# Patient Record
Sex: MALE | Race: BLACK OR AFRICAN AMERICAN | Employment: OTHER | URBAN - METROPOLITAN AREA
[De-identification: names, ages, dates, MRNs, and addresses within clinical notes are randomized per-mention and may not be internally consistent; named-entity substitution may affect disease eponyms.]

---

## 2019-01-23 ENCOUNTER — OP HISTORICAL/CONVERTED ENCOUNTER (OUTPATIENT)
Dept: OTHER | Age: 64
End: 2019-01-23

## 2021-06-14 ENCOUNTER — OFFICE VISIT (OUTPATIENT)
Dept: SURGERY | Age: 66
End: 2021-06-14
Payer: MEDICARE

## 2021-06-14 ENCOUNTER — TRANSCRIBE ORDER (OUTPATIENT)
Dept: REGISTRATION | Age: 66
End: 2021-06-14

## 2021-06-14 ENCOUNTER — HOSPITAL ENCOUNTER (OUTPATIENT)
Dept: LAB | Age: 66
Discharge: HOME | End: 2021-06-14
Attending: SURGERY
Payer: MEDICARE

## 2021-06-14 ENCOUNTER — HOSPITAL ENCOUNTER (OUTPATIENT)
Dept: CT IMAGING | Age: 66
Discharge: HOME | End: 2021-06-14
Attending: SURGERY
Payer: MEDICARE

## 2021-06-14 VITALS
WEIGHT: 163.6 LBS | RESPIRATION RATE: 12 BRPM | HEART RATE: 61 BPM | OXYGEN SATURATION: 99 % | BODY MASS INDEX: 24.23 KG/M2 | HEIGHT: 69 IN | SYSTOLIC BLOOD PRESSURE: 152 MMHG | DIASTOLIC BLOOD PRESSURE: 72 MMHG | TEMPERATURE: 97.1 F

## 2021-06-14 DIAGNOSIS — L97.509 FOOT ULCER (HCC): Primary | ICD-10-CM

## 2021-06-14 DIAGNOSIS — I73.9 PERIPHERAL VASCULAR DISEASE (HCC): ICD-10-CM

## 2021-06-14 DIAGNOSIS — L97.509 FOOT ULCER (HCC): ICD-10-CM

## 2021-06-14 DIAGNOSIS — I70.25 ISCHEMIC FOOT ULCER DUE TO ATHEROSCLEROSIS OF NATIVE ARTERY OF LIMB (HCC): ICD-10-CM

## 2021-06-14 DIAGNOSIS — L97.509 ISCHEMIC FOOT ULCER DUE TO ATHEROSCLEROSIS OF NATIVE ARTERY OF LIMB (HCC): ICD-10-CM

## 2021-06-14 DIAGNOSIS — I70.25 ISCHEMIC FOOT ULCER DUE TO ATHEROSCLEROSIS OF NATIVE ARTERY OF LIMB (HCC): Primary | ICD-10-CM

## 2021-06-14 DIAGNOSIS — L97.509 ISCHEMIC FOOT ULCER DUE TO ATHEROSCLEROSIS OF NATIVE ARTERY OF LIMB (HCC): Primary | ICD-10-CM

## 2021-06-14 LAB — CREAT SERPL-MCNC: 0.86 MG/DL (ref 0.7–1.3)

## 2021-06-14 PROCEDURE — 3017F COLORECTAL CA SCREEN DOC REV: CPT | Performed by: SURGERY

## 2021-06-14 PROCEDURE — 75635 CT ANGIO ABDOMINAL ARTERIES: CPT

## 2021-06-14 PROCEDURE — G8510 SCR DEP NEG, NO PLAN REQD: HCPCS | Performed by: SURGERY

## 2021-06-14 PROCEDURE — 36415 COLL VENOUS BLD VENIPUNCTURE: CPT

## 2021-06-14 PROCEDURE — G8420 CALC BMI NORM PARAMETERS: HCPCS | Performed by: SURGERY

## 2021-06-14 PROCEDURE — 1101F PT FALLS ASSESS-DOCD LE1/YR: CPT | Performed by: SURGERY

## 2021-06-14 PROCEDURE — 99203 OFFICE O/P NEW LOW 30 MIN: CPT | Performed by: SURGERY

## 2021-06-14 PROCEDURE — 82565 ASSAY OF CREATININE: CPT

## 2021-06-14 PROCEDURE — G8536 NO DOC ELDER MAL SCRN: HCPCS | Performed by: SURGERY

## 2021-06-14 PROCEDURE — G8427 DOCREV CUR MEDS BY ELIG CLIN: HCPCS | Performed by: SURGERY

## 2021-06-14 PROCEDURE — 74011000636 HC RX REV CODE- 636: Performed by: SURGERY

## 2021-06-14 RX ORDER — GABAPENTIN 100 MG/1
1 CAPSULE ORAL DAILY
COMMUNITY
Start: 2021-06-10

## 2021-06-14 RX ORDER — CLOPIDOGREL BISULFATE 75 MG/1
1 TABLET ORAL DAILY
COMMUNITY
Start: 2021-05-06

## 2021-06-14 RX ORDER — FUROSEMIDE 40 MG/1
1 TABLET ORAL 2 TIMES DAILY
COMMUNITY
Start: 2021-06-10

## 2021-06-14 RX ORDER — LOSARTAN POTASSIUM 25 MG/1
1 TABLET ORAL DAILY
COMMUNITY
Start: 2021-05-18

## 2021-06-14 RX ORDER — ISOSORBIDE MONONITRATE 30 MG/1
1 TABLET, EXTENDED RELEASE ORAL DAILY
COMMUNITY
Start: 2021-05-30

## 2021-06-14 RX ORDER — DOCUSATE SODIUM 100 MG/1
100 CAPSULE, LIQUID FILLED ORAL AS NEEDED
COMMUNITY

## 2021-06-14 RX ORDER — METOPROLOL TARTRATE 25 MG/1
1 TABLET, FILM COATED ORAL 2 TIMES DAILY
COMMUNITY
Start: 2021-05-06

## 2021-06-14 RX ORDER — ATORVASTATIN CALCIUM 80 MG/1
1 TABLET, FILM COATED ORAL DAILY
COMMUNITY
Start: 2021-06-09

## 2021-06-14 RX ADMIN — IOPAMIDOL 100 ML: 755 INJECTION, SOLUTION INTRAVENOUS at 15:02

## 2021-06-14 NOTE — PROGRESS NOTES
Chief Complaint   Patient presents with    New Patient     Right leg pain/poor circulation      1. Have you been to the ER, urgent care clinic since your last visit? Hospitalized since your last visit? No    2. Have you seen or consulted any other health care providers outside of the [address] [place] [address] since your last visit? Include any pap smears or colon screening.  No    Visit Vitals  BP (!) 152/72 (BP 1 Location: Left upper arm, BP Patient Position: Sitting, BP Cuff Size: Adult)   Pulse 61   Temp 97.1 °F (36.2 °C) (Temporal)   Resp 12   Ht 5' 9\" (1.753 m)   Wt 163 lb 9.6 oz (74.2 kg)   SpO2 99%   BMI 24.16 kg/m²

## 2021-06-17 PROBLEM — L97.509 ISCHEMIC FOOT ULCER DUE TO ATHEROSCLEROSIS OF NATIVE ARTERY OF LIMB (HCC): Status: ACTIVE | Noted: 2021-06-17

## 2021-06-17 PROBLEM — I70.25 ISCHEMIC FOOT ULCER DUE TO ATHEROSCLEROSIS OF NATIVE ARTERY OF LIMB (HCC): Status: ACTIVE | Noted: 2021-06-17

## 2021-06-17 PROBLEM — I73.9 PERIPHERAL VASCULAR DISEASE (HCC): Status: ACTIVE | Noted: 2021-06-17

## 2021-06-17 NOTE — PROGRESS NOTES
VASCULAR history and physical      Subjective:   CHIEF COMPLAINTS:  Leg pain  PRESENTATION OF ILLNESS:  Mr. Ishmael Claros is a very pleasant 79-year-old man who has known history of peripheral vascular disease. He is complaining of bilateral leg pain. Especially worse in right leg. Pain is constant. Patient has some sort of a vascular work-up and procedures done at the [facility] in 2014. Patient is not quite sure what was exactly done. And when symptoms started about a month ago. He says probably longer. But significant pain started about a month ago. Patient denies chest pain shortness of breath. Patient does have a history of congestive heart failure. Patient denies abdominal pain nausea weight loss or history of stroke. Past Medical History:   Diagnosis Date    CAD (coronary artery disease)     HTN (hypertension)     Hyperlipidemia     Ischemic cardiomyopathy     PVD (peripheral vascular disease) ([address]       Past Surgical History:   Procedure Laterality Date    HX HEART VALVE SURGERY  2017    HX IMPLANTABLE CARDIOVERTER DEFIBRILLATOR  2014     Family History   Problem Relation Age of Onset    Hypertension Mother     Heart Attack Father     Hypertension Father       Social History     Tobacco Use    Smoking status: Current Every Day Smoker     Packs/day: 0.25     Years: 40.00     Pack years: 10.00     Types: Cigarettes    Smokeless tobacco: Never Used   Substance Use Topics    Alcohol use: Not Currently       Prior to Admission medications    Medication Sig Start Date End Date Taking? Authorizing Provider   atorvastatin (LIPITOR) 80 mg tablet Take 1 Tablet by mouth daily. 6/9/21  Yes Provider, Historical   clopidogreL (PLAVIX) 75 mg tab Take 1 Tablet by mouth daily. 5/6/21  Yes Provider, Historical   furosemide (LASIX) 40 mg tablet Take 1 Tablet by mouth two (2) times a day. 6/10/21  Yes Provider, Historical   isosorbide mononitrate ER (IMDUR) 30 mg tablet Take 1 Tablet by mouth daily.  5/30/21 Yes Provider, Historical   losartan (COZAAR) 25 mg tablet Take 1 Tablet by mouth daily. 5/18/21  Yes Provider, Historical   metoprolol tartrate (LOPRESSOR) 25 mg tablet Take 1 Tablet by mouth two (2) times a day. 5/6/21  Yes Provider, Historical   gabapentin (NEURONTIN) 100 mg capsule Take 1 Capsule by mouth daily. 6/10/21  Yes Provider, Historical   docusate sodium (COLACE) 100 mg capsule Take 100 mg by mouth as needed for Constipation. Yes Provider, Historical     Not on File     Review of Systems:  I reviewed the rest of organ systems personally and they were negative signed by Dr. Gray Anand    Objective:     Visit Vitals  BP (!) 152/72 (BP 1 Location: Left upper arm, BP Patient Position: Sitting, BP Cuff Size: Adult)   Pulse 61   Temp 97.1 °F (36.2 °C) (Temporal)   Resp 12   Ht 5' 9\" (1.753 m)   Wt 163 lb 9.6 oz (74.2 kg)   SpO2 99%   BMI 24.16 kg/m²     VITAL SIGNS REVIEWED. Physical Exam:  Patient is well-nourished pleasant in conversation is appropriate. Head and neck examination atraumatic, normocephalic. Gaze appropriate. Conversation appropriate. Neck examination shows supple. No mass. No obvious carotid bruit. Chest examination shows lungs are clear bilaterally well-expanded, no crackles or wheezes. Cardiovascular system regular rate, no obvious murmur. Skin warm to touch  and moist, no skin lesions. Abdomen is soft ,not tender or distended bowel sounds present. No palpable mass. Neurological examinations, no focal neuro deficits moving all 4 extremities. Cranial nerves intact. Sensation is intact as well. Hematologic: No obvious bruise or swelling or obvious lymphadenopathy. Psychosocial: Appropriate. Has good effect. Musculoskeletal system: No muscle wasting, appropriate movements upper and lower extremity. Vascular examination: Patient has nonpalpable pedal pulse. Very sluggish Doppler signal noted right foot and stronger Doppler signal noted in the left dorsalis pedis and PT.   There is no palpable pedal pulses. He had multiple scar tissue noted in both groins and abdominal area. Data Review:   Hospital Outpatient Visit on 06/14/2021   Component Date Value Ref Range Status    Creatinine 06/14/2021 0.86  0.70 - 1.30 mg/dL Final        Assessment:     Problem List Items Addressed This Visit        Circulatory    Ischemic foot ulcer due to atherosclerosis of native artery of limb ([address] - Primary    Relevant Medications    atorvastatin (LIPITOR) 80 mg tablet    clopidogreL (PLAVIX) 75 mg tab    furosemide (LASIX) 40 mg tablet    losartan (COZAAR) 25 mg tablet    metoprolol tartrate (LOPRESSOR) 25 mg tablet    Other Relevant Orders    CTA ABD ART W RUNOFF W WO CONT (Completed)    Peripheral vascular disease (HCC)    Relevant Medications    atorvastatin (LIPITOR) 80 mg tablet    clopidogreL (PLAVIX) 75 mg tab              Plan:     Patient will require further imaging studies of CT angiogram aorta distal runoff. He must have some sort of bypass surgery for vascular problem. I did explain to the patient rationale for CT angiogram and will discuss results and come up with viable plan for his ischemic leg on the right leg. Patient will visit me after CT scan which is arranged for tomorrow.         Valentine Dash MD

## 2021-06-25 ENCOUNTER — OFFICE VISIT (OUTPATIENT)
Dept: SURGERY | Age: 66
End: 2021-06-25
Payer: MEDICARE

## 2021-06-25 VITALS
RESPIRATION RATE: 18 BRPM | DIASTOLIC BLOOD PRESSURE: 53 MMHG | BODY MASS INDEX: 23.89 KG/M2 | HEART RATE: 61 BPM | SYSTOLIC BLOOD PRESSURE: 123 MMHG | OXYGEN SATURATION: 99 % | TEMPERATURE: 99.6 F | WEIGHT: 161.8 LBS

## 2021-06-25 DIAGNOSIS — L97.509 ISCHEMIC FOOT ULCER DUE TO ATHEROSCLEROSIS OF NATIVE ARTERY OF LIMB (HCC): Primary | ICD-10-CM

## 2021-06-25 DIAGNOSIS — I70.25 ISCHEMIC FOOT ULCER DUE TO ATHEROSCLEROSIS OF NATIVE ARTERY OF LIMB (HCC): Primary | ICD-10-CM

## 2021-06-25 PROCEDURE — G8510 SCR DEP NEG, NO PLAN REQD: HCPCS | Performed by: SURGERY

## 2021-06-25 PROCEDURE — G8420 CALC BMI NORM PARAMETERS: HCPCS | Performed by: SURGERY

## 2021-06-25 PROCEDURE — 1101F PT FALLS ASSESS-DOCD LE1/YR: CPT | Performed by: SURGERY

## 2021-06-25 PROCEDURE — G8536 NO DOC ELDER MAL SCRN: HCPCS | Performed by: SURGERY

## 2021-06-25 PROCEDURE — 3017F COLORECTAL CA SCREEN DOC REV: CPT | Performed by: SURGERY

## 2021-06-25 PROCEDURE — 99213 OFFICE O/P EST LOW 20 MIN: CPT | Performed by: SURGERY

## 2021-06-25 PROCEDURE — G8427 DOCREV CUR MEDS BY ELIG CLIN: HCPCS | Performed by: SURGERY

## 2021-06-25 RX ORDER — ASPIRIN 81 MG/1
81 TABLET ORAL DAILY
COMMUNITY

## 2021-06-25 NOTE — PROGRESS NOTES
Chief Complaint   Patient presents with    Follow-up     RLE     1. Have you been to the ER, urgent care clinic since your last visit? Hospitalized since your last visit? No    2. Have you seen or consulted any other health care providers outside of the [address] [place] [address] since your last visit? Include any pap smears or colon screening.  No   Visit Vitals  BP (!) 123/53 (BP 1 Location: Left upper arm, BP Patient Position: Sitting, BP Cuff Size: Adult)   Pulse 61   Temp 99.6 °F (37.6 °C) (Temporal)   Resp 18   Wt 161 lb 12.8 oz (73.4 kg)   SpO2 99%   BMI 23.89 kg/m²

## 2021-09-20 ENCOUNTER — TRANSCRIBE ORDER (OUTPATIENT)
Dept: SCHEDULING | Age: 66
End: 2021-09-20

## 2021-09-20 DIAGNOSIS — I70.1 ATHEROSCLEROSIS OF RENAL ARTERY (HCC): Primary | ICD-10-CM

## 2022-02-02 ENCOUNTER — HOSPITAL ENCOUNTER (OUTPATIENT)
Dept: NON INVASIVE DIAGNOSTICS | Age: 67
Discharge: HOME | End: 2022-02-02
Attending: RADIOLOGY
Payer: MEDICARE

## 2022-02-02 DIAGNOSIS — I70.1 ATHEROSCLEROSIS OF RENAL ARTERY (HCC): ICD-10-CM

## 2022-02-02 LAB
ABDOMINAL MID AORTA VEL: 83.2 CM/S
LEFT ARM BP: 161 MMHG
LEFT KIDNEY ARCUATE ARTERY INFERIOR EDV: 4.7 CM/S
LEFT KIDNEY ARCUATE ARTERY INFERIOR PSV: 19.7 CM/S
LEFT KIDNEY ARCUATE ARTERY MEDIAL EDV: 4 CM/S
LEFT KIDNEY ARCUATE ARTERY MEDIAL PSV: 22.4 CM/S
LEFT KIDNEY ARCUATE ARTERY SUPERIOR EDV: 4.7 CM/S
LEFT KIDNEY ARCUATE ARTERY SUPERIOR PSV: 21.4 CM/S
LEFT KIDNEY INF ARCUATE RI: 0.76
LEFT KIDNEY LENGTH: 9.04 CM
LEFT KIDNEY MED ARCUATE RI: 0.82
LEFT KIDNEY SUP ARCUATE RI: 0.78
LEFT RENAL DIST DIAS: 19.4 CM/S
LEFT RENAL DIST RI: 0.71
LEFT RENAL DIST SYS: 66.2 CM/S
LEFT RENAL LOWER PARENCHYMA MAX: 23.5 CM/S
LEFT RENAL LOWER PARENCHYMA MIN: 7 CM/S
LEFT RENAL LOWER PARENCHYMA RI: 0.7
LEFT RENAL MIDDLE PARENCHYMA MAX: 24.7 CM/S
LEFT RENAL MIDDLE PARENCHYMA MIN: 3 CM/S
LEFT RENAL MIDDLE PARENCHYMA RI: 0.88
LEFT RENAL UPPER PARENCHYMA MAX: 33.8 CM/S
LEFT RENAL UPPER PARENCHYMA MIN: 5.2 CM/S
LEFT RENAL UPPER PARENCHYMA RI: 0.85
MID AORTIC AP: 2.06 CM
MID AORTIC TRANS: 1.95 CM
RIGHT ARM BP: 149 MMHG
RIGHT KIDNEY LENGTH: 9.22 CM
RIGHT RENAL DIST DIAS: 34.2 CM/S
RIGHT RENAL DIST RI: 0.73
RIGHT RENAL DIST SYS: 128 CM/S
RIGHT RENAL LOWER PARENCHYMA MAX: 22.2 CM/S
RIGHT RENAL LOWER PARENCHYMA MIN: 5 CM/S
RIGHT RENAL LOWER PARENCHYMA RI: 0.77
RIGHT RENAL MID DIAS: 16 CM/S
RIGHT RENAL MID RI: 0.9
RIGHT RENAL MID SYS: 155 CM/S
RIGHT RENAL MIDDLE PARENCHYMA MAX: 35.1 CM/S
RIGHT RENAL MIDDLE PARENCHYMA MIN: 8.7 CM/S
RIGHT RENAL MIDDLE PARENCHYMA RI: 0.75
RIGHT RENAL ORIGIN DIAS: 11.6 CM/S
RIGHT RENAL ORIGIN RI: 0.83
RIGHT RENAL ORIGIN SYS: 69.2 CM/S
RIGHT RENAL PROX DIAS: 19.3 CM/S
RIGHT RENAL PROX RI: 0.81
RIGHT RENAL PROX SYS: 102 CM/S
RIGHT RENAL UPPER PARENCHYMA MAX: 30.4 CM/S
RIGHT RENAL UPPER PARENCHYMA MIN: 9.7 CM/S
RIGHT RENAL UPPER PARENCHYMA RI: 0.68

## 2022-02-02 PROCEDURE — 93975 VASCULAR STUDY: CPT

## 2022-03-19 PROBLEM — I73.9 PERIPHERAL VASCULAR DISEASE (HCC): Status: ACTIVE | Noted: 2021-06-17

## 2022-03-19 PROBLEM — L97.509 ISCHEMIC FOOT ULCER DUE TO ATHEROSCLEROSIS OF NATIVE ARTERY OF LIMB (HCC): Status: ACTIVE | Noted: 2021-06-17

## 2022-03-19 PROBLEM — I70.25 ISCHEMIC FOOT ULCER DUE TO ATHEROSCLEROSIS OF NATIVE ARTERY OF LIMB (HCC): Status: ACTIVE | Noted: 2021-06-17

## 2022-11-23 ENCOUNTER — TRANSCRIBE ORDER (OUTPATIENT)
Dept: SCHEDULING | Age: 67
End: 2022-11-23

## 2022-11-23 DIAGNOSIS — I70.211 ATHEROSCLEROSIS OF NATIVE ARTERY OF RIGHT LOWER EXTREMITY WITH INTERMITTENT CLAUDICATION (HCC): Primary | ICD-10-CM

## 2022-12-13 ENCOUNTER — HOSPITAL ENCOUNTER (OUTPATIENT)
Dept: CT IMAGING | Age: 67
Discharge: HOME | End: 2022-12-13
Attending: RADIOLOGY
Payer: MEDICARE

## 2022-12-13 DIAGNOSIS — I70.211 ATHEROSCLEROSIS OF NATIVE ARTERY OF RIGHT LOWER EXTREMITY WITH INTERMITTENT CLAUDICATION (HCC): ICD-10-CM

## 2022-12-13 LAB — CREAT BLD-MCNC: 1 MG/DL (ref 0.6–1.3)

## 2022-12-13 PROCEDURE — 75635 CT ANGIO ABDOMINAL ARTERIES: CPT

## 2022-12-13 PROCEDURE — 74011000636 HC RX REV CODE- 636

## 2022-12-13 PROCEDURE — 82565 ASSAY OF CREATININE: CPT

## 2022-12-13 RX ADMIN — IOPAMIDOL 100 ML: 755 INJECTION, SOLUTION INTRAVENOUS at 10:54

## 2023-02-17 ENCOUNTER — HOSPITAL ENCOUNTER (OUTPATIENT)
Dept: PREADMISSION TESTING | Age: 68
Discharge: HOME | End: 2023-02-17
Attending: SURGERY
Payer: MEDICARE

## 2023-02-17 VITALS
SYSTOLIC BLOOD PRESSURE: 115 MMHG | BODY MASS INDEX: 24.32 KG/M2 | HEART RATE: 60 BPM | TEMPERATURE: 97.9 F | HEIGHT: 68 IN | WEIGHT: 160.5 LBS | DIASTOLIC BLOOD PRESSURE: 63 MMHG | RESPIRATION RATE: 16 BRPM | OXYGEN SATURATION: 100 %

## 2023-02-17 LAB
ABO + RH BLD: NORMAL
ALBUMIN SERPL-MCNC: 3.7 G/DL (ref 3.5–5)
ALBUMIN/GLOB SERPL: 1 (ref 1.1–2.2)
ALP SERPL-CCNC: 100 U/L (ref 45–117)
ALT SERPL-CCNC: 16 U/L (ref 12–78)
ANION GAP SERPL CALC-SCNC: 4 MMOL/L (ref 5–15)
APPEARANCE UR: ABNORMAL
APTT PPP: 25.6 SEC (ref 22.1–31)
AST SERPL-CCNC: 20 U/L (ref 15–37)
BACTERIA URNS QL MICRO: NEGATIVE /HPF
BILIRUB SERPL-MCNC: 0.4 MG/DL (ref 0.2–1)
BILIRUB UR QL: NEGATIVE
BLOOD GROUP ANTIBODIES SERPL: NORMAL
BUN SERPL-MCNC: 14 MG/DL (ref 6–20)
BUN/CREAT SERPL: 13 (ref 12–20)
CALCIUM SERPL-MCNC: 8.8 MG/DL (ref 8.5–10.1)
CHLORIDE SERPL-SCNC: 110 MMOL/L (ref 97–108)
CO2 SERPL-SCNC: 28 MMOL/L (ref 21–32)
COLOR UR: ABNORMAL
CREAT SERPL-MCNC: 1.05 MG/DL (ref 0.7–1.3)
EPITH CASTS URNS QL MICRO: ABNORMAL /LPF
ERYTHROCYTE [DISTWIDTH] IN BLOOD BY AUTOMATED COUNT: 13.3 % (ref 11.5–14.5)
GLOBULIN SER CALC-MCNC: 3.7 G/DL (ref 2–4)
GLUCOSE SERPL-MCNC: 111 MG/DL (ref 65–100)
GLUCOSE UR STRIP.AUTO-MCNC: NEGATIVE MG/DL
HCT VFR BLD AUTO: 37.3 % (ref 36.6–50.3)
HGB BLD-MCNC: 11.8 G/DL (ref 12.1–17)
HGB UR QL STRIP: ABNORMAL
INR PPP: 1 (ref 0.9–1.1)
KETONES UR QL STRIP.AUTO: ABNORMAL MG/DL
LEUKOCYTE ESTERASE UR QL STRIP.AUTO: ABNORMAL
MCH RBC QN AUTO: 29.3 PG (ref 26–34)
MCHC RBC AUTO-ENTMCNC: 31.6 G/DL (ref 30–36.5)
MCV RBC AUTO: 92.6 FL (ref 80–99)
NITRITE UR QL STRIP.AUTO: NEGATIVE
NRBC # BLD: 0 K/UL (ref 0–0.01)
NRBC BLD-RTO: 0 PER 100 WBC
PH UR STRIP: 7 (ref 5–8)
PLATELET # BLD AUTO: 292 K/UL (ref 150–400)
PMV BLD AUTO: 10.2 FL (ref 8.9–12.9)
POTASSIUM SERPL-SCNC: 4 MMOL/L (ref 3.5–5.1)
PROT SERPL-MCNC: 7.4 G/DL (ref 6.4–8.2)
PROT UR STRIP-MCNC: ABNORMAL MG/DL
PROTHROMBIN TIME: 10.7 SEC (ref 9–11.1)
RBC # BLD AUTO: 4.03 M/UL (ref 4.1–5.7)
RBC #/AREA URNS HPF: ABNORMAL /HPF (ref 0–5)
SODIUM SERPL-SCNC: 142 MMOL/L (ref 136–145)
SP GR UR REFRACTOMETRY: 1.02
SPECIMEN EXP DATE BLD: NORMAL
THERAPEUTIC RANGE,PTTT: NORMAL SECS (ref 58–77)
TRICHOMONAS UR QL MICRO: PRESENT
UA: UC IF INDICATED,UAUC: ABNORMAL
UROBILINOGEN UR QL STRIP.AUTO: 1 EU/DL (ref 0.2–1)
WBC # BLD AUTO: 7.1 K/UL (ref 4.1–11.1)
WBC URNS QL MICRO: ABNORMAL /HPF (ref 0–4)

## 2023-02-17 PROCEDURE — 87086 URINE CULTURE/COLONY COUNT: CPT

## 2023-02-17 PROCEDURE — 36415 COLL VENOUS BLD VENIPUNCTURE: CPT

## 2023-02-17 PROCEDURE — 87147 CULTURE TYPE IMMUNOLOGIC: CPT

## 2023-02-17 PROCEDURE — 85610 PROTHROMBIN TIME: CPT

## 2023-02-17 PROCEDURE — 80053 COMPREHEN METABOLIC PANEL: CPT

## 2023-02-17 PROCEDURE — 85730 THROMBOPLASTIN TIME PARTIAL: CPT

## 2023-02-17 PROCEDURE — 81001 URINALYSIS AUTO W/SCOPE: CPT

## 2023-02-17 PROCEDURE — 86900 BLOOD TYPING SEROLOGIC ABO: CPT

## 2023-02-17 PROCEDURE — 85027 COMPLETE CBC AUTOMATED: CPT

## 2023-02-17 RX ORDER — FAMOTIDINE 20 MG/1
20 TABLET, FILM COATED ORAL 2 TIMES DAILY
Status: ON HOLD | COMMUNITY

## 2023-02-17 RX ORDER — SODIUM CHLORIDE, SODIUM LACTATE, POTASSIUM CHLORIDE, CALCIUM CHLORIDE 600; 310; 30; 20 MG/100ML; MG/100ML; MG/100ML; MG/100ML
25 INJECTION, SOLUTION INTRAVENOUS CONTINUOUS
Status: CANCELLED | OUTPATIENT
Start: 2023-02-24

## 2023-02-17 NOTE — PERIOP NOTES

## 2023-02-17 NOTE — PERIOP NOTES
[facility]  Preoperative Instructions        Surgery Date 2/24/23          Time of Arrival to be called @ [phone]    1. On the day of your surgery, please report to the Surgical Services Registration Desk and sign in at your designated time. The Surgery Center is located to the right of the Emergency Room. 2. You must have someone with you to drive you home. You should not drive a car for 24 hours following surgery. Please make arrangements for a friend or family member to stay with you for the first 24 hours after your surgery. 3. Do not have anything to eat or drink (including water, gum, mints, coffee, juice) after midnight ?? .? This may not apply to medications prescribed by your physician. ?(Please note below the special instructions with medications to take the morning of your procedure.)    4. We recommend you do not drink any alcoholic beverages for 24 hours before and after your surgery. 5. Contact your surgeons office for instructions on the following medications: non-steroidal anti-inflammatory drugs (i.e. Advil, Aleve), vitamins, and supplements. (Some surgeons will want you to stop these medications prior to surgery and others may allow you to take them)  **If you are currently taking Plavix, Coumadin, Aspirin and/or other blood-thinning agents, contact your surgeon for instructions. ** Your surgeon will partner with the physician prescribing these medications to determine if it is safe to stop or if you need to continue taking. Please do not stop taking these medications without instructions from your surgeon    6. Wear comfortable clothes. Wear glasses instead of contacts. Do not bring any money or jewelry. Please bring picture ID, insurance card, and any prearranged co-payment or hospital payment. Do not wear make-up, particularly mascara the morning of your surgery. Do not wear nail polish, particularly if you are having foot /hand surgery.   Wear your hair loose or down, no ponytails, buns, frantz pins or clips. All body piercings must be removed. Please shower with antibacterial soap for three consecutive days before and on the morning of surgery, but do not apply any lotions, powders or deodorants after the shower on the day of surgery. Please use a fresh towels after each shower. Please sleep in clean clothes and change bed linens the night before surgery. Please do not shave for 48 hours prior to surgery. Shaving of the face is acceptable. 7. You should understand that if you do not follow these instructions your surgery may be cancelled. If your physical condition changes (I.e. fever, cold or flu) please contact your surgeon as soon as possible. 8. It is important that you be on time. If a situation occurs where you may be late, please call [phone] (OR Holding Area). 9. If you have any questions and or problems, please call [phone] (Pre-admission Testing). 10. Your surgery time may be subject to change. You will receive a phone call the evening prior if your time changes. 11.  If having outpatient surgery, you must have someone to drive you here, stay with you during the duration of your stay, and to drive you home at time of discharge. Stop plavix 7 days prior to surgery per Dr Kori Joseph- pt took 2/17/23, instructed to stop as of today- 2/17/23. Continue aspirin but do not take the morning of surgery     TAKE ALL MEDICATIONS DAY OF SURGERY EXCEPT:aspirin. May take all other morning medications with a sip of water. I understand a pre-operative phone call will be made to verify my surgery time. In the event that I am not available, I give permission for a message to be left on my answering service and/or with another person?   yes         ___________________      __________   _________    (Signature of Patient)             (Witness)                (Date and Time)

## 2023-02-18 LAB
BACTERIA SPEC CULT: ABNORMAL
CC UR VC: ABNORMAL
SERVICE CMNT-IMP: ABNORMAL

## 2023-02-20 RX ORDER — METRONIDAZOLE 500 MG/1
2000 TABLET ORAL ONCE
Qty: 4 TABLET | Refills: 0 | Status: SHIPPED | OUTPATIENT
Start: 2023-02-20 | End: 2023-02-20

## 2023-02-20 RX ORDER — AMOXICILLIN 500 MG/1
500 CAPSULE ORAL 3 TIMES DAILY
Qty: 15 CAPSULE | Refills: 0 | Status: ON HOLD | OUTPATIENT
Start: 2023-02-20 | End: 2023-02-25

## 2023-02-20 NOTE — PERIOP NOTES
Patient informed of script for flagyl to take all 4 pills today for trichomonas after he eats a meal. Reinforced not to drink alcohol with flagyl. Informed of script for amoxicillin to start tomorrow 1 tab tid for 5 days for UTI. Pt advised to inform partner to be tested as well. Patient verbalizes understanding.

## 2023-02-20 NOTE — ADVANCED PRACTICE NURSE
2/20/23 UC + for trichomonas as well as beta hemolytic strep. Surgeon advised and requests pt treated and states pt is ok to proceed with surgery as scheduled. Rxs for Metronidazole 500 mg # 4 to take 4 tablets (2000 mg) one time, and Amoxicillin 500 mg TID x 5 days escribed to pt's pharmacy on file. Pt advised to avoid ETOH while taking Metronidazole and that partner(s) should be advised of + trichomonas results and need for testing and treatment as needed. Latest Reference Range & Units 2/17/23 09:50   Color -   YELLOW/STRAW   Appearance CLEAR   CLOUDY ! Specific gravity -   1.022   pH (UA) 5.0 - 8.0   7.0   Protein NEG mg/dL TRACE ! Glucose NEG mg/dL Negative   Ketone NEG mg/dL TRACE ! Blood NEG   TRACE ! Bilirubin NEG   Negative   Urobilinogen 0.2 - 1.0 EU/dL 1.0   Nitrites NEG   Negative   Leukocyte Esterase NEG   LARGE ! Epithelial cells FEW /lpf MANY ! WBC 0 - 4 /hpf 20-50   RBC 0 - 5 /hpf 0-5   Bacteria NEG /hpf Negative   Trichomonas NEG   PRESENT !   !: Data is abnormal  CULTURE, URINE [[id number]] (Order [id number])  Microbiology  Date: 2/17/2023 Department: [facility]rm Or Preadmit Tsting Released By:  (auto-released) Authorizing: Cheryl Babcock MD      Contains abnormal data CULTURE, URINE  Order: [id number]  Collected 2/17/2023 09:50    Status: Final result    Specimen Information: Urine   0 Result Notes  Component Ref Range & Units 2/17/23 0950  Resulting Agency   Special Requests:   NO SPECIAL REQUESTS   Reflexed from [id number]  [facility]   Adams Count   >100,000   COLONIES/mL  [address]   Culture result:    Abnormal   STREPTOCOCCI, BETA HEMOLYTIC GROUP B Penicillin and ampicillin are drugs of choice for treatment of beta-hemolytic streptococcal infections. Susceptibility testing of penicillins and beta-lactams approved by the FDA for treatment of beta-hemolytic streptococcal infections need not be performed routinely, because nonsusceptible isolates are extremely rare. CLSI 2012   [address]              Specimen Collected: 02/17/23 09:50 Last Resulted: 02/18/23 12:14       Order Details     Lab and Collection Details     Routing     Result History     View Encounter Conversation           Result Care Coordination      Patient Communication     Released  Not seen Back to Top           Collection Information    Specimen ID: [id number]    Urine       Collected: 2/17/2023  9:50 AM    [id number]    Resulting Agency: [facility] AT [facility]. Jaci Marques,    [address] [place] [id number]        Lab Information    Lab   [facility] Jaci Marques,    [address] [place] [address] [address] [facility] [address]                Lab Inquiry View [address] [facility] Lab Information       CULTURE, URINE: Patient Communication     Released  Not seen     Provider Information    Ordering User Ordering Provider Authorizing Provider   Brendan, Lab In MD Cheryl Short MD   Attending Provider(s) PCP   MD Sirisha Olivia MD     How to build your own [facility], URINE (Order #[id number]) on 2/17/23     Tracking Links    Cosign Tracking Order Transmittal Tracking

## 2023-02-24 ENCOUNTER — HOSPITAL ENCOUNTER (INPATIENT)
Age: 68
LOS: 3 days | Discharge: HOME | DRG: 254 | End: 2023-02-27
Attending: SURGERY | Admitting: SURGERY
Payer: MEDICARE

## 2023-02-24 ENCOUNTER — ANESTHESIA EVENT (OUTPATIENT)
Dept: SURGERY | Age: 68
DRG: 254 | End: 2023-02-24
Payer: MEDICARE

## 2023-02-24 ENCOUNTER — ANESTHESIA (OUTPATIENT)
Dept: SURGERY | Age: 68
DRG: 254 | End: 2023-02-24
Payer: MEDICARE

## 2023-02-24 DIAGNOSIS — I99.8 LOWER LIMB ISCHEMIA: Primary | ICD-10-CM

## 2023-02-24 PROCEDURE — 74011250636 HC RX REV CODE- 250/636: Performed by: NURSE ANESTHETIST, CERTIFIED REGISTERED

## 2023-02-24 PROCEDURE — 74011250636 HC RX REV CODE- 250/636: Performed by: SURGERY

## 2023-02-24 PROCEDURE — 65270000046 HC RM TELEMETRY

## 2023-02-24 PROCEDURE — 76060000037 HC ANESTHESIA 3 TO 3.5 HR: Performed by: SURGERY

## 2023-02-24 PROCEDURE — 77030002996 HC SUT SLK J&J -A: Performed by: SURGERY

## 2023-02-24 PROCEDURE — 74011000250 HC RX REV CODE- 250: Performed by: NURSE ANESTHETIST, CERTIFIED REGISTERED

## 2023-02-24 PROCEDURE — 77030026438 HC STYL ET INTUB CARD -A: Performed by: STUDENT IN AN ORGANIZED HEALTH CARE EDUCATION/TRAINING PROGRAM

## 2023-02-24 PROCEDURE — 74011250636 HC RX REV CODE- 250/636: Performed by: STUDENT IN AN ORGANIZED HEALTH CARE EDUCATION/TRAINING PROGRAM

## 2023-02-24 PROCEDURE — 041K0JH BYPASS RIGHT FEMORAL ARTERY TO RIGHT FEMORAL ARTERY WITH SYNTHETIC SUBSTITUTE, OPEN APPROACH: ICD-10-PCS | Performed by: SURGERY

## 2023-02-24 PROCEDURE — 76210000016 HC OR PH I REC 1 TO 1.5 HR: Performed by: SURGERY

## 2023-02-24 PROCEDURE — 77030002924 HC SUT GORTX WLGO -B: Performed by: SURGERY

## 2023-02-24 PROCEDURE — 77030002987 HC SUT PROL J&J -B: Performed by: SURGERY

## 2023-02-24 PROCEDURE — 74011000250 HC RX REV CODE- 250: Performed by: SURGERY

## 2023-02-24 PROCEDURE — 77030014008 HC SPNG HEMSTAT J&J -C: Performed by: SURGERY

## 2023-02-24 PROCEDURE — 77030005401 HC CATH RAD ARRO -A: Performed by: STUDENT IN AN ORGANIZED HEALTH CARE EDUCATION/TRAINING PROGRAM

## 2023-02-24 PROCEDURE — 77030020061 HC IV BLD WRMR ADMIN SET 3M -B: Performed by: STUDENT IN AN ORGANIZED HEALTH CARE EDUCATION/TRAINING PROGRAM

## 2023-02-24 PROCEDURE — C1768 GRAFT, VASCULAR: HCPCS | Performed by: SURGERY

## 2023-02-24 PROCEDURE — 77030031753 HC SHR ENDO COAG HARM J&J -E: Performed by: SURGERY

## 2023-02-24 PROCEDURE — 77030013797 HC KT TRNSDUC PRSSR EDWD -A: Performed by: STUDENT IN AN ORGANIZED HEALTH CARE EDUCATION/TRAINING PROGRAM

## 2023-02-24 PROCEDURE — 4A133B1 MONITORING OF ARTERIAL PRESSURE, PERIPHERAL, PERCUTANEOUS APPROACH: ICD-10-PCS | Performed by: STUDENT IN AN ORGANIZED HEALTH CARE EDUCATION/TRAINING PROGRAM

## 2023-02-24 PROCEDURE — 77030008684 HC TU ET CUF COVD -B: Performed by: STUDENT IN AN ORGANIZED HEALTH CARE EDUCATION/TRAINING PROGRAM

## 2023-02-24 PROCEDURE — 4A133J1 MONITORING OF ARTERIAL PULSE, PERIPHERAL, PERCUTANEOUS APPROACH: ICD-10-PCS | Performed by: STUDENT IN AN ORGANIZED HEALTH CARE EDUCATION/TRAINING PROGRAM

## 2023-02-24 PROCEDURE — 2709999900 HC NON-CHARGEABLE SUPPLY: Performed by: SURGERY

## 2023-02-24 PROCEDURE — 77030021678 HC GLIDESCP STAT DISP VERT -B: Performed by: STUDENT IN AN ORGANIZED HEALTH CARE EDUCATION/TRAINING PROGRAM

## 2023-02-24 PROCEDURE — 74011250637 HC RX REV CODE- 250/637: Performed by: SURGERY

## 2023-02-24 PROCEDURE — 77030031139 HC SUT VCRL2 J&J -A: Performed by: SURGERY

## 2023-02-24 PROCEDURE — 77030038692 HC WND DEB SYS IRMX -B: Performed by: SURGERY

## 2023-02-24 PROCEDURE — 041L0JH BYPASS LEFT FEMORAL ARTERY TO RIGHT FEMORAL ARTERY WITH SYNTHETIC SUBSTITUTE, OPEN APPROACH: ICD-10-PCS | Performed by: SURGERY

## 2023-02-24 PROCEDURE — 2709999900 HC NON-CHARGEABLE SUPPLY

## 2023-02-24 PROCEDURE — 76010000173 HC OR TIME 3 TO 3.5 HR INTENSV-TIER 1: Performed by: SURGERY

## 2023-02-24 PROCEDURE — 77030002986 HC SUT PROL J&J -A: Performed by: SURGERY

## 2023-02-24 PROCEDURE — 77030013079 HC BLNKT BAIR HGGR 3M -A: Performed by: STUDENT IN AN ORGANIZED HEALTH CARE EDUCATION/TRAINING PROGRAM

## 2023-02-24 PROCEDURE — 77030008463 HC STPLR SKN PROX J&J -B: Performed by: SURGERY

## 2023-02-24 DEVICE — PROPATEN VASCULAR GRAFT TW RR 8MMX80CM 70CM RINGS HEPARIN
Type: IMPLANTABLE DEVICE | Site: GROIN | Status: FUNCTIONAL
Brand: GORE PROPATEN VASCULAR GRAFT

## 2023-02-24 RX ORDER — ACETAMINOPHEN 325 MG/1
650 TABLET ORAL
Status: DISCONTINUED | OUTPATIENT
Start: 2023-02-24 | End: 2023-02-27 | Stop reason: HOSPADM

## 2023-02-24 RX ORDER — ATORVASTATIN CALCIUM 40 MG/1
80 TABLET, FILM COATED ORAL DAILY
Status: DISCONTINUED | OUTPATIENT
Start: 2023-02-25 | End: 2023-02-27 | Stop reason: HOSPADM

## 2023-02-24 RX ORDER — HYDROMORPHONE HYDROCHLORIDE 1 MG/ML
0.2 INJECTION, SOLUTION INTRAMUSCULAR; INTRAVENOUS; SUBCUTANEOUS
Status: DISCONTINUED | OUTPATIENT
Start: 2023-02-24 | End: 2023-02-24 | Stop reason: HOSPADM

## 2023-02-24 RX ORDER — AMOXICILLIN 250 MG/1
500 CAPSULE ORAL 3 TIMES DAILY
Status: DISCONTINUED | OUTPATIENT
Start: 2023-02-24 | End: 2023-02-24

## 2023-02-24 RX ORDER — SUCCINYLCHOLINE CHLORIDE 20 MG/ML
INJECTION INTRAMUSCULAR; INTRAVENOUS AS NEEDED
Status: DISCONTINUED | OUTPATIENT
Start: 2023-02-24 | End: 2023-02-24 | Stop reason: HOSPADM

## 2023-02-24 RX ORDER — FENTANYL CITRATE 50 UG/ML
25 INJECTION, SOLUTION INTRAMUSCULAR; INTRAVENOUS
Status: DISCONTINUED | OUTPATIENT
Start: 2023-02-24 | End: 2023-02-24 | Stop reason: HOSPADM

## 2023-02-24 RX ORDER — ONDANSETRON 2 MG/ML
INJECTION INTRAMUSCULAR; INTRAVENOUS AS NEEDED
Status: DISCONTINUED | OUTPATIENT
Start: 2023-02-24 | End: 2023-02-24 | Stop reason: HOSPADM

## 2023-02-24 RX ORDER — CLOPIDOGREL BISULFATE 75 MG/1
75 TABLET ORAL DAILY
Status: DISCONTINUED | OUTPATIENT
Start: 2023-02-25 | End: 2023-02-27 | Stop reason: HOSPADM

## 2023-02-24 RX ORDER — GABAPENTIN 100 MG/1
100 CAPSULE ORAL
Status: DISCONTINUED | OUTPATIENT
Start: 2023-02-24 | End: 2023-02-27 | Stop reason: HOSPADM

## 2023-02-24 RX ORDER — ROCURONIUM BROMIDE 10 MG/ML
INJECTION, SOLUTION INTRAVENOUS AS NEEDED
Status: DISCONTINUED | OUTPATIENT
Start: 2023-02-24 | End: 2023-02-24 | Stop reason: HOSPADM

## 2023-02-24 RX ORDER — EPHEDRINE SULFATE/0.9% NACL/PF 50 MG/5 ML
SYRINGE (ML) INTRAVENOUS AS NEEDED
Status: DISCONTINUED | OUTPATIENT
Start: 2023-02-24 | End: 2023-02-24 | Stop reason: HOSPADM

## 2023-02-24 RX ORDER — FAMOTIDINE 20 MG/1
20 TABLET, FILM COATED ORAL 2 TIMES DAILY
Status: DISCONTINUED | OUTPATIENT
Start: 2023-02-24 | End: 2023-02-27 | Stop reason: HOSPADM

## 2023-02-24 RX ORDER — ONDANSETRON 2 MG/ML
4 INJECTION INTRAMUSCULAR; INTRAVENOUS AS NEEDED
Status: DISCONTINUED | OUTPATIENT
Start: 2023-02-24 | End: 2023-02-24 | Stop reason: HOSPADM

## 2023-02-24 RX ORDER — LOSARTAN POTASSIUM 50 MG/1
25 TABLET ORAL DAILY
Status: DISCONTINUED | OUTPATIENT
Start: 2023-02-25 | End: 2023-02-27 | Stop reason: HOSPADM

## 2023-02-24 RX ORDER — DEXAMETHASONE SODIUM PHOSPHATE 4 MG/ML
INJECTION, SOLUTION INTRA-ARTICULAR; INTRALESIONAL; INTRAMUSCULAR; INTRAVENOUS; SOFT TISSUE AS NEEDED
Status: DISCONTINUED | OUTPATIENT
Start: 2023-02-24 | End: 2023-02-24 | Stop reason: HOSPADM

## 2023-02-24 RX ORDER — SODIUM CHLORIDE, SODIUM LACTATE, POTASSIUM CHLORIDE, CALCIUM CHLORIDE 600; 310; 30; 20 MG/100ML; MG/100ML; MG/100ML; MG/100ML
25 INJECTION, SOLUTION INTRAVENOUS CONTINUOUS
Status: DISCONTINUED | OUTPATIENT
Start: 2023-02-24 | End: 2023-02-24 | Stop reason: HOSPADM

## 2023-02-24 RX ORDER — ADHESIVE BANDAGE
30 BANDAGE TOPICAL DAILY PRN
Status: DISCONTINUED | OUTPATIENT
Start: 2023-02-24 | End: 2023-02-27 | Stop reason: HOSPADM

## 2023-02-24 RX ORDER — OXYCODONE HYDROCHLORIDE 5 MG/1
5 TABLET ORAL AS NEEDED
Status: DISCONTINUED | OUTPATIENT
Start: 2023-02-24 | End: 2023-02-24 | Stop reason: HOSPADM

## 2023-02-24 RX ORDER — MORPHINE SULFATE 4 MG/ML
2 INJECTION INTRAVENOUS
Status: DISCONTINUED | OUTPATIENT
Start: 2023-02-24 | End: 2023-02-27 | Stop reason: HOSPADM

## 2023-02-24 RX ORDER — MORPHINE SULFATE 4 MG/ML
4 INJECTION INTRAVENOUS
Status: DISCONTINUED | OUTPATIENT
Start: 2023-02-24 | End: 2023-02-27 | Stop reason: HOSPADM

## 2023-02-24 RX ORDER — LIDOCAINE HYDROCHLORIDE 20 MG/ML
INJECTION, SOLUTION EPIDURAL; INFILTRATION; INTRACAUDAL; PERINEURAL AS NEEDED
Status: DISCONTINUED | OUTPATIENT
Start: 2023-02-24 | End: 2023-02-24 | Stop reason: HOSPADM

## 2023-02-24 RX ORDER — OXYCODONE AND ACETAMINOPHEN 5; 325 MG/1; MG/1
1 TABLET ORAL
Status: DISCONTINUED | OUTPATIENT
Start: 2023-02-24 | End: 2023-02-27 | Stop reason: HOSPADM

## 2023-02-24 RX ORDER — PROTAMINE SULFATE 10 MG/ML
INJECTION, SOLUTION INTRAVENOUS AS NEEDED
Status: DISCONTINUED | OUTPATIENT
Start: 2023-02-24 | End: 2023-02-24 | Stop reason: HOSPADM

## 2023-02-24 RX ORDER — METOPROLOL TARTRATE 25 MG/1
25 TABLET, FILM COATED ORAL 2 TIMES DAILY
Status: DISCONTINUED | OUTPATIENT
Start: 2023-02-24 | End: 2023-02-27 | Stop reason: HOSPADM

## 2023-02-24 RX ORDER — LIDOCAINE HYDROCHLORIDE 10 MG/ML
0.1 INJECTION, SOLUTION EPIDURAL; INFILTRATION; INTRACAUDAL; PERINEURAL AS NEEDED
Status: DISCONTINUED | OUTPATIENT
Start: 2023-02-24 | End: 2023-02-24 | Stop reason: HOSPADM

## 2023-02-24 RX ORDER — FENTANYL CITRATE 50 UG/ML
INJECTION, SOLUTION INTRAMUSCULAR; INTRAVENOUS AS NEEDED
Status: DISCONTINUED | OUTPATIENT
Start: 2023-02-24 | End: 2023-02-24 | Stop reason: HOSPADM

## 2023-02-24 RX ORDER — HYDROMORPHONE HYDROCHLORIDE 2 MG/ML
INJECTION, SOLUTION INTRAMUSCULAR; INTRAVENOUS; SUBCUTANEOUS AS NEEDED
Status: DISCONTINUED | OUTPATIENT
Start: 2023-02-24 | End: 2023-02-24 | Stop reason: HOSPADM

## 2023-02-24 RX ORDER — ONDANSETRON 2 MG/ML
4 INJECTION INTRAMUSCULAR; INTRAVENOUS
Status: DISCONTINUED | OUTPATIENT
Start: 2023-02-24 | End: 2023-02-27 | Stop reason: HOSPADM

## 2023-02-24 RX ORDER — ASPIRIN 81 MG/1
81 TABLET ORAL DAILY
Status: DISCONTINUED | OUTPATIENT
Start: 2023-02-25 | End: 2023-02-27 | Stop reason: HOSPADM

## 2023-02-24 RX ORDER — ISOSORBIDE MONONITRATE 30 MG/1
30 TABLET, EXTENDED RELEASE ORAL DAILY
Status: DISCONTINUED | OUTPATIENT
Start: 2023-02-25 | End: 2023-02-27 | Stop reason: HOSPADM

## 2023-02-24 RX ORDER — ETOMIDATE 2 MG/ML
INJECTION INTRAVENOUS AS NEEDED
Status: DISCONTINUED | OUTPATIENT
Start: 2023-02-24 | End: 2023-02-24 | Stop reason: HOSPADM

## 2023-02-24 RX ORDER — HEPARIN SODIUM 1000 [USP'U]/ML
INJECTION, SOLUTION INTRAVENOUS; SUBCUTANEOUS AS NEEDED
Status: DISCONTINUED | OUTPATIENT
Start: 2023-02-24 | End: 2023-02-24 | Stop reason: HOSPADM

## 2023-02-24 RX ORDER — SODIUM CHLORIDE, SODIUM LACTATE, POTASSIUM CHLORIDE, CALCIUM CHLORIDE 600; 310; 30; 20 MG/100ML; MG/100ML; MG/100ML; MG/100ML
75 INJECTION, SOLUTION INTRAVENOUS CONTINUOUS
Status: DISCONTINUED | OUTPATIENT
Start: 2023-02-24 | End: 2023-02-24 | Stop reason: HOSPADM

## 2023-02-24 RX ORDER — SODIUM CHLORIDE 9 MG/ML
50 INJECTION, SOLUTION INTRAVENOUS CONTINUOUS
Status: DISCONTINUED | OUTPATIENT
Start: 2023-02-24 | End: 2023-02-25

## 2023-02-24 RX ORDER — AMOXICILLIN 250 MG/1
500 CAPSULE ORAL 3 TIMES DAILY
Status: DISCONTINUED | OUTPATIENT
Start: 2023-02-24 | End: 2023-02-27 | Stop reason: HOSPADM

## 2023-02-24 RX ORDER — FACIAL-BODY WIPES
10 EACH TOPICAL DAILY PRN
Status: DISCONTINUED | OUTPATIENT
Start: 2023-02-24 | End: 2023-02-27 | Stop reason: HOSPADM

## 2023-02-24 RX ADMIN — FAMOTIDINE 20 MG: 20 TABLET, FILM COATED ORAL at 18:20

## 2023-02-24 RX ADMIN — HEPARIN SODIUM 7500 UNITS: 1000 INJECTION, SOLUTION INTRAVENOUS; SUBCUTANEOUS at 14:04

## 2023-02-24 RX ADMIN — OXYCODONE AND ACETAMINOPHEN 1 TABLET: 5; 325 TABLET ORAL at 23:40

## 2023-02-24 RX ADMIN — HYDROMORPHONE HYDROCHLORIDE 0.5 MG: 2 INJECTION, SOLUTION INTRAMUSCULAR; INTRAVENOUS; SUBCUTANEOUS at 13:25

## 2023-02-24 RX ADMIN — LIDOCAINE HYDROCHLORIDE 100 MG: 20 INJECTION, SOLUTION EPIDURAL; INFILTRATION; INTRACAUDAL; PERINEURAL at 12:35

## 2023-02-24 RX ADMIN — Medication 10 MG: at 14:08

## 2023-02-24 RX ADMIN — DEXAMETHASONE SODIUM PHOSPHATE 4 MG: 4 INJECTION, SOLUTION INTRAMUSCULAR; INTRAVENOUS at 13:56

## 2023-02-24 RX ADMIN — ETOMIDATE 12 MCG: 2 INJECTION, SOLUTION INTRAVENOUS at 12:35

## 2023-02-24 RX ADMIN — SUGAMMADEX 400 MG: 100 INJECTION, SOLUTION INTRAVENOUS at 15:29

## 2023-02-24 RX ADMIN — HYDROMORPHONE HYDROCHLORIDE 0.25 MG: 2 INJECTION, SOLUTION INTRAMUSCULAR; INTRAVENOUS; SUBCUTANEOUS at 12:30

## 2023-02-24 RX ADMIN — FENTANYL CITRATE 25 MCG: 50 INJECTION, SOLUTION INTRAMUSCULAR; INTRAVENOUS at 12:35

## 2023-02-24 RX ADMIN — Medication 3 AMPULE: at 09:29

## 2023-02-24 RX ADMIN — WATER 2 G: 1 INJECTION INTRAMUSCULAR; INTRAVENOUS; SUBCUTANEOUS at 12:39

## 2023-02-24 RX ADMIN — SODIUM CHLORIDE 40 MCG/MIN: 900 INJECTION, SOLUTION INTRAVENOUS at 13:00

## 2023-02-24 RX ADMIN — SODIUM CHLORIDE, POTASSIUM CHLORIDE, SODIUM LACTATE AND CALCIUM CHLORIDE 25 ML/HR: 600; 310; 30; 20 INJECTION, SOLUTION INTRAVENOUS at 09:28

## 2023-02-24 RX ADMIN — SUCCINYLCHOLINE CHLORIDE 140 MG: 20 INJECTION, SOLUTION INTRAMUSCULAR; INTRAVENOUS at 12:37

## 2023-02-24 RX ADMIN — ONDANSETRON HYDROCHLORIDE 4 MG: 2 INJECTION, SOLUTION INTRAMUSCULAR; INTRAVENOUS at 15:29

## 2023-02-24 RX ADMIN — PROTAMINE SULFATE 40 MG: 10 INJECTION, SOLUTION INTRAVENOUS at 15:13

## 2023-02-24 RX ADMIN — METOPROLOL TARTRATE 25 MG: 25 TABLET, FILM COATED ORAL at 18:20

## 2023-02-24 RX ADMIN — OXYCODONE AND ACETAMINOPHEN 1 TABLET: 5; 325 TABLET ORAL at 20:03

## 2023-02-24 RX ADMIN — ROCURONIUM BROMIDE 40 MG: 10 INJECTION INTRAVENOUS at 12:54

## 2023-02-24 RX ADMIN — SODIUM CHLORIDE 50 ML/HR: 9 INJECTION, SOLUTION INTRAVENOUS at 17:42

## 2023-02-24 RX ADMIN — AMOXICILLIN 500 MG: 250 CAPSULE ORAL at 20:03

## 2023-02-24 RX ADMIN — ROCURONIUM BROMIDE 50 MG: 10 INJECTION INTRAVENOUS at 13:56

## 2023-02-24 RX ADMIN — ROCURONIUM BROMIDE 10 MG: 10 INJECTION INTRAVENOUS at 12:35

## 2023-02-24 RX ADMIN — VANCOMYCIN HYDROCHLORIDE 1000 MG: 1 INJECTION, POWDER, LYOPHILIZED, FOR SOLUTION INTRAVENOUS at 11:41

## 2023-02-24 RX ADMIN — GABAPENTIN 100 MG: 100 CAPSULE ORAL at 23:19

## 2023-02-24 NOTE — PROGRESS NOTES
TRANSFER - IN REPORT:    Verbal report received from IRIS Felix on Viv Penny  being received from PACU for routine progression of care      Report consisted of patients Situation, Background, Assessment and   Recommendations(SBAR). Information from the following report(s) SBAR, Kardex, MAR, and Recent Results was reviewed with the receiving nurse. Opportunity for questions and clarification was provided. Assessment completed upon patients arrival to unit and care assumed.         Primary Nurse Misty Mckay RN and IRIS Felix performed a dual skin assessment on this patient Impairment noted- see wound doc flow sheet  Collins score is 19

## 2023-02-24 NOTE — PERIOP NOTES
Handoff Report from Operating Room to PACU    Report received from TERRENCE OLEARY [facility] and Yeny Cassidy CRNA regarding Zulma Cushing. Surgeon(s):  Kylie Pina MD  And Procedure(s) (LRB):  FEMORAL TO FEMORAL BYPASS WITH PTFE GRAFT (Bilateral)  confirmed   with allergies and dressings discussed. Anesthesia type, drugs, patient history, complications, estimated blood loss, vital signs, intake and output, and last pain medication and reversal medications were reviewed.

## 2023-02-24 NOTE — H&P
History and Physical    Subjective:     Jeremiah Jacobson is a 78 y.o. male who has a h/o PAD w/ previous ABF and bilateral Fem-pop bypasses. The right limb of the ABF and both Fem-pops are occluded. He has rest pain and claudication of the right leg and foot. Past Medical History:   Diagnosis Date    Atherosclerosis of native arteries of extremities with intermittent claudication, bilateral legs (HCC)     CAD (coronary artery disease)     dr Juan Mccall cardiology [place]    Chest pain     Heart attack [facility]) 2014    Heart failure ([facility]     HTN (hypertension)     Hyperlipidemia     Ischemic cardiomyopathy     Mild mitral valve regurgitation     Nonrheumatic mitral (valve) insufficiency     PVD (peripheral vascular disease) ([facility])     Rheumatic tricuspid insufficiency     Sick sinus syndrome ([facility]       Past Surgical History:   Procedure Laterality Date    HX HEART VALVE SURGERY  2017    HX HERNIA REPAIR      HX IMPLANTABLE CARDIOVERTER DEFIBRILLATOR  2014    HX PACEMAKER      pacemaker/ICD    [place] UNLISTED PROCEDURE VASCULAR SURGERY  2014    aortobifemoral bypass and bilateral femoral above knee popliteal bypass     Family History   Problem Relation Age of Onset    Hypertension Mother     Heart Attack Father     Hypertension Father       Social History     Tobacco Use    Smoking status: Every Day     Packs/day: 0.25     Years: 40.00     Pack years: 10.00     Types: Cigarettes    Smokeless tobacco: Never    Tobacco comments:     Occasionally smokes- down to one cigarettes a day- trying to quit    Substance Use Topics    Alcohol use: Not Currently       Prior to Admission medications    Medication Sig Start Date End Date Taking? Authorizing Provider   amoxicillin (AMOXIL) 500 mg capsule Take 1 Capsule by mouth three (3) times daily for 5 days. 2/20/23 2/25/23 Yes Antoinette Spann NP   famotidine (PEPCID) 20 mg tablet Take 20 mg by mouth two (2) times a day.    Yes Provider, Historical collagenase (SANTYL) 250 unit/gram ointment Apply  to affected area daily. To right lower leg near inner ankle   Yes Provider, Historical   atorvastatin (LIPITOR) 80 mg tablet Take 80 mg by mouth daily. 6/9/21  Yes Provider, Historical   isosorbide mononitrate ER (IMDUR) 30 mg tablet Take 30 mg by mouth daily. 5/30/21  Yes Provider, Historical   losartan (COZAAR) 25 mg tablet Take 25 mg by mouth daily. 5/18/21  Yes Provider, Historical   metoprolol tartrate (LOPRESSOR) 25 mg tablet Take 25 mg by mouth two (2) times a day. 5/6/21  Yes Provider, Historical   gabapentin (NEURONTIN) 100 mg capsule Take 100 mg by mouth nightly. 6/10/21  Yes Provider, Historical   aspirin delayed-release 81 mg tablet Take 81 mg by mouth daily. Provider, Historical   clopidogreL (PLAVIX) 75 mg tab Take 75 mg by mouth daily. 5/6/21   Provider, Historical     No Known Allergies     Review of Systems:  Denies CP/SOB    Objective:     Physical Exam:   Visit Vitals  /67 (BP 1 Location: Right upper arm, BP Patient Position: At rest)   Pulse 64   Temp 98.4 °F (36.9 °C)   Resp 21   Ht 5' 8\" (1.727 m)   Wt 72 kg (158 lb 11.7 oz)   SpO2 100%   BMI 24.14 kg/m²     General:  Alert, cooperative, no distress, appears stated age. Head:  Normocephalic, without obvious abnormality, atraumatic. Lungs:   Clear to auscultation bilaterally. Heart:  Regular rate and rhythm, S1, S2 normal, no murmur, click, rub or gallop. Abdomen:   Soft, non-tender. Bowel sounds normal. No masses,  No organomegaly. Rectal:  Normal tone, normal prostate, no masses or tenderness  Guaiac negative stool. Extremities: Extremities normal, atraumatic, no cyanosis or edema. Pulses: 2+ Lt femoral, No RLE pulses           Neurologic: Normal strength, sensation throughout.        Assessment:     Active Problems:    Lower limb ischemia (2/24/2023)        Plan:     Left-right Fem-Fem and right femoral endarterectomy    Signed By: Paras Berman MD     February 24, 2023

## 2023-02-24 NOTE — ANESTHESIA PREPROCEDURE EVALUATION
Relevant Problems   No relevant active problems       Anesthetic History   No history of anesthetic complications            Review of Systems / Medical History  Patient summary reviewed, nursing notes reviewed and pertinent labs reviewed    Pulmonary          Smoker         Neuro/Psych              Cardiovascular    Hypertension      CHF  Dysrhythmias   Pacemaker, past MI, CAD, PAD and hyperlipidemia    Exercise tolerance: <4 METS  Comments:   Myocardial perfusion scan 1/17/23 with EF 16% and mild to moderate RV dysfunction and revealed larger perfusion abnormality indicative of prior large infarct to anterior, apical and inferior myocardium    [employer] Pacemaker plus ICD last interrogation 1/25/23  AAI with lower rate 60bpm; three episodes of VT lasting 1-3 seconds no shocks delivered    Prior inferior and anterolateral infarct based on q waves from prior EKG    Previous aortobifem bypass and bilateral fem-pop bypasses in 2014   GI/Hepatic/Renal  Within defined limits              Endo/Other  Within defined limits           Other Findings            Physical Exam    Airway  Mallampati: II  TM Distance: > 6 cm  Neck ROM: normal range of motion   Mouth opening: Normal     Cardiovascular    Rhythm: regular  Rate: normal         Dental  No notable dental hx       Pulmonary  Breath sounds clear to auscultation               Abdominal  GI exam deferred       Other Findings            Anesthetic Plan    ASA: 4  Anesthesia type: general    Monitoring Plan: Arterial line and BIS      Induction: Intravenous  Anesthetic plan and risks discussed with: Patient

## 2023-02-24 NOTE — PERIOP NOTES
TRANSFER - OUT REPORT:    Verbal report given to [facility] RN (name) on Narcisa Talamantes  being transferred to [facility](unit) for routine post - op       Report consisted of patients Situation, Background, Assessment and   Recommendations(SBAR). Information from the following report(s) SBAR, OR Summary, Procedure Summary, Intake/Output, MAR, and Cardiac Rhythm SR   was reviewed with the receiving nurse. Opportunity for questions and clarification was provided.       Patient transported with:   Monitor  O2 @ 2 liters  Registered Nurse

## 2023-02-24 NOTE — ANESTHESIA PROCEDURE NOTES
Arterial Line Placement    Start time: 2/24/2023 10:00 AM  End time: 2/24/2023 10:20 AM  Performed by: Rizwan Billingsley MD  Authorized by: Rizwan Billingsley MD     Pre-Procedure  Indications:  Arterial pressure monitoring  Preanesthetic Checklist: patient identified, risks and benefits discussed, anesthesia consent, site marked, timeout performed and fire risk safety assessment completed and verbalized    Timeout Time: 10:00 EST      Procedure:   Prep:  Chlorhexidine  Seldinger Technique?: Yes    Orientation:  Left  Location:  Radial artery  Catheter size:  20 G  Number of attempts:  1  Cont Cardiac Output Sensor: No      Assessment:   Post-procedure:  Line secured and sterile dressing applied  Patient Tolerance:  Patient tolerated the procedure well with no immediate complications

## 2023-02-24 NOTE — PERIOP NOTES
4131 - PT DENIES FEVER, COLD, COUGH, SOB, N/V, DIARRHEA. .. PRE-OP TCHING DONE - PT VERBALIZES UNDERSTANDING. STRETCHER IN LOWEST POSITION, CB IN PLACE AND SR UP X2.

## 2023-02-24 NOTE — PERIOP NOTES
Irrrichardpt Wound Debridement and Cleansing System  Ref: Tung Bull: [id number] LOT: [id number] Expiration Date: 2025/01/31

## 2023-02-24 NOTE — ANESTHESIA POSTPROCEDURE EVALUATION
Procedure(s): FEMORAL TO FEMORAL BYPASS WITH PTFE GRAFT. general    Anesthesia Post Evaluation      Multimodal analgesia: multimodal analgesia used between 6 hours prior to anesthesia start to PACU discharge  Patient location during evaluation: PACU  Patient participation: complete - patient participated  Level of consciousness: sleepy but conscious  Pain management: adequate  Airway patency: patent  Anesthetic complications: no  Cardiovascular status: acceptable, blood pressure returned to baseline and hemodynamically stable  Respiratory status: acceptable and nasal cannula  Hydration status: acceptable  Post anesthesia nausea and vomiting:  none  Final Post Anesthesia Temperature Assessment:  Normothermia (36.0-37.5 degrees C)      INITIAL Post-op Vital signs:   Vitals Value Taken Time   /59 02/24/23 1600   Temp 36.8 °C (98.3 °F) 02/24/23 1546   Pulse 62 02/24/23 1614   Resp 16 02/24/23 1614   SpO2 100 % 02/24/23 1614   Vitals shown include unvalidated device data.

## 2023-02-24 NOTE — BRIEF OP NOTE
Brief Postoperative Note    Patient: Vamsi Sousa  YOB: 1955  MRN: [id number]    Date of Procedure: 2/24/2023     Pre-Op Diagnosis: Critical limb ischemia of right lower extremity ([address] [I70.221]    Post-Op Diagnosis: Same as preoperative diagnosis. Procedure(s): FEMORAL TO FEMORAL BYPASS WITH PTFE GRAFT    Surgeon(s):  Hank Perez MD    Surgical Assistant: Surg Asst-1: Tam Verma    Anesthesia: General     Estimated Blood Loss (mL): less than 316     Complications: None    Specimens: * No specimens in log *     Implants:   Implant Name Type Inv. Item Serial No.  Lot No. LRB No. Used Action   GRAFT VASC L80CM DIA8MM PTFE CBAS HEP SURF THN WALLED REM - [id number]  GRAFT VASC L80CM DIA8MM PTFE CBAS HEP SURF THN WALLED REM [id number] [employer]WD NA N/A 1 Implanted       Drains: * No LDAs found *    Findings: Bypass from hare of left femoral anastomosis of ABF to proximal to mid Rt PFA via lateral approach. Good flow on completion.     Electronically Signed by Fatimah Gerber MD on 2/24/2023 at 3:45 PM

## 2023-02-25 LAB
ANION GAP SERPL CALC-SCNC: 4 MMOL/L (ref 5–15)
BUN SERPL-MCNC: 14 MG/DL (ref 6–20)
BUN/CREAT SERPL: 17 (ref 12–20)
CALCIUM SERPL-MCNC: 8.4 MG/DL (ref 8.5–10.1)
CHLORIDE SERPL-SCNC: 108 MMOL/L (ref 97–108)
CO2 SERPL-SCNC: 25 MMOL/L (ref 21–32)
CREAT SERPL-MCNC: 0.81 MG/DL (ref 0.7–1.3)
ERYTHROCYTE [DISTWIDTH] IN BLOOD BY AUTOMATED COUNT: 13 % (ref 11.5–14.5)
GLUCOSE SERPL-MCNC: 124 MG/DL (ref 65–100)
HCT VFR BLD AUTO: 37.7 % (ref 36.6–50.3)
HGB BLD-MCNC: 11.7 G/DL (ref 12.1–17)
MCH RBC QN AUTO: 29 PG (ref 26–34)
MCHC RBC AUTO-ENTMCNC: 31 G/DL (ref 30–36.5)
MCV RBC AUTO: 93.5 FL (ref 80–99)
NRBC # BLD: 0 K/UL (ref 0–0.01)
NRBC BLD-RTO: 0 PER 100 WBC
PLATELET # BLD AUTO: 231 K/UL (ref 150–400)
PMV BLD AUTO: 10.5 FL (ref 8.9–12.9)
POTASSIUM SERPL-SCNC: 4.4 MMOL/L (ref 3.5–5.1)
RBC # BLD AUTO: 4.03 M/UL (ref 4.1–5.7)
SODIUM SERPL-SCNC: 137 MMOL/L (ref 136–145)
WBC # BLD AUTO: 8.5 K/UL (ref 4.1–11.1)

## 2023-02-25 PROCEDURE — 97161 PT EVAL LOW COMPLEX 20 MIN: CPT | Performed by: PHYSICAL THERAPIST

## 2023-02-25 PROCEDURE — 80048 BASIC METABOLIC PNL TOTAL CA: CPT

## 2023-02-25 PROCEDURE — 65270000029 HC RM PRIVATE

## 2023-02-25 PROCEDURE — 74011250637 HC RX REV CODE- 250/637: Performed by: SURGERY

## 2023-02-25 PROCEDURE — 74011000250 HC RX REV CODE- 250: Performed by: SURGERY

## 2023-02-25 PROCEDURE — 97535 SELF CARE MNGMENT TRAINING: CPT | Performed by: OCCUPATIONAL THERAPIST

## 2023-02-25 PROCEDURE — 36415 COLL VENOUS BLD VENIPUNCTURE: CPT

## 2023-02-25 PROCEDURE — 97116 GAIT TRAINING THERAPY: CPT | Performed by: PHYSICAL THERAPIST

## 2023-02-25 PROCEDURE — 97165 OT EVAL LOW COMPLEX 30 MIN: CPT | Performed by: OCCUPATIONAL THERAPIST

## 2023-02-25 PROCEDURE — 85027 COMPLETE CBC AUTOMATED: CPT

## 2023-02-25 RX ADMIN — ATORVASTATIN CALCIUM 80 MG: 40 TABLET, FILM COATED ORAL at 09:36

## 2023-02-25 RX ADMIN — FAMOTIDINE 20 MG: 20 TABLET, FILM COATED ORAL at 09:36

## 2023-02-25 RX ADMIN — GABAPENTIN 100 MG: 100 CAPSULE ORAL at 21:36

## 2023-02-25 RX ADMIN — LOSARTAN POTASSIUM 25 MG: 50 TABLET, FILM COATED ORAL at 09:36

## 2023-02-25 RX ADMIN — METOPROLOL TARTRATE 25 MG: 25 TABLET, FILM COATED ORAL at 17:38

## 2023-02-25 RX ADMIN — COLLAGENASE SANTYL: 250 OINTMENT TOPICAL at 10:07

## 2023-02-25 RX ADMIN — OXYCODONE AND ACETAMINOPHEN 1 TABLET: 5; 325 TABLET ORAL at 06:59

## 2023-02-25 RX ADMIN — METOPROLOL TARTRATE 25 MG: 25 TABLET, FILM COATED ORAL at 09:36

## 2023-02-25 RX ADMIN — CLOPIDOGREL BISULFATE 75 MG: 75 TABLET ORAL at 09:36

## 2023-02-25 RX ADMIN — ASPIRIN 81 MG: 81 TABLET, COATED ORAL at 09:36

## 2023-02-25 RX ADMIN — OXYCODONE AND ACETAMINOPHEN 1 TABLET: 5; 325 TABLET ORAL at 13:52

## 2023-02-25 RX ADMIN — AMOXICILLIN 500 MG: 250 CAPSULE ORAL at 21:36

## 2023-02-25 RX ADMIN — ISOSORBIDE MONONITRATE 30 MG: 30 TABLET, EXTENDED RELEASE ORAL at 09:36

## 2023-02-25 RX ADMIN — AMOXICILLIN 500 MG: 250 CAPSULE ORAL at 10:24

## 2023-02-25 RX ADMIN — FAMOTIDINE 20 MG: 20 TABLET, FILM COATED ORAL at 17:38

## 2023-02-25 RX ADMIN — AMOXICILLIN 500 MG: 250 CAPSULE ORAL at 18:01

## 2023-02-25 NOTE — PROGRESS NOTES
Problem: Falls - Risk of  Goal: *Absence of Falls  Description: Document [place] Fall Risk and appropriate interventions in the flowsheet.   Outcome: Progressing Towards Goal  Note: Fall Risk Interventions:                                Problem: Patient Education: Go to Patient Education Activity  Goal: Patient/Family Education  Outcome: Progressing Towards Goal     Problem: Patient Education: Go to Patient Education Activity  Goal: Patient/Family Education  Outcome: Progressing Towards Goal

## 2023-02-25 NOTE — PROGRESS NOTES
End of Shift Note    Bedside shift change report given to Baltazar Contreras RN (oncoming nurse) by Sada Weinberg RN (offgoing nurse). Report included the following information SBAR, Kardex, and MAR    Shift worked:  7a-7p     Shift summary and any significant changes:     Patient arrived to unit this evening following procedure. No significant changes.      Concerns for physician to address:  none     Zone phone for oncoming shift:   unknown       Activity:     Number times ambulated in hallways past shift: 0  Number of times OOB to chair past shift: 0    Cardiac:   Cardiac Monitoring: Yes      Cardiac Rhythm: Sinus Rhythm, Atrial Paced    Access:  Current line(s): PIV     Genitourinary:   Urinary status: lawrence    Respiratory:   O2 Device: None (Room air)  Chronic home O2 use?: NO  Incentive spirometer at bedside: NO       GI:  Last Bowel Movement Date: 02/23/23  Current diet:  ADULT DIET Regular  Passing flatus: NO  Tolerating current diet: YES       Pain Management:   Patient states pain is manageable on current regimen: N/A    Skin:  Collins Score: 19  Interventions: float heels and increase time out of bed    Patient Safety:  Fall Score:    Interventions: bed/chair alarm, gripper socks, pt to call before getting OOB, and stay with me (per policy)       Length of Stay:  Expected LOS: - - -  Actual LOS: 0      Sada Weinberg RN

## 2023-02-25 NOTE — PROGRESS NOTES
0700: End of Shift Note    Bedside shift change report given to [address] [place] [address] (oncoming nurse) by Delight Krabbe, RN (offgoing nurse).   Report included the following information SBAR, Kardex, Intake/Output, MAR, Recent Results, and Cardiac Rhythm NSR    Shift worked:  5752-1288     Shift summary and any significant changes:     PRN pain medication given     Concerns for physician to address:       Zone phone for oncoming shift:          Activity:  Activity Level: Bath Room Privileges  Number times ambulated in hallways past shift: 0  Number of times OOB to chair past shift: 0    Cardiac:   Cardiac Monitoring: Yes      Cardiac Rhythm: Atrial Paced    Access:  Current line(s): PIV     Genitourinary:   Urinary status: lawrence    Respiratory:   O2 Device: None (Room air)  Chronic home O2 use?: NO  Incentive spirometer at bedside: NO       GI:  Last Bowel Movement Date: 02/23/23  Current diet:  ADULT DIET Regular  Passing flatus: YES  Tolerating current diet: YES       Pain Management:   Patient states pain is manageable on current regimen: YES    Skin:  Collins Score: 19  Interventions: increase time out of bed    Patient Safety:  Fall Score:    Interventions: bed/chair alarm, assistive device (walker, cane, etc), gripper socks, and pt to call before getting OOB       Length of Stay:  Expected LOS: - - -  Actual LOS: [address] [facility] [address], RN

## 2023-02-25 NOTE — PROGRESS NOTES
OCCUPATIONAL THERAPY EVALUATION/DISCHARGE  Patient: Jasiel Collins (51 y.o. male)  Date: 2/25/2023  Primary Diagnosis: Critical limb ischemia of right lower extremity (HCC) [I70.221]  Lower limb ischemia [I99.8]  Procedure(s) (LRB):  FEMORAL TO FEMORAL BYPASS WITH PTFE GRAFT (Bilateral) 1 Day Post-Op   Precautions: none       ASSESSMENT  Based on the objective data described below, the patient presents with controlled pain and pt was able to reach towards feet to adjust socks seated. CGA overall for mobility without RW and supervision with RW.  Educated pt on fall prevention, adaptive strategies for ADLs and bathing. Good understanding. All vitals were stable. Pt is in agreement that OT services aren't needed at this time. He reports his wife can assist at discharge if needed. All vitals were stable this session. Current Level of Function (ADLs/self-care): CGA mobility with and without RW, supervision mobility with RW, supervision ADLS    Functional Outcome Measure: The patient scored 75/100 on the barthel outcome measure which is indicative of minimal deficits with mobility. Other factors to consider for discharge: none     PLAN :  Recommend with staff: ambulate     Recommendation for discharge: (in order for the patient to meet his/her long term goals)  No skilled occupational therapy/ follow up rehabilitation needs identified at this time. This discharge recommendation:  Has not yet been discussed the attending provider and/or case management    IF patient discharges home will need the following DME: none       SUBJECTIVE:   Patient stated It feels good to move.     OBJECTIVE DATA SUMMARY:   HISTORY:   Past Medical History:   Diagnosis Date    Atherosclerosis of native arteries of extremities with intermittent claudication, bilateral legs (HCC)     CAD (coronary artery disease)     dr Nico Burnham cardiology [place]    Chest pain     Heart attack [facility]) 2014    Heart failure (HCC)     HTN (hypertension)     Hyperlipidemia     Ischemic cardiomyopathy     Mild mitral valve regurgitation     Nonrheumatic mitral (valve) insufficiency     PVD (peripheral vascular disease) ([facility])     Rheumatic tricuspid insufficiency     Sick sinus syndrome [facility])      Past Surgical History:   Procedure Laterality Date    HX HEART VALVE SURGERY  2017    HX HERNIA REPAIR      HX IMPLANTABLE CARDIOVERTER DEFIBRILLATOR  2014    HX PACEMAKER      pacemaker/ICD    MA UNLISTED PROCEDURE VASCULAR SURGERY  2014    aortobifemoral bypass and bilateral femoral above knee popliteal bypass       Prior Level of Function/Environment/Context: independent with ADLs and IADLS  Expanded or extensive additional review of patient history:   Home Situation  Home Environment: Private residence  # Steps to Enter: 3  Rails to Enter: Yes  Hand Rails : Bilateral  One/Two Story Residence: One story  Living Alone: No  Support Systems: Spouse/Significant Other  Patient Expects to be Discharged to[de-identified] Home with family assistance  Current DME Used/Available at Home: Cane, straight  Tub or Shower Type: Tub/Shower combination    Hand dominance: Right    EXAMINATION OF PERFORMANCE DEFICITS:  Cognitive/Behavioral Status:  Neurologic State: Alert  Orientation Level: Oriented X4  Cognition: Appropriate decision making; Appropriate for age attention/concentration; Appropriate safety awareness  Perception: Appears intact  Perseveration: No perseveration noted  Safety/Judgement: Awareness of environment; Fall prevention;Home safety; Insight into deficits;Good awareness of safety precautions    \    Hearing: Auditory  Auditory Impairment: None  Hearing Aids/Status: Does not own    Vision/Perceptual:                           Acuity: Within Defined Limits         Range of Motion:    AROM: Within functional limits  PROM: Within functional limits                      Strength:    Strength:  Within functional limits Coordination:  Coordination: Within functional limits  Fine Motor Skills-Upper: Left Intact; Right Intact    Gross Motor Skills-Upper: Left Intact; Right Intact    Tone & Sensation:    Tone: Normal  Sensation: Impaired (numbness left LE)                      Balance:  Sitting: Intact  Standing: Intact    Functional Mobility and Transfers for ADLs:  Bed Mobility:  Rolling: Independent  Supine to Sit: Independent  Scooting: Independent    Transfers:  Sit to Stand: Supervision  Stand to Sit: Supervision  Bed to Chair: Supervision (with Rw)  Bathroom Mobility:  (supervision)  Toilet Transfer : Supervision    ADL Assessment:  Feeding: Independent    Oral Facial Hygiene/Grooming: Supervision    Bathing: Supervision    Upper Body Dressing: Supervision    Lower Body Dressing: Supervision    Toileting: Supervision                ADL Intervention and task modifications:     Educated on donning clothing over more painful leg first and doff last.  Educated to sit to thread LB clothing. Educated to follow doctors instructions on bathing and to not take tub baths. Pt voiced understanding. Cognitive Retraining  Safety/Judgement: Awareness of environment; Fall prevention;Home safety; Insight into deficits;Good awareness of safety precautions    Functional Measure:    Barthel Index:  Bathin  Bladder: 10  Bowels: 10  Groomin  Dressing: 10  Feeding: 10  Mobility: 10  Stairs: 5  Toilet Use: 5  Transfer (Bed to Chair and Back): 10  Total: 75/100      The Barthel ADL Index: Guidelines  1. The index should be used as a record of what a patient does, not as a record of what a patient could do. 2. The main aim is to establish degree of independence from any help, physical or verbal, however minor and for whatever reason. 3. The need for supervision renders the patient not independent. 4. A patient's performance should be established using the best available evidence.  Asking the patient, friends/relatives and nurses are the usual sources, but direct observation and common sense are also important. However direct testing is not needed. 5. Usually the patient's performance over the preceding 24-48 hours is important, but occasionally longer periods will be relevant. 6. Middle categories imply that the patient supplies over 50 per cent of the effort. 7. Use of aids to be independent is allowed. Score Interpretation (from [address])    Independent   60-79 Minimally independent   40-59 Partially dependent   20-39 Very dependent   <20 Totally dependent     -Carlee Cintron., Barthel, D.W. (1965). Functional evaluation: the Barthel Index. [address] ([address] (1997). The Barthel activities of daily living index: self-reporting versus actual performance in the old (> or = 75 years). Journal of [address] [place] [address] 45(7), [address] [facility], SAURAV, [facility]., [place]. (1999). Measuring the change in disability after inpatient rehabilitation; comparison of the responsiveness of the Barthel Index and Functional [place] Measure. Journal of Neurology, Neurosurgery, and Psychiatry, 66(4), 259-279. Zhanna Garza, N.J.A, AUDRA ShinJ.JAYLENE, & Dayna Woodson, M.A. (2004) Assessment of post-stroke quality of life in cost-effectiveness studies: The usefulness of the Barthel Index and the EuroQoL-5D.  Quality of Life Research, 15, 930-93         Occupational Therapy Evaluation Charge Determination   History Examination Decision-Making   LOW Complexity : Brief history review  LOW Complexity : 1-3 performance deficits relating to physical, cognitive , or psychosocial skils that result in activity limitations and / or participation restrictions  LOW Complexity : No comorbidities that affect functional and no verbal or physical assistance needed to complete eval tasks       Based on the above components, the patient evaluation is determined to be of the following complexity level: LOW   Pain Ratin/10    Activity Tolerance:   Good    After treatment patient left in no apparent distress:    Sitting in chair, Call bell within reach, and Bed / chair alarm activated    COMMUNICATION/EDUCATION:   The patients plan of care was discussed with: Physical therapist, Registered nurse, and patient .      Thank you for this referral.  Lauro Caldwell OTR/L  Time Calculation: 13 mins

## 2023-02-25 NOTE — PROGRESS NOTES
Transition of Care Plan:    RUR:  5 %  Disposition:  home with significant other  If SNF or IPR: Date FOC offered:  Date FOC received:  Date authorization started with reference number:  Date authorization received and expires:  Accepting facility:  Follow up appointments:  PCP, Specialists  DME needed:  Pt owns a cane. Transportation at Discharge:  sister  Donnie Mcgrath or means to access home:     yes   IM Medicare Letter: needed at d/c  Is patient a [place] and connected with the [place]? N/a              If yes, was [employer] transfer form completed and [facility] notified? Caregiver Contact:  Cleta Hodgkins  [phone]  Discharge Caregiver contacted prior to discharge? CM will contact prior to d/c if pt desires. Care Conference needed?:          Reason for Admission:  Critical Limb Ischemia of RLE                     RUR Score:     5%                Plan for utilizing home health:   as needed       PCP: First and Last name:  Audrey Beaulieu MD     Name of Practice:    Are you a current patient: Yes/No: yes   Approximate date of last visit: 2 weeks ago    Can you participate in a virtual visit with your PCP:                     Current Advanced Directive/Advance Care Plan: Full Code    Sarah 13 (ACP) Conversation      Date of Conversation: 2/25/23  Conducted with: Patient with Decision Making Capacity    Healthcare Decision Maker:   No healthcare decision makers have been documented.    Click here to complete [place] including selection of the Healthcare Decision Maker Relationship (ie \"Primary\")    Content/Action Overview:   DECLINED ACP conversation - will revisit periodically   Reviewed DNR/DNI and patient elects Full Code (Attempt Resuscitation)       Length of Voluntary ACP Conversation in minutes:  <16 minutes (Non-Billable)    Luz Zuñiga                          Transition of Care Plan:     home with significant other    CM met with pt at bedside to introduce self/role, verify demographics, insurance and PCP. CM also discussed d/c plan. Pt is a 77 yo, single,  male who was [facility] to [facility] on 2/24[facility]23 with the above dx. Pt sees his PCP every 3 months. Pt uses the [employer]. Pt lives with his significant other in a one fl home with 3 CHERI. Pt has a supportive sister as well. Pt can complete his ADL care independently at baseline. Pt has a cane he uses as needed. Pt does drive. Pt has a hx of HH but was unable to recall company. Pt denied a hx of SNF or IPR. Pt's sister will transport at d/c. CM will continue to assess for d/c needs. Care Management Interventions  PCP Verified by CM: Yes (Pt sees Dr. Michelle Johnson. )  Mode of Transport at Discharge:  Other (see comment) (sister)  Transition of Care Consult (CM Consult): Discharge Planning  MyChart Signup:  (Pt has a cane.)  Discharge Durable Medical Equipment: No  Physical Therapy Consult: Yes  Occupational Therapy Consult: Yes  Speech Therapy Consult: No  Support Systems: Other Family Member(s), Spouse/Significant Other  Confirm Follow Up Transport: Self  The Patient and/or Patient Representative was Provided with a Choice of Provider and Agrees with the Discharge Plan?: Yes  Freedom of Choice List was Provided with Basic Dialogue that Supports the Patient's Individualized Plan of Care/Goals, Treatment Preferences and Shares the Quality Data Associated with the Providers?: Yes  Discharge Location  Patient Expects to be Discharged to[de-identified] Home with family assistance    Elyse Dunbar LMSW  Supervisee in [address] [facility] [facility]

## 2023-02-25 NOTE — PROGRESS NOTES
Problem: Mobility Impaired (Adult and Pediatric)  Goal: *Acute Goals and Plan of Care (Insert Text)  Description: FUNCTIONAL STATUS PRIOR TO ADMISSION: Patient was independent and active without use of DME.    HOME SUPPORT PRIOR TO ADMISSION: The patient lived with girl friend but did not require assist.    Physical Therapy Goals  Initiated 2/25/2023  1. Patient will move from supine to sit and sit to supine , scoot up and down, and roll side to side in bed with independence within 7 day(s). 2.  Patient will transfer from bed to chair and chair to bed with independence using the least restrictive device within 7 day(s). 3.  Patient will perform sit to stand with independence within 7 day(s). 4.  Patient will ambulate with independence for 250 feet with the least restrictive device within 7 day(s). 5.  Patient will ascend/descend 4 stairs with 1 handrail(s) with modified independence within 7 day(s). Outcome: Not Met   PHYSICAL THERAPY EVALUATION  Patient: Mitzi Roberts (89 y.o. male)  Date: 2/25/2023  Primary Diagnosis: Critical limb ischemia of right lower extremity (HCC) [I70.221]  Lower limb ischemia [I99.8]  Procedure(s) (LRB):  FEMORAL TO FEMORAL BYPASS WITH PTFE GRAFT (Bilateral) 1 Day Post-Op   Precautions: WBAT        ASSESSMENT  Based on the objective data described below, the patient presents with impaired functional mobility, balance and endurance following s/p fem-fem bypass yesterday. Patient requiring CGA for overall mobility today. He was able to ambulate 200 feet in aquino using RW- gait is antalgic but steady overall with no overt LOB noted. Attempted short distance ambulation without use of AD but patient reports increased pain and gait noted to be mildly unsteady. Encouraged patient to ambulate in halls 3x/day with nursing staff to improve gait stability and endurance. Patient is independent at baseline.  Anticipate good progress with therapy and return to home without further PT services. May need RW pending further progress. Current Level of Function Impacting Discharge (mobility/balance): CGA         Patient will benefit from skilled therapy intervention to address the above noted impairments. PLAN :  Recommendations and Planned Interventions: bed mobility training, transfer training, gait training, therapeutic exercises, patient and family training/education, and therapeutic activities      Frequency/Duration: Patient will be followed by physical therapy:  5 times a week to address goals. Likely needs 1-2 more visits    Recommendation for discharge: (in order for the patient to meet his/her long term goals)  No skilled physical therapy/ follow up rehabilitation needs anticipated at this time. This discharge recommendation:  Has not yet been discussed the attending provider and/or case management    IF patient discharges home will need the following DME: to be determined (TBD); may need RW         SUBJECTIVE:   Patient stated It feels better if I use the walker.     OBJECTIVE DATA SUMMARY:   HISTORY:    Past Medical History:   Diagnosis Date    Atherosclerosis of native arteries of extremities with intermittent claudication, bilateral legs (HCC)     CAD (coronary artery disease)     dr Yu Caldwell cardiology [place]    Chest pain     Heart attack [facility]) 2014    Heart failure ([facility] [address] [address]     HTN (hypertension)     Hyperlipidemia     Ischemic cardiomyopathy     Mild mitral valve regurgitation     Nonrheumatic mitral (valve) insufficiency     PVD (peripheral vascular disease) ([facility] [address]     Rheumatic tricuspid insufficiency     Sick sinus syndrome [facility])      Past Surgical History:   Procedure Laterality Date    HX HEART VALVE SURGERY  2017    HX HERNIA REPAIR      HX IMPLANTABLE CARDIOVERTER DEFIBRILLATOR  2014    HX PACEMAKER      pacemaker/ICD    SD UNLISTED PROCEDURE VASCULAR SURGERY  2014    aortobifemoral bypass and bilateral femoral above knee popliteal bypass Home Situation  Home Environment: Private residence  # Steps to Enter: 3  Rails to Enter: Yes  Hand Rails : Bilateral  One/Two Story Residence: One story  Living Alone: No  Support Systems: Spouse/Significant Other  Patient Expects to be Discharged to[de-identified] Home with family assistance  Current DME Used/Available at Home: Cane, straight  Tub or Shower Type: Tub/Shower combination    EXAMINATION/PRESENTATION/DECISION MAKING:   Critical Behavior:  Neurologic State: Alert  Orientation Level: Oriented X4  Cognition: Appropriate decision making, Appropriate for age attention/concentration, Appropriate safety awareness  Safety/Judgement: Awareness of environment, Fall prevention, Home safety, Insight into deficits, Good awareness of safety precautions  Hearing: Auditory  Auditory Impairment: None  Hearing Aids/Status: Does not own    Range Of Motion:  AROM: Within functional limits           PROM: Within functional limits           Strength:    Strength:  Within functional limits                    Tone & Sensation:   Tone: Normal              Sensation: Impaired (numbness left LE)               Coordination:  Coordination: Within functional limits  Vision:   Acuity: Within Defined Limits  Functional Mobility:  Bed Mobility:  Rolling: Supervision  Supine to Sit: Stand-by assistance     Scooting: Independent  Transfers:  Sit to Stand: Contact guard assistance  Stand to Sit: Contact guard assistance        Bed to Chair: Contact guard assistance              Balance:   Sitting: Intact  Standing: Impaired  Standing - Static: Good  Standing - Dynamic : Fair;Unsupported (good using RW)  Ambulation/Gait Training:  Distance (ft): 200 Feet (ft)  Assistive Device: Walker, rolling;Gait belt  Ambulation - Level of Assistance: Contact guard assistance        Gait Abnormalities: Antalgic;Decreased step clearance;Trunk sway increased        Base of Support: Widened;Shift to left  Stance: Right decreased  Speed/Rosemary: Pace decreased (<100 feet/min); Slow  Step Length: Left shortened      Gait is antalgic but steady overall         Activity Tolerance:   Good    After treatment patient left in no apparent distress:   Sitting in chair, Call bell within reach, and Bed / chair alarm activated    COMMUNICATION/EDUCATION:   The patients plan of care was discussed with: Occupational therapist and Registered nurse. Fall prevention education was provided and the patient/caregiver indicated understanding., Patient/family have participated as able in goal setting and plan of care. , and Patient/family agree to work toward stated goals and plan of care.     Thank you for this referral.  Gia Cheung, PT   Time Calculation: 22 mins

## 2023-02-25 NOTE — PROGRESS NOTES
TRANSFER - OUT REPORT:    Verbal report given to IRIS Rios (name) on Fanta Acosta  being transferred to [facility] for routine progression of care       Report consisted of patients Situation, Background, Assessment and   Recommendations(SBAR). Information from the following report(s) SBAR, Kardex, and MAR was reviewed with the receiving nurse. Lines:   Peripheral IV 02/24/23 Anterior;Distal;Left Forearm (Active)   Site Assessment Clean, dry, & intact 02/25/23 1424   Phlebitis Assessment 0 02/25/23 1424   Infiltration Assessment 0 02/25/23 1424   Dressing Status Clean, dry, & intact 02/25/23 1424   Dressing Type Tape;Transparent 02/25/23 1424   Hub Color/Line Status Capped 02/25/23 1424   Action Taken Open ports on tubing capped 02/25/23 1424   Alcohol Cap Used Yes 02/25/23 1424       Peripheral IV 02/24/23 Posterior;Right Hand (Active)   Site Assessment Clean, dry, & intact 02/25/23 1424   Phlebitis Assessment 0 02/25/23 1424   Infiltration Assessment 0 02/25/23 1424   Dressing Status Clean, dry, & intact 02/25/23 1424   Dressing Type Tape;Transparent w/CHG gel 02/25/23 1424   Hub Color/Line Status Capped 02/25/23 1424   Action Taken Open ports on tubing capped 02/25/23 1424   Alcohol Cap Used Yes 02/25/23 1424        Opportunity for questions and clarification was provided.       Patient transported with:   Patient-specific medications from Pharmacy

## 2023-02-25 NOTE — PROGRESS NOTES
S/p fem-fem  Rest pain resolved  Incision c/d/I  D/c lawrence, HLIVFs  Amoxicillin for UTI  Asa, plavix  Home when mobile

## 2023-02-25 NOTE — PROGRESS NOTES
Bedside shift change report given to Leonor Gutierrez (oncoming nurse) by Baltazar Contreras RN (offgoing nurse). Report included the following information SBAR, Kardex, and MAR.

## 2023-02-26 PROCEDURE — 74011250637 HC RX REV CODE- 250/637: Performed by: SURGERY

## 2023-02-26 PROCEDURE — 65270000029 HC RM PRIVATE

## 2023-02-26 RX ADMIN — CLOPIDOGREL BISULFATE 75 MG: 75 TABLET ORAL at 10:41

## 2023-02-26 RX ADMIN — ATORVASTATIN CALCIUM 80 MG: 40 TABLET, FILM COATED ORAL at 10:42

## 2023-02-26 RX ADMIN — AMOXICILLIN 500 MG: 250 CAPSULE ORAL at 16:05

## 2023-02-26 RX ADMIN — LOSARTAN POTASSIUM 25 MG: 50 TABLET, FILM COATED ORAL at 10:42

## 2023-02-26 RX ADMIN — ISOSORBIDE MONONITRATE 30 MG: 30 TABLET, EXTENDED RELEASE ORAL at 10:41

## 2023-02-26 RX ADMIN — FAMOTIDINE 20 MG: 20 TABLET, FILM COATED ORAL at 10:42

## 2023-02-26 RX ADMIN — METOPROLOL TARTRATE 25 MG: 25 TABLET, FILM COATED ORAL at 21:11

## 2023-02-26 RX ADMIN — METOPROLOL TARTRATE 25 MG: 25 TABLET, FILM COATED ORAL at 10:42

## 2023-02-26 RX ADMIN — AMOXICILLIN 500 MG: 250 CAPSULE ORAL at 21:11

## 2023-02-26 RX ADMIN — COLLAGENASE SANTYL: 250 OINTMENT TOPICAL at 10:36

## 2023-02-26 RX ADMIN — OXYCODONE AND ACETAMINOPHEN 1 TABLET: 5; 325 TABLET ORAL at 21:11

## 2023-02-26 RX ADMIN — FAMOTIDINE 20 MG: 20 TABLET, FILM COATED ORAL at 16:05

## 2023-02-26 RX ADMIN — GABAPENTIN 100 MG: 100 CAPSULE ORAL at 21:12

## 2023-02-26 RX ADMIN — OXYCODONE AND ACETAMINOPHEN 1 TABLET: 5; 325 TABLET ORAL at 10:42

## 2023-02-26 RX ADMIN — AMOXICILLIN 500 MG: 250 CAPSULE ORAL at 10:42

## 2023-02-26 RX ADMIN — ASPIRIN 81 MG: 81 TABLET, COATED ORAL at 10:42

## 2023-02-26 NOTE — PROGRESS NOTES
Problem: Falls - Risk of  Goal: *Absence of Falls  Description: Document Ramesh Gonzalez Fall Risk and appropriate interventions in the flowsheet.   Outcome: Progressing Towards Goal  Note: Fall Risk Interventions:                                Problem: Patient Education: Go to Patient Education Activity  Goal: Patient/Family Education  Outcome: Progressing Towards Goal     Problem: Patient Education: Go to Patient Education Activity  Goal: Patient/Family Education  Outcome: Progressing Towards Goal     Problem: Risk for Elopement  Goal: Patient will not exit the unit/facility without proper escort  Outcome: Progressing Towards Goal     Problem: Patient Education: Go to Patient Education Activity  Goal: Patient/Family Education  2/26/2023 0020 by Leanne Barksdale RN  Outcome: Progressing Towards Goal  2/26/2023 0019 by Leanne Barksdale RN  Outcome: Progressing Towards Goal     Problem: Patient Education: Go to Patient Education Activity  Goal: Patient/Family Education  2/26/2023 0020 by Leanne Barksdale RN  Outcome: Progressing Towards Goal  2/26/2023 0019 by Leanne Barksdale RN  Outcome: Progressing Towards Goal

## 2023-02-26 NOTE — PROGRESS NOTES
End of Shift Note    Bedside shift change report given to IRIS Philip (oncoming nurse) by Marcela Sousa RN (offgoing nurse). Report included the following information SBAR, Kardex, Intake/Output, MAR, and Recent Results    Shift worked:  7p-7a     Shift summary and any significant changes:     Pt had a quiet night, no c/o pain voiced.      Concerns for physician to address:        Zone phone for oncoming shift:   [phone]       Activity:  Activity Level: Bath Room Privileges  Number times ambulated in hallways past shift: 0  Number of times OOB to chair past shift: 1    Cardiac:   Cardiac Monitoring: No      Cardiac Rhythm: Sinus Tachy    Access:  Current line(s): PICC     Genitourinary:   Urinary status: voiding    Respiratory:   O2 Device: None (Room air)  Chronic home O2 use?: NO  Incentive spirometer at bedside: NO       GI:  Last Bowel Movement Date: 02/23/23  Current diet:  ADULT DIET Regular  Passing flatus: YES  Tolerating current diet: YES       Pain Management:   Patient states pain is manageable on current regimen: YES    Skin:  Collins Score: 17  Interventions: float heels, increase time out of bed, and PT/OT consult    Patient Safety:  Fall Score:    Interventions: bed/chair alarm, assistive device (walker, cane, etc), gripper socks, and pt to call before getting OOB       Length of Stay:  Expected LOS: - - -  Actual LOS: 2      Marcela Sousa RN

## 2023-02-26 NOTE — PROGRESS NOTES
Spiritual Care Assessment/Progress Note  [facility]      NAME: Wilbert Rosas      MRN: [id number]  AGE: 78 y.o.  SEX: male  Methodist Affiliation: Alevism   Language: English     2/26/2023     Total Time (in minutes): 16     Spiritual Assessment begun in [facility] 3 SURG TELE through conversation with:         [x]Patient        [] Family    [] Friend(s)        Reason for Consult: Request by staff     Spiritual beliefs: (Please include comment if needed)     [x] Identifies with a layo tradition:         [] Supported by a layo community:            [] Claims no spiritual orientation:           [] Seeking spiritual identity:                [] Adheres to an individual form of spirituality:           [] Not able to assess:                           Identified resources for coping:      [] Prayer                               [] Music                  [] Guided Imagery     [x] Family/friends                 [] Pet visits     [] Devotional reading                         [] Unknown     [] Other:                                                Interventions offered during this visit: (See comments for more details)    Patient Interventions: Initial/Spiritual assessment, patient floor, Coping skills reviewed/reinforced, Life review/legacy, Normalization of emotional/spiritual concerns, Prayer (assurance of), Methodist beliefs/image of God discussed, Integration of medical assessment with existing values and beliefs, Affirmation of layo, Catharsis/review of pertinent events in supportive environment, Iconic (affirming the presence of God/Higher Power)           Plan of Care:     [] Support spiritual and/or cultural needs    [] Support AMD and/or advance care planning process      [] Support grieving process   [] Coordinate Rites and/or Rituals    [] Coordination with community clergy   [] No spiritual needs identified at this time   [] Detailed Plan of Care below (See Comments)  [] Make referral to Music Therapy  [] Make referral to Pet Therapy     [] Make referral to Addiction services  [] Make referral to [facility]  [] Make referral to Spiritual Care Partner  [] No future visits requested        [x] Contact Spiritual Care for further referrals     Comments:  Visit was in response to staff request, through in basket, for pastoral support. Patient was in bed in 3111 and appeared comfortable. He was welcoming of 's visit and shared what led to his hospitalization. He indicated he was doing pretty well, lifting his foot to greenberg improvement in his condition. He has a good support system; sisters, brothers and several nieces and nephews, who stay connected for support. He was hopeful of discharge tomorrow.  celebrated patient's progress and encouraged continuous self care. He expresses no need for support when asked. He thanked  for the visit.        Visited by: Irina fajardo: [phone])

## 2023-02-27 VITALS
HEIGHT: 68 IN | HEART RATE: 88 BPM | RESPIRATION RATE: 16 BRPM | OXYGEN SATURATION: 96 % | WEIGHT: 158.73 LBS | SYSTOLIC BLOOD PRESSURE: 133 MMHG | TEMPERATURE: 99.5 F | DIASTOLIC BLOOD PRESSURE: 67 MMHG | BODY MASS INDEX: 24.06 KG/M2

## 2023-02-27 PROCEDURE — 74011250637 HC RX REV CODE- 250/637: Performed by: SURGERY

## 2023-02-27 PROCEDURE — 74011000250 HC RX REV CODE- 250: Performed by: SURGERY

## 2023-02-27 RX ORDER — OXYCODONE AND ACETAMINOPHEN 5; 325 MG/1; MG/1
1 TABLET ORAL
Qty: 20 TABLET | Refills: 0 | Status: SHIPPED | OUTPATIENT
Start: 2023-02-27 | End: 2023-03-04

## 2023-02-27 RX ADMIN — LOSARTAN POTASSIUM 25 MG: 50 TABLET, FILM COATED ORAL at 08:30

## 2023-02-27 RX ADMIN — METOPROLOL TARTRATE 25 MG: 25 TABLET, FILM COATED ORAL at 08:30

## 2023-02-27 RX ADMIN — COLLAGENASE SANTYL: 250 OINTMENT TOPICAL at 13:00

## 2023-02-27 RX ADMIN — ASPIRIN 81 MG: 81 TABLET, COATED ORAL at 08:29

## 2023-02-27 RX ADMIN — ISOSORBIDE MONONITRATE 30 MG: 30 TABLET, EXTENDED RELEASE ORAL at 08:30

## 2023-02-27 RX ADMIN — CLOPIDOGREL BISULFATE 75 MG: 75 TABLET ORAL at 08:29

## 2023-02-27 RX ADMIN — AMOXICILLIN 500 MG: 250 CAPSULE ORAL at 08:29

## 2023-02-27 RX ADMIN — ATORVASTATIN CALCIUM 80 MG: 40 TABLET, FILM COATED ORAL at 08:29

## 2023-02-27 RX ADMIN — OXYCODONE AND ACETAMINOPHEN 1 TABLET: 5; 325 TABLET ORAL at 08:43

## 2023-02-27 RX ADMIN — FAMOTIDINE 20 MG: 20 TABLET, FILM COATED ORAL at 08:29

## 2023-02-27 NOTE — DISCHARGE INSTRUCTIONS
Discharge Instructions After Vascular Surgery   Home care  Check your incision every day for signs of infection such as swelling, redness, warmth, or drainage. Dont bathe or soak in a tub or go swimming until your incisions are well healed (usually at least 2 weeks). You can shower to keep your incisions clean. Just make sure you dry them well afterward. Dry dressing changes daily. Activity  NO heavy lifting  Dont drive while taking pain medication. Expect to start walking soon after surgery. Walking helps reduce swelling and helps your incision heal.   Dont stand or sit with your feet down for long. When you sit, raise your feet as high as you comfortably can. Take your medicines exactly as directed. Dont skip doses. Avoid skin burns by testing the temperature of shower water before you get in. Wear slippers or shoes when walking. Dont go barefoot or wear open-toed shoes. Follow-up  See your doctor to have your staples removed 2-3 weeks after your surgery. When to call your healthcare provider   Call your provider right away if you have any of the following:  Fever of 100.4°F (38°C)  Signs of infection (redness, swelling, or warmth at the incision site)  Drainage from your incision  Changes in color, temperature, feeling, or movement in either leg or foot  Increasing pain or numbness in your foot or leg  Leg swelling that doesn't get better overnight  Chest pain or trouble breathing    Long-term changes at home  Eat a healthy, low-fat, low cholesterol, and low calorie diet. Maintain your ideal body weight. After you have recovered from surgery, try to exercise more, especially walking. If you smoke, ask your primary doctor for help quitting.       Please call the office at [phone] for any issues

## 2023-02-27 NOTE — PROGRESS NOTES
0700: End of Shift Note    Bedside shift change report given Sebastian Ansari RN (oncoming nurse) by Namita Bai RN (offgoing nurse). Report included the following information SBAR, Kardex, Intake/Output, MAR, Recent Results, and Cardiac Rhythm NSR    Shift worked: 7p-7a   Shift summary and any significant changes:     PRN pain medication given. Pt had a quiet night. Concerns for physician to address:  none     Zone phone for oncoming shift:   [phone]       Activity:  Activity Level: Up with Assistance  Number times ambulated in hallways past shift: 0  Number of times OOB to chair past shift: 0    Cardiac:   Cardiac Monitoring: Yes      Cardiac Rhythm: Sinus Rhythm    Access:  Current line(s): PIV     Genitourinary:   Urinary status: lawrence    Respiratory:   O2 Device: None (Room air)  Chronic home O2 use?: NO  Incentive spirometer at bedside: NO       GI:  Last Bowel Movement Date: 02/23/23  Current diet:  ADULT DIET Regular  Passing flatus: YES  Tolerating current diet: YES       Pain Management:   Patient states pain is manageable on current regimen: YES    Skin:  Collins Score: 16  Interventions: increase time out of bed    Patient Safety:  Fall Score:  Total Score: 3  Interventions: bed/chair alarm, assistive device (walker, cane, etc), gripper socks, and pt to call before getting OOB  High Fall Risk: Yes    Length of Stay:  Expected LOS: - - -  Actual LOS: 3      Namita Bai RN

## 2023-02-27 NOTE — PROGRESS NOTES
DISCHARGE NOTE FROM [facility] NURSE    Patient determined to be stable for discharge by attending provider. I have reviewed the discharge instructions and follow-up appointments with the patient. They verbalized understanding and all questions were answered to their satisfaction. No complaints or further questions were expressed. Medications sent to pharmacy. Appropriate educational materials and medication side effect teaching were provided. PIV were removed prior to discharge. Patient did not discharge with any line, lawrence, or drain. All personal items collected during admission were returned to the patient prior to discharge. Post-op patient: Yes- Patient given post-op discharge kit and instructed on use.        Ml Lovett

## 2023-02-27 NOTE — PROGRESS NOTES
Problem: Falls - Risk of  Goal: *Absence of Falls  Description: Document Nel Erps Fall Risk and appropriate interventions in the flowsheet.   Outcome: Progressing Towards Goal  Note: Fall Risk Interventions:  Mobility Interventions: Bed/chair exit alarm, Patient to call before getting OOB, Utilize walker, cane, or other assistive device, Utilize gait belt for transfers/ambulation         Medication Interventions: Bed/chair exit alarm, Patient to call before getting OOB, Teach patient to arise slowly    Elimination Interventions: Urinal in reach, Toileting schedule/hourly rounds, Stay With Me (per policy), Patient to call for help with toileting needs              Problem: Patient Education: Go to Patient Education Activity  Goal: Patient/Family Education  Outcome: Progressing Towards Goal     Problem: Patient Education: Go to Patient Education Activity  Goal: Patient/Family Education  Outcome: Progressing Towards Goal

## 2023-02-27 NOTE — DISCHARGE SUMMARY
Vascular Surgery Discharge Summary     Patient ID:  Saturnino Ojeda  [id number]  70 y.o.  1955    Admitting Provider: Estiven Wilson MD  Discharging Provider: Estiven Wilson MD    Admit Date: 2/24/2023    Discharge Date: 2/27/2023    Discharge Diagnoses:    Severe peripheral arterial disease POA  Hypertension POA  Hyperlipidemia POA  Coronary artery disease  Cardiomyopathy POA  Sick sinus syndrome POA  History of PPM/ICD POA    Procedures during admission:  FEMORAL TO FEMORAL BYPASS WITH PTFE GRAFT  2/24/2023    Hospital Course:   Mr. Werner Kan is a very pleasant 62-year-old male with a past medical history significant for coronary artery disease, cardiomyopathy, hypertension, hyperlipidemia, sick sinus syndrome, and PPM/ICD. He has a known history of peripheral arterial disease and is status post aortobifemoral bypass and bilateral femoral above-the-knee popliteal bypass in 2014. He is a current smoker. He is now admitted to the hospital status post a femoral to femoral bypass with PTFE 2/24/2023. His postprocedural course has been uneventful. On day of discharge his feet are warm and well-perfused. He denies claudication or rest pain. His comorbid conditions are stable. He was ambulating independently. Pertinent Results:   No results found for this or any previous visit (from the past 24 hour(s)).     Vital signs: Patient Vitals for the past 24 hrs:   BP Temp Pulse Resp SpO2   02/27/23 0734 133/67 99.5 °F (37.5 °C) 88 16 --   02/26/23 1915 129/69 98.5 °F (36.9 °C) 72 18 96 %   02/26/23 1530 119/64 98.4 °F (36.9 °C) 70 17 99 %       Patient Weight:   Last 3 Recorded Weights in this Encounter    02/24/23 0851   Weight: 72 kg (158 lb 11.7 oz)       Consults: None    Patient Condition at Discharge: stable    Disposition: home    Patient Instructions:   Current Discharge Medication List        START taking these medications    Details   oxyCODONE-acetaminophen (PERCOCET) 5-325 mg per tablet Take 1 Tablet by mouth every four (4) hours as needed for Pain for up to 5 days. Max Daily Amount: 6 Tablets. Qty: 20 Tablet, Refills: 0    Associated Diagnoses: Lower limb ischemia           CONTINUE these medications which have NOT CHANGED    Details   famotidine (PEPCID) 20 mg tablet Take 20 mg by mouth two (2) times a day. collagenase (SANTYL) 250 unit/gram ointment Apply  to affected area daily. To right lower leg near inner ankle      atorvastatin (LIPITOR) 80 mg tablet Take 80 mg by mouth daily. isosorbide mononitrate ER (IMDUR) 30 mg tablet Take 30 mg by mouth daily. losartan (COZAAR) 25 mg tablet Take 25 mg by mouth daily. metoprolol tartrate (LOPRESSOR) 25 mg tablet Take 25 mg by mouth two (2) times a day.      gabapentin (NEURONTIN) 100 mg capsule Take 100 mg by mouth nightly. aspirin delayed-release 81 mg tablet Take 81 mg by mouth daily. clopidogreL (PLAVIX) 75 mg tab Take 75 mg by mouth daily. STOP taking these medications       amoxicillin (AMOXIL) 500 mg capsule Comments:   Reason for Stopping:               Diet: Cardiac Diet    Discharge Instructions After Vascular Surgery   Home care  Check your incision every day for signs of infection such as swelling, redness, warmth, or drainage. Dont bathe or soak in a tub or go swimming until your incisions are well healed (usually at least 2 weeks). You can shower to keep your incisions clean. Just make sure you dry them well afterward. Dry dressing changes daily. Activity  NO heavy lifting  Dont drive while taking pain medication. Expect to start walking soon after surgery. Walking helps reduce swelling and helps your incision heal.   Dont stand or sit with your feet down for long. When you sit, raise your feet as high as you comfortably can. Take your medicines exactly as directed. Dont skip doses. Avoid skin burns by testing the temperature of shower water before you get in.   Wear slippers or shoes when walking. Dont go barefoot or wear open-toed shoes. Follow-up  See your doctor to have your staples removed 2-3 weeks after your surgery. When to call your healthcare provider   Call your provider right away if you have any of the following:  Fever of 100.4°F (38°C)  Signs of infection (redness, swelling, or warmth at the incision site)  Drainage from your incision  Changes in color, temperature, feeling, or movement in either leg or foot  Increasing pain or numbness in your foot or leg  Leg swelling that doesn't get better overnight  Chest pain or trouble breathing    Long-term changes at home  Eat a healthy, low-fat, low cholesterol, and low calorie diet. Maintain your ideal body weight. After you have recovered from surgery, try to exercise more, especially walking. If you smoke, ask your primary doctor for help quitting.       Please call the office at [phone] for any issues    Follow-up Information       Follow up With Specialties Details Why Contact Info    Jada Domínguez MD Vascular Surgery Follow up in 2 week(s)  [address] [place] [address]      Harvinder Duval MD Internal Medicine Physician   [address] [place] [address]  [phone]              Signed:  Keyur Nieves NP  2/27/2023  9:03 AM

## 2023-03-22 NOTE — OP NOTES
[facility]  OPERATIVE REPORT    Name:  Ketan Chang  MR#:  [id number]  :  1955  ACCOUNT #:  [de-identified]  DATE OF SERVICE:  2023    PREOPERATIVE DIAGNOSES:  Peripheral artery disease with ischemic rest pain of the right lower extremity. POSTOPERATIVE DIAGNOSES:  Peripheral artery disease with ischemic rest pain of the right lower extremity. PROCEDURE PERFORMED:  Left to right femoral-femoral bypass. SURGEON:  Zenia Watts MD    ASSISTANT:  None. ANESTHESIA:  General.    COMPLICATIONS:  None. SPECIMENS REMOVED:  None. IMPLANTS:  8-mm Propaten graft. ESTIMATED BLOOD LOSS:  Less than 100 mL. DRAINS:  None. INDICATIONS:  The patient is a 61-year-old male with an extensive history of peripheral artery disease including previous aortobifemoral bypass and bilateral femoral above-knee popliteal bypasses. The bilateral femoral above-knee popliteal bypasses and the right limb of the aortobifemoral bypass are known to be occluded. The patient has ischemic pain of the right lower extremity. He presents for left to right femoral-femoral bypass. PROCEDURE:  After informed consent was obtained, the patient was given preoperative intravenous antibiotics within 1 hour of the incision. He was taken to the operating room and after induction of adequate general anesthesia, both groins were prepped and draped as a sterile field and the skin was covered with [place]. Through a transverse incision in the left groin, the anastomosis of the aortofemoral graft to the common femoral artery was dissected free. Next rather than enter the reoperative right groin since there was only profunda outflow, the mid profunda was dissected free from a lateral approach through an incision just along the lateral border of the sartorius muscle in the proximal thigh. Sufficient segment of profunda for the distal anastomosis was isolated and controlled with vessel loops.   The patient was systemically heparinized. An end-to-side anastomosis was performed between an 8-mm ringed Propaten graft and the hare of the left aortofemoral graft using running 5-0 Tea-Mars suture. Upon completion, this was hemostatic. The graft was then tunneled subcutaneously above the level of the pubis and brought down into the proximal right thigh incision where an end-to-side anastomosis was performed between the graft and the proximal to mid profunda using running 5-0 Tea-Mars suture. Upon completion, there was excellent flow in the circuit. The Doppler flow in the right profunda diminished dramatically with temporary occlusion of the bypass. Both wounds were irrigated copiously with antibiotic irrigation. There was also a small counter incision in the lower abdomen and the midline above the pubis, which was used to assist with tunneling. This was irrigated with antibiotic irrigation. All wounds were closed with multiple layers of Vicryl suture and skin staples. Dry dressings were applied. The patient was transferred to the PACU in stable condition. All counts were correct.         Arlene Henderson MD      [id number]  D:  03/22/2023 3:49  T:  03/22/2023 4:16  JOB #:  [id number]

## 2023-05-23 RX ORDER — ISOSORBIDE MONONITRATE 30 MG/1
30 TABLET, EXTENDED RELEASE ORAL DAILY
COMMUNITY
Start: 2021-05-30

## 2023-05-23 RX ORDER — GABAPENTIN 100 MG/1
100 CAPSULE ORAL NIGHTLY
COMMUNITY
Start: 2021-06-10

## 2023-05-23 RX ORDER — CLOPIDOGREL BISULFATE 75 MG/1
75 TABLET ORAL DAILY
COMMUNITY
Start: 2021-05-06

## 2023-05-23 RX ORDER — LOSARTAN POTASSIUM 25 MG/1
25 TABLET ORAL DAILY
COMMUNITY
Start: 2021-05-18

## 2023-05-23 RX ORDER — FAMOTIDINE 20 MG/1
20 TABLET, FILM COATED ORAL 2 TIMES DAILY
COMMUNITY

## 2023-05-23 RX ORDER — ASPIRIN 81 MG/1
81 TABLET ORAL DAILY
COMMUNITY

## 2023-05-23 RX ORDER — ATORVASTATIN CALCIUM 80 MG/1
80 TABLET, FILM COATED ORAL DAILY
COMMUNITY
Start: 2021-06-09

## 2023-07-31 ENCOUNTER — TRANSCRIBE ORDERS (OUTPATIENT)
Facility: HOSPITAL | Age: 68
End: 2023-07-31

## 2023-07-31 DIAGNOSIS — I73.9 PERIPHERAL ARTERY DISEASE (HCC): Primary | ICD-10-CM

## 2023-08-10 ENCOUNTER — HOSPITAL ENCOUNTER (OUTPATIENT)
Facility: HOSPITAL | Age: 68
Discharge: HOME | End: 2023-08-10
Payer: MEDICARE

## 2023-08-10 DIAGNOSIS — I73.9 PERIPHERAL ARTERY DISEASE (HCC): ICD-10-CM

## 2023-08-10 LAB — CREAT BLD-MCNC: 0.7 MG/DL (ref 0.6–1.3)

## 2023-08-10 PROCEDURE — 6360000004 HC RX CONTRAST MEDICATION: Performed by: INTERNAL MEDICINE

## 2023-08-10 PROCEDURE — 82565 ASSAY OF CREATININE: CPT

## 2023-08-10 PROCEDURE — 75635 CT ANGIO ABDOMINAL ARTERIES: CPT

## 2023-08-10 RX ADMIN — IOPAMIDOL 100 ML: 755 INJECTION, SOLUTION INTRAVENOUS at 12:28

## 2023-08-18 ENCOUNTER — HOSPITAL ENCOUNTER (OUTPATIENT)
Facility: HOSPITAL | Age: 68
End: 2023-08-18
Payer: MEDICARE

## 2023-08-18 VITALS
DIASTOLIC BLOOD PRESSURE: 61 MMHG | BODY MASS INDEX: 22.22 KG/M2 | TEMPERATURE: 97.5 F | HEART RATE: 66 BPM | OXYGEN SATURATION: 98 % | RESPIRATION RATE: 18 BRPM | SYSTOLIC BLOOD PRESSURE: 127 MMHG | HEIGHT: 68 IN | WEIGHT: 146.61 LBS

## 2023-08-18 LAB
ALBUMIN SERPL-MCNC: 3.8 G/DL (ref 3.5–5)
ALBUMIN/GLOB SERPL: 0.9 (ref 1.1–2.2)
ALP SERPL-CCNC: 139 U/L (ref 45–117)
ALT SERPL-CCNC: 17 U/L (ref 12–78)
ANION GAP SERPL CALC-SCNC: 2 MMOL/L (ref 5–15)
APPEARANCE UR: CLEAR
APTT PPP: 28 SEC (ref 22.1–31)
AST SERPL-CCNC: 17 U/L (ref 15–37)
BACTERIA URNS QL MICRO: NEGATIVE /HPF
BILIRUB SERPL-MCNC: 0.9 MG/DL (ref 0.2–1)
BILIRUB UR QL: NEGATIVE
BUN SERPL-MCNC: 10 MG/DL (ref 6–20)
BUN/CREAT SERPL: 11 (ref 12–20)
CALCIUM SERPL-MCNC: 9 MG/DL (ref 8.5–10.1)
CHLORIDE SERPL-SCNC: 108 MMOL/L (ref 97–108)
CO2 SERPL-SCNC: 28 MMOL/L (ref 21–32)
COLOR UR: ABNORMAL
CREAT SERPL-MCNC: 0.88 MG/DL (ref 0.7–1.3)
EPITH CASTS URNS QL MICRO: ABNORMAL /LPF
ERYTHROCYTE [DISTWIDTH] IN BLOOD BY AUTOMATED COUNT: 13.6 % (ref 11.5–14.5)
GLOBULIN SER CALC-MCNC: 4.4 G/DL (ref 2–4)
GLUCOSE SERPL-MCNC: 109 MG/DL (ref 65–100)
GLUCOSE UR STRIP.AUTO-MCNC: NEGATIVE MG/DL
HCT VFR BLD AUTO: 37.6 % (ref 36.6–50.3)
HGB BLD-MCNC: 12.2 G/DL (ref 12.1–17)
HGB UR QL STRIP: ABNORMAL
HYALINE CASTS URNS QL MICRO: ABNORMAL /LPF (ref 0–2)
INR PPP: 1 (ref 0.9–1.1)
KETONES UR QL STRIP.AUTO: ABNORMAL MG/DL
LEUKOCYTE ESTERASE UR QL STRIP.AUTO: ABNORMAL
MCH RBC QN AUTO: 28.8 PG (ref 26–34)
MCHC RBC AUTO-ENTMCNC: 32.4 G/DL (ref 30–36.5)
MCV RBC AUTO: 88.7 FL (ref 80–99)
NITRITE UR QL STRIP.AUTO: NEGATIVE
NRBC # BLD: 0 K/UL (ref 0–0.01)
NRBC BLD-RTO: 0 PER 100 WBC
PH UR STRIP: 7 (ref 5–8)
PLATELET # BLD AUTO: 340 K/UL (ref 150–400)
PMV BLD AUTO: 10.4 FL (ref 8.9–12.9)
POTASSIUM SERPL-SCNC: 4.2 MMOL/L (ref 3.5–5.1)
PROT SERPL-MCNC: 8.2 G/DL (ref 6.4–8.2)
PROT UR STRIP-MCNC: ABNORMAL MG/DL
PROTHROMBIN TIME: 10.8 SEC (ref 9–11.1)
RBC # BLD AUTO: 4.24 M/UL (ref 4.1–5.7)
RBC #/AREA URNS HPF: ABNORMAL /HPF (ref 0–5)
SODIUM SERPL-SCNC: 138 MMOL/L (ref 136–145)
SP GR UR REFRACTOMETRY: 1.02
THERAPEUTIC RANGE: NORMAL SECS (ref 58–77)
URINE CULTURE IF INDICATED: ABNORMAL
UROBILINOGEN UR QL STRIP.AUTO: 1 EU/DL (ref 0.2–1)
WBC # BLD AUTO: 6.8 K/UL (ref 4.1–11.1)
WBC URNS QL MICRO: ABNORMAL /HPF (ref 0–4)

## 2023-08-18 PROCEDURE — 85730 THROMBOPLASTIN TIME PARTIAL: CPT

## 2023-08-18 PROCEDURE — 71046 X-RAY EXAM CHEST 2 VIEWS: CPT

## 2023-08-18 PROCEDURE — 81001 URINALYSIS AUTO W/SCOPE: CPT

## 2023-08-18 PROCEDURE — 80053 COMPREHEN METABOLIC PANEL: CPT

## 2023-08-18 PROCEDURE — 85027 COMPLETE CBC AUTOMATED: CPT

## 2023-08-18 PROCEDURE — 36415 COLL VENOUS BLD VENIPUNCTURE: CPT

## 2023-08-18 PROCEDURE — 86900 BLOOD TYPING SEROLOGIC ABO: CPT

## 2023-08-18 PROCEDURE — 86850 RBC ANTIBODY SCREEN: CPT

## 2023-08-18 PROCEDURE — 85610 PROTHROMBIN TIME: CPT

## 2023-08-18 PROCEDURE — 86901 BLOOD TYPING SEROLOGIC RH(D): CPT

## 2023-08-18 RX ORDER — SODIUM CHLORIDE, SODIUM LACTATE, POTASSIUM CHLORIDE, CALCIUM CHLORIDE 600; 310; 30; 20 MG/100ML; MG/100ML; MG/100ML; MG/100ML
INJECTION, SOLUTION INTRAVENOUS CONTINUOUS
Status: CANCELLED | OUTPATIENT
Start: 2023-08-24

## 2023-08-18 ASSESSMENT — PAIN SCALES - GENERAL: PAINLEVEL_OUTOF10: 8

## 2023-08-18 NOTE — PERIOP NOTE
EKG not done during PAT visit. Last EKG done on 7/26/23 during cardiologist visit. EKG tracing on chart.

## 2023-08-19 LAB
ABO + RH BLD: NORMAL
BLOOD GROUP ANTIBODIES SERPL: NORMAL
SPECIMEN EXP DATE BLD: NORMAL

## 2023-08-24 ENCOUNTER — ANESTHESIA EVENT (OUTPATIENT)
Facility: HOSPITAL | Age: 68
End: 2023-08-24
Payer: MEDICARE

## 2023-08-24 ENCOUNTER — HOSPITAL ENCOUNTER (INPATIENT)
Facility: HOSPITAL | Age: 68
LOS: 7 days | Discharge: ANOTHER HOSPITAL | DRG: 215 | End: 2023-08-31
Attending: SURGERY | Admitting: SURGERY
Payer: MEDICARE

## 2023-08-24 ENCOUNTER — ANESTHESIA (OUTPATIENT)
Facility: HOSPITAL | Age: 68
End: 2023-08-24
Payer: MEDICARE

## 2023-08-24 DIAGNOSIS — I21.4 NSTEMI (NON-ST ELEVATED MYOCARDIAL INFARCTION) (HCC): ICD-10-CM

## 2023-08-24 DIAGNOSIS — R06.02 SOB (SHORTNESS OF BREATH): ICD-10-CM

## 2023-08-24 DIAGNOSIS — R57.0 CARDIOGENIC SHOCK (HCC): Primary | ICD-10-CM

## 2023-08-24 PROBLEM — I70.229 CRITICAL LOWER LIMB ISCHEMIA (HCC): Status: ACTIVE | Noted: 2023-08-24

## 2023-08-24 PROCEDURE — 2500000003 HC RX 250 WO HCPCS

## 2023-08-24 PROCEDURE — 6360000002 HC RX W HCPCS

## 2023-08-24 PROCEDURE — 2580000003 HC RX 258: Performed by: ANESTHESIOLOGY

## 2023-08-24 PROCEDURE — 3700000000 HC ANESTHESIA ATTENDED CARE: Performed by: SURGERY

## 2023-08-24 PROCEDURE — 7100000001 HC PACU RECOVERY - ADDTL 15 MIN: Performed by: SURGERY

## 2023-08-24 PROCEDURE — 6360000002 HC RX W HCPCS: Performed by: SURGERY

## 2023-08-24 PROCEDURE — 2500000003 HC RX 250 WO HCPCS: Performed by: SURGERY

## 2023-08-24 PROCEDURE — 3700000001 HC ADD 15 MINUTES (ANESTHESIA): Performed by: SURGERY

## 2023-08-24 PROCEDURE — 6370000000 HC RX 637 (ALT 250 FOR IP): Performed by: SURGERY

## 2023-08-24 PROCEDURE — A4217 STERILE WATER/SALINE, 500 ML: HCPCS | Performed by: SURGERY

## 2023-08-24 PROCEDURE — C1768 GRAFT, VASCULAR: HCPCS | Performed by: SURGERY

## 2023-08-24 PROCEDURE — 041K0JL BYPASS RIGHT FEMORAL ARTERY TO POPLITEAL ARTERY WITH SYNTHETIC SUBSTITUTE, OPEN APPROACH: ICD-10-PCS | Performed by: SURGERY

## 2023-08-24 PROCEDURE — 2720000010 HC SURG SUPPLY STERILE: Performed by: SURGERY

## 2023-08-24 PROCEDURE — 2060000000 HC ICU INTERMEDIATE R&B

## 2023-08-24 PROCEDURE — 3600000004 HC SURGERY LEVEL 4 BASE: Performed by: SURGERY

## 2023-08-24 PROCEDURE — 2709999900 HC NON-CHARGEABLE SUPPLY: Performed by: SURGERY

## 2023-08-24 PROCEDURE — 2580000003 HC RX 258: Performed by: SURGERY

## 2023-08-24 PROCEDURE — 7100000000 HC PACU RECOVERY - FIRST 15 MIN: Performed by: SURGERY

## 2023-08-24 PROCEDURE — 2500000003 HC RX 250 WO HCPCS: Performed by: NURSE ANESTHETIST, CERTIFIED REGISTERED

## 2023-08-24 PROCEDURE — 2580000003 HC RX 258: Performed by: NURSE ANESTHETIST, CERTIFIED REGISTERED

## 2023-08-24 PROCEDURE — 3600000014 HC SURGERY LEVEL 4 ADDTL 15MIN: Performed by: SURGERY

## 2023-08-24 PROCEDURE — 6360000002 HC RX W HCPCS: Performed by: NURSE ANESTHETIST, CERTIFIED REGISTERED

## 2023-08-24 DEVICE — PROPATEN VASCULAR GRAFT TW RR 6MMX80CM 60CM RINGS HEPARIN
Type: IMPLANTABLE DEVICE | Site: GROIN | Status: FUNCTIONAL
Brand: GORE PROPATEN VASCULAR GRAFT

## 2023-08-24 RX ORDER — MIDAZOLAM HYDROCHLORIDE 1 MG/ML
2 INJECTION, SOLUTION INTRAMUSCULAR; INTRAVENOUS
Status: DISCONTINUED | OUTPATIENT
Start: 2023-08-24 | End: 2023-08-24 | Stop reason: HOSPADM

## 2023-08-24 RX ORDER — SODIUM CHLORIDE, SODIUM LACTATE, POTASSIUM CHLORIDE, CALCIUM CHLORIDE 600; 310; 30; 20 MG/100ML; MG/100ML; MG/100ML; MG/100ML
INJECTION, SOLUTION INTRAVENOUS CONTINUOUS
Status: DISCONTINUED | OUTPATIENT
Start: 2023-08-24 | End: 2023-08-24 | Stop reason: HOSPADM

## 2023-08-24 RX ORDER — ONDANSETRON 2 MG/ML
INJECTION INTRAMUSCULAR; INTRAVENOUS PRN
Status: DISCONTINUED | OUTPATIENT
Start: 2023-08-24 | End: 2023-08-24 | Stop reason: SDUPTHER

## 2023-08-24 RX ORDER — BISACODYL 10 MG
10 SUPPOSITORY, RECTAL RECTAL DAILY PRN
Status: DISCONTINUED | OUTPATIENT
Start: 2023-08-24 | End: 2023-08-31 | Stop reason: HOSPADM

## 2023-08-24 RX ORDER — ATORVASTATIN CALCIUM 40 MG/1
80 TABLET, FILM COATED ORAL DAILY
Status: DISCONTINUED | OUTPATIENT
Start: 2023-08-25 | End: 2023-08-28

## 2023-08-24 RX ORDER — FENTANYL CITRATE 50 UG/ML
50 INJECTION, SOLUTION INTRAMUSCULAR; INTRAVENOUS EVERY 5 MIN PRN
Status: DISCONTINUED | OUTPATIENT
Start: 2023-08-24 | End: 2023-08-24 | Stop reason: HOSPADM

## 2023-08-24 RX ORDER — HYDROMORPHONE HYDROCHLORIDE 2 MG/ML
INJECTION, SOLUTION INTRAMUSCULAR; INTRAVENOUS; SUBCUTANEOUS PRN
Status: DISCONTINUED | OUTPATIENT
Start: 2023-08-24 | End: 2023-08-24 | Stop reason: SDUPTHER

## 2023-08-24 RX ORDER — ASPIRIN 81 MG/1
81 TABLET ORAL DAILY
Status: DISCONTINUED | OUTPATIENT
Start: 2023-08-25 | End: 2023-08-28

## 2023-08-24 RX ORDER — ROCURONIUM BROMIDE 10 MG/ML
INJECTION, SOLUTION INTRAVENOUS PRN
Status: DISCONTINUED | OUTPATIENT
Start: 2023-08-24 | End: 2023-08-24 | Stop reason: SDUPTHER

## 2023-08-24 RX ORDER — ONDANSETRON 2 MG/ML
4 INJECTION INTRAMUSCULAR; INTRAVENOUS
Status: DISCONTINUED | OUTPATIENT
Start: 2023-08-24 | End: 2023-08-24 | Stop reason: HOSPADM

## 2023-08-24 RX ORDER — SODIUM CHLORIDE 9 MG/ML
INJECTION, SOLUTION INTRAVENOUS PRN
Status: DISCONTINUED | OUTPATIENT
Start: 2023-08-24 | End: 2023-08-24 | Stop reason: HOSPADM

## 2023-08-24 RX ORDER — CLOPIDOGREL BISULFATE 75 MG/1
75 TABLET ORAL DAILY
Status: DISCONTINUED | OUTPATIENT
Start: 2023-08-25 | End: 2023-08-28

## 2023-08-24 RX ORDER — OXYCODONE HYDROCHLORIDE AND ACETAMINOPHEN 5; 325 MG/1; MG/1
1 TABLET ORAL EVERY 4 HOURS PRN
Status: DISCONTINUED | OUTPATIENT
Start: 2023-08-24 | End: 2023-08-25

## 2023-08-24 RX ORDER — FAMOTIDINE 20 MG/1
20 TABLET, FILM COATED ORAL 2 TIMES DAILY
Status: DISCONTINUED | OUTPATIENT
Start: 2023-08-24 | End: 2023-08-28

## 2023-08-24 RX ORDER — GABAPENTIN 100 MG/1
100 CAPSULE ORAL NIGHTLY
Status: DISCONTINUED | OUTPATIENT
Start: 2023-08-24 | End: 2023-08-28

## 2023-08-24 RX ORDER — HYDRALAZINE HYDROCHLORIDE 20 MG/ML
10 INJECTION INTRAMUSCULAR; INTRAVENOUS
Status: DISCONTINUED | OUTPATIENT
Start: 2023-08-24 | End: 2023-08-24 | Stop reason: HOSPADM

## 2023-08-24 RX ORDER — PHENYLEPHRINE HCL IN 0.9% NACL 0.4MG/10ML
SYRINGE (ML) INTRAVENOUS PRN
Status: DISCONTINUED | OUTPATIENT
Start: 2023-08-24 | End: 2023-08-24 | Stop reason: SDUPTHER

## 2023-08-24 RX ORDER — LIDOCAINE HYDROCHLORIDE 20 MG/ML
INJECTION, SOLUTION EPIDURAL; INFILTRATION; INTRACAUDAL; PERINEURAL PRN
Status: DISCONTINUED | OUTPATIENT
Start: 2023-08-24 | End: 2023-08-24 | Stop reason: SDUPTHER

## 2023-08-24 RX ORDER — HYDROMORPHONE HYDROCHLORIDE 1 MG/ML
0.25 INJECTION, SOLUTION INTRAMUSCULAR; INTRAVENOUS; SUBCUTANEOUS EVERY 5 MIN PRN
Status: DISCONTINUED | OUTPATIENT
Start: 2023-08-24 | End: 2023-08-24 | Stop reason: HOSPADM

## 2023-08-24 RX ORDER — PROCHLORPERAZINE EDISYLATE 5 MG/ML
5 INJECTION INTRAMUSCULAR; INTRAVENOUS
Status: DISCONTINUED | OUTPATIENT
Start: 2023-08-24 | End: 2023-08-24 | Stop reason: HOSPADM

## 2023-08-24 RX ORDER — MEPERIDINE HYDROCHLORIDE 25 MG/ML
12.5 INJECTION INTRAMUSCULAR; INTRAVENOUS; SUBCUTANEOUS EVERY 5 MIN PRN
Status: DISCONTINUED | OUTPATIENT
Start: 2023-08-24 | End: 2023-08-24 | Stop reason: HOSPADM

## 2023-08-24 RX ORDER — LOSARTAN POTASSIUM 25 MG/1
25 TABLET ORAL DAILY
Status: DISCONTINUED | OUTPATIENT
Start: 2023-08-25 | End: 2023-08-28

## 2023-08-24 RX ORDER — SODIUM CHLORIDE 0.9 % (FLUSH) 0.9 %
5-40 SYRINGE (ML) INJECTION PRN
Status: DISCONTINUED | OUTPATIENT
Start: 2023-08-24 | End: 2023-08-24 | Stop reason: HOSPADM

## 2023-08-24 RX ORDER — GLYCOPYRROLATE 0.2 MG/ML
INJECTION INTRAMUSCULAR; INTRAVENOUS PRN
Status: DISCONTINUED | OUTPATIENT
Start: 2023-08-24 | End: 2023-08-24 | Stop reason: SDUPTHER

## 2023-08-24 RX ORDER — ONDANSETRON 2 MG/ML
4 INJECTION INTRAMUSCULAR; INTRAVENOUS EVERY 6 HOURS PRN
Status: DISCONTINUED | OUTPATIENT
Start: 2023-08-24 | End: 2023-08-24 | Stop reason: HOSPADM

## 2023-08-24 RX ORDER — ACETAMINOPHEN 325 MG/1
650 TABLET ORAL EVERY 4 HOURS PRN
Status: DISCONTINUED | OUTPATIENT
Start: 2023-08-24 | End: 2023-08-25

## 2023-08-24 RX ORDER — HEPARIN SODIUM 1000 [USP'U]/ML
INJECTION, SOLUTION INTRAVENOUS; SUBCUTANEOUS PRN
Status: DISCONTINUED | OUTPATIENT
Start: 2023-08-24 | End: 2023-08-24 | Stop reason: SDUPTHER

## 2023-08-24 RX ORDER — MORPHINE SULFATE 2 MG/ML
4 INJECTION, SOLUTION INTRAMUSCULAR; INTRAVENOUS
Status: DISCONTINUED | OUTPATIENT
Start: 2023-08-24 | End: 2023-08-25

## 2023-08-24 RX ORDER — LIDOCAINE HYDROCHLORIDE 20 MG/ML
JELLY TOPICAL PRN
Status: DISCONTINUED | OUTPATIENT
Start: 2023-08-24 | End: 2023-08-24 | Stop reason: ALTCHOICE

## 2023-08-24 RX ORDER — DIPHENHYDRAMINE HYDROCHLORIDE 50 MG/ML
12.5 INJECTION INTRAMUSCULAR; INTRAVENOUS
Status: DISCONTINUED | OUTPATIENT
Start: 2023-08-24 | End: 2023-08-24 | Stop reason: HOSPADM

## 2023-08-24 RX ORDER — MORPHINE SULFATE 2 MG/ML
2 INJECTION, SOLUTION INTRAMUSCULAR; INTRAVENOUS
Status: DISCONTINUED | OUTPATIENT
Start: 2023-08-24 | End: 2023-08-28

## 2023-08-24 RX ORDER — SODIUM CHLORIDE 9 MG/ML
INJECTION, SOLUTION INTRAVENOUS CONTINUOUS
Status: DISCONTINUED | OUTPATIENT
Start: 2023-08-24 | End: 2023-08-25

## 2023-08-24 RX ORDER — SODIUM CHLORIDE, SODIUM LACTATE, POTASSIUM CHLORIDE, CALCIUM CHLORIDE 600; 310; 30; 20 MG/100ML; MG/100ML; MG/100ML; MG/100ML
INJECTION, SOLUTION INTRAVENOUS CONTINUOUS
Status: DISCONTINUED | OUTPATIENT
Start: 2023-08-24 | End: 2023-08-25

## 2023-08-24 RX ORDER — MIDAZOLAM HYDROCHLORIDE 1 MG/ML
INJECTION INTRAMUSCULAR; INTRAVENOUS PRN
Status: DISCONTINUED | OUTPATIENT
Start: 2023-08-24 | End: 2023-08-24 | Stop reason: SDUPTHER

## 2023-08-24 RX ORDER — FENTANYL CITRATE 50 UG/ML
INJECTION, SOLUTION INTRAMUSCULAR; INTRAVENOUS PRN
Status: DISCONTINUED | OUTPATIENT
Start: 2023-08-24 | End: 2023-08-24 | Stop reason: SDUPTHER

## 2023-08-24 RX ORDER — SODIUM CHLORIDE 0.9 % (FLUSH) 0.9 %
5-40 SYRINGE (ML) INJECTION EVERY 12 HOURS SCHEDULED
Status: DISCONTINUED | OUTPATIENT
Start: 2023-08-24 | End: 2023-08-24 | Stop reason: HOSPADM

## 2023-08-24 RX ORDER — TAMSULOSIN HYDROCHLORIDE 0.4 MG/1
0.4 CAPSULE ORAL DAILY
Status: DISCONTINUED | OUTPATIENT
Start: 2023-08-25 | End: 2023-08-28

## 2023-08-24 RX ORDER — ISOSORBIDE MONONITRATE 30 MG/1
30 TABLET, EXTENDED RELEASE ORAL DAILY
Status: DISCONTINUED | OUTPATIENT
Start: 2023-08-25 | End: 2023-08-28

## 2023-08-24 RX ORDER — NEOSTIGMINE METHYLSULFATE 1 MG/ML
INJECTION, SOLUTION INTRAVENOUS PRN
Status: DISCONTINUED | OUTPATIENT
Start: 2023-08-24 | End: 2023-08-24 | Stop reason: SDUPTHER

## 2023-08-24 RX ADMIN — ROCURONIUM BROMIDE 40 MG: 10 INJECTION INTRAVENOUS at 13:59

## 2023-08-24 RX ADMIN — HYDROMORPHONE HYDROCHLORIDE 0.4 MG: 2 INJECTION INTRAMUSCULAR; INTRAVENOUS; SUBCUTANEOUS at 15:05

## 2023-08-24 RX ADMIN — SODIUM CHLORIDE, POTASSIUM CHLORIDE, SODIUM LACTATE AND CALCIUM CHLORIDE: 600; 310; 30; 20 INJECTION, SOLUTION INTRAVENOUS at 17:11

## 2023-08-24 RX ADMIN — GABAPENTIN 100 MG: 100 CAPSULE ORAL at 20:41

## 2023-08-24 RX ADMIN — GLYCOPYRROLATE 0.4 MG: 0.2 INJECTION INTRAMUSCULAR; INTRAVENOUS at 16:59

## 2023-08-24 RX ADMIN — PROPOFOL 20 MG: 10 INJECTION, EMULSION INTRAVENOUS at 17:04

## 2023-08-24 RX ADMIN — ROCURONIUM BROMIDE 10 MG: 10 INJECTION INTRAVENOUS at 16:35

## 2023-08-24 RX ADMIN — Medication 40 MCG: at 17:05

## 2023-08-24 RX ADMIN — METOPROLOL TARTRATE 25 MG: 25 TABLET, FILM COATED ORAL at 20:41

## 2023-08-24 RX ADMIN — FENTANYL CITRATE 25 MCG: 50 INJECTION, SOLUTION INTRAMUSCULAR; INTRAVENOUS at 14:45

## 2023-08-24 RX ADMIN — SODIUM CHLORIDE: 9 INJECTION, SOLUTION INTRAVENOUS at 17:57

## 2023-08-24 RX ADMIN — PHENYLEPHRINE HYDROCHLORIDE 40 MCG/MIN: 10 INJECTION INTRAVENOUS at 14:05

## 2023-08-24 RX ADMIN — ROCURONIUM BROMIDE 10 MG: 10 INJECTION INTRAVENOUS at 14:20

## 2023-08-24 RX ADMIN — PROPOFOL 30 MG: 10 INJECTION, EMULSION INTRAVENOUS at 17:16

## 2023-08-24 RX ADMIN — MIDAZOLAM HYDROCHLORIDE 2 MG: 1 INJECTION, SOLUTION INTRAMUSCULAR; INTRAVENOUS at 13:47

## 2023-08-24 RX ADMIN — ROCURONIUM BROMIDE 20 MG: 10 INJECTION INTRAVENOUS at 15:19

## 2023-08-24 RX ADMIN — FENTANYL CITRATE 50 MCG: 50 INJECTION, SOLUTION INTRAMUSCULAR; INTRAVENOUS at 13:59

## 2023-08-24 RX ADMIN — LIDOCAINE HYDROCHLORIDE 60 MG: 20 INJECTION, SOLUTION EPIDURAL; INFILTRATION; INTRACAUDAL; PERINEURAL at 13:59

## 2023-08-24 RX ADMIN — ONDANSETRON HYDROCHLORIDE 4 MG: 2 INJECTION, SOLUTION INTRAMUSCULAR; INTRAVENOUS at 16:48

## 2023-08-24 RX ADMIN — VANCOMYCIN HYDROCHLORIDE 1000 MG: 1 INJECTION, POWDER, LYOPHILIZED, FOR SOLUTION INTRAVENOUS at 12:52

## 2023-08-24 RX ADMIN — Medication 40 MCG: at 17:06

## 2023-08-24 RX ADMIN — PROPOFOL 120 MG: 10 INJECTION, EMULSION INTRAVENOUS at 13:59

## 2023-08-24 RX ADMIN — Medication 40 MCG: at 17:07

## 2023-08-24 RX ADMIN — MORPHINE SULFATE 2 MG: 2 INJECTION, SOLUTION INTRAMUSCULAR; INTRAVENOUS at 20:41

## 2023-08-24 RX ADMIN — FENTANYL CITRATE 25 MCG: 50 INJECTION, SOLUTION INTRAMUSCULAR; INTRAVENOUS at 14:40

## 2023-08-24 RX ADMIN — PROPOFOL 30 MG: 10 INJECTION, EMULSION INTRAVENOUS at 17:12

## 2023-08-24 RX ADMIN — FAMOTIDINE 20 MG: 20 TABLET, FILM COATED ORAL at 20:41

## 2023-08-24 RX ADMIN — Medication 3.5 MG: at 16:59

## 2023-08-24 RX ADMIN — SODIUM CHLORIDE, POTASSIUM CHLORIDE, SODIUM LACTATE AND CALCIUM CHLORIDE: 600; 310; 30; 20 INJECTION, SOLUTION INTRAVENOUS at 10:50

## 2023-08-24 RX ADMIN — Medication 3 AMPULE: at 12:51

## 2023-08-24 RX ADMIN — HEPARIN SODIUM 7500 UNITS: 1000 INJECTION INTRAVENOUS; SUBCUTANEOUS at 14:57

## 2023-08-24 ASSESSMENT — PAIN DESCRIPTION - LOCATION: LOCATION: LEG

## 2023-08-24 ASSESSMENT — LIFESTYLE VARIABLES: SMOKING_STATUS: 1

## 2023-08-24 ASSESSMENT — PAIN SCALES - GENERAL
PAINLEVEL_OUTOF10: 8
PAINLEVEL_OUTOF10: 0

## 2023-08-24 ASSESSMENT — PAIN DESCRIPTION - DESCRIPTORS
DESCRIPTORS: ACHING;SHARP;SHOOTING
DESCRIPTORS: ACHING

## 2023-08-24 ASSESSMENT — PAIN DESCRIPTION - ORIENTATION: ORIENTATION: RIGHT

## 2023-08-24 ASSESSMENT — PAIN SCALES - WONG BAKER: WONGBAKER_NUMERICALRESPONSE: 0

## 2023-08-24 ASSESSMENT — PAIN - FUNCTIONAL ASSESSMENT: PAIN_FUNCTIONAL_ASSESSMENT: 0-10

## 2023-08-24 NOTE — BRIEF OP NOTE
Brief Postoperative Note      Patient: Olinda Turner  YOB: 1955  MRN: [id number]    Date of Procedure: 8/24/2023    Pre-Op Diagnosis Codes:     * Peripheral vascular disease, unspecified ([address]) [I73.9]    Post-Op Diagnosis: Same       Procedure(s):  RIGHT FEMORAL POPLITEAL BYPASS WITH POLYTETRAFLUOROETHYLENE    Surgeon(s): Edie Billings MD    Assistant:  * No surgical staff found *    Anesthesia: General    Estimated Blood Loss (mL): less than 400     Complications: None    Specimens:   * No specimens in log *    Implants:  Implant Name Type Inv. Item Serial No.  Lot No. LRB No. Used Action   GRAFT VASC L80CM DIA6MM PTFE CBAS HEP SURF THN WALLED REM - [id number] Vascular grafts GRAFT VASC L80CM DIA6MM PTFE CBAS HEP SURF THN WALLED REM [id number] [employer]WD NA Right 1 Implanted         Drains:   Urinary Catheter 08/24/23 Coude (Active)       Findings: Bypass from Lt to Rt Fem-Fem to bk pop w/ 6 mm ringed Propaten. Tunneled anatomically. Good flow on completion.       Electronically signed by Nikolai Alvarez MD on 8/24/2023 at 5:26 PM

## 2023-08-25 LAB
ANION GAP SERPL CALC-SCNC: 6 MMOL/L (ref 5–15)
BUN SERPL-MCNC: 12 MG/DL (ref 6–20)
BUN/CREAT SERPL: 14 (ref 12–20)
CALCIUM SERPL-MCNC: 7.9 MG/DL (ref 8.5–10.1)
CHLORIDE SERPL-SCNC: 109 MMOL/L (ref 97–108)
CO2 SERPL-SCNC: 22 MMOL/L (ref 21–32)
CREAT SERPL-MCNC: 0.88 MG/DL (ref 0.7–1.3)
ERYTHROCYTE [DISTWIDTH] IN BLOOD BY AUTOMATED COUNT: 13.8 % (ref 11.5–14.5)
GLUCOSE SERPL-MCNC: 118 MG/DL (ref 65–100)
HCT VFR BLD AUTO: 31.5 % (ref 36.6–50.3)
HGB BLD-MCNC: 10 G/DL (ref 12.1–17)
MCH RBC QN AUTO: 28.5 PG (ref 26–34)
MCHC RBC AUTO-ENTMCNC: 31.7 G/DL (ref 30–36.5)
MCV RBC AUTO: 89.7 FL (ref 80–99)
NRBC # BLD: 0 K/UL (ref 0–0.01)
NRBC BLD-RTO: 0 PER 100 WBC
PLATELET # BLD AUTO: 242 K/UL (ref 150–400)
PMV BLD AUTO: 10 FL (ref 8.9–12.9)
POTASSIUM SERPL-SCNC: 4.4 MMOL/L (ref 3.5–5.1)
RBC # BLD AUTO: 3.51 M/UL (ref 4.1–5.7)
SODIUM SERPL-SCNC: 137 MMOL/L (ref 136–145)
WBC # BLD AUTO: 6.9 K/UL (ref 4.1–11.1)

## 2023-08-25 PROCEDURE — 6370000000 HC RX 637 (ALT 250 FOR IP): Performed by: NURSE PRACTITIONER

## 2023-08-25 PROCEDURE — 85027 COMPLETE CBC AUTOMATED: CPT

## 2023-08-25 PROCEDURE — 2060000000 HC ICU INTERMEDIATE R&B

## 2023-08-25 PROCEDURE — 80048 BASIC METABOLIC PNL TOTAL CA: CPT

## 2023-08-25 PROCEDURE — 6370000000 HC RX 637 (ALT 250 FOR IP): Performed by: SURGERY

## 2023-08-25 PROCEDURE — 2500000003 HC RX 250 WO HCPCS: Performed by: SURGERY

## 2023-08-25 PROCEDURE — 36415 COLL VENOUS BLD VENIPUNCTURE: CPT

## 2023-08-25 RX ORDER — ACETAMINOPHEN 500 MG
1000 TABLET ORAL EVERY 8 HOURS SCHEDULED
Status: DISCONTINUED | OUTPATIENT
Start: 2023-08-25 | End: 2023-08-28

## 2023-08-25 RX ORDER — OXYCODONE HYDROCHLORIDE 5 MG/1
10 TABLET ORAL EVERY 4 HOURS PRN
Status: DISCONTINUED | OUTPATIENT
Start: 2023-08-25 | End: 2023-08-28

## 2023-08-25 RX ORDER — HEPARIN SODIUM 5000 [USP'U]/ML
5000 INJECTION, SOLUTION INTRAVENOUS; SUBCUTANEOUS EVERY 8 HOURS SCHEDULED
Status: DISCONTINUED | OUTPATIENT
Start: 2023-08-26 | End: 2023-08-27

## 2023-08-25 RX ORDER — NICOTINE 21 MG/24HR
1 PATCH, TRANSDERMAL 24 HOURS TRANSDERMAL DAILY
Status: DISCONTINUED | OUTPATIENT
Start: 2023-08-25 | End: 2023-08-28

## 2023-08-25 RX ORDER — OXYCODONE HYDROCHLORIDE 5 MG/1
5 TABLET ORAL EVERY 4 HOURS PRN
Status: DISCONTINUED | OUTPATIENT
Start: 2023-08-25 | End: 2023-08-28

## 2023-08-25 RX ADMIN — COLLAGENASE SANTYL: 250 OINTMENT TOPICAL at 08:35

## 2023-08-25 RX ADMIN — MORPHINE SULFATE 2 MG: 2 INJECTION, SOLUTION INTRAMUSCULAR; INTRAVENOUS at 08:49

## 2023-08-25 RX ADMIN — METOPROLOL TARTRATE 12.5 MG: 25 TABLET ORAL at 21:31

## 2023-08-25 RX ADMIN — OXYCODONE HYDROCHLORIDE 10 MG: 5 TABLET ORAL at 16:17

## 2023-08-25 RX ADMIN — FAMOTIDINE 20 MG: 20 TABLET, FILM COATED ORAL at 21:31

## 2023-08-25 RX ADMIN — CLOPIDOGREL BISULFATE 75 MG: 75 TABLET ORAL at 08:35

## 2023-08-25 RX ADMIN — FAMOTIDINE 20 MG: 20 TABLET, FILM COATED ORAL at 08:34

## 2023-08-25 RX ADMIN — Medication 1 AMPULE: at 18:25

## 2023-08-25 RX ADMIN — GABAPENTIN 100 MG: 100 CAPSULE ORAL at 21:30

## 2023-08-25 RX ADMIN — TAMSULOSIN HYDROCHLORIDE 0.4 MG: 0.4 CAPSULE ORAL at 08:35

## 2023-08-25 RX ADMIN — ASPIRIN 81 MG: 81 TABLET, COATED ORAL at 08:34

## 2023-08-25 RX ADMIN — METOPROLOL TARTRATE 12.5 MG: 25 TABLET ORAL at 08:27

## 2023-08-25 RX ADMIN — ACETAMINOPHEN 1000 MG: 500 TABLET ORAL at 14:29

## 2023-08-25 RX ADMIN — ACETAMINOPHEN 1000 MG: 500 TABLET ORAL at 21:30

## 2023-08-25 RX ADMIN — MAGNESIUM HYDROXIDE 30 ML: 400 SUSPENSION ORAL at 17:33

## 2023-08-25 RX ADMIN — MORPHINE SULFATE 2 MG: 2 INJECTION, SOLUTION INTRAMUSCULAR; INTRAVENOUS at 03:42

## 2023-08-25 RX ADMIN — Medication 1 AMPULE: at 05:57

## 2023-08-25 RX ADMIN — ATORVASTATIN CALCIUM 80 MG: 40 TABLET, FILM COATED ORAL at 08:35

## 2023-08-25 ASSESSMENT — PAIN SCALES - GENERAL
PAINLEVEL_OUTOF10: 1
PAINLEVEL_OUTOF10: 6
PAINLEVEL_OUTOF10: 0
PAINLEVEL_OUTOF10: 8
PAINLEVEL_OUTOF10: 3
PAINLEVEL_OUTOF10: 1
PAINLEVEL_OUTOF10: 0
PAINLEVEL_OUTOF10: 2
PAINLEVEL_OUTOF10: 8

## 2023-08-25 ASSESSMENT — PAIN DESCRIPTION - ORIENTATION
ORIENTATION: RIGHT

## 2023-08-25 ASSESSMENT — PAIN DESCRIPTION - DESCRIPTORS
DESCRIPTORS: ACHING

## 2023-08-25 ASSESSMENT — PAIN SCALES - WONG BAKER: WONGBAKER_NUMERICALRESPONSE: 2

## 2023-08-25 ASSESSMENT — PAIN DESCRIPTION - LOCATION
LOCATION: LEG;FOOT
LOCATION: LEG
LOCATION: LEG;FOOT
LOCATION: LEG;FOOT

## 2023-08-25 NOTE — OP NOTE
[facility]  OPERATIVE REPORT    Name:  Leilani Tucker  MR#:  [id number]  :  1955  ACCOUNT #:  [de-identified]  DATE OF SERVICE:  2023    PREOPERATIVE DIAGNOSIS:  Peripheral artery disease with nonhealing wound to the right lower extremity. POSTOPERATIVE DIAGNOSIS:  Peripheral artery disease with nonhealing wound to the right lower extremity. PROCEDURE PERFORMED:  Right femoral to below-knee popliteal bypass with PTFE. SURGEON:  Whitney Mcfarland MD    ASSISTANT:  Tika Marsh    ANESTHESIA:  General.    COMPLICATIONS:  None. SPECIMENS REMOVED:  None. IMPLANTS:  6 mm ringed PROPATEN graft. ESTIMATED BLOOD LOSS:  100 mL. DRAINS:  None. INDICATIONS:  The patient is a 64-year-old with a long history of peripheral arterial disease. He had an aortobifemoral bypass and bilateral femoral above-knee popliteal bypasses in the distant past.  Approximately 6 months ago, he presented with occlusion of the right limb of the aortobifemoral bypass and chronic occlusion of the bilateral femoral popliteal bypasses and a wound on the right foot. He underwent a bypass from the coronel of the anastomosis of the left limb of the aortofemoral graft and the common femoral artery which was tunneled subcutaneously in a suprapubic plane and then brought to the profunda in the right leg via a lateral approach to the profunda. He now has a nonhealing right lower leg wound. He presents for femoral popliteal bypass with inflow from the femoral-femoral bypass to the below-knee popliteal artery. PROCEDURE:  After informed consent was obtained, the patient was given intravenous antibiotics within 1 hour of the incision. He was taken to the operating room and after induction of adequate general anesthesia and placement of a Allred catheter, the right groin and right leg were prepped and draped as a sterile field.   The lower leg wound including the existing bandage was covered with an

## 2023-08-25 NOTE — PLAN OF CARE
Problem: Safety - Adult  Goal: Free from fall injury  8/25/2023 1108 by Aliya Berumen RN  Outcome: Progressing  8/25/2023 0210 by Porter Brown RN  Outcome: Progressing  Flowsheets (Taken 8/24/2023 2100)  Free From Fall Injury: Gianna Le family/caregiver on patient safety     Problem: ABCDS Injury Assessment  Goal: Absence of physical injury  8/25/2023 1108 by Aliya Berumen RN  Outcome: Progressing  8/25/2023 0210 by Porter Brown RN  Outcome: Progressing  Flowsheets (Taken 8/24/2023 2100)  Absence of Physical Injury: Implement safety measures based on patient assessment

## 2023-08-25 NOTE — ANESTHESIA POSTPROCEDURE EVALUATION
Department of Anesthesiology  Postprocedure Note    Patient: Sofia Deluna  MRN: [id number]  YOB: 1955  Date of evaluation: 8/24/2023      Procedure Summary     Date: 08/24/23 Room / Location: [facility] MAIN OR M2 / [facility] MAIN OR    Anesthesia Start: 1347 Anesthesia Stop: 1738    Procedure: RIGHT FEMORAL POPLITEAL BYPASS WITH POLYTETRAFLUOROETHYLENE (Right: Groin) Diagnosis:       Peripheral vascular disease, unspecified ([address])      (Peripheral vascular disease, unspecified ([address]) [I73.9])    Providers: Blaire Forte MD Responsible Provider: Cortney Chamorro MD    Anesthesia Type: General ASA Status: 3          Anesthesia Type: General    Kelly Phase I: Kelly Score: 9    Kelly Phase II:        Anesthesia Post Evaluation    Patient location during evaluation: PACU  Patient participation: complete - patient cannot participate  Level of consciousness: awake  Pain score: 0  Airway patency: patent  Nausea & Vomiting: no nausea and no vomiting  Complications: no  Cardiovascular status: hemodynamically stable  Respiratory status: acceptable  Hydration status: stable

## 2023-08-26 LAB
ANION GAP SERPL CALC-SCNC: 4 MMOL/L (ref 5–15)
BUN SERPL-MCNC: 9 MG/DL (ref 6–20)
BUN/CREAT SERPL: 11 (ref 12–20)
CALCIUM SERPL-MCNC: 8.3 MG/DL (ref 8.5–10.1)
CHLORIDE SERPL-SCNC: 104 MMOL/L (ref 97–108)
CO2 SERPL-SCNC: 28 MMOL/L (ref 21–32)
CREAT SERPL-MCNC: 0.81 MG/DL (ref 0.7–1.3)
ERYTHROCYTE [DISTWIDTH] IN BLOOD BY AUTOMATED COUNT: 13.9 % (ref 11.5–14.5)
GLUCOSE SERPL-MCNC: 125 MG/DL (ref 65–100)
HCT VFR BLD AUTO: 27.3 % (ref 36.6–50.3)
HGB BLD-MCNC: 8.8 G/DL (ref 12.1–17)
MCH RBC QN AUTO: 28.3 PG (ref 26–34)
MCHC RBC AUTO-ENTMCNC: 32.2 G/DL (ref 30–36.5)
MCV RBC AUTO: 87.8 FL (ref 80–99)
NRBC # BLD: 0 K/UL (ref 0–0.01)
NRBC BLD-RTO: 0 PER 100 WBC
PLATELET # BLD AUTO: 233 K/UL (ref 150–400)
PMV BLD AUTO: 10.8 FL (ref 8.9–12.9)
POTASSIUM SERPL-SCNC: 3.5 MMOL/L (ref 3.5–5.1)
RBC # BLD AUTO: 3.11 M/UL (ref 4.1–5.7)
SODIUM SERPL-SCNC: 136 MMOL/L (ref 136–145)
WBC # BLD AUTO: 6.1 K/UL (ref 4.1–11.1)

## 2023-08-26 PROCEDURE — 97535 SELF CARE MNGMENT TRAINING: CPT

## 2023-08-26 PROCEDURE — 6360000002 HC RX W HCPCS: Performed by: NURSE PRACTITIONER

## 2023-08-26 PROCEDURE — 36415 COLL VENOUS BLD VENIPUNCTURE: CPT

## 2023-08-26 PROCEDURE — 6370000000 HC RX 637 (ALT 250 FOR IP): Performed by: NURSE PRACTITIONER

## 2023-08-26 PROCEDURE — 80048 BASIC METABOLIC PNL TOTAL CA: CPT

## 2023-08-26 PROCEDURE — 2500000003 HC RX 250 WO HCPCS: Performed by: SURGERY

## 2023-08-26 PROCEDURE — 97530 THERAPEUTIC ACTIVITIES: CPT

## 2023-08-26 PROCEDURE — 97161 PT EVAL LOW COMPLEX 20 MIN: CPT

## 2023-08-26 PROCEDURE — 2060000000 HC ICU INTERMEDIATE R&B

## 2023-08-26 PROCEDURE — 97166 OT EVAL MOD COMPLEX 45 MIN: CPT

## 2023-08-26 PROCEDURE — 85027 COMPLETE CBC AUTOMATED: CPT

## 2023-08-26 PROCEDURE — 6370000000 HC RX 637 (ALT 250 FOR IP): Performed by: SURGERY

## 2023-08-26 RX ADMIN — ASPIRIN 81 MG: 81 TABLET, COATED ORAL at 08:19

## 2023-08-26 RX ADMIN — HEPARIN SODIUM 5000 UNITS: 5000 INJECTION INTRAVENOUS; SUBCUTANEOUS at 14:15

## 2023-08-26 RX ADMIN — MAGNESIUM HYDROXIDE 30 ML: 400 SUSPENSION ORAL at 17:23

## 2023-08-26 RX ADMIN — ACETAMINOPHEN 1000 MG: 500 TABLET ORAL at 06:16

## 2023-08-26 RX ADMIN — TAMSULOSIN HYDROCHLORIDE 0.4 MG: 0.4 CAPSULE ORAL at 08:19

## 2023-08-26 RX ADMIN — METOPROLOL TARTRATE 12.5 MG: 25 TABLET ORAL at 21:14

## 2023-08-26 RX ADMIN — ACETAMINOPHEN 1000 MG: 500 TABLET ORAL at 14:15

## 2023-08-26 RX ADMIN — Medication 1 AMPULE: at 17:30

## 2023-08-26 RX ADMIN — CLOPIDOGREL BISULFATE 75 MG: 75 TABLET ORAL at 08:20

## 2023-08-26 RX ADMIN — FAMOTIDINE 20 MG: 20 TABLET, FILM COATED ORAL at 08:19

## 2023-08-26 RX ADMIN — Medication 1 AMPULE: at 06:17

## 2023-08-26 RX ADMIN — ATORVASTATIN CALCIUM 80 MG: 40 TABLET, FILM COATED ORAL at 08:19

## 2023-08-26 RX ADMIN — OXYCODONE HYDROCHLORIDE 10 MG: 5 TABLET ORAL at 12:22

## 2023-08-26 RX ADMIN — ACETAMINOPHEN 1000 MG: 500 TABLET ORAL at 21:14

## 2023-08-26 RX ADMIN — HEPARIN SODIUM 5000 UNITS: 5000 INJECTION INTRAVENOUS; SUBCUTANEOUS at 06:16

## 2023-08-26 RX ADMIN — GABAPENTIN 100 MG: 100 CAPSULE ORAL at 21:14

## 2023-08-26 RX ADMIN — MORPHINE SULFATE 2 MG: 2 INJECTION, SOLUTION INTRAMUSCULAR; INTRAVENOUS at 17:24

## 2023-08-26 RX ADMIN — OXYCODONE HYDROCHLORIDE 5 MG: 5 TABLET ORAL at 20:36

## 2023-08-26 RX ADMIN — COLLAGENASE SANTYL: 250 OINTMENT TOPICAL at 08:20

## 2023-08-26 RX ADMIN — METOPROLOL TARTRATE 12.5 MG: 25 TABLET ORAL at 08:51

## 2023-08-26 RX ADMIN — FAMOTIDINE 20 MG: 20 TABLET, FILM COATED ORAL at 21:14

## 2023-08-26 RX ADMIN — HEPARIN SODIUM 5000 UNITS: 5000 INJECTION INTRAVENOUS; SUBCUTANEOUS at 21:15

## 2023-08-26 ASSESSMENT — PAIN - FUNCTIONAL ASSESSMENT: PAIN_FUNCTIONAL_ASSESSMENT: ACTIVITIES ARE NOT PREVENTED

## 2023-08-26 ASSESSMENT — PAIN DESCRIPTION - LOCATION
LOCATION: LEG
LOCATION: LEG
LOCATION: FOOT

## 2023-08-26 ASSESSMENT — PAIN DESCRIPTION - ORIENTATION
ORIENTATION: RIGHT

## 2023-08-26 ASSESSMENT — PAIN SCALES - GENERAL
PAINLEVEL_OUTOF10: 2
PAINLEVEL_OUTOF10: 6
PAINLEVEL_OUTOF10: 6
PAINLEVEL_OUTOF10: 8
PAINLEVEL_OUTOF10: 0
PAINLEVEL_OUTOF10: 3
PAINLEVEL_OUTOF10: 8
PAINLEVEL_OUTOF10: 5
PAINLEVEL_OUTOF10: 0

## 2023-08-26 ASSESSMENT — PAIN DESCRIPTION - DESCRIPTORS
DESCRIPTORS: ACHING

## 2023-08-26 ASSESSMENT — PAIN SCALES - WONG BAKER
WONGBAKER_NUMERICALRESPONSE: 0
WONGBAKER_NUMERICALRESPONSE: 2

## 2023-08-26 NOTE — PLAN OF CARE
Problem: Occupational Therapy - Adult  Goal: By Discharge: Performs self-care activities at highest level of function for planned discharge setting. See evaluation for individualized goals. Description: FUNCTIONAL STATUS PRIOR TO ADMISSION:  Patient was independent for ADLs and just recently started using a SPC for mobility. , ADL Assistance: Independent,  ,  ,  ,  ,  ,  , Ambulation Assistance: Independent, Transfer Assistance: Independent, Active : Yes     HOME SUPPORT: Patient lived with his girlfriend. Occupational Therapy Goals:  Initiated 8/26/2023  1. Patient will perform grooming standing at sink with Modified [place] within 7 day(s). 2.  Patient will perform lower body dressing using AE PRN with Modified [place] within 7 day(s). 3.  Patient will perform toilet transfers with Supervision  within 7 day(s). 4.  Patient will perform all aspects of toileting with Modified [place] within 7 day(s). Outcome: Progressing   OCCUPATIONAL THERAPY EVALUATION    Patient: Rosa Reynolds (83 y.o. male)  Date: 8/26/2023  Primary Diagnosis: Peripheral vascular disease, unspecified ([address]) [I73.9]  Critical lower limb ischemia ([address]) [I70.229]  Procedure(s) (LRB):  RIGHT FEMORAL POPLITEAL BYPASS WITH POLYTETRAFLUOROETHYLENE (Right) 2 Days Post-Op     Precautions:                    ASSESSMENT :  The patient is limited by decreased functional mobility, independence in ADLs, high-level IADLs, ROM, strength, body mechanics, activity tolerance, endurance, safety awareness, coordination, balance, increased pain levels. Based on the impairments listed above patient is functioning below his baseline for ADLs and functional mobility. He is now completing ADLs with independence to mod assist and functional mobility with stand-by to min assist using rolling walker. Patient s/p R fem pop bypass POD 2 with c/o RLE pain.  Patient tolerated session well and required min assist for OOB transfer secondary

## 2023-08-26 NOTE — PLAN OF CARE
Problem: Safety - Adult  Goal: Free from fall injury  Outcome: Progressing     Problem: ABCDS Injury Assessment  Goal: Absence of physical injury  Outcome: Progressing     Problem: Physical Therapy - Adult  Goal: By Discharge: Performs mobility at highest level of function for planned discharge setting. See evaluation for individualized goals. Description: FUNCTIONAL STATUS PRIOR TO ADMISSION: Patient was modified independent using a single point cane for functional mobility. HOME SUPPORT PRIOR TO ADMISSION: The patient lives with his girlfriend, 1-story home with 3 steps and B rails to enter. Physical Therapy Goals  Initiated 8/26/2023  1. Patient will move from supine to sit and sit to supine in bed with modified independence within 7 day(s). 2.  Patient will perform sit to stand with supervision/set-up within 7 day(s). 3.  Patient will transfer from bed to chair and chair to bed with supervision/set-up using the least restrictive device within 7 day(s). 4.  Patient will ambulate with supervision/set-up for 150 feet with the least restrictive device within 7 day(s).    5.  Patient will ascend/descend 3 stairs with B handrail(s) with supervision/set-up within 7 day(s).   4/47/3251 [id number] by Maikel Donnelly PT  Outcome: Not Progressing

## 2023-08-26 NOTE — PLAN OF CARE
Bedside and Verbal shift change report given to Al. (oncoming nurse) by Johnna Rosas (offgoing nurse). Report included the following information Intake/Output, MAR, Med Rec Status, Cardiac Rhythm RADHA, Alarm Parameters, Quality Measures, and Neuro Assessment. 8933: Allred out. Problem: Pain  Goal: Verbalizes/displays adequate comfort level or baseline comfort level  Outcome: Progressing     Problem: ABCDS Injury Assessment  Goal: Absence of physical injury  8/25/2023 2044 by Edith Farmer RN  Outcome: Progressing  8/25/2023 1108 by Miri Huerta RN  Outcome: Progressing     Problem: Discharge Planning  Goal: Discharge to home or other facility with appropriate resources  Outcome: Progressing     Problem: Skin/Tissue Integrity  Goal: Absence of new skin breakdown  Description: 1. Monitor for areas of redness and/or skin breakdown  2. Assess vascular access sites hourly  3. Every 4-6 hours minimum:  Change oxygen saturation probe site  4. Every 4-6 hours:  If on nasal continuous positive airway pressure, respiratory therapy assess nares and determine need for appliance change or resting period.   Outcome: Progressing     Problem: Safety - Adult  Goal: Free from fall injury  8/25/2023 2044 by Edith Farmer RN  Outcome: Adequate for Discharge  8/25/2023 1108 by Miri Huerta RN  Outcome: Progressing

## 2023-08-26 NOTE — PLAN OF CARE
Problem: Physical Therapy - Adult  Goal: By Discharge: Performs mobility at highest level of function for planned discharge setting. See evaluation for individualized goals. Description: FUNCTIONAL STATUS PRIOR TO ADMISSION: Patient was modified independent using a single point cane for functional mobility. HOME SUPPORT PRIOR TO ADMISSION: The patient lives with his girlfriend, 1-story home with 3 steps and B rails to enter. Physical Therapy Goals  Initiated 8/26/2023  1. Patient will move from supine to sit and sit to supine in bed with modified independence within 7 day(s). 2.  Patient will perform sit to stand with supervision/set-up within 7 day(s). 3.  Patient will transfer from bed to chair and chair to bed with supervision/set-up using the least restrictive device within 7 day(s). 4.  Patient will ambulate with supervision/set-up for 150 feet with the least restrictive device within 7 day(s). 5.  Patient will ascend/descend 3 stairs with B handrail(s) with supervision/set-up within 7 day(s). Outcome: Not Progressing     PHYSICAL THERAPY EVALUATION    Patient: Ramin Metcalf (16 y.o. male)  Date: 8/26/2023  Primary Diagnosis: Peripheral vascular disease, unspecified ([address]) [I73.9]  Critical lower limb ischemia ([facility]) [I70.229]  Procedure(s) (LRB):  RIGHT FEMORAL POPLITEAL BYPASS WITH POLYTETRAFLUOROETHYLENE (Right) 2 Days Post-Op   Precautions: Weight Bearing, Fall Risk Right Lower Extremity Weight Bearing: Weight Bearing As Tolerated                  ASSESSMENT :   DEFICITS/IMPAIRMENTS:   The patient is limited by decreased functional mobility, strength, balance, increased pain levels s/p POD#2 for R femoral popliteal bypass. Patient cleared by nursing to mobilize. Based on the impairments listed above the patient is below his prior level of function. Patient cleared by nursing to mobilize.  Received patient sitting up in bed, eating breakfast, but agreeable to get into the chair and

## 2023-08-27 ENCOUNTER — APPOINTMENT (OUTPATIENT)
Facility: HOSPITAL | Age: 68
DRG: 215 | End: 2023-08-27
Attending: HOSPITALIST
Payer: MEDICARE

## 2023-08-27 ENCOUNTER — APPOINTMENT (OUTPATIENT)
Facility: HOSPITAL | Age: 68
DRG: 215 | End: 2023-08-27
Attending: SURGERY
Payer: MEDICARE

## 2023-08-27 LAB
ALBUMIN SERPL-MCNC: 2.5 G/DL (ref 3.5–5)
ALBUMIN/GLOB SERPL: 0.6 (ref 1.1–2.2)
ALP SERPL-CCNC: 127 U/L (ref 45–117)
ALT SERPL-CCNC: 32 U/L (ref 12–78)
ANION GAP SERPL CALC-SCNC: 3 MMOL/L (ref 5–15)
ANION GAP SERPL CALC-SCNC: 9 MMOL/L (ref 5–15)
APTT PPP: 26 SEC (ref 22.1–31)
AST SERPL-CCNC: 160 U/L (ref 15–37)
BASOPHILS # BLD: 0 K/UL (ref 0–0.1)
BASOPHILS NFR BLD: 0 % (ref 0–1)
BILIRUB SERPL-MCNC: 1.2 MG/DL (ref 0.2–1)
BUN SERPL-MCNC: 15 MG/DL (ref 6–20)
BUN SERPL-MCNC: 8 MG/DL (ref 6–20)
BUN/CREAT SERPL: 10 (ref 12–20)
BUN/CREAT SERPL: 13 (ref 12–20)
CALCIUM SERPL-MCNC: 8.1 MG/DL (ref 8.5–10.1)
CALCIUM SERPL-MCNC: 8.4 MG/DL (ref 8.5–10.1)
CHLORIDE SERPL-SCNC: 102 MMOL/L (ref 97–108)
CHLORIDE SERPL-SCNC: 105 MMOL/L (ref 97–108)
CO2 SERPL-SCNC: 23 MMOL/L (ref 21–32)
CO2 SERPL-SCNC: 28 MMOL/L (ref 21–32)
COMMENT:: NORMAL
CREAT SERPL-MCNC: 0.83 MG/DL (ref 0.7–1.3)
CREAT SERPL-MCNC: 1.15 MG/DL (ref 0.7–1.3)
D DIMER PPP FEU-MCNC: 2.15 MG/L FEU (ref 0–0.65)
DIFFERENTIAL METHOD BLD: ABNORMAL
ECHO AV PEAK GRADIENT: 3 MMHG
ECHO AV PEAK VELOCITY: 0.8 M/S
ECHO BSA: 1.74 M2
ECHO LA DIAMETER INDEX: 2.17 CM/M2
ECHO LA DIAMETER: 3.8 CM
ECHO LV FRACTIONAL SHORTENING: 13 % (ref 28–44)
ECHO LV INTERNAL DIMENSION DIASTOLE INDEX: 3.2 CM/M2
ECHO LV INTERNAL DIMENSION DIASTOLIC: 5.6 CM (ref 4.2–5.9)
ECHO LV INTERNAL DIMENSION SYSTOLIC INDEX: 2.8 CM/M2
ECHO LV INTERNAL DIMENSION SYSTOLIC: 4.9 CM
ECHO LV IVSD: 0.7 CM (ref 0.6–1)
ECHO LV MASS 2D: 165 G (ref 88–224)
ECHO LV MASS INDEX 2D: 94.3 G/M2 (ref 49–115)
ECHO LV POSTERIOR WALL DIASTOLIC: 0.9 CM (ref 0.6–1)
ECHO LV RELATIVE WALL THICKNESS RATIO: 0.32
ECHO RV INTERNAL DIMENSION: 3.5 CM
ECHO RV TAPSE: 1.5 CM (ref 1.7–?)
ECHO TV REGURGITANT MAX VELOCITY: 2.73 M/S
ECHO TV REGURGITANT PEAK GRADIENT: 30 MMHG
EKG ATRIAL RATE: 156 BPM
EKG DIAGNOSIS: NORMAL
EKG Q-T INTERVAL: 342 MS
EKG QRS DURATION: 176 MS
EKG QTC CALCULATION (BAZETT): 538 MS
EKG R AXIS: -1 DEGREES
EKG T AXIS: 145 DEGREES
EKG VENTRICULAR RATE: 149 BPM
EOSINOPHIL # BLD: 0 K/UL (ref 0–0.4)
EOSINOPHIL NFR BLD: 0 % (ref 0–7)
ERYTHROCYTE [DISTWIDTH] IN BLOOD BY AUTOMATED COUNT: 13.6 % (ref 11.5–14.5)
ERYTHROCYTE [DISTWIDTH] IN BLOOD BY AUTOMATED COUNT: 13.7 % (ref 11.5–14.5)
ERYTHROCYTE [DISTWIDTH] IN BLOOD BY AUTOMATED COUNT: 13.8 % (ref 11.5–14.5)
GLOBULIN SER CALC-MCNC: 4.4 G/DL (ref 2–4)
GLUCOSE BLD STRIP.AUTO-MCNC: 193 MG/DL (ref 65–117)
GLUCOSE SERPL-MCNC: 142 MG/DL (ref 65–100)
GLUCOSE SERPL-MCNC: 239 MG/DL (ref 65–100)
HCT VFR BLD AUTO: 24.7 % (ref 36.6–50.3)
HCT VFR BLD AUTO: 27 % (ref 36.6–50.3)
HCT VFR BLD AUTO: 29.7 % (ref 36.6–50.3)
HGB BLD-MCNC: 8.1 G/DL (ref 12.1–17)
HGB BLD-MCNC: 8.8 G/DL (ref 12.1–17)
HGB BLD-MCNC: 9.5 G/DL (ref 12.1–17)
IMM GRANULOCYTES # BLD AUTO: 0.1 K/UL (ref 0–0.04)
IMM GRANULOCYTES NFR BLD AUTO: 1 % (ref 0–0.5)
INR PPP: 1 (ref 0.9–1.1)
LACTATE SERPL-SCNC: 3.7 MMOL/L (ref 0.4–2)
LACTATE SERPL-SCNC: 4.1 MMOL/L (ref 0.4–2)
LYMPHOCYTES # BLD: 2.2 K/UL (ref 0.8–3.5)
LYMPHOCYTES NFR BLD: 18 % (ref 12–49)
MCH RBC QN AUTO: 28.5 PG (ref 26–34)
MCH RBC QN AUTO: 28.6 PG (ref 26–34)
MCH RBC QN AUTO: 28.7 PG (ref 26–34)
MCHC RBC AUTO-ENTMCNC: 32 G/DL (ref 30–36.5)
MCHC RBC AUTO-ENTMCNC: 32.6 G/DL (ref 30–36.5)
MCHC RBC AUTO-ENTMCNC: 32.8 G/DL (ref 30–36.5)
MCV RBC AUTO: 87.6 FL (ref 80–99)
MCV RBC AUTO: 87.7 FL (ref 80–99)
MCV RBC AUTO: 89.2 FL (ref 80–99)
MONOCYTES # BLD: 1.3 K/UL (ref 0–1)
MONOCYTES NFR BLD: 11 % (ref 5–13)
NEUTS SEG # BLD: 8.6 K/UL (ref 1.8–8)
NEUTS SEG NFR BLD: 70 % (ref 32–75)
NRBC # BLD: 0 K/UL (ref 0–0.01)
NRBC BLD-RTO: 0 PER 100 WBC
PLATELET # BLD AUTO: 226 K/UL (ref 150–400)
PLATELET # BLD AUTO: 327 K/UL (ref 150–400)
PLATELET # BLD AUTO: 336 K/UL (ref 150–400)
PMV BLD AUTO: 10.6 FL (ref 8.9–12.9)
PMV BLD AUTO: 10.9 FL (ref 8.9–12.9)
PMV BLD AUTO: 11.6 FL (ref 8.9–12.9)
POTASSIUM SERPL-SCNC: 3.5 MMOL/L (ref 3.5–5.1)
POTASSIUM SERPL-SCNC: 4.5 MMOL/L (ref 3.5–5.1)
PROT SERPL-MCNC: 6.9 G/DL (ref 6.4–8.2)
PROTHROMBIN TIME: 10.9 SEC (ref 9–11.1)
RBC # BLD AUTO: 2.82 M/UL (ref 4.1–5.7)
RBC # BLD AUTO: 3.08 M/UL (ref 4.1–5.7)
RBC # BLD AUTO: 3.33 M/UL (ref 4.1–5.7)
SERVICE CMNT-IMP: ABNORMAL
SODIUM SERPL-SCNC: 134 MMOL/L (ref 136–145)
SODIUM SERPL-SCNC: 136 MMOL/L (ref 136–145)
SPECIMEN HOLD: NORMAL
THERAPEUTIC RANGE: NORMAL SECS (ref 58–77)
TROPONIN I SERPL HS-MCNC: ABNORMAL NG/L (ref 0–76)
TROPONIN I SERPL HS-MCNC: ABNORMAL NG/L (ref 0–76)
UFH PPP CHRO-ACNC: <0.1 IU/ML
WBC # BLD AUTO: 12.3 K/UL (ref 4.1–11.1)
WBC # BLD AUTO: 14.8 K/UL (ref 4.1–11.1)
WBC # BLD AUTO: 6.7 K/UL (ref 4.1–11.1)

## 2023-08-27 PROCEDURE — 82803 BLOOD GASES ANY COMBINATION: CPT

## 2023-08-27 PROCEDURE — 85730 THROMBOPLASTIN TIME PARTIAL: CPT

## 2023-08-27 PROCEDURE — 6360000002 HC RX W HCPCS: Performed by: NURSE PRACTITIONER

## 2023-08-27 PROCEDURE — 6370000000 HC RX 637 (ALT 250 FOR IP): Performed by: SURGERY

## 2023-08-27 PROCEDURE — 6370000000 HC RX 637 (ALT 250 FOR IP): Performed by: NURSE PRACTITIONER

## 2023-08-27 PROCEDURE — 83605 ASSAY OF LACTIC ACID: CPT

## 2023-08-27 PROCEDURE — 2580000003 HC RX 258

## 2023-08-27 PROCEDURE — 2580000003 HC RX 258: Performed by: HOSPITALIST

## 2023-08-27 PROCEDURE — 85027 COMPLETE CBC AUTOMATED: CPT

## 2023-08-27 PROCEDURE — 5A09357 ASSISTANCE WITH RESPIRATORY VENTILATION, LESS THAN 24 CONSECUTIVE HOURS, CONTINUOUS POSITIVE AIRWAY PRESSURE: ICD-10-PCS | Performed by: HOSPITALIST

## 2023-08-27 PROCEDURE — 85610 PROTHROMBIN TIME: CPT

## 2023-08-27 PROCEDURE — 71045 X-RAY EXAM CHEST 1 VIEW: CPT

## 2023-08-27 PROCEDURE — 2500000003 HC RX 250 WO HCPCS: Performed by: SURGERY

## 2023-08-27 PROCEDURE — 5A2204Z RESTORATION OF CARDIAC RHYTHM, SINGLE: ICD-10-PCS | Performed by: HOSPITALIST

## 2023-08-27 PROCEDURE — 80053 COMPREHEN METABOLIC PANEL: CPT

## 2023-08-27 PROCEDURE — 84484 ASSAY OF TROPONIN QUANT: CPT

## 2023-08-27 PROCEDURE — 2500000003 HC RX 250 WO HCPCS

## 2023-08-27 PROCEDURE — 6360000002 HC RX W HCPCS

## 2023-08-27 PROCEDURE — 93306 TTE W/DOPPLER COMPLETE: CPT

## 2023-08-27 PROCEDURE — 6360000002 HC RX W HCPCS: Performed by: HOSPITALIST

## 2023-08-27 PROCEDURE — 85025 COMPLETE CBC W/AUTO DIFF WBC: CPT

## 2023-08-27 PROCEDURE — 05HM33Z INSERTION OF INFUSION DEVICE INTO RIGHT INTERNAL JUGULAR VEIN, PERCUTANEOUS APPROACH: ICD-10-PCS | Performed by: HOSPITALIST

## 2023-08-27 PROCEDURE — 36415 COLL VENOUS BLD VENIPUNCTURE: CPT

## 2023-08-27 PROCEDURE — 85379 FIBRIN DEGRADATION QUANT: CPT

## 2023-08-27 PROCEDURE — 94660 CPAP INITIATION&MGMT: CPT

## 2023-08-27 PROCEDURE — 2500000003 HC RX 250 WO HCPCS: Performed by: HOSPITALIST

## 2023-08-27 PROCEDURE — 82962 GLUCOSE BLOOD TEST: CPT

## 2023-08-27 PROCEDURE — 85520 HEPARIN ASSAY: CPT

## 2023-08-27 PROCEDURE — 2000000000 HC ICU R&B

## 2023-08-27 RX ORDER — MAGNESIUM SULFATE IN WATER 40 MG/ML
2000 INJECTION, SOLUTION INTRAVENOUS ONCE
Status: COMPLETED | OUTPATIENT
Start: 2023-08-27 | End: 2023-08-27

## 2023-08-27 RX ORDER — HEPARIN SODIUM 1000 [USP'U]/ML
40 INJECTION, SOLUTION INTRAVENOUS; SUBCUTANEOUS PRN
Status: DISCONTINUED | OUTPATIENT
Start: 2023-08-27 | End: 2023-08-28

## 2023-08-27 RX ORDER — NOREPINEPHRINE BITARTRATE 0.06 MG/ML
.5-2 INJECTION, SOLUTION INTRAVENOUS CONTINUOUS
Status: DISCONTINUED | OUTPATIENT
Start: 2023-08-27 | End: 2023-08-28

## 2023-08-27 RX ORDER — FENTANYL CITRATE 50 UG/ML
INJECTION, SOLUTION INTRAMUSCULAR; INTRAVENOUS
Status: COMPLETED
Start: 2023-08-27 | End: 2023-08-27

## 2023-08-27 RX ORDER — HEPARIN SODIUM 1000 [USP'U]/ML
80 INJECTION, SOLUTION INTRAVENOUS; SUBCUTANEOUS ONCE
Status: DISCONTINUED | OUTPATIENT
Start: 2023-08-27 | End: 2023-08-27

## 2023-08-27 RX ORDER — HEPARIN SODIUM 1000 [USP'U]/ML
80 INJECTION, SOLUTION INTRAVENOUS; SUBCUTANEOUS PRN
Status: DISCONTINUED | OUTPATIENT
Start: 2023-08-27 | End: 2023-08-28

## 2023-08-27 RX ORDER — FENTANYL CITRATE 50 UG/ML
100 INJECTION, SOLUTION INTRAMUSCULAR; INTRAVENOUS
Status: COMPLETED | OUTPATIENT
Start: 2023-08-27 | End: 2023-08-27

## 2023-08-27 RX ORDER — NOREPINEPHRINE BITARTRATE 0.06 MG/ML
INJECTION, SOLUTION INTRAVENOUS
Status: COMPLETED
Start: 2023-08-27 | End: 2023-08-27

## 2023-08-27 RX ORDER — MIDAZOLAM HYDROCHLORIDE 1 MG/ML
2 INJECTION, SOLUTION INTRAMUSCULAR; INTRAVENOUS ONCE
Status: DISCONTINUED | OUTPATIENT
Start: 2023-08-27 | End: 2023-08-28

## 2023-08-27 RX ORDER — MIDAZOLAM HYDROCHLORIDE 1 MG/ML
1 INJECTION INTRAMUSCULAR; INTRAVENOUS ONCE
Status: DISCONTINUED | OUTPATIENT
Start: 2023-08-27 | End: 2023-08-28 | Stop reason: HOSPADM

## 2023-08-27 RX ORDER — HEPARIN SODIUM 10000 [USP'U]/100ML
18-30 INJECTION, SOLUTION INTRAVENOUS CONTINUOUS
Status: DISCONTINUED | OUTPATIENT
Start: 2023-08-27 | End: 2023-08-28

## 2023-08-27 RX ADMIN — Medication 1 AMPULE: at 20:08

## 2023-08-27 RX ADMIN — FENTANYL CITRATE: 0.05 INJECTION, SOLUTION INTRAMUSCULAR; INTRAVENOUS at 18:22

## 2023-08-27 RX ADMIN — AMIODARONE HYDROCHLORIDE 150 MG: 1.5 INJECTION, SOLUTION INTRAVENOUS at 19:12

## 2023-08-27 RX ADMIN — CLOPIDOGREL BISULFATE 75 MG: 75 TABLET ORAL at 09:59

## 2023-08-27 RX ADMIN — GABAPENTIN 100 MG: 100 CAPSULE ORAL at 21:08

## 2023-08-27 RX ADMIN — ACETAMINOPHEN 1000 MG: 500 TABLET ORAL at 21:08

## 2023-08-27 RX ADMIN — ACETAMINOPHEN 1000 MG: 500 TABLET ORAL at 13:56

## 2023-08-27 RX ADMIN — METOPROLOL TARTRATE 12.5 MG: 25 TABLET ORAL at 09:59

## 2023-08-27 RX ADMIN — AMIODARONE HYDROCHLORIDE 1 MG/MIN: 50 INJECTION, SOLUTION INTRAVENOUS at 19:22

## 2023-08-27 RX ADMIN — Medication 1 AMPULE: at 06:16

## 2023-08-27 RX ADMIN — MAGNESIUM SULFATE HEPTAHYDRATE 2000 MG: 40 INJECTION, SOLUTION INTRAVENOUS at 20:52

## 2023-08-27 RX ADMIN — ASPIRIN 81 MG: 81 TABLET, COATED ORAL at 09:59

## 2023-08-27 RX ADMIN — FENTANYL CITRATE 100 MCG: 50 INJECTION INTRAMUSCULAR; INTRAVENOUS at 17:53

## 2023-08-27 RX ADMIN — HEPARIN SODIUM 18 UNITS/KG/HR: 10000 INJECTION, SOLUTION INTRAVENOUS at 20:03

## 2023-08-27 RX ADMIN — FAMOTIDINE 20 MG: 20 TABLET, FILM COATED ORAL at 21:09

## 2023-08-27 RX ADMIN — MORPHINE SULFATE 2 MG: 2 INJECTION, SOLUTION INTRAMUSCULAR; INTRAVENOUS at 13:56

## 2023-08-27 RX ADMIN — NOREPINEPHRINE BITARTRATE 12 MCG/MIN: 0.06 INJECTION, SOLUTION INTRAVENOUS at 19:00

## 2023-08-27 RX ADMIN — ATORVASTATIN CALCIUM 80 MG: 40 TABLET, FILM COATED ORAL at 09:59

## 2023-08-27 RX ADMIN — HEPARIN SODIUM 5000 UNITS: 5000 INJECTION INTRAVENOUS; SUBCUTANEOUS at 13:55

## 2023-08-27 RX ADMIN — SODIUM CHLORIDE 12 MCG/MIN: 9 INJECTION, SOLUTION INTRAVENOUS at 19:00

## 2023-08-27 RX ADMIN — ACETAMINOPHEN 1000 MG: 500 TABLET ORAL at 06:12

## 2023-08-27 RX ADMIN — COLLAGENASE SANTYL: 250 OINTMENT TOPICAL at 10:00

## 2023-08-27 RX ADMIN — HEPARIN SODIUM 5000 UNITS: 5000 INJECTION INTRAVENOUS; SUBCUTANEOUS at 06:11

## 2023-08-27 RX ADMIN — FAMOTIDINE 20 MG: 20 TABLET, FILM COATED ORAL at 09:59

## 2023-08-27 RX ADMIN — TAMSULOSIN HYDROCHLORIDE 0.4 MG: 0.4 CAPSULE ORAL at 09:59

## 2023-08-27 ASSESSMENT — PAIN SCALES - GENERAL
PAINLEVEL_OUTOF10: 0
PAINLEVEL_OUTOF10: 9

## 2023-08-27 ASSESSMENT — PAIN DESCRIPTION - DESCRIPTORS: DESCRIPTORS: ACHING

## 2023-08-27 ASSESSMENT — PAIN DESCRIPTION - ORIENTATION: ORIENTATION: RIGHT

## 2023-08-27 ASSESSMENT — ENCOUNTER SYMPTOMS
GASTROINTESTINAL NEGATIVE: 1
SHORTNESS OF BREATH: 1

## 2023-08-27 ASSESSMENT — PAIN DESCRIPTION - LOCATION: LOCATION: LEG;FOOT

## 2023-08-27 NOTE — SIGNIFICANT EVENT
RRT called by primary nurse for respiratory distress. Per primary nurse report, prior to RRT patient was alert and oriented x 3 and in no distress. Upon review of chart patient is status post RLE bypass surgery on 8/24/23. Patient observed resting in bed with eyes closed. Opens eyes when name called. Noted patient to be tachypneic, diaphoretic, lethargic complaining of chest pain. Patient appears hemodynamically unstable. Wide complex tachycardia noted on the monitor in the 120's. /77, patient afebrile. Blood glucose level 195, 02 sats 82%. Patient placed on bipap by respiratory therapy secondary to distress. No audible crackles on auscultation of lungs. No JVD present. NS bolus given. EKG performed which confirms wide complex tachycardia with LBBB. Noted EKG on 8/26/23 shows 7 beat run of wide complex tachycardia. Approximately 5 minutes after EKG obtained patient went into AV paced rhythm and subsequently returned to wide complex tachycardia with LBBB in the 120's. No echo noted on chart, ordered. D-dimer, CBC with magnesium, troponin, lactic acid ordered. Stat chest xray and CTA of chest ordered. Heparin drip started per DVT/PE protocol. Dr. Dany Blackmon (intensivist) at bedside to evaluate patient. Will immediately move patient to ICU secondary to instability. Dr. Donna Jasso with vascular surgery notified of above events. Informed surgeon that RLE remains warm to touch. Pedal pulses are thready but palpable. Informed provider that patient will be transferred to the ICU for further care secondary to hemodynamic instability. Attempted to notify next of kin Catarina Aldana at [phone] without success. Mailbox is full. Primary nurse and Dr. Dany Blackmon informed that primary contact could not be reached.

## 2023-08-27 NOTE — PLAN OF CARE
Bedside and Verbal shift change report given to Al. (oncoming nurse) by Jodi Bright (offgoing nurse). Report included the following information Adult Overview, Intake/Output, MAR, Med Rec Status, Alarm Parameters, and Quality Measures. Pt A/O x4 and cooperative. Pulses (+1 +2) noted with doppler on surgical right foot. Patient states PRN medication help with his pain management. Denies any sign of distress. Will continue to monitor. Problem: Physical Therapy - Adult  Goal: By Discharge: Performs mobility at highest level of function for planned discharge setting. See evaluation for individualized goals. Description: FUNCTIONAL STATUS PRIOR TO ADMISSION: Patient was modified independent using a single point cane for functional mobility. HOME SUPPORT PRIOR TO ADMISSION: The patient lives with his girlfriend, 1-story home with 3 steps and B rails to enter. Physical Therapy Goals  Initiated 8/26/2023  1. Patient will move from supine to sit and sit to supine in bed with modified independence within 7 day(s). 2.  Patient will perform sit to stand with supervision/set-up within 7 day(s). 3.  Patient will transfer from bed to chair and chair to bed with supervision/set-up using the least restrictive device within 7 day(s). 4.  Patient will ambulate with supervision/set-up for 150 feet with the least restrictive device within 7 day(s).    5.  Patient will ascend/descend 3 stairs with B handrail(s) with supervision/set-up within 7 day(s).   0/58/5487 [id number] by Mamta Sánchez PT  Outcome: Not Progressing     Problem: Pain  Goal: Verbalizes/displays adequate comfort level or baseline comfort level  Outcome: Progressing     Problem: ABCDS Injury Assessment  Goal: Absence of physical injury  8/26/2023 2216 by Lilly Whalen RN  Outcome: Progressing  8/26/2023 1556 by Dinora Prabhakar RN  Outcome: Progressing     Problem: Discharge Planning  Goal: Discharge to home or other facility with appropriate

## 2023-08-27 NOTE — CODE DOCUMENTATION
RAPID RESPONSE TEAM    Responded to room 2316 for overhead rapid response page. Reportedly patient became suddenly SOB and tachycardic. Upon arrival, patient was alert, appropriately interactive but unable to speak due to SOB; he maintained his own airway; his respirations were fast and labored; his pulse was strong, rapid, and irregular bilaterally. He quickly became diaphoretic with cold extremities. Bipap was initiated and blood gas (venous?) drawn  Additional IV access was obtained  Blood samples were drawn and sent to lab in lavender, pst, green, gray (on ice), and blue collection tubes. NS bolus was initiated. Patient was transferred to CCU 2518  DCCV was performed at 200J x1  Patient became hypotensive and Neosynephrine 100mcg IV was administered  Norepinephrine was initiated  Care transitioned to CCU Nursing staff. Please call back if needed.     Shahla Nj RN  Ext. [phone]

## 2023-08-28 ENCOUNTER — APPOINTMENT (OUTPATIENT)
Facility: HOSPITAL | Age: 68
DRG: 215 | End: 2023-08-28
Attending: SURGERY
Payer: MEDICARE

## 2023-08-28 ENCOUNTER — APPOINTMENT (OUTPATIENT)
Facility: HOSPITAL | Age: 68
DRG: 215 | End: 2023-08-28
Attending: THORACIC SURGERY (CARDIOTHORACIC VASCULAR SURGERY)
Payer: MEDICARE

## 2023-08-28 ENCOUNTER — ANESTHESIA EVENT (OUTPATIENT)
Facility: HOSPITAL | Age: 68
DRG: 215 | End: 2023-08-28
Payer: MEDICARE

## 2023-08-28 ENCOUNTER — ANESTHESIA (OUTPATIENT)
Facility: HOSPITAL | Age: 68
DRG: 215 | End: 2023-08-28
Payer: MEDICARE

## 2023-08-28 PROBLEM — R06.02 SOB (SHORTNESS OF BREATH): Status: ACTIVE | Noted: 2023-08-28

## 2023-08-28 PROBLEM — R57.0 CARDIOGENIC SHOCK (HCC): Status: ACTIVE | Noted: 2023-08-28

## 2023-08-28 LAB
ALBUMIN SERPL-MCNC: 2.3 G/DL (ref 3.5–5)
ALBUMIN/GLOB SERPL: 0.5 (ref 1.1–2.2)
ALP SERPL-CCNC: 123 U/L (ref 45–117)
ALT SERPL-CCNC: 33 U/L (ref 12–78)
ANION GAP BLD CALC-SCNC: 13 (ref 10–20)
ANION GAP SERPL CALC-SCNC: 8 MMOL/L (ref 5–15)
ANION GAP SERPL CALC-SCNC: 9 MMOL/L (ref 5–15)
APTT PPP: 43.6 SEC (ref 22.1–31)
APTT PPP: >130 SEC (ref 22.1–31)
ARTERIAL PATENCY WRIST A: ABNORMAL
AST SERPL-CCNC: 157 U/L (ref 15–37)
BASE DEFICIT BLD-SCNC: 3.9 MMOL/L
BASE DEFICIT BLDA-SCNC: 4.5 MMOL/L
BASE DEFICIT BLDV-SCNC: 8.9 MMOL/L
BDY SITE: ABNORMAL
BDY SITE: ABNORMAL
BILIRUB SERPL-MCNC: 0.7 MG/DL (ref 0.2–1)
BUN SERPL-MCNC: 19 MG/DL (ref 6–20)
BUN SERPL-MCNC: 28 MG/DL (ref 6–20)
BUN/CREAT SERPL: 13 (ref 12–20)
BUN/CREAT SERPL: 16 (ref 12–20)
CA-I BLD-MCNC: 1.09 MMOL/L (ref 1.12–1.32)
CA-I BLD-SCNC: 1.08 MMOL/L (ref 1.13–1.32)
CALCIUM SERPL-MCNC: 8.3 MG/DL (ref 8.5–10.1)
CALCIUM SERPL-MCNC: 8.9 MG/DL (ref 8.5–10.1)
CHLORIDE BLD-SCNC: 106 MMOL/L (ref 100–108)
CHLORIDE SERPL-SCNC: 101 MMOL/L (ref 97–108)
CHLORIDE SERPL-SCNC: 102 MMOL/L (ref 97–108)
CO2 BLD-SCNC: 21 MMOL/L (ref 19–24)
CO2 SERPL-SCNC: 21 MMOL/L (ref 21–32)
CO2 SERPL-SCNC: 21 MMOL/L (ref 21–32)
CREAT SERPL-MCNC: 1.52 MG/DL (ref 0.7–1.3)
CREAT SERPL-MCNC: 1.8 MG/DL (ref 0.7–1.3)
CREAT UR-MCNC: 1.3 MG/DL (ref 0.6–1.3)
EKG DIAGNOSIS: NORMAL
EKG Q-T INTERVAL: 386 MS
EKG QRS DURATION: 186 MS
EKG QTC CALCULATION (BAZETT): 563 MS
EKG R AXIS: 2 DEGREES
EKG T AXIS: 146 DEGREES
EKG VENTRICULAR RATE: 128 BPM
ERYTHROCYTE [DISTWIDTH] IN BLOOD BY AUTOMATED COUNT: 13.6 % (ref 11.5–14.5)
ERYTHROCYTE [DISTWIDTH] IN BLOOD BY AUTOMATED COUNT: 14.1 % (ref 11.5–14.5)
FIO2 ON VENT: 50 %
GLOBULIN SER CALC-MCNC: 4.3 G/DL (ref 2–4)
GLUCOSE BLD STRIP.AUTO-MCNC: 177 MG/DL (ref 65–117)
GLUCOSE BLD STRIP.AUTO-MCNC: 187 MG/DL (ref 65–117)
GLUCOSE BLD STRIP.AUTO-MCNC: 241 MG/DL (ref 74–106)
GLUCOSE SERPL-MCNC: 177 MG/DL (ref 65–100)
GLUCOSE SERPL-MCNC: 223 MG/DL (ref 65–100)
HCO3 BLDA-SCNC: 20 MMOL/L (ref 22–26)
HCO3 BLDA-SCNC: 21 MMOL/L
HCO3 BLDV-SCNC: 17 MMOL/L (ref 23–28)
HCT VFR BLD AUTO: 28.1 % (ref 36.6–50.3)
HCT VFR BLD AUTO: 28.9 % (ref 36.6–50.3)
HGB BLD-MCNC: 9.2 G/DL (ref 12.1–17)
HGB BLD-MCNC: 9.3 G/DL (ref 12.1–17)
HISTORY CHECK: NORMAL
HISTORY CHECK: NORMAL
INR PPP: 1.2 (ref 0.9–1.1)
LACTATE BLD-SCNC: 5.98 MMOL/L (ref 0.4–2)
LACTATE SERPL-SCNC: 3 MMOL/L (ref 0.4–2)
LACTATE SERPL-SCNC: 4.5 MMOL/L (ref 0.4–2)
LACTATE SERPL-SCNC: 4.7 MMOL/L (ref 0.4–2)
LACTATE SERPL-SCNC: 4.8 MMOL/L (ref 0.4–2)
LDH SERPL L TO P-CCNC: 633 U/L (ref 85–241)
MAGNESIUM SERPL-MCNC: 3.3 MG/DL (ref 1.6–2.4)
MCH RBC QN AUTO: 28.4 PG (ref 26–34)
MCH RBC QN AUTO: 28.8 PG (ref 26–34)
MCHC RBC AUTO-ENTMCNC: 31.8 G/DL (ref 30–36.5)
MCHC RBC AUTO-ENTMCNC: 33.1 G/DL (ref 30–36.5)
MCV RBC AUTO: 87 FL (ref 80–99)
MCV RBC AUTO: 89.2 FL (ref 80–99)
NRBC # BLD: 0 K/UL (ref 0–0.01)
NRBC # BLD: 0.05 K/UL (ref 0–0.01)
NRBC BLD-RTO: 0 PER 100 WBC
NRBC BLD-RTO: 0.5 PER 100 WBC
NT PRO BNP: ABNORMAL PG/ML
NT PRO BNP: ABNORMAL PG/ML
PCO2 BLD: 36.8 MMHG (ref 35–45)
PCO2 BLDA: 37 MMHG (ref 35–45)
PCO2 BLDV: 35.2 MMHG (ref 41–51)
PEEP RESPIRATORY: 5
PH BLD: 7.37 (ref 7.35–7.45)
PH BLDA: 7.36 (ref 7.35–7.45)
PH BLDV: 7.29 (ref 7.32–7.42)
PHOSPHATE SERPL-MCNC: 4.9 MG/DL (ref 2.6–4.7)
PLATELET # BLD AUTO: 256 K/UL (ref 150–400)
PLATELET # BLD AUTO: 358 K/UL (ref 150–400)
PMV BLD AUTO: 10.6 FL (ref 8.9–12.9)
PMV BLD AUTO: 11.2 FL (ref 8.9–12.9)
PO2 BLD: 57 MMHG (ref 80–100)
PO2 BLDA: 55 MMHG (ref 80–100)
PO2 BLDV: 42 MMHG (ref 25–40)
POTASSIUM BLD-SCNC: 3.9 MMOL/L (ref 3.5–5.5)
POTASSIUM SERPL-SCNC: 4.8 MMOL/L (ref 3.5–5.1)
POTASSIUM SERPL-SCNC: 5.3 MMOL/L (ref 3.5–5.1)
PROT SERPL-MCNC: 6.6 G/DL (ref 6.4–8.2)
PROTHROMBIN TIME: 12.7 SEC (ref 9–11.1)
RBC # BLD AUTO: 3.23 M/UL (ref 4.1–5.7)
RBC # BLD AUTO: 3.24 M/UL (ref 4.1–5.7)
SAO2 % BLD: 88 % (ref 92–97)
SAO2 % BLD: 89 %
SAO2 % BLDV: 73 % (ref 65–88)
SAO2% DEVICE SAO2% SENSOR NAME: ABNORMAL
SAO2% DEVICE SAO2% SENSOR NAME: ABNORMAL
SERVICE CMNT-IMP: ABNORMAL
SERVICE CMNT-IMP: ABNORMAL
SODIUM BLD-SCNC: 140 MMOL/L (ref 136–145)
SODIUM SERPL-SCNC: 131 MMOL/L (ref 136–145)
SODIUM SERPL-SCNC: 131 MMOL/L (ref 136–145)
SPECIMEN SITE: ABNORMAL
THERAPEUTIC RANGE: ABNORMAL SECS (ref 58–77)
THERAPEUTIC RANGE: ABNORMAL SECS (ref 58–77)
TROPONIN I SERPL HS-MCNC: ABNORMAL NG/L (ref 0–76)
UFH PPP CHRO-ACNC: 0.57 IU/ML
UFH PPP CHRO-ACNC: 0.64 IU/ML
WBC # BLD AUTO: 10.7 K/UL (ref 4.1–11.1)
WBC # BLD AUTO: 14.5 K/UL (ref 4.1–11.1)

## 2023-08-28 PROCEDURE — 02HA3RZ INSERTION OF SHORT-TERM EXTERNAL HEART ASSIST SYSTEM INTO HEART, PERCUTANEOUS APPROACH: ICD-10-PCS | Performed by: INTERNAL MEDICINE

## 2023-08-28 PROCEDURE — 34716 OPN AX/SUBCLA ART EXPOS CNDT: CPT | Performed by: THORACIC SURGERY (CARDIOTHORACIC VASCULAR SURGERY)

## 2023-08-28 PROCEDURE — 2000000000 HC ICU R&B

## 2023-08-28 PROCEDURE — 2500000003 HC RX 250 WO HCPCS: Performed by: NURSE PRACTITIONER

## 2023-08-28 PROCEDURE — 83605 ASSAY OF LACTIC ACID: CPT

## 2023-08-28 PROCEDURE — 36415 COLL VENOUS BLD VENIPUNCTURE: CPT

## 2023-08-28 PROCEDURE — 6370000000 HC RX 637 (ALT 250 FOR IP): Performed by: NURSE PRACTITIONER

## 2023-08-28 PROCEDURE — 83880 ASSAY OF NATRIURETIC PEPTIDE: CPT

## 2023-08-28 PROCEDURE — 2580000003 HC RX 258: Performed by: HOSPITALIST

## 2023-08-28 PROCEDURE — 86923 COMPATIBILITY TEST ELECTRIC: CPT

## 2023-08-28 PROCEDURE — 83615 LACTATE (LD) (LDH) ENZYME: CPT

## 2023-08-28 PROCEDURE — 2500000003 HC RX 250 WO HCPCS: Performed by: HOSPITALIST

## 2023-08-28 PROCEDURE — 85610 PROTHROMBIN TIME: CPT

## 2023-08-28 PROCEDURE — A4216 STERILE WATER/SALINE, 10 ML: HCPCS | Performed by: NURSE PRACTITIONER

## 2023-08-28 PROCEDURE — 80048 BASIC METABOLIC PNL TOTAL CA: CPT

## 2023-08-28 PROCEDURE — A4217 STERILE WATER/SALINE, 500 ML: HCPCS | Performed by: THORACIC SURGERY (CARDIOTHORACIC VASCULAR SURGERY)

## 2023-08-28 PROCEDURE — 82962 GLUCOSE BLOOD TEST: CPT

## 2023-08-28 PROCEDURE — 84484 ASSAY OF TROPONIN QUANT: CPT

## 2023-08-28 PROCEDURE — C9113 INJ PANTOPRAZOLE SODIUM, VIA: HCPCS | Performed by: NURSE PRACTITIONER

## 2023-08-28 PROCEDURE — 33990 INSJ PERQ VAD L HRT ARTERIAL: CPT | Performed by: THORACIC SURGERY (CARDIOTHORACIC VASCULAR SURGERY)

## 2023-08-28 PROCEDURE — 84295 ASSAY OF SERUM SODIUM: CPT

## 2023-08-28 PROCEDURE — 86900 BLOOD TYPING SEROLOGIC ABO: CPT

## 2023-08-28 PROCEDURE — 6360000004 HC RX CONTRAST MEDICATION: Performed by: STUDENT IN AN ORGANIZED HEALTH CARE EDUCATION/TRAINING PROGRAM

## 2023-08-28 PROCEDURE — 2709999900 HC NON-CHARGEABLE SUPPLY: Performed by: THORACIC SURGERY (CARDIOTHORACIC VASCULAR SURGERY)

## 2023-08-28 PROCEDURE — 85027 COMPLETE CBC AUTOMATED: CPT

## 2023-08-28 PROCEDURE — NBSRV NON-BILLABLE SERVICE: Performed by: NURSE PRACTITIONER

## 2023-08-28 PROCEDURE — P9045 ALBUMIN (HUMAN), 5%, 250 ML: HCPCS | Performed by: NURSE PRACTITIONER

## 2023-08-28 PROCEDURE — 84100 ASSAY OF PHOSPHORUS: CPT

## 2023-08-28 PROCEDURE — 85730 THROMBOPLASTIN TIME PARTIAL: CPT

## 2023-08-28 PROCEDURE — C1769 GUIDE WIRE: HCPCS | Performed by: THORACIC SURGERY (CARDIOTHORACIC VASCULAR SURGERY)

## 2023-08-28 PROCEDURE — 37799 UNLISTED PX VASCULAR SURGERY: CPT

## 2023-08-28 PROCEDURE — 6360000002 HC RX W HCPCS: Performed by: NURSE PRACTITIONER

## 2023-08-28 PROCEDURE — 2580000003 HC RX 258: Performed by: NURSE PRACTITIONER

## 2023-08-28 PROCEDURE — 5A0221D ASSISTANCE WITH CARDIAC OUTPUT USING IMPELLER PUMP, CONTINUOUS: ICD-10-PCS | Performed by: INTERNAL MEDICINE

## 2023-08-28 PROCEDURE — 85520 HEPARIN ASSAY: CPT

## 2023-08-28 PROCEDURE — 71045 X-RAY EXAM CHEST 1 VIEW: CPT

## 2023-08-28 PROCEDURE — 3600000018 HC SURGERY OHS ADDTL 15MIN: Performed by: THORACIC SURGERY (CARDIOTHORACIC VASCULAR SURGERY)

## 2023-08-28 PROCEDURE — B24BZZ4 ULTRASONOGRAPHY OF HEART WITH AORTA, TRANSESOPHAGEAL: ICD-10-PCS | Performed by: ANESTHESIOLOGY

## 2023-08-28 PROCEDURE — 3600000008 HC SURGERY OHS BASE: Performed by: THORACIC SURGERY (CARDIOTHORACIC VASCULAR SURGERY)

## 2023-08-28 PROCEDURE — 51798 US URINE CAPACITY MEASURE: CPT

## 2023-08-28 PROCEDURE — 83735 ASSAY OF MAGNESIUM: CPT

## 2023-08-28 PROCEDURE — 2580000003 HC RX 258: Performed by: THORACIC SURGERY (CARDIOTHORACIC VASCULAR SURGERY)

## 2023-08-28 PROCEDURE — 84132 ASSAY OF SERUM POTASSIUM: CPT

## 2023-08-28 PROCEDURE — P9016 RBC LEUKOCYTES REDUCED: HCPCS

## 2023-08-28 PROCEDURE — C1768 GRAFT, VASCULAR: HCPCS | Performed by: THORACIC SURGERY (CARDIOTHORACIC VASCULAR SURGERY)

## 2023-08-28 PROCEDURE — 6360000002 HC RX W HCPCS: Performed by: THORACIC SURGERY (CARDIOTHORACIC VASCULAR SURGERY)

## 2023-08-28 PROCEDURE — 6370000000 HC RX 637 (ALT 250 FOR IP): Performed by: SURGERY

## 2023-08-28 PROCEDURE — 3700000001 HC ADD 15 MINUTES (ANESTHESIA): Performed by: THORACIC SURGERY (CARDIOTHORACIC VASCULAR SURGERY)

## 2023-08-28 PROCEDURE — 82803 BLOOD GASES ANY COMBINATION: CPT

## 2023-08-28 PROCEDURE — 82330 ASSAY OF CALCIUM: CPT

## 2023-08-28 PROCEDURE — 2720000010 HC SURG SUPPLY STERILE: Performed by: THORACIC SURGERY (CARDIOTHORACIC VASCULAR SURGERY)

## 2023-08-28 PROCEDURE — 2580000003 HC RX 258: Performed by: NURSE ANESTHETIST, CERTIFIED REGISTERED

## 2023-08-28 PROCEDURE — 86850 RBC ANTIBODY SCREEN: CPT

## 2023-08-28 PROCEDURE — 80053 COMPREHEN METABOLIC PANEL: CPT

## 2023-08-28 PROCEDURE — 94660 CPAP INITIATION&MGMT: CPT

## 2023-08-28 PROCEDURE — 5A1945Z RESPIRATORY VENTILATION, 24-96 CONSECUTIVE HOURS: ICD-10-PCS | Performed by: INTERNAL MEDICINE

## 2023-08-28 PROCEDURE — 86901 BLOOD TYPING SEROLOGIC RH(D): CPT

## 2023-08-28 PROCEDURE — 0BH17EZ INSERTION OF ENDOTRACHEAL AIRWAY INTO TRACHEA, VIA NATURAL OR ARTIFICIAL OPENING: ICD-10-PCS | Performed by: INTERNAL MEDICINE

## 2023-08-28 PROCEDURE — 94002 VENT MGMT INPAT INIT DAY: CPT

## 2023-08-28 PROCEDURE — 6360000002 HC RX W HCPCS: Performed by: HOSPITALIST

## 2023-08-28 PROCEDURE — 3700000000 HC ANESTHESIA ATTENDED CARE: Performed by: THORACIC SURGERY (CARDIOTHORACIC VASCULAR SURGERY)

## 2023-08-28 PROCEDURE — 82947 ASSAY GLUCOSE BLOOD QUANT: CPT

## 2023-08-28 DEVICE — HEMASHIELD PLATINUM WOVEN STRAIGHT DOUBLE VELOUR VASCULAR GRAFT WITH GRAFT SIZER ACCESSORY
Type: IMPLANTABLE DEVICE | Site: AXILLA | Status: FUNCTIONAL
Brand: HEMASHIELD

## 2023-08-28 RX ORDER — SODIUM CHLORIDE 0.9 % (FLUSH) 0.9 %
5-40 SYRINGE (ML) INJECTION PRN
Status: DISCONTINUED | OUTPATIENT
Start: 2023-08-28 | End: 2023-08-31 | Stop reason: HOSPADM

## 2023-08-28 RX ORDER — SODIUM CHLORIDE 9 MG/ML
INJECTION, SOLUTION INTRAVENOUS PRN
Status: DISCONTINUED | OUTPATIENT
Start: 2023-08-28 | End: 2023-08-28

## 2023-08-28 RX ORDER — SODIUM CHLORIDE 9 MG/ML
INJECTION, SOLUTION INTRAVENOUS CONTINUOUS
Status: DISCONTINUED | OUTPATIENT
Start: 2023-08-28 | End: 2023-08-31 | Stop reason: HOSPADM

## 2023-08-28 RX ORDER — HEPARIN SODIUM 1000 [USP'U]/ML
INJECTION, SOLUTION INTRAVENOUS; SUBCUTANEOUS PRN
Status: DISCONTINUED | OUTPATIENT
Start: 2023-08-28 | End: 2023-08-28 | Stop reason: SDUPTHER

## 2023-08-28 RX ORDER — INSULIN LISPRO 100 [IU]/ML
1-20 INJECTION, SOLUTION INTRAVENOUS; SUBCUTANEOUS
Status: DISCONTINUED | OUTPATIENT
Start: 2023-08-30 | End: 2023-08-31

## 2023-08-28 RX ORDER — INSULIN GLARGINE 100 [IU]/ML
1-50 INJECTION, SOLUTION SUBCUTANEOUS
Status: ACTIVE | OUTPATIENT
Start: 2023-08-28 | End: 2023-08-29

## 2023-08-28 RX ORDER — SODIUM CHLORIDE, SODIUM LACTATE, POTASSIUM CHLORIDE, AND CALCIUM CHLORIDE .6; .31; .03; .02 G/100ML; G/100ML; G/100ML; G/100ML
500 INJECTION, SOLUTION INTRAVENOUS ONCE
Status: DISCONTINUED | OUTPATIENT
Start: 2023-08-28 | End: 2023-08-28

## 2023-08-28 RX ORDER — HYDRALAZINE HYDROCHLORIDE 20 MG/ML
10 INJECTION INTRAMUSCULAR; INTRAVENOUS EVERY 6 HOURS PRN
Status: DISCONTINUED | OUTPATIENT
Start: 2023-08-28 | End: 2023-08-31 | Stop reason: HOSPADM

## 2023-08-28 RX ORDER — DEXTROSE MONOHYDRATE 100 MG/ML
INJECTION, SOLUTION INTRAVENOUS CONTINUOUS PRN
Status: DISCONTINUED | OUTPATIENT
Start: 2023-08-28 | End: 2023-08-31 | Stop reason: HOSPADM

## 2023-08-28 RX ORDER — MAGNESIUM SULFATE IN WATER 40 MG/ML
2000 INJECTION, SOLUTION INTRAVENOUS PRN
Status: DISCONTINUED | OUTPATIENT
Start: 2023-08-28 | End: 2023-08-31 | Stop reason: HOSPADM

## 2023-08-28 RX ORDER — ONDANSETRON 2 MG/ML
4 INJECTION INTRAMUSCULAR; INTRAVENOUS EVERY 4 HOURS PRN
Status: DISCONTINUED | OUTPATIENT
Start: 2023-08-28 | End: 2023-08-31 | Stop reason: HOSPADM

## 2023-08-28 RX ORDER — POTASSIUM CHLORIDE 29.8 MG/ML
20 INJECTION INTRAVENOUS PRN
Status: DISCONTINUED | OUTPATIENT
Start: 2023-08-28 | End: 2023-08-31 | Stop reason: HOSPADM

## 2023-08-28 RX ORDER — METOPROLOL SUCCINATE 50 MG/1
50 TABLET, EXTENDED RELEASE ORAL 2 TIMES DAILY
Status: ON HOLD | COMMUNITY
Start: 2023-08-04

## 2023-08-28 RX ORDER — SODIUM CHLORIDE 9 MG/ML
INJECTION, SOLUTION INTRAVENOUS CONTINUOUS PRN
Status: DISCONTINUED | OUTPATIENT
Start: 2023-08-28 | End: 2023-08-28 | Stop reason: SDUPTHER

## 2023-08-28 RX ORDER — SUCCINYLCHOLINE CHLORIDE 20 MG/ML
INJECTION INTRAMUSCULAR; INTRAVENOUS PRN
Status: DISCONTINUED | OUTPATIENT
Start: 2023-08-28 | End: 2023-08-28 | Stop reason: SDUPTHER

## 2023-08-28 RX ORDER — LIDOCAINE HYDROCHLORIDE 20 MG/ML
INJECTION, SOLUTION EPIDURAL; INFILTRATION; INTRACAUDAL; PERINEURAL PRN
Status: DISCONTINUED | OUTPATIENT
Start: 2023-08-28 | End: 2023-08-28 | Stop reason: SDUPTHER

## 2023-08-28 RX ORDER — ASPIRIN 81 MG/1
81 TABLET, CHEWABLE ORAL DAILY
Status: DISCONTINUED | OUTPATIENT
Start: 2023-08-29 | End: 2023-08-31 | Stop reason: HOSPADM

## 2023-08-28 RX ORDER — SODIUM CHLORIDE 0.9 % (FLUSH) 0.9 %
5-40 SYRINGE (ML) INJECTION EVERY 12 HOURS SCHEDULED
Status: DISCONTINUED | OUTPATIENT
Start: 2023-08-28 | End: 2023-08-31 | Stop reason: HOSPADM

## 2023-08-28 RX ORDER — ALBUMIN, HUMAN INJ 5% 5 %
SOLUTION INTRAVENOUS
Status: DISPENSED
Start: 2023-08-28 | End: 2023-08-29

## 2023-08-28 RX ORDER — INSULIN LISPRO 100 [IU]/ML
0-8 INJECTION, SOLUTION INTRAVENOUS; SUBCUTANEOUS EVERY 6 HOURS SCHEDULED
Status: DISCONTINUED | OUTPATIENT
Start: 2023-08-28 | End: 2023-08-28

## 2023-08-28 RX ORDER — EPINEPHRINE 1 MG/ML
INJECTION, SOLUTION INTRAMUSCULAR; SUBCUTANEOUS PRN
Status: DISCONTINUED | OUTPATIENT
Start: 2023-08-28 | End: 2023-08-28 | Stop reason: SDUPTHER

## 2023-08-28 RX ORDER — INSULIN LISPRO 100 [IU]/ML
0-6 INJECTION, SOLUTION INTRAVENOUS; SUBCUTANEOUS NIGHTLY
Status: DISCONTINUED | OUTPATIENT
Start: 2023-08-28 | End: 2023-08-31

## 2023-08-28 RX ORDER — ATORVASTATIN CALCIUM 40 MG/1
80 TABLET, FILM COATED ORAL DAILY
Status: DISCONTINUED | OUTPATIENT
Start: 2023-08-29 | End: 2023-08-31 | Stop reason: HOSPADM

## 2023-08-28 RX ORDER — BUMETANIDE 0.25 MG/ML
4 INJECTION INTRAMUSCULAR; INTRAVENOUS ONCE
Status: COMPLETED | OUTPATIENT
Start: 2023-08-28 | End: 2023-08-28

## 2023-08-28 RX ORDER — BUMETANIDE 0.25 MG/ML
2 INJECTION INTRAMUSCULAR; INTRAVENOUS ONCE
Status: COMPLETED | OUTPATIENT
Start: 2023-08-28 | End: 2023-08-28

## 2023-08-28 RX ORDER — CALCIUM CHLORIDE 100 MG/ML
INJECTION INTRAVENOUS; INTRAVENTRICULAR PRN
Status: DISCONTINUED | OUTPATIENT
Start: 2023-08-28 | End: 2023-08-28 | Stop reason: SDUPTHER

## 2023-08-28 RX ORDER — CHLORHEXIDINE GLUCONATE 0.12 MG/ML
15 RINSE ORAL 2 TIMES DAILY
Status: DISCONTINUED | OUTPATIENT
Start: 2023-08-28 | End: 2023-08-31 | Stop reason: HOSPADM

## 2023-08-28 RX ORDER — PROPOFOL 10 MG/ML
5-50 INJECTION, EMULSION INTRAVENOUS CONTINUOUS
Status: DISCONTINUED | OUTPATIENT
Start: 2023-08-28 | End: 2023-08-31 | Stop reason: HOSPADM

## 2023-08-28 RX ORDER — ALBUMIN, HUMAN INJ 5% 5 %
12.5 SOLUTION INTRAVENOUS
Status: DISPENSED | OUTPATIENT
Start: 2023-08-28 | End: 2023-08-29

## 2023-08-28 RX ORDER — MIDAZOLAM HYDROCHLORIDE 1 MG/ML
1 INJECTION, SOLUTION INTRAMUSCULAR; INTRAVENOUS
Status: DISCONTINUED | OUTPATIENT
Start: 2023-08-28 | End: 2023-08-31 | Stop reason: HOSPADM

## 2023-08-28 RX ORDER — ALBUMIN, HUMAN INJ 5% 5 %
SOLUTION INTRAVENOUS PRN
Status: DISCONTINUED | OUTPATIENT
Start: 2023-08-28 | End: 2023-08-28 | Stop reason: SDUPTHER

## 2023-08-28 RX ORDER — ROCURONIUM BROMIDE 10 MG/ML
INJECTION, SOLUTION INTRAVENOUS PRN
Status: DISCONTINUED | OUTPATIENT
Start: 2023-08-28 | End: 2023-08-28 | Stop reason: SDUPTHER

## 2023-08-28 RX ORDER — SODIUM CHLORIDE, SODIUM LACTATE, POTASSIUM CHLORIDE, AND CALCIUM CHLORIDE .6; .31; .03; .02 G/100ML; G/100ML; G/100ML; G/100ML
1000 INJECTION, SOLUTION INTRAVENOUS ONCE
Status: COMPLETED | OUTPATIENT
Start: 2023-08-28 | End: 2023-08-28

## 2023-08-28 RX ORDER — ETOMIDATE 2 MG/ML
INJECTION INTRAVENOUS PRN
Status: DISCONTINUED | OUTPATIENT
Start: 2023-08-28 | End: 2023-08-28 | Stop reason: SDUPTHER

## 2023-08-28 RX ORDER — NOREPINEPHRINE BITARTRATE 0.06 MG/ML
1-10 INJECTION, SOLUTION INTRAVENOUS CONTINUOUS
Status: DISCONTINUED | OUTPATIENT
Start: 2023-08-28 | End: 2023-08-31 | Stop reason: HOSPADM

## 2023-08-28 RX ORDER — FUROSEMIDE 40 MG/1
40 TABLET ORAL DAILY
Status: ON HOLD | COMMUNITY
Start: 2023-07-28

## 2023-08-28 RX ORDER — INSULIN LISPRO 100 [IU]/ML
0-12 INJECTION, SOLUTION INTRAVENOUS; SUBCUTANEOUS
Status: DISCONTINUED | OUTPATIENT
Start: 2023-08-29 | End: 2023-08-31

## 2023-08-28 RX ORDER — FUROSEMIDE 40 MG/1
20 TABLET ORAL ONCE
Status: COMPLETED | OUTPATIENT
Start: 2023-08-28 | End: 2023-08-28

## 2023-08-28 RX ORDER — ALBUTEROL SULFATE 2.5 MG/3ML
2.5 SOLUTION RESPIRATORY (INHALATION) EVERY 4 HOURS PRN
Status: DISCONTINUED | OUTPATIENT
Start: 2023-08-28 | End: 2023-08-31 | Stop reason: HOSPADM

## 2023-08-28 RX ADMIN — COLLAGENASE SANTYL: 250 OINTMENT TOPICAL at 14:48

## 2023-08-28 RX ADMIN — HEPARIN SODIUM 5000 UNITS: 1000 INJECTION, SOLUTION INTRAVENOUS; SUBCUTANEOUS at 18:03

## 2023-08-28 RX ADMIN — CEFEPIME 2000 MG: 2 INJECTION, POWDER, FOR SOLUTION INTRAVENOUS at 09:35

## 2023-08-28 RX ADMIN — AMIODARONE HYDROCHLORIDE 0.5 MG/MIN: 50 INJECTION, SOLUTION INTRAVENOUS at 03:55

## 2023-08-28 RX ADMIN — CALCIUM CHLORIDE 0.5 G: 100 INJECTION INTRAVENOUS; INTRAVENTRICULAR at 16:36

## 2023-08-28 RX ADMIN — 0.12% CHLORHEXIDINE GLUCONATE 15 ML: 1.2 RINSE ORAL at 21:05

## 2023-08-28 RX ADMIN — ETOMIDATE 16 MG: 2 INJECTION INTRAVENOUS at 16:32

## 2023-08-28 RX ADMIN — SODIUM CHLORIDE 2 MCG/MIN: 9 INJECTION, SOLUTION INTRAVENOUS at 16:32

## 2023-08-28 RX ADMIN — SODIUM BICARBONATE: 84 INJECTION, SOLUTION INTRAVENOUS at 20:00

## 2023-08-28 RX ADMIN — SODIUM CHLORIDE: 9 INJECTION, SOLUTION INTRAVENOUS at 16:45

## 2023-08-28 RX ADMIN — ALBUMIN (HUMAN) 12.5 G: 12.5 INJECTION, SOLUTION INTRAVENOUS at 19:50

## 2023-08-28 RX ADMIN — SODIUM CHLORIDE 3.62 UNITS/HR: 9 INJECTION, SOLUTION INTRAVENOUS at 21:52

## 2023-08-28 RX ADMIN — SODIUM CHLORIDE 40 MG: 9 INJECTION INTRAMUSCULAR; INTRAVENOUS; SUBCUTANEOUS at 22:06

## 2023-08-28 RX ADMIN — BUMETANIDE 4 MG: 0.25 INJECTION INTRAMUSCULAR; INTRAVENOUS at 13:35

## 2023-08-28 RX ADMIN — CEFEPIME 2000 MG: 2 INJECTION, POWDER, FOR SOLUTION INTRAVENOUS at 21:22

## 2023-08-28 RX ADMIN — ROCURONIUM BROMIDE 50 MG: 10 INJECTION, SOLUTION INTRAVENOUS at 16:46

## 2023-08-28 RX ADMIN — PROPOFOL 10 MCG/KG/MIN: 10 INJECTION, EMULSION INTRAVENOUS at 20:37

## 2023-08-28 RX ADMIN — Medication 50 MEQ: at 17:22

## 2023-08-28 RX ADMIN — Medication 50 MEQ: at 19:36

## 2023-08-28 RX ADMIN — CALCIUM CHLORIDE 1000 MG: 100 INJECTION, SOLUTION INTRAVENOUS at 23:49

## 2023-08-28 RX ADMIN — ALBUMIN, HUMAN INJ 5% 12.5 G: 5 SOLUTION at 19:05

## 2023-08-28 RX ADMIN — CALCIUM CHLORIDE 0.5 G: 100 INJECTION INTRAVENOUS; INTRAVENTRICULAR at 16:51

## 2023-08-28 RX ADMIN — POTASSIUM CHLORIDE 20 MEQ: 29.8 INJECTION, SOLUTION INTRAVENOUS at 21:45

## 2023-08-28 RX ADMIN — AMIODARONE HYDROCHLORIDE 0.5 MG/MIN: 50 INJECTION, SOLUTION INTRAVENOUS at 21:41

## 2023-08-28 RX ADMIN — Medication 50 MEQ: at 18:37

## 2023-08-28 RX ADMIN — ALBUMIN (HUMAN) 12.5 G: 12.5 INJECTION, SOLUTION INTRAVENOUS at 23:35

## 2023-08-28 RX ADMIN — BUMETANIDE 2 MG: 0.25 INJECTION INTRAMUSCULAR; INTRAVENOUS at 11:16

## 2023-08-28 RX ADMIN — ROCURONIUM BROMIDE 50 MG: 10 INJECTION, SOLUTION INTRAVENOUS at 17:30

## 2023-08-28 RX ADMIN — EPINEPHRINE 8 MCG: 1 INJECTION, SOLUTION INTRAMUSCULAR; SUBCUTANEOUS at 16:32

## 2023-08-28 RX ADMIN — SODIUM CHLORIDE: 9 INJECTION, SOLUTION INTRAVENOUS at 20:25

## 2023-08-28 RX ADMIN — LIDOCAINE HYDROCHLORIDE 60 MG: 20 INJECTION, SOLUTION EPIDURAL; INFILTRATION; INTRACAUDAL; PERINEURAL at 16:32

## 2023-08-28 RX ADMIN — SODIUM CHLORIDE, PRESERVATIVE FREE 10 ML: 5 INJECTION INTRAVENOUS at 22:07

## 2023-08-28 RX ADMIN — BUMETANIDE 0.5 MG/HR: 0.25 INJECTION INTRAMUSCULAR; INTRAVENOUS at 14:40

## 2023-08-28 RX ADMIN — Medication 25 MEQ: at 16:51

## 2023-08-28 RX ADMIN — VANCOMYCIN HYDROCHLORIDE 1500 MG: 1.5 INJECTION, POWDER, LYOPHILIZED, FOR SOLUTION INTRAVENOUS at 11:24

## 2023-08-28 RX ADMIN — ROCURONIUM BROMIDE 50 MG: 10 INJECTION, SOLUTION INTRAVENOUS at 19:00

## 2023-08-28 RX ADMIN — Medication 1 AMPULE: at 08:29

## 2023-08-28 RX ADMIN — SUCCINYLCHOLINE CHLORIDE 140 MG: 20 INJECTION INTRAMUSCULAR; INTRAVENOUS at 16:32

## 2023-08-28 RX ADMIN — HEPARIN SODIUM 2500 UNITS: 1000 INJECTION, SOLUTION INTRAVENOUS; SUBCUTANEOUS at 18:15

## 2023-08-28 RX ADMIN — CALCIUM CHLORIDE 0.5 G: 100 INJECTION INTRAVENOUS; INTRAVENTRICULAR at 17:22

## 2023-08-28 RX ADMIN — ACETAMINOPHEN 1000 MG: 500 TABLET ORAL at 06:09

## 2023-08-28 RX ADMIN — SODIUM CHLORIDE: 9 INJECTION, SOLUTION INTRAVENOUS at 16:17

## 2023-08-28 RX ADMIN — Medication 25 MEQ: at 17:00

## 2023-08-28 RX ADMIN — FUROSEMIDE 20 MG: 40 TABLET ORAL at 00:37

## 2023-08-28 RX ADMIN — SODIUM CHLORIDE, POTASSIUM CHLORIDE, SODIUM LACTATE AND CALCIUM CHLORIDE 1000 ML: 600; 310; 30; 20 INJECTION, SOLUTION INTRAVENOUS at 21:15

## 2023-08-28 ASSESSMENT — PULMONARY FUNCTION TESTS
PIF_VALUE: 35
PIF_VALUE: 29

## 2023-08-28 ASSESSMENT — PAIN SCALES - GENERAL
PAINLEVEL_OUTOF10: 0

## 2023-08-28 NOTE — BRIEF OP NOTE
Brief Postoperative Note      Patient: Olinda Turner  YOB: 1955  MRN: [id number]    Date of Procedure: 8/28/2023    BRIEF OP NOTE  Pre-Op Diagnosis: cardiogenic shock    Post-Op Diagnosis: same    Procedure: Procedure(s):  RIGHT AXILLARY IMPELLA  5.5 INSERTION, HUDSON BY DR CHAUHAN [facility]    Surgeon: Dr. Kelli Page MD    Assistant(s): YASMIN Oshea    Anesthesia: General     Infusions: Amiodarone, propofol, epinephrine, levophed    Estimated Blood Loss: 300    Specimens: * No specimens in log *        Findings: See full operative note. Implants:   Implant Name Type Inv.  Item Serial No.  Lot No. LRB No. Used Action   GRAFT VSCLR L30CM D10MM 1 Technology Gen[employer] DBLE VLR PASS [facility] TUBE - [id number] Vascular grafts GRAFT VSCLR L30CM D10MM 1 Technology [employer] Prevention[employer] DBLE VLR PASS [facility] TUBE [id number] [employer] [facility]- [id number] Right 1 Implanted   bio glue   NA  [id number] Right 1 Implanted            Electronically signed by YASMIN Oshea on 8/28/2023 at 7:25 PM

## 2023-08-28 NOTE — CONSULTS
CSS Consult Note    Subjective:      Porfirio Osman is a 80 y.o. male who was referred for cardiac evaluation of Cardiogenic shock by Dr. Jh Rubio. Pt with significant PMH of CAD, previous MI, HFrEF, MR, SSS s/p PPM, PAD, and anemia. Due to pts present status, information obtained from Chart review: Pt presented to the ER with c/o non-healing wound to his RLE for which the pt subsequently underwent fem-pop bypass with Dr. Lavinia Adorno. Uncomplicated postop course until rapid response called on 8/27 for severe respiratory distress, hypotension, and tachycardia. On initial evaluation, patient was sweaty and tachypnic with belly breathing. HR in 160s, cold peripheries, BP in 01X systolic and RR in 24S. BiPAP initiated. CXR showed pulmonary edema. EKG showed LBBB and monitor shows wide complex regular rhythm. Patient moved to ICU and shocked (synchronized) with 200J emergently that improved HR in 120s. Levophed was initiated, HR staying in 120s and blood pressure improved to 82R over 18A systolic. Patient stays awake during whole course. Unable to get secondary peripheral and right IJ CVC placed with plan to start Amio drip after amio bolus and heparin drip. Our team was consulted by Dr. Jh Rubio this afternoon for cardiogenic shock requiring axillary impella placement. Pt with fem-pop this admission, vasculopath. He is retired. His sister is his  in [facility]. Unable to obtain full questioning due to pt work of breathing while on [address]. Chart reports pt is a current smoker, denies current alcohol use, denies drug use. Significant family history of his father with HTN and MI, his mother with HTN. Current Status: In ICU on Bipap 70%. Tachypneic, tachycardic. Alert, oriented, following commands. Increased WOB, Dyspnea at rest. Pt has been NPO all day. Not responding to diuresis - has gotten 20mg lasix, 6mg total of bumex. Only voided 200mL.      On Levophed @ 2, Heparin gtt, Amiodarone, and

## 2023-08-28 NOTE — PERIOP NOTE
TRANSFER - OUT REPORT:    Verbal report given to Igor Reynoso on Ramin Metcalf  being transferred to CVICU for routine post-op       Report consisted of patient's Situation, Background, Assessment and   Recommendations(SBAR). Information from the following report(s) Intake/Output, MAR, and Recent Results was reviewed with the receiving nurse. Lines:   CVC Double Lumen 08/28/23 (Active)       CVC Quadruple Lumen 08/27/23 Right Internal jugular (Active)   Central Line Being Utilized Yes 08/28/23 1200   Criteria for Appropriate Use Hemodynamically unstable, requiring monitoring lines, vasopressors, or volume resuscitation 08/28/23 1200   Site Assessment Clean, dry & intact 08/28/23 1200   Phlebitis Assessment No symptoms 08/28/23 1200   Infiltration Assessment 0 08/28/23 1200   Color/Movement/Sensation Capillary refill less than 3 sec 08/28/23 1200   Proximal Lumen Color/Status White;Flushed; Infusing 08/28/23 1200   Medial 1 Lumen Color/Status Gray;Flushed;Capped 08/28/23 1200   Medial 2 Lumen Color/Status Blue;Flushed; Infusing 08/28/23 1200   Distal Lumen Color/Status Brown;Flushed; Infusing 08/28/23 1200   Line Care Connections checked and tightened 08/28/23 1200   Alcohol Cap Used Yes 08/28/23 1200   Date of Last Dressing Change 08/27/23 08/28/23 1200   Dressing Type Transparent w/CHG gel 08/28/23 1200   Dressing Status Clean, dry & intact 08/28/23 1200       Pulmonary Artery Cath Single 08/28/23 (Active)       Peripheral IV 08/27/23 Left Antecubital (Active)   Site Assessment Clean, dry & intact 08/28/23 1200   Line Status Infusing 08/28/23 1200   Line Care Connections checked and tightened 08/28/23 1200   Phlebitis Assessment No symptoms 08/28/23 1200   Infiltration Assessment 0 08/28/23 1200   Alcohol Cap Used Yes 08/28/23 1200   Dressing Status Clean, dry & intact 08/28/23 1200   Dressing Type Transparent 08/28/23 1200       Arterial Line 08/28/23 Brachial (Active)       Introducer 08/28/23 (Active)

## 2023-08-28 NOTE — CONSENT
Informed Consent for Blood Component Transfusion Note    I have discussed with the patient the rationale for blood component transfusion; its benefits in treating or preventing fatigue, organ damage, or death; and its risk which includes mild transfusion reactions, rare risk of blood borne infection, or more serious but rare reactions. I have discussed the alternatives to transfusion, including the risk and consequences of not receiving transfusion. The patient had an opportunity to ask questions and had agreed to proceed with transfusion of blood components.     Electronically signed by YASMIN Ledezma NP on 8/28/23 at 3:20 PM EDT

## 2023-08-28 NOTE — ANESTHESIA PROCEDURE NOTES
Central Venous Line:    A central venous line was placed using ultrasound guidance and surface landmarks, in the pre-op for the following indication(s): central venous access and CVP monitoring. 8/28/2023 4:25 PM8/28/2023 4:34 PM    Sterility preparation included the following: hand hygiene performed prior to procedure, maximum sterile barriers used and sterile technique used to drape from head to toe. The patient was placed in Trendelenburg position. The right internal jugular vein was prepped. The site was prepped with Chloraprep. A 9 Fr (size), 12 (length), introducer double lumen was placed. During the procedure, the following specific steps were taken: target vein identified, needle advanced into vein and blood aspirated and guidewire advanced into vein. Intravenous verification was obtained by ultrasound, venous blood return and manometry. Post insertion care included: all ports aspirated, all ports flushed easily, guidewire removed intact, Biopatch applied, line sutured in place and dressing applied. During the procedure the patient experienced: patient tolerated procedure well with no complications. Outcomes: uncomplicated  Anesthesia type: localA(n) oximetric, 8 (size) Pulmonary Artery Catheter (PAC) was placed through the Introducer CVL in the right internal jugular vein. The PAC placement was confirmed by pressure tracing changes and HUDSON. The patient experienced the following events during the procedure: patient tolerated procedure well with no complications. PA Cath placed?: Yes  Staffing  Performed: Anesthesiologist   Anesthesiologist: Sancho Stover MD  Preanesthetic Checklist  Completed: patient identified, IV checked, site marked, risks and benefits discussed, surgical/procedural consents, equipment checked, pre-op evaluation, timeout performed, anesthesia consent given, oxygen available, monitors applied/VS acknowledged and fire risk safety assessment completed and

## 2023-08-28 NOTE — OP NOTE
Cardiothoracic Surgery Operative Note    Date of Dictation: 08/28/23    Date of Procedure: 08/28/23    Referring Physician: Blake Thorpe III, DO    Preoperative Diagnoses:    Cardiogenic shock    Postoperative Diagnosis:    Same as Pre-op. Procedure:    1. Right axillary artery dissection and 10 mm graft for temporary mechanical circulatory support. 2. Impella 5.5 pump    Surgeon: Lola Rivera MD, PhD, [facility], [facility]. Assistant Surgeon(s):  Evangelina Mayo MD    Assistant(s): DANIELLE Rondon    The assistance of the PA was required due to the critical nature of the surgery. Anesthesia: General endotracheal anesthesia and HUDSON. Anesthesiologist(s):  Samanta Matute MD.    Findings:    Good placement of impella in the LV without complication. LV decompressed. Estimated Blood Loss:  Documented in the anesthesia record. STS Data: High risk salvage case. \    The risks, benefits and alternatives to surgery were discussed with the patient and they requested to proceed with surgery. [place]-operative antibiotics were given within the STS-recommended windows. Operative Details: The patient was placed supine on the operating table. Standard monitors and lines were placed, anesthesia was given and the patient was intubated. A HUDSON was performed. The patient was prepped and draped in a sterile manner. An incision was made in the deltopectoral groove in the location of a prior incision. Diffuse scar tissue was encountered. The axillary artery was identified and controlled. 7,000 U of heparin was given to achieve an ACT > 250. An arteriotomy was made and a 10 mm dacron graft was anastomosed in a beveled end-to-side fashion with 5-0 prolene and treated with bioglue. The graft was de-aired and the impella installation sheath was secured. The aortic valve was crossed with a catheter and wire. The wire was exchanged for an 0.018 wire.  The impella device was delivered into the ventricle and

## 2023-08-28 NOTE — ANESTHESIA PROCEDURE NOTES
Procedure Performed: HUDSON       Start Time:        End Time:      Preanesthesia Checklist:  Patient identified, IV assessed, risks and benefits discussed, monitors and equipment assessed, procedure being performed at surgeon's request and anesthesia consent obtained. General Procedure Information  Diagnostic Indications for Echo:  assessment of ascending aorta, assessment of surgical repair, hemodynamic monitoring and assessment of valve function  Location performed:  OR  Intubated  Bite block placed  Heart visualized  Probe Insertion:  Easy  Probe Type:  3D, mulitplane, epiaortic and biplane  Modalities:  2D, 3D, color flow mapping, continuous wave Doppler, pulse wave Doppler and M-mode    Echocardiographic and Doppler Measurements    Ventricles    Right Ventricle:  Cavity size normal.  Hypertrophy not present. Thrombus not present. Global function moderately impaired. Left Ventricle:  Cavity size dilated. Hypertrophy not present. Thrombus not present. Global Function severely impaired. Ejection Fraction 5%. Other Ventricular Findings:       LVEF 5-10 on 10 mcg/min of epi and 10 mcg/min of levo with severe MR    Ventricular Regional Function:  1- Basal Anteroseptal:  akinetic  2- Basal Anterior:  akinetic  3- Basal Anterolateral:  dyskinetic  4- Basal Inferolateral:  akinetic  5- Basal Inferior:  akinetic  6- Basal Inferoseptal:  akinetic  7- Mid Anteroseptal:  akinetic  8- Mid Anterior:  dyskinetic  9- Mid Anterolateral:  dyskinetic  10- Mid Inferolateral:  dyskinetic  11- Mid Inferior:  akinetic  12- Mid Inferoseptal:  hypokinetic  13- Apical Anterior:  akinetic  14- Apical Lateral:  akinetic  15- Apical Inferior:  akinetic  16- Apical Septal:  akinetic  17- [place]:  dyskinetic    Wall Motion Comments:       Most of the walls are akinetic or dyskinetic, the septal wall is the only moving wall. Valves    Aortic Valve: Annulus normal.  Stenosis not present. Regurgitation mild.   Leaflets normal.

## 2023-08-28 NOTE — INTERDISCIPLINARY ROUNDS
Critical care interdisciplinary rounds today. Following members present: Case Management, ,  Diabetes Treatment, Nursing, Nutrition, Pharmacy, and Physician. Plan of care discussed. See clinical pathway for plan of care and interventions and desired outcomes.

## 2023-08-28 NOTE — CONSULTS
Consult    NAME: Colby Banda   :  1955   MRN:  [id number]     Date/Time:  2023 12:12 PM    Patient PCP: Norm Machuca MD  ________________________________________________________________________     Assessment:     Acute hypoxic respiratory failure  Hypotension  Cardiogenic shock  Presumed ischemic CM  Remote ICD  NSTEMI  Presumed VT s/p emergent cardioversion  Lactic acidosis  PAD s/p right fem-pop w/ prior left to right fem-fem crossover  SSS s/p PPM  Chronic systolic heart failure; EF 20-25%  Anemia        Plan: TnI 27, 494 (peak)  Lactate 3 --> 4.5  sCr 1.5  Na 131  HgB 9.2    2mcg levophed  1mg/hr bumex gtt    BiPAP    CTA:  1. Severe inflow and outflow disease as above with occluded right   common/external iliac limb, and bilateral common femoral to popliteal bypass   grafts. Would likely benefit from ischemic eval +/- MCS once anatomy known  He thinks his ICD is MDT; will have this interrogated  Discussed with Dr. Ely Vaughn  Trying to get in touch with vascular  Keep NPO  Continue amio  Continue heparin gtt  Continue ASA  Continue statin  Continue plavix  Continue metoprolol  Pressors per primary  Volume per primary    Thank you for this consult and allowing me to take part in this patients care. Please call with questions. [x]        High complexity decision making was performed        Subjective:   CHIEF COMPLAINT: SOB    HISTORY OF PRESENT ILLNESS:     Emily Araujo is a 80 y.o.  male who \"was admitted on floor 23 for non healing Rt LE wound and underwent Fem-popliteal bypass. Post op, course has been uncomplicated except this evening around 5 PM when patient complained of SOB and he was put on nasal cannula 2L. Within next gour, rapid response was called for patient in severe respiratory distress, hypotension and tachycardia in 160s. On initial evaluation, patient was sweaty and tachypnic with belly breathing.  HR in 160s, cold peripheries, BP

## 2023-08-28 NOTE — ANESTHESIA PROCEDURE NOTES
Arterial Line:    An arterial line was placed using surface landmarks, in the pre-op for the following indication(s): continuous blood pressure monitoring and blood sampling needed. A 20 gauge (size), 16 cm (length), Arrow (type) catheter was placed, Seldinger technique used, into the right brachial artery, secured by Tegaderm and suture. Anesthesia type: Local  Local infiltration: Injection    Events:  patient tolerated procedure well with no complications. 8/28/2023 4:01 PM8/28/2023 4:14 PM  Anesthesiologist: Francesca Borrero MD  Performed: Anesthesiologist   Preanesthetic Checklist  Completed: patient identified, IV checked, site marked, risks and benefits discussed, surgical/procedural consents, equipment checked, pre-op evaluation, timeout performed, anesthesia consent given, oxygen available, monitors applied/VS acknowledged and fire risk safety assessment completed and verbalized

## 2023-08-29 ENCOUNTER — APPOINTMENT (OUTPATIENT)
Facility: HOSPITAL | Age: 68
DRG: 215 | End: 2023-08-29
Attending: SURGERY
Payer: MEDICARE

## 2023-08-29 LAB
ACT BLD: 251 SECS (ref 79–138)
ACT BLD: 402 SECS (ref 79–138)
ALBUMIN SERPL-MCNC: 2.6 G/DL (ref 3.5–5)
ALBUMIN/GLOB SERPL: 0.9 (ref 1.1–2.2)
ALP SERPL-CCNC: 85 U/L (ref 45–117)
ALT SERPL-CCNC: 26 U/L (ref 12–78)
ANION GAP BLD CALC-SCNC: 12 (ref 10–20)
ANION GAP SERPL CALC-SCNC: 5 MMOL/L (ref 5–15)
ANION GAP SERPL CALC-SCNC: 9 MMOL/L (ref 5–15)
APTT PPP: 36 SEC (ref 22.1–31)
APTT PPP: 43 SEC (ref 22.1–31)
APTT PPP: 44.9 SEC (ref 22.1–31)
APTT PPP: 50.5 SEC (ref 22.1–31)
APTT PPP: 51.1 SEC (ref 22.1–31)
APTT PPP: 52.7 SEC (ref 22.1–31)
APTT PPP: 74.4 SEC (ref 22.1–31)
ARTERIAL PATENCY WRIST A: ABNORMAL
ARTERIAL PATENCY WRIST A: ABNORMAL
AST SERPL-CCNC: 135 U/L (ref 15–37)
BASE EXCESS BLD CALC-SCNC: 2.2 MMOL/L
BASE EXCESS BLDA CALC-SCNC: 2 MMOL/L
BASE EXCESS BLDA CALC-SCNC: 2.7 MMOL/L
BDY SITE: ABNORMAL
BDY SITE: ABNORMAL
BILIRUB SERPL-MCNC: 1.8 MG/DL (ref 0.2–1)
BUN SERPL-MCNC: 19 MG/DL (ref 6–20)
BUN SERPL-MCNC: 25 MG/DL (ref 6–20)
BUN/CREAT SERPL: 18 (ref 12–20)
BUN/CREAT SERPL: 20 (ref 12–20)
CA-I BLD-MCNC: 1.2 MMOL/L (ref 1.12–1.32)
CA-I BLD-SCNC: 1.09 MMOL/L (ref 1.13–1.32)
CA-I BLD-SCNC: 1.12 MMOL/L (ref 1.13–1.32)
CALCIUM SERPL-MCNC: 7.9 MG/DL (ref 8.5–10.1)
CALCIUM SERPL-MCNC: 8 MG/DL (ref 8.5–10.1)
CHLORIDE BLD-SCNC: 106 MMOL/L (ref 100–108)
CHLORIDE SERPL-SCNC: 105 MMOL/L (ref 97–108)
CHLORIDE SERPL-SCNC: 111 MMOL/L (ref 97–108)
CO2 BLD-SCNC: 25 MMOL/L (ref 19–24)
CO2 SERPL-SCNC: 25 MMOL/L (ref 21–32)
CO2 SERPL-SCNC: 26 MMOL/L (ref 21–32)
CREAT SERPL-MCNC: 0.97 MG/DL (ref 0.7–1.3)
CREAT SERPL-MCNC: 1.4 MG/DL (ref 0.7–1.3)
CREAT UR-MCNC: 1.2 MG/DL (ref 0.6–1.3)
ERYTHROCYTE [DISTWIDTH] IN BLOOD BY AUTOMATED COUNT: 14.4 % (ref 11.5–14.5)
FIO2 ON VENT: 100 %
GAS FLOW.O2 SETTING OXYMISER: 16
GLOBULIN SER CALC-MCNC: 3 G/DL (ref 2–4)
GLUCOSE BLD STRIP.AUTO-MCNC: 125 MG/DL (ref 65–117)
GLUCOSE BLD STRIP.AUTO-MCNC: 127 MG/DL (ref 65–117)
GLUCOSE BLD STRIP.AUTO-MCNC: 132 MG/DL (ref 65–117)
GLUCOSE BLD STRIP.AUTO-MCNC: 135 MG/DL (ref 65–117)
GLUCOSE BLD STRIP.AUTO-MCNC: 154 MG/DL (ref 65–117)
GLUCOSE BLD STRIP.AUTO-MCNC: 77 MG/DL (ref 65–117)
GLUCOSE BLD STRIP.AUTO-MCNC: 80 MG/DL (ref 65–117)
GLUCOSE BLD STRIP.AUTO-MCNC: 95 MG/DL (ref 74–106)
GLUCOSE BLD-MCNC: 101 MG/DL (ref 65–100)
GLUCOSE BLD-MCNC: 102 MG/DL (ref 65–100)
GLUCOSE BLD-MCNC: 105 MG/DL (ref 65–100)
GLUCOSE BLD-MCNC: 105 MG/DL (ref 65–100)
GLUCOSE BLD-MCNC: 106 MG/DL (ref 65–100)
GLUCOSE BLD-MCNC: 110 MG/DL (ref 65–100)
GLUCOSE BLD-MCNC: 112 MG/DL (ref 65–100)
GLUCOSE BLD-MCNC: 113 MG/DL (ref 65–100)
GLUCOSE BLD-MCNC: 113 MG/DL (ref 65–100)
GLUCOSE BLD-MCNC: 80 MG/DL (ref 65–100)
GLUCOSE BLD-MCNC: 98 MG/DL (ref 65–100)
GLUCOSE SERPL-MCNC: 129 MG/DL (ref 65–100)
GLUCOSE SERPL-MCNC: 191 MG/DL (ref 65–100)
HCO3 BLDA-SCNC: 24 MMOL/L (ref 22–26)
HCO3 BLDA-SCNC: 25 MMOL/L
HCO3 BLDA-SCNC: 25 MMOL/L (ref 22–26)
HCT VFR BLD AUTO: 23.5 % (ref 36.6–50.3)
HGB BLD-MCNC: 8.1 G/DL (ref 12.1–17)
LACTATE BLD-SCNC: 2.22 MMOL/L (ref 0.4–2)
MAGNESIUM SERPL-MCNC: 2 MG/DL (ref 1.6–2.4)
MAGNESIUM SERPL-MCNC: 2.4 MG/DL (ref 1.6–2.4)
MAGNESIUM SERPL-MCNC: 2.4 MG/DL (ref 1.6–2.4)
MCH RBC QN AUTO: 29.3 PG (ref 26–34)
MCHC RBC AUTO-ENTMCNC: 34.5 G/DL (ref 30–36.5)
MCV RBC AUTO: 85.1 FL (ref 80–99)
NRBC # BLD: 0.11 K/UL (ref 0–0.01)
NRBC BLD-RTO: 1.1 PER 100 WBC
NT PRO BNP: ABNORMAL PG/ML
PCO2 BLD: 34.4 MMHG (ref 35–45)
PCO2 BLDA: 31 MMHG (ref 35–45)
PCO2 BLDA: 32 MMHG (ref 35–45)
PEEP RESPIRATORY: 10
PH BLD: 7.48 (ref 7.35–7.45)
PH BLDA: 7.51 (ref 7.35–7.45)
PH BLDA: 7.52 (ref 7.35–7.45)
PLATELET # BLD AUTO: 227 K/UL (ref 150–400)
PMV BLD AUTO: 10.9 FL (ref 8.9–12.9)
PO2 BLD: 68 MMHG (ref 80–100)
PO2 BLDA: 340 MMHG (ref 80–100)
PO2 BLDA: 350 MMHG (ref 80–100)
POTASSIUM BLD-SCNC: 3.7 MMOL/L (ref 3.5–5.5)
POTASSIUM SERPL-SCNC: 3.5 MMOL/L (ref 3.5–5.1)
POTASSIUM SERPL-SCNC: 3.8 MMOL/L (ref 3.5–5.1)
POTASSIUM SERPL-SCNC: 3.9 MMOL/L (ref 3.5–5.1)
PROT SERPL-MCNC: 5.6 G/DL (ref 6.4–8.2)
RBC # BLD AUTO: 2.76 M/UL (ref 4.1–5.7)
SAO2 % BLD: 100 % (ref 92–97)
SAO2 % BLD: 100 % (ref 92–97)
SAO2 % BLD: 95 %
SAO2% DEVICE SAO2% SENSOR NAME: ABNORMAL
SAO2% DEVICE SAO2% SENSOR NAME: ABNORMAL
SERVICE CMNT-IMP: ABNORMAL
SERVICE CMNT-IMP: NORMAL
SODIUM BLD-SCNC: 143 MMOL/L (ref 136–145)
SODIUM SERPL-SCNC: 139 MMOL/L (ref 136–145)
SODIUM SERPL-SCNC: 142 MMOL/L (ref 136–145)
SPECIMEN SITE: ABNORMAL
THERAPEUTIC RANGE: ABNORMAL SECS (ref 58–77)
VANCOMYCIN SERPL-MCNC: 11.8 UG/ML
VT SETTING VENT: 450
WBC # BLD AUTO: 9.7 K/UL (ref 4.1–11.1)

## 2023-08-29 PROCEDURE — 83880 ASSAY OF NATRIURETIC PEPTIDE: CPT

## 2023-08-29 PROCEDURE — 83735 ASSAY OF MAGNESIUM: CPT

## 2023-08-29 PROCEDURE — 6360000002 HC RX W HCPCS: Performed by: NURSE PRACTITIONER

## 2023-08-29 PROCEDURE — B240ZZ3 ULTRASONOGRAPHY OF SINGLE CORONARY ARTERY, INTRAVASCULAR: ICD-10-PCS | Performed by: STUDENT IN AN ORGANIZED HEALTH CARE EDUCATION/TRAINING PROGRAM

## 2023-08-29 PROCEDURE — C9113 INJ PANTOPRAZOLE SODIUM, VIA: HCPCS | Performed by: NURSE PRACTITIONER

## 2023-08-29 PROCEDURE — 82803 BLOOD GASES ANY COMBINATION: CPT

## 2023-08-29 PROCEDURE — 2580000003 HC RX 258: Performed by: NURSE PRACTITIONER

## 2023-08-29 PROCEDURE — 6370000000 HC RX 637 (ALT 250 FOR IP): Performed by: STUDENT IN AN ORGANIZED HEALTH CARE EDUCATION/TRAINING PROGRAM

## 2023-08-29 PROCEDURE — 82962 GLUCOSE BLOOD TEST: CPT

## 2023-08-29 PROCEDURE — B2111ZZ FLUOROSCOPY OF MULTIPLE CORONARY ARTERIES USING LOW OSMOLAR CONTRAST: ICD-10-PCS | Performed by: STUDENT IN AN ORGANIZED HEALTH CARE EDUCATION/TRAINING PROGRAM

## 2023-08-29 PROCEDURE — 99152 MOD SED SAME PHYS/QHP 5/>YRS: CPT | Performed by: STUDENT IN AN ORGANIZED HEALTH CARE EDUCATION/TRAINING PROGRAM

## 2023-08-29 PROCEDURE — A4216 STERILE WATER/SALINE, 10 ML: HCPCS | Performed by: NURSE PRACTITIONER

## 2023-08-29 PROCEDURE — 80053 COMPREHEN METABOLIC PANEL: CPT

## 2023-08-29 PROCEDURE — 76937 US GUIDE VASCULAR ACCESS: CPT | Performed by: STUDENT IN AN ORGANIZED HEALTH CARE EDUCATION/TRAINING PROGRAM

## 2023-08-29 PROCEDURE — C9600 PERC DRUG-EL COR STENT SING: HCPCS | Performed by: STUDENT IN AN ORGANIZED HEALTH CARE EDUCATION/TRAINING PROGRAM

## 2023-08-29 PROCEDURE — 71045 X-RAY EXAM CHEST 1 VIEW: CPT

## 2023-08-29 PROCEDURE — 6360000002 HC RX W HCPCS: Performed by: STUDENT IN AN ORGANIZED HEALTH CARE EDUCATION/TRAINING PROGRAM

## 2023-08-29 PROCEDURE — 6370000000 HC RX 637 (ALT 250 FOR IP): Performed by: NURSE PRACTITIONER

## 2023-08-29 PROCEDURE — 2000000000 HC ICU R&B

## 2023-08-29 PROCEDURE — 93452 LEFT HRT CATH W/VENTRCLGRPHY: CPT | Performed by: STUDENT IN AN ORGANIZED HEALTH CARE EDUCATION/TRAINING PROGRAM

## 2023-08-29 PROCEDURE — 84295 ASSAY OF SERUM SODIUM: CPT

## 2023-08-29 PROCEDURE — 37799 UNLISTED PX VASCULAR SURGERY: CPT

## 2023-08-29 PROCEDURE — 6360000002 HC RX W HCPCS: Performed by: INTERNAL MEDICINE

## 2023-08-29 PROCEDURE — 85730 THROMBOPLASTIN TIME PARTIAL: CPT

## 2023-08-29 PROCEDURE — 2500000003 HC RX 250 WO HCPCS: Performed by: NURSE PRACTITIONER

## 2023-08-29 PROCEDURE — 2580000003 HC RX 258: Performed by: INTERNAL MEDICINE

## 2023-08-29 PROCEDURE — 4B02XTZ MEASUREMENT OF CARDIAC DEFIBRILLATOR, EXTERNAL APPROACH: ICD-10-PCS | Performed by: HOSPITALIST

## 2023-08-29 PROCEDURE — 2709999900 HC NON-CHARGEABLE SUPPLY: Performed by: STUDENT IN AN ORGANIZED HEALTH CARE EDUCATION/TRAINING PROGRAM

## 2023-08-29 PROCEDURE — C1753 CATH, INTRAVAS ULTRASOUND: HCPCS | Performed by: STUDENT IN AN ORGANIZED HEALTH CARE EDUCATION/TRAINING PROGRAM

## 2023-08-29 PROCEDURE — 84132 ASSAY OF SERUM POTASSIUM: CPT

## 2023-08-29 PROCEDURE — 85347 COAGULATION TIME ACTIVATED: CPT

## 2023-08-29 PROCEDURE — 94667 MNPJ CHEST WALL 1ST: CPT

## 2023-08-29 PROCEDURE — C1887 CATHETER, GUIDING: HCPCS | Performed by: STUDENT IN AN ORGANIZED HEALTH CARE EDUCATION/TRAINING PROGRAM

## 2023-08-29 PROCEDURE — 027034Z DILATION OF CORONARY ARTERY, ONE ARTERY WITH DRUG-ELUTING INTRALUMINAL DEVICE, PERCUTANEOUS APPROACH: ICD-10-PCS | Performed by: STUDENT IN AN ORGANIZED HEALTH CARE EDUCATION/TRAINING PROGRAM

## 2023-08-29 PROCEDURE — C1769 GUIDE WIRE: HCPCS | Performed by: STUDENT IN AN ORGANIZED HEALTH CARE EDUCATION/TRAINING PROGRAM

## 2023-08-29 PROCEDURE — 82947 ASSAY GLUCOSE BLOOD QUANT: CPT

## 2023-08-29 PROCEDURE — 36415 COLL VENOUS BLD VENIPUNCTURE: CPT

## 2023-08-29 PROCEDURE — 82330 ASSAY OF CALCIUM: CPT

## 2023-08-29 PROCEDURE — 4A023N7 MEASUREMENT OF CARDIAC SAMPLING AND PRESSURE, LEFT HEART, PERCUTANEOUS APPROACH: ICD-10-PCS | Performed by: STUDENT IN AN ORGANIZED HEALTH CARE EDUCATION/TRAINING PROGRAM

## 2023-08-29 PROCEDURE — 99153 MOD SED SAME PHYS/QHP EA: CPT | Performed by: STUDENT IN AN ORGANIZED HEALTH CARE EDUCATION/TRAINING PROGRAM

## 2023-08-29 PROCEDURE — C1725 CATH, TRANSLUMIN NON-LASER: HCPCS | Performed by: STUDENT IN AN ORGANIZED HEALTH CARE EDUCATION/TRAINING PROGRAM

## 2023-08-29 PROCEDURE — 80202 ASSAY OF VANCOMYCIN: CPT

## 2023-08-29 PROCEDURE — 2500000003 HC RX 250 WO HCPCS: Performed by: INTERNAL MEDICINE

## 2023-08-29 PROCEDURE — 94003 VENT MGMT INPAT SUBQ DAY: CPT

## 2023-08-29 PROCEDURE — 92978 ENDOLUMINL IVUS OCT C 1ST: CPT | Performed by: STUDENT IN AN ORGANIZED HEALTH CARE EDUCATION/TRAINING PROGRAM

## 2023-08-29 PROCEDURE — 2580000003 HC RX 258: Performed by: STUDENT IN AN ORGANIZED HEALTH CARE EDUCATION/TRAINING PROGRAM

## 2023-08-29 PROCEDURE — C1894 INTRO/SHEATH, NON-LASER: HCPCS | Performed by: STUDENT IN AN ORGANIZED HEALTH CARE EDUCATION/TRAINING PROGRAM

## 2023-08-29 PROCEDURE — 6360000004 HC RX CONTRAST MEDICATION: Performed by: STUDENT IN AN ORGANIZED HEALTH CARE EDUCATION/TRAINING PROGRAM

## 2023-08-29 PROCEDURE — C1874 STENT, COATED/COV W/DEL SYS: HCPCS | Performed by: STUDENT IN AN ORGANIZED HEALTH CARE EDUCATION/TRAINING PROGRAM

## 2023-08-29 PROCEDURE — 94668 MNPJ CHEST WALL SBSQ: CPT

## 2023-08-29 PROCEDURE — 85027 COMPLETE CBC AUTOMATED: CPT

## 2023-08-29 DEVICE — STENT ONYXNG30018UX ONYX 3.00X18RX
Type: IMPLANTABLE DEVICE | Status: FUNCTIONAL
Brand: ONYX FRONTIER™

## 2023-08-29 RX ORDER — OXYCODONE HYDROCHLORIDE 5 MG/1
5 TABLET ORAL EVERY 4 HOURS PRN
Status: DISCONTINUED | OUTPATIENT
Start: 2023-08-29 | End: 2023-08-31 | Stop reason: HOSPADM

## 2023-08-29 RX ORDER — DOBUTAMINE HYDROCHLORIDE 200 MG/100ML
2.5 INJECTION INTRAVENOUS CONTINUOUS
Status: DISCONTINUED | OUTPATIENT
Start: 2023-08-29 | End: 2023-08-31 | Stop reason: HOSPADM

## 2023-08-29 RX ORDER — BUMETANIDE 0.25 MG/ML
1 INJECTION INTRAMUSCULAR; INTRAVENOUS 3 TIMES DAILY
Status: DISCONTINUED | OUTPATIENT
Start: 2023-08-29 | End: 2023-08-30

## 2023-08-29 RX ORDER — BIVALIRUDIN 250 MG/5ML
INJECTION, POWDER, LYOPHILIZED, FOR SOLUTION INTRAVENOUS PRN
Status: DISCONTINUED | OUTPATIENT
Start: 2023-08-29 | End: 2023-08-29 | Stop reason: HOSPADM

## 2023-08-29 RX ORDER — FUROSEMIDE 10 MG/ML
40 INJECTION INTRAMUSCULAR; INTRAVENOUS 3 TIMES DAILY
Status: DISCONTINUED | OUTPATIENT
Start: 2023-08-29 | End: 2023-08-29

## 2023-08-29 RX ORDER — HYDROMORPHONE HYDROCHLORIDE 1 MG/ML
1 INJECTION, SOLUTION INTRAMUSCULAR; INTRAVENOUS; SUBCUTANEOUS EVERY 4 HOURS PRN
Status: DISCONTINUED | OUTPATIENT
Start: 2023-08-29 | End: 2023-08-31 | Stop reason: HOSPADM

## 2023-08-29 RX ORDER — OXYCODONE HYDROCHLORIDE 5 MG/1
10 TABLET ORAL EVERY 4 HOURS PRN
Status: DISCONTINUED | OUTPATIENT
Start: 2023-08-29 | End: 2023-08-31 | Stop reason: HOSPADM

## 2023-08-29 RX ADMIN — ASPIRIN 81 MG: 81 TABLET, CHEWABLE ORAL at 08:24

## 2023-08-29 RX ADMIN — SODIUM CHLORIDE: 9 INJECTION, SOLUTION INTRAVENOUS at 05:07

## 2023-08-29 RX ADMIN — POTASSIUM CHLORIDE 20 MEQ: 29.8 INJECTION, SOLUTION INTRAVENOUS at 06:36

## 2023-08-29 RX ADMIN — SODIUM CHLORIDE 40 MG: 9 INJECTION INTRAMUSCULAR; INTRAVENOUS; SUBCUTANEOUS at 20:09

## 2023-08-29 RX ADMIN — PROPOFOL 45 MCG/KG/MIN: 10 INJECTION, EMULSION INTRAVENOUS at 02:50

## 2023-08-29 RX ADMIN — PROPOFOL 50 MCG/KG/MIN: 10 INJECTION, EMULSION INTRAVENOUS at 07:35

## 2023-08-29 RX ADMIN — VANCOMYCIN HYDROCHLORIDE 750 MG: 750 INJECTION, POWDER, LYOPHILIZED, FOR SOLUTION INTRAVENOUS at 15:15

## 2023-08-29 RX ADMIN — PROPOFOL 50 MCG/KG/MIN: 10 INJECTION, EMULSION INTRAVENOUS at 17:38

## 2023-08-29 RX ADMIN — PROPOFOL 50 MCG/KG/MIN: 10 INJECTION, EMULSION INTRAVENOUS at 12:26

## 2023-08-29 RX ADMIN — POTASSIUM CHLORIDE 20 MEQ: 29.8 INJECTION, SOLUTION INTRAVENOUS at 22:11

## 2023-08-29 RX ADMIN — POTASSIUM CHLORIDE 20 MEQ: 29.8 INJECTION, SOLUTION INTRAVENOUS at 23:47

## 2023-08-29 RX ADMIN — Medication 1 AMPULE: at 19:37

## 2023-08-29 RX ADMIN — MAGNESIUM SULFATE HEPTAHYDRATE 2000 MG: 40 INJECTION, SOLUTION INTRAVENOUS at 22:26

## 2023-08-29 RX ADMIN — OXYCODONE HYDROCHLORIDE 5 MG: 5 TABLET ORAL at 20:11

## 2023-08-29 RX ADMIN — BUMETANIDE 1 MG: 0.25 INJECTION INTRAMUSCULAR; INTRAVENOUS at 17:18

## 2023-08-29 RX ADMIN — CEFEPIME 2000 MG: 2 INJECTION, POWDER, FOR SOLUTION INTRAVENOUS at 20:33

## 2023-08-29 RX ADMIN — SODIUM CHLORIDE 40 MG: 9 INJECTION INTRAMUSCULAR; INTRAVENOUS; SUBCUTANEOUS at 08:25

## 2023-08-29 RX ADMIN — CEFEPIME 2000 MG: 2 INJECTION, POWDER, FOR SOLUTION INTRAVENOUS at 08:24

## 2023-08-29 RX ADMIN — 0.12% CHLORHEXIDINE GLUCONATE 15 ML: 1.2 RINSE ORAL at 08:25

## 2023-08-29 RX ADMIN — 0.12% CHLORHEXIDINE GLUCONATE 15 ML: 1.2 RINSE ORAL at 20:33

## 2023-08-29 RX ADMIN — SODIUM CHLORIDE: 9 INJECTION, SOLUTION INTRAVENOUS at 10:29

## 2023-08-29 RX ADMIN — POTASSIUM CHLORIDE 20 MEQ: 29.8 INJECTION, SOLUTION INTRAVENOUS at 15:26

## 2023-08-29 RX ADMIN — AMIODARONE HYDROCHLORIDE 0.5 MG/MIN: 50 INJECTION, SOLUTION INTRAVENOUS at 12:22

## 2023-08-29 RX ADMIN — BIVALIRUDIN 0.07 MG/KG/HR: 250 INJECTION, POWDER, LYOPHILIZED, FOR SOLUTION INTRAVENOUS at 00:15

## 2023-08-29 RX ADMIN — TICAGRELOR 180 MG: 90 TABLET ORAL at 12:29

## 2023-08-29 RX ADMIN — POTASSIUM CHLORIDE 20 MEQ: 29.8 INJECTION, SOLUTION INTRAVENOUS at 21:08

## 2023-08-29 RX ADMIN — SODIUM CHLORIDE: 9 INJECTION, SOLUTION INTRAVENOUS at 15:33

## 2023-08-29 RX ADMIN — Medication 1 AMPULE: at 12:19

## 2023-08-29 RX ADMIN — POTASSIUM CHLORIDE 20 MEQ: 29.8 INJECTION, SOLUTION INTRAVENOUS at 04:38

## 2023-08-29 RX ADMIN — EPINEPHRINE 4 MCG/MIN: 1 INJECTION INTRAMUSCULAR; INTRAVENOUS; SUBCUTANEOUS at 05:44

## 2023-08-29 RX ADMIN — POTASSIUM CHLORIDE 20 MEQ: 29.8 INJECTION, SOLUTION INTRAVENOUS at 14:19

## 2023-08-29 RX ADMIN — MIDAZOLAM HYDROCHLORIDE 1 MG: 1 INJECTION, SOLUTION INTRAMUSCULAR; INTRAVENOUS at 01:25

## 2023-08-29 RX ADMIN — DOBUTAMINE HYDROCHLORIDE 2.5 MCG/KG/MIN: 200 INJECTION INTRAVENOUS at 17:19

## 2023-08-29 RX ADMIN — TICAGRELOR 90 MG: 90 TABLET ORAL at 21:16

## 2023-08-29 RX ADMIN — Medication 5 MCG/MIN: at 05:46

## 2023-08-29 RX ADMIN — PROPOFOL 40 MCG/KG/MIN: 10 INJECTION, EMULSION INTRAVENOUS at 23:34

## 2023-08-29 RX ADMIN — SODIUM CHLORIDE, PRESERVATIVE FREE 10 ML: 5 INJECTION INTRAVENOUS at 20:08

## 2023-08-29 RX ADMIN — SODIUM CHLORIDE, PRESERVATIVE FREE 10 ML: 5 INJECTION INTRAVENOUS at 08:25

## 2023-08-29 RX ADMIN — MAGNESIUM SULFATE HEPTAHYDRATE 2000 MG: 40 INJECTION, SOLUTION INTRAVENOUS at 21:09

## 2023-08-29 RX ADMIN — BIVALIRUDIN 0.14 MG/KG/HR: 250 INJECTION, POWDER, LYOPHILIZED, FOR SOLUTION INTRAVENOUS at 06:16

## 2023-08-29 RX ADMIN — COLLAGENASE SANTYL: 250 OINTMENT TOPICAL at 08:20

## 2023-08-29 ASSESSMENT — PAIN SCALES - GENERAL
PAINLEVEL_OUTOF10: 1
PAINLEVEL_OUTOF10: 3
PAINLEVEL_OUTOF10: 0

## 2023-08-29 ASSESSMENT — PULMONARY FUNCTION TESTS
PIF_VALUE: 19
PIF_VALUE: 23
PIF_VALUE: 20
PIF_VALUE: 25
PIF_VALUE: 27
PIF_VALUE: 27
PIF_VALUE: 19

## 2023-08-29 ASSESSMENT — PAIN SCALES - WONG BAKER: WONGBAKER_NUMERICALRESPONSE: 2

## 2023-08-29 NOTE — PROCEDURES
[facility]      Procedure Note - Central Venous Access:   Performed by Partha Morales MD    Obtained emergent Consent. Immediately prior to the procedure, the patient was reevaluated and found suitable for the planned procedure and any planned medications. Immediately prior to the procedure a time out was called to verify the correct patient, procedure, equipment, staff, and marking as appropriate. Central line Bundle:  Full sterile barrier precautions used. 7-Step Sterility Protocol followed. (cap, mask sterile gown, sterile gloves, large sterile sheet, hand hygiene, 2% chlorhexidine for cutaneous antisepsis)  5 mL 1% Lidocaine placed at insertion site. Patient positioned in Trendelenburg?no   The site was prepped with ChloraPrep. Using Seldinger technique a 4-lumen CVC was placed in the Right, Internal Jugular Vein via direct cannulation with 1 number of attempts for Blood Drawing and IV Access. Ultrasound Guidance was utilized. There was good dark, non-pulsatile blood return in all ports. Femoral Site? no. If Yes, reason femoral site was chosen:   Catheter secured. Biopatch in place? yes. Sterile Bio-occlusive dressing placed. The following complications were encountered: None. A follow-up chest x-ray was ordered post procedure. The procedure was tolerated well.       Partha Morales MD  Critical Care Medicine  [facility]

## 2023-08-29 NOTE — PLAN OF CARE
ADULT PROTOCOL: JET AEROSOL ASSESSMENT    Patient  Tarun Monroe     80 y.o.   male     8/29/2023  9:59 AM    Breath Sounds Pre Procedure: Breath Sounds Pre-Tx CRISTOFER: Diminished                                  Breath Sounds Pre-Tx LLL: Diminished        Breath Sounds Pre-Tx RUL: Rhonchi        Breath Sounds Pre-Tx RML: Rhonchi        Breath Sounds Pre-Tx RLL: Diminished  Breath Sounds Post Procedure: Breath Sounds Post-Tx CRISTOFER: Diminished          Breath Sounds Post-Tx LLL: Diminished          Breath Sounds Post-Tx RUL: Rhonchi          Breath Sounds Post-Tx RML: Rhonchi          Breath Sounds Post-Tx RLL: Rhonchi                                   Heart Rate: Pre procedure Pre-Tx Pulse: 84           Post procedure Post-Tx Pulse: 85    Resp Rate: Pre procedure Pre-Tx Resps: 20           Post procedure Post-Tx Resps: 19    SpO2:  SpO2: 100 %   with Oxygen                Nebulizer Therapy: Current medications        Changed: Yes  CPT Q4 added.      Problem List:   Patient Active Problem List   Diagnosis    Ischemic foot ulcer due to atherosclerosis of native artery of limb ([address])    Peripheral vascular disease ([address])    Lower limb ischemia    Critical lower limb ischemia (HCC)    Cardiogenic shock (HCC)    SOB (shortness of breath)       Respiratory Therapist: Loni Mott RCP

## 2023-08-30 ENCOUNTER — APPOINTMENT (OUTPATIENT)
Facility: HOSPITAL | Age: 68
DRG: 215 | End: 2023-08-30
Attending: SURGERY
Payer: MEDICARE

## 2023-08-30 LAB
ALBUMIN SERPL-MCNC: 2.5 G/DL (ref 3.5–5)
ALBUMIN/GLOB SERPL: 0.8 (ref 1.1–2.2)
ALP SERPL-CCNC: 100 U/L (ref 45–117)
ALT SERPL-CCNC: 24 U/L (ref 12–78)
ANION GAP BLD CALC-SCNC: 10 (ref 10–20)
ANION GAP SERPL CALC-SCNC: 4 MMOL/L (ref 5–15)
ANION GAP SERPL CALC-SCNC: 5 MMOL/L (ref 5–15)
APTT PPP: 49.5 SEC (ref 22.1–31)
APTT PPP: 54.1 SEC (ref 22.1–31)
APTT PPP: 54.2 SEC (ref 22.1–31)
APTT PPP: 54.9 SEC (ref 22.1–31)
APTT PPP: 55.7 SEC (ref 22.1–31)
APTT PPP: 56.4 SEC (ref 22.1–31)
APTT PPP: 58.3 SEC (ref 22.1–31)
APTT PPP: 62.3 SEC (ref 22.1–31)
AST SERPL-CCNC: 121 U/L (ref 15–37)
BASE EXCESS BLD CALC-SCNC: 0.5 MMOL/L
BDY SITE: ABNORMAL
BILIRUB SERPL-MCNC: 1.8 MG/DL (ref 0.2–1)
BUN SERPL-MCNC: 15 MG/DL (ref 6–20)
BUN SERPL-MCNC: 16 MG/DL (ref 6–20)
BUN/CREAT SERPL: 18 (ref 12–20)
BUN/CREAT SERPL: 18 (ref 12–20)
CA-I BLD-MCNC: 1.06 MMOL/L (ref 1.12–1.32)
CALCIUM SERPL-MCNC: 7.6 MG/DL (ref 8.5–10.1)
CALCIUM SERPL-MCNC: 8 MG/DL (ref 8.5–10.1)
CHLORIDE BLD-SCNC: 109 MMOL/L (ref 100–108)
CHLORIDE SERPL-SCNC: 111 MMOL/L (ref 97–108)
CHLORIDE SERPL-SCNC: 111 MMOL/L (ref 97–108)
CO2 BLD-SCNC: 23 MMOL/L (ref 19–24)
CO2 SERPL-SCNC: 26 MMOL/L (ref 21–32)
CO2 SERPL-SCNC: 26 MMOL/L (ref 21–32)
COHGB MFR BLD: 0.2 % (ref 1–2)
CREAT SERPL-MCNC: 0.85 MG/DL (ref 0.7–1.3)
CREAT SERPL-MCNC: 0.88 MG/DL (ref 0.7–1.3)
CREAT UR-MCNC: 0.7 MG/DL (ref 0.6–1.3)
ERYTHROCYTE [DISTWIDTH] IN BLOOD BY AUTOMATED COUNT: 14.9 % (ref 11.5–14.5)
GLOBULIN SER CALC-MCNC: 3.1 G/DL (ref 2–4)
GLUCOSE BLD STRIP.AUTO-MCNC: 115 MG/DL (ref 74–106)
GLUCOSE BLD STRIP.AUTO-MCNC: 131 MG/DL (ref 65–117)
GLUCOSE BLD-MCNC: 101 MG/DL (ref 65–100)
GLUCOSE BLD-MCNC: 101 MG/DL (ref 65–100)
GLUCOSE BLD-MCNC: 102 MG/DL (ref 65–100)
GLUCOSE BLD-MCNC: 104 MG/DL (ref 65–100)
GLUCOSE BLD-MCNC: 104 MG/DL (ref 65–100)
GLUCOSE BLD-MCNC: 105 MG/DL (ref 65–100)
GLUCOSE BLD-MCNC: 106 MG/DL (ref 65–100)
GLUCOSE BLD-MCNC: 107 MG/DL (ref 65–100)
GLUCOSE BLD-MCNC: 110 MG/DL (ref 65–100)
GLUCOSE BLD-MCNC: 113 MG/DL (ref 65–100)
GLUCOSE BLD-MCNC: 114 MG/DL (ref 65–100)
GLUCOSE BLD-MCNC: 115 MG/DL (ref 65–100)
GLUCOSE BLD-MCNC: 115 MG/DL (ref 65–100)
GLUCOSE BLD-MCNC: 116 MG/DL (ref 65–100)
GLUCOSE BLD-MCNC: 117 MG/DL (ref 65–100)
GLUCOSE BLD-MCNC: 118 MG/DL (ref 65–100)
GLUCOSE BLD-MCNC: 124 MG/DL (ref 65–100)
GLUCOSE BLD-MCNC: 90 MG/DL (ref 65–100)
GLUCOSE BLD-MCNC: 97 MG/DL (ref 65–100)
GLUCOSE SERPL-MCNC: 126 MG/DL (ref 65–100)
GLUCOSE SERPL-MCNC: 140 MG/DL (ref 65–100)
HCO3 BLDA-SCNC: 24 MMOL/L
HCT VFR BLD AUTO: 22.6 % (ref 36.6–50.3)
HGB BLD-MCNC: 7.5 G/DL (ref 12.1–17)
LACTATE BLD-SCNC: 0.72 MMOL/L (ref 0.4–2)
LDH SERPL L TO P-CCNC: 649 U/L (ref 85–241)
MAGNESIUM SERPL-MCNC: 2.5 MG/DL (ref 1.6–2.4)
MAGNESIUM SERPL-MCNC: 2.7 MG/DL (ref 1.6–2.4)
MCH RBC QN AUTO: 29 PG (ref 26–34)
MCHC RBC AUTO-ENTMCNC: 33.2 G/DL (ref 30–36.5)
MCV RBC AUTO: 87.3 FL (ref 80–99)
METHGB MFR BLD: 0.4 % (ref 0–1.4)
NRBC # BLD: 0.26 K/UL (ref 0–0.01)
NRBC BLD-RTO: 3.1 PER 100 WBC
NT PRO BNP: 7798 PG/ML
OXYHGB MFR BLD: 62.6 % (ref 94–97)
PCO2 BLD: 29.9 MMHG (ref 35–45)
PH BLD: 7.5 (ref 7.35–7.45)
PLATELET # BLD AUTO: 195 K/UL (ref 150–400)
PMV BLD AUTO: 10.3 FL (ref 8.9–12.9)
PO2 BLD: 85 MMHG (ref 80–100)
POTASSIUM BLD-SCNC: 3.7 MMOL/L (ref 3.5–5.5)
POTASSIUM SERPL-SCNC: 3.7 MMOL/L (ref 3.5–5.1)
POTASSIUM SERPL-SCNC: 4.3 MMOL/L (ref 3.5–5.1)
PROT SERPL-MCNC: 5.6 G/DL (ref 6.4–8.2)
RBC # BLD AUTO: 2.59 M/UL (ref 4.1–5.7)
SAO2 % BLD: 63 % (ref 95–99)
SAO2 % BLD: 97 %
SERVICE CMNT-IMP: ABNORMAL
SERVICE CMNT-IMP: NORMAL
SERVICE CMNT-IMP: NORMAL
SODIUM BLD-SCNC: 142 MMOL/L (ref 136–145)
SODIUM SERPL-SCNC: 141 MMOL/L (ref 136–145)
SODIUM SERPL-SCNC: 142 MMOL/L (ref 136–145)
SPECIMEN SITE: ABNORMAL
SPECIMEN SITE: ABNORMAL
THERAPEUTIC RANGE: ABNORMAL SECS (ref 58–77)
WBC # BLD AUTO: 8.3 K/UL (ref 4.1–11.1)

## 2023-08-30 PROCEDURE — 6360000002 HC RX W HCPCS: Performed by: NURSE PRACTITIONER

## 2023-08-30 PROCEDURE — 2500000003 HC RX 250 WO HCPCS: Performed by: INTERNAL MEDICINE

## 2023-08-30 PROCEDURE — 82330 ASSAY OF CALCIUM: CPT

## 2023-08-30 PROCEDURE — P9045 ALBUMIN (HUMAN), 5%, 250 ML: HCPCS | Performed by: NURSE PRACTITIONER

## 2023-08-30 PROCEDURE — 80053 COMPREHEN METABOLIC PANEL: CPT

## 2023-08-30 PROCEDURE — 6370000000 HC RX 637 (ALT 250 FOR IP): Performed by: NURSE PRACTITIONER

## 2023-08-30 PROCEDURE — 2500000003 HC RX 250 WO HCPCS: Performed by: NURSE PRACTITIONER

## 2023-08-30 PROCEDURE — 82962 GLUCOSE BLOOD TEST: CPT

## 2023-08-30 PROCEDURE — 36415 COLL VENOUS BLD VENIPUNCTURE: CPT

## 2023-08-30 PROCEDURE — 85027 COMPLETE CBC AUTOMATED: CPT

## 2023-08-30 PROCEDURE — 2580000003 HC RX 258: Performed by: NURSE PRACTITIONER

## 2023-08-30 PROCEDURE — 82803 BLOOD GASES ANY COMBINATION: CPT

## 2023-08-30 PROCEDURE — 6360000002 HC RX W HCPCS: Performed by: INTERNAL MEDICINE

## 2023-08-30 PROCEDURE — 6370000000 HC RX 637 (ALT 250 FOR IP): Performed by: STUDENT IN AN ORGANIZED HEALTH CARE EDUCATION/TRAINING PROGRAM

## 2023-08-30 PROCEDURE — 2000000000 HC ICU R&B

## 2023-08-30 PROCEDURE — 98960 EDU&TRN PT SELF-MGMT NQHP 1: CPT

## 2023-08-30 PROCEDURE — 85730 THROMBOPLASTIN TIME PARTIAL: CPT

## 2023-08-30 PROCEDURE — P9047 ALBUMIN (HUMAN), 25%, 50ML: HCPCS | Performed by: INTERNAL MEDICINE

## 2023-08-30 PROCEDURE — 83050 HGB METHEMOGLOBIN QUAN: CPT

## 2023-08-30 PROCEDURE — 71045 X-RAY EXAM CHEST 1 VIEW: CPT

## 2023-08-30 PROCEDURE — 84295 ASSAY OF SERUM SODIUM: CPT

## 2023-08-30 PROCEDURE — 6370000000 HC RX 637 (ALT 250 FOR IP): Performed by: INTERNAL MEDICINE

## 2023-08-30 PROCEDURE — 94003 VENT MGMT INPAT SUBQ DAY: CPT

## 2023-08-30 PROCEDURE — 6370000000 HC RX 637 (ALT 250 FOR IP): Performed by: HOSPITALIST

## 2023-08-30 PROCEDURE — C9113 INJ PANTOPRAZOLE SODIUM, VIA: HCPCS | Performed by: NURSE PRACTITIONER

## 2023-08-30 PROCEDURE — 83880 ASSAY OF NATRIURETIC PEPTIDE: CPT

## 2023-08-30 PROCEDURE — 83735 ASSAY OF MAGNESIUM: CPT

## 2023-08-30 PROCEDURE — 83615 LACTATE (LD) (LDH) ENZYME: CPT

## 2023-08-30 PROCEDURE — 2580000003 HC RX 258: Performed by: INTERNAL MEDICINE

## 2023-08-30 PROCEDURE — 94668 MNPJ CHEST WALL SBSQ: CPT

## 2023-08-30 PROCEDURE — A4216 STERILE WATER/SALINE, 10 ML: HCPCS | Performed by: NURSE PRACTITIONER

## 2023-08-30 PROCEDURE — 37799 UNLISTED PX VASCULAR SURGERY: CPT

## 2023-08-30 PROCEDURE — 84132 ASSAY OF SERUM POTASSIUM: CPT

## 2023-08-30 PROCEDURE — 82375 ASSAY CARBOXYHB QUANT: CPT

## 2023-08-30 RX ORDER — ALBUMIN, HUMAN INJ 5% 5 %
12.5 SOLUTION INTRAVENOUS ONCE
Status: COMPLETED | OUTPATIENT
Start: 2023-08-30 | End: 2023-08-30

## 2023-08-30 RX ORDER — ALBUMIN (HUMAN) 12.5 G/50ML
25 SOLUTION INTRAVENOUS 2 TIMES DAILY
Status: DISCONTINUED | OUTPATIENT
Start: 2023-08-30 | End: 2023-08-31 | Stop reason: HOSPADM

## 2023-08-30 RX ORDER — ACETAMINOPHEN 500 MG
500 TABLET ORAL EVERY 6 HOURS PRN
Status: DISCONTINUED | OUTPATIENT
Start: 2023-08-30 | End: 2023-08-31 | Stop reason: HOSPADM

## 2023-08-30 RX ORDER — BUMETANIDE 0.25 MG/ML
0.5 INJECTION INTRAMUSCULAR; INTRAVENOUS EVERY 6 HOURS PRN
Status: DISCONTINUED | OUTPATIENT
Start: 2023-08-30 | End: 2023-08-31 | Stop reason: HOSPADM

## 2023-08-30 RX ORDER — BUMETANIDE 0.25 MG/ML
1 INJECTION INTRAMUSCULAR; INTRAVENOUS 2 TIMES DAILY
Status: DISCONTINUED | OUTPATIENT
Start: 2023-08-30 | End: 2023-08-31 | Stop reason: HOSPADM

## 2023-08-30 RX ADMIN — OXYCODONE HYDROCHLORIDE 5 MG: 5 TABLET ORAL at 16:44

## 2023-08-30 RX ADMIN — ASPIRIN 81 MG: 81 TABLET, CHEWABLE ORAL at 08:12

## 2023-08-30 RX ADMIN — PROPOFOL 45 MCG/KG/MIN: 10 INJECTION, EMULSION INTRAVENOUS at 16:21

## 2023-08-30 RX ADMIN — Medication 1 AMPULE: at 08:52

## 2023-08-30 RX ADMIN — AMIODARONE HYDROCHLORIDE 0.5 MG/MIN: 50 INJECTION, SOLUTION INTRAVENOUS at 01:20

## 2023-08-30 RX ADMIN — SODIUM CHLORIDE 40 MG: 9 INJECTION INTRAMUSCULAR; INTRAVENOUS; SUBCUTANEOUS at 20:04

## 2023-08-30 RX ADMIN — POTASSIUM BICARBONATE 20 MEQ: 782 TABLET, EFFERVESCENT ORAL at 15:47

## 2023-08-30 RX ADMIN — PROPOFOL 40 MCG/KG/MIN: 10 INJECTION, EMULSION INTRAVENOUS at 21:42

## 2023-08-30 RX ADMIN — OXYCODONE HYDROCHLORIDE 5 MG: 5 TABLET ORAL at 03:49

## 2023-08-30 RX ADMIN — 0.12% CHLORHEXIDINE GLUCONATE 15 ML: 1.2 RINSE ORAL at 20:05

## 2023-08-30 RX ADMIN — COLLAGENASE SANTYL: 250 OINTMENT TOPICAL at 06:00

## 2023-08-30 RX ADMIN — BIVALIRUDIN 0.47 MG/KG/HR: 250 INJECTION, POWDER, LYOPHILIZED, FOR SOLUTION INTRAVENOUS at 19:29

## 2023-08-30 RX ADMIN — ALBUMIN (HUMAN) 25 G: 0.25 INJECTION, SOLUTION INTRAVENOUS at 20:15

## 2023-08-30 RX ADMIN — TICAGRELOR 90 MG: 90 TABLET ORAL at 08:12

## 2023-08-30 RX ADMIN — OXYCODONE HYDROCHLORIDE 5 MG: 5 TABLET ORAL at 22:36

## 2023-08-30 RX ADMIN — Medication 1 AMPULE: at 20:06

## 2023-08-30 RX ADMIN — VANCOMYCIN HYDROCHLORIDE 750 MG: 750 INJECTION, POWDER, LYOPHILIZED, FOR SOLUTION INTRAVENOUS at 14:47

## 2023-08-30 RX ADMIN — BUMETANIDE 1 MG: 0.25 INJECTION INTRAMUSCULAR; INTRAVENOUS at 20:05

## 2023-08-30 RX ADMIN — SODIUM CHLORIDE, PRESERVATIVE FREE 10 ML: 5 INJECTION INTRAVENOUS at 20:06

## 2023-08-30 RX ADMIN — 0.12% CHLORHEXIDINE GLUCONATE 15 ML: 1.2 RINSE ORAL at 08:16

## 2023-08-30 RX ADMIN — TICAGRELOR 90 MG: 90 TABLET ORAL at 20:04

## 2023-08-30 RX ADMIN — POTASSIUM CHLORIDE 20 MEQ: 29.8 INJECTION, SOLUTION INTRAVENOUS at 13:25

## 2023-08-30 RX ADMIN — Medication 3 MCG/MIN: at 00:34

## 2023-08-30 RX ADMIN — SODIUM CHLORIDE 40 MG: 9 INJECTION INTRAMUSCULAR; INTRAVENOUS; SUBCUTANEOUS at 08:00

## 2023-08-30 RX ADMIN — BIVALIRUDIN 0.35 MG/KG/HR: 250 INJECTION, POWDER, LYOPHILIZED, FOR SOLUTION INTRAVENOUS at 09:12

## 2023-08-30 RX ADMIN — PROPOFOL 40 MCG/KG/MIN: 10 INJECTION, EMULSION INTRAVENOUS at 04:56

## 2023-08-30 RX ADMIN — CEFEPIME 2000 MG: 2 INJECTION, POWDER, FOR SOLUTION INTRAVENOUS at 20:39

## 2023-08-30 RX ADMIN — ACETAMINOPHEN 500 MG: 500 TABLET ORAL at 19:32

## 2023-08-30 RX ADMIN — VANCOMYCIN HYDROCHLORIDE 750 MG: 750 INJECTION, POWDER, LYOPHILIZED, FOR SOLUTION INTRAVENOUS at 03:10

## 2023-08-30 RX ADMIN — POTASSIUM CHLORIDE 20 MEQ: 29.8 INJECTION, SOLUTION INTRAVENOUS at 12:14

## 2023-08-30 RX ADMIN — SODIUM CHLORIDE, PRESERVATIVE FREE 10 ML: 5 INJECTION INTRAVENOUS at 09:09

## 2023-08-30 RX ADMIN — EPINEPHRINE 3 MCG/MIN: 1 INJECTION INTRAMUSCULAR; INTRAVENOUS; SUBCUTANEOUS at 20:59

## 2023-08-30 RX ADMIN — AMIODARONE HYDROCHLORIDE 0.5 MG/MIN: 50 INJECTION, SOLUTION INTRAVENOUS at 13:45

## 2023-08-30 RX ADMIN — CEFEPIME 2000 MG: 2 INJECTION, POWDER, FOR SOLUTION INTRAVENOUS at 08:40

## 2023-08-30 RX ADMIN — BUMETANIDE 1 MG: 0.25 INJECTION INTRAMUSCULAR; INTRAVENOUS at 10:48

## 2023-08-30 RX ADMIN — ALBUMIN (HUMAN) 12.5 G: 12.5 INJECTION, SOLUTION INTRAVENOUS at 15:58

## 2023-08-30 RX ADMIN — PROPOFOL 40 MCG/KG/MIN: 10 INJECTION, EMULSION INTRAVENOUS at 11:41

## 2023-08-30 RX ADMIN — SODIUM CHLORIDE 1 UNITS/HR: 9 INJECTION, SOLUTION INTRAVENOUS at 05:52

## 2023-08-30 RX ADMIN — EPINEPHRINE 4 MCG/MIN: 1 INJECTION INTRAMUSCULAR; INTRAVENOUS; SUBCUTANEOUS at 00:32

## 2023-08-30 RX ADMIN — SODIUM BICARBONATE: 84 INJECTION, SOLUTION INTRAVENOUS at 00:25

## 2023-08-30 ASSESSMENT — PAIN SCALES - GENERAL
PAINLEVEL_OUTOF10: 1
PAINLEVEL_OUTOF10: 1
PAINLEVEL_OUTOF10: 5
PAINLEVEL_OUTOF10: 1
PAINLEVEL_OUTOF10: 0
PAINLEVEL_OUTOF10: 5

## 2023-08-30 ASSESSMENT — PAIN SCALES - WONG BAKER: WONGBAKER_NUMERICALRESPONSE: 2

## 2023-08-30 ASSESSMENT — PULMONARY FUNCTION TESTS
PIF_VALUE: 16
PIF_VALUE: 18
PIF_VALUE: 21
PIF_VALUE: 19

## 2023-08-30 NOTE — CARDIO/PULMONARY
Chart reviewed: Patient is 80 y.o. male admitted with Peripheral vascular disease, unspecified ([address]) [I73.9]  Critical lower limb ischemia ([address]) [I70.229]    Cardiac cath 8/29/23:  \"Brief procedure Result:   Severe distal LM and ostial / proximal Ramus disease with 95% stenosis    of LAD    of Lcx with distal collaterals    of RCA with distal collaterals   Brief attempt for RCA  intervention was not successful (wire in subintimal plane)   Successful PCI of LM to Ramus with MARYANA x1\"    Patient in CVICU on vent, critical condition. CP Rehab following.     Jose Eduardo Cueto RN

## 2023-08-31 ENCOUNTER — APPOINTMENT (OUTPATIENT)
Facility: HOSPITAL | Age: 68
DRG: 215 | End: 2023-08-31
Attending: SURGERY
Payer: MEDICARE

## 2023-08-31 ENCOUNTER — APPOINTMENT (OUTPATIENT)
Facility: HOSPITAL | Age: 68
DRG: 215 | End: 2023-08-31
Attending: INTERNAL MEDICINE
Payer: MEDICARE

## 2023-08-31 ENCOUNTER — HOSPITAL ENCOUNTER (INPATIENT)
Facility: HOSPITAL | Age: 68
LOS: 33 days | Discharge: NURSING, REHAB OR LONG-TERM CARE | DRG: 161 | End: 2023-10-03
Attending: ANESTHESIOLOGY | Admitting: ANESTHESIOLOGY
Payer: MEDICAID

## 2023-08-31 VITALS
RESPIRATION RATE: 17 BRPM | SYSTOLIC BLOOD PRESSURE: 109 MMHG | OXYGEN SATURATION: 96 % | BODY MASS INDEX: 23.96 KG/M2 | DIASTOLIC BLOOD PRESSURE: 78 MMHG | TEMPERATURE: 99.3 F | HEART RATE: 81 BPM | HEIGHT: 68 IN | WEIGHT: 158.07 LBS

## 2023-08-31 DIAGNOSIS — M79.89 LEFT ARM SWELLING: ICD-10-CM

## 2023-08-31 DIAGNOSIS — I70.25 ISCHEMIC FOOT ULCER DUE TO ATHEROSCLEROSIS OF NATIVE ARTERY OF LIMB (HCC): ICD-10-CM

## 2023-08-31 DIAGNOSIS — I50.43 ACUTE ON CHRONIC COMBINED SYSTOLIC AND DIASTOLIC HEART FAILURE (HCC): ICD-10-CM

## 2023-08-31 DIAGNOSIS — I50.9 HEART FAILURE, UNSPECIFIED HF CHRONICITY, UNSPECIFIED HEART FAILURE TYPE (HCC): ICD-10-CM

## 2023-08-31 DIAGNOSIS — R57.0 CARDIOGENIC SHOCK (HCC): ICD-10-CM

## 2023-08-31 DIAGNOSIS — Z95.811 LEFT VENTRICULAR ASSIST DEVICE PRESENT (HCC): ICD-10-CM

## 2023-08-31 DIAGNOSIS — I43 CARDIAC AMYLOIDOSIS (HCC): ICD-10-CM

## 2023-08-31 DIAGNOSIS — L97.509 ISCHEMIC FOOT ULCER DUE TO ATHEROSCLEROSIS OF NATIVE ARTERY OF LIMB (HCC): ICD-10-CM

## 2023-08-31 DIAGNOSIS — I63.9 CEREBROVASCULAR ACCIDENT (CVA), UNSPECIFIED MECHANISM (HCC): Primary | ICD-10-CM

## 2023-08-31 DIAGNOSIS — I73.9 PERIPHERAL VASCULAR DISEASE (HCC): ICD-10-CM

## 2023-08-31 DIAGNOSIS — E85.4 CARDIAC AMYLOIDOSIS (HCC): ICD-10-CM

## 2023-08-31 DIAGNOSIS — I50.23 ACUTE ON CHRONIC SYSTOLIC HEART FAILURE (HCC): ICD-10-CM

## 2023-08-31 LAB
-: NORMAL
ALBUMIN SERPL-MCNC: 2.9 G/DL (ref 3.5–5)
ALBUMIN/GLOB SERPL: 1.2 (ref 1.1–2.2)
ALP SERPL-CCNC: 100 U/L (ref 45–117)
ALT SERPL-CCNC: 22 U/L (ref 12–78)
ANION GAP SERPL CALC-SCNC: 6 MMOL/L (ref 5–15)
APTT PPP: 65.6 SEC (ref 22.1–31)
AST SERPL-CCNC: 83 U/L (ref 15–37)
BDY SITE: ABNORMAL
BDY SITE: ABNORMAL
BILIRUB SERPL-MCNC: 2.5 MG/DL (ref 0.2–1)
BUN SERPL-MCNC: 13 MG/DL (ref 6–20)
BUN/CREAT SERPL: 16 (ref 12–20)
CALCIUM SERPL-MCNC: 7.7 MG/DL (ref 8.5–10.1)
CHLORIDE SERPL-SCNC: 107 MMOL/L (ref 97–108)
CO2 SERPL-SCNC: 25 MMOL/L (ref 21–32)
COHGB MFR BLD: 0.3 % (ref 1–2)
COHGB MFR BLD: 1.2 % (ref 1–2)
COMMENT:: NORMAL
CORTIS AM PEAK SERPL-MCNC: 17.9 UG/DL (ref 4.3–22.45)
CREAT SERPL-MCNC: 0.83 MG/DL (ref 0.7–1.3)
ECHO AO ROOT DIAM: 3.2 CM
ECHO AO ROOT INDEX: 1.73 CM/M2
ECHO AV AREA PEAK VELOCITY: 4.2 CM2
ECHO AV AREA/BSA PEAK VELOCITY: 2.3 CM2/M2
ECHO AV PEAK GRADIENT: 1 MMHG
ECHO AV PEAK VELOCITY: 0.6 M/S
ECHO AV VELOCITY RATIO: 1
ECHO BSA: 1.74 M2
ECHO BSA: 1.85 M2
ECHO LA DIAMETER INDEX: 1.73 CM/M2
ECHO LA DIAMETER: 3.2 CM
ECHO LA TO AORTIC ROOT RATIO: 1
ECHO LV FRACTIONAL SHORTENING: 7 % (ref 28–44)
ECHO LV INTERNAL DIMENSION DIASTOLE INDEX: 3.24 CM/M2
ECHO LV INTERNAL DIMENSION DIASTOLIC: 6 CM (ref 4.2–5.9)
ECHO LV INTERNAL DIMENSION SYSTOLIC INDEX: 3.03 CM/M2
ECHO LV INTERNAL DIMENSION SYSTOLIC: 5.6 CM
ECHO LV IVSD: 1.1 CM (ref 0.6–1)
ECHO LV MASS 2D: 246.9 G (ref 88–224)
ECHO LV MASS INDEX 2D: 133.4 G/M2 (ref 49–115)
ECHO LV POSTERIOR WALL DIASTOLIC: 0.9 CM (ref 0.6–1)
ECHO LV RELATIVE WALL THICKNESS RATIO: 0.3
ECHO LVOT AREA: 4.2 CM2
ECHO LVOT DIAM: 2.3 CM
ECHO LVOT PEAK GRADIENT: 1 MMHG
ECHO LVOT PEAK VELOCITY: 0.6 M/S
ERYTHROCYTE [DISTWIDTH] IN BLOOD BY AUTOMATED COUNT: 15.2 % (ref 11.5–14.5)
GLOBULIN SER CALC-MCNC: 2.5 G/DL (ref 2–4)
GLUCOSE BLD-MCNC: 104 MG/DL (ref 65–100)
GLUCOSE BLD-MCNC: 106 MG/DL (ref 65–100)
GLUCOSE BLD-MCNC: 107 MG/DL (ref 65–100)
GLUCOSE BLD-MCNC: 107 MG/DL (ref 65–100)
GLUCOSE BLD-MCNC: 112 MG/DL (ref 65–100)
GLUCOSE BLD-MCNC: 113 MG/DL (ref 65–100)
GLUCOSE BLD-MCNC: 114 MG/DL (ref 65–100)
GLUCOSE BLD-MCNC: 115 MG/DL (ref 65–100)
GLUCOSE BLD-MCNC: 115 MG/DL (ref 65–100)
GLUCOSE BLD-MCNC: 98 MG/DL (ref 65–100)
GLUCOSE SERPL-MCNC: 114 MG/DL (ref 65–100)
HAV IGM SER QL: NONREACTIVE
HBV CORE IGM SER QL: NONREACTIVE
HBV SURFACE AG SER QL: <0.1 INDEX
HBV SURFACE AG SER QL: NEGATIVE
HCT VFR BLD AUTO: 22.2 % (ref 36.6–50.3)
HCV AB SER IA-ACNC: 0.19 INDEX
HCV AB SERPL QL IA: NONREACTIVE
HGB BLD-MCNC: 7.3 G/DL (ref 12.1–17)
HIV 1+2 AB+HIV1 P24 AG SERPL QL IA: NONREACTIVE
HIV 1/2 RESULT COMMENT: NORMAL
LACTATE SERPL-SCNC: 0.8 MMOL/L (ref 0.4–2)
LDH SERPL L TO P-CCNC: 580 U/L (ref 85–241)
MAGNESIUM SERPL-MCNC: 2.2 MG/DL (ref 1.6–2.4)
MCH RBC QN AUTO: 29.2 PG (ref 26–34)
MCHC RBC AUTO-ENTMCNC: 32.9 G/DL (ref 30–36.5)
MCV RBC AUTO: 88.8 FL (ref 80–99)
METHGB MFR BLD: 0.3 % (ref 0–1.4)
METHGB MFR BLD: 0.4 % (ref 0–1.4)
NRBC # BLD: 0.17 K/UL (ref 0–0.01)
NRBC BLD-RTO: 2.7 PER 100 WBC
OXYHGB MFR BLD: 56.3 % (ref 94–97)
OXYHGB MFR BLD: 65.6 % (ref 94–97)
PLATELET # BLD AUTO: 156 K/UL (ref 150–400)
PMV BLD AUTO: 10 FL (ref 8.9–12.9)
POTASSIUM SERPL-SCNC: 3.8 MMOL/L (ref 3.5–5.1)
PROCALCITONIN SERPL-MCNC: 0.51 NG/ML
PROCALCITONIN SERPL-MCNC: 0.69 NG/ML
PROT SERPL-MCNC: 5.4 G/DL (ref 6.4–8.2)
RBC # BLD AUTO: 2.5 M/UL (ref 4.1–5.7)
SAO2 % BLD: 57 % (ref 95–99)
SAO2 % BLD: 66 % (ref 95–99)
SERVICE CMNT-IMP: ABNORMAL
SERVICE CMNT-IMP: NORMAL
SODIUM SERPL-SCNC: 138 MMOL/L (ref 136–145)
SPECIMEN HOLD: NORMAL
SPECIMEN SITE: ABNORMAL
SPECIMEN SITE: ABNORMAL
THERAPEUTIC RANGE: ABNORMAL SECS (ref 58–77)
TSH SERPL DL<=0.05 MIU/L-ACNC: 1.66 UIU/ML (ref 0.36–3.74)
VANCOMYCIN SERPL-MCNC: 11.6 UG/ML
WBC # BLD AUTO: 6.3 K/UL (ref 4.1–11.1)

## 2023-08-31 PROCEDURE — 87389 HIV-1 AG W/HIV-1&-2 AB AG IA: CPT

## 2023-08-31 PROCEDURE — 82533 TOTAL CORTISOL: CPT

## 2023-08-31 PROCEDURE — 93308 TTE F-UP OR LMTD: CPT

## 2023-08-31 PROCEDURE — A4216 STERILE WATER/SALINE, 10 ML: HCPCS | Performed by: NURSE PRACTITIONER

## 2023-08-31 PROCEDURE — 2500000003 HC RX 250 WO HCPCS: Performed by: NURSE PRACTITIONER

## 2023-08-31 PROCEDURE — 94640 AIRWAY INHALATION TREATMENT: CPT

## 2023-08-31 PROCEDURE — 6370000000 HC RX 637 (ALT 250 FOR IP): Performed by: STUDENT IN AN ORGANIZED HEALTH CARE EDUCATION/TRAINING PROGRAM

## 2023-08-31 PROCEDURE — 84443 ASSAY THYROID STIM HORMONE: CPT

## 2023-08-31 PROCEDURE — 94003 VENT MGMT INPAT SUBQ DAY: CPT

## 2023-08-31 PROCEDURE — 37799 UNLISTED PX VASCULAR SURGERY: CPT

## 2023-08-31 PROCEDURE — 83605 ASSAY OF LACTIC ACID: CPT

## 2023-08-31 PROCEDURE — 6370000000 HC RX 637 (ALT 250 FOR IP): Performed by: NURSE PRACTITIONER

## 2023-08-31 PROCEDURE — 83735 ASSAY OF MAGNESIUM: CPT

## 2023-08-31 PROCEDURE — 36415 COLL VENOUS BLD VENIPUNCTURE: CPT

## 2023-08-31 PROCEDURE — 6360000002 HC RX W HCPCS: Performed by: NURSE PRACTITIONER

## 2023-08-31 PROCEDURE — C9113 INJ PANTOPRAZOLE SODIUM, VIA: HCPCS | Performed by: NURSE PRACTITIONER

## 2023-08-31 PROCEDURE — 6370000000 HC RX 637 (ALT 250 FOR IP): Performed by: INTERNAL MEDICINE

## 2023-08-31 PROCEDURE — 85730 THROMBOPLASTIN TIME PARTIAL: CPT

## 2023-08-31 PROCEDURE — 83050 HGB METHEMOGLOBIN QUAN: CPT

## 2023-08-31 PROCEDURE — 80053 COMPREHEN METABOLIC PANEL: CPT

## 2023-08-31 PROCEDURE — 2580000003 HC RX 258: Performed by: INTERNAL MEDICINE

## 2023-08-31 PROCEDURE — 2580000003 HC RX 258: Performed by: NURSE PRACTITIONER

## 2023-08-31 PROCEDURE — 6360000002 HC RX W HCPCS: Performed by: INTERNAL MEDICINE

## 2023-08-31 PROCEDURE — 80202 ASSAY OF VANCOMYCIN: CPT

## 2023-08-31 PROCEDURE — 82962 GLUCOSE BLOOD TEST: CPT

## 2023-08-31 PROCEDURE — 84145 PROCALCITONIN (PCT): CPT

## 2023-08-31 PROCEDURE — 2500000003 HC RX 250 WO HCPCS: Performed by: INTERNAL MEDICINE

## 2023-08-31 PROCEDURE — 6360000002 HC RX W HCPCS: Performed by: ANESTHESIOLOGY

## 2023-08-31 PROCEDURE — P9047 ALBUMIN (HUMAN), 25%, 50ML: HCPCS | Performed by: INTERNAL MEDICINE

## 2023-08-31 PROCEDURE — 85027 COMPLETE CBC AUTOMATED: CPT

## 2023-08-31 PROCEDURE — 2580000003 HC RX 258: Performed by: HOSPITALIST

## 2023-08-31 PROCEDURE — 83615 LACTATE (LD) (LDH) ENZYME: CPT

## 2023-08-31 PROCEDURE — 80074 ACUTE HEPATITIS PANEL: CPT

## 2023-08-31 PROCEDURE — 6360000002 HC RX W HCPCS: Performed by: HOSPITALIST

## 2023-08-31 PROCEDURE — 82375 ASSAY CARBOXYHB QUANT: CPT

## 2023-08-31 PROCEDURE — 5A1935Z RESPIRATORY VENTILATION, LESS THAN 24 CONSECUTIVE HOURS: ICD-10-PCS | Performed by: ANESTHESIOLOGY

## 2023-08-31 PROCEDURE — 94668 MNPJ CHEST WALL SBSQ: CPT

## 2023-08-31 PROCEDURE — 2000000000 HC ICU R&B

## 2023-08-31 PROCEDURE — 71045 X-RAY EXAM CHEST 1 VIEW: CPT

## 2023-08-31 RX ORDER — INSULIN LISPRO 100 [IU]/ML
0-4 INJECTION, SOLUTION INTRAVENOUS; SUBCUTANEOUS NIGHTLY
Status: DISCONTINUED | OUTPATIENT
Start: 2023-08-31 | End: 2023-08-31 | Stop reason: HOSPADM

## 2023-08-31 RX ORDER — INSULIN LISPRO 100 [IU]/ML
0-4 INJECTION, SOLUTION INTRAVENOUS; SUBCUTANEOUS EVERY 6 HOURS SCHEDULED
Status: DISCONTINUED | OUTPATIENT
Start: 2023-08-31 | End: 2023-08-31 | Stop reason: HOSPADM

## 2023-08-31 RX ORDER — PROPOFOL 10 MG/ML
5-50 INJECTION, EMULSION INTRAVENOUS CONTINUOUS
Status: DISCONTINUED | OUTPATIENT
Start: 2023-09-01 | End: 2023-09-03

## 2023-08-31 RX ORDER — DOBUTAMINE HYDROCHLORIDE 200 MG/100ML
2.5 INJECTION INTRAVENOUS CONTINUOUS
Status: DISCONTINUED | OUTPATIENT
Start: 2023-09-01 | End: 2023-09-01

## 2023-08-31 RX ORDER — MIDAZOLAM HYDROCHLORIDE 2 MG/2ML
2 INJECTION, SOLUTION INTRAMUSCULAR; INTRAVENOUS ONCE
Status: COMPLETED | OUTPATIENT
Start: 2023-08-31 | End: 2023-08-31

## 2023-08-31 RX ADMIN — OXYCODONE HYDROCHLORIDE 5 MG: 5 TABLET ORAL at 03:35

## 2023-08-31 RX ADMIN — SODIUM CHLORIDE 40 MG: 9 INJECTION INTRAMUSCULAR; INTRAVENOUS; SUBCUTANEOUS at 08:30

## 2023-08-31 RX ADMIN — 0.12% CHLORHEXIDINE GLUCONATE 15 ML: 1.2 RINSE ORAL at 08:39

## 2023-08-31 RX ADMIN — Medication 1 AMPULE: at 08:29

## 2023-08-31 RX ADMIN — AMIODARONE HYDROCHLORIDE 0.5 MG/MIN: 50 INJECTION, SOLUTION INTRAVENOUS at 20:52

## 2023-08-31 RX ADMIN — POTASSIUM CHLORIDE 20 MEQ: 29.8 INJECTION, SOLUTION INTRAVENOUS at 03:23

## 2023-08-31 RX ADMIN — SODIUM BICARBONATE: 84 INJECTION, SOLUTION INTRAVENOUS at 20:52

## 2023-08-31 RX ADMIN — BIVALIRUDIN 0.47 MG/KG/HR: 250 INJECTION, POWDER, LYOPHILIZED, FOR SOLUTION INTRAVENOUS at 04:20

## 2023-08-31 RX ADMIN — DOBUTAMINE HYDROCHLORIDE 2.5 MCG/KG/MIN: 200 INJECTION INTRAVENOUS at 12:14

## 2023-08-31 RX ADMIN — BIVALIRUDIN 0.47 MG/KG/HR: 250 INJECTION, POWDER, LYOPHILIZED, FOR SOLUTION INTRAVENOUS at 14:06

## 2023-08-31 RX ADMIN — HYDROMORPHONE HYDROCHLORIDE 1 MG: 1 INJECTION, SOLUTION INTRAMUSCULAR; INTRAVENOUS; SUBCUTANEOUS at 17:00

## 2023-08-31 RX ADMIN — HYDROMORPHONE HYDROCHLORIDE 1 MG: 1 INJECTION, SOLUTION INTRAMUSCULAR; INTRAVENOUS; SUBCUTANEOUS at 10:14

## 2023-08-31 RX ADMIN — MIDAZOLAM HYDROCHLORIDE 2 MG: 1 INJECTION, SOLUTION INTRAMUSCULAR; INTRAVENOUS at 23:32

## 2023-08-31 RX ADMIN — AMIODARONE HYDROCHLORIDE 0.5 MG/MIN: 50 INJECTION, SOLUTION INTRAVENOUS at 04:59

## 2023-08-31 RX ADMIN — TICAGRELOR 90 MG: 90 TABLET ORAL at 08:40

## 2023-08-31 RX ADMIN — CEFEPIME 2000 MG: 2 INJECTION, POWDER, FOR SOLUTION INTRAVENOUS at 08:28

## 2023-08-31 RX ADMIN — PROPOFOL 35 MCG/KG/MIN: 10 INJECTION, EMULSION INTRAVENOUS at 05:20

## 2023-08-31 RX ADMIN — PROPOFOL 50 MCG/KG/MIN: 10 INJECTION, EMULSION INTRAVENOUS at 10:21

## 2023-08-31 RX ADMIN — PROPOFOL 50 MCG/KG/MIN: 10 INJECTION, EMULSION INTRAVENOUS at 19:43

## 2023-08-31 RX ADMIN — VANCOMYCIN HYDROCHLORIDE 1000 MG: 1 INJECTION, POWDER, LYOPHILIZED, FOR SOLUTION INTRAVENOUS at 14:59

## 2023-08-31 RX ADMIN — VANCOMYCIN HYDROCHLORIDE 750 MG: 750 INJECTION, POWDER, LYOPHILIZED, FOR SOLUTION INTRAVENOUS at 03:04

## 2023-08-31 RX ADMIN — ALBUMIN (HUMAN) 25 G: 0.25 INJECTION, SOLUTION INTRAVENOUS at 08:38

## 2023-08-31 RX ADMIN — ASPIRIN 81 MG: 81 TABLET, CHEWABLE ORAL at 08:40

## 2023-08-31 RX ADMIN — MIDAZOLAM HYDROCHLORIDE 1 MG: 1 INJECTION, SOLUTION INTRAMUSCULAR; INTRAVENOUS at 05:59

## 2023-08-31 RX ADMIN — COLLAGENASE SANTYL: 250 OINTMENT TOPICAL at 08:33

## 2023-08-31 RX ADMIN — POTASSIUM CHLORIDE 20 MEQ: 29.8 INJECTION, SOLUTION INTRAVENOUS at 04:51

## 2023-08-31 RX ADMIN — BUMETANIDE 0.5 MG: 0.25 INJECTION INTRAMUSCULAR; INTRAVENOUS at 13:09

## 2023-08-31 RX ADMIN — POTASSIUM BICARBONATE 20 MEQ: 782 TABLET, EFFERVESCENT ORAL at 08:40

## 2023-08-31 RX ADMIN — PROPOFOL 50 MCG/KG/MIN: 10 INJECTION, EMULSION INTRAVENOUS at 14:56

## 2023-08-31 RX ADMIN — SODIUM CHLORIDE, PRESERVATIVE FREE 10 ML: 5 INJECTION INTRAVENOUS at 08:33

## 2023-08-31 ASSESSMENT — PULMONARY FUNCTION TESTS
PIF_VALUE: 16
PIF_VALUE: 21
PIF_VALUE: 19
PIF_VALUE: 19
PIF_VALUE: 31
PIF_VALUE: 26
PIF_VALUE: 21
PIF_VALUE: 18

## 2023-08-31 ASSESSMENT — PAIN SCALES - GENERAL
PAINLEVEL_OUTOF10: 5
PAINLEVEL_OUTOF10: 0
PAINLEVEL_OUTOF10: 7
PAINLEVEL_OUTOF10: 1
PAINLEVEL_OUTOF10: 0
PAINLEVEL_OUTOF10: 3
PAINLEVEL_OUTOF10: 7
PAINLEVEL_OUTOF10: 0
PAINLEVEL_OUTOF10: 0
PAINLEVEL_OUTOF10: 2

## 2023-08-31 NOTE — DISCHARGE SUMMARY
Discharge Summary      Patient ID:  Brandy Odom  80 y.o.  1955  [id number]    Admit Date: 8/24/2023    Attending Physician:  Blanca Hwang MD    Chief Complaint:  No chief complaint on file. Problem List:    Patient Active Problem List    Diagnosis Date Noted    Cardiogenic shock ([address]) 08/28/2023    SOB (shortness of breath) 08/28/2023    Critical lower limb ischemia ([address]) 08/24/2023    Lower limb ischemia 02/24/2023    Ischemic foot ulcer due to atherosclerosis of native artery of limb ([address]) 06/17/2021    Peripheral vascular disease ([address]) 06/17/2021       Discharge Diagnosis:    Cardiogenic shock. CAD. Acute hypoxemic respiratory failure. Pulmonary edema. Hospital Course:    Brandy Odom is a 80 y.o. male who was referred for cardiac evaluation of Cardiogenic shock by Dr. Rolanda Mckeon. Pt with significant PMH of CAD, previous MI, HFrEF, MR, SSS s/p PPM, PAD, and anemia. Due to pts present status, information obtained from Chart review: Pt presented to the ER with c/o non-healing wound to his RLE for which the pt subsequently underwent fem-pop bypass with Dr. Marilee Davila. Uncomplicated postop course until rapid response called on 8/27 for severe respiratory distress, hypotension, and tachycardia. On initial evaluation, patient was sweaty and tachypnic with belly breathing. HR in 160s, cold peripheries, BP in 19R systolic and RR in 65I. BiPAP initiated. CXR showed pulmonary edema. EKG showed LBBB and monitor shows wide complex regular rhythm. Patient moved to ICU and shocked (synchronized) with 200J emergently that improved HR in 120s.    8/29 - patient went to OR on 08/28 for axillary Impella placement for circulatory support. He remains intubated (intubated in OR). Going for cath today. He also has right lung atelectasis. 8/30: Patient remains intubated and sedated. In impella P7. No acute events overnight.   8/31: Patient remains on P7 at [address] On 2.5mic dobutamine and 3mic of epi.  40%

## 2023-08-31 NOTE — CARE COORDINATION
Transition of Care Plan:     RUR: 12%  Prior Level of Functioning: Independent  Disposition: Home with Home Health   If SNF or IPR: Date FOC offered: TBD  Date FOC received: TBD  Accepting facility: [facility]  Date authorization started with reference number: [facility]  Date authorization received and expires: TBD  Follow up appointments: To be done prior to discharge if going home. DME needed: None  Transportation at discharge: Family  IM/IMM Medicare/ letter given: To be given prior to discharge. Is patient a [place] and connected with VA? N/A  Caregiver Contact: Girlfriend and sister  Discharge Caregiver contacted prior to discharge? Caregiver to be contacted prior to discharge. Care Conference needed? No  Barriers to discharge:  Medical stability and HH    Initial note: Chart reviewed for updates. PT/OT recommended [facility] 08/26/23. CM has sent mass referrals to [facility] agencies as patient lives in the [facility] where most [facility] are out of service area. CM will continue to follow up with d/c planning.     CANDACE Allison    [phone]
Transition of Care Plan:     RUR: 13% \"low risk\"  Prior Level of Functioning: Independent  Disposition: Home with  Adrien Estrada   If SNF or IPR: Date FOC offered: TBD  Date FOC received: TBD  Accepting facility: TBD  Date authorization started with reference number: TBD  Date authorization received and expires: TBD  Follow up appointments: To be done prior to discharge if going home. DME needed: None  Transportation at discharge: Family will provide transport at d/c  IM/IMM Medicare/ letter given: To be given prior to discharge. Is patient a [place] and connected with VA? N/A  Caregiver Contact: Girlfriend and sister  Discharge Caregiver contacted prior to discharge? Caregiver to be contacted prior to discharge. Care Conference needed? No  Barriers to discharge:  Medical stability     Initial note: Chart reviewed for updates. Pt was accepted by [facility]. Pt is still pending medical clearance. CM will continue to follow up with d/c planning.     CANDACE Cox    [phone]
Transition of Care Plan:    RUR: 12%  Prior Level of Functioning: Independent  Disposition: TBD  If SNF or IPR: Date FOC offered: TBD  Date FOC received: TBD  Accepting facility: [facility]  Date authorization started with reference number: [facility]  Date authorization received and expires: TBD  Follow up appointments: To be done prior to discharge if going home. DME needed: None  Transportation at discharge: Family  IM/IMM Medicare/ letter given: To be given prior to discharge. Is patient a [place] and connected with VA? No   If yes, was [employer] transfer form completed and VA notified? N/A  Caregiver Contact: Girlfriend and sister  Discharge Caregiver contacted prior to discharge? Caregiver to be contacted prior to discharge. Care Conference needed? No  Barriers to discharge:  Medical stability. Patient was transferred to ccu on 8/27/23. He is on bipap. Cardiac surgery has been consulted for axillary impella insertion. Will continue to monitor. Indu Anguiano RN BSN CRM        [phone]
past. Rob Noel will drive the patient home. The patient has a cane and a wheelchair; denies any other DME needs. He lives in a house with his girlfriend, Jonny Noel [phone] but she does not drive.      Maurizio Yanez RN  Case Management  [phone]

## 2023-09-01 ENCOUNTER — APPOINTMENT (OUTPATIENT)
Facility: HOSPITAL | Age: 68
DRG: 161 | End: 2023-09-01
Attending: ANESTHESIOLOGY
Payer: MEDICAID

## 2023-09-01 LAB
ABO + RH BLD: NORMAL
ALBUMIN SERPL-MCNC: 3.6 G/DL (ref 3.5–5)
ALBUMIN/GLOB SERPL: 1.3 (ref 1.1–2.2)
ALP SERPL-CCNC: 78 U/L (ref 45–117)
ALT SERPL-CCNC: 18 U/L (ref 12–78)
ANION GAP SERPL CALC-SCNC: 6 MMOL/L (ref 5–15)
APPEARANCE UR: CLEAR
APTT PPP: 63.8 SEC (ref 22.1–31)
APTT PPP: 71.7 SEC (ref 22.1–31)
AST SERPL-CCNC: 40 U/L (ref 15–37)
BACTERIA URNS QL MICRO: NEGATIVE /HPF
BASE EXCESS BLD CALC-SCNC: 3.8 MMOL/L
BASOPHILS # BLD: 0 K/UL (ref 0–0.1)
BASOPHILS NFR BLD: 0 % (ref 0–1)
BILIRUB DIRECT SERPL-MCNC: 1.2 MG/DL (ref 0–0.2)
BILIRUB SERPL-MCNC: 2.3 MG/DL (ref 0.2–1)
BILIRUB UR QL: NEGATIVE
BLD PROD TYP BPU: NORMAL
BLOOD BANK DISPENSE STATUS: NORMAL
BLOOD GROUP ANTIBODIES SERPL: NORMAL
BPU ID: NORMAL
BUN SERPL-MCNC: 9 MG/DL (ref 6–20)
BUN/CREAT SERPL: 13 (ref 12–20)
CALCIUM SERPL-MCNC: 8 MG/DL (ref 8.5–10.1)
CHLORIDE SERPL-SCNC: 108 MMOL/L (ref 97–108)
CO2 SERPL-SCNC: 25 MMOL/L (ref 21–32)
COLOR UR: ABNORMAL
CREAT SERPL-MCNC: 0.72 MG/DL (ref 0.7–1.3)
CROSSMATCH RESULT: NORMAL
DIFFERENTIAL METHOD BLD: ABNORMAL
EOSINOPHIL # BLD: 0.3 K/UL (ref 0–0.4)
EOSINOPHIL NFR BLD: 3 % (ref 0–7)
EPITH CASTS URNS QL MICRO: ABNORMAL /LPF
ERYTHROCYTE [DISTWIDTH] IN BLOOD BY AUTOMATED COUNT: 15.6 % (ref 11.5–14.5)
EST. AVERAGE GLUCOSE BLD GHB EST-MCNC: 128 MG/DL
FERRITIN SERPL-MCNC: 302 NG/ML (ref 26–388)
GLOBULIN SER CALC-MCNC: 2.8 G/DL (ref 2–4)
GLUCOSE SERPL-MCNC: 105 MG/DL (ref 65–100)
GLUCOSE UR STRIP.AUTO-MCNC: NEGATIVE MG/DL
HBA1C MFR BLD: 6.1 % (ref 4–5.6)
HCO3 BLD-SCNC: 26.3 MMOL/L (ref 22–26)
HCT VFR BLD AUTO: 20.1 % (ref 36.6–50.3)
HCT VFR BLD AUTO: 21.3 % (ref 36.6–50.3)
HCT VFR BLD AUTO: 23.9 % (ref 36.6–50.3)
HGB BLD-MCNC: 6.4 G/DL (ref 12.1–17)
HGB BLD-MCNC: 6.7 G/DL (ref 12.1–17)
HGB BLD-MCNC: 7.7 G/DL (ref 12.1–17)
HGB UR QL STRIP: ABNORMAL
HISTORY CHECK: NORMAL
IMM GRANULOCYTES # BLD AUTO: 0 K/UL
IMM GRANULOCYTES NFR BLD AUTO: 0 %
IRON SATN MFR SERPL: 12 % (ref 20–50)
IRON SERPL-MCNC: 12 UG/DL (ref 35–150)
KETONES UR QL STRIP.AUTO: ABNORMAL MG/DL
LDH SERPL L TO P-CCNC: 487 U/L (ref 85–241)
LEUKOCYTE ESTERASE UR QL STRIP.AUTO: NEGATIVE
LYMPHOCYTES # BLD: 1.4 K/UL (ref 0.8–3.5)
LYMPHOCYTES NFR BLD: 15 % (ref 12–49)
MAGNESIUM SERPL-MCNC: 2.2 MG/DL (ref 1.6–2.4)
MCH RBC QN AUTO: 28.5 PG (ref 26–34)
MCHC RBC AUTO-ENTMCNC: 31.5 G/DL (ref 30–36.5)
MCV RBC AUTO: 90.6 FL (ref 80–99)
MONOCYTES # BLD: 0.9 K/UL (ref 0–1)
MONOCYTES NFR BLD: 9 % (ref 5–13)
NEUTS BAND NFR BLD MANUAL: 15 % (ref 0–6)
NEUTS SEG # BLD: 6.9 K/UL (ref 1.8–8)
NEUTS SEG NFR BLD: 58 % (ref 32–75)
NITRITE UR QL STRIP.AUTO: NEGATIVE
NRBC # BLD: 0.03 K/UL (ref 0–0.01)
NRBC BLD-RTO: 0.3 PER 100 WBC
PCO2 BLD: 33 MMHG (ref 35–45)
PH BLD: 7.51 (ref 7.35–7.45)
PH UR STRIP: 6 (ref 5–8)
PLATELET # BLD AUTO: 134 K/UL (ref 150–400)
PMV BLD AUTO: 10.5 FL (ref 8.9–12.9)
PO2 BLD: 137 MMHG (ref 80–100)
POTASSIUM SERPL-SCNC: 3.7 MMOL/L (ref 3.5–5.1)
PROT SERPL-MCNC: 6.4 G/DL (ref 6.4–8.2)
PROT UR STRIP-MCNC: NEGATIVE MG/DL
RBC # BLD AUTO: 2.35 M/UL (ref 4.1–5.7)
RBC #/AREA URNS HPF: >100 /HPF (ref 0–5)
RBC MORPH BLD: ABNORMAL
SAO2 % BLD: 99.4 % (ref 92–97)
SODIUM SERPL-SCNC: 139 MMOL/L (ref 136–145)
SP GR UR REFRACTOMETRY: 1.01 (ref 1–1.03)
SPECIMEN EXP DATE BLD: NORMAL
SPECIMEN TYPE: ABNORMAL
THERAPEUTIC RANGE: ABNORMAL SECS (ref 58–77)
THERAPEUTIC RANGE: ABNORMAL SECS (ref 58–77)
TIBC SERPL-MCNC: 98 UG/DL (ref 250–450)
UNIT DIVISION: 0
URINE CULTURE IF INDICATED: ABNORMAL
UROBILINOGEN UR QL STRIP.AUTO: 0.2 EU/DL (ref 0.2–1)
WBC # BLD AUTO: 9.5 K/UL (ref 4.1–11.1)
WBC URNS QL MICRO: ABNORMAL /HPF (ref 0–4)

## 2023-09-01 PROCEDURE — 2000000000 HC ICU R&B

## 2023-09-01 PROCEDURE — 83036 HEMOGLOBIN GLYCOSYLATED A1C: CPT

## 2023-09-01 PROCEDURE — 86850 RBC ANTIBODY SCREEN: CPT

## 2023-09-01 PROCEDURE — 71045 X-RAY EXAM CHEST 1 VIEW: CPT

## 2023-09-01 PROCEDURE — 86920 COMPATIBILITY TEST SPIN: CPT

## 2023-09-01 PROCEDURE — 85014 HEMATOCRIT: CPT

## 2023-09-01 PROCEDURE — A4216 STERILE WATER/SALINE, 10 ML: HCPCS | Performed by: ANESTHESIOLOGY

## 2023-09-01 PROCEDURE — 80048 BASIC METABOLIC PNL TOTAL CA: CPT

## 2023-09-01 PROCEDURE — 83550 IRON BINDING TEST: CPT

## 2023-09-01 PROCEDURE — 85018 HEMOGLOBIN: CPT

## 2023-09-01 PROCEDURE — 51798 US URINE CAPACITY MEASURE: CPT

## 2023-09-01 PROCEDURE — 2700000000 HC OXYGEN THERAPY PER DAY

## 2023-09-01 PROCEDURE — 85025 COMPLETE CBC W/AUTO DIFF WBC: CPT

## 2023-09-01 PROCEDURE — 36415 COLL VENOUS BLD VENIPUNCTURE: CPT

## 2023-09-01 PROCEDURE — 6360000002 HC RX W HCPCS: Performed by: ANESTHESIOLOGY

## 2023-09-01 PROCEDURE — 6370000000 HC RX 637 (ALT 250 FOR IP): Performed by: ANESTHESIOLOGY

## 2023-09-01 PROCEDURE — P9047 ALBUMIN (HUMAN), 25%, 50ML: HCPCS | Performed by: ANESTHESIOLOGY

## 2023-09-01 PROCEDURE — P9016 RBC LEUKOCYTES REDUCED: HCPCS

## 2023-09-01 PROCEDURE — 51701 INSERT BLADDER CATHETER: CPT

## 2023-09-01 PROCEDURE — 6360000002 HC RX W HCPCS: Performed by: NURSE PRACTITIONER

## 2023-09-01 PROCEDURE — 36430 TRANSFUSION BLD/BLD COMPNT: CPT

## 2023-09-01 PROCEDURE — 83615 LACTATE (LD) (LDH) ENZYME: CPT

## 2023-09-01 PROCEDURE — 80076 HEPATIC FUNCTION PANEL: CPT

## 2023-09-01 PROCEDURE — 86901 BLOOD TYPING SEROLOGIC RH(D): CPT

## 2023-09-01 PROCEDURE — C9113 INJ PANTOPRAZOLE SODIUM, VIA: HCPCS | Performed by: ANESTHESIOLOGY

## 2023-09-01 PROCEDURE — 51702 INSERT TEMP BLADDER CATH: CPT

## 2023-09-01 PROCEDURE — 83540 ASSAY OF IRON: CPT

## 2023-09-01 PROCEDURE — 83735 ASSAY OF MAGNESIUM: CPT

## 2023-09-01 PROCEDURE — 2580000003 HC RX 258: Performed by: ANESTHESIOLOGY

## 2023-09-01 PROCEDURE — 82803 BLOOD GASES ANY COMBINATION: CPT

## 2023-09-01 PROCEDURE — 6360000002 HC RX W HCPCS: Performed by: INTERNAL MEDICINE

## 2023-09-01 PROCEDURE — 85730 THROMBOPLASTIN TIME PARTIAL: CPT

## 2023-09-01 PROCEDURE — 99231 SBSQ HOSP IP/OBS SF/LOW 25: CPT | Performed by: INTERNAL MEDICINE

## 2023-09-01 PROCEDURE — 81001 URINALYSIS AUTO W/SCOPE: CPT

## 2023-09-01 PROCEDURE — 94002 VENT MGMT INPAT INIT DAY: CPT

## 2023-09-01 PROCEDURE — 86900 BLOOD TYPING SEROLOGIC ABO: CPT

## 2023-09-01 PROCEDURE — 2500000003 HC RX 250 WO HCPCS: Performed by: ANESTHESIOLOGY

## 2023-09-01 PROCEDURE — 30233N1 TRANSFUSION OF NONAUTOLOGOUS RED BLOOD CELLS INTO PERIPHERAL VEIN, PERCUTANEOUS APPROACH: ICD-10-PCS | Performed by: ANESTHESIOLOGY

## 2023-09-01 PROCEDURE — 82728 ASSAY OF FERRITIN: CPT

## 2023-09-01 RX ORDER — POTASSIUM CHLORIDE 7.45 MG/ML
10 INJECTION INTRAVENOUS PRN
Status: DISCONTINUED | OUTPATIENT
Start: 2023-09-01 | End: 2023-09-19

## 2023-09-01 RX ORDER — HYDRALAZINE HYDROCHLORIDE 20 MG/ML
2.5 INJECTION INTRAMUSCULAR; INTRAVENOUS EVERY 6 HOURS PRN
Status: DISCONTINUED | OUTPATIENT
Start: 2023-09-01 | End: 2023-09-01

## 2023-09-01 RX ORDER — MAGNESIUM SULFATE IN WATER 40 MG/ML
2000 INJECTION, SOLUTION INTRAVENOUS PRN
Status: DISCONTINUED | OUTPATIENT
Start: 2023-09-01 | End: 2023-09-19

## 2023-09-01 RX ORDER — SODIUM CHLORIDE 9 MG/ML
INJECTION, SOLUTION INTRAVENOUS PRN
Status: DISCONTINUED | OUTPATIENT
Start: 2023-09-01 | End: 2023-09-19

## 2023-09-01 RX ORDER — ALBUMIN (HUMAN) 12.5 G/50ML
25 SOLUTION INTRAVENOUS 2 TIMES DAILY
Status: DISCONTINUED | OUTPATIENT
Start: 2023-09-01 | End: 2023-09-08

## 2023-09-01 RX ORDER — BUMETANIDE 0.25 MG/ML
0.5 INJECTION INTRAMUSCULAR; INTRAVENOUS AS NEEDED
Status: DISCONTINUED | OUTPATIENT
Start: 2023-09-01 | End: 2023-09-05

## 2023-09-01 RX ORDER — HYDRALAZINE HYDROCHLORIDE 20 MG/ML
5 INJECTION INTRAMUSCULAR; INTRAVENOUS EVERY 6 HOURS PRN
Status: DISCONTINUED | OUTPATIENT
Start: 2023-09-01 | End: 2023-09-19

## 2023-09-01 RX ORDER — FENTANYL CITRATE-0.9 % NACL/PF 10 MCG/ML
25-50 PLASTIC BAG, INJECTION (ML) INTRAVENOUS CONTINUOUS
Status: DISCONTINUED | OUTPATIENT
Start: 2023-09-01 | End: 2023-09-03

## 2023-09-01 RX ORDER — SODIUM CHLORIDE 9 MG/ML
INJECTION, SOLUTION INTRAVENOUS PRN
Status: DISCONTINUED | OUTPATIENT
Start: 2023-09-01 | End: 2023-09-01 | Stop reason: SDUPTHER

## 2023-09-01 RX ORDER — CHLORHEXIDINE GLUCONATE ORAL RINSE 1.2 MG/ML
15 SOLUTION DENTAL 2 TIMES DAILY
Status: DISCONTINUED | OUTPATIENT
Start: 2023-09-01 | End: 2023-09-28

## 2023-09-01 RX ORDER — BUMETANIDE 0.25 MG/ML
1 INJECTION INTRAMUSCULAR; INTRAVENOUS 2 TIMES DAILY
Status: DISCONTINUED | OUTPATIENT
Start: 2023-09-01 | End: 2023-09-02

## 2023-09-01 RX ORDER — POTASSIUM CHLORIDE 29.8 MG/ML
20 INJECTION INTRAVENOUS PRN
Status: DISCONTINUED | OUTPATIENT
Start: 2023-09-01 | End: 2023-09-19

## 2023-09-01 RX ORDER — MIDAZOLAM HYDROCHLORIDE 2 MG/2ML
2 INJECTION, SOLUTION INTRAMUSCULAR; INTRAVENOUS EVERY 4 HOURS PRN
Status: DISCONTINUED | OUTPATIENT
Start: 2023-09-01 | End: 2023-09-19

## 2023-09-01 RX ADMIN — BIVALIRUDIN 0.47 MG/KG/HR: 250 INJECTION, POWDER, LYOPHILIZED, FOR SOLUTION INTRAVENOUS at 00:12

## 2023-09-01 RX ADMIN — AMIODARONE HYDROCHLORIDE 0.5 MG/MIN: 50 INJECTION, SOLUTION INTRAVENOUS at 15:31

## 2023-09-01 RX ADMIN — BIVALIRUDIN 0.47 MG/KG/HR: 250 INJECTION, POWDER, LYOPHILIZED, FOR SOLUTION INTRAVENOUS at 17:42

## 2023-09-01 RX ADMIN — BUMETANIDE 1 MG: 0.25 INJECTION, SOLUTION INTRAMUSCULAR; INTRAVENOUS at 09:51

## 2023-09-01 RX ADMIN — WATER 2000 MG: 1 INJECTION INTRAMUSCULAR; INTRAVENOUS; SUBCUTANEOUS at 23:28

## 2023-09-01 RX ADMIN — PROPOFOL 50 MCG/KG/MIN: 10 INJECTION, EMULSION INTRAVENOUS at 00:15

## 2023-09-01 RX ADMIN — MIDAZOLAM HYDROCHLORIDE 2 MG: 1 INJECTION, SOLUTION INTRAMUSCULAR; INTRAVENOUS at 03:52

## 2023-09-01 RX ADMIN — WATER 2000 MG: 1 INJECTION INTRAMUSCULAR; INTRAVENOUS; SUBCUTANEOUS at 15:19

## 2023-09-01 RX ADMIN — TICAGRELOR 90 MG: 90 TABLET ORAL at 20:39

## 2023-09-01 RX ADMIN — POTASSIUM BICARBONATE 20 MEQ: 782 TABLET, EFFERVESCENT ORAL at 00:42

## 2023-09-01 RX ADMIN — POTASSIUM BICARBONATE 20 MEQ: 782 TABLET, EFFERVESCENT ORAL at 20:40

## 2023-09-01 RX ADMIN — HYDRALAZINE HYDROCHLORIDE 3 MG: 20 INJECTION INTRAMUSCULAR; INTRAVENOUS at 12:09

## 2023-09-01 RX ADMIN — SODIUM CHLORIDE, PRESERVATIVE FREE 40 MG: 5 INJECTION INTRAVENOUS at 12:09

## 2023-09-01 RX ADMIN — Medication 50 MCG/HR: at 07:13

## 2023-09-01 RX ADMIN — CHLORHEXIDINE GLUCONATE 15 ML: 1.2 RINSE ORAL at 00:42

## 2023-09-01 RX ADMIN — SODIUM BICARBONATE: 84 INJECTION, SOLUTION INTRAVENOUS at 00:14

## 2023-09-01 RX ADMIN — VANCOMYCIN HYDROCHLORIDE 1000 MG: 1 INJECTION, POWDER, LYOPHILIZED, FOR SOLUTION INTRAVENOUS at 03:30

## 2023-09-01 RX ADMIN — POTASSIUM BICARBONATE 20 MEQ: 782 TABLET, EFFERVESCENT ORAL at 08:38

## 2023-09-01 RX ADMIN — ALBUMIN (HUMAN) 25 G: 0.25 INJECTION, SOLUTION INTRAVENOUS at 00:42

## 2023-09-01 RX ADMIN — EPINEPHRINE 2 MCG/MIN: 1 INJECTION INTRAMUSCULAR; INTRAVENOUS; SUBCUTANEOUS at 00:15

## 2023-09-01 RX ADMIN — DOBUTAMINE HYDROCHLORIDE 2.5 MCG/KG/MIN: 200 INJECTION INTRAVENOUS at 00:11

## 2023-09-01 RX ADMIN — CHLORHEXIDINE GLUCONATE 15 ML: 1.2 RINSE ORAL at 08:39

## 2023-09-01 RX ADMIN — TICAGRELOR 90 MG: 90 TABLET ORAL at 00:42

## 2023-09-01 RX ADMIN — WATER 1 MG: 1 INJECTION INTRAMUSCULAR; INTRAVENOUS; SUBCUTANEOUS at 15:10

## 2023-09-01 RX ADMIN — BIVALIRUDIN 0.47 MG/KG/HR: 250 INJECTION, POWDER, LYOPHILIZED, FOR SOLUTION INTRAVENOUS at 07:59

## 2023-09-01 RX ADMIN — ALBUMIN (HUMAN) 25 G: 0.25 INJECTION, SOLUTION INTRAVENOUS at 20:40

## 2023-09-01 RX ADMIN — ALBUMIN (HUMAN) 25 G: 0.25 INJECTION, SOLUTION INTRAVENOUS at 08:40

## 2023-09-01 RX ADMIN — CHLORHEXIDINE GLUCONATE 15 ML: 1.2 RINSE ORAL at 20:40

## 2023-09-01 RX ADMIN — SODIUM CHLORIDE, PRESERVATIVE FREE 40 MG: 5 INJECTION INTRAVENOUS at 00:41

## 2023-09-01 RX ADMIN — HYDRALAZINE HYDROCHLORIDE 5 MG: 20 INJECTION INTRAMUSCULAR; INTRAVENOUS at 15:10

## 2023-09-01 RX ADMIN — AMIODARONE HYDROCHLORIDE 0.5 MG/MIN: 50 INJECTION, SOLUTION INTRAVENOUS at 00:12

## 2023-09-01 RX ADMIN — TICAGRELOR 90 MG: 90 TABLET ORAL at 08:38

## 2023-09-01 ASSESSMENT — PAIN SCALES - GENERAL
PAINLEVEL_OUTOF10: 0

## 2023-09-01 ASSESSMENT — PULMONARY FUNCTION TESTS
PIF_VALUE: 18
PIF_VALUE: 17
PIF_VALUE: 13

## 2023-09-01 ASSESSMENT — PAIN SCALES - WONG BAKER: WONGBAKER_NUMERICALRESPONSE: 0

## 2023-09-01 NOTE — ANESTHESIA POSTPROCEDURE EVALUATION
patent. Complications related to anesthesia: None    Post-anesthesia assessment completed. No concerns. I have evaluated the patient and the patient is stable and ready to be discharged from PACU .     Signed By: Keily Riddle MD    9/1/2023    Pain management: satisfactory to patient

## 2023-09-01 NOTE — CARE COORDINATION
Transition of Care Plan:    RUR: 19%--moderate  Prior Level of Functioning: independent  Disposition: home with [address] [facility] PT/OT/SN with Chelsy  Follow up appointments: cardiology, PCP  DME needed: N/A  Transportation at discharge: in car w/family  IM/IMM Medicare/ letter given:   Is patient a [place] and connected with VA? no   If yes, was [employer] transfer form completed and VA notified? Caregiver Contact:   Clara Layla, significant other, [phone]  Darcella Cushing, sister, [phone]  Discharge Caregiver contacted prior to discharge? N/A  Care Conference needed? no  Barriers to discharge: none noted    CM received a call from [facility] requesting that CM place a referral in [address] for home health PT/OT/SN as they had accepted patient when he was at [address] [place] [address]. Referral submitted. 09/01/23 0971   Readmission Assessment   Number of Days since last admission? 1-7 days  (transfer from [address] [place] [address] 8/24-8/31/23)   Previous Disposition Other (comment)  (transfer for higher level of care)   Who is being Jazzy Dobbs   (chart records)   What was the patient's/caregiver's perception as to why they think they needed to return back to the hospital? Other (Comment)  (N/A--transfer)   Did you visit your Primary Care Physician after you left the hospital, before you returned this time? No   Why weren't you able to visit your PCP? Other (Comment)  (N/A transfer)   Did you see a specialist, such as Cardiac, Pulmonary, Orthopedic Physician, etc. after you left the hospital? Other (Comment)  (N/A transfer)   Who advised the patient to return to the hospital? Physician   Does the patient report anything that got in the way of taking their medications? No   In our efforts to provide the best possible care to you and others like you, can you think of anything that we could have done to help you after you left the hospital the first time, so that you might not have needed to return so soon?  Other (Comment)  (transfer)     Robert Arrieta

## 2023-09-01 NOTE — NURSE NAVIGATOR
HEART FAILURE NURSE NAVIGATOR NOTE  [address]    Patient chart was reviewed by Heart Failure (HF) Nurse Navigators for compliance of prescribed treatment with guidelines directed medical therapy (GDMT) and HF database completed. Please, review beneath recommendations for symptomatic patients with HF with Reduced Ejection Fraction ? 40% (HFrEF)* and patients whose LVEF improved > 40% (HFimpEF)* for your consideration when taking care of this patient. Current Medical Therapy:    Name Patricia Covarrubias    1955   LVEF    15/20   NYHA Functional Class   IV   ARNi/ACEi/ARB   *   Beta-blocker   *   Aldosterone Antagonist   *   SGLT2 inhibitor   *   Hydralazine/Isosorbide Dinitrate    Consulting Cardiologist:   [facility]     Recommendations:    Please, add the following GDMT for HFrEF ? 40% [Class 1] or document in discharge summary/progress note why patient cannot take the medication:  ARNi/ACEi or ARB  Beta-blockers (carvedilol, sustained-release metoprolol succinate or bisoprolol)  Aldosterone antagonists GFR > 30 and K< 5  SGLT2 inhibitor  Hydralazine/Isosorbide dinitrate for  Americans with Class III/IV symptoms  Adjust diuretic dose at discharge if hospitalized for volume overload    Consider adding the following GDMT for HFrEF ? 40%, if appropriate [Class 2b]:   Ivabradine for patients on maximally tolerated beta-blocker dose in order to achieve HR 70-80bpm  Digoxin, goal level 0.5-0.9  Polyunsaturated fatty acids  Vericuguat  For patient with hyperkalemia while on RAASi > 5.5, consider adding potassium binders (patiromer, sodium zirconium cycosilicate)    Note: the following medications may be potentially harmful in heart failure [Class 3]:  Calcium channel blockers (doxazosin, diltiazem, verapamil, nifedipine)  Antiarrhythmics (flecanide, disopyrimide, sotalol, dronedarone)  Diabetes medications (thiasolidinediones, saxagliptin, alogliptin)  NSAIDs and MERCEDES 2

## 2023-09-01 NOTE — CARE COORDINATION
Care Management Initial Assessment       RUR: 20%--high  Readmission? Yes - [facility] from 8/24-8/31/23  1st IM letter given? Yes - 09/01/23  1st  letter given: No    Emergency contacts: Jonny Noel, significant other, [address], sister, [phone]    Patient is a transfer from [facility] where he was hospitalized for cardiogenic shock. Underwent fem pop bypass, left heart cath and Impella placement while at [facility]. Patient is being worked up for LVAD. Home health PT/OT being recommended at discharge. Per chart notes, patient and is his significant other reside in a home in [place]. DME: cane, wheelchair. Prior to initial admission, patient was independent in ADLs/ambulation and driving. Hx: sick sinus syndrome s/p pacemaker placement, CAD, MI, reduced HFrEF, PAD, anemia. Receives Medicare, Medicaid and SNAP benefits. PCP confirmed. Finding home health in [facility] could pose a barrier to discharge. 09/01/23 0928   Service Assessment   Patient Orientation Alert and Oriented   Cognition Alert   History Provided By Medical Record   Primary Caregiver Self   Support Systems Spouse/Significant Other;Family Members   PCP Verified by CM Yes   Last Visit to PCP Within last 3 months   Prior Functional Level Independent in ADLs/IADLs   Current Functional Level Assistance with the following:;Cooking;Housework; Shopping;Mobility   Can patient return to prior living arrangement Yes   Ability to make needs known: Good   Family able to assist with home care needs: Yes   Financial Resources [employer]; Medicaid; Medicare   Community Resources None   CM/SW Referral Other (see comment)  (home health)   Social/Functional History   Lives With Significant other   Type of [address] [place] [address]  One level   Home Access Stairs to enter with rails   Entrance Stairs - Number of Steps [phone]; [address]

## 2023-09-01 NOTE — WOUND CARE
WOCN Note:     New consult for leg wound. Seen in 4223    Chart shows:  Admitted on 8/31/23. Admitted for heart failure & transferred for LVAD workup. History of ischemic foot ulcer, s/p fem-pop bypass. WBC = 9.5    Assessment:   Communicative and reports no pain. RN at bedside and reports no concerns with buttocks; FMS in place & using barrier cream for leaks. Surface: GUZMAN mattress    Bilateral heels intact and without erythema. Heels offloaded with pillows. 1. POA chronic ulceration to right medial malleolus  4 x 5 x 0.6 cm  Scant serosanguinous exudate; no malodor or purulence - no gross S&S of infection  Periwound intact & without erythema; some fibrinous material in base of wound; lots of large dry skin plaques. Washed lower leg with soap & water. Tx: applied Puracol Ag and foam dressing        Wound Recommendations:    RLL: clean with saline, apply Puracol AG (left in room) and cover with foam dressing. Change every 3 days. PI Prevention:  Turn/reposition approximately every 2 hours  Offload heels with heels hanging off end of pillow at all times while in bed. Sacral Foam dressing: lift to assess regularly; change as needed. Discontinue if incontinence is frequently soiling dressing. Z-guard cream to buttocks and sacrum daily and as needed with incontinence care  Low Air Loss mattress: Use only flat sheet and one incontinence pad. Discussed with RN.     Transition of Care: Plan to follow weekly and as needed while admitted to hospital.     EUGENIA Sparks, RN, [facility]  Certified Wound, Ostomy, Continence Nurse  office [phone]  Available via [facility]

## 2023-09-02 ENCOUNTER — APPOINTMENT (OUTPATIENT)
Facility: HOSPITAL | Age: 68
DRG: 161 | End: 2023-09-02
Attending: ANESTHESIOLOGY
Payer: MEDICAID

## 2023-09-02 LAB
ABO + RH BLD: NORMAL
ALBUMIN SERPL-MCNC: 3.2 G/DL (ref 3.5–5)
ALBUMIN/GLOB SERPL: 1.1 (ref 1.1–2.2)
ALP SERPL-CCNC: 84 U/L (ref 45–117)
ALT SERPL-CCNC: 16 U/L (ref 12–78)
ANION GAP SERPL CALC-SCNC: 5 MMOL/L (ref 5–15)
APTT PPP: 71.9 SEC (ref 22.1–31)
APTT PPP: 72.6 SEC (ref 22.1–31)
AST SERPL-CCNC: 34 U/L (ref 15–37)
BASOPHILS # BLD: 0 K/UL (ref 0–0.1)
BASOPHILS NFR BLD: 0 % (ref 0–1)
BDY SITE: ABNORMAL
BILIRUB DIRECT SERPL-MCNC: 1.1 MG/DL (ref 0–0.2)
BILIRUB SERPL-MCNC: 2.1 MG/DL (ref 0.2–1)
BLD PROD TYP BPU: NORMAL
BLOOD BANK DISPENSE STATUS: NORMAL
BLOOD GROUP ANTIBODIES SERPL: NORMAL
BPU ID: NORMAL
BUN SERPL-MCNC: 8 MG/DL (ref 6–20)
BUN/CREAT SERPL: 12 (ref 12–20)
CALCIUM SERPL-MCNC: 8.2 MG/DL (ref 8.5–10.1)
CHLORIDE SERPL-SCNC: 106 MMOL/L (ref 97–108)
CO2 SERPL-SCNC: 27 MMOL/L (ref 21–32)
COHGB MFR BLD: 1.7 % (ref 1–2)
CREAT SERPL-MCNC: 0.66 MG/DL (ref 0.7–1.3)
CROSSMATCH RESULT: NORMAL
DIFFERENTIAL METHOD BLD: ABNORMAL
ECHO BSA: 1.8 M2
ECHO EST RA PRESSURE: 8 MMHG
ECHO LV EDV A2C: 282 ML
ECHO LV EDV A4C: 193 ML
ECHO LV EDV BP: 247 ML (ref 67–155)
ECHO LV EDV INDEX A4C: 107 ML/M2
ECHO LV EDV INDEX BP: 137 ML/M2
ECHO LV EDV NDEX A2C: 157 ML/M2
ECHO LV EJECTION FRACTION A2C: 32 %
ECHO LV EJECTION FRACTION A4C: 29 %
ECHO LV EJECTION FRACTION BIPLANE: 27 % (ref 55–100)
ECHO LV ESV A2C: 192 ML
ECHO LV ESV A4C: 136 ML
ECHO LV ESV BP: 179 ML (ref 22–58)
ECHO LV ESV INDEX A2C: 107 ML/M2
ECHO LV ESV INDEX A4C: 76 ML/M2
ECHO LV ESV INDEX BP: 99 ML/M2
ECHO LV FRACTIONAL SHORTENING: 6 % (ref 28–44)
ECHO LV INTERNAL DIMENSION DIASTOLE INDEX: 3.44 CM/M2
ECHO LV INTERNAL DIMENSION DIASTOLIC: 6.2 CM (ref 4.2–5.9)
ECHO LV INTERNAL DIMENSION SYSTOLIC INDEX: 3.22 CM/M2
ECHO LV INTERNAL DIMENSION SYSTOLIC: 5.8 CM
ECHO LV IVSD: 0.7 CM (ref 0.6–1)
ECHO LV MASS 2D: 182.1 G (ref 88–224)
ECHO LV MASS INDEX 2D: 101.2 G/M2 (ref 49–115)
ECHO LV POSTERIOR WALL DIASTOLIC: 0.8 CM (ref 0.6–1)
ECHO LV RELATIVE WALL THICKNESS RATIO: 0.26
ECHO MV A VELOCITY: 1.13 M/S
ECHO MV AREA PHT: 5.4 CM2
ECHO MV E DECELERATION TIME (DT): 140.5 MS
ECHO MV E VELOCITY: 0.83 M/S
ECHO MV E/A RATIO: 0.73
ECHO MV PRESSURE HALF TIME (PHT): 40.7 MS
ECHO PULMONARY ARTERY END DIASTOLIC PRESSURE: 5 MMHG
ECHO PV REGURGITANT MAX VELOCITY: 1.1 M/S
ECHO RIGHT VENTRICULAR SYSTOLIC PRESSURE (RVSP): 34 MMHG
ECHO RV FREE WALL PEAK S': 12 CM/S
ECHO RV TAPSE: 1.8 CM (ref 1.7–?)
ECHO TV REGURGITANT MAX VELOCITY: 2.54 M/S
ECHO TV REGURGITANT PEAK GRADIENT: 26 MMHG
EOSINOPHIL # BLD: 0.4 K/UL (ref 0–0.4)
EOSINOPHIL NFR BLD: 3 % (ref 0–7)
ERYTHROCYTE [DISTWIDTH] IN BLOOD BY AUTOMATED COUNT: 15.3 % (ref 11.5–14.5)
GLOBULIN SER CALC-MCNC: 2.9 G/DL (ref 2–4)
GLUCOSE SERPL-MCNC: 88 MG/DL (ref 65–100)
HCT VFR BLD AUTO: 23.6 % (ref 36.6–50.3)
HGB BLD-MCNC: 7.6 G/DL (ref 12.1–17)
IMM GRANULOCYTES # BLD AUTO: 0 K/UL
IMM GRANULOCYTES NFR BLD AUTO: 0 %
LACTATE SERPL-SCNC: 0.9 MMOL/L (ref 0.4–2)
LACTATE SERPL-SCNC: 1 MMOL/L (ref 0.4–2)
LDH SERPL L TO P-CCNC: 494 U/L (ref 85–241)
LYMPHOCYTES # BLD: 1.8 K/UL (ref 0.8–3.5)
LYMPHOCYTES NFR BLD: 15 % (ref 12–49)
MAGNESIUM SERPL-MCNC: 2.2 MG/DL (ref 1.6–2.4)
MCH RBC QN AUTO: 28.8 PG (ref 26–34)
MCHC RBC AUTO-ENTMCNC: 32.2 G/DL (ref 30–36.5)
MCV RBC AUTO: 89.4 FL (ref 80–99)
METAMYELOCYTES NFR BLD MANUAL: 2 %
METHGB MFR BLD: 0 % (ref 0–1.4)
MONOCYTES # BLD: 1.1 K/UL (ref 0–1)
MONOCYTES NFR BLD: 9 % (ref 5–13)
NEUTS BAND NFR BLD MANUAL: 10 % (ref 0–6)
NEUTS SEG # BLD: 8.4 K/UL (ref 1.8–8)
NEUTS SEG NFR BLD: 61 % (ref 32–75)
NRBC # BLD: 0.02 K/UL (ref 0–0.01)
NRBC BLD-RTO: 0.2 PER 100 WBC
OXYHGB MFR BLD: 54.9 % (ref 94–97)
PLATELET # BLD AUTO: 131 K/UL (ref 150–400)
PMV BLD AUTO: 10.7 FL (ref 8.9–12.9)
POTASSIUM SERPL-SCNC: 3 MMOL/L (ref 3.5–5.1)
PROT SERPL-MCNC: 6.1 G/DL (ref 6.4–8.2)
RBC # BLD AUTO: 2.64 M/UL (ref 4.1–5.7)
RBC MORPH BLD: ABNORMAL
SAO2 % BLD: 56 % (ref 95–99)
SODIUM SERPL-SCNC: 138 MMOL/L (ref 136–145)
SPECIMEN EXP DATE BLD: NORMAL
SPECIMEN SITE: ABNORMAL
THERAPEUTIC RANGE: ABNORMAL SECS (ref 58–77)
THERAPEUTIC RANGE: ABNORMAL SECS (ref 58–77)
UNIT DIVISION: 0
WBC # BLD AUTO: 11.8 K/UL (ref 4.1–11.1)

## 2023-09-02 PROCEDURE — 6360000002 HC RX W HCPCS: Performed by: ANESTHESIOLOGY

## 2023-09-02 PROCEDURE — 2580000003 HC RX 258: Performed by: ANESTHESIOLOGY

## 2023-09-02 PROCEDURE — 6370000000 HC RX 637 (ALT 250 FOR IP): Performed by: ANESTHESIOLOGY

## 2023-09-02 PROCEDURE — 83735 ASSAY OF MAGNESIUM: CPT

## 2023-09-02 PROCEDURE — P9047 ALBUMIN (HUMAN), 25%, 50ML: HCPCS | Performed by: ANESTHESIOLOGY

## 2023-09-02 PROCEDURE — 83605 ASSAY OF LACTIC ACID: CPT

## 2023-09-02 PROCEDURE — 6360000002 HC RX W HCPCS: Performed by: STUDENT IN AN ORGANIZED HEALTH CARE EDUCATION/TRAINING PROGRAM

## 2023-09-02 PROCEDURE — 93325 DOPPLER ECHO COLOR FLOW MAPG: CPT | Performed by: INTERNAL MEDICINE

## 2023-09-02 PROCEDURE — 2700000000 HC OXYGEN THERAPY PER DAY

## 2023-09-02 PROCEDURE — 2500000003 HC RX 250 WO HCPCS: Performed by: ANESTHESIOLOGY

## 2023-09-02 PROCEDURE — 83050 HGB METHEMOGLOBIN QUAN: CPT

## 2023-09-02 PROCEDURE — 80048 BASIC METABOLIC PNL TOTAL CA: CPT

## 2023-09-02 PROCEDURE — A4216 STERILE WATER/SALINE, 10 ML: HCPCS | Performed by: ANESTHESIOLOGY

## 2023-09-02 PROCEDURE — 83615 LACTATE (LD) (LDH) ENZYME: CPT

## 2023-09-02 PROCEDURE — 85730 THROMBOPLASTIN TIME PARTIAL: CPT

## 2023-09-02 PROCEDURE — 6370000000 HC RX 637 (ALT 250 FOR IP): Performed by: STUDENT IN AN ORGANIZED HEALTH CARE EDUCATION/TRAINING PROGRAM

## 2023-09-02 PROCEDURE — 93321 DOPPLER ECHO F-UP/LMTD STD: CPT | Performed by: INTERNAL MEDICINE

## 2023-09-02 PROCEDURE — 6360000002 HC RX W HCPCS: Performed by: NURSE PRACTITIONER

## 2023-09-02 PROCEDURE — 82375 ASSAY CARBOXYHB QUANT: CPT

## 2023-09-02 PROCEDURE — 85025 COMPLETE CBC W/AUTO DIFF WBC: CPT

## 2023-09-02 PROCEDURE — 80076 HEPATIC FUNCTION PANEL: CPT

## 2023-09-02 PROCEDURE — C9113 INJ PANTOPRAZOLE SODIUM, VIA: HCPCS | Performed by: ANESTHESIOLOGY

## 2023-09-02 PROCEDURE — 99232 SBSQ HOSP IP/OBS MODERATE 35: CPT | Performed by: NURSE PRACTITIONER

## 2023-09-02 PROCEDURE — 36415 COLL VENOUS BLD VENIPUNCTURE: CPT

## 2023-09-02 PROCEDURE — 2000000000 HC ICU R&B

## 2023-09-02 PROCEDURE — 93308 TTE F-UP OR LMTD: CPT

## 2023-09-02 PROCEDURE — 6360000002 HC RX W HCPCS: Performed by: INTERNAL MEDICINE

## 2023-09-02 PROCEDURE — 93308 TTE F-UP OR LMTD: CPT | Performed by: INTERNAL MEDICINE

## 2023-09-02 RX ORDER — BUMETANIDE 0.25 MG/ML
1 INJECTION INTRAMUSCULAR; INTRAVENOUS DAILY
Status: DISCONTINUED | OUTPATIENT
Start: 2023-09-03 | End: 2023-09-07

## 2023-09-02 RX ORDER — DOBUTAMINE HYDROCHLORIDE 200 MG/100ML
5 INJECTION INTRAVENOUS CONTINUOUS
Status: DISCONTINUED | OUTPATIENT
Start: 2023-09-02 | End: 2023-09-05

## 2023-09-02 RX ORDER — LOPERAMIDE HYDROCHLORIDE 2 MG/1
4 CAPSULE ORAL ONCE
Status: COMPLETED | OUTPATIENT
Start: 2023-09-02 | End: 2023-09-02

## 2023-09-02 RX ADMIN — SODIUM BICARBONATE: 84 INJECTION, SOLUTION INTRAVENOUS at 20:20

## 2023-09-02 RX ADMIN — BUMETANIDE 1 MG: 0.25 INJECTION, SOLUTION INTRAMUSCULAR; INTRAVENOUS at 08:00

## 2023-09-02 RX ADMIN — POTASSIUM BICARBONATE 20 MEQ: 782 TABLET, EFFERVESCENT ORAL at 08:00

## 2023-09-02 RX ADMIN — AMIODARONE HYDROCHLORIDE 0.5 MG/MIN: 50 INJECTION, SOLUTION INTRAVENOUS at 04:44

## 2023-09-02 RX ADMIN — CHLORHEXIDINE GLUCONATE 15 ML: 1.2 RINSE ORAL at 07:59

## 2023-09-02 RX ADMIN — POTASSIUM CHLORIDE 20 MEQ: 29.8 INJECTION, SOLUTION INTRAVENOUS at 08:00

## 2023-09-02 RX ADMIN — DOBUTAMINE HYDROCHLORIDE 5 MCG/KG/MIN: 200 INJECTION INTRAVENOUS at 12:00

## 2023-09-02 RX ADMIN — TICAGRELOR 90 MG: 90 TABLET ORAL at 20:32

## 2023-09-02 RX ADMIN — POTASSIUM CHLORIDE 20 MEQ: 29.8 INJECTION, SOLUTION INTRAVENOUS at 07:17

## 2023-09-02 RX ADMIN — SODIUM CHLORIDE, PRESERVATIVE FREE 40 MG: 5 INJECTION INTRAVENOUS at 13:00

## 2023-09-02 RX ADMIN — ALBUMIN (HUMAN) 25 G: 0.25 INJECTION, SOLUTION INTRAVENOUS at 20:32

## 2023-09-02 RX ADMIN — POTASSIUM CHLORIDE 20 MEQ: 29.8 INJECTION, SOLUTION INTRAVENOUS at 06:20

## 2023-09-02 RX ADMIN — WATER 2000 MG: 1 INJECTION INTRAMUSCULAR; INTRAVENOUS; SUBCUTANEOUS at 22:58

## 2023-09-02 RX ADMIN — WATER 2000 MG: 1 INJECTION INTRAMUSCULAR; INTRAVENOUS; SUBCUTANEOUS at 06:43

## 2023-09-02 RX ADMIN — BIVALIRUDIN 0.47 MG/KG/HR: 250 INJECTION, POWDER, LYOPHILIZED, FOR SOLUTION INTRAVENOUS at 09:54

## 2023-09-02 RX ADMIN — POTASSIUM BICARBONATE 20 MEQ: 782 TABLET, EFFERVESCENT ORAL at 20:32

## 2023-09-02 RX ADMIN — BIVALIRUDIN 0.47 MG/KG/HR: 250 INJECTION, POWDER, LYOPHILIZED, FOR SOLUTION INTRAVENOUS at 01:45

## 2023-09-02 RX ADMIN — HYDRALAZINE HYDROCHLORIDE 5 MG: 20 INJECTION INTRAMUSCULAR; INTRAVENOUS at 18:34

## 2023-09-02 RX ADMIN — CHLORHEXIDINE GLUCONATE 15 ML: 1.2 RINSE ORAL at 20:32

## 2023-09-02 RX ADMIN — AMIODARONE HYDROCHLORIDE 0.5 MG/MIN: 50 INJECTION, SOLUTION INTRAVENOUS at 18:30

## 2023-09-02 RX ADMIN — WATER 2000 MG: 1 INJECTION INTRAMUSCULAR; INTRAVENOUS; SUBCUTANEOUS at 14:38

## 2023-09-02 RX ADMIN — TICAGRELOR 90 MG: 90 TABLET ORAL at 08:00

## 2023-09-02 RX ADMIN — SODIUM CHLORIDE, PRESERVATIVE FREE 40 MG: 5 INJECTION INTRAVENOUS at 02:29

## 2023-09-02 RX ADMIN — BIVALIRUDIN 0.47 MG/KG/HR: 250 INJECTION, POWDER, LYOPHILIZED, FOR SOLUTION INTRAVENOUS at 19:38

## 2023-09-02 RX ADMIN — ALBUMIN (HUMAN) 25 G: 0.25 INJECTION, SOLUTION INTRAVENOUS at 09:09

## 2023-09-02 RX ADMIN — LOPERAMIDE HYDROCHLORIDE 4 MG: 2 CAPSULE ORAL at 11:08

## 2023-09-02 RX ADMIN — HYDRALAZINE HYDROCHLORIDE 5 MG: 20 INJECTION INTRAMUSCULAR; INTRAVENOUS at 10:07

## 2023-09-02 ASSESSMENT — PAIN SCALES - WONG BAKER: WONGBAKER_NUMERICALRESPONSE: 0

## 2023-09-02 ASSESSMENT — PAIN SCALES - GENERAL
PAINLEVEL_OUTOF10: 0
PAINLEVEL_OUTOF10: 0

## 2023-09-03 LAB
ALBUMIN SERPL-MCNC: 3.5 G/DL (ref 3.5–5)
ALBUMIN/GLOB SERPL: 1.3 (ref 1.1–2.2)
ALP SERPL-CCNC: 91 U/L (ref 45–117)
ALT SERPL-CCNC: 17 U/L (ref 12–78)
ANION GAP SERPL CALC-SCNC: 5 MMOL/L (ref 5–15)
APTT PPP: 56 SEC (ref 22.1–31)
APTT PPP: 65.3 SEC (ref 22.1–31)
APTT PPP: 76.3 SEC (ref 22.1–31)
AST SERPL-CCNC: 28 U/L (ref 15–37)
BASOPHILS # BLD: 0 K/UL (ref 0–0.1)
BASOPHILS NFR BLD: 0 % (ref 0–1)
BDY SITE: ABNORMAL
BILIRUB DIRECT SERPL-MCNC: 0.9 MG/DL (ref 0–0.2)
BILIRUB SERPL-MCNC: 1.8 MG/DL (ref 0.2–1)
BUN SERPL-MCNC: 8 MG/DL (ref 6–20)
BUN/CREAT SERPL: 11 (ref 12–20)
CALCIUM SERPL-MCNC: 8.1 MG/DL (ref 8.5–10.1)
CHLORIDE SERPL-SCNC: 106 MMOL/L (ref 97–108)
CO2 SERPL-SCNC: 27 MMOL/L (ref 21–32)
COHGB MFR BLD: 1.3 % (ref 1–2)
CREAT SERPL-MCNC: 0.74 MG/DL (ref 0.7–1.3)
DIFFERENTIAL METHOD BLD: ABNORMAL
EOSINOPHIL # BLD: 0.4 K/UL (ref 0–0.4)
EOSINOPHIL NFR BLD: 4 % (ref 0–7)
ERYTHROCYTE [DISTWIDTH] IN BLOOD BY AUTOMATED COUNT: 15.2 % (ref 11.5–14.5)
GLOBULIN SER CALC-MCNC: 2.8 G/DL (ref 2–4)
GLUCOSE SERPL-MCNC: 125 MG/DL (ref 65–100)
HCT VFR BLD AUTO: 23.7 % (ref 36.6–50.3)
HGB BLD-MCNC: 7.5 G/DL (ref 12.1–17)
IMM GRANULOCYTES # BLD AUTO: 0 K/UL
IMM GRANULOCYTES NFR BLD AUTO: 0 %
LDH SERPL L TO P-CCNC: 412 U/L (ref 85–241)
LYMPHOCYTES # BLD: 1.3 K/UL (ref 0.8–3.5)
LYMPHOCYTES NFR BLD: 12 % (ref 12–49)
MAGNESIUM SERPL-MCNC: 2 MG/DL (ref 1.6–2.4)
MCH RBC QN AUTO: 29 PG (ref 26–34)
MCHC RBC AUTO-ENTMCNC: 31.6 G/DL (ref 30–36.5)
MCV RBC AUTO: 91.5 FL (ref 80–99)
METHGB MFR BLD: 0.5 % (ref 0–1.4)
MONOCYTES # BLD: 1 K/UL (ref 0–1)
MONOCYTES NFR BLD: 9 % (ref 5–13)
NEUTS SEG # BLD: 7.9 K/UL (ref 1.8–8)
NEUTS SEG NFR BLD: 75 % (ref 32–75)
NRBC # BLD: 0 K/UL (ref 0–0.01)
NRBC BLD-RTO: 0 PER 100 WBC
OXYHGB MFR BLD: 65 % (ref 94–97)
PLATELET # BLD AUTO: 131 K/UL (ref 150–400)
PLATELET COMMENT: ABNORMAL
PMV BLD AUTO: 11 FL (ref 8.9–12.9)
POTASSIUM SERPL-SCNC: 3.1 MMOL/L (ref 3.5–5.1)
PROT SERPL-MCNC: 6.3 G/DL (ref 6.4–8.2)
RBC # BLD AUTO: 2.59 M/UL (ref 4.1–5.7)
RBC MORPH BLD: ABNORMAL
RBC MORPH BLD: ABNORMAL
SAO2 % BLD: 66 % (ref 95–99)
SODIUM SERPL-SCNC: 138 MMOL/L (ref 136–145)
SPECIMEN SITE: ABNORMAL
THERAPEUTIC RANGE: ABNORMAL SECS (ref 58–77)
WBC # BLD AUTO: 10.6 K/UL (ref 4.1–11.1)

## 2023-09-03 PROCEDURE — 6370000000 HC RX 637 (ALT 250 FOR IP): Performed by: INTERNAL MEDICINE

## 2023-09-03 PROCEDURE — 2700000000 HC OXYGEN THERAPY PER DAY

## 2023-09-03 PROCEDURE — 85730 THROMBOPLASTIN TIME PARTIAL: CPT

## 2023-09-03 PROCEDURE — 2709999900 HC NON-CHARGEABLE SUPPLY

## 2023-09-03 PROCEDURE — 76937 US GUIDE VASCULAR ACCESS: CPT

## 2023-09-03 PROCEDURE — 36415 COLL VENOUS BLD VENIPUNCTURE: CPT

## 2023-09-03 PROCEDURE — 6360000002 HC RX W HCPCS: Performed by: STUDENT IN AN ORGANIZED HEALTH CARE EDUCATION/TRAINING PROGRAM

## 2023-09-03 PROCEDURE — 80048 BASIC METABOLIC PNL TOTAL CA: CPT

## 2023-09-03 PROCEDURE — 2000000000 HC ICU R&B

## 2023-09-03 PROCEDURE — 85025 COMPLETE CBC W/AUTO DIFF WBC: CPT

## 2023-09-03 PROCEDURE — 6360000002 HC RX W HCPCS: Performed by: ANESTHESIOLOGY

## 2023-09-03 PROCEDURE — 05HA33Z INSERTION OF INFUSION DEVICE INTO LEFT BRACHIAL VEIN, PERCUTANEOUS APPROACH: ICD-10-PCS | Performed by: STUDENT IN AN ORGANIZED HEALTH CARE EDUCATION/TRAINING PROGRAM

## 2023-09-03 PROCEDURE — 83615 LACTATE (LD) (LDH) ENZYME: CPT

## 2023-09-03 PROCEDURE — P9047 ALBUMIN (HUMAN), 25%, 50ML: HCPCS | Performed by: ANESTHESIOLOGY

## 2023-09-03 PROCEDURE — 83735 ASSAY OF MAGNESIUM: CPT

## 2023-09-03 PROCEDURE — 6370000000 HC RX 637 (ALT 250 FOR IP): Performed by: ANESTHESIOLOGY

## 2023-09-03 PROCEDURE — A4216 STERILE WATER/SALINE, 10 ML: HCPCS | Performed by: ANESTHESIOLOGY

## 2023-09-03 PROCEDURE — C9113 INJ PANTOPRAZOLE SODIUM, VIA: HCPCS | Performed by: ANESTHESIOLOGY

## 2023-09-03 PROCEDURE — C1751 CATH, INF, PER/CENT/MIDLINE: HCPCS

## 2023-09-03 PROCEDURE — 6370000000 HC RX 637 (ALT 250 FOR IP): Performed by: STUDENT IN AN ORGANIZED HEALTH CARE EDUCATION/TRAINING PROGRAM

## 2023-09-03 PROCEDURE — 80076 HEPATIC FUNCTION PANEL: CPT

## 2023-09-03 PROCEDURE — 83050 HGB METHEMOGLOBIN QUAN: CPT

## 2023-09-03 PROCEDURE — 6360000002 HC RX W HCPCS: Performed by: INTERNAL MEDICINE

## 2023-09-03 PROCEDURE — 2580000003 HC RX 258: Performed by: ANESTHESIOLOGY

## 2023-09-03 PROCEDURE — 82375 ASSAY CARBOXYHB QUANT: CPT

## 2023-09-03 PROCEDURE — 2500000003 HC RX 250 WO HCPCS: Performed by: NURSE PRACTITIONER

## 2023-09-03 PROCEDURE — 6360000002 HC RX W HCPCS: Performed by: NURSE PRACTITIONER

## 2023-09-03 PROCEDURE — 99231 SBSQ HOSP IP/OBS SF/LOW 25: CPT | Performed by: NURSE PRACTITIONER

## 2023-09-03 RX ORDER — LOPERAMIDE HYDROCHLORIDE 2 MG/1
2 CAPSULE ORAL 4 TIMES DAILY PRN
Status: DISCONTINUED | OUTPATIENT
Start: 2023-09-03 | End: 2023-09-19

## 2023-09-03 RX ORDER — ACETAMINOPHEN 325 MG/1
650 TABLET ORAL EVERY 6 HOURS PRN
Status: DISCONTINUED | OUTPATIENT
Start: 2023-09-03 | End: 2023-09-19

## 2023-09-03 RX ORDER — AMIODARONE HYDROCHLORIDE 200 MG/1
400 TABLET ORAL DAILY
Status: DISCONTINUED | OUTPATIENT
Start: 2023-09-03 | End: 2023-09-19

## 2023-09-03 RX ORDER — POTASSIUM CHLORIDE 29.8 MG/ML
20 INJECTION INTRAVENOUS
Status: DISPENSED | OUTPATIENT
Start: 2023-09-03 | End: 2023-09-03

## 2023-09-03 RX ADMIN — POTASSIUM CHLORIDE 20 MEQ: 29.8 INJECTION, SOLUTION INTRAVENOUS at 06:44

## 2023-09-03 RX ADMIN — POTASSIUM BICARBONATE 20 MEQ: 782 TABLET, EFFERVESCENT ORAL at 08:00

## 2023-09-03 RX ADMIN — HYDRALAZINE HYDROCHLORIDE 5 MG: 20 INJECTION INTRAMUSCULAR; INTRAVENOUS at 00:09

## 2023-09-03 RX ADMIN — TICAGRELOR 90 MG: 90 TABLET ORAL at 20:21

## 2023-09-03 RX ADMIN — ALBUMIN (HUMAN) 25 G: 0.25 INJECTION, SOLUTION INTRAVENOUS at 09:00

## 2023-09-03 RX ADMIN — ALBUMIN (HUMAN) 25 G: 0.25 INJECTION, SOLUTION INTRAVENOUS at 20:22

## 2023-09-03 RX ADMIN — POTASSIUM BICARBONATE 20 MEQ: 782 TABLET, EFFERVESCENT ORAL at 20:22

## 2023-09-03 RX ADMIN — CHLORHEXIDINE GLUCONATE 15 ML: 1.2 RINSE ORAL at 08:00

## 2023-09-03 RX ADMIN — BUMETANIDE 1 MG: 0.25 INJECTION, SOLUTION INTRAMUSCULAR; INTRAVENOUS at 08:00

## 2023-09-03 RX ADMIN — BIVALIRUDIN 0.47 MG/KG/HR: 250 INJECTION, POWDER, LYOPHILIZED, FOR SOLUTION INTRAVENOUS at 13:06

## 2023-09-03 RX ADMIN — SODIUM CHLORIDE, PRESERVATIVE FREE 40 MG: 5 INJECTION INTRAVENOUS at 00:09

## 2023-09-03 RX ADMIN — WATER 2000 MG: 1 INJECTION INTRAMUSCULAR; INTRAVENOUS; SUBCUTANEOUS at 23:30

## 2023-09-03 RX ADMIN — TICAGRELOR 90 MG: 90 TABLET ORAL at 08:00

## 2023-09-03 RX ADMIN — BIVALIRUDIN 0.47 MG/KG/HR: 250 INJECTION, POWDER, LYOPHILIZED, FOR SOLUTION INTRAVENOUS at 20:14

## 2023-09-03 RX ADMIN — WATER 2000 MG: 1 INJECTION INTRAMUSCULAR; INTRAVENOUS; SUBCUTANEOUS at 14:00

## 2023-09-03 RX ADMIN — HYDRALAZINE HYDROCHLORIDE 5 MG: 20 INJECTION INTRAMUSCULAR; INTRAVENOUS at 11:54

## 2023-09-03 RX ADMIN — WATER 2000 MG: 1 INJECTION INTRAMUSCULAR; INTRAVENOUS; SUBCUTANEOUS at 06:30

## 2023-09-03 RX ADMIN — AMIODARONE HYDROCHLORIDE 0.5 MG/MIN: 50 INJECTION, SOLUTION INTRAVENOUS at 06:20

## 2023-09-03 RX ADMIN — HYDRALAZINE HYDROCHLORIDE 5 MG: 20 INJECTION INTRAMUSCULAR; INTRAVENOUS at 20:31

## 2023-09-03 RX ADMIN — ACETAMINOPHEN 650 MG: 325 TABLET ORAL at 20:21

## 2023-09-03 RX ADMIN — LOPERAMIDE HYDROCHLORIDE 2 MG: 2 CAPSULE ORAL at 12:36

## 2023-09-03 RX ADMIN — SODIUM CHLORIDE, PRESERVATIVE FREE 40 MG: 5 INJECTION INTRAVENOUS at 11:54

## 2023-09-03 RX ADMIN — DOBUTAMINE HYDROCHLORIDE 5 MCG/KG/MIN: 200 INJECTION INTRAVENOUS at 06:20

## 2023-09-03 RX ADMIN — AMIODARONE HYDROCHLORIDE 400 MG: 200 TABLET ORAL at 08:00

## 2023-09-03 RX ADMIN — BIVALIRUDIN 0.47 MG/KG/HR: 250 INJECTION, POWDER, LYOPHILIZED, FOR SOLUTION INTRAVENOUS at 06:26

## 2023-09-03 RX ADMIN — POTASSIUM CHLORIDE 20 MEQ: 29.8 INJECTION, SOLUTION INTRAVENOUS at 07:56

## 2023-09-03 RX ADMIN — CHLORHEXIDINE GLUCONATE 15 ML: 1.2 RINSE ORAL at 21:07

## 2023-09-03 ASSESSMENT — PAIN SCALES - WONG BAKER
WONGBAKER_NUMERICALRESPONSE: 0
WONGBAKER_NUMERICALRESPONSE: 0

## 2023-09-03 ASSESSMENT — PAIN - FUNCTIONAL ASSESSMENT: PAIN_FUNCTIONAL_ASSESSMENT: ACTIVITIES ARE NOT PREVENTED

## 2023-09-03 ASSESSMENT — PAIN SCALES - GENERAL
PAINLEVEL_OUTOF10: 0
PAINLEVEL_OUTOF10: 0
PAINLEVEL_OUTOF10: 8
PAINLEVEL_OUTOF10: 8
PAINLEVEL_OUTOF10: 0

## 2023-09-03 ASSESSMENT — PAIN DESCRIPTION - ORIENTATION: ORIENTATION: RIGHT

## 2023-09-03 ASSESSMENT — PAIN DESCRIPTION - LOCATION: LOCATION: FOOT

## 2023-09-03 ASSESSMENT — PAIN DESCRIPTION - DESCRIPTORS: DESCRIPTORS: ACHING

## 2023-09-04 ENCOUNTER — APPOINTMENT (OUTPATIENT)
Facility: HOSPITAL | Age: 68
DRG: 161 | End: 2023-09-04
Attending: ANESTHESIOLOGY
Payer: MEDICAID

## 2023-09-04 LAB
ALBUMIN SERPL-MCNC: 3.9 G/DL (ref 3.5–5)
ALBUMIN/GLOB SERPL: 1.6 (ref 1.1–2.2)
ALP SERPL-CCNC: 84 U/L (ref 45–117)
ALT SERPL-CCNC: 15 U/L (ref 12–78)
ANION GAP SERPL CALC-SCNC: 6 MMOL/L (ref 5–15)
APTT PPP: 68.7 SEC (ref 22.1–31)
APTT PPP: 71.3 SEC (ref 22.1–31)
AST SERPL-CCNC: 22 U/L (ref 15–37)
BASOPHILS # BLD: 0 K/UL (ref 0–0.1)
BASOPHILS NFR BLD: 0 % (ref 0–1)
BDY SITE: ABNORMAL
BILIRUB DIRECT SERPL-MCNC: 1.2 MG/DL (ref 0–0.2)
BILIRUB SERPL-MCNC: 2.2 MG/DL (ref 0.2–1)
BUN SERPL-MCNC: 9 MG/DL (ref 6–20)
BUN/CREAT SERPL: 12 (ref 12–20)
CALCIUM SERPL-MCNC: 8.4 MG/DL (ref 8.5–10.1)
CHLORIDE SERPL-SCNC: 108 MMOL/L (ref 97–108)
CO2 SERPL-SCNC: 27 MMOL/L (ref 21–32)
COHGB MFR BLD: 1.4 % (ref 1–2)
CREAT SERPL-MCNC: 0.74 MG/DL (ref 0.7–1.3)
DIFFERENTIAL METHOD BLD: ABNORMAL
EOSINOPHIL # BLD: 0.4 K/UL (ref 0–0.4)
EOSINOPHIL NFR BLD: 4 % (ref 0–7)
ERYTHROCYTE [DISTWIDTH] IN BLOOD BY AUTOMATED COUNT: 15.2 % (ref 11.5–14.5)
GLOBULIN SER CALC-MCNC: 2.4 G/DL (ref 2–4)
GLUCOSE SERPL-MCNC: 107 MG/DL (ref 65–100)
HCT VFR BLD AUTO: 22.2 % (ref 36.6–50.3)
HGB BLD-MCNC: 7.1 G/DL (ref 12.1–17)
IMM GRANULOCYTES # BLD AUTO: 0.2 K/UL (ref 0–0.04)
IMM GRANULOCYTES NFR BLD AUTO: 2 % (ref 0–0.5)
LDH SERPL L TO P-CCNC: 367 U/L (ref 85–241)
LYMPHOCYTES # BLD: 1.4 K/UL (ref 0.8–3.5)
LYMPHOCYTES NFR BLD: 13 % (ref 12–49)
MAGNESIUM SERPL-MCNC: 2 MG/DL (ref 1.6–2.4)
MCH RBC QN AUTO: 29 PG (ref 26–34)
MCHC RBC AUTO-ENTMCNC: 32 G/DL (ref 30–36.5)
MCV RBC AUTO: 90.6 FL (ref 80–99)
METHGB MFR BLD: 0.3 % (ref 0–1.4)
MONOCYTES # BLD: 1.2 K/UL (ref 0–1)
MONOCYTES NFR BLD: 11 % (ref 5–13)
NEUTS SEG # BLD: 7.5 K/UL (ref 1.8–8)
NEUTS SEG NFR BLD: 70 % (ref 32–75)
NRBC # BLD: 0 K/UL (ref 0–0.01)
NRBC BLD-RTO: 0 PER 100 WBC
NT PRO BNP: 5774 PG/ML
OXYHGB MFR BLD: 64.5 % (ref 94–97)
PLATELET # BLD AUTO: 137 K/UL (ref 150–400)
PMV BLD AUTO: 10.7 FL (ref 8.9–12.9)
POTASSIUM SERPL-SCNC: 3.5 MMOL/L (ref 3.5–5.1)
PROT SERPL-MCNC: 6.3 G/DL (ref 6.4–8.2)
RBC # BLD AUTO: 2.45 M/UL (ref 4.1–5.7)
SAO2 % BLD: 66 % (ref 95–99)
SODIUM SERPL-SCNC: 141 MMOL/L (ref 136–145)
SPECIMEN SITE: ABNORMAL
THERAPEUTIC RANGE: ABNORMAL SECS (ref 58–77)
THERAPEUTIC RANGE: ABNORMAL SECS (ref 58–77)
WBC # BLD AUTO: 10.7 K/UL (ref 4.1–11.1)

## 2023-09-04 PROCEDURE — 85025 COMPLETE CBC W/AUTO DIFF WBC: CPT

## 2023-09-04 PROCEDURE — 2000000000 HC ICU R&B

## 2023-09-04 PROCEDURE — 2500000003 HC RX 250 WO HCPCS: Performed by: ANESTHESIOLOGY

## 2023-09-04 PROCEDURE — 2580000003 HC RX 258: Performed by: ANESTHESIOLOGY

## 2023-09-04 PROCEDURE — 6360000002 HC RX W HCPCS: Performed by: STUDENT IN AN ORGANIZED HEALTH CARE EDUCATION/TRAINING PROGRAM

## 2023-09-04 PROCEDURE — 83735 ASSAY OF MAGNESIUM: CPT

## 2023-09-04 PROCEDURE — 82375 ASSAY CARBOXYHB QUANT: CPT

## 2023-09-04 PROCEDURE — C9113 INJ PANTOPRAZOLE SODIUM, VIA: HCPCS | Performed by: ANESTHESIOLOGY

## 2023-09-04 PROCEDURE — 80048 BASIC METABOLIC PNL TOTAL CA: CPT

## 2023-09-04 PROCEDURE — 6360000002 HC RX W HCPCS: Performed by: ANESTHESIOLOGY

## 2023-09-04 PROCEDURE — 71045 X-RAY EXAM CHEST 1 VIEW: CPT

## 2023-09-04 PROCEDURE — 83880 ASSAY OF NATRIURETIC PEPTIDE: CPT

## 2023-09-04 PROCEDURE — 83050 HGB METHEMOGLOBIN QUAN: CPT

## 2023-09-04 PROCEDURE — 36415 COLL VENOUS BLD VENIPUNCTURE: CPT

## 2023-09-04 PROCEDURE — A4216 STERILE WATER/SALINE, 10 ML: HCPCS | Performed by: ANESTHESIOLOGY

## 2023-09-04 PROCEDURE — 99231 SBSQ HOSP IP/OBS SF/LOW 25: CPT | Performed by: NURSE PRACTITIONER

## 2023-09-04 PROCEDURE — 6370000000 HC RX 637 (ALT 250 FOR IP): Performed by: INTERNAL MEDICINE

## 2023-09-04 PROCEDURE — 2700000000 HC OXYGEN THERAPY PER DAY

## 2023-09-04 PROCEDURE — 6370000000 HC RX 637 (ALT 250 FOR IP): Performed by: STUDENT IN AN ORGANIZED HEALTH CARE EDUCATION/TRAINING PROGRAM

## 2023-09-04 PROCEDURE — 6360000002 HC RX W HCPCS: Performed by: NURSE PRACTITIONER

## 2023-09-04 PROCEDURE — 2500000003 HC RX 250 WO HCPCS: Performed by: NURSE PRACTITIONER

## 2023-09-04 PROCEDURE — 80076 HEPATIC FUNCTION PANEL: CPT

## 2023-09-04 PROCEDURE — 85730 THROMBOPLASTIN TIME PARTIAL: CPT

## 2023-09-04 PROCEDURE — P9047 ALBUMIN (HUMAN), 25%, 50ML: HCPCS | Performed by: ANESTHESIOLOGY

## 2023-09-04 PROCEDURE — 6360000002 HC RX W HCPCS: Performed by: INTERNAL MEDICINE

## 2023-09-04 PROCEDURE — 6370000000 HC RX 637 (ALT 250 FOR IP): Performed by: ANESTHESIOLOGY

## 2023-09-04 PROCEDURE — 83615 LACTATE (LD) (LDH) ENZYME: CPT

## 2023-09-04 RX ADMIN — DOBUTAMINE HYDROCHLORIDE 5 MCG/KG/MIN: 200 INJECTION INTRAVENOUS at 03:48

## 2023-09-04 RX ADMIN — HYDRALAZINE HYDROCHLORIDE 5 MG: 20 INJECTION INTRAMUSCULAR; INTRAVENOUS at 00:21

## 2023-09-04 RX ADMIN — HYDRALAZINE HYDROCHLORIDE 5 MG: 20 INJECTION INTRAMUSCULAR; INTRAVENOUS at 16:06

## 2023-09-04 RX ADMIN — CHLORHEXIDINE GLUCONATE 15 ML: 1.2 RINSE ORAL at 20:41

## 2023-09-04 RX ADMIN — TICAGRELOR 90 MG: 90 TABLET ORAL at 07:49

## 2023-09-04 RX ADMIN — BIVALIRUDIN 0.47 MG/KG/HR: 250 INJECTION, POWDER, LYOPHILIZED, FOR SOLUTION INTRAVENOUS at 20:40

## 2023-09-04 RX ADMIN — SODIUM CHLORIDE, PRESERVATIVE FREE 40 MG: 5 INJECTION INTRAVENOUS at 00:21

## 2023-09-04 RX ADMIN — SODIUM CHLORIDE, PRESERVATIVE FREE 40 MG: 5 INJECTION INTRAVENOUS at 11:20

## 2023-09-04 RX ADMIN — SODIUM BICARBONATE: 84 INJECTION, SOLUTION INTRAVENOUS at 21:48

## 2023-09-04 RX ADMIN — TICAGRELOR 90 MG: 90 TABLET ORAL at 20:40

## 2023-09-04 RX ADMIN — SODIUM BICARBONATE: 84 INJECTION, SOLUTION INTRAVENOUS at 03:15

## 2023-09-04 RX ADMIN — LOPERAMIDE HYDROCHLORIDE 2 MG: 2 CAPSULE ORAL at 19:11

## 2023-09-04 RX ADMIN — CHLORHEXIDINE GLUCONATE 15 ML: 1.2 RINSE ORAL at 07:53

## 2023-09-04 RX ADMIN — LOPERAMIDE HYDROCHLORIDE 2 MG: 2 CAPSULE ORAL at 07:49

## 2023-09-04 RX ADMIN — BUMETANIDE 0.5 MG: 0.25 INJECTION, SOLUTION INTRAMUSCULAR; INTRAVENOUS at 21:23

## 2023-09-04 RX ADMIN — ALBUMIN (HUMAN) 25 G: 0.25 INJECTION, SOLUTION INTRAVENOUS at 07:50

## 2023-09-04 RX ADMIN — WATER 2000 MG: 1 INJECTION INTRAMUSCULAR; INTRAVENOUS; SUBCUTANEOUS at 22:58

## 2023-09-04 RX ADMIN — POTASSIUM CHLORIDE 20 MEQ: 29.8 INJECTION, SOLUTION INTRAVENOUS at 05:41

## 2023-09-04 RX ADMIN — WATER 2000 MG: 1 INJECTION INTRAMUSCULAR; INTRAVENOUS; SUBCUTANEOUS at 06:41

## 2023-09-04 RX ADMIN — BUMETANIDE 1 MG: 0.25 INJECTION, SOLUTION INTRAMUSCULAR; INTRAVENOUS at 08:30

## 2023-09-04 RX ADMIN — ACETAMINOPHEN 650 MG: 325 TABLET ORAL at 07:11

## 2023-09-04 RX ADMIN — HYDRALAZINE HYDROCHLORIDE 5 MG: 20 INJECTION INTRAMUSCULAR; INTRAVENOUS at 08:00

## 2023-09-04 RX ADMIN — POTASSIUM CHLORIDE 20 MEQ: 29.8 INJECTION, SOLUTION INTRAVENOUS at 06:44

## 2023-09-04 RX ADMIN — LOPERAMIDE HYDROCHLORIDE 2 MG: 2 CAPSULE ORAL at 12:37

## 2023-09-04 RX ADMIN — HYDRALAZINE HYDROCHLORIDE 5 MG: 20 INJECTION INTRAMUSCULAR; INTRAVENOUS at 21:24

## 2023-09-04 RX ADMIN — AMIODARONE HYDROCHLORIDE 400 MG: 200 TABLET ORAL at 07:49

## 2023-09-04 RX ADMIN — WATER 2000 MG: 1 INJECTION INTRAMUSCULAR; INTRAVENOUS; SUBCUTANEOUS at 15:25

## 2023-09-04 RX ADMIN — POTASSIUM BICARBONATE 20 MEQ: 782 TABLET, EFFERVESCENT ORAL at 20:39

## 2023-09-04 RX ADMIN — POTASSIUM BICARBONATE 20 MEQ: 782 TABLET, EFFERVESCENT ORAL at 07:49

## 2023-09-04 RX ADMIN — ALBUMIN (HUMAN) 25 G: 0.25 INJECTION, SOLUTION INTRAVENOUS at 20:40

## 2023-09-04 RX ADMIN — BIVALIRUDIN 0.47 MG/KG/HR: 250 INJECTION, POWDER, LYOPHILIZED, FOR SOLUTION INTRAVENOUS at 13:12

## 2023-09-04 RX ADMIN — BIVALIRUDIN 0.47 MG/KG/HR: 250 INJECTION, POWDER, LYOPHILIZED, FOR SOLUTION INTRAVENOUS at 03:45

## 2023-09-04 ASSESSMENT — PAIN SCALES - GENERAL
PAINLEVEL_OUTOF10: 0
PAINLEVEL_OUTOF10: 8
PAINLEVEL_OUTOF10: 0
PAINLEVEL_OUTOF10: 0

## 2023-09-04 ASSESSMENT — PAIN - FUNCTIONAL ASSESSMENT: PAIN_FUNCTIONAL_ASSESSMENT: ACTIVITIES ARE NOT PREVENTED

## 2023-09-04 ASSESSMENT — PAIN SCALES - WONG BAKER: WONGBAKER_NUMERICALRESPONSE: 0

## 2023-09-04 ASSESSMENT — PAIN DESCRIPTION - LOCATION: LOCATION: FOOT

## 2023-09-04 ASSESSMENT — PAIN DESCRIPTION - ORIENTATION: ORIENTATION: RIGHT

## 2023-09-04 ASSESSMENT — PAIN DESCRIPTION - DESCRIPTORS: DESCRIPTORS: ACHING

## 2023-09-05 ENCOUNTER — APPOINTMENT (OUTPATIENT)
Facility: HOSPITAL | Age: 68
DRG: 161 | End: 2023-09-05
Attending: ANESTHESIOLOGY
Payer: MEDICAID

## 2023-09-05 LAB
ALBUMIN SERPL-MCNC: 3.8 G/DL (ref 3.5–5)
ALBUMIN/GLOB SERPL: 1.4 (ref 1.1–2.2)
ALP SERPL-CCNC: 74 U/L (ref 45–117)
ALT SERPL-CCNC: 11 U/L (ref 12–78)
ANION GAP SERPL CALC-SCNC: 2 MMOL/L (ref 5–15)
APTT PPP: 59.4 SEC (ref 22.1–31)
APTT PPP: 64.2 SEC (ref 22.1–31)
APTT PPP: 67.8 SEC (ref 22.1–31)
APTT PPP: 72.2 SEC (ref 22.1–31)
APTT PPP: 74.3 SEC (ref 22.1–31)
AST SERPL-CCNC: 19 U/L (ref 15–37)
BASOPHILS # BLD: 0.1 K/UL (ref 0–0.1)
BASOPHILS NFR BLD: 1 % (ref 0–1)
BDY SITE: ABNORMAL
BILIRUB DIRECT SERPL-MCNC: 1 MG/DL (ref 0–0.2)
BILIRUB SERPL-MCNC: 2 MG/DL (ref 0.2–1)
BUN SERPL-MCNC: 8 MG/DL (ref 6–20)
BUN/CREAT SERPL: 11 (ref 12–20)
CALCIUM SERPL-MCNC: 8.4 MG/DL (ref 8.5–10.1)
CHLORIDE SERPL-SCNC: 104 MMOL/L (ref 97–108)
CO2 SERPL-SCNC: 29 MMOL/L (ref 21–32)
COHGB MFR BLD: 1 % (ref 1–2)
CREAT SERPL-MCNC: 0.7 MG/DL (ref 0.7–1.3)
DIFFERENTIAL METHOD BLD: ABNORMAL
ECHO BSA: 1.8 M2
EOSINOPHIL # BLD: 0 K/UL (ref 0–0.4)
EOSINOPHIL NFR BLD: 0 % (ref 0–7)
ERYTHROCYTE [DISTWIDTH] IN BLOOD BY AUTOMATED COUNT: 15.5 % (ref 11.5–14.5)
GLOBULIN SER CALC-MCNC: 2.7 G/DL (ref 2–4)
GLUCOSE SERPL-MCNC: 107 MG/DL (ref 65–100)
HCT VFR BLD AUTO: 20.5 % (ref 36.6–50.3)
HCT VFR BLD AUTO: 24.7 % (ref 36.6–50.3)
HGB BLD-MCNC: 6.6 G/DL (ref 12.1–17)
HGB BLD-MCNC: 7.8 G/DL (ref 12.1–17)
HISTORY CHECK: NORMAL
IMM GRANULOCYTES # BLD AUTO: 0 K/UL
IMM GRANULOCYTES NFR BLD AUTO: 0 %
LDH SERPL L TO P-CCNC: 360 U/L (ref 85–241)
LYMPHOCYTES # BLD: 1.2 K/UL (ref 0.8–3.5)
LYMPHOCYTES NFR BLD: 13 % (ref 12–49)
MAGNESIUM SERPL-MCNC: 1.9 MG/DL (ref 1.6–2.4)
MCH RBC QN AUTO: 29.9 PG (ref 26–34)
MCHC RBC AUTO-ENTMCNC: 32.2 G/DL (ref 30–36.5)
MCV RBC AUTO: 92.8 FL (ref 80–99)
METHGB MFR BLD: 0.4 % (ref 0–1.4)
MONOCYTES # BLD: 0.9 K/UL (ref 0–1)
MONOCYTES NFR BLD: 10 % (ref 5–13)
NEUTS BAND NFR BLD MANUAL: 4 % (ref 0–6)
NEUTS SEG # BLD: 6.8 K/UL (ref 1.8–8)
NEUTS SEG NFR BLD: 72 % (ref 32–75)
NRBC # BLD: 0 K/UL (ref 0–0.01)
NRBC BLD-RTO: 0 PER 100 WBC
NT PRO BNP: 5996 PG/ML
OXYHGB MFR BLD: 62.9 % (ref 94–97)
PLATELET # BLD AUTO: 142 K/UL (ref 150–400)
PMV BLD AUTO: 11.1 FL (ref 8.9–12.9)
POTASSIUM SERPL-SCNC: 3.9 MMOL/L (ref 3.5–5.1)
PROT SERPL-MCNC: 6.5 G/DL (ref 6.4–8.2)
RBC # BLD AUTO: 2.21 M/UL (ref 4.1–5.7)
RBC MORPH BLD: ABNORMAL
SAO2 % BLD: 64 % (ref 95–99)
SODIUM SERPL-SCNC: 135 MMOL/L (ref 136–145)
SPECIMEN SITE: ABNORMAL
THERAPEUTIC RANGE: ABNORMAL SECS (ref 58–77)
WBC # BLD AUTO: 9 K/UL (ref 4.1–11.1)

## 2023-09-05 PROCEDURE — 6360000002 HC RX W HCPCS: Performed by: ANESTHESIOLOGY

## 2023-09-05 PROCEDURE — 99291 CRITICAL CARE FIRST HOUR: CPT | Performed by: INTERNAL MEDICINE

## 2023-09-05 PROCEDURE — 80048 BASIC METABOLIC PNL TOTAL CA: CPT

## 2023-09-05 PROCEDURE — 93308 TTE F-UP OR LMTD: CPT | Performed by: SPECIALIST

## 2023-09-05 PROCEDURE — 36430 TRANSFUSION BLD/BLD COMPNT: CPT

## 2023-09-05 PROCEDURE — 93308 TTE F-UP OR LMTD: CPT

## 2023-09-05 PROCEDURE — C9113 INJ PANTOPRAZOLE SODIUM, VIA: HCPCS | Performed by: ANESTHESIOLOGY

## 2023-09-05 PROCEDURE — 2500000003 HC RX 250 WO HCPCS: Performed by: NURSE PRACTITIONER

## 2023-09-05 PROCEDURE — A4216 STERILE WATER/SALINE, 10 ML: HCPCS | Performed by: ANESTHESIOLOGY

## 2023-09-05 PROCEDURE — 37799 UNLISTED PX VASCULAR SURGERY: CPT

## 2023-09-05 PROCEDURE — 86923 COMPATIBILITY TEST ELECTRIC: CPT

## 2023-09-05 PROCEDURE — 6370000000 HC RX 637 (ALT 250 FOR IP): Performed by: ANESTHESIOLOGY

## 2023-09-05 PROCEDURE — 85025 COMPLETE CBC W/AUTO DIFF WBC: CPT

## 2023-09-05 PROCEDURE — 93922 UPR/L XTREMITY ART 2 LEVELS: CPT

## 2023-09-05 PROCEDURE — 82375 ASSAY CARBOXYHB QUANT: CPT

## 2023-09-05 PROCEDURE — 83615 LACTATE (LD) (LDH) ENZYME: CPT

## 2023-09-05 PROCEDURE — 85730 THROMBOPLASTIN TIME PARTIAL: CPT

## 2023-09-05 PROCEDURE — 6360000002 HC RX W HCPCS: Performed by: INTERNAL MEDICINE

## 2023-09-05 PROCEDURE — 80076 HEPATIC FUNCTION PANEL: CPT

## 2023-09-05 PROCEDURE — 6360000002 HC RX W HCPCS: Performed by: STUDENT IN AN ORGANIZED HEALTH CARE EDUCATION/TRAINING PROGRAM

## 2023-09-05 PROCEDURE — 6370000000 HC RX 637 (ALT 250 FOR IP): Performed by: NURSE PRACTITIONER

## 2023-09-05 PROCEDURE — 86900 BLOOD TYPING SEROLOGIC ABO: CPT

## 2023-09-05 PROCEDURE — 83050 HGB METHEMOGLOBIN QUAN: CPT

## 2023-09-05 PROCEDURE — 86901 BLOOD TYPING SEROLOGIC RH(D): CPT

## 2023-09-05 PROCEDURE — 83880 ASSAY OF NATRIURETIC PEPTIDE: CPT

## 2023-09-05 PROCEDURE — 2000000000 HC ICU R&B

## 2023-09-05 PROCEDURE — P9016 RBC LEUKOCYTES REDUCED: HCPCS

## 2023-09-05 PROCEDURE — 2580000003 HC RX 258: Performed by: ANESTHESIOLOGY

## 2023-09-05 PROCEDURE — 85014 HEMATOCRIT: CPT

## 2023-09-05 PROCEDURE — 83735 ASSAY OF MAGNESIUM: CPT

## 2023-09-05 PROCEDURE — P9047 ALBUMIN (HUMAN), 25%, 50ML: HCPCS | Performed by: ANESTHESIOLOGY

## 2023-09-05 PROCEDURE — 86850 RBC ANTIBODY SCREEN: CPT

## 2023-09-05 PROCEDURE — 94660 CPAP INITIATION&MGMT: CPT

## 2023-09-05 PROCEDURE — 36415 COLL VENOUS BLD VENIPUNCTURE: CPT

## 2023-09-05 PROCEDURE — 93325 DOPPLER ECHO COLOR FLOW MAPG: CPT | Performed by: SPECIALIST

## 2023-09-05 PROCEDURE — 85018 HEMOGLOBIN: CPT

## 2023-09-05 PROCEDURE — 6370000000 HC RX 637 (ALT 250 FOR IP): Performed by: STUDENT IN AN ORGANIZED HEALTH CARE EDUCATION/TRAINING PROGRAM

## 2023-09-05 PROCEDURE — 5A09557 ASSISTANCE WITH RESPIRATORY VENTILATION, GREATER THAN 96 CONSECUTIVE HOURS, CONTINUOUS POSITIVE AIRWAY PRESSURE: ICD-10-PCS | Performed by: THORACIC SURGERY (CARDIOTHORACIC VASCULAR SURGERY)

## 2023-09-05 PROCEDURE — 2700000000 HC OXYGEN THERAPY PER DAY

## 2023-09-05 RX ORDER — SPIRONOLACTONE 25 MG/1
12.5 TABLET ORAL DAILY
Status: DISCONTINUED | OUTPATIENT
Start: 2023-09-05 | End: 2023-09-07

## 2023-09-05 RX ORDER — SODIUM CHLORIDE 9 MG/ML
INJECTION, SOLUTION INTRAVENOUS PRN
Status: COMPLETED | OUTPATIENT
Start: 2023-09-05 | End: 2023-09-19

## 2023-09-05 RX ORDER — DOBUTAMINE HYDROCHLORIDE 200 MG/100ML
2 INJECTION INTRAVENOUS CONTINUOUS
Status: DISCONTINUED | OUTPATIENT
Start: 2023-09-05 | End: 2023-09-06

## 2023-09-05 RX ORDER — BUMETANIDE 0.25 MG/ML
0.5 INJECTION INTRAMUSCULAR; INTRAVENOUS AS NEEDED
Status: DISCONTINUED | OUTPATIENT
Start: 2023-09-05 | End: 2023-09-07

## 2023-09-05 RX ADMIN — WATER 2000 MG: 1 INJECTION INTRAMUSCULAR; INTRAVENOUS; SUBCUTANEOUS at 14:59

## 2023-09-05 RX ADMIN — CHLORHEXIDINE GLUCONATE 15 ML: 1.2 RINSE ORAL at 08:16

## 2023-09-05 RX ADMIN — BIVALIRUDIN 0.47 MG/KG/HR: 250 INJECTION, POWDER, LYOPHILIZED, FOR SOLUTION INTRAVENOUS at 03:59

## 2023-09-05 RX ADMIN — ALBUMIN (HUMAN) 25 G: 0.25 INJECTION, SOLUTION INTRAVENOUS at 08:14

## 2023-09-05 RX ADMIN — HYDRALAZINE HYDROCHLORIDE 5 MG: 20 INJECTION INTRAMUSCULAR; INTRAVENOUS at 08:14

## 2023-09-05 RX ADMIN — SODIUM CHLORIDE, PRESERVATIVE FREE 40 MG: 5 INJECTION INTRAVENOUS at 00:58

## 2023-09-05 RX ADMIN — WATER 2000 MG: 1 INJECTION INTRAMUSCULAR; INTRAVENOUS; SUBCUTANEOUS at 06:51

## 2023-09-05 RX ADMIN — SPIRONOLACTONE 12.5 MG: 25 TABLET ORAL at 10:00

## 2023-09-05 RX ADMIN — TICAGRELOR 90 MG: 90 TABLET ORAL at 08:13

## 2023-09-05 RX ADMIN — POTASSIUM BICARBONATE 20 MEQ: 782 TABLET, EFFERVESCENT ORAL at 08:14

## 2023-09-05 RX ADMIN — DOBUTAMINE HYDROCHLORIDE 5 MCG/KG/MIN: 200 INJECTION INTRAVENOUS at 02:59

## 2023-09-05 RX ADMIN — BUMETANIDE 1 MG: 0.25 INJECTION, SOLUTION INTRAMUSCULAR; INTRAVENOUS at 08:14

## 2023-09-05 RX ADMIN — TICAGRELOR 90 MG: 90 TABLET ORAL at 20:20

## 2023-09-05 RX ADMIN — WATER 2000 MG: 1 INJECTION INTRAMUSCULAR; INTRAVENOUS; SUBCUTANEOUS at 23:30

## 2023-09-05 RX ADMIN — ALBUMIN (HUMAN) 25 G: 0.25 INJECTION, SOLUTION INTRAVENOUS at 20:20

## 2023-09-05 RX ADMIN — POTASSIUM BICARBONATE 20 MEQ: 782 TABLET, EFFERVESCENT ORAL at 20:20

## 2023-09-05 RX ADMIN — SODIUM CHLORIDE, PRESERVATIVE FREE 40 MG: 5 INJECTION INTRAVENOUS at 14:07

## 2023-09-05 RX ADMIN — HYDRALAZINE HYDROCHLORIDE 5 MG: 20 INJECTION INTRAMUSCULAR; INTRAVENOUS at 14:06

## 2023-09-05 RX ADMIN — AMIODARONE HYDROCHLORIDE 400 MG: 200 TABLET ORAL at 08:13

## 2023-09-05 ASSESSMENT — PAIN SCALES - WONG BAKER: WONGBAKER_NUMERICALRESPONSE: 0

## 2023-09-05 ASSESSMENT — PAIN SCALES - GENERAL
PAINLEVEL_OUTOF10: 0

## 2023-09-05 NOTE — CARE COORDINATION
Transition of Care Plan:     RUR: 17%--moderate  Prior Level of Functioning: independent  Disposition: home with [address] [facility] PT/OT/SN with Chelsy  Follow up appointments: cardiology, PCP  DME needed: N/A  Transportation at discharge: in car w/family  IM/IMM Medicare/ letter given: 9/1/23  Is patient a [place] and connected with VA? no               If yes, was [employer] transfer form completed and VA notified? Caregiver Contact:   Zain Cruz, significant other, [phone]  Portillo Hwang, sister, [phone]  Discharge Caregiver contacted prior to discharge? N/A  Care Conference needed? no  Barriers to discharge: none noted     Patient admitted from [facility] on 8/31 for heart failure. Impella is now set to P3 with weaning attempt to P2. If tolerated, Impella will be removed. Currently on a dobutamine infusion. PT/OT have been consulted. [facility] previously accepted for home health PT/OT/SN. CM updated [facility] via [employer] as to patient's status.     Sandie Vargas, [address]  [phone]

## 2023-09-06 LAB
ALBUMIN SERPL-MCNC: 3.8 G/DL (ref 3.5–5)
ALBUMIN/GLOB SERPL: 1.4 (ref 1.1–2.2)
ALP SERPL-CCNC: 74 U/L (ref 45–117)
ALT SERPL-CCNC: 11 U/L (ref 12–78)
ANION GAP SERPL CALC-SCNC: 7 MMOL/L (ref 5–15)
APTT PPP: 48.5 SEC (ref 22.1–31)
APTT PPP: 49 SEC (ref 22.1–31)
APTT PPP: 50.4 SEC (ref 22.1–31)
APTT PPP: 52.3 SEC (ref 22.1–31)
APTT PPP: 57.6 SEC (ref 22.1–31)
AST SERPL-CCNC: 14 U/L (ref 15–37)
BASOPHILS # BLD: 0 K/UL (ref 0–0.1)
BASOPHILS NFR BLD: 0 % (ref 0–1)
BDY SITE: ABNORMAL
BILIRUB DIRECT SERPL-MCNC: 0.9 MG/DL (ref 0–0.2)
BILIRUB SERPL-MCNC: 2 MG/DL (ref 0.2–1)
BUN SERPL-MCNC: 9 MG/DL (ref 6–20)
BUN/CREAT SERPL: 13 (ref 12–20)
CALCIUM SERPL-MCNC: 8.7 MG/DL (ref 8.5–10.1)
CHLORIDE SERPL-SCNC: 104 MMOL/L (ref 97–108)
CO2 SERPL-SCNC: 26 MMOL/L (ref 21–32)
COHGB MFR BLD: 1.4 % (ref 1–2)
CREAT SERPL-MCNC: 0.7 MG/DL (ref 0.7–1.3)
DIFFERENTIAL METHOD BLD: ABNORMAL
ECHO BSA: 1.8 M2
EOSINOPHIL # BLD: 0.3 K/UL (ref 0–0.4)
EOSINOPHIL NFR BLD: 4 % (ref 0–7)
ERYTHROCYTE [DISTWIDTH] IN BLOOD BY AUTOMATED COUNT: 16.5 % (ref 11.5–14.5)
GLOBULIN SER CALC-MCNC: 2.7 G/DL (ref 2–4)
GLUCOSE SERPL-MCNC: 98 MG/DL (ref 65–100)
HCT VFR BLD AUTO: 23.5 % (ref 36.6–50.3)
HGB BLD-MCNC: 7.3 G/DL (ref 12.1–17)
HISTORY CHECK: NORMAL
IMM GRANULOCYTES # BLD AUTO: 0.1 K/UL (ref 0–0.04)
IMM GRANULOCYTES NFR BLD AUTO: 1 % (ref 0–0.5)
LDH SERPL L TO P-CCNC: 320 U/L (ref 85–241)
LYMPHOCYTES # BLD: 1.2 K/UL (ref 0.8–3.5)
LYMPHOCYTES NFR BLD: 15 % (ref 12–49)
MAGNESIUM SERPL-MCNC: 1.9 MG/DL (ref 1.6–2.4)
MCH RBC QN AUTO: 28.1 PG (ref 26–34)
MCHC RBC AUTO-ENTMCNC: 31.1 G/DL (ref 30–36.5)
MCV RBC AUTO: 90.4 FL (ref 80–99)
METHGB MFR BLD: 0.3 % (ref 0–1.4)
MONOCYTES # BLD: 1 K/UL (ref 0–1)
MONOCYTES NFR BLD: 13 % (ref 5–13)
NEUTS SEG # BLD: 5.2 K/UL (ref 1.8–8)
NEUTS SEG NFR BLD: 67 % (ref 32–75)
NRBC # BLD: 0 K/UL (ref 0–0.01)
NRBC BLD-RTO: 0 PER 100 WBC
NT PRO BNP: 5625 PG/ML
OXYHGB MFR BLD: 65.9 % (ref 94–97)
PLATELET # BLD AUTO: 177 K/UL (ref 150–400)
PMV BLD AUTO: 11.1 FL (ref 8.9–12.9)
POTASSIUM SERPL-SCNC: 3.9 MMOL/L (ref 3.5–5.1)
PROT SERPL-MCNC: 6.5 G/DL (ref 6.4–8.2)
RBC # BLD AUTO: 2.6 M/UL (ref 4.1–5.7)
SAO2 % BLD: 67 % (ref 95–99)
SODIUM SERPL-SCNC: 137 MMOL/L (ref 136–145)
SPECIMEN SITE: ABNORMAL
THERAPEUTIC RANGE: ABNORMAL SECS (ref 58–77)
VAS RIGHT ARM BP: 115 MMHG
WBC # BLD AUTO: 7.7 K/UL (ref 4.1–11.1)

## 2023-09-06 PROCEDURE — 2500000003 HC RX 250 WO HCPCS: Performed by: INTERNAL MEDICINE

## 2023-09-06 PROCEDURE — 36415 COLL VENOUS BLD VENIPUNCTURE: CPT

## 2023-09-06 PROCEDURE — A4216 STERILE WATER/SALINE, 10 ML: HCPCS | Performed by: ANESTHESIOLOGY

## 2023-09-06 PROCEDURE — 6360000002 HC RX W HCPCS: Performed by: INTERNAL MEDICINE

## 2023-09-06 PROCEDURE — 6360000002 HC RX W HCPCS: Performed by: NURSE PRACTITIONER

## 2023-09-06 PROCEDURE — 83880 ASSAY OF NATRIURETIC PEPTIDE: CPT

## 2023-09-06 PROCEDURE — 83615 LACTATE (LD) (LDH) ENZYME: CPT

## 2023-09-06 PROCEDURE — 6360000002 HC RX W HCPCS: Performed by: ANESTHESIOLOGY

## 2023-09-06 PROCEDURE — 97167 OT EVAL HIGH COMPLEX 60 MIN: CPT

## 2023-09-06 PROCEDURE — 2580000003 HC RX 258: Performed by: NURSE PRACTITIONER

## 2023-09-06 PROCEDURE — 83050 HGB METHEMOGLOBIN QUAN: CPT

## 2023-09-06 PROCEDURE — 97161 PT EVAL LOW COMPLEX 20 MIN: CPT

## 2023-09-06 PROCEDURE — 2580000003 HC RX 258: Performed by: ANESTHESIOLOGY

## 2023-09-06 PROCEDURE — 97530 THERAPEUTIC ACTIVITIES: CPT

## 2023-09-06 PROCEDURE — 80076 HEPATIC FUNCTION PANEL: CPT

## 2023-09-06 PROCEDURE — 82375 ASSAY CARBOXYHB QUANT: CPT

## 2023-09-06 PROCEDURE — P9016 RBC LEUKOCYTES REDUCED: HCPCS

## 2023-09-06 PROCEDURE — 6370000000 HC RX 637 (ALT 250 FOR IP): Performed by: NURSE PRACTITIONER

## 2023-09-06 PROCEDURE — 2000000000 HC ICU R&B

## 2023-09-06 PROCEDURE — 83735 ASSAY OF MAGNESIUM: CPT

## 2023-09-06 PROCEDURE — 2500000003 HC RX 250 WO HCPCS: Performed by: NURSE PRACTITIONER

## 2023-09-06 PROCEDURE — P9047 ALBUMIN (HUMAN), 25%, 50ML: HCPCS | Performed by: ANESTHESIOLOGY

## 2023-09-06 PROCEDURE — 36430 TRANSFUSION BLD/BLD COMPNT: CPT

## 2023-09-06 PROCEDURE — 85025 COMPLETE CBC W/AUTO DIFF WBC: CPT

## 2023-09-06 PROCEDURE — 6370000000 HC RX 637 (ALT 250 FOR IP): Performed by: STUDENT IN AN ORGANIZED HEALTH CARE EDUCATION/TRAINING PROGRAM

## 2023-09-06 PROCEDURE — APPSS45 APP SPLIT SHARED TIME 31-45 MINUTES: Performed by: NURSE PRACTITIONER

## 2023-09-06 PROCEDURE — 99232 SBSQ HOSP IP/OBS MODERATE 35: CPT | Performed by: INTERNAL MEDICINE

## 2023-09-06 PROCEDURE — 80048 BASIC METABOLIC PNL TOTAL CA: CPT

## 2023-09-06 PROCEDURE — 2580000003 HC RX 258: Performed by: INTERNAL MEDICINE

## 2023-09-06 PROCEDURE — 6370000000 HC RX 637 (ALT 250 FOR IP): Performed by: ANESTHESIOLOGY

## 2023-09-06 PROCEDURE — 2500000003 HC RX 250 WO HCPCS: Performed by: ANESTHESIOLOGY

## 2023-09-06 PROCEDURE — 94660 CPAP INITIATION&MGMT: CPT

## 2023-09-06 PROCEDURE — 85730 THROMBOPLASTIN TIME PARTIAL: CPT

## 2023-09-06 PROCEDURE — C9113 INJ PANTOPRAZOLE SODIUM, VIA: HCPCS | Performed by: ANESTHESIOLOGY

## 2023-09-06 RX ORDER — SODIUM CHLORIDE 9 MG/ML
INJECTION, SOLUTION INTRAVENOUS PRN
Status: DISCONTINUED | OUTPATIENT
Start: 2023-09-06 | End: 2023-09-19

## 2023-09-06 RX ORDER — DOBUTAMINE HYDROCHLORIDE 200 MG/100ML
1 INJECTION INTRAVENOUS CONTINUOUS
Status: DISCONTINUED | OUTPATIENT
Start: 2023-09-06 | End: 2023-09-07

## 2023-09-06 RX ORDER — PANTOPRAZOLE SODIUM 40 MG/1
40 TABLET, DELAYED RELEASE ORAL EVERY 12 HOURS
Status: DISCONTINUED | OUTPATIENT
Start: 2023-09-06 | End: 2023-09-19

## 2023-09-06 RX ADMIN — NICARDIPINE HYDROCHLORIDE 5 MG/HR: 25 INJECTION, SOLUTION INTRAVENOUS at 17:09

## 2023-09-06 RX ADMIN — ALBUMIN (HUMAN) 25 G: 0.25 INJECTION, SOLUTION INTRAVENOUS at 21:03

## 2023-09-06 RX ADMIN — WATER 2000 MG: 1 INJECTION INTRAMUSCULAR; INTRAVENOUS; SUBCUTANEOUS at 23:00

## 2023-09-06 RX ADMIN — TICAGRELOR 90 MG: 90 TABLET ORAL at 09:08

## 2023-09-06 RX ADMIN — CHLORHEXIDINE GLUCONATE 15 ML: 1.2 RINSE ORAL at 21:03

## 2023-09-06 RX ADMIN — ALBUMIN (HUMAN) 25 G: 0.25 INJECTION, SOLUTION INTRAVENOUS at 09:05

## 2023-09-06 RX ADMIN — POTASSIUM BICARBONATE 20 MEQ: 782 TABLET, EFFERVESCENT ORAL at 21:03

## 2023-09-06 RX ADMIN — TICAGRELOR 90 MG: 90 TABLET ORAL at 21:03

## 2023-09-06 RX ADMIN — DOBUTAMINE HYDROCHLORIDE 3 MCG/KG/MIN: 200 INJECTION INTRAVENOUS at 06:11

## 2023-09-06 RX ADMIN — BUMETANIDE 1 MG: 0.25 INJECTION, SOLUTION INTRAMUSCULAR; INTRAVENOUS at 09:13

## 2023-09-06 RX ADMIN — AMIODARONE HYDROCHLORIDE 400 MG: 200 TABLET ORAL at 09:07

## 2023-09-06 RX ADMIN — CHLORHEXIDINE GLUCONATE 15 ML: 1.2 RINSE ORAL at 09:23

## 2023-09-06 RX ADMIN — SODIUM CHLORIDE, PRESERVATIVE FREE 40 MG: 5 INJECTION INTRAVENOUS at 00:12

## 2023-09-06 RX ADMIN — HYDRALAZINE HYDROCHLORIDE 5 MG: 20 INJECTION INTRAMUSCULAR; INTRAVENOUS at 16:07

## 2023-09-06 RX ADMIN — PANTOPRAZOLE SODIUM 40 MG: 40 TABLET, DELAYED RELEASE ORAL at 12:06

## 2023-09-06 RX ADMIN — WATER 2000 MG: 1 INJECTION INTRAMUSCULAR; INTRAVENOUS; SUBCUTANEOUS at 15:31

## 2023-09-06 RX ADMIN — SPIRONOLACTONE 12.5 MG: 25 TABLET ORAL at 09:07

## 2023-09-06 RX ADMIN — BIVALIRUDIN 0.09 MG/KG/HR: 250 INJECTION, POWDER, LYOPHILIZED, FOR SOLUTION INTRAVENOUS at 10:46

## 2023-09-06 RX ADMIN — WATER 2000 MG: 1 INJECTION INTRAMUSCULAR; INTRAVENOUS; SUBCUTANEOUS at 06:30

## 2023-09-06 RX ADMIN — SODIUM BICARBONATE: 84 INJECTION, SOLUTION INTRAVENOUS at 06:13

## 2023-09-06 RX ADMIN — POTASSIUM BICARBONATE 20 MEQ: 782 TABLET, EFFERVESCENT ORAL at 09:06

## 2023-09-06 RX ADMIN — HYDRALAZINE HYDROCHLORIDE 5 MG: 20 INJECTION INTRAMUSCULAR; INTRAVENOUS at 09:14

## 2023-09-06 ASSESSMENT — PAIN DESCRIPTION - LOCATION
LOCATION: FOOT
LOCATION: FOOT

## 2023-09-06 ASSESSMENT — PAIN - FUNCTIONAL ASSESSMENT
PAIN_FUNCTIONAL_ASSESSMENT: ACTIVITIES ARE NOT PREVENTED
PAIN_FUNCTIONAL_ASSESSMENT: ACTIVITIES ARE NOT PREVENTED

## 2023-09-06 ASSESSMENT — PAIN DESCRIPTION - ORIENTATION
ORIENTATION: RIGHT
ORIENTATION: RIGHT

## 2023-09-06 ASSESSMENT — PAIN SCALES - GENERAL
PAINLEVEL_OUTOF10: 0
PAINLEVEL_OUTOF10: 2
PAINLEVEL_OUTOF10: 0
PAINLEVEL_OUTOF10: 1
PAINLEVEL_OUTOF10: 0

## 2023-09-06 ASSESSMENT — PAIN DESCRIPTION - PAIN TYPE
TYPE: CHRONIC PAIN
TYPE: CHRONIC PAIN

## 2023-09-06 ASSESSMENT — PAIN SCALES - WONG BAKER
WONGBAKER_NUMERICALRESPONSE: 0

## 2023-09-06 ASSESSMENT — PAIN DESCRIPTION - DESCRIPTORS
DESCRIPTORS: SORE
DESCRIPTORS: PRESSURE

## 2023-09-07 ENCOUNTER — APPOINTMENT (OUTPATIENT)
Facility: HOSPITAL | Age: 68
DRG: 161 | End: 2023-09-07
Attending: STUDENT IN AN ORGANIZED HEALTH CARE EDUCATION/TRAINING PROGRAM
Payer: MEDICAID

## 2023-09-07 ENCOUNTER — APPOINTMENT (OUTPATIENT)
Facility: HOSPITAL | Age: 68
DRG: 161 | End: 2023-09-07
Attending: ANESTHESIOLOGY
Payer: MEDICAID

## 2023-09-07 ENCOUNTER — APPOINTMENT (OUTPATIENT)
Facility: HOSPITAL | Age: 68
DRG: 161 | End: 2023-09-07
Attending: INTERNAL MEDICINE
Payer: MEDICAID

## 2023-09-07 LAB
25(OH)D3 SERPL-MCNC: <9 NG/ML (ref 30–100)
ABO + RH BLD: NORMAL
ALBUMIN SERPL-MCNC: 4.3 G/DL (ref 3.5–5)
ALBUMIN/GLOB SERPL: 1.6 (ref 1.1–2.2)
ALP SERPL-CCNC: 127 U/L (ref 45–117)
ALT SERPL-CCNC: 13 U/L (ref 12–78)
ANION GAP SERPL CALC-SCNC: 6 MMOL/L (ref 5–15)
APTT PPP: 47.1 SEC (ref 22.1–31)
APTT PPP: 47.8 SEC (ref 22.1–31)
APTT PPP: 49.9 SEC (ref 22.1–31)
APTT PPP: 50.1 SEC (ref 22.1–31)
APTT PPP: 51.8 SEC (ref 22.1–31)
AST SERPL-CCNC: 15 U/L (ref 15–37)
BASOPHILS # BLD: 0 K/UL (ref 0–0.1)
BASOPHILS NFR BLD: 0 % (ref 0–1)
BDY SITE: ABNORMAL
BILIRUB DIRECT SERPL-MCNC: 1.1 MG/DL (ref 0–0.2)
BILIRUB SERPL-MCNC: 2.3 MG/DL (ref 0.2–1)
BLD PROD TYP BPU: NORMAL
BLD PROD TYP BPU: NORMAL
BLOOD BANK DISPENSE STATUS: NORMAL
BLOOD BANK DISPENSE STATUS: NORMAL
BLOOD GROUP ANTIBODIES SERPL: NORMAL
BPU ID: NORMAL
BPU ID: NORMAL
BUN SERPL-MCNC: 11 MG/DL (ref 6–20)
BUN/CREAT SERPL: 14 (ref 12–20)
CALCIUM SERPL-MCNC: 8.8 MG/DL (ref 8.5–10.1)
CALCIUM SERPL-MCNC: 9.4 MG/DL (ref 8.5–10.1)
CHLORIDE SERPL-SCNC: 104 MMOL/L (ref 97–108)
CHOLEST SERPL-MCNC: 95 MG/DL
CO2 SERPL-SCNC: 28 MMOL/L (ref 21–32)
COHGB MFR BLD: 1.5 % (ref 1–2)
CREAT SERPL-MCNC: 0.81 MG/DL (ref 0.7–1.3)
CROSSMATCH RESULT: NORMAL
CROSSMATCH RESULT: NORMAL
DIFFERENTIAL METHOD BLD: ABNORMAL
EOSINOPHIL # BLD: 0.3 K/UL (ref 0–0.4)
EOSINOPHIL NFR BLD: 4 % (ref 0–7)
ERYTHROCYTE [DISTWIDTH] IN BLOOD BY AUTOMATED COUNT: 15.9 % (ref 11.5–14.5)
GLOBULIN SER CALC-MCNC: 2.7 G/DL (ref 2–4)
GLUCOSE SERPL-MCNC: 107 MG/DL (ref 65–100)
HCT VFR BLD AUTO: 25.9 % (ref 36.6–50.3)
HDLC SERPL-MCNC: 17 MG/DL
HDLC SERPL: 5.6 (ref 0–5)
HGB BLD-MCNC: 8.2 G/DL (ref 12.1–17)
IMM GRANULOCYTES # BLD AUTO: 0 K/UL
IMM GRANULOCYTES NFR BLD AUTO: 0 %
LDH SERPL L TO P-CCNC: 339 U/L (ref 85–241)
LDLC SERPL CALC-MCNC: 55.6 MG/DL (ref 0–100)
LYMPHOCYTES # BLD: 0.9 K/UL (ref 0.8–3.5)
LYMPHOCYTES NFR BLD: 15 % (ref 12–49)
MAGNESIUM SERPL-MCNC: 2 MG/DL (ref 1.6–2.4)
MCH RBC QN AUTO: 28.6 PG (ref 26–34)
MCHC RBC AUTO-ENTMCNC: 31.7 G/DL (ref 30–36.5)
MCV RBC AUTO: 90.2 FL (ref 80–99)
METHGB MFR BLD: 0.3 % (ref 0–1.4)
MONOCYTES # BLD: 0.7 K/UL (ref 0–1)
MONOCYTES NFR BLD: 11 % (ref 5–13)
NEUTS SEG # BLD: 4.4 K/UL (ref 1.8–8)
NEUTS SEG NFR BLD: 70 % (ref 32–75)
NRBC # BLD: 0 K/UL (ref 0–0.01)
NRBC BLD-RTO: 0 PER 100 WBC
NT PRO BNP: 7263 PG/ML
OXYHGB MFR BLD: 49.5 % (ref 94–97)
PLATELET # BLD AUTO: 199 K/UL (ref 150–400)
PMV BLD AUTO: 10.9 FL (ref 8.9–12.9)
POTASSIUM SERPL-SCNC: 4.2 MMOL/L (ref 3.5–5.1)
PREALB SERPL-MCNC: 13.1 MG/DL (ref 20–40)
PROT SERPL-MCNC: 7 G/DL (ref 6.4–8.2)
PSA SERPL-MCNC: 6.7 NG/ML (ref 0.01–4)
PTH-INTACT SERPL-MCNC: 109.2 PG/ML (ref 18.4–88)
RBC # BLD AUTO: 2.87 M/UL (ref 4.1–5.7)
RBC MORPH BLD: ABNORMAL
RPR SER QL: NONREACTIVE
SAO2 % BLD: 50 % (ref 95–99)
SODIUM SERPL-SCNC: 138 MMOL/L (ref 136–145)
SPECIMEN EXP DATE BLD: NORMAL
SPECIMEN SITE: ABNORMAL
THERAPEUTIC RANGE: ABNORMAL SECS (ref 58–77)
TRIGL SERPL-MCNC: 112 MG/DL
UNIT DIVISION: 0
UNIT DIVISION: 0
URATE SERPL-MCNC: 4.2 MG/DL (ref 3.5–7.2)
VLDLC SERPL CALC-MCNC: 22.4 MG/DL
WBC # BLD AUTO: 6.3 K/UL (ref 4.1–11.1)

## 2023-09-07 PROCEDURE — G0480 DRUG TEST DEF 1-7 CLASSES: HCPCS

## 2023-09-07 PROCEDURE — 83880 ASSAY OF NATRIURETIC PEPTIDE: CPT

## 2023-09-07 PROCEDURE — 80048 BASIC METABOLIC PNL TOTAL CA: CPT

## 2023-09-07 PROCEDURE — 97530 THERAPEUTIC ACTIVITIES: CPT

## 2023-09-07 PROCEDURE — 85705 THROMBOPLASTIN INHIBITION: CPT

## 2023-09-07 PROCEDURE — 2580000003 HC RX 258: Performed by: ANESTHESIOLOGY

## 2023-09-07 PROCEDURE — 6370000000 HC RX 637 (ALT 250 FOR IP): Performed by: STUDENT IN AN ORGANIZED HEALTH CARE EDUCATION/TRAINING PROGRAM

## 2023-09-07 PROCEDURE — 74176 CT ABD & PELVIS W/O CONTRAST: CPT

## 2023-09-07 PROCEDURE — P9047 ALBUMIN (HUMAN), 25%, 50ML: HCPCS | Performed by: ANESTHESIOLOGY

## 2023-09-07 PROCEDURE — 2500000003 HC RX 250 WO HCPCS: Performed by: ANESTHESIOLOGY

## 2023-09-07 PROCEDURE — 2000000000 HC ICU R&B

## 2023-09-07 PROCEDURE — 93308 TTE F-UP OR LMTD: CPT

## 2023-09-07 PROCEDURE — 71250 CT THORAX DX C-: CPT

## 2023-09-07 PROCEDURE — 85025 COMPLETE CBC W/AUTO DIFF WBC: CPT

## 2023-09-07 PROCEDURE — 84550 ASSAY OF BLOOD/URIC ACID: CPT

## 2023-09-07 PROCEDURE — 2500000003 HC RX 250 WO HCPCS: Performed by: NURSE PRACTITIONER

## 2023-09-07 PROCEDURE — 71045 X-RAY EXAM CHEST 1 VIEW: CPT

## 2023-09-07 PROCEDURE — 84165 PROTEIN E-PHORESIS SERUM: CPT

## 2023-09-07 PROCEDURE — 2700000000 HC OXYGEN THERAPY PER DAY

## 2023-09-07 PROCEDURE — 83615 LACTATE (LD) (LDH) ENZYME: CPT

## 2023-09-07 PROCEDURE — 97535 SELF CARE MNGMENT TRAINING: CPT | Performed by: OCCUPATIONAL THERAPIST

## 2023-09-07 PROCEDURE — 2500000003 HC RX 250 WO HCPCS: Performed by: INTERNAL MEDICINE

## 2023-09-07 PROCEDURE — 86334 IMMUNOFIX E-PHORESIS SERUM: CPT

## 2023-09-07 PROCEDURE — 94660 CPAP INITIATION&MGMT: CPT

## 2023-09-07 PROCEDURE — 80061 LIPID PANEL: CPT

## 2023-09-07 PROCEDURE — 36415 COLL VENOUS BLD VENIPUNCTURE: CPT

## 2023-09-07 PROCEDURE — 85732 THROMBOPLASTIN TIME PARTIAL: CPT

## 2023-09-07 PROCEDURE — 85730 THROMBOPLASTIN TIME PARTIAL: CPT

## 2023-09-07 PROCEDURE — 83521 IG LIGHT CHAINS FREE EACH: CPT

## 2023-09-07 PROCEDURE — G0103 PSA SCREENING: HCPCS

## 2023-09-07 PROCEDURE — 82306 VITAMIN D 25 HYDROXY: CPT

## 2023-09-07 PROCEDURE — 82375 ASSAY CARBOXYHB QUANT: CPT

## 2023-09-07 PROCEDURE — 2580000003 HC RX 258: Performed by: INTERNAL MEDICINE

## 2023-09-07 PROCEDURE — 99232 SBSQ HOSP IP/OBS MODERATE 35: CPT | Performed by: INTERNAL MEDICINE

## 2023-09-07 PROCEDURE — 84134 ASSAY OF PREALBUMIN: CPT

## 2023-09-07 PROCEDURE — 86235 NUCLEAR ANTIGEN ANTIBODY: CPT

## 2023-09-07 PROCEDURE — 86592 SYPHILIS TEST NON-TREP QUAL: CPT

## 2023-09-07 PROCEDURE — 82784 ASSAY IGA/IGD/IGG/IGM EACH: CPT

## 2023-09-07 PROCEDURE — 85670 THROMBIN TIME PLASMA: CPT

## 2023-09-07 PROCEDURE — 83735 ASSAY OF MAGNESIUM: CPT

## 2023-09-07 PROCEDURE — 6370000000 HC RX 637 (ALT 250 FOR IP): Performed by: ANESTHESIOLOGY

## 2023-09-07 PROCEDURE — 80076 HEPATIC FUNCTION PANEL: CPT

## 2023-09-07 PROCEDURE — 83050 HGB METHEMOGLOBIN QUAN: CPT

## 2023-09-07 PROCEDURE — 85598 HEXAGNAL PHOSPH PLTLT NEUTRL: CPT

## 2023-09-07 PROCEDURE — 85041 AUTOMATED RBC COUNT: CPT

## 2023-09-07 PROCEDURE — 6370000000 HC RX 637 (ALT 250 FOR IP): Performed by: NURSE PRACTITIONER

## 2023-09-07 PROCEDURE — 6360000002 HC RX W HCPCS: Performed by: ANESTHESIOLOGY

## 2023-09-07 PROCEDURE — 85613 RUSSELL VIPER VENOM DILUTED: CPT

## 2023-09-07 PROCEDURE — 70450 CT HEAD/BRAIN W/O DYE: CPT

## 2023-09-07 PROCEDURE — 82955 ASSAY OF G6PD ENZYME: CPT

## 2023-09-07 PROCEDURE — 83970 ASSAY OF PARATHORMONE: CPT

## 2023-09-07 PROCEDURE — 86022 PLATELET ANTIBODIES: CPT

## 2023-09-07 PROCEDURE — 86225 DNA ANTIBODY NATIVE: CPT

## 2023-09-07 RX ORDER — SPIRONOLACTONE 25 MG/1
25 TABLET ORAL DAILY
Status: DISCONTINUED | OUTPATIENT
Start: 2023-09-08 | End: 2023-09-19

## 2023-09-07 RX ORDER — BUMETANIDE 0.25 MG/ML
1 INJECTION INTRAMUSCULAR; INTRAVENOUS EVERY 6 HOURS PRN
Status: DISCONTINUED | OUTPATIENT
Start: 2023-09-07 | End: 2023-09-19

## 2023-09-07 RX ORDER — BUMETANIDE 0.25 MG/ML
1 INJECTION INTRAMUSCULAR; INTRAVENOUS 2 TIMES DAILY
Status: DISCONTINUED | OUTPATIENT
Start: 2023-09-07 | End: 2023-09-08

## 2023-09-07 RX ADMIN — ALBUMIN (HUMAN) 25 G: 0.25 INJECTION, SOLUTION INTRAVENOUS at 09:43

## 2023-09-07 RX ADMIN — BUMETANIDE 1 MG: 0.25 INJECTION INTRAMUSCULAR; INTRAVENOUS at 17:56

## 2023-09-07 RX ADMIN — TICAGRELOR 90 MG: 90 TABLET ORAL at 20:53

## 2023-09-07 RX ADMIN — PANTOPRAZOLE SODIUM 40 MG: 40 TABLET, DELAYED RELEASE ORAL at 23:04

## 2023-09-07 RX ADMIN — NICARDIPINE HYDROCHLORIDE 5 MG/HR: 25 INJECTION, SOLUTION INTRAVENOUS at 05:55

## 2023-09-07 RX ADMIN — PANTOPRAZOLE SODIUM 40 MG: 40 TABLET, DELAYED RELEASE ORAL at 13:02

## 2023-09-07 RX ADMIN — BUMETANIDE 1 MG: 0.25 INJECTION, SOLUTION INTRAMUSCULAR; INTRAVENOUS at 20:53

## 2023-09-07 RX ADMIN — AMIODARONE HYDROCHLORIDE 400 MG: 200 TABLET ORAL at 09:42

## 2023-09-07 RX ADMIN — NICARDIPINE HYDROCHLORIDE 7.5 MG/HR: 25 INJECTION, SOLUTION INTRAVENOUS at 17:55

## 2023-09-07 RX ADMIN — WATER 2000 MG: 1 INJECTION INTRAMUSCULAR; INTRAVENOUS; SUBCUTANEOUS at 23:04

## 2023-09-07 RX ADMIN — TICAGRELOR 90 MG: 90 TABLET ORAL at 09:42

## 2023-09-07 RX ADMIN — WATER 2000 MG: 1 INJECTION INTRAMUSCULAR; INTRAVENOUS; SUBCUTANEOUS at 06:21

## 2023-09-07 RX ADMIN — NICARDIPINE HYDROCHLORIDE 7.5 MG/HR: 25 INJECTION, SOLUTION INTRAVENOUS at 15:20

## 2023-09-07 RX ADMIN — BUMETANIDE 0.5 MG: 0.25 INJECTION INTRAMUSCULAR; INTRAVENOUS at 06:18

## 2023-09-07 RX ADMIN — NICARDIPINE HYDROCHLORIDE 10 MG/HR: 25 INJECTION, SOLUTION INTRAVENOUS at 23:03

## 2023-09-07 RX ADMIN — PANTOPRAZOLE SODIUM 40 MG: 40 TABLET, DELAYED RELEASE ORAL at 00:00

## 2023-09-07 RX ADMIN — SPIRONOLACTONE 12.5 MG: 25 TABLET ORAL at 09:41

## 2023-09-07 RX ADMIN — ALBUMIN (HUMAN) 25 G: 0.25 INJECTION, SOLUTION INTRAVENOUS at 23:04

## 2023-09-07 RX ADMIN — NICARDIPINE HYDROCHLORIDE 2.5 MG/HR: 25 INJECTION, SOLUTION INTRAVENOUS at 02:18

## 2023-09-07 RX ADMIN — NICARDIPINE HYDROCHLORIDE 7.5 MG/HR: 25 INJECTION, SOLUTION INTRAVENOUS at 10:15

## 2023-09-07 RX ADMIN — SODIUM BICARBONATE: 84 INJECTION, SOLUTION INTRAVENOUS at 20:52

## 2023-09-07 RX ADMIN — CHLORHEXIDINE GLUCONATE 15 ML: 1.2 RINSE ORAL at 13:02

## 2023-09-07 RX ADMIN — WATER 2000 MG: 1 INJECTION INTRAMUSCULAR; INTRAVENOUS; SUBCUTANEOUS at 16:09

## 2023-09-07 RX ADMIN — POTASSIUM BICARBONATE 20 MEQ: 782 TABLET, EFFERVESCENT ORAL at 09:42

## 2023-09-07 RX ADMIN — NICARDIPINE HYDROCHLORIDE 10 MG/HR: 25 INJECTION, SOLUTION INTRAVENOUS at 20:52

## 2023-09-07 RX ADMIN — POTASSIUM BICARBONATE 20 MEQ: 782 TABLET, EFFERVESCENT ORAL at 20:53

## 2023-09-07 RX ADMIN — BUMETANIDE 1 MG: 0.25 INJECTION, SOLUTION INTRAMUSCULAR; INTRAVENOUS at 09:42

## 2023-09-07 ASSESSMENT — PAIN SCALES - WONG BAKER
WONGBAKER_NUMERICALRESPONSE: 0

## 2023-09-07 ASSESSMENT — PAIN SCALES - GENERAL
PAINLEVEL_OUTOF10: 0

## 2023-09-07 NOTE — CARE COORDINATION
Transition of Care Plan:     RUR: 17%--moderate  Prior Level of Functioning: independent  Disposition: home with [address] [facility] PT/OT/SN with Chelsy  Follow up appointments: cardiology, PCP  DME needed: N/A  Transportation at discharge: in car w/family  IM/IMM Medicare/ letter given: 9/1/23  Is patient a [place] and connected with VA? no               If yes, was [employer] transfer form completed and VA notified? Caregiver Contact:   Janine Waite, significant other, [phone]  Read Sheri, sister, [phone]  Discharge Caregiver contacted prior to discharge? N/A  Care Conference needed? no  Barriers to discharge: none noted     Patient admitted from [facility] on 8/31 for heart failure. On 1.5L NC during the day and Cpap at night. Echo on 9/5 shows LVEF 25-30% which is improved from previous echo. Plan for repeat echo today. Dobutamine infusion turned off. Impella at P3. LVAD worked up to be completed by Ariel Schneider. PT/OT following.     Cuco Barber, [address]  [phone]

## 2023-09-07 NOTE — WOUND CARE
WOCN Note:     Follow up visit for leg wound. Seen in 4223    Chart shows:  Admitted on 8/31/23. Admitted for heart failure & transferred for LVAD workup; Impella. History of ischemic foot ulcer, s/p fem-pop bypass. Assessment:   Communicative and reports no pain. RN at bedside and reports no concerns with buttocks; FMS in place & using barrier cream for leaks. Surface: GUZMAN mattress    Bilateral heels intact and without erythema. Heels offloaded with pillows. Buttocks and sacrum intact and foam dressing in place for prevention. 1. POA chronic ulceration to right medial malleolus  4 x 5 x 0.5 cm  Scant serosanguinous exudate; no malodor or purulence - no gross S&S of infection  Periwound intact & without erythema; some fibrinous material in base of wound; lots of large dry skin plaques now lifting away. Cleaned wound and skin around it with Vashe. Tx: applied Puracol Ag and foam dressing      Wound Recommendations:    RLL: clean with saline, apply Puracol AG (left in room) and cover with foam dressing. Change every 3 days. PI Prevention:  Turn/reposition approximately every 2 hours  Offload heels with heels hanging off end of pillow at all times while in bed. Sacral Foam dressing: lift to assess regularly; change as needed. Discontinue if incontinence is frequently soiling dressing. Z-guard cream to buttocks and sacrum daily and as needed with incontinence care  Low Air Loss mattress: Use only flat sheet and one incontinence pad. Discussed with RN.     Transition of Care: Plan to follow weekly and as needed while admitted to hospital.     AMARILIS AcevedoN, RN, [facility]  Certified Wound, Ostomy, Continence Nurse  office [phone]  Available via [facility] [place]

## 2023-09-08 ENCOUNTER — APPOINTMENT (OUTPATIENT)
Facility: HOSPITAL | Age: 68
DRG: 161 | End: 2023-09-08
Attending: ANESTHESIOLOGY
Payer: MEDICAID

## 2023-09-08 LAB
ALBUMIN SERPL-MCNC: 4.5 G/DL (ref 3.5–5)
ALBUMIN/GLOB SERPL: 2 (ref 1.1–2.2)
ALP SERPL-CCNC: 102 U/L (ref 45–117)
ALT SERPL-CCNC: 13 U/L (ref 12–78)
ANION GAP SERPL CALC-SCNC: 4 MMOL/L (ref 5–15)
APTT PPP: 42.9 SEC (ref 22.1–31)
APTT PPP: 49.5 SEC (ref 22.1–31)
AST SERPL-CCNC: 19 U/L (ref 15–37)
BASOPHILS # BLD: 0 K/UL (ref 0–0.1)
BASOPHILS NFR BLD: 0 % (ref 0–1)
BILIRUB DIRECT SERPL-MCNC: 1 MG/DL (ref 0–0.2)
BILIRUB SERPL-MCNC: 2.1 MG/DL (ref 0.2–1)
BUN SERPL-MCNC: 15 MG/DL (ref 6–20)
BUN/CREAT SERPL: 16 (ref 12–20)
CALCIUM SERPL-MCNC: 8.7 MG/DL (ref 8.5–10.1)
CHLORIDE SERPL-SCNC: 107 MMOL/L (ref 97–108)
CO2 SERPL-SCNC: 27 MMOL/L (ref 21–32)
CREAT SERPL-MCNC: 0.94 MG/DL (ref 0.7–1.3)
DIFFERENTIAL METHOD BLD: ABNORMAL
ECHO BSA: 1.8 M2
ECHO EST RA PRESSURE: 15 MMHG
ECHO LA VOL 2C: 58 ML (ref 18–58)
ECHO LA VOL 2C: 61 ML (ref 18–58)
ECHO LA VOL 4C: 75 ML (ref 18–58)
ECHO LA VOL 4C: 83 ML (ref 18–58)
ECHO LA VOLUME AREA LENGTH: 72 ML
ECHO LA VOLUME INDEX AREA LENGTH: 40 ML/M2 (ref 16–34)
ECHO LV EDV A2C: 246 ML
ECHO LV EDV A4C: 178 ML
ECHO LV EDV BP: 213 ML (ref 67–155)
ECHO LV EDV INDEX A4C: 99 ML/M2
ECHO LV EDV INDEX BP: 119 ML/M2
ECHO LV EDV NDEX A2C: 137 ML/M2
ECHO LV EJECTION FRACTION A2C: 16 %
ECHO LV EJECTION FRACTION A4C: 32 %
ECHO LV EJECTION FRACTION BIPLANE: 18 % (ref 55–100)
ECHO LV ESV A2C: 206 ML
ECHO LV ESV A4C: 122 ML
ECHO LV ESV BP: 174 ML (ref 22–58)
ECHO LV ESV INDEX A2C: 115 ML/M2
ECHO LV ESV INDEX A4C: 68 ML/M2
ECHO LV ESV INDEX BP: 97 ML/M2
ECHO LV FRACTIONAL SHORTENING: 11 % (ref 28–44)
ECHO LV INTERNAL DIMENSION DIASTOLE INDEX: 3.63 CM/M2
ECHO LV INTERNAL DIMENSION DIASTOLIC: 6.5 CM (ref 4.2–5.9)
ECHO LV INTERNAL DIMENSION SYSTOLIC INDEX: 3.24 CM/M2
ECHO LV INTERNAL DIMENSION SYSTOLIC: 5.8 CM
ECHO LV IVSD: 0.7 CM (ref 0.6–1)
ECHO LV MASS 2D: 230.9 G (ref 88–224)
ECHO LV MASS INDEX 2D: 129 G/M2 (ref 49–115)
ECHO LV POSTERIOR WALL DIASTOLIC: 1 CM (ref 0.6–1)
ECHO LV RELATIVE WALL THICKNESS RATIO: 0.31
ECHO MV A VELOCITY: 0.88 M/S
ECHO MV AREA PHT: 4.5 CM2
ECHO MV E DECELERATION TIME (DT): 169.1 MS
ECHO MV E VELOCITY: 0.96 M/S
ECHO MV E/A RATIO: 1.09
ECHO MV PRESSURE HALF TIME (PHT): 49.1 MS
ECHO MV REGURGITANT PEAK GRADIENT: 67 MMHG
ECHO MV REGURGITANT PEAK VELOCITY: 4.1 M/S
ECHO RIGHT VENTRICULAR SYSTOLIC PRESSURE (RVSP): 51 MMHG
ECHO RV TAPSE: 1.8 CM (ref 1.7–?)
ECHO TV REGURGITANT MAX VELOCITY: 3.02 M/S
ECHO TV REGURGITANT PEAK GRADIENT: 37 MMHG
EOSINOPHIL # BLD: 0.1 K/UL (ref 0–0.4)
EOSINOPHIL NFR BLD: 2 % (ref 0–7)
ERYTHROCYTE [DISTWIDTH] IN BLOOD BY AUTOMATED COUNT: 15.5 % (ref 11.5–14.5)
FERRITIN SERPL-MCNC: 292 NG/ML (ref 26–388)
GLOBULIN SER CALC-MCNC: 2.2 G/DL (ref 2–4)
GLUCOSE SERPL-MCNC: 116 MG/DL (ref 65–100)
HCT VFR BLD AUTO: 25.5 % (ref 36.6–50.3)
HGB BLD-MCNC: 8.3 G/DL (ref 12.1–17)
IMM GRANULOCYTES # BLD AUTO: 0.1 K/UL (ref 0–0.04)
IMM GRANULOCYTES NFR BLD AUTO: 1 % (ref 0–0.5)
IRON SATN MFR SERPL: 19 % (ref 20–50)
IRON SERPL-MCNC: 21 UG/DL (ref 35–150)
LDH SERPL L TO P-CCNC: 340 U/L (ref 85–241)
LIPASE SERPL-CCNC: 545 U/L (ref 73–393)
LYMPHOCYTES # BLD: 1 K/UL (ref 0.8–3.5)
LYMPHOCYTES NFR BLD: 17 % (ref 12–49)
MAGNESIUM SERPL-MCNC: 1.9 MG/DL (ref 1.6–2.4)
MCH RBC QN AUTO: 29 PG (ref 26–34)
MCHC RBC AUTO-ENTMCNC: 32.5 G/DL (ref 30–36.5)
MCV RBC AUTO: 89.2 FL (ref 80–99)
MONOCYTES # BLD: 0.7 K/UL (ref 0–1)
MONOCYTES NFR BLD: 13 % (ref 5–13)
NEUTS SEG # BLD: 3.9 K/UL (ref 1.8–8)
NEUTS SEG NFR BLD: 67 % (ref 32–75)
NRBC # BLD: 0 K/UL (ref 0–0.01)
NRBC BLD-RTO: 0 PER 100 WBC
NT PRO BNP: 7419 PG/ML
PF4 HEPARIN CMPLX AB SER-ACNC: 0.27 OD (ref 0–0.4)
PLATELET # BLD AUTO: 242 K/UL (ref 150–400)
PMV BLD AUTO: 10.5 FL (ref 8.9–12.9)
POTASSIUM SERPL-SCNC: 4.2 MMOL/L (ref 3.5–5.1)
PROT SERPL-MCNC: 6.7 G/DL (ref 6.4–8.2)
RBC # BLD AUTO: 2.86 M/UL (ref 4.1–5.7)
SODIUM SERPL-SCNC: 138 MMOL/L (ref 136–145)
THERAPEUTIC RANGE: ABNORMAL SECS (ref 58–77)
THERAPEUTIC RANGE: ABNORMAL SECS (ref 58–77)
TIBC SERPL-MCNC: 110 UG/DL (ref 250–450)
WBC # BLD AUTO: 5.8 K/UL (ref 4.1–11.1)

## 2023-09-08 PROCEDURE — 83880 ASSAY OF NATRIURETIC PEPTIDE: CPT

## 2023-09-08 PROCEDURE — 94660 CPAP INITIATION&MGMT: CPT

## 2023-09-08 PROCEDURE — 2580000003 HC RX 258: Performed by: NURSE PRACTITIONER

## 2023-09-08 PROCEDURE — 80076 HEPATIC FUNCTION PANEL: CPT

## 2023-09-08 PROCEDURE — 36415 COLL VENOUS BLD VENIPUNCTURE: CPT

## 2023-09-08 PROCEDURE — 83615 LACTATE (LD) (LDH) ENZYME: CPT

## 2023-09-08 PROCEDURE — 6370000000 HC RX 637 (ALT 250 FOR IP): Performed by: STUDENT IN AN ORGANIZED HEALTH CARE EDUCATION/TRAINING PROGRAM

## 2023-09-08 PROCEDURE — 6370000000 HC RX 637 (ALT 250 FOR IP): Performed by: ANESTHESIOLOGY

## 2023-09-08 PROCEDURE — 2000000000 HC ICU R&B

## 2023-09-08 PROCEDURE — 93321 DOPPLER ECHO F-UP/LMTD STD: CPT | Performed by: SPECIALIST

## 2023-09-08 PROCEDURE — 83540 ASSAY OF IRON: CPT

## 2023-09-08 PROCEDURE — 2580000003 HC RX 258: Performed by: ANESTHESIOLOGY

## 2023-09-08 PROCEDURE — 85025 COMPLETE CBC W/AUTO DIFF WBC: CPT

## 2023-09-08 PROCEDURE — 2500000003 HC RX 250 WO HCPCS: Performed by: INTERNAL MEDICINE

## 2023-09-08 PROCEDURE — 97116 GAIT TRAINING THERAPY: CPT

## 2023-09-08 PROCEDURE — 93325 DOPPLER ECHO COLOR FLOW MAPG: CPT | Performed by: SPECIALIST

## 2023-09-08 PROCEDURE — 84597 ASSAY OF VITAMIN K: CPT

## 2023-09-08 PROCEDURE — 6360000002 HC RX W HCPCS: Performed by: ANESTHESIOLOGY

## 2023-09-08 PROCEDURE — 83735 ASSAY OF MAGNESIUM: CPT

## 2023-09-08 PROCEDURE — 99222 1ST HOSP IP/OBS MODERATE 55: CPT | Performed by: NURSE PRACTITIONER

## 2023-09-08 PROCEDURE — 82728 ASSAY OF FERRITIN: CPT

## 2023-09-08 PROCEDURE — 6370000000 HC RX 637 (ALT 250 FOR IP): Performed by: INTERNAL MEDICINE

## 2023-09-08 PROCEDURE — 83690 ASSAY OF LIPASE: CPT

## 2023-09-08 PROCEDURE — 97530 THERAPEUTIC ACTIVITIES: CPT

## 2023-09-08 PROCEDURE — 80048 BASIC METABOLIC PNL TOTAL CA: CPT

## 2023-09-08 PROCEDURE — 76700 US EXAM ABDOM COMPLETE: CPT

## 2023-09-08 PROCEDURE — 6360000002 HC RX W HCPCS: Performed by: NURSE PRACTITIONER

## 2023-09-08 PROCEDURE — 2500000003 HC RX 250 WO HCPCS: Performed by: ANESTHESIOLOGY

## 2023-09-08 PROCEDURE — 2580000003 HC RX 258: Performed by: INTERNAL MEDICINE

## 2023-09-08 PROCEDURE — 83550 IRON BINDING TEST: CPT

## 2023-09-08 PROCEDURE — 85730 THROMBOPLASTIN TIME PARTIAL: CPT

## 2023-09-08 PROCEDURE — 93308 TTE F-UP OR LMTD: CPT | Performed by: SPECIALIST

## 2023-09-08 PROCEDURE — 94010 BREATHING CAPACITY TEST: CPT

## 2023-09-08 PROCEDURE — 99232 SBSQ HOSP IP/OBS MODERATE 35: CPT | Performed by: INTERNAL MEDICINE

## 2023-09-08 RX ORDER — BUMETANIDE 0.25 MG/ML
2 INJECTION INTRAMUSCULAR; INTRAVENOUS 2 TIMES DAILY
Status: DISCONTINUED | OUTPATIENT
Start: 2023-09-08 | End: 2023-09-08

## 2023-09-08 RX ORDER — ERGOCALCIFEROL 1.25 MG/1
50000 CAPSULE ORAL WEEKLY
Status: DISCONTINUED | OUTPATIENT
Start: 2023-09-08 | End: 2023-10-03 | Stop reason: HOSPADM

## 2023-09-08 RX ORDER — BUMETANIDE 0.25 MG/ML
2 INJECTION INTRAMUSCULAR; INTRAVENOUS DAILY
Status: DISCONTINUED | OUTPATIENT
Start: 2023-09-09 | End: 2023-09-10

## 2023-09-08 RX ADMIN — BUMETANIDE 2 MG: 0.25 INJECTION INTRAMUSCULAR; INTRAVENOUS at 08:35

## 2023-09-08 RX ADMIN — NICARDIPINE HYDROCHLORIDE 7.5 MG/HR: 25 INJECTION, SOLUTION INTRAVENOUS at 04:50

## 2023-09-08 RX ADMIN — NICARDIPINE HYDROCHLORIDE 7.5 MG/HR: 25 INJECTION, SOLUTION INTRAVENOUS at 14:00

## 2023-09-08 RX ADMIN — NICARDIPINE HYDROCHLORIDE 5 MG/HR: 25 INJECTION, SOLUTION INTRAVENOUS at 17:58

## 2023-09-08 RX ADMIN — ACETAMINOPHEN 650 MG: 325 TABLET ORAL at 20:15

## 2023-09-08 RX ADMIN — ERGOCALCIFEROL 50000 UNITS: 1.25 CAPSULE ORAL at 08:36

## 2023-09-08 RX ADMIN — BIVALIRUDIN 0.07 MG/KG/HR: 250 INJECTION, POWDER, LYOPHILIZED, FOR SOLUTION INTRAVENOUS at 04:51

## 2023-09-08 RX ADMIN — WATER 2000 MG: 1 INJECTION INTRAMUSCULAR; INTRAVENOUS; SUBCUTANEOUS at 22:18

## 2023-09-08 RX ADMIN — NICARDIPINE HYDROCHLORIDE 5 MG/HR: 25 INJECTION, SOLUTION INTRAVENOUS at 08:35

## 2023-09-08 RX ADMIN — NICARDIPINE HYDROCHLORIDE 5 MG/HR: 25 INJECTION, SOLUTION INTRAVENOUS at 17:06

## 2023-09-08 RX ADMIN — SPIRONOLACTONE 25 MG: 25 TABLET ORAL at 08:36

## 2023-09-08 RX ADMIN — TICAGRELOR 90 MG: 90 TABLET ORAL at 08:36

## 2023-09-08 RX ADMIN — POTASSIUM BICARBONATE 20 MEQ: 782 TABLET, EFFERVESCENT ORAL at 08:36

## 2023-09-08 RX ADMIN — PANTOPRAZOLE SODIUM 40 MG: 40 TABLET, DELAYED RELEASE ORAL at 12:24

## 2023-09-08 RX ADMIN — PANTOPRAZOLE SODIUM 40 MG: 40 TABLET, DELAYED RELEASE ORAL at 22:17

## 2023-09-08 RX ADMIN — NICARDIPINE HYDROCHLORIDE 10 MG/HR: 25 INJECTION, SOLUTION INTRAVENOUS at 01:47

## 2023-09-08 RX ADMIN — WATER 2000 MG: 1 INJECTION INTRAMUSCULAR; INTRAVENOUS; SUBCUTANEOUS at 07:02

## 2023-09-08 RX ADMIN — ACETAMINOPHEN 650 MG: 325 TABLET ORAL at 14:05

## 2023-09-08 RX ADMIN — WATER 2000 MG: 1 INJECTION INTRAMUSCULAR; INTRAVENOUS; SUBCUTANEOUS at 14:38

## 2023-09-08 RX ADMIN — SODIUM BICARBONATE: 84 INJECTION, SOLUTION INTRAVENOUS at 22:18

## 2023-09-08 RX ADMIN — AMIODARONE HYDROCHLORIDE 400 MG: 200 TABLET ORAL at 08:36

## 2023-09-08 RX ADMIN — NICARDIPINE HYDROCHLORIDE 5 MG/HR: 25 INJECTION, SOLUTION INTRAVENOUS at 23:55

## 2023-09-08 RX ADMIN — POTASSIUM BICARBONATE 20 MEQ: 782 TABLET, EFFERVESCENT ORAL at 20:15

## 2023-09-08 RX ADMIN — TICAGRELOR 90 MG: 90 TABLET ORAL at 20:16

## 2023-09-08 ASSESSMENT — PAIN DESCRIPTION - DESCRIPTORS
DESCRIPTORS: ACHING
DESCRIPTORS: ACHING
DESCRIPTORS: THROBBING

## 2023-09-08 ASSESSMENT — PAIN SCALES - GENERAL
PAINLEVEL_OUTOF10: 0
PAINLEVEL_OUTOF10: 6
PAINLEVEL_OUTOF10: 0
PAINLEVEL_OUTOF10: 0
PAINLEVEL_OUTOF10: 6
PAINLEVEL_OUTOF10: 0
PAINLEVEL_OUTOF10: 8
PAINLEVEL_OUTOF10: 0
PAINLEVEL_OUTOF10: 0

## 2023-09-08 ASSESSMENT — PAIN DESCRIPTION - LOCATION
LOCATION: FOOT
LOCATION: INCISION;CHEST
LOCATION: FOOT

## 2023-09-08 ASSESSMENT — PAIN SCALES - WONG BAKER
WONGBAKER_NUMERICALRESPONSE: 0
WONGBAKER_NUMERICALRESPONSE: 6

## 2023-09-08 ASSESSMENT — PAIN DESCRIPTION - ORIENTATION
ORIENTATION: RIGHT

## 2023-09-09 ENCOUNTER — APPOINTMENT (OUTPATIENT)
Facility: HOSPITAL | Age: 68
DRG: 161 | End: 2023-09-09
Attending: INTERNAL MEDICINE
Payer: MEDICAID

## 2023-09-09 LAB
ALBUMIN SERPL-MCNC: 4.3 G/DL (ref 3.5–5)
ALBUMIN/GLOB SERPL: 1.9 (ref 1.1–2.2)
ALP SERPL-CCNC: 109 U/L (ref 45–117)
ALT SERPL-CCNC: 13 U/L (ref 12–78)
ANION GAP SERPL CALC-SCNC: 4 MMOL/L (ref 5–15)
ANION GAP SERPL CALC-SCNC: 6 MMOL/L (ref 5–15)
APTT PPP: 48.7 SEC (ref 22.1–31)
APTT PPP: 49.5 SEC (ref 22.1–31)
AST SERPL-CCNC: 22 U/L (ref 15–37)
BASOPHILS # BLD: 0 K/UL (ref 0–0.1)
BASOPHILS NFR BLD: 1 % (ref 0–1)
BILIRUB DIRECT SERPL-MCNC: 0.8 MG/DL (ref 0–0.2)
BILIRUB SERPL-MCNC: 1.9 MG/DL (ref 0.2–1)
BUN SERPL-MCNC: 15 MG/DL (ref 6–20)
BUN SERPL-MCNC: 21 MG/DL (ref 6–20)
BUN/CREAT SERPL: 16 (ref 12–20)
BUN/CREAT SERPL: 16 (ref 12–20)
CALCIUM SERPL-MCNC: 8.8 MG/DL (ref 8.5–10.1)
CALCIUM SERPL-MCNC: 9.1 MG/DL (ref 8.5–10.1)
CENTROMERE B AB SER-ACNC: <0.2 AI (ref 0–0.9)
CHLORIDE SERPL-SCNC: 103 MMOL/L (ref 97–108)
CHLORIDE SERPL-SCNC: 104 MMOL/L (ref 97–108)
CHROMATIN AB SERPL-ACNC: <0.2 AI (ref 0–0.9)
CO2 SERPL-SCNC: 26 MMOL/L (ref 21–32)
CO2 SERPL-SCNC: 29 MMOL/L (ref 21–32)
CREAT SERPL-MCNC: 0.91 MG/DL (ref 0.7–1.3)
CREAT SERPL-MCNC: 1.33 MG/DL (ref 0.7–1.3)
DIFFERENTIAL METHOD BLD: ABNORMAL
DSDNA AB SER-ACNC: <1 IU/ML (ref 0–9)
ENA JO1 AB SER-ACNC: <0.2 AI (ref 0–0.9)
ENA RNP AB SER-ACNC: <0.2 AI (ref 0–0.9)
ENA SCL70 AB SER-ACNC: <0.2 AI (ref 0–0.9)
ENA SM AB SER-ACNC: <0.2 AI (ref 0–0.9)
ENA SS-A AB SER-ACNC: <0.2 AI (ref 0–0.9)
ENA SS-B AB SER-ACNC: <0.2 AI (ref 0–0.9)
EOSINOPHIL # BLD: 0.2 K/UL (ref 0–0.4)
EOSINOPHIL NFR BLD: 4 % (ref 0–7)
ERYTHROCYTE [DISTWIDTH] IN BLOOD BY AUTOMATED COUNT: 15.3 % (ref 11.5–14.5)
G6PD BLD QN: 275 U/10E12 RBC (ref 127–427)
GLOBULIN SER CALC-MCNC: 2.3 G/DL (ref 2–4)
GLUCOSE SERPL-MCNC: 110 MG/DL (ref 65–100)
GLUCOSE SERPL-MCNC: 161 MG/DL (ref 65–100)
HCT VFR BLD AUTO: 26.1 % (ref 36.6–50.3)
HGB BLD-MCNC: 8.5 G/DL (ref 12.1–17)
IMM GRANULOCYTES # BLD AUTO: 0.1 K/UL (ref 0–0.04)
IMM GRANULOCYTES NFR BLD AUTO: 1 % (ref 0–0.5)
LDH SERPL L TO P-CCNC: 330 U/L (ref 85–241)
LYMPHOCYTES # BLD: 0.9 K/UL (ref 0.8–3.5)
LYMPHOCYTES NFR BLD: 18 % (ref 12–49)
Lab: NORMAL
MAGNESIUM SERPL-MCNC: 2.1 MG/DL (ref 1.6–2.4)
MCH RBC QN AUTO: 29.2 PG (ref 26–34)
MCHC RBC AUTO-ENTMCNC: 32.6 G/DL (ref 30–36.5)
MCV RBC AUTO: 89.7 FL (ref 80–99)
MONOCYTES # BLD: 0.7 K/UL (ref 0–1)
MONOCYTES NFR BLD: 14 % (ref 5–13)
NEUTS SEG # BLD: 3.2 K/UL (ref 1.8–8)
NEUTS SEG NFR BLD: 62 % (ref 32–75)
NRBC # BLD: 0 K/UL (ref 0–0.01)
NRBC BLD-RTO: 0 PER 100 WBC
NT PRO BNP: 6474 PG/ML
PLATELET # BLD AUTO: 292 K/UL (ref 150–400)
PMV BLD AUTO: 11 FL (ref 8.9–12.9)
POTASSIUM SERPL-SCNC: 4.3 MMOL/L (ref 3.5–5.1)
POTASSIUM SERPL-SCNC: 4.8 MMOL/L (ref 3.5–5.1)
PROT SERPL-MCNC: 6.6 G/DL (ref 6.4–8.2)
RBC # BLD AUTO: 2.91 M/UL (ref 4.1–5.7)
RBC # BLD AUTO: 3.18 X10E6/UL (ref 4.14–5.8)
SODIUM SERPL-SCNC: 135 MMOL/L (ref 136–145)
SODIUM SERPL-SCNC: 137 MMOL/L (ref 136–145)
THERAPEUTIC RANGE: ABNORMAL SECS (ref 58–77)
THERAPEUTIC RANGE: ABNORMAL SECS (ref 58–77)
WBC # BLD AUTO: 5.2 K/UL (ref 4.1–11.1)

## 2023-09-09 PROCEDURE — 83615 LACTATE (LD) (LDH) ENZYME: CPT

## 2023-09-09 PROCEDURE — 2500000003 HC RX 250 WO HCPCS: Performed by: ANESTHESIOLOGY

## 2023-09-09 PROCEDURE — 36415 COLL VENOUS BLD VENIPUNCTURE: CPT

## 2023-09-09 PROCEDURE — 2580000003 HC RX 258: Performed by: INTERNAL MEDICINE

## 2023-09-09 PROCEDURE — 85025 COMPLETE CBC W/AUTO DIFF WBC: CPT

## 2023-09-09 PROCEDURE — 85730 THROMBOPLASTIN TIME PARTIAL: CPT

## 2023-09-09 PROCEDURE — 6370000000 HC RX 637 (ALT 250 FOR IP): Performed by: INTERNAL MEDICINE

## 2023-09-09 PROCEDURE — 93308 TTE F-UP OR LMTD: CPT

## 2023-09-09 PROCEDURE — 2000000000 HC ICU R&B

## 2023-09-09 PROCEDURE — 83880 ASSAY OF NATRIURETIC PEPTIDE: CPT

## 2023-09-09 PROCEDURE — 83735 ASSAY OF MAGNESIUM: CPT

## 2023-09-09 PROCEDURE — 6370000000 HC RX 637 (ALT 250 FOR IP): Performed by: ANESTHESIOLOGY

## 2023-09-09 PROCEDURE — 6370000000 HC RX 637 (ALT 250 FOR IP): Performed by: STUDENT IN AN ORGANIZED HEALTH CARE EDUCATION/TRAINING PROGRAM

## 2023-09-09 PROCEDURE — 6360000002 HC RX W HCPCS: Performed by: ANESTHESIOLOGY

## 2023-09-09 PROCEDURE — 99233 SBSQ HOSP IP/OBS HIGH 50: CPT | Performed by: INTERNAL MEDICINE

## 2023-09-09 PROCEDURE — 2500000003 HC RX 250 WO HCPCS: Performed by: INTERNAL MEDICINE

## 2023-09-09 PROCEDURE — 80048 BASIC METABOLIC PNL TOTAL CA: CPT

## 2023-09-09 PROCEDURE — 2580000003 HC RX 258: Performed by: ANESTHESIOLOGY

## 2023-09-09 PROCEDURE — 80076 HEPATIC FUNCTION PANEL: CPT

## 2023-09-09 PROCEDURE — 94660 CPAP INITIATION&MGMT: CPT

## 2023-09-09 RX ORDER — POLYETHYLENE GLYCOL 3350 17 G/17G
17 POWDER, FOR SOLUTION ORAL DAILY PRN
Status: DISCONTINUED | OUTPATIENT
Start: 2023-09-09 | End: 2023-09-19

## 2023-09-09 RX ORDER — HYDRALAZINE HYDROCHLORIDE 25 MG/1
25 TABLET, FILM COATED ORAL EVERY 8 HOURS SCHEDULED
Status: DISCONTINUED | OUTPATIENT
Start: 2023-09-09 | End: 2023-09-10

## 2023-09-09 RX ORDER — ATORVASTATIN CALCIUM 40 MG/1
40 TABLET, FILM COATED ORAL NIGHTLY
Status: DISCONTINUED | OUTPATIENT
Start: 2023-09-09 | End: 2023-09-19

## 2023-09-09 RX ADMIN — POLYETHYLENE GLYCOL 3350 17 G: 17 POWDER, FOR SOLUTION ORAL at 08:26

## 2023-09-09 RX ADMIN — NICARDIPINE HYDROCHLORIDE 15 MG/HR: 25 INJECTION, SOLUTION INTRAVENOUS at 14:29

## 2023-09-09 RX ADMIN — NICARDIPINE HYDROCHLORIDE 15 MG/HR: 25 INJECTION, SOLUTION INTRAVENOUS at 12:14

## 2023-09-09 RX ADMIN — WATER 2000 MG: 1 INJECTION INTRAMUSCULAR; INTRAVENOUS; SUBCUTANEOUS at 23:25

## 2023-09-09 RX ADMIN — WATER 2000 MG: 1 INJECTION INTRAMUSCULAR; INTRAVENOUS; SUBCUTANEOUS at 14:30

## 2023-09-09 RX ADMIN — PANTOPRAZOLE SODIUM 40 MG: 40 TABLET, DELAYED RELEASE ORAL at 23:25

## 2023-09-09 RX ADMIN — HYDRALAZINE HYDROCHLORIDE 25 MG: 25 TABLET, FILM COATED ORAL at 08:26

## 2023-09-09 RX ADMIN — NICARDIPINE HYDROCHLORIDE 5 MG/HR: 25 INJECTION, SOLUTION INTRAVENOUS at 04:54

## 2023-09-09 RX ADMIN — BUMETANIDE 2 MG: 0.25 INJECTION, SOLUTION INTRAMUSCULAR; INTRAVENOUS at 08:16

## 2023-09-09 RX ADMIN — SODIUM BICARBONATE: 84 INJECTION, SOLUTION INTRAVENOUS at 23:02

## 2023-09-09 RX ADMIN — POTASSIUM BICARBONATE 20 MEQ: 782 TABLET, EFFERVESCENT ORAL at 20:03

## 2023-09-09 RX ADMIN — TICAGRELOR 90 MG: 90 TABLET ORAL at 20:03

## 2023-09-09 RX ADMIN — SPIRONOLACTONE 25 MG: 25 TABLET ORAL at 08:16

## 2023-09-09 RX ADMIN — AMIODARONE HYDROCHLORIDE 400 MG: 200 TABLET ORAL at 08:16

## 2023-09-09 RX ADMIN — HYDRALAZINE HYDROCHLORIDE 25 MG: 25 TABLET, FILM COATED ORAL at 20:03

## 2023-09-09 RX ADMIN — HYDRALAZINE HYDROCHLORIDE 25 MG: 25 TABLET, FILM COATED ORAL at 14:30

## 2023-09-09 RX ADMIN — NICARDIPINE HYDROCHLORIDE 10 MG/HR: 25 INJECTION, SOLUTION INTRAVENOUS at 08:12

## 2023-09-09 RX ADMIN — NICARDIPINE HYDROCHLORIDE 15 MG/HR: 25 INJECTION, SOLUTION INTRAVENOUS at 17:51

## 2023-09-09 RX ADMIN — TICAGRELOR 90 MG: 90 TABLET ORAL at 08:16

## 2023-09-09 RX ADMIN — PANTOPRAZOLE SODIUM 40 MG: 40 TABLET, DELAYED RELEASE ORAL at 10:36

## 2023-09-09 RX ADMIN — NICARDIPINE HYDROCHLORIDE 12.5 MG/HR: 25 INJECTION, SOLUTION INTRAVENOUS at 10:36

## 2023-09-09 RX ADMIN — NICARDIPINE HYDROCHLORIDE 15 MG/HR: 25 INJECTION, SOLUTION INTRAVENOUS at 21:27

## 2023-09-09 RX ADMIN — POTASSIUM BICARBONATE 20 MEQ: 782 TABLET, EFFERVESCENT ORAL at 08:16

## 2023-09-09 RX ADMIN — NICARDIPINE HYDROCHLORIDE 15 MG/HR: 25 INJECTION, SOLUTION INTRAVENOUS at 15:53

## 2023-09-09 RX ADMIN — NICARDIPINE HYDROCHLORIDE 15 MG/HR: 25 INJECTION, SOLUTION INTRAVENOUS at 23:25

## 2023-09-09 RX ADMIN — ATORVASTATIN CALCIUM 40 MG: 40 TABLET, FILM COATED ORAL at 20:03

## 2023-09-09 RX ADMIN — WATER 2000 MG: 1 INJECTION INTRAMUSCULAR; INTRAVENOUS; SUBCUTANEOUS at 07:20

## 2023-09-09 RX ADMIN — ACETAMINOPHEN 650 MG: 325 TABLET ORAL at 14:36

## 2023-09-09 RX ADMIN — NICARDIPINE HYDROCHLORIDE 15 MG/HR: 25 INJECTION, SOLUTION INTRAVENOUS at 19:45

## 2023-09-09 ASSESSMENT — PAIN DESCRIPTION - DESCRIPTORS: DESCRIPTORS: ACHING

## 2023-09-09 ASSESSMENT — PAIN DESCRIPTION - LOCATION: LOCATION: SHOULDER

## 2023-09-09 ASSESSMENT — PAIN SCALES - WONG BAKER
WONGBAKER_NUMERICALRESPONSE: 0
WONGBAKER_NUMERICALRESPONSE: 0

## 2023-09-09 ASSESSMENT — PAIN SCALES - GENERAL
PAINLEVEL_OUTOF10: 0
PAINLEVEL_OUTOF10: 5
PAINLEVEL_OUTOF10: 0

## 2023-09-09 ASSESSMENT — PAIN DESCRIPTION - ORIENTATION: ORIENTATION: RIGHT

## 2023-09-10 ENCOUNTER — APPOINTMENT (OUTPATIENT)
Facility: HOSPITAL | Age: 68
DRG: 161 | End: 2023-09-10
Attending: INTERNAL MEDICINE
Payer: MEDICAID

## 2023-09-10 ENCOUNTER — APPOINTMENT (OUTPATIENT)
Facility: HOSPITAL | Age: 68
DRG: 161 | End: 2023-09-10
Attending: ANESTHESIOLOGY
Payer: MEDICAID

## 2023-09-10 LAB
ALBUMIN SERPL-MCNC: 4.2 G/DL (ref 3.5–5)
ALBUMIN/GLOB SERPL: 1.6 (ref 1.1–2.2)
ALP SERPL-CCNC: 158 U/L (ref 45–117)
ALT SERPL-CCNC: 12 U/L (ref 12–78)
ANION GAP SERPL CALC-SCNC: 5 MMOL/L (ref 5–15)
APTT PPP: 48.1 SEC (ref 22.1–31)
APTT PPP: 49.7 SEC (ref 22.1–31)
APTT SCREEN TO CONFIRM RATIO: 1 RATIO (ref 0–1.34)
AST SERPL-CCNC: 25 U/L (ref 15–37)
BASOPHILS # BLD: 0 K/UL (ref 0–0.1)
BASOPHILS NFR BLD: 1 % (ref 0–1)
BILIRUB DIRECT SERPL-MCNC: 0.7 MG/DL (ref 0–0.2)
BILIRUB SERPL-MCNC: 1.4 MG/DL (ref 0.2–1)
BUN SERPL-MCNC: 28 MG/DL (ref 6–20)
BUN/CREAT SERPL: 18 (ref 12–20)
CALCIUM SERPL-MCNC: 9.1 MG/DL (ref 8.5–10.1)
CHLORIDE SERPL-SCNC: 104 MMOL/L (ref 97–108)
CO2 SERPL-SCNC: 26 MMOL/L (ref 21–32)
CONFIRM APTT/NORMAL: 103.8 SEC (ref 0–47.6)
CREAT SERPL-MCNC: 1.58 MG/DL (ref 0.7–1.3)
DIFFERENTIAL METHOD BLD: ABNORMAL
DRVVT RATIO: 1.6 RATIO (ref 0.8–1.2)
ECHO BSA: 1.83 M2
ECHO EST RA PRESSURE: 15 MMHG
ECHO LA VOL 2C: 60 ML (ref 18–58)
ECHO LA VOL 2C: 62 ML (ref 18–58)
ECHO LA VOL 4C: 88 ML (ref 18–58)
ECHO LA VOL 4C: 88 ML (ref 18–58)
ECHO LA VOLUME AREA LENGTH: 77 ML
ECHO LA VOLUME INDEX AREA LENGTH: 42 ML/M2 (ref 16–34)
ECHO LV E' LATERAL VELOCITY: 10 CM/S
ECHO LV EDV A2C: 230 ML
ECHO LV EDV A4C: 186 ML
ECHO LV EDV BP: 219 ML (ref 67–155)
ECHO LV EDV INDEX A4C: 102 ML/M2
ECHO LV EDV INDEX BP: 120 ML/M2
ECHO LV EDV NDEX A2C: 126 ML/M2
ECHO LV EJECTION FRACTION A2C: 35 %
ECHO LV EJECTION FRACTION A4C: 43 %
ECHO LV EJECTION FRACTION BIPLANE: 39 % (ref 55–100)
ECHO LV ESV A2C: 149 ML
ECHO LV ESV A4C: 107 ML
ECHO LV ESV BP: 134 ML (ref 22–58)
ECHO LV ESV INDEX A2C: 82 ML/M2
ECHO LV ESV INDEX A4C: 59 ML/M2
ECHO LV ESV INDEX BP: 74 ML/M2
ECHO LV FRACTIONAL SHORTENING: 10 % (ref 28–44)
ECHO LV INTERNAL DIMENSION DIASTOLE INDEX: 3.46 CM/M2
ECHO LV INTERNAL DIMENSION DIASTOLIC: 6.3 CM (ref 4.2–5.9)
ECHO LV INTERNAL DIMENSION SYSTOLIC INDEX: 3.13 CM/M2
ECHO LV INTERNAL DIMENSION SYSTOLIC: 5.7 CM
ECHO LV IVSD: 0.8 CM (ref 0.6–1)
ECHO LV MASS 2D: 187.4 G (ref 88–224)
ECHO LV MASS INDEX 2D: 103 G/M2 (ref 49–115)
ECHO LV POSTERIOR WALL DIASTOLIC: 0.7 CM (ref 0.6–1)
ECHO LV RELATIVE WALL THICKNESS RATIO: 0.22
ECHO LVOT PEAK GRADIENT: 2 MMHG
ECHO LVOT PEAK VELOCITY: 0.8 M/S
ECHO MV A VELOCITY: 1.01 M/S
ECHO MV AREA PHT: 4.8 CM2
ECHO MV E DECELERATION TIME (DT): 158.8 MS
ECHO MV E VELOCITY: 0.99 M/S
ECHO MV E/A RATIO: 0.98
ECHO MV E/E' LATERAL: 9.9
ECHO MV PRESSURE HALF TIME (PHT): 46.1 MS
ECHO PV ACCELERATION TIME (AT): 79.9 MS
ECHO RIGHT VENTRICULAR SYSTOLIC PRESSURE (RVSP): 51 MMHG
ECHO RV TAPSE: 1.3 CM (ref 1.7–?)
ECHO TV REGURGITANT MAX VELOCITY: 2.98 M/S
ECHO TV REGURGITANT PEAK GRADIENT: 35 MMHG
EOSINOPHIL # BLD: 0.1 K/UL (ref 0–0.4)
EOSINOPHIL NFR BLD: 2 % (ref 0–7)
ERYTHROCYTE [DISTWIDTH] IN BLOOD BY AUTOMATED COUNT: 15.1 % (ref 11.5–14.5)
GLOBULIN SER CALC-MCNC: 2.7 G/DL (ref 2–4)
GLUCOSE SERPL-MCNC: 147 MG/DL (ref 65–100)
HCT VFR BLD AUTO: 26.8 % (ref 36.6–50.3)
HEXAGONAL PHASE PHOSPHOLIPID: 7 SEC (ref 0–11)
HGB BLD-MCNC: 8.6 G/DL (ref 12.1–17)
IMM GRANULOCYTES # BLD AUTO: 0.1 K/UL (ref 0–0.04)
IMM GRANULOCYTES NFR BLD AUTO: 2 % (ref 0–0.5)
LA 2 SCREEN W REFLEX-IMP: ABNORMAL
LDH SERPL L TO P-CCNC: 356 U/L (ref 85–241)
LYMPHOCYTES # BLD: 1 K/UL (ref 0.8–3.5)
LYMPHOCYTES NFR BLD: 14 % (ref 12–49)
MAGNESIUM SERPL-MCNC: 1.9 MG/DL (ref 1.6–2.4)
MCH RBC QN AUTO: 28.7 PG (ref 26–34)
MCHC RBC AUTO-ENTMCNC: 32.1 G/DL (ref 30–36.5)
MCV RBC AUTO: 89.3 FL (ref 80–99)
MIXING DRVVT: 76 SEC (ref 0–40.4)
MONOCYTES # BLD: 0.8 K/UL (ref 0–1)
MONOCYTES NFR BLD: 11 % (ref 5–13)
NEUTS SEG # BLD: 5.3 K/UL (ref 1.8–8)
NEUTS SEG NFR BLD: 70 % (ref 32–75)
NRBC # BLD: 0 K/UL (ref 0–0.01)
NRBC BLD-RTO: 0 PER 100 WBC
NT PRO BNP: ABNORMAL PG/ML
PLATELET # BLD AUTO: 347 K/UL (ref 150–400)
PMV BLD AUTO: 10.9 FL (ref 8.9–12.9)
POTASSIUM SERPL-SCNC: 5.3 MMOL/L (ref 3.5–5.1)
PROT SERPL-MCNC: 6.9 G/DL (ref 6.4–8.2)
PTT-LA MIX: 80.4 SEC (ref 0–40.5)
RBC # BLD AUTO: 3 M/UL (ref 4.1–5.7)
SCREEN APTT: 88.5 SEC (ref 0–43.5)
SCREEN DRVVT: 124.6 SEC (ref 0–47)
SODIUM SERPL-SCNC: 135 MMOL/L (ref 136–145)
THERAPEUTIC RANGE: ABNORMAL SECS (ref 58–77)
THERAPEUTIC RANGE: ABNORMAL SECS (ref 58–77)
THROMBIN TIME: 93.6 SEC (ref 0–23)
WBC # BLD AUTO: 7.5 K/UL (ref 4.1–11.1)

## 2023-09-10 PROCEDURE — 02HQ32Z INSERTION OF MONITORING DEVICE INTO RIGHT PULMONARY ARTERY, PERCUTANEOUS APPROACH: ICD-10-PCS | Performed by: OBSTETRICS & GYNECOLOGY

## 2023-09-10 PROCEDURE — 71045 X-RAY EXAM CHEST 1 VIEW: CPT

## 2023-09-10 PROCEDURE — 85025 COMPLETE CBC W/AUTO DIFF WBC: CPT

## 2023-09-10 PROCEDURE — 6370000000 HC RX 637 (ALT 250 FOR IP): Performed by: INTERNAL MEDICINE

## 2023-09-10 PROCEDURE — 6360000002 HC RX W HCPCS

## 2023-09-10 PROCEDURE — 93325 DOPPLER ECHO COLOR FLOW MAPG: CPT | Performed by: INTERNAL MEDICINE

## 2023-09-10 PROCEDURE — 99233 SBSQ HOSP IP/OBS HIGH 50: CPT | Performed by: INTERNAL MEDICINE

## 2023-09-10 PROCEDURE — 6360000002 HC RX W HCPCS: Performed by: ANESTHESIOLOGY

## 2023-09-10 PROCEDURE — 6360000002 HC RX W HCPCS: Performed by: NURSE PRACTITIONER

## 2023-09-10 PROCEDURE — 93321 DOPPLER ECHO F-UP/LMTD STD: CPT | Performed by: INTERNAL MEDICINE

## 2023-09-10 PROCEDURE — 93308 TTE F-UP OR LMTD: CPT | Performed by: INTERNAL MEDICINE

## 2023-09-10 PROCEDURE — 80048 BASIC METABOLIC PNL TOTAL CA: CPT

## 2023-09-10 PROCEDURE — 2580000003 HC RX 258: Performed by: ANESTHESIOLOGY

## 2023-09-10 PROCEDURE — 93880 EXTRACRANIAL BILAT STUDY: CPT

## 2023-09-10 PROCEDURE — 83880 ASSAY OF NATRIURETIC PEPTIDE: CPT

## 2023-09-10 PROCEDURE — 2500000003 HC RX 250 WO HCPCS: Performed by: INTERNAL MEDICINE

## 2023-09-10 PROCEDURE — 6370000000 HC RX 637 (ALT 250 FOR IP): Performed by: ANESTHESIOLOGY

## 2023-09-10 PROCEDURE — 2000000000 HC ICU R&B

## 2023-09-10 PROCEDURE — 2500000003 HC RX 250 WO HCPCS: Performed by: ANESTHESIOLOGY

## 2023-09-10 PROCEDURE — 2580000003 HC RX 258: Performed by: INTERNAL MEDICINE

## 2023-09-10 PROCEDURE — 85730 THROMBOPLASTIN TIME PARTIAL: CPT

## 2023-09-10 PROCEDURE — 80076 HEPATIC FUNCTION PANEL: CPT

## 2023-09-10 PROCEDURE — 6360000002 HC RX W HCPCS: Performed by: OBSTETRICS & GYNECOLOGY

## 2023-09-10 PROCEDURE — 83735 ASSAY OF MAGNESIUM: CPT

## 2023-09-10 PROCEDURE — 36415 COLL VENOUS BLD VENIPUNCTURE: CPT

## 2023-09-10 PROCEDURE — 2580000003 HC RX 258: Performed by: NURSE PRACTITIONER

## 2023-09-10 PROCEDURE — 6370000000 HC RX 637 (ALT 250 FOR IP): Performed by: STUDENT IN AN ORGANIZED HEALTH CARE EDUCATION/TRAINING PROGRAM

## 2023-09-10 PROCEDURE — 93503 INSERT/PLACE HEART CATHETER: CPT

## 2023-09-10 PROCEDURE — 36556 INSERT NON-TUNNEL CV CATH: CPT

## 2023-09-10 PROCEDURE — 83615 LACTATE (LD) (LDH) ENZYME: CPT

## 2023-09-10 RX ORDER — FENTANYL CITRATE 50 UG/ML
INJECTION, SOLUTION INTRAMUSCULAR; INTRAVENOUS
Status: COMPLETED
Start: 2023-09-10 | End: 2023-09-10

## 2023-09-10 RX ORDER — LORAZEPAM 2 MG/ML
1 INJECTION INTRAMUSCULAR ONCE
Status: COMPLETED | OUTPATIENT
Start: 2023-09-10 | End: 2023-09-10

## 2023-09-10 RX ORDER — FENTANYL CITRATE 50 UG/ML
100 INJECTION, SOLUTION INTRAMUSCULAR; INTRAVENOUS ONCE
Status: COMPLETED | OUTPATIENT
Start: 2023-09-10 | End: 2023-09-10

## 2023-09-10 RX ORDER — BUMETANIDE 0.25 MG/ML
2 INJECTION INTRAMUSCULAR; INTRAVENOUS 2 TIMES DAILY
Status: DISCONTINUED | OUTPATIENT
Start: 2023-09-10 | End: 2023-09-11

## 2023-09-10 RX ORDER — HYDRALAZINE HYDROCHLORIDE 25 MG/1
50 TABLET, FILM COATED ORAL EVERY 8 HOURS SCHEDULED
Status: DISCONTINUED | OUTPATIENT
Start: 2023-09-10 | End: 2023-09-11

## 2023-09-10 RX ORDER — ISOSORBIDE DINITRATE 20 MG/1
20 TABLET ORAL 3 TIMES DAILY
Status: DISCONTINUED | OUTPATIENT
Start: 2023-09-10 | End: 2023-09-15

## 2023-09-10 RX ADMIN — TICAGRELOR 90 MG: 90 TABLET ORAL at 21:02

## 2023-09-10 RX ADMIN — NICARDIPINE HYDROCHLORIDE 12.5 MG/HR: 25 INJECTION, SOLUTION INTRAVENOUS at 12:44

## 2023-09-10 RX ADMIN — PANTOPRAZOLE SODIUM 40 MG: 40 TABLET, DELAYED RELEASE ORAL at 12:13

## 2023-09-10 RX ADMIN — NICARDIPINE HYDROCHLORIDE 10 MG/HR: 25 INJECTION, SOLUTION INTRAVENOUS at 17:03

## 2023-09-10 RX ADMIN — HYDRALAZINE HYDROCHLORIDE 50 MG: 25 TABLET, FILM COATED ORAL at 21:02

## 2023-09-10 RX ADMIN — AMIODARONE HYDROCHLORIDE 400 MG: 200 TABLET ORAL at 08:13

## 2023-09-10 RX ADMIN — WATER 2000 MG: 1 INJECTION INTRAMUSCULAR; INTRAVENOUS; SUBCUTANEOUS at 06:58

## 2023-09-10 RX ADMIN — FENTANYL CITRATE 100 MCG: 50 INJECTION INTRAMUSCULAR; INTRAVENOUS at 15:31

## 2023-09-10 RX ADMIN — FENTANYL CITRATE 100 MCG: 50 INJECTION, SOLUTION INTRAMUSCULAR; INTRAVENOUS at 15:31

## 2023-09-10 RX ADMIN — PANTOPRAZOLE SODIUM 40 MG: 40 TABLET, DELAYED RELEASE ORAL at 22:47

## 2023-09-10 RX ADMIN — NICARDIPINE HYDROCHLORIDE 10 MG/HR: 25 INJECTION, SOLUTION INTRAVENOUS at 19:45

## 2023-09-10 RX ADMIN — NICARDIPINE HYDROCHLORIDE 12.5 MG/HR: 25 INJECTION, SOLUTION INTRAVENOUS at 14:22

## 2023-09-10 RX ADMIN — POTASSIUM BICARBONATE 20 MEQ: 782 TABLET, EFFERVESCENT ORAL at 21:02

## 2023-09-10 RX ADMIN — SPIRONOLACTONE 25 MG: 25 TABLET ORAL at 08:13

## 2023-09-10 RX ADMIN — NICARDIPINE HYDROCHLORIDE 15 MG/HR: 25 INJECTION, SOLUTION INTRAVENOUS at 06:47

## 2023-09-10 RX ADMIN — ISOSORBIDE DINITRATE 20 MG: 20 TABLET ORAL at 12:13

## 2023-09-10 RX ADMIN — HYDRALAZINE HYDROCHLORIDE 50 MG: 25 TABLET, FILM COATED ORAL at 14:22

## 2023-09-10 RX ADMIN — POLYETHYLENE GLYCOL 3350 17 G: 17 POWDER, FOR SOLUTION ORAL at 08:13

## 2023-09-10 RX ADMIN — LORAZEPAM 1 MG: 2 INJECTION INTRAMUSCULAR; INTRAVENOUS at 14:50

## 2023-09-10 RX ADMIN — NICARDIPINE HYDROCHLORIDE 15 MG/HR: 25 INJECTION, SOLUTION INTRAVENOUS at 05:02

## 2023-09-10 RX ADMIN — NICARDIPINE HYDROCHLORIDE 12.5 MG/HR: 25 INJECTION, SOLUTION INTRAVENOUS at 10:05

## 2023-09-10 RX ADMIN — SODIUM BICARBONATE: 84 INJECTION, SOLUTION INTRAVENOUS at 22:54

## 2023-09-10 RX ADMIN — BUMETANIDE 2 MG: 0.25 INJECTION, SOLUTION INTRAMUSCULAR; INTRAVENOUS at 08:13

## 2023-09-10 RX ADMIN — ISOSORBIDE DINITRATE 20 MG: 20 TABLET ORAL at 17:04

## 2023-09-10 RX ADMIN — NICARDIPINE HYDROCHLORIDE 15 MG/HR: 25 INJECTION, SOLUTION INTRAVENOUS at 01:09

## 2023-09-10 RX ADMIN — BUMETANIDE 2 MG: 0.25 INJECTION, SOLUTION INTRAMUSCULAR; INTRAVENOUS at 21:02

## 2023-09-10 RX ADMIN — NICARDIPINE HYDROCHLORIDE 15 MG/HR: 25 INJECTION, SOLUTION INTRAVENOUS at 08:42

## 2023-09-10 RX ADMIN — NICARDIPINE HYDROCHLORIDE 15 MG/HR: 25 INJECTION, SOLUTION INTRAVENOUS at 03:02

## 2023-09-10 RX ADMIN — ATORVASTATIN CALCIUM 40 MG: 40 TABLET, FILM COATED ORAL at 21:02

## 2023-09-10 RX ADMIN — HYDRALAZINE HYDROCHLORIDE 25 MG: 25 TABLET, FILM COATED ORAL at 06:58

## 2023-09-10 RX ADMIN — BIVALIRUDIN 0.07 MG/KG/HR: 250 INJECTION, POWDER, LYOPHILIZED, FOR SOLUTION INTRAVENOUS at 10:25

## 2023-09-10 RX ADMIN — NICARDIPINE HYDROCHLORIDE 2.5 MG/HR: 25 INJECTION, SOLUTION INTRAVENOUS at 23:33

## 2023-09-10 RX ADMIN — TICAGRELOR 90 MG: 90 TABLET ORAL at 08:13

## 2023-09-10 RX ADMIN — WATER 2000 MG: 1 INJECTION INTRAMUSCULAR; INTRAVENOUS; SUBCUTANEOUS at 14:22

## 2023-09-10 RX ADMIN — ISOSORBIDE DINITRATE 20 MG: 20 TABLET ORAL at 08:13

## 2023-09-10 RX ADMIN — WATER 2000 MG: 1 INJECTION INTRAMUSCULAR; INTRAVENOUS; SUBCUTANEOUS at 22:47

## 2023-09-10 ASSESSMENT — PAIN SCALES - GENERAL
PAINLEVEL_OUTOF10: 0

## 2023-09-10 ASSESSMENT — PAIN SCALES - WONG BAKER: WONGBAKER_NUMERICALRESPONSE: 0

## 2023-09-11 ENCOUNTER — APPOINTMENT (OUTPATIENT)
Facility: HOSPITAL | Age: 68
DRG: 161 | End: 2023-09-11
Attending: ANESTHESIOLOGY
Payer: MEDICAID

## 2023-09-11 LAB
ALBUMIN SERPL-MCNC: 4.2 G/DL (ref 3.5–5)
ALBUMIN SERPL-MCNC: 4.2 G/DL (ref 3.5–5)
ALBUMIN/GLOB SERPL: 1.4 (ref 1.1–2.2)
ALBUMIN/GLOB SERPL: 1.4 (ref 1.1–2.2)
ALP SERPL-CCNC: 148 U/L (ref 45–117)
ALP SERPL-CCNC: 157 U/L (ref 45–117)
ALT SERPL-CCNC: 7 U/L (ref 12–78)
ALT SERPL-CCNC: 8 U/L (ref 12–78)
ANION GAP SERPL CALC-SCNC: 7 MMOL/L (ref 5–15)
ANION GAP SERPL CALC-SCNC: 7 MMOL/L (ref 5–15)
APTT PPP: 40.2 SEC (ref 22.1–31)
APTT PPP: 46.7 SEC (ref 22.1–31)
APTT PPP: 46.9 SEC (ref 22.1–31)
APTT PPP: 48.2 SEC (ref 22.1–31)
AST SERPL-CCNC: 22 U/L (ref 15–37)
AST SERPL-CCNC: 24 U/L (ref 15–37)
BASOPHILS # BLD: 0.1 K/UL (ref 0–0.1)
BASOPHILS NFR BLD: 1 % (ref 0–1)
BILIRUB DIRECT SERPL-MCNC: 0.7 MG/DL (ref 0–0.2)
BILIRUB SERPL-MCNC: 1.3 MG/DL (ref 0.2–1)
BILIRUB SERPL-MCNC: 1.8 MG/DL (ref 0.2–1)
BUN SERPL-MCNC: 38 MG/DL (ref 6–20)
BUN SERPL-MCNC: 38 MG/DL (ref 6–20)
BUN/CREAT SERPL: 16 (ref 12–20)
BUN/CREAT SERPL: 17 (ref 12–20)
CALCIUM SERPL-MCNC: 9.2 MG/DL (ref 8.5–10.1)
CALCIUM SERPL-MCNC: 9.5 MG/DL (ref 8.5–10.1)
CHLORIDE SERPL-SCNC: 103 MMOL/L (ref 97–108)
CHLORIDE SERPL-SCNC: 105 MMOL/L (ref 97–108)
CO2 SERPL-SCNC: 22 MMOL/L (ref 21–32)
CO2 SERPL-SCNC: 22 MMOL/L (ref 21–32)
CREAT SERPL-MCNC: 2.24 MG/DL (ref 0.7–1.3)
CREAT SERPL-MCNC: 2.36 MG/DL (ref 0.7–1.3)
DIFFERENTIAL METHOD BLD: ABNORMAL
ECHO BSA: 1.83 M2
EOSINOPHIL # BLD: 0 K/UL (ref 0–0.4)
EOSINOPHIL NFR BLD: 0 % (ref 0–7)
ERYTHROCYTE [DISTWIDTH] IN BLOOD BY AUTOMATED COUNT: 15.5 % (ref 11.5–14.5)
GLOBULIN SER CALC-MCNC: 3 G/DL (ref 2–4)
GLOBULIN SER CALC-MCNC: 3.1 G/DL (ref 2–4)
GLUCOSE SERPL-MCNC: 125 MG/DL (ref 65–100)
GLUCOSE SERPL-MCNC: 205 MG/DL (ref 65–100)
HCT VFR BLD AUTO: 23.3 % (ref 36.6–50.3)
HGB BLD-MCNC: 7.5 G/DL (ref 12.1–17)
IMM GRANULOCYTES # BLD AUTO: 0.1 K/UL (ref 0–0.04)
IMM GRANULOCYTES NFR BLD AUTO: 1 % (ref 0–0.5)
LDH SERPL L TO P-CCNC: 355 U/L (ref 85–241)
LYMPHOCYTES # BLD: 0.8 K/UL (ref 0.8–3.5)
LYMPHOCYTES NFR BLD: 10 % (ref 12–49)
MAGNESIUM SERPL-MCNC: 1.9 MG/DL (ref 1.6–2.4)
MCH RBC QN AUTO: 28.7 PG (ref 26–34)
MCHC RBC AUTO-ENTMCNC: 32.2 G/DL (ref 30–36.5)
MCV RBC AUTO: 89.3 FL (ref 80–99)
MONOCYTES # BLD: 1 K/UL (ref 0–1)
MONOCYTES NFR BLD: 13 % (ref 5–13)
NEUTS SEG # BLD: 6 K/UL (ref 1.8–8)
NEUTS SEG NFR BLD: 75 % (ref 32–75)
NRBC # BLD: 0 K/UL (ref 0–0.01)
NRBC BLD-RTO: 0 PER 100 WBC
NT PRO BNP: ABNORMAL PG/ML
PLATELET # BLD AUTO: 365 K/UL (ref 150–400)
PMV BLD AUTO: 11.1 FL (ref 8.9–12.9)
POTASSIUM SERPL-SCNC: 5.4 MMOL/L (ref 3.5–5.1)
POTASSIUM SERPL-SCNC: 5.5 MMOL/L (ref 3.5–5.1)
PROT SERPL-MCNC: 7.2 G/DL (ref 6.4–8.2)
PROT SERPL-MCNC: 7.3 G/DL (ref 6.4–8.2)
RBC # BLD AUTO: 2.61 M/UL (ref 4.1–5.7)
SODIUM SERPL-SCNC: 132 MMOL/L (ref 136–145)
SODIUM SERPL-SCNC: 134 MMOL/L (ref 136–145)
STRESS TARGET HR: 153 BPM
THERAPEUTIC RANGE: ABNORMAL SECS (ref 58–77)
VAS LEFT CCA DIST EDV: 21.8 CM/S
VAS LEFT CCA DIST PSV: 135.2 CM/S
VAS LEFT CCA PROX EDV: 27.1 CM/S
VAS LEFT CCA PROX PSV: 165.5 CM/S
VAS LEFT ECA EDV: 9.4 CM/S
VAS LEFT ECA PSV: 133.4 CM/S
VAS LEFT ICA DIST EDV: 17.9 CM/S
VAS LEFT ICA DIST PSV: 64.1 CM/S
VAS LEFT ICA MID EDV: 9.5 CM/S
VAS LEFT ICA MID PSV: 43.7 CM/S
VAS LEFT ICA PROX EDV: 18.2 CM/S
VAS LEFT ICA PROX PSV: 122.7 CM/S
VAS LEFT ICA/CCA PSV: 0.9 NO UNITS
VAS LEFT VERTEBRAL EDV: 9.7 CM/S
VAS LEFT VERTEBRAL PSV: 53.2 CM/S
VAS RIGHT CCA DIST EDV: 27.8 CM/S
VAS RIGHT CCA DIST PSV: 117.7 CM/S
VAS RIGHT CCA PROX EDV: 16 CM/S
VAS RIGHT CCA PROX PSV: 86.5 CM/S
VAS RIGHT ECA EDV: 12.1 CM/S
VAS RIGHT ECA PSV: 91.6 CM/S
VAS RIGHT ICA DIST EDV: 23.6 CM/S
VAS RIGHT ICA DIST PSV: 121.1 CM/S
VAS RIGHT ICA MID EDV: 28.3 CM/S
VAS RIGHT ICA MID PSV: 104.8 CM/S
VAS RIGHT ICA PROX EDV: 6.9 CM/S
VAS RIGHT ICA PROX PSV: 73.3 CM/S
VAS RIGHT ICA/CCA PSV: 1 NO UNITS
VAS RIGHT VERTEBRAL EDV: 7.9 CM/S
VAS RIGHT VERTEBRAL PSV: 36.7 CM/S
WBC # BLD AUTO: 7.9 K/UL (ref 4.1–11.1)

## 2023-09-11 PROCEDURE — 93971 EXTREMITY STUDY: CPT

## 2023-09-11 PROCEDURE — 82803 BLOOD GASES ANY COMBINATION: CPT

## 2023-09-11 PROCEDURE — 3430000000 HC RX DIAGNOSTIC RADIOPHARMACEUTICAL: Performed by: INTERNAL MEDICINE

## 2023-09-11 PROCEDURE — 2500000003 HC RX 250 WO HCPCS: Performed by: ANESTHESIOLOGY

## 2023-09-11 PROCEDURE — 71045 X-RAY EXAM CHEST 1 VIEW: CPT

## 2023-09-11 PROCEDURE — 83615 LACTATE (LD) (LDH) ENZYME: CPT

## 2023-09-11 PROCEDURE — 6370000000 HC RX 637 (ALT 250 FOR IP): Performed by: INTERNAL MEDICINE

## 2023-09-11 PROCEDURE — 6370000000 HC RX 637 (ALT 250 FOR IP): Performed by: ANESTHESIOLOGY

## 2023-09-11 PROCEDURE — 94660 CPAP INITIATION&MGMT: CPT

## 2023-09-11 PROCEDURE — 85025 COMPLETE CBC W/AUTO DIFF WBC: CPT

## 2023-09-11 PROCEDURE — 85730 THROMBOPLASTIN TIME PARTIAL: CPT

## 2023-09-11 PROCEDURE — 2500000003 HC RX 250 WO HCPCS: Performed by: INTERNAL MEDICINE

## 2023-09-11 PROCEDURE — 2580000003 HC RX 258: Performed by: ANESTHESIOLOGY

## 2023-09-11 PROCEDURE — 80053 COMPREHEN METABOLIC PANEL: CPT

## 2023-09-11 PROCEDURE — 2000000000 HC ICU R&B

## 2023-09-11 PROCEDURE — 36415 COLL VENOUS BLD VENIPUNCTURE: CPT

## 2023-09-11 PROCEDURE — 6370000000 HC RX 637 (ALT 250 FOR IP): Performed by: STUDENT IN AN ORGANIZED HEALTH CARE EDUCATION/TRAINING PROGRAM

## 2023-09-11 PROCEDURE — 2580000003 HC RX 258: Performed by: INTERNAL MEDICINE

## 2023-09-11 PROCEDURE — 2500000003 HC RX 250 WO HCPCS: Performed by: NURSE PRACTITIONER

## 2023-09-11 PROCEDURE — 99233 SBSQ HOSP IP/OBS HIGH 50: CPT | Performed by: NURSE PRACTITIONER

## 2023-09-11 PROCEDURE — 80076 HEPATIC FUNCTION PANEL: CPT

## 2023-09-11 PROCEDURE — 6360000002 HC RX W HCPCS: Performed by: ANESTHESIOLOGY

## 2023-09-11 PROCEDURE — 78800 RP LOCLZJ TUM 1 AREA 1 D IMG: CPT

## 2023-09-11 PROCEDURE — 83880 ASSAY OF NATRIURETIC PEPTIDE: CPT

## 2023-09-11 PROCEDURE — 83735 ASSAY OF MAGNESIUM: CPT

## 2023-09-11 PROCEDURE — A9538 TC99M PYROPHOSPHATE: HCPCS | Performed by: INTERNAL MEDICINE

## 2023-09-11 PROCEDURE — 6370000000 HC RX 637 (ALT 250 FOR IP): Performed by: NURSE PRACTITIONER

## 2023-09-11 RX ORDER — HYDRALAZINE HYDROCHLORIDE 25 MG/1
75 TABLET, FILM COATED ORAL EVERY 8 HOURS SCHEDULED
Status: DISCONTINUED | OUTPATIENT
Start: 2023-09-11 | End: 2023-09-12

## 2023-09-11 RX ORDER — TECHNETIUM TC99M PYROPHOSPHATE 12 MG/10ML
16.2 INJECTION INTRAVENOUS ONCE
Status: COMPLETED | OUTPATIENT
Start: 2023-09-11 | End: 2023-09-11

## 2023-09-11 RX ORDER — BUMETANIDE 0.25 MG/ML
2 INJECTION INTRAMUSCULAR; INTRAVENOUS 2 TIMES DAILY
Status: DISCONTINUED | OUTPATIENT
Start: 2023-09-11 | End: 2023-09-14

## 2023-09-11 RX ADMIN — SODIUM BICARBONATE: 84 INJECTION, SOLUTION INTRAVENOUS at 21:02

## 2023-09-11 RX ADMIN — NICARDIPINE HYDROCHLORIDE 2.5 MG/HR: 25 INJECTION, SOLUTION INTRAVENOUS at 14:57

## 2023-09-11 RX ADMIN — TICAGRELOR 90 MG: 90 TABLET ORAL at 09:14

## 2023-09-11 RX ADMIN — PANTOPRAZOLE SODIUM 40 MG: 40 TABLET, DELAYED RELEASE ORAL at 10:26

## 2023-09-11 RX ADMIN — HYDRALAZINE HYDROCHLORIDE 50 MG: 25 TABLET, FILM COATED ORAL at 06:31

## 2023-09-11 RX ADMIN — ISOSORBIDE DINITRATE 20 MG: 20 TABLET ORAL at 19:13

## 2023-09-11 RX ADMIN — NICARDIPINE HYDROCHLORIDE 5 MG/HR: 25 INJECTION, SOLUTION INTRAVENOUS at 04:49

## 2023-09-11 RX ADMIN — NICARDIPINE HYDROCHLORIDE 5 MG/HR: 25 INJECTION, SOLUTION INTRAVENOUS at 09:06

## 2023-09-11 RX ADMIN — TECHNETIUM TC99M PYROPHOSPHATE 16.2 MILLICURIE: 12 INJECTION INTRAVENOUS at 10:20

## 2023-09-11 RX ADMIN — BUMETANIDE 2 MG: 0.25 INJECTION INTRAMUSCULAR; INTRAVENOUS at 14:13

## 2023-09-11 RX ADMIN — HYDRALAZINE HYDROCHLORIDE 75 MG: 25 TABLET, FILM COATED ORAL at 20:55

## 2023-09-11 RX ADMIN — TICAGRELOR 90 MG: 90 TABLET ORAL at 20:55

## 2023-09-11 RX ADMIN — ISOSORBIDE DINITRATE 20 MG: 20 TABLET ORAL at 09:14

## 2023-09-11 RX ADMIN — ATORVASTATIN CALCIUM 40 MG: 40 TABLET, FILM COATED ORAL at 20:55

## 2023-09-11 RX ADMIN — PANTOPRAZOLE SODIUM 40 MG: 40 TABLET, DELAYED RELEASE ORAL at 22:53

## 2023-09-11 RX ADMIN — WATER 2000 MG: 1 INJECTION INTRAMUSCULAR; INTRAVENOUS; SUBCUTANEOUS at 06:30

## 2023-09-11 RX ADMIN — BUMETANIDE 2 MG: 0.25 INJECTION INTRAMUSCULAR; INTRAVENOUS at 20:55

## 2023-09-11 RX ADMIN — HYDRALAZINE HYDROCHLORIDE 75 MG: 25 TABLET, FILM COATED ORAL at 14:13

## 2023-09-11 RX ADMIN — WATER 1000 MG: 1 INJECTION INTRAMUSCULAR; INTRAVENOUS; SUBCUTANEOUS at 19:13

## 2023-09-11 RX ADMIN — AMIODARONE HYDROCHLORIDE 400 MG: 200 TABLET ORAL at 09:14

## 2023-09-11 RX ADMIN — ISOSORBIDE DINITRATE 20 MG: 20 TABLET ORAL at 14:13

## 2023-09-11 ASSESSMENT — PAIN SCALES - GENERAL
PAINLEVEL_OUTOF10: 0
PAINLEVEL_OUTOF10: 0

## 2023-09-11 NOTE — CARE COORDINATION
Transition of Care Plan:     RUR: 20%--high  Prior Level of Functioning: independent  Disposition: home with [facility] PT/OT/SN with Chelsy  Follow up appointments: cardiology, PCP  DME needed: N/A  Transportation at discharge: in car w/family  IM/IMM Medicare/ letter given: 9/1/23  Is patient a  and connected with VA? no               If yes, was [facility] transfer form completed and VA notified? Caregiver Contact:   Amber Browning, significant other, [phone]  Lolita Khoury, sister, [phone]  Discharge Caregiver contacted prior to discharge? N/A  Care Conference needed? yes--family meeting to discuss goals of care. Barriers to discharge: clinical status    Patient admitted from [facility] on 8/31 for heart failure. On room air during the day and Cpap at night. LVEF 25-30% with plans for echo every other day. Impella at P3. LVAD workup being completed by FT. PT/OT following. Per nursing, there is a plan for a family meeting Tuesday to discuss goals of care. Patient's brother and sister/MPOA are also hospitalized. CM will continue to follow.     Shirley Fierro, [address]  [phone]

## 2023-09-12 ENCOUNTER — APPOINTMENT (OUTPATIENT)
Facility: HOSPITAL | Age: 68
DRG: 161 | End: 2023-09-12
Attending: INTERNAL MEDICINE
Payer: MEDICAID

## 2023-09-12 ENCOUNTER — CLINICAL DOCUMENTATION (OUTPATIENT)
Age: 68
End: 2023-09-12

## 2023-09-12 LAB
ALBUMIN SERPL-MCNC: 4.1 G/DL (ref 3.5–5)
ALBUMIN/GLOB SERPL: 1.2 (ref 1.1–2.2)
ALP SERPL-CCNC: 141 U/L (ref 45–117)
ALT SERPL-CCNC: 10 U/L (ref 12–78)
ANION GAP SERPL CALC-SCNC: 8 MMOL/L (ref 5–15)
APTT PPP: 33.8 SEC (ref 22.1–31)
APTT PPP: 46 SEC (ref 22.1–31)
APTT PPP: 48.8 SEC (ref 22.1–31)
APTT PPP: 49.5 SEC (ref 22.1–31)
APTT PPP: 49.7 SEC (ref 22.1–31)
AST SERPL-CCNC: 24 U/L (ref 15–37)
BASE DEFICIT BLDV-SCNC: 2.3 MMOL/L
BASE EXCESS BLDV CALC-SCNC: 0 MMOL/L
BASOPHILS # BLD: 0 K/UL (ref 0–0.1)
BASOPHILS NFR BLD: 1 % (ref 0–1)
BDY SITE: ABNORMAL
BDY SITE: ABNORMAL
BILIRUB DIRECT SERPL-MCNC: 0.8 MG/DL (ref 0–0.2)
BILIRUB SERPL-MCNC: 2 MG/DL (ref 0.2–1)
BUN SERPL-MCNC: 38 MG/DL (ref 6–20)
BUN/CREAT SERPL: 17 (ref 12–20)
CALCIUM SERPL-MCNC: 9.6 MG/DL (ref 8.5–10.1)
CHLORIDE SERPL-SCNC: 105 MMOL/L (ref 97–108)
CO2 SERPL-SCNC: 23 MMOL/L (ref 21–32)
CREAT SERPL-MCNC: 2.18 MG/DL (ref 0.7–1.3)
CREAT UR-MCNC: 25.9 MG/DL
DIFFERENTIAL METHOD BLD: ABNORMAL
ECHO BSA: 1.83 M2
EOSINOPHIL # BLD: 0 K/UL (ref 0–0.4)
EOSINOPHIL NFR BLD: 0 % (ref 0–7)
ERYTHROCYTE [DISTWIDTH] IN BLOOD BY AUTOMATED COUNT: 15.7 % (ref 11.5–14.5)
GAS FLOW.O2 O2 DELIVERY SYS: 6 L/MIN
GLOBULIN SER CALC-MCNC: 3.3 G/DL (ref 2–4)
GLUCOSE SERPL-MCNC: 106 MG/DL (ref 65–100)
HCO3 BLDV-SCNC: 22 MMOL/L (ref 23–28)
HCO3 BLDV-SCNC: 24 MMOL/L (ref 23–28)
HCT VFR BLD AUTO: 22.6 % (ref 36.6–50.3)
HGB BLD-MCNC: 7.2 G/DL (ref 12.1–17)
IMM GRANULOCYTES # BLD AUTO: 0.1 K/UL (ref 0–0.04)
IMM GRANULOCYTES NFR BLD AUTO: 1 % (ref 0–0.5)
LDH SERPL L TO P-CCNC: 398 U/L (ref 85–241)
LYMPHOCYTES # BLD: 0.9 K/UL (ref 0.8–3.5)
LYMPHOCYTES NFR BLD: 10 % (ref 12–49)
MAGNESIUM SERPL-MCNC: 2.1 MG/DL (ref 1.6–2.4)
MCH RBC QN AUTO: 28.5 PG (ref 26–34)
MCHC RBC AUTO-ENTMCNC: 31.9 G/DL (ref 30–36.5)
MCV RBC AUTO: 89.3 FL (ref 80–99)
MICROALBUMIN UR-MCNC: 12.3 MG/DL
MICROALBUMIN/CREAT UR-RTO: 475 MG/G (ref 0–30)
MONOCYTES # BLD: 1.1 K/UL (ref 0–1)
MONOCYTES NFR BLD: 13 % (ref 5–13)
NEUTS SEG # BLD: 6.4 K/UL (ref 1.8–8)
NEUTS SEG NFR BLD: 75 % (ref 32–75)
NRBC # BLD: 0 K/UL (ref 0–0.01)
NRBC BLD-RTO: 0 PER 100 WBC
NT PRO BNP: ABNORMAL PG/ML
PCO2 BLDV: 34 MMHG (ref 41–51)
PCO2 BLDV: 34.6 MMHG (ref 41–51)
PETH BLD QL SCN: NEGATIVE
PETH BLD-MCNC: NEGATIVE NG/ML
PH BLDV: 7.42 (ref 7.32–7.42)
PH BLDV: 7.46 (ref 7.32–7.42)
PHYTONADIONE SERPL-MCNC: 0.16 NG/ML (ref 0.1–2.2)
PLATELET # BLD AUTO: 305 K/UL (ref 150–400)
PMV BLD AUTO: 10.9 FL (ref 8.9–12.9)
PO2 BLDV: 24 MMHG (ref 25–40)
PO2 BLDV: 37 MMHG (ref 25–40)
POTASSIUM SERPL-SCNC: 4.7 MMOL/L (ref 3.5–5.1)
PROT SERPL-MCNC: 7.4 G/DL (ref 6.4–8.2)
RBC # BLD AUTO: 2.53 M/UL (ref 4.1–5.7)
SAO2% DEVICE SAO2% SENSOR NAME: ABNORMAL
SAO2% DEVICE SAO2% SENSOR NAME: ABNORMAL
SERVICE CMNT-IMP: ABNORMAL
SODIUM SERPL-SCNC: 136 MMOL/L (ref 136–145)
SPECIMEN SITE: ABNORMAL
SPECIMEN SITE: ABNORMAL
THERAPEUTIC RANGE: ABNORMAL SECS (ref 58–77)
WBC # BLD AUTO: 8.4 K/UL (ref 4.1–11.1)

## 2023-09-12 PROCEDURE — 2580000003 HC RX 258: Performed by: INTERNAL MEDICINE

## 2023-09-12 PROCEDURE — 93308 TTE F-UP OR LMTD: CPT

## 2023-09-12 PROCEDURE — 6370000000 HC RX 637 (ALT 250 FOR IP): Performed by: NURSE PRACTITIONER

## 2023-09-12 PROCEDURE — 6370000000 HC RX 637 (ALT 250 FOR IP): Performed by: INTERNAL MEDICINE

## 2023-09-12 PROCEDURE — 97535 SELF CARE MNGMENT TRAINING: CPT

## 2023-09-12 PROCEDURE — 2000000000 HC ICU R&B

## 2023-09-12 PROCEDURE — 82570 ASSAY OF URINE CREATININE: CPT

## 2023-09-12 PROCEDURE — 80076 HEPATIC FUNCTION PANEL: CPT

## 2023-09-12 PROCEDURE — 2580000003 HC RX 258: Performed by: ANESTHESIOLOGY

## 2023-09-12 PROCEDURE — 2580000003 HC RX 258: Performed by: NURSE PRACTITIONER

## 2023-09-12 PROCEDURE — 85730 THROMBOPLASTIN TIME PARTIAL: CPT

## 2023-09-12 PROCEDURE — 94660 CPAP INITIATION&MGMT: CPT

## 2023-09-12 PROCEDURE — 2500000003 HC RX 250 WO HCPCS: Performed by: ANESTHESIOLOGY

## 2023-09-12 PROCEDURE — 6360000002 HC RX W HCPCS: Performed by: ANESTHESIOLOGY

## 2023-09-12 PROCEDURE — 80048 BASIC METABOLIC PNL TOTAL CA: CPT

## 2023-09-12 PROCEDURE — 2500000003 HC RX 250 WO HCPCS: Performed by: INTERNAL MEDICINE

## 2023-09-12 PROCEDURE — 83615 LACTATE (LD) (LDH) ENZYME: CPT

## 2023-09-12 PROCEDURE — 85025 COMPLETE CBC W/AUTO DIFF WBC: CPT

## 2023-09-12 PROCEDURE — 83880 ASSAY OF NATRIURETIC PEPTIDE: CPT

## 2023-09-12 PROCEDURE — 6370000000 HC RX 637 (ALT 250 FOR IP): Performed by: ANESTHESIOLOGY

## 2023-09-12 PROCEDURE — 93931 UPPER EXTREMITY STUDY: CPT

## 2023-09-12 PROCEDURE — 83735 ASSAY OF MAGNESIUM: CPT

## 2023-09-12 PROCEDURE — 82043 UR ALBUMIN QUANTITATIVE: CPT

## 2023-09-12 PROCEDURE — 6370000000 HC RX 637 (ALT 250 FOR IP): Performed by: STUDENT IN AN ORGANIZED HEALTH CARE EDUCATION/TRAINING PROGRAM

## 2023-09-12 PROCEDURE — 36415 COLL VENOUS BLD VENIPUNCTURE: CPT

## 2023-09-12 PROCEDURE — 99291 CRITICAL CARE FIRST HOUR: CPT | Performed by: INTERNAL MEDICINE

## 2023-09-12 PROCEDURE — 80307 DRUG TEST PRSMV CHEM ANLYZR: CPT

## 2023-09-12 PROCEDURE — 93308 TTE F-UP OR LMTD: CPT | Performed by: SPECIALIST

## 2023-09-12 PROCEDURE — 97530 THERAPEUTIC ACTIVITIES: CPT

## 2023-09-12 PROCEDURE — 2500000003 HC RX 250 WO HCPCS: Performed by: NURSE PRACTITIONER

## 2023-09-12 PROCEDURE — 6360000002 HC RX W HCPCS: Performed by: NURSE PRACTITIONER

## 2023-09-12 PROCEDURE — C9460 INJECTION, CANGRELOR: HCPCS | Performed by: NURSE PRACTITIONER

## 2023-09-12 RX ORDER — HYDRALAZINE HYDROCHLORIDE 25 MG/1
100 TABLET, FILM COATED ORAL EVERY 8 HOURS SCHEDULED
Status: DISCONTINUED | OUTPATIENT
Start: 2023-09-12 | End: 2023-09-19

## 2023-09-12 RX ADMIN — BUMETANIDE 2 MG: 0.25 INJECTION INTRAMUSCULAR; INTRAVENOUS at 20:59

## 2023-09-12 RX ADMIN — BIVALIRUDIN 0.08 MG/KG/HR: 250 INJECTION, POWDER, LYOPHILIZED, FOR SOLUTION INTRAVENOUS at 04:24

## 2023-09-12 RX ADMIN — ISOSORBIDE DINITRATE 20 MG: 20 TABLET ORAL at 08:42

## 2023-09-12 RX ADMIN — ISOSORBIDE DINITRATE 20 MG: 20 TABLET ORAL at 17:58

## 2023-09-12 RX ADMIN — NICARDIPINE HYDROCHLORIDE 2.5 MG/HR: 25 INJECTION, SOLUTION INTRAVENOUS at 02:57

## 2023-09-12 RX ADMIN — HYDRALAZINE HYDROCHLORIDE 100 MG: 25 TABLET, FILM COATED ORAL at 13:16

## 2023-09-12 RX ADMIN — TICAGRELOR 90 MG: 90 TABLET ORAL at 08:42

## 2023-09-12 RX ADMIN — CHLORHEXIDINE GLUCONATE 15 ML: 1.2 RINSE ORAL at 08:43

## 2023-09-12 RX ADMIN — PANTOPRAZOLE SODIUM 40 MG: 40 TABLET, DELAYED RELEASE ORAL at 23:00

## 2023-09-12 RX ADMIN — WATER 1000 MG: 1 INJECTION INTRAMUSCULAR; INTRAVENOUS; SUBCUTANEOUS at 18:31

## 2023-09-12 RX ADMIN — WATER 1000 MG: 1 INJECTION INTRAMUSCULAR; INTRAVENOUS; SUBCUTANEOUS at 06:20

## 2023-09-12 RX ADMIN — HYDRALAZINE HYDROCHLORIDE 100 MG: 25 TABLET, FILM COATED ORAL at 20:59

## 2023-09-12 RX ADMIN — PANTOPRAZOLE SODIUM 40 MG: 40 TABLET, DELAYED RELEASE ORAL at 13:16

## 2023-09-12 RX ADMIN — AMIODARONE HYDROCHLORIDE 400 MG: 200 TABLET ORAL at 08:42

## 2023-09-12 RX ADMIN — SODIUM BICARBONATE: 84 INJECTION, SOLUTION INTRAVENOUS at 22:00

## 2023-09-12 RX ADMIN — BUMETANIDE 2 MG: 0.25 INJECTION INTRAMUSCULAR; INTRAVENOUS at 08:40

## 2023-09-12 RX ADMIN — CANGRELOR 0.75 MCG/KG/MIN: 50 INJECTION, POWDER, LYOPHILIZED, FOR SOLUTION INTRAVENOUS at 15:25

## 2023-09-12 RX ADMIN — ATORVASTATIN CALCIUM 40 MG: 40 TABLET, FILM COATED ORAL at 20:59

## 2023-09-12 RX ADMIN — ISOSORBIDE DINITRATE 20 MG: 20 TABLET ORAL at 13:16

## 2023-09-12 RX ADMIN — HYDRALAZINE HYDROCHLORIDE 75 MG: 25 TABLET, FILM COATED ORAL at 06:20

## 2023-09-12 ASSESSMENT — PAIN SCALES - GENERAL
PAINLEVEL_OUTOF10: 0
PAINLEVEL_OUTOF10: 0

## 2023-09-12 NOTE — PROGRESS NOTES
referral)   Palliative Care  PLAN:   Chart reviewed. Patient seen and examined. Patient is in the early stages of the [facility] workup. Palliative medicine completed the AMD today and will return next week for completion of the consult. Patient listed his sister as the primary MPOA and his girlfriend as the secondary  Tried to call the sister to include her in the discussion about the AMD but she was not available. Initial consult note routed to primary continuity provider and/or primary health care team members  Communicated plan of care with: Palliative IDT, [address] [place] [address] Team   Nutrition 09/07/23  Type and Reason for Visit: Initial, Consult     Nutrition Recommendations/Plan:   Continue 2 gm Na+ diet  Added high kcal/high protein ONS with meals         Malnutrition Assessment:  Malnutrition Status: At risk for malnutrition (Comment) (weight loss, decreased PO intake) (09/08/23 0786)    Context:  Chronic Illness       Maranda Machuca RD  Available via [facility]      Gastroenterology    Dental CT MAXILLOFACIAL WO CONTRAST 09/13/2023    Impression  Edentulous maxilla  Multiple chronically missing and carious bilateral mandibular teeth with  associated periapical lucencies as detailed. No suggestion of associated odontogenic soft tissue infection.      Insurance Status [place] [employer]          LVAD/TRANSPLANT CANDIDATE SELECTION COMMITTEE DECISION  [address]   Date: 9/15/23   Committee Members:   Dr. Yue Dumont MD (Medical Director), Dr. Kali Romeo MD PhD (Heart Failure Cardiologist), Dr. Luiz Vázquez MD (Surgical Director), JORGITO Arcos (Chief Nurse Practitioner), Sarah Booth NP ([facility] Nurse Practitioner), Luis De Paz RN (Lead VAD Coordinator), Naveen Hodge RN (VAD Coordinator), Teresa Beltran RN (VAD Coordinator), Albino Omer LCSW ()     Relative Contraindication    Irreversible

## 2023-09-13 ENCOUNTER — APPOINTMENT (OUTPATIENT)
Facility: HOSPITAL | Age: 68
DRG: 161 | End: 2023-09-13
Attending: ANESTHESIOLOGY
Payer: MEDICAID

## 2023-09-13 LAB
ALBUMIN SERPL-MCNC: 4 G/DL (ref 3.5–5)
ALBUMIN/GLOB SERPL: 1.3 (ref 1.1–2.2)
ALP SERPL-CCNC: 148 U/L (ref 45–117)
ALT SERPL-CCNC: 10 U/L (ref 12–78)
ANION GAP SERPL CALC-SCNC: 6 MMOL/L (ref 5–15)
APTT PPP: 45.8 SEC (ref 22.1–31)
AST SERPL-CCNC: 19 U/L (ref 15–37)
BASE EXCESS BLDV CALC-SCNC: 1.1 MMOL/L
BASOPHILS # BLD: 0 K/UL (ref 0–0.1)
BASOPHILS NFR BLD: 1 % (ref 0–1)
BDY SITE: ABNORMAL
BILIRUB DIRECT SERPL-MCNC: 0.7 MG/DL (ref 0–0.2)
BILIRUB SERPL-MCNC: 1.9 MG/DL (ref 0.2–1)
BUN SERPL-MCNC: 35 MG/DL (ref 6–20)
BUN/CREAT SERPL: 23 (ref 12–20)
CALCIUM SERPL-MCNC: 9.8 MG/DL (ref 8.5–10.1)
CHLORIDE SERPL-SCNC: 102 MMOL/L (ref 97–108)
CO2 SERPL-SCNC: 28 MMOL/L (ref 21–32)
CREAT SERPL-MCNC: 1.52 MG/DL (ref 0.7–1.3)
DIFFERENTIAL METHOD BLD: ABNORMAL
EOSINOPHIL # BLD: 0.1 K/UL (ref 0–0.4)
EOSINOPHIL NFR BLD: 1 % (ref 0–7)
ERYTHROCYTE [DISTWIDTH] IN BLOOD BY AUTOMATED COUNT: 15.3 % (ref 11.5–14.5)
FIO2 ON VENT: 0.4 %
GLOBULIN SER CALC-MCNC: 3.1 G/DL (ref 2–4)
GLUCOSE SERPL-MCNC: 104 MG/DL (ref 65–100)
HCO3 BLDV-SCNC: 25 MMOL/L (ref 23–28)
HCT VFR BLD AUTO: 22.8 % (ref 36.6–50.3)
HCT VFR BLD AUTO: 27.1 % (ref 36.6–50.3)
HGB BLD-MCNC: 7.2 G/DL (ref 12.1–17)
HGB BLD-MCNC: 8.7 G/DL (ref 12.1–17)
HISTORY CHECK: NORMAL
IMM GRANULOCYTES # BLD AUTO: 0.1 K/UL (ref 0–0.04)
IMM GRANULOCYTES NFR BLD AUTO: 1 % (ref 0–0.5)
LDH SERPL L TO P-CCNC: 371 U/L (ref 85–241)
LYMPHOCYTES # BLD: 1 K/UL (ref 0.8–3.5)
LYMPHOCYTES NFR BLD: 13 % (ref 12–49)
MAGNESIUM SERPL-MCNC: 2 MG/DL (ref 1.6–2.4)
MCH RBC QN AUTO: 28.7 PG (ref 26–34)
MCHC RBC AUTO-ENTMCNC: 31.6 G/DL (ref 30–36.5)
MCV RBC AUTO: 90.8 FL (ref 80–99)
MONOCYTES # BLD: 1.1 K/UL (ref 0–1)
MONOCYTES NFR BLD: 14 % (ref 5–13)
NEUTS SEG # BLD: 5.5 K/UL (ref 1.8–8)
NEUTS SEG NFR BLD: 70 % (ref 32–75)
NRBC # BLD: 0 K/UL (ref 0–0.01)
NRBC BLD-RTO: 0 PER 100 WBC
NT PRO BNP: 7828 PG/ML
PCO2 BLDV: 38 MMHG (ref 41–51)
PEEP RESPIRATORY: 5
PH BLDV: 7.44 (ref 7.32–7.42)
PLATELET # BLD AUTO: 282 K/UL (ref 150–400)
PMV BLD AUTO: 11 FL (ref 8.9–12.9)
PO2 BLDV: 36 MMHG (ref 25–40)
POTASSIUM SERPL-SCNC: 3.7 MMOL/L (ref 3.5–5.1)
PROT SERPL-MCNC: 7.1 G/DL (ref 6.4–8.2)
RBC # BLD AUTO: 2.51 M/UL (ref 4.1–5.7)
SAO2% DEVICE SAO2% SENSOR NAME: ABNORMAL
SODIUM SERPL-SCNC: 136 MMOL/L (ref 136–145)
SPECIMEN SITE: ABNORMAL
THERAPEUTIC RANGE: ABNORMAL SECS (ref 58–77)
VENTILATION MODE VENT: ABNORMAL
WBC # BLD AUTO: 7.7 K/UL (ref 4.1–11.1)

## 2023-09-13 PROCEDURE — 94660 CPAP INITIATION&MGMT: CPT

## 2023-09-13 PROCEDURE — 83735 ASSAY OF MAGNESIUM: CPT

## 2023-09-13 PROCEDURE — 36430 TRANSFUSION BLD/BLD COMPNT: CPT

## 2023-09-13 PROCEDURE — 2580000003 HC RX 258: Performed by: ANESTHESIOLOGY

## 2023-09-13 PROCEDURE — 2580000003 HC RX 258: Performed by: OBSTETRICS & GYNECOLOGY

## 2023-09-13 PROCEDURE — 6370000000 HC RX 637 (ALT 250 FOR IP): Performed by: INTERNAL MEDICINE

## 2023-09-13 PROCEDURE — 86923 COMPATIBILITY TEST ELECTRIC: CPT

## 2023-09-13 PROCEDURE — 97530 THERAPEUTIC ACTIVITIES: CPT

## 2023-09-13 PROCEDURE — 97116 GAIT TRAINING THERAPY: CPT

## 2023-09-13 PROCEDURE — 85025 COMPLETE CBC W/AUTO DIFF WBC: CPT

## 2023-09-13 PROCEDURE — 70486 CT MAXILLOFACIAL W/O DYE: CPT

## 2023-09-13 PROCEDURE — 99233 SBSQ HOSP IP/OBS HIGH 50: CPT | Performed by: NURSE PRACTITIONER

## 2023-09-13 PROCEDURE — 80076 HEPATIC FUNCTION PANEL: CPT

## 2023-09-13 PROCEDURE — 85730 THROMBOPLASTIN TIME PARTIAL: CPT

## 2023-09-13 PROCEDURE — 2500000003 HC RX 250 WO HCPCS: Performed by: ANESTHESIOLOGY

## 2023-09-13 PROCEDURE — 82803 BLOOD GASES ANY COMBINATION: CPT

## 2023-09-13 PROCEDURE — 6360000002 HC RX W HCPCS: Performed by: INTERNAL MEDICINE

## 2023-09-13 PROCEDURE — 86850 RBC ANTIBODY SCREEN: CPT

## 2023-09-13 PROCEDURE — 6370000000 HC RX 637 (ALT 250 FOR IP): Performed by: ANESTHESIOLOGY

## 2023-09-13 PROCEDURE — 83880 ASSAY OF NATRIURETIC PEPTIDE: CPT

## 2023-09-13 PROCEDURE — 94010 BREATHING CAPACITY TEST: CPT

## 2023-09-13 PROCEDURE — C9460 INJECTION, CANGRELOR: HCPCS | Performed by: NURSE PRACTITIONER

## 2023-09-13 PROCEDURE — 2580000003 HC RX 258: Performed by: NURSE PRACTITIONER

## 2023-09-13 PROCEDURE — 86900 BLOOD TYPING SEROLOGIC ABO: CPT

## 2023-09-13 PROCEDURE — 2000000000 HC ICU R&B

## 2023-09-13 PROCEDURE — 6370000000 HC RX 637 (ALT 250 FOR IP): Performed by: STUDENT IN AN ORGANIZED HEALTH CARE EDUCATION/TRAINING PROGRAM

## 2023-09-13 PROCEDURE — 6360000002 HC RX W HCPCS: Performed by: ANESTHESIOLOGY

## 2023-09-13 PROCEDURE — 83615 LACTATE (LD) (LDH) ENZYME: CPT

## 2023-09-13 PROCEDURE — P9016 RBC LEUKOCYTES REDUCED: HCPCS

## 2023-09-13 PROCEDURE — 36415 COLL VENOUS BLD VENIPUNCTURE: CPT

## 2023-09-13 PROCEDURE — 85014 HEMATOCRIT: CPT

## 2023-09-13 PROCEDURE — 85018 HEMOGLOBIN: CPT

## 2023-09-13 PROCEDURE — 80048 BASIC METABOLIC PNL TOTAL CA: CPT

## 2023-09-13 PROCEDURE — 6360000002 HC RX W HCPCS: Performed by: NURSE PRACTITIONER

## 2023-09-13 PROCEDURE — 6360000002 HC RX W HCPCS: Performed by: OBSTETRICS & GYNECOLOGY

## 2023-09-13 PROCEDURE — 2500000003 HC RX 250 WO HCPCS: Performed by: NURSE PRACTITIONER

## 2023-09-13 PROCEDURE — 2700000000 HC OXYGEN THERAPY PER DAY

## 2023-09-13 PROCEDURE — 86901 BLOOD TYPING SEROLOGIC RH(D): CPT

## 2023-09-13 RX ADMIN — WATER 1000 MG: 1 INJECTION INTRAMUSCULAR; INTRAVENOUS; SUBCUTANEOUS at 06:14

## 2023-09-13 RX ADMIN — HYDRALAZINE HYDROCHLORIDE 100 MG: 25 TABLET, FILM COATED ORAL at 06:14

## 2023-09-13 RX ADMIN — HYDRALAZINE HYDROCHLORIDE 5 MG: 20 INJECTION INTRAMUSCULAR; INTRAVENOUS at 19:06

## 2023-09-13 RX ADMIN — BUMETANIDE 2 MG: 0.25 INJECTION INTRAMUSCULAR; INTRAVENOUS at 07:59

## 2023-09-13 RX ADMIN — ISOSORBIDE DINITRATE 20 MG: 20 TABLET ORAL at 07:59

## 2023-09-13 RX ADMIN — PANTOPRAZOLE SODIUM 40 MG: 40 TABLET, DELAYED RELEASE ORAL at 11:38

## 2023-09-13 RX ADMIN — CANGRELOR 0.75 MCG/KG/MIN: 50 INJECTION, POWDER, LYOPHILIZED, FOR SOLUTION INTRAVENOUS at 04:20

## 2023-09-13 RX ADMIN — HYDRALAZINE HYDROCHLORIDE 5 MG: 20 INJECTION INTRAMUSCULAR; INTRAVENOUS at 12:10

## 2023-09-13 RX ADMIN — HYDRALAZINE HYDROCHLORIDE 100 MG: 25 TABLET, FILM COATED ORAL at 16:56

## 2023-09-13 RX ADMIN — HYDRALAZINE HYDROCHLORIDE 100 MG: 25 TABLET, FILM COATED ORAL at 20:59

## 2023-09-13 RX ADMIN — ATORVASTATIN CALCIUM 40 MG: 40 TABLET, FILM COATED ORAL at 20:59

## 2023-09-13 RX ADMIN — ISOSORBIDE DINITRATE 20 MG: 20 TABLET ORAL at 16:57

## 2023-09-13 RX ADMIN — BUMETANIDE 2 MG: 0.25 INJECTION INTRAMUSCULAR; INTRAVENOUS at 20:59

## 2023-09-13 RX ADMIN — PANTOPRAZOLE SODIUM 40 MG: 40 TABLET, DELAYED RELEASE ORAL at 22:12

## 2023-09-13 RX ADMIN — BIVALIRUDIN 0.08 MG/KG/HR: 250 INJECTION, POWDER, LYOPHILIZED, FOR SOLUTION INTRAVENOUS at 00:04

## 2023-09-13 RX ADMIN — SODIUM BICARBONATE: 84 INJECTION, SOLUTION INTRAVENOUS at 23:03

## 2023-09-13 RX ADMIN — WATER 2000 MG: 1 INJECTION INTRAMUSCULAR; INTRAVENOUS; SUBCUTANEOUS at 22:12

## 2023-09-13 RX ADMIN — CANGRELOR 0.75 MCG/KG/MIN: 50 INJECTION, POWDER, LYOPHILIZED, FOR SOLUTION INTRAVENOUS at 19:01

## 2023-09-13 RX ADMIN — ISOSORBIDE DINITRATE 20 MG: 20 TABLET ORAL at 12:09

## 2023-09-13 RX ADMIN — AMIODARONE HYDROCHLORIDE 400 MG: 200 TABLET ORAL at 07:59

## 2023-09-13 RX ADMIN — WATER 2000 MG: 1 INJECTION INTRAMUSCULAR; INTRAVENOUS; SUBCUTANEOUS at 16:56

## 2023-09-13 ASSESSMENT — PAIN SCALES - GENERAL: PAINLEVEL_OUTOF10: 0

## 2023-09-13 ASSESSMENT — PAIN SCALES - WONG BAKER: WONGBAKER_NUMERICALRESPONSE: 0

## 2023-09-13 NOTE — CARE COORDINATION
Transition of Care Plan:     RUR: 20%--high  Prior Level of Functioning: independent  Disposition: home with [facility] PT/OT/SN with Chelsy  Follow up appointments: cardiology, PCP, LEELAFT  DME needed: N/A  Transportation at discharge: in car w/family  IM/IMM Medicare/ letter given: 9/1/23  Is patient a  and connected with VA? no               If yes, was [employer] transfer form completed and VA notified? Caregiver Contact:   Clara Layla, significant other, [phone]  Darcella Cushing, sister, [phone]  Discharge Caregiver contacted prior to discharge? N/A  Care Conference needed? yes--family meeting to discuss goals of care. Barriers to discharge: clinical status     Patient admitted from [address] [place] [address] on 8/31 for heart failure. Patient working with PT/OT. Reuben at  with plans for LVAD placement on 9/19. EGD and colonoscopy scheduled for Friday. Palliative following. Patient received LVAD education today and will be introduced to equipment tomorrow. CM will continue to follow.      Robert Arrieta, [address] [facility]  [phone]

## 2023-09-13 NOTE — WOUND CARE
WOCN Note:     Follow up visit for leg wound. Seen in 4223    Chart shows:  Admitted on 8/31/23. Admitted for heart failure & transferred for LVAD workup; Impella. History of ischemic foot ulcer, s/p fem-pop bypass. Assessment:   Communicative and reports no pain. RN at bedside and reports no concerns with buttocks; FMS in place & using barrier cream for leaks. Surface: GUZMAN mattress    Bilateral heels intact and without erythema. Heels offloaded with pillows. 1. POA chronic traumatic ulceration to right medial malleolus  3.5 x 3.5 x 0.3 cm; smaller  Scant serosanguinous exudate; no malodor or purulence - no gross S&S of infection  Periwound intact & without erythema; some fibrinous material in base of wound with new epithelial tissue encroaching at margins; lots of large dry skin plaques. Cleaned wound and skin around it with Vashe. Tx: applied Puracol Ag and foam dressing      Wound Recommendations:    RLL: clean with saline, apply Puracol AG (left in room) and cover with foam dressing. Change every 3 days. PI Prevention:  Turn/reposition approximately every 2 hours  Offload heels with heels hanging off end of pillow at all times while in bed. Sacral Foam dressing: lift to assess regularly; change as needed. Discontinue if incontinence is frequently soiling dressing. Z-guard cream to buttocks and sacrum daily and as needed with incontinence care  Low Air Loss mattress: Use only flat sheet and one incontinence pad. Discussed with RN.     Transition of Care: Plan to follow weekly and as needed while admitted to hospital.     EUGENIA Sampson, RN, [facility]  Certified Wound, Ostomy, Continence Nurse  office [phone]  Available via [facility]

## 2023-09-14 LAB
ABO + RH BLD: NORMAL
ALBUMIN SERPL ELPH-MCNC: 4.6 G/DL (ref 2.9–4.4)
ALBUMIN SERPL-MCNC: 3.9 G/DL (ref 3.5–5)
ALBUMIN/GLOB SERPL: 1.3 (ref 1.1–2.2)
ALBUMIN/GLOB SERPL: 2.1 (ref 0.7–1.7)
ALP SERPL-CCNC: 152 U/L (ref 45–117)
ALPHA1 GLOB SERPL ELPH-MCNC: 0.3 G/DL (ref 0–0.4)
ALPHA2 GLOB SERPL ELPH-MCNC: 0.4 G/DL (ref 0.4–1)
ALT SERPL-CCNC: 14 U/L (ref 12–78)
AMPHETAMINES UR QL SCN: NEGATIVE NG/ML
ANION GAP SERPL CALC-SCNC: 8 MMOL/L (ref 5–15)
APTT PPP: 45.6 SEC (ref 22.1–31)
APTT PPP: 45.6 SEC (ref 22.1–31)
AST SERPL-CCNC: 32 U/L (ref 15–37)
B-GLOBULIN SERPL ELPH-MCNC: 0.7 G/DL (ref 0.7–1.3)
BARBITURATES UR QL SCN: NEGATIVE NG/ML
BASOPHILS # BLD: 0 K/UL (ref 0–0.1)
BASOPHILS NFR BLD: 1 % (ref 0–1)
BENZODIAZ UR QL: NEGATIVE NG/ML
BILIRUB DIRECT SERPL-MCNC: 0.7 MG/DL (ref 0–0.2)
BILIRUB SERPL-MCNC: 1.6 MG/DL (ref 0.2–1)
BLD PROD TYP BPU: NORMAL
BLOOD BANK DISPENSE STATUS: NORMAL
BLOOD GROUP ANTIBODIES SERPL: NORMAL
BPU ID: NORMAL
BUN SERPL-MCNC: 32 MG/DL (ref 6–20)
BUN/CREAT SERPL: 26 (ref 12–20)
BZE UR QL: NEGATIVE NG/ML
CALCIUM SERPL-MCNC: 9.6 MG/DL (ref 8.5–10.1)
CANNABINOIDS UR QL SCN: NEGATIVE NG/ML
CHLORIDE SERPL-SCNC: 99 MMOL/L (ref 97–108)
CO2 SERPL-SCNC: 30 MMOL/L (ref 21–32)
COMMENT:: NORMAL
COTININE UR QL SCN: NEGATIVE NG/ML
CREAT SERPL-MCNC: 1.22 MG/DL (ref 0.7–1.3)
CROSSMATCH RESULT: NORMAL
DIFFERENTIAL METHOD BLD: ABNORMAL
ECHO BSA: 1.83 M2
ECHO BSA: 1.83 M2
EOSINOPHIL # BLD: 0.1 K/UL (ref 0–0.4)
EOSINOPHIL NFR BLD: 1 % (ref 0–7)
ERYTHROCYTE [DISTWIDTH] IN BLOOD BY AUTOMATED COUNT: 15 % (ref 11.5–14.5)
GAMMA GLOB SERPL ELPH-MCNC: 0.9 G/DL (ref 0.4–1.8)
GLOBULIN SER CALC-MCNC: 3.1 G/DL (ref 2–4)
GLOBULIN SER-MCNC: 2.3 G/DL (ref 2.2–3.9)
GLUCOSE SERPL-MCNC: 116 MG/DL (ref 65–100)
HCT VFR BLD AUTO: 27.6 % (ref 36.6–50.3)
HGB BLD-MCNC: 8.8 G/DL (ref 12.1–17)
IGA SERPL-MCNC: 179 MG/DL (ref 61–437)
IGG SERPL-MCNC: 887 MG/DL (ref 603–1613)
IGM SERPL-MCNC: 82 MG/DL (ref 20–172)
IMM GRANULOCYTES # BLD AUTO: 0.1 K/UL (ref 0–0.04)
IMM GRANULOCYTES NFR BLD AUTO: 1 % (ref 0–0.5)
INTERPRETATION SERPL IEP-IMP: ABNORMAL
KAPPA LC FREE SER-MCNC: 66.4 MG/L (ref 3.3–19.4)
KAPPA LC FREE/LAMBDA FREE SER: 1.82 (ref 0.26–1.65)
LAMBDA LC FREE SERPL-MCNC: 36.5 MG/L (ref 5.7–26.3)
LDH SERPL L TO P-CCNC: 424 U/L (ref 85–241)
LYMPHOCYTES # BLD: 1.1 K/UL (ref 0.8–3.5)
LYMPHOCYTES NFR BLD: 15 % (ref 12–49)
M PROTEIN SERPL ELPH-MCNC: ABNORMAL G/DL
MAGNESIUM SERPL-MCNC: 2 MG/DL (ref 1.6–2.4)
MCH RBC QN AUTO: 28.9 PG (ref 26–34)
MCHC RBC AUTO-ENTMCNC: 31.9 G/DL (ref 30–36.5)
MCV RBC AUTO: 90.8 FL (ref 80–99)
MDMA URINE: NEGATIVE NG/ML
METHADONE UR QL SCN: NEGATIVE NG/ML
METHAQUALONE UR QL: NEGATIVE NG/ML
MONOCYTES # BLD: 1.1 K/UL (ref 0–1)
MONOCYTES NFR BLD: 15 % (ref 5–13)
NEUTS SEG # BLD: 5 K/UL (ref 1.8–8)
NEUTS SEG NFR BLD: 67 % (ref 32–75)
NRBC # BLD: 0 K/UL (ref 0–0.01)
NRBC BLD-RTO: 0 PER 100 WBC
NT PRO BNP: 6202 PG/ML
OPIATES UR QL: NEGATIVE NG/ML
PCP UR QL: NEGATIVE NG/ML
PLATELET # BLD AUTO: 255 K/UL (ref 150–400)
PMV BLD AUTO: 10.7 FL (ref 8.9–12.9)
POTASSIUM SERPL-SCNC: 3.7 MMOL/L (ref 3.5–5.1)
PROPOXYPH UR QL: NEGATIVE NG/ML
PROT SERPL-MCNC: 6.9 G/DL (ref 6–8.5)
PROT SERPL-MCNC: 7 G/DL (ref 6.4–8.2)
RBC # BLD AUTO: 3.04 M/UL (ref 4.1–5.7)
SODIUM SERPL-SCNC: 137 MMOL/L (ref 136–145)
SPECIMEN EXP DATE BLD: NORMAL
THERAPEUTIC RANGE: ABNORMAL SECS (ref 58–77)
THERAPEUTIC RANGE: ABNORMAL SECS (ref 58–77)
UNIT DIVISION: 0
VAS LEFT AX A MID PSV: 82 CM/S
VAS LEFT BRACHIAL A DIST EDV: 23.1 CM/S
VAS LEFT BRACHIAL A DIST PSV: 173.4 CM/S
VAS LEFT BRACHIAL A PROX EDV: 12.6 CM/S
VAS LEFT BRACHIAL A PROX PSV: 114.9 CM/S
VAS LEFT RADIAL A DIST PSV: 137 CM/S
VAS LEFT SUBCLAVIAN DIST EDV: 0 CM/S
VAS LEFT SUBCLAVIAN DIST PSV: 167.8 CM/S
VAS LEFT SUBCLAVIAN PROX EDV: 27.1 CM/S
VAS LEFT SUBCLAVIAN PROX PSV: 203.6 CM/S
VAS LEFT ULNAR A DIST PSV: 113.7 CM/S
WBC # BLD AUTO: 7.3 K/UL (ref 4.1–11.1)

## 2023-09-14 PROCEDURE — 2580000003 HC RX 258: Performed by: OBSTETRICS & GYNECOLOGY

## 2023-09-14 PROCEDURE — 6370000000 HC RX 637 (ALT 250 FOR IP): Performed by: INTERNAL MEDICINE

## 2023-09-14 PROCEDURE — 2580000003 HC RX 258: Performed by: NURSE PRACTITIONER

## 2023-09-14 PROCEDURE — 97116 GAIT TRAINING THERAPY: CPT

## 2023-09-14 PROCEDURE — 83880 ASSAY OF NATRIURETIC PEPTIDE: CPT

## 2023-09-14 PROCEDURE — 6370000000 HC RX 637 (ALT 250 FOR IP): Performed by: PHYSICIAN ASSISTANT

## 2023-09-14 PROCEDURE — 80048 BASIC METABOLIC PNL TOTAL CA: CPT

## 2023-09-14 PROCEDURE — 83615 LACTATE (LD) (LDH) ENZYME: CPT

## 2023-09-14 PROCEDURE — 85730 THROMBOPLASTIN TIME PARTIAL: CPT

## 2023-09-14 PROCEDURE — C9460 INJECTION, CANGRELOR: HCPCS | Performed by: NURSE PRACTITIONER

## 2023-09-14 PROCEDURE — 6370000000 HC RX 637 (ALT 250 FOR IP): Performed by: ANESTHESIOLOGY

## 2023-09-14 PROCEDURE — 97535 SELF CARE MNGMENT TRAINING: CPT

## 2023-09-14 PROCEDURE — 2500000003 HC RX 250 WO HCPCS: Performed by: INTERNAL MEDICINE

## 2023-09-14 PROCEDURE — 6360000002 HC RX W HCPCS: Performed by: NURSE PRACTITIONER

## 2023-09-14 PROCEDURE — 2500000003 HC RX 250 WO HCPCS: Performed by: ANESTHESIOLOGY

## 2023-09-14 PROCEDURE — 2000000000 HC ICU R&B

## 2023-09-14 PROCEDURE — 6370000000 HC RX 637 (ALT 250 FOR IP): Performed by: STUDENT IN AN ORGANIZED HEALTH CARE EDUCATION/TRAINING PROGRAM

## 2023-09-14 PROCEDURE — 80076 HEPATIC FUNCTION PANEL: CPT

## 2023-09-14 PROCEDURE — 6360000002 HC RX W HCPCS: Performed by: INTERNAL MEDICINE

## 2023-09-14 PROCEDURE — 85025 COMPLETE CBC W/AUTO DIFF WBC: CPT

## 2023-09-14 PROCEDURE — 99233 SBSQ HOSP IP/OBS HIGH 50: CPT | Performed by: INTERNAL MEDICINE

## 2023-09-14 PROCEDURE — 83050 HGB METHEMOGLOBIN QUAN: CPT

## 2023-09-14 PROCEDURE — 36415 COLL VENOUS BLD VENIPUNCTURE: CPT

## 2023-09-14 PROCEDURE — 2580000003 HC RX 258: Performed by: ANESTHESIOLOGY

## 2023-09-14 PROCEDURE — 94660 CPAP INITIATION&MGMT: CPT

## 2023-09-14 PROCEDURE — 97530 THERAPEUTIC ACTIVITIES: CPT

## 2023-09-14 PROCEDURE — 83735 ASSAY OF MAGNESIUM: CPT

## 2023-09-14 PROCEDURE — 99232 SBSQ HOSP IP/OBS MODERATE 35: CPT | Performed by: NURSE PRACTITIONER

## 2023-09-14 PROCEDURE — 2700000000 HC OXYGEN THERAPY PER DAY

## 2023-09-14 PROCEDURE — 6360000002 HC RX W HCPCS: Performed by: OBSTETRICS & GYNECOLOGY

## 2023-09-14 PROCEDURE — 82375 ASSAY CARBOXYHB QUANT: CPT

## 2023-09-14 RX ORDER — BUMETANIDE 0.25 MG/ML
2 INJECTION INTRAMUSCULAR; INTRAVENOUS DAILY
Status: DISCONTINUED | OUTPATIENT
Start: 2023-09-14 | End: 2023-09-19

## 2023-09-14 RX ADMIN — SODIUM BICARBONATE: 84 INJECTION, SOLUTION INTRAVENOUS at 21:17

## 2023-09-14 RX ADMIN — HYDRALAZINE HYDROCHLORIDE 100 MG: 25 TABLET, FILM COATED ORAL at 14:14

## 2023-09-14 RX ADMIN — CHLORHEXIDINE GLUCONATE 15 ML: 1.2 RINSE ORAL at 11:40

## 2023-09-14 RX ADMIN — POLYETHYLENE GLYCOL-3350 AND ELECTROLYTES 2000 ML: 236; 6.74; 5.86; 2.97; 22.74 POWDER, FOR SOLUTION ORAL at 20:42

## 2023-09-14 RX ADMIN — BIVALIRUDIN 0.08 MG/KG/HR: 250 INJECTION, POWDER, LYOPHILIZED, FOR SOLUTION INTRAVENOUS at 21:16

## 2023-09-14 RX ADMIN — AMIODARONE HYDROCHLORIDE 400 MG: 200 TABLET ORAL at 08:42

## 2023-09-14 RX ADMIN — WATER 2000 MG: 1 INJECTION INTRAMUSCULAR; INTRAVENOUS; SUBCUTANEOUS at 06:04

## 2023-09-14 RX ADMIN — BUMETANIDE 2 MG: 0.25 INJECTION INTRAMUSCULAR; INTRAVENOUS at 08:42

## 2023-09-14 RX ADMIN — CANGRELOR 0.75 MCG/KG/MIN: 50 INJECTION, POWDER, LYOPHILIZED, FOR SOLUTION INTRAVENOUS at 22:38

## 2023-09-14 RX ADMIN — HYDRALAZINE HYDROCHLORIDE 100 MG: 25 TABLET, FILM COATED ORAL at 20:41

## 2023-09-14 RX ADMIN — ATORVASTATIN CALCIUM 40 MG: 40 TABLET, FILM COATED ORAL at 20:42

## 2023-09-14 RX ADMIN — WATER 2000 MG: 1 INJECTION INTRAMUSCULAR; INTRAVENOUS; SUBCUTANEOUS at 14:14

## 2023-09-14 RX ADMIN — CANGRELOR 0.75 MCG/KG/MIN: 50 INJECTION, POWDER, LYOPHILIZED, FOR SOLUTION INTRAVENOUS at 11:34

## 2023-09-14 RX ADMIN — WATER 2000 MG: 1 INJECTION INTRAMUSCULAR; INTRAVENOUS; SUBCUTANEOUS at 22:10

## 2023-09-14 RX ADMIN — ISOSORBIDE DINITRATE 20 MG: 20 TABLET ORAL at 18:21

## 2023-09-14 RX ADMIN — SPIRONOLACTONE 25 MG: 25 TABLET ORAL at 08:42

## 2023-09-14 RX ADMIN — ISOSORBIDE DINITRATE 20 MG: 20 TABLET ORAL at 12:17

## 2023-09-14 RX ADMIN — ISOSORBIDE DINITRATE 20 MG: 20 TABLET ORAL at 08:43

## 2023-09-14 RX ADMIN — HYDRALAZINE HYDROCHLORIDE 100 MG: 25 TABLET, FILM COATED ORAL at 06:04

## 2023-09-14 RX ADMIN — HYDRALAZINE HYDROCHLORIDE 5 MG: 20 INJECTION INTRAMUSCULAR; INTRAVENOUS at 07:02

## 2023-09-14 RX ADMIN — PANTOPRAZOLE SODIUM 40 MG: 40 TABLET, DELAYED RELEASE ORAL at 12:17

## 2023-09-14 ASSESSMENT — PAIN SCALES - GENERAL
PAINLEVEL_OUTOF10: 0

## 2023-09-14 ASSESSMENT — PAIN SCALES - WONG BAKER
WONGBAKER_NUMERICALRESPONSE: 0

## 2023-09-15 ENCOUNTER — ANESTHESIA EVENT (OUTPATIENT)
Facility: HOSPITAL | Age: 68
End: 2023-09-15
Payer: MEDICAID

## 2023-09-15 ENCOUNTER — APPOINTMENT (OUTPATIENT)
Facility: HOSPITAL | Age: 68
DRG: 161 | End: 2023-09-15
Attending: ANESTHESIOLOGY
Payer: MEDICAID

## 2023-09-15 ENCOUNTER — ANESTHESIA (OUTPATIENT)
Facility: HOSPITAL | Age: 68
End: 2023-09-15
Payer: MEDICAID

## 2023-09-15 ENCOUNTER — APPOINTMENT (OUTPATIENT)
Facility: HOSPITAL | Age: 68
DRG: 161 | End: 2023-09-15
Attending: INTERNAL MEDICINE
Payer: MEDICAID

## 2023-09-15 LAB
ALBUMIN SERPL-MCNC: 3.9 G/DL (ref 3.5–5)
ALBUMIN/GLOB SERPL: 1.4 (ref 1.1–2.2)
ALP SERPL-CCNC: 145 U/L (ref 45–117)
ALT SERPL-CCNC: 32 U/L (ref 12–78)
ANION GAP SERPL CALC-SCNC: 3 MMOL/L (ref 5–15)
APTT PPP: 44.7 SEC (ref 22.1–31)
APTT PPP: 48.7 SEC (ref 22.1–31)
AST SERPL-CCNC: 99 U/L (ref 15–37)
BASOPHILS # BLD: 0 K/UL (ref 0–0.1)
BASOPHILS NFR BLD: 0 % (ref 0–1)
BDY SITE: ABNORMAL
BILIRUB DIRECT SERPL-MCNC: 0.7 MG/DL (ref 0–0.2)
BILIRUB SERPL-MCNC: 1.6 MG/DL (ref 0.2–1)
BUN SERPL-MCNC: 26 MG/DL (ref 6–20)
BUN/CREAT SERPL: 25 (ref 12–20)
CALCIUM SERPL-MCNC: 9 MG/DL (ref 8.5–10.1)
CHLORIDE SERPL-SCNC: 98 MMOL/L (ref 97–108)
CO2 SERPL-SCNC: 33 MMOL/L (ref 21–32)
COHGB MFR BLD: 1 % (ref 1–2)
CREAT SERPL-MCNC: 1.05 MG/DL (ref 0.7–1.3)
DIFFERENTIAL METHOD BLD: ABNORMAL
ECHO BSA: 1.83 M2
ECHO LV FRACTIONAL SHORTENING: 22 % (ref 28–44)
ECHO LV INTERNAL DIMENSION DIASTOLE INDEX: 2.87 CM/M2
ECHO LV INTERNAL DIMENSION DIASTOLIC: 5.1 CM (ref 4.2–5.9)
ECHO LV INTERNAL DIMENSION SYSTOLIC INDEX: 2.25 CM/M2
ECHO LV INTERNAL DIMENSION SYSTOLIC: 4 CM
ECHO LV IVSD: 0.7 CM (ref 0.6–1)
ECHO LV MASS 2D: 129.4 G (ref 88–224)
ECHO LV MASS INDEX 2D: 72.7 G/M2 (ref 49–115)
ECHO LV POSTERIOR WALL DIASTOLIC: 0.8 CM (ref 0.6–1)
ECHO LV RELATIVE WALL THICKNESS RATIO: 0.31
ECHO RV INTERNAL DIMENSION: 4 CM
ECHO RV TAPSE: 1.9 CM (ref 1.7–?)
EOSINOPHIL # BLD: 0.1 K/UL (ref 0–0.4)
EOSINOPHIL NFR BLD: 1 % (ref 0–7)
ERYTHROCYTE [DISTWIDTH] IN BLOOD BY AUTOMATED COUNT: 15 % (ref 11.5–14.5)
GLOBULIN SER CALC-MCNC: 2.8 G/DL (ref 2–4)
GLUCOSE SERPL-MCNC: 101 MG/DL (ref 65–100)
HCT VFR BLD AUTO: 26.5 % (ref 36.6–50.3)
HGB BLD-MCNC: 8.6 G/DL (ref 12.1–17)
IMM GRANULOCYTES # BLD AUTO: 0.1 K/UL (ref 0–0.04)
IMM GRANULOCYTES NFR BLD AUTO: 1 % (ref 0–0.5)
LDH SERPL L TO P-CCNC: 490 U/L (ref 85–241)
LYMPHOCYTES # BLD: 1.1 K/UL (ref 0.8–3.5)
LYMPHOCYTES NFR BLD: 15 % (ref 12–49)
MAGNESIUM SERPL-MCNC: 1.9 MG/DL (ref 1.6–2.4)
MCH RBC QN AUTO: 30.2 PG (ref 26–34)
MCHC RBC AUTO-ENTMCNC: 32.5 G/DL (ref 30–36.5)
MCV RBC AUTO: 93 FL (ref 80–99)
METHGB MFR BLD: 0.3 % (ref 0–1.4)
MONOCYTES # BLD: 1.1 K/UL (ref 0–1)
MONOCYTES NFR BLD: 15 % (ref 5–13)
NEUTS SEG # BLD: 4.9 K/UL (ref 1.8–8)
NEUTS SEG NFR BLD: 68 % (ref 32–75)
NRBC # BLD: 0 K/UL (ref 0–0.01)
NRBC BLD-RTO: 0 PER 100 WBC
NT PRO BNP: 5420 PG/ML
OXYHGB MFR BLD: 73.2 % (ref 94–97)
PLATELET # BLD AUTO: 209 K/UL (ref 150–400)
PMV BLD AUTO: 11.3 FL (ref 8.9–12.9)
POTASSIUM SERPL-SCNC: 3.4 MMOL/L (ref 3.5–5.1)
PROT SERPL-MCNC: 6.7 G/DL (ref 6.4–8.2)
RBC # BLD AUTO: 2.85 M/UL (ref 4.1–5.7)
SAO2 % BLD: 74 % (ref 95–99)
SODIUM SERPL-SCNC: 134 MMOL/L (ref 136–145)
SPECIMEN SITE: ABNORMAL
THERAPEUTIC RANGE: ABNORMAL SECS (ref 58–77)
THERAPEUTIC RANGE: ABNORMAL SECS (ref 58–77)
WBC # BLD AUTO: 7.2 K/UL (ref 4.1–11.1)

## 2023-09-15 PROCEDURE — 6370000000 HC RX 637 (ALT 250 FOR IP): Performed by: INTERNAL MEDICINE

## 2023-09-15 PROCEDURE — 2709999900 HC NON-CHARGEABLE SUPPLY: Performed by: INTERNAL MEDICINE

## 2023-09-15 PROCEDURE — 80076 HEPATIC FUNCTION PANEL: CPT

## 2023-09-15 PROCEDURE — 6360000002 HC RX W HCPCS: Performed by: NURSE ANESTHETIST, CERTIFIED REGISTERED

## 2023-09-15 PROCEDURE — 82375 ASSAY CARBOXYHB QUANT: CPT

## 2023-09-15 PROCEDURE — 83615 LACTATE (LD) (LDH) ENZYME: CPT

## 2023-09-15 PROCEDURE — 6370000000 HC RX 637 (ALT 250 FOR IP): Performed by: STUDENT IN AN ORGANIZED HEALTH CARE EDUCATION/TRAINING PROGRAM

## 2023-09-15 PROCEDURE — 37799 UNLISTED PX VASCULAR SURGERY: CPT

## 2023-09-15 PROCEDURE — 2580000003 HC RX 258: Performed by: OBSTETRICS & GYNECOLOGY

## 2023-09-15 PROCEDURE — 0DJ08ZZ INSPECTION OF UPPER INTESTINAL TRACT, VIA NATURAL OR ARTIFICIAL OPENING ENDOSCOPIC: ICD-10-PCS | Performed by: INTERNAL MEDICINE

## 2023-09-15 PROCEDURE — 80048 BASIC METABOLIC PNL TOTAL CA: CPT

## 2023-09-15 PROCEDURE — 85730 THROMBOPLASTIN TIME PARTIAL: CPT

## 2023-09-15 PROCEDURE — 3600007502: Performed by: INTERNAL MEDICINE

## 2023-09-15 PROCEDURE — 2580000003 HC RX 258: Performed by: NURSE PRACTITIONER

## 2023-09-15 PROCEDURE — 6360000002 HC RX W HCPCS: Performed by: OBSTETRICS & GYNECOLOGY

## 2023-09-15 PROCEDURE — 2000000000 HC ICU R&B

## 2023-09-15 PROCEDURE — 71045 X-RAY EXAM CHEST 1 VIEW: CPT

## 2023-09-15 PROCEDURE — 83735 ASSAY OF MAGNESIUM: CPT

## 2023-09-15 PROCEDURE — 83050 HGB METHEMOGLOBIN QUAN: CPT

## 2023-09-15 PROCEDURE — 2580000003 HC RX 258: Performed by: NURSE ANESTHETIST, CERTIFIED REGISTERED

## 2023-09-15 PROCEDURE — 6360000002 HC RX W HCPCS: Performed by: INTERNAL MEDICINE

## 2023-09-15 PROCEDURE — 93308 TTE F-UP OR LMTD: CPT

## 2023-09-15 PROCEDURE — 93308 TTE F-UP OR LMTD: CPT | Performed by: INTERNAL MEDICINE

## 2023-09-15 PROCEDURE — 83880 ASSAY OF NATRIURETIC PEPTIDE: CPT

## 2023-09-15 PROCEDURE — 3600007512: Performed by: INTERNAL MEDICINE

## 2023-09-15 PROCEDURE — 97116 GAIT TRAINING THERAPY: CPT

## 2023-09-15 PROCEDURE — 6370000000 HC RX 637 (ALT 250 FOR IP): Performed by: ANESTHESIOLOGY

## 2023-09-15 PROCEDURE — 94660 CPAP INITIATION&MGMT: CPT

## 2023-09-15 PROCEDURE — 97530 THERAPEUTIC ACTIVITIES: CPT

## 2023-09-15 PROCEDURE — 2500000003 HC RX 250 WO HCPCS: Performed by: INTERNAL MEDICINE

## 2023-09-15 PROCEDURE — C9460 INJECTION, CANGRELOR: HCPCS | Performed by: NURSE PRACTITIONER

## 2023-09-15 PROCEDURE — 2700000000 HC OXYGEN THERAPY PER DAY

## 2023-09-15 PROCEDURE — 36415 COLL VENOUS BLD VENIPUNCTURE: CPT

## 2023-09-15 PROCEDURE — 0DJD8ZZ INSPECTION OF LOWER INTESTINAL TRACT, VIA NATURAL OR ARTIFICIAL OPENING ENDOSCOPIC: ICD-10-PCS | Performed by: INTERNAL MEDICINE

## 2023-09-15 PROCEDURE — 3700000001 HC ADD 15 MINUTES (ANESTHESIA): Performed by: INTERNAL MEDICINE

## 2023-09-15 PROCEDURE — 97535 SELF CARE MNGMENT TRAINING: CPT

## 2023-09-15 PROCEDURE — 93325 DOPPLER ECHO COLOR FLOW MAPG: CPT | Performed by: INTERNAL MEDICINE

## 2023-09-15 PROCEDURE — 99233 SBSQ HOSP IP/OBS HIGH 50: CPT | Performed by: INTERNAL MEDICINE

## 2023-09-15 PROCEDURE — 85025 COMPLETE CBC W/AUTO DIFF WBC: CPT

## 2023-09-15 PROCEDURE — 3700000000 HC ANESTHESIA ATTENDED CARE: Performed by: INTERNAL MEDICINE

## 2023-09-15 PROCEDURE — 6360000002 HC RX W HCPCS: Performed by: NURSE PRACTITIONER

## 2023-09-15 RX ORDER — SODIUM CHLORIDE 9 MG/ML
25 INJECTION, SOLUTION INTRAVENOUS PRN
Status: DISCONTINUED | OUTPATIENT
Start: 2023-09-15 | End: 2023-09-19 | Stop reason: HOSPADM

## 2023-09-15 RX ORDER — ISOSORBIDE DINITRATE 20 MG/1
40 TABLET ORAL 3 TIMES DAILY
Status: DISCONTINUED | OUTPATIENT
Start: 2023-09-15 | End: 2023-09-19

## 2023-09-15 RX ORDER — SODIUM CHLORIDE 0.9 % (FLUSH) 0.9 %
5-40 SYRINGE (ML) INJECTION PRN
Status: DISCONTINUED | OUTPATIENT
Start: 2023-09-15 | End: 2023-09-19 | Stop reason: HOSPADM

## 2023-09-15 RX ORDER — SODIUM CHLORIDE 0.9 % (FLUSH) 0.9 %
5-40 SYRINGE (ML) INJECTION EVERY 12 HOURS SCHEDULED
Status: DISCONTINUED | OUTPATIENT
Start: 2023-09-15 | End: 2023-09-19 | Stop reason: HOSPADM

## 2023-09-15 RX ORDER — SODIUM CHLORIDE 9 MG/ML
INJECTION, SOLUTION INTRAVENOUS CONTINUOUS
Status: DISCONTINUED | OUTPATIENT
Start: 2023-09-15 | End: 2023-09-19

## 2023-09-15 RX ORDER — 0.9 % SODIUM CHLORIDE 0.9 %
INTRAVENOUS SOLUTION INTRAVENOUS PRN
Status: DISCONTINUED | OUTPATIENT
Start: 2023-09-15 | End: 2023-09-15 | Stop reason: SDUPTHER

## 2023-09-15 RX ADMIN — WATER 2000 MG: 1 INJECTION INTRAMUSCULAR; INTRAVENOUS; SUBCUTANEOUS at 21:33

## 2023-09-15 RX ADMIN — WATER 2000 MG: 1 INJECTION INTRAMUSCULAR; INTRAVENOUS; SUBCUTANEOUS at 14:15

## 2023-09-15 RX ADMIN — ATORVASTATIN CALCIUM 40 MG: 40 TABLET, FILM COATED ORAL at 21:02

## 2023-09-15 RX ADMIN — PROPOFOL 40 MG: 10 INJECTION, EMULSION INTRAVENOUS at 14:50

## 2023-09-15 RX ADMIN — HYDRALAZINE HYDROCHLORIDE 100 MG: 25 TABLET, FILM COATED ORAL at 17:19

## 2023-09-15 RX ADMIN — PANTOPRAZOLE SODIUM 40 MG: 40 TABLET, DELAYED RELEASE ORAL at 21:02

## 2023-09-15 RX ADMIN — PROPOFOL 20 MG: 10 INJECTION, EMULSION INTRAVENOUS at 14:41

## 2023-09-15 RX ADMIN — WATER 2000 MG: 1 INJECTION INTRAMUSCULAR; INTRAVENOUS; SUBCUTANEOUS at 06:50

## 2023-09-15 RX ADMIN — HYDRALAZINE HYDROCHLORIDE 5 MG: 20 INJECTION INTRAMUSCULAR; INTRAVENOUS at 02:40

## 2023-09-15 RX ADMIN — HYDRALAZINE HYDROCHLORIDE 100 MG: 25 TABLET, FILM COATED ORAL at 21:02

## 2023-09-15 RX ADMIN — PROPOFOL 20 MG: 10 INJECTION, EMULSION INTRAVENOUS at 14:59

## 2023-09-15 RX ADMIN — POTASSIUM CHLORIDE 20 MEQ: 29.8 INJECTION, SOLUTION INTRAVENOUS at 05:35

## 2023-09-15 RX ADMIN — CHLORHEXIDINE GLUCONATE 15 ML: 1.2 RINSE ORAL at 21:03

## 2023-09-15 RX ADMIN — SODIUM CHLORIDE 150 ML: 9 INJECTION, SOLUTION INTRAVENOUS at 15:05

## 2023-09-15 RX ADMIN — PROPOFOL 20 MG: 10 INJECTION, EMULSION INTRAVENOUS at 15:01

## 2023-09-15 RX ADMIN — BUMETANIDE 2 MG: 0.25 INJECTION INTRAMUSCULAR; INTRAVENOUS at 08:14

## 2023-09-15 RX ADMIN — PROPOFOL 20 MG: 10 INJECTION, EMULSION INTRAVENOUS at 14:34

## 2023-09-15 RX ADMIN — PHENYLEPHRINE HYDROCHLORIDE 80 MCG/MIN: 10 INJECTION INTRAVENOUS at 14:28

## 2023-09-15 RX ADMIN — ISOSORBIDE DINITRATE 40 MG: 20 TABLET ORAL at 12:49

## 2023-09-15 RX ADMIN — ISOSORBIDE DINITRATE 20 MG: 20 TABLET ORAL at 08:13

## 2023-09-15 RX ADMIN — ERGOCALCIFEROL 50000 UNITS: 1.25 CAPSULE ORAL at 17:19

## 2023-09-15 RX ADMIN — HYDRALAZINE HYDROCHLORIDE 100 MG: 25 TABLET, FILM COATED ORAL at 06:49

## 2023-09-15 RX ADMIN — PANTOPRAZOLE SODIUM 40 MG: 40 TABLET, DELAYED RELEASE ORAL at 12:49

## 2023-09-15 RX ADMIN — PROPOFOL 40 MG: 10 INJECTION, EMULSION INTRAVENOUS at 14:31

## 2023-09-15 RX ADMIN — AMIODARONE HYDROCHLORIDE 400 MG: 200 TABLET ORAL at 08:14

## 2023-09-15 RX ADMIN — POLYETHYLENE GLYCOL-3350 AND ELECTROLYTES 2000 ML: 236; 6.74; 5.86; 2.97; 22.74 POWDER, FOR SOLUTION ORAL at 00:00

## 2023-09-15 RX ADMIN — CANGRELOR 0.75 MCG/KG/MIN: 50 INJECTION, POWDER, LYOPHILIZED, FOR SOLUTION INTRAVENOUS at 13:52

## 2023-09-15 RX ADMIN — PROPOFOL 20 MG: 10 INJECTION, EMULSION INTRAVENOUS at 14:44

## 2023-09-15 RX ADMIN — POTASSIUM CHLORIDE 20 MEQ: 29.8 INJECTION, SOLUTION INTRAVENOUS at 06:49

## 2023-09-15 RX ADMIN — PROPOFOL 20 MG: 10 INJECTION, EMULSION INTRAVENOUS at 14:56

## 2023-09-15 RX ADMIN — ISOSORBIDE DINITRATE 40 MG: 20 TABLET ORAL at 17:19

## 2023-09-15 RX ADMIN — SPIRONOLACTONE 25 MG: 25 TABLET ORAL at 08:13

## 2023-09-15 RX ADMIN — PROPOFOL 40 MG: 10 INJECTION, EMULSION INTRAVENOUS at 14:38

## 2023-09-15 RX ADMIN — PROPOFOL 20 MG: 10 INJECTION, EMULSION INTRAVENOUS at 14:47

## 2023-09-15 RX ADMIN — PROPOFOL 20 MG: 10 INJECTION, EMULSION INTRAVENOUS at 14:53

## 2023-09-15 ASSESSMENT — PAIN SCALES - GENERAL
PAINLEVEL_OUTOF10: 0
PAINLEVEL_OUTOF10: 0

## 2023-09-15 ASSESSMENT — ENCOUNTER SYMPTOMS: SHORTNESS OF BREATH: 1

## 2023-09-15 ASSESSMENT — PAIN SCALES - WONG BAKER: WONGBAKER_NUMERICALRESPONSE: 0

## 2023-09-15 NOTE — ANESTHESIA POSTPROCEDURE EVALUATION
Department of Anesthesiology  Postprocedure Note    Patient: Young Cai  MRN: [id number]  YOB: 1955  Date of evaluation: 9/15/2023      Procedure Summary     Date: 09/15/23 Room / Location: [place] [address] / [facility] ENDOSCOPY    Anesthesia Start: 9099 Anesthesia Stop: 1518    Procedures:       EGD ESOPHAGOGASTRODUODENOSCOPY (Upper GI Region)      COLONOSCOPY DIAGNOSTIC (Lower GI Region) Diagnosis:       Anemia, unspecified type      (Anemia, unspecified type [D64.9])    Surgeons: Albin Juarez MD Responsible Provider: Mihir Scott MD    Anesthesia Type: MAC ASA Status: 4          Anesthesia Type: MAC    Kelly Phase I: Kelly Score: 8    Kelly Phase II:        Anesthesia Post Evaluation    Patient location during evaluation: PACU  Patient participation: complete - patient participated  Level of consciousness: awake  Airway patency: patent  Nausea & Vomiting: no nausea  Complications: no  Cardiovascular status: blood pressure returned to baseline and hemodynamically stable  Respiratory status: acceptable  Hydration status: stable  Multimodal analgesia pain management approach

## 2023-09-15 NOTE — OP NOTE
[address] [facility] [facility] [address], [address] [facility]  [phone]           Endoscopic Gastroduodenoscopy and Colonoscopy Procedure Note    Valentine Pop  1955  [id number]    Indication:     EGD: Anemia  Colonoscopy: Average risk screening, no prior colonoscopy    : Celine Simms MD    Staff: Circulator: Eve Colon RN  Endoscopy Technician: Latia Hernandez     Referring Provider:  Aaron Lewis MD    Anesthesia/Sedation:  MAC anesthesia      Procedure Details     After informed consent was obtained with all risks and benefits of procedure explained and preoperative exam completed, the patient was taken to the endoscopy suite and placed in the left lateral decubitus position. Upon sequential sedation as per above, the endoscope was inserted into the mouth and advanced under direct vision to second portion of the duodenum. A careful inspection was made as the gastroscope was withdrawn, including a retroflexed view of the proximal stomach; findings and interventions are described below. The patient was then turned around and a digital rectal exam was performed  And was normal.  The Olympus videocolonoscope  was inserted in the rectum and carefully advanced to the cecum, which was identified by the ileocecal valve and appendiceal orifice. The quality of preparation was fair - adequate views obtained with extensive lavage. The colonoscope was slowly withdrawn with careful evaluation between folds. Retroflexion in the rectum was performed and was normal.    Findings:   Esophagus:normal  Stomach: normal   Duodenum/jejunum: normal    Rectum: Grade 2 internal hemorrhoid(s);   Sigmoid:     -Diverticulosis  Descending Colon:     -Diverticulosis  Transverse Colon:     -Diverticulosis  Ascending Colon:     -Diverticulosis  Cecum: normal  Terminal Ileum: not intubated    Therapies:  none    Specimens: * No specimens in log *            EBL:  None    Complications:

## 2023-09-15 NOTE — CARE COORDINATION
Transition of Care Plan:     RUR: 17%--moderate  Prior Level of Functioning: independent  Disposition: home with [facility] PT/OT/SN with Chelsy  Follow up appointments: cardiology, PCP, AHFT  DME needed: N/A  Transportation at discharge: in car w/family  IM/IMM Medicare/ letter given: 9/1/23  Is patient a [place] and connected with VA? no  Caregiver Contact:   Sachin Galvan, significant other, [phone]  Chidi Crisostomo, sister, [phone]  Discharge Caregiver contacted prior to discharge? N/A  Care Conference needed? yes--family meeting to discuss goals of care. Barriers to discharge: clinical status     Patient admitted from [facility] on 8/31 for heart failure. Patient working with PT/OT. [place]ella at P5 with plans for LVAD placement on 9/19. EGD and colonoscopy this afternoon. Palliative and AHFT following. Working with PT/OT daily. CM will continue to follow.

## 2023-09-16 ENCOUNTER — APPOINTMENT (OUTPATIENT)
Facility: HOSPITAL | Age: 68
DRG: 161 | End: 2023-09-16
Attending: ANESTHESIOLOGY
Payer: MEDICAID

## 2023-09-16 LAB
ALBUMIN SERPL-MCNC: 3.8 G/DL (ref 3.5–5)
ALBUMIN/GLOB SERPL: 1.1 (ref 1.1–2.2)
ALP SERPL-CCNC: 139 U/L (ref 45–117)
ALT SERPL-CCNC: 16 U/L (ref 12–78)
ANION GAP SERPL CALC-SCNC: 4 MMOL/L (ref 5–15)
APTT PPP: 47.8 SEC (ref 22.1–31)
APTT PPP: 48.7 SEC (ref 22.1–31)
AST SERPL-CCNC: 38 U/L (ref 15–37)
BASOPHILS # BLD: 0 K/UL (ref 0–0.1)
BASOPHILS NFR BLD: 1 % (ref 0–1)
BDY SITE: ABNORMAL
BDY SITE: ABNORMAL
BILIRUB DIRECT SERPL-MCNC: 0.5 MG/DL (ref 0–0.2)
BILIRUB SERPL-MCNC: 1.1 MG/DL (ref 0.2–1)
BUN SERPL-MCNC: 22 MG/DL (ref 6–20)
BUN/CREAT SERPL: 23 (ref 12–20)
CALCIUM SERPL-MCNC: 8.8 MG/DL (ref 8.5–10.1)
CHLORIDE SERPL-SCNC: 101 MMOL/L (ref 97–108)
CO2 SERPL-SCNC: 31 MMOL/L (ref 21–32)
COHGB MFR BLD: 0.6 % (ref 1–2)
COHGB MFR BLD: 1.1 % (ref 1–2)
CREAT SERPL-MCNC: 0.96 MG/DL (ref 0.7–1.3)
DIFFERENTIAL METHOD BLD: ABNORMAL
EOSINOPHIL # BLD: 0.2 K/UL (ref 0–0.4)
EOSINOPHIL NFR BLD: 3 % (ref 0–7)
ERYTHROCYTE [DISTWIDTH] IN BLOOD BY AUTOMATED COUNT: 15 % (ref 11.5–14.5)
GLOBULIN SER CALC-MCNC: 3.4 G/DL (ref 2–4)
GLUCOSE SERPL-MCNC: 111 MG/DL (ref 65–100)
HCT VFR BLD AUTO: 24.8 % (ref 36.6–50.3)
HGB BLD-MCNC: 7.9 G/DL (ref 12.1–17)
IMM GRANULOCYTES # BLD AUTO: 0 K/UL (ref 0–0.04)
IMM GRANULOCYTES NFR BLD AUTO: 1 % (ref 0–0.5)
LDH SERPL L TO P-CCNC: 429 U/L (ref 85–241)
LYMPHOCYTES # BLD: 1.2 K/UL (ref 0.8–3.5)
LYMPHOCYTES NFR BLD: 19 % (ref 12–49)
MAGNESIUM SERPL-MCNC: 2.1 MG/DL (ref 1.6–2.4)
MCH RBC QN AUTO: 28.9 PG (ref 26–34)
MCHC RBC AUTO-ENTMCNC: 31.9 G/DL (ref 30–36.5)
MCV RBC AUTO: 90.8 FL (ref 80–99)
METHGB MFR BLD: 0.1 % (ref 0–1.4)
METHGB MFR BLD: 0.2 % (ref 0–1.4)
MONOCYTES # BLD: 1 K/UL (ref 0–1)
MONOCYTES NFR BLD: 16 % (ref 5–13)
NEUTS SEG # BLD: 4 K/UL (ref 1.8–8)
NEUTS SEG NFR BLD: 60 % (ref 32–75)
NRBC # BLD: 0 K/UL (ref 0–0.01)
NRBC BLD-RTO: 0 PER 100 WBC
NT PRO BNP: 3922 PG/ML
OXYHGB MFR BLD: 68.5 % (ref 94–97)
OXYHGB MFR BLD: 81.5 % (ref 94–97)
PHOSPHATE SERPL-MCNC: 3.3 MG/DL (ref 2.6–4.7)
PLATELET # BLD AUTO: 175 K/UL (ref 150–400)
PMV BLD AUTO: 10.7 FL (ref 8.9–12.9)
POTASSIUM SERPL-SCNC: 3.4 MMOL/L (ref 3.5–5.1)
PROT SERPL-MCNC: 7.2 G/DL (ref 6.4–8.2)
RBC # BLD AUTO: 2.73 M/UL (ref 4.1–5.7)
SAO2 % BLD: 69 % (ref 95–99)
SAO2 % BLD: 82 % (ref 95–99)
SODIUM SERPL-SCNC: 136 MMOL/L (ref 136–145)
SPECIMEN SITE: ABNORMAL
SPECIMEN SITE: ABNORMAL
THERAPEUTIC RANGE: ABNORMAL SECS (ref 58–77)
THERAPEUTIC RANGE: ABNORMAL SECS (ref 58–77)
WBC # BLD AUTO: 6.5 K/UL (ref 4.1–11.1)

## 2023-09-16 PROCEDURE — 85025 COMPLETE CBC W/AUTO DIFF WBC: CPT

## 2023-09-16 PROCEDURE — 80048 BASIC METABOLIC PNL TOTAL CA: CPT

## 2023-09-16 PROCEDURE — 36415 COLL VENOUS BLD VENIPUNCTURE: CPT

## 2023-09-16 PROCEDURE — 84100 ASSAY OF PHOSPHORUS: CPT

## 2023-09-16 PROCEDURE — 83615 LACTATE (LD) (LDH) ENZYME: CPT

## 2023-09-16 PROCEDURE — 83880 ASSAY OF NATRIURETIC PEPTIDE: CPT

## 2023-09-16 PROCEDURE — 80076 HEPATIC FUNCTION PANEL: CPT

## 2023-09-16 PROCEDURE — 82375 ASSAY CARBOXYHB QUANT: CPT

## 2023-09-16 PROCEDURE — 85730 THROMBOPLASTIN TIME PARTIAL: CPT

## 2023-09-16 PROCEDURE — 2580000003 HC RX 258: Performed by: NURSE PRACTITIONER

## 2023-09-16 PROCEDURE — 83735 ASSAY OF MAGNESIUM: CPT

## 2023-09-16 PROCEDURE — 6360000002 HC RX W HCPCS: Performed by: INTERNAL MEDICINE

## 2023-09-16 PROCEDURE — 6370000000 HC RX 637 (ALT 250 FOR IP): Performed by: ANESTHESIOLOGY

## 2023-09-16 PROCEDURE — 2580000003 HC RX 258: Performed by: OBSTETRICS & GYNECOLOGY

## 2023-09-16 PROCEDURE — 6360000002 HC RX W HCPCS: Performed by: OBSTETRICS & GYNECOLOGY

## 2023-09-16 PROCEDURE — 99233 SBSQ HOSP IP/OBS HIGH 50: CPT | Performed by: INTERNAL MEDICINE

## 2023-09-16 PROCEDURE — 2500000003 HC RX 250 WO HCPCS: Performed by: ANESTHESIOLOGY

## 2023-09-16 PROCEDURE — 2500000003 HC RX 250 WO HCPCS: Performed by: INTERNAL MEDICINE

## 2023-09-16 PROCEDURE — 2580000003 HC RX 258: Performed by: ANESTHESIOLOGY

## 2023-09-16 PROCEDURE — 83050 HGB METHEMOGLOBIN QUAN: CPT

## 2023-09-16 PROCEDURE — 2580000003 HC RX 258: Performed by: INTERNAL MEDICINE

## 2023-09-16 PROCEDURE — 6360000002 HC RX W HCPCS: Performed by: NURSE PRACTITIONER

## 2023-09-16 PROCEDURE — 6370000000 HC RX 637 (ALT 250 FOR IP): Performed by: INTERNAL MEDICINE

## 2023-09-16 PROCEDURE — 71045 X-RAY EXAM CHEST 1 VIEW: CPT

## 2023-09-16 PROCEDURE — 94660 CPAP INITIATION&MGMT: CPT

## 2023-09-16 PROCEDURE — C9460 INJECTION, CANGRELOR: HCPCS | Performed by: NURSE PRACTITIONER

## 2023-09-16 PROCEDURE — 6370000000 HC RX 637 (ALT 250 FOR IP): Performed by: STUDENT IN AN ORGANIZED HEALTH CARE EDUCATION/TRAINING PROGRAM

## 2023-09-16 PROCEDURE — 2000000000 HC ICU R&B

## 2023-09-16 RX ADMIN — ISOSORBIDE DINITRATE 40 MG: 20 TABLET ORAL at 13:45

## 2023-09-16 RX ADMIN — SODIUM CHLORIDE, PRESERVATIVE FREE 10 ML: 5 INJECTION INTRAVENOUS at 08:14

## 2023-09-16 RX ADMIN — CANGRELOR 0.75 MCG/KG/MIN: 50 INJECTION, POWDER, LYOPHILIZED, FOR SOLUTION INTRAVENOUS at 06:07

## 2023-09-16 RX ADMIN — HYDRALAZINE HYDROCHLORIDE 100 MG: 25 TABLET, FILM COATED ORAL at 21:01

## 2023-09-16 RX ADMIN — HYDRALAZINE HYDROCHLORIDE 100 MG: 25 TABLET, FILM COATED ORAL at 13:45

## 2023-09-16 RX ADMIN — WATER 2000 MG: 1 INJECTION INTRAMUSCULAR; INTRAVENOUS; SUBCUTANEOUS at 13:45

## 2023-09-16 RX ADMIN — PANTOPRAZOLE SODIUM 40 MG: 40 TABLET, DELAYED RELEASE ORAL at 11:23

## 2023-09-16 RX ADMIN — POTASSIUM CHLORIDE 20 MEQ: 29.8 INJECTION, SOLUTION INTRAVENOUS at 08:10

## 2023-09-16 RX ADMIN — CANGRELOR 0.75 MCG/KG/MIN: 50 INJECTION, POWDER, LYOPHILIZED, FOR SOLUTION INTRAVENOUS at 21:02

## 2023-09-16 RX ADMIN — BUMETANIDE 2 MG: 0.25 INJECTION INTRAMUSCULAR; INTRAVENOUS at 08:10

## 2023-09-16 RX ADMIN — PANTOPRAZOLE SODIUM 40 MG: 40 TABLET, DELAYED RELEASE ORAL at 22:10

## 2023-09-16 RX ADMIN — SODIUM BICARBONATE: 84 INJECTION, SOLUTION INTRAVENOUS at 01:24

## 2023-09-16 RX ADMIN — HYDRALAZINE HYDROCHLORIDE 100 MG: 25 TABLET, FILM COATED ORAL at 06:07

## 2023-09-16 RX ADMIN — CHLORHEXIDINE GLUCONATE 15 ML: 1.2 RINSE ORAL at 21:02

## 2023-09-16 RX ADMIN — AMIODARONE HYDROCHLORIDE 400 MG: 200 TABLET ORAL at 08:10

## 2023-09-16 RX ADMIN — BIVALIRUDIN 0.08 MG/KG/HR: 250 INJECTION, POWDER, LYOPHILIZED, FOR SOLUTION INTRAVENOUS at 01:24

## 2023-09-16 RX ADMIN — WATER 2000 MG: 1 INJECTION INTRAMUSCULAR; INTRAVENOUS; SUBCUTANEOUS at 06:07

## 2023-09-16 RX ADMIN — ATORVASTATIN CALCIUM 40 MG: 40 TABLET, FILM COATED ORAL at 21:02

## 2023-09-16 RX ADMIN — POTASSIUM CHLORIDE 20 MEQ: 29.8 INJECTION, SOLUTION INTRAVENOUS at 07:00

## 2023-09-16 RX ADMIN — ISOSORBIDE DINITRATE 40 MG: 20 TABLET ORAL at 08:10

## 2023-09-16 RX ADMIN — WATER 2000 MG: 1 INJECTION INTRAMUSCULAR; INTRAVENOUS; SUBCUTANEOUS at 21:32

## 2023-09-16 RX ADMIN — CHLORHEXIDINE GLUCONATE 15 ML: 1.2 RINSE ORAL at 08:14

## 2023-09-16 RX ADMIN — HYDRALAZINE HYDROCHLORIDE 5 MG: 20 INJECTION INTRAMUSCULAR; INTRAVENOUS at 03:02

## 2023-09-16 RX ADMIN — ISOSORBIDE DINITRATE 40 MG: 20 TABLET ORAL at 18:02

## 2023-09-16 RX ADMIN — SPIRONOLACTONE 25 MG: 25 TABLET ORAL at 08:10

## 2023-09-16 ASSESSMENT — PAIN SCALES - GENERAL
PAINLEVEL_OUTOF10: 0

## 2023-09-16 ASSESSMENT — PAIN SCALES - WONG BAKER
WONGBAKER_NUMERICALRESPONSE: 0

## 2023-09-17 ENCOUNTER — APPOINTMENT (OUTPATIENT)
Facility: HOSPITAL | Age: 68
DRG: 161 | End: 2023-09-17
Attending: ANESTHESIOLOGY
Payer: MEDICAID

## 2023-09-17 LAB
ALBUMIN SERPL-MCNC: 3.7 G/DL (ref 3.5–5)
ALBUMIN/GLOB SERPL: 1.1 (ref 1.1–2.2)
ALP SERPL-CCNC: 136 U/L (ref 45–117)
ALT SERPL-CCNC: 14 U/L (ref 12–78)
ANION GAP SERPL CALC-SCNC: 3 MMOL/L (ref 5–15)
APTT PPP: 43.6 SEC (ref 22.1–31)
APTT PPP: 44.9 SEC (ref 22.1–31)
APTT PPP: 69.9 SEC (ref 22.1–31)
AST SERPL-CCNC: 29 U/L (ref 15–37)
BASOPHILS # BLD: 0.1 K/UL (ref 0–0.1)
BASOPHILS NFR BLD: 1 % (ref 0–1)
BILIRUB DIRECT SERPL-MCNC: 0.5 MG/DL (ref 0–0.2)
BILIRUB SERPL-MCNC: 0.8 MG/DL (ref 0.2–1)
BUN SERPL-MCNC: 20 MG/DL (ref 6–20)
BUN/CREAT SERPL: 20 (ref 12–20)
CALCIUM SERPL-MCNC: 8.6 MG/DL (ref 8.5–10.1)
CHLORIDE SERPL-SCNC: 105 MMOL/L (ref 97–108)
CO2 SERPL-SCNC: 31 MMOL/L (ref 21–32)
CREAT SERPL-MCNC: 0.99 MG/DL (ref 0.7–1.3)
DIFFERENTIAL METHOD BLD: ABNORMAL
EOSINOPHIL # BLD: 0.2 K/UL (ref 0–0.4)
EOSINOPHIL NFR BLD: 3 % (ref 0–7)
ERYTHROCYTE [DISTWIDTH] IN BLOOD BY AUTOMATED COUNT: 15.1 % (ref 11.5–14.5)
GLOBULIN SER CALC-MCNC: 3.3 G/DL (ref 2–4)
GLUCOSE SERPL-MCNC: 119 MG/DL (ref 65–100)
HCT VFR BLD AUTO: 24 % (ref 36.6–50.3)
HGB BLD-MCNC: 7.6 G/DL (ref 12.1–17)
IMM GRANULOCYTES # BLD AUTO: 0 K/UL
IMM GRANULOCYTES NFR BLD AUTO: 0 %
LDH SERPL L TO P-CCNC: 412 U/L (ref 85–241)
LYMPHOCYTES # BLD: 1.6 K/UL (ref 0.8–3.5)
LYMPHOCYTES NFR BLD: 24 % (ref 12–49)
MAGNESIUM SERPL-MCNC: 2 MG/DL (ref 1.6–2.4)
MCH RBC QN AUTO: 29.3 PG (ref 26–34)
MCHC RBC AUTO-ENTMCNC: 31.7 G/DL (ref 30–36.5)
MCV RBC AUTO: 92.7 FL (ref 80–99)
MONOCYTES # BLD: 0.8 K/UL (ref 0–1)
MONOCYTES NFR BLD: 12 % (ref 5–13)
NEUTS BAND NFR BLD MANUAL: 1 % (ref 0–6)
NEUTS SEG # BLD: 4 K/UL (ref 1.8–8)
NEUTS SEG NFR BLD: 59 % (ref 32–75)
NRBC # BLD: 0 K/UL (ref 0–0.01)
NRBC BLD-RTO: 0 PER 100 WBC
NT PRO BNP: 3926 PG/ML
PHOSPHATE SERPL-MCNC: 2.7 MG/DL (ref 2.6–4.7)
PLATELET # BLD AUTO: 146 K/UL (ref 150–400)
PLATELET COMMENT: ABNORMAL
PMV BLD AUTO: 10.4 FL (ref 8.9–12.9)
POTASSIUM SERPL-SCNC: 4.2 MMOL/L (ref 3.5–5.1)
PROT SERPL-MCNC: 7 G/DL (ref 6.4–8.2)
RBC # BLD AUTO: 2.59 M/UL (ref 4.1–5.7)
RBC MORPH BLD: ABNORMAL
RBC MORPH BLD: ABNORMAL
SODIUM SERPL-SCNC: 139 MMOL/L (ref 136–145)
THERAPEUTIC RANGE: ABNORMAL SECS (ref 58–77)
WBC # BLD AUTO: 6.7 K/UL (ref 4.1–11.1)

## 2023-09-17 PROCEDURE — 6370000000 HC RX 637 (ALT 250 FOR IP): Performed by: INTERNAL MEDICINE

## 2023-09-17 PROCEDURE — 2580000003 HC RX 258: Performed by: NURSE PRACTITIONER

## 2023-09-17 PROCEDURE — 71045 X-RAY EXAM CHEST 1 VIEW: CPT

## 2023-09-17 PROCEDURE — 2000000000 HC ICU R&B

## 2023-09-17 PROCEDURE — 83735 ASSAY OF MAGNESIUM: CPT

## 2023-09-17 PROCEDURE — 85730 THROMBOPLASTIN TIME PARTIAL: CPT

## 2023-09-17 PROCEDURE — 6370000000 HC RX 637 (ALT 250 FOR IP): Performed by: STUDENT IN AN ORGANIZED HEALTH CARE EDUCATION/TRAINING PROGRAM

## 2023-09-17 PROCEDURE — 6370000000 HC RX 637 (ALT 250 FOR IP): Performed by: ANESTHESIOLOGY

## 2023-09-17 PROCEDURE — 83615 LACTATE (LD) (LDH) ENZYME: CPT

## 2023-09-17 PROCEDURE — 84100 ASSAY OF PHOSPHORUS: CPT

## 2023-09-17 PROCEDURE — 85025 COMPLETE CBC W/AUTO DIFF WBC: CPT

## 2023-09-17 PROCEDURE — 6360000002 HC RX W HCPCS: Performed by: NURSE PRACTITIONER

## 2023-09-17 PROCEDURE — 83880 ASSAY OF NATRIURETIC PEPTIDE: CPT

## 2023-09-17 PROCEDURE — 80048 BASIC METABOLIC PNL TOTAL CA: CPT

## 2023-09-17 PROCEDURE — 94660 CPAP INITIATION&MGMT: CPT

## 2023-09-17 PROCEDURE — 80076 HEPATIC FUNCTION PANEL: CPT

## 2023-09-17 PROCEDURE — 6360000002 HC RX W HCPCS: Performed by: OBSTETRICS & GYNECOLOGY

## 2023-09-17 PROCEDURE — 2500000003 HC RX 250 WO HCPCS: Performed by: INTERNAL MEDICINE

## 2023-09-17 PROCEDURE — C9460 INJECTION, CANGRELOR: HCPCS | Performed by: NURSE PRACTITIONER

## 2023-09-17 PROCEDURE — 36415 COLL VENOUS BLD VENIPUNCTURE: CPT

## 2023-09-17 PROCEDURE — 2580000003 HC RX 258: Performed by: OBSTETRICS & GYNECOLOGY

## 2023-09-17 PROCEDURE — 99233 SBSQ HOSP IP/OBS HIGH 50: CPT | Performed by: INTERNAL MEDICINE

## 2023-09-17 RX ORDER — SENNA AND DOCUSATE SODIUM 50; 8.6 MG/1; MG/1
2 TABLET, FILM COATED ORAL DAILY PRN
Status: DISCONTINUED | OUTPATIENT
Start: 2023-09-17 | End: 2023-09-19

## 2023-09-17 RX ADMIN — SPIRONOLACTONE 25 MG: 25 TABLET ORAL at 08:46

## 2023-09-17 RX ADMIN — CANGRELOR 0.75 MCG/KG/MIN: 50 INJECTION, POWDER, LYOPHILIZED, FOR SOLUTION INTRAVENOUS at 12:49

## 2023-09-17 RX ADMIN — WATER 2000 MG: 1 INJECTION INTRAMUSCULAR; INTRAVENOUS; SUBCUTANEOUS at 21:36

## 2023-09-17 RX ADMIN — WATER 2000 MG: 1 INJECTION INTRAMUSCULAR; INTRAVENOUS; SUBCUTANEOUS at 06:15

## 2023-09-17 RX ADMIN — AMIODARONE HYDROCHLORIDE 400 MG: 200 TABLET ORAL at 08:46

## 2023-09-17 RX ADMIN — BUMETANIDE 2 MG: 0.25 INJECTION INTRAMUSCULAR; INTRAVENOUS at 08:46

## 2023-09-17 RX ADMIN — PANTOPRAZOLE SODIUM 40 MG: 40 TABLET, DELAYED RELEASE ORAL at 22:33

## 2023-09-17 RX ADMIN — ISOSORBIDE DINITRATE 40 MG: 20 TABLET ORAL at 08:46

## 2023-09-17 RX ADMIN — ISOSORBIDE DINITRATE 40 MG: 20 TABLET ORAL at 13:03

## 2023-09-17 RX ADMIN — ATORVASTATIN CALCIUM 40 MG: 40 TABLET, FILM COATED ORAL at 21:06

## 2023-09-17 RX ADMIN — CHLORHEXIDINE GLUCONATE 15 ML: 1.2 RINSE ORAL at 21:07

## 2023-09-17 RX ADMIN — HYDRALAZINE HYDROCHLORIDE 100 MG: 25 TABLET, FILM COATED ORAL at 21:06

## 2023-09-17 RX ADMIN — HYDRALAZINE HYDROCHLORIDE 100 MG: 25 TABLET, FILM COATED ORAL at 06:15

## 2023-09-17 RX ADMIN — CHLORHEXIDINE GLUCONATE 15 ML: 1.2 RINSE ORAL at 08:47

## 2023-09-17 RX ADMIN — PANTOPRAZOLE SODIUM 40 MG: 40 TABLET, DELAYED RELEASE ORAL at 11:02

## 2023-09-17 RX ADMIN — SENNOSIDES AND DOCUSATE SODIUM 2 TABLET: 50; 8.6 TABLET ORAL at 18:07

## 2023-09-17 RX ADMIN — HYDRALAZINE HYDROCHLORIDE 100 MG: 25 TABLET, FILM COATED ORAL at 14:21

## 2023-09-17 RX ADMIN — ISOSORBIDE DINITRATE 40 MG: 20 TABLET ORAL at 18:07

## 2023-09-17 RX ADMIN — WATER 2000 MG: 1 INJECTION INTRAMUSCULAR; INTRAVENOUS; SUBCUTANEOUS at 14:21

## 2023-09-17 ASSESSMENT — PAIN SCALES - GENERAL
PAINLEVEL_OUTOF10: 0

## 2023-09-17 ASSESSMENT — PAIN SCALES - WONG BAKER
WONGBAKER_NUMERICALRESPONSE: 0

## 2023-09-18 ENCOUNTER — APPOINTMENT (OUTPATIENT)
Facility: HOSPITAL | Age: 68
DRG: 161 | End: 2023-09-18
Attending: ANESTHESIOLOGY
Payer: MEDICAID

## 2023-09-18 ENCOUNTER — ANESTHESIA EVENT (OUTPATIENT)
Facility: HOSPITAL | Age: 68
End: 2023-09-18
Payer: MEDICAID

## 2023-09-18 LAB
ALBUMIN SERPL-MCNC: 3.7 G/DL (ref 3.5–5)
ALBUMIN/GLOB SERPL: 1.1 (ref 1.1–2.2)
ALP SERPL-CCNC: 142 U/L (ref 45–117)
ALT SERPL-CCNC: 14 U/L (ref 12–78)
ANION GAP SERPL CALC-SCNC: 2 MMOL/L (ref 5–15)
APTT PPP: 33.6 SEC (ref 22.1–31)
APTT PPP: 39.5 SEC (ref 22.1–31)
APTT PPP: 41.6 SEC (ref 22.1–31)
APTT PPP: 42.7 SEC (ref 22.1–31)
APTT PPP: 43.1 SEC (ref 22.1–31)
APTT PPP: 43.3 SEC (ref 22.1–31)
AST SERPL-CCNC: 28 U/L (ref 15–37)
BASOPHILS # BLD: 0 K/UL (ref 0–0.1)
BASOPHILS NFR BLD: 0 % (ref 0–1)
BILIRUB DIRECT SERPL-MCNC: 0.4 MG/DL (ref 0–0.2)
BILIRUB SERPL-MCNC: 0.7 MG/DL (ref 0.2–1)
BUN SERPL-MCNC: 18 MG/DL (ref 6–20)
BUN/CREAT SERPL: 20 (ref 12–20)
CALCIUM SERPL-MCNC: 8.6 MG/DL (ref 8.5–10.1)
CHLORIDE SERPL-SCNC: 103 MMOL/L (ref 97–108)
CO2 SERPL-SCNC: 32 MMOL/L (ref 21–32)
CREAT SERPL-MCNC: 0.9 MG/DL (ref 0.7–1.3)
DIFFERENTIAL METHOD BLD: ABNORMAL
EOSINOPHIL # BLD: 0.3 K/UL (ref 0–0.4)
EOSINOPHIL NFR BLD: 5 % (ref 0–7)
ERYTHROCYTE [DISTWIDTH] IN BLOOD BY AUTOMATED COUNT: 15 % (ref 11.5–14.5)
GLOBULIN SER CALC-MCNC: 3.5 G/DL (ref 2–4)
GLUCOSE SERPL-MCNC: 135 MG/DL (ref 65–100)
HCT VFR BLD AUTO: 25 % (ref 36.6–50.3)
HGB BLD-MCNC: 7.8 G/DL (ref 12.1–17)
HISTORY CHECK: NORMAL
IMM GRANULOCYTES # BLD AUTO: 0 K/UL
IMM GRANULOCYTES NFR BLD AUTO: 0 %
LDH SERPL L TO P-CCNC: 408 U/L (ref 85–241)
LYMPHOCYTES # BLD: 1.3 K/UL (ref 0.8–3.5)
LYMPHOCYTES NFR BLD: 20 % (ref 12–49)
MAGNESIUM SERPL-MCNC: 2 MG/DL (ref 1.6–2.4)
MCH RBC QN AUTO: 28.9 PG (ref 26–34)
MCHC RBC AUTO-ENTMCNC: 31.2 G/DL (ref 30–36.5)
MCV RBC AUTO: 92.6 FL (ref 80–99)
MONOCYTES # BLD: 0.7 K/UL (ref 0–1)
MONOCYTES NFR BLD: 11 % (ref 5–13)
NEUTS SEG # BLD: 4.1 K/UL (ref 1.8–8)
NEUTS SEG NFR BLD: 64 % (ref 32–75)
NRBC # BLD: 0 K/UL (ref 0–0.01)
NRBC BLD-RTO: 0 PER 100 WBC
NT PRO BNP: 3382 PG/ML
PHOSPHATE SERPL-MCNC: 3.4 MG/DL (ref 2.6–4.7)
PLATELET # BLD AUTO: 143 K/UL (ref 150–400)
PLATELET COMMENT: ABNORMAL
PMV BLD AUTO: 10.4 FL (ref 8.9–12.9)
POTASSIUM SERPL-SCNC: 4.1 MMOL/L (ref 3.5–5.1)
PROT SERPL-MCNC: 7.2 G/DL (ref 6.4–8.2)
RBC # BLD AUTO: 2.7 M/UL (ref 4.1–5.7)
RBC MORPH BLD: ABNORMAL
SODIUM SERPL-SCNC: 137 MMOL/L (ref 136–145)
THERAPEUTIC RANGE: ABNORMAL SECS (ref 58–77)
WBC # BLD AUTO: 6.4 K/UL (ref 4.1–11.1)
WBC MORPH BLD: ABNORMAL

## 2023-09-18 PROCEDURE — 80048 BASIC METABOLIC PNL TOTAL CA: CPT

## 2023-09-18 PROCEDURE — 97530 THERAPEUTIC ACTIVITIES: CPT

## 2023-09-18 PROCEDURE — 86923 COMPATIBILITY TEST ELECTRIC: CPT

## 2023-09-18 PROCEDURE — 85025 COMPLETE CBC W/AUTO DIFF WBC: CPT

## 2023-09-18 PROCEDURE — C9460 INJECTION, CANGRELOR: HCPCS | Performed by: NURSE PRACTITIONER

## 2023-09-18 PROCEDURE — 85730 THROMBOPLASTIN TIME PARTIAL: CPT

## 2023-09-18 PROCEDURE — 83615 LACTATE (LD) (LDH) ENZYME: CPT

## 2023-09-18 PROCEDURE — 2000000000 HC ICU R&B

## 2023-09-18 PROCEDURE — 86900 BLOOD TYPING SEROLOGIC ABO: CPT

## 2023-09-18 PROCEDURE — 86850 RBC ANTIBODY SCREEN: CPT

## 2023-09-18 PROCEDURE — 6370000000 HC RX 637 (ALT 250 FOR IP): Performed by: ANESTHESIOLOGY

## 2023-09-18 PROCEDURE — 2500000003 HC RX 250 WO HCPCS: Performed by: INTERNAL MEDICINE

## 2023-09-18 PROCEDURE — 86901 BLOOD TYPING SEROLOGIC RH(D): CPT

## 2023-09-18 PROCEDURE — 6360000002 HC RX W HCPCS: Performed by: NURSE PRACTITIONER

## 2023-09-18 PROCEDURE — 83735 ASSAY OF MAGNESIUM: CPT

## 2023-09-18 PROCEDURE — 6370000000 HC RX 637 (ALT 250 FOR IP): Performed by: INTERNAL MEDICINE

## 2023-09-18 PROCEDURE — 2700000000 HC OXYGEN THERAPY PER DAY

## 2023-09-18 PROCEDURE — 71045 X-RAY EXAM CHEST 1 VIEW: CPT

## 2023-09-18 PROCEDURE — 2580000003 HC RX 258: Performed by: NURSE PRACTITIONER

## 2023-09-18 PROCEDURE — 6370000000 HC RX 637 (ALT 250 FOR IP): Performed by: STUDENT IN AN ORGANIZED HEALTH CARE EDUCATION/TRAINING PROGRAM

## 2023-09-18 PROCEDURE — 2580000003 HC RX 258: Performed by: INTERNAL MEDICINE

## 2023-09-18 PROCEDURE — 6360000002 HC RX W HCPCS: Performed by: OBSTETRICS & GYNECOLOGY

## 2023-09-18 PROCEDURE — 80076 HEPATIC FUNCTION PANEL: CPT

## 2023-09-18 PROCEDURE — 99223 1ST HOSP IP/OBS HIGH 75: CPT | Performed by: NURSE PRACTITIONER

## 2023-09-18 PROCEDURE — 84100 ASSAY OF PHOSPHORUS: CPT

## 2023-09-18 PROCEDURE — 2580000003 HC RX 258: Performed by: OBSTETRICS & GYNECOLOGY

## 2023-09-18 PROCEDURE — 2580000003 HC RX 258: Performed by: ANESTHESIOLOGY

## 2023-09-18 PROCEDURE — 2500000003 HC RX 250 WO HCPCS: Performed by: ANESTHESIOLOGY

## 2023-09-18 PROCEDURE — 83880 ASSAY OF NATRIURETIC PEPTIDE: CPT

## 2023-09-18 PROCEDURE — 36415 COLL VENOUS BLD VENIPUNCTURE: CPT

## 2023-09-18 PROCEDURE — 6370000000 HC RX 637 (ALT 250 FOR IP): Performed by: NURSE PRACTITIONER

## 2023-09-18 PROCEDURE — 94660 CPAP INITIATION&MGMT: CPT

## 2023-09-18 RX ORDER — LEVOFLOXACIN 5 MG/ML
500 INJECTION, SOLUTION INTRAVENOUS ONCE
Status: DISCONTINUED | OUTPATIENT
Start: 2023-09-18 | End: 2023-09-18

## 2023-09-18 RX ORDER — FLUCONAZOLE 2 MG/ML
400 INJECTION, SOLUTION INTRAVENOUS ONCE
Status: DISCONTINUED | OUTPATIENT
Start: 2023-09-18 | End: 2023-09-18

## 2023-09-18 RX ORDER — LEVOFLOXACIN 5 MG/ML
500 INJECTION, SOLUTION INTRAVENOUS ONCE
Status: COMPLETED | OUTPATIENT
Start: 2023-09-19 | End: 2023-09-19

## 2023-09-18 RX ORDER — FLUCONAZOLE 2 MG/ML
400 INJECTION, SOLUTION INTRAVENOUS ONCE
Status: COMPLETED | OUTPATIENT
Start: 2023-09-19 | End: 2023-09-19

## 2023-09-18 RX ADMIN — PANTOPRAZOLE SODIUM 40 MG: 40 TABLET, DELAYED RELEASE ORAL at 22:59

## 2023-09-18 RX ADMIN — SODIUM CHLORIDE, PRESERVATIVE FREE 10 ML: 5 INJECTION INTRAVENOUS at 20:59

## 2023-09-18 RX ADMIN — HYDRALAZINE HYDROCHLORIDE 100 MG: 25 TABLET, FILM COATED ORAL at 06:03

## 2023-09-18 RX ADMIN — SODIUM BICARBONATE: 84 INJECTION, SOLUTION INTRAVENOUS at 23:00

## 2023-09-18 RX ADMIN — ISOSORBIDE DINITRATE 40 MG: 20 TABLET ORAL at 08:27

## 2023-09-18 RX ADMIN — BUMETANIDE 2 MG: 0.25 INJECTION INTRAMUSCULAR; INTRAVENOUS at 08:28

## 2023-09-18 RX ADMIN — WATER 2000 MG: 1 INJECTION INTRAMUSCULAR; INTRAVENOUS; SUBCUTANEOUS at 13:59

## 2023-09-18 RX ADMIN — ISOSORBIDE DINITRATE 40 MG: 20 TABLET ORAL at 19:00

## 2023-09-18 RX ADMIN — HYDRALAZINE HYDROCHLORIDE 100 MG: 25 TABLET, FILM COATED ORAL at 13:58

## 2023-09-18 RX ADMIN — CHLORHEXIDINE GLUCONATE 15 ML: 1.2 RINSE ORAL at 21:01

## 2023-09-18 RX ADMIN — AMIODARONE HYDROCHLORIDE 400 MG: 200 TABLET ORAL at 08:28

## 2023-09-18 RX ADMIN — PANTOPRAZOLE SODIUM 40 MG: 40 TABLET, DELAYED RELEASE ORAL at 12:05

## 2023-09-18 RX ADMIN — ISOSORBIDE DINITRATE 40 MG: 20 TABLET ORAL at 13:58

## 2023-09-18 RX ADMIN — ATORVASTATIN CALCIUM 40 MG: 40 TABLET, FILM COATED ORAL at 20:58

## 2023-09-18 RX ADMIN — SPIRONOLACTONE 25 MG: 25 TABLET ORAL at 08:28

## 2023-09-18 RX ADMIN — HYDRALAZINE HYDROCHLORIDE 100 MG: 25 TABLET, FILM COATED ORAL at 20:58

## 2023-09-18 RX ADMIN — MUPIROCIN: 20 OINTMENT TOPICAL at 20:57

## 2023-09-18 RX ADMIN — WATER 2000 MG: 1 INJECTION INTRAMUSCULAR; INTRAVENOUS; SUBCUTANEOUS at 06:03

## 2023-09-18 RX ADMIN — CHLORHEXIDINE GLUCONATE 15 ML: 1.2 RINSE ORAL at 11:01

## 2023-09-18 RX ADMIN — SENNOSIDES AND DOCUSATE SODIUM 2 TABLET: 50; 8.6 TABLET ORAL at 08:31

## 2023-09-18 RX ADMIN — WATER 2000 MG: 1 INJECTION INTRAMUSCULAR; INTRAVENOUS; SUBCUTANEOUS at 20:57

## 2023-09-18 RX ADMIN — CANGRELOR 0.75 MCG/KG/MIN: 50 INJECTION, POWDER, LYOPHILIZED, FOR SOLUTION INTRAVENOUS at 15:41

## 2023-09-18 ASSESSMENT — PAIN DESCRIPTION - DESCRIPTORS: DESCRIPTORS: ACHING

## 2023-09-18 ASSESSMENT — PAIN SCALES - GENERAL
PAINLEVEL_OUTOF10: 0
PAINLEVEL_OUTOF10: 2
PAINLEVEL_OUTOF10: 0

## 2023-09-18 ASSESSMENT — PAIN SCALES - WONG BAKER
WONGBAKER_NUMERICALRESPONSE: 2
WONGBAKER_NUMERICALRESPONSE: 0

## 2023-09-18 ASSESSMENT — ENCOUNTER SYMPTOMS
VOMITING: 0
NAUSEA: 0
SHORTNESS OF BREATH: 0
COUGH: 0
ABDOMINAL DISTENTION: 0

## 2023-09-18 ASSESSMENT — PAIN DESCRIPTION - PAIN TYPE: TYPE: CHRONIC PAIN

## 2023-09-18 ASSESSMENT — PAIN DESCRIPTION - LOCATION: LOCATION: ANKLE

## 2023-09-18 ASSESSMENT — PAIN DESCRIPTION - ORIENTATION: ORIENTATION: RIGHT

## 2023-09-18 ASSESSMENT — PAIN - FUNCTIONAL ASSESSMENT: PAIN_FUNCTIONAL_ASSESSMENT: ACTIVITIES ARE NOT PREVENTED

## 2023-09-18 NOTE — CARE COORDINATION
Transition of Care Plan:     RUR: 17%--moderate  Prior Level of Functioning: independent  Disposition: home with [facility] PT/OT/SN with Chelsy  Follow up appointments: cardiology, PCP, AHFT  DME needed: N/A  Transportation at discharge: in car w/family  IM/IMM Medicare/ letter given: 9/1/23  Is patient a [place] and connected with VA? no  Caregiver Contact:   Denice Pryor, significant other, [phone]  Jennifer Staples, sister, [phone]  Discharge Caregiver contacted prior to discharge? N/A  Care Conference needed? yes--family meeting to discuss goals of care. Barriers to discharge: clinical status     Patient admitted from [facility] on 8/31 for heart failure. Patient working with PT/OT. LVAD placement scheduled for tomorrow. Palliative and AHFT following. Working with PT/OT daily. CM will continue to follow.     Michael Adan, [facility]  [phone]

## 2023-09-19 ENCOUNTER — ANESTHESIA (OUTPATIENT)
Facility: HOSPITAL | Age: 68
End: 2023-09-19
Payer: MEDICAID

## 2023-09-19 ENCOUNTER — APPOINTMENT (OUTPATIENT)
Facility: HOSPITAL | Age: 68
DRG: 161 | End: 2023-09-19
Attending: ANESTHESIOLOGY
Payer: MEDICAID

## 2023-09-19 ENCOUNTER — HOSPITAL ENCOUNTER (OUTPATIENT)
Facility: HOSPITAL | Age: 68
Discharge: HOME | End: 2023-09-21
Attending: THORACIC SURGERY (CARDIOTHORACIC VASCULAR SURGERY)

## 2023-09-19 ENCOUNTER — ANESTHESIA EVENT (OUTPATIENT)
Facility: HOSPITAL | Age: 68
End: 2023-09-19
Payer: MEDICAID

## 2023-09-19 PROBLEM — Z95.811 LEFT VENTRICULAR ASSIST DEVICE PRESENT (HCC): Status: ACTIVE | Noted: 2023-09-19

## 2023-09-19 LAB
ALBUMIN SERPL-MCNC: 3.9 G/DL (ref 3.5–5)
ALBUMIN SERPL-MCNC: 4.5 G/DL (ref 3.5–5)
ALBUMIN/GLOB SERPL: 1.2 (ref 1.1–2.2)
ALBUMIN/GLOB SERPL: 2.3 (ref 1.1–2.2)
ALP SERPL-CCNC: 126 U/L (ref 45–117)
ALP SERPL-CCNC: 76 U/L (ref 45–117)
ALT SERPL-CCNC: 12 U/L (ref 12–78)
ALT SERPL-CCNC: 13 U/L (ref 12–78)
ANION GAP SERPL CALC-SCNC: 3 MMOL/L (ref 5–15)
ANION GAP SERPL CALC-SCNC: 7 MMOL/L (ref 5–15)
ANION GAP SERPL CALC-SCNC: 8 MMOL/L (ref 5–15)
APTT PPP: 32.2 SEC (ref 22.1–31)
AST SERPL-CCNC: 30 U/L (ref 15–37)
AST SERPL-CCNC: 58 U/L (ref 15–37)
BASE DEFICIT BLD-SCNC: 1.6 MMOL/L
BASE DEFICIT BLD-SCNC: 3.2 MMOL/L
BDY SITE: ABNORMAL
BILIRUB DIRECT SERPL-MCNC: 0.4 MG/DL (ref 0–0.2)
BILIRUB SERPL-MCNC: 0.8 MG/DL (ref 0.2–1)
BILIRUB SERPL-MCNC: 2.4 MG/DL (ref 0.2–1)
BUN SERPL-MCNC: 15 MG/DL (ref 6–20)
BUN SERPL-MCNC: 16 MG/DL (ref 6–20)
BUN SERPL-MCNC: 16 MG/DL (ref 6–20)
BUN/CREAT SERPL: 16 (ref 12–20)
BUN/CREAT SERPL: 16 (ref 12–20)
BUN/CREAT SERPL: 17 (ref 12–20)
CA-I BLD-SCNC: 1.07 MMOL/L (ref 1.12–1.32)
CA-I BLD-SCNC: 1.11 MMOL/L (ref 1.12–1.32)
CALCIUM SERPL-MCNC: 8.1 MG/DL (ref 8.5–10.1)
CALCIUM SERPL-MCNC: 8.1 MG/DL (ref 8.5–10.1)
CALCIUM SERPL-MCNC: 8.2 MG/DL (ref 8.5–10.1)
CHLORIDE SERPL-SCNC: 107 MMOL/L (ref 97–108)
CHLORIDE SERPL-SCNC: 107 MMOL/L (ref 97–108)
CHLORIDE SERPL-SCNC: 108 MMOL/L (ref 97–108)
CHOLEST SERPL-MCNC: <50 MG/DL
CO2 SERPL-SCNC: 23 MMOL/L (ref 21–32)
CO2 SERPL-SCNC: 24 MMOL/L (ref 21–32)
CO2 SERPL-SCNC: 25 MMOL/L (ref 21–32)
COHGB MFR BLD: 0.6 % (ref 1–2)
COHGB MFR BLD: 1 % (ref 1–2)
CREAT SERPL-MCNC: 0.9 MG/DL (ref 0.7–1.3)
CREAT SERPL-MCNC: 0.97 MG/DL (ref 0.7–1.3)
CREAT SERPL-MCNC: 0.98 MG/DL (ref 0.7–1.3)
ECHO BSA: 1.83 M2
ERYTHROCYTE [DISTWIDTH] IN BLOOD BY AUTOMATED COUNT: 15 % (ref 11.5–14.5)
ERYTHROCYTE [DISTWIDTH] IN BLOOD BY AUTOMATED COUNT: 15.1 % (ref 11.5–14.5)
ERYTHROCYTE [DISTWIDTH] IN BLOOD BY AUTOMATED COUNT: 15.3 % (ref 11.5–14.5)
GLOBULIN SER CALC-MCNC: 2 G/DL (ref 2–4)
GLOBULIN SER CALC-MCNC: 3.3 G/DL (ref 2–4)
GLUCOSE BLD STRIP.AUTO-MCNC: 112 MG/DL (ref 65–117)
GLUCOSE BLD STRIP.AUTO-MCNC: 125 MG/DL (ref 65–117)
GLUCOSE BLD STRIP.AUTO-MCNC: 126 MG/DL (ref 65–117)
GLUCOSE BLD STRIP.AUTO-MCNC: 140 MG/DL (ref 65–117)
GLUCOSE BLD STRIP.AUTO-MCNC: 143 MG/DL (ref 65–117)
GLUCOSE BLD STRIP.AUTO-MCNC: 143 MG/DL (ref 65–117)
GLUCOSE BLD STRIP.AUTO-MCNC: 145 MG/DL (ref 65–117)
GLUCOSE BLD STRIP.AUTO-MCNC: 151 MG/DL (ref 65–117)
GLUCOSE SERPL-MCNC: 115 MG/DL (ref 65–100)
GLUCOSE SERPL-MCNC: 140 MG/DL (ref 65–100)
GLUCOSE SERPL-MCNC: 142 MG/DL (ref 65–100)
HCO3 BLD-SCNC: 20.9 MMOL/L (ref 22–26)
HCO3 BLD-SCNC: 23.4 MMOL/L (ref 22–26)
HCT VFR BLD AUTO: 24.7 % (ref 36.6–50.3)
HCT VFR BLD AUTO: 26.2 % (ref 36.6–50.3)
HCT VFR BLD AUTO: 29.8 % (ref 36.6–50.3)
HDLC SERPL-MCNC: 21 MG/DL
HDLC SERPL: NORMAL (ref 0–5)
HGB BLD-MCNC: 8 G/DL (ref 12.1–17)
HGB BLD-MCNC: 8.3 G/DL (ref 12.1–17)
HGB BLD-MCNC: 9.6 G/DL (ref 12.1–17)
INR PPP: 1.4 (ref 0.9–1.1)
LDH SERPL L TO P-CCNC: 436 U/L (ref 85–241)
LDLC SERPL CALC-MCNC: NORMAL MG/DL (ref 0–100)
MAGNESIUM SERPL-MCNC: 1.9 MG/DL (ref 1.6–2.4)
MAGNESIUM SERPL-MCNC: 2.5 MG/DL (ref 1.6–2.4)
MCH RBC QN AUTO: 28.9 PG (ref 26–34)
MCH RBC QN AUTO: 29.1 PG (ref 26–34)
MCH RBC QN AUTO: 29.2 PG (ref 26–34)
MCHC RBC AUTO-ENTMCNC: 31.7 G/DL (ref 30–36.5)
MCHC RBC AUTO-ENTMCNC: 32.2 G/DL (ref 30–36.5)
MCHC RBC AUTO-ENTMCNC: 32.4 G/DL (ref 30–36.5)
MCV RBC AUTO: 90.1 FL (ref 80–99)
MCV RBC AUTO: 90.3 FL (ref 80–99)
MCV RBC AUTO: 91.3 FL (ref 80–99)
METHGB MFR BLD: 0.2 % (ref 0–1.4)
METHGB MFR BLD: 0.6 % (ref 0–1.4)
NRBC # BLD: 0 K/UL (ref 0–0.01)
NRBC BLD-RTO: 0 PER 100 WBC
NT PRO BNP: 2836 PG/ML
OXYHGB MFR BLD: 61.7 % (ref 94–97)
OXYHGB MFR BLD: 76.8 % (ref 94–97)
PCO2 BLD: 32.7 MMHG (ref 35–45)
PCO2 BLD: 39.8 MMHG (ref 35–45)
PH BLD: 7.38 (ref 7.35–7.45)
PH BLD: 7.41 (ref 7.35–7.45)
PLATELET # BLD AUTO: 141 K/UL (ref 150–400)
PLATELET # BLD AUTO: 153 K/UL (ref 150–400)
PLATELET # BLD AUTO: 173 K/UL (ref 150–400)
PMV BLD AUTO: 10.5 FL (ref 8.9–12.9)
PMV BLD AUTO: 10.6 FL (ref 8.9–12.9)
PMV BLD AUTO: 11.4 FL (ref 8.9–12.9)
PO2 BLD: 209 MMHG (ref 80–100)
PO2 BLD: 380 MMHG (ref 80–100)
POTASSIUM SERPL-SCNC: 3.7 MMOL/L (ref 3.5–5.1)
POTASSIUM SERPL-SCNC: 4.3 MMOL/L (ref 3.5–5.1)
POTASSIUM SERPL-SCNC: 4.4 MMOL/L (ref 3.5–5.1)
PROT SERPL-MCNC: 6.5 G/DL (ref 6.4–8.2)
PROT SERPL-MCNC: 7.2 G/DL (ref 6.4–8.2)
PROTHROMBIN TIME: 14.2 SEC (ref 9–11.1)
RBC # BLD AUTO: 2.74 M/UL (ref 4.1–5.7)
RBC # BLD AUTO: 2.87 M/UL (ref 4.1–5.7)
RBC # BLD AUTO: 3.3 M/UL (ref 4.1–5.7)
SAO2 % BLD: 100 % (ref 92–97)
SAO2 % BLD: 62 % (ref 95–99)
SAO2 % BLD: 78 % (ref 95–99)
SAO2 % BLD: 99.8 % (ref 92–97)
SERVICE CMNT-IMP: ABNORMAL
SERVICE CMNT-IMP: NORMAL
SODIUM SERPL-SCNC: 136 MMOL/L (ref 136–145)
SODIUM SERPL-SCNC: 138 MMOL/L (ref 136–145)
SODIUM SERPL-SCNC: 138 MMOL/L (ref 136–145)
SPECIMEN SITE: ABNORMAL
SPECIMEN SITE: ABNORMAL
SPECIMEN TYPE: ABNORMAL
SPECIMEN TYPE: ABNORMAL
THERAPEUTIC RANGE: ABNORMAL SECS (ref 58–77)
TRIGL SERPL-MCNC: 66 MG/DL
TSH SERPL DL<=0.05 MIU/L-ACNC: 2.11 UIU/ML (ref 0.36–3.74)
UFH PPP CHRO-ACNC: <0.1 IU/ML
VLDLC SERPL CALC-MCNC: NORMAL MG/DL
WBC # BLD AUTO: 10.7 K/UL (ref 4.1–11.1)
WBC # BLD AUTO: 10.7 K/UL (ref 4.1–11.1)
WBC # BLD AUTO: 19.4 K/UL (ref 4.1–11.1)

## 2023-09-19 PROCEDURE — 02HA0QZ INSERTION OF IMPLANTABLE HEART ASSIST SYSTEM INTO HEART, OPEN APPROACH: ICD-10-PCS | Performed by: THORACIC SURGERY (CARDIOTHORACIC VASCULAR SURGERY)

## 2023-09-19 PROCEDURE — 02PA3RZ REMOVAL OF SHORT-TERM EXTERNAL HEART ASSIST SYSTEM FROM HEART, PERCUTANEOUS APPROACH: ICD-10-PCS | Performed by: THORACIC SURGERY (CARDIOTHORACIC VASCULAR SURGERY)

## 2023-09-19 PROCEDURE — 6360000002 HC RX W HCPCS: Performed by: NURSE PRACTITIONER

## 2023-09-19 PROCEDURE — 2580000003 HC RX 258: Performed by: RADIOLOGY

## 2023-09-19 PROCEDURE — 6360000002 HC RX W HCPCS: Performed by: RADIOLOGY

## 2023-09-19 PROCEDURE — 6360000002 HC RX W HCPCS: Performed by: STUDENT IN AN ORGANIZED HEALTH CARE EDUCATION/TRAINING PROGRAM

## 2023-09-19 PROCEDURE — 6370000000 HC RX 637 (ALT 250 FOR IP): Performed by: NURSE PRACTITIONER

## 2023-09-19 PROCEDURE — 2500000003 HC RX 250 WO HCPCS: Performed by: NURSE ANESTHETIST, CERTIFIED REGISTERED

## 2023-09-19 PROCEDURE — 2000000000 HC ICU R&B

## 2023-09-19 PROCEDURE — 2720000010 HC SURG SUPPLY STERILE: Performed by: THORACIC SURGERY (CARDIOTHORACIC VASCULAR SURGERY)

## 2023-09-19 PROCEDURE — 6360000002 HC RX W HCPCS: Performed by: THORACIC SURGERY (CARDIOTHORACIC VASCULAR SURGERY)

## 2023-09-19 PROCEDURE — 94660 CPAP INITIATION&MGMT: CPT

## 2023-09-19 PROCEDURE — 3700000001 HC ADD 15 MINUTES (ANESTHESIA): Performed by: THORACIC SURGERY (CARDIOTHORACIC VASCULAR SURGERY)

## 2023-09-19 PROCEDURE — 5A02216 ASSISTANCE WITH CARDIAC OUTPUT USING OTHER PUMP, CONTINUOUS: ICD-10-PCS | Performed by: THORACIC SURGERY (CARDIOTHORACIC VASCULAR SURGERY)

## 2023-09-19 PROCEDURE — 85730 THROMBOPLASTIN TIME PARTIAL: CPT

## 2023-09-19 PROCEDURE — 2709999900 HC NON-CHARGEABLE SUPPLY: Performed by: THORACIC SURGERY (CARDIOTHORACIC VASCULAR SURGERY)

## 2023-09-19 PROCEDURE — 2580000003 HC RX 258: Performed by: THORACIC SURGERY (CARDIOTHORACIC VASCULAR SURGERY)

## 2023-09-19 PROCEDURE — P9073 PLATELETS PHERESIS PATH REDU: HCPCS

## 2023-09-19 PROCEDURE — 2500000003 HC RX 250 WO HCPCS

## 2023-09-19 PROCEDURE — 85610 PROTHROMBIN TIME: CPT

## 2023-09-19 PROCEDURE — B3161ZZ FLUOROSCOPY OF RIGHT INTERNAL CAROTID ARTERY USING LOW OSMOLAR CONTRAST: ICD-10-PCS | Performed by: RADIOLOGY

## 2023-09-19 PROCEDURE — 03CG3ZZ EXTIRPATION OF MATTER FROM INTRACRANIAL ARTERY, PERCUTANEOUS APPROACH: ICD-10-PCS | Performed by: RADIOLOGY

## 2023-09-19 PROCEDURE — 2580000003 HC RX 258: Performed by: ANESTHESIOLOGY

## 2023-09-19 PROCEDURE — 70450 CT HEAD/BRAIN W/O DYE: CPT

## 2023-09-19 PROCEDURE — 83036 HEMOGLOBIN GLYCOSYLATED A1C: CPT

## 2023-09-19 PROCEDURE — 03C30ZZ EXTIRPATION OF MATTER FROM RIGHT SUBCLAVIAN ARTERY, OPEN APPROACH: ICD-10-PCS | Performed by: THORACIC SURGERY (CARDIOTHORACIC VASCULAR SURGERY)

## 2023-09-19 PROCEDURE — 2580000003 HC RX 258: Performed by: NURSE PRACTITIONER

## 2023-09-19 PROCEDURE — 3700000000 HC ANESTHESIA ATTENDED CARE: Performed by: RADIOLOGY

## 2023-09-19 PROCEDURE — 2580000003 HC RX 258: Performed by: OBSTETRICS & GYNECOLOGY

## 2023-09-19 PROCEDURE — C1768 GRAFT, VASCULAR: HCPCS | Performed by: THORACIC SURGERY (CARDIOTHORACIC VASCULAR SURGERY)

## 2023-09-19 PROCEDURE — 82803 BLOOD GASES ANY COMBINATION: CPT

## 2023-09-19 PROCEDURE — 88313 SPECIAL STAINS GROUP 2: CPT

## 2023-09-19 PROCEDURE — 80048 BASIC METABOLIC PNL TOTAL CA: CPT

## 2023-09-19 PROCEDURE — 83050 HGB METHEMOGLOBIN QUAN: CPT

## 2023-09-19 PROCEDURE — 99291 CRITICAL CARE FIRST HOUR: CPT | Performed by: INTERNAL MEDICINE

## 2023-09-19 PROCEDURE — 6360000002 HC RX W HCPCS

## 2023-09-19 PROCEDURE — 3600000018 HC SURGERY OHS ADDTL 15MIN: Performed by: THORACIC SURGERY (CARDIOTHORACIC VASCULAR SURGERY)

## 2023-09-19 PROCEDURE — 3700000000 HC ANESTHESIA ATTENDED CARE: Performed by: THORACIC SURGERY (CARDIOTHORACIC VASCULAR SURGERY)

## 2023-09-19 PROCEDURE — 6360000004 HC RX CONTRAST MEDICATION: Performed by: RADIOLOGY

## 2023-09-19 PROCEDURE — 80061 LIPID PANEL: CPT

## 2023-09-19 PROCEDURE — 82375 ASSAY CARBOXYHB QUANT: CPT

## 2023-09-19 PROCEDURE — 71045 X-RAY EXAM CHEST 1 VIEW: CPT

## 2023-09-19 PROCEDURE — 0042T CT BRAIN PERFUSION: CPT

## 2023-09-19 PROCEDURE — 3600000008 HC SURGERY OHS BASE: Performed by: THORACIC SURGERY (CARDIOTHORACIC VASCULAR SURGERY)

## 2023-09-19 PROCEDURE — 2500000003 HC RX 250 WO HCPCS: Performed by: NURSE PRACTITIONER

## 2023-09-19 PROCEDURE — P9016 RBC LEUKOCYTES REDUCED: HCPCS

## 2023-09-19 PROCEDURE — 80076 HEPATIC FUNCTION PANEL: CPT

## 2023-09-19 PROCEDURE — P9045 ALBUMIN (HUMAN), 5%, 250 ML: HCPCS | Performed by: NURSE PRACTITIONER

## 2023-09-19 PROCEDURE — B246ZZ4 ULTRASONOGRAPHY OF RIGHT AND LEFT HEART, TRANSESOPHAGEAL: ICD-10-PCS | Performed by: ANESTHESIOLOGY

## 2023-09-19 PROCEDURE — 85027 COMPLETE CBC AUTOMATED: CPT

## 2023-09-19 PROCEDURE — C1713 ANCHOR/SCREW BN/BN,TIS/BN: HCPCS | Performed by: THORACIC SURGERY (CARDIOTHORACIC VASCULAR SURGERY)

## 2023-09-19 PROCEDURE — C1769 GUIDE WIRE: HCPCS

## 2023-09-19 PROCEDURE — 85520 HEPARIN ASSAY: CPT

## 2023-09-19 PROCEDURE — 6360000002 HC RX W HCPCS: Performed by: OBSTETRICS & GYNECOLOGY

## 2023-09-19 PROCEDURE — 83735 ASSAY OF MAGNESIUM: CPT

## 2023-09-19 PROCEDURE — 02HV33Z INSERTION OF INFUSION DEVICE INTO SUPERIOR VENA CAVA, PERCUTANEOUS APPROACH: ICD-10-PCS | Performed by: OBSTETRICS & GYNECOLOGY

## 2023-09-19 PROCEDURE — A4217 STERILE WATER/SALINE, 500 ML: HCPCS | Performed by: THORACIC SURGERY (CARDIOTHORACIC VASCULAR SURGERY)

## 2023-09-19 PROCEDURE — 88305 TISSUE EXAM BY PATHOLOGIST: CPT

## 2023-09-19 PROCEDURE — 3700000001 HC ADD 15 MINUTES (ANESTHESIA): Performed by: RADIOLOGY

## 2023-09-19 PROCEDURE — 82962 GLUCOSE BLOOD TEST: CPT

## 2023-09-19 PROCEDURE — 80053 COMPREHEN METABOLIC PANEL: CPT

## 2023-09-19 PROCEDURE — 6370000000 HC RX 637 (ALT 250 FOR IP): Performed by: INTERNAL MEDICINE

## 2023-09-19 PROCEDURE — 2580000003 HC RX 258

## 2023-09-19 PROCEDURE — 5A1221Z PERFORMANCE OF CARDIAC OUTPUT, CONTINUOUS: ICD-10-PCS | Performed by: THORACIC SURGERY (CARDIOTHORACIC VASCULAR SURGERY)

## 2023-09-19 PROCEDURE — 70498 CT ANGIOGRAPHY NECK: CPT

## 2023-09-19 PROCEDURE — 83880 ASSAY OF NATRIURETIC PEPTIDE: CPT

## 2023-09-19 PROCEDURE — 2500000003 HC RX 250 WO HCPCS: Performed by: RADIOLOGY

## 2023-09-19 PROCEDURE — G0269 OCCLUSIVE DEVICE IN VEIN ART: HCPCS

## 2023-09-19 PROCEDURE — 84443 ASSAY THYROID STIM HORMONE: CPT

## 2023-09-19 PROCEDURE — 36415 COLL VENOUS BLD VENIPUNCTURE: CPT

## 2023-09-19 PROCEDURE — 83615 LACTATE (LD) (LDH) ENZYME: CPT

## 2023-09-19 PROCEDURE — P9047 ALBUMIN (HUMAN), 25%, 50ML: HCPCS

## 2023-09-19 PROCEDURE — C1760 CLOSURE DEV, VASC: HCPCS | Performed by: RADIOLOGY

## 2023-09-19 DEVICE — IMPLANTABLE DEVICE: Type: IMPLANTABLE DEVICE | Site: HEART | Status: FUNCTIONAL

## 2023-09-19 DEVICE — STRETCH VASCULAR GRAFT SW 20MMX20CM
Type: IMPLANTABLE DEVICE | Site: HEART | Status: FUNCTIONAL
Brand: GORE-TEX   STRETCH VASCULAR GRAFT

## 2023-09-19 DEVICE — PUTTY BONE HEMSTAT ABSORB MTRX 2.0GRAM: Type: IMPLANTABLE DEVICE | Site: STERNUM | Status: FUNCTIONAL

## 2023-09-19 RX ORDER — SODIUM CHLORIDE 9 MG/ML
INJECTION, SOLUTION INTRAVENOUS PRN
Status: DISCONTINUED | OUTPATIENT
Start: 2023-09-19 | End: 2023-09-26

## 2023-09-19 RX ORDER — MIDAZOLAM HYDROCHLORIDE 1 MG/ML
INJECTION INTRAMUSCULAR; INTRAVENOUS PRN
Status: DISCONTINUED | OUTPATIENT
Start: 2023-09-19 | End: 2023-09-19 | Stop reason: SDUPTHER

## 2023-09-19 RX ORDER — LIDOCAINE 4 G/G
1 PATCH TOPICAL DAILY
Status: DISCONTINUED | OUTPATIENT
Start: 2023-09-20 | End: 2023-10-03 | Stop reason: HOSPADM

## 2023-09-19 RX ORDER — BISACODYL 5 MG/1
5 TABLET, DELAYED RELEASE ORAL DAILY PRN
Status: DISCONTINUED | OUTPATIENT
Start: 2023-09-19 | End: 2023-10-03 | Stop reason: HOSPADM

## 2023-09-19 RX ORDER — SODIUM CHLORIDE, SODIUM LACTATE, POTASSIUM CHLORIDE, CALCIUM CHLORIDE 600; 310; 30; 20 MG/100ML; MG/100ML; MG/100ML; MG/100ML
INJECTION, SOLUTION INTRAVENOUS CONTINUOUS PRN
Status: DISCONTINUED | OUTPATIENT
Start: 2023-09-19 | End: 2023-09-19 | Stop reason: SDUPTHER

## 2023-09-19 RX ORDER — POLYETHYLENE GLYCOL 3350 17 G/17G
17 POWDER, FOR SOLUTION ORAL DAILY
Status: DISCONTINUED | OUTPATIENT
Start: 2023-09-20 | End: 2023-10-03 | Stop reason: HOSPADM

## 2023-09-19 RX ORDER — HYDROMORPHONE HYDROCHLORIDE 2 MG/ML
INJECTION, SOLUTION INTRAMUSCULAR; INTRAVENOUS; SUBCUTANEOUS PRN
Status: DISCONTINUED | OUTPATIENT
Start: 2023-09-19 | End: 2023-09-19 | Stop reason: SDUPTHER

## 2023-09-19 RX ORDER — ACETAMINOPHEN 325 MG/1
650 TABLET ORAL EVERY 4 HOURS PRN
Status: DISCONTINUED | OUTPATIENT
Start: 2023-09-19 | End: 2023-10-03 | Stop reason: HOSPADM

## 2023-09-19 RX ORDER — INSULIN LISPRO 100 [IU]/ML
0.08 INJECTION, SOLUTION INTRAVENOUS; SUBCUTANEOUS
Status: DISCONTINUED | OUTPATIENT
Start: 2023-09-21 | End: 2023-09-21

## 2023-09-19 RX ORDER — ALBUMIN, HUMAN INJ 5% 5 %
12.5 SOLUTION INTRAVENOUS PRN
Status: DISCONTINUED | OUTPATIENT
Start: 2023-09-19 | End: 2023-09-26

## 2023-09-19 RX ORDER — SODIUM CHLORIDE 0.9 % (FLUSH) 0.9 %
5-40 SYRINGE (ML) INJECTION PRN
Status: DISCONTINUED | OUTPATIENT
Start: 2023-09-19 | End: 2023-09-26

## 2023-09-19 RX ORDER — DEXTROSE MONOHYDRATE 100 MG/ML
INJECTION, SOLUTION INTRAVENOUS CONTINUOUS PRN
Status: DISCONTINUED | OUTPATIENT
Start: 2023-09-19 | End: 2023-10-03 | Stop reason: HOSPADM

## 2023-09-19 RX ORDER — FENTANYL CITRATE 50 UG/ML
100 INJECTION, SOLUTION INTRAMUSCULAR; INTRAVENOUS
Status: DISCONTINUED | OUTPATIENT
Start: 2023-09-19 | End: 2023-09-19 | Stop reason: HOSPADM

## 2023-09-19 RX ORDER — OXYCODONE HYDROCHLORIDE 5 MG/1
5 TABLET ORAL
Status: DISCONTINUED | OUTPATIENT
Start: 2023-09-19 | End: 2023-09-19 | Stop reason: HOSPADM

## 2023-09-19 RX ORDER — SODIUM CHLORIDE 0.9 % (FLUSH) 0.9 %
5-40 SYRINGE (ML) INJECTION PRN
Status: DISCONTINUED | OUTPATIENT
Start: 2023-09-19 | End: 2023-09-19 | Stop reason: HOSPADM

## 2023-09-19 RX ORDER — NEOSTIGMINE METHYLSULFATE 1 MG/ML
INJECTION, SOLUTION INTRAVENOUS PRN
Status: DISCONTINUED | OUTPATIENT
Start: 2023-09-19 | End: 2023-09-19 | Stop reason: SDUPTHER

## 2023-09-19 RX ORDER — SODIUM CHLORIDE 9 MG/ML
INJECTION, SOLUTION INTRAVENOUS PRN
Status: DISCONTINUED | OUTPATIENT
Start: 2023-09-19 | End: 2023-09-19 | Stop reason: HOSPADM

## 2023-09-19 RX ORDER — ONDANSETRON 2 MG/ML
4 INJECTION INTRAMUSCULAR; INTRAVENOUS
Status: DISCONTINUED | OUTPATIENT
Start: 2023-09-19 | End: 2023-09-19 | Stop reason: HOSPADM

## 2023-09-19 RX ORDER — INSULIN LISPRO 100 [IU]/ML
0-6 INJECTION, SOLUTION INTRAVENOUS; SUBCUTANEOUS NIGHTLY
Status: DISCONTINUED | OUTPATIENT
Start: 2023-09-19 | End: 2023-10-03 | Stop reason: HOSPADM

## 2023-09-19 RX ORDER — HEPARIN SODIUM 10000 [USP'U]/100ML
400 INJECTION, SOLUTION INTRAVENOUS CONTINUOUS
Status: DISCONTINUED | OUTPATIENT
Start: 2023-09-20 | End: 2023-09-20

## 2023-09-19 RX ORDER — SODIUM CHLORIDE 9 MG/ML
INJECTION, SOLUTION INTRAVENOUS PRN
Status: DISCONTINUED | OUTPATIENT
Start: 2023-09-19 | End: 2023-09-19

## 2023-09-19 RX ORDER — PROTAMINE SULFATE 10 MG/ML
INJECTION, SOLUTION INTRAVENOUS PRN
Status: DISCONTINUED | OUTPATIENT
Start: 2023-09-19 | End: 2023-09-19 | Stop reason: SDUPTHER

## 2023-09-19 RX ORDER — FLUCONAZOLE 2 MG/ML
200 INJECTION, SOLUTION INTRAVENOUS EVERY 24 HOURS
Status: COMPLETED | OUTPATIENT
Start: 2023-09-20 | End: 2023-09-21

## 2023-09-19 RX ORDER — SODIUM CHLORIDE 0.9 % (FLUSH) 0.9 %
5-40 SYRINGE (ML) INJECTION EVERY 12 HOURS SCHEDULED
Status: DISCONTINUED | OUTPATIENT
Start: 2023-09-19 | End: 2023-10-03 | Stop reason: HOSPADM

## 2023-09-19 RX ORDER — LIDOCAINE HYDROCHLORIDE 20 MG/ML
INJECTION, SOLUTION INTRAVENOUS PRN
Status: DISCONTINUED | OUTPATIENT
Start: 2023-09-19 | End: 2023-09-19 | Stop reason: SDUPTHER

## 2023-09-19 RX ORDER — DOBUTAMINE HYDROCHLORIDE 200 MG/100ML
INJECTION INTRAVENOUS CONTINUOUS PRN
Status: DISCONTINUED | OUTPATIENT
Start: 2023-09-19 | End: 2023-09-19

## 2023-09-19 RX ORDER — OXYCODONE HYDROCHLORIDE 5 MG/1
10 TABLET ORAL EVERY 4 HOURS PRN
Status: DISCONTINUED | OUTPATIENT
Start: 2023-09-19 | End: 2023-10-03 | Stop reason: HOSPADM

## 2023-09-19 RX ORDER — SODIUM CHLORIDE 0.9 % (FLUSH) 0.9 %
5-40 SYRINGE (ML) INJECTION EVERY 12 HOURS SCHEDULED
Status: DISCONTINUED | OUTPATIENT
Start: 2023-09-19 | End: 2023-09-19 | Stop reason: HOSPADM

## 2023-09-19 RX ORDER — PROPOFOL 10 MG/ML
5-50 INJECTION, EMULSION INTRAVENOUS CONTINUOUS
Status: DISCONTINUED | OUTPATIENT
Start: 2023-09-19 | End: 2023-09-21

## 2023-09-19 RX ORDER — ACETAMINOPHEN 500 MG
1000 TABLET ORAL ONCE
Status: DISCONTINUED | OUTPATIENT
Start: 2023-09-19 | End: 2023-09-19 | Stop reason: HOSPADM

## 2023-09-19 RX ORDER — INSULIN LISPRO 100 [IU]/ML
0-12 INJECTION, SOLUTION INTRAVENOUS; SUBCUTANEOUS
Status: DISCONTINUED | OUTPATIENT
Start: 2023-09-19 | End: 2023-10-03 | Stop reason: HOSPADM

## 2023-09-19 RX ORDER — DOBUTAMINE HYDROCHLORIDE 200 MG/100ML
2.5 INJECTION INTRAVENOUS CONTINUOUS
Status: DISCONTINUED | OUTPATIENT
Start: 2023-09-19 | End: 2023-09-20

## 2023-09-19 RX ORDER — ONDANSETRON 4 MG/1
4 TABLET, ORALLY DISINTEGRATING ORAL EVERY 8 HOURS PRN
Status: DISCONTINUED | OUTPATIENT
Start: 2023-09-19 | End: 2023-09-26

## 2023-09-19 RX ORDER — ALBUTEROL SULFATE 90 UG/1
2 AEROSOL, METERED RESPIRATORY (INHALATION) EVERY 6 HOURS PRN
Status: DISCONTINUED | OUTPATIENT
Start: 2023-09-19 | End: 2023-09-19 | Stop reason: CLARIF

## 2023-09-19 RX ORDER — NOREPINEPHRINE BITARTRATE 0.06 MG/ML
1-100 INJECTION, SOLUTION INTRAVENOUS CONTINUOUS
Status: DISCONTINUED | OUTPATIENT
Start: 2023-09-19 | End: 2023-09-22

## 2023-09-19 RX ORDER — FENTANYL CITRATE 50 UG/ML
INJECTION, SOLUTION INTRAMUSCULAR; INTRAVENOUS PRN
Status: DISCONTINUED | OUTPATIENT
Start: 2023-09-19 | End: 2023-09-19 | Stop reason: SDUPTHER

## 2023-09-19 RX ORDER — MORPHINE SULFATE 2 MG/ML
2 INJECTION, SOLUTION INTRAMUSCULAR; INTRAVENOUS
Status: DISCONTINUED | OUTPATIENT
Start: 2023-09-19 | End: 2023-09-20 | Stop reason: ALTCHOICE

## 2023-09-19 RX ORDER — ONDANSETRON 2 MG/ML
4 INJECTION INTRAMUSCULAR; INTRAVENOUS EVERY 6 HOURS PRN
Status: DISCONTINUED | OUTPATIENT
Start: 2023-09-19 | End: 2023-10-03 | Stop reason: HOSPADM

## 2023-09-19 RX ORDER — DESMOPRESSIN ACETATE 4 UG/ML
INJECTION, SOLUTION INTRAVENOUS; SUBCUTANEOUS PRN
Status: DISCONTINUED | OUTPATIENT
Start: 2023-09-19 | End: 2023-09-19 | Stop reason: SDUPTHER

## 2023-09-19 RX ORDER — AMIODARONE HYDROCHLORIDE 200 MG/1
200 TABLET ORAL DAILY
Status: DISCONTINUED | OUTPATIENT
Start: 2023-09-25 | End: 2023-09-27 | Stop reason: SDUPTHER

## 2023-09-19 RX ORDER — FENTANYL CITRATE 50 UG/ML
25 INJECTION, SOLUTION INTRAMUSCULAR; INTRAVENOUS EVERY 5 MIN PRN
Status: DISCONTINUED | OUTPATIENT
Start: 2023-09-19 | End: 2023-09-19 | Stop reason: HOSPADM

## 2023-09-19 RX ORDER — ROCURONIUM BROMIDE 10 MG/ML
INJECTION, SOLUTION INTRAVENOUS PRN
Status: DISCONTINUED | OUTPATIENT
Start: 2023-09-19 | End: 2023-09-19 | Stop reason: SDUPTHER

## 2023-09-19 RX ORDER — AMIODARONE HYDROCHLORIDE 200 MG/1
400 TABLET ORAL 2 TIMES DAILY
Status: DISPENSED | OUTPATIENT
Start: 2023-09-20 | End: 2023-09-25

## 2023-09-19 RX ORDER — LIDOCAINE HYDROCHLORIDE 10 MG/ML
1 INJECTION, SOLUTION EPIDURAL; INFILTRATION; INTRACAUDAL; PERINEURAL
Status: DISCONTINUED | OUTPATIENT
Start: 2023-09-19 | End: 2023-09-19 | Stop reason: HOSPADM

## 2023-09-19 RX ORDER — KETAMINE HCL IN NACL, ISO-OSM 100MG/10ML
SYRINGE (ML) INJECTION PRN
Status: DISCONTINUED | OUTPATIENT
Start: 2023-09-19 | End: 2023-09-19 | Stop reason: SDUPTHER

## 2023-09-19 RX ORDER — ASPIRIN 81 MG/1
81 TABLET ORAL DAILY
Status: DISCONTINUED | OUTPATIENT
Start: 2023-09-20 | End: 2023-09-21

## 2023-09-19 RX ORDER — HYDROMORPHONE HYDROCHLORIDE 1 MG/ML
1 INJECTION, SOLUTION INTRAMUSCULAR; INTRAVENOUS; SUBCUTANEOUS EVERY 4 HOURS PRN
Status: DISCONTINUED | OUTPATIENT
Start: 2023-09-19 | End: 2023-09-26

## 2023-09-19 RX ORDER — INSULIN GLARGINE 100 [IU]/ML
0.15 INJECTION, SOLUTION SUBCUTANEOUS NIGHTLY
Status: DISCONTINUED | OUTPATIENT
Start: 2023-09-20 | End: 2023-09-20

## 2023-09-19 RX ORDER — MAGNESIUM SULFATE IN WATER 40 MG/ML
2000 INJECTION, SOLUTION INTRAVENOUS PRN
Status: DISCONTINUED | OUTPATIENT
Start: 2023-09-19 | End: 2023-10-03 | Stop reason: HOSPADM

## 2023-09-19 RX ORDER — PHENYLEPHRINE HYDROCHLORIDE 10 MG/ML
INJECTION INTRAVENOUS PRN
Status: DISCONTINUED | OUTPATIENT
Start: 2023-09-19 | End: 2023-09-19 | Stop reason: SDUPTHER

## 2023-09-19 RX ORDER — OXYCODONE HYDROCHLORIDE 5 MG/1
5 TABLET ORAL EVERY 4 HOURS PRN
Status: DISCONTINUED | OUTPATIENT
Start: 2023-09-19 | End: 2023-10-03 | Stop reason: HOSPADM

## 2023-09-19 RX ORDER — LIDOCAINE HYDROCHLORIDE 10 MG/ML
INJECTION, SOLUTION EPIDURAL; INFILTRATION; INTRACAUDAL; PERINEURAL PRN
Status: COMPLETED | OUTPATIENT
Start: 2023-09-19 | End: 2023-09-19

## 2023-09-19 RX ORDER — POTASSIUM CHLORIDE 29.8 MG/ML
20 INJECTION INTRAVENOUS PRN
Status: DISCONTINUED | OUTPATIENT
Start: 2023-09-19 | End: 2023-10-03 | Stop reason: HOSPADM

## 2023-09-19 RX ORDER — MIDAZOLAM HYDROCHLORIDE 2 MG/2ML
2 INJECTION, SOLUTION INTRAMUSCULAR; INTRAVENOUS
Status: DISCONTINUED | OUTPATIENT
Start: 2023-09-19 | End: 2023-09-19 | Stop reason: HOSPADM

## 2023-09-19 RX ORDER — PROCHLORPERAZINE EDISYLATE 5 MG/ML
5 INJECTION INTRAMUSCULAR; INTRAVENOUS
Status: DISCONTINUED | OUTPATIENT
Start: 2023-09-19 | End: 2023-09-19 | Stop reason: HOSPADM

## 2023-09-19 RX ORDER — SUCRALFATE 1 G/1
1 TABLET ORAL
Status: DISCONTINUED | OUTPATIENT
Start: 2023-09-20 | End: 2023-10-03 | Stop reason: HOSPADM

## 2023-09-19 RX ORDER — HYDRALAZINE HYDROCHLORIDE 20 MG/ML
10 INJECTION INTRAMUSCULAR; INTRAVENOUS
Status: DISCONTINUED | OUTPATIENT
Start: 2023-09-19 | End: 2023-09-19 | Stop reason: HOSPADM

## 2023-09-19 RX ORDER — HYDROMORPHONE HYDROCHLORIDE 1 MG/ML
0.5 INJECTION, SOLUTION INTRAMUSCULAR; INTRAVENOUS; SUBCUTANEOUS EVERY 4 HOURS PRN
Status: DISCONTINUED | OUTPATIENT
Start: 2023-09-19 | End: 2023-09-26

## 2023-09-19 RX ORDER — ALBUMIN (HUMAN) 12.5 G/50ML
SOLUTION INTRAVENOUS PRN
Status: DISCONTINUED | OUTPATIENT
Start: 2023-09-19 | End: 2023-09-19 | Stop reason: SDUPTHER

## 2023-09-19 RX ORDER — CEFAZOLIN SODIUM 1 G/3ML
INJECTION, POWDER, FOR SOLUTION INTRAMUSCULAR; INTRAVENOUS PRN
Status: DISCONTINUED | OUTPATIENT
Start: 2023-09-19 | End: 2023-09-19 | Stop reason: ALTCHOICE

## 2023-09-19 RX ORDER — HYDROMORPHONE HYDROCHLORIDE 1 MG/ML
0.5 INJECTION, SOLUTION INTRAMUSCULAR; INTRAVENOUS; SUBCUTANEOUS EVERY 5 MIN PRN
Status: DISCONTINUED | OUTPATIENT
Start: 2023-09-19 | End: 2023-09-19 | Stop reason: HOSPADM

## 2023-09-19 RX ORDER — MORPHINE SULFATE 4 MG/ML
4 INJECTION, SOLUTION INTRAMUSCULAR; INTRAVENOUS
Status: DISCONTINUED | OUTPATIENT
Start: 2023-09-19 | End: 2023-09-20 | Stop reason: ALTCHOICE

## 2023-09-19 RX ORDER — SODIUM CHLORIDE, SODIUM LACTATE, POTASSIUM CHLORIDE, CALCIUM CHLORIDE 600; 310; 30; 20 MG/100ML; MG/100ML; MG/100ML; MG/100ML
INJECTION, SOLUTION INTRAVENOUS CONTINUOUS
Status: DISCONTINUED | OUTPATIENT
Start: 2023-09-19 | End: 2023-09-19 | Stop reason: HOSPADM

## 2023-09-19 RX ORDER — DEXMEDETOMIDINE HYDROCHLORIDE 4 UG/ML
.1-1.5 INJECTION, SOLUTION INTRAVENOUS CONTINUOUS
Status: DISCONTINUED | OUTPATIENT
Start: 2023-09-19 | End: 2023-09-22

## 2023-09-19 RX ORDER — LEVOFLOXACIN 5 MG/ML
500 INJECTION, SOLUTION INTRAVENOUS EVERY 24 HOURS
Status: COMPLETED | OUTPATIENT
Start: 2023-09-20 | End: 2023-09-21

## 2023-09-19 RX ORDER — ROPIVACAINE HYDROCHLORIDE 5 MG/ML
INJECTION, SOLUTION EPIDURAL; INFILTRATION; PERINEURAL PRN
Status: DISCONTINUED | OUTPATIENT
Start: 2023-09-19 | End: 2023-09-19 | Stop reason: ALTCHOICE

## 2023-09-19 RX ORDER — PANTOPRAZOLE SODIUM 40 MG/1
40 TABLET, DELAYED RELEASE ORAL
Status: DISCONTINUED | OUTPATIENT
Start: 2023-09-20 | End: 2023-10-03 | Stop reason: HOSPADM

## 2023-09-19 RX ORDER — ALBUTEROL SULFATE 2.5 MG/3ML
2.5 SOLUTION RESPIRATORY (INHALATION) EVERY 6 HOURS PRN
Status: DISCONTINUED | OUTPATIENT
Start: 2023-09-19 | End: 2023-10-03 | Stop reason: HOSPADM

## 2023-09-19 RX ORDER — GLYCOPYRROLATE 0.2 MG/ML
INJECTION INTRAMUSCULAR; INTRAVENOUS PRN
Status: DISCONTINUED | OUTPATIENT
Start: 2023-09-19 | End: 2023-09-19 | Stop reason: SDUPTHER

## 2023-09-19 RX ORDER — MIDAZOLAM HYDROCHLORIDE 1 MG/ML
INJECTION INTRAMUSCULAR; INTRAVENOUS
Status: DISCONTINUED
Start: 2023-09-19 | End: 2023-09-19 | Stop reason: WASHOUT

## 2023-09-19 RX ADMIN — AMIODARONE HYDROCHLORIDE 1 MG/MIN: 50 INJECTION, SOLUTION INTRAVENOUS at 16:56

## 2023-09-19 RX ADMIN — PHENYLEPHRINE HYDROCHLORIDE 80 MCG: 10 INJECTION INTRAVENOUS at 08:36

## 2023-09-19 RX ADMIN — WATER 2000 MG: 1 INJECTION INTRAMUSCULAR; INTRAVENOUS; SUBCUTANEOUS at 05:49

## 2023-09-19 RX ADMIN — DEXMEDETOMIDINE HYDROCHLORIDE 0.5 MCG/KG/HR: 400 INJECTION, SOLUTION INTRAVENOUS at 22:55

## 2023-09-19 RX ADMIN — ALBUMIN (HUMAN) 12.5 G: 12.5 INJECTION, SOLUTION INTRAVENOUS at 16:39

## 2023-09-19 RX ADMIN — SODIUM CHLORIDE, POTASSIUM CHLORIDE, SODIUM LACTATE AND CALCIUM CHLORIDE: 600; 310; 30; 20 INJECTION, SOLUTION INTRAVENOUS at 08:00

## 2023-09-19 RX ADMIN — DEXMEDETOMIDINE HYDROCHLORIDE 0.4 MCG/KG/HR: 400 INJECTION, SOLUTION INTRAVENOUS at 14:19

## 2023-09-19 RX ADMIN — VANCOMYCIN HYDROCHLORIDE 750 MG: 750 INJECTION, POWDER, LYOPHILIZED, FOR SOLUTION INTRAVENOUS at 20:56

## 2023-09-19 RX ADMIN — EPINEPHRINE 3 MCG/MIN: 1 INJECTION INTRAMUSCULAR; INTRAVENOUS; SUBCUTANEOUS at 09:12

## 2023-09-19 RX ADMIN — HEPARIN SODIUM: 1000 INJECTION INTRAVENOUS; SUBCUTANEOUS at 19:03

## 2023-09-19 RX ADMIN — ALBUMIN (HUMAN) 12.5 G: 12.5 INJECTION, SOLUTION INTRAVENOUS at 15:46

## 2023-09-19 RX ADMIN — HYDROMORPHONE HYDROCHLORIDE 1 MG: 1 INJECTION, SOLUTION INTRAMUSCULAR; INTRAVENOUS; SUBCUTANEOUS at 17:29

## 2023-09-19 RX ADMIN — AMIODARONE HYDROCHLORIDE 1 MG/MIN: 50 INJECTION, SOLUTION INTRAVENOUS at 11:24

## 2023-09-19 RX ADMIN — ALBUMIN (HUMAN) 12.5 G: 0.25 INJECTION, SOLUTION INTRAVENOUS at 11:34

## 2023-09-19 RX ADMIN — FENTANYL CITRATE 200 MCG: 50 INJECTION, SOLUTION INTRAMUSCULAR; INTRAVENOUS at 08:01

## 2023-09-19 RX ADMIN — ROCURONIUM BROMIDE 100 MG: 10 SOLUTION INTRAVENOUS at 08:03

## 2023-09-19 RX ADMIN — FLUCONAZOLE IN SODIUM CHLORIDE 400 MG: 2 INJECTION, SOLUTION INTRAVENOUS at 04:37

## 2023-09-19 RX ADMIN — IOPAMIDOL 65 ML: 612 INJECTION, SOLUTION INTRAVENOUS at 19:05

## 2023-09-19 RX ADMIN — MAGNESIUM SULFATE HEPTAHYDRATE 2000 MG: 40 INJECTION, SOLUTION INTRAVENOUS at 14:19

## 2023-09-19 RX ADMIN — HEPARIN SODIUM: 1000 INJECTION INTRAVENOUS; SUBCUTANEOUS at 19:02

## 2023-09-19 RX ADMIN — HYDROMORPHONE HYDROCHLORIDE 1 MG: 2 INJECTION INTRAMUSCULAR; INTRAVENOUS; SUBCUTANEOUS at 11:59

## 2023-09-19 RX ADMIN — IOPAMIDOL 80 ML: 755 INJECTION, SOLUTION INTRAVENOUS at 18:01

## 2023-09-19 RX ADMIN — DEXMEDETOMIDINE HYDROCHLORIDE 0.5 MCG/KG/HR: 100 INJECTION, SOLUTION, CONCENTRATE INTRAVENOUS at 10:34

## 2023-09-19 RX ADMIN — SODIUM CHLORIDE, PRESERVATIVE FREE 10 ML: 5 INJECTION INTRAVENOUS at 21:17

## 2023-09-19 RX ADMIN — MUPIROCIN: 20 OINTMENT TOPICAL at 16:08

## 2023-09-19 RX ADMIN — IOPAMIDOL 40 ML: 755 INJECTION, SOLUTION INTRAVENOUS at 17:58

## 2023-09-19 RX ADMIN — NOREPINEPHRINE BITARTRATE 10 MCG/MIN: 1 INJECTION, SOLUTION, CONCENTRATE INTRAVENOUS at 09:30

## 2023-09-19 RX ADMIN — PROPOFOL 15 MCG/KG/MIN: 10 INJECTION, EMULSION INTRAVENOUS at 18:32

## 2023-09-19 RX ADMIN — LIDOCAINE HYDROCHLORIDE 100 MG: 20 INJECTION, SOLUTION INTRAVENOUS at 08:01

## 2023-09-19 RX ADMIN — FENTANYL CITRATE 50 MCG: 50 INJECTION, SOLUTION INTRAMUSCULAR; INTRAVENOUS at 07:49

## 2023-09-19 RX ADMIN — HYDROMORPHONE HYDROCHLORIDE 1 MG: 2 INJECTION INTRAMUSCULAR; INTRAVENOUS; SUBCUTANEOUS at 12:27

## 2023-09-19 RX ADMIN — Medication 100 ML/HR: at 08:05

## 2023-09-19 RX ADMIN — CHLORHEXIDINE GLUCONATE 15 ML: 1.2 RINSE ORAL at 16:08

## 2023-09-19 RX ADMIN — Medication 3 MG: at 12:27

## 2023-09-19 RX ADMIN — FENTANYL CITRATE 150 MCG: 50 INJECTION, SOLUTION INTRAMUSCULAR; INTRAVENOUS at 09:21

## 2023-09-19 RX ADMIN — ROCURONIUM BROMIDE 50 MG: 10 SOLUTION INTRAVENOUS at 18:35

## 2023-09-19 RX ADMIN — CHLORHEXIDINE GLUCONATE 15 ML: 1.2 RINSE ORAL at 21:50

## 2023-09-19 RX ADMIN — POTASSIUM CHLORIDE 20 MEQ: 400 INJECTION, SOLUTION INTRAVENOUS at 16:01

## 2023-09-19 RX ADMIN — Medication 0.6 UNITS/HR: at 08:33

## 2023-09-19 RX ADMIN — FENTANYL CITRATE 100 MCG: 50 INJECTION, SOLUTION INTRAMUSCULAR; INTRAVENOUS at 08:40

## 2023-09-19 RX ADMIN — PROTAMINE SULFATE 250 MG: 10 INJECTION, SOLUTION INTRAVENOUS at 10:32

## 2023-09-19 RX ADMIN — ALBUMIN (HUMAN) 12.5 G: 0.25 INJECTION, SOLUTION INTRAVENOUS at 11:57

## 2023-09-19 RX ADMIN — PHENYLEPHRINE HYDROCHLORIDE 80 MCG: 10 INJECTION INTRAVENOUS at 09:09

## 2023-09-19 RX ADMIN — MIDAZOLAM 1 MG: 1 INJECTION INTRAMUSCULAR; INTRAVENOUS at 08:00

## 2023-09-19 RX ADMIN — LIDOCAINE HYDROCHLORIDE 2 ML: 10 INJECTION, SOLUTION EPIDURAL; INFILTRATION; INTRACAUDAL; PERINEURAL at 19:04

## 2023-09-19 RX ADMIN — ALBUMIN (HUMAN) 12.5 G: 12.5 INJECTION, SOLUTION INTRAVENOUS at 15:41

## 2023-09-19 RX ADMIN — LIDOCAINE HYDROCHLORIDE 3 ML: 10 INJECTION, SOLUTION EPIDURAL; INFILTRATION; INTRACAUDAL; PERINEURAL at 18:52

## 2023-09-19 RX ADMIN — SODIUM CHLORIDE: 9 INJECTION, SOLUTION INTRAVENOUS at 18:32

## 2023-09-19 RX ADMIN — GLYCOPYRROLATE 0.6 MG: 0.2 INJECTION INTRAMUSCULAR; INTRAVENOUS at 12:27

## 2023-09-19 RX ADMIN — SODIUM NITROPRUSSIDE 0.25 MCG/KG/MIN: 25 INJECTION, SOLUTION, CONCENTRATE INTRAVENOUS at 21:40

## 2023-09-19 RX ADMIN — Medication 30000 UNITS: at 09:09

## 2023-09-19 RX ADMIN — PHENYLEPHRINE HYDROCHLORIDE 200 MCG: 10 INJECTION INTRAVENOUS at 08:04

## 2023-09-19 RX ADMIN — LEVOFLOXACIN 500 MG: 500 INJECTION, SOLUTION INTRAVENOUS at 05:49

## 2023-09-19 RX ADMIN — MUPIROCIN: 20 OINTMENT TOPICAL at 21:50

## 2023-09-19 RX ADMIN — VASOPRESSIN 0.04 UNITS/MIN: 20 INJECTION INTRAVENOUS at 09:27

## 2023-09-19 RX ADMIN — HYDRALAZINE HYDROCHLORIDE 100 MG: 25 TABLET, FILM COATED ORAL at 05:49

## 2023-09-19 RX ADMIN — VANCOMYCIN HYDROCHLORIDE 1000 MG: 1 INJECTION, POWDER, LYOPHILIZED, FOR SOLUTION INTRAVENOUS at 08:15

## 2023-09-19 RX ADMIN — RIFAMPIN 600 MG: 600 INJECTION, POWDER, LYOPHILIZED, FOR SOLUTION INTRAVENOUS at 03:59

## 2023-09-19 RX ADMIN — MIDAZOLAM 1 MG: 1 INJECTION INTRAMUSCULAR; INTRAVENOUS at 07:49

## 2023-09-19 RX ADMIN — Medication 50 MG: at 08:01

## 2023-09-19 RX ADMIN — PHENYLEPHRINE HYDROCHLORIDE 40 MCG/MIN: 10 INJECTION INTRAVENOUS at 08:03

## 2023-09-19 RX ADMIN — DESMOPRESSIN ACETATE 20 MCG: 4 INJECTION, SOLUTION INTRAVENOUS; SUBCUTANEOUS at 11:06

## 2023-09-19 RX ADMIN — SODIUM CHLORIDE: 9 INJECTION, SOLUTION INTRAVENOUS at 08:01

## 2023-09-19 ASSESSMENT — PULMONARY FUNCTION TESTS
PIF_VALUE: 20
PIF_VALUE: 22
PIF_VALUE: 24
PIF_VALUE: 22

## 2023-09-19 ASSESSMENT — ENCOUNTER SYMPTOMS
SHORTNESS OF BREATH: 1
SHORTNESS OF BREATH: 1

## 2023-09-19 ASSESSMENT — PAIN SCALES - GENERAL: PAINLEVEL_OUTOF10: 0

## 2023-09-19 NOTE — PERIOP NOTE
Updated family, sister Portillo Hwang at start of procedure. [phone]    Family member stated that she is at home, just returned from her own hospitalization for meningitis. She would be able by phone only.

## 2023-09-19 NOTE — SIGNIFICANT EVENT
Rapid Response Team    Code Stroke CVICU Room 33 called at this time. Rapid Response Team Nurse responding. Response    Rapid Response Team at the bedside for evaluation. Dr. Rajan Mathew and Axel NP responding. Gardenia Muñoz, RN is the primary nurse during this episode. Ongoing vital signs monitored throughout critical time. Situation    Per Primary RN the patient is s/p LVAD placement and has an LNK of approximately one hour ago. Pt previously following commands in all four extremities, and nodding head yes/no to questioning. Pt still nodding head and has full strength in right upper extremity, reported to now have flaccidity in left upper extremity.  mg/dL. Neuro NP at bedside for assessment, CVICU RNs advise that they are able to transport the patient to CT without RRT assistance. The patient was left in the care of their primary nursing team.    Checked in with primary RN prior to leaving. Opportunity for questions and concerns provided. Sepsis Screening  Are two or more SIRS criteria present?  No

## 2023-09-19 NOTE — BRIEF OP NOTE
Brief Procedure Note      Patient: Tacho Ennis MRN: [id number]     YOB: 1955  Age: 80 y.o. Sex: male      Service: Neurointerventional Surgery    Date of Procedure: 9/19/2023    Pre-Procedure Diagnosis: Stroke    Post-Procedure Diagnosis: SAME    : Yuri Treadwell MD    Assistant(s): N/A    Procedure(s):   Transarterial mechanical thrombectomy, CNS: Aspiration thrombectomy in Right MCA  Diagnostic cerebral angiogram  Ultrasound guided vascular access  Placement of arteriotomy closure device    Vessels Studied:   Right ICA    Puncture Site: Left femoral graft    Preliminary Findings:   Right M1 occlusion. Status post single pass of aspiration thrombectomy resulting in TICI 3 reperfusion.     Plan:   Admit to CVICU  Defer BP goal and antiplatelet administration to LVAD team      Specimens: None    Implants: None    Drains: None    Anesthesia: GA    Estimated Blood Loss: 15 cc      Apparent Intraoperative Complications: None immediate    Patient Condition: Stable    Disposition: CVICU      Signed:   Yuri Treadwell MD    [address]

## 2023-09-19 NOTE — ANESTHESIA PROCEDURE NOTES
Arterial Line:    An arterial line was placed using surface landmarks, in the OR for the following indication(s): continuous blood pressure monitoring and blood sampling needed. A 20 gauge (size) (length), Arrow (type) catheter was placed, Seldinger technique used, into the left radial artery, secured by Tegaderm. Anesthesia type: Local  Local infiltration: Injection    Events:  patient tolerated procedure well with no complications.     Additional notes:  Assisted by SRNA9/19/2023 7:45 AM  Anesthesiologist: Vickey Murray MD  Performed: Anesthesiologist   Preanesthetic Checklist  Completed: patient identified, IV checked, site marked, risks and benefits discussed, surgical/procedural consents, equipment checked, pre-op evaluation, timeout performed, anesthesia consent given, oxygen available, monitors applied/VS acknowledged and fire risk safety assessment completed and verbalized
Procedure Performed: HUDSON       Start Time:  9/19/2023 11:43 AM       End Time:      Preanesthesia Checklist:  Patient identified, IV assessed, risks and benefits discussed, monitors and equipment assessed, procedure being performed at surgeon's request and anesthesia consent obtained. General Procedure Information  Diagnostic Indications for Echo:  assessment of ascending aorta, assessment of surgical repair, hemodynamic monitoring and assessment of valve function  Location performed:  OR  Intubated  Bite block placed  Heart visualized  Probe Insertion:  Easy  Probe Type:  3D, mulitplane, epiaortic and biplane  Modalities:  2D, 3D, color flow mapping, continuous wave Doppler, pulse wave Doppler and M-mode    Echocardiographic and Doppler Measurements    Ventricles    Right Ventricle:  Cavity size dilated. Hypertrophy not present. Thrombus not present. Global function severely impaired. Left Ventricle:  Cavity size normal.  Hypertrophy not present. Thrombus not present. Global Function mildly impaired. Other Ventricular Findings:       LVEF 10%    Ventricular Regional Function:  1- Basal Anteroseptal:  hypokinetic  2- Basal Anterior:  hypokinetic  3- Basal Anterolateral:  hypokinetic  4- Basal Inferolateral:  hypokinetic  5- Basal Inferior:  hypokinetic  6- Basal Inferoseptal:  hypokinetic  7- Mid Anteroseptal:  hypokinetic  8- Mid Anterior:  hypokinetic  9- Mid Anterolateral:  hypokinetic  10- Mid Inferolateral:  hypokinetic  11- Mid Inferior:  hypokinetic  12- Mid Inferoseptal:  hypokinetic  13- Apical Anterior:  hypokinetic  14- Apical Lateral:  hypokinetic  15- Apical Inferior:  hypokinetic  16- Apical Septal:  hypokinetic  17- [place]:  hypokinetic    Wall Motion Comments:       Severe global hypokinesis    Valves    Aortic Valve: Annulus normal.  Stenosis not present. Regurgitation none. Leaflets normal.  Leaflet motions normal.      Mitral Valve: Annulus normal.  Stenosis not present.
acknowledged and fire risk safety assessment completed and verbalized

## 2023-09-19 NOTE — PERIOP NOTE
Vancomycin given @ 0806  Diflucan given @ 1020  Levoquin given @ 0549  Rifampin given @ 5640      betablocker contraindication- cardiogenic shock

## 2023-09-19 NOTE — PERIOP NOTE
Report called to [facility], CVICU RN. Information given included patient's name, age, allergies, height, weight, PMH, Procedure, Invasive Lines, Anesthesia drips, CPB time.     LVAD start time 10:08am  LVAD start RPM  3000  LVAD RPM when leaving OR 4800

## 2023-09-19 NOTE — BRIEF OP NOTE
BRIEF OP NOTE  Pre-Op Diagnosis: Chronic systolic (congestive) heart failure (HCC) [I50.22]    Post-Op Diagnosis: same    Procedure: Procedure(s):  LEFT VENTRICULAR ASSIST DEVICE (LVAD) IMPLANTATION;TEMPORARY RIGHT AXILLARY LVAD REMOVAL (IMPELLA); HUDSON & EPIAORTIC US BY DR. Deandre Nguyen    Surgeon: Otilia Christian MD    Assistant(s): Amelie Huff PA-C    Anesthesia: General     Infusions: Amiodarone, precedex, insulin, vaso, epi    Estimated Blood Loss: 536 mL    Cell Saver: 950 mL    Specimens:   ID Type Source Tests Collected by Time Destination   A : Left Ventricular Apical Core Tissue Heart SURGICAL PATHOLOGY Cate Tsai MD 9/19/2023 7962        Drains and pacing wires:  1 bipolar ventricular wire, 3 jayme drains (3 meds)    Complications: None    Findings: Chronic systolic (congestive) heart failure ([address]) [I50.22], severe pericardial and pleural adhesions found on entry. Unable to safely place pleural tubes. Implants:   Implant Name Type Inv.  Item Serial No.  Lot No. LRB No. Used Action   felt 15.2 cm x 15.2 cm    [employer]-WD [id number] N/A 1 Implanted   PUTTY BONE HEMSTAT ABSORB MTRX 2.0GRAM - SNA  PUTTY BONE HEMSTAT ABSORB MTRX 2.0GRAM NA [employer]-WD [id number] N/A 2 Implanted   KIT VENT ASST FOR HEARTMATE III IMPL - [id number] Ventricular assist devices KIT VENT ASST FOR HEARTMATE III IMPL [id number] ST [employer]-WD [id number] N/A 1 Implanted   GRAFT VASC L20CM [id number] STRTCH WALL [place]OR [place] - [id number] Vascular grafts GRAFT VASC L20CM [id number] STRTCH [place]ALL [place] [id number]  [employer]WD NA N/A 1 Implanted       Amelie Huff PA-C

## 2023-09-20 ENCOUNTER — APPOINTMENT (OUTPATIENT)
Facility: HOSPITAL | Age: 68
DRG: 161 | End: 2023-09-20
Attending: ANESTHESIOLOGY
Payer: MEDICAID

## 2023-09-20 PROBLEM — R73.9 TRANSIENT HYPERGLYCEMIA POST PROCEDURE: Status: ACTIVE | Noted: 2023-09-20

## 2023-09-20 PROBLEM — I63.9 CEREBROVASCULAR ACCIDENT (CVA) (HCC): Status: ACTIVE | Noted: 2023-09-20

## 2023-09-20 PROBLEM — I63.9 CARDIOEMBOLIC STROKE (HCC): Status: ACTIVE | Noted: 2023-09-20

## 2023-09-20 LAB
ALBUMIN SERPL-MCNC: 3.8 G/DL (ref 3.5–5)
ALBUMIN/GLOB SERPL: 1.7 (ref 1.1–2.2)
ALP SERPL-CCNC: 71 U/L (ref 45–117)
ALT SERPL-CCNC: 12 U/L (ref 12–78)
ANION GAP SERPL CALC-SCNC: 9 MMOL/L (ref 5–15)
AST SERPL-CCNC: 70 U/L (ref 15–37)
BASE DEFICIT BLD-SCNC: 1.5 MMOL/L
BASE DEFICIT BLD-SCNC: 3.6 MMOL/L
BDY SITE: ABNORMAL
BILIRUB DIRECT SERPL-MCNC: 1.2 MG/DL (ref 0–0.2)
BILIRUB SERPL-MCNC: 1.9 MG/DL (ref 0.2–1)
BLD PROD TYP BPU: NORMAL
BLOOD BANK DISPENSE STATUS: NORMAL
BPU ID: NORMAL
BUN SERPL-MCNC: 17 MG/DL (ref 6–20)
BUN/CREAT SERPL: 21 (ref 12–20)
CA-I BLD-SCNC: 1.07 MMOL/L (ref 1.12–1.32)
CA-I BLD-SCNC: 1.09 MMOL/L (ref 1.12–1.32)
CALCIUM SERPL-MCNC: 7.7 MG/DL (ref 8.5–10.1)
CHLORIDE SERPL-SCNC: 108 MMOL/L (ref 97–108)
CO2 SERPL-SCNC: 22 MMOL/L (ref 21–32)
COHGB MFR BLD: 0.6 % (ref 1–2)
COHGB MFR BLD: 0.7 % (ref 1–2)
CREAT SERPL-MCNC: 0.81 MG/DL (ref 0.7–1.3)
ECHO BSA: 1.83 M2
ECHO LV INTERNAL DIMENSION DIASTOLE INDEX: 3.01 CM/M2
ECHO LV INTERNAL DIMENSION DIASTOLIC: 5.5 CM (ref 4.2–5.9)
ECHO LV IVSD: 0.8 CM (ref 0.6–1)
ECHO LV MASS 2D: 185.8 G (ref 88–224)
ECHO LV MASS INDEX 2D: 101.6 G/M2 (ref 49–115)
ECHO LV POSTERIOR WALL DIASTOLIC: 1 CM (ref 0.6–1)
ECHO LV RELATIVE WALL THICKNESS RATIO: 0.36
ECHO RV INTERNAL DIMENSION: 3.7 CM
ECHO RV TAPSE: 0.7 CM (ref 1.7–?)
EKG ATRIAL RATE: 25 BPM
EKG DIAGNOSIS: NORMAL
EKG Q-T INTERVAL: 532 MS
EKG QRS DURATION: 180 MS
EKG QTC CALCULATION (BAZETT): 606 MS
EKG R AXIS: 179 DEGREES
EKG T AXIS: 157 DEGREES
EKG VENTRICULAR RATE: 78 BPM
ERYTHROCYTE [DISTWIDTH] IN BLOOD BY AUTOMATED COUNT: 15.6 % (ref 11.5–14.5)
ERYTHROCYTE [DISTWIDTH] IN BLOOD BY AUTOMATED COUNT: 15.6 % (ref 11.5–14.5)
EST. AVERAGE GLUCOSE BLD GHB EST-MCNC: 114 MG/DL
GLOBULIN SER CALC-MCNC: 2.2 G/DL (ref 2–4)
GLUCOSE BLD STRIP.AUTO-MCNC: 102 MG/DL (ref 65–117)
GLUCOSE BLD STRIP.AUTO-MCNC: 102 MG/DL (ref 65–117)
GLUCOSE BLD STRIP.AUTO-MCNC: 104 MG/DL (ref 65–117)
GLUCOSE BLD STRIP.AUTO-MCNC: 106 MG/DL (ref 65–117)
GLUCOSE BLD STRIP.AUTO-MCNC: 109 MG/DL (ref 65–117)
GLUCOSE BLD STRIP.AUTO-MCNC: 116 MG/DL (ref 65–117)
GLUCOSE BLD STRIP.AUTO-MCNC: 117 MG/DL (ref 65–117)
GLUCOSE BLD STRIP.AUTO-MCNC: 121 MG/DL (ref 65–117)
GLUCOSE BLD STRIP.AUTO-MCNC: 125 MG/DL (ref 65–117)
GLUCOSE BLD STRIP.AUTO-MCNC: 127 MG/DL (ref 65–117)
GLUCOSE BLD STRIP.AUTO-MCNC: 134 MG/DL (ref 65–117)
GLUCOSE BLD STRIP.AUTO-MCNC: 137 MG/DL (ref 65–117)
GLUCOSE BLD STRIP.AUTO-MCNC: 145 MG/DL (ref 65–117)
GLUCOSE BLD STRIP.AUTO-MCNC: 147 MG/DL (ref 65–117)
GLUCOSE BLD STRIP.AUTO-MCNC: 158 MG/DL (ref 65–117)
GLUCOSE BLD STRIP.AUTO-MCNC: 72 MG/DL (ref 65–117)
GLUCOSE BLD STRIP.AUTO-MCNC: 91 MG/DL (ref 65–117)
GLUCOSE BLD STRIP.AUTO-MCNC: 95 MG/DL (ref 65–117)
GLUCOSE BLD STRIP.AUTO-MCNC: 96 MG/DL (ref 65–117)
GLUCOSE SERPL-MCNC: 92 MG/DL (ref 65–100)
HBA1C MFR BLD: 5.6 % (ref 4–5.6)
HCO3 BLD-SCNC: 20.6 MMOL/L (ref 22–26)
HCO3 BLD-SCNC: 22.4 MMOL/L (ref 22–26)
HCT VFR BLD AUTO: 22.7 % (ref 36.6–50.3)
HCT VFR BLD AUTO: 26.2 % (ref 36.6–50.3)
HGB BLD-MCNC: 7.3 G/DL (ref 12.1–17)
HGB BLD-MCNC: 8.6 G/DL (ref 12.1–17)
HISTORY CHECK: NORMAL
INR PPP: 1.2 (ref 0.9–1.1)
LACTATE SERPL-SCNC: 1.3 MMOL/L (ref 0.4–2)
LDH SERPL L TO P-CCNC: 412 U/L (ref 85–241)
MAGNESIUM SERPL-MCNC: 2.4 MG/DL (ref 1.6–2.4)
MCH RBC QN AUTO: 29.2 PG (ref 26–34)
MCH RBC QN AUTO: 29.3 PG (ref 26–34)
MCHC RBC AUTO-ENTMCNC: 32.2 G/DL (ref 30–36.5)
MCHC RBC AUTO-ENTMCNC: 32.8 G/DL (ref 30–36.5)
MCV RBC AUTO: 89.1 FL (ref 80–99)
MCV RBC AUTO: 90.8 FL (ref 80–99)
METHGB MFR BLD: 0.3 % (ref 0–1.4)
METHGB MFR BLD: 0.4 % (ref 0–1.4)
NRBC # BLD: 0 K/UL (ref 0–0.01)
NRBC # BLD: 0 K/UL (ref 0–0.01)
NRBC BLD-RTO: 0 PER 100 WBC
NRBC BLD-RTO: 0 PER 100 WBC
NT PRO BNP: 2422 PG/ML
OXYHGB MFR BLD: 47.6 % (ref 94–97)
OXYHGB MFR BLD: 54.6 % (ref 94–97)
PCO2 BLD: 32.9 MMHG (ref 35–45)
PCO2 BLD: 33.2 MMHG (ref 35–45)
PH BLD: 7.41 (ref 7.35–7.45)
PH BLD: 7.44 (ref 7.35–7.45)
PLATELET # BLD AUTO: 125 K/UL (ref 150–400)
PLATELET # BLD AUTO: 155 K/UL (ref 150–400)
PMV BLD AUTO: 11.7 FL (ref 8.9–12.9)
PMV BLD AUTO: 11.9 FL (ref 8.9–12.9)
PO2 BLD: 118 MMHG (ref 80–100)
PO2 BLD: 129 MMHG (ref 80–100)
POTASSIUM SERPL-SCNC: 4.2 MMOL/L (ref 3.5–5.1)
PROT SERPL-MCNC: 6 G/DL (ref 6.4–8.2)
PROTHROMBIN TIME: 12.4 SEC (ref 9–11.1)
RBC # BLD AUTO: 2.5 M/UL (ref 4.1–5.7)
RBC # BLD AUTO: 2.94 M/UL (ref 4.1–5.7)
SAO2 % BLD: 48 % (ref 95–99)
SAO2 % BLD: 55 % (ref 95–99)
SAO2 % BLD: 98.7 % (ref 92–97)
SAO2 % BLD: 99.1 % (ref 92–97)
SERVICE CMNT-IMP: ABNORMAL
SERVICE CMNT-IMP: NORMAL
SODIUM SERPL-SCNC: 139 MMOL/L (ref 136–145)
SPECIMEN SITE: ABNORMAL
SPECIMEN SITE: ABNORMAL
SPECIMEN TYPE: ABNORMAL
SPECIMEN TYPE: ABNORMAL
UFH PPP CHRO-ACNC: 0.32 IU/ML
UFH PPP CHRO-ACNC: 0.34 IU/ML
UNIT DIVISION: 0
WBC # BLD AUTO: 14.2 K/UL (ref 4.1–11.1)
WBC # BLD AUTO: 9.3 K/UL (ref 4.1–11.1)

## 2023-09-20 PROCEDURE — C9113 INJ PANTOPRAZOLE SODIUM, VIA: HCPCS | Performed by: NURSE PRACTITIONER

## 2023-09-20 PROCEDURE — 82803 BLOOD GASES ANY COMBINATION: CPT

## 2023-09-20 PROCEDURE — 82375 ASSAY CARBOXYHB QUANT: CPT

## 2023-09-20 PROCEDURE — 93306 TTE W/DOPPLER COMPLETE: CPT

## 2023-09-20 PROCEDURE — 2580000003 HC RX 258: Performed by: INTERNAL MEDICINE

## 2023-09-20 PROCEDURE — 4A03X5D MEASUREMENT OF ARTERIAL FLOW, INTRACRANIAL, EXTERNAL APPROACH: ICD-10-PCS | Performed by: STUDENT IN AN ORGANIZED HEALTH CARE EDUCATION/TRAINING PROGRAM

## 2023-09-20 PROCEDURE — 6360000004 HC RX CONTRAST MEDICATION: Performed by: RADIOLOGY

## 2023-09-20 PROCEDURE — 93005 ELECTROCARDIOGRAM TRACING: CPT | Performed by: NURSE PRACTITIONER

## 2023-09-20 PROCEDURE — P9045 ALBUMIN (HUMAN), 5%, 250 ML: HCPCS | Performed by: STUDENT IN AN ORGANIZED HEALTH CARE EDUCATION/TRAINING PROGRAM

## 2023-09-20 PROCEDURE — 6360000002 HC RX W HCPCS: Performed by: INTERNAL MEDICINE

## 2023-09-20 PROCEDURE — 83615 LACTATE (LD) (LDH) ENZYME: CPT

## 2023-09-20 PROCEDURE — A4216 STERILE WATER/SALINE, 10 ML: HCPCS | Performed by: NURSE PRACTITIONER

## 2023-09-20 PROCEDURE — APPNB45 APP NON BILLABLE 31-45 MINUTES

## 2023-09-20 PROCEDURE — 93306 TTE W/DOPPLER COMPLETE: CPT | Performed by: INTERNAL MEDICINE

## 2023-09-20 PROCEDURE — 83050 HGB METHEMOGLOBIN QUAN: CPT

## 2023-09-20 PROCEDURE — P9045 ALBUMIN (HUMAN), 5%, 250 ML: HCPCS | Performed by: NURSE PRACTITIONER

## 2023-09-20 PROCEDURE — 85027 COMPLETE CBC AUTOMATED: CPT

## 2023-09-20 PROCEDURE — 2700000000 HC OXYGEN THERAPY PER DAY

## 2023-09-20 PROCEDURE — 80076 HEPATIC FUNCTION PANEL: CPT

## 2023-09-20 PROCEDURE — 93750 INTERROGATION VAD IN PERSON: CPT | Performed by: INTERNAL MEDICINE

## 2023-09-20 PROCEDURE — 36430 TRANSFUSION BLD/BLD COMPNT: CPT

## 2023-09-20 PROCEDURE — 6370000000 HC RX 637 (ALT 250 FOR IP): Performed by: STUDENT IN AN ORGANIZED HEALTH CARE EDUCATION/TRAINING PROGRAM

## 2023-09-20 PROCEDURE — 36600 WITHDRAWAL OF ARTERIAL BLOOD: CPT

## 2023-09-20 PROCEDURE — 94003 VENT MGMT INPAT SUBQ DAY: CPT

## 2023-09-20 PROCEDURE — 6360000002 HC RX W HCPCS: Performed by: STUDENT IN AN ORGANIZED HEALTH CARE EDUCATION/TRAINING PROGRAM

## 2023-09-20 PROCEDURE — 2580000003 HC RX 258: Performed by: NURSE PRACTITIONER

## 2023-09-20 PROCEDURE — 6370000000 HC RX 637 (ALT 250 FOR IP): Performed by: INTERNAL MEDICINE

## 2023-09-20 PROCEDURE — 99223 1ST HOSP IP/OBS HIGH 75: CPT | Performed by: PSYCHIATRY & NEUROLOGY

## 2023-09-20 PROCEDURE — 2500000003 HC RX 250 WO HCPCS: Performed by: NURSE PRACTITIONER

## 2023-09-20 PROCEDURE — P9016 RBC LEUKOCYTES REDUCED: HCPCS

## 2023-09-20 PROCEDURE — 6370000000 HC RX 637 (ALT 250 FOR IP): Performed by: NURSE PRACTITIONER

## 2023-09-20 PROCEDURE — 83605 ASSAY OF LACTIC ACID: CPT

## 2023-09-20 PROCEDURE — 6360000002 HC RX W HCPCS: Performed by: NURSE PRACTITIONER

## 2023-09-20 PROCEDURE — 99291 CRITICAL CARE FIRST HOUR: CPT | Performed by: INTERNAL MEDICINE

## 2023-09-20 PROCEDURE — 36415 COLL VENOUS BLD VENIPUNCTURE: CPT

## 2023-09-20 PROCEDURE — 83735 ASSAY OF MAGNESIUM: CPT

## 2023-09-20 PROCEDURE — 2000000000 HC ICU R&B

## 2023-09-20 PROCEDURE — 70450 CT HEAD/BRAIN W/O DYE: CPT

## 2023-09-20 PROCEDURE — 82962 GLUCOSE BLOOD TEST: CPT

## 2023-09-20 PROCEDURE — 71045 X-RAY EXAM CHEST 1 VIEW: CPT

## 2023-09-20 PROCEDURE — 99222 1ST HOSP IP/OBS MODERATE 55: CPT | Performed by: CLINICAL NURSE SPECIALIST

## 2023-09-20 PROCEDURE — 80048 BASIC METABOLIC PNL TOTAL CA: CPT

## 2023-09-20 PROCEDURE — 85520 HEPARIN ASSAY: CPT

## 2023-09-20 PROCEDURE — 93010 ELECTROCARDIOGRAM REPORT: CPT | Performed by: SPECIALIST

## 2023-09-20 PROCEDURE — 85610 PROTHROMBIN TIME: CPT

## 2023-09-20 PROCEDURE — 70498 CT ANGIOGRAPHY NECK: CPT

## 2023-09-20 PROCEDURE — 83880 ASSAY OF NATRIURETIC PEPTIDE: CPT

## 2023-09-20 RX ORDER — HEPARIN SODIUM 10000 [USP'U]/100ML
5-30 INJECTION, SOLUTION INTRAVENOUS CONTINUOUS
Status: DISCONTINUED | OUTPATIENT
Start: 2023-09-20 | End: 2023-09-26

## 2023-09-20 RX ORDER — MILRINONE LACTATE 0.2 MG/ML
0.25 INJECTION, SOLUTION INTRAVENOUS CONTINUOUS
Status: DISCONTINUED | OUTPATIENT
Start: 2023-09-20 | End: 2023-09-20 | Stop reason: SDUPTHER

## 2023-09-20 RX ORDER — HYDRALAZINE HYDROCHLORIDE 20 MG/ML
10 INJECTION INTRAMUSCULAR; INTRAVENOUS EVERY 6 HOURS PRN
Status: DISCONTINUED | OUTPATIENT
Start: 2023-09-20 | End: 2023-09-23 | Stop reason: SDUPTHER

## 2023-09-20 RX ORDER — CASTOR OIL AND BALSAM, PERU 788; 87 MG/G; MG/G
OINTMENT TOPICAL 2 TIMES DAILY
Status: DISCONTINUED | OUTPATIENT
Start: 2023-09-20 | End: 2023-10-03 | Stop reason: HOSPADM

## 2023-09-20 RX ORDER — ATORVASTATIN CALCIUM 40 MG/1
40 TABLET, FILM COATED ORAL NIGHTLY
Status: DISCONTINUED | OUTPATIENT
Start: 2023-09-20 | End: 2023-10-03 | Stop reason: HOSPADM

## 2023-09-20 RX ORDER — ALBUMIN, HUMAN INJ 5% 5 %
12.5 SOLUTION INTRAVENOUS ONCE
Status: COMPLETED | OUTPATIENT
Start: 2023-09-20 | End: 2023-09-20

## 2023-09-20 RX ORDER — MILRINONE LACTATE 0.2 MG/ML
0.3 INJECTION, SOLUTION INTRAVENOUS CONTINUOUS
Status: DISCONTINUED | OUTPATIENT
Start: 2023-09-20 | End: 2023-09-23

## 2023-09-20 RX ORDER — DOBUTAMINE HYDROCHLORIDE 200 MG/100ML
2.5-1 INJECTION INTRAVENOUS CONTINUOUS
Status: DISCONTINUED | OUTPATIENT
Start: 2023-09-20 | End: 2023-09-21

## 2023-09-20 RX ORDER — SODIUM CHLORIDE 9 MG/ML
INJECTION, SOLUTION INTRAVENOUS PRN
Status: DISCONTINUED | OUTPATIENT
Start: 2023-09-20 | End: 2023-09-26

## 2023-09-20 RX ADMIN — SODIUM CHLORIDE 1.05 UNITS/HR: 9 INJECTION, SOLUTION INTRAVENOUS at 18:22

## 2023-09-20 RX ADMIN — EPINEPHRINE 5 MCG/MIN: 1 INJECTION INTRAMUSCULAR; INTRAVENOUS; SUBCUTANEOUS at 06:03

## 2023-09-20 RX ADMIN — Medication: at 22:11

## 2023-09-20 RX ADMIN — ATORVASTATIN CALCIUM 40 MG: 40 TABLET, FILM COATED ORAL at 20:47

## 2023-09-20 RX ADMIN — MUPIROCIN: 20 OINTMENT TOPICAL at 22:11

## 2023-09-20 RX ADMIN — ALBUMIN (HUMAN) 12.5 G: 12.5 INJECTION, SOLUTION INTRAVENOUS at 14:47

## 2023-09-20 RX ADMIN — SODIUM CHLORIDE, PRESERVATIVE FREE 10 ML: 5 INJECTION INTRAVENOUS at 10:42

## 2023-09-20 RX ADMIN — LEVOFLOXACIN 500 MG: 500 INJECTION, SOLUTION INTRAVENOUS at 06:30

## 2023-09-20 RX ADMIN — HEPARIN SODIUM 400 UNITS/HR: 10000 INJECTION, SOLUTION INTRAVENOUS at 00:54

## 2023-09-20 RX ADMIN — VANCOMYCIN HYDROCHLORIDE 750 MG: 750 INJECTION, POWDER, LYOPHILIZED, FOR SOLUTION INTRAVENOUS at 08:20

## 2023-09-20 RX ADMIN — SODIUM CHLORIDE 1.1 UNITS/HR: 9 INJECTION, SOLUTION INTRAVENOUS at 17:17

## 2023-09-20 RX ADMIN — ALBUMIN (HUMAN) 12.5 G: 12.5 INJECTION, SOLUTION INTRAVENOUS at 16:33

## 2023-09-20 RX ADMIN — MILRINONE LACTATE IN DEXTROSE 0.25 MCG/KG/MIN: 200 INJECTION, SOLUTION INTRAVENOUS at 10:03

## 2023-09-20 RX ADMIN — AMIODARONE HYDROCHLORIDE 400 MG: 200 TABLET ORAL at 20:47

## 2023-09-20 RX ADMIN — ALBUMIN (HUMAN) 12.5 G: 12.5 INJECTION, SOLUTION INTRAVENOUS at 12:53

## 2023-09-20 RX ADMIN — SUCRALFATE 1 G: 1 TABLET ORAL at 20:47

## 2023-09-20 RX ADMIN — OXYCODONE HYDROCHLORIDE 10 MG: 5 TABLET ORAL at 20:47

## 2023-09-20 RX ADMIN — DOBUTAMINE HYDROCHLORIDE 2.5 MCG/KG/MIN: 200 INJECTION INTRAVENOUS at 00:39

## 2023-09-20 RX ADMIN — MUPIROCIN: 20 OINTMENT TOPICAL at 09:15

## 2023-09-20 RX ADMIN — ACETAMINOPHEN 650 MG: 325 TABLET ORAL at 20:47

## 2023-09-20 RX ADMIN — CHLORHEXIDINE GLUCONATE 15 ML: 1.2 RINSE ORAL at 10:41

## 2023-09-20 RX ADMIN — ASPIRIN 81 MG: 81 TABLET, COATED ORAL at 10:02

## 2023-09-20 RX ADMIN — DEXMEDETOMIDINE HYDROCHLORIDE 0.3 MCG/KG/HR: 400 INJECTION, SOLUTION INTRAVENOUS at 08:30

## 2023-09-20 RX ADMIN — AMIODARONE HYDROCHLORIDE 0.5 MG/MIN: 50 INJECTION, SOLUTION INTRAVENOUS at 18:35

## 2023-09-20 RX ADMIN — VANCOMYCIN HYDROCHLORIDE 750 MG: 750 INJECTION, POWDER, LYOPHILIZED, FOR SOLUTION INTRAVENOUS at 20:33

## 2023-09-20 RX ADMIN — HEPARIN SODIUM 12 UNITS/KG/HR: 10000 INJECTION, SOLUTION INTRAVENOUS at 10:13

## 2023-09-20 RX ADMIN — IOPAMIDOL 100 ML: 755 INJECTION, SOLUTION INTRAVENOUS at 03:52

## 2023-09-20 RX ADMIN — FLUCONAZOLE 200 MG: 2 INJECTION, SOLUTION INTRAVENOUS at 05:18

## 2023-09-20 RX ADMIN — SUCRALFATE 1 G: 1 TABLET ORAL at 17:10

## 2023-09-20 RX ADMIN — SODIUM CHLORIDE 1.93 UNITS/HR: 9 INJECTION, SOLUTION INTRAVENOUS at 07:01

## 2023-09-20 RX ADMIN — OXYCODONE HYDROCHLORIDE 5 MG: 5 TABLET ORAL at 15:10

## 2023-09-20 RX ADMIN — RIFAMPIN 600 MG: 600 INJECTION, POWDER, LYOPHILIZED, FOR SOLUTION INTRAVENOUS at 04:29

## 2023-09-20 RX ADMIN — Medication: at 10:41

## 2023-09-20 RX ADMIN — SODIUM CHLORIDE 40 MG: 9 INJECTION INTRAMUSCULAR; INTRAVENOUS; SUBCUTANEOUS at 07:29

## 2023-09-20 RX ADMIN — PROPOFOL 20 MCG/KG/MIN: 10 INJECTION, EMULSION INTRAVENOUS at 04:57

## 2023-09-20 RX ADMIN — CHLORHEXIDINE GLUCONATE 15 ML: 1.2 RINSE ORAL at 22:11

## 2023-09-20 RX ADMIN — SUCRALFATE 1 G: 1 TABLET ORAL at 10:02

## 2023-09-20 ASSESSMENT — PAIN DESCRIPTION - DESCRIPTORS
DESCRIPTORS: ACHING

## 2023-09-20 ASSESSMENT — PAIN SCALES - GENERAL
PAINLEVEL_OUTOF10: 2
PAINLEVEL_OUTOF10: 2
PAINLEVEL_OUTOF10: 0
PAINLEVEL_OUTOF10: 0
PAINLEVEL_OUTOF10: 6
PAINLEVEL_OUTOF10: 7

## 2023-09-20 ASSESSMENT — PAIN DESCRIPTION - LOCATION
LOCATION: CHEST;INCISION
LOCATION: CHEST;INCISION
LOCATION: INCISION;CHEST

## 2023-09-20 ASSESSMENT — PAIN DESCRIPTION - ONSET: ONSET: GRADUAL

## 2023-09-20 ASSESSMENT — PAIN DESCRIPTION - ORIENTATION
ORIENTATION: MID

## 2023-09-20 ASSESSMENT — PULMONARY FUNCTION TESTS: PIF_VALUE: 19

## 2023-09-20 ASSESSMENT — PAIN - FUNCTIONAL ASSESSMENT: PAIN_FUNCTIONAL_ASSESSMENT: ACTIVITIES ARE NOT PREVENTED

## 2023-09-20 ASSESSMENT — PAIN DESCRIPTION - PAIN TYPE: TYPE: CHRONIC PAIN;SURGICAL PAIN

## 2023-09-20 NOTE — CARDIO/PULMONARY
Cardiac Rehab: Rudy Varghese is s/p NSTEMI and eligible for cardiac rehab enrollment. However, patient had an LVAD placed on 9/19/23 so will defer enrollment until surgical and heart failure team refers patient to cardiac rehab.

## 2023-09-20 NOTE — ANESTHESIA POSTPROCEDURE EVALUATION
Department of Anesthesiology  Postprocedure Note    Patient: Ramin Metcalf  MRN: [id number]  [address] [facility] 1955  Date of evaluation: 9/19/2023      Procedure Summary     Date: 09/19/23 Room / Location: [address] [place] [address] Road ANGIO IR    Anesthesia Start: [address] Anesthesia Stop: 2003    Procedure: IR MECHANICAL ART THROMBECTOMY INTRACRANIAL Diagnosis:       Cerebrovascular accident (CVA), unspecified mechanism ([address])      (Stroke)    Scheduled Providers: Seun Trevizo MD Responsible Provider: Flaquita Gonzalez DO    Anesthesia Type: General ASA Status: 5 - Emergent          Anesthesia Type: General    Kelly Phase I: Kelly Score: 8    Kelly Phase II:        Anesthesia Post Evaluation    Patient location during evaluation: ICU  Level of consciousness: awake and lethargic  Pain score: 0  Airway patency: patent  Nausea & Vomiting: no nausea  Complications: no  Cardiovascular status: hemodynamically stable  Respiratory status: acceptable  Hydration status: euvolemic  Pain management: adequate

## 2023-09-20 NOTE — WOUND CARE
WOCN Note:     Follow up visit for leg wound. Seen in 4333    Chart shows:  Admitted on 8/31/23. Admitted for heart failure & transferred for LVAD workup; Impella. S/p LVAD 9/19 and subsequent CVA 9/19  History of ischemic foot ulcer, s/p fem-pop bypass. Assessment:   Communicative and reports no pain. RN at bedside and reports no concerns with buttocks; encouraged continued use of sacral foam  Surface: GUZMAN mattress    Bilateral heels intact and without erythema. Heels offloaded with pillows. 1. POA chronic traumatic ulceration to right medial malleolus  3.3 x 3.5 x 0.3 cm; smaller  Scant serosanguinous exudate; no malodor or purulence - no gross S&S of infection  Periwound intact & without erythema; some fibrinous material in base of wound with new epithelial tissue encroaching at margins; lots of large dry skin plaques. Cleaned wound and skin around it with Vashe. Tx: applied Puracol Ag and foam dressing      Wound Recommendations:    RLL: clean with saline, apply Puracol AG (left in room) and cover with foam dressing. Change every 3 days. PI Prevention:  Turn/reposition approximately every 2 hours  Offload heels with heels hanging off end of pillow at all times while in bed. Sacral Foam dressing: lift to assess regularly; change as needed. Discontinue if incontinence is frequently soiling dressing. Z-guard cream to buttocks and sacrum daily and as needed with incontinence care  Low Air Loss mattress: Use only flat sheet and one incontinence pad. Discussed with RN.     Transition of Care: Plan to follow weekly and as needed while admitted to hospital.     AMARILIS SebastianN, RN, [facility]  Certified Wound, Ostomy, Continence Nurse  office [phone]  Available via [facility]

## 2023-09-21 ENCOUNTER — TELEPHONE (OUTPATIENT)
Age: 68
End: 2023-09-21

## 2023-09-21 ENCOUNTER — APPOINTMENT (OUTPATIENT)
Facility: HOSPITAL | Age: 68
DRG: 161 | End: 2023-09-21
Attending: ANESTHESIOLOGY
Payer: MEDICAID

## 2023-09-21 LAB
ABO + RH BLD: NORMAL
ALBUMIN SERPL-MCNC: 3.8 G/DL (ref 3.5–5)
ALBUMIN SERPL-MCNC: 4.3 G/DL (ref 3.5–5)
ALBUMIN/GLOB SERPL: 1.5 (ref 1.1–2.2)
ALBUMIN/GLOB SERPL: 1.5 (ref 1.1–2.2)
ALP SERPL-CCNC: 81 U/L (ref 45–117)
ALP SERPL-CCNC: 94 U/L (ref 45–117)
ALT SERPL-CCNC: 13 U/L (ref 12–78)
ALT SERPL-CCNC: 16 U/L (ref 12–78)
ANION GAP SERPL CALC-SCNC: 7 MMOL/L (ref 5–15)
ANION GAP SERPL CALC-SCNC: 8 MMOL/L (ref 5–15)
APTT PPP: 71.4 SEC (ref 22.1–31)
AST SERPL-CCNC: 79 U/L (ref 15–37)
AST SERPL-CCNC: 79 U/L (ref 15–37)
BDY SITE: ABNORMAL
BILIRUB DIRECT SERPL-MCNC: 2.2 MG/DL (ref 0–0.2)
BILIRUB SERPL-MCNC: 3.5 MG/DL (ref 0.2–1)
BILIRUB SERPL-MCNC: 4.8 MG/DL (ref 0.2–1)
BLD PROD TYP BPU: NORMAL
BLOOD BANK DISPENSE STATUS: NORMAL
BLOOD GROUP ANTIBODIES SERPL: NORMAL
BPU ID: NORMAL
BUN SERPL-MCNC: 18 MG/DL (ref 6–20)
BUN SERPL-MCNC: 19 MG/DL (ref 6–20)
BUN/CREAT SERPL: 16 (ref 12–20)
BUN/CREAT SERPL: 18 (ref 12–20)
CA-I BLD-SCNC: 1.07 MMOL/L (ref 1.12–1.32)
CALCIUM SERPL-MCNC: 7.8 MG/DL (ref 8.5–10.1)
CALCIUM SERPL-MCNC: 9.3 MG/DL (ref 8.5–10.1)
CHLORIDE SERPL-SCNC: 107 MMOL/L (ref 97–108)
CHLORIDE SERPL-SCNC: 110 MMOL/L (ref 97–108)
CO2 SERPL-SCNC: 20 MMOL/L (ref 21–32)
CO2 SERPL-SCNC: 20 MMOL/L (ref 21–32)
COHGB MFR BLD: 1.1 % (ref 1–2)
CREAT SERPL-MCNC: 1.01 MG/DL (ref 0.7–1.3)
CREAT SERPL-MCNC: 1.17 MG/DL (ref 0.7–1.3)
CROSSMATCH RESULT: NORMAL
ERYTHROCYTE [DISTWIDTH] IN BLOOD BY AUTOMATED COUNT: 16.1 % (ref 11.5–14.5)
GLOBULIN SER CALC-MCNC: 2.6 G/DL (ref 2–4)
GLOBULIN SER CALC-MCNC: 2.8 G/DL (ref 2–4)
GLUCOSE BLD STRIP.AUTO-MCNC: 100 MG/DL (ref 65–117)
GLUCOSE BLD STRIP.AUTO-MCNC: 109 MG/DL (ref 65–117)
GLUCOSE BLD STRIP.AUTO-MCNC: 113 MG/DL (ref 65–117)
GLUCOSE BLD STRIP.AUTO-MCNC: 114 MG/DL (ref 65–117)
GLUCOSE BLD STRIP.AUTO-MCNC: 115 MG/DL (ref 65–117)
GLUCOSE BLD STRIP.AUTO-MCNC: 119 MG/DL (ref 65–117)
GLUCOSE BLD STRIP.AUTO-MCNC: 122 MG/DL (ref 65–117)
GLUCOSE BLD STRIP.AUTO-MCNC: 122 MG/DL (ref 65–117)
GLUCOSE BLD STRIP.AUTO-MCNC: 141 MG/DL (ref 65–117)
GLUCOSE BLD STRIP.AUTO-MCNC: 155 MG/DL (ref 65–117)
GLUCOSE BLD STRIP.AUTO-MCNC: 161 MG/DL (ref 65–117)
GLUCOSE BLD STRIP.AUTO-MCNC: 194 MG/DL (ref 65–117)
GLUCOSE BLD STRIP.AUTO-MCNC: 208 MG/DL (ref 65–117)
GLUCOSE BLD STRIP.AUTO-MCNC: 84 MG/DL (ref 65–117)
GLUCOSE BLD STRIP.AUTO-MCNC: 96 MG/DL (ref 65–117)
GLUCOSE BLD STRIP.AUTO-MCNC: 98 MG/DL (ref 65–117)
GLUCOSE SERPL-MCNC: 101 MG/DL (ref 65–100)
GLUCOSE SERPL-MCNC: 104 MG/DL (ref 65–100)
HCT VFR BLD AUTO: 23.3 % (ref 36.6–50.3)
HGB BLD-MCNC: 7.6 G/DL (ref 12.1–17)
INR PPP: 1.3 (ref 0.9–1.1)
LACTATE SERPL-SCNC: 1.8 MMOL/L (ref 0.4–2)
LACTATE SERPL-SCNC: 3.2 MMOL/L (ref 0.4–2)
LACTATE SERPL-SCNC: 4.5 MMOL/L (ref 0.4–2)
LDH SERPL L TO P-CCNC: 449 U/L (ref 85–241)
MAGNESIUM SERPL-MCNC: 2.3 MG/DL (ref 1.6–2.4)
MCH RBC QN AUTO: 29 PG (ref 26–34)
MCHC RBC AUTO-ENTMCNC: 32.6 G/DL (ref 30–36.5)
MCV RBC AUTO: 88.9 FL (ref 80–99)
METHGB MFR BLD: 0.4 % (ref 0–1.4)
NRBC # BLD: 0 K/UL (ref 0–0.01)
NRBC BLD-RTO: 0 PER 100 WBC
NT PRO BNP: 4920 PG/ML
OXYHGB MFR BLD: 51 % (ref 94–97)
PLATELET # BLD AUTO: 155 K/UL (ref 150–400)
PMV BLD AUTO: 12.1 FL (ref 8.9–12.9)
POTASSIUM SERPL-SCNC: 4.3 MMOL/L (ref 3.5–5.1)
POTASSIUM SERPL-SCNC: 4.3 MMOL/L (ref 3.5–5.1)
PROT SERPL-MCNC: 6.4 G/DL (ref 6.4–8.2)
PROT SERPL-MCNC: 7.1 G/DL (ref 6.4–8.2)
PROTHROMBIN TIME: 13.2 SEC (ref 9–11.1)
RBC # BLD AUTO: 2.62 M/UL (ref 4.1–5.7)
SAO2 % BLD: 52 % (ref 95–99)
SERVICE CMNT-IMP: ABNORMAL
SERVICE CMNT-IMP: NORMAL
SODIUM SERPL-SCNC: 134 MMOL/L (ref 136–145)
SODIUM SERPL-SCNC: 138 MMOL/L (ref 136–145)
SPECIMEN EXP DATE BLD: NORMAL
SPECIMEN SITE: ABNORMAL
THERAPEUTIC RANGE: ABNORMAL SECS (ref 58–77)
UFH PPP CHRO-ACNC: 0.31 IU/ML
UNIT DIVISION: 0
WBC # BLD AUTO: 13 K/UL (ref 4.1–11.1)

## 2023-09-21 PROCEDURE — 2580000003 HC RX 258: Performed by: NURSE PRACTITIONER

## 2023-09-21 PROCEDURE — 6360000002 HC RX W HCPCS: Performed by: STUDENT IN AN ORGANIZED HEALTH CARE EDUCATION/TRAINING PROGRAM

## 2023-09-21 PROCEDURE — 97530 THERAPEUTIC ACTIVITIES: CPT

## 2023-09-21 PROCEDURE — 83880 ASSAY OF NATRIURETIC PEPTIDE: CPT

## 2023-09-21 PROCEDURE — 2500000003 HC RX 250 WO HCPCS: Performed by: NURSE PRACTITIONER

## 2023-09-21 PROCEDURE — 80053 COMPREHEN METABOLIC PANEL: CPT

## 2023-09-21 PROCEDURE — 2700000000 HC OXYGEN THERAPY PER DAY

## 2023-09-21 PROCEDURE — 97168 OT RE-EVAL EST PLAN CARE: CPT

## 2023-09-21 PROCEDURE — 92610 EVALUATE SWALLOWING FUNCTION: CPT

## 2023-09-21 PROCEDURE — 92522 EVALUATE SPEECH PRODUCTION: CPT

## 2023-09-21 PROCEDURE — 83735 ASSAY OF MAGNESIUM: CPT

## 2023-09-21 PROCEDURE — 83050 HGB METHEMOGLOBIN QUAN: CPT

## 2023-09-21 PROCEDURE — 6370000000 HC RX 637 (ALT 250 FOR IP): Performed by: INTERNAL MEDICINE

## 2023-09-21 PROCEDURE — 82962 GLUCOSE BLOOD TEST: CPT

## 2023-09-21 PROCEDURE — 6360000002 HC RX W HCPCS: Performed by: NURSE PRACTITIONER

## 2023-09-21 PROCEDURE — 83615 LACTATE (LD) (LDH) ENZYME: CPT

## 2023-09-21 PROCEDURE — 85610 PROTHROMBIN TIME: CPT

## 2023-09-21 PROCEDURE — 6360000002 HC RX W HCPCS: Performed by: ANESTHESIOLOGY

## 2023-09-21 PROCEDURE — 6370000000 HC RX 637 (ALT 250 FOR IP): Performed by: NURSE PRACTITIONER

## 2023-09-21 PROCEDURE — 85520 HEPARIN ASSAY: CPT

## 2023-09-21 PROCEDURE — 6360000002 HC RX W HCPCS: Performed by: INTERNAL MEDICINE

## 2023-09-21 PROCEDURE — 36415 COLL VENOUS BLD VENIPUNCTURE: CPT

## 2023-09-21 PROCEDURE — 2000000000 HC ICU R&B

## 2023-09-21 PROCEDURE — 93750 INTERROGATION VAD IN PERSON: CPT | Performed by: INTERNAL MEDICINE

## 2023-09-21 PROCEDURE — 82330 ASSAY OF CALCIUM: CPT

## 2023-09-21 PROCEDURE — 83605 ASSAY OF LACTIC ACID: CPT

## 2023-09-21 PROCEDURE — 82375 ASSAY CARBOXYHB QUANT: CPT

## 2023-09-21 PROCEDURE — 85027 COMPLETE CBC AUTOMATED: CPT

## 2023-09-21 PROCEDURE — 80076 HEPATIC FUNCTION PANEL: CPT

## 2023-09-21 PROCEDURE — 71045 X-RAY EXAM CHEST 1 VIEW: CPT

## 2023-09-21 PROCEDURE — 97535 SELF CARE MNGMENT TRAINING: CPT

## 2023-09-21 PROCEDURE — 85730 THROMBOPLASTIN TIME PARTIAL: CPT

## 2023-09-21 PROCEDURE — 2580000003 HC RX 258: Performed by: INTERNAL MEDICINE

## 2023-09-21 PROCEDURE — 99291 CRITICAL CARE FIRST HOUR: CPT | Performed by: INTERNAL MEDICINE

## 2023-09-21 RX ORDER — INSULIN GLARGINE 100 [IU]/ML
15 INJECTION, SOLUTION SUBCUTANEOUS ONCE
Status: DISCONTINUED | OUTPATIENT
Start: 2023-09-21 | End: 2023-09-25

## 2023-09-21 RX ORDER — BUMETANIDE 0.25 MG/ML
2 INJECTION INTRAMUSCULAR; INTRAVENOUS 2 TIMES DAILY
Status: DISCONTINUED | OUTPATIENT
Start: 2023-09-21 | End: 2023-09-22

## 2023-09-21 RX ORDER — WARFARIN SODIUM 5 MG/1
2.5 TABLET ORAL
Status: COMPLETED | OUTPATIENT
Start: 2023-09-21 | End: 2023-09-21

## 2023-09-21 RX ORDER — FUROSEMIDE 10 MG/ML
40 INJECTION INTRAMUSCULAR; INTRAVENOUS ONCE
Status: COMPLETED | OUTPATIENT
Start: 2023-09-21 | End: 2023-09-21

## 2023-09-21 RX ADMIN — OXYCODONE HYDROCHLORIDE 10 MG: 5 TABLET ORAL at 20:34

## 2023-09-21 RX ADMIN — HYDRALAZINE HYDROCHLORIDE 10 MG: 20 INJECTION, SOLUTION INTRAMUSCULAR; INTRAVENOUS at 18:15

## 2023-09-21 RX ADMIN — HYDRALAZINE HYDROCHLORIDE 10 MG: 20 INJECTION, SOLUTION INTRAMUSCULAR; INTRAVENOUS at 13:28

## 2023-09-21 RX ADMIN — TICAGRELOR 90 MG: 90 TABLET ORAL at 21:46

## 2023-09-21 RX ADMIN — WARFARIN SODIUM 2.5 MG: 5 TABLET ORAL at 18:51

## 2023-09-21 RX ADMIN — AMIODARONE HYDROCHLORIDE 400 MG: 200 TABLET ORAL at 21:45

## 2023-09-21 RX ADMIN — ACETAMINOPHEN 650 MG: 325 TABLET ORAL at 17:38

## 2023-09-21 RX ADMIN — EPINEPHRINE 3 MCG/MIN: 1 INJECTION INTRAMUSCULAR; INTRAVENOUS; SUBCUTANEOUS at 06:59

## 2023-09-21 RX ADMIN — PANTOPRAZOLE SODIUM 40 MG: 40 TABLET, DELAYED RELEASE ORAL at 06:59

## 2023-09-21 RX ADMIN — SUCRALFATE 1 G: 1 TABLET ORAL at 06:59

## 2023-09-21 RX ADMIN — CHLORHEXIDINE GLUCONATE 15 ML: 1.2 RINSE ORAL at 21:46

## 2023-09-21 RX ADMIN — ASPIRIN 81 MG: 81 TABLET, COATED ORAL at 08:19

## 2023-09-21 RX ADMIN — ACETAMINOPHEN 650 MG: 325 TABLET ORAL at 03:21

## 2023-09-21 RX ADMIN — ATORVASTATIN CALCIUM 40 MG: 40 TABLET, FILM COATED ORAL at 21:46

## 2023-09-21 RX ADMIN — HYDROMORPHONE HYDROCHLORIDE 0.5 MG: 1 INJECTION, SOLUTION INTRAMUSCULAR; INTRAVENOUS; SUBCUTANEOUS at 20:47

## 2023-09-21 RX ADMIN — SODIUM CHLORIDE: 9 INJECTION, SOLUTION INTRAVENOUS at 00:25

## 2023-09-21 RX ADMIN — DEXMEDETOMIDINE HYDROCHLORIDE 0.2 MCG/KG/HR: 400 INJECTION, SOLUTION INTRAVENOUS at 05:55

## 2023-09-21 RX ADMIN — AMIODARONE HYDROCHLORIDE 400 MG: 200 TABLET ORAL at 08:19

## 2023-09-21 RX ADMIN — POLYETHYLENE GLYCOL 3350 17 G: 17 POWDER, FOR SOLUTION ORAL at 08:19

## 2023-09-21 RX ADMIN — LEVOFLOXACIN 500 MG: 500 INJECTION, SOLUTION INTRAVENOUS at 06:05

## 2023-09-21 RX ADMIN — Medication: at 08:21

## 2023-09-21 RX ADMIN — BUMETANIDE 0.5 MG/HR: 0.25 INJECTION, SOLUTION INTRAMUSCULAR; INTRAVENOUS at 15:33

## 2023-09-21 RX ADMIN — HYDROMORPHONE HYDROCHLORIDE 1 MG: 1 INJECTION, SOLUTION INTRAMUSCULAR; INTRAVENOUS; SUBCUTANEOUS at 16:11

## 2023-09-21 RX ADMIN — SODIUM CHLORIDE 0.8 UNITS/HR: 9 INJECTION, SOLUTION INTRAVENOUS at 06:58

## 2023-09-21 RX ADMIN — SUCRALFATE 1 G: 1 TABLET ORAL at 21:46

## 2023-09-21 RX ADMIN — SODIUM CHLORIDE, PRESERVATIVE FREE 10 ML: 5 INJECTION INTRAVENOUS at 22:39

## 2023-09-21 RX ADMIN — CHLORHEXIDINE GLUCONATE 15 ML: 1.2 RINSE ORAL at 08:21

## 2023-09-21 RX ADMIN — Medication 5 UNITS: at 08:22

## 2023-09-21 RX ADMIN — MILRINONE LACTATE IN DEXTROSE 0.35 MCG/KG/MIN: 200 INJECTION, SOLUTION INTRAVENOUS at 08:35

## 2023-09-21 RX ADMIN — SUCRALFATE 1 G: 1 TABLET ORAL at 17:38

## 2023-09-21 RX ADMIN — RIFAMPIN 600 MG: 600 INJECTION, POWDER, LYOPHILIZED, FOR SOLUTION INTRAVENOUS at 03:43

## 2023-09-21 RX ADMIN — VANCOMYCIN HYDROCHLORIDE 750 MG: 750 INJECTION, POWDER, LYOPHILIZED, FOR SOLUTION INTRAVENOUS at 08:20

## 2023-09-21 RX ADMIN — CALCIUM CHLORIDE INJECTION 1000 MG: 100 INJECTION, SOLUTION INTRAVENOUS at 12:52

## 2023-09-21 RX ADMIN — OXYCODONE HYDROCHLORIDE 10 MG: 5 TABLET ORAL at 10:32

## 2023-09-21 RX ADMIN — ACETAMINOPHEN 650 MG: 325 TABLET ORAL at 08:19

## 2023-09-21 RX ADMIN — Medication 1 UNITS: at 22:43

## 2023-09-21 RX ADMIN — HEPARIN SODIUM 12 UNITS/KG/HR: 10000 INJECTION, SOLUTION INTRAVENOUS at 10:43

## 2023-09-21 RX ADMIN — MUPIROCIN: 20 OINTMENT TOPICAL at 08:21

## 2023-09-21 RX ADMIN — ACETAMINOPHEN 650 MG: 325 TABLET ORAL at 12:52

## 2023-09-21 RX ADMIN — AMIODARONE HYDROCHLORIDE 0.5 MG/MIN: 50 INJECTION, SOLUTION INTRAVENOUS at 06:01

## 2023-09-21 RX ADMIN — SUCRALFATE 1 G: 1 TABLET ORAL at 12:04

## 2023-09-21 RX ADMIN — OXYCODONE HYDROCHLORIDE 5 MG: 5 TABLET ORAL at 06:59

## 2023-09-21 RX ADMIN — MUPIROCIN: 20 OINTMENT TOPICAL at 21:49

## 2023-09-21 RX ADMIN — OXYCODONE HYDROCHLORIDE 10 MG: 5 TABLET ORAL at 15:40

## 2023-09-21 RX ADMIN — FLUCONAZOLE 200 MG: 2 INJECTION, SOLUTION INTRAVENOUS at 05:19

## 2023-09-21 RX ADMIN — BUMETANIDE 2 MG: 0.25 INJECTION INTRAMUSCULAR; INTRAVENOUS at 13:28

## 2023-09-21 RX ADMIN — ALBUTEROL SULFATE 2.5 MG: 2.5 SOLUTION RESPIRATORY (INHALATION) at 16:44

## 2023-09-21 RX ADMIN — FUROSEMIDE 40 MG: 10 INJECTION, SOLUTION INTRAMUSCULAR; INTRAVENOUS at 08:26

## 2023-09-21 RX ADMIN — Medication: at 21:46

## 2023-09-21 ASSESSMENT — PAIN DESCRIPTION - PAIN TYPE
TYPE: SURGICAL PAIN
TYPE: ACUTE PAIN;CHRONIC PAIN;SURGICAL PAIN
TYPE: SURGICAL PAIN

## 2023-09-21 ASSESSMENT — PAIN - FUNCTIONAL ASSESSMENT
PAIN_FUNCTIONAL_ASSESSMENT: PREVENTS OR INTERFERES SOME ACTIVE ACTIVITIES AND ADLS
PAIN_FUNCTIONAL_ASSESSMENT: ACTIVITIES ARE NOT PREVENTED
PAIN_FUNCTIONAL_ASSESSMENT: PREVENTS OR INTERFERES SOME ACTIVE ACTIVITIES AND ADLS
PAIN_FUNCTIONAL_ASSESSMENT: ACTIVITIES ARE NOT PREVENTED
PAIN_FUNCTIONAL_ASSESSMENT: PREVENTS OR INTERFERES SOME ACTIVE ACTIVITIES AND ADLS

## 2023-09-21 ASSESSMENT — PAIN DESCRIPTION - DESCRIPTORS
DESCRIPTORS: ACHING

## 2023-09-21 ASSESSMENT — PAIN DESCRIPTION - LOCATION
LOCATION: CHEST
LOCATION: CHEST;INCISION
LOCATION: CHEST

## 2023-09-21 ASSESSMENT — PAIN DESCRIPTION - ORIENTATION
ORIENTATION: MID
ORIENTATION: ANTERIOR
ORIENTATION: ANTERIOR;MID
ORIENTATION: ANTERIOR

## 2023-09-21 ASSESSMENT — PAIN SCALES - GENERAL
PAINLEVEL_OUTOF10: 9
PAINLEVEL_OUTOF10: 2
PAINLEVEL_OUTOF10: 8
PAINLEVEL_OUTOF10: 5
PAINLEVEL_OUTOF10: 3
PAINLEVEL_OUTOF10: 4
PAINLEVEL_OUTOF10: 6
PAINLEVEL_OUTOF10: 2
PAINLEVEL_OUTOF10: 5
PAINLEVEL_OUTOF10: 5
PAINLEVEL_OUTOF10: 9
PAINLEVEL_OUTOF10: 4
PAINLEVEL_OUTOF10: 3
PAINLEVEL_OUTOF10: 6
PAINLEVEL_OUTOF10: 2
PAINLEVEL_OUTOF10: 3

## 2023-09-21 ASSESSMENT — PAIN DESCRIPTION - FREQUENCY
FREQUENCY: CONTINUOUS

## 2023-09-21 ASSESSMENT — PAIN DESCRIPTION - ONSET: ONSET: GRADUAL

## 2023-09-21 NOTE — CARE COORDINATION
Transition of Care Plan:     RUR: 17%--moderate  Prior Level of Functioning: independent  Disposition: home with [address] [facility] PT/OT/SN with [facility]  Follow up appointments: cardiology, PCP, AHFT  DME needed: N/A  Transportation at discharge: in car w/family  IM/IMM Medicare/ letter given: 9/1/23  Is patient a  and connected with VA? no  Caregiver Contact:   Babakpatricia Cruz, significant other, [phone]  Ashley Irizarry, sister, [phone]  Discharge Caregiver contacted prior to discharge? N/A  Care Conference needed? yes--family meeting to discuss goals of care. Barriers to discharge: clinical status     Patient admitted from [facility] on 8/31 for heart failure. Patient working with PT/OT. LVAD placement scheduled for tomorrow. Palliative and AHFT following. Working with PT/OT daily. CM will continue to follow. Update 9/21/23-new LVAD on 9/19/23  Pending progress will likely need home health vs IPR.

## 2023-09-21 NOTE — TELEPHONE ENCOUNTER
Called and spoke with patient's nephew [facility]ON[facility] regarding dressing change education. Provided [facility] phone number and requested he call them to coordinate this. Educated that most people need 8-10 sessions to feel comfortable. He verbalized understanding and stated he will call CVICU to coordinate this. [facility] stated that he is being asked to go out of the country for work October 17-20th and he is unable to have someone else take his place. He stated he's going to speak to his mother, patient's sister about stepping in as caregiver for this time if the patient is discharged before then. Educated that we will need to train any back up caregivers as well as [facility]. He verbalized understanding.

## 2023-09-22 ENCOUNTER — APPOINTMENT (OUTPATIENT)
Facility: HOSPITAL | Age: 68
DRG: 161 | End: 2023-09-22
Attending: ANESTHESIOLOGY
Payer: MEDICAID

## 2023-09-22 ENCOUNTER — APPOINTMENT (OUTPATIENT)
Facility: HOSPITAL | Age: 68
DRG: 161 | End: 2023-09-22
Attending: STUDENT IN AN ORGANIZED HEALTH CARE EDUCATION/TRAINING PROGRAM
Payer: MEDICAID

## 2023-09-22 LAB
ALBUMIN SERPL-MCNC: 4.2 G/DL (ref 3.5–5)
ALBUMIN/GLOB SERPL: 1.4 (ref 1.1–2.2)
ALP SERPL-CCNC: 98 U/L (ref 45–117)
ALT SERPL-CCNC: 21 U/L (ref 12–78)
ANION GAP SERPL CALC-SCNC: 17 MMOL/L (ref 5–15)
ANION GAP SERPL CALC-SCNC: 8 MMOL/L (ref 5–15)
APTT PPP: 60.6 SEC (ref 22.1–31)
AST SERPL-CCNC: 77 U/L (ref 15–37)
BILIRUB DIRECT SERPL-MCNC: 3.6 MG/DL (ref 0–0.2)
BILIRUB SERPL-MCNC: 5.5 MG/DL (ref 0.2–1)
BUN SERPL-MCNC: 27 MG/DL (ref 6–20)
BUN SERPL-MCNC: 33 MG/DL (ref 6–20)
BUN/CREAT SERPL: 13 (ref 12–20)
BUN/CREAT SERPL: 17 (ref 12–20)
CALCIUM SERPL-MCNC: 8.9 MG/DL (ref 8.5–10.1)
CALCIUM SERPL-MCNC: 9.2 MG/DL (ref 8.5–10.1)
CHLORIDE SERPL-SCNC: 102 MMOL/L (ref 97–108)
CHLORIDE SERPL-SCNC: 103 MMOL/L (ref 97–108)
CO2 SERPL-SCNC: 12 MMOL/L (ref 21–32)
CO2 SERPL-SCNC: 22 MMOL/L (ref 21–32)
CREAT SERPL-MCNC: 1.93 MG/DL (ref 0.7–1.3)
CREAT SERPL-MCNC: 2.15 MG/DL (ref 0.7–1.3)
ECHO BSA: 1.85 M2
ECHO LV FRACTIONAL SHORTENING: 10 % (ref 28–44)
ECHO LV INTERNAL DIMENSION DIASTOLE INDEX: 2.83 CM/M2
ECHO LV INTERNAL DIMENSION DIASTOLIC: 5.2 CM (ref 4.2–5.9)
ECHO LV INTERNAL DIMENSION SYSTOLIC INDEX: 2.55 CM/M2
ECHO LV INTERNAL DIMENSION SYSTOLIC: 4.7 CM
ECHO LV IVSD: 0.9 CM (ref 0.6–1)
ECHO LV MASS 2D: 169 G (ref 88–224)
ECHO LV MASS INDEX 2D: 91.8 G/M2 (ref 49–115)
ECHO LV POSTERIOR WALL DIASTOLIC: 0.9 CM (ref 0.6–1)
ECHO LV RELATIVE WALL THICKNESS RATIO: 0.35
ECHO LVOT AREA: 4.2 CM2
ECHO LVOT DIAM: 2.3 CM
ECHO RV INTERNAL DIMENSION: 2.5 CM
ECHO TV REGURGITANT MAX VELOCITY: 3.11 M/S
ECHO TV REGURGITANT PEAK GRADIENT: 39 MMHG
ERYTHROCYTE [DISTWIDTH] IN BLOOD BY AUTOMATED COUNT: 16.7 % (ref 11.5–14.5)
GLOBULIN SER CALC-MCNC: 3 G/DL (ref 2–4)
GLUCOSE BLD STRIP.AUTO-MCNC: 101 MG/DL (ref 65–117)
GLUCOSE BLD STRIP.AUTO-MCNC: 112 MG/DL (ref 65–117)
GLUCOSE BLD STRIP.AUTO-MCNC: 137 MG/DL (ref 65–117)
GLUCOSE BLD STRIP.AUTO-MCNC: 148 MG/DL (ref 65–117)
GLUCOSE SERPL-MCNC: 132 MG/DL (ref 65–100)
GLUCOSE SERPL-MCNC: 136 MG/DL (ref 65–100)
HCT VFR BLD AUTO: 23.4 % (ref 36.6–50.3)
HGB BLD-MCNC: 7.4 G/DL (ref 12.1–17)
INR PPP: 1.6 (ref 0.9–1.1)
LACTATE SERPL-SCNC: 10.9 MMOL/L (ref 0.4–2)
LACTATE SERPL-SCNC: 4.1 MMOL/L (ref 0.4–2)
LACTATE SERPL-SCNC: 7.1 MMOL/L (ref 0.4–2)
LACTATE SERPL-SCNC: 9.8 MMOL/L (ref 0.4–2)
MAGNESIUM SERPL-MCNC: 2.4 MG/DL (ref 1.6–2.4)
MCH RBC QN AUTO: 29.2 PG (ref 26–34)
MCHC RBC AUTO-ENTMCNC: 31.6 G/DL (ref 30–36.5)
MCV RBC AUTO: 92.5 FL (ref 80–99)
NRBC # BLD: 0.06 K/UL (ref 0–0.01)
NRBC BLD-RTO: 0.3 PER 100 WBC
NT PRO BNP: ABNORMAL PG/ML
PLATELET # BLD AUTO: 259 K/UL (ref 150–400)
PMV BLD AUTO: 11.8 FL (ref 8.9–12.9)
POTASSIUM SERPL-SCNC: 4.1 MMOL/L (ref 3.5–5.1)
POTASSIUM SERPL-SCNC: 4.5 MMOL/L (ref 3.5–5.1)
PROCALCITONIN SERPL-MCNC: 1.83 NG/ML
PROT SERPL-MCNC: 7.2 G/DL (ref 6.4–8.2)
PROTHROMBIN TIME: 16.2 SEC (ref 9–11.1)
RBC # BLD AUTO: 2.53 M/UL (ref 4.1–5.7)
SERVICE CMNT-IMP: ABNORMAL
SERVICE CMNT-IMP: ABNORMAL
SERVICE CMNT-IMP: NORMAL
SERVICE CMNT-IMP: NORMAL
SODIUM SERPL-SCNC: 132 MMOL/L (ref 136–145)
SODIUM SERPL-SCNC: 132 MMOL/L (ref 136–145)
THERAPEUTIC RANGE: ABNORMAL SECS (ref 58–77)
UFH PPP CHRO-ACNC: 0.28 IU/ML
UFH PPP CHRO-ACNC: 0.28 IU/ML
UFH PPP CHRO-ACNC: 0.44 IU/ML
VANCOMYCIN SERPL-MCNC: 14.4 UG/ML
WBC # BLD AUTO: 21.8 K/UL (ref 4.1–11.1)

## 2023-09-22 PROCEDURE — 2700000000 HC OXYGEN THERAPY PER DAY

## 2023-09-22 PROCEDURE — 83880 ASSAY OF NATRIURETIC PEPTIDE: CPT

## 2023-09-22 PROCEDURE — 2580000003 HC RX 258: Performed by: INTERNAL MEDICINE

## 2023-09-22 PROCEDURE — 87040 BLOOD CULTURE FOR BACTERIA: CPT

## 2023-09-22 PROCEDURE — 85520 HEPARIN ASSAY: CPT

## 2023-09-22 PROCEDURE — 80048 BASIC METABOLIC PNL TOTAL CA: CPT

## 2023-09-22 PROCEDURE — 84145 PROCALCITONIN (PCT): CPT

## 2023-09-22 PROCEDURE — 99291 CRITICAL CARE FIRST HOUR: CPT | Performed by: INTERNAL MEDICINE

## 2023-09-22 PROCEDURE — 6370000000 HC RX 637 (ALT 250 FOR IP): Performed by: INTERNAL MEDICINE

## 2023-09-22 PROCEDURE — 80202 ASSAY OF VANCOMYCIN: CPT

## 2023-09-22 PROCEDURE — 94660 CPAP INITIATION&MGMT: CPT

## 2023-09-22 PROCEDURE — 6370000000 HC RX 637 (ALT 250 FOR IP): Performed by: NURSE PRACTITIONER

## 2023-09-22 PROCEDURE — 6360000002 HC RX W HCPCS: Performed by: STUDENT IN AN ORGANIZED HEALTH CARE EDUCATION/TRAINING PROGRAM

## 2023-09-22 PROCEDURE — 6360000002 HC RX W HCPCS: Performed by: INTERNAL MEDICINE

## 2023-09-22 PROCEDURE — 97530 THERAPEUTIC ACTIVITIES: CPT

## 2023-09-22 PROCEDURE — 93750 INTERROGATION VAD IN PERSON: CPT | Performed by: INTERNAL MEDICINE

## 2023-09-22 PROCEDURE — 6360000002 HC RX W HCPCS: Performed by: NURSE PRACTITIONER

## 2023-09-22 PROCEDURE — 82962 GLUCOSE BLOOD TEST: CPT

## 2023-09-22 PROCEDURE — 74018 RADEX ABDOMEN 1 VIEW: CPT

## 2023-09-22 PROCEDURE — 2580000003 HC RX 258: Performed by: NURSE PRACTITIONER

## 2023-09-22 PROCEDURE — 85730 THROMBOPLASTIN TIME PARTIAL: CPT

## 2023-09-22 PROCEDURE — 85027 COMPLETE CBC AUTOMATED: CPT

## 2023-09-22 PROCEDURE — 85610 PROTHROMBIN TIME: CPT

## 2023-09-22 PROCEDURE — 36415 COLL VENOUS BLD VENIPUNCTURE: CPT

## 2023-09-22 PROCEDURE — 93308 TTE F-UP OR LMTD: CPT

## 2023-09-22 PROCEDURE — 71045 X-RAY EXAM CHEST 1 VIEW: CPT

## 2023-09-22 PROCEDURE — 6360000002 HC RX W HCPCS: Performed by: ANESTHESIOLOGY

## 2023-09-22 PROCEDURE — 93308 TTE F-UP OR LMTD: CPT | Performed by: INTERNAL MEDICINE

## 2023-09-22 PROCEDURE — 93325 DOPPLER ECHO COLOR FLOW MAPG: CPT | Performed by: INTERNAL MEDICINE

## 2023-09-22 PROCEDURE — 94640 AIRWAY INHALATION TREATMENT: CPT

## 2023-09-22 PROCEDURE — 80076 HEPATIC FUNCTION PANEL: CPT

## 2023-09-22 PROCEDURE — 74174 CTA ABD&PLVS W/CONTRAST: CPT

## 2023-09-22 PROCEDURE — 6360000004 HC RX CONTRAST MEDICATION: Performed by: RADIOLOGY

## 2023-09-22 PROCEDURE — 83605 ASSAY OF LACTIC ACID: CPT

## 2023-09-22 PROCEDURE — 2000000000 HC ICU R&B

## 2023-09-22 PROCEDURE — 93321 DOPPLER ECHO F-UP/LMTD STD: CPT | Performed by: INTERNAL MEDICINE

## 2023-09-22 PROCEDURE — 83735 ASSAY OF MAGNESIUM: CPT

## 2023-09-22 PROCEDURE — 6370000000 HC RX 637 (ALT 250 FOR IP): Performed by: OBSTETRICS & GYNECOLOGY

## 2023-09-22 RX ORDER — SODIUM CHLORIDE, SODIUM LACTATE, POTASSIUM CHLORIDE, CALCIUM CHLORIDE 600; 310; 30; 20 MG/100ML; MG/100ML; MG/100ML; MG/100ML
INJECTION, SOLUTION INTRAVENOUS CONTINUOUS
Status: DISCONTINUED | OUTPATIENT
Start: 2023-09-22 | End: 2023-09-26

## 2023-09-22 RX ORDER — POLYETHYLENE GLYCOL 3350 17 G/17G
17 POWDER, FOR SOLUTION ORAL ONCE
Status: COMPLETED | OUTPATIENT
Start: 2023-09-22 | End: 2023-09-22

## 2023-09-22 RX ORDER — SODIUM CHLORIDE, SODIUM LACTATE, POTASSIUM CHLORIDE, AND CALCIUM CHLORIDE .6; .31; .03; .02 G/100ML; G/100ML; G/100ML; G/100ML
500 INJECTION, SOLUTION INTRAVENOUS ONCE
Status: COMPLETED | OUTPATIENT
Start: 2023-09-22 | End: 2023-09-22

## 2023-09-22 RX ORDER — METRONIDAZOLE 500 MG/100ML
500 INJECTION, SOLUTION INTRAVENOUS EVERY 8 HOURS
Status: DISCONTINUED | OUTPATIENT
Start: 2023-09-22 | End: 2023-09-24

## 2023-09-22 RX ORDER — SIMETHICONE 80 MG
80 TABLET,CHEWABLE ORAL EVERY 6 HOURS PRN
Status: DISCONTINUED | OUTPATIENT
Start: 2023-09-22 | End: 2023-10-03 | Stop reason: HOSPADM

## 2023-09-22 RX ORDER — SODIUM CHLORIDE, SODIUM LACTATE, POTASSIUM CHLORIDE, AND CALCIUM CHLORIDE .6; .31; .03; .02 G/100ML; G/100ML; G/100ML; G/100ML
500 INJECTION, SOLUTION INTRAVENOUS ONCE
Status: DISCONTINUED | OUTPATIENT
Start: 2023-09-22 | End: 2023-10-03 | Stop reason: HOSPADM

## 2023-09-22 RX ORDER — NOREPINEPHRINE BITARTRATE 0.06 MG/ML
1-30 INJECTION, SOLUTION INTRAVENOUS CONTINUOUS
Status: DISCONTINUED | OUTPATIENT
Start: 2023-09-22 | End: 2023-09-26

## 2023-09-22 RX ADMIN — ATORVASTATIN CALCIUM 40 MG: 40 TABLET, FILM COATED ORAL at 20:51

## 2023-09-22 RX ADMIN — CHLOROTHIAZIDE SODIUM 250 MG: 500 INJECTION, POWDER, LYOPHILIZED, FOR SOLUTION INTRAVENOUS at 01:59

## 2023-09-22 RX ADMIN — EPINEPHRINE 5 MCG/MIN: 1 INJECTION INTRAMUSCULAR; INTRAVENOUS; SUBCUTANEOUS at 05:58

## 2023-09-22 RX ADMIN — SUCRALFATE 1 G: 1 TABLET ORAL at 20:51

## 2023-09-22 RX ADMIN — ALBUTEROL SULFATE 2.5 MG: 2.5 SOLUTION RESPIRATORY (INHALATION) at 00:26

## 2023-09-22 RX ADMIN — MILRINONE LACTATE IN DEXTROSE 0.35 MCG/KG/MIN: 200 INJECTION, SOLUTION INTRAVENOUS at 11:56

## 2023-09-22 RX ADMIN — OXYCODONE HYDROCHLORIDE 5 MG: 5 TABLET ORAL at 00:23

## 2023-09-22 RX ADMIN — SODIUM CHLORIDE, PRESERVATIVE FREE 10 ML: 5 INJECTION INTRAVENOUS at 08:30

## 2023-09-22 RX ADMIN — HYDRALAZINE HYDROCHLORIDE 10 MG: 20 INJECTION, SOLUTION INTRAMUSCULAR; INTRAVENOUS at 17:47

## 2023-09-22 RX ADMIN — MUPIROCIN: 20 OINTMENT TOPICAL at 20:52

## 2023-09-22 RX ADMIN — Medication: at 08:29

## 2023-09-22 RX ADMIN — HEPARIN SODIUM 14 UNITS/KG/HR: 10000 INJECTION, SOLUTION INTRAVENOUS at 14:27

## 2023-09-22 RX ADMIN — WATER 2000 MG: 1 INJECTION INTRAMUSCULAR; INTRAVENOUS; SUBCUTANEOUS at 09:36

## 2023-09-22 RX ADMIN — Medication: at 20:52

## 2023-09-22 RX ADMIN — IOPAMIDOL 100 ML: 755 INJECTION, SOLUTION INTRAVENOUS at 11:22

## 2023-09-22 RX ADMIN — ACETAMINOPHEN 650 MG: 325 TABLET ORAL at 00:23

## 2023-09-22 RX ADMIN — METRONIDAZOLE 500 MG: 500 INJECTION, SOLUTION INTRAVENOUS at 17:19

## 2023-09-22 RX ADMIN — SODIUM CHLORIDE, PRESERVATIVE FREE 10 ML: 5 INJECTION INTRAVENOUS at 20:53

## 2023-09-22 RX ADMIN — HYDROMORPHONE HYDROCHLORIDE 0.5 MG: 1 INJECTION, SOLUTION INTRAMUSCULAR; INTRAVENOUS; SUBCUTANEOUS at 10:15

## 2023-09-22 RX ADMIN — CEFEPIME 2000 MG: 2 INJECTION, POWDER, FOR SOLUTION INTRAVENOUS at 21:10

## 2023-09-22 RX ADMIN — VANCOMYCIN HYDROCHLORIDE 750 MG: 750 INJECTION, POWDER, LYOPHILIZED, FOR SOLUTION INTRAVENOUS at 11:36

## 2023-09-22 RX ADMIN — MUPIROCIN: 20 OINTMENT TOPICAL at 08:29

## 2023-09-22 RX ADMIN — AMIODARONE HYDROCHLORIDE 400 MG: 200 TABLET ORAL at 20:51

## 2023-09-22 RX ADMIN — POLYETHYLENE GLYCOL 3350 17 G: 17 POWDER, FOR SOLUTION ORAL at 20:51

## 2023-09-22 RX ADMIN — METRONIDAZOLE 500 MG: 500 INJECTION, SOLUTION INTRAVENOUS at 09:35

## 2023-09-22 RX ADMIN — CHLORHEXIDINE GLUCONATE 15 ML: 1.2 RINSE ORAL at 20:52

## 2023-09-22 RX ADMIN — CHLORHEXIDINE GLUCONATE 15 ML: 1.2 RINSE ORAL at 08:29

## 2023-09-22 RX ADMIN — SODIUM CHLORIDE: 9 INJECTION, SOLUTION INTRAVENOUS at 08:06

## 2023-09-22 RX ADMIN — Medication 2 UNITS: at 14:32

## 2023-09-22 RX ADMIN — TICAGRELOR 90 MG: 90 TABLET ORAL at 20:51

## 2023-09-22 RX ADMIN — HYDROMORPHONE HYDROCHLORIDE 0.5 MG: 1 INJECTION, SOLUTION INTRAMUSCULAR; INTRAVENOUS; SUBCUTANEOUS at 21:01

## 2023-09-22 RX ADMIN — MILRINONE LACTATE IN DEXTROSE 0.35 MCG/KG/MIN: 200 INJECTION, SOLUTION INTRAVENOUS at 00:33

## 2023-09-22 RX ADMIN — SODIUM CHLORIDE, POTASSIUM CHLORIDE, SODIUM LACTATE AND CALCIUM CHLORIDE 500 ML: 600; 310; 30; 20 INJECTION, SOLUTION INTRAVENOUS at 05:05

## 2023-09-22 ASSESSMENT — PAIN DESCRIPTION - DESCRIPTORS
DESCRIPTORS: ACHING

## 2023-09-22 ASSESSMENT — PAIN SCALES - GENERAL
PAINLEVEL_OUTOF10: 3
PAINLEVEL_OUTOF10: 6
PAINLEVEL_OUTOF10: 2
PAINLEVEL_OUTOF10: 2
PAINLEVEL_OUTOF10: 6
PAINLEVEL_OUTOF10: 4
PAINLEVEL_OUTOF10: 1
PAINLEVEL_OUTOF10: 3
PAINLEVEL_OUTOF10: 1

## 2023-09-22 ASSESSMENT — PAIN DESCRIPTION - LOCATION
LOCATION: CHEST

## 2023-09-22 ASSESSMENT — PAIN DESCRIPTION - ORIENTATION
ORIENTATION: MID
ORIENTATION: ANTERIOR;MID
ORIENTATION: ANTERIOR

## 2023-09-23 ENCOUNTER — APPOINTMENT (OUTPATIENT)
Facility: HOSPITAL | Age: 68
DRG: 161 | End: 2023-09-23
Attending: ANESTHESIOLOGY
Payer: MEDICAID

## 2023-09-23 LAB
ANION GAP SERPL CALC-SCNC: 7 MMOL/L (ref 5–15)
APTT PPP: 129.7 SEC (ref 22.1–31)
BASE DEFICIT BLDV-SCNC: 3.1 MMOL/L
BDY SITE: ABNORMAL
BUN SERPL-MCNC: 34 MG/DL (ref 6–20)
BUN/CREAT SERPL: 22 (ref 12–20)
CALCIUM SERPL-MCNC: 9.2 MG/DL (ref 8.5–10.1)
CHLORIDE SERPL-SCNC: 103 MMOL/L (ref 97–108)
CO2 SERPL-SCNC: 25 MMOL/L (ref 21–32)
CREAT SERPL-MCNC: 1.52 MG/DL (ref 0.7–1.3)
ERYTHROCYTE [DISTWIDTH] IN BLOOD BY AUTOMATED COUNT: 16.2 % (ref 11.5–14.5)
GAS FLOW.O2 O2 DELIVERY SYS: 4 L/MIN
GLUCOSE BLD STRIP.AUTO-MCNC: 108 MG/DL (ref 65–117)
GLUCOSE BLD STRIP.AUTO-MCNC: 114 MG/DL (ref 65–117)
GLUCOSE BLD STRIP.AUTO-MCNC: 115 MG/DL (ref 65–117)
GLUCOSE BLD STRIP.AUTO-MCNC: 118 MG/DL (ref 65–117)
GLUCOSE SERPL-MCNC: 97 MG/DL (ref 65–100)
HCO3 BLDV-SCNC: 21 MMOL/L (ref 23–28)
HCT VFR BLD AUTO: 20 % (ref 36.6–50.3)
HCT VFR BLD AUTO: 25.5 % (ref 36.6–50.3)
HGB BLD-MCNC: 6.6 G/DL (ref 12.1–17)
HGB BLD-MCNC: 8.5 G/DL (ref 12.1–17)
HISTORY CHECK: NORMAL
INR PPP: 1.5 (ref 0.9–1.1)
LACTATE SERPL-SCNC: 1.2 MMOL/L (ref 0.4–2)
LDH SERPL L TO P-CCNC: 649 U/L (ref 85–241)
MAGNESIUM SERPL-MCNC: 2.2 MG/DL (ref 1.6–2.4)
MCH RBC QN AUTO: 29.7 PG (ref 26–34)
MCHC RBC AUTO-ENTMCNC: 33 G/DL (ref 30–36.5)
MCV RBC AUTO: 90.1 FL (ref 80–99)
NRBC # BLD: 0.06 K/UL (ref 0–0.01)
NRBC BLD-RTO: 0.4 PER 100 WBC
NT PRO BNP: ABNORMAL PG/ML
PCO2 BLDV: 31 MMHG (ref 41–51)
PH BLDV: 7.44 (ref 7.32–7.42)
PLATELET # BLD AUTO: 247 K/UL (ref 150–400)
PMV BLD AUTO: 11.7 FL (ref 8.9–12.9)
PO2 BLDV: 29 MMHG (ref 25–40)
POTASSIUM SERPL-SCNC: 3.3 MMOL/L (ref 3.5–5.1)
PROTHROMBIN TIME: 15.7 SEC (ref 9–11.1)
RBC # BLD AUTO: 2.22 M/UL (ref 4.1–5.7)
SAO2% DEVICE SAO2% SENSOR NAME: ABNORMAL
SERVICE CMNT-IMP: ABNORMAL
SERVICE CMNT-IMP: NORMAL
SODIUM SERPL-SCNC: 135 MMOL/L (ref 136–145)
SPECIMEN SITE: ABNORMAL
THERAPEUTIC RANGE: ABNORMAL SECS (ref 58–77)
UFH PPP CHRO-ACNC: 0.46 IU/ML
VANCOMYCIN SERPL-MCNC: 11.4 UG/ML
WBC # BLD AUTO: 14.2 K/UL (ref 4.1–11.1)

## 2023-09-23 PROCEDURE — 36430 TRANSFUSION BLD/BLD COMPNT: CPT

## 2023-09-23 PROCEDURE — 2580000003 HC RX 258: Performed by: NURSE PRACTITIONER

## 2023-09-23 PROCEDURE — 82962 GLUCOSE BLOOD TEST: CPT

## 2023-09-23 PROCEDURE — 83880 ASSAY OF NATRIURETIC PEPTIDE: CPT

## 2023-09-23 PROCEDURE — 85014 HEMATOCRIT: CPT

## 2023-09-23 PROCEDURE — 99233 SBSQ HOSP IP/OBS HIGH 50: CPT | Performed by: INTERNAL MEDICINE

## 2023-09-23 PROCEDURE — 85018 HEMOGLOBIN: CPT

## 2023-09-23 PROCEDURE — 6370000000 HC RX 637 (ALT 250 FOR IP): Performed by: INTERNAL MEDICINE

## 2023-09-23 PROCEDURE — 82803 BLOOD GASES ANY COMBINATION: CPT

## 2023-09-23 PROCEDURE — 83605 ASSAY OF LACTIC ACID: CPT

## 2023-09-23 PROCEDURE — A4216 STERILE WATER/SALINE, 10 ML: HCPCS | Performed by: NURSE PRACTITIONER

## 2023-09-23 PROCEDURE — 86923 COMPATIBILITY TEST ELECTRIC: CPT

## 2023-09-23 PROCEDURE — 83615 LACTATE (LD) (LDH) ENZYME: CPT

## 2023-09-23 PROCEDURE — 80202 ASSAY OF VANCOMYCIN: CPT

## 2023-09-23 PROCEDURE — 85730 THROMBOPLASTIN TIME PARTIAL: CPT

## 2023-09-23 PROCEDURE — 6360000002 HC RX W HCPCS: Performed by: NURSE PRACTITIONER

## 2023-09-23 PROCEDURE — 36415 COLL VENOUS BLD VENIPUNCTURE: CPT

## 2023-09-23 PROCEDURE — 6360000002 HC RX W HCPCS: Performed by: ANESTHESIOLOGY

## 2023-09-23 PROCEDURE — 6370000000 HC RX 637 (ALT 250 FOR IP): Performed by: NURSE PRACTITIONER

## 2023-09-23 PROCEDURE — 2000000000 HC ICU R&B

## 2023-09-23 PROCEDURE — 85610 PROTHROMBIN TIME: CPT

## 2023-09-23 PROCEDURE — 85520 HEPARIN ASSAY: CPT

## 2023-09-23 PROCEDURE — 6360000002 HC RX W HCPCS: Performed by: STUDENT IN AN ORGANIZED HEALTH CARE EDUCATION/TRAINING PROGRAM

## 2023-09-23 PROCEDURE — 86901 BLOOD TYPING SEROLOGIC RH(D): CPT

## 2023-09-23 PROCEDURE — 71045 X-RAY EXAM CHEST 1 VIEW: CPT

## 2023-09-23 PROCEDURE — 2580000003 HC RX 258: Performed by: STUDENT IN AN ORGANIZED HEALTH CARE EDUCATION/TRAINING PROGRAM

## 2023-09-23 PROCEDURE — 80048 BASIC METABOLIC PNL TOTAL CA: CPT

## 2023-09-23 PROCEDURE — 86850 RBC ANTIBODY SCREEN: CPT

## 2023-09-23 PROCEDURE — 93750 INTERROGATION VAD IN PERSON: CPT | Performed by: INTERNAL MEDICINE

## 2023-09-23 PROCEDURE — P9016 RBC LEUKOCYTES REDUCED: HCPCS

## 2023-09-23 PROCEDURE — 85027 COMPLETE CBC AUTOMATED: CPT

## 2023-09-23 PROCEDURE — 6360000002 HC RX W HCPCS: Performed by: INTERNAL MEDICINE

## 2023-09-23 PROCEDURE — 83735 ASSAY OF MAGNESIUM: CPT

## 2023-09-23 PROCEDURE — 86900 BLOOD TYPING SEROLOGIC ABO: CPT

## 2023-09-23 RX ORDER — SODIUM CHLORIDE 9 MG/ML
INJECTION, SOLUTION INTRAVENOUS PRN
Status: DISCONTINUED | OUTPATIENT
Start: 2023-09-23 | End: 2023-09-26

## 2023-09-23 RX ORDER — MILRINONE LACTATE 0.2 MG/ML
0.3 INJECTION, SOLUTION INTRAVENOUS CONTINUOUS
Status: DISCONTINUED | OUTPATIENT
Start: 2023-09-23 | End: 2023-09-24

## 2023-09-23 RX ORDER — POTASSIUM CHLORIDE 750 MG/1
40 TABLET, FILM COATED, EXTENDED RELEASE ORAL 2 TIMES DAILY
Status: DISCONTINUED | OUTPATIENT
Start: 2023-09-23 | End: 2023-09-28

## 2023-09-23 RX ORDER — HYDRALAZINE HYDROCHLORIDE 20 MG/ML
5 INJECTION INTRAMUSCULAR; INTRAVENOUS EVERY 6 HOURS PRN
Status: DISCONTINUED | OUTPATIENT
Start: 2023-09-23 | End: 2023-09-26

## 2023-09-23 RX ORDER — HYDRALAZINE HYDROCHLORIDE 25 MG/1
25 TABLET, FILM COATED ORAL EVERY 6 HOURS
Status: DISCONTINUED | OUTPATIENT
Start: 2023-09-23 | End: 2023-09-24

## 2023-09-23 RX ADMIN — ATORVASTATIN CALCIUM 40 MG: 40 TABLET, FILM COATED ORAL at 21:36

## 2023-09-23 RX ADMIN — Medication: at 21:17

## 2023-09-23 RX ADMIN — CEFEPIME 2000 MG: 2 INJECTION, POWDER, FOR SOLUTION INTRAVENOUS at 09:00

## 2023-09-23 RX ADMIN — POTASSIUM CHLORIDE 20 MEQ: 400 INJECTION, SOLUTION INTRAVENOUS at 07:23

## 2023-09-23 RX ADMIN — HYDRALAZINE HYDROCHLORIDE 25 MG: 25 TABLET, FILM COATED ORAL at 23:00

## 2023-09-23 RX ADMIN — SUCRALFATE 1 G: 1 TABLET ORAL at 06:57

## 2023-09-23 RX ADMIN — MUPIROCIN: 20 OINTMENT TOPICAL at 09:01

## 2023-09-23 RX ADMIN — SODIUM CHLORIDE, PRESERVATIVE FREE 10 ML: 5 INJECTION INTRAVENOUS at 09:00

## 2023-09-23 RX ADMIN — HYDROMORPHONE HYDROCHLORIDE 0.5 MG: 1 INJECTION, SOLUTION INTRAMUSCULAR; INTRAVENOUS; SUBCUTANEOUS at 02:03

## 2023-09-23 RX ADMIN — METRONIDAZOLE 500 MG: 500 INJECTION, SOLUTION INTRAVENOUS at 17:05

## 2023-09-23 RX ADMIN — CHLORHEXIDINE GLUCONATE 15 ML: 1.2 RINSE ORAL at 09:01

## 2023-09-23 RX ADMIN — SODIUM CHLORIDE, PRESERVATIVE FREE 10 ML: 5 INJECTION INTRAVENOUS at 21:37

## 2023-09-23 RX ADMIN — TICAGRELOR 90 MG: 90 TABLET ORAL at 21:36

## 2023-09-23 RX ADMIN — SUCRALFATE 1 G: 1 TABLET ORAL at 21:37

## 2023-09-23 RX ADMIN — POTASSIUM CHLORIDE 40 MEQ: 750 TABLET, EXTENDED RELEASE ORAL at 12:26

## 2023-09-23 RX ADMIN — TICAGRELOR 90 MG: 90 TABLET ORAL at 08:59

## 2023-09-23 RX ADMIN — HYDRALAZINE HYDROCHLORIDE 25 MG: 25 TABLET, FILM COATED ORAL at 17:28

## 2023-09-23 RX ADMIN — VANCOMYCIN HYDROCHLORIDE 1000 MG: 1 INJECTION, POWDER, LYOPHILIZED, FOR SOLUTION INTRAVENOUS at 12:59

## 2023-09-23 RX ADMIN — HYDRALAZINE HYDROCHLORIDE 10 MG: 20 INJECTION, SOLUTION INTRAMUSCULAR; INTRAVENOUS at 07:23

## 2023-09-23 RX ADMIN — SODIUM CHLORIDE, PRESERVATIVE FREE 10 ML: 5 INJECTION INTRAVENOUS at 21:38

## 2023-09-23 RX ADMIN — AMIODARONE HYDROCHLORIDE 400 MG: 200 TABLET ORAL at 08:59

## 2023-09-23 RX ADMIN — AMIODARONE HYDROCHLORIDE 400 MG: 200 TABLET ORAL at 21:36

## 2023-09-23 RX ADMIN — METRONIDAZOLE 500 MG: 500 INJECTION, SOLUTION INTRAVENOUS at 09:01

## 2023-09-23 RX ADMIN — CEFEPIME 2000 MG: 2 INJECTION, POWDER, FOR SOLUTION INTRAVENOUS at 21:18

## 2023-09-23 RX ADMIN — POLYETHYLENE GLYCOL 3350 17 G: 17 POWDER, FOR SOLUTION ORAL at 08:59

## 2023-09-23 RX ADMIN — CHLORHEXIDINE GLUCONATE 15 ML: 1.2 RINSE ORAL at 21:18

## 2023-09-23 RX ADMIN — MUPIROCIN: 20 OINTMENT TOPICAL at 21:17

## 2023-09-23 RX ADMIN — POTASSIUM CHLORIDE 20 MEQ: 400 INJECTION, SOLUTION INTRAVENOUS at 06:12

## 2023-09-23 RX ADMIN — Medication: at 09:00

## 2023-09-23 RX ADMIN — PANTOPRAZOLE SODIUM 40 MG: 40 TABLET, DELAYED RELEASE ORAL at 06:57

## 2023-09-23 RX ADMIN — HYDRALAZINE HYDROCHLORIDE 25 MG: 25 TABLET, FILM COATED ORAL at 12:26

## 2023-09-23 RX ADMIN — MILRINONE LACTATE IN DEXTROSE 0.3 MCG/KG/MIN: 200 INJECTION, SOLUTION INTRAVENOUS at 13:41

## 2023-09-23 RX ADMIN — HEPARIN SODIUM 16 UNITS/KG/HR: 10000 INJECTION, SOLUTION INTRAVENOUS at 12:32

## 2023-09-23 RX ADMIN — METRONIDAZOLE 500 MG: 500 INJECTION, SOLUTION INTRAVENOUS at 01:24

## 2023-09-23 RX ADMIN — MILRINONE LACTATE IN DEXTROSE 0.35 MCG/KG/MIN: 200 INJECTION, SOLUTION INTRAVENOUS at 00:30

## 2023-09-23 RX ADMIN — HYDRALAZINE HYDROCHLORIDE 5 MG: 20 INJECTION, SOLUTION INTRAMUSCULAR; INTRAVENOUS at 16:03

## 2023-09-23 RX ADMIN — ACETAMINOPHEN 650 MG: 325 TABLET ORAL at 12:26

## 2023-09-23 ASSESSMENT — PAIN SCALES - GENERAL
PAINLEVEL_OUTOF10: 4
PAINLEVEL_OUTOF10: 1
PAINLEVEL_OUTOF10: 3
PAINLEVEL_OUTOF10: 6
PAINLEVEL_OUTOF10: 3
PAINLEVEL_OUTOF10: 3

## 2023-09-23 ASSESSMENT — PAIN DESCRIPTION - LOCATION
LOCATION: CHEST
LOCATION: CHEST

## 2023-09-23 ASSESSMENT — PAIN DESCRIPTION - ORIENTATION
ORIENTATION: ANTERIOR
ORIENTATION: MID

## 2023-09-23 ASSESSMENT — PAIN DESCRIPTION - DESCRIPTORS
DESCRIPTORS: ACHING
DESCRIPTORS: ACHING

## 2023-09-24 ENCOUNTER — APPOINTMENT (OUTPATIENT)
Facility: HOSPITAL | Age: 68
DRG: 161 | End: 2023-09-24
Attending: ANESTHESIOLOGY
Payer: MEDICAID

## 2023-09-24 LAB
ABO + RH BLD: NORMAL
ALBUMIN SERPL-MCNC: 3.5 G/DL (ref 3.5–5)
ALBUMIN/GLOB SERPL: 1.2 (ref 1.1–2.2)
ALP SERPL-CCNC: 103 U/L (ref 45–117)
ALT SERPL-CCNC: 79 U/L (ref 12–78)
ANION GAP SERPL CALC-SCNC: 10 MMOL/L (ref 5–15)
AST SERPL-CCNC: 186 U/L (ref 15–37)
BILIRUB DIRECT SERPL-MCNC: 2.8 MG/DL (ref 0–0.2)
BILIRUB SERPL-MCNC: 4.3 MG/DL (ref 0.2–1)
BLD PROD TYP BPU: NORMAL
BLOOD BANK DISPENSE STATUS: NORMAL
BLOOD GROUP ANTIBODIES SERPL: NORMAL
BPU ID: NORMAL
BUN SERPL-MCNC: 30 MG/DL (ref 6–20)
BUN/CREAT SERPL: 28 (ref 12–20)
CALCIUM SERPL-MCNC: 8.6 MG/DL (ref 8.5–10.1)
CHLORIDE SERPL-SCNC: 108 MMOL/L (ref 97–108)
CK SERPL-CCNC: 167 U/L (ref 39–308)
CO2 SERPL-SCNC: 21 MMOL/L (ref 21–32)
CREAT SERPL-MCNC: 1.06 MG/DL (ref 0.7–1.3)
CROSSMATCH RESULT: NORMAL
ERYTHROCYTE [DISTWIDTH] IN BLOOD BY AUTOMATED COUNT: 16.5 % (ref 11.5–14.5)
ERYTHROCYTE [DISTWIDTH] IN BLOOD BY AUTOMATED COUNT: 16.8 % (ref 11.5–14.5)
GLOBULIN SER CALC-MCNC: 3 G/DL (ref 2–4)
GLUCOSE BLD STRIP.AUTO-MCNC: 110 MG/DL (ref 65–117)
GLUCOSE BLD STRIP.AUTO-MCNC: 121 MG/DL (ref 65–117)
GLUCOSE BLD STRIP.AUTO-MCNC: 176 MG/DL (ref 65–117)
GLUCOSE BLD STRIP.AUTO-MCNC: 182 MG/DL (ref 65–117)
GLUCOSE SERPL-MCNC: 107 MG/DL (ref 65–100)
HCT VFR BLD AUTO: 24.4 % (ref 36.6–50.3)
HCT VFR BLD AUTO: 25.2 % (ref 36.6–50.3)
HEMOCCULT STL QL: POSITIVE
HGB BLD-MCNC: 8 G/DL (ref 12.1–17)
HGB BLD-MCNC: 8.2 G/DL (ref 12.1–17)
INR PPP: 1.4 (ref 0.9–1.1)
LACTATE SERPL-SCNC: 0.8 MMOL/L (ref 0.4–2)
LDH SERPL L TO P-CCNC: 615 U/L (ref 85–241)
MAGNESIUM SERPL-MCNC: 1.9 MG/DL (ref 1.6–2.4)
MCH RBC QN AUTO: 29.2 PG (ref 26–34)
MCH RBC QN AUTO: 29.8 PG (ref 26–34)
MCHC RBC AUTO-ENTMCNC: 32.5 G/DL (ref 30–36.5)
MCHC RBC AUTO-ENTMCNC: 32.8 G/DL (ref 30–36.5)
MCV RBC AUTO: 89.1 FL (ref 80–99)
MCV RBC AUTO: 91.6 FL (ref 80–99)
NRBC # BLD: 0.04 K/UL (ref 0–0.01)
NRBC # BLD: 0.05 K/UL (ref 0–0.01)
NRBC BLD-RTO: 0.3 PER 100 WBC
NRBC BLD-RTO: 0.4 PER 100 WBC
NT PRO BNP: 6711 PG/ML
PLATELET # BLD AUTO: 223 K/UL (ref 150–400)
PLATELET # BLD AUTO: 224 K/UL (ref 150–400)
PMV BLD AUTO: 10 FL (ref 8.9–12.9)
PMV BLD AUTO: 10.1 FL (ref 8.9–12.9)
POTASSIUM SERPL-SCNC: 3.5 MMOL/L (ref 3.5–5.1)
PROCALCITONIN SERPL-MCNC: 1.01 NG/ML
PROT SERPL-MCNC: 6.5 G/DL (ref 6.4–8.2)
PROTHROMBIN TIME: 14.1 SEC (ref 9–11.1)
RBC # BLD AUTO: 2.74 M/UL (ref 4.1–5.7)
RBC # BLD AUTO: 2.75 M/UL (ref 4.1–5.7)
SERVICE CMNT-IMP: ABNORMAL
SERVICE CMNT-IMP: NORMAL
SODIUM SERPL-SCNC: 139 MMOL/L (ref 136–145)
SPECIMEN EXP DATE BLD: NORMAL
UFH PPP CHRO-ACNC: 0.49 IU/ML
UNIT DIVISION: 0
WBC # BLD AUTO: 11.7 K/UL (ref 4.1–11.1)
WBC # BLD AUTO: 13.8 K/UL (ref 4.1–11.1)

## 2023-09-24 PROCEDURE — 85027 COMPLETE CBC AUTOMATED: CPT

## 2023-09-24 PROCEDURE — 82272 OCCULT BLD FECES 1-3 TESTS: CPT

## 2023-09-24 PROCEDURE — 82550 ASSAY OF CK (CPK): CPT

## 2023-09-24 PROCEDURE — 74018 RADEX ABDOMEN 1 VIEW: CPT

## 2023-09-24 PROCEDURE — 85610 PROTHROMBIN TIME: CPT

## 2023-09-24 PROCEDURE — 6370000000 HC RX 637 (ALT 250 FOR IP): Performed by: INTERNAL MEDICINE

## 2023-09-24 PROCEDURE — 2580000003 HC RX 258: Performed by: NURSE PRACTITIONER

## 2023-09-24 PROCEDURE — 97530 THERAPEUTIC ACTIVITIES: CPT

## 2023-09-24 PROCEDURE — 6360000002 HC RX W HCPCS: Performed by: INTERNAL MEDICINE

## 2023-09-24 PROCEDURE — 6360000002 HC RX W HCPCS: Performed by: NURSE PRACTITIONER

## 2023-09-24 PROCEDURE — 82962 GLUCOSE BLOOD TEST: CPT

## 2023-09-24 PROCEDURE — 83735 ASSAY OF MAGNESIUM: CPT

## 2023-09-24 PROCEDURE — 80076 HEPATIC FUNCTION PANEL: CPT

## 2023-09-24 PROCEDURE — 36415 COLL VENOUS BLD VENIPUNCTURE: CPT

## 2023-09-24 PROCEDURE — 2000000000 HC ICU R&B

## 2023-09-24 PROCEDURE — 84145 PROCALCITONIN (PCT): CPT

## 2023-09-24 PROCEDURE — 97535 SELF CARE MNGMENT TRAINING: CPT

## 2023-09-24 PROCEDURE — 71045 X-RAY EXAM CHEST 1 VIEW: CPT

## 2023-09-24 PROCEDURE — 80048 BASIC METABOLIC PNL TOTAL CA: CPT

## 2023-09-24 PROCEDURE — 85520 HEPARIN ASSAY: CPT

## 2023-09-24 PROCEDURE — 83615 LACTATE (LD) (LDH) ENZYME: CPT

## 2023-09-24 PROCEDURE — 6370000000 HC RX 637 (ALT 250 FOR IP): Performed by: NURSE PRACTITIONER

## 2023-09-24 PROCEDURE — 83880 ASSAY OF NATRIURETIC PEPTIDE: CPT

## 2023-09-24 PROCEDURE — 6360000002 HC RX W HCPCS: Performed by: STUDENT IN AN ORGANIZED HEALTH CARE EDUCATION/TRAINING PROGRAM

## 2023-09-24 PROCEDURE — 83605 ASSAY OF LACTIC ACID: CPT

## 2023-09-24 RX ORDER — HYDRALAZINE HYDROCHLORIDE 25 MG/1
50 TABLET, FILM COATED ORAL EVERY 6 HOURS
Status: DISCONTINUED | OUTPATIENT
Start: 2023-09-24 | End: 2023-09-24

## 2023-09-24 RX ORDER — SPIRONOLACTONE 25 MG/1
12.5 TABLET ORAL DAILY
Status: DISCONTINUED | OUTPATIENT
Start: 2023-09-24 | End: 2023-10-03 | Stop reason: HOSPADM

## 2023-09-24 RX ORDER — HYDRALAZINE HYDROCHLORIDE 50 MG/1
100 TABLET, FILM COATED ORAL EVERY 6 HOURS
Status: DISCONTINUED | OUTPATIENT
Start: 2023-09-25 | End: 2023-09-28

## 2023-09-24 RX ORDER — WARFARIN SODIUM 5 MG/1
5 TABLET ORAL
Status: COMPLETED | OUTPATIENT
Start: 2023-09-24 | End: 2023-09-24

## 2023-09-24 RX ORDER — HYDRALAZINE HYDROCHLORIDE 25 MG/1
50 TABLET, FILM COATED ORAL ONCE
Status: COMPLETED | OUTPATIENT
Start: 2023-09-24 | End: 2023-09-24

## 2023-09-24 RX ORDER — BISACODYL 10 MG
10 SUPPOSITORY, RECTAL RECTAL ONCE
Status: COMPLETED | OUTPATIENT
Start: 2023-09-24 | End: 2023-09-24

## 2023-09-24 RX ORDER — MILRINONE LACTATE 0.2 MG/ML
0.2 INJECTION, SOLUTION INTRAVENOUS CONTINUOUS
Status: DISCONTINUED | OUTPATIENT
Start: 2023-09-24 | End: 2023-09-26

## 2023-09-24 RX ORDER — AMLODIPINE BESYLATE 5 MG/1
2.5 TABLET ORAL EVERY 12 HOURS
Status: DISCONTINUED | OUTPATIENT
Start: 2023-09-24 | End: 2023-09-26

## 2023-09-24 RX ADMIN — POTASSIUM CHLORIDE 20 MEQ: 400 INJECTION, SOLUTION INTRAVENOUS at 06:48

## 2023-09-24 RX ADMIN — AMLODIPINE BESYLATE 2.5 MG: 5 TABLET ORAL at 17:34

## 2023-09-24 RX ADMIN — HYDRALAZINE HYDROCHLORIDE 50 MG: 25 TABLET, FILM COATED ORAL at 17:08

## 2023-09-24 RX ADMIN — ATORVASTATIN CALCIUM 40 MG: 40 TABLET, FILM COATED ORAL at 20:51

## 2023-09-24 RX ADMIN — AMIODARONE HYDROCHLORIDE 400 MG: 200 TABLET ORAL at 08:47

## 2023-09-24 RX ADMIN — MUPIROCIN: 20 OINTMENT TOPICAL at 08:48

## 2023-09-24 RX ADMIN — SODIUM CHLORIDE, PRESERVATIVE FREE 10 ML: 5 INJECTION INTRAVENOUS at 20:50

## 2023-09-24 RX ADMIN — ACETAMINOPHEN 650 MG: 325 TABLET ORAL at 08:59

## 2023-09-24 RX ADMIN — CEFEPIME 2000 MG: 2 INJECTION, POWDER, FOR SOLUTION INTRAVENOUS at 09:02

## 2023-09-24 RX ADMIN — HYDROMORPHONE HYDROCHLORIDE 1 MG: 1 INJECTION, SOLUTION INTRAMUSCULAR; INTRAVENOUS; SUBCUTANEOUS at 20:53

## 2023-09-24 RX ADMIN — HYDRALAZINE HYDROCHLORIDE 50 MG: 25 TABLET, FILM COATED ORAL at 17:34

## 2023-09-24 RX ADMIN — HYDROMORPHONE HYDROCHLORIDE 0.5 MG: 1 INJECTION, SOLUTION INTRAMUSCULAR; INTRAVENOUS; SUBCUTANEOUS at 05:40

## 2023-09-24 RX ADMIN — SODIUM CHLORIDE, PRESERVATIVE FREE 10 ML: 5 INJECTION INTRAVENOUS at 08:48

## 2023-09-24 RX ADMIN — MAGNESIUM SULFATE HEPTAHYDRATE 2000 MG: 40 INJECTION, SOLUTION INTRAVENOUS at 05:05

## 2023-09-24 RX ADMIN — BISACODYL 10 MG: 10 SUPPOSITORY RECTAL at 10:53

## 2023-09-24 RX ADMIN — TICAGRELOR 90 MG: 90 TABLET ORAL at 20:52

## 2023-09-24 RX ADMIN — HYDRALAZINE HYDROCHLORIDE 5 MG: 20 INJECTION, SOLUTION INTRAMUSCULAR; INTRAVENOUS at 03:08

## 2023-09-24 RX ADMIN — HYDRALAZINE HYDROCHLORIDE 50 MG: 25 TABLET, FILM COATED ORAL at 11:04

## 2023-09-24 RX ADMIN — PANTOPRAZOLE SODIUM 40 MG: 40 TABLET, DELAYED RELEASE ORAL at 08:47

## 2023-09-24 RX ADMIN — SPIRONOLACTONE 12.5 MG: 25 TABLET ORAL at 10:54

## 2023-09-24 RX ADMIN — Medication: at 08:48

## 2023-09-24 RX ADMIN — METRONIDAZOLE 500 MG: 500 INJECTION, SOLUTION INTRAVENOUS at 08:43

## 2023-09-24 RX ADMIN — ACETAMINOPHEN 650 MG: 325 TABLET ORAL at 20:59

## 2023-09-24 RX ADMIN — Medication: at 20:52

## 2023-09-24 RX ADMIN — HYDRALAZINE HYDROCHLORIDE 5 MG: 20 INJECTION, SOLUTION INTRAMUSCULAR; INTRAVENOUS at 21:30

## 2023-09-24 RX ADMIN — MILRINONE LACTATE IN DEXTROSE 0.3 MCG/KG/MIN: 200 INJECTION, SOLUTION INTRAVENOUS at 01:46

## 2023-09-24 RX ADMIN — POLYETHYLENE GLYCOL 3350 17 G: 17 POWDER, FOR SOLUTION ORAL at 08:47

## 2023-09-24 RX ADMIN — SUCRALFATE 1 G: 1 TABLET ORAL at 20:52

## 2023-09-24 RX ADMIN — Medication 1 UNITS: at 20:53

## 2023-09-24 RX ADMIN — TICAGRELOR 90 MG: 90 TABLET ORAL at 08:47

## 2023-09-24 RX ADMIN — POTASSIUM CHLORIDE 40 MEQ: 750 TABLET, EXTENDED RELEASE ORAL at 08:47

## 2023-09-24 RX ADMIN — OXYCODONE HYDROCHLORIDE 5 MG: 5 TABLET ORAL at 17:34

## 2023-09-24 RX ADMIN — CHLORHEXIDINE GLUCONATE 15 ML: 1.2 RINSE ORAL at 08:48

## 2023-09-24 RX ADMIN — HYDRALAZINE HYDROCHLORIDE 5 MG: 20 INJECTION, SOLUTION INTRAMUSCULAR; INTRAVENOUS at 16:08

## 2023-09-24 RX ADMIN — AMIODARONE HYDROCHLORIDE 400 MG: 200 TABLET ORAL at 20:51

## 2023-09-24 RX ADMIN — POTASSIUM CHLORIDE 40 MEQ: 750 TABLET, EXTENDED RELEASE ORAL at 20:51

## 2023-09-24 RX ADMIN — POTASSIUM CHLORIDE 20 MEQ: 400 INJECTION, SOLUTION INTRAVENOUS at 05:05

## 2023-09-24 RX ADMIN — WARFARIN SODIUM 5 MG: 5 TABLET ORAL at 17:34

## 2023-09-24 RX ADMIN — HEPARIN SODIUM 16 UNITS/KG/HR: 10000 INJECTION, SOLUTION INTRAVENOUS at 11:53

## 2023-09-24 RX ADMIN — CHLORHEXIDINE GLUCONATE 15 ML: 1.2 RINSE ORAL at 20:52

## 2023-09-24 RX ADMIN — METRONIDAZOLE 500 MG: 500 INJECTION, SOLUTION INTRAVENOUS at 01:46

## 2023-09-24 RX ADMIN — MUPIROCIN: 20 OINTMENT TOPICAL at 20:53

## 2023-09-24 RX ADMIN — Medication 2 UNITS: at 17:08

## 2023-09-24 RX ADMIN — HYDRALAZINE HYDROCHLORIDE 25 MG: 25 TABLET, FILM COATED ORAL at 05:38

## 2023-09-24 ASSESSMENT — PAIN SCALES - GENERAL
PAINLEVEL_OUTOF10: 1
PAINLEVEL_OUTOF10: 2
PAINLEVEL_OUTOF10: 1
PAINLEVEL_OUTOF10: 4
PAINLEVEL_OUTOF10: 2
PAINLEVEL_OUTOF10: 6
PAINLEVEL_OUTOF10: 2
PAINLEVEL_OUTOF10: 4
PAINLEVEL_OUTOF10: 1
PAINLEVEL_OUTOF10: 7
PAINLEVEL_OUTOF10: 1

## 2023-09-24 ASSESSMENT — PAIN DESCRIPTION - DESCRIPTORS
DESCRIPTORS: ACHING
DESCRIPTORS: ACHING
DESCRIPTORS: SORE
DESCRIPTORS: SORE

## 2023-09-24 ASSESSMENT — PAIN DESCRIPTION - ORIENTATION
ORIENTATION: MID
ORIENTATION: ANTERIOR;MID
ORIENTATION: ANTERIOR
ORIENTATION: MID

## 2023-09-24 ASSESSMENT — PAIN DESCRIPTION - LOCATION
LOCATION: CHEST
LOCATION: CHEST
LOCATION: INCISION
LOCATION: CHEST

## 2023-09-25 ENCOUNTER — APPOINTMENT (OUTPATIENT)
Facility: HOSPITAL | Age: 68
DRG: 161 | End: 2023-09-25
Attending: ANESTHESIOLOGY
Payer: MEDICAID

## 2023-09-25 LAB
ANION GAP SERPL CALC-SCNC: 10 MMOL/L (ref 5–15)
BUN SERPL-MCNC: 26 MG/DL (ref 6–20)
BUN/CREAT SERPL: 28 (ref 12–20)
CALCIUM SERPL-MCNC: 8.7 MG/DL (ref 8.5–10.1)
CHLORIDE SERPL-SCNC: 113 MMOL/L (ref 97–108)
CO2 SERPL-SCNC: 19 MMOL/L (ref 21–32)
CREAT SERPL-MCNC: 0.94 MG/DL (ref 0.7–1.3)
ERYTHROCYTE [DISTWIDTH] IN BLOOD BY AUTOMATED COUNT: 17.1 % (ref 11.5–14.5)
ERYTHROCYTE [DISTWIDTH] IN BLOOD BY AUTOMATED COUNT: 17.7 % (ref 11.5–14.5)
GLUCOSE BLD STRIP.AUTO-MCNC: 142 MG/DL (ref 65–117)
GLUCOSE BLD STRIP.AUTO-MCNC: 261 MG/DL (ref 65–117)
GLUCOSE BLD STRIP.AUTO-MCNC: 83 MG/DL (ref 65–117)
GLUCOSE SERPL-MCNC: 105 MG/DL (ref 65–100)
HAPTOGLOB SERPL-MCNC: 34 MG/DL (ref 30–200)
HCT VFR BLD AUTO: 24.2 % (ref 36.6–50.3)
HCT VFR BLD AUTO: 25.2 % (ref 36.6–50.3)
HGB BLD-MCNC: 7.6 G/DL (ref 12.1–17)
HGB BLD-MCNC: 7.9 G/DL (ref 12.1–17)
INR PPP: 1.4 (ref 0.9–1.1)
LACTATE SERPL-SCNC: 1.1 MMOL/L (ref 0.4–2)
LDH SERPL L TO P-CCNC: 701 U/L (ref 85–241)
MAGNESIUM SERPL-MCNC: 2.4 MG/DL (ref 1.6–2.4)
MCH RBC QN AUTO: 29.3 PG (ref 26–34)
MCH RBC QN AUTO: 29.3 PG (ref 26–34)
MCHC RBC AUTO-ENTMCNC: 31.3 G/DL (ref 30–36.5)
MCHC RBC AUTO-ENTMCNC: 31.4 G/DL (ref 30–36.5)
MCV RBC AUTO: 93.3 FL (ref 80–99)
MCV RBC AUTO: 93.4 FL (ref 80–99)
NRBC # BLD: 0.05 K/UL (ref 0–0.01)
NRBC # BLD: 0.08 K/UL (ref 0–0.01)
NRBC BLD-RTO: 0.5 PER 100 WBC
NRBC BLD-RTO: 0.8 PER 100 WBC
NT PRO BNP: 3437 PG/ML
PLATELET # BLD AUTO: 195 K/UL (ref 150–400)
PLATELET # BLD AUTO: 211 K/UL (ref 150–400)
PMV BLD AUTO: 10.2 FL (ref 8.9–12.9)
PMV BLD AUTO: 10.3 FL (ref 8.9–12.9)
POTASSIUM SERPL-SCNC: 4.2 MMOL/L (ref 3.5–5.1)
PROTHROMBIN TIME: 14.2 SEC (ref 9–11.1)
RBC # BLD AUTO: 2.59 M/UL (ref 4.1–5.7)
RBC # BLD AUTO: 2.7 M/UL (ref 4.1–5.7)
SERVICE CMNT-IMP: ABNORMAL
SERVICE CMNT-IMP: ABNORMAL
SERVICE CMNT-IMP: NORMAL
SODIUM SERPL-SCNC: 142 MMOL/L (ref 136–145)
UFH PPP CHRO-ACNC: 0.5 IU/ML
WBC # BLD AUTO: 10.1 K/UL (ref 4.1–11.1)
WBC # BLD AUTO: 9.7 K/UL (ref 4.1–11.1)

## 2023-09-25 PROCEDURE — 6370000000 HC RX 637 (ALT 250 FOR IP): Performed by: INTERNAL MEDICINE

## 2023-09-25 PROCEDURE — 93750 INTERROGATION VAD IN PERSON: CPT | Performed by: NURSE PRACTITIONER

## 2023-09-25 PROCEDURE — 80048 BASIC METABOLIC PNL TOTAL CA: CPT

## 2023-09-25 PROCEDURE — 85027 COMPLETE CBC AUTOMATED: CPT

## 2023-09-25 PROCEDURE — 6360000002 HC RX W HCPCS: Performed by: NURSE PRACTITIONER

## 2023-09-25 PROCEDURE — 83050 HGB METHEMOGLOBIN QUAN: CPT

## 2023-09-25 PROCEDURE — 2580000003 HC RX 258: Performed by: NURSE PRACTITIONER

## 2023-09-25 PROCEDURE — 36415 COLL VENOUS BLD VENIPUNCTURE: CPT

## 2023-09-25 PROCEDURE — 71045 X-RAY EXAM CHEST 1 VIEW: CPT

## 2023-09-25 PROCEDURE — 2000000000 HC ICU R&B

## 2023-09-25 PROCEDURE — 6360000002 HC RX W HCPCS: Performed by: STUDENT IN AN ORGANIZED HEALTH CARE EDUCATION/TRAINING PROGRAM

## 2023-09-25 PROCEDURE — 82375 ASSAY CARBOXYHB QUANT: CPT

## 2023-09-25 PROCEDURE — 83010 ASSAY OF HAPTOGLOBIN QUANT: CPT

## 2023-09-25 PROCEDURE — 97530 THERAPEUTIC ACTIVITIES: CPT

## 2023-09-25 PROCEDURE — 85520 HEPARIN ASSAY: CPT

## 2023-09-25 PROCEDURE — 85610 PROTHROMBIN TIME: CPT

## 2023-09-25 PROCEDURE — 83615 LACTATE (LD) (LDH) ENZYME: CPT

## 2023-09-25 PROCEDURE — 83735 ASSAY OF MAGNESIUM: CPT

## 2023-09-25 PROCEDURE — 83605 ASSAY OF LACTIC ACID: CPT

## 2023-09-25 PROCEDURE — 6370000000 HC RX 637 (ALT 250 FOR IP): Performed by: NURSE PRACTITIONER

## 2023-09-25 PROCEDURE — 83880 ASSAY OF NATRIURETIC PEPTIDE: CPT

## 2023-09-25 PROCEDURE — 82962 GLUCOSE BLOOD TEST: CPT

## 2023-09-25 PROCEDURE — 97535 SELF CARE MNGMENT TRAINING: CPT

## 2023-09-25 PROCEDURE — 6360000002 HC RX W HCPCS: Performed by: INTERNAL MEDICINE

## 2023-09-25 PROCEDURE — 99232 SBSQ HOSP IP/OBS MODERATE 35: CPT | Performed by: NURSE PRACTITIONER

## 2023-09-25 RX ORDER — FUROSEMIDE 10 MG/ML
40 INJECTION INTRAMUSCULAR; INTRAVENOUS ONCE
Status: COMPLETED | OUTPATIENT
Start: 2023-09-25 | End: 2023-09-25

## 2023-09-25 RX ORDER — INSULIN GLARGINE 100 [IU]/ML
10 INJECTION, SOLUTION SUBCUTANEOUS DAILY
Status: DISCONTINUED | OUTPATIENT
Start: 2023-09-25 | End: 2023-09-28

## 2023-09-25 RX ORDER — WARFARIN SODIUM 5 MG/1
5 TABLET ORAL
Status: COMPLETED | OUTPATIENT
Start: 2023-09-25 | End: 2023-09-25

## 2023-09-25 RX ORDER — FUROSEMIDE 10 MG/ML
40 INJECTION INTRAMUSCULAR; INTRAVENOUS EVERY 12 HOURS
Status: DISCONTINUED | OUTPATIENT
Start: 2023-09-25 | End: 2023-09-28

## 2023-09-25 RX ADMIN — WARFARIN SODIUM 5 MG: 5 TABLET ORAL at 17:16

## 2023-09-25 RX ADMIN — HYDRALAZINE HYDROCHLORIDE 100 MG: 25 TABLET, FILM COATED ORAL at 06:49

## 2023-09-25 RX ADMIN — SUCRALFATE 1 G: 1 TABLET ORAL at 17:16

## 2023-09-25 RX ADMIN — AMLODIPINE BESYLATE 2.5 MG: 5 TABLET ORAL at 17:15

## 2023-09-25 RX ADMIN — OXYCODONE HYDROCHLORIDE 10 MG: 5 TABLET ORAL at 17:15

## 2023-09-25 RX ADMIN — FUROSEMIDE 40 MG: 10 INJECTION, SOLUTION INTRAMUSCULAR; INTRAVENOUS at 10:51

## 2023-09-25 RX ADMIN — HYDRALAZINE HYDROCHLORIDE 100 MG: 25 TABLET, FILM COATED ORAL at 17:16

## 2023-09-25 RX ADMIN — HYDROMORPHONE HYDROCHLORIDE 1 MG: 1 INJECTION, SOLUTION INTRAMUSCULAR; INTRAVENOUS; SUBCUTANEOUS at 03:14

## 2023-09-25 RX ADMIN — HYDRALAZINE HYDROCHLORIDE 5 MG: 20 INJECTION, SOLUTION INTRAMUSCULAR; INTRAVENOUS at 19:10

## 2023-09-25 RX ADMIN — CHLORHEXIDINE GLUCONATE 15 ML: 1.2 RINSE ORAL at 20:51

## 2023-09-25 RX ADMIN — Medication 1 UNITS: at 20:59

## 2023-09-25 RX ADMIN — MILRINONE LACTATE IN DEXTROSE 0.2 MCG/KG/MIN: 200 INJECTION, SOLUTION INTRAVENOUS at 00:25

## 2023-09-25 RX ADMIN — CHLORHEXIDINE GLUCONATE 15 ML: 1.2 RINSE ORAL at 09:46

## 2023-09-25 RX ADMIN — HEPARIN SODIUM 16 UNITS/KG/HR: 10000 INJECTION, SOLUTION INTRAVENOUS at 11:25

## 2023-09-25 RX ADMIN — Medication 6 UNITS: at 13:39

## 2023-09-25 RX ADMIN — OXYCODONE HYDROCHLORIDE 10 MG: 5 TABLET ORAL at 09:43

## 2023-09-25 RX ADMIN — AMIODARONE HYDROCHLORIDE 200 MG: 200 TABLET ORAL at 09:25

## 2023-09-25 RX ADMIN — FUROSEMIDE 40 MG: 10 INJECTION, SOLUTION INTRAMUSCULAR; INTRAVENOUS at 17:16

## 2023-09-25 RX ADMIN — SPIRONOLACTONE 12.5 MG: 25 TABLET ORAL at 09:26

## 2023-09-25 RX ADMIN — POTASSIUM CHLORIDE 40 MEQ: 750 TABLET, EXTENDED RELEASE ORAL at 10:51

## 2023-09-25 RX ADMIN — POLYETHYLENE GLYCOL 3350 17 G: 17 POWDER, FOR SOLUTION ORAL at 09:25

## 2023-09-25 RX ADMIN — TICAGRELOR 90 MG: 90 TABLET ORAL at 09:26

## 2023-09-25 RX ADMIN — WATER 2000 MG: 1 INJECTION INTRAMUSCULAR; INTRAVENOUS; SUBCUTANEOUS at 13:40

## 2023-09-25 RX ADMIN — MUPIROCIN: 20 OINTMENT TOPICAL at 09:46

## 2023-09-25 RX ADMIN — AMLODIPINE BESYLATE 2.5 MG: 5 TABLET ORAL at 06:48

## 2023-09-25 RX ADMIN — ATORVASTATIN CALCIUM 40 MG: 40 TABLET, FILM COATED ORAL at 20:48

## 2023-09-25 RX ADMIN — Medication: at 20:48

## 2023-09-25 RX ADMIN — WATER 2000 MG: 1 INJECTION INTRAMUSCULAR; INTRAVENOUS; SUBCUTANEOUS at 20:47

## 2023-09-25 RX ADMIN — POTASSIUM CHLORIDE 40 MEQ: 750 TABLET, EXTENDED RELEASE ORAL at 20:48

## 2023-09-25 RX ADMIN — PANTOPRAZOLE SODIUM 40 MG: 40 TABLET, DELAYED RELEASE ORAL at 09:42

## 2023-09-25 RX ADMIN — SODIUM CHLORIDE, PRESERVATIVE FREE 10 ML: 5 INJECTION INTRAVENOUS at 20:49

## 2023-09-25 RX ADMIN — SUCRALFATE 1 G: 1 TABLET ORAL at 09:40

## 2023-09-25 RX ADMIN — MUPIROCIN: 20 OINTMENT TOPICAL at 20:49

## 2023-09-25 RX ADMIN — TICAGRELOR 90 MG: 90 TABLET ORAL at 20:48

## 2023-09-25 RX ADMIN — HYDRALAZINE HYDROCHLORIDE 100 MG: 25 TABLET, FILM COATED ORAL at 00:04

## 2023-09-25 RX ADMIN — HYDRALAZINE HYDROCHLORIDE 100 MG: 25 TABLET, FILM COATED ORAL at 11:17

## 2023-09-25 RX ADMIN — SUCRALFATE 1 G: 1 TABLET ORAL at 20:48

## 2023-09-25 RX ADMIN — INSULIN GLARGINE 10 UNITS: 100 INJECTION, SOLUTION SUBCUTANEOUS at 15:49

## 2023-09-25 RX ADMIN — SUCRALFATE 1 G: 1 TABLET ORAL at 12:37

## 2023-09-25 ASSESSMENT — PAIN DESCRIPTION - ORIENTATION
ORIENTATION: ANTERIOR;MID
ORIENTATION: MID
ORIENTATION: MID

## 2023-09-25 ASSESSMENT — PAIN DESCRIPTION - DESCRIPTORS: DESCRIPTORS: ACHING

## 2023-09-25 ASSESSMENT — PAIN SCALES - GENERAL
PAINLEVEL_OUTOF10: 2
PAINLEVEL_OUTOF10: 6
PAINLEVEL_OUTOF10: 4
PAINLEVEL_OUTOF10: 4
PAINLEVEL_OUTOF10: 7
PAINLEVEL_OUTOF10: 2
PAINLEVEL_OUTOF10: 4
PAINLEVEL_OUTOF10: 0
PAINLEVEL_OUTOF10: 4
PAINLEVEL_OUTOF10: 2

## 2023-09-25 ASSESSMENT — PAIN DESCRIPTION - LOCATION
LOCATION: CHEST
LOCATION: INCISION
LOCATION: CHEST

## 2023-09-25 ASSESSMENT — PAIN DESCRIPTION - PAIN TYPE: TYPE: ACUTE PAIN

## 2023-09-25 ASSESSMENT — PAIN SCALES - WONG BAKER: WONGBAKER_NUMERICALRESPONSE: 2

## 2023-09-25 NOTE — CARE COORDINATION
Transition of Care Plan:     RUR: 17%--moderate  Prior Level of Functioning: independent  Disposition: home with [facility] PT/OT/SN with [facility] vs Rehab  Follow up appointments: cardiology, PCP, AHFT  DME needed: N/A  Transportation at discharge: in car w/family  IM/IMM Medicare/ letter given: 9/1/23  Is patient a  and connected with VA? no  Caregiver Contact:   Eunice Roberto, significant other, [address], sister, [phone]  Discharge Caregiver contacted prior to discharge? N/A  Care Conference needed? yes--family meeting to discuss goals of care. Barriers to discharge: clinical status     Patient admitted from [facility] on 8/31 for heart failure. Patient working with PT/OT. LVAD placement scheduled for tomorrow. Palliative and AHFT following. Working with PT/OT daily. CM will continue to follow. Update 9/21/23-new LVAD. Pending progress will either discharge home with home health or IPR rehab. He will need authorization to admit.

## 2023-09-26 ENCOUNTER — APPOINTMENT (OUTPATIENT)
Facility: HOSPITAL | Age: 68
DRG: 161 | End: 2023-09-26
Attending: ANESTHESIOLOGY
Payer: MEDICAID

## 2023-09-26 LAB
ALBUMIN SERPL-MCNC: 3.4 G/DL (ref 3.5–5)
ALBUMIN/GLOB SERPL: 1.1 (ref 1.1–2.2)
ALP SERPL-CCNC: 116 U/L (ref 45–117)
ALT SERPL-CCNC: 59 U/L (ref 12–78)
ANION GAP SERPL CALC-SCNC: 4 MMOL/L (ref 5–15)
AST SERPL-CCNC: 68 U/L (ref 15–37)
BDY SITE: ABNORMAL
BDY SITE: ABNORMAL
BILIRUB DIRECT SERPL-MCNC: 1.2 MG/DL (ref 0–0.2)
BILIRUB SERPL-MCNC: 1.9 MG/DL (ref 0.2–1)
BUN SERPL-MCNC: 20 MG/DL (ref 6–20)
BUN/CREAT SERPL: 22 (ref 12–20)
CALCIUM SERPL-MCNC: 8.4 MG/DL (ref 8.5–10.1)
CHLORIDE SERPL-SCNC: 107 MMOL/L (ref 97–108)
CO2 SERPL-SCNC: 24 MMOL/L (ref 21–32)
COHGB MFR BLD: 0.8 % (ref 1–2)
COHGB MFR BLD: 1.4 % (ref 1–2)
CREAT SERPL-MCNC: 0.91 MG/DL (ref 0.7–1.3)
ERYTHROCYTE [DISTWIDTH] IN BLOOD BY AUTOMATED COUNT: 17.5 % (ref 11.5–14.5)
ERYTHROCYTE [DISTWIDTH] IN BLOOD BY AUTOMATED COUNT: 17.7 % (ref 11.5–14.5)
GLOBULIN SER CALC-MCNC: 3.2 G/DL (ref 2–4)
GLUCOSE BLD STRIP.AUTO-MCNC: 112 MG/DL (ref 65–117)
GLUCOSE BLD STRIP.AUTO-MCNC: 132 MG/DL (ref 65–117)
GLUCOSE BLD STRIP.AUTO-MCNC: 167 MG/DL (ref 65–117)
GLUCOSE BLD STRIP.AUTO-MCNC: 91 MG/DL (ref 65–117)
GLUCOSE SERPL-MCNC: 82 MG/DL (ref 65–100)
HCT VFR BLD AUTO: 24.3 % (ref 36.6–50.3)
HCT VFR BLD AUTO: 24.7 % (ref 36.6–50.3)
HGB BLD-MCNC: 7.6 G/DL (ref 12.1–17)
HGB BLD-MCNC: 7.8 G/DL (ref 12.1–17)
INR PPP: 2.8 (ref 0.9–1.1)
INR PPP: 3.3 (ref 0.9–1.1)
LACTATE SERPL-SCNC: 0.8 MMOL/L (ref 0.4–2)
LDH SERPL L TO P-CCNC: 488 U/L (ref 85–241)
MAGNESIUM SERPL-MCNC: 2 MG/DL (ref 1.6–2.4)
MCH RBC QN AUTO: 29.2 PG (ref 26–34)
MCH RBC QN AUTO: 29.5 PG (ref 26–34)
MCHC RBC AUTO-ENTMCNC: 31.3 G/DL (ref 30–36.5)
MCHC RBC AUTO-ENTMCNC: 31.6 G/DL (ref 30–36.5)
MCV RBC AUTO: 93.5 FL (ref 80–99)
MCV RBC AUTO: 93.6 FL (ref 80–99)
METHGB MFR BLD: 0.2 % (ref 0–1.4)
METHGB MFR BLD: 0.3 % (ref 0–1.4)
NRBC # BLD: 0.08 K/UL (ref 0–0.01)
NRBC # BLD: 0.09 K/UL (ref 0–0.01)
NRBC BLD-RTO: 0.8 PER 100 WBC
NRBC BLD-RTO: 0.9 PER 100 WBC
OXYHGB MFR BLD: 51.2 % (ref 94–97)
OXYHGB MFR BLD: 62.4 % (ref 94–97)
PLATELET # BLD AUTO: 203 K/UL (ref 150–400)
PLATELET # BLD AUTO: 217 K/UL (ref 150–400)
PMV BLD AUTO: 10.4 FL (ref 8.9–12.9)
PMV BLD AUTO: 10.8 FL (ref 8.9–12.9)
POTASSIUM SERPL-SCNC: 4.3 MMOL/L (ref 3.5–5.1)
PROT SERPL-MCNC: 6.6 G/DL (ref 6.4–8.2)
PROTHROMBIN TIME: 27.8 SEC (ref 9–11.1)
PROTHROMBIN TIME: 32.3 SEC (ref 9–11.1)
RBC # BLD AUTO: 2.6 M/UL (ref 4.1–5.7)
RBC # BLD AUTO: 2.64 M/UL (ref 4.1–5.7)
SAO2 % BLD: 52 % (ref 95–99)
SAO2 % BLD: 64 % (ref 95–99)
SERVICE CMNT-IMP: ABNORMAL
SERVICE CMNT-IMP: ABNORMAL
SERVICE CMNT-IMP: NORMAL
SERVICE CMNT-IMP: NORMAL
SODIUM SERPL-SCNC: 135 MMOL/L (ref 136–145)
SPECIMEN SITE: ABNORMAL
SPECIMEN SITE: ABNORMAL
UFH PPP CHRO-ACNC: 0.57 IU/ML
WBC # BLD AUTO: 10.3 K/UL (ref 4.1–11.1)
WBC # BLD AUTO: 10.3 K/UL (ref 4.1–11.1)

## 2023-09-26 PROCEDURE — 6370000000 HC RX 637 (ALT 250 FOR IP): Performed by: INTERNAL MEDICINE

## 2023-09-26 PROCEDURE — 6360000002 HC RX W HCPCS: Performed by: INTERNAL MEDICINE

## 2023-09-26 PROCEDURE — 85520 HEPARIN ASSAY: CPT

## 2023-09-26 PROCEDURE — 97535 SELF CARE MNGMENT TRAINING: CPT

## 2023-09-26 PROCEDURE — 83615 LACTATE (LD) (LDH) ENZYME: CPT

## 2023-09-26 PROCEDURE — 6360000002 HC RX W HCPCS: Performed by: NURSE PRACTITIONER

## 2023-09-26 PROCEDURE — 6370000000 HC RX 637 (ALT 250 FOR IP): Performed by: NURSE PRACTITIONER

## 2023-09-26 PROCEDURE — 80076 HEPATIC FUNCTION PANEL: CPT

## 2023-09-26 PROCEDURE — 71045 X-RAY EXAM CHEST 1 VIEW: CPT

## 2023-09-26 PROCEDURE — 93005 ELECTROCARDIOGRAM TRACING: CPT | Performed by: NURSE PRACTITIONER

## 2023-09-26 PROCEDURE — 36415 COLL VENOUS BLD VENIPUNCTURE: CPT

## 2023-09-26 PROCEDURE — 93750 INTERROGATION VAD IN PERSON: CPT | Performed by: INTERNAL MEDICINE

## 2023-09-26 PROCEDURE — 97530 THERAPEUTIC ACTIVITIES: CPT

## 2023-09-26 PROCEDURE — 83050 HGB METHEMOGLOBIN QUAN: CPT

## 2023-09-26 PROCEDURE — 2580000003 HC RX 258: Performed by: NURSE PRACTITIONER

## 2023-09-26 PROCEDURE — 97116 GAIT TRAINING THERAPY: CPT

## 2023-09-26 PROCEDURE — 82375 ASSAY CARBOXYHB QUANT: CPT

## 2023-09-26 PROCEDURE — 85610 PROTHROMBIN TIME: CPT

## 2023-09-26 PROCEDURE — 83735 ASSAY OF MAGNESIUM: CPT

## 2023-09-26 PROCEDURE — 85027 COMPLETE CBC AUTOMATED: CPT

## 2023-09-26 PROCEDURE — 83605 ASSAY OF LACTIC ACID: CPT

## 2023-09-26 PROCEDURE — 80048 BASIC METABOLIC PNL TOTAL CA: CPT

## 2023-09-26 PROCEDURE — 99231 SBSQ HOSP IP/OBS SF/LOW 25: CPT | Performed by: INTERNAL MEDICINE

## 2023-09-26 PROCEDURE — 74018 RADEX ABDOMEN 1 VIEW: CPT

## 2023-09-26 PROCEDURE — 2000000000 HC ICU R&B

## 2023-09-26 PROCEDURE — 82962 GLUCOSE BLOOD TEST: CPT

## 2023-09-26 RX ORDER — MILRINONE LACTATE 0.2 MG/ML
0.12 INJECTION, SOLUTION INTRAVENOUS CONTINUOUS
Status: DISCONTINUED | OUTPATIENT
Start: 2023-09-26 | End: 2023-09-28

## 2023-09-26 RX ORDER — HYDRALAZINE HYDROCHLORIDE 20 MG/ML
10 INJECTION INTRAMUSCULAR; INTRAVENOUS EVERY 6 HOURS PRN
Status: DISCONTINUED | OUTPATIENT
Start: 2023-09-26 | End: 2023-10-03 | Stop reason: HOSPADM

## 2023-09-26 RX ORDER — AMLODIPINE BESYLATE 5 MG/1
5 TABLET ORAL EVERY 12 HOURS
Status: DISCONTINUED | OUTPATIENT
Start: 2023-09-26 | End: 2023-10-01

## 2023-09-26 RX ADMIN — FUROSEMIDE 40 MG: 10 INJECTION, SOLUTION INTRAMUSCULAR; INTRAVENOUS at 16:39

## 2023-09-26 RX ADMIN — FUROSEMIDE 40 MG: 10 INJECTION, SOLUTION INTRAMUSCULAR; INTRAVENOUS at 05:23

## 2023-09-26 RX ADMIN — CHLORHEXIDINE GLUCONATE 15 ML: 1.2 RINSE ORAL at 09:42

## 2023-09-26 RX ADMIN — OXYCODONE HYDROCHLORIDE 10 MG: 5 TABLET ORAL at 21:19

## 2023-09-26 RX ADMIN — SUCRALFATE 1 G: 1 TABLET ORAL at 16:39

## 2023-09-26 RX ADMIN — MILRINONE LACTATE IN DEXTROSE 0.2 MCG/KG/MIN: 200 INJECTION, SOLUTION INTRAVENOUS at 04:06

## 2023-09-26 RX ADMIN — HYDRALAZINE HYDROCHLORIDE 100 MG: 25 TABLET, FILM COATED ORAL at 16:39

## 2023-09-26 RX ADMIN — SUCRALFATE 1 G: 1 TABLET ORAL at 21:20

## 2023-09-26 RX ADMIN — SUCRALFATE 1 G: 1 TABLET ORAL at 09:54

## 2023-09-26 RX ADMIN — MUPIROCIN: 20 OINTMENT TOPICAL at 09:55

## 2023-09-26 RX ADMIN — WATER 2000 MG: 1 INJECTION INTRAMUSCULAR; INTRAVENOUS; SUBCUTANEOUS at 21:19

## 2023-09-26 RX ADMIN — SODIUM CHLORIDE, PRESERVATIVE FREE 10 ML: 5 INJECTION INTRAVENOUS at 21:35

## 2023-09-26 RX ADMIN — SODIUM CHLORIDE, PRESERVATIVE FREE 10 ML: 5 INJECTION INTRAVENOUS at 09:55

## 2023-09-26 RX ADMIN — MUPIROCIN: 20 OINTMENT TOPICAL at 21:34

## 2023-09-26 RX ADMIN — AMLODIPINE BESYLATE 5 MG: 5 TABLET ORAL at 17:04

## 2023-09-26 RX ADMIN — SPIRONOLACTONE 12.5 MG: 25 TABLET ORAL at 09:54

## 2023-09-26 RX ADMIN — TICAGRELOR 90 MG: 90 TABLET ORAL at 09:54

## 2023-09-26 RX ADMIN — AMIODARONE HYDROCHLORIDE 200 MG: 200 TABLET ORAL at 09:56

## 2023-09-26 RX ADMIN — ATORVASTATIN CALCIUM 40 MG: 40 TABLET, FILM COATED ORAL at 21:20

## 2023-09-26 RX ADMIN — Medication 2 UNITS: at 19:16

## 2023-09-26 RX ADMIN — CHLORHEXIDINE GLUCONATE 15 ML: 1.2 RINSE ORAL at 21:32

## 2023-09-26 RX ADMIN — SUCRALFATE 1 G: 1 TABLET ORAL at 06:30

## 2023-09-26 RX ADMIN — INSULIN GLARGINE 10 UNITS: 100 INJECTION, SOLUTION SUBCUTANEOUS at 09:53

## 2023-09-26 RX ADMIN — WATER 2000 MG: 1 INJECTION INTRAMUSCULAR; INTRAVENOUS; SUBCUTANEOUS at 12:38

## 2023-09-26 RX ADMIN — POTASSIUM CHLORIDE 40 MEQ: 750 TABLET, EXTENDED RELEASE ORAL at 09:53

## 2023-09-26 RX ADMIN — PANTOPRAZOLE SODIUM 40 MG: 40 TABLET, DELAYED RELEASE ORAL at 06:30

## 2023-09-26 RX ADMIN — AMLODIPINE BESYLATE 2.5 MG: 5 TABLET ORAL at 06:16

## 2023-09-26 RX ADMIN — POLYETHYLENE GLYCOL 3350 17 G: 17 POWDER, FOR SOLUTION ORAL at 09:54

## 2023-09-26 RX ADMIN — HYDRALAZINE HYDROCHLORIDE 100 MG: 25 TABLET, FILM COATED ORAL at 00:05

## 2023-09-26 RX ADMIN — HYDRALAZINE HYDROCHLORIDE 100 MG: 25 TABLET, FILM COATED ORAL at 06:16

## 2023-09-26 RX ADMIN — HYDRALAZINE HYDROCHLORIDE 100 MG: 25 TABLET, FILM COATED ORAL at 12:38

## 2023-09-26 RX ADMIN — TICAGRELOR 90 MG: 90 TABLET ORAL at 21:20

## 2023-09-26 RX ADMIN — Medication: at 09:42

## 2023-09-26 RX ADMIN — POTASSIUM CHLORIDE 40 MEQ: 750 TABLET, EXTENDED RELEASE ORAL at 21:20

## 2023-09-26 RX ADMIN — WATER 2000 MG: 1 INJECTION INTRAMUSCULAR; INTRAVENOUS; SUBCUTANEOUS at 05:23

## 2023-09-26 RX ADMIN — OXYCODONE HYDROCHLORIDE 10 MG: 5 TABLET ORAL at 00:05

## 2023-09-26 RX ADMIN — OXYCODONE HYDROCHLORIDE 10 MG: 5 TABLET ORAL at 06:30

## 2023-09-26 RX ADMIN — HEPARIN SODIUM 16 UNITS/KG/HR: 10000 INJECTION, SOLUTION INTRAVENOUS at 06:17

## 2023-09-26 ASSESSMENT — PAIN SCALES - GENERAL
PAINLEVEL_OUTOF10: 7
PAINLEVEL_OUTOF10: 3
PAINLEVEL_OUTOF10: 2
PAINLEVEL_OUTOF10: 3
PAINLEVEL_OUTOF10: 7
PAINLEVEL_OUTOF10: 2
PAINLEVEL_OUTOF10: 3
PAINLEVEL_OUTOF10: 2
PAINLEVEL_OUTOF10: 7

## 2023-09-26 ASSESSMENT — PAIN DESCRIPTION - LOCATION
LOCATION: CHEST

## 2023-09-26 ASSESSMENT — PAIN DESCRIPTION - ORIENTATION
ORIENTATION: ANTERIOR

## 2023-09-26 ASSESSMENT — PAIN DESCRIPTION - DESCRIPTORS
DESCRIPTORS: ACHING

## 2023-09-26 NOTE — WOUND CARE
WOCN Note:     Follow up visit for leg wound. Seen in 4333    Chart shows:  Admitted on 8/31/23. Admitted for heart failure & transferred for LVAD workup; Impella. S/p LVAD 9/19 and subsequent CVA 9/19  History of ischemic foot ulcer, s/p fem-pop bypass. Assessment:   Communicative and reports no pain. Sitting up in chair having breakfast.  Reports no pain. Surface: GUZMAN mattress    Bilateral heels intact and without erythema. 1. POA chronic traumatic ulceration to right medial malleolus  3.3 x 3.5 x 0.3 cm  Scant serosanguinous exudate; no malodor or purulence - no gross S&S of infection  Periwound intact & without erythema; some fibrinous material in base of wound with new epithelial tissue encroaching at margins; lots of large dry skin plaques. Cleaned wound and skin around it with saline  Tx: applied Puracol Ag and foam dressing      Wound Recommendations:    RLL: clean with saline, apply Puracol AG (left in room) and cover with foam dressing. Change every 3 days. PI Prevention:  Turn/reposition approximately every 2 hours  Offload heels with heels hanging off end of pillow at all times while in bed. Sacral Foam dressing: lift to assess regularly; change as needed. Discontinue if incontinence is frequently soiling dressing. Z-guard cream to buttocks and sacrum daily and as needed with incontinence care  Low Air Loss mattress: Use only flat sheet and one incontinence pad. Discussed with RN. No concerns to relay to provider.      Transition of Care: Plan to follow weekly and as needed while admitted to hospital.     AMARILIS AlfonsoN, RN, [facility] [place]  Certified Wound, Ostomy, Continence Nurse  office [phone]  Available via [facility]

## 2023-09-27 ENCOUNTER — APPOINTMENT (OUTPATIENT)
Facility: HOSPITAL | Age: 68
DRG: 161 | End: 2023-09-27
Attending: ANESTHESIOLOGY
Payer: MEDICAID

## 2023-09-27 LAB
ALBUMIN SERPL-MCNC: 3.4 G/DL (ref 3.5–5)
ALBUMIN/GLOB SERPL: 1.2 (ref 1.1–2.2)
ALP SERPL-CCNC: 146 U/L (ref 45–117)
ALT SERPL-CCNC: 36 U/L (ref 12–78)
ANION GAP SERPL CALC-SCNC: 4 MMOL/L (ref 5–15)
AST SERPL-CCNC: 49 U/L (ref 15–37)
BACTERIA SPEC CULT: NORMAL
BACTERIA SPEC CULT: NORMAL
BDY SITE: ABNORMAL
BILIRUB DIRECT SERPL-MCNC: 1.1 MG/DL (ref 0–0.2)
BILIRUB SERPL-MCNC: 1.5 MG/DL (ref 0.2–1)
BUN SERPL-MCNC: 24 MG/DL (ref 6–20)
BUN/CREAT SERPL: 29 (ref 12–20)
CALCIUM SERPL-MCNC: 8.7 MG/DL (ref 8.5–10.1)
CHLORIDE SERPL-SCNC: 104 MMOL/L (ref 97–108)
CO2 SERPL-SCNC: 27 MMOL/L (ref 21–32)
COHGB MFR BLD: 0.9 % (ref 1–2)
CREAT SERPL-MCNC: 0.82 MG/DL (ref 0.7–1.3)
EKG ATRIAL RATE: 75 BPM
EKG DIAGNOSIS: NORMAL
EKG Q-T INTERVAL: 406 MS
EKG QRS DURATION: 144 MS
EKG QTC CALCULATION (BAZETT): 453 MS
EKG R AXIS: 25 DEGREES
EKG T AXIS: 84 DEGREES
EKG VENTRICULAR RATE: 75 BPM
ERYTHROCYTE [DISTWIDTH] IN BLOOD BY AUTOMATED COUNT: 17.5 % (ref 11.5–14.5)
GLOBULIN SER CALC-MCNC: 2.9 G/DL (ref 2–4)
GLUCOSE BLD STRIP.AUTO-MCNC: 118 MG/DL (ref 65–117)
GLUCOSE BLD STRIP.AUTO-MCNC: 126 MG/DL (ref 65–117)
GLUCOSE BLD STRIP.AUTO-MCNC: 144 MG/DL (ref 65–117)
GLUCOSE BLD STRIP.AUTO-MCNC: 79 MG/DL (ref 65–117)
GLUCOSE SERPL-MCNC: 73 MG/DL (ref 65–100)
HCT VFR BLD AUTO: 25.2 % (ref 36.6–50.3)
HGB BLD-MCNC: 8 G/DL (ref 12.1–17)
INR PPP: 2.4 (ref 0.9–1.1)
LACTATE SERPL-SCNC: 0.6 MMOL/L (ref 0.4–2)
LDH SERPL L TO P-CCNC: 473 U/L (ref 85–241)
MAGNESIUM SERPL-MCNC: 1.8 MG/DL (ref 1.6–2.4)
MCH RBC QN AUTO: 29.7 PG (ref 26–34)
MCHC RBC AUTO-ENTMCNC: 31.7 G/DL (ref 30–36.5)
MCV RBC AUTO: 93.7 FL (ref 80–99)
METHGB MFR BLD: 0.2 % (ref 0–1.4)
NRBC # BLD: 0.06 K/UL (ref 0–0.01)
NRBC BLD-RTO: 0.6 PER 100 WBC
OXYHGB MFR BLD: 56.3 % (ref 94–97)
PLATELET # BLD AUTO: 223 K/UL (ref 150–400)
PMV BLD AUTO: 10.4 FL (ref 8.9–12.9)
POTASSIUM SERPL-SCNC: 4.6 MMOL/L (ref 3.5–5.1)
PROT SERPL-MCNC: 6.3 G/DL (ref 6.4–8.2)
PROTHROMBIN TIME: 24 SEC (ref 9–11.1)
RBC # BLD AUTO: 2.69 M/UL (ref 4.1–5.7)
SAO2 % BLD: 57 % (ref 95–99)
SERVICE CMNT-IMP: ABNORMAL
SERVICE CMNT-IMP: NORMAL
SODIUM SERPL-SCNC: 135 MMOL/L (ref 136–145)
SPECIMEN SITE: ABNORMAL
WBC # BLD AUTO: 10.3 K/UL (ref 4.1–11.1)

## 2023-09-27 PROCEDURE — 6370000000 HC RX 637 (ALT 250 FOR IP): Performed by: INTERNAL MEDICINE

## 2023-09-27 PROCEDURE — 99231 SBSQ HOSP IP/OBS SF/LOW 25: CPT | Performed by: INTERNAL MEDICINE

## 2023-09-27 PROCEDURE — 76937 US GUIDE VASCULAR ACCESS: CPT

## 2023-09-27 PROCEDURE — 2580000003 HC RX 258: Performed by: NURSE PRACTITIONER

## 2023-09-27 PROCEDURE — C1751 CATH, INF, PER/CENT/MIDLINE: HCPCS

## 2023-09-27 PROCEDURE — 83615 LACTATE (LD) (LDH) ENZYME: CPT

## 2023-09-27 PROCEDURE — 6360000002 HC RX W HCPCS: Performed by: NURSE PRACTITIONER

## 2023-09-27 PROCEDURE — 97530 THERAPEUTIC ACTIVITIES: CPT

## 2023-09-27 PROCEDURE — 85610 PROTHROMBIN TIME: CPT

## 2023-09-27 PROCEDURE — 80048 BASIC METABOLIC PNL TOTAL CA: CPT

## 2023-09-27 PROCEDURE — 80076 HEPATIC FUNCTION PANEL: CPT

## 2023-09-27 PROCEDURE — 97535 SELF CARE MNGMENT TRAINING: CPT

## 2023-09-27 PROCEDURE — 36415 COLL VENOUS BLD VENIPUNCTURE: CPT

## 2023-09-27 PROCEDURE — 83605 ASSAY OF LACTIC ACID: CPT

## 2023-09-27 PROCEDURE — 6370000000 HC RX 637 (ALT 250 FOR IP): Performed by: NURSE PRACTITIONER

## 2023-09-27 PROCEDURE — 82962 GLUCOSE BLOOD TEST: CPT

## 2023-09-27 PROCEDURE — 83050 HGB METHEMOGLOBIN QUAN: CPT

## 2023-09-27 PROCEDURE — 85027 COMPLETE CBC AUTOMATED: CPT

## 2023-09-27 PROCEDURE — 36410 VNPNXR 3YR/> PHY/QHP DX/THER: CPT

## 2023-09-27 PROCEDURE — 82375 ASSAY CARBOXYHB QUANT: CPT

## 2023-09-27 PROCEDURE — 71045 X-RAY EXAM CHEST 1 VIEW: CPT

## 2023-09-27 PROCEDURE — 2060000000 HC ICU INTERMEDIATE R&B

## 2023-09-27 PROCEDURE — 83735 ASSAY OF MAGNESIUM: CPT

## 2023-09-27 PROCEDURE — 93750 INTERROGATION VAD IN PERSON: CPT | Performed by: INTERNAL MEDICINE

## 2023-09-27 PROCEDURE — 94660 CPAP INITIATION&MGMT: CPT

## 2023-09-27 PROCEDURE — 2709999900 HC NON-CHARGEABLE SUPPLY

## 2023-09-27 PROCEDURE — 05HC33Z INSERTION OF INFUSION DEVICE INTO LEFT BASILIC VEIN, PERCUTANEOUS APPROACH: ICD-10-PCS | Performed by: INTERNAL MEDICINE

## 2023-09-27 RX ORDER — AMIODARONE HYDROCHLORIDE 200 MG/1
200 TABLET ORAL DAILY
Status: DISCONTINUED | OUTPATIENT
Start: 2023-09-28 | End: 2023-10-03 | Stop reason: HOSPADM

## 2023-09-27 RX ORDER — WARFARIN SODIUM 2 MG/1
2 TABLET ORAL
Status: COMPLETED | OUTPATIENT
Start: 2023-09-27 | End: 2023-09-27

## 2023-09-27 RX ADMIN — AMLODIPINE BESYLATE 5 MG: 5 TABLET ORAL at 17:20

## 2023-09-27 RX ADMIN — SUCRALFATE 1 G: 1 TABLET ORAL at 21:05

## 2023-09-27 RX ADMIN — AMLODIPINE BESYLATE 5 MG: 5 TABLET ORAL at 07:16

## 2023-09-27 RX ADMIN — TICAGRELOR 90 MG: 90 TABLET ORAL at 21:06

## 2023-09-27 RX ADMIN — HYDRALAZINE HYDROCHLORIDE 100 MG: 25 TABLET, FILM COATED ORAL at 01:56

## 2023-09-27 RX ADMIN — WATER 2000 MG: 1 INJECTION INTRAMUSCULAR; INTRAVENOUS; SUBCUTANEOUS at 21:06

## 2023-09-27 RX ADMIN — MUPIROCIN: 20 OINTMENT TOPICAL at 21:03

## 2023-09-27 RX ADMIN — AMIODARONE HYDROCHLORIDE 200 MG: 200 TABLET ORAL at 09:25

## 2023-09-27 RX ADMIN — MAGNESIUM SULFATE HEPTAHYDRATE 2000 MG: 40 INJECTION, SOLUTION INTRAVENOUS at 05:31

## 2023-09-27 RX ADMIN — WATER 2000 MG: 1 INJECTION INTRAMUSCULAR; INTRAVENOUS; SUBCUTANEOUS at 05:25

## 2023-09-27 RX ADMIN — SPIRONOLACTONE 12.5 MG: 25 TABLET ORAL at 09:29

## 2023-09-27 RX ADMIN — INSULIN GLARGINE 10 UNITS: 100 INJECTION, SOLUTION SUBCUTANEOUS at 09:23

## 2023-09-27 RX ADMIN — PANTOPRAZOLE SODIUM 40 MG: 40 TABLET, DELAYED RELEASE ORAL at 07:16

## 2023-09-27 RX ADMIN — WATER 2000 MG: 1 INJECTION INTRAMUSCULAR; INTRAVENOUS; SUBCUTANEOUS at 12:33

## 2023-09-27 RX ADMIN — MUPIROCIN: 20 OINTMENT TOPICAL at 09:15

## 2023-09-27 RX ADMIN — POTASSIUM CHLORIDE 40 MEQ: 750 TABLET, EXTENDED RELEASE ORAL at 21:05

## 2023-09-27 RX ADMIN — HYDRALAZINE HYDROCHLORIDE 100 MG: 25 TABLET, FILM COATED ORAL at 12:33

## 2023-09-27 RX ADMIN — SUCRALFATE 1 G: 1 TABLET ORAL at 07:16

## 2023-09-27 RX ADMIN — OXYCODONE HYDROCHLORIDE 10 MG: 5 TABLET ORAL at 21:05

## 2023-09-27 RX ADMIN — SODIUM CHLORIDE, PRESERVATIVE FREE 10 ML: 5 INJECTION INTRAVENOUS at 21:03

## 2023-09-27 RX ADMIN — SUCRALFATE 1 G: 1 TABLET ORAL at 12:33

## 2023-09-27 RX ADMIN — CHLORHEXIDINE GLUCONATE 15 ML: 1.2 RINSE ORAL at 21:03

## 2023-09-27 RX ADMIN — ATORVASTATIN CALCIUM 40 MG: 40 TABLET, FILM COATED ORAL at 21:05

## 2023-09-27 RX ADMIN — WARFARIN SODIUM 2 MG: 2 TABLET ORAL at 17:20

## 2023-09-27 RX ADMIN — FUROSEMIDE 40 MG: 10 INJECTION, SOLUTION INTRAMUSCULAR; INTRAVENOUS at 17:22

## 2023-09-27 RX ADMIN — TICAGRELOR 90 MG: 90 TABLET ORAL at 09:29

## 2023-09-27 RX ADMIN — HYDRALAZINE HYDROCHLORIDE 100 MG: 25 TABLET, FILM COATED ORAL at 07:15

## 2023-09-27 RX ADMIN — HYDRALAZINE HYDROCHLORIDE 100 MG: 25 TABLET, FILM COATED ORAL at 17:19

## 2023-09-27 RX ADMIN — CHLORHEXIDINE GLUCONATE 15 ML: 1.2 RINSE ORAL at 09:15

## 2023-09-27 RX ADMIN — SODIUM CHLORIDE, PRESERVATIVE FREE 10 ML: 5 INJECTION INTRAVENOUS at 09:15

## 2023-09-27 RX ADMIN — FUROSEMIDE 40 MG: 10 INJECTION, SOLUTION INTRAMUSCULAR; INTRAVENOUS at 05:25

## 2023-09-27 ASSESSMENT — PAIN SCALES - GENERAL
PAINLEVEL_OUTOF10: 3
PAINLEVEL_OUTOF10: 6
PAINLEVEL_OUTOF10: 6
PAINLEVEL_OUTOF10: 2
PAINLEVEL_OUTOF10: 2
PAINLEVEL_OUTOF10: 0

## 2023-09-27 ASSESSMENT — PAIN DESCRIPTION - LOCATION: LOCATION: CHEST

## 2023-09-27 ASSESSMENT — PAIN DESCRIPTION - DESCRIPTORS: DESCRIPTORS: ACHING

## 2023-09-27 ASSESSMENT — PAIN DESCRIPTION - ORIENTATION: ORIENTATION: ANTERIOR

## 2023-09-27 NOTE — OP NOTE
[facility]  OPERATIVE REPORT    Name:  Michael Carlton  MR#:  [id number]  :  1955  ACCOUNT #:  [de-identified]  DATE OF SERVICE:  2023    PREOPERATIVE DIAGNOSIS:  New York Heart Association class IV acute-on-chronic systolic heart failure (stage C heart failure). POSTOPERATIVE DIAGNOSIS:  New York Heart Association class IV acute-on-chronic systolic heart failure (stage C heart failure). PROCEDURES PERFORMED:  1. Insertion of ventricular assist device, implantable, intracorporeal, single ventricle (CPT code 28776). 2.  Removal of percutaneous ventricular assist device (CPT code 50733). 3.  Embolectomy and thrombectomy of the right subclavian axillary artery by chest incision (CPT code 63030). SURGEON:  Betsy Kidd MD    ASSISTANT:  CORINNA Schultz    ANESTHESIA:  General endotracheal anesthesia. COMPLICATIONS:  None. SPECIMENS REMOVED:  Apical core. IMPLANTS:  HeartMate 3 left ventricular assist device. ESTIMATED BLOOD LOSS:  50 mL. INDICATIONS:  The patient is a very pleasant 27-year-old gentleman who recently developed cardiogenic shock, was unable to be weaned from his Impella device. He is now being brought to operating room to have a permanent ventricular assist device placed. PROCEDURE:  The patient was brought to the operating room, had a right radial A-line placed without complications, McDowell-Nichole catheter was placed without complications, underwent general endotracheal anesthesia without complications. Chest, abdomen, and lower extremities were prepped and draped in the usual fashion. A midline incision was made on the patient's sternum. Cautery dissection was used to dissect down to the level of sternal bone and the sternal bone with a sagittal saw, and the left pleural space was entered. We then tunneled the HeartMate 3 left ventricular assist device   given appropriate dose of heparin.   We placed 2 pursestring sutures in the ascending

## 2023-09-27 NOTE — PROCEDURES
Chest tubes removed. Suction off. Sites cleaned with chlorhexidine. Sutures cut. Pt held his breath during removal.  Tubes removed. Intact. Pt tolerated procedure well. Sites covered with Xeroform and gauze and taped.   Chest xray in am.

## 2023-09-28 ENCOUNTER — TELEPHONE (OUTPATIENT)
Age: 68
End: 2023-09-28

## 2023-09-28 ENCOUNTER — APPOINTMENT (OUTPATIENT)
Facility: HOSPITAL | Age: 68
DRG: 161 | End: 2023-09-28
Attending: ANESTHESIOLOGY
Payer: MEDICAID

## 2023-09-28 LAB
ALBUMIN SERPL-MCNC: 3.2 G/DL (ref 3.5–5)
ALBUMIN/GLOB SERPL: 0.9 (ref 1.1–2.2)
ALP SERPL-CCNC: 152 U/L (ref 45–117)
ALT SERPL-CCNC: 19 U/L (ref 12–78)
ANION GAP SERPL CALC-SCNC: 6 MMOL/L (ref 5–15)
APPEARANCE UR: CLEAR
AST SERPL-CCNC: 34 U/L (ref 15–37)
BACTERIA URNS QL MICRO: NEGATIVE /HPF
BILIRUB DIRECT SERPL-MCNC: 0.9 MG/DL (ref 0–0.2)
BILIRUB SERPL-MCNC: 1.4 MG/DL (ref 0.2–1)
BILIRUB UR QL: NEGATIVE
BUN SERPL-MCNC: 19 MG/DL (ref 6–20)
BUN/CREAT SERPL: 23 (ref 12–20)
CALCIUM SERPL-MCNC: 8.6 MG/DL (ref 8.5–10.1)
CHLORIDE SERPL-SCNC: 101 MMOL/L (ref 97–108)
CO2 SERPL-SCNC: 26 MMOL/L (ref 21–32)
COLOR UR: ABNORMAL
CREAT SERPL-MCNC: 0.82 MG/DL (ref 0.7–1.3)
EPITH CASTS URNS QL MICRO: ABNORMAL /LPF
ERYTHROCYTE [DISTWIDTH] IN BLOOD BY AUTOMATED COUNT: 17 % (ref 11.5–14.5)
GLOBULIN SER CALC-MCNC: 3.5 G/DL (ref 2–4)
GLUCOSE BLD STRIP.AUTO-MCNC: 118 MG/DL (ref 65–117)
GLUCOSE BLD STRIP.AUTO-MCNC: 130 MG/DL (ref 65–117)
GLUCOSE BLD STRIP.AUTO-MCNC: 142 MG/DL (ref 65–117)
GLUCOSE SERPL-MCNC: 95 MG/DL (ref 65–100)
GLUCOSE UR STRIP.AUTO-MCNC: >1000 MG/DL
HCT VFR BLD AUTO: 25.2 % (ref 36.6–50.3)
HGB BLD-MCNC: 8.1 G/DL (ref 12.1–17)
HGB UR QL STRIP: NEGATIVE
HYALINE CASTS URNS QL MICRO: ABNORMAL /LPF (ref 0–5)
INR PPP: 1.9 (ref 0.9–1.1)
INR PPP: 2 (ref 0.9–1.1)
KETONES UR QL STRIP.AUTO: NEGATIVE MG/DL
LDH SERPL L TO P-CCNC: 501 U/L (ref 85–241)
LEUKOCYTE ESTERASE UR QL STRIP.AUTO: NEGATIVE
MAGNESIUM SERPL-MCNC: 1.9 MG/DL (ref 1.6–2.4)
MCH RBC QN AUTO: 30.1 PG (ref 26–34)
MCHC RBC AUTO-ENTMCNC: 32.1 G/DL (ref 30–36.5)
MCV RBC AUTO: 93.7 FL (ref 80–99)
NITRITE UR QL STRIP.AUTO: NEGATIVE
NRBC # BLD: 0 K/UL (ref 0–0.01)
NRBC BLD-RTO: 0 PER 100 WBC
PH UR STRIP: 7.5 (ref 5–8)
PLATELET # BLD AUTO: 236 K/UL (ref 150–400)
PMV BLD AUTO: 10.4 FL (ref 8.9–12.9)
POTASSIUM SERPL-SCNC: 4.3 MMOL/L (ref 3.5–5.1)
PROT SERPL-MCNC: 6.7 G/DL (ref 6.4–8.2)
PROT UR STRIP-MCNC: ABNORMAL MG/DL
PROTHROMBIN TIME: 18.8 SEC (ref 9–11.1)
PROTHROMBIN TIME: 19.9 SEC (ref 9–11.1)
RBC # BLD AUTO: 2.69 M/UL (ref 4.1–5.7)
RBC #/AREA URNS HPF: ABNORMAL /HPF (ref 0–5)
SERVICE CMNT-IMP: ABNORMAL
SODIUM SERPL-SCNC: 133 MMOL/L (ref 136–145)
SP GR UR REFRACTOMETRY: 1.01 (ref 1–1.03)
URINE CULTURE IF INDICATED: ABNORMAL
UROBILINOGEN UR QL STRIP.AUTO: 0.2 EU/DL (ref 0.2–1)
WBC # BLD AUTO: 9.6 K/UL (ref 4.1–11.1)
WBC URNS QL MICRO: ABNORMAL /HPF (ref 0–4)

## 2023-09-28 PROCEDURE — 80076 HEPATIC FUNCTION PANEL: CPT

## 2023-09-28 PROCEDURE — 82962 GLUCOSE BLOOD TEST: CPT

## 2023-09-28 PROCEDURE — 6370000000 HC RX 637 (ALT 250 FOR IP): Performed by: INTERNAL MEDICINE

## 2023-09-28 PROCEDURE — 99232 SBSQ HOSP IP/OBS MODERATE 35: CPT | Performed by: NURSE PRACTITIONER

## 2023-09-28 PROCEDURE — 83735 ASSAY OF MAGNESIUM: CPT

## 2023-09-28 PROCEDURE — 6370000000 HC RX 637 (ALT 250 FOR IP): Performed by: NURSE PRACTITIONER

## 2023-09-28 PROCEDURE — 2580000003 HC RX 258: Performed by: NURSE PRACTITIONER

## 2023-09-28 PROCEDURE — 99231 SBSQ HOSP IP/OBS SF/LOW 25: CPT

## 2023-09-28 PROCEDURE — 71045 X-RAY EXAM CHEST 1 VIEW: CPT

## 2023-09-28 PROCEDURE — 81001 URINALYSIS AUTO W/SCOPE: CPT

## 2023-09-28 PROCEDURE — 2060000000 HC ICU INTERMEDIATE R&B

## 2023-09-28 PROCEDURE — 92953 TEMPORARY EXTERNAL PACING: CPT

## 2023-09-28 PROCEDURE — 6360000002 HC RX W HCPCS: Performed by: NURSE PRACTITIONER

## 2023-09-28 PROCEDURE — 97535 SELF CARE MNGMENT TRAINING: CPT

## 2023-09-28 PROCEDURE — 36415 COLL VENOUS BLD VENIPUNCTURE: CPT

## 2023-09-28 PROCEDURE — 97110 THERAPEUTIC EXERCISES: CPT

## 2023-09-28 PROCEDURE — 97116 GAIT TRAINING THERAPY: CPT

## 2023-09-28 PROCEDURE — 80048 BASIC METABOLIC PNL TOTAL CA: CPT

## 2023-09-28 PROCEDURE — 83615 LACTATE (LD) (LDH) ENZYME: CPT

## 2023-09-28 PROCEDURE — 85027 COMPLETE CBC AUTOMATED: CPT

## 2023-09-28 PROCEDURE — 85610 PROTHROMBIN TIME: CPT

## 2023-09-28 RX ORDER — WARFARIN SODIUM 2 MG/1
2 TABLET ORAL
Status: COMPLETED | OUTPATIENT
Start: 2023-09-28 | End: 2023-09-28

## 2023-09-28 RX ORDER — POTASSIUM CHLORIDE 750 MG/1
40 TABLET, FILM COATED, EXTENDED RELEASE ORAL DAILY
Status: DISCONTINUED | OUTPATIENT
Start: 2023-09-28 | End: 2023-10-03 | Stop reason: HOSPADM

## 2023-09-28 RX ORDER — HYDRALAZINE HYDROCHLORIDE 50 MG/1
100 TABLET, FILM COATED ORAL EVERY 8 HOURS SCHEDULED
Status: DISCONTINUED | OUTPATIENT
Start: 2023-09-28 | End: 2023-10-03 | Stop reason: HOSPADM

## 2023-09-28 RX ORDER — ISOSORBIDE DINITRATE 20 MG/1
20 TABLET ORAL EVERY 8 HOURS
Status: DISCONTINUED | OUTPATIENT
Start: 2023-09-28 | End: 2023-10-03 | Stop reason: HOSPADM

## 2023-09-28 RX ORDER — FUROSEMIDE 20 MG/1
20 TABLET ORAL DAILY
Status: DISCONTINUED | OUTPATIENT
Start: 2023-09-29 | End: 2023-10-01

## 2023-09-28 RX ORDER — FUROSEMIDE 10 MG/ML
40 INJECTION INTRAMUSCULAR; INTRAVENOUS DAILY
Status: DISCONTINUED | OUTPATIENT
Start: 2023-09-29 | End: 2023-09-28

## 2023-09-28 RX ADMIN — POTASSIUM CHLORIDE 40 MEQ: 750 TABLET, FILM COATED, EXTENDED RELEASE ORAL at 09:14

## 2023-09-28 RX ADMIN — HYDRALAZINE HYDROCHLORIDE 100 MG: 50 TABLET, FILM COATED ORAL at 13:00

## 2023-09-28 RX ADMIN — SPIRONOLACTONE 12.5 MG: 25 TABLET ORAL at 09:14

## 2023-09-28 RX ADMIN — PANTOPRAZOLE SODIUM 40 MG: 40 TABLET, DELAYED RELEASE ORAL at 06:59

## 2023-09-28 RX ADMIN — SODIUM CHLORIDE, PRESERVATIVE FREE 5 ML: 5 INJECTION INTRAVENOUS at 09:16

## 2023-09-28 RX ADMIN — WATER 2000 MG: 1 INJECTION INTRAMUSCULAR; INTRAVENOUS; SUBCUTANEOUS at 05:19

## 2023-09-28 RX ADMIN — HYDRALAZINE HYDROCHLORIDE 100 MG: 25 TABLET, FILM COATED ORAL at 00:07

## 2023-09-28 RX ADMIN — WATER 2000 MG: 1 INJECTION INTRAMUSCULAR; INTRAVENOUS; SUBCUTANEOUS at 21:40

## 2023-09-28 RX ADMIN — MUPIROCIN: 20 OINTMENT TOPICAL at 21:44

## 2023-09-28 RX ADMIN — MUPIROCIN: 20 OINTMENT TOPICAL at 09:16

## 2023-09-28 RX ADMIN — HYDRALAZINE HYDROCHLORIDE 100 MG: 50 TABLET, FILM COATED ORAL at 21:40

## 2023-09-28 RX ADMIN — SUCRALFATE 1 G: 1 TABLET ORAL at 21:40

## 2023-09-28 RX ADMIN — OXYCODONE HYDROCHLORIDE 10 MG: 5 TABLET ORAL at 18:35

## 2023-09-28 RX ADMIN — HYDRALAZINE HYDROCHLORIDE 100 MG: 25 TABLET, FILM COATED ORAL at 06:59

## 2023-09-28 RX ADMIN — SODIUM CHLORIDE, PRESERVATIVE FREE 10 ML: 5 INJECTION INTRAVENOUS at 21:47

## 2023-09-28 RX ADMIN — SUCRALFATE 1 G: 1 TABLET ORAL at 06:59

## 2023-09-28 RX ADMIN — CHLORHEXIDINE GLUCONATE 15 ML: 1.2 RINSE ORAL at 09:15

## 2023-09-28 RX ADMIN — ISOSORBIDE DINITRATE 20 MG: 20 TABLET ORAL at 13:00

## 2023-09-28 RX ADMIN — SUCRALFATE 1 G: 1 TABLET ORAL at 09:14

## 2023-09-28 RX ADMIN — Medication: at 09:15

## 2023-09-28 RX ADMIN — FUROSEMIDE 40 MG: 10 INJECTION, SOLUTION INTRAMUSCULAR; INTRAVENOUS at 05:19

## 2023-09-28 RX ADMIN — Medication: at 21:44

## 2023-09-28 RX ADMIN — ATORVASTATIN CALCIUM 40 MG: 40 TABLET, FILM COATED ORAL at 21:40

## 2023-09-28 RX ADMIN — TICAGRELOR 90 MG: 90 TABLET ORAL at 21:40

## 2023-09-28 RX ADMIN — AMLODIPINE BESYLATE 5 MG: 5 TABLET ORAL at 18:30

## 2023-09-28 RX ADMIN — INSULIN GLARGINE 10 UNITS: 100 INJECTION, SOLUTION SUBCUTANEOUS at 09:15

## 2023-09-28 RX ADMIN — WATER 2000 MG: 1 INJECTION INTRAMUSCULAR; INTRAVENOUS; SUBCUTANEOUS at 13:00

## 2023-09-28 RX ADMIN — Medication 1 UNITS: at 21:59

## 2023-09-28 RX ADMIN — AMIODARONE HYDROCHLORIDE 200 MG: 200 TABLET ORAL at 09:14

## 2023-09-28 RX ADMIN — WARFARIN SODIUM 2 MG: 2 TABLET ORAL at 18:30

## 2023-09-28 RX ADMIN — WARFARIN SODIUM 2 MG: 2 TABLET ORAL at 09:21

## 2023-09-28 RX ADMIN — ISOSORBIDE DINITRATE 20 MG: 20 TABLET ORAL at 21:40

## 2023-09-28 RX ADMIN — EMPAGLIFLOZIN 10 MG: 10 TABLET, FILM COATED ORAL at 09:14

## 2023-09-28 RX ADMIN — SUCRALFATE 1 G: 1 TABLET ORAL at 18:30

## 2023-09-28 RX ADMIN — TICAGRELOR 90 MG: 90 TABLET ORAL at 09:14

## 2023-09-28 RX ADMIN — AMLODIPINE BESYLATE 5 MG: 5 TABLET ORAL at 06:59

## 2023-09-28 ASSESSMENT — PAIN SCALES - WONG BAKER
WONGBAKER_NUMERICALRESPONSE: 0
WONGBAKER_NUMERICALRESPONSE: 0

## 2023-09-28 ASSESSMENT — PAIN SCALES - GENERAL
PAINLEVEL_OUTOF10: 0
PAINLEVEL_OUTOF10: 2
PAINLEVEL_OUTOF10: 2

## 2023-09-28 NOTE — TELEPHONE ENCOUNTER
Called and spoke to patient's nephew Eunice Chauhan who stated he came to floor for dressing change education Monday. Stated he will plan to come to floor Saturday and Sunday to continue education. Also will plan to come to clinic Friday 10/06 at 10am for equipment education. Eunice Chauhan stated that he thinks his travel may be postponed, confirmed that his mother Ya Navarro agreed to act as a back up caregiver in the event that Eunice Chauhan needs to travel.

## 2023-09-28 NOTE — CARE COORDINATION
Transition of Care Plan:     RUR: 17%--moderate  Prior Level of Functioning: independent  Disposition: home with [address] [facility] PT/OT/SN with [facility] vs Rehab  Follow up appointments: cardiology, PCP, AHFT  DME needed: N/A  Transportation at discharge: in car w/family  IM/IMM Medicare/ letter given: 9/1/23  Is patient a  and connected with VA? no  Caregiver Contact:   Kishore Torres, significant other, [facility], sister, [phone]  Discharge Caregiver contacted prior to discharge? N/A  Care Conference needed? yes--family meeting to discuss goals of care. Barriers to discharge: clinical status  Update-9/28/23  Post-op LVAD. Therapy recommendations are for IPR. He will need authorization prior to admit. Not near ready to discharge. CM following for progress and needs.

## 2023-09-29 LAB
ALBUMIN SERPL-MCNC: 3.1 G/DL (ref 3.5–5)
ALBUMIN/GLOB SERPL: 0.9 (ref 1.1–2.2)
ALP SERPL-CCNC: 160 U/L (ref 45–117)
ALT SERPL-CCNC: 20 U/L (ref 12–78)
ANION GAP SERPL CALC-SCNC: 5 MMOL/L (ref 5–15)
AST SERPL-CCNC: 32 U/L (ref 15–37)
BILIRUB DIRECT SERPL-MCNC: 0.6 MG/DL (ref 0–0.2)
BILIRUB SERPL-MCNC: 1.1 MG/DL (ref 0.2–1)
BUN SERPL-MCNC: 18 MG/DL (ref 6–20)
BUN/CREAT SERPL: 22 (ref 12–20)
CALCIUM SERPL-MCNC: 8.8 MG/DL (ref 8.5–10.1)
CHLORIDE SERPL-SCNC: 102 MMOL/L (ref 97–108)
CO2 SERPL-SCNC: 24 MMOL/L (ref 21–32)
CREAT SERPL-MCNC: 0.82 MG/DL (ref 0.7–1.3)
ERYTHROCYTE [DISTWIDTH] IN BLOOD BY AUTOMATED COUNT: 17.1 % (ref 11.5–14.5)
FERRITIN SERPL-MCNC: 522 NG/ML (ref 26–388)
FOLATE SERPL-MCNC: 9 NG/ML (ref 5–21)
GLOBULIN SER CALC-MCNC: 3.6 G/DL (ref 2–4)
GLUCOSE BLD STRIP.AUTO-MCNC: 110 MG/DL (ref 65–117)
GLUCOSE BLD STRIP.AUTO-MCNC: 131 MG/DL (ref 65–117)
GLUCOSE BLD STRIP.AUTO-MCNC: 133 MG/DL (ref 65–117)
GLUCOSE BLD STRIP.AUTO-MCNC: 171 MG/DL (ref 65–117)
GLUCOSE SERPL-MCNC: 76 MG/DL (ref 65–100)
HCT VFR BLD AUTO: 24.5 % (ref 36.6–50.3)
HGB BLD-MCNC: 7.8 G/DL (ref 12.1–17)
INR PPP: 2.2 (ref 0.9–1.1)
IRON SATN MFR SERPL: 24 % (ref 20–50)
IRON SERPL-MCNC: 36 UG/DL (ref 35–150)
LDH SERPL L TO P-CCNC: 503 U/L (ref 85–241)
MAGNESIUM SERPL-MCNC: 2 MG/DL (ref 1.6–2.4)
MCH RBC QN AUTO: 29.8 PG (ref 26–34)
MCHC RBC AUTO-ENTMCNC: 31.8 G/DL (ref 30–36.5)
MCV RBC AUTO: 93.5 FL (ref 80–99)
NRBC # BLD: 0 K/UL (ref 0–0.01)
NRBC BLD-RTO: 0 PER 100 WBC
PLATELET # BLD AUTO: 247 K/UL (ref 150–400)
PMV BLD AUTO: 10.4 FL (ref 8.9–12.9)
POTASSIUM SERPL-SCNC: 4.5 MMOL/L (ref 3.5–5.1)
PROT SERPL-MCNC: 6.7 G/DL (ref 6.4–8.2)
PROTHROMBIN TIME: 21.7 SEC (ref 9–11.1)
RBC # BLD AUTO: 2.62 M/UL (ref 4.1–5.7)
SERVICE CMNT-IMP: ABNORMAL
SERVICE CMNT-IMP: NORMAL
SODIUM SERPL-SCNC: 131 MMOL/L (ref 136–145)
TIBC SERPL-MCNC: 151 UG/DL (ref 250–450)
VIT B12 SERPL-MCNC: 813 PG/ML (ref 193–986)
WBC # BLD AUTO: 9.5 K/UL (ref 4.1–11.1)

## 2023-09-29 PROCEDURE — 6370000000 HC RX 637 (ALT 250 FOR IP): Performed by: INTERNAL MEDICINE

## 2023-09-29 PROCEDURE — 2580000003 HC RX 258: Performed by: NURSE PRACTITIONER

## 2023-09-29 PROCEDURE — 83540 ASSAY OF IRON: CPT

## 2023-09-29 PROCEDURE — 82746 ASSAY OF FOLIC ACID SERUM: CPT

## 2023-09-29 PROCEDURE — 97530 THERAPEUTIC ACTIVITIES: CPT

## 2023-09-29 PROCEDURE — 85027 COMPLETE CBC AUTOMATED: CPT

## 2023-09-29 PROCEDURE — 83735 ASSAY OF MAGNESIUM: CPT

## 2023-09-29 PROCEDURE — 97112 NEUROMUSCULAR REEDUCATION: CPT

## 2023-09-29 PROCEDURE — 93750 INTERROGATION VAD IN PERSON: CPT | Performed by: NURSE PRACTITIONER

## 2023-09-29 PROCEDURE — 2060000000 HC ICU INTERMEDIATE R&B

## 2023-09-29 PROCEDURE — 6370000000 HC RX 637 (ALT 250 FOR IP): Performed by: NURSE PRACTITIONER

## 2023-09-29 PROCEDURE — 99232 SBSQ HOSP IP/OBS MODERATE 35: CPT | Performed by: NURSE PRACTITIONER

## 2023-09-29 PROCEDURE — 97116 GAIT TRAINING THERAPY: CPT

## 2023-09-29 PROCEDURE — 80076 HEPATIC FUNCTION PANEL: CPT

## 2023-09-29 PROCEDURE — 80048 BASIC METABOLIC PNL TOTAL CA: CPT

## 2023-09-29 PROCEDURE — 97535 SELF CARE MNGMENT TRAINING: CPT

## 2023-09-29 PROCEDURE — 83615 LACTATE (LD) (LDH) ENZYME: CPT

## 2023-09-29 PROCEDURE — 36415 COLL VENOUS BLD VENIPUNCTURE: CPT

## 2023-09-29 PROCEDURE — 85610 PROTHROMBIN TIME: CPT

## 2023-09-29 PROCEDURE — 82728 ASSAY OF FERRITIN: CPT

## 2023-09-29 PROCEDURE — 82607 VITAMIN B-12: CPT

## 2023-09-29 PROCEDURE — 6360000002 HC RX W HCPCS: Performed by: NURSE PRACTITIONER

## 2023-09-29 PROCEDURE — 82962 GLUCOSE BLOOD TEST: CPT

## 2023-09-29 PROCEDURE — 83550 IRON BINDING TEST: CPT

## 2023-09-29 RX ORDER — WARFARIN SODIUM 4 MG/1
4 TABLET ORAL
Status: COMPLETED | OUTPATIENT
Start: 2023-09-29 | End: 2023-09-29

## 2023-09-29 RX ADMIN — ISOSORBIDE DINITRATE 20 MG: 20 TABLET ORAL at 21:20

## 2023-09-29 RX ADMIN — Medication 2 UNITS: at 13:13

## 2023-09-29 RX ADMIN — MUPIROCIN: 20 OINTMENT TOPICAL at 08:27

## 2023-09-29 RX ADMIN — SUCRALFATE 1 G: 1 TABLET ORAL at 22:16

## 2023-09-29 RX ADMIN — SUCRALFATE 1 G: 1 TABLET ORAL at 13:20

## 2023-09-29 RX ADMIN — WATER 2000 MG: 1 INJECTION INTRAMUSCULAR; INTRAVENOUS; SUBCUTANEOUS at 06:25

## 2023-09-29 RX ADMIN — TICAGRELOR 90 MG: 90 TABLET ORAL at 21:20

## 2023-09-29 RX ADMIN — SUCRALFATE 1 G: 1 TABLET ORAL at 17:36

## 2023-09-29 RX ADMIN — Medication: at 08:27

## 2023-09-29 RX ADMIN — MUPIROCIN: 20 OINTMENT TOPICAL at 21:22

## 2023-09-29 RX ADMIN — TICAGRELOR 90 MG: 90 TABLET ORAL at 08:26

## 2023-09-29 RX ADMIN — WATER 2000 MG: 1 INJECTION INTRAMUSCULAR; INTRAVENOUS; SUBCUTANEOUS at 13:20

## 2023-09-29 RX ADMIN — HYDRALAZINE HYDROCHLORIDE 100 MG: 50 TABLET, FILM COATED ORAL at 06:26

## 2023-09-29 RX ADMIN — SPIRONOLACTONE 12.5 MG: 25 TABLET ORAL at 08:27

## 2023-09-29 RX ADMIN — HYDRALAZINE HYDROCHLORIDE 100 MG: 50 TABLET, FILM COATED ORAL at 13:20

## 2023-09-29 RX ADMIN — AMLODIPINE BESYLATE 5 MG: 5 TABLET ORAL at 17:36

## 2023-09-29 RX ADMIN — ISOSORBIDE DINITRATE 20 MG: 20 TABLET ORAL at 06:26

## 2023-09-29 RX ADMIN — EMPAGLIFLOZIN 10 MG: 10 TABLET, FILM COATED ORAL at 08:26

## 2023-09-29 RX ADMIN — SUCRALFATE 1 G: 1 TABLET ORAL at 06:26

## 2023-09-29 RX ADMIN — WATER 2000 MG: 1 INJECTION INTRAMUSCULAR; INTRAVENOUS; SUBCUTANEOUS at 21:19

## 2023-09-29 RX ADMIN — HYDRALAZINE HYDROCHLORIDE 100 MG: 50 TABLET, FILM COATED ORAL at 22:16

## 2023-09-29 RX ADMIN — SODIUM CHLORIDE, PRESERVATIVE FREE 10 ML: 5 INJECTION INTRAVENOUS at 21:26

## 2023-09-29 RX ADMIN — ATORVASTATIN CALCIUM 40 MG: 40 TABLET, FILM COATED ORAL at 21:20

## 2023-09-29 RX ADMIN — WARFARIN SODIUM 4 MG: 4 TABLET ORAL at 17:36

## 2023-09-29 RX ADMIN — PANTOPRAZOLE SODIUM 40 MG: 40 TABLET, DELAYED RELEASE ORAL at 06:26

## 2023-09-29 RX ADMIN — POTASSIUM CHLORIDE 40 MEQ: 750 TABLET, FILM COATED, EXTENDED RELEASE ORAL at 08:26

## 2023-09-29 RX ADMIN — FUROSEMIDE 20 MG: 20 TABLET ORAL at 08:26

## 2023-09-29 RX ADMIN — OXYCODONE HYDROCHLORIDE 5 MG: 5 TABLET ORAL at 21:20

## 2023-09-29 RX ADMIN — POLYETHYLENE GLYCOL 3350 17 G: 17 POWDER, FOR SOLUTION ORAL at 08:27

## 2023-09-29 RX ADMIN — AMIODARONE HYDROCHLORIDE 200 MG: 200 TABLET ORAL at 08:26

## 2023-09-29 RX ADMIN — ISOSORBIDE DINITRATE 20 MG: 20 TABLET ORAL at 13:20

## 2023-09-29 RX ADMIN — ERGOCALCIFEROL 50000 UNITS: 1.25 CAPSULE ORAL at 08:28

## 2023-09-29 RX ADMIN — Medication: at 21:22

## 2023-09-29 RX ADMIN — AMLODIPINE BESYLATE 5 MG: 5 TABLET ORAL at 06:26

## 2023-09-29 ASSESSMENT — PAIN DESCRIPTION - LOCATION
LOCATION: CHEST

## 2023-09-29 ASSESSMENT — PAIN DESCRIPTION - ORIENTATION
ORIENTATION: ANTERIOR

## 2023-09-29 ASSESSMENT — PAIN - FUNCTIONAL ASSESSMENT
PAIN_FUNCTIONAL_ASSESSMENT: ACTIVITIES ARE NOT PREVENTED

## 2023-09-29 ASSESSMENT — PAIN SCALES - WONG BAKER
WONGBAKER_NUMERICALRESPONSE: 0
WONGBAKER_NUMERICALRESPONSE: 0

## 2023-09-29 ASSESSMENT — PAIN DESCRIPTION - DESCRIPTORS
DESCRIPTORS: ACHING

## 2023-09-29 ASSESSMENT — PAIN SCALES - GENERAL
PAINLEVEL_OUTOF10: 4
PAINLEVEL_OUTOF10: 4
PAINLEVEL_OUTOF10: 6
PAINLEVEL_OUTOF10: 0

## 2023-09-30 LAB
ALBUMIN SERPL-MCNC: 3.2 G/DL (ref 3.5–5)
ALBUMIN/GLOB SERPL: 0.9 (ref 1.1–2.2)
ALP SERPL-CCNC: 192 U/L (ref 45–117)
ALT SERPL-CCNC: 17 U/L (ref 12–78)
ANION GAP SERPL CALC-SCNC: 6 MMOL/L (ref 5–15)
AST SERPL-CCNC: 30 U/L (ref 15–37)
BILIRUB DIRECT SERPL-MCNC: 0.7 MG/DL (ref 0–0.2)
BILIRUB SERPL-MCNC: 1 MG/DL (ref 0.2–1)
BUN SERPL-MCNC: 16 MG/DL (ref 6–20)
BUN/CREAT SERPL: 20 (ref 12–20)
CALCIUM SERPL-MCNC: 8.8 MG/DL (ref 8.5–10.1)
CHLORIDE SERPL-SCNC: 103 MMOL/L (ref 97–108)
CO2 SERPL-SCNC: 24 MMOL/L (ref 21–32)
CREAT SERPL-MCNC: 0.82 MG/DL (ref 0.7–1.3)
ERYTHROCYTE [DISTWIDTH] IN BLOOD BY AUTOMATED COUNT: 17.4 % (ref 11.5–14.5)
GLOBULIN SER CALC-MCNC: 3.5 G/DL (ref 2–4)
GLUCOSE BLD STRIP.AUTO-MCNC: 117 MG/DL (ref 65–117)
GLUCOSE BLD STRIP.AUTO-MCNC: 126 MG/DL (ref 65–117)
GLUCOSE BLD STRIP.AUTO-MCNC: 152 MG/DL (ref 65–117)
GLUCOSE BLD STRIP.AUTO-MCNC: 161 MG/DL (ref 65–117)
GLUCOSE BLD STRIP.AUTO-MCNC: 166 MG/DL (ref 65–117)
GLUCOSE SERPL-MCNC: 114 MG/DL (ref 65–100)
HCT VFR BLD AUTO: 24.4 % (ref 36.6–50.3)
HGB BLD-MCNC: 7.7 G/DL (ref 12.1–17)
INR PPP: 2.4 (ref 0.9–1.1)
LDH SERPL L TO P-CCNC: 427 U/L (ref 85–241)
MAGNESIUM SERPL-MCNC: 1.9 MG/DL (ref 1.6–2.4)
MCH RBC QN AUTO: 29.1 PG (ref 26–34)
MCHC RBC AUTO-ENTMCNC: 31.6 G/DL (ref 30–36.5)
MCV RBC AUTO: 92.1 FL (ref 80–99)
NRBC # BLD: 0 K/UL (ref 0–0.01)
NRBC BLD-RTO: 0 PER 100 WBC
PLATELET # BLD AUTO: 288 K/UL (ref 150–400)
PMV BLD AUTO: 9.9 FL (ref 8.9–12.9)
POTASSIUM SERPL-SCNC: 4.5 MMOL/L (ref 3.5–5.1)
PROT SERPL-MCNC: 6.7 G/DL (ref 6.4–8.2)
PROTHROMBIN TIME: 24 SEC (ref 9–11.1)
RBC # BLD AUTO: 2.65 M/UL (ref 4.1–5.7)
SERVICE CMNT-IMP: ABNORMAL
SERVICE CMNT-IMP: NORMAL
SODIUM SERPL-SCNC: 133 MMOL/L (ref 136–145)
WBC # BLD AUTO: 9.5 K/UL (ref 4.1–11.1)

## 2023-09-30 PROCEDURE — 6370000000 HC RX 637 (ALT 250 FOR IP): Performed by: NURSE PRACTITIONER

## 2023-09-30 PROCEDURE — 83735 ASSAY OF MAGNESIUM: CPT

## 2023-09-30 PROCEDURE — 36415 COLL VENOUS BLD VENIPUNCTURE: CPT

## 2023-09-30 PROCEDURE — 2580000003 HC RX 258: Performed by: NURSE PRACTITIONER

## 2023-09-30 PROCEDURE — 82962 GLUCOSE BLOOD TEST: CPT

## 2023-09-30 PROCEDURE — 6370000000 HC RX 637 (ALT 250 FOR IP): Performed by: INTERNAL MEDICINE

## 2023-09-30 PROCEDURE — 85027 COMPLETE CBC AUTOMATED: CPT

## 2023-09-30 PROCEDURE — 80048 BASIC METABOLIC PNL TOTAL CA: CPT

## 2023-09-30 PROCEDURE — 97110 THERAPEUTIC EXERCISES: CPT

## 2023-09-30 PROCEDURE — 2060000000 HC ICU INTERMEDIATE R&B

## 2023-09-30 PROCEDURE — 93750 INTERROGATION VAD IN PERSON: CPT | Performed by: INTERNAL MEDICINE

## 2023-09-30 PROCEDURE — 6360000002 HC RX W HCPCS: Performed by: NURSE PRACTITIONER

## 2023-09-30 PROCEDURE — 99231 SBSQ HOSP IP/OBS SF/LOW 25: CPT | Performed by: INTERNAL MEDICINE

## 2023-09-30 PROCEDURE — 85610 PROTHROMBIN TIME: CPT

## 2023-09-30 PROCEDURE — 80076 HEPATIC FUNCTION PANEL: CPT

## 2023-09-30 PROCEDURE — 83615 LACTATE (LD) (LDH) ENZYME: CPT

## 2023-09-30 RX ADMIN — AMLODIPINE BESYLATE 5 MG: 5 TABLET ORAL at 19:25

## 2023-09-30 RX ADMIN — ISOSORBIDE DINITRATE 20 MG: 20 TABLET ORAL at 15:43

## 2023-09-30 RX ADMIN — WARFARIN SODIUM 3 MG: 2 TABLET ORAL at 17:35

## 2023-09-30 RX ADMIN — ISOSORBIDE DINITRATE 20 MG: 20 TABLET ORAL at 06:13

## 2023-09-30 RX ADMIN — Medication: at 21:53

## 2023-09-30 RX ADMIN — Medication 1 UNITS: at 22:20

## 2023-09-30 RX ADMIN — TICAGRELOR 90 MG: 90 TABLET ORAL at 10:19

## 2023-09-30 RX ADMIN — WATER 2000 MG: 1 INJECTION INTRAMUSCULAR; INTRAVENOUS; SUBCUTANEOUS at 14:01

## 2023-09-30 RX ADMIN — SUCRALFATE 1 G: 1 TABLET ORAL at 21:53

## 2023-09-30 RX ADMIN — SODIUM CHLORIDE, PRESERVATIVE FREE 10 ML: 5 INJECTION INTRAVENOUS at 22:00

## 2023-09-30 RX ADMIN — Medication 2 UNITS: at 13:40

## 2023-09-30 RX ADMIN — SUCRALFATE 1 G: 1 TABLET ORAL at 17:35

## 2023-09-30 RX ADMIN — SODIUM CHLORIDE, PRESERVATIVE FREE 10 ML: 5 INJECTION INTRAVENOUS at 10:21

## 2023-09-30 RX ADMIN — SUCRALFATE 1 G: 1 TABLET ORAL at 06:13

## 2023-09-30 RX ADMIN — TICAGRELOR 90 MG: 90 TABLET ORAL at 21:53

## 2023-09-30 RX ADMIN — SPIRONOLACTONE 12.5 MG: 25 TABLET ORAL at 10:19

## 2023-09-30 RX ADMIN — POTASSIUM CHLORIDE 40 MEQ: 750 TABLET, FILM COATED, EXTENDED RELEASE ORAL at 10:19

## 2023-09-30 RX ADMIN — EMPAGLIFLOZIN 10 MG: 10 TABLET, FILM COATED ORAL at 10:19

## 2023-09-30 RX ADMIN — WATER 2000 MG: 1 INJECTION INTRAMUSCULAR; INTRAVENOUS; SUBCUTANEOUS at 21:53

## 2023-09-30 RX ADMIN — ATORVASTATIN CALCIUM 40 MG: 40 TABLET, FILM COATED ORAL at 21:53

## 2023-09-30 RX ADMIN — HYDRALAZINE HYDROCHLORIDE 100 MG: 50 TABLET, FILM COATED ORAL at 06:13

## 2023-09-30 RX ADMIN — Medication: at 10:21

## 2023-09-30 RX ADMIN — PANTOPRAZOLE SODIUM 40 MG: 40 TABLET, DELAYED RELEASE ORAL at 06:13

## 2023-09-30 RX ADMIN — HYDRALAZINE HYDROCHLORIDE 100 MG: 50 TABLET, FILM COATED ORAL at 15:43

## 2023-09-30 RX ADMIN — WATER 2000 MG: 1 INJECTION INTRAMUSCULAR; INTRAVENOUS; SUBCUTANEOUS at 06:13

## 2023-09-30 RX ADMIN — MUPIROCIN: 20 OINTMENT TOPICAL at 21:54

## 2023-09-30 RX ADMIN — AMIODARONE HYDROCHLORIDE 200 MG: 200 TABLET ORAL at 10:19

## 2023-09-30 RX ADMIN — FUROSEMIDE 20 MG: 20 TABLET ORAL at 10:19

## 2023-09-30 RX ADMIN — ISOSORBIDE DINITRATE 20 MG: 20 TABLET ORAL at 21:53

## 2023-09-30 RX ADMIN — HYDRALAZINE HYDROCHLORIDE 100 MG: 50 TABLET, FILM COATED ORAL at 21:53

## 2023-09-30 RX ADMIN — SUCRALFATE 1 G: 1 TABLET ORAL at 10:19

## 2023-09-30 RX ADMIN — AMLODIPINE BESYLATE 5 MG: 5 TABLET ORAL at 06:13

## 2023-09-30 ASSESSMENT — PAIN SCALES - GENERAL
PAINLEVEL_OUTOF10: 0
PAINLEVEL_OUTOF10: 2

## 2023-09-30 ASSESSMENT — PAIN DESCRIPTION - PAIN TYPE: TYPE: SURGICAL PAIN;ACUTE PAIN

## 2023-09-30 ASSESSMENT — PAIN DESCRIPTION - LOCATION: LOCATION: CHEST

## 2023-09-30 ASSESSMENT — PAIN DESCRIPTION - DESCRIPTORS: DESCRIPTORS: ACHING

## 2023-09-30 ASSESSMENT — PAIN DESCRIPTION - ORIENTATION: ORIENTATION: MID

## 2023-10-01 LAB
ALBUMIN SERPL-MCNC: 3.2 G/DL (ref 3.5–5)
ALBUMIN/GLOB SERPL: 0.9 (ref 1.1–2.2)
ALP SERPL-CCNC: 180 U/L (ref 45–117)
ALT SERPL-CCNC: 15 U/L (ref 12–78)
ANION GAP SERPL CALC-SCNC: 5 MMOL/L (ref 5–15)
AST SERPL-CCNC: 29 U/L (ref 15–37)
BILIRUB DIRECT SERPL-MCNC: 0.7 MG/DL (ref 0–0.2)
BILIRUB SERPL-MCNC: 0.9 MG/DL (ref 0.2–1)
BUN SERPL-MCNC: 16 MG/DL (ref 6–20)
BUN/CREAT SERPL: 19 (ref 12–20)
CALCIUM SERPL-MCNC: 8.8 MG/DL (ref 8.5–10.1)
CHLORIDE SERPL-SCNC: 102 MMOL/L (ref 97–108)
CO2 SERPL-SCNC: 27 MMOL/L (ref 21–32)
CREAT SERPL-MCNC: 0.83 MG/DL (ref 0.7–1.3)
ERYTHROCYTE [DISTWIDTH] IN BLOOD BY AUTOMATED COUNT: 17.1 % (ref 11.5–14.5)
GLOBULIN SER CALC-MCNC: 3.7 G/DL (ref 2–4)
GLUCOSE BLD STRIP.AUTO-MCNC: 121 MG/DL (ref 65–117)
GLUCOSE BLD STRIP.AUTO-MCNC: 133 MG/DL (ref 65–117)
GLUCOSE BLD STRIP.AUTO-MCNC: 139 MG/DL (ref 65–117)
GLUCOSE BLD STRIP.AUTO-MCNC: 140 MG/DL (ref 65–117)
GLUCOSE SERPL-MCNC: 127 MG/DL (ref 65–100)
HCT VFR BLD AUTO: 24.2 % (ref 36.6–50.3)
HGB BLD-MCNC: 7.6 G/DL (ref 12.1–17)
INR PPP: 2.6 (ref 0.9–1.1)
LDH SERPL L TO P-CCNC: 410 U/L (ref 85–241)
MAGNESIUM SERPL-MCNC: 1.9 MG/DL (ref 1.6–2.4)
MCH RBC QN AUTO: 29.3 PG (ref 26–34)
MCHC RBC AUTO-ENTMCNC: 31.4 G/DL (ref 30–36.5)
MCV RBC AUTO: 93.4 FL (ref 80–99)
NRBC # BLD: 0 K/UL (ref 0–0.01)
NRBC BLD-RTO: 0 PER 100 WBC
PLATELET # BLD AUTO: 328 K/UL (ref 150–400)
PMV BLD AUTO: 10.2 FL (ref 8.9–12.9)
POTASSIUM SERPL-SCNC: 4.5 MMOL/L (ref 3.5–5.1)
PROT SERPL-MCNC: 6.9 G/DL (ref 6.4–8.2)
PROTHROMBIN TIME: 25.6 SEC (ref 9–11.1)
RBC # BLD AUTO: 2.59 M/UL (ref 4.1–5.7)
SERVICE CMNT-IMP: ABNORMAL
SODIUM SERPL-SCNC: 134 MMOL/L (ref 136–145)
WBC # BLD AUTO: 9.5 K/UL (ref 4.1–11.1)

## 2023-10-01 PROCEDURE — 2060000000 HC ICU INTERMEDIATE R&B

## 2023-10-01 PROCEDURE — 93750 INTERROGATION VAD IN PERSON: CPT | Performed by: INTERNAL MEDICINE

## 2023-10-01 PROCEDURE — 6370000000 HC RX 637 (ALT 250 FOR IP): Performed by: NURSE PRACTITIONER

## 2023-10-01 PROCEDURE — 6370000000 HC RX 637 (ALT 250 FOR IP): Performed by: INTERNAL MEDICINE

## 2023-10-01 PROCEDURE — 85610 PROTHROMBIN TIME: CPT

## 2023-10-01 PROCEDURE — 2580000003 HC RX 258: Performed by: NURSE PRACTITIONER

## 2023-10-01 PROCEDURE — 36415 COLL VENOUS BLD VENIPUNCTURE: CPT

## 2023-10-01 PROCEDURE — 97116 GAIT TRAINING THERAPY: CPT

## 2023-10-01 PROCEDURE — 85027 COMPLETE CBC AUTOMATED: CPT

## 2023-10-01 PROCEDURE — 99231 SBSQ HOSP IP/OBS SF/LOW 25: CPT | Performed by: INTERNAL MEDICINE

## 2023-10-01 PROCEDURE — 80076 HEPATIC FUNCTION PANEL: CPT

## 2023-10-01 PROCEDURE — 83735 ASSAY OF MAGNESIUM: CPT

## 2023-10-01 PROCEDURE — 82962 GLUCOSE BLOOD TEST: CPT

## 2023-10-01 PROCEDURE — 83615 LACTATE (LD) (LDH) ENZYME: CPT

## 2023-10-01 PROCEDURE — 80048 BASIC METABOLIC PNL TOTAL CA: CPT

## 2023-10-01 PROCEDURE — 6360000002 HC RX W HCPCS: Performed by: NURSE PRACTITIONER

## 2023-10-01 PROCEDURE — 97535 SELF CARE MNGMENT TRAINING: CPT

## 2023-10-01 RX ORDER — WARFARIN SODIUM 2 MG/1
2 TABLET ORAL
Status: COMPLETED | OUTPATIENT
Start: 2023-10-01 | End: 2023-10-01

## 2023-10-01 RX ORDER — METOPROLOL SUCCINATE 25 MG/1
12.5 TABLET, EXTENDED RELEASE ORAL DAILY
Status: DISCONTINUED | OUTPATIENT
Start: 2023-10-01 | End: 2023-10-03 | Stop reason: HOSPADM

## 2023-10-01 RX ORDER — AMLODIPINE BESYLATE 5 MG/1
2.5 TABLET ORAL EVERY 12 HOURS
Status: DISCONTINUED | OUTPATIENT
Start: 2023-10-01 | End: 2023-10-03 | Stop reason: HOSPADM

## 2023-10-01 RX ADMIN — SODIUM CHLORIDE, PRESERVATIVE FREE 10 ML: 5 INJECTION INTRAVENOUS at 09:10

## 2023-10-01 RX ADMIN — WATER 2000 MG: 1 INJECTION INTRAMUSCULAR; INTRAVENOUS; SUBCUTANEOUS at 06:20

## 2023-10-01 RX ADMIN — EMPAGLIFLOZIN 10 MG: 10 TABLET, FILM COATED ORAL at 09:04

## 2023-10-01 RX ADMIN — ISOSORBIDE DINITRATE 20 MG: 20 TABLET ORAL at 15:30

## 2023-10-01 RX ADMIN — HYDRALAZINE HYDROCHLORIDE 100 MG: 50 TABLET, FILM COATED ORAL at 06:16

## 2023-10-01 RX ADMIN — TICAGRELOR 90 MG: 90 TABLET ORAL at 09:05

## 2023-10-01 RX ADMIN — HYDRALAZINE HYDROCHLORIDE 100 MG: 50 TABLET, FILM COATED ORAL at 21:13

## 2023-10-01 RX ADMIN — POTASSIUM CHLORIDE 40 MEQ: 750 TABLET, FILM COATED, EXTENDED RELEASE ORAL at 09:04

## 2023-10-01 RX ADMIN — METOPROLOL SUCCINATE 12.5 MG: 25 TABLET, EXTENDED RELEASE ORAL at 09:04

## 2023-10-01 RX ADMIN — WARFARIN SODIUM 2 MG: 2 TABLET ORAL at 18:05

## 2023-10-01 RX ADMIN — POLYETHYLENE GLYCOL 3350 17 G: 17 POWDER, FOR SOLUTION ORAL at 09:04

## 2023-10-01 RX ADMIN — SPIRONOLACTONE 12.5 MG: 25 TABLET ORAL at 09:04

## 2023-10-01 RX ADMIN — Medication: at 21:19

## 2023-10-01 RX ADMIN — PANTOPRAZOLE SODIUM 40 MG: 40 TABLET, DELAYED RELEASE ORAL at 06:16

## 2023-10-01 RX ADMIN — WATER 2000 MG: 1 INJECTION INTRAMUSCULAR; INTRAVENOUS; SUBCUTANEOUS at 12:11

## 2023-10-01 RX ADMIN — AMLODIPINE BESYLATE 5 MG: 5 TABLET ORAL at 06:16

## 2023-10-01 RX ADMIN — MUPIROCIN: 20 OINTMENT TOPICAL at 09:10

## 2023-10-01 RX ADMIN — HYDRALAZINE HYDROCHLORIDE 100 MG: 50 TABLET, FILM COATED ORAL at 15:30

## 2023-10-01 RX ADMIN — AMIODARONE HYDROCHLORIDE 200 MG: 200 TABLET ORAL at 09:05

## 2023-10-01 RX ADMIN — TICAGRELOR 90 MG: 90 TABLET ORAL at 21:14

## 2023-10-01 RX ADMIN — WATER 2000 MG: 1 INJECTION INTRAMUSCULAR; INTRAVENOUS; SUBCUTANEOUS at 21:13

## 2023-10-01 RX ADMIN — SUCRALFATE 1 G: 1 TABLET ORAL at 18:06

## 2023-10-01 RX ADMIN — SUCRALFATE 1 G: 1 TABLET ORAL at 12:11

## 2023-10-01 RX ADMIN — SODIUM CHLORIDE, PRESERVATIVE FREE 10 ML: 5 INJECTION INTRAVENOUS at 21:32

## 2023-10-01 RX ADMIN — SODIUM CHLORIDE, PRESERVATIVE FREE 10 ML: 5 INJECTION INTRAVENOUS at 09:05

## 2023-10-01 RX ADMIN — Medication: at 09:09

## 2023-10-01 RX ADMIN — ISOSORBIDE DINITRATE 20 MG: 20 TABLET ORAL at 21:14

## 2023-10-01 RX ADMIN — AMLODIPINE BESYLATE 2.5 MG: 5 TABLET ORAL at 18:05

## 2023-10-01 RX ADMIN — SUCRALFATE 1 G: 1 TABLET ORAL at 21:14

## 2023-10-01 RX ADMIN — SODIUM CHLORIDE, PRESERVATIVE FREE 10 ML: 5 INJECTION INTRAVENOUS at 21:14

## 2023-10-01 RX ADMIN — ATORVASTATIN CALCIUM 40 MG: 40 TABLET, FILM COATED ORAL at 21:13

## 2023-10-01 RX ADMIN — ISOSORBIDE DINITRATE 20 MG: 20 TABLET ORAL at 06:16

## 2023-10-01 RX ADMIN — SUCRALFATE 1 G: 1 TABLET ORAL at 06:16

## 2023-10-01 ASSESSMENT — PAIN SCALES - WONG BAKER
WONGBAKER_NUMERICALRESPONSE: 0

## 2023-10-01 ASSESSMENT — PAIN SCALES - GENERAL
PAINLEVEL_OUTOF10: 0

## 2023-10-02 ENCOUNTER — APPOINTMENT (OUTPATIENT)
Facility: HOSPITAL | Age: 68
DRG: 161 | End: 2023-10-02
Attending: INTERNAL MEDICINE
Payer: MEDICAID

## 2023-10-02 ENCOUNTER — TELEPHONE (OUTPATIENT)
Age: 68
End: 2023-10-02

## 2023-10-02 LAB
ALBUMIN SERPL-MCNC: 3.3 G/DL (ref 3.5–5)
ALBUMIN/GLOB SERPL: 1 (ref 1.1–2.2)
ALP SERPL-CCNC: 182 U/L (ref 45–117)
ALT SERPL-CCNC: 14 U/L (ref 12–78)
ANION GAP SERPL CALC-SCNC: 6 MMOL/L (ref 5–15)
AST SERPL-CCNC: 28 U/L (ref 15–37)
BILIRUB DIRECT SERPL-MCNC: 0.7 MG/DL (ref 0–0.2)
BILIRUB SERPL-MCNC: 0.9 MG/DL (ref 0.2–1)
BUN SERPL-MCNC: 17 MG/DL (ref 6–20)
BUN/CREAT SERPL: 18 (ref 12–20)
CALCIUM SERPL-MCNC: 8.8 MG/DL (ref 8.5–10.1)
CHLORIDE SERPL-SCNC: 104 MMOL/L (ref 97–108)
CO2 SERPL-SCNC: 25 MMOL/L (ref 21–32)
CREAT SERPL-MCNC: 0.93 MG/DL (ref 0.7–1.3)
ECHO BSA: 1.73 M2
ECHO LA DIAMETER INDEX: 2.01 CM/M2
ECHO LA DIAMETER: 3.5 CM
ECHO LV ASSIST DEVICE BASELINE SPEED: 5400 RPM
ECHO LV FRACTIONAL SHORTENING: 18 % (ref 28–44)
ECHO LV INTERNAL DIMENSION DIASTOLE INDEX: 2.87 CM/M2
ECHO LV INTERNAL DIMENSION DIASTOLIC: 5 CM (ref 4.2–5.9)
ECHO LV INTERNAL DIMENSION SYSTOLIC INDEX: 2.36 CM/M2
ECHO LV INTERNAL DIMENSION SYSTOLIC: 4.1 CM
ECHO LV IVSD: 1.1 CM (ref 0.6–1)
ECHO LV MASS 2D: 220.3 G (ref 88–224)
ECHO LV MASS INDEX 2D: 126.6 G/M2 (ref 49–115)
ECHO LV POSTERIOR WALL DIASTOLIC: 1.2 CM (ref 0.6–1)
ECHO LV RELATIVE WALL THICKNESS RATIO: 0.48
ECHO MV A VELOCITY: 0.85 M/S
ECHO MV AREA PHT: 1.6 CM2
ECHO MV E DECELERATION TIME (DT): 465.9 MS
ECHO MV E VELOCITY: 0.95 M/S
ECHO MV E/A RATIO: 1.12
ECHO MV PRESSURE HALF TIME (PHT): 135.1 MS
ERYTHROCYTE [DISTWIDTH] IN BLOOD BY AUTOMATED COUNT: 17.3 % (ref 11.5–14.5)
GLOBULIN SER CALC-MCNC: 3.4 G/DL (ref 2–4)
GLUCOSE BLD STRIP.AUTO-MCNC: 131 MG/DL (ref 65–117)
GLUCOSE BLD STRIP.AUTO-MCNC: 154 MG/DL (ref 65–117)
GLUCOSE BLD STRIP.AUTO-MCNC: 174 MG/DL (ref 65–117)
GLUCOSE BLD STRIP.AUTO-MCNC: 183 MG/DL (ref 65–117)
GLUCOSE SERPL-MCNC: 114 MG/DL (ref 65–100)
HCT VFR BLD AUTO: 25.6 % (ref 36.6–50.3)
HGB BLD-MCNC: 8 G/DL (ref 12.1–17)
INR PPP: 2.4 (ref 0.9–1.1)
LDH SERPL L TO P-CCNC: 443 U/L (ref 85–241)
MAGNESIUM SERPL-MCNC: 2.1 MG/DL (ref 1.6–2.4)
MCH RBC QN AUTO: 29 PG (ref 26–34)
MCHC RBC AUTO-ENTMCNC: 31.3 G/DL (ref 30–36.5)
MCV RBC AUTO: 92.8 FL (ref 80–99)
NRBC # BLD: 0 K/UL (ref 0–0.01)
NRBC BLD-RTO: 0 PER 100 WBC
PLATELET # BLD AUTO: 415 K/UL (ref 150–400)
PMV BLD AUTO: 9.8 FL (ref 8.9–12.9)
POTASSIUM SERPL-SCNC: 4.6 MMOL/L (ref 3.5–5.1)
PREALB SERPL-MCNC: 14.9 MG/DL (ref 20–40)
PROT SERPL-MCNC: 6.7 G/DL (ref 6.4–8.2)
PROTHROMBIN TIME: 24 SEC (ref 9–11.1)
PSA SERPL-MCNC: 6.1 NG/ML (ref 0.01–4)
RBC # BLD AUTO: 2.76 M/UL (ref 4.1–5.7)
SERVICE CMNT-IMP: ABNORMAL
SODIUM SERPL-SCNC: 135 MMOL/L (ref 136–145)
WBC # BLD AUTO: 9.7 K/UL (ref 4.1–11.1)

## 2023-10-02 PROCEDURE — APPSS45 APP SPLIT SHARED TIME 31-45 MINUTES: Performed by: NURSE PRACTITIONER

## 2023-10-02 PROCEDURE — 97530 THERAPEUTIC ACTIVITIES: CPT

## 2023-10-02 PROCEDURE — 93321 DOPPLER ECHO F-UP/LMTD STD: CPT | Performed by: SPECIALIST

## 2023-10-02 PROCEDURE — 6360000004 HC RX CONTRAST MEDICATION: Performed by: THORACIC SURGERY (CARDIOTHORACIC VASCULAR SURGERY)

## 2023-10-02 PROCEDURE — G0103 PSA SCREENING: HCPCS

## 2023-10-02 PROCEDURE — 36415 COLL VENOUS BLD VENIPUNCTURE: CPT

## 2023-10-02 PROCEDURE — 85027 COMPLETE CBC AUTOMATED: CPT

## 2023-10-02 PROCEDURE — 83735 ASSAY OF MAGNESIUM: CPT

## 2023-10-02 PROCEDURE — 93325 DOPPLER ECHO COLOR FLOW MAPG: CPT | Performed by: SPECIALIST

## 2023-10-02 PROCEDURE — 80076 HEPATIC FUNCTION PANEL: CPT

## 2023-10-02 PROCEDURE — 2580000003 HC RX 258: Performed by: NURSE PRACTITIONER

## 2023-10-02 PROCEDURE — C8924 2D TTE W OR W/O FOL W/CON,FU: HCPCS

## 2023-10-02 PROCEDURE — 2060000000 HC ICU INTERMEDIATE R&B

## 2023-10-02 PROCEDURE — 6370000000 HC RX 637 (ALT 250 FOR IP): Performed by: NURSE PRACTITIONER

## 2023-10-02 PROCEDURE — 97116 GAIT TRAINING THERAPY: CPT

## 2023-10-02 PROCEDURE — 80048 BASIC METABOLIC PNL TOTAL CA: CPT

## 2023-10-02 PROCEDURE — 83615 LACTATE (LD) (LDH) ENZYME: CPT

## 2023-10-02 PROCEDURE — 93308 TTE F-UP OR LMTD: CPT | Performed by: SPECIALIST

## 2023-10-02 PROCEDURE — 94760 N-INVAS EAR/PLS OXIMETRY 1: CPT

## 2023-10-02 PROCEDURE — 6370000000 HC RX 637 (ALT 250 FOR IP): Performed by: INTERNAL MEDICINE

## 2023-10-02 PROCEDURE — 85610 PROTHROMBIN TIME: CPT

## 2023-10-02 PROCEDURE — 93750 INTERROGATION VAD IN PERSON: CPT | Performed by: NURSE PRACTITIONER

## 2023-10-02 PROCEDURE — 84134 ASSAY OF PREALBUMIN: CPT

## 2023-10-02 PROCEDURE — 82962 GLUCOSE BLOOD TEST: CPT

## 2023-10-02 PROCEDURE — 6360000002 HC RX W HCPCS: Performed by: NURSE PRACTITIONER

## 2023-10-02 RX ORDER — WARFARIN SODIUM 2 MG/1
2 TABLET ORAL
Status: COMPLETED | OUTPATIENT
Start: 2023-10-02 | End: 2023-10-02

## 2023-10-02 RX ADMIN — MUPIROCIN: 20 OINTMENT TOPICAL at 21:21

## 2023-10-02 RX ADMIN — WATER 2000 MG: 1 INJECTION INTRAMUSCULAR; INTRAVENOUS; SUBCUTANEOUS at 06:17

## 2023-10-02 RX ADMIN — AMIODARONE HYDROCHLORIDE 200 MG: 200 TABLET ORAL at 09:18

## 2023-10-02 RX ADMIN — EMPAGLIFLOZIN 10 MG: 10 TABLET, FILM COATED ORAL at 09:18

## 2023-10-02 RX ADMIN — SODIUM CHLORIDE, PRESERVATIVE FREE 10 ML: 5 INJECTION INTRAVENOUS at 21:19

## 2023-10-02 RX ADMIN — SUCRALFATE 1 G: 1 TABLET ORAL at 21:20

## 2023-10-02 RX ADMIN — METOPROLOL SUCCINATE 12.5 MG: 25 TABLET, EXTENDED RELEASE ORAL at 09:18

## 2023-10-02 RX ADMIN — SUCRALFATE 1 G: 1 TABLET ORAL at 11:53

## 2023-10-02 RX ADMIN — SODIUM CHLORIDE, PRESERVATIVE FREE 10 ML: 5 INJECTION INTRAVENOUS at 09:19

## 2023-10-02 RX ADMIN — TICAGRELOR 90 MG: 90 TABLET ORAL at 09:18

## 2023-10-02 RX ADMIN — Medication: at 09:19

## 2023-10-02 RX ADMIN — WARFARIN SODIUM 2 MG: 2 TABLET ORAL at 18:07

## 2023-10-02 RX ADMIN — AMLODIPINE BESYLATE 2.5 MG: 5 TABLET ORAL at 06:35

## 2023-10-02 RX ADMIN — ATORVASTATIN CALCIUM 40 MG: 40 TABLET, FILM COATED ORAL at 21:20

## 2023-10-02 RX ADMIN — AMLODIPINE BESYLATE 2.5 MG: 5 TABLET ORAL at 18:07

## 2023-10-02 RX ADMIN — WATER 2000 MG: 1 INJECTION INTRAMUSCULAR; INTRAVENOUS; SUBCUTANEOUS at 13:23

## 2023-10-02 RX ADMIN — SUCRALFATE 1 G: 1 TABLET ORAL at 06:35

## 2023-10-02 RX ADMIN — ISOSORBIDE DINITRATE 20 MG: 20 TABLET ORAL at 13:23

## 2023-10-02 RX ADMIN — PERFLUTREN 1.5 ML: 6.52 INJECTION, SUSPENSION INTRAVENOUS at 11:50

## 2023-10-02 RX ADMIN — SPIRONOLACTONE 12.5 MG: 25 TABLET ORAL at 09:18

## 2023-10-02 RX ADMIN — WATER 2000 MG: 1 INJECTION INTRAMUSCULAR; INTRAVENOUS; SUBCUTANEOUS at 21:20

## 2023-10-02 RX ADMIN — TICAGRELOR 90 MG: 90 TABLET ORAL at 21:19

## 2023-10-02 RX ADMIN — POTASSIUM CHLORIDE 40 MEQ: 750 TABLET, FILM COATED, EXTENDED RELEASE ORAL at 09:18

## 2023-10-02 RX ADMIN — HYDRALAZINE HYDROCHLORIDE 100 MG: 50 TABLET, FILM COATED ORAL at 06:35

## 2023-10-02 RX ADMIN — ISOSORBIDE DINITRATE 20 MG: 20 TABLET ORAL at 21:20

## 2023-10-02 RX ADMIN — PANTOPRAZOLE SODIUM 40 MG: 40 TABLET, DELAYED RELEASE ORAL at 06:35

## 2023-10-02 RX ADMIN — HYDRALAZINE HYDROCHLORIDE 100 MG: 50 TABLET, FILM COATED ORAL at 13:23

## 2023-10-02 RX ADMIN — MUPIROCIN: 20 OINTMENT TOPICAL at 09:19

## 2023-10-02 RX ADMIN — HYDRALAZINE HYDROCHLORIDE 100 MG: 50 TABLET, FILM COATED ORAL at 21:20

## 2023-10-02 RX ADMIN — SUCRALFATE 1 G: 1 TABLET ORAL at 18:07

## 2023-10-02 RX ADMIN — Medication: at 21:22

## 2023-10-02 RX ADMIN — Medication 1 UNITS: at 21:47

## 2023-10-02 RX ADMIN — ISOSORBIDE DINITRATE 20 MG: 20 TABLET ORAL at 06:35

## 2023-10-02 ASSESSMENT — PAIN SCALES - GENERAL
PAINLEVEL_OUTOF10: 0

## 2023-10-02 ASSESSMENT — PAIN SCALES - WONG BAKER
WONGBAKER_NUMERICALRESPONSE: 0

## 2023-10-02 NOTE — PROCEDURES
R axillary prior impella incision staples removed. Site cleaned with chlorhexidine. Incision healed and intact. No drainage or erythema.

## 2023-10-02 NOTE — TELEPHONE ENCOUNTER
Called and spoke with patient's nephew laura regarding dressing change education. States he did not come over weekend for education. Advised that patient will likely discharge soon and he soul come today to continue dressing change education. He verbalized understanding. Provided [facility] number to schedule with nursing. He verbalized understanding.

## 2023-10-02 NOTE — DIABETES MGMT
[facility]  DIABETES MANAGEMENT CONSULT    Consulted by Provider for advanced nursing evaluation and care for inpatient blood glucose management. Evaluation and Action Plan   Gurinder Stubbs is a 80 y.o. male who has had a complicated course since initial admission for right fem/pop surgery @[facility] on 8/18/23. Had complication of cardiogenic shock/ ischemic cardiomyopathy requiring emergent PCI x1 on 8/29 then transferred to [facility] ICU for [facility] workup and evaluation for LVAD placement. Required impella placement for cardiac support up until LVAD placement on 9/19- s/p LVAD 8/98 with complication of CVA requiring emergent thrombectomy on 9/19. Today, 9/20 extubated to NC 02-    Has no PMH of diabetes PTA-however has significant risk factors of CAD/ PAD/ cigarette smoker - Currently on insulin infusion per CTS protocol-  Anticipate he will not need subsequent insulin therapy once transitioned off insulin infusion. Prior A1Cs inaccurate given blood transfusions PTA>     Clinically progressing s/p LVAD placement. Past 24h Insulin infusion needs: 37.1 units. PO intake progressing and will be consuming a regular lunch tray today. Discussed with RN that once he consumes >50% of his meal tray, can transition off insulin infusion per CTS protocol      Management Rationale Action Plan   Medication  Transition off insulin infusion per CTS protocol-  Transition basal insulin dose: Lantus 15 units-turn off insulin infusion 2h later  Then ACHS correction -as currently ordered   Additional orders             Initial Presentation   Gurinder Stubbs is a 80 y.o. male admitted from [facility] on 8/31 after experiencing complicated post op course after right fem/pop bypass that required RRT for respiratory distress, hypotension, and tachycardia- Work up and evaluation revealed: patient was sweaty and tachypnic with belly breathing. HR in 160s, cold peripheries, BP in 24U systolic and RR in 96M.  BiPAP
Diabetes Management Team to sign off at this point as patient's blood glucose remains stable. Please re-consult us if patient needs change. Thank you for including us in their care.       [place]ong [place], [address]  [facility]
complicated post op course after right fem/pop bypass that required RRT for respiratory distress, hypotension, and tachycardia- Work up and evaluation revealed: patient was sweaty and tachypnic with belly breathing. HR in 160s, cold peripheries, BP in 54S systolic and RR in 53E. BiPAP initiated. CXR showed pulmonary edema. EKG showed LBBB and monitor shows wide complex regular rhythm. Patient moved to ICU and shocked (synchronized) with 200J emergently that improved HR in 120s. - Required Impella placement PTA to [facility] / cardiac cath / Discharged from [facility] to [facility] for treatment of Advanced HF and LVAD work up. S/p disease; successful PCI of LM to Ramus with MARYANA x 1 on 8/29  HX:   Past Medical History:   Diagnosis Date    Atherosclerosis of native arteries of extremities with intermittent claudication, bilateral legs (HCC)     CAD (coronary artery disease)     dr Yayo Lemon cardiology [place]    Chest pain     Heart attack [facility]) 2014    Heart failure ([address])     HTN (hypertension)     Hyperlipidemia     Ischemic cardiomyopathy     Mild mitral valve regurgitation     Nonrheumatic mitral (valve) insufficiency     PVD (peripheral vascular disease) ([facility])     Rheumatic tricuspid insufficiency     Sick sinus syndrome ([address])         INITIAL DX: Heart failure ([address]) [I50.9]     Current Treatment     TX: CTS/LVAD post op care    Hospital Course   Clinical progress has been complicated by cardiogenic shock with acute HF requiring impella placement PTA- transferred to [facility] for LVAD work up.    8/28: Coronary angiogram  showed severe distal LM to Ramus stenosis s/p PCI.   LAD and RCA, unable to revascularize RCA territory  8/31; admitted to [facility] ICU; impella/ intubated/ medically sedated  9/1: [facility] consulted for LVAD as destination therapy - LVEF 10%  9/2: extubated/ impella remains  9-3:-9/4: impella remains; on dobutamine infusion; EF 15-20%  9/5: blood transfusion; diuresis / weaning dobutamine; GI consulted

## 2023-10-03 ENCOUNTER — TELEPHONE (OUTPATIENT)
Age: 68
End: 2023-10-03

## 2023-10-03 VITALS
OXYGEN SATURATION: 96 % | TEMPERATURE: 97.9 F | SYSTOLIC BLOOD PRESSURE: 109 MMHG | BODY MASS INDEX: 20.58 KG/M2 | DIASTOLIC BLOOD PRESSURE: 95 MMHG | RESPIRATION RATE: 14 BRPM | WEIGHT: 135.8 LBS | HEIGHT: 68 IN | HEART RATE: 66 BPM

## 2023-10-03 LAB
ALBUMIN SERPL-MCNC: 3.3 G/DL (ref 3.5–5)
ALBUMIN/GLOB SERPL: 0.9 (ref 1.1–2.2)
ALP SERPL-CCNC: 191 U/L (ref 45–117)
ALT SERPL-CCNC: 14 U/L (ref 12–78)
ANION GAP SERPL CALC-SCNC: 5 MMOL/L (ref 5–15)
AST SERPL-CCNC: 29 U/L (ref 15–37)
BILIRUB DIRECT SERPL-MCNC: 0.7 MG/DL (ref 0–0.2)
BILIRUB SERPL-MCNC: 0.9 MG/DL (ref 0.2–1)
BUN SERPL-MCNC: 19 MG/DL (ref 6–20)
BUN/CREAT SERPL: 22 (ref 12–20)
CALCIUM SERPL-MCNC: 9 MG/DL (ref 8.5–10.1)
CHLORIDE SERPL-SCNC: 105 MMOL/L (ref 97–108)
CO2 SERPL-SCNC: 25 MMOL/L (ref 21–32)
CREAT SERPL-MCNC: 0.85 MG/DL (ref 0.7–1.3)
ERYTHROCYTE [DISTWIDTH] IN BLOOD BY AUTOMATED COUNT: 17.4 % (ref 11.5–14.5)
FERRITIN SERPL-MCNC: 404 NG/ML (ref 26–388)
GLOBULIN SER CALC-MCNC: 3.6 G/DL (ref 2–4)
GLUCOSE BLD STRIP.AUTO-MCNC: 123 MG/DL (ref 65–117)
GLUCOSE BLD STRIP.AUTO-MCNC: 148 MG/DL (ref 65–117)
GLUCOSE SERPL-MCNC: 115 MG/DL (ref 65–100)
HCT VFR BLD AUTO: 26.5 % (ref 36.6–50.3)
HGB BLD-MCNC: 8.1 G/DL (ref 12.1–17)
INR PPP: 2.3 (ref 0.9–1.1)
IRON SATN MFR SERPL: 17 % (ref 20–50)
IRON SERPL-MCNC: 35 UG/DL (ref 35–150)
LDH SERPL L TO P-CCNC: 407 U/L (ref 85–241)
MAGNESIUM SERPL-MCNC: 2.2 MG/DL (ref 1.6–2.4)
MCH RBC QN AUTO: 29.1 PG (ref 26–34)
MCHC RBC AUTO-ENTMCNC: 30.6 G/DL (ref 30–36.5)
MCV RBC AUTO: 95.3 FL (ref 80–99)
NRBC # BLD: 0 K/UL (ref 0–0.01)
NRBC BLD-RTO: 0 PER 100 WBC
PLATELET # BLD AUTO: 437 K/UL (ref 150–400)
PMV BLD AUTO: 10.5 FL (ref 8.9–12.9)
POTASSIUM SERPL-SCNC: 4.8 MMOL/L (ref 3.5–5.1)
PROT SERPL-MCNC: 6.9 G/DL (ref 6.4–8.2)
PROTHROMBIN TIME: 22.7 SEC (ref 9–11.1)
RBC # BLD AUTO: 2.78 M/UL (ref 4.1–5.7)
SERVICE CMNT-IMP: ABNORMAL
SERVICE CMNT-IMP: ABNORMAL
SODIUM SERPL-SCNC: 135 MMOL/L (ref 136–145)
TIBC SERPL-MCNC: 201 UG/DL (ref 250–450)
WBC # BLD AUTO: 9 K/UL (ref 4.1–11.1)

## 2023-10-03 PROCEDURE — 83735 ASSAY OF MAGNESIUM: CPT

## 2023-10-03 PROCEDURE — 6360000002 HC RX W HCPCS: Performed by: THORACIC SURGERY (CARDIOTHORACIC VASCULAR SURGERY)

## 2023-10-03 PROCEDURE — 82728 ASSAY OF FERRITIN: CPT

## 2023-10-03 PROCEDURE — 80076 HEPATIC FUNCTION PANEL: CPT

## 2023-10-03 PROCEDURE — 2580000003 HC RX 258: Performed by: NURSE PRACTITIONER

## 2023-10-03 PROCEDURE — P9045 ALBUMIN (HUMAN), 5%, 250 ML: HCPCS | Performed by: NURSE PRACTITIONER

## 2023-10-03 PROCEDURE — 80048 BASIC METABOLIC PNL TOTAL CA: CPT

## 2023-10-03 PROCEDURE — 6370000000 HC RX 637 (ALT 250 FOR IP): Performed by: NURSE PRACTITIONER

## 2023-10-03 PROCEDURE — 83550 IRON BINDING TEST: CPT

## 2023-10-03 PROCEDURE — 97535 SELF CARE MNGMENT TRAINING: CPT

## 2023-10-03 PROCEDURE — 6370000000 HC RX 637 (ALT 250 FOR IP): Performed by: INTERNAL MEDICINE

## 2023-10-03 PROCEDURE — 83540 ASSAY OF IRON: CPT

## 2023-10-03 PROCEDURE — 85027 COMPLETE CBC AUTOMATED: CPT

## 2023-10-03 PROCEDURE — 93750 INTERROGATION VAD IN PERSON: CPT | Performed by: NURSE PRACTITIONER

## 2023-10-03 PROCEDURE — 6360000002 HC RX W HCPCS: Performed by: NURSE PRACTITIONER

## 2023-10-03 PROCEDURE — 83615 LACTATE (LD) (LDH) ENZYME: CPT

## 2023-10-03 PROCEDURE — APPSS60 APP SPLIT SHARED TIME 46-60 MINUTES: Performed by: NURSE PRACTITIONER

## 2023-10-03 PROCEDURE — 82962 GLUCOSE BLOOD TEST: CPT

## 2023-10-03 PROCEDURE — 85610 PROTHROMBIN TIME: CPT

## 2023-10-03 PROCEDURE — 36415 COLL VENOUS BLD VENIPUNCTURE: CPT

## 2023-10-03 RX ORDER — SPIRONOLACTONE 25 MG/1
12.5 TABLET ORAL DAILY
Qty: 30 TABLET | Refills: 3 | Status: SHIPPED
Start: 2023-10-04

## 2023-10-03 RX ORDER — ATORVASTATIN CALCIUM 40 MG/1
40 TABLET, FILM COATED ORAL NIGHTLY
Qty: 30 TABLET | Refills: 3 | Status: SHIPPED
Start: 2023-10-03

## 2023-10-03 RX ORDER — METOPROLOL SUCCINATE 25 MG/1
12.5 TABLET, EXTENDED RELEASE ORAL DAILY
Qty: 30 TABLET | Refills: 3 | Status: SHIPPED
Start: 2023-10-04

## 2023-10-03 RX ORDER — SUCRALFATE 1 G/1
1 TABLET ORAL
Qty: 112 TABLET | Refills: 0 | Status: SHIPPED | OUTPATIENT
Start: 2023-10-03 | End: 2023-10-31

## 2023-10-03 RX ORDER — AMIODARONE HYDROCHLORIDE 200 MG/1
200 TABLET ORAL DAILY
Qty: 13 TABLET | Refills: 0 | Status: SHIPPED
Start: 2023-10-04 | End: 2023-10-17

## 2023-10-03 RX ORDER — POTASSIUM CHLORIDE 1500 MG/1
40 TABLET, EXTENDED RELEASE ORAL DAILY
Qty: 60 TABLET | Refills: 3 | Status: SHIPPED
Start: 2023-10-04

## 2023-10-03 RX ORDER — ISOSORBIDE DINITRATE 20 MG/1
20 TABLET ORAL EVERY 8 HOURS
Qty: 90 TABLET | Refills: 3 | Status: SHIPPED
Start: 2023-10-03

## 2023-10-03 RX ORDER — ERGOCALCIFEROL 1.25 MG/1
50000 CAPSULE ORAL WEEKLY
Qty: 5 CAPSULE | Refills: 0 | Status: SHIPPED
Start: 2023-10-06 | End: 2023-10-14

## 2023-10-03 RX ORDER — WARFARIN SODIUM 2 MG/1
2 TABLET ORAL
Status: DISCONTINUED | OUTPATIENT
Start: 2023-10-03 | End: 2023-10-03 | Stop reason: HOSPADM

## 2023-10-03 RX ORDER — HYDRALAZINE HYDROCHLORIDE 100 MG/1
100 TABLET, FILM COATED ORAL EVERY 8 HOURS SCHEDULED
Qty: 90 TABLET | Refills: 3 | Status: SHIPPED
Start: 2023-10-03

## 2023-10-03 RX ORDER — AMLODIPINE BESYLATE 2.5 MG/1
2.5 TABLET ORAL EVERY 12 HOURS
Qty: 30 TABLET | Refills: 3 | Status: SHIPPED
Start: 2023-10-03

## 2023-10-03 RX ORDER — ALBUMIN, HUMAN INJ 5% 5 %
12.5 SOLUTION INTRAVENOUS ONCE
Status: COMPLETED | OUTPATIENT
Start: 2023-10-03 | End: 2023-10-03

## 2023-10-03 RX ORDER — PANTOPRAZOLE SODIUM 40 MG/1
40 TABLET, DELAYED RELEASE ORAL
Qty: 30 TABLET | Refills: 3 | Status: SHIPPED
Start: 2023-10-04

## 2023-10-03 RX ORDER — WARFARIN SODIUM 2 MG/1
2 TABLET ORAL DAILY
Qty: 30 TABLET | Refills: 3 | Status: SHIPPED
Start: 2023-10-03

## 2023-10-03 RX ADMIN — ISOSORBIDE DINITRATE 20 MG: 20 TABLET ORAL at 07:15

## 2023-10-03 RX ADMIN — POTASSIUM CHLORIDE 40 MEQ: 750 TABLET, FILM COATED, EXTENDED RELEASE ORAL at 09:20

## 2023-10-03 RX ADMIN — WATER 2000 MG: 1 INJECTION INTRAMUSCULAR; INTRAVENOUS; SUBCUTANEOUS at 06:18

## 2023-10-03 RX ADMIN — MUPIROCIN: 20 OINTMENT TOPICAL at 09:22

## 2023-10-03 RX ADMIN — SUCRALFATE 1 G: 1 TABLET ORAL at 07:15

## 2023-10-03 RX ADMIN — SODIUM CHLORIDE, PRESERVATIVE FREE 10 ML: 5 INJECTION INTRAVENOUS at 09:21

## 2023-10-03 RX ADMIN — AMIODARONE HYDROCHLORIDE 200 MG: 200 TABLET ORAL at 09:20

## 2023-10-03 RX ADMIN — AMLODIPINE BESYLATE 2.5 MG: 5 TABLET ORAL at 07:14

## 2023-10-03 RX ADMIN — EMPAGLIFLOZIN 10 MG: 10 TABLET, FILM COATED ORAL at 09:21

## 2023-10-03 RX ADMIN — METOPROLOL SUCCINATE 12.5 MG: 25 TABLET, EXTENDED RELEASE ORAL at 09:21

## 2023-10-03 RX ADMIN — ACETAMINOPHEN 650 MG: 325 TABLET ORAL at 07:13

## 2023-10-03 RX ADMIN — HYDRALAZINE HYDROCHLORIDE 100 MG: 50 TABLET, FILM COATED ORAL at 13:53

## 2023-10-03 RX ADMIN — ALBUMIN (HUMAN) 12.5 G: 12.5 INJECTION, SOLUTION INTRAVENOUS at 10:31

## 2023-10-03 RX ADMIN — HYDRALAZINE HYDROCHLORIDE 100 MG: 50 TABLET, FILM COATED ORAL at 07:15

## 2023-10-03 RX ADMIN — SPIRONOLACTONE 12.5 MG: 25 TABLET ORAL at 09:20

## 2023-10-03 RX ADMIN — ISOSORBIDE DINITRATE 20 MG: 20 TABLET ORAL at 13:53

## 2023-10-03 RX ADMIN — TICAGRELOR 90 MG: 90 TABLET ORAL at 09:21

## 2023-10-03 RX ADMIN — PANTOPRAZOLE SODIUM 40 MG: 40 TABLET, DELAYED RELEASE ORAL at 07:15

## 2023-10-03 RX ADMIN — IRON SUCROSE 200 MG: 20 INJECTION, SOLUTION INTRAVENOUS at 15:19

## 2023-10-03 RX ADMIN — Medication: at 09:22

## 2023-10-03 RX ADMIN — Medication 2 UNITS: at 11:52

## 2023-10-03 RX ADMIN — SUCRALFATE 1 G: 1 TABLET ORAL at 10:31

## 2023-10-03 ASSESSMENT — ENCOUNTER SYMPTOMS
COUGH: 0
ABDOMINAL DISTENTION: 0
NAUSEA: 0
VOMITING: 0
SHORTNESS OF BREATH: 0

## 2023-10-03 ASSESSMENT — PAIN DESCRIPTION - ORIENTATION: ORIENTATION: MID

## 2023-10-03 ASSESSMENT — PAIN SCALES - GENERAL
PAINLEVEL_OUTOF10: 6
PAINLEVEL_OUTOF10: 6

## 2023-10-03 ASSESSMENT — PAIN DESCRIPTION - LOCATION: LOCATION: STERNUM

## 2023-10-03 NOTE — DISCHARGE SUMMARY
[facility] Discharge Summary     Patient ID:  Ramin Metcalf  [id number]  39 y.o.  1955    Admit date: 8/31/2023    Discharge date: 10/3/2023     Admitting Physician: Isaac Hernandez MD     PCP:  Jigna Treviño MD    1. Cerebrovascular accident (CVA), unspecified mechanism ([address])    2. Cardiogenic shock ([address])    3. Acute on chronic systolic heart failure ([address])    4. Peripheral vascular disease ([address])    5. 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 Heart failure, unspecified HF chronicity, unspecified heart failure type ([address])    7. Left arm swelling    8. Left ventricular assist device present ([address])    9. Ischemic foot ulcer due to atherosclerosis of native artery of limb ([address])    10. Acute on chronic combined systolic and diastolic heart failure ([address])        Discharged Condition: Improved    Disposition: transferred to [facility] inpatient rehab    Procedures for this admission:  Procedure(s):  LEFT VENTRICULAR ASSIST DEVICE (LVAD) IMPLANTATION;TEMPORARY RIGHT AXILLARY LVAD REMOVAL (IMPELLA); HUDSON & EPIAORTIC US BY DR. Gary Bradshaw    Discharge Medications:      Medication List        START taking these medications      amiodarone 200 MG tablet  Commonly known as: CORDARONE  Take 1 tablet by mouth daily for 13 days  Start taking on: October 4, 2023     amLODIPine 2.5 MG tablet  Commonly known as: NORVASC  Take 1 tablet by mouth in the morning and 1 tablet in the evening.      hydrALAZINE 100 MG tablet  Commonly known as: APRESOLINE  Take 1 tablet by mouth every 8 hours     isosorbide dinitrate 20 MG tablet  Commonly known as: ISORDIL  Take 1 tablet by mouth every 8 (eight) hours     pantoprazole 40 MG tablet  Commonly known as: PROTONIX  Take 1 tablet by mouth every morning (before breakfast)  Start taking on: October 4, 2023     potassium chloride 20 MEQ Tbcr extended release tablet  Commonly known as: KLOR-CON M  Take 2 tablets by mouth daily  Start taking on: October 4, 2023     spironolactone 25 MG tablet  Commonly known as:

## 2023-10-03 NOTE — DISCHARGE INSTRUCTIONS
Name: Rudy Varghese    Surgery & Date: Procedure(s):  LEFT VENTRICULAR ASSIST DEVICE (LVAD) IMPLANTATION;TEMPORARY RIGHT AXILLARY LVAD REMOVAL (IMPELLA); HUDSON & EPIAORTIC US BY DR. Rommel Colón    Discharge Date: 10/03/23     MEDICATIONS:  Please see your After Visit Summary for a list of medications. INR TARGET- 2-3   INR LEVEL to be drawn- 2x/week  INR Mangement per the [address] [place] [address] ([phone]) post discharge from [address] [place] [address]:  NO SMOKING  Follow all the instructions in your discharge book given to you by the VAD Coordinator. Clean all incisions, except your driveline site, with mild soap and water twice daily. Change your driveline dressing daily, as directed. Call the VAD Coordinator immediately for any redness, swelling, or drainage from your incision or your driveline site. Take your temperature daily and call for a temperature of 99 degrees or higher, or for any symptoms that make you think you have an infection. Weigh yourself each morning. Call if you gain more than 2 pounds in a one day period or 5 pounds in a one week period. Use the incentive spirometer 10-12 breaths every hour while you are awake for the first month after surgery. Use a pillow or your bear to splint your breastbone when coughing or sneezing. If you feel your breast bone clicking or popping, notify the office immediately. DIET  If you are having trouble with your appetite, eat what you can. Try eating small, frequent meals throughout the day. Eat an American Heart Association diet. ACTIVITY  Per therapy at [address]  If you have any questions or concerns about your LVAD or health, please contact the VAD Coordinator:  During office hours, call [phone]  After hours or on weekends, please page [phone] first follow up appointment will be after discharge from [facility] Suman Goldsmith at the [address] [place] [address].  The office

## 2023-10-03 NOTE — TELEPHONE ENCOUNTER
Called and spoke with patient's nephew. Advised of likely discharge today.; Requested he present for dressing change education as soon as possible. He stated that he can come today at 15.

## 2023-10-03 NOTE — CARE COORDINATION
Transition of Care Plan:     RUR: 17%--moderate  Prior Level of Functioning: independent  Disposition: home with [facility] PT/OT/SN with [facility] vs Rehab  Follow up appointments: cardiology, PCP, AHFT  DME needed: N/A  Transportation at discharge: in car w/family  IM/IMM Medicare/ letter given: 9/1/23  Is patient a  and connected with VA? no  Caregiver Contact:   Popeye Moreno, significant other, [address], sister, [phone]  Discharge Caregiver contacted prior to discharge? N/A  Care Conference needed? yes--family meeting to discuss goals of care. Barriers to discharge: clinical status  Update-9/28/23  Post-op LVAD. Therapy recommendations are for IPR. He will need authorization prior to admit. Not near ready to discharge. CM following for progress and needs. Update-10/3/23-  Likely discharge to [facility] IPR today. We have authorization and a bed is available. Confirmed with clinical liaison Neelam Ricardo [phone] regarding bed. Met with patient at the bedside to update and he is agreeable to this disposition. Transport requested for 1600. Attending aware of update and will round on patient, but likely able to discharge. Medicare pt has received, reviewed, and signed 2nd IM letter informing them of their right to appeal the discharge. Signed copy has been placed on pt bedside chart.

## 2023-10-03 NOTE — PROCEDURES
External ventricular epicardial wire sites cleansed with alcohol. Sutures cut and removed. Traction pulled on ventricular wire, wire cut. Appropriate retraction into skin. Pt tolerated procedure well.

## 2023-10-04 ENCOUNTER — TELEPHONE (OUTPATIENT)
Age: 68
End: 2023-10-04

## 2023-10-04 NOTE — TELEPHONE ENCOUNTER
Received call back from Kaylin at [facility] regarding scheduling time for education at [facility]. Discussed plan of care with Kaylin regarding patient going to live with [facility] post discharge . Schedule blocked from 2pm to 3pm on 10/06 for family education. Called and spoke with patient's nephew who confirmed he will meet at [facility] at 2pm for education.

## 2023-10-04 NOTE — TELEPHONE ENCOUNTER
Received call back from patient's sister Brooklyn Alexandra who confirmed she will be patient's caregiver during period (October 15-24th) when his nephew is out of town. Reviewed 24/7 caregiver presence for 30 day period. She verbalized understanding. States [facility]

## 2023-10-05 ENCOUNTER — TELEPHONE (OUTPATIENT)
Age: 68
End: 2023-10-05

## 2023-10-05 NOTE — TELEPHONE ENCOUNTER
Received return call from Kaylin with [facility] who stated that patient's family okay to change dressing with staff supervision as part of family training.

## 2023-10-05 NOTE — TELEPHONE ENCOUNTER
Received call from Kaylin at [facility] stating that dressing change will need to be performed by their RN due to licensing. Returned call to Kaylin and educated that we will need patient's family to perform the dressing change with staff supervision. She stated she will follow up with nursing supervisor.

## 2023-10-06 ENCOUNTER — CLINICAL DOCUMENTATION (OUTPATIENT)
Age: 68
End: 2023-10-06

## 2023-10-09 ENCOUNTER — CLINICAL DOCUMENTATION (OUTPATIENT)
Age: 68
End: 2023-10-09

## 2023-10-09 NOTE — PROGRESS NOTES
[address] in [place], [place]    Met with patient and patient's nephew Toya Cervantes at [address] [place] [address] for dressing change education. Toya Cervantes performed a mock dressing change with a practice kit and required reeducation on step involving pouring soap and saline. Able to don sterile gloved in one attempt. Contaminated mock sterile field by placing bottle on it and required redirection. After mock dressing change practice a full sterile dressing change completed on patient. Nelson performed dressing change without prompting and maintained sterility throughout. Toya Cervantes confirmed he is able to come to [facility] Friday 10/13 at 10am for continued education. Called placed to Riki Shepard with care management at [facility] requesting to book patient's schedule for this.

## 2023-10-09 NOTE — PROGRESS NOTES
[address] in [facility]     LVAD Education:     Met with patient, nephew Avi Brown and sister Phil Mckinley for LVAD education. Reviewed the following:     LVAD terms and functions- discussed each component of the LVAD system. Phil Mckinley and Nelson able to teach back      - discussed each component of the HM3  display, lighted symbols and their meanings, and function of each button. Phil Mckinley and Avi Brown able to teach back         Alarms- Reviewed \"red heart\" hazard and \"yellow wrench\" advisory alarms. Discussed the importance of calling 911 and subsequently the LVAD 24/7 emergency patient in the event of the \"red heart\" hazard alarm with loss of illuminated green arrows. Discussed contacting LVAD team for \"yellow wrench\" alarms. Phil Mckinley and Avi Brown able to teach back      Sterile dressing change- Discussed driveline sterile dressing change. Nelson performed sterile dressing change (see education note.)  Discussed need for dressing change daily for the first 30 days and 3 times a week after, unless otherwise indicated by LVAD team. Phil Mckinley confirmed she will bring patient to clinic or have home health perform dressing change for time that Avi Brown is out of town. Power Sources- Discussed MPU and batteries. Educated patient and family that patient to use MPU at night when sleeping and any other time there is a chance of sleep. Discussed changing MPU batteries with daylight savings time. Discussed batteries and battery charger. Reviewed indicator lights on battery charger. Demonstrated inserting batteries into clips and inserting power leads into clips. Phil Mckinley returned demonstration. Able to teach back sleeping on MPU. Demonstrated connecting patient to MPU       Back Up Emergency Equipment- Reviewed LVAD back up emergency bag and contents (Extra set of batteries, battery clips and back up ).  Discussed bag accompanying patient

## 2023-10-09 NOTE — PROGRESS NOTES
[address] [facility] in [place], [place]    Met with patient and patient's nephew [place] Garcia at bedside for dressing change education. Patient's sister Alireza Bains and [facility] RN also present in room. Nelson completed dressing change using sterile technique. Abhinav Garcia was able to don sterile gloves in one attempt however broke sterile field when throwing away packaging. He re-donned sterile gloves and maintained sterility. Nelson required moderate prompting regarding dressing change steps and to maintain sterile field. Completed dressing change and reducated regarding sterile field. Denied questions at end of visit.  Continued dressing change education scheduled for 10/09 at 2pm.

## 2023-10-10 ENCOUNTER — TELEPHONE (OUTPATIENT)
Age: 68
End: 2023-10-10

## 2023-10-11 ENCOUNTER — TELEPHONE (OUTPATIENT)
Age: 68
End: 2023-10-11

## 2023-10-11 NOTE — TELEPHONE ENCOUNTER
Called left patient message requesting number to speak with his step daughter to coordinate dressing change education in preparation for eventual move back to [facility]. Left call back number and requested patient call back with information.

## 2023-10-13 ENCOUNTER — CLINICAL DOCUMENTATION (OUTPATIENT)
Age: 68
End: 2023-10-13

## 2023-10-13 NOTE — PROGRESS NOTES
[address] in [place], [place]     Met with patient at [facility]. Patient's Nephew and two sisters Kvng Hong and Judie Nunez at bedside. Nelson completed drive line dressing change independently with no verbal prompting. Demonstrated steps correctly and maintained sterility throughout. Reminded Nelson to call [employer] and order shipment of dressing change kits. He stated he would do this today. Reviewed education regarding sleeping on MPU, what to do in the case of power outage and alarm management. - Patient and sister Kvng Hong both able to teach back. Kvng Hong confirmed that she will be patient's 24hour caregiver for the dates when Shaina Best is out of the country (10/15-10/24). Confirmed she will bring patient to clinic 3x a week for dressing changes during this time unless home health provides them. Patient verbalized understanding of this plan. Patient provided his step daughter Tiffany's number and confirmed okay for coordinator to call her to begin scheduling dressing change education.

## 2023-10-16 ENCOUNTER — TELEPHONE (OUTPATIENT)
Age: 68
End: 2023-10-16

## 2023-10-16 ENCOUNTER — HOME HEALTH ADMISSION (OUTPATIENT)
Dept: HOME HEALTH SERVICES | Facility: HOME HEALTH | Age: 68
End: 2023-10-16
Payer: MEDICARE

## 2023-10-16 NOTE — TELEPHONE ENCOUNTER
Received secure e-mail back from [facility] staff who stated that they initiated a shipment for this patient to arrive tomorrow. [facility] and informed [facility] should [facility] tomorrow. Also notified of first appointment time (10/24) and that patient should receive call from home health for intake today or tomorrow. she verbalized understanding.

## 2023-10-16 NOTE — TELEPHONE ENCOUNTER
Placed call to patient's step daughter to initiate dressing change education. Left message requested call back to schedule.

## 2023-10-17 ENCOUNTER — HOME CARE VISIT (OUTPATIENT)
Facility: HOME HEALTH | Age: 68
End: 2023-10-17

## 2023-10-17 ENCOUNTER — TELEPHONE (OUTPATIENT)
Age: 68
End: 2023-10-17

## 2023-10-17 ENCOUNTER — ANTI-COAG VISIT (OUTPATIENT)
Age: 68
End: 2023-10-17
Payer: MEDICARE

## 2023-10-17 DIAGNOSIS — Z79.01 CHRONIC ANTICOAGULATION: Primary | ICD-10-CM

## 2023-10-17 LAB
ALBUMIN SERPL-MCNC: 4.8 G/DL (ref 3.9–4.9)
ALBUMIN/GLOB SERPL: 1.5 {RATIO} (ref 1.2–2.2)
ALP SERPL-CCNC: 165 IU/L (ref 44–121)
ALT SERPL-CCNC: 20 IU/L (ref 0–44)
AST SERPL-CCNC: 36 IU/L (ref 0–40)
BASOPHILS # BLD AUTO: 0 X10E3/UL (ref 0–0.2)
BASOPHILS NFR BLD AUTO: 0 %
BILIRUB SERPL-MCNC: 0.9 MG/DL (ref 0–1.2)
BUN SERPL-MCNC: 33 MG/DL (ref 8–27)
BUN/CREAT SERPL: 26 (ref 10–24)
CALCIUM SERPL-MCNC: 9.6 MG/DL (ref 8.6–10.2)
CHLORIDE SERPL-SCNC: 97 MMOL/L (ref 96–106)
CO2 SERPL-SCNC: 24 MMOL/L (ref 20–29)
CREAT SERPL-MCNC: 1.29 MG/DL (ref 0.76–1.27)
EGFRCR SERPLBLD CKD-EPI 2021: 61 ML/MIN/1.73
EOSINOPHIL # BLD AUTO: 0.2 X10E3/UL (ref 0–0.4)
EOSINOPHIL NFR BLD AUTO: 4 %
ERYTHROCYTE [DISTWIDTH] IN BLOOD BY AUTOMATED COUNT: 14.2 % (ref 11.6–15.4)
GLOBULIN SER CALC-MCNC: 3.3 G/DL (ref 1.5–4.5)
GLUCOSE SERPL-MCNC: 91 MG/DL (ref 70–99)
HCT VFR BLD AUTO: 37.3 % (ref 37.5–51)
HGB BLD-MCNC: 11.4 G/DL (ref 13–17.7)
IMM GRANULOCYTES NFR BLD AUTO: 0 %
INR BLD: 2.3
INR PPP: 2.1 (ref 0.9–1.2)
LDH SERPL L TO P-CCNC: 393 IU/L (ref 121–224)
LYMPHOCYTES # BLD AUTO: 2.1 X10E3/UL (ref 0.7–3.1)
LYMPHOCYTES NFR BLD AUTO: 30 %
MAGNESIUM SERPL-MCNC: 2.3 MG/DL (ref 1.6–2.3)
MCH RBC QN AUTO: 28.6 PG (ref 26.6–33)
MCHC RBC AUTO-ENTMCNC: 30.6 G/DL (ref 31.5–35.7)
MCV RBC AUTO: 94 FL (ref 79–97)
MONOCYTES # BLD AUTO: 0.9 X10E3/UL (ref 0.1–0.9)
MONOCYTES NFR BLD AUTO: 13 %
NEUTROPHILS # BLD AUTO: 3.7 X10E3/UL (ref 1.4–7)
NEUTROPHILS NFR BLD AUTO: 53 %
NT-PROBNP SERPL-MCNC: 895 PG/ML (ref 0–376)
PLATELET # BLD AUTO: 240 X10E3/UL (ref 150–450)
POTASSIUM SERPL-SCNC: 5 MMOL/L (ref 3.5–5.2)
PROT SERPL-MCNC: 8.1 G/DL (ref 6–8.5)
PROTHROMBIN TIME: 21.1 SEC (ref 9.1–12)
RBC # BLD AUTO: 3.98 X10E6/UL (ref 4.14–5.8)
SODIUM SERPL-SCNC: 134 MMOL/L (ref 134–144)
WBC # BLD AUTO: 6.9 X10E3/UL (ref 3.4–10.8)

## 2023-10-17 PROCEDURE — 0221000100 HH NO PAY CLAIM PROCEDURE

## 2023-10-17 PROCEDURE — G0299 HHS/HOSPICE OF RN EA 15 MIN: HCPCS

## 2023-10-17 PROCEDURE — 93793 ANTICOAG MGMT PT WARFARIN: CPT | Performed by: NURSE PRACTITIONER

## 2023-10-17 RX ORDER — WARFARIN SODIUM 1 MG/1
TABLET ORAL
COMMUNITY
Start: 2023-10-13

## 2023-10-17 RX ORDER — EMPAGLIFLOZIN 10 MG/1
10 TABLET, FILM COATED ORAL DAILY
Qty: 30 TABLET | Refills: 2 | Status: SHIPPED | OUTPATIENT
Start: 2023-10-17

## 2023-10-17 RX ORDER — METOPROLOL SUCCINATE 25 MG/1
12.5 TABLET, EXTENDED RELEASE ORAL DAILY
Qty: 30 TABLET | Refills: 1 | Status: SHIPPED | OUTPATIENT
Start: 2023-10-17

## 2023-10-17 RX ORDER — EMPAGLIFLOZIN 10 MG/1
TABLET, FILM COATED ORAL
COMMUNITY
Start: 2023-10-16 | End: 2023-10-17 | Stop reason: SDUPTHER

## 2023-10-17 NOTE — PROGRESS NOTES
[address] [facility] in [place], [place]      INR result reviewed with Marli Tyler NP who made the following recommendations (Maggie Press): continue 3mg daily and recheck 1 week. Patient's sister notified and verbalized understanding. They had no further questions.  (See anticoag tracker)     Malathi Ta RN

## 2023-10-17 NOTE — TELEPHONE ENCOUNTER
Called and spoke with [facility], patient scheduled for follow up 10/31 @ 1:30 with Dr. Lisa Elizalde , patient's sister Sophia Saini notified who verbalized understanding.

## 2023-10-17 NOTE — TELEPHONE ENCOUNTER
[address] in [facility], [address] [facility]       Date of call: 10/17/23  Date of discharge: 10/3/23 from [facility], 10/16/23 from [facility]   Discharge disposition:Home        Called and spoke with Jose Wade who states patient is currently located at home. MEDICATIONS:     Med rec completed based off of discharge AVS? Yes  Discrepancies noted: None   Do you have all of your prescriptions and are they filled? No- missing Jardiance, metoprolol and brilenta. Do you have a copy of your discharge instructions? No- [facility] discharge AVS forwarded to home health nurse Fela Muse. FOLLOW UP:     Future Appointments   Date Time Provider [address]   10/18/2023 To Be Determined [place] [id number] [address]   10/18/2023 12:00 PM Nelle Severance, [facility]   10/24/2023  1:00 PM Maribell Luevano APRN - NP [facility] BS AMB   10/31/2023 11:00 AM Eulalio Alves MD [facility] BS AMB   11/7/2023  9:00 AM Maribell Luevano APRN - NP [facility] AMB   11/16/2023 10:00 AM Ike Mcrae MD [facility] BS AMB       PCP follow up: To be scheduled    Date   [facility] follow up: as above- patient's sister Madisyn Aguilar confirmed       TRANSPORTATION:   Patient utilizes for transportation: Family/caregiver - Educated Madisyn Aguilar patient needs to travel in the back seat of the care until further instructed, she verbalized understanding. Do you have any transportation barriers at this time? No      SUPPLIES:   Do you have all LVAD supplies? No- [employer] shipment should arrive today, home health provided LVAD dressing change      Provider notified of missing medications- E. Chloé confirmed patient should be on Brilenta and coumadin with no Asprin. Refills per VORZAINAB.      Marvin Quezada RN  [address] [facility]

## 2023-10-17 NOTE — TELEPHONE ENCOUNTER
Received message from home health stating they do not have discharge summary from [facility]. Jose Roberto Haynes with [facility] who stated she will fax discharge summary shortly. Summary forwarded to patient's home health nurse Jodi Love.

## 2023-10-18 ENCOUNTER — HOME CARE VISIT (OUTPATIENT)
Facility: HOME HEALTH | Age: 68
End: 2023-10-18

## 2023-10-18 ENCOUNTER — TELEPHONE (OUTPATIENT)
Age: 68
End: 2023-10-18

## 2023-10-18 VITALS
HEIGHT: 69 IN | HEART RATE: 87 BPM | TEMPERATURE: 97 F | WEIGHT: 133 LBS | BODY MASS INDEX: 19.7 KG/M2 | OXYGEN SATURATION: 98 %

## 2023-10-18 VITALS
HEART RATE: 61 BPM | OXYGEN SATURATION: 100 % | SYSTOLIC BLOOD PRESSURE: 100 MMHG | RESPIRATION RATE: 17 BRPM | DIASTOLIC BLOOD PRESSURE: 60 MMHG

## 2023-10-18 PROCEDURE — G0152 HHCP-SERV OF OT,EA 15 MIN: HCPCS

## 2023-10-18 PROCEDURE — G0151 HHCP-SERV OF PT,EA 15 MIN: HCPCS

## 2023-10-18 ASSESSMENT — ENCOUNTER SYMPTOMS: DYSPNEA ACTIVITY LEVEL: AFTER AMBULATING 10 - 20 FT

## 2023-10-18 NOTE — HOME HEALTH
Oct with LVAD     Discharge planning discussed with patient and caregiver. Discharge planning as follows: Is no longer homebound, Per physician order, When patient is no longer able to participate or progresses to SNF/Hospice, Will discharge when the patient has reached their maximum functional potential and maximum safety in their home and When goals are met Patient/caregiver did verbalize agreement with discharge planning.      Specific plan for next visit: increase strength in B LE,s transfers, gait

## 2023-10-18 NOTE — PATIENT INSTRUCTIONS
Medication changes:    None at this time- please start taking Jardiance, metoprolol and brilenta which were sent. Testing Ordered:    None today       Other Recommendations: Follow up with Electrophysiology       Continue to change drive line exit site dressing 3 times a week using sterile technique,     Ensure you are drinking an adequate amount of water with a goal of 6-8 eight ounce glasses (1.5-2 liters) of fluid daily. Your urine should be clear and light yellow straw colored. Monthly LVAD Education Tip:    [employer]                10/24/2023 1:00 PM Kelvin Luevano APRN -  [facility] BS AMB   10/31/2023 11:00 AM Melinda Cunningham MD [address] [place] [address] [facility] BS AMB   10/31/2023  Dr. Yung Carvalho (Vascular)  Okay to move to [place] location     11/7/2023 9:00 AM Kelvin Luevano APRN -  [facility] BS AMB   11/16/2023 10:00 AM Kali Romeo  [facility]            Follow up in 1  week with [address]      For further patient and caregiver resources as well as access to online LVAD support groups visit [email or web address]       Please bring your daily sheets and medications to your next appointment. Please monitor your weights daily upon waking and after using the bathroom. Keep a written records of your weights and bring to your next appointment. If you have a weight gain of 3 or more pounds overnight OR 5 or more pounds in one week please contact our office. Thank you for allowing us the privilege of being a part of your healthcare team! Please do not hesitate to contact our office at [phone] with any questions or concerns.

## 2023-10-18 NOTE — TELEPHONE ENCOUNTER
----- Message from YASMIN Greene NP sent at 10/18/2023 11:24 AM EDT -----  Please call Nelda Fore to discuss their abnormal lab results. He needs to stop his potassium and he needs to make sure he is hydrated at home. I want to repeat his labs next week when he is here.

## 2023-10-18 NOTE — HOME HEALTH
Reason for referral, PMH SUMMARY of clinical condition: Pt is a 80 y.o. male who presents for OT evaluation following hospitalization at [facility] for Ischemic non healing wound on R LE 08-24 to 08-31-23. Pt was then transferred to [facility] to manage heart complications. Pt was hospitalized at [facility] 08-31 to 10-03 -23. Post op course complicated by NSTEMI and subsequent acute on chronic systolic heart failure, cardiogenic-septic shock, acute renal failure, and acute hypoxic respiratory failure. Pt underwent LVAD placement. He then transferred to [facility] for rehab stay. PMHx includes: PVD, CHF, CAD, HTN, Ischemic foot ulcer. Pt lives in [place] in single story home with his girlfriend, however for the next 30 days he is staying with nephew's family to be close to the hospital. Pt's nephew is currently in [place] with family and pt's sister Villa Chavez is temporarily staying with pt in nephew's home to assist as needed. Pt's bedroom and primary bathroom are on upstairs level of nephew's home. Pt uses SPC for mobility. Clinical Assessment/Skilled reason for admission to home health (What this means for the patient overall and need for ongoing skilled care): Pt presents with generalized weakness, decreased activity tolerance, and balance impairments following extended hospital stay and LVAD placement. Pt's Modified Barthel Index score is 80/100, indicating he is totaly independent with ADLs, however pt requires Supervision and cueing to ensure use of AE for LB dressing and bathing tasks to maintain move in the tube precautions. Pt is limited to bathing at sink level currently as he has not been cleared to shower. Pt is currently dependent for meals and would like to return to preparing light meals independently. Pt presents with impaired BUE strength as indicated by shoulder MMT score of 3-/5; elbow MMT score 3+/5. Pt fatigues easily with brief periods of activity and requires frequent rest breaks.  Pt to benefit from OT

## 2023-10-18 NOTE — TELEPHONE ENCOUNTER
Reviewed patient status with Dr. Loy Lucio and Qasim Nunez. Dr. Loy Lucio requested patient come for nurse visit for dressing change and suture removal this week. Per Gearld Abu patient should stop potassium and ensure he is hydrating. Called and spoke with patient's sister Mark Winn who confirmed that patient has not had any alarms. Educated on provider instructions to stop potassium and increase hydration. Reminded to remove from pill pack. She verbalized understanding.      Confirmed they can come for nurse visit for dressing change and suture removal tomorrow at 2pm.

## 2023-10-19 ENCOUNTER — NURSE ONLY (OUTPATIENT)
Age: 68
End: 2023-10-19

## 2023-10-19 VITALS — RESPIRATION RATE: 20 BRPM | OXYGEN SATURATION: 99 % | TEMPERATURE: 98.2 F

## 2023-10-19 VITALS
SYSTOLIC BLOOD PRESSURE: 100 MMHG | OXYGEN SATURATION: 100 % | WEIGHT: 128.4 LBS | RESPIRATION RATE: 18 BRPM | BODY MASS INDEX: 18.96 KG/M2 | HEART RATE: 91 BPM

## 2023-10-19 DIAGNOSIS — Z95.811 LEFT VENTRICULAR ASSIST DEVICE PRESENT (HCC): Primary | ICD-10-CM

## 2023-10-19 ASSESSMENT — ENCOUNTER SYMPTOMS: DYSPNEA ACTIVITY LEVEL: AFTER AMBULATING 10 - 20 FT

## 2023-10-19 NOTE — HOME HEALTH
Skilled Reason for [place]  /summary of clinical condition:   Pt is a 80year old  male admitted to home care for Lvad implantation , Congestive heart failure,  DVD management  Lvad management ,  management of theraputic meds warfarin,  management of CHF and  medication management, wound management. Diagnosis: Lvad implantation   Disciplines involved in care/ visit frequency:  SN 2x weekly,  6 PRN for  change in patient's status, complications with Lvad, additional labs, complications with Lvad   Telehealth : n/a  Caregiver: Cg sister Alireza Bains , nephew   Caregiver assists with all care needs  Medications reconciliation completed. Pt instructed on all medications that are available in the home this visit. Education was provided regarding medications. Medications are effective at this time. Md notified of any discrepancies/medication interactions: Pt doesnt have Metrprol or jardiance , Md nicolette made aware will  from pharmacy , Med box packed. High Risk Meds:  ANTICOAGULANTS -Instructed pt/CG on warfarin (COUMADIN) 1mg tab . 3mg daily   No ASA or NSAIDS unless ordered by MD.  S & S excessive anticoagulation are: Pink/red urine, swelling/pain of joints,  tarry stools, unusual/excessive bruising or bleeding. Reviewed sharps precautions  (No razors, soft toothbrush, etc.). Instructed  that falls, hitting head or bleeding that won't stop requires immediate medical attention. HYPOGLYCEMIC MEDICATIONS . PT taking Jardiance for cardio vascular health   JARDIANCE 10 MG tablet 30 tablet 2 6/27/2023  Sig: TAKE 1 TABLET BY MOUTH EVERY DAY, pt taking for cardiac management   Instructed S/S hypoglycemia  -  BG < 70, jitters, nervousness, faintness, light-headedness, confusion/slurred speech, difficult to arouse. Taught actions to take in the event of hypoglycemia (eat food with protein and carbohydrate). Recheck BS in 15 minutes. Repeat Glucose intake until normal/stable.   Low blood sugar that will not stabilize

## 2023-10-20 ENCOUNTER — HOME CARE VISIT (OUTPATIENT)
Facility: HOME HEALTH | Age: 68
End: 2023-10-20

## 2023-10-20 ENCOUNTER — TELEPHONE (OUTPATIENT)
Age: 68
End: 2023-10-20

## 2023-10-20 PROCEDURE — G0157 HHC PT ASSISTANT EA 15: HCPCS

## 2023-10-20 PROCEDURE — G0299 HHS/HOSPICE OF RN EA 15 MIN: HCPCS

## 2023-10-20 NOTE — TELEPHONE ENCOUNTER
Received message from patient's home health [address] [facility]. Per RN patient's map is 90, has not started metoprolol. Spoke to [facility] who stated that the patient can  metoprolol. Stated that [place] are too soon to fill. Patient's  home health nurse Ann Borges confirmed patient picked up Jardiance and Metorolol from [employer] and does have these medications. Confirmed patient aware they can  metoprolol.

## 2023-10-22 VITALS
BODY MASS INDEX: 19.94 KG/M2 | OXYGEN SATURATION: 99 % | WEIGHT: 135 LBS | TEMPERATURE: 98.2 F | RESPIRATION RATE: 20 BRPM

## 2023-10-22 VITALS — RESPIRATION RATE: 16 BRPM | OXYGEN SATURATION: 97 % | TEMPERATURE: 98.3 F

## 2023-10-23 ENCOUNTER — HOME CARE VISIT (OUTPATIENT)
Facility: HOME HEALTH | Age: 68
End: 2023-10-23

## 2023-10-23 ENCOUNTER — ANTI-COAG VISIT (OUTPATIENT)
Age: 68
End: 2023-10-23
Payer: MEDICARE

## 2023-10-23 DIAGNOSIS — Z79.01 CHRONIC ANTICOAGULATION: Primary | ICD-10-CM

## 2023-10-23 LAB
ALBUMIN SERPL-MCNC: 4.7 G/DL (ref 3.9–4.9)
ALBUMIN/GLOB SERPL: 1.5 {RATIO} (ref 1.2–2.2)
ALP SERPL-CCNC: 150 IU/L (ref 44–121)
ALT SERPL-CCNC: 25 IU/L (ref 0–44)
AST SERPL-CCNC: 43 IU/L (ref 0–40)
BASOPHILS # BLD AUTO: 0 X10E3/UL (ref 0–0.2)
BASOPHILS NFR BLD AUTO: 1 %
BILIRUB SERPL-MCNC: 0.8 MG/DL (ref 0–1.2)
BUN SERPL-MCNC: 23 MG/DL (ref 8–27)
BUN/CREAT SERPL: 21 (ref 10–24)
CALCIUM SERPL-MCNC: 9.6 MG/DL (ref 8.6–10.2)
CHLORIDE SERPL-SCNC: 101 MMOL/L (ref 96–106)
CO2 SERPL-SCNC: 23 MMOL/L (ref 20–29)
CREAT SERPL-MCNC: 1.1 MG/DL (ref 0.76–1.27)
EGFRCR SERPLBLD CKD-EPI 2021: 74 ML/MIN/1.73
EOSINOPHIL # BLD AUTO: 0.2 X10E3/UL (ref 0–0.4)
EOSINOPHIL NFR BLD AUTO: 3 %
ERYTHROCYTE [DISTWIDTH] IN BLOOD BY AUTOMATED COUNT: 13.6 % (ref 11.6–15.4)
GLOBULIN SER CALC-MCNC: 3.2 G/DL (ref 1.5–4.5)
GLUCOSE SERPL-MCNC: 84 MG/DL (ref 70–99)
HCT VFR BLD AUTO: 36.5 % (ref 37.5–51)
HGB BLD-MCNC: 11.1 G/DL (ref 13–17.7)
IMM GRANULOCYTES NFR BLD AUTO: 0 %
INR BLD: 3.7
INR PPP: 3.7 (ref 0.9–1.2)
LDH SERPL L TO P-CCNC: 434 IU/L (ref 121–224)
LYMPHOCYTES # BLD AUTO: 1.6 X10E3/UL (ref 0.7–3.1)
LYMPHOCYTES NFR BLD AUTO: 24 %
MAGNESIUM SERPL-MCNC: 2 MG/DL (ref 1.6–2.3)
MCH RBC QN AUTO: 28.5 PG (ref 26.6–33)
MCHC RBC AUTO-ENTMCNC: 30.4 G/DL (ref 31.5–35.7)
MCV RBC AUTO: 94 FL (ref 79–97)
MONOCYTES # BLD AUTO: 0.6 X10E3/UL (ref 0.1–0.9)
MONOCYTES NFR BLD AUTO: 9 %
NEUTROPHILS # BLD AUTO: 4.2 X10E3/UL (ref 1.4–7)
NEUTROPHILS NFR BLD AUTO: 63 %
NT-PROBNP SERPL-MCNC: 774 PG/ML (ref 0–376)
PLATELET # BLD AUTO: 230 X10E3/UL (ref 150–450)
POTASSIUM SERPL-SCNC: 4.5 MMOL/L (ref 3.5–5.2)
PROT SERPL-MCNC: 7.9 G/DL (ref 6–8.5)
PROTHROMBIN TIME: 35 SEC (ref 9.1–12)
RBC # BLD AUTO: 3.9 X10E6/UL (ref 4.14–5.8)
SODIUM SERPL-SCNC: 138 MMOL/L (ref 134–144)
WBC # BLD AUTO: 6.7 X10E3/UL (ref 3.4–10.8)

## 2023-10-23 PROCEDURE — 93793 ANTICOAG MGMT PT WARFARIN: CPT | Performed by: NURSE PRACTITIONER

## 2023-10-23 PROCEDURE — G0299 HHS/HOSPICE OF RN EA 15 MIN: HCPCS

## 2023-10-23 ASSESSMENT — ENCOUNTER SYMPTOMS: STOOL DESCRIPTION: FIRM

## 2023-10-23 NOTE — PROGRESS NOTES
[address] [facility] in [place], [place]      INR result reviewed with Morenita Dickey NP who made the following recommendations (VORB): 1mg tonight and recheck 1 week. Patient's sister notified and verbalized understanding. They had no further questions.  (See anticoag tracker)     Carole Marcum RN

## 2023-10-23 NOTE — PATIENT INSTRUCTIONS
Medication changes:    None today       Testing Ordered: An order for CT scan of the chest has been placed to be done in the next month. You will be receiving an automated call from Coordination of Care to schedule this test. If you are unavailable to receive the call or would like to contact coordination of care yourself you may contact [phone] to schedule. You will need to contact coordination of care yourself if you miss their calls as they will only make 3 attempts to reach you. Other Recommendations:     A referral to Neuropsycology has been placed. You will be contacted for scheduling. The wait for these appointments is generally 6-8 months, please schedule the earliest available. You can also call [phone] to schedule. Follow up with electrophysiology- you stated you have an appointment with [facility] Cardiology this Friday. A referral to sleep medicine has been placed. You will be contacted for scheduling. Continue to change drive line exit site dressing 3 times a week using sterile technique,     Ensure you are drinking an adequate amount of water with a goal of 6-8 eight ounce glasses (1.5-2 liters) of fluid daily. Your urine should be clear and light yellow straw colored. Monthly LVAD Education Tip:    [employer]                      Follow up in 1 week with [address]      For further patient and caregiver resources as well as access to online LVAD support groups visit [email or web address]       Please bring your daily sheets and medications to your next appointment. Please monitor your weights daily upon waking and after using the bathroom. Keep a written records of your weights and bring to your next appointment. If you have a weight gain of 3 or more pounds overnight OR 5 or more pounds in one week please contact our office.        Thank you for allowing us the privilege of being a part of your

## 2023-10-23 NOTE — TELEPHONE ENCOUNTER
Call returned from Rachael who confirmed she will perform patient's dressing changes when he returns to his home in [place]. Requested we arrange time for her to come for dressing change education. Rachael stated she will need to coordinate transportation and will call back when she has spoken to person who will drive her.

## 2023-10-23 NOTE — HOME HEALTH
Subjective: Pt stated that he is doing good today,  weight is 135l bs, we discussed signs of the Green  Zone, Md made aware . Falls since last visit : No   Caregiver involvement changes:  Pts wife Susanna ghosh daughters assists with IADLS and  ADLs    Home health supplies by type and quantity ordered/delivered this visit include: Lvad supplies,     Does the patient have any new or changed medications? YES, Potassium stopped , pt still needs to  Metroprol , Md aware   If Yes, were medications reconciled? YES   Was the certifying physician notified of changes in medications? N/A     Clinical assessment  . Patient education provided this visit: CHF  management, wound care management , Lvad management. Pt competent with administration of meds once packed in med box. Pt instructed on s/s of chf exercaberation, recognised signs of green  zone. We discusssed the importance of dietary imput and sodium restrictions . Pt remains stable and demonstrates understanding of Lvad management, medication management and bleeding precautions. Pt and Cg  instructed pt on s/s of infection and s/s to contact MD Rosendo Zacarias. Progress toward goals: Pt continued to require Sn for  Med management  CHF  management , management of theraputic meds, Lvad exit wound management, wound care     Interdisciplinary communication: Safety measures  and fall precautions discussed with patient.     Discharge planning for pt to be discharge to hospice or SNF when patient is no longer able to participate in care

## 2023-10-23 NOTE — TELEPHONE ENCOUNTER
Second call  to patient's step daughter to initiate dressing change education. Left message requested call back to schedule.

## 2023-10-24 ENCOUNTER — OFFICE VISIT (OUTPATIENT)
Age: 68
End: 2023-10-24
Payer: MEDICARE

## 2023-10-24 VITALS
WEIGHT: 126.8 LBS | BODY MASS INDEX: 18.73 KG/M2 | TEMPERATURE: 97.8 F | HEART RATE: 72 BPM | SYSTOLIC BLOOD PRESSURE: 86 MMHG | OXYGEN SATURATION: 100 % | RESPIRATION RATE: 16 BRPM

## 2023-10-24 DIAGNOSIS — Z12.5 SCREENING PSA (PROSTATE SPECIFIC ANTIGEN): ICD-10-CM

## 2023-10-24 DIAGNOSIS — Z95.811 LEFT VENTRICULAR ASSIST DEVICE PRESENT (HCC): Primary | ICD-10-CM

## 2023-10-24 DIAGNOSIS — R57.0 CARDIOGENIC SHOCK (HCC): ICD-10-CM

## 2023-10-24 PROCEDURE — 99215 OFFICE O/P EST HI 40 MIN: CPT | Performed by: NURSE PRACTITIONER

## 2023-10-24 PROCEDURE — 93750 INTERROGATION VAD IN PERSON: CPT | Performed by: NURSE PRACTITIONER

## 2023-10-24 PROCEDURE — 1124F ACP DISCUSS-NO DSCNMKR DOCD: CPT | Performed by: NURSE PRACTITIONER

## 2023-10-24 ASSESSMENT — ENCOUNTER SYMPTOMS
ABDOMINAL DISTENTION: 0
SHORTNESS OF BREATH: 0
COUGH: 0
NAUSEA: 0
VOMITING: 0

## 2023-10-24 NOTE — PROGRESS NOTES
[address] in [address]      LVAD  LVAD Type[de-identified] Left Ventricular Assist Device (LVAD)  Pump Speed (rpm): 5400  Pump Flow (lpm): 4.1  MAP (mmHg): 88  Set Low Speed (rpm): 54  Pump Pulse Index (PI): 9.4  Pump Power ([place]): 4.1  Battery Life Checked: Yes  Backup Controller Present: Yes  Driveline Dressing: Clean, Dry and Intact  Outpatient: Yes  MAP in Therapeutic Range (Outpatient): Yes       Savage Clark was seen today in clinic for a visit with Christa Dinh NP    Patient denied s/s of driveline infection or driveline trauma (including  drops). States he had one quick alarm at home. Driveline inspected for integrity. Dressing changed per order. Above reported to provider. LVAD interrogation completed in clinic, results reported to provider. See flow sheet for details. Discussed with patient and sister Jama Jaeger need to have patient's Luh Meek trained on dressing. Reviewed need to be trained by [facility] staff to ensure proper technique. Both verbalized understanding. Stated they will discuss with family and help facilitate getting her to [facility] for training. All orders entered per VORB. All provider instructions placed in AVS and reviewed with patient. Educated the patient on testing ordered, new referrals, expected follow up. reviewed questions. Patient verbalized understanding, denied questions at end of visit.
performed by Andrés Swan MD at [facility] ENDOSCOPY    VASCULAR SURGERY  2014    aortobifemoral bypass and bilateral femoral above knee popliteal bypass       FAMILY HISTORY:  Family History   Problem Relation Age of Onset    Hypertension Mother     Hypertension Father     Heart Attack Father        SOCIAL HISTORY:  Social History     Socioeconomic History    Marital status: Single     Spouse name: None    Number of children: None    Years of education: None    Highest education level: None   Tobacco Use    Smoking status: Former     Packs/day: .25     Types: Cigarettes     Quit date: 2023     Years since quittin.2    Smokeless tobacco: Never   Substance and Sexual Activity    Alcohol use: Not Currently    Drug use: Never     Social Determinants of Health     Transportation Needs: No Transportation Needs (10/17/2023)    OASIS : Transportation     Lack of Transportation (Medical): No     Lack of Transportation (Non-Medical):  No     Patient Unable or Declines to Respond: No   Social Connections: Feeling Socially Integrated (10/17/2023)    OASIS : Social Isolation     Frequency of experiencing loneliness or isolation: Never       LABORATORY RESULTS:         Latest Ref Rng & Units 10/23/2023    11:50 AM 10/17/2023     3:00 PM 10/3/2023     4:42 AM 10/2/2023     6:30 AM 10/1/2023     4:13 AM 2023     4:39 AM 2023     3:40 AM   CBC   WBC 3.4 - 10.8 x10E3/uL 6.7  6.9  9.0  9.7  9.5  9.5  9.5    RBC 4.14 - 5.80 x10E6/uL 3.90  3.98  2.78  2.76  2.59  2.65  2.62    Hemoglobin Quant 13.0 - 17.7 g/dL 11.1  11.4  8.1  8.0  7.6  7.7  7.8    Hematocrit 37.5 - 51.0 % 36.5  37.3  26.5  25.6  24.2  24.4  24.5    MCV 79 - 97 fL 94  94  95.3  92.8  93.4  92.1  93.5    MCH 26.6 - 33.0 pg 28.5  28.6  29.1  29.0  29.3  29.1  29.8    MCHC 31.5 - 35.7 g/dL 30.4  30.6  30.6  31.3  31.4  31.6  31.8    MPV 8.9 - 12.9 FL   10.5  9.8  10.2  9.9  10.4    RDW 11.6 - 15.4 % 13.6  14.2  17.4  17.3  17.1  17.4  17.1

## 2023-10-25 ENCOUNTER — HOME CARE VISIT (OUTPATIENT)
Facility: HOME HEALTH | Age: 68
End: 2023-10-25

## 2023-10-25 ENCOUNTER — TELEPHONE (OUTPATIENT)
Age: 68
End: 2023-10-25

## 2023-10-25 VITALS — TEMPERATURE: 97.9 F | HEART RATE: 72 BPM | OXYGEN SATURATION: 99 % | RESPIRATION RATE: 16 BRPM

## 2023-10-25 VITALS — RESPIRATION RATE: 20 BRPM | OXYGEN SATURATION: 98 % | TEMPERATURE: 97.2 F

## 2023-10-25 VITALS — HEART RATE: 64 BPM | TEMPERATURE: 98 F | OXYGEN SATURATION: 99 % | RESPIRATION RATE: 16 BRPM

## 2023-10-25 PROCEDURE — G0157 HHC PT ASSISTANT EA 15: HCPCS

## 2023-10-25 PROCEDURE — G0152 HHCP-SERV OF OT,EA 15 MIN: HCPCS

## 2023-10-25 NOTE — HOME HEALTH
Subjective: Pt states he is doing fine   Falls since last visit No(if yes complete the Fall Tracking Form and include bsrifallreport):   Caregiver involvement changes: n/a  Home health supplies by type and quantity ordered/delivered this visit include: OT provided pt with theraputty this visit     Clinician asked if patient has had any physician contact since last home care visit and patient states: NO  Clinician asked if patient has any new or changed medications and patient states:  NO   If Yes, were medications reconciled? N/A   Was the certifying physician notified of changes in medications? N/A     Clinical assessment (what this visit means for the patient overall and need for ongoing skilled care) and progress or lack of progress towards SPECIFIC goals: UE theraputty HEP training initiated today. Pt will require further training to ensure carryover. Pt is a high fall risk with all functional mobility. He had LOB x2 during visit today, both times requiring Min A from therapist to recover balance. Pt to benefit from further skilled OT to maximize safety and independence with ADL/light IADL routine.      Written Teaching Material Utilized: written HEP     Interdisciplinary communication with: JENIFER Lovett for the purpose of scheduling     Discharge planning as follows: d/c when goals are met/max potential is acheived    Specific plan for next visit: LB dressing re-training, energy conservation training

## 2023-10-25 NOTE — TELEPHONE ENCOUNTER
Received call from patient sister stating patient's stepdaughter can come to clinic for dressing training 10/30 at [facility].     Received return call from patient's sister stating that patient's stepdaughter cannot come on 10/30 anymore.  Confirmed she can come from 10am-11am  11/6 and 11/10 Advancement Flap (Double) Text: The defect edges were debeveled with a #15 scalpel blade.  Given the location of the defect and the proximity to free margins a double advancement flap was deemed most appropriate.  Using a sterile surgical marker, the appropriate advancement flaps were drawn incorporating the defect and placing the expected incisions within the relaxed skin tension lines where possible.    The area thus outlined was incised deep to adipose tissue with a #15 scalpel blade.  The skin margins were undermined to an appropriate distance in all directions utilizing iris scissors.

## 2023-10-26 ENCOUNTER — TELEPHONE (OUTPATIENT)
Age: 68
End: 2023-10-26

## 2023-10-26 ENCOUNTER — HOME CARE VISIT (OUTPATIENT)
Facility: HOME HEALTH | Age: 68
End: 2023-10-26

## 2023-10-26 VITALS — RESPIRATION RATE: 16 BRPM | HEART RATE: 71 BPM | TEMPERATURE: 98 F | OXYGEN SATURATION: 100 %

## 2023-10-26 PROCEDURE — G0152 HHCP-SERV OF OT,EA 15 MIN: HCPCS

## 2023-10-26 PROCEDURE — G0299 HHS/HOSPICE OF RN EA 15 MIN: HCPCS

## 2023-10-26 NOTE — TELEPHONE ENCOUNTER
Received message from patient's home health [address] questioning if patient should be on potassium or not. Received with Morenita Dickey who stated VORB patient should stop potassium all together and remove from med list. Reviewed with Amber Leonardo who confirmed understanding.

## 2023-10-26 NOTE — HOME HEALTH
Subjective: Pt denies any pain today   Falls since last visit No(if yes complete the Fall Tracking Form and include bsrifallreport):   Caregiver involvement changes: pt's nephew has returned home from his trip and is now pt's primary cg  Home health supplies by type and quantity ordered/delivered this visit include: n/a    Clinician asked if patient has had any physician contact since last home care visit and patient states: NO  Clinician asked if patient has any new or changed medications and patient states:  NO   If Yes, were medications reconciled? N/A   Was the certifying physician notified of changes in medications? N/A     Clinical assessment (what this visit means for the patient overall and need for ongoing skilled care) and progress or lack of progress towards SPECIFIC goals: Pt has progressed to Mod I level with LB dressing tasks. Pt continues to be a high fall risk and would benefit from continued OT services to address safety with light meal prep tasks as he will need to be able to perform these tasks when he returns home.      Written Teaching Material Utilized: N/A    Interdisciplinary communication with: N/A    Discharge planning as follows: d/c when goals are met/max potential is acheived    Specific plan for next visit: light meal prep re-training

## 2023-10-27 ENCOUNTER — HOME CARE VISIT (OUTPATIENT)
Facility: HOME HEALTH | Age: 68
End: 2023-10-27

## 2023-10-27 PROCEDURE — G0157 HHC PT ASSISTANT EA 15: HCPCS

## 2023-10-27 NOTE — PROGRESS NOTES
[address] in [address] Note    Patient name: Olinda Turner  Patient : 1955  Patient MRN: [id number]  Date of service: 10/31/23    Primary care physician: Burgess Antoine MD  LVAD cardiologist: MICHAEL    CHIEF COMPLAINT:  Follow up for heart failure, LVAD, anticoagulation management    ASSESSMENT:  Olinda Turner is a 80 y.o. male with history of chronic systolic heart failure due to ischemic cardiomyopathy, s/p HM3 LVAD implantation (23) as destination, stage D, Optimizing GDMT. He is interested in consideration for transplant. He has quit smoking. Will plan for monthly urine cotinine screening x 6 months. PLAN:  Heart Failure/Cardiomyopathy: NYHA class II  Continue current medical therapy for heart failure:  Beta-blocker: Continue Toprol XL 12.5 mg daily  ACE/ARB/ARNi: Not yet started due to volume depletion. Will re evaluate next visit starting low dose Entresto and discontinuing Amlodipine. Hydralazine/nitrate: Continue Hydralazine 100 mg TID and Isorild 20 mg TID  MRA: Continue Spironolactone 12.5 mg daily  SGLT2 inhibitor: Continue Jardiance 10 mg daily  Diuretic: He is not requiring diuretics. Reinforced low salt diet  Reinforced fluid restriction to 6 x 8oz glasses per day  Refer to cardiac rehab once he is discharged from home health. LVAD/Anticoagulation:  Continue current device speed; schedule RHC with ramp study   No signs of bleeding or stroke. Drainage on driveline dressing. Will treat empirically with Keflex. Continue dressing changes three times weekly  Chronic anticoagulation with coumadin, followed by LVAD RN  Continue Ticagrelor 90 mg BID   Routine LVAD/anticoagulation labs weekly: Labs 10/30 reviewed and stable. INR therapeutic. CAD:  Continue Ticagrelor and statin.      Arrhythmia/ICD:  ICD interrogation every 3 months per routine   Follow-up with EP

## 2023-10-29 VITALS — OXYGEN SATURATION: 99 % | HEART RATE: 61 BPM | RESPIRATION RATE: 16 BRPM | TEMPERATURE: 98.8 F

## 2023-10-30 ENCOUNTER — ANTI-COAG VISIT (OUTPATIENT)
Age: 68
End: 2023-10-30
Payer: MEDICARE

## 2023-10-30 ENCOUNTER — HOME CARE VISIT (OUTPATIENT)
Facility: HOME HEALTH | Age: 68
End: 2023-10-30

## 2023-10-30 VITALS — RESPIRATION RATE: 16 BRPM | OXYGEN SATURATION: 99 % | HEART RATE: 67 BPM | TEMPERATURE: 97.3 F

## 2023-10-30 DIAGNOSIS — Z79.01 CHRONIC ANTICOAGULATION: Primary | ICD-10-CM

## 2023-10-30 LAB
ALBUMIN SERPL-MCNC: 4.4 G/DL (ref 3.9–4.9)
ALBUMIN/GLOB SERPL: 1.5 {RATIO} (ref 1.2–2.2)
ALP SERPL-CCNC: 145 IU/L (ref 44–121)
ALT SERPL-CCNC: 24 IU/L (ref 0–44)
AST SERPL-CCNC: 34 IU/L (ref 0–40)
BASOPHILS # BLD AUTO: 0 X10E3/UL (ref 0–0.2)
BASOPHILS NFR BLD AUTO: 1 %
BILIRUB SERPL-MCNC: 0.6 MG/DL (ref 0–1.2)
BUN SERPL-MCNC: 15 MG/DL (ref 8–27)
BUN/CREAT SERPL: 16 (ref 10–24)
CALCIUM SERPL-MCNC: 9.1 MG/DL (ref 8.6–10.2)
CHLORIDE SERPL-SCNC: 100 MMOL/L (ref 96–106)
CO2 SERPL-SCNC: 21 MMOL/L (ref 20–29)
CREAT SERPL-MCNC: 0.95 MG/DL (ref 0.76–1.27)
EGFRCR SERPLBLD CKD-EPI 2021: 88 ML/MIN/1.73
EOSINOPHIL # BLD AUTO: 0.2 X10E3/UL (ref 0–0.4)
EOSINOPHIL NFR BLD AUTO: 3 %
ERYTHROCYTE [DISTWIDTH] IN BLOOD BY AUTOMATED COUNT: 13.2 % (ref 11.6–15.4)
GLOBULIN SER CALC-MCNC: 2.9 G/DL (ref 1.5–4.5)
GLUCOSE SERPL-MCNC: 69 MG/DL (ref 70–99)
HCT VFR BLD AUTO: 36.8 % (ref 37.5–51)
HGB BLD-MCNC: 11.3 G/DL (ref 13–17.7)
IMM GRANULOCYTES NFR BLD AUTO: 0 %
INR BLD: 2.9
INR PPP: 2.8 (ref 0.9–1.2)
LDH SERPL L TO P-CCNC: 347 IU/L (ref 121–224)
LYMPHOCYTES # BLD AUTO: 1.8 X10E3/UL (ref 0.7–3.1)
LYMPHOCYTES NFR BLD AUTO: 27 %
MAGNESIUM SERPL-MCNC: 1.8 MG/DL (ref 1.6–2.3)
MCH RBC QN AUTO: 28.1 PG (ref 26.6–33)
MCHC RBC AUTO-ENTMCNC: 30.7 G/DL (ref 31.5–35.7)
MCV RBC AUTO: 92 FL (ref 79–97)
MONOCYTES # BLD AUTO: 0.7 X10E3/UL (ref 0.1–0.9)
MONOCYTES NFR BLD AUTO: 10 %
NEUTROPHILS # BLD AUTO: 4 X10E3/UL (ref 1.4–7)
NEUTROPHILS NFR BLD AUTO: 59 %
NT-PROBNP SERPL-MCNC: 1064 PG/ML (ref 0–376)
PLATELET # BLD AUTO: 239 X10E3/UL (ref 150–450)
POTASSIUM SERPL-SCNC: 3.8 MMOL/L (ref 3.5–5.2)
PROT SERPL-MCNC: 7.3 G/DL (ref 6–8.5)
PROTHROMBIN TIME: 27.4 SEC (ref 9.1–12)
RBC # BLD AUTO: 4.02 X10E6/UL (ref 4.14–5.8)
SODIUM SERPL-SCNC: 138 MMOL/L (ref 134–144)
WBC # BLD AUTO: 6.7 X10E3/UL (ref 3.4–10.8)

## 2023-10-30 PROCEDURE — G0299 HHS/HOSPICE OF RN EA 15 MIN: HCPCS

## 2023-10-30 PROCEDURE — G0152 HHCP-SERV OF OT,EA 15 MIN: HCPCS

## 2023-10-30 PROCEDURE — 93793 ANTICOAG MGMT PT WARFARIN: CPT | Performed by: INTERNAL MEDICINE

## 2023-10-30 RX ORDER — SUCRALFATE 1 G/1
TABLET ORAL
Qty: 112 TABLET | Refills: 0 | OUTPATIENT
Start: 2023-10-30

## 2023-10-30 NOTE — PATIENT INSTRUCTIONS
Medication changes:    START Keflex 4 times a day for 5 days       Testing Ordered:    A wound culture was collected of your drive line- we will contact you with any results which require changes to your plan of care     Other Recommendations:     A referral to urology has been placed. You will be contacted for scheduling. Dr. Lieutenant Mendez would like you to undergo a Right Heart Cath study. You will be contacted for scheduling and with further directions. We will request home help start monthly substance screenings for you     Remember to schedule your CT scan. Call [phone]    Continue to change drive line exit site dressing 3 times a week using sterile technique,     Ensure you are drinking an adequate amount of water with a goal of 6-8 eight ounce glasses (1.5-2 liters) of fluid daily. Your urine should be clear and light yellow straw colored. Monthly LVAD Education Tip:    [employer]                        Follow up in 1 week with [address]      For further patient and caregiver resources as well as access to online LVAD support groups visit [email or web address]       Please bring your daily sheets and medications to your next appointment. Please monitor your weights daily upon waking and after using the bathroom. Keep a written records of your weights and bring to your next appointment. If you have a weight gain of 3 or more pounds overnight OR 5 or more pounds in one week please contact our office. Thank you for allowing us the privilege of being a part of your healthcare team! Please do not hesitate to contact our office at [phone] with any questions or concerns.

## 2023-10-30 NOTE — PROGRESS NOTES
[address] in [address] [place] [address]      INR result reviewed with Dr. Araseli Mirza MD  who made the following recommendations (K[facility]):  decrease dose Tuesday and Friday to 2mg and recheck Friday. Patient and nephew notified and verbalized understanding. They had no further questions. Also notified patient's home health nurse [address].  (See anticoag tracker)     Carole Marcum RN

## 2023-10-30 NOTE — TELEPHONE ENCOUNTER
Requested Prescriptions     Refused Prescriptions Disp Refills    sucralfate (CARAFATE) 1 GM tablet [Pharmacy Med Name: SUCRALFATE 1 GM TABLET] 112 tablet 0     Sig: take 1 tablet by mouth four times a day BEFORE MEALS AND NIGHTLY     Refused By: Romulo Snyder     Reason for Refusal: Medication discontinued         Reviewed with Dr. Aimee Rodrigues, per provider Olga Code no refill as patient has completed course of medication.
No

## 2023-10-30 NOTE — HOME HEALTH
Subjective: Pt reports his sister brought him to visit his home over the weekend. He denies any concerns about returning home. Falls since last visit No(if yes complete the Fall Tracking Form and include bsrifallreport):   Caregiver involvement changes: n/a  Home health supplies by type and quantity ordered/delivered this visit include: n/a    Clinician asked if patient has had any physician contact since last home care visit and patient states: NO  Clinician asked if patient has any new or changed medications and patient states:  NO   If Yes, were medications reconciled? N/A   Was the certifying physician notified of changes in medications? N/A     Clinical assessment (what this visit means for the patient overall and need for ongoing skilled care) and progress or lack of progress towards SPECIFIC goals: Pt has progressed to CGA level with beverage retreival/retreival of light snack. Pt continues to be weak and require frequent rest breaks with all activity, however he demonstrates good carryover with energy conservation strategies. Pt plans to return [place] next week and would benefit from 1 additional OT visit to assess carryover with theraputty HEP. Written Teaching Material Utilized: n/a    Interdisciplinary communication with: n/a     Discharge planning as follows: When goals are met/max potential is acheived.      Specific plan for next visit: HEP education, discharge training

## 2023-10-31 ENCOUNTER — OFFICE VISIT (OUTPATIENT)
Age: 68
End: 2023-10-31
Payer: MEDICARE

## 2023-10-31 ENCOUNTER — TELEPHONE (OUTPATIENT)
Age: 68
End: 2023-10-31

## 2023-10-31 VITALS
RESPIRATION RATE: 16 BRPM | HEIGHT: 69 IN | OXYGEN SATURATION: 100 % | WEIGHT: 131 LBS | HEART RATE: 62 BPM | BODY MASS INDEX: 19.4 KG/M2 | SYSTOLIC BLOOD PRESSURE: 82 MMHG

## 2023-10-31 DIAGNOSIS — I25.10 CORONARY ARTERY DISEASE INVOLVING NATIVE CORONARY ARTERY OF NATIVE HEART WITHOUT ANGINA PECTORIS: ICD-10-CM

## 2023-10-31 DIAGNOSIS — R68.89 SUSPECTED SOFT TISSUE INFECTION: ICD-10-CM

## 2023-10-31 DIAGNOSIS — Z95.810 ICD (IMPLANTABLE CARDIOVERTER-DEFIBRILLATOR) IN PLACE: ICD-10-CM

## 2023-10-31 DIAGNOSIS — Z95.811 LEFT VENTRICULAR ASSIST DEVICE PRESENT (HCC): ICD-10-CM

## 2023-10-31 DIAGNOSIS — I25.5 ISCHEMIC CARDIOMYOPATHY: ICD-10-CM

## 2023-10-31 DIAGNOSIS — I50.22 CHRONIC SYSTOLIC (CONGESTIVE) HEART FAILURE (HCC): Primary | ICD-10-CM

## 2023-10-31 DIAGNOSIS — Z79.01 CHRONIC ANTICOAGULATION: ICD-10-CM

## 2023-10-31 DIAGNOSIS — I10 ESSENTIAL HYPERTENSION: ICD-10-CM

## 2023-10-31 DIAGNOSIS — R97.20 ELEVATED PSA: ICD-10-CM

## 2023-10-31 PROBLEM — I50.9 HEART FAILURE (HCC): Status: RESOLVED | Noted: 2023-08-31 | Resolved: 2023-10-31

## 2023-10-31 PROBLEM — R57.0 CARDIOGENIC SHOCK (HCC): Status: RESOLVED | Noted: 2023-08-28 | Resolved: 2023-10-31

## 2023-10-31 PROCEDURE — 93750 INTERROGATION VAD IN PERSON: CPT | Performed by: INTERNAL MEDICINE

## 2023-10-31 PROCEDURE — 1124F ACP DISCUSS-NO DSCNMKR DOCD: CPT | Performed by: INTERNAL MEDICINE

## 2023-10-31 PROCEDURE — 99215 OFFICE O/P EST HI 40 MIN: CPT | Performed by: INTERNAL MEDICINE

## 2023-10-31 RX ORDER — CEPHALEXIN 500 MG/1
500 CAPSULE ORAL 4 TIMES DAILY
Qty: 20 CAPSULE | Refills: 0 | Status: SHIPPED | OUTPATIENT
Start: 2023-10-31 | End: 2023-11-05

## 2023-10-31 ASSESSMENT — PATIENT HEALTH QUESTIONNAIRE - PHQ9
SUM OF ALL RESPONSES TO PHQ QUESTIONS 1-9: 0
1. LITTLE INTEREST OR PLEASURE IN DOING THINGS: 0
SUM OF ALL RESPONSES TO PHQ9 QUESTIONS 1 & 2: 0
2. FEELING DOWN, DEPRESSED OR HOPELESS: 0

## 2023-10-31 NOTE — PROGRESS NOTES
[address]      LVAD  LVAD Type[de-identified] Left Ventricular Assist Device (LVAD)  Pump Speed (rpm): 5400  Pump Flow (lpm): 3.3  MAP (mmHg): 82  Set Low Speed (rpm): 5400  Pump Pulse Index (PI): 10.7  Pump Power (Villanueva): 4.2  Battery Life Checked: Yes  Backup Controller Present: Yes  Driveline Dressing: Changed per order  Outpatient: Yes  MAP in Therapeutic Range (Outpatient): Yes       Savage Clark was seen today in clinic for a visit with Dr. Rusty Kumar MD     Patient denied s/s of driveline infection or driveline trauma (including  drops). Dressing changed per order- serosanguinous drainage noted soaking 1st gauze. Discussed with Sobeida Wiley who ordered VORB culture and antibiotics. Driveline inspected for integrity. Reports some low flows at home, states he paged about this last night and spoke to [facility] provider. Above reported to provider. LVAD interrogation completed in clinic, results reported to provider. See flow sheet for details. All orders entered per VORB. All provider instructions placed in AVS and reviewed with patient. Educated the patient to increase hydration per provider, scheduling outstanding testing, new referrals, and expected follow up. reviewed questions. Patient verbalized understanding, denied questions at end of visit.

## 2023-10-31 NOTE — TELEPHONE ENCOUNTER
Received a return call Chinyere Ingram from [facility] who stated that per their last note on this patient (10/27/23) MD would like patient to continue this med. Requested that she clarify with their provider if patient should be on this and fill this medication if needed. She stated she will do this. Updated Dr. Garo Hill  who was in agreement with plan.

## 2023-10-31 NOTE — TELEPHONE ENCOUNTER
Reviewed AVS med list with Lisa Noel who stated patient does not need to continue amiodarone per her knowledge. Medication removed as therapy complete. Call placed to [facility] to verify patient does not need amio from their provider. Message left with nursing requesting call back to clarify this.

## 2023-11-01 ENCOUNTER — HOME CARE VISIT (OUTPATIENT)
Facility: HOME HEALTH | Age: 68
End: 2023-11-01

## 2023-11-01 VITALS
OXYGEN SATURATION: 98 % | RESPIRATION RATE: 20 BRPM | BODY MASS INDEX: 19.94 KG/M2 | RESPIRATION RATE: 20 BRPM | TEMPERATURE: 97.2 F | TEMPERATURE: 98.2 F | OXYGEN SATURATION: 98 % | WEIGHT: 136 LBS | WEIGHT: 135 LBS | BODY MASS INDEX: 20.08 KG/M2

## 2023-11-01 VITALS — RESPIRATION RATE: 16 BRPM | TEMPERATURE: 97.2 F

## 2023-11-01 PROCEDURE — G0152 HHCP-SERV OF OT,EA 15 MIN: HCPCS

## 2023-11-01 PROCEDURE — G0157 HHC PT ASSISTANT EA 15: HCPCS

## 2023-11-01 NOTE — TELEPHONE ENCOUNTER
Received call from patient's nephew laura  requesting the date and times of upcoming [facility] appointments. Reviewed all appointments through December. Laura verbalized understanding. Reviewed conversation yesterday with nurse from [facility] cardiology and that they will reach out to patient to clarify medication. Requested patient call if he does not hear from this office soon. He verbalized understanding and stated he will notify patient.

## 2023-11-01 NOTE — HOME HEALTH
Subjective: Pt stated that he is doing good today,  weight is 135l bs, we discussed signs of the Green  Zone, Md made aware . Falls since last visit : No   Caregiver involvement changes:  Pts wife Gordo ghosh daughters assists with IADLS and  ADLs    Home health supplies by type and quantity ordered/delivered this visit include: Lvad supplies,     Does the patient have any new or changed medications? YES, Potassium stopped , pt still needs to  [employer] , Md aware   If Yes, were medications reconciled? YES   Was the certifying physician notified of changes in medications? N/A     Clinical assessment  . Patient education provided this visit: CHF  management, wound care management , Lvad management. Pt competent with administration of meds once packed in med box. Pt instructed on s/s of chf exercaberation, recognised signs of green  zone. We discusssed the importance of dietary imput and sodium restrictions . Pt remains stable and demonstrates understanding of Lvad management, medication management and bleeding precautions. Pt and Cg  instructed pt on s/s of infection and s/s to contact MD Debbie Hernandes. PT/INR asssesed results to [facility] . Lab specimen obtained for CBC, CMP, LDH,Mg Nt Pro BNp , taken to Lab darci for stat run. Progress toward goals: Pt continued to require Sn for  Med management  CHF  management , management of theraputic meds, Lvad exit wound management, wound care     Interdisciplinary communication: Safety measures  and fall precautions discussed with patient.     Discharge planning for pt to be discharge to hospice or SNF when patient is no longer able to participate in care

## 2023-11-01 NOTE — HOME HEALTH
Subjective: Pt stated that he is doing good today,  weight is 135 lbs, we discussed signs of the Green  Zone, Md made aware . Falls since last visit : No   Caregiver involvement changes:  Pts wife Mikayla ghosh daughters assists with IADLS and  ADLs    Home health supplies by type and quantity ordered/delivered this visit include: Lvad supplies,     Does the patient have any new or changed medications? N   If Yes, were medications reconciled? YES   Was the certifying physician notified of changes in medications? N/A     Clinical assessment  . Patient education provided this visit: CHF  management, wound care management , Lvad management. Pt competent with administration of meds once packed in med box. Pt instructed on s/s of chf exercaberation, recognised signs of green  zone. We discusssed the importance of dietary imput and sodium restrictions . Pt remains stable and demonstrates understanding of Lvad management, medication management and bleeding precautions. Pt and Cg  instructed pt on s/s of infection and s/s to contact MD Lesa Sheffield. Progress toward goals: Pt continued to require Sn for  Med management  CHF  management , management of theraputic meds, Lvad exit wound management, wound care     Interdisciplinary communication: Safety measures  and fall precautions discussed with patient.     Discharge planning for pt to be discharge to hospice or SNF when patient is no longer able to participate in care

## 2023-11-01 NOTE — HOME HEALTH
Subjective: Pt stated that he is doing good today,  weight is 135l bs, we discussed signs of the Green  Zone, Md made aware . Falls since last visit : No   Caregiver involvement changes:  Pts wife Marcell ghosh daughters assists with IADLS and  ADLs    Home health supplies by type and quantity ordered/delivered this visit include: Lvad supplies,     Does the patient have any new or changed medications? YES, Potassium stopped , pt still needs to  [employer] , Md aware   If Yes, were medications reconciled? YES   Was the certifying physician notified of changes in medications? N/A     Clinical assessment  . Patient education provided this visit: CHF  management, wound care management , Lvad management. Pt competent with administration of meds once packed in med box. Pt instructed on s/s of chf exercaberation, recognised signs of green  zone. We discusssed the importance of dietary imput and sodium restrictions . Pt remains stable and demonstrates understanding of Lvad management, medication management and bleeding precautions. Pt and Cg  instructed pt on s/s of infection and s/s to contact MD Sue Bruno. Progress toward goals: Pt continued to require Sn for  Med management  CHF  management , management of theraputic meds, Lvad exit wound management, wound care     Interdisciplinary communication: Safety measures  and fall precautions discussed with patient.     Discharge planning for pt to be discharge to hospice or SNF when patient is no longer able to participate in care

## 2023-11-01 NOTE — HOME HEALTH
Subjective: Pt denies any pain or fatigue today   Falls since last visit No(if yes complete the Fall Tracking Form and include bsrifallreport):   Caregiver involvement changes: n/a  Home health supplies by type and quantity ordered/delivered this visit include: n/a    Clinician asked if patient has had any physician contact since last home care visit and patient states: YES, pt had visit with Dr. Rees American yesterday  Clinician asked if patient has any new or changed medications and patient states:  YES, pt started taking cephalaxen, was advised to stop taking amiodirone and warfarin dosage decreased to 2 tablets on Tues and Friday this week. If Yes, were medications reconciled? yes, med profile has already been udpated. Was the certifying physician notified of changes in medications? yes    Clinical assessment (what this visit means for the patient overall and need for ongoing skilled care) and progress or lack of progress towards SPECIFIC goals: Pt seen for OT discharge today as he has met OT goals. see d/c summary    Written Teaching Material Utilized: written discharge instructions    Interdisciplinary communication with: ID team for the purpose of discharge planning    Discharge planning as follows: d/c home with family assistance under supervision of physician.

## 2023-11-02 ENCOUNTER — TELEPHONE (OUTPATIENT)
Age: 68
End: 2023-11-02

## 2023-11-02 ENCOUNTER — HOME CARE VISIT (OUTPATIENT)
Facility: HOME HEALTH | Age: 68
End: 2023-11-02
Payer: MEDICARE

## 2023-11-02 PROCEDURE — G0299 HHS/HOSPICE OF RN EA 15 MIN: HCPCS

## 2023-11-02 ASSESSMENT — ENCOUNTER SYMPTOMS: PAIN LOCATION - PAIN QUALITY: ACHE

## 2023-11-02 NOTE — TELEPHONE ENCOUNTER
Telephone Call RE:  Appointment reminder     Outcome:     [x] Patient confirmed appointment   [] Patient rescheduled appointment for    [] Unable to reach  [] Left message              [] Other:

## 2023-11-02 NOTE — HOME HEALTH
Subjective: The doctor put me on an antibiotic x 5 days for drainage at site. Falls since last visit No(if yes complete the Fall Tracking Form and include bsrifallreport):   Caregiver involvement changes: none  Home health supplies by type and quantity ordered/delivered this visit include: na    Clinician asked if patient has had any physician contact since last home care visit and patient states: yes  Clinician asked if patient has any new or changed medications and patient states:  yes -Cephalexin 500 mg cap asc        qty 20         Take 1 capsule by mouth 4 times daily for 5 days. 10/31/23         Kike Smart MD  If Yes, were medications reconciled? NO PT to update  Was the certifying physician notified of changes in medications? N/A     Clinical assessment (what this visit means for the patient overall and need for ongoing skilled care) and progress or lack of progress towards SPECIFIC goals: Pt with LVAD with good progress. New antibiotic for drainage. Pt is amb with SPC when he leaves home and wiht no AD in home. Pt with sister to order him a vest for LVAD and battery pack which should decrease shoulder pain. Pt with HEP and walking program in place    Written Teaching Material Utilized: N/A    Interdisciplinary communication with:  PT, RN for the purpose of med update    Discharge planning as follows: Is no longer homebound, Per physician order, Will discharge when the patient has reached their maximum functional potential and maximum safety in their home and When goals are met    Specific plan for next visit: Progress with stair training and community amb if weather allows. Signature deferred due to COVID 19 in the community.

## 2023-11-03 ENCOUNTER — HOME CARE VISIT (OUTPATIENT)
Facility: HOME HEALTH | Age: 68
End: 2023-11-03

## 2023-11-03 VITALS — OXYGEN SATURATION: 99 % | HEART RATE: 66 BPM | TEMPERATURE: 98.9 F | BODY MASS INDEX: 19.2 KG/M2 | WEIGHT: 130 LBS

## 2023-11-03 PROCEDURE — G0157 HHC PT ASSISTANT EA 15: HCPCS

## 2023-11-03 NOTE — HOME HEALTH
Subjective: No drainage noted from site since scab came off. Falls since last visit No(if yes complete the Fall Tracking Form and include bsrifallreport):   Caregiver involvement changes: none  Home health supplies by type and quantity ordered/delivered this visit include: na    Clinician asked if patient has had any physician contact since last home care visit and patient states: no  Clinician asked if patient has any new or changed medications and patient states:  NO   If Yes, were medications reconciled? N/A reviewed  Was the certifying physician notified of changes in medications? N/A     Clinical assessment (what this visit means for the patient overall and need for ongoing skilled care) and progress or lack of progress towards SPECIFIC goals: Pt with LVAD with good progress with strength and balance with step ups on stairs and education for CHF and energy conservation and pacing today with teachback for safety. Written Teaching Material Utilized: written HEP in place    Interdisciplinary communication with:  PT for the purpose of POC collaboration    Discharge planning as follows:  Is no longer homebound, Per physician order, Will discharge when the patient has reached their maximum functional potential and maximum safety in their home and When goals are met    Specific plan for next visit: community amb

## 2023-11-05 VITALS
RESPIRATION RATE: 20 BRPM | BODY MASS INDEX: 20.08 KG/M2 | WEIGHT: 136 LBS | TEMPERATURE: 98.2 F | OXYGEN SATURATION: 99 %

## 2023-11-05 VITALS — BODY MASS INDEX: 19.32 KG/M2 | TEMPERATURE: 98.4 F | RESPIRATION RATE: 16 BRPM | WEIGHT: 130.8 LBS

## 2023-11-05 LAB
BACTERIA SPEC AEROBE CULT: NORMAL
BACTERIA SPEC ANAEROBE CULT: NORMAL

## 2023-11-05 NOTE — HOME HEALTH
Subjective: Pt stated that he is doing good today,  weight is 136lbs , we discussed signs of the Green  Zone, Md made aware . Falls since last visit : No   Caregiver involvement changes:  Pts wife Krystin ghosh daughters assists with IADLS and  ADLs    Home health supplies by type and quantity ordered/delivered this visit include: Lvad supplies,     Does the patient have any new or changed medications? Yes, antibiotics . If Yes, were medications reconciled? YES   Was the certifying physician notified of changes in medications? N/A     Clinical assessment  . Patient education provided this visit: CHF  management, wound care management , Lvad management. Pt competent with administration of meds once packed in med box. Pt instructed on s/s of chf exercaberation, recognised signs of green  zone. We discusssed the importance of dietary imput and sodium restrictions . Pt remains stable and demonstrates understanding of Lvad management, medication management and bleeding precautions. Pt and Cg  instructed pt on s/s of infection and s/s to contact MD Isa Stiles. Progress toward goals: Pt continued to require Sn for  Med management  CHF  management , management of theraputic meds, Lvad exit wound management, wound care     Interdisciplinary communication: Safety measures  and fall precautions discussed with patient. Pt has apppointment for rt ankle wound asssessment today.     Discharge planning for pt to be discharge to hospice or SNF when patient is no longer able to participate in care

## 2023-11-06 ENCOUNTER — CLINICAL DOCUMENTATION (OUTPATIENT)
Age: 68
End: 2023-11-06

## 2023-11-06 ENCOUNTER — HOME CARE VISIT (OUTPATIENT)
Facility: HOME HEALTH | Age: 68
End: 2023-11-06
Payer: MEDICARE

## 2023-11-06 ENCOUNTER — TELEPHONE (OUTPATIENT)
Age: 68
End: 2023-11-06

## 2023-11-06 ENCOUNTER — ANTI-COAG VISIT (OUTPATIENT)
Age: 68
End: 2023-11-06
Payer: MEDICARE

## 2023-11-06 VITALS — OXYGEN SATURATION: 97 % | TEMPERATURE: 98.5 F | RESPIRATION RATE: 16 BRPM

## 2023-11-06 DIAGNOSIS — Z79.01 CHRONIC ANTICOAGULATION: Primary | ICD-10-CM

## 2023-11-06 LAB
ALBUMIN SERPL-MCNC: 4.3 G/DL (ref 3.9–4.9)
ALBUMIN/GLOB SERPL: 1.5 {RATIO} (ref 1.2–2.2)
ALP SERPL-CCNC: 126 IU/L (ref 44–121)
ALT SERPL-CCNC: 21 IU/L (ref 0–44)
AST SERPL-CCNC: 26 IU/L (ref 0–40)
BASOPHILS # BLD AUTO: 0 X10E3/UL (ref 0–0.2)
BASOPHILS NFR BLD AUTO: 0 %
BILIRUB SERPL-MCNC: 0.6 MG/DL (ref 0–1.2)
BUN SERPL-MCNC: 11 MG/DL (ref 8–27)
BUN/CREAT SERPL: 13 (ref 10–24)
CALCIUM SERPL-MCNC: 9 MG/DL (ref 8.6–10.2)
CHLORIDE SERPL-SCNC: 102 MMOL/L (ref 96–106)
CO2 SERPL-SCNC: 22 MMOL/L (ref 20–29)
CREAT SERPL-MCNC: 0.88 MG/DL (ref 0.76–1.27)
EGFRCR SERPLBLD CKD-EPI 2021: 94 ML/MIN/1.73
EOSINOPHIL # BLD AUTO: 0.1 X10E3/UL (ref 0–0.4)
EOSINOPHIL NFR BLD AUTO: 2 %
ERYTHROCYTE [DISTWIDTH] IN BLOOD BY AUTOMATED COUNT: 13.7 % (ref 11.6–15.4)
GLOBULIN SER CALC-MCNC: 2.9 G/DL (ref 1.5–4.5)
GLUCOSE SERPL-MCNC: 92 MG/DL (ref 70–99)
HCT VFR BLD AUTO: 35.7 % (ref 37.5–51)
HGB BLD-MCNC: 11.2 G/DL (ref 13–17.7)
IMM GRANULOCYTES NFR BLD AUTO: 0 %
INR BLD: 3.6
INR PPP: 3.3 (ref 0.9–1.2)
LYMPHOCYTES # BLD AUTO: 1.6 X10E3/UL (ref 0.7–3.1)
LYMPHOCYTES NFR BLD AUTO: 26 %
MAGNESIUM SERPL-MCNC: 1.7 MG/DL (ref 1.6–2.3)
MCH RBC QN AUTO: 28.4 PG (ref 26.6–33)
MCHC RBC AUTO-ENTMCNC: 31.4 G/DL (ref 31.5–35.7)
MCV RBC AUTO: 90 FL (ref 79–97)
MONOCYTES # BLD AUTO: 0.6 X10E3/UL (ref 0.1–0.9)
MONOCYTES NFR BLD AUTO: 9 %
NEUTROPHILS # BLD AUTO: 3.8 X10E3/UL (ref 1.4–7)
NEUTROPHILS NFR BLD AUTO: 63 %
NT-PROBNP SERPL-MCNC: 1184 PG/ML (ref 0–376)
PLATELET # BLD AUTO: 225 X10E3/UL (ref 150–450)
POTASSIUM SERPL-SCNC: 3.6 MMOL/L (ref 3.5–5.2)
PROT SERPL-MCNC: 7.2 G/DL (ref 6–8.5)
PROTHROMBIN TIME: 31.9 SEC (ref 9.1–12)
RBC # BLD AUTO: 3.95 X10E6/UL (ref 4.14–5.8)
SODIUM SERPL-SCNC: 137 MMOL/L (ref 134–144)
WBC # BLD AUTO: 6.1 X10E3/UL (ref 3.4–10.8)

## 2023-11-06 PROCEDURE — G0157 HHC PT ASSISTANT EA 15: HCPCS

## 2023-11-06 PROCEDURE — G0299 HHS/HOSPICE OF RN EA 15 MIN: HCPCS

## 2023-11-06 PROCEDURE — 93793 ANTICOAG MGMT PT WARFARIN: CPT | Performed by: NURSE PRACTITIONER

## 2023-11-06 NOTE — PROGRESS NOTES
[address] in [facility], [address] [place] [address] Note    Patient name: Maura Power  Patient : 1955  Patient MRN: [id number]  Date of service: 23    Primary care physician: Carolann Hernandez MD  LVAD cardiologist: MICHAEL    CHIEF COMPLAINT:  Follow up for heart failure, LVAD, anticoagulation management    ASSESSMENT:  Maura Power is a 80 y.o. male with history of chronic systolic heart failure due to ischemic cardiomyopathy, s/p HM3 LVAD implantation (23) as destination, stage D, Optimizing GDMT. He is interested in consideration for transplant. He has quit smoking. Will plan for monthly urine cotinine screening x 6 months. PLAN:  Heart Failure/Cardiomyopathy: NYHA class II  Continue current medical therapy for heart failure:  Beta-blocker: Continue Toprol XL 12.5 mg daily  ACE/ARB/ARNi: Start Entresto . Will continue amlodipine for now bc MAP 96. Will have VAD coordinator f/u with home health after next visit in 2 days for a BP check and possibly d/c amlodipine  Hydralazine/nitrate: Continue Hydralazine 100 mg TID and Isorild 20 mg TID  MRA: Continue Spironolactone 12.5 mg daily  SGLT2 inhibitor: Continue Jardiance 10 mg daily  Diuretic: He is not requiring diuretics. Reinforced low salt diet  Reinforced fluid restriction to 6 x 8oz glasses per day  Refer to cardiac rehab once he is discharged from home health. LVAD/Anticoagulation:  Continue current device speed; schedule RHC with ramp study   No signs of bleeding or stroke. Drainage on driveline dressing at last visit, Started empirically with Keflex--> finished course  Continue dressing changes three times weekly  Chronic anticoagulation with coumadin, coumadin supratherapeutic on . Daily dose was 3 mg/day. For this week pt was told to take 1 mg Monday, 2 mg Tuesday and Thursday and 3 mg the rest of the days.   Will recheck INR

## 2023-11-06 NOTE — HOME HEALTH
Subjective: My left shoulder bothers me some from carrying this bag all of the time. Falls since last visit No(if yes complete the Fall Tracking Form and include bsrifallreport):   Caregiver involvement changes: none  Home health supplies by type and quantity ordered/delivered this visit include: na    Clinician asked if patient has had any physician contact since last home care visit and patient states: NO  Clinician asked if patient has any new or changed medications and patient states:  NO   If Yes, were medications reconciled? N/A reviewed  Was the certifying physician notified of changes in medications? N/A     Clinical assessment (what this visit means for the patient overall and need for ongoing skilled care) and progress or lack of progress towards SPECIFIC goals: Pt with LVAD with limited amb in community requiring assistance. Pt with balance and strengthening for return to PLOF. Progressing with HEP and initiation of community amb today. Written Teaching Material Utilized: N/A    Interdisciplinary communication with: N/A     Discharge planning as follows: Is no longer homebound, Per physician order, Will discharge when the patient has reached their maximum functional potential and maximum safety in their home and When goals are met    Specific plan for next visit: Community gait training    Signature deferred due to COVID 19 in the community.

## 2023-11-06 NOTE — PROGRESS NOTES
[address] [facility] in [place], [place]    Patients DAWNA hensley and step daughter Cecile Terry presented to clinic for dressing change education. Reviewed dressing change handout and discussed steps. Reviewed donning sterile gloves- Cecile Terry returned demonstration correctly. Cecile Mara practiced procedure using a dressing change kit and mock driveline. She completed procedure with verbal prompting and reminders to maintain sterility. Sent patient with practice sterile gloves and practice kit and requested she practice steps of procedure with handout. She verbalized understanding.

## 2023-11-06 NOTE — TELEPHONE ENCOUNTER
Called to confirm patient's sister and step daughter were planning to come for dressing change education.  They confirmed they will arrive shortly

## 2023-11-06 NOTE — PROGRESS NOTES
[address] [facility] in [place], [place]      INR result reviewed with Elvia Garcia NP who made the following recommendations (VORB): 1mg tonight, 2mg tues/thurs and recheck 1 week. Patient notified and verbalized understanding. They had no further questions.  (See anticoag tracker)     Mac Gallegos RN

## 2023-11-07 ENCOUNTER — OFFICE VISIT (OUTPATIENT)
Age: 68
End: 2023-11-07
Payer: MEDICARE

## 2023-11-07 ENCOUNTER — HOME CARE VISIT (OUTPATIENT)
Dept: HOME HEALTH SERVICES | Facility: HOME HEALTH | Age: 68
End: 2023-11-07
Payer: MEDICARE

## 2023-11-07 ENCOUNTER — TELEPHONE (OUTPATIENT)
Age: 68
End: 2023-11-07

## 2023-11-07 VITALS
BODY MASS INDEX: 19.7 KG/M2 | RESPIRATION RATE: 16 BRPM | HEIGHT: 69 IN | OXYGEN SATURATION: 100 % | TEMPERATURE: 97.7 F | WEIGHT: 133 LBS | SYSTOLIC BLOOD PRESSURE: 92 MMHG | HEART RATE: 64 BPM

## 2023-11-07 VITALS
BODY MASS INDEX: 20.08 KG/M2 | TEMPERATURE: 98.2 F | OXYGEN SATURATION: 100 % | RESPIRATION RATE: 20 BRPM | WEIGHT: 136 LBS

## 2023-11-07 DIAGNOSIS — Z79.01 CHRONIC ANTICOAGULATION: ICD-10-CM

## 2023-11-07 DIAGNOSIS — I50.22 CHRONIC SYSTOLIC HEART FAILURE (HCC): Primary | ICD-10-CM

## 2023-11-07 DIAGNOSIS — Z95.811 LVAD (LEFT VENTRICULAR ASSIST DEVICE) PRESENT (HCC): ICD-10-CM

## 2023-11-07 LAB — PSA SERPL-MCNC: 2.1 NG/ML (ref 0–4)

## 2023-11-07 PROCEDURE — 93750 INTERROGATION VAD IN PERSON: CPT | Performed by: NURSE PRACTITIONER

## 2023-11-07 PROCEDURE — 1124F ACP DISCUSS-NO DSCNMKR DOCD: CPT | Performed by: NURSE PRACTITIONER

## 2023-11-07 PROCEDURE — 99215 OFFICE O/P EST HI 40 MIN: CPT | Performed by: NURSE PRACTITIONER

## 2023-11-07 RX ORDER — METOPROLOL SUCCINATE 25 MG/1
12.5 TABLET, EXTENDED RELEASE ORAL DAILY
Qty: 30 TABLET | Refills: 1 | Status: SHIPPED | OUTPATIENT
Start: 2023-11-07

## 2023-11-07 RX ORDER — AMIODARONE HYDROCHLORIDE 200 MG/1
200 TABLET ORAL DAILY
COMMUNITY
Start: 2023-11-01

## 2023-11-07 ASSESSMENT — PATIENT HEALTH QUESTIONNAIRE - PHQ9
SUM OF ALL RESPONSES TO PHQ QUESTIONS 1-9: 0
SUM OF ALL RESPONSES TO PHQ9 QUESTIONS 1 & 2: 0
SUM OF ALL RESPONSES TO PHQ QUESTIONS 1-9: 0
SUM OF ALL RESPONSES TO PHQ QUESTIONS 1-9: 0
2. FEELING DOWN, DEPRESSED OR HOPELESS: 0
SUM OF ALL RESPONSES TO PHQ QUESTIONS 1-9: 0
1. LITTLE INTEREST OR PLEASURE IN DOING THINGS: 0

## 2023-11-07 NOTE — HOME HEALTH
Subjective: Pt stated that he is doing good today,  weight is 136lbs , we discussed signs of the Green  Zone, Md made aware . Falls since last visit : No   Caregiver involvement changes:  Pts nephcaro Smith assists with all AdLs    Home health supplies by type and quantity ordered/delivered this visit include: Lvad supplies,     Does the patient have any new or changed medications? No    If Yes, were medications reconciled? YES   Was the certifying physician notified of changes in medications? N/A     Clinical assessment  . Patient education provided this visit: CHF  management, wound care management , Lvad management. Pt competent with administration of meds once packed in med box. Pt instructed on s/s of chf exercaberation, recognised signs of green  zone. We discusssed the importance of dietary imput and sodium restrictions . Pt remains stable and demonstrates understanding of Lvad management, medication management and bleeding precautions. Pt and Cg  instructed pt on s/s of infection and s/s to contact MD Pura Mario. Progress toward goals: Pt continued to require Sn for  Med management  CHF  management , management of theraputic meds, Lvad exit wound management, wound care     Interdisciplinary communication: Safety measures  and fall precautions discussed with patient. Pt has appointment with [facility] .  Call made to [facility] regarding amiodarone, waiting for response     Discharge planning for pt to be discharge to hospice or SNF when patient is no longer able to participate in care

## 2023-11-08 ENCOUNTER — HOME CARE VISIT (OUTPATIENT)
Facility: HOME HEALTH | Age: 68
End: 2023-11-08
Payer: MEDICARE

## 2023-11-08 ENCOUNTER — TELEPHONE (OUTPATIENT)
Age: 68
End: 2023-11-08

## 2023-11-08 PROCEDURE — G0299 HHS/HOSPICE OF RN EA 15 MIN: HCPCS

## 2023-11-08 NOTE — TELEPHONE ENCOUNTER
Reviewed driveline drainage with Kasie Waters who confirmed not new antibiotics at this time however requested patient have dressing change performed daily. Left message with patient's nephew requesting he ensure dressing change performed daily. Also communicated request to patient's home health nurse [address] who confirmed she will see patient tomorrow and assitt with dressing change.

## 2023-11-08 NOTE — TELEPHONE ENCOUNTER
Received message from Dr. Aimee Rodrigues requesting call to check on patient alarms. Per provider patient paged for alarms and \"call hospital contact alarm\" Call placed to patient with no answer, left voicemail requesting call back. Received message from patient's home health [address] [facility] Jocelyne who stated she was with patient. She reported PI of 10.2, map of 88, and last 6 alarms reflect only low flow alarms. States patient reported starting entresto this morning. Reviewed with Vasile Rodney who stated VORB instruct patient to increase hydration and continue entresto. Communicated this to KEVON Child who confirmed patient received instructions.

## 2023-11-08 NOTE — PROGRESS NOTES
[address] in [address]      LVAD  LVAD Type[de-identified] Left Ventricular Assist Device (LVAD)  Pump Speed (rpm): 5450  Pump Flow (lpm): 4  MAP (mmHg): 96 (No palpable pulse)  Set Low Speed (rpm): 5400  Pump Pulse Index (PI): 9.7  Pump Power (Villanueva): 4  Battery Life Checked: Yes  Backup Controller Present: No  Driveline Dressing: Changed per order  Outpatient: Yes  MAP in Therapeutic Range (Outpatient): No       Loren Navarro was seen today in clinic for a visit with Leo Rubio     Patient denied s/s of driveline infection or driveline trauma (including  drops). States he had a quick low flow alarm and he drank some water and it went away. Reeducated patient to call office or after hours pager for low flow alarm, he verbalized understanding. Reeducated to keep back up bag with him at all times. Driveline inspected for integrity. Patient with bottle of amioderone from [facility] cardiology written for Amioderone 200 BID. He stated he has been only taking this daily as this is what he was on previously. Call placed to [facility] to clarify dosing. Above reported to provider. LVAD interrogation completed in clinic, results reported to provider. See flow sheet for details. Driveline dressing changed with Leo Rubio. Site notable to bloody drainage saturating the first gauze. Chelo states it was changed yesterday by his home health nurse. Previously wound culture negative. Per Leo Rubio no indication for antibiotics at this time. All orders entered per VORB. All provider instructions placed in AVS and reviewed with patient. Educated the patient on new medication, expected follow up. reviewed questions. Patient verbalized understanding, denied questions at end of visit.

## 2023-11-09 ENCOUNTER — TELEPHONE (OUTPATIENT)
Age: 68
End: 2023-11-09

## 2023-11-09 ENCOUNTER — NURSE ONLY (OUTPATIENT)
Age: 68
End: 2023-11-09

## 2023-11-09 ENCOUNTER — HOME CARE VISIT (OUTPATIENT)
Facility: HOME HEALTH | Age: 68
End: 2023-11-09
Payer: MEDICARE

## 2023-11-09 VITALS
RESPIRATION RATE: 20 BRPM | WEIGHT: 134.6 LBS | BODY MASS INDEX: 19.88 KG/M2 | OXYGEN SATURATION: 100 % | SYSTOLIC BLOOD PRESSURE: 92 MMHG | HEART RATE: 77 BPM

## 2023-11-09 DIAGNOSIS — Z95.811 LVAD (LEFT VENTRICULAR ASSIST DEVICE) PRESENT (HCC): Primary | ICD-10-CM

## 2023-11-09 DIAGNOSIS — I50.22 CHRONIC SYSTOLIC HEART FAILURE (HCC): ICD-10-CM

## 2023-11-09 PROCEDURE — G0157 HHC PT ASSISTANT EA 15: HCPCS

## 2023-11-09 RX ORDER — PANTOPRAZOLE SODIUM 40 MG/1
40 TABLET, DELAYED RELEASE ORAL
Qty: 90 TABLET | Refills: 1 | Status: SHIPPED | OUTPATIENT
Start: 2023-11-09

## 2023-11-09 RX ORDER — ISOSORBIDE DINITRATE 20 MG/1
20 TABLET ORAL EVERY 8 HOURS
Qty: 90 TABLET | Refills: 3 | Status: SHIPPED | OUTPATIENT
Start: 2023-11-09 | End: 2023-11-10 | Stop reason: SDUPTHER

## 2023-11-09 RX ORDER — ATORVASTATIN CALCIUM 40 MG/1
40 TABLET, FILM COATED ORAL NIGHTLY
Qty: 90 TABLET | Refills: 1 | Status: SHIPPED | OUTPATIENT
Start: 2023-11-09

## 2023-11-09 RX ORDER — SPIRONOLACTONE 25 MG/1
12.5 TABLET ORAL DAILY
Qty: 45 TABLET | Refills: 1 | Status: SHIPPED | OUTPATIENT
Start: 2023-11-09

## 2023-11-09 RX ORDER — HYDRALAZINE HYDROCHLORIDE 100 MG/1
100 TABLET, FILM COATED ORAL EVERY 8 HOURS SCHEDULED
Qty: 270 TABLET | Refills: 1 | Status: SHIPPED | OUTPATIENT
Start: 2023-11-09

## 2023-11-09 RX ORDER — WARFARIN SODIUM 2 MG/1
2 TABLET ORAL DAILY
Qty: 30 TABLET | Refills: 3 | Status: CANCELLED | OUTPATIENT
Start: 2023-11-09

## 2023-11-09 RX ORDER — AMLODIPINE BESYLATE 2.5 MG/1
2.5 TABLET ORAL EVERY 12 HOURS
Qty: 180 TABLET | Refills: 1 | Status: SHIPPED | OUTPATIENT
Start: 2023-11-09

## 2023-11-09 RX ORDER — WARFARIN SODIUM 1 MG/1
3 TABLET ORAL DAILY
Qty: 180 TABLET | Refills: 1 | Status: SHIPPED | OUTPATIENT
Start: 2023-11-09

## 2023-11-09 NOTE — TELEPHONE ENCOUNTER
Received a call from patient who reports Low Flow Alarms with a PI of 10.4. Patient reports taking his morning meds at 0600 and lunchtime meds at 12:00. Patient states he had a ham, egg and cheese sandwich for breakfast and 2 16-ounce bottles of water. Discussed above with Zackery Harris NP who requested a stat ECHO and a nurse visit today. Patient verbalized agreement. Nurse visit scheduled for 3:00 PM. Echo ordered stat.      Maria Dolores Cast RN  VAD Coordinator

## 2023-11-10 ENCOUNTER — HOSPITAL ENCOUNTER (OUTPATIENT)
Facility: HOSPITAL | Age: 68
End: 2023-11-10
Payer: MEDICARE

## 2023-11-10 ENCOUNTER — NURSE ONLY (OUTPATIENT)
Age: 68
End: 2023-11-10

## 2023-11-10 VITALS — HEIGHT: 69 IN | BODY MASS INDEX: 19.95 KG/M2 | WEIGHT: 134.7 LBS

## 2023-11-10 VITALS — SYSTOLIC BLOOD PRESSURE: 96 MMHG

## 2023-11-10 VITALS
OXYGEN SATURATION: 99 % | RESPIRATION RATE: 20 BRPM | TEMPERATURE: 97.2 F | WEIGHT: 136 LBS | BODY MASS INDEX: 20.08 KG/M2

## 2023-11-10 DIAGNOSIS — Z95.811 LVAD (LEFT VENTRICULAR ASSIST DEVICE) PRESENT (HCC): ICD-10-CM

## 2023-11-10 DIAGNOSIS — Z95.811 LVAD (LEFT VENTRICULAR ASSIST DEVICE) PRESENT (HCC): Primary | ICD-10-CM

## 2023-11-10 DIAGNOSIS — I50.22 CHRONIC SYSTOLIC HEART FAILURE (HCC): ICD-10-CM

## 2023-11-10 LAB
ECHO BSA: 1.72 M2
ECHO EST RA PRESSURE: 8 MMHG
ECHO LV E' SEPTAL VELOCITY: 6 CM/S
ECHO LV EJECTION FRACTION A2C: 45 %
ECHO LV EJECTION FRACTION A4C: 35 %
ECHO LV GLOBAL LONGITUDINAL STRAIN (GLS): -10 %
ECHO LV GLOBAL LONGITUDINAL STRAIN (GLS): -6.8 %
ECHO LV GLOBAL LONGITUDINAL STRAIN (GLS): -7.9 %
ECHO LV GLOBAL LONGITUDINAL STRAIN (GLS): -8.3 %
ECHO MV A VELOCITY: 0.74 M/S
ECHO MV E DECELERATION TIME (DT): 152.7 MS
ECHO MV E VELOCITY: 0.61 M/S
ECHO MV E/A RATIO: 0.82
ECHO MV REGURGITANT PEAK GRADIENT: 67 MMHG
ECHO MV REGURGITANT PEAK VELOCITY: 4.1 M/S
ECHO PV MAX VELOCITY: 0.9 M/S
ECHO PV PEAK GRADIENT: 3 MMHG
ECHO RIGHT VENTRICULAR SYSTOLIC PRESSURE (RVSP): 23 MMHG
ECHO RV FREE WALL PEAK S': 10 CM/S
ECHO RV TAPSE: 1.1 CM (ref 1.7–?)
ECHO TV REGURGITANT MAX VELOCITY: 1.95 M/S
ECHO TV REGURGITANT PEAK GRADIENT: 15 MMHG

## 2023-11-10 PROCEDURE — 93356 MYOCRD STRAIN IMG SPCKL TRCK: CPT | Performed by: INTERNAL MEDICINE

## 2023-11-10 PROCEDURE — 93306 TTE W/DOPPLER COMPLETE: CPT | Performed by: INTERNAL MEDICINE

## 2023-11-10 PROCEDURE — 93306 TTE W/DOPPLER COMPLETE: CPT

## 2023-11-10 RX ORDER — ISOSORBIDE DINITRATE 20 MG/1
40 TABLET ORAL EVERY 8 HOURS
Qty: 180 TABLET | Refills: 1 | Status: SHIPPED | OUTPATIENT
Start: 2023-11-10

## 2023-11-10 NOTE — PROGRESS NOTES
[address] in [place], [place]  LVAD Outpatient Nurse Visit    Chief Complaint   Patient presents with    Other     Nurse visit- dressing change education, BP check          BP (!) 96/0 Comment: no palpable pulse     LVAD  LVAD Type[de-identified] Left Ventricular Assist Device (LVAD)  Pump Speed (rpm): 5400  Pump Flow (lpm): 4.1  MAP (mmHg): 96  Set Low Speed (rpm): 5000  Pump Pulse Index (PI): 4.1  Pump Power ([place]): 4.2  Battery Life Checked: Yes  Backup Controller Present: Yes  Driveline Dressing: Changed per order  Outpatient: Yes  MAP in Therapeutic Range (Outpatient): No        Sukhdev Boyd is a 80 y.o. male with a history of ICM with Heartmate 3 implant date of 09/19/23. Patient was seen in clinic for check of LVAD alarms, BP and family dressing change education. LVAD interrogation completed. Notable for 12 low flow alarms. MAP 96. Yoni Londono changed driveline dressing using sterile technique. She required reminders and verbal prompting to maintain sterile field. Able to don sterile gloves correctly with instruction. Reviewed all information with Isra Orta and Gerald Cerna who recommended V.O.R.B increase Isordil to 40MG TID. All instructions placed in after visit summary and reviewed with patient and family present (Arya Figueroa and Yoni Londono). Education provided on medication changes and echo today at 12:30. Time given to ask questions. Patient verbalized understanding and had no further questions.

## 2023-11-10 NOTE — HOME HEALTH
Subjective: Pt stated that he is doing good today,  weight is 136lbs , we discussed signs of the Green  Zone, Md made aware . Falls since last visit : No   Caregiver involvement changes:  Pts wife Chidi ghosh daughters assists with IADLS and  ADLs    Home health supplies by type and quantity ordered/delivered this visit include: Lvad supplies,     Does the patient have any new or changed medications? Yes, antibiotics completed , entresto started    If Yes, were medications reconciled? YES   Was the certifying physician notified of changes in medications? N/A     Clinical assessment  . Patient education provided this visit: CHF  management, wound care management , Lvad management. Pt competent with administration of meds once packed in med box. Pt instructed on s/s of chf exercaberation, recognised signs of green  zone. We discusssed the importance of dietary imput and sodium restrictions . Pt remains stable and demonstrates understanding of Lvad management, medication management and bleeding precautions. Pt and Cg  instructed pt on s/s of infection and s/s to contact MD Venessa Garcia. Progress toward goals: Pt continued to require Sn for  Med management  CHF  management , management of theraputic meds, Lvad exit wound management, wound care     Interdisciplinary communication: Safety measures  and fall precautions discussed with patient. Pt has MD  apppointment today.      Discharge planning for pt to be discharge to hospice or SNF when patient is no longer able to participate in care

## 2023-11-10 NOTE — HOME HEALTH
Subjective: I am feeling pretty good today. I am trying to warm up my hand so you can check my oxygen. Falls since last visit No(if yes complete the Fall Tracking Form and include bsrifallreport):   Caregiver involvement changes: none  Home health supplies by type and quantity ordered/delivered this visit include: na    Clinician asked if patient has had any physician contact since last home care visit and patient states: NO  Clinician asked if patient has any new or changed medications and patient states:  NO   If Yes, were medications reconciled? no reviewed  Was the certifying physician notified of changes in medications? N/A     Clinical assessment (what this visit means for the patient overall and need for ongoing skilled care) and progress or lack of progress towards SPECIFIC goals: Pt with LVAD with recent surgery and weakness which limits ambulation. Pt progressing with community amb and able to improve distance by only a short distance today. Pt with slowed pace today, which is beneficial as pt is a fast walker and this seems to tire him quickly. Pt with increased safety knowledge in teachback for energy conservation with community amb. Written Teaching Material Utilized: N/A    Interdisciplinary communication with:  PT for the purpose of POC collaboration    Discharge planning as follows: Is no longer homebound, Per physician order, Will discharge when the patient has reached their maximum functional potential and maximum safety in their home and When goals are met    Specific plan for next visit: Progress with gait and balance in preparation for dc    Signature deferred due to COVID 19 in the community.

## 2023-11-12 VITALS — OXYGEN SATURATION: 99 % | TEMPERATURE: 97.6 F | RESPIRATION RATE: 16 BRPM

## 2023-11-12 ASSESSMENT — ENCOUNTER SYMPTOMS: PAIN LOCATION - PAIN QUALITY: ACHE

## 2023-11-13 ENCOUNTER — TELEPHONE (OUTPATIENT)
Age: 68
End: 2023-11-13

## 2023-11-13 ENCOUNTER — HOME CARE VISIT (OUTPATIENT)
Facility: HOME HEALTH | Age: 68
End: 2023-11-13
Payer: MEDICARE

## 2023-11-13 ENCOUNTER — ANTI-COAG VISIT (OUTPATIENT)
Age: 68
End: 2023-11-13

## 2023-11-13 VITALS — OXYGEN SATURATION: 98 % | RESPIRATION RATE: 16 BRPM | TEMPERATURE: 98.3 F | HEART RATE: 74 BPM

## 2023-11-13 LAB
ALBUMIN SERPL-MCNC: 4.1 G/DL (ref 3.9–4.9)
ALBUMIN/GLOB SERPL: 1.6 {RATIO} (ref 1.2–2.2)
ALP SERPL-CCNC: 109 IU/L (ref 44–121)
ALT SERPL-CCNC: 18 IU/L (ref 0–44)
AST SERPL-CCNC: 27 IU/L (ref 0–40)
BASOPHILS # BLD AUTO: 0 X10E3/UL (ref 0–0.2)
BASOPHILS NFR BLD AUTO: 0 %
BILIRUB SERPL-MCNC: 0.7 MG/DL (ref 0–1.2)
BUN SERPL-MCNC: 10 MG/DL (ref 8–27)
BUN/CREAT SERPL: 10 (ref 10–24)
CALCIUM SERPL-MCNC: 8.8 MG/DL (ref 8.6–10.2)
CHLORIDE SERPL-SCNC: 102 MMOL/L (ref 96–106)
CO2 SERPL-SCNC: 20 MMOL/L (ref 20–29)
CREAT SERPL-MCNC: 0.98 MG/DL (ref 0.76–1.27)
EGFRCR SERPLBLD CKD-EPI 2021: 85 ML/MIN/1.73
EOSINOPHIL # BLD AUTO: 0.1 X10E3/UL (ref 0–0.4)
EOSINOPHIL NFR BLD AUTO: 2 %
ERYTHROCYTE [DISTWIDTH] IN BLOOD BY AUTOMATED COUNT: 13.9 % (ref 11.6–15.4)
GLOBULIN SER CALC-MCNC: 2.6 G/DL (ref 1.5–4.5)
GLUCOSE SERPL-MCNC: 75 MG/DL (ref 70–99)
HCT VFR BLD AUTO: 35.5 % (ref 37.5–51)
HGB BLD-MCNC: 11.2 G/DL (ref 13–17.7)
IMM GRANULOCYTES NFR BLD AUTO: 0 %
INR BLD: 3.1
INR PPP: 3.1 (ref 0.9–1.2)
LDH SERPL L TO P-CCNC: 278 IU/L (ref 121–224)
LYMPHOCYTES # BLD AUTO: 1.5 X10E3/UL (ref 0.7–3.1)
LYMPHOCYTES NFR BLD AUTO: 28 %
MAGNESIUM SERPL-MCNC: 1.7 MG/DL (ref 1.6–2.3)
MCH RBC QN AUTO: 27.9 PG (ref 26.6–33)
MCHC RBC AUTO-ENTMCNC: 31.5 G/DL (ref 31.5–35.7)
MCV RBC AUTO: 88 FL (ref 79–97)
MONOCYTES # BLD AUTO: 0.6 X10E3/UL (ref 0.1–0.9)
MONOCYTES NFR BLD AUTO: 12 %
NEUTROPHILS # BLD AUTO: 3.1 X10E3/UL (ref 1.4–7)
NEUTROPHILS NFR BLD AUTO: 58 %
NT-PROBNP SERPL-MCNC: 1042 PG/ML (ref 0–376)
PLATELET # BLD AUTO: 239 X10E3/UL (ref 150–450)
POTASSIUM SERPL-SCNC: 3.3 MMOL/L (ref 3.5–5.2)
PROT SERPL-MCNC: 6.7 G/DL (ref 6–8.5)
PROTHROMBIN TIME: 29.6 SEC (ref 9.1–12)
RBC # BLD AUTO: 4.02 X10E6/UL (ref 4.14–5.8)
SODIUM SERPL-SCNC: 138 MMOL/L (ref 134–144)
WBC # BLD AUTO: 5.4 X10E3/UL (ref 3.4–10.8)

## 2023-11-13 PROCEDURE — G0157 HHC PT ASSISTANT EA 15: HCPCS

## 2023-11-13 PROCEDURE — G0299 HHS/HOSPICE OF RN EA 15 MIN: HCPCS

## 2023-11-13 NOTE — PROGRESS NOTES
[address] in [place], [place]      INR result reviewed with Ginny Saini who made the following recommendations (Jason Powell): 1mg tonight, 2mg tomorrow, 2 mg Friday  and recheck 1 week. Patient and nephew  notified and verbalized understanding. They had no further questions.  (See anticoag tracker)     Staci Ford RN

## 2023-11-13 NOTE — HOME HEALTH
Subjective: I got to see \"[place]\" this weekend. That made me feel better. Falls since last visit No(if yes complete the Fall Tracking Form and include bsrifallreport):   Caregiver involvement changes: none  Home health supplies by type and quantity ordered/delivered this visit include: na    Clinician asked if patient has had any physician contact since last home care visit and patient states: YES  Clinician asked if patient has any new or changed medications and patient states:  NO   If Yes, were medications reconciled? N/A reviewed  Was the certifying physician notified of changes in medications? N/A     Clinical assessment (what this visit means for the patient overall and need for ongoing skilled care) and progress or lack of progress towards SPECIFIC goals: Pt with LVAD with weakness and limited amb. Pt with walking with SPC for balance with improved balance and stair training with rail and cane. Reviewed energy conservation. Pt with good progress toward all goals for PT with stair training and HEP goals met and pt with improved balance and knowledge for energy conservation with teachback for understanding. Supportive CG in place. PT to follow next visit. Written Teaching Material Utilized: N/A    Interdisciplinary communication with: Jamaica Silva RN for the purpose of home safety issues    Discharge planning as follows:     Specific plan for next visit: PT to follow    Signature deferred due to COVID 19 in the community.

## 2023-11-13 NOTE — TELEPHONE ENCOUNTER
1332: received message from patient's home health nurse [address] stating patient did not make med adjustments from last week. States she assisted patietn in adjusting pill box based on reccomendations from last week to double Entresto and isordil. Reviewed with Mora Decker who stated Maggie Press instruct patient to only take increased entresto at this time and resume prior dose of isordil 20mg TID. She stated she instructed patient to do this and he confirmed understanding. 1650: spoke with patient and patient's nephew together (see anticoag tracker) and educated on provider instructions to take 2 tablets of entresto BID and take only 20mg of Isordil TID. They confirmed understanding.

## 2023-11-14 ENCOUNTER — TELEPHONE (OUTPATIENT)
Age: 68
End: 2023-11-14

## 2023-11-14 VITALS
OXYGEN SATURATION: 99 % | TEMPERATURE: 97.2 F | WEIGHT: 137 LBS | RESPIRATION RATE: 20 BRPM | BODY MASS INDEX: 20.22 KG/M2

## 2023-11-14 LAB — 25(OH)D3+25(OH)D2 SERPL-MCNC: 20.5 NG/ML (ref 30–100)

## 2023-11-14 RX ORDER — MAGNESIUM OXIDE 400 MG/1
400 TABLET ORAL DAILY
Qty: 30 TABLET | Refills: 1 | Status: SHIPPED | OUTPATIENT
Start: 2023-11-14

## 2023-11-14 RX ORDER — POTASSIUM CHLORIDE 20 MEQ/1
20 TABLET, EXTENDED RELEASE ORAL DAILY
Qty: 30 TABLET | Refills: 1 | Status: SHIPPED | OUTPATIENT
Start: 2023-11-14

## 2023-11-14 RX ORDER — SPIRONOLACTONE 25 MG/1
25 TABLET ORAL DAILY
Qty: 45 TABLET | Refills: 1 | Status: SHIPPED | OUTPATIENT
Start: 2023-11-14

## 2023-11-14 NOTE — TELEPHONE ENCOUNTER
----- Message from YASMIN Mckeon NP sent at 11/14/2023  2:52 PM EST -----  Lets do 20meq daily for now   ----- Message -----  From: Malathi Ta RN  Sent: 11/14/2023   2:49 PM EST  To: YASMIN Mckeon NP    Do you want him to restart any potassium?   ----- Message -----  From: YASMIN Mckeon NP  Sent: 11/14/2023  12:56 PM EST  To: Malathi Ta RN    Please call Candance Rebel to discuss their abnormal lab results. Start Mag ox 400mg daily, increase spironolactone to 25mg daily.

## 2023-11-14 NOTE — TELEPHONE ENCOUNTER
Reviewed recent labwork with Abbey Moran who ordered VORB, start magnesium 400mg daily, potassium 20meq daily and increase spironolactone to 25mg daily. Called and spoke with patient. Educated on provider instructions. He verbalized understanding. Also informed patient's home health nurse Jeyson Tee who confirmed understanding of changes.      Requested Prescriptions     Signed Prescriptions Disp Refills    spironolactone (ALDACTONE) 25 MG tablet 45 tablet 1     Sig: Take 1 tablet by mouth daily     Authorizing Provider: Merlene Parrish     Ordering User: RENÉE CROWELLN    potassium chloride (KLOR-CON M) 20 MEQ extended release tablet 30 tablet 1     Sig: Take 1 tablet by mouth daily     Authorizing Provider: Merlene Parrish     Ordering User: PASTI, FRANK    magnesium oxide (MAG-OX) 400 MG tablet 30 tablet 1     Sig: Take 1 tablet by mouth daily     Authorizing Provider: Merlene Parrish     Ordering User: Rober Bolanos

## 2023-11-15 ENCOUNTER — HOME CARE VISIT (OUTPATIENT)
Facility: HOME HEALTH | Age: 68
End: 2023-11-15
Payer: MEDICARE

## 2023-11-15 VITALS — BODY MASS INDEX: 20.22 KG/M2 | OXYGEN SATURATION: 97 % | WEIGHT: 137 LBS | TEMPERATURE: 97.6 F | HEART RATE: 83 BPM

## 2023-11-15 PROCEDURE — G0151 HHCP-SERV OF PT,EA 15 MIN: HCPCS

## 2023-11-15 PROCEDURE — G0299 HHS/HOSPICE OF RN EA 15 MIN: HCPCS

## 2023-11-15 ASSESSMENT — ENCOUNTER SYMPTOMS: DYSPNEA ACTIVITY LEVEL: AFTER AMBULATING MORE THAN 20 FT

## 2023-11-15 NOTE — HOME HEALTH
Utilized: written Hep and [facility] hand book    Interdisciplinary communication with: Lucie Benson LPTA/ and nursing for the purpose of POC collaboration and Dc    Discharge planning as follows:  Dc today from PT.

## 2023-11-15 NOTE — PROGRESS NOTES
[address] in [address]      LVAD  LVAD Type[de-identified] Left Ventricular Assist Device (LVAD)  Pump Speed (rpm): 5200  Pump Flow (lpm): 3.8  MAP (mmHg): 96  Set Low Speed (rpm): 4800  Pump Pulse Index (PI): 8  Pump Power (Villanueva): 3.8  Battery Life Checked: Yes  Backup Controller Present: Yes  Driveline Dressing: Changed per order  Outpatient: Yes  MAP in Therapeutic Range (Outpatient): Yes       Eleanor Pretty was seen today in clinic for a visit with Puja Botello. Patient denied s/s of driveline infection or driveline trauma (including  drops). Denies LVAD alarms at home. Driveline inspected for integrity. Patient unsure if he is taking 40mg or 20mg of isordil. Above reported to provider. Sterile dressing change completed by patient's [address] Marvel Cadet able to perform steps correctly with no verbal prompting. Required one reminder change gloves due to breaking sterile field. LVAD interrogation completed in clinic, results reported to provider. See flow sheet for details. LVAD speed changed per provider VORB, see flow sheet for details. All orders entered per VORB. All provider instructions placed in AVS and reviewed with patient. Educated the patient on medication changes, expected follow up. reviewed questions. Patient verbalized understanding, denied questions at end of visit.
cardioversion with 200J emergently. Levophed was initiated. Heart rate 120s and blood pressure improved to 52Z/23G systolic. He had persistent shock. CV surgery was consulted and Impella 5.5 was implanted 8/28/2023. Coronary angiogram performed on 8/29/23 and showed severe distal LM to Ramus stenosis,  LAD and  RCA. He underwent PCI to the LM-Ramus. RCA territory could not be revascularized. He was transferred to [facility] for Impella management and LVAD evaluation. Patient was stabilized on Impella support. We made attempts at weaning Impella. He had worsened hemodynamics with cerebral hypoperfusion and altered mental status, elevated LFTs and creatinine at P3. He failed Impella wean and performance level was increased back to P7 on 9/11 with improvement. LVAD evaluation was completed. He underwent HeartMate 3 LVAD for destination therapy on 9/19/2023. He was a current smoker and not a transplant candidate. His initial post op course was complicated by right MCA stroke. Symptoms were recognized very early by nursing staff and he underwent thrombectomy of the right M1 on the evening of 9/19/23. He had resolution of left sided weakness. Post op course was also complicated by RV failure and liver dysfunction requiring inotrope support. He was discharged on POD 14 to inpatient rehab. CURRENT VISIT (11/16/23): Patient presents for initial post discharge LVAD clinic visit. Josselyn Ramirez has completed inpatient rehab and is home with his nephew. He is doing very well and has no complaints. He is getting home PT/OT, walking and working on lower body strength. He denies chest pain or dyspnea with regular exertion. He denies leg swelling or abdominal fullness, orthopnea and PND. He denies palpitations, lightheadedness or syncope. Denies melena, hematochezia, hematemesis, and epistaxis. Denies drainage, redness or pain from the driveline. No stroke like symptoms.  He is following a low sodium diet and

## 2023-11-16 ENCOUNTER — OFFICE VISIT (OUTPATIENT)
Age: 68
End: 2023-11-16

## 2023-11-16 VITALS
HEIGHT: 69 IN | TEMPERATURE: 97.5 F | OXYGEN SATURATION: 99 % | HEART RATE: 58 BPM | BODY MASS INDEX: 20.29 KG/M2 | WEIGHT: 137 LBS | RESPIRATION RATE: 18 BRPM | SYSTOLIC BLOOD PRESSURE: 84 MMHG

## 2023-11-16 VITALS
WEIGHT: 137 LBS | TEMPERATURE: 98.2 F | BODY MASS INDEX: 20.22 KG/M2 | OXYGEN SATURATION: 98 % | RESPIRATION RATE: 20 BRPM

## 2023-11-16 DIAGNOSIS — I50.22 CHRONIC SYSTOLIC HEART FAILURE (HCC): ICD-10-CM

## 2023-11-16 DIAGNOSIS — Z95.811 LVAD (LEFT VENTRICULAR ASSIST DEVICE) PRESENT (HCC): Primary | ICD-10-CM

## 2023-11-16 DIAGNOSIS — Z13.89 SCREENING FOR SUBSTANCE ABUSE: ICD-10-CM

## 2023-11-16 LAB
COTININE SERPL-MCNC: <1 NG/ML
NICOTINE SERPL-MCNC: <1 NG/ML

## 2023-11-16 RX ORDER — MELATONIN
1000 DAILY
Qty: 30 TABLET | Refills: 5 | Status: SHIPPED | OUTPATIENT
Start: 2023-11-16

## 2023-11-16 RX ORDER — ISOSORBIDE MONONITRATE 30 MG/1
30 TABLET, EXTENDED RELEASE ORAL DAILY
COMMUNITY
Start: 2023-10-27 | End: 2023-11-16 | Stop reason: SDUPTHER

## 2023-11-16 RX ORDER — MELATONIN
1000 DAILY
Qty: 30 TABLET | Refills: 5 | Status: SHIPPED | OUTPATIENT
Start: 2023-11-16 | End: 2023-11-16

## 2023-11-16 RX ORDER — FUROSEMIDE 40 MG/1
40 TABLET ORAL DAILY
COMMUNITY
Start: 2023-10-30 | End: 2023-11-16

## 2023-11-16 ASSESSMENT — PATIENT HEALTH QUESTIONNAIRE - PHQ9
SUM OF ALL RESPONSES TO PHQ QUESTIONS 1-9: 0
1. LITTLE INTEREST OR PLEASURE IN DOING THINGS: 0
2. FEELING DOWN, DEPRESSED OR HOPELESS: 0
SUM OF ALL RESPONSES TO PHQ QUESTIONS 1-9: 0
SUM OF ALL RESPONSES TO PHQ9 QUESTIONS 1 & 2: 0

## 2023-11-16 NOTE — HOME HEALTH
Subjective: Pt stated that he is doing good today,  weight is 137lbs , we discussed signs of the Green  Zone, Md made aware . Falls since last visit : No   Caregiver involvement changes:  Pts nephew Lisandro Lock assists with all AdLs    Home health supplies by type and quantity ordered/delivered this visit include: Lvad supplies,     Does the patient have any new or changed medications? Yes Entresto dose changed    If Yes, were medications  reconciled? YES   Was the certifying physician notified of changes in medications? N/A     Clinical assessment  . Patient education provided this visit: CHF  management, wound care management , Lvad management. Pt competent with administration of meds once packed in med box. Pt instructed on s/s of chf exercaberation, recognised signs of green  zone. We discusssed the importance of dietary imput and sodium restrictions . Pt remains st able and demonstrates understanding of Lvad management, medication management and bleeding precautions. Pt and Cg  instructed pt on s/s of infection and s/s to contact MD Angella Blanton. Lab specimen obtained per Md order, taken to [facility] for stat run. Progress toward goals: Pt continued to require Sn for  Med management  CHF  management , management of theraputic meds, Lvad exit wound management, wound care     Interdisciplinary communicatio n: Safety measures  and fall precautions discussed with patient.      Discharge planning for pt to be discharge to hospice or SNF when patient is no longer able to participate in care

## 2023-11-17 ENCOUNTER — TELEPHONE (OUTPATIENT)
Age: 68
End: 2023-11-17

## 2023-11-17 ENCOUNTER — HOSPITAL ENCOUNTER (OUTPATIENT)
Facility: HOSPITAL | Age: 68
End: 2023-11-17
Payer: MEDICARE

## 2023-11-17 ENCOUNTER — HOME CARE VISIT (OUTPATIENT)
Dept: HOME HEALTH SERVICES | Facility: HOME HEALTH | Age: 68
End: 2023-11-17
Payer: MEDICARE

## 2023-11-17 DIAGNOSIS — Z95.811 LEFT VENTRICULAR ASSIST DEVICE PRESENT (HCC): ICD-10-CM

## 2023-11-17 LAB
COTININE UR QL SCN: NEGATIVE NG/ML
Lab: NORMAL

## 2023-11-17 PROCEDURE — 6360000004 HC RX CONTRAST MEDICATION: Performed by: NURSE PRACTITIONER

## 2023-11-17 PROCEDURE — 71275 CT ANGIOGRAPHY CHEST: CPT

## 2023-11-17 RX ADMIN — IOPAMIDOL 100 ML: 755 INJECTION, SOLUTION INTRAVENOUS at 10:53

## 2023-11-17 NOTE — TELEPHONE ENCOUNTER
Reviewed patient with Amaury Marreror who stated VORB increase isordil to 40mg TID based on MAPs yesterday in clinic (patient was unsure if he was taking 20mg or 40mg yesterday at visit). Provider stated to have patient review med box and ensure isordil dose is 40mg. Called and spoke to patient. Educated on provider instructions to increase isordil to 40mg TID. He was able to repeat directions for confirmation. Educated to call [facility] for light headedness or dizzyness. He verbalized understanding. Also updated patient's home health nurse [address].

## 2023-11-18 ENCOUNTER — HOME CARE VISIT (OUTPATIENT)
Dept: HOME HEALTH SERVICES | Facility: HOME HEALTH | Age: 68
End: 2023-11-18
Payer: MEDICARE

## 2023-11-18 LAB
AMPHETAMINES UR QL SCN: NEGATIVE NG/ML
BARBITURATES UR QL SCN: NEGATIVE NG/ML
BENZODIAZ UR QL: NEGATIVE NG/ML
BZE UR QL: NEGATIVE NG/ML
CANNABINOIDS UR QL SCN: NEGATIVE NG/ML
MDMA UR QL SCN: NEGATIVE NG/ML
METHADONE UR QL SCN: NEGATIVE NG/ML
METHAQUALONE UR QL: NEGATIVE NG/ML
OPIATES UR QL: NEGATIVE NG/ML
PCP UR QL SCN: NEGATIVE NG/ML
PROPOXYPH UR QL SCN: NEGATIVE NG/ML

## 2023-11-20 ENCOUNTER — HOME CARE VISIT (OUTPATIENT)
Facility: HOME HEALTH | Age: 68
End: 2023-11-20
Payer: MEDICARE

## 2023-11-20 ENCOUNTER — ANTI-COAG VISIT (OUTPATIENT)
Age: 68
End: 2023-11-20
Payer: MEDICARE

## 2023-11-20 DIAGNOSIS — Z79.01 CHRONIC ANTICOAGULATION: Primary | ICD-10-CM

## 2023-11-20 LAB
ALBUMIN SERPL-MCNC: 4.2 G/DL (ref 3.9–4.9)
ALBUMIN/GLOB SERPL: 1.5 {RATIO} (ref 1.2–2.2)
ALP SERPL-CCNC: 115 IU/L (ref 44–121)
ALT SERPL-CCNC: 22 IU/L (ref 0–44)
AST SERPL-CCNC: 31 IU/L (ref 0–40)
BASOPHILS # BLD AUTO: 0 X10E3/UL (ref 0–0.2)
BASOPHILS NFR BLD AUTO: 0 %
BILIRUB SERPL-MCNC: 0.5 MG/DL (ref 0–1.2)
BUN SERPL-MCNC: 7 MG/DL (ref 8–27)
BUN/CREAT SERPL: 8 (ref 10–24)
CALCIUM SERPL-MCNC: 9 MG/DL (ref 8.6–10.2)
CHLORIDE SERPL-SCNC: 101 MMOL/L (ref 96–106)
CO2 SERPL-SCNC: 23 MMOL/L (ref 20–29)
CREAT SERPL-MCNC: 0.86 MG/DL (ref 0.76–1.27)
EGFRCR SERPLBLD CKD-EPI 2021: 95 ML/MIN/1.73
EOSINOPHIL # BLD AUTO: 0.1 X10E3/UL (ref 0–0.4)
EOSINOPHIL NFR BLD AUTO: 2 %
ERYTHROCYTE [DISTWIDTH] IN BLOOD BY AUTOMATED COUNT: 14.4 % (ref 11.6–15.4)
GLOBULIN SER CALC-MCNC: 2.8 G/DL (ref 1.5–4.5)
GLUCOSE SERPL-MCNC: 86 MG/DL (ref 70–99)
HCT VFR BLD AUTO: 35.8 % (ref 37.5–51)
HGB BLD-MCNC: 11.3 G/DL (ref 13–17.7)
IMM GRANULOCYTES NFR BLD AUTO: 0 %
INR BLD: 4.4
LDH SERPL L TO P-CCNC: 295 IU/L (ref 121–224)
LYMPHOCYTES # BLD AUTO: 1.7 X10E3/UL (ref 0.7–3.1)
LYMPHOCYTES NFR BLD AUTO: 28 %
MAGNESIUM SERPL-MCNC: 1.9 MG/DL (ref 1.6–2.3)
MCH RBC QN AUTO: 28.2 PG (ref 26.6–33)
MCHC RBC AUTO-ENTMCNC: 31.6 G/DL (ref 31.5–35.7)
MCV RBC AUTO: 89 FL (ref 79–97)
MONOCYTES # BLD AUTO: 0.6 X10E3/UL (ref 0.1–0.9)
MONOCYTES NFR BLD AUTO: 10 %
NEUTROPHILS # BLD AUTO: 3.7 X10E3/UL (ref 1.4–7)
NEUTROPHILS NFR BLD AUTO: 60 %
NT-PROBNP SERPL-MCNC: 1038 PG/ML (ref 0–376)
PLATELET # BLD AUTO: 264 X10E3/UL (ref 150–450)
POTASSIUM SERPL-SCNC: 4.2 MMOL/L (ref 3.5–5.2)
PROT SERPL-MCNC: 7 G/DL (ref 6–8.5)
RBC # BLD AUTO: 4.01 X10E6/UL (ref 4.14–5.8)
SODIUM SERPL-SCNC: 139 MMOL/L (ref 134–144)
WBC # BLD AUTO: 6.2 X10E3/UL (ref 3.4–10.8)

## 2023-11-20 PROCEDURE — G0299 HHS/HOSPICE OF RN EA 15 MIN: HCPCS

## 2023-11-20 PROCEDURE — 93793 ANTICOAG MGMT PT WARFARIN: CPT | Performed by: NURSE PRACTITIONER

## 2023-11-20 NOTE — PROGRESS NOTES
[address] in [place], [place]      INR result reviewed with Morenita Dickey NP who made the following recommendations ([facility]): hold tonight, 1 mg tomorrow and recheck Wednesday. Patient notified and verbalized understanding. They had no further questions.  (See anticoag tracker)   Also updated patient's home health nurse Hema Aranda RN

## 2023-11-21 RX ORDER — EMPAGLIFLOZIN 10 MG/1
10 TABLET, FILM COATED ORAL DAILY
Qty: 30 TABLET | Refills: 2 | Status: SHIPPED | OUTPATIENT
Start: 2023-11-21

## 2023-11-21 NOTE — TELEPHONE ENCOUNTER
Requested Prescriptions     Signed Prescriptions Disp Refills    JARDIANCE 10 MG tablet 30 tablet 2     Sig: Take 1 tablet by mouth daily     Authorizing Provider: Jackelyn Green     Ordering User: John Andrews

## 2023-11-22 ENCOUNTER — HOME CARE VISIT (OUTPATIENT)
Facility: HOME HEALTH | Age: 68
End: 2023-11-22
Payer: MEDICARE

## 2023-11-22 ENCOUNTER — ANTI-COAG VISIT (OUTPATIENT)
Age: 68
End: 2023-11-22
Payer: MEDICARE

## 2023-11-22 ENCOUNTER — TELEPHONE (OUTPATIENT)
Age: 68
End: 2023-11-22

## 2023-11-22 LAB — INR BLD: 2.5

## 2023-11-22 PROCEDURE — 93793 ANTICOAG MGMT PT WARFARIN: CPT | Performed by: NURSE PRACTITIONER

## 2023-11-22 PROCEDURE — G0299 HHS/HOSPICE OF RN EA 15 MIN: HCPCS

## 2023-11-22 NOTE — TELEPHONE ENCOUNTER
Call placed to AYSE TSANG [facility] to schedule patient appointment. Patient scheduled for Dec 7th at 2:30. Patient's home health nurse [address] notified of scheduled appointment.

## 2023-11-22 NOTE — PROGRESS NOTES
[address] in [address]      INR result reviewed with Rukhsana Martines NP who made the following recommendations (Darrin Junes): Continue current coumadin dosing (3mg daily) and recheck INR 11/29/23. Patient notified and verbalized understanding. Stacey Seth RN with [facility] notified and verbalized understanding. They had no further questions.  (See anticoag tracker)     Kash Muñoz RN

## 2023-11-27 ENCOUNTER — TELEPHONE (OUTPATIENT)
Age: 68
End: 2023-11-27

## 2023-11-27 ENCOUNTER — DIRECT ADMIT ORDERS (OUTPATIENT)
Age: 68
End: 2023-11-27

## 2023-11-27 ENCOUNTER — HOME CARE VISIT (OUTPATIENT)
Facility: HOME HEALTH | Age: 68
End: 2023-11-27
Payer: MEDICARE

## 2023-11-27 ENCOUNTER — ANTI-COAG VISIT (OUTPATIENT)
Age: 68
End: 2023-11-27
Payer: MEDICARE

## 2023-11-27 DIAGNOSIS — Z79.01 CHRONIC ANTICOAGULATION: Primary | ICD-10-CM

## 2023-11-27 LAB — INR BLD: 3.2

## 2023-11-27 PROCEDURE — 93793 ANTICOAG MGMT PT WARFARIN: CPT | Performed by: INTERNAL MEDICINE

## 2023-11-27 PROCEDURE — G0299 HHS/HOSPICE OF RN EA 15 MIN: HCPCS

## 2023-11-27 RX ORDER — SODIUM CHLORIDE 9 MG/ML
INJECTION, SOLUTION INTRAVENOUS PRN
Status: CANCELLED | OUTPATIENT
Start: 2023-11-27 | End: 2023-11-27

## 2023-11-27 RX ORDER — ISOSORBIDE DINITRATE 20 MG/1
40 TABLET ORAL EVERY 8 HOURS
Qty: 180 TABLET | Refills: 1 | Status: SHIPPED | OUTPATIENT
Start: 2023-11-27

## 2023-11-27 NOTE — HOME HEALTH
Subjective: Pt stated that he is doing good today,  weight is 137lbs , we discussed signs of the Green  Zone, Md made aware . Falls since last visit : No   Caregiver involvement changes:  Pts nephew Clifford Queen assists with all AdLs    Home health supplies by type and quantity ordered/delivered this visit include: Lvad supplies,     Does the patient have any new or changed medications? Yes warfarin dose changed   If Yes, were medications reconciled? YES   Was the certifying physician notified of changes in medications? N/A     Clinical assessment  . Patient education provided this visit: CHF  management, wound care management , Lvad management. Pt competent with administration of meds once packed in med box. Pt instructed on s/s of chf exercaberation, recognised signs of green  zone. We discusssed the importance of dietary imput and sodium restrictions . Pt remains stable and demonstrates understanding of Lvad management, medication management and bleeding precautions. Pt and Cg  instructed pt on s/s of infection and s/s to contact MD Dariana Martinez. Lab specimen obtained per Md order, taken to [facility] for stat run. Progress toward goals: Pt continued to require Sn for  Med management  CHF  management , management of theraputic meds, Lvad exit wound management, wound care     Interdisciplinary communication: Safety measures  and fall precautions discussed with patient.      Discharge planning for pt to be discharge to hospice or SNF when patient is no longer able to participate in care

## 2023-11-27 NOTE — PROGRESS NOTES
[address] in [address] [place] [address]      INR result reviewed with Dr. Kassi Ryder MD  who made the following recommendations ([facility]): 1.5mg x2 days and recheck wednesday. Patient notified and verbalized understanding. They had no further questions.  (See anticoag tracker)     Yecenia Skinner RN

## 2023-11-27 NOTE — PATIENT INSTRUCTIONS
Please bring your daily sheets and medications to your next appointment. Please monitor your weights daily upon waking and after using the bathroom. Keep a written records of your weights and bring to your next appointment. If you have a weight gain of 3 or more pounds overnight OR 5 or more pounds in one week please contact our office. Thank you for allowing us the privilege of being a part of your healthcare team! Please do not hesitate to contact our office at [phone] with any questions or concerns.

## 2023-11-27 NOTE — HOME HEALTH
Subjective: Pt stated that he is doing good today,  weight is 137lbs , we discussed signs of the Green  Zone, Md made aware . Falls since last visit : No   Caregiver involvement changes:  Pts nephcaro Luque assists with all AdLs    Home health supplies by type and quantity ordered/delivered this visit include: Lvad supplies,     Does the patient have any new or changed medications? NO     If Yes, were medications reconciled? YES   Was the certifying physician notified of changes in medications? N/A     Clinical assessment  . Patient education provided this visit: CHF  management, wound care management , Lvad management. Pt competent with administration of meds once packed in med box. Pt instructed on s/s of chf exercaberation, recognised signs of green  zone. We discusssed the importance of dietary imput and sodium restrictions . Pt remains stable and demonstrates understanding of Lvad management, medication management and bleeding precautions. Pt and Cg  instructed pt on s/s of infection and s/s to contact MD Debbie Hernandes. PT/INR asssessed results to [facility]    Progress toward goals: Pt continued to require Sn for  Med management  CHF  management , management of theraputic meds, Lvad exit wound management, wound care     Interdisciplinary communication: Safety measures  and fall precautions discussed with patient.      Discharge planning for pt to be discharge to hospice or SNF when patient is no longer able to participate in care

## 2023-11-27 NOTE — TELEPHONE ENCOUNTER
Received call from patient requesting call back to discuss question. Returned call to patient. Left voicemail requesting patient call back to discuss their questions. Patient returned call. Stated he needs refill of isosorbide. Reminded patient of appointment tomorrow and cath Wednesday with 7:45 arrival time. He verbalized understanding. Stated he will bring dressing change kit so that family can perform dressing change check off. Called and spoke with pharmacy who stated patient was dispensed isordil 20 mg. New script sent to reflect correct dosing of 40mg. Requested Prescriptions     Signed Prescriptions Disp Refills    isosorbide dinitrate (ISORDIL) 20 MG tablet 180 tablet 1     Sig: Take 2 tablets by mouth every 8 (eight) hours     Authorizing Provider: Morenita Cano     Ordering User: Akin Cisneros       Called and spoke with patients katina song who confirmed she will come on wednesday to complete remainder of dressing change education.

## 2023-11-27 NOTE — HOME HEALTH
Subjective: Pt stated that he is doing good today,  weight is 137lbs , we discussed signs of the Green  Zone, Md made aware . Falls since last visit : No   Caregiver involvement changes:  Pts nephcaro Washington assists with all AdLs    Home health supplies by type and quantity ordered/delivered this visit include: Lvad supplies,     Does the patient have any new or changed medications? NO    If Yes, were medications reconciled? YES   Was the certifying physician notified of changes in medications? N/A     Clinical assessment  . Patient education provided this visit: CHF  management, wound care management , Lvad management. Pt competent with administration of meds once packed in med box. Pt instructed on s/s of chf exercaberation, recognised signs of green  zone. We discusssed the importance of dietary imput and sodium restrictions . Pt remains stable and demonstrates understanding of Lvad management, medication management and bleeding precautions. Pt and Cg  instructed pt on s/s of infection and s/s to contact MD Darien Olivares. Progress toward goals: Pt continued to require Sn for  Med management  CHF  management , management of theraputic meds, Lvad exit wound management, wound care     Interdisciplinary communication: Safety measures  and fall precautions discussed with patient.      Discharge planning for pt to be discharge to hospice or SNF when patient is no longer able to participate in care

## 2023-11-28 ENCOUNTER — OFFICE VISIT (OUTPATIENT)
Age: 68
End: 2023-11-28
Payer: MEDICARE

## 2023-11-28 VITALS
WEIGHT: 131.2 LBS | TEMPERATURE: 97.5 F | HEIGHT: 69 IN | SYSTOLIC BLOOD PRESSURE: 80 MMHG | HEART RATE: 55 BPM | OXYGEN SATURATION: 100 % | BODY MASS INDEX: 19.43 KG/M2 | RESPIRATION RATE: 20 BRPM

## 2023-11-28 VITALS
BODY MASS INDEX: 20.08 KG/M2 | BODY MASS INDEX: 20.23 KG/M2 | WEIGHT: 137 LBS | WEIGHT: 137 LBS | TEMPERATURE: 98.2 F | TEMPERATURE: 98.2 F | WEIGHT: 136 LBS | OXYGEN SATURATION: 98 % | RESPIRATION RATE: 20 BRPM | RESPIRATION RATE: 20 BRPM | OXYGEN SATURATION: 98 % | BODY MASS INDEX: 20.23 KG/M2 | TEMPERATURE: 98.2 F | RESPIRATION RATE: 20 BRPM | OXYGEN SATURATION: 98 %

## 2023-11-28 DIAGNOSIS — Z95.811 LVAD (LEFT VENTRICULAR ASSIST DEVICE) PRESENT (HCC): Primary | ICD-10-CM

## 2023-11-28 LAB
ALBUMIN SERPL-MCNC: 4.5 G/DL (ref 3.9–4.9)
ALBUMIN/GLOB SERPL: 1.5 {RATIO} (ref 1.2–2.2)
ALP SERPL-CCNC: 111 IU/L (ref 44–121)
ALT SERPL-CCNC: 24 IU/L (ref 0–44)
AST SERPL-CCNC: 33 IU/L (ref 0–40)
BASOPHILS # BLD AUTO: 0 X10E3/UL (ref 0–0.2)
BASOPHILS NFR BLD AUTO: 1 %
BILIRUB SERPL-MCNC: 0.6 MG/DL (ref 0–1.2)
BUN SERPL-MCNC: 11 MG/DL (ref 8–27)
BUN/CREAT SERPL: 10 (ref 10–24)
CALCIUM SERPL-MCNC: 9.5 MG/DL (ref 8.6–10.2)
CHLORIDE SERPL-SCNC: 99 MMOL/L (ref 96–106)
CO2 SERPL-SCNC: 21 MMOL/L (ref 20–29)
CREAT SERPL-MCNC: 1.12 MG/DL (ref 0.76–1.27)
EGFRCR SERPLBLD CKD-EPI 2021: 72 ML/MIN/1.73
EOSINOPHIL # BLD AUTO: 0.1 X10E3/UL (ref 0–0.4)
EOSINOPHIL NFR BLD AUTO: 2 %
ERYTHROCYTE [DISTWIDTH] IN BLOOD BY AUTOMATED COUNT: 12.9 % (ref 11.6–15.4)
GLOBULIN SER CALC-MCNC: 3.1 G/DL (ref 1.5–4.5)
GLUCOSE SERPL-MCNC: 88 MG/DL (ref 70–99)
HCT VFR BLD AUTO: 40.3 % (ref 37.5–51)
HGB BLD-MCNC: 12.9 G/DL (ref 13–17.7)
IMM GRANULOCYTES # BLD AUTO: 0 X10E3/UL (ref 0–0.1)
IMM GRANULOCYTES NFR BLD AUTO: 0 %
LDH SERPL L TO P-CCNC: 310 IU/L (ref 121–224)
LYMPHOCYTES # BLD AUTO: 1.6 X10E3/UL (ref 0.7–3.1)
LYMPHOCYTES NFR BLD AUTO: 27 %
MAGNESIUM SERPL-MCNC: 2.1 MG/DL (ref 1.6–2.3)
MCH RBC QN AUTO: 28.4 PG (ref 26.6–33)
MCHC RBC AUTO-ENTMCNC: 32 G/DL (ref 31.5–35.7)
MCV RBC AUTO: 89 FL (ref 79–97)
MONOCYTES # BLD AUTO: 0.6 X10E3/UL (ref 0.1–0.9)
MONOCYTES NFR BLD AUTO: 10 %
NEUTROPHILS # BLD AUTO: 3.6 X10E3/UL (ref 1.4–7)
NEUTROPHILS NFR BLD AUTO: 60 %
NT-PROBNP SERPL-MCNC: 442 PG/ML (ref 0–376)
PLATELET # BLD AUTO: 295 X10E3/UL (ref 150–450)
POTASSIUM SERPL-SCNC: 4.8 MMOL/L (ref 3.5–5.2)
PROT SERPL-MCNC: 7.6 G/DL (ref 6–8.5)
RBC # BLD AUTO: 4.55 X10E6/UL (ref 4.14–5.8)
SODIUM SERPL-SCNC: 136 MMOL/L (ref 134–144)
WBC # BLD AUTO: 6 X10E3/UL (ref 3.4–10.8)

## 2023-11-28 PROCEDURE — 1124F ACP DISCUSS-NO DSCNMKR DOCD: CPT | Performed by: NURSE PRACTITIONER

## 2023-11-28 PROCEDURE — 93750 INTERROGATION VAD IN PERSON: CPT | Performed by: NURSE PRACTITIONER

## 2023-11-28 PROCEDURE — 99214 OFFICE O/P EST MOD 30 MIN: CPT | Performed by: NURSE PRACTITIONER

## 2023-11-28 ASSESSMENT — ENCOUNTER SYMPTOMS
SORE THROAT: 0
CHEST TIGHTNESS: 0
ABDOMINAL PAIN: 0
SHORTNESS OF BREATH: 0
EYE PAIN: 0
BLOOD IN STOOL: 0
ABDOMINAL DISTENTION: 0
COUGH: 0

## 2023-11-28 ASSESSMENT — PATIENT HEALTH QUESTIONNAIRE - PHQ9
2. FEELING DOWN, DEPRESSED OR HOPELESS: 0
SUM OF ALL RESPONSES TO PHQ QUESTIONS 1-9: 0
1. LITTLE INTEREST OR PLEASURE IN DOING THINGS: 0
SUM OF ALL RESPONSES TO PHQ9 QUESTIONS 1 & 2: 0

## 2023-11-28 NOTE — PROGRESS NOTES
[address] in [address] Note    Patient name: Yue Luke  Patient : 1955  Patient MRN: [id number]  Date of service: 23    Primary care physician: Chris Darnell MD  LVAD cardiologist: MICHAEL    Chief Complaint   Patient presents with    Congestive Heart Failure    Follow-up         ASSESSMENT:  Yue Luke is a 80 y.o. male with history of chronic systolic heart failure due to ischemic cardiomyopathy, s/p HM3 LVAD implantation (23) as destination, stage D, Optimizing GDMT. He is interested in consideration for transplant. He has quit smoking. Will plan for monthly urine cotinine screening x 6 months. INTERVAL HISTORY:  -MAP 80. Low flowing in office with high Pis into 12's  -labs : pBNP down to 442  -weight down 6lbs  -CTA chest  without significant LVAD findings  -Yue Luke is feeling good. He denies SOB, chest pain, palpitations, swelling, orthopnea, dizziness. He states his appetite is good and is sleeping well. Feeling stronger. Energy doing well. Has not had breakfast yet today. Had a glass of water PTA. PI improved in visit after patient had a glass of water. Family states the number of low flows at home has been improving. Usually go away after morning medications and then don't return until the next day       PLAN:  Heart Failure/Cardiomyopathy: NYHA class II  Continue current medical therapy for heart failure:  Beta-blocker: Continue Toprol XL 12.5 mg daily  ACE/ARB/ARNi: increase Entresto 49/51 x2 BID. If tolerates, refill 97/103 on next refill. Stopping amlodipine    Hydralazine/nitrate: Continue Hydralazine 100 mg TID and Isorild 20 mg TID  MRA: Continue Spironolactone 25 mg daily  SGLT2 inhibitor: Continue Jardiance 10 mg daily  Diuretic: He is not requiring diuretics.    Reinforced low salt diet  Reinforced adequate fluid intake   Refer to

## 2023-11-28 NOTE — HOME HEALTH
Subjective: Pt stated that he is doing good today,  weight is 136 lbs , we discussed signs of the Green  Zone, Md made aware . Falls since last visit : No   Caregiver involvement changes:  Pts nephew Lizabeth Ibrahim assists with all AdLs    Home health supplies by type and quantity ordered/delivered this visit include: Lvad supplies,     Does the patient have any new or changed medications? NO     If Yes, were medications reconciled? YES   Was the certifying physician notified of changes in medications? N/A     Clinical assessment  . Patient education provided this visit: CHF  management, wound care management , Lvad management. Pt competent with administration of meds once packed in med box. Pt instructed on s/s of chf exercaberation, recognised signs of green  zone. We discusssed the importance of dietary imput and sodium restrictions . Pt remains stable and demonstrates understanding of Lvad management, medication management and bleeding precautions. Pt and Cg  instructed pt on s/s of infection and s/s to contact MD Ash Cleary. PT/INR asssessed results to [facility], Lab specimen obtained per Md order, taken to [facility] for stat run. Progress toward goals: Pt continued to require Sn for  Med management  CHF  management , management of theraputic meds, Lvad exit wound management, wound care     Interdisciplinary communication: Safety measures  and fall precautions discussed with patient.      Discharge planning for pt to be discharge to hospice or SNF when patient is no longer able to participate in care

## 2023-11-28 NOTE — PROGRESS NOTES
[address] in [address]      LVAD  LVAD Type[de-identified] Left Ventricular Assist Device (LVAD)  Pump Speed (rpm): 5300  Pump Flow (lpm): 3.4  MAP (mmHg): 80 (O2 100% NO PALPABLE PULSE)  Set Low Speed (rpm): 5200  Pump Pulse Index (PI): 11.1  Pump Power (Villanueva): 3.9  Battery Life Checked: Yes  Backup Controller Present: Yes  Driveline Dressing: Clean, Dry and Intact  Outpatient: Yes  MAP in Therapeutic Range (Outpatient): Yes       Gian Brooks was seen today in clinic for a visit with Mariely Jennings NP    Patient denied s/s of driveline infection or driveline trauma (including  drops). Denies LVAD alarms at home. Driveline inspected for integrity. Above reported to provider. LVAD interrogation completed in clinic, results reported to provider. See flow sheet for details. All orders entered per VORB. All provider instructions placed in AVS and reviewed with patient. Educated the patient on upcoming [place], expected follow up. reviewed questions. Patient verbalized understanding, denied questions at end of visit.

## 2023-11-29 ENCOUNTER — APPOINTMENT (OUTPATIENT)
Facility: HOSPITAL | Age: 68
End: 2023-11-29
Attending: INTERNAL MEDICINE
Payer: MEDICAID

## 2023-11-29 ENCOUNTER — HOSPITAL ENCOUNTER (OUTPATIENT)
Facility: HOSPITAL | Age: 68
Discharge: HOME | End: 2023-11-29
Attending: INTERNAL MEDICINE | Admitting: INTERNAL MEDICINE
Payer: MEDICAID

## 2023-11-29 ENCOUNTER — TELEPHONE (OUTPATIENT)
Age: 68
End: 2023-11-29

## 2023-11-29 VITALS
RESPIRATION RATE: 19 BRPM | TEMPERATURE: 98 F | HEART RATE: 71 BPM | OXYGEN SATURATION: 94 % | WEIGHT: 134.48 LBS | BODY MASS INDEX: 19.92 KG/M2 | HEIGHT: 69 IN

## 2023-11-29 DIAGNOSIS — Z95.811 LEFT VENTRICULAR ASSIST DEVICE PRESENT (HCC): Primary | ICD-10-CM

## 2023-11-29 DIAGNOSIS — I50.22 CHRONIC SYSTOLIC HEART FAILURE (HCC): ICD-10-CM

## 2023-11-29 LAB
ECHO BSA: 1.72 M2
ECHO BSA: 1.73 M2
INR BLD: 2.2

## 2023-11-29 PROCEDURE — 93325 DOPPLER ECHO COLOR FLOW MAPG: CPT | Performed by: INTERNAL MEDICINE

## 2023-11-29 PROCEDURE — 93451 RIGHT HEART CATH: CPT | Performed by: INTERNAL MEDICINE

## 2023-11-29 PROCEDURE — 2500000003 HC RX 250 WO HCPCS: Performed by: INTERNAL MEDICINE

## 2023-11-29 PROCEDURE — 2709999900 HC NON-CHARGEABLE SUPPLY: Performed by: INTERNAL MEDICINE

## 2023-11-29 PROCEDURE — C1894 INTRO/SHEATH, NON-LASER: HCPCS | Performed by: INTERNAL MEDICINE

## 2023-11-29 PROCEDURE — C1713 ANCHOR/SCREW BN/BN,TIS/BN: HCPCS | Performed by: INTERNAL MEDICINE

## 2023-11-29 PROCEDURE — 85610 PROTHROMBIN TIME: CPT

## 2023-11-29 PROCEDURE — 93308 TTE F-UP OR LMTD: CPT

## 2023-11-29 PROCEDURE — 93308 TTE F-UP OR LMTD: CPT | Performed by: INTERNAL MEDICINE

## 2023-11-29 PROCEDURE — C1725 CATH, TRANSLUMIN NON-LASER: HCPCS | Performed by: INTERNAL MEDICINE

## 2023-11-29 RX ORDER — SODIUM CHLORIDE 9 MG/ML
INJECTION, SOLUTION INTRAVENOUS PRN
Status: DISCONTINUED | OUTPATIENT
Start: 2023-11-29 | End: 2023-11-29 | Stop reason: HOSPADM

## 2023-11-29 RX ORDER — LIDOCAINE HYDROCHLORIDE 10 MG/ML
INJECTION, SOLUTION INFILTRATION; PERINEURAL PRN
Status: DISCONTINUED | OUTPATIENT
Start: 2023-11-29 | End: 2023-11-29 | Stop reason: HOSPADM

## 2023-11-29 RX ORDER — ATORVASTATIN CALCIUM 40 MG/1
80 TABLET, FILM COATED ORAL NIGHTLY
Qty: 90 TABLET | Refills: 1 | Status: SHIPPED
Start: 2023-11-29

## 2023-11-29 NOTE — PROGRESS NOTES
CATH LAB to RECOVERY ROOM REPORT    Procedure: C    MD: Dave Henry MD    The procedure was diagnostic only. Verbal Report given to Recovery Nurse on patient being transferred to Cardiac Cath Lab  for routine post-op. Patient stable upon transfer to . Vitals, mental status, MAR, procedural summary discussed with recovery RN. Sheaths:    Right brachial vein sheath pulled at 0948 am, secured with a band-aid.

## 2023-11-29 NOTE — PROGRESS NOTES
Cardiac Cath Lab Recovery Arrival Note:      Donis Batista arrived to Cardiac Cath Lab, Recovery Area. Staff introduced to patient. Patient identifiers verified with NAME and DATE OF BIRTH. Procedure verified with patient. Consent forms reviewed and signed by patient or authorized representative and verified. Allergies verified. Patient informed of procedure and plan of care. Questions answered with review. Patient prepped for procedure, per orders from physician, prior to arrival.    Patient on cardiac monitor, non-invasive blood pressure, SPO2 monitor. Patient is A&Ox 4. Patient reports no complaints. Patient in stretcher, in low position, with side rails up, call bell within reach, patient instructed to call of assistance as needed. Patient prep in: [facility] Recovery Area, Bed# FT. Family in: waiting room. Prep by: AUGUSTA Grove

## 2023-11-29 NOTE — PROGRESS NOTES
[address] in [place], [place]      Met with  patient and patient's step daughter Marvel Castañeda at bedside in cath lab recovery to complete dressing change. Marvel Castañeda completed dressing change independently with no verbal prompting. Patient's drive line site assessed and found to appear fully healed. Reviewed with Dr. Tita Maloney who stated Zandra Holding okay for patient to shower with shower bag and home health assistance. Patient asking if he is able to return to his home in [place] [facility] [place]. Discussed with Dr. Tita Maloney that patient should have home health set up at primary residence as not yet discharged from [facility]. Dr. Tita Maloney stated okay to coordinate transfer of patient to a hh company which can cover [facility] and for patient to return home with hh support and Marvel Castañeda checked off on driveline dressing change. Shower bag provided to patient. Educated patient, patient's wife Yoshi Talamantes and carlos Castañeda and nephew Susie Gonzalez that patient does not need 24 hour caregivers at this time and he may return to his home in [place] [place] [facility] but would need to return to [facility] to receive dressing changes and home health assesment. Educated we will work with [facility] to coordinate transfer of hh services. He verbalized understanding. Educate on use of shower bag and that patient should wait for assistance of his home health nurse tomorrow before attempting to shower. He verbalized understanding.

## 2023-11-29 NOTE — PROGRESS NOTES
[address] in [place], [place]       Procedure: Right Heart Catherization with Dr. Kirti Minor     Fixed Speed: 5200  Low Speed: 4800      Pre procedure:  Post procedure:    Time  0930 0948   Speed  5200   5200   Flow  4.2    4.3   MAP  96 98   PI  6.2  6.7   Power  3.7   3.8   LvIdd  4.4 cm    RV  dimmension 2.4 cm     AV  Closed    No aortic insufficiency     MV  No MR     TV  No TR     Septal Position  Midline         Post procedure:       Fixed Speed: 5200  Low Speed: 4800     Ramp RHC and ECHO performed with Dr. Kirti Minor. No speed changes ordered per provider, measurements entered per provider assessment of echo images. VAD Coordinator managed LVAD equipment during procedure.        Vanessa Rivas RN  LVAD coordinator

## 2023-11-29 NOTE — TELEPHONE ENCOUNTER
Call placed to patient's step daughter who confirmed she is almost at hospital. Requested she present to clinic on arrival, she verbalized understanding. Also notified patient's nephew who verbalized understanding.

## 2023-11-29 NOTE — PROGRESS NOTES
Cardiac Cath Lab Procedure Area Arrival Note:    Morena Estrada arrived to Cardiac Cath Lab, Procedure Area. Patient identifiers verified with NAME and DATE OF BIRTH. Procedure verified with patient. Consent forms verified. Allergies verified. Patient informed of procedure and plan of care. Questions answered with review. Patient voiced understanding of procedure and plan of care. Patient on cardiac monitor, non-invasive blood pressure, SPO2 monitor. IV of ns on pump at 15 ml/hr. Patient is A&Ox 4. Patient reports no complaints. Patient medicated during procedure with orders obtained and verified by Dr. Araseli Mirza. Refer to patients Cardiac Cath Lab PROCEDURE REPORT for vital signs, assessment, status, and response during procedure, printed at end of case. Printed report on chart or scanned into chart.

## 2023-11-30 ENCOUNTER — TELEPHONE (OUTPATIENT)
Age: 68
End: 2023-11-30

## 2023-11-30 ENCOUNTER — ANTI-COAG VISIT (OUTPATIENT)
Age: 68
End: 2023-11-30

## 2023-11-30 ENCOUNTER — HOME CARE VISIT (OUTPATIENT)
Facility: HOME HEALTH | Age: 68
End: 2023-11-30
Payer: MEDICARE

## 2023-11-30 DIAGNOSIS — Z95.811 LVAD (LEFT VENTRICULAR ASSIST DEVICE) PRESENT (HCC): Primary | ICD-10-CM

## 2023-11-30 DIAGNOSIS — I50.22 CHRONIC SYSTOLIC HEART FAILURE (HCC): ICD-10-CM

## 2023-11-30 DIAGNOSIS — Z95.811 LEFT VENTRICULAR ASSIST DEVICE PRESENT (HCC): ICD-10-CM

## 2023-11-30 LAB — INR BLD: 2.6

## 2023-11-30 PROCEDURE — G0299 HHS/HOSPICE OF RN EA 15 MIN: HCPCS

## 2023-11-30 NOTE — TELEPHONE ENCOUNTER
Reviewed plan of care with Dr. Patience Lozoya who stated Eber francisco to coordinate transfer of home health services to location which can service patient's home address ([place]). Called and spoke with [facility] who stated they are not accepting referrals at this time. Called and spoke with [facility] who stated they are accepting referrals and would cover patient's address.  Referral faxed to [phone]

## 2023-11-30 NOTE — PROGRESS NOTES
[address] in [address] [place] [address]      INR result reviewed with Dr. Trenton Jones MD  who made the following recommendations (Jeni Pham): 2 mg today and Friday and recheck on Monday. For next week 2 mg daily and 3 mg on Tuesday and Friday  and recheck INR in 1 week. Patient notified and verbalized understanding. They had no further questions.  (See anticoag tracker)     Liliane Velazquez RN

## 2023-11-30 NOTE — TELEPHONE ENCOUNTER
Pt called to speak with Freddy Duong about his upcoming appointments, but I was able to answer his questions in the end.

## 2023-12-01 VITALS
OXYGEN SATURATION: 98 % | TEMPERATURE: 97.2 F | RESPIRATION RATE: 20 BRPM | WEIGHT: 136 LBS | BODY MASS INDEX: 20.14 KG/M2

## 2023-12-01 NOTE — TELEPHONE ENCOUNTER
Received message from Jenna Carlos with [facility] stating that patient has been accepted for home health. Jenna Carlos requesting to discuss any other services patient will need initiated. Returned call to samantha and left a message requesting call back to discuss patient needs as well as LVAD training for agency. Called patient and  left message updating on acceptance with [facility] and that team will coordinate transfer of patient to their services. Received return call from Jenna Carlos with [facility]. Stated she reached out to patient and left patient message regarding services. Discussed LVAD and need for agency training. Jenna Carlos confirmed she will reach out to patient's family regarding initiating services and will plan to speak with [facility] staff on Monday regarding LVAD training for [facility]. Patient's home health nurse KEVON Child updated on above.

## 2023-12-01 NOTE — TELEPHONE ENCOUNTER
Received call from patient stating that his family would like to assist him in moving back to [place] this Tuesday as his nephew Aldon Kussmaul needs to start going back to work in person. Reviewed referral made to [facility]ILDA [facility]ERMU[facility] home health and that we are waiting on confirmation that they could accept him. Reviewed importance of having home health in place for labs, vital signs monitoring ect. He verbalized understanding. Patient confirmed that his sister Ching Valdez will be able top provide him rides to appointments once he moves back to [place] as he is not yet driving. Educated patient will call him today with an update on status by end of day.

## 2023-12-03 ENCOUNTER — TELEPHONE (OUTPATIENT)
Age: 68
End: 2023-12-03

## 2023-12-03 NOTE — TELEPHONE ENCOUNTER
Received message from patient's home health nurse Chris Sánchez stating patient received call from [facility] care [facility] Monday. Called and spoke with patient who stated he received a call from [facility] requesting to admit him tomorrow 12/4. Provided phone number for person that he spoke to. Graham Bhandari at [phone]  with [facility] in [place]. Reviewed need to ensure LVAD staff is trained before admission for home health services. She verbilized understanding and provided number to nurse manager ([phone]) to coordinate this. Acrlin Nikolai stated they will cancel hh admission for tomorrow and await coordination of training for staff. Plan reviewed with patient who verbalized understanding. Reviewed with patient's home health nurse Chris Sánchez who verbalized understanding, stated she has a visit planned with patient 1pm on 12/04.         12/4: Called and spoke with Joanie and reviewed needs for LVAD training for company. She verbalized understanding. Stated she will review with her director and return call. Called returned by Hal[place] who stated she will assist in coordinating training. Times provided when coordinator could host this. [place] verbalized understanding, stated she will reach out to staff and will return call with best date.

## 2023-12-04 ENCOUNTER — HOME CARE VISIT (OUTPATIENT)
Facility: HOME HEALTH | Age: 68
End: 2023-12-04
Payer: MEDICARE

## 2023-12-04 ENCOUNTER — ANTI-COAG VISIT (OUTPATIENT)
Age: 68
End: 2023-12-04
Payer: MEDICAID

## 2023-12-04 ENCOUNTER — TELEPHONE (OUTPATIENT)
Age: 68
End: 2023-12-04

## 2023-12-04 DIAGNOSIS — Z79.01 CHRONIC ANTICOAGULATION: Primary | ICD-10-CM

## 2023-12-04 LAB — INR BLD: 3.3

## 2023-12-04 PROCEDURE — 93793 ANTICOAG MGMT PT WARFARIN: CPT | Performed by: NURSE PRACTITIONER

## 2023-12-04 PROCEDURE — G0299 HHS/HOSPICE OF RN EA 15 MIN: HCPCS

## 2023-12-04 NOTE — TELEPHONE ENCOUNTER
Reviewed reported, flow, PI and map numbers with CARMELINA Luevano who stated [facility] no changes at this time as long as patient is not having dizzyness. Called and spoke to patient (see anticoag encounter) who denied dizzyness or lightheadedness. Reviewed provider instructions for no changes at this time unless he experiences dizzyness, he verbalized understanding.

## 2023-12-04 NOTE — HOME HEALTH
Subjective: Pt stated that he is doing good today,  weight is 136 lbs , we discussed signs of the Green  Zone, Md made aware . Falls since last visit : No   Caregiver involvement changes:  Pts nephcaro Smith assists with all AdLs    Home health supplies by type and quantity ordered/delivered this visit include: Lvad supplies,     Does the patient have any new or changed medications? NO     If Yes, were medications reconciled? YES   Was the certifying physician notified of changes in medications? N/A     Clinical assessment  . Patient education provided this visit: CHF  management, wound care management , Lvad management. Pt competent with administration of meds once packed in med box. Pt instructed on s/s of chf exercaberation, recognised signs of green  zone. We discusssed the importance of dietary imput and sodium restrictions . Pt remains stable and demonstrates understanding of Lvad management, medication management and bleeding precautions. Pt and Cg  instructed pt on s/s of infection and s/s to contact MD Pura Mario. PT/INR asssessed results to [facility]. Progress toward goals: Pt continued to require Sn for  Med management  CHF  management , management of theraputic meds, Lvad exit wound management, wound care     Interdisciplinary communication: Safety measures  and fall precautions discussed with patient.      Discharge planning for pt to be discharge to hospice or SNF when patient is no longer able to participate in care

## 2023-12-04 NOTE — PROGRESS NOTES
[address] [facility] in [place], [place]      INR result reviewed with Edward Lanier NP who made the following recommendations (Orion García): 2mg every Monday and Friday, 3mg all other days and recheck 1 week. Patient, patient's home health nurse [address] notified and verbalized understanding. They had no further questions.  (See anticoag tracker)     Cristin Eden RN

## 2023-12-04 NOTE — TELEPHONE ENCOUNTER
Received message from patient's home health nurse Tracy Bowles reporting the following     PI 12.5  Map 66  Flow 2.6     Patient has been taking increased dose of entresto since Thursday.

## 2023-12-05 LAB
ALBUMIN SERPL-MCNC: 4.6 G/DL (ref 3.9–4.9)
ALBUMIN/GLOB SERPL: 1.8 {RATIO} (ref 1.2–2.2)
ALP SERPL-CCNC: 123 IU/L (ref 44–121)
ALT SERPL-CCNC: 30 IU/L (ref 0–44)
AST SERPL-CCNC: 36 IU/L (ref 0–40)
BASOPHILS # BLD AUTO: 0 X10E3/UL (ref 0–0.2)
BASOPHILS NFR BLD AUTO: 0 %
BILIRUB SERPL-MCNC: 0.5 MG/DL (ref 0–1.2)
BUN SERPL-MCNC: 8 MG/DL (ref 8–27)
BUN/CREAT SERPL: 8 (ref 10–24)
CALCIUM SERPL-MCNC: 9.2 MG/DL (ref 8.6–10.2)
CHLORIDE SERPL-SCNC: 98 MMOL/L (ref 96–106)
CO2 SERPL-SCNC: 20 MMOL/L (ref 20–29)
CREAT SERPL-MCNC: 1.05 MG/DL (ref 0.76–1.27)
EGFRCR SERPLBLD CKD-EPI 2021: 78 ML/MIN/1.73
EOSINOPHIL # BLD AUTO: 0.1 X10E3/UL (ref 0–0.4)
EOSINOPHIL NFR BLD AUTO: 2 %
ERYTHROCYTE [DISTWIDTH] IN BLOOD BY AUTOMATED COUNT: 12.9 % (ref 11.6–15.4)
GLOBULIN SER CALC-MCNC: 2.5 G/DL (ref 1.5–4.5)
GLUCOSE SERPL-MCNC: 94 MG/DL (ref 70–99)
HCT VFR BLD AUTO: 36 % (ref 37.5–51)
HGB BLD-MCNC: 11.9 G/DL (ref 13–17.7)
IMM GRANULOCYTES # BLD AUTO: 0 X10E3/UL (ref 0–0.1)
IMM GRANULOCYTES NFR BLD AUTO: 1 %
LDH SERPL L TO P-CCNC: 280 IU/L (ref 121–224)
LYMPHOCYTES # BLD AUTO: 1.1 X10E3/UL (ref 0.7–3.1)
LYMPHOCYTES NFR BLD AUTO: 20 %
MAGNESIUM SERPL-MCNC: 2.2 MG/DL (ref 1.6–2.3)
MCH RBC QN AUTO: 28.1 PG (ref 26.6–33)
MCHC RBC AUTO-ENTMCNC: 33.1 G/DL (ref 31.5–35.7)
MCV RBC AUTO: 85 FL (ref 79–97)
MONOCYTES # BLD AUTO: 0.7 X10E3/UL (ref 0.1–0.9)
MONOCYTES NFR BLD AUTO: 13 %
NEUTROPHILS # BLD AUTO: 3.5 X10E3/UL (ref 1.4–7)
NEUTROPHILS NFR BLD AUTO: 64 %
NT-PROBNP SERPL-MCNC: 639 PG/ML (ref 0–376)
PLATELET # BLD AUTO: 264 X10E3/UL (ref 150–450)
POTASSIUM SERPL-SCNC: 4.5 MMOL/L (ref 3.5–5.2)
PROT SERPL-MCNC: 7.1 G/DL (ref 6–8.5)
RBC # BLD AUTO: 4.24 X10E6/UL (ref 4.14–5.8)
SODIUM SERPL-SCNC: 133 MMOL/L (ref 134–144)
WBC # BLD AUTO: 5.5 X10E3/UL (ref 3.4–10.8)

## 2023-12-05 NOTE — TELEPHONE ENCOUNTER
Requested Prescriptions     Signed Prescriptions Disp Refills    sacubitril-valsartan (ENTRESTO) 49-51 MG per tablet 60 tablet 1     Sig: Take 2 tablets by mouth 2 times daily     Authorizing Provider: Nixon Paula     Ordering User: Wilmer Jo

## 2023-12-05 NOTE — TELEPHONE ENCOUNTER
Requested Prescriptions     Signed Prescriptions Disp Refills    sacubitril-valsartan (ENTRESTO)  MG per tablet 60 tablet 2     Sig: Take 1 tablet by mouth 2 times daily     Authorizing Provider: Mylene Owens     Ordering User: FRANK CROWELL      dose sent per pharmacy/insurance request and provider TYSON. Called and spoke with patient to educate that he should only take one of these BID (was previously taking 2 tablets of 49-51 BID). Instructed patient to inspect bottle to verify dosing. He verbalized understanding. Also updated patient's home health nurse [address] who confirmed understanding.

## 2023-12-05 NOTE — TELEPHONE ENCOUNTER
Received return call from [place] with [facility] who stated staff will be available for training Thursday 12/07 at 1:30 pm. Confirmed present staff will include patient's primary RN and LPNs who would cover patient.

## 2023-12-08 ENCOUNTER — HOME CARE VISIT (OUTPATIENT)
Facility: HOME HEALTH | Age: 68
End: 2023-12-08
Payer: MEDICARE

## 2023-12-08 ENCOUNTER — TELEPHONE (OUTPATIENT)
Age: 68
End: 2023-12-08

## 2023-12-08 VITALS
TEMPERATURE: 98.2 F | WEIGHT: 136 LBS | BODY MASS INDEX: 20.14 KG/M2 | RESPIRATION RATE: 20 BRPM | OXYGEN SATURATION: 99 %

## 2023-12-08 DIAGNOSIS — I50.22 CHRONIC SYSTOLIC HEART FAILURE (HCC): ICD-10-CM

## 2023-12-08 DIAGNOSIS — Z95.811 LVAD (LEFT VENTRICULAR ASSIST DEVICE) PRESENT (HCC): ICD-10-CM

## 2023-12-08 PROCEDURE — G0299 HHS/HOSPICE OF RN EA 15 MIN: HCPCS

## 2023-12-08 RX ORDER — MELATONIN
1000 DAILY
Qty: 90 TABLET | Refills: 1 | Status: SHIPPED | OUTPATIENT
Start: 2023-12-08

## 2023-12-08 RX ORDER — METOPROLOL SUCCINATE 25 MG/1
12.5 TABLET, EXTENDED RELEASE ORAL DAILY
Qty: 45 TABLET | Refills: 1 | Status: SHIPPED | OUTPATIENT
Start: 2023-12-08

## 2023-12-08 NOTE — TELEPHONE ENCOUNTER
Requested Prescriptions     Signed Prescriptions Disp Refills    metoprolol succinate (TOPROL XL) 25 MG extended release tablet 45 tablet 1     Sig: Take 0.5 tablets by mouth daily     Authorizing Provider: Chriss Dasilva     Ordering User: Ivonne Chappell    vitamin D3 (CHOLECALCIFEROL) 25 MCG (1000 UT) TABS tablet 90 tablet 1     Sig: Take 1 tablet by mouth daily     Authorizing Provider: Chriss Dasilva     Ordering User: Ivonne Chappell

## 2023-12-11 ENCOUNTER — TELEPHONE (OUTPATIENT)
Age: 68
End: 2023-12-11

## 2023-12-11 ENCOUNTER — ANTI-COAG VISIT (OUTPATIENT)
Age: 68
End: 2023-12-11
Payer: MEDICARE

## 2023-12-11 DIAGNOSIS — Z79.01 CHRONIC ANTICOAGULATION: Primary | ICD-10-CM

## 2023-12-11 LAB — INR BLD: 2.7

## 2023-12-11 PROCEDURE — 93793 ANTICOAG MGMT PT WARFARIN: CPT | Performed by: NURSE PRACTITIONER

## 2023-12-11 NOTE — PROGRESS NOTES
[address] in [facility], [address] [place] [address]      INR result reviewed with Karol Rose NP who made the following recommendations (Dianne Dan): no changes and recheck 1 week. Patient's home health nurse THE [facility] notified while with patient at home visit- she verbalized understanding, confirmed patient received instructions.       Kelly Merritt RN

## 2023-12-11 NOTE — TELEPHONE ENCOUNTER
Called and spoke to [employer] office and requested patient's home health nurse call with INR results from today. Office confirmed that nurse went out to patient's home in [place] for start of serviced today. Stated she will call patient's nurse and return call to [facility].

## 2023-12-12 ENCOUNTER — TELEPHONE (OUTPATIENT)
Age: 68
End: 2023-12-12

## 2023-12-12 DIAGNOSIS — I50.22 CHRONIC SYSTOLIC HEART FAILURE (HCC): ICD-10-CM

## 2023-12-12 NOTE — TELEPHONE ENCOUNTER
Called and spoke with patient's home health nurse Kaila Bravo. Updated on today's visit including medication changes and recommendations regarding liberalizing sodium intake and increasing hydration. She verbalized understanding. Stated additional labs can be drawn at next patient visit.  Lab orders faxed to office per Kaila Bravo request.

## 2023-12-13 ASSESSMENT — SLEEP AND FATIGUE QUESTIONNAIRES
HOW LIKELY ARE YOU TO NOD OFF OR FALL ASLEEP WHILE WATCHING TV: 2
HOW LIKELY ARE YOU TO NOD OFF OR FALL ASLEEP IN A CAR, WHILE STOPPED FOR A FEW MINUTES IN TRAFFIC: 0
HOW LIKELY ARE YOU TO NOD OFF OR FALL ASLEEP WHILE SITTING AND READING: 0
HOW LIKELY ARE YOU TO NOD OFF OR FALL ASLEEP WHILE SITTING INACTIVE IN A PUBLIC PLACE: 0
HOW LIKELY ARE YOU TO NOD OFF OR FALL ASLEEP WHEN YOU ARE A PASSENGER IN A CAR FOR AN HOUR WITHOUT A BREAK: 0
ESS TOTAL SCORE: 4
HOW LIKELY ARE YOU TO NOD OFF OR FALL ASLEEP WHILE LYING DOWN TO REST IN THE AFTERNOON WHEN CIRCUMSTANCES PERMIT: 2
HOW LIKELY ARE YOU TO NOD OFF OR FALL ASLEEP WHILE SITTING AND TALKING TO SOMEONE: 0
HOW LIKELY ARE YOU TO NOD OFF OR FALL ASLEEP WHILE SITTING QUIETLY AFTER LUNCH WITHOUT ALCOHOL: 0

## 2023-12-14 ENCOUNTER — TELEMEDICINE (OUTPATIENT)
Age: 68
End: 2023-12-14
Payer: MEDICAID

## 2023-12-14 DIAGNOSIS — G47.33 OBSTRUCTIVE SLEEP APNEA (ADULT) (PEDIATRIC): Primary | ICD-10-CM

## 2023-12-14 DIAGNOSIS — I25.5 ISCHEMIC CARDIOMYOPATHY: ICD-10-CM

## 2023-12-14 PROCEDURE — 99204 OFFICE O/P NEW MOD 45 MIN: CPT | Performed by: INTERNAL MEDICINE

## 2023-12-14 PROCEDURE — 1124F ACP DISCUSS-NO DSCNMKR DOCD: CPT | Performed by: INTERNAL MEDICINE

## 2023-12-14 NOTE — PATIENT INSTRUCTIONS
[facility]., Raghavendra Maldonado, [address]  Tel.  [phone]  Fax. [address]  [address], [address] [place] [address]  Tel.  [phone]  Fax. [phone] [facility], [address] [facility]  Tel.  [phone]  Fax. [phone]     Sleep Apnea: After Your Visit  Your Care Instructions  Sleep apnea occurs when you frequently stop breathing for 10 seconds or longer during sleep. It can be mild to severe, based on the number of times per hour that you stop breathing or have slowed breathing. Blocked or narrowed airways in your nose, mouth, or throat can cause sleep apnea. Your airway can become blocked when your throat muscles and tongue relax during sleep. Sleep apnea is common, occurring in 1 out of 20 individuals. Individuals having any of the following characteristics should be evaluated and treated right away due to high risk and detrimental consequences from untreated sleep apnea:  Obesity  Congestive Heart failure  Atrial Fibrillation  Uncontrolled Hypertension  Type II Diabetes  Night-time Arrhythmias  Stroke  Pulmonary Hypertension  High-risk Driving Populations (pilots, truck drivers, etc.)  Patients Considering Weight-loss Surgery    How do you know you have sleep apnea? You probably have sleep apnea if you answer 'yes' to 3 or more of the following questions:  S - Have you been told that you Snore? T - Are you often Tired during the day? O - Has anyone Observed you stop breathing while sleeping? P- Do you have (or are being treated for) high blood Pressure? B - Are you obese (Body Mass Index > 35)? A - Is your Age 48years old or older? N - Is your Neck size greater than 16 inches? G - Are you male Gender? A sleep physician can prescribe a breathing device that prevents tissues in the throat from blocking your airway. Or your doctor may recommend using a dental device (oral breathing device) to help keep your airway open.  In some cases, surgery may

## 2023-12-14 NOTE — PROGRESS NOTES
[x] No Facial Asymmetry (Cranial nerve 7 motor function) (limited exam due to video visit)          [x] No gaze palsy        [] Abnormal -          Skin:        [x] No significant exanthematous lesions or discoloration noted on facial skin         [] Abnormal -            Psychiatric:       [x] Normal Affect [] Abnormal -       Other pertinent observable physical exam findings:-          Assessment:      Diagnosis Orders   1. Obstructive sleep apnea (adult) (pediatric)  SLEEP STUDY FULL      2. Ischemic cardiomyopathy  SLEEP STUDY FULL            Plan:       * Sleep testing was ordered for initial evaluation. * He was provided information on sleep apnea including coresponding risk factors and the importance of proper treatment. * Treatment options for sleep apnea were reviewed today. Patient agrees to a PAP titration  if indicated. * Counseling was provided regarding proper sleep hygiene, appropriate sleep schedule, need for sleep environment safety and safe driving. 2. Cardiomyopathy-severe ischemic cardiomyopathy, LVEF 15% s/p HM3 LVAD implantation (9/19/23) . Stage D, NYHA class IIIA  * Patient agrees to telephone follow-up by sleep technologist shortly after sleep study to review results and plan final management. The treatment plan was reviewed with the patient in detail . he understands that the lead technologist will be calling him  with the results or notifying of results via [address] [place] [address] and assisting with the next step in the treatment plan as outlined today during the consultation with me. All of his questions were addressed. Thank you for allowing us to participate in your patient's medical care. We'll keep you updated on these investigations. Services were provided through a video synchronous discussion virtually to substitute for in-person clinic visit.     Evonne Newton MD    Electronically signed by    Mishel Jarrett MD  Diplomate in Sleep Medicine  [facility]

## 2023-12-15 ENCOUNTER — TELEPHONE (OUTPATIENT)
Age: 68
End: 2023-12-15

## 2023-12-15 RX ORDER — NITROFURANTOIN 25; 75 MG/1; MG/1
100 CAPSULE ORAL 2 TIMES DAILY
Qty: 10 CAPSULE | Refills: 0 | Status: SHIPPED | OUTPATIENT
Start: 2023-12-15 | End: 2023-12-20

## 2023-12-15 NOTE — TELEPHONE ENCOUNTER
Reviewed UA results with [address] [place] [address] who ordered 5 day course of Macrobid. Per provider VORZAINAB patient does not need to hold Jardiance at this time. Call placed to patient who confirmed instructions to start antibiotics. Call also placed to patient's home health nurse Miguel Angel Brought, reviewed UA results and Macrobid prescription, Miguel Angel Brought confirmed understanding. Miguel Angel Chavez also confirmed she assisted patient in making medication changes per last visit with [facility]. Stated she may plan to see patient on T/THU/ scheduled-Discussed with Miguel Angel Chavez okay to do this patient patient would need to transition to T/THU/SAT dressing change schedule and continue having patient step daughter do dressing changes with hh support. Miguel Angel Chavez confirmed understanding.

## 2023-12-15 NOTE — PROGRESS NOTES
Reviewed UA results with [address] [place] [address] who ordered 5 day course of Macrobid. Per provider VORB patient does not need to hold Jardiance at this time. Call placed to patient who confirmed instructions to start antibiotics (see telephone call).
UA showed greater than 100,000 colonies of UTI. Will prescribe macrobid x 5 days.
Self Administrated

## 2023-12-17 VITALS
TEMPERATURE: 97.2 F | WEIGHT: 136 LBS | OXYGEN SATURATION: 100 % | BODY MASS INDEX: 20.14 KG/M2 | RESPIRATION RATE: 20 BRPM

## 2023-12-21 NOTE — HOME HEALTH
Subjective: Pt stated that he is doing good today, sad to be discharge from us , but he is happy to be going home,  weight is stable , we discussed signs of the Green  Zone, Md made aware . Falls since last visit : No   Caregiver involvement changes:  Pts nephew Chyna Dorannt assists with all AdLs    Home health supplies by type and quantity ordered/delivered this visit include: Lvad supplies,     Does the patient have any new or changed medications? NO     If Yes, were medications reconciled? YES   Was the certifying physician notified of changes in medications? N/A     Clinical assessment  . Patient education provided this visit: CHF  management, wound care management , Lvad management. Pt competent with administration of meds once packed in med box. Pt instructed on s/s of chf exercaberation, recognised signs of green  zone. We discusssed the importance of dietary imput and sodium restrictions . Pt remains stable and demonstrates understanding of Lvad management, medication management and bleeding precautions. Pt and Cg  instructed pt on s/s of infection and s/s to contact MD Rosendo Zacarias. Progress toward goals: Pt continued to require Sn for  Med management  CHF  management , management of theraputic meds, Lvad exit wound management, wound care     Interdisciplinary communication: Safety measures  and fall precautions discussed with patient. Discharge planning for pt to be discharge to be discharged today to transfer [facility] , in service area of his home, PT/ CG and MD are in agreement.

## 2023-12-21 NOTE — HOME HEALTH
Subjective: Pt stated that he is doing good today,  weight is 136 lbs , we discussed signs of the Green  Zone, Md made aware . Falls since last visit : No   Caregiver involvement changes:  Pts nephew Tess Holm assists with all AdLs    Home health supplies by type and quantity ordered/delivered this visit include: Lvad supplies,     Does the patient have any new or changed medications? NO     If Yes, were medications reconciled? YES   Was the certifying physician notified of changes in medications? N/A     Clinical assessment  . Patient education provided this visit: CHF  management, wound care management , Lvad management. Pt competent with administration of meds once packed in med box. Pt instructed on s/s of chf exercaberation, recognised signs of green  zone. We discusssed the importance of dietary imput and sodium restrictions . Pt remains stable and demonstrates understanding of Lvad management, medication management and bleeding precautions. Pt and Cg  instructed pt on s/s of infection and s/s to contact MD Mauricio Gutierrez. PT/INR asssessed results to [facility], Lab specimen obtained per Md order, taken to [employer] for stat run. Progress toward goals: Pt continued to require Sn for  Med management  CHF  management , management of theraputic meds, Lvad exit wound management, wound care     Interdisciplinary communication: Safety measures  and fall precautions discussed with patient.      Discharge planning for pt to be discharge to hospice or SNF when patient is no longer able to participate in care

## 2023-12-26 ENCOUNTER — ANTI-COAG VISIT (OUTPATIENT)
Age: 68
End: 2023-12-26
Payer: MEDICARE

## 2023-12-26 DIAGNOSIS — Z79.01 CHRONIC ANTICOAGULATION: Primary | ICD-10-CM

## 2023-12-26 LAB — INR BLD: 3.1

## 2023-12-26 PROCEDURE — 93793 ANTICOAG MGMT PT WARFARIN: CPT | Performed by: NURSE PRACTITIONER

## 2023-12-26 NOTE — PROGRESS NOTES
[address] [facility] [place] in [place], [place]      INR result reviewed with Shereen Epstein NP who made the following recommendations (José Luis Briceno): 2mg tonight and recheck 1 week. Patient's home health nurse Elizabeth Pang notified of provider instructions, verbalized understanding. Patient notified of provider instructions via voicemail, requested call back to confirm.      Patient returned call, confirmed he received dosing instructions       Kely Echevarria RN

## 2023-12-27 NOTE — PATIENT INSTRUCTIONS
Medication changes:    INCREASE metoprolol to 25mg twice a day     Testing Ordered:    Lab work has been collected today. You will be contacted with any abnormal results requiring changes to your current plan of care. Other Recommendations:     Per our provider you may resume driving. Take another adult with you for the first driving trip. Start with a short trip and gradually increase driving time as you feel comfortable. Continue to change drive line exit site dressing 3 times a week using sterile technique,     Ensure you are drinking an adequate amount of water with a goal of 6-8 eight ounce glasses (1.5-2 liters) of fluid daily. Your urine should be clear and light yellow straw colored. Monthly LVAD Education Tip:     In anticipation of inclement weather, we would like to communicate an emergency plan regarding your HeartMate II / HeartMate 3 LVAD. In preparation for an upcoming storm:    Have all batteries fully charged. Have a cell phone (fully charged) and flashlight available. Know the contact number for a local place that will definitely have power if you lose power in your home (friend with a generator, fire station, shelter, hospital, etc.). In the event of a snow emergency, please identify your snow removal plan. If you must shovel the snow yourself please take frequent breaks and keep controller/batteries dry. In the event of a power outage and you are connected to your power module (HEARTMATE 2 ONLY):    Place yourself onto battery. If the power does not come back in 1-2 hours, your power module battery is at risk. You have two options to preserve it:  Find a power source and plug-in. OR  Remove the internal battery. To remove your internal battery,  follow these steps  Unplug power module, press silence button. Use Motosmarty head screwdriver on the back panel to remove plate. Continue to use screwdriver on metal cage that holds the big black battery in place.   Remove big

## 2023-12-28 ENCOUNTER — OFFICE VISIT (OUTPATIENT)
Age: 68
End: 2023-12-28

## 2023-12-28 VITALS
WEIGHT: 132.8 LBS | HEART RATE: 72 BPM | TEMPERATURE: 97.7 F | SYSTOLIC BLOOD PRESSURE: 86 MMHG | RESPIRATION RATE: 20 BRPM | OXYGEN SATURATION: 100 % | BODY MASS INDEX: 20.13 KG/M2 | HEIGHT: 68 IN

## 2023-12-28 DIAGNOSIS — Z95.811 LVAD (LEFT VENTRICULAR ASSIST DEVICE) PRESENT (HCC): ICD-10-CM

## 2023-12-28 DIAGNOSIS — N30.00 ACUTE CYSTITIS WITHOUT HEMATURIA: Primary | ICD-10-CM

## 2023-12-28 RX ORDER — METOPROLOL SUCCINATE 25 MG/1
25 TABLET, EXTENDED RELEASE ORAL 2 TIMES DAILY
Qty: 90 TABLET | Refills: 1
Start: 2023-12-28 | End: 2023-12-28

## 2023-12-28 RX ORDER — METOPROLOL SUCCINATE 25 MG/1
25 TABLET, EXTENDED RELEASE ORAL 2 TIMES DAILY
Qty: 90 TABLET | Refills: 1 | Status: SHIPPED | OUTPATIENT
Start: 2023-12-28

## 2023-12-28 NOTE — PROGRESS NOTES
[address] in [facility], [address] Note    Patient name: Olinda Turner  Patient : 1955  Patient MRN: [id number]  Date of service: 23    Primary care physician: Burgess Antoine MD  LVAD cardiologist: Inés Casanova,[address] [facility]    Chief Complaint   Patient presents with    Congestive Heart Failure    Follow-up         PLAN OF CARE:  80 y.o. with h/o severe ischemic cardiomyopathy, LVEF 15% s/p HM3 LVAD implantation (23) as destination therapy due not candidate for heart transplant (tobacco), malnutrition and severity of PVD, stage C, NYHA class II, on GDMT limited by hypotension/RV dysfunction/IVVD  Patient is interested in formal evaluation for heart transplant; we plan on monthly urine cotinine screening x 6 months. Negative since . INTERVAL HISTORY:  -MAP 86 with no pulse. Low flows in the mornings before meds (and in office after coughing). Pis high.   -labs last on  reviewed  -labs : Na 125; pBNP 853; Mg 1.9 LDL 66; iron sat 18%, but no ferritin   - UA with culture + for enterobacter and given macrobid  -weight down 1lb  -Olinda Turner is feeling good. He denies SOB, DOS SANTOS, swelling, orthopnea, palpitations, chest pain, issues with his driveline, or any bleeding issues. He states his energy is good and his appetite is good. Brought by his sister. He wants to know if he can drive yet. PLAN:  Continue current device speed; low flow alarms due to IVVD  Continue dressing changes 3x weekly  Increase current dose of toprol XL 25mg twice daily.    Continue current dose of entresto 97/103mg twice daily  Continue hydralazine/isordil 100/40mg TID  Continue current dose of spironolactone 25mg daily  Continue current dose of jardiance to 5mg daily due to IVVD and hypoglycemia in 60s in routine labs  Hold off diuretics; , liberal fluid and salt intake, but decrease fluid some due to low sodium  Not on

## 2023-12-28 NOTE — PROGRESS NOTES
[address] in [address]      LVAD  LVAD Type[de-identified] Left Ventricular Assist Device (LVAD)  Pump Speed (rpm): 5200  Pump Flow (lpm): 3.8  MAP (mmHg): 86 (O2 100% No palpable pulse)  Set Low Speed (rpm): 5200  Pump Pulse Index (PI): 8  Pump Power (Villanueva): 3.8  Backup Controller Present: Yes  Driveline Dressing: Clean, Dry and Intact  Outpatient: Yes  MAP in Therapeutic Range (Outpatient): Yes       Dameon Marley was seen today in clinic for a visit with Christina Ortega NP    Patient denied s/s of driveline infection or driveline trauma (including  drops). Notes he is having low flow alarms in the morning which resolve about 30 minutes after taking medications. States low flows happen \"rarely\" in the afternoon. Driveline inspected for integrity. Above reported to provider. Per provider patient would like to defer genetic testing at this time. Per provider patient may resume driving. Patient educated to start with short driving trips and increase as tolerated. LVAD interrogation completed in clinic, results reported to provider. See flow sheet for details. All orders entered per VORB. All provider instructions placed in AVS and reviewed with patient. Educated the patient on medication changes, expected follow up, echo schedule for 1/11 reviewed questions. Patient verbalized understanding, denied questions at end of visit. Called and spoke to patient's home health nurse THE [facility] updated on clinic visit and medication changes made today.

## 2023-12-29 LAB — SPECIMEN STATUS REPORT: NORMAL

## 2023-12-31 PROBLEM — R97.20 ELEVATED PSA: Status: ACTIVE | Noted: 2023-12-31

## 2024-01-02 ENCOUNTER — TELEPHONE (OUTPATIENT)
Age: 69
End: 2024-01-02

## 2024-01-02 ENCOUNTER — ANTI-COAG VISIT (OUTPATIENT)
Age: 69
End: 2024-01-02
Payer: MEDICARE

## 2024-01-02 DIAGNOSIS — Z79.01 CHRONIC ANTICOAGULATION: Primary | ICD-10-CM

## 2024-01-02 LAB
APPEARANCE UR: CLEAR
BACTERIA #/AREA URNS HPF: ABNORMAL /[HPF]
BACTERIA UR CULT: ABNORMAL
BILIRUB UR QL STRIP: NEGATIVE
CASTS URNS QL MICRO: ABNORMAL /LPF
COLOR UR: YELLOW
EPI CELLS #/AREA URNS HPF: ABNORMAL /HPF (ref 0–10)
GLUCOSE UR QL STRIP: ABNORMAL
HGB UR QL STRIP: NEGATIVE
INR BLD: 4.9
KETONES UR QL STRIP: NEGATIVE
LEUKOCYTE ESTERASE UR QL STRIP: NEGATIVE
MICRO URNS: ABNORMAL
MICRO URNS: ABNORMAL
NITRITE UR QL STRIP: NEGATIVE
PH UR STRIP: 6.5 [PH] (ref 5–7.5)
PROT UR QL STRIP: NEGATIVE
RBC #/AREA URNS HPF: ABNORMAL /HPF (ref 0–2)
SP GR UR STRIP: 1.01 (ref 1–1.03)
URINALYSIS REFLEX: ABNORMAL
UROBILINOGEN UR STRIP-MCNC: 0.2 MG/DL (ref 0.2–1)
WBC #/AREA URNS HPF: ABNORMAL /HPF (ref 0–5)

## 2024-01-02 PROCEDURE — 93793 ANTICOAG MGMT PT WARFARIN: CPT | Performed by: NURSE PRACTITIONER

## 2024-01-02 NOTE — TELEPHONE ENCOUNTER
Reviewed UA with FAUSTO Garcia who placed order to stop Jardiance and sent 7 day course of Cipro. Called and spoke with patient and reviewed medication changes. He verbalized understanding.    Also notified patient's home health nurse Cheryle Santacruz who verbalized understanding.

## 2024-01-02 NOTE — PROGRESS NOTES
ADVANCED HEART FAILURE CENTER  [facility] in [place], [place]      INR result reviewed with LUPE Mondragon NP  who made the following recommendations (VORB): hold x2 days and recheck Thursday. Patient and patient's home health nurse Cheryle notified of provider instructions, both verbalized understanding. . (See anticoag tracker)     Btey Aranda RN

## 2024-01-03 ENCOUNTER — OFFICE VISIT (OUTPATIENT)
Age: 69
End: 2024-01-03
Payer: MEDICARE

## 2024-01-03 VITALS — HEART RATE: 47 BPM | SYSTOLIC BLOOD PRESSURE: 75 MMHG | DIASTOLIC BLOOD PRESSURE: 54 MMHG

## 2024-01-03 DIAGNOSIS — R97.20 ELEVATED PSA: Primary | ICD-10-CM

## 2024-01-03 DIAGNOSIS — I63.9 CEREBROVASCULAR ACCIDENT (CVA), UNSPECIFIED MECHANISM (HCC): ICD-10-CM

## 2024-01-03 DIAGNOSIS — I73.9 PERIPHERAL VASCULAR DISEASE (HCC): ICD-10-CM

## 2024-01-03 DIAGNOSIS — I25.5 ISCHEMIC CARDIOMYOPATHY: ICD-10-CM

## 2024-01-03 DIAGNOSIS — I25.10 CORONARY ARTERY DISEASE INVOLVING NATIVE CORONARY ARTERY OF NATIVE HEART WITHOUT ANGINA PECTORIS: ICD-10-CM

## 2024-01-03 DIAGNOSIS — Z95.810 ICD (IMPLANTABLE CARDIOVERTER-DEFIBRILLATOR) IN PLACE: ICD-10-CM

## 2024-01-03 DIAGNOSIS — I50.22 CHRONIC SYSTOLIC (CONGESTIVE) HEART FAILURE (HCC): ICD-10-CM

## 2024-01-03 PROCEDURE — 99203 OFFICE O/P NEW LOW 30 MIN: CPT | Performed by: UROLOGY

## 2024-01-03 PROCEDURE — 3074F SYST BP LT 130 MM HG: CPT | Performed by: UROLOGY

## 2024-01-03 PROCEDURE — 3078F DIAST BP <80 MM HG: CPT | Performed by: UROLOGY

## 2024-01-03 PROCEDURE — 1123F ACP DISCUSS/DSCN MKR DOCD: CPT | Performed by: UROLOGY

## 2024-01-03 NOTE — PROGRESS NOTES
HISTORY OF PRESENT ILLNESS  Bishop Love is a 68 y.o. male   Patient returns today with a history of elevated PSA.  He denies fevers chills flank pain nausea vomiting weight loss or bone pain.  Last PSA dropped down to 2.1 but he was much higher than that 2 months prior so we will going to set him up for a repeat PSA.  In PCA 3-if it comes back positive we will discuss with partners given his cardiomyopathy improved or if doing a prostate biopsy or just following PSAs  1. Elevated PSA  Overview:  Component  Ref Range & Units 11/6/23 1200 10/2/23 0630 9/7/23 1537   PSA  0.0 - 4.0 ng/mL 2.1 6.1 High  R, CM 6.7 High  R, CM     Orders:  -     PSA, Total and Free; Future  2. Chronic systolic (congestive) heart failure (HCC)  Overview:   s/p HM3 LVAD implantation (9/19/23) as destination therapy due not candidate for heart   3. Cerebrovascular accident (CVA), unspecified mechanism (HCC)  4. ICD (implantable cardioverter-defibrillator) in place  5. Peripheral vascular disease (HCC)  6. Coronary artery disease involving native coronary artery of native heart without angina pectoris  7. Ischemic cardiomyopathy        PAST MEDICAL HISTORY  PMHx (including negatives):  has a past medical history of Atherosclerosis of native arteries of extremities with intermittent claudication, bilateral legs (HCC), CAD (coronary artery disease), Chest pain, CHF (congestive heart failure) ([facility]), Chronic anticoagulation, Chronic systolic (congestive) heart failure ([facility]), Coronary artery disease involving native coronary artery of native heart without angina pectoris, Essential hypertension, Heart attack (HCC), Heart failure (HCC), HTN (hypertension), Hyperlipidemia, ICD (implantable cardioverter-defibrillator) in place, Ischemic cardiomyopathy, Ischemic cardiomyopathy, Mild mitral valve regurgitation, Nonrheumatic mitral (valve) insufficiency, PVD (peripheral vascular disease) ([facility]), Rheumatic tricuspid insufficiency, and Sick sinus

## 2024-01-03 NOTE — PROGRESS NOTES
Chief Complaint   Patient presents with    New Patient    Elevated PSA     Blue Box        BP (!) 75/54   Pulse (!) 47      PHQ-9 score is    Negative      1. \"Have you been to the ER, urgent care clinic since your last visit?  Hospitalized since your last visit?\" No    2. \"Have you seen or consulted any other health care providers outside of the [facility] since your last visit?\" No     3. For patients aged 45-75: Has the patient had a colonoscopy / FIT/ Cologuard? No      If the patient is female:    4. For patients aged 40-74: Has the patient had a mammogram within the past 2 years? NA - based on age or sex      5. For patients aged 21-65: Has the patient had a pap smear? NA - based on age or sex

## 2024-01-04 ENCOUNTER — ANTI-COAG VISIT (OUTPATIENT)
Age: 69
End: 2024-01-04

## 2024-01-04 DIAGNOSIS — Z79.01 CHRONIC ANTICOAGULATION: Primary | ICD-10-CM

## 2024-01-04 LAB
INR BLD: 3
PSA FREE MFR SERPL: 28.6 %
PSA FREE SERPL-MCNC: 2.12 NG/ML
PSA SERPL-MCNC: 7.4 NG/ML (ref 0–4)

## 2024-01-04 NOTE — PROGRESS NOTES
ADVANCED HEART FAILURE CENTER  [facility] in [place], [place]      INR result reviewed with FAUSTO Garcia NP who made the following recommendations (VORB): no changes and recheck monday. Patient notified and verbalized understanding. They had no further questions. Patient's home health nurse cheryle also notified. (See anticoag tracker)     Cheryle requested clarification regarding Amioderone dosing. RN requested she reach out to [facility] as [facility] does not write this for patient. She verbalized understanding.       Bety Aranda RN

## 2024-01-05 ENCOUNTER — TELEPHONE (OUTPATIENT)
Age: 69
End: 2024-01-05

## 2024-01-09 ENCOUNTER — ANTI-COAG VISIT (OUTPATIENT)
Age: 69
End: 2024-01-09
Payer: MEDICARE

## 2024-01-09 ENCOUNTER — TELEPHONE (OUTPATIENT)
Age: 69
End: 2024-01-09

## 2024-01-09 DIAGNOSIS — Z79.01 CHRONIC ANTICOAGULATION: Primary | ICD-10-CM

## 2024-01-09 LAB — INR BLD: 1.8

## 2024-01-09 PROCEDURE — 93793 ANTICOAG MGMT PT WARFARIN: CPT | Performed by: NURSE PRACTITIONER

## 2024-01-09 NOTE — TELEPHONE ENCOUNTER
Abi from [employer] called   INR- 1.8  Map- 100  Coughing x 2-3 weeks  Crackles in right lower lobe    Sending to LVAD coordinator Bety

## 2024-01-09 NOTE — TELEPHONE ENCOUNTER
Called and spoke with [facility] home health nurse Abi Damon who states patient had wet cough and lower lobe crackles when she assessed patient at visit. States that patient reports this has been going on for 2-3 weeks. Patient's family states he has had symptoms of URI. No lower extremity swelling reported, denies fever or chills. Reports MAP of 100, no palpable pulse and low flow alarms occurring at patient's regular pattern (30-60 minutes post morning medications and occasional afternoon alarm. Denies any increase in alarms.      Reviewed with LUPE Mondragon who stated VORB no changes to patient's plan of care at this time.  Called and reviewed provider instructions with Abi damon who verbalized understanding.      Called and spoke with patient. Educated to call [facility] for symptoms of weight gain 3lb overnight or 5lb in 1 week, fever chills or increased cough. He verbalized understanding .

## 2024-01-09 NOTE — PROGRESS NOTES
ADVANCED HEART FAILURE CENTER  [facility] in [place], [place]      INR result reviewed with LUPE Mondragon NP  who made the following recommendations (VORB):  no change to regular dosing, patient to continue current dosing and recheck Thursday (original instructions were to change tomorrow's dose, clarified with LUPE Mondragon who amended instructions).     Patient notified of provider instructions, verbalized understanding. Patient's home health nurse Abi Barros also notified and verbalized understanding.   They had no further questions. (See anticoag tracker)     Bety Aranda RN

## 2024-01-10 ENCOUNTER — TELEPHONE (OUTPATIENT)
Age: 69
End: 2024-01-10

## 2024-01-10 NOTE — TELEPHONE ENCOUNTER
Reviewed lab work with CARMELINA Luevano who stated VORB have patient stop potasium request recheck BMP, BNP next week.      Called and spoke with patient. Instructed to stop potassium and remove form pill box.  Patient verbalized understanding.      Called and spoke with patient's home health nurse Abi Barros. Informed of provider instructions to stop potassium and repeat labs next week. She verbalized understanding.      Medications Discontinued During This Encounter   Medication Reason    potassium chloride (KLOR-CON M) 20 MEQ extended release tablet Side effects

## 2024-01-10 NOTE — TELEPHONE ENCOUNTER
----- Message from YASMIN Crouch - NP sent at 1/10/2024  1:55 PM EST -----  Please have him stop his Potassium supplement, his K is 5.5, repeat labs next week- CHAPO SAGASTUME

## 2024-01-11 ENCOUNTER — ANTI-COAG VISIT (OUTPATIENT)
Age: 69
End: 2024-01-11

## 2024-01-11 ENCOUNTER — HOSPITAL ENCOUNTER (OUTPATIENT)
Facility: HOSPITAL | Age: 69
Discharge: HOME | End: 2024-01-13
Attending: INTERNAL MEDICINE
Payer: MEDICARE

## 2024-01-11 ENCOUNTER — OFFICE VISIT (OUTPATIENT)
Age: 69
End: 2024-01-11

## 2024-01-11 VITALS
OXYGEN SATURATION: 98 % | SYSTOLIC BLOOD PRESSURE: 100 MMHG | HEART RATE: 50 BPM | HEIGHT: 68 IN | TEMPERATURE: 97.5 F | BODY MASS INDEX: 20 KG/M2 | WEIGHT: 132 LBS | RESPIRATION RATE: 16 BRPM

## 2024-01-11 DIAGNOSIS — Z23 NEEDS FLU SHOT: ICD-10-CM

## 2024-01-11 DIAGNOSIS — N39.0 URINARY TRACT INFECTION WITHOUT HEMATURIA, SITE UNSPECIFIED: ICD-10-CM

## 2024-01-11 DIAGNOSIS — Z95.811 LVAD (LEFT VENTRICULAR ASSIST DEVICE) PRESENT (HCC): ICD-10-CM

## 2024-01-11 DIAGNOSIS — Z01.89 ENCOUNTER FOR SCREENING FOR TOBACCO USE: ICD-10-CM

## 2024-01-11 DIAGNOSIS — O99.019 ANEMIA AFFECTING 10TH PREGNANCY: ICD-10-CM

## 2024-01-11 DIAGNOSIS — R06.02 SHORTNESS OF BREATH AFTER COVID-19 VACCINATION: ICD-10-CM

## 2024-01-11 DIAGNOSIS — T50.B95A SHORTNESS OF BREATH AFTER COVID-19 VACCINATION: ICD-10-CM

## 2024-01-11 DIAGNOSIS — I50.22 CHRONIC SYSTOLIC HEART FAILURE (HCC): ICD-10-CM

## 2024-01-11 DIAGNOSIS — Z79.01 CHRONIC ANTICOAGULATION: Primary | ICD-10-CM

## 2024-01-11 DIAGNOSIS — Z13.89 SCREENING FOR SUBSTANCE ABUSE: ICD-10-CM

## 2024-01-11 DIAGNOSIS — D64.9 ANEMIA, UNSPECIFIED TYPE: ICD-10-CM

## 2024-01-11 DIAGNOSIS — O09.40 ANEMIA AFFECTING 10TH PREGNANCY: ICD-10-CM

## 2024-01-11 DIAGNOSIS — R06.02 SHORTNESS OF BREATH: ICD-10-CM

## 2024-01-11 LAB
ECHO LV FRACTIONAL SHORTENING: 12 % (ref 28–44)
ECHO LV INTERNAL DIMENSION DIASTOLE INDEX: 2.98 CM/M2
ECHO LV INTERNAL DIMENSION DIASTOLIC: 5.1 CM (ref 4.2–5.9)
ECHO LV INTERNAL DIMENSION SYSTOLIC INDEX: 2.63 CM/M2
ECHO LV INTERNAL DIMENSION SYSTOLIC: 4.5 CM
ECHO LV IVSD: 0.7 CM (ref 0.6–1)
ECHO LV MASS 2D: 98.3 G (ref 88–224)
ECHO LV MASS INDEX 2D: 57.5 G/M2 (ref 49–115)
ECHO LV POSTERIOR WALL DIASTOLIC: 0.5 CM (ref 0.6–1)
ECHO LV RELATIVE WALL THICKNESS RATIO: 0.2
ECHO MV A VELOCITY: 0.74 M/S
ECHO MV E VELOCITY: 0.52 M/S
ECHO MV E/A RATIO: 0.7
ECHO RV TAPSE: 1.4 CM (ref 1.7–?)
ECHO TV REGURGITANT MAX VELOCITY: 1.7 M/S
ECHO TV REGURGITANT PEAK GRADIENT: 12 MMHG
INR BLD: 2.2
INR BLD: 2.2

## 2024-01-11 PROCEDURE — C8929 TTE W OR WO FOL WCON,DOPPLER: HCPCS

## 2024-01-11 PROCEDURE — 6360000004 HC RX CONTRAST MEDICATION: Performed by: INTERNAL MEDICINE

## 2024-01-11 RX ORDER — AMLODIPINE BESYLATE 2.5 MG/1
2.5 TABLET ORAL DAILY
Qty: 30 TABLET | Refills: 3 | Status: ON HOLD | OUTPATIENT
Start: 2024-01-11

## 2024-01-11 RX ADMIN — PERFLUTREN 1.5 ML: 6.52 INJECTION, SUSPENSION INTRAVENOUS at 12:27

## 2024-01-11 ASSESSMENT — ENCOUNTER SYMPTOMS
SHORTNESS OF BREATH: 0
COUGH: 0
CHEST TIGHTNESS: 0
ABDOMINAL DISTENTION: 0
BLOOD IN STOOL: 0
SORE THROAT: 0
ABDOMINAL PAIN: 0
EYE PAIN: 0

## 2024-01-11 ASSESSMENT — PATIENT HEALTH QUESTIONNAIRE - PHQ9
SUM OF ALL RESPONSES TO PHQ QUESTIONS 1-9: 0
1. LITTLE INTEREST OR PLEASURE IN DOING THINGS: 0
SUM OF ALL RESPONSES TO PHQ QUESTIONS 1-9: 0
2. FEELING DOWN, DEPRESSED OR HOPELESS: 0
SUM OF ALL RESPONSES TO PHQ9 QUESTIONS 1 & 2: 0

## 2024-01-11 NOTE — PROGRESS NOTES
ADVANCED HEART FAILURE CENTER  [facility] in [place], [place]    BP (!) 100/0 (Site: Right Upper Arm, Position: Sitting, Cuff Size: Medium Adult) Comment: MAP  Pulse 50   Temp 97.5 °F (36.4 °C) (Oral)   Resp 16   Ht 1.727 m (5' 8\")   Wt 59.9 kg (132 lb)   SpO2 98%   BMI 20.07 kg/m²     LVAD  LVAD Type:: Left Ventricular Assist Device (LVAD)  Pump Speed (rpm): 5200  Pump Flow (lpm): 3.7  MAP (mmHg): 100 (O2 98% NO PALPABLE PULSE)  Set Low Speed (rpm): 5200  Pump Pulse Index (PI): 8.7  Pump Power (Villanueva): 3.8  Battery Life Checked: Yes  Backup Controller Present: Yes  Driveline Dressing: Clean, Dry and Intact  Outpatient: Yes  MAP in Therapeutic Range (Outpatient): No       Bishop Love was seen today in clinic for a visit with CARMELINA Luevano NP    Patient denied s/s of driveline infection or driveline trauma (including  drops). Reports Low flows occurring in regular pattern (worse in the AM). Driveline inspected for integrity.  Above reported to provider.     LVAD interrogation completed in clinic, results reported to provider. See flow sheet for details.     EKG completed and reviewed with provider.  Patient unable to provide urine sample today, orders will be sent to home health agency for collection per provider request.     All orders entered per VORB. All provider instructions placed in AVS and reviewed with patient. Educated the patient on medication changes, expected follow up. reviewed questions. Patient verbalized understanding, denied questions at end of visit.    
Bishop Love is a 68 y.o. male who presents for routine immunizations.   He denies any symptoms , reactions or allergies that would exclude them from being immunized today.  Risks and adverse reactions were discussed and the VIS was given to them. All questions were addressed.  He was observed for 15 min post injection. There were no reactions observed.    Bety Aranda RN        
left to right common femoral bypass graft. Patent right common  femoral to distal graft.  5.  Partially imaged mediastinal postsurgical changes.  6.  Small bilateral pleural effusions.        HISTORY OF PRESENT ILLNESS:  I had the pleasure of seeing Bishop Love in Advanced Heart Failure Clinic at [facility] in [place].    \"Briefly, Bishop Love is a 68 y.o. male with a history of chronic systolic heart failure due to ischemic cardiomyopathy. He has CAD and PVD. He has a history of SSS and is s/p PPM.      He presented 8/24/2023 for scheduled Fem-Pop bypass for RLE ischemia and non healing wound. His post op course was complicated by NSTEMI and cardiogenic shock. Rapid response was called on 8/27 for severe respiratory distress, hypotension, and tachycardia. On initial evaluation, patient was sweaty and tachypnic with belly breathing. HR in 160s, cold peripheries, BP in 60s systolic and RR in 30s. BiPAP initiated. CXR showed pulmonary edema. EKG showed LBBB and telemetry showed wide complex tachycardia. Patient moved to ICU and underwent synchronized cardioversion with 200J emergently. Levophed was initiated. Heart rate 120s and blood pressure improved to 90s/60s systolic. He had persistent shock. CV surgery was consulted and Impella 5.5 was implanted 8/28/2023. Coronary angiogram performed on 8/29/23 and showed severe distal LM to Ramus stenosis,  LAD and  RCA. He underwent PCI to the LM-Ramus. RCA territory could not be revascularized. He was transferred to [place] for Impella management and LVAD evaluation.      Patient was stabilized on Impella support. We made attempts at weaning Impella. He had worsened hemodynamics with cerebral hypoperfusion and altered mental status, elevated LFTs and creatinine at P3. He failed Impella wean and performance level was increased back to P7 on 9/11 with improvement. LVAD evaluation was completed. He underwent HeartMate 3 LVAD for

## 2024-01-11 NOTE — PATIENT INSTRUCTIONS
Medication changes:    START amlodipine 2.5mg daily at NIGHT     Testing Ordered:    Lab work has been collected today. You will be contacted with any abnormal results requiring changes to your current plan of care.      EKG obtained in clinic today     Other Recommendations:     Please call your Primary care or pharmacy and request records of your COVID and pneumonia vaccines.     Continue to change drive line exit site dressing 3 times a week using sterile technique,     Ensure you are drinking an adequate amount of water with a goal of 6-8 eight ounce glasses (1.5-2 liters) of fluid daily. Your urine should be clear and light yellow straw colored.       Monthly LVAD Education Tip:    LVAD TERMS AND FUNCTIONS     DRIVE LINE Exits out of your body at the “drive-line” site and is typically anchored to your stomach or upper thigh.     The all white cable that connects your  and pump inside your heart.     TIPS:  Never pull on your drive-line or let your power leads get twisted around it.       Controls and monitors your LVAD system. Uses lights, sounds, and on-screen messages to communicate your LVADs operating status.     TIPS:  Complete a self-test daily [in AM] by pressing and holding down the “battery” button x 3 seconds.      POWER LEADS White and black power leads - Each supply power to your LVAD from either the wall or batteries.     TIPS:  White power lead provides pump settings-Disconnect/Reconnect FIRST    NEVER disconnect both at the same time.      MOBILE POWER UNIT Plugs into an AC outlet [wall] to provide your LVAD power.     TIPS:  Use while napping and sleeping.       BATTERY You always need TWO batteries at a time to provide power to your LVAD.     Each battery inserts into a battery clip that connects the power lead to the .     TIPS:  Use when you are active, mobile, or outdoors.      BATTERY CLIP Clips hold your batteries. A power lead inserts

## 2024-01-11 NOTE — PATIENT INSTRUCTIONS
Medication changes:    START amlodipine 2.5 mg at NIGHT      Testing Ordered:    Lab work has been drawn today. You will be contacted with any abnormal results requiring changes to your current plan of care.      Other Recommendations:     Continue to change drive line exit site dressing 3 times a week using sterile technique,     Ensure you are drinking an adequate amount of water with a goal of 6-8 eight ounce glasses (1.5-2 liters) of fluid daily. Your urine should be clear and light yellow straw colored.       Monthly LVAD Education Tip:    LVAD TERMS AND FUNCTIONS     DRIVE LINE Exits out of your body at the “drive-line” site and is typically anchored to your stomach or upper thigh.     The all white cable that connects your  and pump inside your heart.     TIPS:  Never pull on your drive-line or let your power leads get twisted around it.       Controls and monitors your LVAD system. Uses lights, sounds, and on-screen messages to communicate your LVADs operating status.     TIPS:  Complete a self-test daily [in AM] by pressing and holding down the “battery” button x 3 seconds.      POWER LEADS White and black power leads - Each supply power to your LVAD from either the wall or batteries.     TIPS:  White power lead provides pump settings-Disconnect/Reconnect FIRST    NEVER disconnect both at the same time.      MOBILE POWER UNIT Plugs into an AC outlet [wall] to provide your LVAD power.     TIPS:  Use while napping and sleeping.       BATTERY You always need TWO batteries at a time to provide power to your LVAD.     Each battery inserts into a battery clip that connects the power lead to the .     TIPS:  Use when you are active, mobile, or outdoors.      BATTERY CLIP Clips hold your batteries. A power lead inserts into a clip which then connects to your .    BATTERY CHARGING   STATION Charges, calibrates, and tests batteries.    TIPS:  The

## 2024-01-11 NOTE — PROGRESS NOTES
ADVANCED HEART FAILURE CENTER  [facility] in [place], [place]      INR result reviewed with CARMELINA Luevano NP who made the following recommendations (VORB): no changes and recheck Tuesday. Patient notified in clinic visit verbalized understanding. They had no further questions. (See anticoag tracker)     Bety Aranda RN

## 2024-01-12 ENCOUNTER — TELEPHONE (OUTPATIENT)
Age: 69
End: 2024-01-12

## 2024-01-12 DIAGNOSIS — I50.22 CHRONIC SYSTOLIC HEART FAILURE (HCC): ICD-10-CM

## 2024-01-12 DIAGNOSIS — R06.02 SHORTNESS OF BREATH: ICD-10-CM

## 2024-01-12 NOTE — TELEPHONE ENCOUNTER
Call placed to Abi Barros patient's home health nurse Abi Barros. Reviewed medication changes from patients recent clinic visit.     Reviewed needed labwork which will be faxed to [place] office. Abi verbalized understanding.

## 2024-01-14 ENCOUNTER — TELEPHONE (OUTPATIENT)
Age: 69
End: 2024-01-14

## 2024-01-14 ENCOUNTER — HOSPITAL ENCOUNTER (INPATIENT)
Facility: HOSPITAL | Age: 69
LOS: 4 days | Discharge: HOME | DRG: 369 | End: 2024-01-18
Attending: EMERGENCY MEDICINE | Admitting: FAMILY MEDICINE
Payer: MEDICARE

## 2024-01-14 ENCOUNTER — APPOINTMENT (OUTPATIENT)
Facility: HOSPITAL | Age: 69
DRG: 369 | End: 2024-01-14
Payer: MEDICARE

## 2024-01-14 ENCOUNTER — OFFICE VISIT (OUTPATIENT)
Facility: HOSPITAL | Age: 69
DRG: 369 | End: 2024-01-14
Attending: EMERGENCY MEDICINE
Payer: MEDICARE

## 2024-01-14 DIAGNOSIS — D62 ACUTE BLOOD LOSS ANEMIA: ICD-10-CM

## 2024-01-14 DIAGNOSIS — K92.2 ACUTE UPPER GI BLEED: Primary | ICD-10-CM

## 2024-01-14 DIAGNOSIS — Z95.811 LVAD (LEFT VENTRICULAR ASSIST DEVICE) PRESENT (HCC): ICD-10-CM

## 2024-01-14 DIAGNOSIS — Z79.01 ANTICOAGULATED ON COUMADIN: ICD-10-CM

## 2024-01-14 LAB
ALBUMIN SERPL-MCNC: 3.1 G/DL (ref 3.5–5)
ALBUMIN/GLOB SERPL: 0.9 (ref 1.1–2.2)
ALP SERPL-CCNC: 90 U/L (ref 45–117)
ALT SERPL-CCNC: 38 U/L (ref 12–78)
ANION GAP SERPL CALC-SCNC: 10 MMOL/L (ref 5–15)
AST SERPL-CCNC: 30 U/L (ref 15–37)
BASOPHILS # BLD: 0 K/UL (ref 0–0.1)
BASOPHILS NFR BLD: 0 % (ref 0–1)
BILIRUB SERPL-MCNC: 0.3 MG/DL (ref 0.2–1)
BUN SERPL-MCNC: 48 MG/DL (ref 6–20)
BUN/CREAT SERPL: 27 (ref 12–20)
CALCIUM SERPL-MCNC: 7.7 MG/DL (ref 8.5–10.1)
CHLORIDE SERPL-SCNC: 104 MMOL/L (ref 97–108)
CO2 SERPL-SCNC: 18 MMOL/L (ref 21–32)
CREAT SERPL-MCNC: 1.78 MG/DL (ref 0.7–1.3)
DIFFERENTIAL METHOD BLD: ABNORMAL
EOSINOPHIL # BLD: 0 K/UL (ref 0–0.4)
EOSINOPHIL NFR BLD: 0 % (ref 0–7)
ERYTHROCYTE [DISTWIDTH] IN BLOOD BY AUTOMATED COUNT: 14.6 % (ref 11.5–14.5)
GLOBULIN SER CALC-MCNC: 3.6 G/DL (ref 2–4)
GLUCOSE SERPL-MCNC: 140 MG/DL (ref 65–100)
HCT VFR BLD AUTO: 21.6 % (ref 36.6–50.3)
HCT VFR BLD AUTO: 23.8 % (ref 36.6–50.3)
HGB BLD-MCNC: 7.4 G/DL (ref 12.1–17)
HGB BLD-MCNC: 7.7 G/DL (ref 12.1–17)
HISTORY CHECK: NORMAL
IMM GRANULOCYTES # BLD AUTO: 0.2 K/UL (ref 0–0.04)
IMM GRANULOCYTES NFR BLD AUTO: 1 % (ref 0–0.5)
INR PPP: 4.5 (ref 0.9–1.1)
LACTATE SERPL-SCNC: 1.6 MMOL/L (ref 0.4–2)
LACTATE SERPL-SCNC: 3 MMOL/L (ref 0.4–2)
LDH SERPL L TO P-CCNC: 243 U/L (ref 85–241)
LYMPHOCYTES # BLD: 1.7 K/UL (ref 0.8–3.5)
LYMPHOCYTES NFR BLD: 14 % (ref 12–49)
MAGNESIUM SERPL-MCNC: 1.7 MG/DL (ref 1.6–2.4)
MCH RBC QN AUTO: 28.1 PG (ref 26–34)
MCHC RBC AUTO-ENTMCNC: 32.4 G/DL (ref 30–36.5)
MCV RBC AUTO: 86.9 FL (ref 80–99)
MONOCYTES # BLD: 0.4 K/UL (ref 0–1)
MONOCYTES NFR BLD: 3 % (ref 5–13)
NEUTS SEG # BLD: 9.9 K/UL (ref 1.8–8)
NEUTS SEG NFR BLD: 82 % (ref 32–75)
NRBC # BLD: 0 K/UL (ref 0–0.01)
NRBC BLD-RTO: 0 PER 100 WBC
NT PRO BNP: 899 PG/ML
PLATELET # BLD AUTO: 242 K/UL (ref 150–400)
PMV BLD AUTO: 10.4 FL (ref 8.9–12.9)
POTASSIUM SERPL-SCNC: 5.1 MMOL/L (ref 3.5–5.1)
PROT SERPL-MCNC: 6.7 G/DL (ref 6.4–8.2)
PROTHROMBIN TIME: 42.8 SEC (ref 9–11.1)
RBC # BLD AUTO: 2.74 M/UL (ref 4.1–5.7)
SODIUM SERPL-SCNC: 132 MMOL/L (ref 136–145)
TROPONIN I SERPL HS-MCNC: 91 NG/L (ref 0–76)
TSH SERPL DL<=0.05 MIU/L-ACNC: 2.6 UIU/ML (ref 0.36–3.74)
WBC # BLD AUTO: 12.1 K/UL (ref 4.1–11.1)

## 2024-01-14 PROCEDURE — 6360000002 HC RX W HCPCS: Performed by: EMERGENCY MEDICINE

## 2024-01-14 PROCEDURE — 83880 ASSAY OF NATRIURETIC PEPTIDE: CPT

## 2024-01-14 PROCEDURE — 6360000002 HC RX W HCPCS: Performed by: NURSE PRACTITIONER

## 2024-01-14 PROCEDURE — 86923 COMPATIBILITY TEST ELECTRIC: CPT

## 2024-01-14 PROCEDURE — 36415 COLL VENOUS BLD VENIPUNCTURE: CPT

## 2024-01-14 PROCEDURE — 2580000003 HC RX 258: Performed by: PHYSICIAN ASSISTANT

## 2024-01-14 PROCEDURE — 30233N1 TRANSFUSION OF NONAUTOLOGOUS RED BLOOD CELLS INTO PERIPHERAL VEIN, PERCUTANEOUS APPROACH: ICD-10-PCS | Performed by: INTERNAL MEDICINE

## 2024-01-14 PROCEDURE — C9113 INJ PANTOPRAZOLE SODIUM, VIA: HCPCS | Performed by: EMERGENCY MEDICINE

## 2024-01-14 PROCEDURE — 83615 LACTATE (LD) (LDH) ENZYME: CPT

## 2024-01-14 PROCEDURE — 99285 EMERGENCY DEPT VISIT HI MDM: CPT

## 2024-01-14 PROCEDURE — 84443 ASSAY THYROID STIM HORMONE: CPT

## 2024-01-14 PROCEDURE — A4216 STERILE WATER/SALINE, 10 ML: HCPCS | Performed by: EMERGENCY MEDICINE

## 2024-01-14 PROCEDURE — 96374 THER/PROPH/DIAG INJ IV PUSH: CPT

## 2024-01-14 PROCEDURE — 86900 BLOOD TYPING SEROLOGIC ABO: CPT

## 2024-01-14 PROCEDURE — 84484 ASSAY OF TROPONIN QUANT: CPT

## 2024-01-14 PROCEDURE — 86901 BLOOD TYPING SEROLOGIC RH(D): CPT

## 2024-01-14 PROCEDURE — 83605 ASSAY OF LACTIC ACID: CPT

## 2024-01-14 PROCEDURE — 2580000003 HC RX 258: Performed by: NURSE PRACTITIONER

## 2024-01-14 PROCEDURE — 99223 1ST HOSP IP/OBS HIGH 75: CPT | Performed by: INTERNAL MEDICINE

## 2024-01-14 PROCEDURE — 80053 COMPREHEN METABOLIC PANEL: CPT

## 2024-01-14 PROCEDURE — 2580000003 HC RX 258: Performed by: EMERGENCY MEDICINE

## 2024-01-14 PROCEDURE — 85610 PROTHROMBIN TIME: CPT

## 2024-01-14 PROCEDURE — 86850 RBC ANTIBODY SCREEN: CPT

## 2024-01-14 PROCEDURE — 85025 COMPLETE CBC W/AUTO DIFF WBC: CPT

## 2024-01-14 PROCEDURE — 93005 ELECTROCARDIOGRAM TRACING: CPT | Performed by: EMERGENCY MEDICINE

## 2024-01-14 PROCEDURE — P9016 RBC LEUKOCYTES REDUCED: HCPCS

## 2024-01-14 PROCEDURE — 85014 HEMATOCRIT: CPT

## 2024-01-14 PROCEDURE — 2060000000 HC ICU INTERMEDIATE R&B

## 2024-01-14 PROCEDURE — 85018 HEMOGLOBIN: CPT

## 2024-01-14 PROCEDURE — 83735 ASSAY OF MAGNESIUM: CPT

## 2024-01-14 PROCEDURE — 71045 X-RAY EXAM CHEST 1 VIEW: CPT

## 2024-01-14 RX ORDER — ALBUTEROL SULFATE 2.5 MG/3ML
2.5 SOLUTION RESPIRATORY (INHALATION) EVERY 6 HOURS PRN
Status: DISCONTINUED | OUTPATIENT
Start: 2024-01-14 | End: 2024-01-18 | Stop reason: HOSPADM

## 2024-01-14 RX ORDER — SODIUM CHLORIDE, SODIUM LACTATE, POTASSIUM CHLORIDE, CALCIUM CHLORIDE 600; 310; 30; 20 MG/100ML; MG/100ML; MG/100ML; MG/100ML
INJECTION, SOLUTION INTRAVENOUS CONTINUOUS
Status: DISCONTINUED | OUTPATIENT
Start: 2024-01-14 | End: 2024-01-17

## 2024-01-14 RX ORDER — SODIUM CHLORIDE 0.9 % (FLUSH) 0.9 %
5-40 SYRINGE (ML) INJECTION EVERY 12 HOURS SCHEDULED
Status: DISCONTINUED | OUTPATIENT
Start: 2024-01-14 | End: 2024-01-18 | Stop reason: HOSPADM

## 2024-01-14 RX ORDER — ONDANSETRON 2 MG/ML
4 INJECTION INTRAMUSCULAR; INTRAVENOUS EVERY 6 HOURS PRN
Status: DISCONTINUED | OUTPATIENT
Start: 2024-01-14 | End: 2024-01-18 | Stop reason: HOSPADM

## 2024-01-14 RX ORDER — ALBUTEROL SULFATE 90 UG/1
2 AEROSOL, METERED RESPIRATORY (INHALATION) EVERY 6 HOURS PRN
Status: DISCONTINUED | OUTPATIENT
Start: 2024-01-14 | End: 2024-01-14 | Stop reason: CLARIF

## 2024-01-14 RX ORDER — HYDRALAZINE HYDROCHLORIDE 20 MG/ML
10 INJECTION INTRAMUSCULAR; INTRAVENOUS EVERY 4 HOURS PRN
Status: DISCONTINUED | OUTPATIENT
Start: 2024-01-14 | End: 2024-01-18 | Stop reason: HOSPADM

## 2024-01-14 RX ORDER — SODIUM CHLORIDE 9 MG/ML
INJECTION, SOLUTION INTRAVENOUS PRN
Status: DISCONTINUED | OUTPATIENT
Start: 2024-01-14 | End: 2024-01-18

## 2024-01-14 RX ORDER — 0.9 % SODIUM CHLORIDE 0.9 %
500 INTRAVENOUS SOLUTION INTRAVENOUS ONCE
Status: COMPLETED | OUTPATIENT
Start: 2024-01-14 | End: 2024-01-14

## 2024-01-14 RX ORDER — ACETAMINOPHEN 325 MG/1
650 TABLET ORAL EVERY 6 HOURS PRN
Status: DISCONTINUED | OUTPATIENT
Start: 2024-01-14 | End: 2024-01-18 | Stop reason: HOSPADM

## 2024-01-14 RX ORDER — SODIUM CHLORIDE 0.9 % (FLUSH) 0.9 %
5-40 SYRINGE (ML) INJECTION PRN
Status: DISCONTINUED | OUTPATIENT
Start: 2024-01-14 | End: 2024-01-18 | Stop reason: HOSPADM

## 2024-01-14 RX ORDER — SODIUM CHLORIDE 9 MG/ML
INJECTION, SOLUTION INTRAVENOUS PRN
Status: DISCONTINUED | OUTPATIENT
Start: 2024-01-14 | End: 2024-01-18 | Stop reason: HOSPADM

## 2024-01-14 RX ORDER — DEXTROSE MONOHYDRATE 100 MG/ML
INJECTION, SOLUTION INTRAVENOUS CONTINUOUS PRN
Status: DISCONTINUED | OUTPATIENT
Start: 2024-01-14 | End: 2024-01-18 | Stop reason: HOSPADM

## 2024-01-14 RX ORDER — ACETAMINOPHEN 650 MG/1
650 SUPPOSITORY RECTAL EVERY 6 HOURS PRN
Status: DISCONTINUED | OUTPATIENT
Start: 2024-01-14 | End: 2024-01-18 | Stop reason: HOSPADM

## 2024-01-14 RX ADMIN — SODIUM CHLORIDE 80 MG: 9 INJECTION INTRAMUSCULAR; INTRAVENOUS; SUBCUTANEOUS at 14:15

## 2024-01-14 RX ADMIN — SODIUM CHLORIDE 500 ML: 9 INJECTION, SOLUTION INTRAVENOUS at 15:06

## 2024-01-14 RX ADMIN — SODIUM CHLORIDE, PRESERVATIVE FREE 10 ML: 5 INJECTION INTRAVENOUS at 21:02

## 2024-01-14 RX ADMIN — SODIUM CHLORIDE, PRESERVATIVE FREE 10 ML: 5 INJECTION INTRAVENOUS at 21:03

## 2024-01-14 RX ADMIN — PHYTONADIONE 5 MG: 10 INJECTION, EMULSION INTRAMUSCULAR; INTRAVENOUS; SUBCUTANEOUS at 15:56

## 2024-01-14 RX ADMIN — SODIUM CHLORIDE 500 ML: 9 INJECTION, SOLUTION INTRAVENOUS at 14:07

## 2024-01-14 RX ADMIN — SODIUM CHLORIDE, POTASSIUM CHLORIDE, SODIUM LACTATE AND CALCIUM CHLORIDE: 600; 310; 30; 20 INJECTION, SOLUTION INTRAVENOUS at 15:29

## 2024-01-14 ASSESSMENT — PAIN - FUNCTIONAL ASSESSMENT: PAIN_FUNCTIONAL_ASSESSMENT: NONE - DENIES PAIN

## 2024-01-14 NOTE — CONSENT
Informed Consent for Blood Component Transfusion Note    I have discussed with the patient the rationale for blood component transfusion; its benefits in treating or preventing fatigue, organ damage, or death; and its risk which includes mild transfusion reactions, rare risk of blood borne infection, or more serious but rare reactions. I have discussed the alternatives to transfusion, including the risk and consequences of not receiving transfusion. The patient had an opportunity to ask questions and had agreed to proceed with transfusion of blood components.    Electronically signed by David M Milligram, PA-C on 1/14/24 at 3:18 PM EST

## 2024-01-14 NOTE — PROGRESS NOTES
1930  Verbal bedside report given to JAKOB Guzman by TRINH Joe RN. Report included updated vitals and SBAR. Patient is in bed awake and respirations are even and unlabored.     1905  Blood transfusion completed.     1725  Contacted Leslie Mondragon regarding infusion rate status. New LR rate to 100 mL/hr.    1708  Pt transferred to [facility]. Patient rhythm NSR w/ PVC. Patient is in bed awake and respirations are even and unlabored. Call bell is within reach.     LVAD:  - 4 batteries  - 4 clips  - one extra controller  - dressing clean, dry, and intact  - anchor in place  - Set speed 5200  - Low speed 4800  - Last date of dressing change (per patient) 1/12/24      1645  ED phone report received from JAKOB Nair given to TRINH Joe RN & AUGUSTA Brumfield RN. Report included vital signs, SBAR, and plan for the day.

## 2024-01-14 NOTE — ED TRIAGE NOTES
Arrives with family to triage. He reports vomiting one bloody vomit and one dark stool. He His LVAD alarms have been alarming low flow since last night. Brought to ER 2. Dr. Payne has assesed him there. He is calling the LVAD coordinator. The patient is alert and oriented.

## 2024-01-14 NOTE — PROGRESS NOTES
Admission Medication Reconciliation:    Information obtained from:  Patient, OV summary 1/11/2024, RxQuery  RxQuery data available¹:  Yes    Comments/Recommendations: Updated PTA meds/reviewed patient's allergies.    1)  Patient presented copy of OV summary from 1/11/24 from [facility], which was used to complete MR note. Patient takes warfarin 1mg tabs, 2mg on Monday/Fridays + 3mg rest of the week (last dose was on 1/13 evening, 3mg)    2)  Medication changes (since last review):  Added  - n/a    Adjusted  - n/a    Removed  - n/a    3)  Confirmed with patient that he currently takes amiodarone 200mg as daily which is dosing in OV summary (not twice daily as suggested by RxQuery)     ¹RxQuery pharmacy benefit data reflects medications filled and processed through the patient's insurance, however   this data does NOT capture whether the medication was picked up or is currently being taken by the patient.    Allergies:  Patient has no known allergies.    Significant PMH/Disease States:   Past Medical History:   Diagnosis Date    Atherosclerosis of native arteries of extremities with intermittent claudication, bilateral legs ([facility])     CAD [facility]coronary artery [facility] [place]    Chest pain     CHF (congestive heart failure) ([facility])     Chronic anticoagulation 10/31/2023    Chronic systolic (congestive) heart failure ([facility]) 10/31/2023    Coronary artery disease involving native coronary artery of native heart without angina pectoris 10/31/2023    Essential hypertension 10/31/2023    Heart attack ([facility]) 2014    Heart failure ([facility])     HTN (hypertension)     Hyperlipidemia     ICD (implantable cardioverter-defibrillator) in place 10/31/2023    Ischemic cardiomyopathy     Ischemic cardiomyopathy 10/31/2023    Mild mitral valve regurgitation     Nonrheumatic mitral (valve) insufficiency     PVD (peripheral vascular disease) ([facility])     Rheumatic tricuspid insufficiency     Sick  Medications: None     Please contact the main inpatient pharmacy with any questions or concerns at [phone] and we will direct you to the clinical pharmacist covering this patient's care while in-house.   Kathi Ji [facility]

## 2024-01-14 NOTE — CONSULTS
right common femoral bypass graft, patent. Focal  linear filling defect in the left common femoral artery near the bypass  anastomosis, 3-176. Status post right common femoral to distal bypass graft,  visualized aspects patent. Bilateral visualized profunda femoris are patent.  Occluded left common femoral to distal bypass graft. Patent right renal artery  stent. Mild/moderate stenosis celiac axis. SMA patent without stenoses.     CT findings:  Visualized Chest: Status post left ventricular assist device. Partially imaged  pulmonary arterial catheter terminating in the distal main right pulmonary  artery. Cardiomegaly. Partially imaged air locules in the anterior mediastinum.  Bilateral small pleural effusions.  Liver: No mass or biliary dilatation.   Gallbladder: No calcified gallstone. Vicarious excretion of contrast.  Spleen: Unremarkable  Pancreas: No mass or ductal dilatation.  Adrenals: Unremarkable.  Kidneys: Normal symmetric nephrograms without evidence for  focal mass. No  hydronephrosis   Bladder: Allred catheter in position.  Colon: Air-filled colon. Mild diverticulosis. No obstruction.  Small bowel: Mild distention of air-filled jejunal loops with relative  decompression of the distal ileum. No wall thickening.  Appendix: Not clearly visualized.  Stomach: Distended fluid-filled stomach.  Reproductive Organs: Mild prominence of the prostate gland. The seminal vesicles  are unremarkable.  Peritoneum: No ascites or masses.  Lymph Nodes: No lymphadenopathy.  Abdominal Wall: Stable small bowel containing ventral hernia. Anasarca.  Bones: No suspicious osseous lesions.     IMPRESSION:  1.  Gastric and jejunal distention with relative decompression of the ileum.  Findings favored to represent ileus, though partial obstruction is difficult to  exclude. Continued radiographic follow-up would be of benefit.  2.  No discrete evidence of arterial or venous mesenteric ischemia.  3.  Infrarenal aortobiiliac stent graft  (ISORDIL) 20 MG tablet, Take 2 tablets by mouth every 8 (eight) hours, Disp: 180 tablet, Rfl: 1    ticagrelor (BRILINTA) 90 MG TABS tablet, Take 1 tablet by mouth 2 times daily, Disp: 60 tablet, Rfl: 2    spironolactone (ALDACTONE) 25 MG tablet, Take 1 tablet by mouth daily, Disp: 45 tablet, Rfl: 1    magnesium oxide (MAG-OX) 400 MG tablet, Take 1 tablet by mouth daily, Disp: 30 tablet, Rfl: 1    warfarin (COUMADIN) 1 MG tablet, Take 3 tablets by mouth daily, Disp: 180 tablet, Rfl: 1    hydrALAZINE (APRESOLINE) 100 MG tablet, Take 1 tablet by mouth every 8 hours, Disp: 270 tablet, Rfl: 1    pantoprazole (PROTONIX) 40 MG tablet, Take 1 tablet by mouth every morning (before breakfast), Disp: 90 tablet, Rfl: 1    amiodarone (CORDARONE) 200 MG tablet, Take 1 tablet by mouth daily, Disp: , Rfl:     warfarin (COUMADIN) 2 MG tablet, Take 1 tablet by mouth daily, Disp: 30 tablet, Rfl: 3    PATIENT CARE TEAM:  Patient Care Team:  Freddy Chandler MD as PCP - General  ZamarripaShay MD as Referring Physician  Dalton Iqbal III, DO as Consulting Physician (Cardiology)  Jose Francisco Del Toro MD as Consulting Physician (Cardiothoracic Surgery)  Sole Allen MD as Consulting Physician (Cardiology)     Thank you for allowing me to participate in this patient's care.    Leslie Mondragon, YASMIN - NP   Advanced Heart Failure Center  [facility]  [address] [place] [address], [address]  Phone: [phone]

## 2024-01-14 NOTE — H&P
APRN - NP   hydrALAZINE (APRESOLINE) 100 MG tablet Take 1 tablet by mouth every 8 hours 11/9/23   Leslie Mondragon, APRN - NP   pantoprazole (PROTONIX) 40 MG tablet Take 1 tablet by mouth every morning (before breakfast) 11/9/23   Leslie Mondragon, APRN - NP   amiodarone (CORDARONE) 200 MG tablet Take 1 tablet by mouth daily 11/7/23   Provider, MD Mamadou   warfarin (COUMADIN) 2 MG tablet Take 1 tablet by mouth daily 10/3/23   Norma Luevano, APRN - NP     No Known Allergies   Family History   Problem Relation Age of Onset    Hypertension Mother     Hypertension Father     Heart Attack Father       Social History:  reports that he quit smoking about 5 months ago. His smoking use included cigarettes. He started smoking about 40 years ago. He has a 10.0 pack-year smoking history. He has never used smokeless tobacco. He reports that he does not currently use alcohol. He reports that he does not use drugs.   Social Determinants of Health     Tobacco Use: Medium Risk (1/11/2024)    Patient History     Smoking Tobacco Use: Former     Smokeless Tobacco Use: Never     Passive Exposure: Not on file   Alcohol Use: Not on file   Financial Resource Strain: Not on file   Food Insecurity: Not on file   Transportation Needs: No Transportation Needs (12/8/2023)    OASIS : Transportation     Lack of Transportation (Medical): No     Lack of Transportation (Non-Medical): No     Patient Unable or Declines to Respond: No   Physical Activity: Not on file   Stress: Not on file   Social Connections: Feeling Socially Integrated (12/8/2023)    OASIS : Social Isolation     Frequency of experiencing loneliness or isolation: Never   Intimate Partner Violence: Not on file   Depression: Not at risk (1/11/2024)    PHQ-2     PHQ-2 Score: 0   Housing Stability: Not on file   Interpersonal Safety: Not on file   Utilities: Not on file        Medications were reconciled to the best of my ability given all available resources  at the time of admission. Route is PO if not otherwise noted.     Family and social history were personally reviewed, all pertinent and relevant details are outlined as above.    Objective:   Pulse 61   Temp 97.8 °F (36.6 °C) (Oral)   Resp 18   Wt 62.6 kg (138 lb)   SpO2 99%   BMI 20.98 kg/m²         PHYSICAL EXAM:   General: Alert x oriented x 3, awake, no acute distress,   HEENT: PEERL, EOMI, moist mucus membranes  Neck: Supple, no JVD, no meningeal signs  Chest: Clear to auscultation bilaterally   CVS: Murmur associated with LVAD, no S1, S2 auscultated  Abd: Soft, non-tender, non-distended, +bowel sounds   Ext: No clubbing, no cyanosis, no edema  Neuro/Psych: Pleasant mood and affect, CN 2-12 grossly intact, sensory grossly within normal limit, Strength 5/5 in all extremities  Cap refill: Brisk, less than 3 seconds  Skin: Warm, dry, without rashes or lesions    Data Review:   I have independently reviewed and interpreted patient's lab and all other diagnostic data    Abnormal Labs Reviewed   CBC WITH AUTO DIFFERENTIAL - Abnormal; Notable for the following components:       Result Value    WBC 12.1 (*)     RBC 2.74 (*)     Hemoglobin 7.7 (*)     Hematocrit 23.8 (*)     RDW 14.6 (*)     Neutrophils % 82 (*)     Monocytes % 3 (*)     Immature Granulocytes 1 (*)     Neutrophils Absolute 9.9 (*)     Absolute Immature Granulocyte 0.2 (*)     All other components within normal limits   COMPREHENSIVE METABOLIC PANEL - Abnormal; Notable for the following components:    Sodium 132 (*)     CO2 18 (*)     Glucose 140 (*)     BUN 48 (*)     Creatinine 1.78 (*)     Bun/Cre Ratio 27 (*)     Est, Glom Filt Rate 41 (*)     Calcium 7.7 (*)     Albumin 3.1 (*)     Albumin/Globulin Ratio 0.9 (*)     All other components within normal limits   TROPONIN - Abnormal; Notable for the following components:    Troponin, High Sensitivity 91 (*)     All other components within normal limits   BRAIN NATRIURETIC PEPTIDE - Abnormal;

## 2024-01-14 NOTE — ED NOTES
TRANSFER - OUT REPORT:    Verbal report given to JAKOB Pinon on Bishop Love  being transferred to [facility] for routine progression of patient care       Report consisted of patient's Situation, Background, Assessment and   Recommendations(SBAR).     Information from the following report(s) Nurse Handoff Report, Index, ED Encounter Summary, ED SBAR, Adult Overview, Intake/Output, MAR, and Recent Results was reviewed with the receiving nurse.    [place] Fall Assessment:    Presents to emergency department  because of falls (Syncope, seizure, or loss of consciousness): No  Age > 70: No  Altered Mental Status, Intoxication with alcohol or substance confusion (Disorientation, impaired judgment, poor safety awaremess, or inability to follow instructions): No  Impaired Mobility: Ambulates or transfers with assistive devices or assistance; Unable to ambulate or transer.: No  Nursing Judgement: No          Lines:   Peripheral IV 01/14/24 Right;Anterior Hand (Active)       Peripheral IV 01/14/24 Right;Anterior Forearm (Active)       Peripheral IV 01/14/24 Proximal;Right;Anterior Forearm (Active)        Opportunity for questions and clarification was provided.      Patient transported with:  Monitor and Registered Nurse

## 2024-01-14 NOTE — ED NOTES
Emergency Department LVAD Documentation   The ED nurse is responsible for assessment & documentation of the LVAD when assuming care & every 4 hours. Required documentation includes: Vital signs, LVAD Hum, doppler blood pressure, device parameters (Flow, RPM, Power, Pulsatility Index), driveline and dressing assessment, and back up equipment at bedside. LVAD provider should be notified upon patient arrival to the emergency department.       Date: 1/14/24   Time of assessment: 1355    LVAD Hum auscultated/present over the left upper quadrant: Yes    Weight - Scale: 62.6 kg (138 lb)       Doppler Blood Pressure (MAP):     Goal MAP 70-90, unless otherwise noted. Notify provider if outside of defined limits.     LVAD dressing assessment: clean/dry    LVAD driveline intact: Yes    Device Parameters  Flow: 1.8  RPM: 5200  Power: 3.5  Pulsatility Index (PI): 12.5    Backup  at bedside: back-up , batteries x2, power module, battery charger, vest, and equipment bag    Contact CVICU/CVSU for LVAD tower       Notify the Ventricular Assist Device (VAD) coordinator for power change of greater than 2 from baseline or PI less than 3.  Pager number: [phone]

## 2024-01-14 NOTE — PROGRESS NOTES
4 Eyes Skin Assessment     NAME:  Bishop Love  YOB: 1955  MEDICAL RECORD NUMBER:  [id number]    The patient is being assessed for  Admission    I agree that at least one RN has performed a thorough Head to Toe Skin Assessment on the patient. ALL assessment sites listed below have been assessed.      Areas assessed by both nurses:    Head, Face, Ears, Shoulders, Back, Chest, Arms, Elbows, Hands, Sacrum. Buttock, Coccyx, Ischium, and Legs. Feet and Heels        Does the Patient have a Wound? No noted wound(s)       Ryan Prevention initiated by RN: Yes  Wound Care Orders initiated by RN: No    Pressure Injury (Stage 3,4, Unstageable, DTI, NWPT, and Complex wounds) if present, place Wound referral order by RN under : No    New Ostomies, if present place, Ostomy referral order under : No     Nurse 1 eSignature: Electronically signed by Melva Joe RN on 1/14/24 at 5:28 PM EST    **SHARE this note so that the co-signing nurse can place an eSignature**    Nurse 2 eSignature: Electronically signed by Radha Brumfield RN on 1/14/24 at 7:40 PM EST

## 2024-01-14 NOTE — CONSULTS
1.30 MG/DL    Bun/Cre Ratio 27 (H) 12 - 20      Est, Glom Filt Rate 41 (L) >60 ml/min/1.73m2    Calcium 7.7 (L) 8.5 - 10.1 MG/DL    Total Bilirubin 0.3 0.2 - 1.0 MG/DL    ALT 38 12 - 78 U/L    AST 30 15 - 37 U/L    Alk Phosphatase 90 45 - 117 U/L    Total Protein 6.7 6.4 - 8.2 g/dL    Albumin 3.1 (L) 3.5 - 5.0 g/dL    Globulin 3.6 2.0 - 4.0 g/dL    Albumin/Globulin Ratio 0.9 (L) 1.1 - 2.2     Troponin    Collection Time: 01/14/24  2:08 PM   Result Value Ref Range    Troponin, High Sensitivity 91 (H) 0 - 76 ng/L   Brain Natriuretic Peptide    Collection Time: 01/14/24  2:08 PM   Result Value Ref Range    NT Pro- (H) <125 PG/ML   Magnesium    Collection Time: 01/14/24  2:08 PM   Result Value Ref Range    Magnesium 1.7 1.6 - 2.4 mg/dL   TSH    Collection Time: 01/14/24  2:08 PM   Result Value Ref Range    TSH, 3RD GENERATION 2.60 0.36 - 3.74 uIU/mL   Protime-INR    Collection Time: 01/14/24  2:08 PM   Result Value Ref Range    INR 4.5 (HH) 0.9 - 1.1      Protime 42.8 (H) 9.0 - 11.1 sec   TYPE AND SCREEN    Collection Time: 01/14/24  2:08 PM   Result Value Ref Range    Crossmatch expiration date 01/17/2024,6833     ABO/Rh B POSITIVE     Antibody Screen NEG     Unit Number [id number]     Product Code Blood Bank RC LR     Unit Divison 00     Dispense Status Blood Bank ISSUED     Crossmatch Result Compatible    EKG 12 Lead    Collection Time: 01/14/24  2:19 PM   Result Value Ref Range    Ventricular Rate 60 BPM    QRS Duration 180 ms    Q-T Interval 540 ms    QTc Calculation (Bazett) 540 ms    R Axis -49 degrees    T Axis 162 degrees    Diagnosis       Wide QRS rhythm  Left axis deviation  Nonspecific intraventricular block  Lateral infarct , age undetermined  Inferior infarct , age undetermined  Abnormal ECG  When compared with ECG of 26-SEP-2023 15:46,  No significant change was found     Lactate Dehydrogenase    Collection Time: 01/14/24  3:16 PM   Result Value Ref Range     (H) 85 - 241 U/L   Lactic  Acid    Collection Time: 01/14/24  3:17 PM   Result Value Ref Range    Lactic Acid, Plasma 3.0 (HH) 0.4 - 2.0 MMOL/L   PREPARE RBC (CROSSMATCH), 1 Units    Collection Time: 01/14/24  3:30 PM   Result Value Ref Range    History Check Historical check performed                 Assessment/Plan:    Bishop Love is a 68 y.o. male who is seen in consultation for GI bleeding.  Patient with history of multiple medical problems as outlined below including ischemic cardiomyopathy with left ventricular ejection fraction of 15%, LVAD implantation in September 23, severe PVD CHF RV dysfunction IVVD, chronic heart failure.  Status post multiple surgical procedures as stated above he has been on Xarelto.  He is being admitted because of acute onset of hematemesis dark-colored stools as of last night.  He states that he only had 1 episode yesterday and has not had any recurrence since yesterday.,  He denies any abdominal pain.  There is no history of chest pain shortness of breath palpitations dizziness lightheadedness PND orthopnea.,  He denies any bre hematochezia.,  He denies any recurrent melena since yesterday.,  In the emergency room his vital signs are stable with systolic blood pressure in the 90s, his BUN is 48 creatinine is 1.78, his INR is 4.5 prothrombin time is 42.8, hemoglobin 7.7 hematocrit 23.8.,  GFR is 41    I will focus on GI issues and defer other medical problems to the attending physicians  GI bleed/melena/acute blood loss anemia  UGI Causes:  Erosive esophagitis, Erosive gastropathy, Erosive duodenopathy, Peptic ulcer disease, Angioectasia (AVM's) Portal gastropathy,  Neoplasia,   Lower Causes:  Angioectasia (AVM's), Diverticulosis, Neoplasia, Inflammatory bowel disease, hemorrhoids     Patient is severely coagulopathic due to Xarelto on board last dose being last night. (1/13/24) his PT is 42 and INR is 4.5    Recommendations;  I agree with PPI drip  N.p.o. except ice chips and sips of water  Iron

## 2024-01-14 NOTE — ED PROVIDER NOTES
[facility] EMERGENCY DEP  EMERGENCY DEPARTMENT ENCOUNTER      Pt Name: Bishop Love  MRN: [id number]  Birthdate 1955  Date of evaluation: 1/14/2024  Provider: Luis Payne MD    CHIEF COMPLAINT     No chief complaint on file.        HISTORY OF PRESENT ILLNESS    68 y.o. male presents with concern that his LVAD is alarming for low flow. He had an episode yesterday of hematemesis and has since had darkening of stool. On coumadin with no missed doses.             Review of External Medical Records:     Nursing Notes were reviewed.    REVIEW OF SYSTEMS       Review of Systems    Except as noted above the remainder of the review of systems was reviewed and negative.       PAST MEDICAL HISTORY     Past Medical History:   Diagnosis Date    Atherosclerosis of native arteries of extremities with intermittent claudication, bilateral legs ([facility])     CAD (coronary artery disease)     [facility] [place]    Chest pain     CHF (congestive heart failure) ([facility])     Chronic anticoagulation 10/31/2023    Chronic systolic (congestive) heart failure ([facility]) 10/31/2023    Coronary artery disease involving native coronary artery of native heart without angina pectoris 10/31/2023    Essential hypertension 10/31/2023    Heart attack ([facility]) 2014    Heart failure ([facility])     HTN (hypertension)     Hyperlipidemia     ICD (implantable cardioverter-defibrillator) in place 10/31/2023    Ischemic cardiomyopathy     Ischemic cardiomyopathy 10/31/2023    Mild mitral valve regurgitation     Nonrheumatic mitral (valve) insufficiency     PVD (peripheral vascular disease) ([facility])     Rheumatic tricuspid insufficiency     Sick sinus syndrome ([facility])          SURGICAL HISTORY       Past Surgical History:   Procedure Laterality Date    CARDIAC DEFIBRILLATOR PLACEMENT  2014    CARDIAC PROCEDURE N/A 08/29/2023    Left heart cath / coronary angiography performed by Sole Allen MD at [facility] CARDIAC CATH LAB    CARDIAC  Course.  ECG/medicine tests: ordered and independent interpretation performed. Decision-making details documented in ED Course.    Risk  Prescription drug management.  Decision regarding hospitalization.            REASSESSMENT     ED Course as of 01/14/24 1442   Sun Jan 14, 2024   1434 EKG 1419: Rate 60, NSR, regular wide complex rhythm. Interpretation limited by artifact.  [DK]      ED Course User Index  [DK] Luis Payne MD           CONSULTS:  None    PROCEDURES:  Unless otherwise noted below, none     Procedures    I have discussed with the patient the rationale for blood component transfusion; its benefits in treating or preventing fatigue, organ damage, or death; and its risk which includes mild transfusion reactions, rare risk of blood borne infection, or more serious but rare reactions. I have discussed the alternatives to transfusion, including the risk and consequences of not receiving transfusion. The patient had an opportunity to ask questions and had agreed to proceed with transfusion of blood components.     FINAL IMPRESSION      1. Acute upper GI bleed    2. Anticoagulated on Coumadin    3. LVAD (left ventricular assist device) present (HCC)    4. Acute blood loss anemia          DISPOSITION/PLAN   DISPOSITION Decision To Admit 01/14/2024 02:36:03 PM    Perfect Serve Consult for Admission  2:43 PM    ED Room Number: ER02/02  Patient Name and age:  Bishop Love 68 y.o.  male  Working Diagnosis:   1. Acute upper GI bleed    2. Anticoagulated on Coumadin    3. LVAD (left ventricular assist device) present (HCC)    4. Acute blood loss anemia        COVID-19 Suspicion: No  Sepsis present:  No  Reassessment needed: No  Code Status:  Full Code  Readmission: No  Isolation Requirements: no  Recommended Level of Care: step down  Department: [facility] Adult ED - [phone]  Consulting Provider: Sue    Other:  68 y.o. male presents with LVAD alarming for low flow after having dark red bloody emesis

## 2024-01-15 ENCOUNTER — ANESTHESIA EVENT (OUTPATIENT)
Facility: HOSPITAL | Age: 69
End: 2024-01-15
Payer: MEDICARE

## 2024-01-15 ENCOUNTER — ANESTHESIA (OUTPATIENT)
Facility: HOSPITAL | Age: 69
End: 2024-01-15
Payer: MEDICARE

## 2024-01-15 LAB
ALBUMIN SERPL-MCNC: 2.4 G/DL (ref 3.5–5)
ALBUMIN/GLOB SERPL: 1 (ref 1.1–2.2)
ALP SERPL-CCNC: 69 U/L (ref 45–117)
ALT SERPL-CCNC: 26 U/L (ref 12–78)
ANION GAP SERPL CALC-SCNC: 6 MMOL/L (ref 5–15)
APTT PPP: 27.3 SEC (ref 22.1–31)
AST SERPL-CCNC: 22 U/L (ref 15–37)
BILIRUB DIRECT SERPL-MCNC: 0.2 MG/DL (ref 0–0.2)
BILIRUB SERPL-MCNC: 0.6 MG/DL (ref 0.2–1)
BUN SERPL-MCNC: 52 MG/DL (ref 6–20)
BUN/CREAT SERPL: 55 (ref 12–20)
CALCIUM SERPL-MCNC: 7.3 MG/DL (ref 8.5–10.1)
CHLORIDE SERPL-SCNC: 116 MMOL/L (ref 97–108)
CO2 SERPL-SCNC: 19 MMOL/L (ref 21–32)
CREAT SERPL-MCNC: 0.94 MG/DL (ref 0.7–1.3)
EKG DIAGNOSIS: NORMAL
EKG Q-T INTERVAL: 540 MS
EKG QRS DURATION: 180 MS
EKG QTC CALCULATION (BAZETT): 540 MS
EKG R AXIS: -49 DEGREES
EKG T AXIS: 162 DEGREES
EKG VENTRICULAR RATE: 60 BPM
GLOBULIN SER CALC-MCNC: 2.4 G/DL (ref 2–4)
GLUCOSE SERPL-MCNC: 91 MG/DL (ref 65–100)
HCT VFR BLD AUTO: 18.9 % (ref 36.6–50.3)
HCT VFR BLD AUTO: 21.3 % (ref 36.6–50.3)
HGB BLD-MCNC: 6.4 G/DL (ref 12.1–17)
HGB BLD-MCNC: 7.3 G/DL (ref 12.1–17)
HISTORY CHECK: NORMAL
INR PPP: 1.6 (ref 0.9–1.1)
IRON SATN MFR SERPL: 86 % (ref 20–50)
IRON SERPL-MCNC: 151 UG/DL (ref 35–150)
LDH SERPL L TO P-CCNC: 242 U/L (ref 85–241)
MAGNESIUM SERPL-MCNC: 1.7 MG/DL (ref 1.6–2.4)
POTASSIUM SERPL-SCNC: 4.8 MMOL/L (ref 3.5–5.1)
PROT SERPL-MCNC: 4.8 G/DL (ref 6.4–8.2)
PROTHROMBIN TIME: 16.2 SEC (ref 9–11.1)
SODIUM SERPL-SCNC: 141 MMOL/L (ref 136–145)
THERAPEUTIC RANGE: NORMAL SECS (ref 58–77)
TIBC SERPL-MCNC: 175 UG/DL (ref 250–450)

## 2024-01-15 PROCEDURE — 6360000002 HC RX W HCPCS: Performed by: NURSE PRACTITIONER

## 2024-01-15 PROCEDURE — 36430 TRANSFUSION BLD/BLD COMPNT: CPT

## 2024-01-15 PROCEDURE — 6370000000 HC RX 637 (ALT 250 FOR IP): Performed by: NURSE PRACTITIONER

## 2024-01-15 PROCEDURE — 2500000003 HC RX 250 WO HCPCS: Performed by: NURSE ANESTHETIST, CERTIFIED REGISTERED

## 2024-01-15 PROCEDURE — 3600007502: Performed by: INTERNAL MEDICINE

## 2024-01-15 PROCEDURE — 85610 PROTHROMBIN TIME: CPT

## 2024-01-15 PROCEDURE — 83540 ASSAY OF IRON: CPT

## 2024-01-15 PROCEDURE — 85730 THROMBOPLASTIN TIME PARTIAL: CPT

## 2024-01-15 PROCEDURE — 93750 INTERROGATION VAD IN PERSON: CPT | Performed by: INTERNAL MEDICINE

## 2024-01-15 PROCEDURE — A4216 STERILE WATER/SALINE, 10 ML: HCPCS | Performed by: NURSE PRACTITIONER

## 2024-01-15 PROCEDURE — 85014 HEMATOCRIT: CPT

## 2024-01-15 PROCEDURE — C1889 IMPLANT/INSERT DEVICE, NOC: HCPCS | Performed by: INTERNAL MEDICINE

## 2024-01-15 PROCEDURE — 3700000001 HC ADD 15 MINUTES (ANESTHESIA): Performed by: INTERNAL MEDICINE

## 2024-01-15 PROCEDURE — 2720000010 HC SURG SUPPLY STERILE: Performed by: INTERNAL MEDICINE

## 2024-01-15 PROCEDURE — 99233 SBSQ HOSP IP/OBS HIGH 50: CPT | Performed by: INTERNAL MEDICINE

## 2024-01-15 PROCEDURE — 83615 LACTATE (LD) (LDH) ENZYME: CPT

## 2024-01-15 PROCEDURE — C9113 INJ PANTOPRAZOLE SODIUM, VIA: HCPCS | Performed by: NURSE PRACTITIONER

## 2024-01-15 PROCEDURE — 2580000003 HC RX 258: Performed by: PHYSICIAN ASSISTANT

## 2024-01-15 PROCEDURE — 2580000003 HC RX 258: Performed by: NURSE PRACTITIONER

## 2024-01-15 PROCEDURE — P9016 RBC LEUKOCYTES REDUCED: HCPCS

## 2024-01-15 PROCEDURE — 93010 ELECTROCARDIOGRAM REPORT: CPT | Performed by: SPECIALIST

## 2024-01-15 PROCEDURE — 7100000011 HC PHASE II RECOVERY - ADDTL 15 MIN: Performed by: INTERNAL MEDICINE

## 2024-01-15 PROCEDURE — 6360000002 HC RX W HCPCS: Performed by: NURSE ANESTHETIST, CERTIFIED REGISTERED

## 2024-01-15 PROCEDURE — 3600007512: Performed by: INTERNAL MEDICINE

## 2024-01-15 PROCEDURE — APPNB60 APP NON BILLABLE TIME 46-60 MINS: Performed by: NURSE PRACTITIONER

## 2024-01-15 PROCEDURE — 3700000000 HC ANESTHESIA ATTENDED CARE: Performed by: INTERNAL MEDICINE

## 2024-01-15 PROCEDURE — 80076 HEPATIC FUNCTION PANEL: CPT

## 2024-01-15 PROCEDURE — 80048 BASIC METABOLIC PNL TOTAL CA: CPT

## 2024-01-15 PROCEDURE — 85018 HEMOGLOBIN: CPT

## 2024-01-15 PROCEDURE — 0W3P8ZZ CONTROL BLEEDING IN GASTROINTESTINAL TRACT, VIA NATURAL OR ARTIFICIAL OPENING ENDOSCOPIC: ICD-10-PCS | Performed by: INTERNAL MEDICINE

## 2024-01-15 PROCEDURE — 2580000003 HC RX 258: Performed by: NURSE ANESTHETIST, CERTIFIED REGISTERED

## 2024-01-15 PROCEDURE — 36415 COLL VENOUS BLD VENIPUNCTURE: CPT

## 2024-01-15 PROCEDURE — 2580000003 HC RX 258: Performed by: INTERNAL MEDICINE

## 2024-01-15 PROCEDURE — 7100000010 HC PHASE II RECOVERY - FIRST 15 MIN: Performed by: INTERNAL MEDICINE

## 2024-01-15 PROCEDURE — 2060000000 HC ICU INTERMEDIATE R&B

## 2024-01-15 PROCEDURE — 83550 IRON BINDING TEST: CPT

## 2024-01-15 PROCEDURE — 83735 ASSAY OF MAGNESIUM: CPT

## 2024-01-15 DEVICE — CLIP
Type: IMPLANTABLE DEVICE | Site: STOMACH | Status: FUNCTIONAL
Brand: MANTIS™ CLIP

## 2024-01-15 DEVICE — WORKING LENGTH 235CM, WORKING CHANNEL 2.8MM
Type: IMPLANTABLE DEVICE | Site: STOMACH | Status: FUNCTIONAL
Brand: RESOLUTION 360 CLIP

## 2024-01-15 RX ORDER — SODIUM CHLORIDE 0.9 % (FLUSH) 0.9 %
5-40 SYRINGE (ML) INJECTION PRN
Status: DISCONTINUED | OUTPATIENT
Start: 2024-01-15 | End: 2024-01-15 | Stop reason: HOSPADM

## 2024-01-15 RX ORDER — SODIUM CHLORIDE 9 MG/ML
25 INJECTION, SOLUTION INTRAVENOUS PRN
Status: DISCONTINUED | OUTPATIENT
Start: 2024-01-15 | End: 2024-01-15 | Stop reason: HOSPADM

## 2024-01-15 RX ORDER — AMIODARONE HYDROCHLORIDE 200 MG/1
200 TABLET ORAL DAILY
Status: DISCONTINUED | OUTPATIENT
Start: 2024-01-15 | End: 2024-01-18 | Stop reason: HOSPADM

## 2024-01-15 RX ORDER — SODIUM CHLORIDE, SODIUM LACTATE, POTASSIUM CHLORIDE, AND CALCIUM CHLORIDE .6; .31; .03; .02 G/100ML; G/100ML; G/100ML; G/100ML
500 INJECTION, SOLUTION INTRAVENOUS ONCE
Status: COMPLETED | OUTPATIENT
Start: 2024-01-15 | End: 2024-01-15

## 2024-01-15 RX ORDER — VITAMIN B COMPLEX
1000 TABLET ORAL DAILY
Status: DISCONTINUED | OUTPATIENT
Start: 2024-01-15 | End: 2024-01-18 | Stop reason: HOSPADM

## 2024-01-15 RX ORDER — METOPROLOL SUCCINATE 25 MG/1
25 TABLET, EXTENDED RELEASE ORAL 2 TIMES DAILY
Status: DISCONTINUED | OUTPATIENT
Start: 2024-01-15 | End: 2024-01-18 | Stop reason: HOSPADM

## 2024-01-15 RX ORDER — ISOSORBIDE DINITRATE 20 MG/1
40 TABLET ORAL EVERY 8 HOURS
Status: DISCONTINUED | OUTPATIENT
Start: 2024-01-15 | End: 2024-01-18 | Stop reason: HOSPADM

## 2024-01-15 RX ORDER — HYDRALAZINE HYDROCHLORIDE 50 MG/1
100 TABLET, FILM COATED ORAL EVERY 8 HOURS SCHEDULED
Status: DISCONTINUED | OUTPATIENT
Start: 2024-01-15 | End: 2024-01-18 | Stop reason: HOSPADM

## 2024-01-15 RX ORDER — SPIRONOLACTONE 25 MG/1
25 TABLET ORAL DAILY
Status: DISCONTINUED | OUTPATIENT
Start: 2024-01-15 | End: 2024-01-18 | Stop reason: HOSPADM

## 2024-01-15 RX ORDER — WARFARIN SODIUM 2 MG/1
2 TABLET ORAL
Status: COMPLETED | OUTPATIENT
Start: 2024-01-15 | End: 2024-01-15

## 2024-01-15 RX ORDER — SODIUM CHLORIDE 9 MG/ML
INJECTION, SOLUTION INTRAVENOUS CONTINUOUS
Status: DISCONTINUED | OUTPATIENT
Start: 2024-01-15 | End: 2024-01-17

## 2024-01-15 RX ORDER — SODIUM CHLORIDE 9 MG/ML
INJECTION, SOLUTION INTRAVENOUS PRN
Status: DISCONTINUED | OUTPATIENT
Start: 2024-01-15 | End: 2024-01-18

## 2024-01-15 RX ORDER — LIDOCAINE HYDROCHLORIDE 20 MG/ML
INJECTION, SOLUTION EPIDURAL; INFILTRATION; INTRACAUDAL; PERINEURAL PRN
Status: DISCONTINUED | OUTPATIENT
Start: 2024-01-15 | End: 2024-01-15 | Stop reason: SDUPTHER

## 2024-01-15 RX ORDER — SODIUM CHLORIDE 0.9 % (FLUSH) 0.9 %
5-40 SYRINGE (ML) INJECTION EVERY 12 HOURS SCHEDULED
Status: DISCONTINUED | OUTPATIENT
Start: 2024-01-15 | End: 2024-01-15 | Stop reason: HOSPADM

## 2024-01-15 RX ORDER — LANOLIN ALCOHOL/MO/W.PET/CERES
400 CREAM (GRAM) TOPICAL DAILY
Status: DISCONTINUED | OUTPATIENT
Start: 2024-01-15 | End: 2024-01-18 | Stop reason: HOSPADM

## 2024-01-15 RX ADMIN — SACUBITRIL AND VALSARTAN 1 TABLET: 97; 103 TABLET, FILM COATED ORAL at 11:00

## 2024-01-15 RX ADMIN — PROPOFOL 30 MG: 10 INJECTION, EMULSION INTRAVENOUS at 14:38

## 2024-01-15 RX ADMIN — ISOSORBIDE DINITRATE 40 MG: 20 TABLET ORAL at 11:00

## 2024-01-15 RX ADMIN — PROPOFOL 30 MG: 10 INJECTION, EMULSION INTRAVENOUS at 14:39

## 2024-01-15 RX ADMIN — SODIUM CHLORIDE, PRESERVATIVE FREE 10 ML: 5 INJECTION INTRAVENOUS at 08:27

## 2024-01-15 RX ADMIN — SODIUM CHLORIDE, PRESERVATIVE FREE 40 MG: 5 INJECTION INTRAVENOUS at 07:26

## 2024-01-15 RX ADMIN — SODIUM CHLORIDE, POTASSIUM CHLORIDE, SODIUM LACTATE AND CALCIUM CHLORIDE 500 ML: 600; 310; 30; 20 INJECTION, SOLUTION INTRAVENOUS at 16:49

## 2024-01-15 RX ADMIN — PROPOFOL 30 MG: 10 INJECTION, EMULSION INTRAVENOUS at 14:37

## 2024-01-15 RX ADMIN — PROPOFOL 30 MG: 10 INJECTION, EMULSION INTRAVENOUS at 14:44

## 2024-01-15 RX ADMIN — MAGNESIUM OXIDE TAB 400 MG (241.3 MG ELEMENTAL MG) 400 MG: 400 (241.3 MG) TAB at 11:00

## 2024-01-15 RX ADMIN — SODIUM CHLORIDE, POTASSIUM CHLORIDE, SODIUM LACTATE AND CALCIUM CHLORIDE: 600; 310; 30; 20 INJECTION, SOLUTION INTRAVENOUS at 09:44

## 2024-01-15 RX ADMIN — AMIODARONE HYDROCHLORIDE 200 MG: 200 TABLET ORAL at 11:00

## 2024-01-15 RX ADMIN — LIDOCAINE HYDROCHLORIDE 60 MG: 20 INJECTION, SOLUTION EPIDURAL; INFILTRATION; INTRACAUDAL; PERINEURAL at 14:37

## 2024-01-15 RX ADMIN — PROPOFOL 30 MG: 10 INJECTION, EMULSION INTRAVENOUS at 14:40

## 2024-01-15 RX ADMIN — SODIUM CHLORIDE, PRESERVATIVE FREE 10 ML: 5 INJECTION INTRAVENOUS at 20:53

## 2024-01-15 RX ADMIN — WARFARIN SODIUM 2 MG: 2 TABLET ORAL at 18:36

## 2024-01-15 RX ADMIN — PHENYLEPHRINE HYDROCHLORIDE 50 MCG/MIN: 10 INJECTION INTRAVENOUS at 14:37

## 2024-01-15 RX ADMIN — ISOSORBIDE DINITRATE 40 MG: 20 TABLET ORAL at 18:35

## 2024-01-15 RX ADMIN — SODIUM CHLORIDE, POTASSIUM CHLORIDE, SODIUM LACTATE AND CALCIUM CHLORIDE: 600; 310; 30; 20 INJECTION, SOLUTION INTRAVENOUS at 23:27

## 2024-01-15 RX ADMIN — SPIRONOLACTONE 25 MG: 25 TABLET ORAL at 11:00

## 2024-01-15 RX ADMIN — SODIUM CHLORIDE, POTASSIUM CHLORIDE, SODIUM LACTATE AND CALCIUM CHLORIDE: 600; 310; 30; 20 INJECTION, SOLUTION INTRAVENOUS at 01:20

## 2024-01-15 RX ADMIN — Medication 1000 UNITS: at 11:00

## 2024-01-15 RX ADMIN — SODIUM CHLORIDE, PRESERVATIVE FREE 40 MG: 5 INJECTION INTRAVENOUS at 18:36

## 2024-01-15 ASSESSMENT — PAIN - FUNCTIONAL ASSESSMENT: PAIN_FUNCTIONAL_ASSESSMENT: NONE - DENIES PAIN

## 2024-01-15 ASSESSMENT — ENCOUNTER SYMPTOMS
DIARRHEA: 0
VOMITING: 0
SHORTNESS OF BREATH: 0
SHORTNESS OF BREATH: 1
ABDOMINAL DISTENTION: 0
NAUSEA: 0
COUGH: 0

## 2024-01-15 NOTE — PROGRESS NOTES
[facility]  [facility] in [place], [place]       Procedure: EGD with Dr. Simmons     Fixed Speed: 5200  Low Speed: 4800      Pre procedure:    Post procedure    Time  1348 1414 1540 1555   Speed  5200   5200 5200 5200   Flow  2.7   3.1 3.8 2.9   MAP  70 74 130 80   PI  11.1 10.4 5.8 11.2   Power  3.7  3.7 3.6 3.7     Several low flow alarms occurred prior to and during procedure. CARMELINA Luevano and Dr. Fields notified of alarms and MAPs.     Post procedure:    Fixed Speed: 5200  Low Speed: 4800       VAD Coordinator managed LVAD equipment during procedure.       Bety Aranda, RN  LVAD coordinator

## 2024-01-15 NOTE — PLAN OF CARE
1930: Bedside and Verbal shift change report given to Megan RN (oncoming nurse) by Catarina RN (offgoing nurse). Report included the following information Nurse Handoff Report.      0400: Hgb resulted 6.4. Per GITA Mondragon transfuse 1 unit PRBC. (See flowsheets for blood transfusion details).    Problem: Safety - Adult  Goal: Free from fall injury  Flowsheets (Taken 1/15/2024 0028)  Free From Fall Injury: Instruct family/caregiver on patient safety     Problem: ABCDS Injury Assessment  Goal: Absence of physical injury  Flowsheets (Taken 1/15/2024 0028)  Absence of Physical Injury: Implement safety measures based on patient assessment

## 2024-01-15 NOTE — PROGRESS NOTES
Pharmacist Note - Warfarin Dosing  Consult provided for this 68 y.o.male to manage warfarin for LVAD    INR Goal: 2 - 3    Home regimen/ tablet size: 2 mg Monday and Friday/3 mg all other days of the week    Drugs that may increase INR: Amiodarone  Drugs that may decrease INR: None  Other current anticoagulants/ drugs that may increase bleeding risk: None  Risk factors: Age > 65 and Decompensated Heart Failure  Daily INR ordered through: 4/6/24    Recent Labs     01/14/24  1408 01/14/24  2046 01/15/24  0315 01/15/24  1241   HGB 7.7* 7.4* 6.4* 7.3*   INR 4.5*  --  1.6*  --      Date               INR                  Dose  1/15  1.6  2 mg                                                                                 Assessment/ Plan:  Will order warfarin 2 mg PO x 1 dose.    Pharmacy will continue to monitor daily and adjust therapy as indicated.  Please contact the pharmacist at x [phone] or [phone] for outpatient recommendations if needed.     Refugio De Luna, ChristineD

## 2024-01-15 NOTE — PROGRESS NOTES
Phenylphrine titrated to 25mcg per Dr. Ornelas instructions   And then stopped see mar per Dr. Ornelas

## 2024-01-15 NOTE — ANESTHESIA POSTPROCEDURE EVALUATION
Department of Anesthesiology  Postprocedure Note    Patient: Bishop Love  MRN: [id number]  YOB: 1955  Date of evaluation: 1/15/2024    Procedure Summary       Date: 01/15/24 Room / Location: [facility] ENDO 01 / [facility] ENDOSCOPY    Anesthesia Start: 1413 Anesthesia Stop: 1518    Procedure: EGD ESOPHAGOGASTRODUODENOSCOPY, 3 clips placed, one clip fell off after being placed (Upper GI Region) Diagnosis:       Anemia, unspecified type      (Anemia, unspecified type [D64.9])    Surgeons: Maryjo Simmons MD Responsible Provider: Tiffanie Ornelas DO    Anesthesia Type: MAC ASA Status: 4            Anesthesia Type: MAC    Kelly Phase I: Kelly Score: 10    Kelly Phase II: Kelly Score: 10    Anesthesia Post Evaluation    Patient location during evaluation: PACU  Patient participation: complete - patient participated  Level of consciousness: awake and alert  Pain score: 0  Airway patency: patent  Nausea & Vomiting: no nausea and no vomiting  Cardiovascular status: blood pressure returned to baseline and hemodynamically stable  Respiratory status: room air  Hydration status: euvolemic  Pain management: adequate      No notable events documented.

## 2024-01-15 NOTE — PROGRESS NOTES
[facility]   [address] [address]       GI PROGRESS NOTE  Josseline Martinez PA-C  [phone] office  NP/PA in-hospital M-F until 4:30PM  After 5PM or on weekends, please call  for physician on call      NAME: Bishop Love   :  1955   MRN:  [id number]       Subjective:     Pt resting comfortably in bed. Last bowel movement was prior to admission, no further episodes of hematemesis. No abdominal pain, nausea, or vomiting.       Objective:     VITALS:   Last 24hrs VS reviewed since prior progress note. Most recent are:  Vitals:    01/15/24 1100   Pulse: 68   Resp: 21   Temp: 98.8 °F (37.1 °C)   SpO2:        PHYSICAL EXAM:  General: Cooperative, no acute distress    Neurologic:  Alert and oriented X 3.  HEENT: EOMI, no scleral icterus   Lungs:  CTA bilaterally. No wheezing  Heart:  S1 S2, regular rhythm  Abdomen: Soft, non-distended, no tenderness. +Bowel sounds  Extremities: No edema  Psych:   Good insight. Not anxious or agitated.    Lab Data Reviewed:     Recent Results (from the past 24 hour(s))   CBC with Auto Differential    Collection Time: 24  2:08 PM   Result Value Ref Range    WBC 12.1 (H) 4.1 - 11.1 K/uL    RBC 2.74 (L) 4.10 - 5.70 M/uL    Hemoglobin 7.7 (L) 12.1 - 17.0 g/dL    Hematocrit 23.8 (L) 36.6 - 50.3 %    MCV 86.9 80.0 - 99.0 FL    MCH 28.1 26.0 - 34.0 PG    MCHC 32.4 30.0 - 36.5 g/dL    RDW 14.6 (H) 11.5 - 14.5 %    Platelets 242 150 - 400 K/uL    MPV 10.4 8.9 - 12.9 FL    Nucleated RBCs 0.0 0  WBC    nRBC 0.00 0.00 - 0.01 K/uL    Neutrophils % 82 (H) 32 - 75 %    Lymphocytes % 14 12 - 49 %    Monocytes % 3 (L) 5 - 13 %    Eosinophils % 0 0 - 7 %    Basophils % 0 0 - 1 %    Immature Granulocytes 1 (H) 0.0 - 0.5 %    Neutrophils Absolute 9.9 (H) 1.8 - 8.0 K/UL    Lymphocytes Absolute 1.7 0.8 - 3.5 K/UL    Monocytes Absolute 0.4 0.0 - 1.0 K/UL    Eosinophils Absolute 0.0 0.0 - 0.4 K/UL    Basophils Absolute 0.0 0.0 - 0.1 K/UL

## 2024-01-15 NOTE — ANESTHESIA PROCEDURE NOTES
Arterial Line:    An arterial line was placed using ultrasound guidance, in the pre-op for the following indication(s): continuous blood pressure monitoring and blood sampling needed.    A 20 gauge (size) (length), Arrow (type) catheter was placed, Seldinger technique used, into the left radial artery, secured by Tegaderm.  Anesthesia type: Local  Local infiltration: Injection    Events:  patient tolerated procedure well with no complications.  Anesthesiologist: America Carey DO  Performed: Anesthesiologist   Preanesthetic Checklist  Completed: patient identified, IV checked, site marked, risks and benefits discussed, surgical/procedural consents, equipment checked, pre-op evaluation, timeout performed, anesthesia consent given, oxygen available, monitors applied/VS acknowledged and fire risk safety assessment completed and verbalized

## 2024-01-15 NOTE — PROGRESS NOTES
1930  Preceptee, TRINH Joe RN, also received report.  Charting and pt care for patient Bishop Love performed by preceptee from 0730 to 1930. Documentation and care supervised and reviewed.

## 2024-01-15 NOTE — PROGRESS NOTES
[facility]                                                                                          Hospitalist Progress Note  Naga Hope MD  Office Phone: [phone]        Date of Service:  1/15/2024  NAME:  Bishop Love  :  1955  MRN:  [id number]       Admission Summary:   Bishop Love is a 68 y.o. male with a PMHx of HFrEF s/p LVAD placement in 2023 and ICD placement in 10/2023, CAD, HTN, HLD and PVD.      He presents to the ED with Hematemesis and dark colored stools that began yesterday. Following these episodes his LVAD began alarming for low flow. Symptoms began yesterday and appear to have no clear aggravating or alleviating factors, He reports vomiting blood and subsequently having a dark colored BM. He denies any chest pain, shortness of breath, dizziness, lightheadedness, PND, orthopnea, vision changes, headache or any focal or generalized neurologic symptoms.  Hospitalist consulted for admission for management of acute GI bleed.      I spoke with the on-call Advanced HF NP, we will transfuse for HGB > 8 in the setting of low flow alarm and advanced HF. MAP goal will be 70-90     ED Workup  - Hgb 7.7, previously was 11.9 ()  - INR: 4.5  - Na: 132  - Cr. 1.78     ED Rx  - 1L NS       Interval history / Subjective:   Follow up GI bleed   S/p 1 unit PRBC this am  S/p EGD 1/15  Assessment & Plan:     GI bleed  Acute blood loss anemia  -s/p 2 units PRBC  - ISO advanced HF, Hgb goal > 8  - Trend H&H q6h  -Transfuse for hb<7  - IV PPI, BID   -s/p EGD  Esophagus:hiatal hernia 3 cm in size   2 cm nash ayala tear ( ulcer) non bleeding , with visible vessel , at G-E junction. I placed 3 hemoclips ( 1 resolution, and 2 ultraclips, [employer])  on it, which closed it   Rest of esophagus was normal   Stomach: normal   Duodenum: normal  -Appreciate GI acid suppression, clears tonight, advance in am. May start coumadin tonight

## 2024-01-15 NOTE — OP NOTE
[facility]  [address], [place] [address]        Esophago- Gastroduodenoscopy (EGD) Procedure Note    Bishop Love  1955  [id number]      Procedure: Endoscopic Gastroduodenoscopy with control of bleeding    Indication: Hematochezia      Pre-operative Diagnosis: see indication above    Post-operative Diagnosis: see findings below    : Maryjo Simmons MD    Surgical Assistant: Circulator: Henry Hernandez RN  Endoscopy Technician: Ashok Jackson    Implants:  None    Referring Provider:  Freddy Chandler MD      Anesthesia/Sedation:  MAC anesthesia Propofol        Procedure Details     After infomed consent was obtained for the procedure, with all risks and benefits of procedure explained the patient was taken to the endoscopy suite and placed in the left lateral decubitus position.  Following sequential administration of sedation as per above, the endoscope was inserted into the mouth and advanced under direct vision to third portion of the duodenum.  A careful inspection was made as the gastroscope was withdrawn, including a retroflexed view of the proximal stomach; findings and interventions are described below.      Findings:   Esophagus:hiatal hernia 3 cm in size     2 cm nash ayala tear ( ulcer) non bleeding , with visible vessel , at G-E junction. I placed 3 hemoclips ( 1 resolution, and 2 ultraclips, [employer])  on it, which closed it     Rest of esophagus was normal   Stomach: normal   Duodenum: normal      Therapies:  as above     Specimens: none         EBL: None      Complications:   None; patient tolerated the procedure well.           Impression:    -See post-procedure diagnoses.    Recommendations:  -Continue acid suppression  -to avoid nausea/ vomiting  -clear liquids tonight, advance in am   -may start coumadin tonight  - may start heparin in am if needed   -discussed results of EGD with the patient in recovery room and his LVAD team

## 2024-01-15 NOTE — ANESTHESIA PRE PROCEDURE
Department of Anesthesiology  Preprocedure Note       Name:  Bishop Love   Age:  68 y.o.  :  1955                                          MRN:  [id number]         Date:  1/15/2024      Surgeon: Surgeon(s):  Maryjo Simmons MD    Procedure: Procedure(s):  EGD ESOPHAGOGASTRODUODENOSCOPY, 3 clips placed, one clip fell off after being placed    Medications prior to admission:   Prior to Admission medications    Medication Sig Start Date End Date Taking? Authorizing Provider   amLODIPine (NORVASC) 2.5 MG tablet Take 1 tablet by mouth daily Take at night 24   Norma Luevano APRN - NP   saccharomyces boulardii (FLORASTOR) 250 MG capsule Take 1 capsule by mouth 2 times daily for 14 days 24  Lina Garcia APRN - NP   metoprolol succinate (TOPROL XL) 25 MG extended release tablet Take 1 tablet by mouth in the morning and at bedtime 23   Lina Garcia APRN - NP   vitamin D3 (CHOLECALCIFEROL) 25 MCG (1000 UT) TABS tablet Take 1 tablet by mouth daily 23   Pennie Vang MD   sacubitril-valsartan (ENTRESTO)  MG per tablet Take 1 tablet by mouth 2 times daily 23   Norma Luevano APRN - NP   atorvastatin (LIPITOR) 40 MG tablet Take 2 tablets by mouth nightly 23   Jose Daniel Fields MD   isosorbide dinitrate (ISORDIL) 20 MG tablet Take 2 tablets by mouth every 8 (eight) hours 23   Jose Daniel Fields MD   ticagrelor (BRILINTA) 90 MG TABS tablet Take 1 tablet by mouth 2 times daily 23   Leslie Mondragon APRN - NP   spironolactone (ALDACTONE) 25 MG tablet Take 1 tablet by mouth daily 23   Norma Luevano APRN - NP   magnesium oxide (MAG-OX) 400 MG tablet Take 1 tablet by mouth daily 23   Norma Luevano APRN - NP   warfarin (COUMADIN) 1 MG tablet Take 3 tablets by mouth daily  Patient taking differently: Take 3 tablets by mouth daily 24 4:40 PM - 2mg on Mon/, 3mg ROW 23   Leslie Mondragon,

## 2024-01-15 NOTE — PROGRESS NOTES
1930  Verbal bedside report given to JAKOB Guzman by TRINH Joe RN. Report included updated vitals and SBAR. Patient is in bed awake and respirations are even and unlabored.     1640  Pt back on floor. JAKOB Pinon at bedside.     1325  Off floor for EGD.    1030  Completed blood transfusion.    0730  Verbal bedside report received from JAKOB Guzman given to TRINH Joe RN. Report included vital signs, SBAR, and plan for the day. Patient rhythm V-paced. Patient is in bed asleep and respirations are even and unlabored. Call bell is within reach. Blood rate verified.    LVAD:  - 4 batteries  - 4 clips  - one extra controller  - dressing clean, dry, and intact  - anchor in place  - Set speed 5200  - Low speed 4800  - Last date of dressing change (per patient) 1/12/24    Care Plan:    Problem: Discharge Planning  Goal: Discharge to home or other facility with appropriate resources  Outcome: Progressing     Problem: Safety - Adult  Goal: Free from fall injury  1/15/2024 1355 by Melva Joe RN  Outcome: Progressing  1/15/2024 0028 by Marcio Damico RN  Flowsheets (Taken 1/15/2024 0028)  Free From Fall Injury: Instruct family/caregiver on patient safety     Problem: ABCDS Injury Assessment  Goal: Absence of physical injury  1/15/2024 1355 by Melva Joe RN  Outcome: Progressing  1/15/2024 0028 by Marcio Damico RN  Flowsheets (Taken 1/15/2024 0028)  Absence of Physical Injury: Implement safety measures based on patient assessment     Problem: Chronic Conditions and Co-morbidities  Goal: Patient's chronic conditions and co-morbidity symptoms are monitored and maintained or improved  Outcome: Progressing

## 2024-01-15 NOTE — PROGRESS NOTES
TRANSFER - OUT REPORT:    Verbal report given to Kathrin on Bishop Love  being transferred to [facility] for ordered procedure       Report consisted of patient's Situation, Background, Assessment and   Recommendations(SBAR).     Information from the following report(s) Nurse Handoff Report was reviewed with the receiving nurse.           Lines:   Peripheral IV 01/14/24 Right;Anterior Forearm (Active)   Site Assessment Clean, dry & intact 01/15/24 0827   Line Status Infusing;Flushed;Capped 01/15/24 0827   Line Care Cap changed;Connections checked and tightened;Chlorhexidine wipes;Ports disinfected 01/15/24 0827   Phlebitis Assessment No symptoms 01/15/24 0827   Infiltration Assessment 0 01/15/24 0827   Alcohol Cap Used Yes 01/15/24 0827   Dressing Status Clean, dry & intact 01/15/24 0827   Dressing Type Transparent 01/15/24 0827       Peripheral IV 01/14/24 Proximal;Right;Anterior Forearm (Active)   Site Assessment Clean, dry & intact 01/15/24 0827   Line Status Blood return noted;Capped;Flushed;Normal saline locked 01/15/24 0827   Line Care Cap changed;Connections checked and tightened;Chlorhexidine wipes;Ports disinfected 01/15/24 0827   Phlebitis Assessment No symptoms 01/15/24 0827   Infiltration Assessment 0 01/15/24 0827   Alcohol Cap Used Yes 01/15/24 0827   Dressing Status Clean, dry & intact 01/15/24 0827   Dressing Type Transparent 01/15/24 0827        Opportunity for questions and clarification was provided.      Patient transported with:  Monitor        [FreeTextEntry1] : 54 y/o F, never smoker, w/ hx of DM, HLD, lobular carcinoma in situ s/p Lt lumpectomy on 6/2013 (s/p tamoxifen x 6 mo, stop due to elevated LFT), who presents w/ increasing size lung nodules.\par \par CT Chest on 6/1/19:\par - few 3mm josefina-fissural nodules, likely benign LNs, stable from 4/2017\par - JIAN\par \par I have reviewed the patient's medical records and diagnostic images at time of this office consultation and have made the following recommendation:\par 1. CT scan reviewed, lung nodules are stable since 2017, I recommended patient to f/u with her PCP, RTC as needed.\par \par \par Written by Keith Parham NP, acting as a scribe for Dr. Dane Quezada.\par \par The documentation recorded by the scribe accurately reflects the service I personally performed and the decisions made by me. DANE QUEZADA MD\par

## 2024-01-15 NOTE — PROGRESS NOTES
ADVANCED HEART FAILURE CENTER  [facility] in [place], [place]  Inpatient Progress Note      Patient name: Bishop Love  Patient : 1955  Patient MRN: [id number]  Consulting MD: Naga Hope MD  Date of service: 01/15/24      Reason for referral:  Management of LVAD    PLAN OF CARE:  68 y.o. with h/o severe ischemic cardiomyopathy, LVEF 15% s/p HM3 LVAD implantation (23) as destination therapy due not candidate for heart transplant (tobacco), malnutrition and severity of PVD, stage C, NYHA class II, on GDMT limited by hypotension/RV dysfunction/IVVD  Patient is interested in formal evaluation for heart transplant; we plan on monthly urine cotinine screening x 6 months.  Negative since .    Called office on  for c/o hematemesis and dark red stools x 2 days, advised to present to the ED for admission for GI bleed.     Interval Events:  Admitted for GIB  Hgb 6.4 this morning- transfused 1U PRBC  MAPs 70s  Creatinine improved   INR 1.6  Low flow alarms noted- last one at 4am today      PLAN:  Chronic HFrEF  Resume toprol XL 25mg twice daily with hold parameters  Resume Entresto 97/103mg twice daily with hold parameters  Resume hydralazine/isordil 100/40mg TID with hold parameters  Have ordered PRN hydralazine 10 mg IV for MAP>100 if pt becomes hypertensive after volume resusitation  Resume spironolactone 25mg daily with hold parameters  Hold Norvasc 2.5mg due to hypotension  No SGLT2i due to recurrent UTI- consider re challenging at a later time  Not on allopurinol or uloric     LVAD  Continue current device speed at 5200   Low flow alarms reported, likely due to hypovolemia in the setting of GI bleed  Continue dressing changes 3x weekly  Hold AC while actively bleeding     GI bleed  H/H every 6hrs  PPI gtt for now per GI  transfuse for Hg <8 and Plt <50  GI consulted, appreciate recs, plan for scope once coagulopathy is reversed if cleared by cardiology.  Pt is cleared for EGD

## 2024-01-15 NOTE — PROGRESS NOTES
1650  Attending at bedside, pt  in no acute distress.    1645  Heart failure MD at bedside, MAP 70, verbal order received to give bolus of LR and d/c continuous LR.    1630  PT returned to floor from [facility].  Heart failure team accompanied pt, MAPs are in 70s.

## 2024-01-16 LAB
ALBUMIN SERPL-MCNC: 2.2 G/DL (ref 3.5–5)
ALBUMIN/GLOB SERPL: 0.8 (ref 1.1–2.2)
ALP SERPL-CCNC: 55 U/L (ref 45–117)
ALT SERPL-CCNC: 23 U/L (ref 12–78)
ANION GAP SERPL CALC-SCNC: 6 MMOL/L (ref 5–15)
APTT PPP: 29.1 SEC (ref 22.1–31)
AST SERPL-CCNC: 31 U/L (ref 15–37)
BILIRUB DIRECT SERPL-MCNC: <0.1 MG/DL (ref 0–0.2)
BILIRUB SERPL-MCNC: 0.5 MG/DL (ref 0.2–1)
BUN SERPL-MCNC: 36 MG/DL (ref 6–20)
BUN/CREAT SERPL: 46 (ref 12–20)
CALCIUM SERPL-MCNC: 7.6 MG/DL (ref 8.5–10.1)
CHLORIDE SERPL-SCNC: 116 MMOL/L (ref 97–108)
CO2 SERPL-SCNC: 18 MMOL/L (ref 21–32)
CREAT SERPL-MCNC: 0.78 MG/DL (ref 0.7–1.3)
GLOBULIN SER CALC-MCNC: 2.7 G/DL (ref 2–4)
GLUCOSE SERPL-MCNC: 81 MG/DL (ref 65–100)
HCT VFR BLD AUTO: 19.5 % (ref 36.6–50.3)
HCT VFR BLD AUTO: 23.3 % (ref 36.6–50.3)
HCT VFR BLD AUTO: 24.7 % (ref 36.6–50.3)
HCT VFR BLD AUTO: 27 % (ref 36.6–50.3)
HGB BLD-MCNC: 6.8 G/DL (ref 12.1–17)
HGB BLD-MCNC: 8.1 G/DL (ref 12.1–17)
HGB BLD-MCNC: 8.6 G/DL (ref 12.1–17)
HGB BLD-MCNC: 9.1 G/DL (ref 12.1–17)
HISTORY CHECK: NORMAL
INR PPP: 1.3 (ref 0.9–1.1)
LDH SERPL L TO P-CCNC: 449 U/L (ref 85–241)
MAGNESIUM SERPL-MCNC: 1.5 MG/DL (ref 1.6–2.4)
NT PRO BNP: 1021 PG/ML
POTASSIUM SERPL-SCNC: 4.5 MMOL/L (ref 3.5–5.1)
PROT SERPL-MCNC: 4.9 G/DL (ref 6.4–8.2)
PROTHROMBIN TIME: 13.5 SEC (ref 9–11.1)
SODIUM SERPL-SCNC: 140 MMOL/L (ref 136–145)
THERAPEUTIC RANGE: NORMAL SECS (ref 58–77)
UFH PPP CHRO-ACNC: 0.26 IU/ML
UFH PPP CHRO-ACNC: <0.1 IU/ML

## 2024-01-16 PROCEDURE — 80076 HEPATIC FUNCTION PANEL: CPT

## 2024-01-16 PROCEDURE — 6370000000 HC RX 637 (ALT 250 FOR IP): Performed by: HOSPITALIST

## 2024-01-16 PROCEDURE — 6370000000 HC RX 637 (ALT 250 FOR IP): Performed by: NURSE PRACTITIONER

## 2024-01-16 PROCEDURE — 2580000003 HC RX 258: Performed by: PHYSICIAN ASSISTANT

## 2024-01-16 PROCEDURE — 85610 PROTHROMBIN TIME: CPT

## 2024-01-16 PROCEDURE — APPNB60 APP NON BILLABLE TIME 46-60 MINS: Performed by: NURSE PRACTITIONER

## 2024-01-16 PROCEDURE — 99233 SBSQ HOSP IP/OBS HIGH 50: CPT | Performed by: INTERNAL MEDICINE

## 2024-01-16 PROCEDURE — 36430 TRANSFUSION BLD/BLD COMPNT: CPT

## 2024-01-16 PROCEDURE — 83615 LACTATE (LD) (LDH) ENZYME: CPT

## 2024-01-16 PROCEDURE — 6370000000 HC RX 637 (ALT 250 FOR IP): Performed by: INTERNAL MEDICINE

## 2024-01-16 PROCEDURE — 2060000000 HC ICU INTERMEDIATE R&B

## 2024-01-16 PROCEDURE — 6360000002 HC RX W HCPCS: Performed by: NURSE PRACTITIONER

## 2024-01-16 PROCEDURE — 85520 HEPARIN ASSAY: CPT

## 2024-01-16 PROCEDURE — 83735 ASSAY OF MAGNESIUM: CPT

## 2024-01-16 PROCEDURE — 36415 COLL VENOUS BLD VENIPUNCTURE: CPT

## 2024-01-16 PROCEDURE — 2580000003 HC RX 258: Performed by: NURSE PRACTITIONER

## 2024-01-16 PROCEDURE — 93750 INTERROGATION VAD IN PERSON: CPT | Performed by: INTERNAL MEDICINE

## 2024-01-16 PROCEDURE — 83880 ASSAY OF NATRIURETIC PEPTIDE: CPT

## 2024-01-16 PROCEDURE — 80048 BASIC METABOLIC PNL TOTAL CA: CPT

## 2024-01-16 PROCEDURE — 85018 HEMOGLOBIN: CPT

## 2024-01-16 PROCEDURE — 85014 HEMATOCRIT: CPT

## 2024-01-16 PROCEDURE — C9113 INJ PANTOPRAZOLE SODIUM, VIA: HCPCS | Performed by: NURSE PRACTITIONER

## 2024-01-16 PROCEDURE — P9016 RBC LEUKOCYTES REDUCED: HCPCS

## 2024-01-16 PROCEDURE — 85730 THROMBOPLASTIN TIME PARTIAL: CPT

## 2024-01-16 PROCEDURE — 93750 INTERROGATION VAD IN PERSON: CPT | Performed by: NURSE PRACTITIONER

## 2024-01-16 PROCEDURE — A4216 STERILE WATER/SALINE, 10 ML: HCPCS | Performed by: NURSE PRACTITIONER

## 2024-01-16 RX ORDER — HEPARIN SODIUM 10000 [USP'U]/100ML
5-30 INJECTION, SOLUTION INTRAVENOUS CONTINUOUS
Status: DISCONTINUED | OUTPATIENT
Start: 2024-01-16 | End: 2024-01-18

## 2024-01-16 RX ORDER — HEPARIN SODIUM 1000 [USP'U]/ML
60 INJECTION, SOLUTION INTRAVENOUS; SUBCUTANEOUS PRN
Status: DISCONTINUED | OUTPATIENT
Start: 2024-01-16 | End: 2024-01-18

## 2024-01-16 RX ORDER — HEPARIN SODIUM 1000 [USP'U]/ML
60 INJECTION, SOLUTION INTRAVENOUS; SUBCUTANEOUS ONCE
Status: COMPLETED | OUTPATIENT
Start: 2024-01-16 | End: 2024-01-16

## 2024-01-16 RX ORDER — HEPARIN SODIUM 1000 [USP'U]/ML
30 INJECTION, SOLUTION INTRAVENOUS; SUBCUTANEOUS PRN
Status: DISCONTINUED | OUTPATIENT
Start: 2024-01-16 | End: 2024-01-18

## 2024-01-16 RX ORDER — SODIUM CHLORIDE 9 MG/ML
INJECTION, SOLUTION INTRAVENOUS PRN
Status: DISCONTINUED | OUTPATIENT
Start: 2024-01-16 | End: 2024-01-18

## 2024-01-16 RX ORDER — ATORVASTATIN CALCIUM 40 MG/1
80 TABLET, FILM COATED ORAL NIGHTLY
Status: DISCONTINUED | OUTPATIENT
Start: 2024-01-16 | End: 2024-01-18 | Stop reason: HOSPADM

## 2024-01-16 RX ADMIN — SODIUM CHLORIDE, PRESERVATIVE FREE 10 ML: 5 INJECTION INTRAVENOUS at 10:23

## 2024-01-16 RX ADMIN — WARFARIN SODIUM 3 MG: 2 TABLET ORAL at 17:51

## 2024-01-16 RX ADMIN — ISOSORBIDE DINITRATE 40 MG: 20 TABLET ORAL at 17:51

## 2024-01-16 RX ADMIN — HYDRALAZINE HYDROCHLORIDE 100 MG: 50 TABLET, FILM COATED ORAL at 14:47

## 2024-01-16 RX ADMIN — AMIODARONE HYDROCHLORIDE 200 MG: 200 TABLET ORAL at 10:21

## 2024-01-16 RX ADMIN — SPIRONOLACTONE 25 MG: 25 TABLET ORAL at 10:21

## 2024-01-16 RX ADMIN — HEPARIN SODIUM 3400 UNITS: 1000 INJECTION INTRAVENOUS; SUBCUTANEOUS at 13:28

## 2024-01-16 RX ADMIN — HYDRALAZINE HYDROCHLORIDE 100 MG: 50 TABLET, FILM COATED ORAL at 22:22

## 2024-01-16 RX ADMIN — ATORVASTATIN CALCIUM 80 MG: 40 TABLET, FILM COATED ORAL at 22:22

## 2024-01-16 RX ADMIN — HEPARIN SODIUM 1700 UNITS: 1000 INJECTION INTRAVENOUS; SUBCUTANEOUS at 21:58

## 2024-01-16 RX ADMIN — SODIUM CHLORIDE, PRESERVATIVE FREE 40 MG: 5 INJECTION INTRAVENOUS at 17:51

## 2024-01-16 RX ADMIN — SODIUM CHLORIDE, PRESERVATIVE FREE 40 MG: 5 INJECTION INTRAVENOUS at 07:29

## 2024-01-16 RX ADMIN — SODIUM CHLORIDE, PRESERVATIVE FREE 10 ML: 5 INJECTION INTRAVENOUS at 22:02

## 2024-01-16 RX ADMIN — MAGNESIUM OXIDE TAB 400 MG (241.3 MG ELEMENTAL MG) 400 MG: 400 (241.3 MG) TAB at 10:21

## 2024-01-16 RX ADMIN — ISOSORBIDE DINITRATE 40 MG: 20 TABLET ORAL at 10:34

## 2024-01-16 RX ADMIN — Medication 1000 UNITS: at 10:08

## 2024-01-16 RX ADMIN — HEPARIN SODIUM 12 UNITS/KG/HR: 10000 INJECTION, SOLUTION INTRAVENOUS at 13:29

## 2024-01-16 ASSESSMENT — ENCOUNTER SYMPTOMS
NAUSEA: 0
VOMITING: 0
SHORTNESS OF BREATH: 0
ABDOMINAL DISTENTION: 0
COUGH: 0
DIARRHEA: 0

## 2024-01-16 NOTE — PROGRESS NOTES
Pharmacist Note - Warfarin Dosing  Consult provided for this 68 y.o.male to manage warfarin for LVAD    INR Goal: 2 - 3    Home regimen/ tablet size: 2 mg Monday and Friday/3 mg all other days of the week    Drugs that may increase INR: Amiodarone  Drugs that may decrease INR: None  Other current anticoagulants/ drugs that may increase bleeding risk: None  Risk factors: Age > 65 and Decompensated Heart Failure  Daily INR ordered through: 4/6/24    Recent Labs     01/14/24  1408 01/14/24  2046 01/15/24  0315 01/15/24  1241 01/16/24  0017 01/16/24  0749   HGB 7.7*   < > 6.4* 7.3* 6.8* 9.1*   INR 4.5*  --  1.6*  --   --  1.3*    < > = values in this interval not displayed.     Date               INR                  Dose  1/15  1.6  2 mg    1/16  1.3  3 mg                                                                                Assessment/ Plan:  Will order warfarin 3 mg PO x 1 dose.    Pharmacy will continue to monitor daily and adjust therapy as indicated.  Please contact the pharmacist at [phone] or [phone] for outpatient recommendations if needed.

## 2024-01-16 NOTE — PROGRESS NOTES
cleared by GI  -monitor for signs of bleeding now that the patient is on coumadin/heparin gtt     HFrEF S/P LVAD and placement  Chronic anticoagulation use  - AHF team, held entresto sec to cough     KOLBY: now resolved  HLD: Can resume statin     CRITICAL CARE ATTESTATION:  I had a face to face encounter with the patient, reviewed and interpreted patient data including clinical events, labs, images, vital signs, I/O's, and examined patient.  I have discussed the case and the plan and management of the patient's care with the consulting services, the bedside nurses and necessary ancillary providers.       NOTE OF PERSONAL INVOLVEMENT IN CARE   This patient has a high probability of imminent, clinically significant deterioration, which requires the highest level of preparedness to intervene urgently. I participated in the decision-making and personally managed or directed the management of the following life and organ supporting interventions that required my frequent assessment to treat or prevent imminent deterioration.     I personally spent 43 minutes of critical care time.  This is time spent at this critically ill patient's bedside actively involved in patient care as well as the coordination of care and discussions with the patient's family.  This does not include any procedural time which has been billed separately.        Code status: FULL CODE  Prophylaxis: coumadin/heparin gtt    Plan: coumadin.heparin gtt  Care Plan discussed with: patient, family  Anticipated Disposition: ?tomorrow  Inpatient  Cardiac monitoring: Telemetry  Central Line:            Social Determinants of Health     Tobacco Use: Medium Risk (1/11/2024)    Patient History     Smoking Tobacco Use: Former     Smokeless Tobacco Use: Never     Passive Exposure: Not on file   Alcohol Use: Not on file   Financial Resource Strain: Not on file   Food Insecurity: Patient Declined (1/15/2024)    Hunger Vital Sign     Worried About Running Out of Food  appropriate clinical/nonclinical/ nursing providers based on care coordination needs.         Patients current active Medications were reviewed, considered, added and adjusted based on the clinical condition today.      Home Medications were reconciled to the best of my ability given all available resources at the time of admission. Route is PO if not otherwise noted.      Admission Status:[id number]:::1}      Medications Reviewed:     Current Facility-Administered Medications   Medication Dose Route Frequency    0.9 % sodium chloride infusion   IntraVENous PRN    warfarin (COUMADIN) tablet 3 mg  3 mg Oral Once    heparin (porcine) injection 3,400 Units  60 Units/kg IntraVENous Once    heparin (porcine) injection 3,400 Units  60 Units/kg IntraVENous PRN    heparin (porcine) injection 1,700 Units  30 Units/kg IntraVENous PRN    heparin 25,000 units in dextrose 5% 250 mL (premix) infusion  5-30 Units/kg/hr IntraVENous Continuous    0.9 % sodium chloride infusion   IntraVENous PRN    amiodarone (CORDARONE) tablet 200 mg  200 mg Oral Daily    hydrALAZINE (APRESOLINE) tablet 100 mg  100 mg Oral 3 times per day    isosorbide dinitrate (ISORDIL) tablet 40 mg  40 mg Oral q8h    magnesium oxide (MAG-OX) tablet 400 mg  400 mg Oral Daily    metoprolol succinate (TOPROL XL) extended release tablet 25 mg  25 mg Oral BID    [Held by provider] sacubitril-valsartan (ENTRESTO)  MG per tablet 1 tablet  1 tablet Oral BID    spironolactone (ALDACTONE) tablet 25 mg  25 mg Oral Daily    Vitamin D (CHOLECALCIFEROL) tablet 1,000 Units  1,000 Units Oral Daily    0.9 % sodium chloride infusion   IntraVENous Continuous    warfarin placeholder: dosing by pharmacy   Other RX Placeholder    sodium chloride flush 0.9 % injection 5-40 mL  5-40 mL IntraVENous 2 times per day    sodium chloride flush 0.9 % injection 5-40 mL  5-40 mL IntraVENous PRN    0.9 % sodium chloride infusion   IntraVENous PRN    ondansetron (ZOFRAN) injection 4 mg  4 mg

## 2024-01-16 NOTE — PROGRESS NOTES
ADVANCED HEART FAILURE CENTER  [facility] in [place], [place]  Inpatient Progress Note      Patient name: Bishop Love  Patient : 1955  Patient MRN: [id number]  Consulting MD: Naga Hope MD  Date of service: 24      Reason for referral:  Management of LVAD    PLAN OF CARE:  68 y.o. with h/o severe ischemic cardiomyopathy, LVEF 15% s/p HM3 LVAD implantation (23) as destination therapy due not candidate for heart transplant (tobacco), malnutrition and severity of PVD, stage C, NYHA class II, on GDMT limited by hypotension/RV dysfunction/IVVD  Patient is interested in formal evaluation for heart transplant; we plan on monthly urine cotinine screening x 6 months.  Negative since .    Called office on  for c/o hematemesis and dark red stools x 2 days, advised to present to the ED for admission for GI bleed.     Interval Events:  Hgb 6.8 this morning- transfused 1U PRBC  MAPs 70s  Creatinine stable   INR 1.3  Low flow alarms noted- last one at 8am today   EGD- nash ayala tear noted      PLAN:  Chronic HFrEF  Continue toprol XL 25mg twice daily with hold parameters  Hold Entresto due to possible allergy with cough since starting  Continue hydralazine/isordil 100/40mg TID with hold parameters  Have ordered PRN hydralazine 10 mg IV for MAP>100 if pt becomes hypertensive after volume resusitation  Continue spironolactone 25mg daily with hold parameters  Hold Norvasc 2.5mg due to hypotension  No SGLT2i due to recurrent UTI- consider re challenging at a later time  Not on allopurinol or uloric     LVAD  Continue current device speed at 5200   Low flow alarms reported, likely due to hypovolemia in the setting of GI bleed  Continue dressing changes 3x weekly  Hold AC while actively bleeding     GI bleed- nash ayala tear  H/H every 6hrs  PPI gtt for now per GI  transfuse for Hg <8 and Plt <50  GI consulted, appreciate recs      Supratherapeutic INR  -INR 1.3  -Continue

## 2024-01-16 NOTE — PROGRESS NOTES
Called about patient hemoglobin level of 6.8.  Will transfuse patient with 1 unit of packed red blood cell

## 2024-01-16 NOTE — CARE COORDINATION
Care Management Initial Assessment       RUR:  19%  Readmission? No  1st IM letter given? Yes - 1/16/24  1st  letter given: No  Insurance  [employer] [place] [employer] and [facility]    Admission  Acute blood loss anemia   GI bleed  LVAD    Hx LVAD (9/23),  ICD (10/23) HTN, HLD, CAD, PVD    Cardiology, GI and Hospitalist following    Plan   Home with girlfriend, family support and HH-- [facility]  [phone]  CM will send referral when    Resumption order for SN is secured    Disposition   Home - girlfriend in the home and assists --self care and independent using cane for mobility  girlfriend, family and friends transport  Transportation   Sister, Catalina  [facility]   Resumption order for HH SN and any other service is needed-- CM will send referral when order secured.   Medical follow up   PCP and specialist   Contact  Sister  Catalina Santos [facility]  [phone]  Girlfriend   Kaylin Murcia [facility]  [phone]    CM met with patient in his room to introduce self and explain role.  Patient was alert and oriented-- confirmed his address as [address] [place] [address]   [place]  [address].  He has a nephew in [facility] [facility] [place] that he stayed with after his LVAD surgery..  He said he has supportive family and friends in [facility].      Patient lives in a one level home with 3 steps to enter with his girlfriend.  He has two sisters friends and Kaylin's daughter who are supportive  He has no children.    His family transports to appointments.   Patient was independent prior to admission using cane for mobility.    Patient is open to [facility]  for SN .  CM confirmed with Tiffanie at the agency--[phone]     CM will send referral with resumption order and any other services needed-- fax attached, when secured.    Patient said his sister will transport home.      Cm will follow and assist with transition of care planning.         01/16/24 7553   Service Assessment   Patient Orientation Alert and  Oriented   Cognition Alert   History Provided By Patient   Primary Caregiver Self   Support Systems Spouse/Significant Other;Family Members   Patient's Healthcare Decision Maker is: Named in Scanned ACP Document   PCP Verified by CM Yes   Last Visit to PCP Within last 3 months   Prior Functional Level Independent in ADLs/IADLs   Current Functional Level Independent in ADLs/IADLs   Can patient return to prior living arrangement Yes   Ability to make needs known: Good   Family able to assist with home care needs: Yes   Would you like for me to discuss the discharge plan with any other family members/significant others, and if so, who? Yes  (sister  Catalina Santos--[phone])   Financial Resources Medicare;Medicaid   Social/Functional History   Lives With Significant other   Type of Home House   Home Layout One level   Home Equipment Cane   Receives Help From Family;Friend(s)   ADL Assistance Independent   Homemaking Assistance Independent   Ambulation Assistance Independent   Transfer Assistance Independent   Active  No   Mode of Transportation Family;Car   Occupation Retired   Discharge Planning   Type of Residence House   Current Services Prior To Admission Home Care  (home health  [facility]  SN  needs resumption order)   DME Ordered? Cane   Potential Assistance Purchasing Medications No  (medicaid)   Patient expects to be discharged to: House   Services At/After Discharge   Transition of Care Consult (CM Consult) Home Health   Internal Home Health No  (out of servicing area   open to another agency)   Mode of Transport at Discharge Other (see comment)  (sister will transport home)   Condition of Participation: Discharge Planning   The Plan for Transition of Care is related to the following treatment goals:    Open to [facility] in [facility]   The Patient and/or Patient Representative was provided with a Choice of Provider? Patient   The Patient and/Or Patient Representative agree with the Discharge Plan? Yes

## 2024-01-16 NOTE — PLAN OF CARE
1930: Bedside and Verbal shift change report given to JAKOB Guzman (oncoming nurse) by Kathrin/JAKOB Frye (offgoing nurse). Report included the following information Nurse Handoff Report.      0130: Patient hgb resulted 6.8.     0215: Received order to transfuse patient 1 unit PRBC.    0245: Blood transfusion started.    0645: Blood transfusion completed.    2330: LVAD driveline dressing change completed using sterile procedure. Site CDI.     Problem: Discharge Planning  Goal: Discharge to home or other facility with appropriate resources  1/15/2024 1355 by Melva Joe RN  Outcome: Progressing  Problem: Safety - Adult  Goal: Free from fall injury  1/16/2024 0259 by Marcio Damico RN  Flowsheets (Taken 1/16/2024 0259)  Free From Fall Injury: Instruct family/caregiver on patient safety  1/15/2024 1355 by Melva Joe RN  Outcome: Progressing     Problem: ABCDS Injury Assessment  Goal: Absence of physical injury  1/16/2024 0259 by Marcio Damico RN  Flowsheets (Taken 1/16/2024 0259)  Absence of Physical Injury: Implement safety measures based on patient assessment  1/15/2024 1355 by Melva Joe RN  Outcome: Progressing     Problem: Chronic Conditions and Co-morbidities  Goal: Patient's chronic conditions and co-morbidity symptoms are monitored and maintained or improved  1/16/2024 0259 by Marcio Damico RN  Flowsheets (Taken 1/16/2024 0259)  Care Plan - Patient's Chronic Conditions and Co-Morbidity Symptoms are Monitored and Maintained or Improved: Monitor and assess patient's chronic conditions and comorbid symptoms for stability, deterioration, or improvement  1/15/2024 1355 by Melva Joe RN  Outcome: Progressing

## 2024-01-17 LAB
ABO + RH BLD: NORMAL
ALBUMIN SERPL-MCNC: 2.5 G/DL (ref 3.5–5)
ALBUMIN/GLOB SERPL: 0.8 (ref 1.1–2.2)
ALP SERPL-CCNC: 69 U/L (ref 45–117)
ALT SERPL-CCNC: 22 U/L (ref 12–78)
ANION GAP SERPL CALC-SCNC: 7 MMOL/L (ref 5–15)
AST SERPL-CCNC: 22 U/L (ref 15–37)
BILIRUB DIRECT SERPL-MCNC: 0.2 MG/DL (ref 0–0.2)
BILIRUB SERPL-MCNC: 0.6 MG/DL (ref 0.2–1)
BLD PROD TYP BPU: NORMAL
BLOOD BANK DISPENSE STATUS: NORMAL
BLOOD GROUP ANTIBODIES SERPL: NORMAL
BPU ID: NORMAL
BUN SERPL-MCNC: 14 MG/DL (ref 6–20)
BUN/CREAT SERPL: 18 (ref 12–20)
CALCIUM SERPL-MCNC: 8.1 MG/DL (ref 8.5–10.1)
CHLORIDE SERPL-SCNC: 110 MMOL/L (ref 97–108)
CO2 SERPL-SCNC: 21 MMOL/L (ref 21–32)
CREAT SERPL-MCNC: 0.77 MG/DL (ref 0.7–1.3)
CROSSMATCH RESULT: NORMAL
GLOBULIN SER CALC-MCNC: 3.1 G/DL (ref 2–4)
GLUCOSE SERPL-MCNC: 91 MG/DL (ref 65–100)
HCT VFR BLD AUTO: 23.3 % (ref 36.6–50.3)
HCT VFR BLD AUTO: 26.1 % (ref 36.6–50.3)
HGB BLD-MCNC: 7.9 G/DL (ref 12.1–17)
HGB BLD-MCNC: 9 G/DL (ref 12.1–17)
INR PPP: 1.8 (ref 0.9–1.1)
LDH SERPL L TO P-CCNC: 254 U/L (ref 85–241)
MAGNESIUM SERPL-MCNC: 1.6 MG/DL (ref 1.6–2.4)
NT PRO BNP: 1048 PG/ML
POTASSIUM SERPL-SCNC: 3.6 MMOL/L (ref 3.5–5.1)
PROT SERPL-MCNC: 5.6 G/DL (ref 6.4–8.2)
PROTHROMBIN TIME: 17.8 SEC (ref 9–11.1)
SODIUM SERPL-SCNC: 138 MMOL/L (ref 136–145)
SPECIMEN EXP DATE BLD: NORMAL
UFH PPP CHRO-ACNC: 0.3 IU/ML
UFH PPP CHRO-ACNC: 0.32 IU/ML
UNIT DIVISION: 0

## 2024-01-17 PROCEDURE — 83735 ASSAY OF MAGNESIUM: CPT

## 2024-01-17 PROCEDURE — 83880 ASSAY OF NATRIURETIC PEPTIDE: CPT

## 2024-01-17 PROCEDURE — 6370000000 HC RX 637 (ALT 250 FOR IP): Performed by: INTERNAL MEDICINE

## 2024-01-17 PROCEDURE — 6370000000 HC RX 637 (ALT 250 FOR IP): Performed by: HOSPITALIST

## 2024-01-17 PROCEDURE — 36410 VNPNXR 3YR/> PHY/QHP DX/THER: CPT

## 2024-01-17 PROCEDURE — 2580000003 HC RX 258: Performed by: NURSE PRACTITIONER

## 2024-01-17 PROCEDURE — 85520 HEPARIN ASSAY: CPT

## 2024-01-17 PROCEDURE — 6360000002 HC RX W HCPCS: Performed by: NURSE PRACTITIONER

## 2024-01-17 PROCEDURE — 2060000000 HC ICU INTERMEDIATE R&B

## 2024-01-17 PROCEDURE — 85610 PROTHROMBIN TIME: CPT

## 2024-01-17 PROCEDURE — 2709999900 HC NON-CHARGEABLE SUPPLY

## 2024-01-17 PROCEDURE — 76937 US GUIDE VASCULAR ACCESS: CPT

## 2024-01-17 PROCEDURE — C1751 CATH, INF, PER/CENT/MIDLINE: HCPCS

## 2024-01-17 PROCEDURE — 85018 HEMOGLOBIN: CPT

## 2024-01-17 PROCEDURE — C9113 INJ PANTOPRAZOLE SODIUM, VIA: HCPCS | Performed by: NURSE PRACTITIONER

## 2024-01-17 PROCEDURE — 36415 COLL VENOUS BLD VENIPUNCTURE: CPT

## 2024-01-17 PROCEDURE — APPNB30 APP NON BILLABLE TIME 0-30 MINS: Performed by: NURSE PRACTITIONER

## 2024-01-17 PROCEDURE — A4216 STERILE WATER/SALINE, 10 ML: HCPCS | Performed by: NURSE PRACTITIONER

## 2024-01-17 PROCEDURE — 80048 BASIC METABOLIC PNL TOTAL CA: CPT

## 2024-01-17 PROCEDURE — 83615 LACTATE (LD) (LDH) ENZYME: CPT

## 2024-01-17 PROCEDURE — 05HA33Z INSERTION OF INFUSION DEVICE INTO LEFT BRACHIAL VEIN, PERCUTANEOUS APPROACH: ICD-10-PCS | Performed by: INTERNAL MEDICINE

## 2024-01-17 PROCEDURE — 85014 HEMATOCRIT: CPT

## 2024-01-17 PROCEDURE — 6370000000 HC RX 637 (ALT 250 FOR IP): Performed by: NURSE PRACTITIONER

## 2024-01-17 PROCEDURE — 2580000003 HC RX 258: Performed by: PHYSICIAN ASSISTANT

## 2024-01-17 PROCEDURE — 80076 HEPATIC FUNCTION PANEL: CPT

## 2024-01-17 RX ORDER — MAGNESIUM SULFATE 1 G/100ML
1000 INJECTION INTRAVENOUS ONCE
Status: COMPLETED | OUTPATIENT
Start: 2024-01-17 | End: 2024-01-17

## 2024-01-17 RX ORDER — POTASSIUM CHLORIDE 7.45 MG/ML
10 INJECTION INTRAVENOUS
Status: COMPLETED | OUTPATIENT
Start: 2024-01-17 | End: 2024-01-17

## 2024-01-17 RX ADMIN — SODIUM CHLORIDE, PRESERVATIVE FREE 10 ML: 5 INJECTION INTRAVENOUS at 20:19

## 2024-01-17 RX ADMIN — SODIUM CHLORIDE, PRESERVATIVE FREE 40 MG: 5 INJECTION INTRAVENOUS at 18:07

## 2024-01-17 RX ADMIN — MAGNESIUM OXIDE TAB 400 MG (241.3 MG ELEMENTAL MG) 400 MG: 400 (241.3 MG) TAB at 10:26

## 2024-01-17 RX ADMIN — HYDRALAZINE HYDROCHLORIDE 100 MG: 50 TABLET, FILM COATED ORAL at 14:07

## 2024-01-17 RX ADMIN — WARFARIN SODIUM 3 MG: 2 TABLET ORAL at 18:07

## 2024-01-17 RX ADMIN — HEPARIN SODIUM 14 UNITS/KG/HR: 10000 INJECTION, SOLUTION INTRAVENOUS at 20:27

## 2024-01-17 RX ADMIN — ATORVASTATIN CALCIUM 80 MG: 40 TABLET, FILM COATED ORAL at 20:18

## 2024-01-17 RX ADMIN — SODIUM CHLORIDE, PRESERVATIVE FREE 40 MG: 5 INJECTION INTRAVENOUS at 07:51

## 2024-01-17 RX ADMIN — SODIUM CHLORIDE, PRESERVATIVE FREE 10 ML: 5 INJECTION INTRAVENOUS at 10:26

## 2024-01-17 RX ADMIN — ISOSORBIDE DINITRATE 40 MG: 20 TABLET ORAL at 10:26

## 2024-01-17 RX ADMIN — ISOSORBIDE DINITRATE 40 MG: 20 TABLET ORAL at 18:08

## 2024-01-17 RX ADMIN — SPIRONOLACTONE 25 MG: 25 TABLET ORAL at 10:26

## 2024-01-17 RX ADMIN — AMIODARONE HYDROCHLORIDE 200 MG: 200 TABLET ORAL at 10:26

## 2024-01-17 RX ADMIN — METOPROLOL SUCCINATE 25 MG: 25 TABLET, EXTENDED RELEASE ORAL at 10:26

## 2024-01-17 RX ADMIN — Medication 1000 UNITS: at 10:26

## 2024-01-17 RX ADMIN — POTASSIUM CHLORIDE 10 MEQ: 10 INJECTION, SOLUTION INTRAVENOUS at 10:25

## 2024-01-17 RX ADMIN — HYDRALAZINE HYDROCHLORIDE 100 MG: 50 TABLET, FILM COATED ORAL at 07:51

## 2024-01-17 RX ADMIN — MAGNESIUM SULFATE HEPTAHYDRATE 1000 MG: 1 INJECTION, SOLUTION INTRAVENOUS at 10:25

## 2024-01-17 RX ADMIN — POTASSIUM CHLORIDE 10 MEQ: 10 INJECTION, SOLUTION INTRAVENOUS at 11:00

## 2024-01-17 RX ADMIN — HYDRALAZINE HYDROCHLORIDE 100 MG: 50 TABLET, FILM COATED ORAL at 20:18

## 2024-01-17 ASSESSMENT — ENCOUNTER SYMPTOMS
ABDOMINAL PAIN: 0
DIARRHEA: 0
COUGH: 0
NAUSEA: 0
ABDOMINAL DISTENTION: 0
VOMITING: 0
SHORTNESS OF BREATH: 0

## 2024-01-17 NOTE — PROGRESS NOTES
ADVANCED HEART FAILURE CENTER  [facility] in [place], [place]  Inpatient Progress Note      Patient name: Bishop Love  Patient : 1955  Patient MRN: [id number]  Consulting MD: Naga Hope MD  Date of service: 24      Reason for referral:  Management of LVAD    PLAN OF CARE:  68 y.o. with h/o severe ischemic cardiomyopathy, LVEF 15% s/p HM3 LVAD implantation (23) as destination therapy due not candidate for heart transplant (tobacco), malnutrition and severity of PVD, stage C, NYHA class II, on GDMT limited by hypotension/RV dysfunction/IVVD  Patient is interested in formal evaluation for heart transplant; we plan on monthly urine cotinine screening x 6 months.  Negative since .    Called office on  for c/o hematemesis and dark red stools x 2 days, advised to present to the ED for admission for GI bleed.     Interval Events:  Hgb 7.9 this am   MAPs 70s-80s  Creatinine stable   INR 1.8  Still having low flows  Feels well. No dizziness or SOB.     PLAN:  Chronic HFrEF  Continue toprol XL 25mg twice daily with hold parameters  Hold Entresto due to possible allergy with cough since starting  Continue hydralazine/isordil 100/40mg TID with hold parameters  Have ordered PRN hydralazine 10 mg IV for MAP>100 if pt becomes hypertensive after volume resusitation  Continue spironolactone 25mg daily with hold parameters  Hold Norvasc 2.5mg due to hypotension  No SGLT2i due to recurrent UTI- consider re challenging at a later time  Not on allopurinol or uloric     LVAD  Continue current device speed at 5200   Low flow alarms reported, likely due to hypovolemia in the setting of GI bleed  Continue dressing changes 3x weekly  Cont heparin gtt to bridge to coumadin    GI bleed- nash ayala tear  H/H every 6hrs  PPI gtt for now per GI  transfuse for Hg <8 and Plt <50  GI consulted, appreciate recs  S/p EGD on 1/15- significant for 2 cm nash ayala tear (ulcer) non bleeding, with

## 2024-01-17 NOTE — PROCEDURES
1445Midline Insertion and Progress Note    PRE-PROCEDURE VERIFICATION  Correct Procedure: yes  Correct Site: yes  Temperature: Temp: 98.1 °F (36.7 °C), Temperature Source:    Recent Labs     01/17/24  0510 01/17/24  0527   BUN 14  --    INR  --  1.8*     Allergies: Patient has no known allergies.    PROCEDURE DETAIL  A single midline IV catheter was started for vascular access. The following documentation is in addition to the Midline properties in the lines/airways flowsheet:  Xylocaine 1% used intradermally: Yes  Lot #: [id number]  Catheter to vein ratio: 33%  Catheter Total Length: 11 (cm)  External Catheter Length: 0 (cm)  Circumference at 10 cm above ac: 26.5 (cm)  Vein Selection for Midline: left brachial  Complication Related to Insertion: none    Line is okay to use - spoke w/Tiffany Pegram RN BRENDA J THROCKMORTON, RN

## 2024-01-17 NOTE — PLAN OF CARE
Problem: Safety - Adult  Goal: Free from fall injury  Flowsheets (Taken 1/17/2024 0951)  Free From Fall Injury: Instruct family/caregiver on patient safety     Problem: Chronic Conditions and Co-morbidities  Goal: Patient's chronic conditions and co-morbidity symptoms are monitored and maintained or improved  Flowsheets (Taken 1/17/2024 0951)  Care Plan - Patient's Chronic Conditions and Co-Morbidity Symptoms are Monitored and Maintained or Improved: Monitor and assess patient's chronic conditions and comorbid symptoms for stability, deterioration, or improvement

## 2024-01-17 NOTE — PROGRESS NOTES
clinical/nonclinical/nursing services involved in patient's clinical care. Care coordination discussions were held with appropriate clinical/nonclinical/ nursing providers based on care coordination needs.         Patients current active Medications were reviewed, considered, added and adjusted based on the clinical condition today.      Home Medications were reconciled to the best of my ability given all available resources at the time of admission. Route is PO if not otherwise noted.      Admission Status:[id number]:::1}      Medications Reviewed:     Current Facility-Administered Medications   Medication Dose Route Frequency    warfarin (COUMADIN) tablet 3 mg  3 mg Oral Once    0.9 % sodium chloride infusion   IntraVENous PRN    heparin (porcine) injection 3,400 Units  60 Units/kg IntraVENous PRN    heparin (porcine) injection 1,700 Units  30 Units/kg IntraVENous PRN    heparin 25,000 units in dextrose 5% 250 mL (premix) infusion  5-30 Units/kg/hr IntraVENous Continuous    atorvastatin (LIPITOR) tablet 80 mg  80 mg Oral Nightly    0.9 % sodium chloride infusion   IntraVENous PRN    amiodarone (CORDARONE) tablet 200 mg  200 mg Oral Daily    hydrALAZINE (APRESOLINE) tablet 100 mg  100 mg Oral 3 times per day    isosorbide dinitrate (ISORDIL) tablet 40 mg  40 mg Oral q8h    magnesium oxide (MAG-OX) tablet 400 mg  400 mg Oral Daily    metoprolol succinate (TOPROL XL) extended release tablet 25 mg  25 mg Oral BID    [Held by provider] sacubitril-valsartan (ENTRESTO)  MG per tablet 1 tablet  1 tablet Oral BID    spironolactone (ALDACTONE) tablet 25 mg  25 mg Oral Daily    Vitamin D (CHOLECALCIFEROL) tablet 1,000 Units  1,000 Units Oral Daily    warfarin placeholder: dosing by pharmacy   Other RX Placeholder    sodium chloride flush 0.9 % injection 5-40 mL  5-40 mL IntraVENous 2 times per day    sodium chloride flush 0.9 % injection 5-40 mL  5-40 mL IntraVENous PRN    0.9 % sodium chloride infusion   IntraVENous PRN

## 2024-01-17 NOTE — PROGRESS NOTES
Pharmacist Note - Warfarin Dosing  Consult provided for this 68 y.o.male to manage warfarin for LVAD    INR Goal: 2 - 3    Home regimen/ tablet size: 2 mg Monday and Friday/3 mg all other days of the week    Drugs that may increase INR: Amiodarone  Drugs that may decrease INR: None  Other current anticoagulants/ drugs that may increase bleeding risk: Heparin  Risk factors: Age > 65 and Decompensated Heart Failure  Daily INR ordered through: 4/6/24    Recent Labs     01/15/24  0315 01/15/24  1241 01/16/24  0749 01/16/24  1326 01/16/24  2019 01/17/24  0510 01/17/24  0527   HGB 6.4*   < > 9.1* 8.6* 8.1* 7.9*  --    INR 1.6*  --  1.3*  --   --   --  1.8*    < > = values in this interval not displayed.     Date               INR                  Dose  1/15  1.6  2 mg    1/16  1.3  3 mg   1/17  1.8  3 mg                                                                               Assessment/ Plan:  Will order warfarin 3 mg PO x 1 dose.    Pharmacy will continue to monitor daily and adjust therapy as indicated.  Please contact the pharmacist at [phone] or [phone] for outpatient recommendations if needed.

## 2024-01-17 NOTE — PROGRESS NOTES
Per previous nurse, NP Norma said there is no order for LR and to turn it off. Order still in place in MAR, but kept off d/t verbal order from GITA Green.

## 2024-01-18 VITALS
TEMPERATURE: 98 F | DIASTOLIC BLOOD PRESSURE: 80 MMHG | SYSTOLIC BLOOD PRESSURE: 120 MMHG | RESPIRATION RATE: 16 BRPM | WEIGHT: 132.9 LBS | BODY MASS INDEX: 20.21 KG/M2 | HEART RATE: 62 BPM | OXYGEN SATURATION: 97 %

## 2024-01-18 LAB
ALBUMIN SERPL-MCNC: 2.7 G/DL (ref 3.5–5)
ALBUMIN/GLOB SERPL: 1.1 (ref 1.1–2.2)
ALP SERPL-CCNC: 72 U/L (ref 45–117)
ALT SERPL-CCNC: 23 U/L (ref 12–78)
ANION GAP SERPL CALC-SCNC: 8 MMOL/L (ref 5–15)
AST SERPL-CCNC: 24 U/L (ref 15–37)
BILIRUB DIRECT SERPL-MCNC: 0.2 MG/DL (ref 0–0.2)
BILIRUB SERPL-MCNC: 0.4 MG/DL (ref 0.2–1)
BUN SERPL-MCNC: 9 MG/DL (ref 6–20)
BUN/CREAT SERPL: 10 (ref 12–20)
CALCIUM SERPL-MCNC: 8 MG/DL (ref 8.5–10.1)
CHLORIDE SERPL-SCNC: 109 MMOL/L (ref 97–108)
CO2 SERPL-SCNC: 23 MMOL/L (ref 21–32)
CREAT SERPL-MCNC: 0.88 MG/DL (ref 0.7–1.3)
ERYTHROCYTE [DISTWIDTH] IN BLOOD BY AUTOMATED COUNT: 15.1 % (ref 11.5–14.5)
GLOBULIN SER CALC-MCNC: 2.5 G/DL (ref 2–4)
GLUCOSE BLD STRIP.AUTO-MCNC: 110 MG/DL (ref 65–117)
GLUCOSE BLD STRIP.AUTO-MCNC: 132 MG/DL (ref 65–117)
GLUCOSE BLD STRIP.AUTO-MCNC: 136 MG/DL (ref 65–117)
GLUCOSE SERPL-MCNC: 93 MG/DL (ref 65–100)
HCT VFR BLD AUTO: 21.6 % (ref 36.6–50.3)
HCT VFR BLD AUTO: 21.9 % (ref 36.6–50.3)
HGB BLD-MCNC: 7.5 G/DL (ref 12.1–17)
HGB BLD-MCNC: 7.5 G/DL (ref 12.1–17)
INR PPP: 2.3 (ref 0.9–1.1)
LDH SERPL L TO P-CCNC: 272 U/L (ref 85–241)
MAGNESIUM SERPL-MCNC: 1.7 MG/DL (ref 1.6–2.4)
MCH RBC QN AUTO: 29.5 PG (ref 26–34)
MCHC RBC AUTO-ENTMCNC: 34.7 G/DL (ref 30–36.5)
MCV RBC AUTO: 85 FL (ref 80–99)
NRBC # BLD: 0 K/UL (ref 0–0.01)
NRBC BLD-RTO: 0 PER 100 WBC
NT PRO BNP: 1838 PG/ML
PLATELET # BLD AUTO: 161 K/UL (ref 150–400)
PMV BLD AUTO: 10.8 FL (ref 8.9–12.9)
POTASSIUM SERPL-SCNC: 3.9 MMOL/L (ref 3.5–5.1)
PROT SERPL-MCNC: 5.2 G/DL (ref 6.4–8.2)
PROTHROMBIN TIME: 23.2 SEC (ref 9–11.1)
RBC # BLD AUTO: 2.54 M/UL (ref 4.1–5.7)
SERVICE CMNT-IMP: ABNORMAL
SERVICE CMNT-IMP: ABNORMAL
SERVICE CMNT-IMP: NORMAL
SODIUM SERPL-SCNC: 140 MMOL/L (ref 136–145)
UFH PPP CHRO-ACNC: 0.29 IU/ML
WBC # BLD AUTO: 6.6 K/UL (ref 4.1–11.1)

## 2024-01-18 PROCEDURE — 6370000000 HC RX 637 (ALT 250 FOR IP): Performed by: NURSE PRACTITIONER

## 2024-01-18 PROCEDURE — 85520 HEPARIN ASSAY: CPT

## 2024-01-18 PROCEDURE — 83615 LACTATE (LD) (LDH) ENZYME: CPT

## 2024-01-18 PROCEDURE — 83735 ASSAY OF MAGNESIUM: CPT

## 2024-01-18 PROCEDURE — 2580000003 HC RX 258: Performed by: NURSE PRACTITIONER

## 2024-01-18 PROCEDURE — 36415 COLL VENOUS BLD VENIPUNCTURE: CPT

## 2024-01-18 PROCEDURE — 2580000003 HC RX 258: Performed by: PHYSICIAN ASSISTANT

## 2024-01-18 PROCEDURE — 99232 SBSQ HOSP IP/OBS MODERATE 35: CPT | Performed by: NURSE PRACTITIONER

## 2024-01-18 PROCEDURE — 93750 INTERROGATION VAD IN PERSON: CPT | Performed by: NURSE PRACTITIONER

## 2024-01-18 PROCEDURE — 85027 COMPLETE CBC AUTOMATED: CPT

## 2024-01-18 PROCEDURE — 80076 HEPATIC FUNCTION PANEL: CPT

## 2024-01-18 PROCEDURE — 82962 GLUCOSE BLOOD TEST: CPT

## 2024-01-18 PROCEDURE — 6370000000 HC RX 637 (ALT 250 FOR IP): Performed by: STUDENT IN AN ORGANIZED HEALTH CARE EDUCATION/TRAINING PROGRAM

## 2024-01-18 PROCEDURE — 83880 ASSAY OF NATRIURETIC PEPTIDE: CPT

## 2024-01-18 PROCEDURE — 80048 BASIC METABOLIC PNL TOTAL CA: CPT

## 2024-01-18 PROCEDURE — 85014 HEMATOCRIT: CPT

## 2024-01-18 PROCEDURE — 85610 PROTHROMBIN TIME: CPT

## 2024-01-18 PROCEDURE — 6360000002 HC RX W HCPCS: Performed by: NURSE PRACTITIONER

## 2024-01-18 PROCEDURE — C9113 INJ PANTOPRAZOLE SODIUM, VIA: HCPCS | Performed by: NURSE PRACTITIONER

## 2024-01-18 PROCEDURE — A4216 STERILE WATER/SALINE, 10 ML: HCPCS | Performed by: NURSE PRACTITIONER

## 2024-01-18 PROCEDURE — 85018 HEMOGLOBIN: CPT

## 2024-01-18 RX ORDER — WARFARIN SODIUM 5 MG/1
2.5 TABLET ORAL
Status: COMPLETED | OUTPATIENT
Start: 2024-01-18 | End: 2024-01-18

## 2024-01-18 RX ADMIN — METOPROLOL SUCCINATE 25 MG: 25 TABLET, EXTENDED RELEASE ORAL at 08:57

## 2024-01-18 RX ADMIN — HYDRALAZINE HYDROCHLORIDE 100 MG: 50 TABLET, FILM COATED ORAL at 14:23

## 2024-01-18 RX ADMIN — AMIODARONE HYDROCHLORIDE 200 MG: 200 TABLET ORAL at 08:57

## 2024-01-18 RX ADMIN — SODIUM CHLORIDE, PRESERVATIVE FREE 40 MG: 5 INJECTION INTRAVENOUS at 05:07

## 2024-01-18 RX ADMIN — ISOSORBIDE DINITRATE 40 MG: 20 TABLET ORAL at 00:10

## 2024-01-18 RX ADMIN — Medication 1000 UNITS: at 08:57

## 2024-01-18 RX ADMIN — ISOSORBIDE DINITRATE 40 MG: 20 TABLET ORAL at 17:31

## 2024-01-18 RX ADMIN — ISOSORBIDE DINITRATE 40 MG: 20 TABLET ORAL at 08:57

## 2024-01-18 RX ADMIN — SODIUM CHLORIDE, PRESERVATIVE FREE 40 MG: 5 INJECTION INTRAVENOUS at 17:31

## 2024-01-18 RX ADMIN — SODIUM CHLORIDE, PRESERVATIVE FREE 10 ML: 5 INJECTION INTRAVENOUS at 08:58

## 2024-01-18 RX ADMIN — WARFARIN SODIUM 2.5 MG: 5 TABLET ORAL at 17:31

## 2024-01-18 RX ADMIN — SPIRONOLACTONE 25 MG: 25 TABLET ORAL at 08:57

## 2024-01-18 RX ADMIN — MAGNESIUM OXIDE TAB 400 MG (241.3 MG ELEMENTAL MG) 400 MG: 400 (241.3 MG) TAB at 08:57

## 2024-01-18 ASSESSMENT — ENCOUNTER SYMPTOMS
COUGH: 0
NAUSEA: 0
VOMITING: 0
SHORTNESS OF BREATH: 0
ABDOMINAL DISTENTION: 0
DIARRHEA: 0
ABDOMINAL PAIN: 0

## 2024-01-18 NOTE — PROGRESS NOTES
ADVANCED HEART FAILURE CENTER  [facility] in [place], [place]  Inpatient Progress Note      Patient name: Bishop Love  Patient : 1955  Patient MRN: [id number]  Consulting MD: Mauricio Carlos MD  Date of service: 24      Reason for referral:  Management of LVAD    PLAN OF CARE:  68 y.o. with h/o severe ischemic cardiomyopathy, LVEF 15% s/p HM3 LVAD implantation (23) as destination therapy due not candidate for heart transplant (tobacco), malnutrition and severity of PVD, stage C, NYHA class II, on GDMT limited by hypotension/RV dysfunction/IVVD  Patient is interested in formal evaluation for heart transplant; we plan on monthly urine cotinine screening x 6 months.  Negative since .    Called office on  for c/o hematemesis and dark red stools x 2 days, advised to present to the ED for admission for GI bleed.     Interval Events:  NAEO  Hgb 7.5 this am ( 7.0 yesterday)  PBNP up slightly  1 low flow in 24hours  States he feels better  Still on clear liquid diet     PLAN:  Chronic HFrEF  Continue toprol XL 25mg twice daily with hold parameters  Hold Entresto due to possible allergy with cough since starting  Continue hydralazine/isordil 100/40mg TID with hold parameters  Have ordered PRN hydralazine 10 mg IV for MAP>100 if pt becomes hypertensive after volume resusitation  Continue spironolactone 25mg daily with hold parameters  Hold Norvasc 2.5mg due to hypotension  No SGLT2i due to recurrent UTI- consider re challenging at a later time  Not on allopurinol or uloric     LVAD  Continue current device speed at 5200   Low flow alarms reported, likely due to hypovolemia in the setting of GI bleed  Continue dressing changes 3x weekly  Cont heparin gtt to bridge to coumadin    GI bleed- nash ayala tear  Repeat H/H this afternoon- if stable will DC today or tomorrow   PPI BID  transfuse for Hg <8 and Plt <50  GI consulted, appreciate recs  S/p EGD on 1/15- significant for 2

## 2024-01-18 NOTE — DISCHARGE INSTRUCTIONS
- We have hold Birilinta due to bleeding.Please follow up GI doctor before resuming it   - Follow up [facility] heart failure   - Entresto was held due to possible allergy

## 2024-01-18 NOTE — CARE COORDINATION
OLIVIA PLAN:  FERNANDO noted patient is discharged home today, met with him and he said his niece will provide transportation home. [facility] will follow. CM faxed [facility] order to [phone] as requested and notified them of discharge today. JORGE Cunningham MSA, RN, CM

## 2024-01-18 NOTE — PROGRESS NOTES
0730: Bedside and Verbal shift change report given to Garry RN (oncoming nurse) by Devora RN (offgoing nurse). Report included the following information Nurse Handoff Report, Index, Intake/Output, MAR, and Recent Results.     0900: LVAD alarms checked Low flow event at 0838 noted and multiple PI events    1030: Pt sleeping in bed    1800: pt safely d/c'd to car. Opportunity for questions provided and d/c education given

## 2024-01-18 NOTE — DISCHARGE SUMMARY
Discharge Summary   Please note that this dictation was completed with [employer], the computer voice recognition software.  Quite often unanticipated grammatical, syntax, homophones, and other interpretive errors are inadvertently transcribed by the computer software.  Please disregard these errors.  Please excuse any errors that have escaped final proofreading.    PATIENT ID: Bishop Love  MRN: [id number]   YOB: 1955    DATE OF ADMISSION: 1/14/2024  1:45 PM    DATE OF DISCHARGE: 1/18/2024  PRIMARY CARE PROVIDER: Freddy Chandler MD         ATTENDING PHYSICIAN: Mauricio Carlos MD  DISCHARGING PROVIDER: Mauricio Carlos MD       CONSULTATIONS: IP CONSULT TO HOSPITALIST  IP CONSULT TO ADVANCED HEART FAILURE  IP CONSULT TO GI  IP CONSULT TO PHARMACY  IP CONSULT TO CASE MANAGEMENT    PROCEDURES/SURGERIES: Procedure(s):  EGD ESOPHAGOGASTRODUODENOSCOPY, 3 clips placed, one clip fell off after being placed    ADMITTING HPI from excerpted H&P     Bishop Love is a 68 y.o. male with a PMHx of HFrEF s/p LVAD placement in September of 2023 and ICD placement in 10/2023, CAD, HTN, HLD and PVD.      He presents to the ED with Hematemesis and dark colored stools that began yesterday. Following these episodes his LVAD began alarming for low flow. Symptoms began yesterday and appear to have no clear aggravating or alleviating factors, He reports vomiting blood and subsequently having a dark colored BM. He denies any chest pain, shortness of breath, dizziness, lightheadedness, PND, orthopnea, vision changes, headache or any focal or generalized neurologic symptoms.  Hospitalist consulted for admission for management of acute GI bleed.      I spoke with the on-call Advanced HF NP, we will transfuse for HGB > 8 in the setting of low flow alarm and advanced HF. MAP goal will be 70-90     ED Workup  - Hgb 7.7, previously was 11.9 (12/04)  - INR: 4.5  - Na: 132  - Cr. 1.78     ED Rx  - [address] [place] [address]  3, awake, no acute distress,   HEENT: PEERL, EOMI, moist mucus membrane, TM clear  Neck: supple, no JVD, no meningeal signs  Chest: Clear to auscultation bilaterally   CVS: S1 S2 heard, Capillary refill less than 2 seconds  Abd: soft/ Non tender, non distended, BS physiological,   Ext: no clubbing, no cyanosis, no edema, brisk 2+ DP pulses  Neuro/Psych: pleasant mood and affect, CN 2-12 grossly intact, sensory grossly within normal limit, Strength 5/5 in all extremities, DTR 1+ x 4  Skin: warm     CHRONIC MEDICAL DIAGNOSES:      Greater than 31 minutes were spent with the patient on counseling and coordination of care    Signed:   Mauricio Carlos MD  1/18/2024  3:14 PM

## 2024-01-18 NOTE — PROGRESS NOTES
Pharmacist Note - Warfarin Dosing  Consult provided for this 68 y.o.male to manage warfarin for LVAD    INR Goal: 2 - 3    Home regimen/ tablet size: 2 mg Monday and Friday/3 mg all other days of the week    Drugs that may increase INR: Amiodarone  Drugs that may decrease INR: None  Other current anticoagulants/ drugs that may increase bleeding risk: Heparin  Risk factors: Age > 65 and Decompensated Heart Failure  Daily INR ordered through: 4/6/24    Recent Labs     01/16/24  0749 01/16/24  1326 01/17/24  0510 01/17/24  0527 01/17/24  0915 01/18/24  0520   HGB 9.1*   < > 7.9*  --  9.0* 7.5*   INR 1.3*  --   --  1.8*  --  2.3*    < > = values in this interval not displayed.     Date               INR                  Dose  1/15  1.6  2 mg    1/16  1.3  3 mg   1/17  1.8  3 mg  1/18  2.3  2.5 mg                                                                               Assessment/ Plan:  Will order warfarin 2.5 mg PO x 1 dose.    Pharmacy will continue to monitor daily and adjust therapy as indicated.  Please contact the pharmacist at x [phone] for outpatient recommendations if needed.

## 2024-01-19 ENCOUNTER — TELEPHONE (OUTPATIENT)
Age: 69
End: 2024-01-19

## 2024-01-19 NOTE — PLAN OF CARE
Problem: Discharge Planning  Goal: Discharge to home or other facility with appropriate resources  Outcome: Adequate for Discharge     Problem: Safety - Adult  Goal: Free from fall injury  Outcome: Adequate for Discharge     Problem: ABCDS Injury Assessment  Goal: Absence of physical injury  Outcome: Adequate for Discharge     Problem: Chronic Conditions and Co-morbidities  Goal: Patient's chronic conditions and co-morbidity symptoms are monitored and maintained or improved  Outcome: Adequate for Discharge  Flowsheets (Taken 1/18/2024 7720)  Care Plan - Patient's Chronic Conditions and Co-Morbidity Symptoms are Monitored and Maintained or Improved: Monitor and assess patient's chronic conditions and comorbid symptoms for stability, deterioration, or improvement

## 2024-01-19 NOTE — TELEPHONE ENCOUNTER
ADVANCED HEART FAILURE CENTER  [facility] in [place], [place]  LVAD PATIENT DISCHARGE FOLLOW UP CALL       Date of call: 01/19/24  Date of discharge: 1/18/24  Discharge disposition: Home or Self Care    Called and spoke with patient who states they are currently located at home.     MEDICATIONS:     Med rec completed based off of discharge AVS? Yes  Discrepancies noted: None   Do you have all of your prescriptions and are they filled? Yes  Do you have a copy of your discharge instructions? Yes    Patient confirmed instructions to stop Brilenta and Entresto.  Stated that he removed these from his pill box.      Call and left voicemail with patient's home health nurse Abi Barros notifying her of medication changes.      FOLLOW UP:     Future Appointments   Date Time Provider Department Center   1/25/2024 10:00 AM Norma Luevano APRN - NP [facility] BS AMB   2/7/2024 10:15 AM Anderson Torres MD [facility] BS AMB         TRANSPORTATION:   Patient utilizes for transportation: Family/caregiver  and Drives self   Do you have any transportation barriers at this time?  Yes    Patient states that he does not have a ride to follow up appointment on Thursday. Also states that he has a conflicting appointment with [facility]. Discussed with patient that there are no other spots to reschedule him for hospital discharge at this time. Patient verbalized understanding. States he will call [facility] and try to reschedule their appt. States the will call a family member and request ride to [facility] f/u appt, states he will call back if he is unable to arrange a ride.      Call placed to [facility] to notify of conflicting appointments. Staff there stated they will reach out to patient and attempt to find non-conflicting appt time for him.      SUPPLIES:   Do you have all LVAD supplies?  Yes      Bety Aranda, RN  LVAD coordinator   [facility] [place]

## 2024-01-22 ENCOUNTER — ANTI-COAG VISIT (OUTPATIENT)
Age: 69
End: 2024-01-22
Payer: MEDICARE

## 2024-01-22 DIAGNOSIS — Z79.01 CHRONIC ANTICOAGULATION: Primary | ICD-10-CM

## 2024-01-22 LAB — INR BLD: 3.8

## 2024-01-22 PROCEDURE — 93793 ANTICOAG MGMT PT WARFARIN: CPT | Performed by: NURSE PRACTITIONER

## 2024-01-22 RX ORDER — POTASSIUM CHLORIDE 20 MEQ/1
20 TABLET, EXTENDED RELEASE ORAL DAILY
Qty: 30 TABLET | OUTPATIENT
Start: 2024-01-22

## 2024-01-22 NOTE — PROGRESS NOTES
ADVANCED HEART FAILURE CENTER  [facility] in [place], [place]      INR result reviewed with CARMELINA Luevano NP who made the following recommendations (VORB): 2mg daily and recheck Thursday. Patient notified and verbalized understanding. They had no further questions. (See anticoag tracker)     Bety Aranda RN

## 2024-01-22 NOTE — PATIENT INSTRUCTIONS
Medication changes:    HOLD your coumadin tonight - We will ask home health to recheck your INR tomorrow     Testing Ordered:    Lab work has been collected today. You will be contacted with any abnormal results requiring changes to your current plan of care.      Other Recommendations:     A referral to GI (stomach doctors) has been placed. You will be contacted for scheduling.      Continue to change drive line exit site dressing 3 times a week using sterile technique,     Ensure you are drinking an adequate amount of water with a goal of 6-8 eight ounce glasses (1.5-2 liters) of fluid daily. Your urine should be clear and light yellow straw colored.       Monthly LVAD Education Tip:    LVAD TERMS AND FUNCTIONS     DRIVE LINE Exits out of your body at the “drive-line” site and is typically anchored to your stomach or upper thigh.     The all white cable that connects your  and pump inside your heart.     TIPS:  Never pull on your drive-line or let your power leads get twisted around it.       Controls and monitors your LVAD system. Uses lights, sounds, and on-screen messages to communicate your LVADs operating status.     TIPS:  Complete a self-test daily [in AM] by pressing and holding down the “battery” button x 3 seconds.      POWER LEADS White and black power leads - Each supply power to your LVAD from either the wall or batteries.     TIPS:  White power lead provides pump settings-Disconnect/Reconnect FIRST    NEVER disconnect both at the same time.      MOBILE POWER UNIT Plugs into an AC outlet [wall] to provide your LVAD power.     TIPS:  Use while napping and sleeping.       BATTERY You always need TWO batteries at a time to provide power to your LVAD.     Each battery inserts into a battery clip that connects the power lead to the .     TIPS:  Use when you are active, mobile, or outdoors.      BATTERY CLIP Clips hold your batteries. A power lead inserts

## 2024-01-23 ASSESSMENT — ENCOUNTER SYMPTOMS: COUGH: 1

## 2024-01-24 ASSESSMENT — ENCOUNTER SYMPTOMS
NAUSEA: 0
ABDOMINAL DISTENTION: 0
COUGH: 0
DIARRHEA: 0
SHORTNESS OF BREATH: 0
ABDOMINAL PAIN: 0

## 2024-01-25 ENCOUNTER — OFFICE VISIT (OUTPATIENT)
Age: 69
End: 2024-01-25

## 2024-01-25 ENCOUNTER — ANTI-COAG VISIT (OUTPATIENT)
Age: 69
End: 2024-01-25

## 2024-01-25 ENCOUNTER — TELEPHONE (OUTPATIENT)
Age: 69
End: 2024-01-25

## 2024-01-25 VITALS
OXYGEN SATURATION: 100 % | SYSTOLIC BLOOD PRESSURE: 92 MMHG | HEIGHT: 68 IN | RESPIRATION RATE: 16 BRPM | BODY MASS INDEX: 19.85 KG/M2 | WEIGHT: 131 LBS | HEART RATE: 73 BPM

## 2024-01-25 DIAGNOSIS — D64.9 ANEMIA, UNSPECIFIED TYPE: ICD-10-CM

## 2024-01-25 DIAGNOSIS — Z51.81 ENCOUNTER FOR MONITORING DIURETIC THERAPY: ICD-10-CM

## 2024-01-25 DIAGNOSIS — R06.02 SHORTNESS OF BREATH: ICD-10-CM

## 2024-01-25 DIAGNOSIS — N30.00 ACUTE CYSTITIS WITHOUT HEMATURIA: ICD-10-CM

## 2024-01-25 DIAGNOSIS — I50.22 CHRONIC SYSTOLIC HEART FAILURE (HCC): ICD-10-CM

## 2024-01-25 DIAGNOSIS — K22.6 MALLORY-WEISS TEAR: ICD-10-CM

## 2024-01-25 DIAGNOSIS — Z79.899 ENCOUNTER FOR MONITORING DIURETIC THERAPY: ICD-10-CM

## 2024-01-25 DIAGNOSIS — Z95.811 LVAD (LEFT VENTRICULAR ASSIST DEVICE) PRESENT (HCC): Primary | ICD-10-CM

## 2024-01-25 DIAGNOSIS — Z13.89 SCREENING FOR SUBSTANCE ABUSE: ICD-10-CM

## 2024-01-25 DIAGNOSIS — Z01.89 ENCOUNTER FOR SCREENING FOR TOBACCO USE: ICD-10-CM

## 2024-01-25 DIAGNOSIS — Z79.01 CHRONIC ANTICOAGULATION: ICD-10-CM

## 2024-01-25 LAB
POC INR: 4.3
PROTHROMBIN TIME, POC: ABNORMAL

## 2024-01-25 NOTE — PROGRESS NOTES
ADVANCED HEART FAILURE CENTER  [facility] in [place], [place]    BP (!) 92/0 (Site: Right Upper Arm, Position: Sitting, Cuff Size: Medium Adult) Comment: MAP  Pulse 73   Resp 16   Ht 1.727 m (5' 8\")   Wt 59.4 kg (131 lb) Comment: 137 w/ vad equip  SpO2 100%   BMI 19.92 kg/m²      LVAD  LVAD Type:: Left Ventricular Assist Device (LVAD)  Pump Speed (rpm): 5250  Pump Flow (lpm): 3.5  MAP (mmHg): 92  Set Low Speed (rpm): 5200  Pump Pulse Index (PI): 10.9  Pump Power (Villanueva): 3.7  Battery Life Checked: Yes  Backup Controller Present: Yes  Driveline Dressing: Clean, Dry and Intact  Outpatient: Yes  MAP in Therapeutic Range (Outpatient): No       Bishop Love was seen today in clinic for a visit with LUPE Mondragon NP     Patient denied s/s of driveline infection or driveline trauma (including  drops). States LVAD alarms are improved- still going off in the morning before medications, but better in the afternoon. Driveline inspected for integrity.  Above reported to provider.     LVAD interrogation completed in clinic, results reported to provider. See flow sheet for details.     All orders entered per VORB. All provider instructions placed in AVS and reviewed with patient. Educated the patient on coumadin instructions, GI referral and expected follow up. reviewed questions. Patient verbalized understanding, denied questions at end of visit.    Placed call to Abi Barros patient's home health nurse, and informed of updates from today's visit. Notified of labslips being faxed and need for INR check tomorrow. She verbalized understanding and stated [facility] can go out to patient tomorrow to check INR.     
  Glucose, Random 65 - 100 mg/dL  93   91       BUN,BUNPL 6 - 20 MG/DL  9   14       Creatinine 0.70 - 1.30 MG/DL  0.88   0.77       Sodium 136 - 145 mmol/L  140   138       Potassium 3.5 - 5.1 mmol/L  3.9   3.6       Chloride 97 - 108 mmol/L  109   110       CO2 21 - 32 mmol/L  23   21       CALCIUM, SERUM, 965827 8.5 - 10.1 MG/DL  8.0   8.1       Total Protein 6.4 - 8.2 g/dL  5.2   5.6       Bilirubin, Direct 0.0 - 0.2 MG/DL  0.2   0.2       BILIRUBIN TOTAL 0.2 - 1.0 MG/DL  0.4   0.6       AST 15 - 37 U/L  24   22       ALT 12 - 78 U/L  23   22           ALLERGY:  No Known Allergies     CURRENT MEDICATIONS:    Current Outpatient Medications:     amLODIPine (NORVASC) 2.5 MG tablet, Take 1 tablet by mouth daily Take at night, Disp: 30 tablet, Rfl: 3    metoprolol succinate (TOPROL XL) 25 MG extended release tablet, Take 1 tablet by mouth in the morning and at bedtime, Disp: 90 tablet, Rfl: 1    vitamin D3 (CHOLECALCIFEROL) 25 MCG (1000 UT) TABS tablet, Take 1 tablet by mouth daily, Disp: 90 tablet, Rfl: 1    atorvastatin (LIPITOR) 40 MG tablet, Take 2 tablets by mouth nightly, Disp: 90 tablet, Rfl: 1    isosorbide dinitrate (ISORDIL) 20 MG tablet, Take 2 tablets by mouth every 8 (eight) hours, Disp: 180 tablet, Rfl: 1    spironolactone (ALDACTONE) 25 MG tablet, Take 1 tablet by mouth daily, Disp: 45 tablet, Rfl: 1    magnesium oxide (MAG-OX) 400 MG tablet, Take 1 tablet by mouth daily, Disp: 30 tablet, Rfl: 1    warfarin (COUMADIN) 1 MG tablet, Take 3 tablets by mouth daily (Patient taking differently: Take 3 tablets by mouth daily 01/14/24 4:40 PM - 2mg on Mon/Fridays, 3mg ROW), Disp: 180 tablet, Rfl: 1    hydrALAZINE (APRESOLINE) 100 MG tablet, Take 1 tablet by mouth every 8 hours, Disp: 270 tablet, Rfl: 1    pantoprazole (PROTONIX) 40 MG tablet, Take 1 tablet by mouth every morning (before breakfast), Disp: 90 tablet, Rfl: 1    amiodarone (CORDARONE) 200 MG tablet, Take 1 tablet by mouth daily, Disp: , Rfl:

## 2024-01-25 NOTE — TELEPHONE ENCOUNTER
Called GI specialists regarding referral. Spoke to MA with Dr. Simmons who stated next available appt with PA at their practice is Feb 22nd at 8:15. [address] [facility]. Appointment scheduled for patient.

## 2024-01-26 ENCOUNTER — ANTI-COAG VISIT (OUTPATIENT)
Age: 69
End: 2024-01-26

## 2024-01-26 DIAGNOSIS — Z79.01 CHRONIC ANTICOAGULATION: Primary | ICD-10-CM

## 2024-01-26 LAB — INR BLD: 3.9

## 2024-01-26 NOTE — PROGRESS NOTES
ADVANCED HEART FAILURE CENTER  [facility] in [place], [place]      INR result reviewed with LUPE Mondragon NP  who made the following recommendations (VORB): hold tonight, 2mg daily and recheck Tuesday. Spoke with patient's home health nurse Abi Barros who states she is at patient's home, confirmed patient received instructions and will check patient's pill box for correct coumadin dosing.  (See anticoag tracker)     Bety Aranda RN

## 2024-01-26 NOTE — TELEPHONE ENCOUNTER
Reviewed appointment scheduling with LUPE Mondragon who requested call be placed to Dr. Simmons office regarding restarting Brilenta as next available appointment is 1 month away.     Called GI specilists and spoke to Kathi. Informed our providers would like recommendations from Dr. Simmons regarding restarting brilenta. She verbalized understanding.

## 2024-01-30 ENCOUNTER — ANTI-COAG VISIT (OUTPATIENT)
Age: 69
End: 2024-01-30
Payer: MEDICARE

## 2024-01-30 ENCOUNTER — TELEPHONE (OUTPATIENT)
Age: 69
End: 2024-01-30

## 2024-01-30 LAB — INR BLD: 2.9

## 2024-01-30 PROCEDURE — 93793 ANTICOAG MGMT PT WARFARIN: CPT | Performed by: NURSE PRACTITIONER

## 2024-01-30 NOTE — PROGRESS NOTES
ADVANCED HEART FAILURE CENTER  [facility] in [place], [place]      INR result reviewed with CARMELINA Luevano NP who made the following recommendations (VORB): no change to coumadin dosing and recheck INR in 1 week on 2/6/24. Spoke with patient's home health nurse Cheryle ROBERT who states she is at patient's home, confirmed patient received instructions and will check patient's pill box for correct coumadin dosing.  (See anticoag tracker)     CHARLENE DANIELS RN  VAD Coordinator

## 2024-01-30 NOTE — TELEPHONE ENCOUNTER
Received call from Sean WEINSTEIN with GI specialists returning call regarding restarting Brilenta. Reviewed nash-ayala tear repair that patient had on 01/15 and that patient was seen in clinic 01/25 and had no s/s of bleeding. Provider stated VORB okay for patient to restart Brilenta from GI perspective.  Confirmed patient has follow up with this GI provider in late February.

## 2024-01-30 NOTE — TELEPHONE ENCOUNTER
Patient called requesting clarification of coumadin dosing. States his home health nurse did not put coumadin in his pill box. Reviewed patient's maintenance dosing- (2 mg (1 mg x 2) every Mon, Fri; 3 mg (1 mg x 3) all other days).     Patient verbalized understanding.

## 2024-01-31 ENCOUNTER — TELEPHONE (OUTPATIENT)
Age: 69
End: 2024-01-31

## 2024-01-31 NOTE — TELEPHONE ENCOUNTER
INR result from [facility] received. Called and spoke with patient's home health nurse. Notified that patient will need meter correlated x3 with her meter. She verbalized understanding.

## 2024-02-01 NOTE — TELEPHONE ENCOUNTER
Reviewed call with LUPE Mondragon who stated VORB instruct patient to restart Brilenta at dose he was previously taking (90mg BID)     Call placed to patient to inform he should resume taking Brilenta and follow up with GI as scheduled.  He verbalized understanding. Educated to notify [facility] immediately for any melena or vomiting blood, he verbalized understanding.     Also notified patient's home health nurse Abi Barros.

## 2024-02-05 ENCOUNTER — TELEPHONE (OUTPATIENT)
Age: 69
End: 2024-02-05

## 2024-02-05 NOTE — TELEPHONE ENCOUNTER
Patient left message requesting call back.     Returned call to patient who stated that since he restarted Brilenta he has had an increase in dry cough and has had more LVAD alarms. Denies any vomiting, abdominal pain or melena.

## 2024-02-05 NOTE — TELEPHONE ENCOUNTER
Reviewed with FAUSTO Garcia who stated patient should continue taking Brilenta as prescribed. States patient should take OTC coricidin as coughing may be causing alarms.     Spoke with patient ans educated on provider instructions to continue brilenta and treat cough OTC with BP safe cough medication. He verbalized understanding. Also stated home health nurse is coming tomorrow who will be able to check patient's blood pressure.

## 2024-02-06 ENCOUNTER — TELEPHONE (OUTPATIENT)
Age: 69
End: 2024-02-06

## 2024-02-06 ENCOUNTER — ANTI-COAG VISIT (OUTPATIENT)
Age: 69
End: 2024-02-06

## 2024-02-06 LAB — INR BLD: 3.9

## 2024-02-06 NOTE — PROGRESS NOTES
Patient's home health nurse samantha gutierrez called and stated she checked patient's inr on two separate meters and got two different results. His meter read 3.2 and her meter read 3.9 although she states that these readings were two separate fingers a few minutes apart.     Both values reported to provider.     ADVANCED HEART FAILURE CENTER  [facility] in [place], [place]      INR result reviewed with FAUSTO Garcia NP who made the following recommendations (VORB): 1 mg x 2 days and recheck Friday. Patient notified and verbalized understanding.  (See anticoag tracker). Patient's home health nurse notified who verbalized understanding. Stated she will recoorolate meters at next appt with same finger stick- will coorolate with venous stick if needed.     Patient called requesting details of GI appointment with GI specialists. Reviewed information and left information on patient's voicemail per his request.      Bety Arnada RN

## 2024-02-07 ENCOUNTER — OFFICE VISIT (OUTPATIENT)
Age: 69
End: 2024-02-07
Payer: MEDICARE

## 2024-02-07 VITALS
HEART RATE: 69 BPM | SYSTOLIC BLOOD PRESSURE: 105 MMHG | BODY MASS INDEX: 20.61 KG/M2 | HEIGHT: 68 IN | DIASTOLIC BLOOD PRESSURE: 73 MMHG | WEIGHT: 136 LBS

## 2024-02-07 DIAGNOSIS — I25.10 CORONARY ARTERY DISEASE INVOLVING NATIVE CORONARY ARTERY OF NATIVE HEART WITHOUT ANGINA PECTORIS: ICD-10-CM

## 2024-02-07 DIAGNOSIS — R97.20 ELEVATED PSA: Primary | ICD-10-CM

## 2024-02-07 DIAGNOSIS — I25.5 ISCHEMIC CARDIOMYOPATHY: ICD-10-CM

## 2024-02-07 DIAGNOSIS — Z95.810 ICD (IMPLANTABLE CARDIOVERTER-DEFIBRILLATOR) IN PLACE: ICD-10-CM

## 2024-02-07 DIAGNOSIS — N39.0 URINARY TRACT INFECTION WITHOUT HEMATURIA, SITE UNSPECIFIED: ICD-10-CM

## 2024-02-07 LAB
BILIRUBIN, URINE, POC: NEGATIVE
BLOOD URINE, POC: NEGATIVE
GLUCOSE URINE, POC: NEGATIVE
KETONES, URINE, POC: NEGATIVE
LEUKOCYTE ESTERASE, URINE, POC: NORMAL
NITRITE, URINE, POC: NEGATIVE
PH, URINE, POC: 7 (ref 4.6–8)
PROTEIN,URINE, POC: NEGATIVE
SPECIFIC GRAVITY, URINE, POC: 1.01 (ref 1–1.03)
URINALYSIS CLARITY, POC: CLEAR
URINALYSIS COLOR, POC: YELLOW
UROBILINOGEN, POC: NORMAL

## 2024-02-07 PROCEDURE — 81001 URINALYSIS AUTO W/SCOPE: CPT | Performed by: UROLOGY

## 2024-02-07 PROCEDURE — 3078F DIAST BP <80 MM HG: CPT | Performed by: UROLOGY

## 2024-02-07 PROCEDURE — 1123F ACP DISCUSS/DSCN MKR DOCD: CPT | Performed by: UROLOGY

## 2024-02-07 PROCEDURE — 3074F SYST BP LT 130 MM HG: CPT | Performed by: UROLOGY

## 2024-02-07 PROCEDURE — 99214 OFFICE O/P EST MOD 30 MIN: CPT | Performed by: UROLOGY

## 2024-02-07 RX ORDER — CIPROFLOXACIN 500 MG/1
500 TABLET, FILM COATED ORAL 2 TIMES DAILY
Qty: 10 TABLET | Refills: 0 | Status: SHIPPED | OUTPATIENT
Start: 2024-02-07 | End: 2024-02-12

## 2024-02-07 NOTE — PROGRESS NOTES
HISTORY OF PRESENT ILLNESS  Bishop Love is a 68 y.o. male   Patient's MDX, PCA 3 came back 61% likelihood of cancer biopsy 33% of attack obese and greater 7.  He has cardiomyopathy he is 68 years of age we will set up for biopsy and depending what the biopsy shows but we will do.  I am not sure his life expectancy at least we will know over dealing with , then together make appropriate choices.  He is aware the risk of bleeding infection sepsis and death he is aware of alternatives he has no questions.  I will set him up with my partner Dr. Medeiros to perform the biopsy  1. Elevated PSA  Overview:  Component  Ref Range & Units 11/6/23 1200 10/2/23 0630 9/7/23 1537   PSA  0.0 - 4.0 ng/mL 2.1 6.1 High  R, CM 6.7 High  R, CM     Select MDX report reveled 61% likelihood of prostate cancer upon biopsy and 33% likelihood of Alen >7 upon biopsy  Orders:  -     ciprofloxacin (CIPRO) 500 MG tablet; Take 1 tablet by mouth 2 times daily for 10 doses Start the night before the biopsy take 1 pill then take 1 twice a day till pills are done, Disp-10 tablet, R-0Normal  2. Urinary tract infection without hematuria, site unspecified  -     AMB POC URINALYSIS DIP STICK AUTO W/ MICRO  -     Culture, Urine        PAST MEDICAL HISTORY  PMHx (including negatives):  has a past medical history of Atherosclerosis of native arteries of extremities with intermittent claudication, bilateral legs (HCC), CAD (coronary artery disease), Chest pain, CHF (congestive heart failure) ([facility]), Chronic anticoagulation, Chronic systolic (congestive) heart failure (HCC), Coronary artery disease involving native coronary artery of native heart without angina pectoris, Essential hypertension, Heart attack (HCC), Heart failure (HCC), HTN (hypertension), Hyperlipidemia, ICD (implantable cardioverter-defibrillator) in place, Ischemic cardiomyopathy, Ischemic cardiomyopathy, Mild mitral valve regurgitation, Nonrheumatic mitral (valve) insufficiency, PVD

## 2024-02-07 NOTE — PROGRESS NOTES
Chief Complaint   Patient presents with    Follow-up    Elevated PSA    Congestive Heart Failure        /73   Pulse 69   Ht 1.727 m (5' 8\")   Wt 61.7 kg (136 lb)   BMI 20.68 kg/m²      PHQ-9 score is    Negative      1. \"Have you been to the ER, urgent care clinic since your last visit?  Hospitalized since your last visit?\" No    2. \"Have you seen or consulted any other health care providers outside of the [facility] since your last visit?\" No     3. For patients aged 45-75: Has the patient had a colonoscopy / FIT/ Cologuard? Yes - no Care Gap present      If the patient is female:    4. For patients aged 40-74: Has the patient had a mammogram within the past 2 years? NA - based on age or sex      5. For patients aged 21-65: Has the patient had a pap smear? NA - based on age or sex

## 2024-02-08 ENCOUNTER — TELEPHONE (OUTPATIENT)
Age: 69
End: 2024-02-08

## 2024-02-08 NOTE — TELEPHONE ENCOUNTER
Received request for surgical clearance for prostate biopsy from [facility]. Per clearance request patient is scheduled for biopsy at [facility].     Called and spoke to Edinson with [facility] urology who stated that biopsy is done in office with local anethesitc only, no sedation given. Edinson stated that their provider is requesting patient hold coumadin and brilenta for 5 days prior to biopsy. Discussed with Edinson that generally our providers request to titrate inr instead of stopping coumadin completely due to the risk of pump thrombosis with LVAD. Requested Edinson discuss acceptable INR target range with urology provider and return call to [facility]. She verbalized understanding.

## 2024-02-09 ENCOUNTER — ANTI-COAG VISIT (OUTPATIENT)
Age: 69
End: 2024-02-09

## 2024-02-09 DIAGNOSIS — Z79.01 CHRONIC ANTICOAGULATION: Primary | ICD-10-CM

## 2024-02-09 LAB — INR BLD: 3

## 2024-02-09 NOTE — PROGRESS NOTES
ADVANCED HEART FAILURE CENTER  [facility] in [place], [place]      INR result reviewed with LUPE Mondragon NP  who made the following recommendations (VORB): 2mg saturday and recheck Monday. Patient notified and verbalized understanding. Patient's home health nurse samantha gutierrez notified who verbalized understanding.  They had no further questions. (See anticoag tracker)     Bety Aranda RN

## 2024-02-12 ENCOUNTER — ANTI-COAG VISIT (OUTPATIENT)
Age: 69
End: 2024-02-12

## 2024-02-12 ENCOUNTER — TELEPHONE (OUTPATIENT)
Age: 69
End: 2024-02-12

## 2024-02-12 NOTE — TELEPHONE ENCOUNTER
Called and spoke with patient's home health nurse samantha gutierrez. Discussed inr result of 2.6 collected last week- requested she corollate patient's meter against a venous sample two more times. Also requested she collect patient's urine nicotine screening. Discussed previous order sent to [employer] and requested samantha call if new order for this is needed. She verbalized understanding .

## 2024-02-13 LAB — BACTERIA UR CULT: ABNORMAL

## 2024-02-14 ENCOUNTER — ANTI-COAG VISIT (OUTPATIENT)
Age: 69
End: 2024-02-14
Payer: MEDICARE

## 2024-02-14 DIAGNOSIS — Z79.01 CHRONIC ANTICOAGULATION: Primary | ICD-10-CM

## 2024-02-14 LAB — INR BLD: 3.8

## 2024-02-14 PROCEDURE — 93793 ANTICOAG MGMT PT WARFARIN: CPT | Performed by: NURSE PRACTITIONER

## 2024-02-14 NOTE — PROGRESS NOTES
ADVANCED HEART FAILURE CENTER  [facility] in [place], [place]      INR result reviewed with CARMELINA Luevano NP who made the following recommendations (VORB): new   Maintenance plan:2 mg every Mon, Wed, Fri; 3 mg all other days and recheck 1 week. Patient notified and verbalized understanding. Patient's home health nurse Cheryle Santacruz notified who verbalized understanding  They had no further questions. (See anticoag tracker)     Bety Aranda RN

## 2024-02-14 NOTE — TELEPHONE ENCOUNTER
Called and spoke with Edinson with [facility]. Requested update on INR goal for prostate biopsy before sending clearance to provider. Edinson stated she has not received an answer regarding this but will follow up with providers tomorrow regarding this.

## 2024-02-16 ENCOUNTER — ANTI-COAG VISIT (OUTPATIENT)
Age: 69
End: 2024-02-16

## 2024-02-16 ENCOUNTER — TELEPHONE (OUTPATIENT)
Age: 69
End: 2024-02-16

## 2024-02-16 DIAGNOSIS — Z79.01 CHRONIC ANTICOAGULATION: Primary | ICD-10-CM

## 2024-02-16 LAB — INR BLD: 3.2

## 2024-02-16 NOTE — PROGRESS NOTES
ADVANCED HEART FAILURE CENTER  [facility] in [place], [place]      INR result reviewed with LUPE Mondragon NP  who made the following recommendations (VORB): 1 mg coumadin tonight, 2 mg tomorrow, 3 mg Sunday and recheck Monday. Patient and patinet's home health nurse samantha gutierrez notified and verbalized understanding. They had no further questions. (See anticoag tracker)     Bety Aranda RN

## 2024-02-16 NOTE — TELEPHONE ENCOUNTER
Received call from Edinson with [facility] stating that she spoke with Dr. Medeiros who would like patient's INR less than 1.5 for prostate biopsy.  Provided clearence to CARMELINA Luevano with Dr. Medeiros request for INR less than 1.5.

## 2024-02-16 NOTE — TELEPHONE ENCOUNTER
Crissy from Norma Luevano's NP office called about patient being off coumadin for the in office prostate biopsy. She stated it would only be for 1-2 days max but they could adjust his INR range if you let the know where you want it.

## 2024-02-20 ENCOUNTER — ANTI-COAG VISIT (OUTPATIENT)
Age: 69
End: 2024-02-20
Payer: MEDICARE

## 2024-02-20 DIAGNOSIS — Z79.01 CHRONIC ANTICOAGULATION: Primary | ICD-10-CM

## 2024-02-20 LAB — INR BLD: 2.7

## 2024-02-20 PROCEDURE — 93793 ANTICOAG MGMT PT WARFARIN: CPT | Performed by: NURSE PRACTITIONER

## 2024-02-20 NOTE — PROGRESS NOTES
ADVANCED HEART FAILURE CENTER  [facility] in [place], [place]      INR result reviewed with FAUSTO Garcia NP who made the following recommendations (VORB): no changes and recheck Friday . Patient and home health nurse Abi notified and verbalized understanding. They had no further questions. (See anticoag tracker)     Bety Aranda RN

## 2024-02-22 ENCOUNTER — TELEPHONE (OUTPATIENT)
Age: 69
End: 2024-02-22

## 2024-02-22 RX ORDER — WARFARIN SODIUM 1 MG/1
TABLET ORAL
Qty: 230 TABLET | Refills: 1 | Status: SHIPPED | OUTPATIENT
Start: 2024-02-22

## 2024-02-22 NOTE — TELEPHONE ENCOUNTER
Requested Prescriptions     Signed Prescriptions Disp Refills    warfarin (COUMADIN) 1 MG tablet 230 tablet 1     Sig: Take 2 mg (1 mg x 2) by mouth every Mon, Wed, Fri; 3 mg (1 mg x 3) all other days. May take additional doses PRN as directed by [facility] provider for subtherapeutic INR.     Authorizing Provider: SKYLAR SANCHEZ     Ordering User: LUIS STEVEN

## 2024-02-23 ENCOUNTER — ANTI-COAG VISIT (OUTPATIENT)
Age: 69
End: 2024-02-23

## 2024-02-23 DIAGNOSIS — Z79.01 CHRONIC ANTICOAGULATION: Primary | ICD-10-CM

## 2024-02-23 LAB — INR BLD: 2.3

## 2024-02-23 RX ORDER — ENOXAPARIN SODIUM 100 MG/ML
1 INJECTION SUBCUTANEOUS 2 TIMES DAILY
Qty: 12 EACH | Refills: 0 | Status: SHIPPED | OUTPATIENT
Start: 2024-02-23

## 2024-02-23 NOTE — PROGRESS NOTES
ADVANCED HEART FAILURE CENTER  [facility] in [place], [place]      INR result reviewed with Dr. Donnie MD  who made the following recommendations (VORB):     -hold coumadin and Brilenta starting Sunday 02/25  -INR check Tuesday in clinic  -Take one dose of lovenox Tuesday evening and one dose wednesday morning.   -Resume taking lovenox BID after prostate biopsy is performed Thursday.   -Recheck INR Friday      Patient notified and verbalized understanding. They had no further questions. Requested patient bring INR meter and lovenox injections to clinic visit Tuesday, patient verbalized understanding.     Called and spoke with patient's home health nurse Abi damon, reviewed above provider instructions, she verbalized understanding.  (See anticoag tracker)     Bety Aranda RN

## 2024-02-23 NOTE — TELEPHONE ENCOUNTER
Reviewed with Dr. Fields at [facility]. Per Dr Fields VOR patient should hold coumadin and Brilenta starting Sunday 02/25. Patient should have INR check in clinic  02/27. Patient should then start lovenox bridge 1mg/kg BID. Patient should take one dose of lovenox Tuesday night and one dose Wednesday morning. Patient should then hold lovenox until the evening (Thursday) following his prostate biopsy. Post- biopsy patient should continue lovenox brigde until instructed otherwise.      Called and spoke with Edinson with [facility] and informed of provider instructions above. Clearance with above notes faxed back to edinson at [facility].     Called and spoke to patient with provider instructions (see anticoag encounter).

## 2024-02-27 ENCOUNTER — OFFICE VISIT (OUTPATIENT)
Age: 69
End: 2024-02-27

## 2024-02-27 ENCOUNTER — TELEPHONE (OUTPATIENT)
Age: 69
End: 2024-02-27

## 2024-02-27 ENCOUNTER — ANTI-COAG VISIT (OUTPATIENT)
Age: 69
End: 2024-02-27

## 2024-02-27 VITALS
WEIGHT: 134 LBS | HEART RATE: 53 BPM | OXYGEN SATURATION: 98 % | BODY MASS INDEX: 20.37 KG/M2 | SYSTOLIC BLOOD PRESSURE: 110 MMHG | TEMPERATURE: 97.8 F | RESPIRATION RATE: 16 BRPM

## 2024-02-27 DIAGNOSIS — Z79.01 CHRONIC ANTICOAGULATION: Primary | ICD-10-CM

## 2024-02-27 DIAGNOSIS — Z95.811 LVAD (LEFT VENTRICULAR ASSIST DEVICE) PRESENT (HCC): Primary | ICD-10-CM

## 2024-02-27 DIAGNOSIS — Z79.01 CHRONIC ANTICOAGULATION: ICD-10-CM

## 2024-02-27 DIAGNOSIS — I50.22 CHRONIC SYSTOLIC (CONGESTIVE) HEART FAILURE (HCC): ICD-10-CM

## 2024-02-27 LAB — INR BLD: 2.6

## 2024-02-27 RX ORDER — AMLODIPINE BESYLATE 2.5 MG/1
TABLET ORAL
Qty: 30 TABLET | Refills: 3 | Status: SHIPPED | OUTPATIENT
Start: 2024-02-27 | End: 2024-02-28 | Stop reason: SDUPTHER

## 2024-02-27 ASSESSMENT — PATIENT HEALTH QUESTIONNAIRE - PHQ9
SUM OF ALL RESPONSES TO PHQ9 QUESTIONS 1 & 2: 0
1. LITTLE INTEREST OR PLEASURE IN DOING THINGS: 0
2. FEELING DOWN, DEPRESSED OR HOPELESS: 0
SUM OF ALL RESPONSES TO PHQ QUESTIONS 1-9: 0

## 2024-02-27 ASSESSMENT — ENCOUNTER SYMPTOMS
ABDOMINAL DISTENTION: 0
SORE THROAT: 0
COUGH: 0
EYE PAIN: 0
SHORTNESS OF BREATH: 0
BLOOD IN STOOL: 0
ABDOMINAL PAIN: 0
CHEST TIGHTNESS: 0

## 2024-02-27 NOTE — PROGRESS NOTES
ADVANCED HEART FAILURE CENTER  [facility] in [place], [place]      INR result reviewed with FAUSTO Garcia NP who made the following recommendations (VORB): regular coumadin dose tonight and recheck Friday. Patient notified via voicemail, requested call back to confirm- see telephone encounter regarding urology procedure (See anticoag tracker)     Bety Aranda RN

## 2024-02-27 NOTE — TELEPHONE ENCOUNTER
Received call from Edinson with [facility] urology who stated that providers at this office would like to cancel prostate biopsy on Thursday. Confirmed patient will be seen in office however biopsy will not be performed on Thursday. Edinson stated she will call patient and notify of cancelled procedure.      Reviewed with FAUSTO Garcia who verbalized understanding and stated VORB instruct patient to restart coumadin dosing at regular dose tonight, no lovenox indicated.     Call placed to patient, left patient voicemail notifying he should resume coumadin if urology is indeed canceling procedure, requested call back to confirm.

## 2024-02-27 NOTE — TELEPHONE ENCOUNTER
Patient's cardiology office called. He is scheduled for a prostate biopsy in the office on 2-29-24.  He is unable to stop Brilinta but has stopped coumadin. He is being bridged with lovenox. The office reports his INR today is still 2.6.

## 2024-02-27 NOTE — TELEPHONE ENCOUNTER
Called and requested INR results- spoke to aminta who stated she will fax results to our office

## 2024-02-27 NOTE — PROGRESS NOTES
ADVANCED HEART FAILURE CENTER  [facility] in [place], [place]    BP (!) 110/0   Pulse 53   Temp 97.8 °F (36.6 °C) (Oral)   Resp 16   Wt 60.8 kg (134 lb)   SpO2 98%   BMI 20.37 kg/m²     LVAD  LVAD Type:: Left Ventricular Assist Device (LVAD)  Pump Speed (rpm): 5200  Pump Flow (lpm): 3.8  MAP (mmHg): 110 (no palpable pulse)  Set Low Speed (rpm): 5200  Pump Pulse Index (PI): 8.1  Pump Power (Villanueva): 3.8  Battery Life Checked: Yes  Backup Controller Present: Yes  Driveline Dressing: Clean, Dry and Intact  Outpatient: Yes  MAP in Therapeutic Range (Outpatient): No       Bishop Love was seen today in clinic for a visit with FAUSTO Garcia NP    Patient denied s/s of driveline infection or driveline trauma (including  drops). States LVAD alarms have been better than usual at home. Driveline inspected for integrity.  Above reported to provider.     LVAD interrogation completed in clinic, results reported to provider. See flow sheet for details.     Educated patient on how to self administer lovenox. Reviewed printed education and had patient demonstrate with capped needle. He verbalized understanding.     Patient inquired about how to change . Educated patient at no point should he attempt to change  or disconnect drive line himself. Educated that this would stop his pump and would be an emergency. Educated that should controller need to be exchanged routinely this would only happen in clinic. Educated to keep safely door on controller closed at all times. He verbalized understanding.      Reviewed INR of 2.6 with FAUSTO Garcia who stated VORB, have patient hold coumadin, brilenta and lovenox until prostate biopsy is completed, Patient should resume coumadin night of procedure, start lovenox friday morning. Reviewed with patient who verbalized understanding.    All orders entered per VORB. All provider instructions placed in AVS and reviewed with

## 2024-02-27 NOTE — PATIENT INSTRUCTIONS
any damage to your driveline, notify the LVAD team IMMEDIATELY      Reference:  The 3Doodler. (2017). HeartMate 3 Left Ventricular Assist System Patient Handbook.         Follow up in 1 month with [facility]      For further patient and caregiver resources as well as access to online LVAD support groups visit [email or web address]       Please bring your daily sheets and medications to your next appointment.      Please monitor your weights daily upon waking and after using the bathroom. Keep a written records of your weights and bring to your next appointment. If you have a weight gain of 3 or more pounds overnight OR 5 or more pounds in one week please contact our office.       Thank you for allowing us the privilege of being a part of your healthcare team! Please do not hesitate to contact our office at [phone] with any questions or concerns.

## 2024-02-27 NOTE — PROGRESS NOTES
ADVANCED HEART FAILURE CENTER  [facility] in [place], [place]  Outpatient Office Note      Patient name: Bishop Love  Patient : 1955  Patient MRN: [id number]  Date of service: 24    Chief Complaint   Patient presents with    Congestive Heart Failure    Follow-up     LVAD         PLAN OF CARE:  68 y.o. with h/o severe ischemic cardiomyopathy, LVEF 15% s/p HM3 LVAD implantation (23) as destination therapy due not candidate for heart transplant (tobacco), malnutrition and severity of PVD, stage C, NYHA class II, on GDMT limited by hypotension/RV dysfunction/IVVD  Patient is interested in formal evaluation for heart transplant; we plan on monthly urine cotinine screening x 6 months.  Negative since .  Recent admission -  for GI bleed due to Evelyn Tapia tear status post 3 hemoclips per GI      INTERVAL HISTORY:  -.  Frequent recent low flows with high Pis.  Many yesterday afternoon.   -labs :  Hg 9.4; K 4.4; creat 1.15; pBNP 981;   -labs : Hg 10.4; creat 1.14; K 4.6; pBNP 995; ; Mg 2.0  -weight up 3lbs  -Bishop Love is feeling good.  He denies SOB, chest pain, palpitations, swelling, orthopnea, dizziness, drive line concerns.  He feels like he's able to do the things he wants to do.  Upcoming prostate biopsy on Thursday of this week.       PLAN:  Chronic HFrEF  Continue toprol XL 25mg twice daily   Hold Entresto due to possible allergy with cough since starting  Continue hydralazine/isordil 100/40mg TID   Continue spironolactone 25mg daily   Increase Norvasc to 5mg in the AM and 2.5mg in the PM   No SGLT2i due to recurrent UTI- consider re challenging at a later time with farxiga  Not on allopurinol or uloric     LVAD  Continue current device speed at 5200   Continue dressing changes 3x weekly  Chronic anticoagulation with coumadin- see anticoagulation tracker.  INR 2.6. hasn't taken coumadin since Saturday due to upcoming procedure.

## 2024-02-27 NOTE — TELEPHONE ENCOUNTER
Called placed to patient's home health nurse Abi damon. Updated Abi on changes made at clinic visit today, plan from christiano at [facility] to cancel biopsy and anticoag changes, she verbalized understanding

## 2024-02-28 ENCOUNTER — TELEPHONE (OUTPATIENT)
Age: 69
End: 2024-02-28

## 2024-02-28 RX ORDER — ISOSORBIDE DINITRATE 20 MG/1
40 TABLET ORAL EVERY 8 HOURS
Qty: 180 TABLET | Refills: 1 | Status: SHIPPED | OUTPATIENT
Start: 2024-02-28

## 2024-02-28 RX ORDER — AMLODIPINE BESYLATE 2.5 MG/1
TABLET ORAL
Qty: 90 TABLET | Refills: 2 | Status: SHIPPED | OUTPATIENT
Start: 2024-02-28

## 2024-02-28 NOTE — TELEPHONE ENCOUNTER
Requested Prescriptions     Signed Prescriptions Disp Refills    amLODIPine (NORVASC) 2.5 MG tablet 90 tablet 2     Sig: Take 5mg (2 pills) in the morning and 2.5mg (1pill) in the evening     Authorizing Provider: SKYLAR SANCHEZ     Ordering User: FRANK CROWELL    isosorbide dinitrate (ISORDIL) 20 MG tablet 180 tablet 1     Sig: Take 2 tablets by mouth every 8 (eight) hours     Authorizing Provider: SKYLAR SANCHEZ     Ordering User: FRANK CROWELL

## 2024-02-28 NOTE — TELEPHONE ENCOUNTER
Called and spoke to Katherine with [facility]. Reviewed large difference in INR results returned from patient's meter. Katherine requested our office fax letter stating difference in results to him at [phone]. letter requesting new meter and listing erroneous results faxed to katherine.     Patient called regarding refills (see refill encounter). Notified patient that our office requested new meter and [facility] would reach out to him to change meter. He verbalized understanding.

## 2024-02-28 NOTE — TELEPHONE ENCOUNTER
Patient called and confirmed urology called him and cancelled biopsy for tomorrow. He confirmed he received instructions to resume coumadin and brilenta, do not take lovenox shots.

## 2024-02-29 ENCOUNTER — INITIAL CONSULT (OUTPATIENT)
Age: 69
End: 2024-02-29
Payer: MEDICARE

## 2024-02-29 VITALS
SYSTOLIC BLOOD PRESSURE: 109 MMHG | HEIGHT: 69 IN | HEART RATE: 61 BPM | DIASTOLIC BLOOD PRESSURE: 69 MMHG | WEIGHT: 133 LBS | BODY MASS INDEX: 19.7 KG/M2

## 2024-02-29 DIAGNOSIS — R97.20 ELEVATED PSA: Primary | ICD-10-CM

## 2024-02-29 PROCEDURE — 3074F SYST BP LT 130 MM HG: CPT | Performed by: STUDENT IN AN ORGANIZED HEALTH CARE EDUCATION/TRAINING PROGRAM

## 2024-02-29 PROCEDURE — 1123F ACP DISCUSS/DSCN MKR DOCD: CPT | Performed by: STUDENT IN AN ORGANIZED HEALTH CARE EDUCATION/TRAINING PROGRAM

## 2024-02-29 PROCEDURE — 99213 OFFICE O/P EST LOW 20 MIN: CPT | Performed by: STUDENT IN AN ORGANIZED HEALTH CARE EDUCATION/TRAINING PROGRAM

## 2024-02-29 PROCEDURE — 3078F DIAST BP <80 MM HG: CPT | Performed by: STUDENT IN AN ORGANIZED HEALTH CARE EDUCATION/TRAINING PROGRAM

## 2024-02-29 NOTE — PROGRESS NOTES
Chief Complaint   Patient presents with    Consultation     Elevated PSA     1. Have you been to the ER, urgent care clinic since your last visit?  Hospitalized since your last visit?No    2. Have you seen or consulted any other health care providers outside of the [facility] since your last visit?  Include any pap smears or colon screening. No  /69 (Site: Left Upper Arm, Position: Sitting, Cuff Size: Medium Adult)   Pulse 61   Ht 1.753 m (5' 9\")   Wt 60.3 kg (133 lb)   BMI 19.64 kg/m²

## 2024-02-29 NOTE — ASSESSMENT & PLAN NOTE
68-year-old male who had previously elevated PSA to 6.7 which down trended to 6.1 and ultimately ended up at 2.1 in November prior to being seen.  Reportedly the patient's PSA was 7.4 on January 3, 2024.  Free PSA was 28.6.  Select MDX test performed showed 61% likelihood of prostate cancer upon biopsy.  Of note the patient has end-stage congestive heart failure and has a left ventricular assist device implanted.  He is on chronic anticoagulation and discontinuing his anticoagulation entirely would be very dangerous and risk potential malfunction of the LVAD.  Even if the patient was diagnosed with prostate cancer at this point he would not be a candidate for surgical removal via radical prostatectomy and would be a poor candidate for radiation therapy.  I would not recommend doing a prostate biopsy at this point given the significant risks of proceeding including severe bleeding issues.  We can elect to continue to monitor the patient's PSA once yearly until the patient is 71 years old at which point I would discontinue testing.  If the patient's PSA were to rise rapidly then we may consider proceeding with a bone scan and elect to treat prostate cancer with ADT empirically.

## 2024-02-29 NOTE — PROGRESS NOTES
HISTORY OF PRESENT ILLNESS  Bishop Love is a 68 y.o. male.  Chief Complaint   Patient presents with    Consultation     Elevated PSA       Referred to see me by Dr. Torres for elevated PSA.  Significant history of end-stage CHF with an LVAD in place.  The patient is on chronic anticoagulation and discontinuation of this for biopsy would be very high risk.        PAST MEDICAL HISTORY:  PMHx (including negatives):  has a past medical history of Atherosclerosis of native arteries of extremities with intermittent claudication, bilateral legs (HCC), CAD (coronary artery disease), Chest pain, CHF (congestive heart failure) (HCC), Chronic anticoagulation, Chronic systolic (congestive) heart failure (HCC), Coronary artery disease involving native coronary artery of native heart without angina pectoris, Essential hypertension, Heart attack (HCC), Heart failure (HCC), HTN (hypertension), Hyperlipidemia, ICD (implantable cardioverter-defibrillator) in place, Ischemic cardiomyopathy, Ischemic cardiomyopathy, Mild mitral valve regurgitation, Nonrheumatic mitral (valve) insufficiency, PVD (peripheral vascular disease) ([facility]), Rheumatic tricuspid insufficiency, and Sick sinus syndrome ([facility]).   PSurgHx:  has a past surgical history that includes Cardiac defibrillator placement (2014); Cardiac valuve replacement (2017); hernia repair; vascular surgery (2014); pacemaker; femoral bypass (Right, 08/24/2023); Cardiac surgery (Right, 08/28/2023); Cardiac procedure (N/A, 08/29/2023); Cardiac procedure (N/A, 08/29/2023); invasive vascular (N/A, 08/29/2023); Cardiac procedure (N/A, 08/29/2023); Cardiac procedure (N/A, 08/29/2023); Upper gastrointestinal endoscopy (N/A, 09/15/2023); Colonoscopy (N/A, 09/15/2023); Cardiac surgery (N/A, 09/19/2023); IR MECHANICAL ART THROMBECTOMY INTRACRANIAL (09/19/2023); Left ventricular assist device; Cardiac procedure (N/A, 11/29/2023); invasive vascular (N/A, 11/29/2023); and Upper gastrointestinal

## 2024-03-01 ENCOUNTER — ANTI-COAG VISIT (OUTPATIENT)
Age: 69
End: 2024-03-01

## 2024-03-01 DIAGNOSIS — Z79.01 LONG TERM (CURRENT) USE OF ANTICOAGULANTS: Primary | ICD-10-CM

## 2024-03-01 LAB — INR BLD: 1.5

## 2024-03-01 NOTE — PROGRESS NOTES
ADVANCED HEART FAILURE CENTER  [facility] in [place], [place]      INR result reviewed with LUPE Mondragon NP  who made the following recommendations (VORB): restart Lovenox 1 mg/kg BID, continue regular coumadin maintenance dosing, verify INR from home meter with labs drawn on 3/1/24 and address on 3/4/24. Spoke with Cheryle Muhammad RN ([facility] ) who is at patient's home and informed patient of above. Patient verbalized understanding. (See anticoag tracker)     CHARLENE DANIELS RN  VAD Coordinator

## 2024-03-04 ENCOUNTER — TELEPHONE (OUTPATIENT)
Age: 69
End: 2024-03-04

## 2024-03-04 NOTE — TELEPHONE ENCOUNTER
1118: INR of 1.6 collected 3/1/24 received from lab darci via fax this morning. Reviewed with LUPE Mondragon who confirmed will continue with previous recommendation for lovenox BID, continue current maintenance coumadin dosing, and home health to recheck INR. Contacted Cheryle Muhammad, nurse with [facility]. She stated she is unable to see patient today, but will see patient tomorrow and plan for a full set of labs including INR. Informed her no need to utilized patients home meter at this time as meter not reading accurately. Informed her new meter has been ordered and will need to correlate with lab collection within 30 minutes of each other three separate times once new meter received. She verbalized understanding and had no further questions.     1133: Contacted patient to notify of instructions to continue current coumadin maintenance plan (2 mg (1 mg x 2) every Mon, Wed, Fri; 3 mg (1 mg x 3) all other days ), continue lovenox 60mg BID SubQ, recheck INR and labs tomorrow by home health, and will contact patient with further instructions once INR received and reviewed by provider. Patient stated [facility] contacted him to inform him new meter would be sent to provider. Informed him will contact [facility] to confirm and discussed need for correlation x3 with new meter and lab draw. Patient verbalized understanding of all instructions and had no further questions.     1139: Contacted Storm with [facility] who stated meter with be shipped to one of their company's training nurses who will contact the patient to set up education session with patient. The nurse will also obtain the patient's current meter and ship it back. Informed him will clarify with patient. He had no further questions.    1149: Patient notified. He verbalized understanding and had no further questions.     LUIS STEVEN RN  VAD Coordinator

## 2024-03-05 ENCOUNTER — TELEPHONE (OUTPATIENT)
Age: 69
End: 2024-03-05

## 2024-03-05 NOTE — TELEPHONE ENCOUNTER
Per chart review patient had referral to GI placed by PCP. Referral states \"Needs Colonoscopy\" Called and spoke with nurse with [facility], reviewed patient had colonoscopy in September of last year. She verbalized understanding and requested we fax this to their office. Also notified that patient follow up with GI specialists post his last hospitalization and provided their number if PCP office would like to request records from them. Staff verbalized understanding .

## 2024-03-06 ENCOUNTER — ANTI-COAG VISIT (OUTPATIENT)
Age: 69
End: 2024-03-06
Payer: MEDICARE

## 2024-03-06 ENCOUNTER — TELEPHONE (OUTPATIENT)
Age: 69
End: 2024-03-06

## 2024-03-06 DIAGNOSIS — Z79.01 CHRONIC ANTICOAGULATION: Primary | ICD-10-CM

## 2024-03-06 LAB — INR BLD: 2.5

## 2024-03-06 PROCEDURE — 93793 ANTICOAG MGMT PT WARFARIN: CPT | Performed by: NURSE PRACTITIONER

## 2024-03-06 NOTE — TELEPHONE ENCOUNTER
Patient called stating that didn't take his lovenox until 12:30pm, patient asking what the soonest he can take the next dose is. Educated patient that lovenox doses should be spaced 12 hours apart.  Educated patient to take lovenox no sooner than 11:30am but try to get as close to 12 hours spacing as possible. He verbilized understanding. Stated that he received his new meter but has not used it yet.

## 2024-03-06 NOTE — PROGRESS NOTES
ADVANCED HEART FAILURE CENTER  [facility] in [place], [place]      INR result reviewed with CARMELINA Luevano NP who made the following recommendations (VORB): no changes and recheck 1 week. Okay to stop lovenox. Patient notified and verbalized understanding. They had no further questions. (See anticoag tracker)     Patient notified of provider instructions, verbalized understanding. Patient's home health nurse Abi Barros notified who verbalized understanding.  Abi stated that the last Coumadin dose order she had was from 03/01 for 2mg Monday and Wednesday and 3mg all other days.  Clarified with Abi maintenance dose should be 2 mg every Mon, Wed, Fri; 3 mg all other days, she verbalized understanding and stated she will update order. CARMELINA Luevano notified via chart message, confirmed VORB order should be 2 mg every Mon, Wed, Fri; 3 mg all other days.     Requested when patient is seen next that nursing corollate patient's meter with a venous sample, requested she draw lab and check patient's INR immediately.  She verbalized understanding.       Bety Aranda, RN

## 2024-03-12 ENCOUNTER — ANTI-COAG VISIT (OUTPATIENT)
Age: 69
End: 2024-03-12
Payer: MEDICARE

## 2024-03-12 DIAGNOSIS — Z79.01 LONG TERM (CURRENT) USE OF ANTICOAGULANTS: Primary | ICD-10-CM

## 2024-03-12 LAB — INR BLD: 4.1

## 2024-03-12 PROCEDURE — 93793 ANTICOAG MGMT PT WARFARIN: CPT | Performed by: NURSE PRACTITIONER

## 2024-03-12 NOTE — PROGRESS NOTES
ADVANCED HEART FAILURE CENTER  [facility] in [place], [place]      INR result reviewed with FAUSTO Garcia NP who made the following recommendations (VORB): hold coumadin today, compare INR home meter reading to lab result and recheck INR on 3/13/24 depending on 3/12/24 lab result. Patient notified and verbalized understanding. He had no further questions. Cheryle ([facility] RN) made aware of provider recommendations. (See anticoag tracker)     HCARLENE DANIELS RN  VAD Coordinator

## 2024-03-13 ENCOUNTER — TELEPHONE (OUTPATIENT)
Age: 69
End: 2024-03-13

## 2024-03-13 ENCOUNTER — ANTI-COAG VISIT (OUTPATIENT)
Age: 69
End: 2024-03-13

## 2024-03-13 LAB — INR BLD: 5.1

## 2024-03-13 NOTE — PROGRESS NOTES
Discussed patient INR results with FAUSTO Garcia who stated VORB hold tonight, 1mg tomorrow and recheck INR friday.     1331: Placed call to FAUSTO Garcia (out of clinic) to discuss INR recheck. Patient is receiving INR checks via venous samples with home health. Called to request home health check INR on Thursday as lab result will likely not be returned before end of clinic if drawn on Friday. FAUSTO Garcia confirmed okay to request home health draw venous sample on Thursday. Per FAUSTO Garcia VORB patient should still take 1mg of coumadin Thursday and have home health draw venous sample.     ADVANCED HEART FAILURE CENTER  [facility] in [place], [place]    INR result reviewed with FAUSTO Garcia NP who made the following recommendations (VORB): hold tonight, 1mg tomorrow and recheck Thursday. Patient notified and verbalized understanding. Kat with [facility] home [facility] notified of instructions, verbalized understanding. They had no further questions. (See anticoag tracker)     Bety Aranda RN

## 2024-03-13 NOTE — TELEPHONE ENCOUNTER
0936: Letter received via fax to LUPE Mondragon NP stating services could not be processed. Date of service provided along with claim ID, but no specific services listed. Per letters claim could not be processed as there was no indication of urgent or emergent services, but did not describe services being denied. Date coincides with Anticoagulation monitoring encounter in EMR.     Contacted [employer] and spoke with Lenka who stated provider listed not in network and claim was therefore denied. She requested Tax ID and stated provider's current address listed on letter ([address], [place] [address]) is not covered under tax ID. Provided [facility] address ([address], [place] [address]). Lenka stated that address is also not listed under covered addresses and only covered address on file is [address], [place] [address]. She stated provider relations will need to be contacted so contract can be updated with correct addresses and tax ID can be verified. She had no further questions. Reviewed with Practice Admin who stated will reach out to resolve issue. Letter provided.     LUIS STEVEN RN  VAD Coordinator

## 2024-03-13 NOTE — PROGRESS NOTES
Received call from Kat with [facility] who reported they received a critical lab alert of INR of 5.1 on this patient from [facility].

## 2024-03-14 ENCOUNTER — TELEPHONE (OUTPATIENT)
Age: 69
End: 2024-03-14

## 2024-03-14 NOTE — TELEPHONE ENCOUNTER
1136: patient's home health nurse called and left message reporting patient checked INR on his home INR meter which returned value of 2.7. Stated that patient will plan to take 1mg of coumadin tonight as previously instructed. States a venous sample was sent to check patient's INR as new meter is not yet calibrated.     Reviewed with FASUTO Garcia who confirmed will dose coumadin based off of venous lab sample, patient should take 1mg of coumadin tonight as previously instructed and await further instructions after lab sample is returned.     1705: returned call to patient's home health nurse Abi Barros. Confirmed with Abi that coumadin dosing instructions remain the same per [facility] provider, she verbalized understanding. Inquired if POC inr and venous sample were drawn at the same time, Abi confirmed she tim venous sample \"within 2 or 3 minutes\" of patient checking POC inr.

## 2024-03-15 ENCOUNTER — ANTI-COAG VISIT (OUTPATIENT)
Age: 69
End: 2024-03-15

## 2024-03-15 DIAGNOSIS — Z79.01 CHRONIC ANTICOAGULATION: Primary | ICD-10-CM

## 2024-03-15 LAB — INR BLD: 2.9

## 2024-03-15 NOTE — PROGRESS NOTES
ADVANCED HEART FAILURE CENTER  [facility] in [place], [place]      INR result reviewed with FAUSTO Garcia NP who made the following recommendations (VORB): 1mg tonight, 2mg tomorrow and recheck Monday. Patient notified and verbalized understanding. Patient's home health nurse Abi Barros notified who verbalized understanding. They had no further questions. (See anticoag tracker)     Bety Aranda RN

## 2024-03-18 ENCOUNTER — TELEPHONE (OUTPATIENT)
Age: 69
End: 2024-03-18

## 2024-03-18 NOTE — TELEPHONE ENCOUNTER
Call received from patient's home health Abi Barros who stated patient received a new INR meter yesterday.  States this is new replacement (third overall INR meter patient has received). Abi stated she is not sure why the second meter was replaced. States POC reading on new (third) meter today is 2.6.  Confirmed she tim a venous sample which will be used for coumadin changes.

## 2024-03-19 ENCOUNTER — ANTI-COAG VISIT (OUTPATIENT)
Age: 69
End: 2024-03-19
Payer: MEDICARE

## 2024-03-19 DIAGNOSIS — Z95.811 LVAD (LEFT VENTRICULAR ASSIST DEVICE) PRESENT (HCC): ICD-10-CM

## 2024-03-19 DIAGNOSIS — I50.22 CHRONIC SYSTOLIC (CONGESTIVE) HEART FAILURE (HCC): ICD-10-CM

## 2024-03-19 DIAGNOSIS — Z79.01 CHRONIC ANTICOAGULATION: Primary | ICD-10-CM

## 2024-03-19 LAB — INR BLD: 2.3

## 2024-03-19 PROCEDURE — 93793 ANTICOAG MGMT PT WARFARIN: CPT | Performed by: NURSE PRACTITIONER

## 2024-03-19 NOTE — PROGRESS NOTES
ADVANCED HEART FAILURE CENTER  [facility] in [place], [place]      INR result reviewed with LUPE Mondragon NP  who made the following recommendations (VORB): Take 2mg coumadin every night and recheck INR 3/21/24. Patient notified and verbalized understanding. Abi Barros, [facility] notified and verbalized understanding. They had no further questions. (See anticoag tracker).    LUIS STEVEN RN

## 2024-03-20 ENCOUNTER — TELEPHONE (OUTPATIENT)
Age: 69
End: 2024-03-20

## 2024-03-21 ENCOUNTER — TELEPHONE (OUTPATIENT)
Age: 69
End: 2024-03-21

## 2024-03-21 NOTE — TELEPHONE ENCOUNTER
1100: received call from patient stating he received LVAD dressing kits from [email or web address] however they do not have saline in them. Informed Coordinator will request his home health nurse bring him some at visit today.     1105: call placed to Abi Barros with patient's home health company. Notified that patient does not have saline in his kits and requested hh bring a short supply for patient. Abi verbalized understanding, stated nurse Cheryle Santacruz will see patient today and bring sterile saline for patient.     1300: received call from patient's home health nurse Cheryle who stated patient's INR on newest meter is 3.2. States she tim a venous sample at the same time as INR meter check.  Notified FAUSTO Garcia of INR- per provider VORB no changes to coumadin dosing and team will wait on venous sample for INR management. Updated cheryle who verbalized understanding.     Secure email sent to [email or web address] represenative to clarify patient okay to receive INR services with [employer] as patient's insurance previously denied home INR services.  Per representative patient is approved for [employer] INR services and can continue using [employer] meter.     1620: call placed to patient to notify that he was approved by [employer] and this is why he was sent a meter by them.  Instructed patient to continue using ONLY his [employer] meter and return meter to MD inr per their instructions. Confirmed this is the meter he has been using since he received it. He verbalized understanding.

## 2024-03-22 ENCOUNTER — ANTI-COAG VISIT (OUTPATIENT)
Age: 69
End: 2024-03-22

## 2024-03-22 DIAGNOSIS — Z79.01 CHRONIC ANTICOAGULATION: Primary | ICD-10-CM

## 2024-03-22 LAB — INR BLD: 3

## 2024-03-22 NOTE — PROGRESS NOTES
ADVANCED HEART FAILURE CENTER  [facility] in [place], [place]      INR result reviewed with FAUSTO Garcia NP who made the following recommendations (VORB): 1mg tonight,  2mg Saturday and Sunday and recheck Tuesday. Patient notified and verbalized understanding. Patient's home health nurse Abi Barros also notified. They had no further questions. (See anticoag tracker)     Bety Aranda RN

## 2024-03-22 NOTE — TELEPHONE ENCOUNTER
1343: Call placed to Storm with [facility]. Discussed patient will need to d/c services with [facility] as he now has services with [employer]. Storm requested written request to DC services be faxed to him at [phone]. Request signed by FAUSTO Garcia and faxed to number provider. Per Storm PHAN inr will reach out to patient regarding return of meter.     1403: called and spoke with patient. Notified that [facility] will reach out to him regarding return of meter. He verbalized understanding, confirmed he is using only [employer] meter.

## 2024-03-25 NOTE — PATIENT INSTRUCTIONS
nosebleeds, heavy menstrual periods or abnormal vaginal bleeding, blood in your urine, bloody or tarry stools, coughing up blood or vomit that looks like coffee grounds.  Many other drugs can increase your risk of bleeding when used with warfarin. Tell your doctor about all medicines you have recently used.  Avoid making any changes in your diet without first talking to your doctor. Some foods can make warfarin less effective.  What is warfarin?  Warfarin is an anticoagulant (blood thinner). Warfarin reduces the formation of blood clots.  Warfarin is used to treat or prevent blood clots in veins or arteries, which can reduce the risk of stroke, heart attack, or other serious conditions.  Warfarin may also be used for purposes not listed in this medication guide.  What should I discuss with my healthcare provider before taking warfarin?  You should not take warfarin if you are allergic to it, or if:  you have very high blood pressure;  you recently had or will have surgery on your brain, spine, or eye;  you undergo a spinal tap or spinal anesthesia (epidural); or  you cannot take warfarin on time every day.  You also should not take warfarin if you are are prone to bleeding because of a medical condition, such as:   a blood cell disorder (such as low red blood cells or low platelets);    Do not take warfarin if you are pregnant, unless your doctor tells you to. Warfarin can cause birth defects, but preventing blood clots may outweigh any risks to the baby. If you are not pregnant, use effective birth control to prevent pregnancy while taking warfarin and for at least 1 month after your last dose. Tell your doctor right away if you become pregnant.  Warfarin can make you bleed more easily, especially if you have ever had:   high blood pressure or serious heart disease;  kidney disease;  cancer or low blood cell counts;  an accident or surgery;  bleeding in your stomach or intestines;  a stroke; or  if you are 65 or

## 2024-03-26 ENCOUNTER — OFFICE VISIT (OUTPATIENT)
Age: 69
End: 2024-03-26
Payer: MEDICARE

## 2024-03-26 ENCOUNTER — ANTI-COAG VISIT (OUTPATIENT)
Age: 69
End: 2024-03-26

## 2024-03-26 VITALS
HEIGHT: 69 IN | BODY MASS INDEX: 20.14 KG/M2 | SYSTOLIC BLOOD PRESSURE: 92 MMHG | HEART RATE: 61 BPM | WEIGHT: 136 LBS | OXYGEN SATURATION: 99 % | RESPIRATION RATE: 18 BRPM | TEMPERATURE: 97.7 F

## 2024-03-26 DIAGNOSIS — I50.22 CHRONIC SYSTOLIC (CONGESTIVE) HEART FAILURE (HCC): ICD-10-CM

## 2024-03-26 DIAGNOSIS — Z79.01 CHRONIC ANTICOAGULATION: ICD-10-CM

## 2024-03-26 DIAGNOSIS — Z95.811 LVAD (LEFT VENTRICULAR ASSIST DEVICE) PRESENT (HCC): Primary | ICD-10-CM

## 2024-03-26 DIAGNOSIS — Z72.0 TOBACCO ABUSE DISORDER: ICD-10-CM

## 2024-03-26 LAB
DISTANCE WALKED: NORMAL
POC INR: 3.1
PROTHROMBIN TIME, POC: NORMAL
SPO2: NORMAL

## 2024-03-26 PROCEDURE — 99215 OFFICE O/P EST HI 40 MIN: CPT | Performed by: NURSE PRACTITIONER

## 2024-03-26 PROCEDURE — 85610 PROTHROMBIN TIME: CPT | Performed by: NURSE PRACTITIONER

## 2024-03-26 PROCEDURE — 94618 PULMONARY STRESS TESTING: CPT | Performed by: NURSE PRACTITIONER

## 2024-03-26 PROCEDURE — 93750 INTERROGATION VAD IN PERSON: CPT | Performed by: NURSE PRACTITIONER

## 2024-03-26 PROCEDURE — 1123F ACP DISCUSS/DSCN MKR DOCD: CPT | Performed by: NURSE PRACTITIONER

## 2024-03-26 ASSESSMENT — PATIENT HEALTH QUESTIONNAIRE - PHQ9
2. FEELING DOWN, DEPRESSED OR HOPELESS: NOT AT ALL
1. LITTLE INTEREST OR PLEASURE IN DOING THINGS: NOT AT ALL
SUM OF ALL RESPONSES TO PHQ QUESTIONS 1-9: 0
SUM OF ALL RESPONSES TO PHQ QUESTIONS 1-9: 0
SUM OF ALL RESPONSES TO PHQ9 QUESTIONS 1 & 2: 0
SUM OF ALL RESPONSES TO PHQ QUESTIONS 1-9: 0
SUM OF ALL RESPONSES TO PHQ QUESTIONS 1-9: 0

## 2024-03-26 ASSESSMENT — ENCOUNTER SYMPTOMS
EYE PAIN: 0
COUGH: 0
SORE THROAT: 0
CHEST TIGHTNESS: 0
ABDOMINAL PAIN: 0
SHORTNESS OF BREATH: 0
ABDOMINAL DISTENTION: 0
BLOOD IN STOOL: 0

## 2024-03-26 NOTE — PROGRESS NOTES
ADVANCED HEART FAILURE CENTER  [facility] in [place], [place]  Outpatient Office Note      Patient name: Bishop Love  Patient : 1955  Patient MRN: [id number]  Date of service: 24    Chief Complaint   Patient presents with    Follow-up     VAD         PLAN OF CARE:  68 y.o. with h/o severe ischemic cardiomyopathy, LVEF 15% s/p HM3 LVAD implantation (23) as destination therapy due not candidate for heart transplant (tobacco), malnutrition and severity of PVD, stage C, NYHA class II, on GDMT limited by hypotension/RV dysfunction/IVVD  Patient is interested in formal evaluation for heart transplant; we plan on monthly urine cotinine screening x 6 months.  Negative since .  Recent admission -  for GI bleed due to Nash Tapia tear status post 3 hemoclips with GI      CURRENT VISIT 3/26/24:  Mr. Love presents today for LVAD f/u.  Overall he feels really well.  Denies SOB, DOS SANTOS, orthopnea, PND, chest pain, palpitations, dizziness, bleeding issues, or weakness. Has been having less low flows then previously. Has been getting regular exercise with taking daily walks.  Is complaint with all medications.      PLAN:  Chronic HFrEF  Continue toprol XL 25mg twice daily   Hold Entresto due to possible allergy with cough since starting  Continue hydralazine/isordil 100/40mg TID   Continue spironolactone 25mg daily   Continue Norvasc to 5mg in the AM and 2.5mg in the PM   No SGLT2i due to recurrent UTI- consider re challenging at a later time with farxiga  Not on allopurinol or uloric     LVAD  Continue current device speed at 5200   Continue dressing changes 3x weekly  Chronic anticoagulation with coumadin- see anticoagulation tracker.  INR 3.1 today, advised to take 2 mg coumadin for the next 3 days and recheck INR Friday.  LVAD monthly, labs from 3/5 reviewed and stable    Recent GI bleed due to nash tapia tear  S/p EGD on 1/15- significant for 2 cm nash tapia tear 
understanding, stated home health will collect urine nicotine screening at visit this week.      All orders entered per VORB. All provider instructions placed in AVS and reviewed with patient. LVAD education provided on coumadin (see AVS). Educated patient on scheduling sleep study, expected follow up.  Time given to ask questions. Patient verbalized understanding and had no further questions.     Bety Aranda RN  VAD Coordinator

## 2024-03-26 NOTE — PROGRESS NOTES
ADVANCED HEART FAILURE CENTER  [facility] in [place], [place]      INR result reviewed with LUPE Mondragon NP  who made the following recommendations (VORB): 2mg daily and recheck Friday. Patient notified at clinic visit and verbalized understanding. They had no further questions. Patient's home health nurse Abi damon also notified. (See anticoag tracker, clinic note)     Bety Aranda RN

## 2024-03-27 ENCOUNTER — TELEPHONE (OUTPATIENT)
Age: 69
End: 2024-03-27

## 2024-03-27 NOTE — TELEPHONE ENCOUNTER
1101: call received from patient stating he tried to call sleep center regarding scheduling and was unable to reach staff. Discussed with patient will reach out to sleep center and ask them to call him.     1102: called and spoke with Adonay with sleep medicine. Requested office reach out to patient to assist patient in scheduling his sleep study.  Reviewed patient drives quite far and requested this be done on the same say as an appointment in [place]. Adonay verbalized understanding and stated she will reach out to patient.      Received call from Adonay stating patient is scheduled for 05/22 and is on wait list for sooner date at [facility]

## 2024-03-28 ENCOUNTER — TELEPHONE (OUTPATIENT)
Age: 69
End: 2024-03-28

## 2024-03-28 NOTE — TELEPHONE ENCOUNTER
Telephone Call RE:  Lab Reminder      Outcome:     [x] Patient verbalizes understanding    [] Unable to reach   [] Left message              []       Chaitanya Sow

## 2024-03-29 ENCOUNTER — ANTI-COAG VISIT (OUTPATIENT)
Age: 69
End: 2024-03-29
Payer: MEDICARE

## 2024-03-29 DIAGNOSIS — Z79.01 CHRONIC ANTICOAGULATION: Primary | ICD-10-CM

## 2024-03-29 LAB — INR BLD: 4.3

## 2024-03-29 PROCEDURE — 93793 ANTICOAG MGMT PT WARFARIN: CPT | Performed by: NURSE PRACTITIONER

## 2024-03-29 NOTE — PROGRESS NOTES
ADVANCED HEART FAILURE CENTER  [facility] in [place], [place]      INR result reviewed with LUPE Mondragon NP  who made the following recommendations (VORB): hold tonight and tomorrow, 1 mg Sunday and recheck monday. Patient notified and verbalized understanding.  Patient's home health nurse Abi Barros also notified. They had no further questions. (See anticoag tracker)     Bety Aranda RN

## 2024-04-01 ENCOUNTER — ANTI-COAG VISIT (OUTPATIENT)
Age: 69
End: 2024-04-01
Payer: MEDICARE

## 2024-04-01 DIAGNOSIS — Z79.01 CHRONIC ANTICOAGULATION: Primary | ICD-10-CM

## 2024-04-01 LAB — INR BLD: 2.3

## 2024-04-01 PROCEDURE — 93793 ANTICOAG MGMT PT WARFARIN: CPT | Performed by: NURSE PRACTITIONER

## 2024-04-01 NOTE — PROGRESS NOTES
ADVANCED HEART FAILURE CENTER  [facility] in [place], [place]      INR result reviewed with FAUSTO Garcia NP who made the following recommendations (VORB): no changes and recheck Thursday.     Call placed to patient regarding INR instructions, no answer and busy tone on patient's phone.     Second call placed to patient, Notified of provider instructions regarding coumadin dosing, patient verbalized understanding.   (See anticoag tracker)     Left message with patient's home health nurse Abi Barros to notify.      Bety Aranda RN

## 2024-04-03 ENCOUNTER — TELEPHONE (OUTPATIENT)
Age: 69
End: 2024-04-03

## 2024-04-03 DIAGNOSIS — R97.20 ELEVATED PSA: ICD-10-CM

## 2024-04-03 NOTE — TELEPHONE ENCOUNTER
1048: received call from patient stating he received notice from his pharmacy that he has  a Jardiance prescription ready for . Patient wanted to confirm if this is correct as he has not been on Jardiance recently. Jardiance has been on hold since 01/02/24.     Educated patient that Jardiance has been on hold per [facility] providers and he does not need to pick this up. He verbalized understanding.

## 2024-04-04 ENCOUNTER — TELEPHONE (OUTPATIENT)
Age: 69
End: 2024-04-04

## 2024-04-04 ENCOUNTER — ANTI-COAG VISIT (OUTPATIENT)
Age: 69
End: 2024-04-04
Payer: MEDICARE

## 2024-04-04 DIAGNOSIS — Z79.01 CHRONIC ANTICOAGULATION: Primary | ICD-10-CM

## 2024-04-04 LAB — INR BLD: 2.8

## 2024-04-04 PROCEDURE — 93793 ANTICOAG MGMT PT WARFARIN: CPT | Performed by: NURSE PRACTITIONER

## 2024-04-04 NOTE — TELEPHONE ENCOUNTER
1031: call received from patient's home health nurse Cheryle stating that patient noted to have mild cough. Stated patient denies shortness of breath, weight gain or edema. Denied fever or chills. Patient and cheryle confirmed patient is not taking Entresto. Reported last 3 MAP measurements have been 86, 90 and 88.  CARMELINA Luevano notified, no new orders received

## 2024-04-04 NOTE — PROGRESS NOTES
ADVANCED HEART FAILURE CENTER  [facility] in [place], [place]      INR result reviewed with FAUSTO Garcia NP who made the following recommendations (VORB): no changes and recheck Tuesday. Patient notified and verbalized understanding. They had no further questions. Patient's home health nurse called to discuss separate issue, was notified of coumadin dosing she verbalized understanding.  (See anticoag tracker)     Bety Aranda RN

## 2024-04-04 NOTE — TELEPHONE ENCOUNTER
Telephone Call RE:  Lab Reminder      Outcome:     [x] Patient verbalizes understanding    [] Unable to reach   [] Left message              []     Pt checked INR today.  I informed Chin Sow

## 2024-04-08 ENCOUNTER — TELEPHONE (OUTPATIENT)
Age: 69
End: 2024-04-08

## 2024-04-08 NOTE — TELEPHONE ENCOUNTER
Telephone Call RE:  Lab Reminder      Outcome:     [] Patient verbalizes understanding    [] Unable to reach   [x] Left message              []       Chaitanya Sow

## 2024-04-09 ENCOUNTER — ANTI-COAG VISIT (OUTPATIENT)
Age: 69
End: 2024-04-09
Payer: MEDICARE

## 2024-04-09 DIAGNOSIS — Z79.01 CHRONIC ANTICOAGULATION: Primary | ICD-10-CM

## 2024-04-09 LAB — INR BLD: 3.4

## 2024-04-09 PROCEDURE — 93793 ANTICOAG MGMT PT WARFARIN: CPT | Performed by: NURSE PRACTITIONER

## 2024-04-09 NOTE — PROGRESS NOTES
ADVANCED HEART FAILURE CENTER  [facility] in [place], [place]      INR result reviewed with LUPE Mondragon NP  who made the following recommendations (VORB): 1mg tonight, 2 mg Thursday and recheck Friday. Patient notified and verbalized understanding. They had no further questions. (See anticoag tracker). Patient's home health nurse Cheryle also notified      Bety Aranda RN

## 2024-04-11 ENCOUNTER — TELEPHONE (OUTPATIENT)
Age: 69
End: 2024-04-11

## 2024-04-12 ENCOUNTER — ANTI-COAG VISIT (OUTPATIENT)
Age: 69
End: 2024-04-12
Payer: MEDICARE

## 2024-04-12 ENCOUNTER — TELEPHONE (OUTPATIENT)
Age: 69
End: 2024-04-12

## 2024-04-12 DIAGNOSIS — Z79.01 CHRONIC ANTICOAGULATION: Primary | ICD-10-CM

## 2024-04-12 LAB — INR BLD: 4

## 2024-04-12 PROCEDURE — 93793 ANTICOAG MGMT PT WARFARIN: CPT | Performed by: NURSE PRACTITIONER

## 2024-04-12 NOTE — TELEPHONE ENCOUNTER
1007: spoke to patient regarding INR and coumadin instructions (see anticoag encounter). Patient states he is still having a dry cough. Denied fever, chills, congestion, weight gain, edema or shortness of breath. Confirmed he is no longer taking entresto.     Reviewed with FAUSTO Garcia who recommended VORB patient take Claritin for several days and assess if this improves cough.      1624: called and spoke with patient. Reviewed provider instructions to take Claritin OTC and monitor symptoms for any change, educated to call [facility] for fever, chills, congestion, weight gain, edema or shortness of breath. He verbalized understanding.

## 2024-04-12 NOTE — PROGRESS NOTES
ADVANCED HEART FAILURE CENTER  [facility] in [place], [place]      INR result reviewed with FAUSTO Garcia NP who made the following recommendations (VORB): hold tonight, 1mg Saturday and sunday and recheck Monday. Patient notified and verbalized understanding. Home health nurse samantha gutierrez also notified. . (See anticoag tracker)     Bety Aranda RN

## 2024-04-15 ENCOUNTER — ANTI-COAG VISIT (OUTPATIENT)
Age: 69
End: 2024-04-15
Payer: MEDICARE

## 2024-04-15 DIAGNOSIS — Z79.01 CHRONIC ANTICOAGULATION: Primary | ICD-10-CM

## 2024-04-15 DIAGNOSIS — I50.22 CHRONIC SYSTOLIC HEART FAILURE (HCC): ICD-10-CM

## 2024-04-15 DIAGNOSIS — Z95.811 LVAD (LEFT VENTRICULAR ASSIST DEVICE) PRESENT (HCC): ICD-10-CM

## 2024-04-15 LAB — INR BLD: 3

## 2024-04-15 PROCEDURE — 93793 ANTICOAG MGMT PT WARFARIN: CPT | Performed by: NURSE PRACTITIONER

## 2024-04-15 NOTE — PROGRESS NOTES
ADVANCED HEART FAILURE CENTER  [facility] in [place], [place]      INR result reviewed with CARMELINA Luevano NP who made the following recommendations (VORB): 2mg tomorrow and recheck 1 week. Patient notified and verbalized understanding. They had no further questions. Patient's home health nurse Cheryle also notified.  (See anticoag tracker)     Bety Aranda RN        
What Is The Reason For Today's Visit?: History of Non-Melanoma Skin Cancer
How Many Skin Cancers Have You Had?: more than one
When Was Your Last Cancer Diagnosed?: 2013

## 2024-04-16 RX ORDER — HYDRALAZINE HYDROCHLORIDE 100 MG/1
100 TABLET, FILM COATED ORAL EVERY 8 HOURS SCHEDULED
Qty: 270 TABLET | Refills: 1 | Status: SHIPPED | OUTPATIENT
Start: 2024-04-16

## 2024-04-16 RX ORDER — MELATONIN
1000 DAILY
Qty: 90 TABLET | Refills: 1 | Status: SHIPPED | OUTPATIENT
Start: 2024-04-16

## 2024-04-16 RX ORDER — AMLODIPINE BESYLATE 2.5 MG/1
TABLET ORAL
Qty: 90 TABLET | Refills: 2 | Status: SHIPPED | OUTPATIENT
Start: 2024-04-16

## 2024-04-16 RX ORDER — ATORVASTATIN CALCIUM 40 MG/1
80 TABLET, FILM COATED ORAL NIGHTLY
Qty: 180 TABLET | Refills: 1 | Status: SHIPPED | OUTPATIENT
Start: 2024-04-16 | End: 2024-04-17 | Stop reason: SDUPTHER

## 2024-04-16 RX ORDER — SPIRONOLACTONE 25 MG/1
25 TABLET ORAL DAILY
Qty: 90 TABLET | Refills: 0 | Status: SHIPPED | OUTPATIENT
Start: 2024-04-16

## 2024-04-16 RX ORDER — ISOSORBIDE DINITRATE 20 MG/1
40 TABLET ORAL EVERY 8 HOURS
Qty: 180 TABLET | Refills: 2 | Status: SHIPPED | OUTPATIENT
Start: 2024-04-16

## 2024-04-16 RX ORDER — METOPROLOL SUCCINATE 25 MG/1
25 TABLET, EXTENDED RELEASE ORAL 2 TIMES DAILY
Qty: 180 TABLET | Refills: 1 | Status: SHIPPED | OUTPATIENT
Start: 2024-04-16

## 2024-04-16 RX ORDER — WARFARIN SODIUM 1 MG/1
TABLET ORAL
Qty: 230 TABLET | Refills: 1 | Status: SHIPPED | OUTPATIENT
Start: 2024-04-16

## 2024-04-16 NOTE — TELEPHONE ENCOUNTER
Requested Prescriptions     Signed Prescriptions Disp Refills    spironolactone (ALDACTONE) 25 MG tablet 90 tablet 0     Sig: take 1 tablet by mouth once daily     Authorizing Provider: MELVA GAMBLE     Ordering User: FRANK CROWLEL

## 2024-04-16 NOTE — TELEPHONE ENCOUNTER
Received call from [facility] stating that they are the patient's new mail delivery and wanted to be updated in the system to receive all future active medications also, requested the pended medications be sent to them.    Pharmacy updated in chart and meds pended that they requested for now.    Halina Dutton, CMA  
(LIPITOR) 40 MG tablet 180 tablet 1     Sig: Take 2 tablets by mouth nightly     Authorizing Provider: SARAVANAN BAILEY     Ordering User: FRANK CROWELL

## 2024-04-17 RX ORDER — ATORVASTATIN CALCIUM 40 MG/1
80 TABLET, FILM COATED ORAL NIGHTLY
Qty: 60 TABLET | Refills: 1 | Status: SHIPPED | OUTPATIENT
Start: 2024-04-17

## 2024-04-17 NOTE — TELEPHONE ENCOUNTER
Requested Prescriptions     Signed Prescriptions Disp Refills    atorvastatin (LIPITOR) 40 MG tablet 60 tablet 1     Sig: Take 2 tablets by mouth nightly     Authorizing Provider: PETE SCHWAB     Ordering User: FRANK CROWELL

## 2024-04-19 ENCOUNTER — TELEPHONE (OUTPATIENT)
Age: 69
End: 2024-04-19

## 2024-04-19 NOTE — TELEPHONE ENCOUNTER
1449: received call from patient stating that he tried to order INR test strips but was told that he was sent a 90 day supply and so is not able to get more until June. Informed patient we will request home health check his INR at visit this week.      1449: placed call to patient's home health nurse Cheryle who stated that she called [facility] to order more strips and was told that patient had pending shipment. Informed will follow up with [facility] regarding if patient has shipment coming.  Requested cheryle see patient next Monday 04/22 when renee is due for INR check and check INR. Cheryle verbilized understnaidng, states she will call to report INR on Monday.

## 2024-04-23 ENCOUNTER — ANTI-COAG VISIT (OUTPATIENT)
Age: 69
End: 2024-04-23
Payer: MEDICARE

## 2024-04-23 ENCOUNTER — TELEPHONE (OUTPATIENT)
Age: 69
End: 2024-04-23

## 2024-04-23 DIAGNOSIS — Z79.01 CHRONIC ANTICOAGULATION: Primary | ICD-10-CM

## 2024-04-23 LAB — INR BLD: 5

## 2024-04-23 PROCEDURE — 93793 ANTICOAG MGMT PT WARFARIN: CPT | Performed by: NURSE PRACTITIONER

## 2024-04-23 NOTE — PROGRESS NOTES
ADVANCED HEART FAILURE CENTER  [facility] in [place], [place]    1424: received call from patient's home health nurse Cheryle who stated she checked patient's INR on her meter which resulted as 5.0. Patient unable to check on his meter as out of strips. Chreyle stated she will draw venous sample for confirmation.     Reviewed with FAUSTO Garcia who ordered VORB hold coumadin tonight, draw venous INR and await further instructions once inr results tomorrow.     Patient and home health nurse Cheryle (on speaker phone together) both notified and verbalized understanding.      Bety Aranda RN

## 2024-04-23 NOTE — TELEPHONE ENCOUNTER
0900: received call from patient stating that he is almost out of brilinta and spironolactone.     0930: call placed to [facility] pharmacy who stated that [facility]     0940: called and spoke with [facility] who stated they do not have current scripts for brilinta or spironolactone on file.     0943: called and spoke with [facility] and discussed [facility] pharmacy stated they do not have scripts for Brilenta or Spironolactone for patient. [facility] staff stated they have current scripts for these two medications and patient should be able to fill today.     0950: call placed to patient's home. Spoke to his wife Kaylin and notified her that patient can  these two medications at [facility] today. She verbalized understanding.

## 2024-04-24 ENCOUNTER — ANTI-COAG VISIT (OUTPATIENT)
Age: 69
End: 2024-04-24
Payer: MEDICARE

## 2024-04-24 DIAGNOSIS — Z79.01 CHRONIC ANTICOAGULATION: Primary | ICD-10-CM

## 2024-04-24 LAB — INR BLD: 4.7

## 2024-04-24 PROCEDURE — 93793 ANTICOAG MGMT PT WARFARIN: CPT | Performed by: NURSE PRACTITIONER

## 2024-04-24 NOTE — PROGRESS NOTES
ADVANCED HEART FAILURE CENTER  [facility] in [place], [place]      INR result (4.7 from lab draw yesterday) reviewed with FAUSTO Garcia NP who made the following recommendations (VORB): hold tonight, 1mg tomorrow and recheck Friday. Patient notified and verbalized understanding. They had no further questions. (See anticoag tracker)     Lorrie's home health nurse Cheryle Santacruz also notified.      Patient confirmed that he picked up brilenta from pharmacy     Bety Aranda RN

## 2024-04-25 ENCOUNTER — TELEPHONE (OUTPATIENT)
Age: 69
End: 2024-04-25

## 2024-04-25 NOTE — TELEPHONE ENCOUNTER
1226: called patient who stated that he has not received strips from [facility] yet. Inquired if patient would be able to go to [facility] to have INR check. He stated that he will not have a ride to [facility] (lives about 40 minutes drive from lab) tonight or tomorrow.     1228: called and spoke with patient's home health nurse samantha who stated she will see patient tomorrow for INR check

## 2024-04-26 ENCOUNTER — ANTI-COAG VISIT (OUTPATIENT)
Age: 69
End: 2024-04-26
Payer: MEDICARE

## 2024-04-26 ENCOUNTER — TELEPHONE (OUTPATIENT)
Age: 69
End: 2024-04-26

## 2024-04-26 DIAGNOSIS — Z79.01 CHRONIC ANTICOAGULATION: Primary | ICD-10-CM

## 2024-04-26 LAB — INR BLD: 2.7

## 2024-04-26 PROCEDURE — 93793 ANTICOAG MGMT PT WARFARIN: CPT | Performed by: NURSE PRACTITIONER

## 2024-04-26 RX ORDER — SPIRONOLACTONE 25 MG/1
25 TABLET ORAL DAILY
Qty: 90 TABLET | Refills: 0 | Status: SHIPPED | OUTPATIENT
Start: 2024-04-26

## 2024-04-26 RX ORDER — HYDRALAZINE HYDROCHLORIDE 100 MG/1
100 TABLET, FILM COATED ORAL EVERY 8 HOURS SCHEDULED
Qty: 270 TABLET | Refills: 1 | Status: SHIPPED | OUTPATIENT
Start: 2024-04-26

## 2024-04-26 RX ORDER — METOPROLOL SUCCINATE 25 MG/1
25 TABLET, EXTENDED RELEASE ORAL 2 TIMES DAILY
Qty: 180 TABLET | Refills: 1 | Status: SHIPPED | OUTPATIENT
Start: 2024-04-26

## 2024-04-26 RX ORDER — WARFARIN SODIUM 1 MG/1
TABLET ORAL
Qty: 230 TABLET | Refills: 1 | Status: SHIPPED | OUTPATIENT
Start: 2024-04-26

## 2024-04-26 RX ORDER — AMLODIPINE BESYLATE 2.5 MG/1
TABLET ORAL
Qty: 90 TABLET | Refills: 2 | Status: SHIPPED | OUTPATIENT
Start: 2024-04-26

## 2024-04-26 NOTE — TELEPHONE ENCOUNTER
Called and spoke with [facility] pharmacy who stated they do not have scripts for warfarin, spironolactone, metoprolol, hydralazine or amlodipine. Reviewed all other medications and staff confirmed prescriptions for these medications.     Called and spoke with [facility] pharmacy who confirmed they have current Brilenta script on file for patient and requested [facility] call to transfer script.     Called and spoke with [facility] and [facility]. Staff stated they will request Brilenta script from local [facility]. All other missing medications sent per FAUSTO MEHTA        Requested Prescriptions     Signed Prescriptions Disp Refills    spironolactone (ALDACTONE) 25 MG tablet 90 tablet 0     Sig: Take 1 tablet by mouth daily     Authorizing Provider: SKYLAR SANCHEZ     Ordering User: FRANK CROWELL    warfarin (COUMADIN) 1 MG tablet 230 tablet 1     Sig: Take 2 mg (1 mg x 2) by mouth every Mon, Wed, Fri; 3 mg (1 mg x 3) all other days. May take additional doses PRN as directed by [facility] provider for subtherapeutic INR.     Authorizing Provider: SKYLAR SANCHEZ     Ordering User: FRANK CROWELL    amLODIPine (NORVASC) 2.5 MG tablet 90 tablet 2     Sig: Take 5mg (2 pills) in the morning and 2.5mg (1pill) in the evening     Authorizing Provider: SKYLAR SANCHEZ     Ordering User: FRANK CROWELL    hydrALAZINE (APRESOLINE) 100 MG tablet 270 tablet 1     Sig: Take 1 tablet by mouth every 8 hours     Authorizing Provider: SKYLAR SANCHEZ     Ordering User: FRANK CROWELL    metoprolol succinate (TOPROL XL) 25 MG extended release tablet 180 tablet 1     Sig: Take 1 tablet by mouth in the morning and at bedtime     Authorizing Provider: SKYLAR SANCHEZ     Ordering User: FRANK CROWELL

## 2024-04-26 NOTE — PROGRESS NOTES
1000: Patient's home health nurse called to report she checked patient's INR on her meter and got a result of 2.7. Stated she also tim a venous sample     ADVANCED HEART FAILURE CENTER  [facility] in [place], [place]      INR result reviewed with FAUSTO Garcia NP who made the following recommendations (VORB):  2mg Saturday ( provider originally ordered 2mg tonight, patient's dose for tonight already 2mg, clarified with provider patient should take 2mg tonight and tomorrow) and recheck INR 04/30. Per provider okay to use hh meter result as lab value won't return before the weekend.  Patient notified and verbalized understanding, patient's home health nurse Cheryle also notified.  They had no further questions. (See anticoag tracker)     Bety Aranda RN

## 2024-04-29 NOTE — PATIENT INSTRUCTIONS
Medication changes:    HOLD  your coumadin tonight- Changes to  your coumadin dosing going forward-   Maintenance plan:3 mg every Tue, Thu, Sat; 2 mg all other days     Increase your amlodipine to 2mg twice a day    Testing Ordered:    Lab work has been drawn today. You will be contacted with any abnormal results requiring changes to your current plan of care.      Other Recommendations:      A referral has been placed to [facility] to discuss transplant evaluation.  You will be contacted by them for scheduling.     Continue to change drive line exit site dressing 3 times a week using sterile technique,     Ensure you are drinking an adequate amount of water with a goal of 6-8 eight ounce glasses (1.5-2 liters) of fluid daily. Your urine should be clear and light yellow straw colored.       Monthly LVAD Education Tip:    Travel Tips and Preparation:  Traveling with your device is generally easy and safe.     Here are some things to know or consider:  First, consult with your doctor.  It’s important to notify your care team about your plans, so they can:  Help you connect with a doctor at your destination, just in case you need care  Help you plan for the right level of exercise or activity while you’re away  Create a travel and emergency action plan for long-distance trips  Talk with you about travel safety rules for equipment  Help you think through how to manage any possible issues  Provide any other relevant information  Your VAD team will send records to the LVAD center nearest to your destination in case of emergency. The contact information for that center will be provided to you.     What to bring with you  Pack any medications. Bring about a week’s more than you expect to need for the trip. If traveling by plane, pack your medication in your carry-on so it’s with you for the entire journey.  Bring a photocopy of the prescriptions your doctor wrote, along with any related insurance or pharmacy information so you can

## 2024-04-30 ENCOUNTER — OFFICE VISIT (OUTPATIENT)
Age: 69
End: 2024-04-30
Payer: MEDICARE

## 2024-04-30 ENCOUNTER — ANTI-COAG VISIT (OUTPATIENT)
Age: 69
End: 2024-04-30

## 2024-04-30 VITALS
SYSTOLIC BLOOD PRESSURE: 98 MMHG | WEIGHT: 138 LBS | RESPIRATION RATE: 18 BRPM | BODY MASS INDEX: 20.38 KG/M2 | HEART RATE: 81 BPM | OXYGEN SATURATION: 98 % | TEMPERATURE: 97.6 F

## 2024-04-30 DIAGNOSIS — Z79.899 HIGH RISK MEDICATION USE: Primary | ICD-10-CM

## 2024-04-30 DIAGNOSIS — I50.22 CHRONIC SYSTOLIC HEART FAILURE (HCC): ICD-10-CM

## 2024-04-30 DIAGNOSIS — Z79.01 CHRONIC ANTICOAGULATION: ICD-10-CM

## 2024-04-30 DIAGNOSIS — R19.5 POSITIVE FECAL OCCULT BLOOD TEST: Primary | ICD-10-CM

## 2024-04-30 DIAGNOSIS — Z95.811 LVAD (LEFT VENTRICULAR ASSIST DEVICE) PRESENT (HCC): ICD-10-CM

## 2024-04-30 LAB
INR BLD: 3.1
POC INR: 3.1
PROTHROMBIN TIME, POC: NORMAL

## 2024-04-30 PROCEDURE — 99214 OFFICE O/P EST MOD 30 MIN: CPT | Performed by: INTERNAL MEDICINE

## 2024-04-30 PROCEDURE — 85610 PROTHROMBIN TIME: CPT | Performed by: INTERNAL MEDICINE

## 2024-04-30 PROCEDURE — 1123F ACP DISCUSS/DSCN MKR DOCD: CPT | Performed by: INTERNAL MEDICINE

## 2024-04-30 PROCEDURE — 93750 INTERROGATION VAD IN PERSON: CPT | Performed by: INTERNAL MEDICINE

## 2024-04-30 RX ORDER — AMLODIPINE BESYLATE 2.5 MG/1
5 TABLET ORAL 2 TIMES DAILY
Qty: 120 TABLET | Refills: 2 | Status: SHIPPED | OUTPATIENT
Start: 2024-04-30

## 2024-04-30 ASSESSMENT — PATIENT HEALTH QUESTIONNAIRE - PHQ9
SUM OF ALL RESPONSES TO PHQ QUESTIONS 1-9: 0
1. LITTLE INTEREST OR PLEASURE IN DOING THINGS: NOT AT ALL
SUM OF ALL RESPONSES TO PHQ QUESTIONS 1-9: 0
SUM OF ALL RESPONSES TO PHQ QUESTIONS 1-9: 0
2. FEELING DOWN, DEPRESSED OR HOPELESS: NOT AT ALL
SUM OF ALL RESPONSES TO PHQ QUESTIONS 1-9: 0
SUM OF ALL RESPONSES TO PHQ9 QUESTIONS 1 & 2: 0

## 2024-04-30 NOTE — PROGRESS NOTES
ADVANCED HEART FAILURE CENTER  [facility] in [place], [place]      INR result reviewed with Dr. Alejandro MD  who made the following recommendations (VORB): hold tonight, change maintenance plan to 3 mg every Tue, Thu, Sat; 2 mg all other days    and recheck INR in 1  week.  Patient notified at clinic visit and verbalized understanding. They had no further questions. (See anticoag tracker)  Patient's home health nurse Cheryle also notified.      Bety Aranda RN

## 2024-04-30 NOTE — PROGRESS NOTES
ADVANCED HEART FAILURE CENTER  [facility] in [place], [place]  LVAD Coordinator Clinic Visit Note  Chief Complaint   Patient presents with    Other     LVAD follow up          BP (!) 98/0 Comment: no palpable radial pulse  Pulse 81   Temp 97.6 °F (36.4 °C)   Resp 18   Wt 62.6 kg (138 lb)   SpO2 98%   BMI 20.38 kg/m²     LVAD  LVAD Type:: Left Ventricular Assist Device (LVAD)  Pump Speed (rpm): 5200  Pump Flow (lpm): 4  MAP (mmHg): 98 (no palpable pulse)  Set Low Speed (rpm): 5200  Pump Pulse Index (PI): 8.6  Pump Power (Villanueva): 3.7  Battery Life Checked: Yes  Backup Controller Present: Yes  Driveline Dressing: Clean, Dry and Intact  Outpatient: Yes  MAP in Therapeutic Range (Outpatient): No       Bishop Love is a 69 y.o. male with a history of ICM with Heartmate 3 implant date of 09/19/23. LVAD interrogation completed in clinic, results reported to provider, Notable for low flow alarms- patient states they continue to improve, only had one this morning and one yesterday morning.     Patient denied s/s of driveline infection or driveline trauma (including  drops).  See flow sheet for details. Driveline inspected for integrity.  Above reported to provider.     Per Dr. Alejandro MEHTA referral to be sent to [facility] for transplant eval. Discussed patient missed one month of nicotine screening, does not have 6 months of negative testing, per Dr. Alejandro francisco to send without 6 months of documented negative testing.     Per provider patient can use saline rinses to see if this helps his cough as seasonal allergies may be contributing.       All orders entered per TYSON. All provider instructions placed in AVS and reviewed with patient. LVAD education provided on traveling with LVAD, medication changes, coumadin changes  Time given to ask questions. Patient verbalized understanding and had no further questions.     Called and spoke with Cheryle gutierrez patient's home health nurse, updated on 
Jania Christopher MD at [facility] ENDOSCOPY    FEMORAL BYPASS Right 2023    RIGHT FEMORAL POPLITEAL BYPASS WITH POLYTETRAFLUOROETHYLENE performed by Sean Barger MD at [facility] MAIN OR    HERNIA REPAIR      INVASIVE VASCULAR N/A 2023    Ultrasound guided vascular access performed by Sole Allen MD at [facility] CARDIAC CATH LAB    INVASIVE VASCULAR N/A 2023    Ultrasound guided vascular access performed by Jose Daniel Fields MD at [facility] CARDIAC CATH LAB    IR MECHANICAL ART THROMBECTOMY INTRACRANIAL  2023    IR MECHANICAL ART THROMBECTOMY INTRACRANIAL 2023 [facility] RAD ANGIO IR    LEFT VENTRICULAR ASSIST DEVICE      PACEMAKER      pacemaker/ICD    UPPER GASTROINTESTINAL ENDOSCOPY N/A 09/15/2023    EGD ESOPHAGOGASTRODUODENOSCOPY performed by Jania Christopher MD at [facility] ENDOSCOPY    UPPER GASTROINTESTINAL ENDOSCOPY N/A 1/15/2024    EGD ESOPHAGOGASTRODUODENOSCOPY, 3 clips placed, one clip fell off after being placed performed by Maryjo Simmons MD at [facility] ENDOSCOPY    VASCULAR SURGERY  2014    aortobifemoral bypass and bilateral femoral above knee popliteal bypass       FAMILY HISTORY:  Family History   Problem Relation Age of Onset    Hypertension Mother     Hypertension Father     Heart Attack Father        SOCIAL HISTORY:  Social History     Socioeconomic History    Marital status: Single   Tobacco Use    Smoking status: Former     Current packs/day: 0.00     Average packs/day: 0.3 packs/day for 40.0 years (10.0 ttl pk-yrs)     Types: Cigarettes     Start date: 1983     Quit date: 2023     Years since quittin.7    Smokeless tobacco: Never   Vaping Use    Vaping Use: Never used   Substance and Sexual Activity    Alcohol use: Not Currently    Drug use: Never     Social Determinants of Health     Food Insecurity: Patient Declined (1/15/2024)    Hunger Vital Sign     Worried About Running Out of Food in the Last Year: Patient declined     Ran Out of Food in the Last Year: Patient declined

## 2024-05-01 LAB
25(OH)D3+25(OH)D2 SERPL-MCNC: 35.8 NG/ML (ref 30–100)
CHOLEST SERPL-MCNC: 98 MG/DL (ref 100–199)
FERRITIN SERPL-MCNC: 151 NG/ML (ref 30–400)
HDLC SERPL-MCNC: 41 MG/DL
IRON SATN MFR SERPL: 19 % (ref 15–55)
IRON SERPL-MCNC: 57 UG/DL (ref 38–169)
LDLC SERPL CALC-MCNC: 43 MG/DL (ref 0–99)
TIBC SERPL-MCNC: 302 UG/DL (ref 250–450)
TRIGL SERPL-MCNC: 63 MG/DL (ref 0–149)
TSH SERPL DL<=0.005 MIU/L-ACNC: 1.93 UIU/ML (ref 0.45–4.5)
UIBC SERPL-MCNC: 245 UG/DL (ref 111–343)
VLDLC SERPL CALC-MCNC: 14 MG/DL (ref 5–40)

## 2024-05-01 NOTE — RESULT ENCOUNTER NOTE
Cholesterol normal  Ferritin normal  TIBC normal  TSH normal  Vitamin D normal I would continue same treatment

## 2024-05-02 ENCOUNTER — TELEPHONE (OUTPATIENT)
Age: 69
End: 2024-05-02

## 2024-05-02 NOTE — TELEPHONE ENCOUNTER
Labwork normal per Dr. Allen, called patient and informed labwork normal per provider. He verbalized understanding.

## 2024-05-02 NOTE — TELEPHONE ENCOUNTER
----- Message from Lux Allen MD sent at 5/1/2024  3:11 PM EDT -----  Cholesterol normal  Ferritin normal  TIBC normal  TSH normal  Vitamin D normal I would continue same treatment

## 2024-05-07 ENCOUNTER — ANTI-COAG VISIT (OUTPATIENT)
Age: 69
End: 2024-05-07
Payer: MEDICARE

## 2024-05-07 ENCOUNTER — TELEPHONE (OUTPATIENT)
Age: 69
End: 2024-05-07

## 2024-05-07 DIAGNOSIS — Z79.01 CHRONIC ANTICOAGULATION: Primary | ICD-10-CM

## 2024-05-07 LAB — INR BLD: 4.2

## 2024-05-07 PROCEDURE — 93793 ANTICOAG MGMT PT WARFARIN: CPT | Performed by: NURSE PRACTITIONER

## 2024-05-07 NOTE — TELEPHONE ENCOUNTER
1337: Patient's home health nurse Cheryle called to report patient received pill pack from [facility] however they are missing spironolactone and have atorvastatin 40mg in them instead of 80mg. States patient was made aware of discrepancy, has extra supply of statin and spironolactone which he knows to take until further notice.

## 2024-05-07 NOTE — PROGRESS NOTES
1337: patient's home health nurse cheryle called to report INR. Stated that patient performed this on his meter.     ADVANCED HEART FAILURE CENTER  [facility] in [place], [place]      INR result reviewed with LUPE Mondragon NP  who made the following recommendations (VORB): hold tonight and tomorrow night and recheck Thursday. Patient notified and verbalized understanding. They had no further questions. Cheryle also notified (See anticoag tracker)     Bety Aranda RN

## 2024-05-08 ENCOUNTER — ANTI-COAG VISIT (OUTPATIENT)
Age: 69
End: 2024-05-08
Payer: MEDICARE

## 2024-05-08 DIAGNOSIS — Z79.01 CHRONIC ANTICOAGULATION: Primary | ICD-10-CM

## 2024-05-08 LAB — INR BLD: 3.8

## 2024-05-08 PROCEDURE — 93793 ANTICOAG MGMT PT WARFARIN: CPT | Performed by: NURSE PRACTITIONER

## 2024-05-08 RX ORDER — ATORVASTATIN CALCIUM 40 MG/1
80 TABLET, FILM COATED ORAL NIGHTLY
Qty: 60 TABLET | Refills: 2 | Status: SHIPPED | OUTPATIENT
Start: 2024-05-08

## 2024-05-08 NOTE — PROGRESS NOTES
ADVANCED HEART FAILURE CENTER  [place], [place]      Venous inr result received from home health with result of 3.8.. INR result reviewed with LUPE Mondragon NP  who made the following recommendations (VORB): no change to prior instructions, patient should still hold coumadin tonight and recheck tomorrow. Patient notified and verbalized understanding. They had no further questions. (See anticoag tracker)     Bety Aranda RN

## 2024-05-08 NOTE — TELEPHONE ENCOUNTER
1025: Call placed to [facility] regarding patient's missing medications. Spoke with representative Dominick who stated they did not include spirinolactone in patient's medication shipment since it was too soon to fill. Reports this will be included in renee's July pack as per insurance he should have a current 90 prescription. Reviewed Atorvastatin, pharmacy stated that patient reported taking 40mg during pharmacy intake call therefore this is what was sent to him. Reviewed correct dose should be 80mg nightly. New prescription sent to [facility] with 80mg dosing per LUPE MEHTA.      1130:called and spoke with patient's home health nurse Cheryle. Notified that patient's spironolactone will not be included in May or June packs per pharmacy, and that atorvastatin should be corrected with next pack.  Cheryle verbalized understanding, stated patient has a separate pill box for his daily coumadin doses, cheryle stated she will assist patient to fill that pill box with additional spironolactone and statin until pill packs are corrected.      1138: called and spoke with patient. Notified of  medication discrepancies and that he will need to add spironolactone and 1/2 dose of atorvastatin to his evening pill box until correction is made to pill packs. He verbalized understanding. Also reminded to check INR tomorrow.     Requested Prescriptions     Signed Prescriptions Disp Refills    atorvastatin (LIPITOR) 40 MG tablet 60 tablet 2     Sig: Take 2 tablets by mouth nightly     Authorizing Provider: PETE SCHWAB     Ordering User: BETY ARANDA       Telephone Call RE:  Lab Reminder      Outcome:     [x] Patient verbalizes understanding    [] Unable to reach   [] Left message              []       Bety Aranda RN

## 2024-05-09 ENCOUNTER — ANTI-COAG VISIT (OUTPATIENT)
Age: 69
End: 2024-05-09

## 2024-05-09 DIAGNOSIS — Z79.01 CHRONIC ANTICOAGULATION: Primary | ICD-10-CM

## 2024-05-09 LAB — INR BLD: 3.3

## 2024-05-09 NOTE — PROGRESS NOTES
ADVANCED HEART FAILURE CENTER  [facility] in [place], [place]      INR result reviewed with LUPE Mondragon NP  who made the following recommendations (VORB): 1mg tonight, 2mg all other days and recheck tuesday. Patient notified and verbalized understanding. Patient's home health nurse Cheryle also notified. They had no further questions. (See anticoag tracker)     Bety Aranda RN

## 2024-05-13 ENCOUNTER — TELEPHONE (OUTPATIENT)
Age: 69
End: 2024-05-13

## 2024-05-13 NOTE — TELEPHONE ENCOUNTER
0830: Call received from patient's home health nurse Cheryle requesting to clarify if patient needs to be seen twice this week. Stated that RN will see patient for recert Thursday and wondering if patient needs to be seen by them for inr check tomorrow.     0930: call placed to Cheryle. Informed that patient can use his INR meter to check INR and does not need secondary visit from [facility] perspective. She verbalized understanding, stated that patient will have home health visit on Thursday.     0939: call placed to patient. Reminded to check inr meter tomorrow on his meter, he verbalized understanding.

## 2024-05-14 ENCOUNTER — ANTI-COAG VISIT (OUTPATIENT)
Age: 69
End: 2024-05-14
Payer: MEDICARE

## 2024-05-14 DIAGNOSIS — Z79.01 CHRONIC ANTICOAGULATION: Primary | ICD-10-CM

## 2024-05-14 LAB — INR BLD: 2.2

## 2024-05-14 PROCEDURE — 93793 ANTICOAG MGMT PT WARFARIN: CPT | Performed by: NURSE PRACTITIONER

## 2024-05-14 NOTE — PROGRESS NOTES
ADVANCED HEART FAILURE CENTER  [facility] in [place], [place]      INR result reviewed with CARMELINA Luevano NP who made the following recommendations (VORB): change maintenance dosing to 3 mg every Tue, Thu; 2 mg all other days and recheck 1 week. Patient notified and verbalized understanding. They had no further questions.  Patient's home health nurse Cheryle also notified via voicemail.  (See anticoag tracker)     Bety Aranda RN

## 2024-05-16 RX ORDER — ATORVASTATIN CALCIUM 80 MG/1
80 TABLET, FILM COATED ORAL DAILY
Qty: 90 TABLET | Refills: 0 | Status: SHIPPED | OUTPATIENT
Start: 2024-05-16

## 2024-05-16 NOTE — TELEPHONE ENCOUNTER
Per insurance will only cover 80mg tabs of atorvastatin in place of 40mg tabs, take 2 tabs daily. Updated prescription sent to pharmacy. Contacted patient to notify new prescription will be one 80mg tablet daily. He verbalized understanding and had no further questions.     LUIS STEVEN RN  VAD Coordinator

## 2024-05-17 ENCOUNTER — TELEPHONE (OUTPATIENT)
Age: 69
End: 2024-05-17

## 2024-05-17 NOTE — TELEPHONE ENCOUNTER
Telephone Call RE:  Appointment reminder     Outcome:     [x] Patient confirmed appointment   [] Patient rescheduled appointment for    [] Unable to reach  [] Left message              [] Other:       Pt has sleep study the day before.

## 2024-05-20 NOTE — PATIENT INSTRUCTIONS
Medication changes:    DECREASE atorvastatin to 40mg daily. You may take half of the 80mg tablets until you receive the new prescription. The new prescription will be for one 40mg tablet daily.     Testing Ordered:    None today    Other Recommendations:     Ensure you are drinking adequate fluid, especially with the weather getting hotter.     Continue to change drive line exit site dressing 3 times a week using sterile technique,     Ensure you are drinking an adequate amount of water with a goal of 6-8 eight ounce glasses (1.5-2 liters) of fluid daily. Your urine should be clear and light yellow straw colored.       Monthly LVAD Education Tip:    LVAD Alarms Education:     Types of Alarms:   advisory and hazard alarms are messages that appear on the user interface screen when some problem with the system needs immediate attention. Some of the alarms indicate problems you can address yourself (for example, a disconnected power cable that you should reconnect). But most instruct you to call your hospital contact for instructions.1    For all alarms you should first hit the silence button so you can think more clearly. Silencing the alarm will not make it go away. It will silence the alarm tone only. You should then check for the green illuminated arrows (pump running symbol). If the green arrows are no longer illuminated, contact 911 then call the LVAD emergency pager ([phone]). The appropriate response to each alarm type is listed below.    Advisory Alarms:        \"Yellow Wrench\" Advisory alarms will silence for 4 hours. These types of alarms are NOT an emergency. The pump is still providing support. If you experience a \"Yellow Wrench\" Alarm after hours, you may contact the VAD team the next day during normal daytime hours.       Hazard Alarms:        Hazard Alarms will silence for 2 minutes.       Recalling the Alarms on the :    The  will store the last six

## 2024-05-21 ENCOUNTER — ANTI-COAG VISIT (OUTPATIENT)
Age: 69
End: 2024-05-21
Payer: MEDICARE

## 2024-05-21 DIAGNOSIS — Z79.01 CHRONIC ANTICOAGULATION: Primary | ICD-10-CM

## 2024-05-21 LAB — INR BLD: 2.5

## 2024-05-21 PROCEDURE — 93793 ANTICOAG MGMT PT WARFARIN: CPT | Performed by: NURSE PRACTITIONER

## 2024-05-21 NOTE — PROGRESS NOTES
0858: patient called to report INR of 2.5     ADVANCED HEART FAILURE CENTER  [facility] in [place], [place]      INR result reviewed with LUPE Mondragon NP  who made the following recommendations (VORB): no changes and recheck 1 week. Patient notified and verbalized understanding. They had no further questions. (See anticoag tracker)     Bety Aranda RN

## 2024-05-22 ENCOUNTER — HOSPITAL ENCOUNTER (OUTPATIENT)
Facility: HOSPITAL | Age: 69
Discharge: HOME | End: 2024-05-25
Attending: INTERNAL MEDICINE
Payer: MEDICARE

## 2024-05-22 VITALS
TEMPERATURE: 97.9 F | OXYGEN SATURATION: 97 % | HEART RATE: 60 BPM | BODY MASS INDEX: 20.44 KG/M2 | HEIGHT: 69 IN | WEIGHT: 138 LBS

## 2024-05-22 DIAGNOSIS — I25.5 ISCHEMIC CARDIOMYOPATHY: ICD-10-CM

## 2024-05-22 DIAGNOSIS — G47.33 OBSTRUCTIVE SLEEP APNEA (ADULT) (PEDIATRIC): ICD-10-CM

## 2024-05-22 PROCEDURE — 95810 POLYSOM 6/> YRS 4/> PARAM: CPT | Performed by: INTERNAL MEDICINE

## 2024-05-22 ASSESSMENT — ENCOUNTER SYMPTOMS
EYE PAIN: 0
CHEST TIGHTNESS: 0
ABDOMINAL DISTENTION: 0
SORE THROAT: 0
SHORTNESS OF BREATH: 0
ABDOMINAL PAIN: 0
BLOOD IN STOOL: 0

## 2024-05-22 NOTE — PROGRESS NOTES
ADVANCED HEART FAILURE CENTER  [facility] in [place], [place]  Mechanical Circulatory Support Clinic Note    Patient name: Bishop Love  Patient : 1955  Patient MRN: [id number]  Date of service: 24    Primary care physician: Freddy Chandler MD  LVAD cardiologist: MICHAEL    Chief Complaint   Patient presents with    Follow-up         ASSESSMENT:  Bishop Love is a 69 y.o. male with history of chronic systolic heart failure due to  ischemic cardiomyopathy, s/p HM3 LVAD implantation (2023) initially as destination therapy, stage D, on optimal GDMT   Former smoker--quit 6 months back--negative urine cotinine test      INTERVAL HISTORY:  -MAP 80;  PI high today (11).  Short low flow earlier in office visit   -labs :  Hg slight decrease to 9.7; LFTs slightly elevated; pBNP steady at 1053  -weight down 2lbs  -Bishop Love is feeling well.  He denies SOB, DOS SANTOS, swelling, orthopnea, dizziness, drive line issues.   Still with a dry, non-productive cough.  He did have breakfast this morning, but endorses not drinking much this morning because he didn't want to have to use the bathroom traveling here.      PLAN:  Heart Failure/Cardiomyopathy: Well compensated  Continue current medical therapy for heart failure:  Beta-blocker: Continue Toprol-XL 25 mg twice daily  ACE/ARB/ARNi: Not on Entresto due to severe cough  Hydralazine/nitrate: Continue hydralazine 100 mg p.o. 3 times daily  MRA: Continue spironolactone 25 mg once daily  SGLT2 inhibitor: Currently not on due to recurrent UTI  Diuretic: On as needed Lasix  Reinforced low salt diet  Reinforced fluid intake of 6 x 8oz glasses per day.  Encouraged increased intake when he gets home later today.     Patient has been abstinent from smoking for last 6 months with negative urine cotinine test-  Last test in 2024 negative --referred to [facility] for evaluation for heart transplant and further workup if

## 2024-05-23 ENCOUNTER — OFFICE VISIT (OUTPATIENT)
Age: 69
End: 2024-05-23

## 2024-05-23 VITALS
OXYGEN SATURATION: 97 % | HEIGHT: 69 IN | HEART RATE: 64 BPM | BODY MASS INDEX: 20.14 KG/M2 | RESPIRATION RATE: 16 BRPM | WEIGHT: 136 LBS | SYSTOLIC BLOOD PRESSURE: 80 MMHG | TEMPERATURE: 97.5 F

## 2024-05-23 DIAGNOSIS — Z95.811 LVAD (LEFT VENTRICULAR ASSIST DEVICE) PRESENT (HCC): Primary | ICD-10-CM

## 2024-05-23 DIAGNOSIS — Z01.89 ENCOUNTER FOR SCREENING FOR TOBACCO USE: ICD-10-CM

## 2024-05-23 DIAGNOSIS — Z72.0 TOBACCO ABUSE DISORDER: ICD-10-CM

## 2024-05-23 RX ORDER — TICAGRELOR 90 MG/1
90 TABLET ORAL 2 TIMES DAILY
Qty: 60 TABLET | Refills: 2 | Status: SHIPPED | OUTPATIENT
Start: 2024-05-23

## 2024-05-23 RX ORDER — ATORVASTATIN CALCIUM 40 MG/1
40 TABLET, FILM COATED ORAL DAILY
Qty: 90 TABLET | Refills: 2 | Status: SHIPPED | OUTPATIENT
Start: 2024-05-23

## 2024-05-23 ASSESSMENT — PATIENT HEALTH QUESTIONNAIRE - PHQ9
SUM OF ALL RESPONSES TO PHQ QUESTIONS 1-9: 0
2. FEELING DOWN, DEPRESSED OR HOPELESS: NOT AT ALL
SUM OF ALL RESPONSES TO PHQ QUESTIONS 1-9: 0
1. LITTLE INTEREST OR PLEASURE IN DOING THINGS: NOT AT ALL
SUM OF ALL RESPONSES TO PHQ QUESTIONS 1-9: 0
SUM OF ALL RESPONSES TO PHQ9 QUESTIONS 1 & 2: 0
SUM OF ALL RESPONSES TO PHQ QUESTIONS 1-9: 0

## 2024-05-23 ASSESSMENT — ENCOUNTER SYMPTOMS: COUGH: 1

## 2024-05-23 NOTE — PROGRESS NOTES
ADVANCED HEART FAILURE CENTER  [facility] in [place], [place]  LVAD Coordinator Clinic Visit Note    Chief Complaint   Patient presents with    Follow-up     BP (!) 80/0 (Site: Right Upper Arm, Position: Sitting, Cuff Size: Medium Adult)   Pulse 64   Temp 97.5 °F (36.4 °C) (Oral)   Resp 16   Ht 1.753 m (5' 9.02\")   Wt 61.7 kg (136 lb)   SpO2 97%   BMI 20.07 kg/m²      LVAD  LVAD Type:: Left Ventricular Assist Device (LVAD)  Pump Speed (rpm): 5300  Pump Flow (lpm): 2.7  MAP (mmHg): 80  Pump Pulse Index (PI): 11.8  Pump Power (Villanueva): 3.7  Backup Controller Present: Yes  Outpatient: Yes  MAP in Therapeutic Range (Outpatient): Yes     Bishop Love is a 69 y.o. male with a history of ischemic cardiomyopathy with Heartmate 3 implant date of 9/19/23. LVAD interrogation completed in clinic. Notable for Several PI events. Low flow experienced during clinic with high PI. Noted patient also had \"no external power\" this morning which he reported was due to accidentally disconnecting both power leads simultaneously. No other adverse alarms noted. . Denies LVAD alarms at home.  See flow sheet for details. Patient denied s/s of driveline infection or driveline trauma (including  drops).  All information reported to provider.      All orders entered per VORB. All provider instructions placed in AVS and reviewed with patient. LVAD education provided on LVAD alarms and emergency response and importance of adequate hydration. Time given to ask questions. Patient verbalized understanding and had no further questions.     LUIS STEVEN RN  VAD Coordinator

## 2024-05-23 NOTE — TELEPHONE ENCOUNTER
Requested Prescriptions     Signed Prescriptions Disp Refills    ticagrelor (BRILINTA) 90 MG TABS tablet 60 tablet 2     Sig: TAKE ONE (1) TABLET BY MOUTH TWICE DAILY     Authorizing Provider: SARAVANAN BAILEY     Ordering User: LUIS STEVEN

## 2024-05-24 ENCOUNTER — CLINICAL DOCUMENTATION (OUTPATIENT)
Age: 69
End: 2024-05-24

## 2024-05-24 DIAGNOSIS — G47.33 OBSTRUCTIVE SLEEP APNEA (ADULT) (PEDIATRIC): Primary | ICD-10-CM

## 2024-05-24 NOTE — PROGRESS NOTES
Attended polysomnogram showed an AHI of 6/hour and lowest oxygen saturation was 86%. This is consistent with mild but still significant sleep apnea. Moderate snoring noted.    Based on these results, PAP therapy would be beneficial.     his respiratory events were worse when sleeping on back. he should avoid sleeping on his back until he is on PAP therapy.       I recommend that he return to the sleep center for an attended PAP titration where we will be able to find the pressure setting that best controls his sleep apnea and allows him the opportunity to adjust to PAP therapy. Mask options will be presented at this time. Once results reviewed, I will order a PAP device for him to use at home and we will see him in follow-up about 6-8 weeks after initiation of PAP.  he will be called with results.

## 2024-05-28 ENCOUNTER — TELEPHONE (OUTPATIENT)
Age: 69
End: 2024-05-28

## 2024-05-28 ENCOUNTER — ANTI-COAG VISIT (OUTPATIENT)
Age: 69
End: 2024-05-28
Payer: MEDICARE

## 2024-05-28 DIAGNOSIS — Z79.01 LONG TERM (CURRENT) USE OF ANTICOAGULANTS: Primary | ICD-10-CM

## 2024-05-28 LAB — INR BLD: 4.3

## 2024-05-28 PROCEDURE — 93793 ANTICOAG MGMT PT WARFARIN: CPT | Performed by: NURSE PRACTITIONER

## 2024-05-28 NOTE — PROGRESS NOTES
ADVANCED HEART FAILURE CENTER  [facility] in [place], [place]      INR result reviewed with CARMELINA Luevano NP who made the following recommendations (VORB): hold coumadin dose today (5/28/24) and recheck INR tomorrow (05/29/24). Patient states he is out of INR strips, does not have a ride to the lab, and HH is unable to return for INR recheck until Friday (5/31/24). Reviewed with Norma Luevano NP who recommended patient hold coumadin dose today, resume regular dose of coumadin tomorrow (05/29/24) and recheck INR as soon as he receives INR strips or Friday with HH. Patient and HH Nurse (Cheryle) verbalized understanding and had no further questions. (See anticoag tracker)    CHARLENE DANIELS RN  VAD Coordinator

## 2024-05-28 NOTE — TELEPHONE ENCOUNTER
0830: Call received from patient stating he attempted to check INR this morning, but was having difficulty with INR meter. He requested home health be contacted and request they check INR as they will be visiting today. Informed him will discuss with home health. He verbalized understanding and52 had no further questions.     0834: Attempted to contact patient's home health nurse, Cheryle. Message left. Will await return call.     0852: Return call received from Cheryle confirming will recheck INR today. She had no further questions.     LUIS STEVEN RN  VAD Coordinator

## 2024-05-30 ENCOUNTER — TELEPHONE (OUTPATIENT)
Age: 69
End: 2024-05-30

## 2024-05-30 NOTE — TELEPHONE ENCOUNTER
Reviewed sleep study results with patient. He expressed understanding and is willing to proceed with a PAP titration.  Front staff to call patient to schedule.  Thank you.

## 2024-05-31 ENCOUNTER — ANTI-COAG VISIT (OUTPATIENT)
Age: 69
End: 2024-05-31

## 2024-05-31 DIAGNOSIS — Z79.01 CHRONIC ANTICOAGULATION: Primary | ICD-10-CM

## 2024-05-31 LAB — INR BLD: 3.4

## 2024-05-31 NOTE — PROGRESS NOTES
ADVANCED HEART FAILURE CENTER  [facility] in [place], [place]      INR result reviewed with LUPE Mondragon NP  who made the following recommendations (VORB): 1mg tonight and 1 mg tomorrow and recheck Tuesday 06/04 Patient notified and verbalized understanding. They had no further questions. Patient's home health nurse Cheryle also notified. (See anticoag tracker)     Bety Aranda RN

## 2024-06-03 ENCOUNTER — TELEPHONE (OUTPATIENT)
Age: 69
End: 2024-06-03

## 2024-06-03 NOTE — TELEPHONE ENCOUNTER
Patient called into the office on 06/03/2024 at 4:04pm, returning a call from the office to schedule a Titration sleep study per the provider. Patient states that he has quite a few appointments at this time and will call when he is ready to proceed with sleep testing.

## 2024-06-04 ENCOUNTER — ANTI-COAG VISIT (OUTPATIENT)
Age: 69
End: 2024-06-04
Payer: MEDICARE

## 2024-06-04 DIAGNOSIS — Z79.01 CHRONIC ANTICOAGULATION: Primary | ICD-10-CM

## 2024-06-04 LAB — INR BLD: 2.6

## 2024-06-04 PROCEDURE — 93793 ANTICOAG MGMT PT WARFARIN: CPT | Performed by: NURSE PRACTITIONER

## 2024-06-04 NOTE — PROGRESS NOTES
ADVANCED HEART FAILURE CENTER  [facility] in [place], [place]      INR result reviewed with CARMELINA Luevano NP who made the following recommendations (VORB): 2mg daily and recheck 1 week. Patient notified and verbalized understanding. They had no further questions. Patient's home health nurse Cheryle gutierrez also notified.  (See anticoag tracker)     Bety Aranda RN

## 2024-06-06 ENCOUNTER — HOSPITAL ENCOUNTER (OUTPATIENT)
Facility: HOSPITAL | Age: 69
Discharge: HOME | End: 2024-06-09

## 2024-06-06 DIAGNOSIS — I73.9 PERIPHERAL VASCULAR DISEASE, UNSPECIFIED (HCC): ICD-10-CM

## 2024-06-11 ENCOUNTER — ANTI-COAG VISIT (OUTPATIENT)
Age: 69
End: 2024-06-11
Payer: MEDICARE

## 2024-06-11 DIAGNOSIS — Z79.01 CHRONIC ANTICOAGULATION: Primary | ICD-10-CM

## 2024-06-11 LAB — INR BLD: 3.7

## 2024-06-11 PROCEDURE — 93793 ANTICOAG MGMT PT WARFARIN: CPT | Performed by: NURSE PRACTITIONER

## 2024-06-11 NOTE — PROGRESS NOTES
ADVANCED HEART FAILURE CENTER  [facility] in [place], [place]      INR result reviewed with FAUSTO Garcia NP who made the following recommendations (VORB): 1mg today and Thursday  and recheck Friday 06/14. Patient notified and verbalized understanding. Stated he will go to lab at [facility] for INR check Friday as he has an appointment that day. Patient's home health nurse Cheryle also notified.  They had no further questions. (See anticoag tracker)     Bety Aranda RN

## 2024-06-13 ENCOUNTER — ANTI-COAG VISIT (OUTPATIENT)
Age: 69
End: 2024-06-13

## 2024-06-13 ENCOUNTER — TELEPHONE (OUTPATIENT)
Age: 69
End: 2024-06-13

## 2024-06-13 DIAGNOSIS — D50.8 OTHER IRON DEFICIENCY ANEMIA: ICD-10-CM

## 2024-06-13 DIAGNOSIS — Z79.01 CHRONIC ANTICOAGULATION: Primary | ICD-10-CM

## 2024-06-13 DIAGNOSIS — E61.1 IRON DEFICIENCY: ICD-10-CM

## 2024-06-13 DIAGNOSIS — I50.22 CHRONIC SYSTOLIC HEART FAILURE (HCC): ICD-10-CM

## 2024-06-13 DIAGNOSIS — Z95.811 LVAD (LEFT VENTRICULAR ASSIST DEVICE) PRESENT (HCC): Primary | ICD-10-CM

## 2024-06-13 PROBLEM — D50.9 IRON (FE) DEFICIENCY ANEMIA: Status: ACTIVE | Noted: 2024-06-13

## 2024-06-13 LAB — INR BLD: 2.5

## 2024-06-13 NOTE — PROGRESS NOTES
ADVANCED HEART FAILURE CENTER  [facility] in [place], [place]      INR result reviewed with LUPE Mondragon NP  who made the following recommendations (VORB): 2mg daily and recheck Monday 06/17. Patient notified and verbalized understanding. They had no further questions. Patient's home health nurse Cheryle also notified.  (See anticoag tracker)     Bety Aranda RN

## 2024-06-13 NOTE — TELEPHONE ENCOUNTER
Secure message received from U VAD Coordinator noting patient had labwork performed at appointment with them yesterday which reflected increased LFTs, and INR 2.5. Discussed with LUPE Mondragon who stated she will review labwork in careeverywhere.      ----- Message from YASMIN Harp NP sent at 6/13/2024 12:53 PM EDT -----  Please order injectafer 750mg x 2 doses 6 weeks apart for iron deficiency and order echo to evaluate RV size/function and interventricular septum due to uptrending LFTS    1420: called and spoke with patient who stated he is going to [facility] for test tomorrow. States if he needs to have imaging done next week he only has a ride on Friday.      Reviewed with LUPE Mondragon who ordered VORB injectafer 750mg x 2 doses 6 weeks apart for iron deficiency and STAT echo. Reviewed with LUPE Mondragon who stated okay for patient to have echo performed at [facility] as he has a far drive and needs a ride and has an appt at [facility] tomorrow. Confirmed with imaging department providers will be able to see images in epic.     1516: call placed to coordination of care. Stat echo scheduled for tomorrow at noon at [facility].      1516: called and spoke with patient. Informed of stat echo scheduled for tomorrow at 12pm. Patient confirmed this appointment time.  Also informed of iron infusion orders and that he will be contacted for scheduling, he verbalized understanding.

## 2024-06-14 ENCOUNTER — TELEPHONE (OUTPATIENT)
Age: 69
End: 2024-06-14

## 2024-06-14 ENCOUNTER — HOSPITAL ENCOUNTER (OUTPATIENT)
Facility: HOSPITAL | Age: 69
End: 2024-06-14
Payer: MEDICARE

## 2024-06-14 DIAGNOSIS — I73.9 PERIPHERAL VASCULAR DISEASE, UNSPECIFIED (HCC): ICD-10-CM

## 2024-06-14 DIAGNOSIS — Z95.811 LVAD (LEFT VENTRICULAR ASSIST DEVICE) PRESENT (HCC): ICD-10-CM

## 2024-06-14 DIAGNOSIS — I50.22 CHRONIC SYSTOLIC HEART FAILURE (HCC): ICD-10-CM

## 2024-06-14 LAB
ECHO AO ASC DIAM: 3 CM
ECHO AO ROOT DIAM: 3.5 CM
ECHO LV EDV A2C: 113 ML
ECHO LV EDV A4C: 130 ML
ECHO LV EJECTION FRACTION A2C: 24 %
ECHO LV EJECTION FRACTION A4C: 26 %
ECHO LV EJECTION FRACTION BIPLANE: 30 % (ref 55–100)
ECHO LV ESV A2C: 86 ML
ECHO LV ESV A4C: 96 ML
ECHO RV INTERNAL DIMENSION: 5.3 CM

## 2024-06-14 PROCEDURE — 93926 LOWER EXTREMITY STUDY: CPT

## 2024-06-14 PROCEDURE — C8929 TTE W OR WO FOL WCON,DOPPLER: HCPCS

## 2024-06-14 PROCEDURE — 6360000004 HC RX CONTRAST MEDICATION: Performed by: INTERNAL MEDICINE

## 2024-06-14 RX ORDER — ONDANSETRON 2 MG/ML
8 INJECTION INTRAMUSCULAR; INTRAVENOUS
OUTPATIENT
Start: 2024-06-14

## 2024-06-14 RX ORDER — SODIUM CHLORIDE 9 MG/ML
5-250 INJECTION, SOLUTION INTRAVENOUS PRN
OUTPATIENT
Start: 2024-06-14

## 2024-06-14 RX ORDER — EPINEPHRINE 1 MG/ML
0.3 INJECTION, SOLUTION, CONCENTRATE INTRAVENOUS PRN
OUTPATIENT
Start: 2024-06-14

## 2024-06-14 RX ORDER — ACETAMINOPHEN 325 MG/1
650 TABLET ORAL
OUTPATIENT
Start: 2024-06-14

## 2024-06-14 RX ORDER — SODIUM CHLORIDE 9 MG/ML
INJECTION, SOLUTION INTRAVENOUS CONTINUOUS
OUTPATIENT
Start: 2024-06-14

## 2024-06-14 RX ORDER — SODIUM CHLORIDE 0.9 % (FLUSH) 0.9 %
5-40 SYRINGE (ML) INJECTION PRN
OUTPATIENT
Start: 2024-06-14

## 2024-06-14 RX ORDER — FAMOTIDINE 10 MG/ML
20 INJECTION, SOLUTION INTRAVENOUS
OUTPATIENT
Start: 2024-06-14

## 2024-06-14 RX ORDER — ALBUTEROL SULFATE 90 UG/1
4 AEROSOL, METERED RESPIRATORY (INHALATION) PRN
OUTPATIENT
Start: 2024-06-14

## 2024-06-14 RX ORDER — DIPHENHYDRAMINE HYDROCHLORIDE 50 MG/ML
50 INJECTION INTRAMUSCULAR; INTRAVENOUS
OUTPATIENT
Start: 2024-06-14

## 2024-06-14 RX ORDER — HEPARIN SODIUM (PORCINE) LOCK FLUSH IV SOLN 100 UNIT/ML 100 UNIT/ML
500 SOLUTION INTRAVENOUS PRN
OUTPATIENT
Start: 2024-06-14

## 2024-06-14 RX ADMIN — PERFLUTREN 1.5 ML: 6.52 INJECTION, SUSPENSION INTRAVENOUS at 13:10

## 2024-06-14 NOTE — TELEPHONE ENCOUNTER
Patient reviewed with LUPE Mondragon and Dr. Fields. Per LUPE Mondragon she reviewed patient's echo images and patient will need to be seen for speed change of LVAD.     1347: Called and spoke with patient. Informed he will need speed change of his LVAD. Requested he present at next available LVAD appointment 06/18 at 11am with Dr. Allen. Patient verbalized understanding. Confirmed he can come to this appointment.     LUPE Mondragon notified patient scheduled for 06/18 with Dr. Allen

## 2024-06-15 LAB
VAS LEFT ARTERIAL PROX ANASTOMOSIS PSV: 0 CM/S
VAS LEFT CFA PROX PSV: 63.9 CM/S
VAS LEFT DIST OUTFLOW PSV: 0 CM/S
VAS LEFT GRAFT 1: NORMAL
VAS LEFT INFLOW ARTERY PSV: 44.8 CM/S
VAS LEFT MID OUTFLOW PSV: 0 CM/S
VAS LEFT PROX OUTFLOW PSV: 0 CM/S
VAS RIGHT ARTERIAL PROX ANASTOMOSIS PSV: 114 CM/S
VAS RIGHT ATA DIST PSV: 22.6 CM/S
VAS RIGHT CFA PROX PSV: 44.8 CM/S
VAS RIGHT DIST OUTFLOW PSV: 32.2 CM/S
VAS RIGHT GRAFT 1: NORMAL
VAS RIGHT INFLOW ARTERY PSV: 63.9 CM/S
VAS RIGHT MID OUTFLOW PSV: 38.5 CM/S
VAS RIGHT OUTFLOW VESSEL PSV: 44.8 CM/S
VAS RIGHT PERONEAL DIST PSV: 16.2 CM/S
VAS RIGHT PFA PROX PSV: 42.7 CM/S
VAS RIGHT POP A PROX PSV: 17.7 CM/S
VAS RIGHT PROX OUTFLOW PSV: 52.7 CM/S
VAS RIGHT PTA DIST PSV: 32.9 CM/S
VAS RIGHT VENOUS DIST ANASTOMOSIS PSV: 38.5 CM/S

## 2024-06-15 PROCEDURE — 93926 LOWER EXTREMITY STUDY: CPT | Performed by: SURGERY

## 2024-06-17 NOTE — PATIENT INSTRUCTIONS
Medication changes:    START taking torsemide 10mg three times a week. This is a diuretic which will make you urinate more. Weight yourself everyday first thing in the morning and write your weight down. Call [facility] if you have any  LVAD alarms, you gain 3lb overnight or 5lb in 1 week you have increased shortness of breath or swelling or dizziness.       Testing Ordered:    An order for echocardiogram has been placed to be done in 2 weeks. You will be receiving an automated call from Coordination of Care to schedule this test. If you are unavailable to receive the call or would like to contact coordination of care yourself you may contact [phone] to schedule. You will need to contact coordination of care yourself if you miss their calls as they will only make 3 attempts to reach you.      Other Recommendations:     Continue to change drive line exit site dressing 3 times a week using sterile technique,     Ensure you are drinking an adequate amount of water with a goal of 6-8 eight ounce glasses (1.5-2 liters) of fluid daily. Your urine should be clear and light yellow straw colored.       Monthly LVAD Education Tip:        References:    [employer] (2017). HeartMate 3 Left Ventricular Assist System Patient Handbook. [employer].           Follow up in 1 month with [facility] [place]      For further patient and caregiver resources as well as access to online LVAD support groups visit [email or web address]       Please bring your daily sheets and medications to your next appointment.      Please monitor your weights daily upon waking and after using the bathroom. Keep a written records of your weights and bring to your next appointment. If you have a weight gain of 3 or more pounds overnight OR 5 or more pounds in one week please contact our office.       Thank you for allowing us the privilege of being a part of your healthcare team! Please do not hesitate to contact our office at

## 2024-06-18 ENCOUNTER — ANTI-COAG VISIT (OUTPATIENT)
Age: 69
End: 2024-06-18

## 2024-06-18 ENCOUNTER — OFFICE VISIT (OUTPATIENT)
Age: 69
End: 2024-06-18

## 2024-06-18 VITALS
BODY MASS INDEX: 21.18 KG/M2 | WEIGHT: 143 LBS | HEART RATE: 67 BPM | HEIGHT: 69 IN | RESPIRATION RATE: 16 BRPM | OXYGEN SATURATION: 100 % | SYSTOLIC BLOOD PRESSURE: 92 MMHG | TEMPERATURE: 97.4 F

## 2024-06-18 DIAGNOSIS — I50.22 CHRONIC SYSTOLIC (CONGESTIVE) HEART FAILURE (HCC): ICD-10-CM

## 2024-06-18 DIAGNOSIS — I42.9 CARDIOMYOPATHY, UNSPECIFIED TYPE (HCC): Primary | ICD-10-CM

## 2024-06-18 DIAGNOSIS — Z95.811 LEFT VENTRICULAR ASSIST DEVICE PRESENT (HCC): ICD-10-CM

## 2024-06-18 LAB
INR BLD: 3
POC INR: 3
PROTHROMBIN TIME, POC: NORMAL

## 2024-06-18 RX ORDER — TORSEMIDE 10 MG/1
10 TABLET ORAL
Qty: 12 TABLET | Refills: 0 | Status: SHIPPED | OUTPATIENT
Start: 2024-06-19

## 2024-06-18 RX ORDER — TORSEMIDE 10 MG/1
10 TABLET ORAL PRN
Qty: 10 TABLET | Refills: 1 | Status: SHIPPED | OUTPATIENT
Start: 2024-06-18 | End: 2024-06-18

## 2024-06-18 ASSESSMENT — PATIENT HEALTH QUESTIONNAIRE - PHQ9
SUM OF ALL RESPONSES TO PHQ9 QUESTIONS 1 & 2: 0
SUM OF ALL RESPONSES TO PHQ QUESTIONS 1-9: 0
2. FEELING DOWN, DEPRESSED OR HOPELESS: NOT AT ALL
1. LITTLE INTEREST OR PLEASURE IN DOING THINGS: NOT AT ALL
SUM OF ALL RESPONSES TO PHQ QUESTIONS 1-9: 0

## 2024-06-18 NOTE — PROGRESS NOTES
ADVANCED HEART FAILURE CENTER  [facility] in [place], [place]      INR result reviewed with Dr. Alejandro MD  who made the following recommendations (VORB): no changes (2mg tonight) and recheck INR Friday 06/21. Patient notified at clinic visit and verbalized understanding. Patient's home health nurse Cheryle MATHEW Also notified and verbalized understanding.  They had no further questions. (See anticoag tracker)     Bety Aarnda RN

## 2024-06-18 NOTE — PROGRESS NOTES
ADVANCED HEART FAILURE CENTER  [facility] in [place], [place]  Mechanical Circulatory Support Clinic Note    Patient name: Bishop Love  Patient : 1955  Patient MRN: [id number]  Date of service: 24    Primary care physician: Freddy Chandler MD  LVAD cardiologist: MICHAEL    CHIEF COMPLAINT:  Follow up for heart failure, LVAD, anticoagulation management    ASSESSMENT:    Bishop Love is a 69 y.o. male with history of chronic systolic heart failure due to  ischemic cardiomyopathy, s/p HM3 LVAD implantation (2023) initially as destination therapy, stage D, on optimal GDMT   Former smoker--quit 6 months back--negative urine cotinine test  Next annual LVAD evaluation in 1 months  Dilated RV with RV failure as per recent echo       PLAN:  Heart Failure/Cardiomyopathy with RV dilatation and failure: Well compensated  RV 5.3 with septum more towards LV--- will reduce the speed of LVAD 5000 RPMs plan to repeat echo in 1 month will also plan to do right heart catheterization to assess RV hemodynamics  Continue current medical therapy for heart failure:  Beta-blocker: Continue metoprolol 25 mg twice daily  ACE/ARB/ARNi: Not on Entresto due to severe cough  Hydralazine/nitrate: Continue hydralazine 100 mg p.o. 3 times daily  MRA: Continue spironolactone 25 mg once daily  SGLT2 inhibitor: Not on SGLT2 inhibitors due to recurrent UTI  Diuretic: As patient has RV dilatation with mild RV failure plan to start torsemide 10 mg p.o. 3 times weekly.  Will watch for overdiuresis.  Plan to repeat labs in 1 week  Reinforced low salt diet  Reinforced fluid restriction to 6 x 8oz glasses per day  Patient is also being evaluated for heart transplant at [facility]--- been referred to follow-up with vascular surgeon and cardiothoracic surgeon for severe peripheral vascular disease which might be a barrier.  Patient is also getting random urine cotinine test    LVAD/Anticoagulation:  Due to RV dilatation

## 2024-06-18 NOTE — PROGRESS NOTES
ADVANCED HEART FAILURE CENTER  [facility] in [place], [place]  LVAD Coordinator Clinic Visit Note  Chief Complaint   Patient presents with    Follow-up     VAD    Leg Swelling     Right leg       BP (!) 92/0 (Site: Right Upper Arm, Position: Sitting, Cuff Size: Medium Adult) Comment: No palpable pulse present  Pulse 67   Temp 97.4 °F (36.3 °C) (Temporal)   Resp 16   Ht 1.753 m (5' 9\")   Wt 64.9 kg (143 lb)   SpO2 100%   BMI 21.12 kg/m²     LVAD  LVAD Type:: Left Ventricular Assist Device (LVAD)  Pump Speed (rpm): 5000  Pump Flow (lpm): 4.1  MAP (mmHg): 94  Set Low Speed (rpm): 4600  Pump Pulse Index (PI): 7.2  Pump Power (Villanueva): 3.4  Battery Life Checked: Yes  Backup Controller Present: Yes  Driveline Dressing: Clean, Dry and Intact  Outpatient: Yes  MAP in Therapeutic Range (Outpatient): No         Bishopneelima Love is a 69 y.o. male with a history of ICM with Heartmate 3 implant date of 09/19/2023. LVAD interrogation completed in clinic, results reported to provider, Notable for PI events.     Dr. Allen ordered VORB LVAD speed decreased by 200rpm. Patient's set speed decreased from 5200 rpm to 5000 rpm. See VAD flow sheet for parameters before and after.  Recommended echo repeated in 2 weeks and RHC in 1 month.         Patient denied s/s of driveline infection or driveline trauma (including  drops). Denies LVAD alarms at home. See flow sheet for details. Driveline inspected for integrity.  Above reported to provider.      Call placed to patient's home health nurse Cheryle. Provided updates from visit including new torsemide script. She verbalized understanding.     All orders entered per VORB. See anticoag encounter for coumadin recommendations. All provider instructions placed in AVS and reviewed with patient. LVAD education provided on battery calibration. Educated patient on weight monitoring in setting of new torsemide prescription, scheduling echo, expected follow up.

## 2024-06-19 LAB
COTININE UR QL SCN: NEGATIVE NG/ML
Lab: NORMAL

## 2024-06-21 ENCOUNTER — ANTI-COAG VISIT (OUTPATIENT)
Age: 69
End: 2024-06-21

## 2024-06-21 ENCOUNTER — TELEPHONE (OUTPATIENT)
Age: 69
End: 2024-06-21

## 2024-06-21 DIAGNOSIS — Z79.01 CHRONIC ANTICOAGULATION: Primary | ICD-10-CM

## 2024-06-21 LAB — INR BLD: 3.6

## 2024-06-21 NOTE — TELEPHONE ENCOUNTER
1142: received call from patient's home health nurse Cheryle while with patient stating patient's MAP is 82 today, pt had one low flow first thing this morning.     Reports patient's weight has decreased from 145 to 143lb, cough is less wet sounding. States patient denied any dizziness.      Reports patient has not picked up torsemide script but had lasix at home so took 2 doses of lasix this week instead. Instructed patient he should take torsemide as prescribed and stop lasix, educated to ensure he is not taking both at once, he verbalized understanding, Cheryle also verbalized understanding.  Reviewed with CARMELINA Luevano who stated VORB no changes, patient to recheck weight Monday.      1220: received call from patient who stated he tried to fill torsemide but states his pharmacy does not have prescription.     1409: call and spoke with pharmacy who stated they were having an IT issue with patient's prescription but have resolved this and are able to fill for patient.     1418: Placed call to patient, left voicemail notifying patient that he should  torsemide today and take per prescription, keep daily weight log and call with weight Monday. He verbalized understanding.      1419: placed call to patient's home health nurse Cheryle, notified of provider instructions to take torsemide per prescription, stop lasix and check weight Monday.  She verbalized understanding.      1428: patient returned call, confirmed he received instructions.

## 2024-06-21 NOTE — PROGRESS NOTES
ADVANCED HEART FAILURE CENTER  [facility] in [place], [place]      INR result reviewed with CARMELINA Luevano NP who made the following recommendations (VORB): hold tonight and recheck 1 week. Patient notified and verbalized understanding. They had no further questions. Patient's home health nurse Cheryle also notified.  (See anticoag tracker)     Bety Aranda RN        
99

## 2024-06-23 PROBLEM — I73.9 PERIPHERAL VASCULAR DISEASE, UNSPECIFIED (HCC): Status: ACTIVE | Noted: 2021-06-17

## 2024-06-24 ENCOUNTER — TELEPHONE (OUTPATIENT)
Age: 69
End: 2024-06-24

## 2024-06-24 NOTE — TELEPHONE ENCOUNTER
1436: Placed call to patient who reports weight of 142lb this morning. States he picked up torsemide and started taking this. Denied any LVAD alarms, shortness of breath or dizziness.        Notified of [facility] scheduled for 07/31 patient verbalized understanding.        Norma Luevano APRN - NP  YouJust now (2:47 PM)       Ok continue for now     You  Norma Luevano APRN - NP3 minutes ago (2:44 PM)     MP  Down from 145lb last week.     Reviewed with CARMELINA Luevano who stated VORB no changes to plan of care at this time

## 2024-06-25 ENCOUNTER — ANTI-COAG VISIT (OUTPATIENT)
Age: 69
End: 2024-06-25
Payer: MEDICARE

## 2024-06-25 DIAGNOSIS — Z79.01 CHRONIC ANTICOAGULATION: Primary | ICD-10-CM

## 2024-06-25 LAB — INR BLD: 2.4

## 2024-06-25 PROCEDURE — 93793 ANTICOAG MGMT PT WARFARIN: CPT | Performed by: NURSE PRACTITIONER

## 2024-06-25 NOTE — PROGRESS NOTES
ADVANCED HEART FAILURE CENTER  [facility] in [place], [place]      INR result reviewed with CARMELINA Luevano NP who made the following recommendations (VORB): no changes and recheck 1 week. Patient notified and verbalized understanding. They had no further questions. Patient's home health nurse Cheryle Muhammad also notified. (See anticoag tracker)     Bety Aranda RN

## 2024-07-02 ENCOUNTER — ANTI-COAG VISIT (OUTPATIENT)
Age: 69
End: 2024-07-02
Payer: MEDICARE

## 2024-07-02 DIAGNOSIS — Z79.01 CHRONIC ANTICOAGULATION: Primary | ICD-10-CM

## 2024-07-02 LAB — INR BLD: 4

## 2024-07-02 PROCEDURE — 93793 ANTICOAG MGMT PT WARFARIN: CPT | Performed by: NURSE PRACTITIONER

## 2024-07-02 NOTE — PROGRESS NOTES
1230: called to report INR of 4.0     ADVANCED HEART FAILURE CENTER  [facility] in [place], [place]      INR result reviewed with LUPE Mondragon NP  who made the following recommendations (VORB): hold tonight, 2mg Wednesday and Thursday  and recheck INR Friday. Patient notified and verbalized understanding. They had no further questions. Patient's home health nurse Cheryle also notified.  (See anticoag tracker)     Bety Aranda RN

## 2024-07-05 ENCOUNTER — ANTI-COAG VISIT (OUTPATIENT)
Age: 69
End: 2024-07-05
Payer: MEDICARE

## 2024-07-05 DIAGNOSIS — Z79.01 CHRONIC ANTICOAGULATION: Primary | ICD-10-CM

## 2024-07-05 LAB — INR BLD: 3.6

## 2024-07-05 PROCEDURE — 93793 ANTICOAG MGMT PT WARFARIN: CPT | Performed by: NURSE PRACTITIONER

## 2024-07-05 RX ORDER — SODIUM CHLORIDE 9 MG/ML
5-250 INJECTION, SOLUTION INTRAVENOUS PRN
OUTPATIENT
Start: 2024-08-16

## 2024-07-05 RX ORDER — ALBUTEROL SULFATE 90 UG/1
4 AEROSOL, METERED RESPIRATORY (INHALATION) PRN
OUTPATIENT
Start: 2024-08-16

## 2024-07-05 RX ORDER — ACETAMINOPHEN 325 MG/1
650 TABLET ORAL
OUTPATIENT
Start: 2024-08-16

## 2024-07-05 RX ORDER — HEPARIN 100 UNIT/ML
500 SYRINGE INTRAVENOUS PRN
OUTPATIENT
Start: 2024-08-16

## 2024-07-05 RX ORDER — ONDANSETRON 2 MG/ML
8 INJECTION INTRAMUSCULAR; INTRAVENOUS
OUTPATIENT
Start: 2024-08-16

## 2024-07-05 RX ORDER — DIPHENHYDRAMINE HYDROCHLORIDE 50 MG/ML
50 INJECTION INTRAMUSCULAR; INTRAVENOUS
OUTPATIENT
Start: 2024-08-16

## 2024-07-05 RX ORDER — SODIUM CHLORIDE 0.9 % (FLUSH) 0.9 %
5-40 SYRINGE (ML) INJECTION PRN
OUTPATIENT
Start: 2024-08-16

## 2024-07-05 RX ORDER — SODIUM CHLORIDE 9 MG/ML
INJECTION, SOLUTION INTRAVENOUS CONTINUOUS
OUTPATIENT
Start: 2024-08-16

## 2024-07-05 RX ORDER — EPINEPHRINE 1 MG/ML
0.3 INJECTION, SOLUTION, CONCENTRATE INTRAVENOUS PRN
OUTPATIENT
Start: 2024-08-16

## 2024-07-05 NOTE — PROGRESS NOTES
ADVANCED HEART FAILURE CENTER  [facility] in [place], [place]      INR result reviewed with LUPE Mondragon NP  who made the following recommendations (VORB): hold tonight, 1mg tonight and tomorrow and recheck 07/08. Patient notified and verbalized understanding. They had no further questions. Patient's home health nurse Cheryle WILSON also notified.  (See anticoag tracker)     Bety Aranda RN

## 2024-07-09 ENCOUNTER — ANTI-COAG VISIT (OUTPATIENT)
Age: 69
End: 2024-07-09
Payer: MEDICARE

## 2024-07-09 ENCOUNTER — TELEPHONE (OUTPATIENT)
Age: 69
End: 2024-07-09

## 2024-07-09 DIAGNOSIS — I50.22 CHRONIC SYSTOLIC (CONGESTIVE) HEART FAILURE (HCC): ICD-10-CM

## 2024-07-09 DIAGNOSIS — Z79.01 CHRONIC ANTICOAGULATION: Primary | ICD-10-CM

## 2024-07-09 LAB — INR BLD: 1.8

## 2024-07-09 PROCEDURE — 93793 ANTICOAG MGMT PT WARFARIN: CPT | Performed by: NURSE PRACTITIONER

## 2024-07-09 RX ORDER — TORSEMIDE 10 MG/1
10 TABLET ORAL
Qty: 25 TABLET | Refills: 1 | Status: SHIPPED | OUTPATIENT
Start: 2024-07-09

## 2024-07-09 NOTE — TELEPHONE ENCOUNTER
1350: received call from patient's home health nurse reporting patient has been taking torsemide 10mg 3x a week as prescribed and weight has been unchanged at 143-145lb.     Reviewed with Dr. Allen who ordered VORB patient should increase torsemide to 5x weekly.     1453:Called and spoke with ronel. Informed of provider order to increase torsemide to 5 times daily. Patient verbalized understanding.    1456: patient's home health nurse notified of provider order to increase torsemide to 5x weekly.  She verbalized understanding.

## 2024-07-09 NOTE — PROGRESS NOTES
ADVANCED HEART FAILURE CENTER  [facility] in [place], [place]      INR result reviewed with LUPE Mondragon NP  who made the following recommendations (VORB): no changes and recheck 07/12. Patient notified and verbalized understanding. They had no further questions. Patients home health nurse Cheryle also notified.  (See anticoag tracker)     Bety Aranda RN

## 2024-07-10 ENCOUNTER — HOSPITAL ENCOUNTER (OUTPATIENT)
Facility: HOSPITAL | Age: 69
Discharge: HOME | End: 2024-07-12
Attending: INTERNAL MEDICINE
Payer: MEDICARE

## 2024-07-10 DIAGNOSIS — I42.9 CARDIOMYOPATHY, UNSPECIFIED TYPE (HCC): ICD-10-CM

## 2024-07-10 DIAGNOSIS — I50.22 CHRONIC SYSTOLIC (CONGESTIVE) HEART FAILURE (HCC): ICD-10-CM

## 2024-07-10 DIAGNOSIS — Z95.811 LEFT VENTRICULAR ASSIST DEVICE PRESENT (HCC): ICD-10-CM

## 2024-07-10 PROCEDURE — 6360000004 HC RX CONTRAST MEDICATION

## 2024-07-10 PROCEDURE — C8929 TTE W OR WO FOL WCON,DOPPLER: HCPCS

## 2024-07-10 RX ADMIN — PERFLUTREN 1.5 ML: 6.52 INJECTION, SUSPENSION INTRAVENOUS at 11:11

## 2024-07-11 ENCOUNTER — TELEPHONE (OUTPATIENT)
Age: 69
End: 2024-07-11

## 2024-07-11 LAB
ECHO AO ROOT DIAM: 3.1 CM
ECHO EST RA PRESSURE: 8 MMHG
ECHO LA DIAMETER: 1.5 CM
ECHO LA TO AORTIC ROOT RATIO: 0.48
ECHO LV EDV A4C: 111 ML
ECHO LV EJECTION FRACTION A4C: 21 %
ECHO LV ESV A4C: 88 ML
ECHO LVOT MEAN GRADIENT: 0 MMHG
ECHO LVOT PEAK GRADIENT: 1 MMHG
ECHO LVOT PEAK VELOCITY: 0.5 M/S
ECHO LVOT VTI: 6.9 CM
ECHO MV A VELOCITY: 0.8 M/S
ECHO MV E DECELERATION TIME (DT): 203 MS
ECHO MV E VELOCITY: 0.71 M/S
ECHO MV E/A RATIO: 0.89
ECHO PV MAX VELOCITY: 1 M/S
ECHO PV MEAN GRADIENT: 2 MMHG
ECHO PV MEAN VELOCITY: 0.7 M/S
ECHO PV PEAK GRADIENT: 4 MMHG
ECHO PV VTI: 14.2 CM
ECHO RIGHT VENTRICULAR SYSTOLIC PRESSURE (RVSP): 17 MMHG
ECHO RV INTERNAL DIMENSION: 5.4 CM
ECHO RVOT MEAN GRADIENT: 1 MMHG
ECHO RVOT PEAK GRADIENT: 2 MMHG
ECHO RVOT PEAK VELOCITY: 0.8 M/S
ECHO RVOT VTI: 10.4 CM
ECHO TV REGURGITANT MAX VELOCITY: 1.46 M/S
ECHO TV REGURGITANT PEAK GRADIENT: 9 MMHG

## 2024-07-11 NOTE — RESULT ENCOUNTER NOTE
Echo shows heart function of 15 to 20% LV mildly dilated  RV not well-visualized but appears to be normal size and function  Continue same treatment

## 2024-07-11 NOTE — TELEPHONE ENCOUNTER
Telephone Call RE:  Appointment reminder     Outcome:     [x] Patient confirmed appointment   [] Patient rescheduled appointment for    [] Unable to reach  [] Left message              [] Other:     Informed pt of parking.

## 2024-07-11 NOTE — TELEPHONE ENCOUNTER
Telephone Call RE:  Lab Reminder      Outcome:     [x] Patient verbalizes understanding    [] Unable to reach   [] Left message              []       Bety Aranda RN

## 2024-07-12 ENCOUNTER — ANTI-COAG VISIT (OUTPATIENT)
Age: 69
End: 2024-07-12

## 2024-07-12 DIAGNOSIS — Z79.01 CHRONIC ANTICOAGULATION: Primary | ICD-10-CM

## 2024-07-12 LAB — INR BLD: 2.3

## 2024-07-12 NOTE — PROGRESS NOTES
ADVANCED HEART FAILURE CENTER  [facility] in [place], [place]      INR result reviewed with LUPE Mondragon NP  who made the following recommendations (VORB): 2mg daily and recheck 1 week. Patient notified and verbalized understanding. They had no further questions. Patient's home health nurse Cheryle also notified.  (See anticoag tracker)     Bety Aranda RN

## 2024-07-15 ENCOUNTER — TELEPHONE (OUTPATIENT)
Age: 69
End: 2024-07-15

## 2024-07-15 RX ORDER — SPIRONOLACTONE 25 MG/1
25 TABLET ORAL DAILY
Qty: 90 TABLET | Refills: 0 | Status: SHIPPED | OUTPATIENT
Start: 2024-07-15

## 2024-07-15 NOTE — TELEPHONE ENCOUNTER
Note received from office of Dr. Cuello stating he recommends patient be scheduled for replacement of RV lead.     1321: Placed call to Dr. Cuello's office. Spoke with nurse and notified that patient must be scheduled at [facility] for sedating procedures due to LVAD, she verbalized understanding, stated she will contact their  and notify  that patient must be scheduled at [facility] and request they call LVAD coordinators to ensure coordinator is available on procedure date.

## 2024-07-15 NOTE — TELEPHONE ENCOUNTER
Requested Prescriptions     Signed Prescriptions Disp Refills    spironolactone (ALDACTONE) 25 MG tablet 90 tablet 0     Sig: take 1 tablet by mouth once daily     Authorizing Provider: MELVA GAMBLE     Ordering User: FRANK CROWELL

## 2024-07-16 ENCOUNTER — ANTI-COAG VISIT (OUTPATIENT)
Age: 69
End: 2024-07-16

## 2024-07-16 ENCOUNTER — OFFICE VISIT (OUTPATIENT)
Age: 69
End: 2024-07-16

## 2024-07-16 VITALS
TEMPERATURE: 97.3 F | HEART RATE: 63 BPM | WEIGHT: 136 LBS | RESPIRATION RATE: 16 BRPM | SYSTOLIC BLOOD PRESSURE: 80 MMHG | HEIGHT: 69 IN | BODY MASS INDEX: 20.14 KG/M2

## 2024-07-16 DIAGNOSIS — Z79.01 CHRONIC ANTICOAGULATION: Primary | ICD-10-CM

## 2024-07-16 DIAGNOSIS — Z95.811 LEFT VENTRICULAR ASSIST DEVICE PRESENT (HCC): ICD-10-CM

## 2024-07-16 LAB
INR BLD: 2.4
POC INR: 2.4
PROTHROMBIN TIME, POC: NORMAL

## 2024-07-16 ASSESSMENT — ENCOUNTER SYMPTOMS
VOMITING: 0
NAUSEA: 0
SHORTNESS OF BREATH: 0
COUGH: 0
ABDOMINAL DISTENTION: 0

## 2024-07-16 ASSESSMENT — PATIENT HEALTH QUESTIONNAIRE - PHQ9
SUM OF ALL RESPONSES TO PHQ QUESTIONS 1-9: 0
SUM OF ALL RESPONSES TO PHQ QUESTIONS 1-9: 0
SUM OF ALL RESPONSES TO PHQ9 QUESTIONS 1 & 2: 0
SUM OF ALL RESPONSES TO PHQ QUESTIONS 1-9: 0
1. LITTLE INTEREST OR PLEASURE IN DOING THINGS: NOT AT ALL
2. FEELING DOWN, DEPRESSED OR HOPELESS: NOT AT ALL
SUM OF ALL RESPONSES TO PHQ QUESTIONS 1-9: 0

## 2024-07-16 NOTE — PROGRESS NOTES
ADVANCED HEART FAILURE CENTER  [facility] in [place], [place]      INR result reviewed with CARMELINA Luevano NP who made the following recommendations (VORB): change maintenance dosing to 2mg every day and recheck 1 week.   Patient notified of provider instructions in clinic, verbalized understanding. Patient's home health nurse Abi Barros also notified (See anticoag tracker)     Bety Aranda RN

## 2024-07-16 NOTE — PROGRESS NOTES
ADVANCED HEART FAILURE CENTER  [facility] in [place], [place]  Mechanical Circulatory Support Clinic Note    Patient name: Bishop Love  Patient : 1955  Patient MRN: [id number]  Date of service: 24    Primary care physician: Freddy Chandler MD  LVAD cardiologist: MICHAEL    Chief Complaint   Patient presents with    Follow-up     VAD        ASSESSMENT:    Bishop Love is a 69 y.o. male with history of chronic systolic heart failure due to  ischemic cardiomyopathy, s/p HM3 LVAD implantation (2023) initially as destination therapy, stage D, on optimal GDMT   Former smoker--quit 6 months back--negative urine cotinine test  Next annual LVAD evaluation in 1 months  Dilated RV with RV failure as per recent echo       Current Visit 24   Bishop Love presents today for LVAD follow up. He states he feels well, his weight is back down 4lbs since starting the torsemide 10mg 3x/week. He has only had one short low flow alarm. He has his RHC scheduled for  and he has a follow up with [facility] next week for ongoing OHT evaluation.   VSS- MAP 80  Labs from  reviewed- creatinine up slightly but overall stable    PLAN:  Heart Failure/Cardiomyopathy with RV dilatation and failure: Well compensated  RV 5.3 with septum more towards LV--cont speed of LVAD 5000 RPMs    RHC scheduled for  to assess RV hemodynamics  Continue current medical therapy for heart failure:  Beta-blocker: Continue metoprolol 25 mg twice daily  ACE/ARB/ARNi: Not on Entresto due to severe cough  Hydralazine/nitrate: Continue hydralazine 100 mg p.o. 3 times daily  MRA: Continue spironolactone 25 mg once daily  SGLT2 inhibitor: Not on SGLT2 inhibitors due to recurrent UTI  Diuretic: As patient has RV dilatation with mild RV failure plan to start torsemide 10 mg p.o. 3 times weekly.    Reinforced low salt diet  Reinforced fluid restriction to 6 x 8oz glasses per day  Follup up appt on  for heart

## 2024-07-16 NOTE — PROGRESS NOTES
ADVANCED HEART FAILURE CENTER  [facility] in [place], [place]  LVAD Coordinator Clinic Visit Note  Chief Complaint   Patient presents with    Follow-up     VAD       BP (!) 80/0 (Site: Left Upper Arm, Position: Sitting, Cuff Size: Medium Adult) Comment: MAP  Pulse 63   Temp 97.3 °F (36.3 °C) (Temporal)   Resp 16   Ht 1.753 m (5' 9\")   Wt 61.7 kg (136 lb)   BMI 20.08 kg/m²     LVAD  LVAD Type:: Left Ventricular Assist Device (LVAD)  Pump Speed (rpm): 5000  Pump Flow (lpm): 3.4  MAP (mmHg): 80  Set Low Speed (rpm): 5000  Pump Pulse Index (PI): 8.9  Pump Power (Villanueva): 3.5  Battery Life Checked: Yes  Backup Controller Present: Yes  Driveline Dressing: Clean, Dry and Intact  Outpatient: Yes  MAP in Therapeutic Range (Outpatient): Yes         Bishop Love is a 69 y.o. male with a history of ICM with Heartmate 3 implant date of 09/19/2023. LVAD interrogation completed in clinic, results reported to provider, Notable for one low flow alarm.     Patient denied s/s of driveline infection or driveline trauma (including  drops). Denies LVAD alarms at home. See flow sheet for details. Driveline inspected for integrity.  Above reported to provider.     INR changes per E. Chloé VORB (see anticoag encounter for details) Per E. Chloé VORB no August visit indicated at this time, patient to follow up at [facility] 07/31 and annual visit 09/17.      All orders entered per VORB. All provider instructions placed in AVS and reviewed with patient. LVAD education provided on hear healthy diet. Educated patient expected follow up.  Time given to ask questions. Patient verbalized understanding and had no further questions.     Bety Aranda RN  VAD Coordinator

## 2024-07-18 ENCOUNTER — HOSPITAL ENCOUNTER (OUTPATIENT)
Facility: HOSPITAL | Age: 69
Setting detail: INFUSION SERIES
Discharge: HOME | End: 2024-07-18
Payer: MEDICARE

## 2024-07-18 VITALS
WEIGHT: 145 LBS | RESPIRATION RATE: 16 BRPM | OXYGEN SATURATION: 100 % | TEMPERATURE: 98.4 F | SYSTOLIC BLOOD PRESSURE: 92 MMHG | HEIGHT: 69 IN | HEART RATE: 60 BPM | DIASTOLIC BLOOD PRESSURE: 66 MMHG | BODY MASS INDEX: 21.48 KG/M2

## 2024-07-18 PROCEDURE — 2580000003 HC RX 258: Performed by: NURSE PRACTITIONER

## 2024-07-18 PROCEDURE — 96365 THER/PROPH/DIAG IV INF INIT: CPT

## 2024-07-18 PROCEDURE — 6360000002 HC RX W HCPCS: Performed by: NURSE PRACTITIONER

## 2024-07-18 RX ADMIN — FERRIC CARBOXYMALTOSE INJECTION 750 MG: 50 INJECTION, SOLUTION INTRAVENOUS at 11:28

## 2024-07-18 NOTE — PROGRESS NOTES
1200: Report from Puja ROBERT. Pt tolerated infusion with no complaints. Pt to be discharged at 12:25pm. Discharge instructions and medications reviewed/given. Patient verbalizes understanding. All questions answered. No distress noted, patient stable.   1225: pt discharged via ambulating off unit.

## 2024-07-19 RX ORDER — ISOSORBIDE DINITRATE 20 MG/1
40 TABLET ORAL EVERY 8 HOURS
Qty: 180 TABLET | Refills: 2 | Status: SHIPPED | OUTPATIENT
Start: 2024-07-19

## 2024-07-19 NOTE — TELEPHONE ENCOUNTER
Requested Prescriptions     Signed Prescriptions Disp Refills    isosorbide dinitrate (ISORDIL) 20 MG tablet 180 tablet 2     Sig: Take 2 tablets by mouth every 8 (eight) hours     Authorizing Provider: PETE SCHWAB     Ordering User: FRANK CROWELL

## 2024-07-24 ENCOUNTER — ANTI-COAG VISIT (OUTPATIENT)
Age: 69
End: 2024-07-24

## 2024-07-24 DIAGNOSIS — Z79.01 CHRONIC ANTICOAGULATION: Primary | ICD-10-CM

## 2024-07-24 LAB — INR BLD: 3.3

## 2024-07-24 NOTE — PROGRESS NOTES
ADVANCED HEART FAILURE CENTER  [facility] in [place], [place]      INR result reviewed with Jeanette Brink made the following recommendations (VORB): 1.5mg tonight and recheck 07/30. Patient notified and verbalized understanding. They had no further questions. Patient's home health nurse Cheryle also notified.  (See anticoag tracker)     Bety Aranda RN

## 2024-07-29 ENCOUNTER — DIRECT ADMIT ORDERS (OUTPATIENT)
Age: 69
End: 2024-07-29

## 2024-07-29 ENCOUNTER — TELEPHONE (OUTPATIENT)
Age: 69
End: 2024-07-29

## 2024-07-29 ENCOUNTER — ANTI-COAG VISIT (OUTPATIENT)
Age: 69
End: 2024-07-29

## 2024-07-29 DIAGNOSIS — Z79.01 CHRONIC ANTICOAGULATION: Primary | ICD-10-CM

## 2024-07-29 LAB — INR BLD: 2.7

## 2024-07-29 RX ORDER — SODIUM CHLORIDE 9 MG/ML
INJECTION, SOLUTION INTRAVENOUS PRN
Status: CANCELLED | OUTPATIENT
Start: 2024-07-29 | End: 2024-07-29

## 2024-07-29 NOTE — TELEPHONE ENCOUNTER
0958: Patient called to report INR. Reviewed instructions for upcoming RHC with patient including, NPO status night before,arrival time of 0630, where to present, taking medications morning of with a sip of water, having a  ect. Patient verbalized understanding. Informed him hard copy was mailed to him several days ago and to look for it in the mail, he verbalized understanding.     1006: call placed to [facility] to request most recent labwork. Spoke with RN Kat who stated that labwork was drawn last Thursday 07/25 however [employer] has not received results from [facility].  Kat stated she would contact [facility] to request labwork and fax to [facility]     July labwork received from [employer], placed in Dr. Fields's box for review

## 2024-07-29 NOTE — PROGRESS NOTES
ADVANCED HEART FAILURE CENTER  [facility] in [place], [place]      INR result reviewed with Dr. Donnie MD  who made the following recommendations (VORB): no changes and recheck 1 week. Patient notified and verbalized understanding. They had no further questions. (See anticoag tracker)     Bety Aranda RN

## 2024-07-31 ENCOUNTER — ANTI-COAG VISIT (OUTPATIENT)
Age: 69
End: 2024-07-31

## 2024-07-31 ENCOUNTER — APPOINTMENT (OUTPATIENT)
Facility: HOSPITAL | Age: 69
End: 2024-07-31
Attending: INTERNAL MEDICINE
Payer: MEDICARE

## 2024-07-31 ENCOUNTER — HOSPITAL ENCOUNTER (OUTPATIENT)
Facility: HOSPITAL | Age: 69
Discharge: HOME | End: 2024-07-31
Attending: INTERNAL MEDICINE | Admitting: INTERNAL MEDICINE
Payer: MEDICARE

## 2024-07-31 VITALS
HEIGHT: 69 IN | WEIGHT: 138 LBS | SYSTOLIC BLOOD PRESSURE: 102 MMHG | TEMPERATURE: 97.8 F | DIASTOLIC BLOOD PRESSURE: 72 MMHG | BODY MASS INDEX: 20.44 KG/M2 | HEART RATE: 60 BPM | RESPIRATION RATE: 14 BRPM | OXYGEN SATURATION: 98 %

## 2024-07-31 DIAGNOSIS — Z79.01 CHRONIC ANTICOAGULATION: Primary | ICD-10-CM

## 2024-07-31 DIAGNOSIS — I50.22 CHRONIC SYSTOLIC (CONGESTIVE) HEART FAILURE (HCC): Primary | ICD-10-CM

## 2024-07-31 DIAGNOSIS — I50.22 CHRONIC SYSTOLIC HEART FAILURE (HCC): ICD-10-CM

## 2024-07-31 LAB
ECHO BSA: 1.75 M2
ECHO BSA: 1.75 M2
ECHO LV INTERNAL DIMENSION DIASTOLE INDEX: 2.67 CM/M2
ECHO LV INTERNAL DIMENSION DIASTOLIC: 4.7 CM (ref 4.2–5.9)
ECHO LV IVSD: 0.5 CM (ref 0.6–1)
ECHO LV MASS 2D: 93.9 G (ref 88–224)
ECHO LV MASS INDEX 2D: 53.4 G/M2 (ref 49–115)
ECHO LV POSTERIOR WALL DIASTOLIC: 0.8 CM (ref 0.6–1)
ECHO LV RELATIVE WALL THICKNESS RATIO: 0.34
INR BLD: 2.5

## 2024-07-31 PROCEDURE — C1725 CATH, TRANSLUMIN NON-LASER: HCPCS | Performed by: INTERNAL MEDICINE

## 2024-07-31 PROCEDURE — 80307 DRUG TEST PRSMV CHEM ANLYZR: CPT

## 2024-07-31 PROCEDURE — 85610 PROTHROMBIN TIME: CPT

## 2024-07-31 PROCEDURE — 2709999900 HC NON-CHARGEABLE SUPPLY: Performed by: INTERNAL MEDICINE

## 2024-07-31 PROCEDURE — 76937 US GUIDE VASCULAR ACCESS: CPT | Performed by: INTERNAL MEDICINE

## 2024-07-31 PROCEDURE — C1713 ANCHOR/SCREW BN/BN,TIS/BN: HCPCS | Performed by: INTERNAL MEDICINE

## 2024-07-31 PROCEDURE — C1769 GUIDE WIRE: HCPCS | Performed by: INTERNAL MEDICINE

## 2024-07-31 PROCEDURE — 2500000003 HC RX 250 WO HCPCS: Performed by: INTERNAL MEDICINE

## 2024-07-31 PROCEDURE — 93451 RIGHT HEART CATH: CPT | Performed by: INTERNAL MEDICINE

## 2024-07-31 PROCEDURE — 93308 TTE F-UP OR LMTD: CPT

## 2024-07-31 PROCEDURE — C1894 INTRO/SHEATH, NON-LASER: HCPCS | Performed by: INTERNAL MEDICINE

## 2024-07-31 RX ORDER — ATORVASTATIN CALCIUM 40 MG/1
80 TABLET, FILM COATED ORAL DAILY
Qty: 90 TABLET | Refills: 1 | Status: SHIPPED
Start: 2024-07-31

## 2024-07-31 RX ORDER — LIDOCAINE HYDROCHLORIDE 10 MG/ML
INJECTION, SOLUTION INFILTRATION; PERINEURAL PRN
Status: DISCONTINUED | OUTPATIENT
Start: 2024-07-31 | End: 2024-07-31 | Stop reason: HOSPADM

## 2024-07-31 RX ORDER — PANTOPRAZOLE SODIUM 40 MG/1
40 TABLET, DELAYED RELEASE ORAL
Qty: 90 TABLET | Refills: 1 | Status: SHIPPED | OUTPATIENT
Start: 2024-07-31

## 2024-07-31 RX ORDER — SODIUM CHLORIDE 9 MG/ML
INJECTION, SOLUTION INTRAVENOUS PRN
Status: DISCONTINUED | OUTPATIENT
Start: 2024-07-31 | End: 2024-07-31 | Stop reason: HOSPADM

## 2024-07-31 RX ORDER — TORSEMIDE 10 MG/1
10 TABLET ORAL DAILY
Qty: 25 TABLET | Refills: 1 | Status: SHIPPED | OUTPATIENT
Start: 2024-07-31

## 2024-07-31 RX ORDER — WARFARIN SODIUM 1 MG/1
2 TABLET ORAL DAILY
Qty: 180 TABLET | Refills: 0 | Status: SHIPPED
Start: 2024-07-31

## 2024-07-31 NOTE — PROGRESS NOTES
TRANSFER - IN Cath Lab RR REPORT:    Verbal report received from Joshua on Bishop Love  being received from the Cardiac Cath Lab for routine progression of care. Report consisted of patient’s Situation, Background, Assessment and Recommendations(SBAR). Procedural summary and MAR reviewed with receiving nurse. Opportunity for questions and clarification was provided. Assessment completed upon patient’s arrival to Cardiac Cath Lab RECOVERY AREA and care assumed.       Cardiac Cath Lab Recovery Arrival Note:    Bishop Love arrived to CCL recovery area.  Patient procedure= RHC. Patient on cardiac monitor, non-invasive blood pressure, SPO2 monitor. On RA. Patient is A&Ox 4. Patient reports no complaints.    PROCEDURE SITE CHECK:    Procedure site: No bleeding and no hematoma, no pain/discomfort reported at procedure site.     No change in patient status. Continue to monitor patient and status.

## 2024-07-31 NOTE — PROGRESS NOTES
CATH LAB to RECOVERY ROOM REPORT    Procedure: [facility]    MD: DIO Fields MD    The procedure was diagnostic only.    Verbal Report given to Recovery Nurse on patient being transferred to Cardiac Cath Lab  for routine post-op. Patient stable upon transfer to .  Vitals, mental status, MAR, procedural summary discussed with recovery RN.    Medication given during procedure:        Sheaths:    Right brachial sheath pulled at 0835 am, secured with band-aid.

## 2024-07-31 NOTE — PROGRESS NOTES
Cardiac Cath Lab Procedure Area Arrival Note:    Bishop Lvoe arrived to Cardiac Cath Lab, Procedure Area. Patient identifiers verified with NAME and DATE OF BIRTH. Procedure verified with patient. Consent forms verified. Allergies verified. Patient informed of procedure and plan of care. Questions answered with review. Patient voiced understanding of procedure and plan of care.    Patient on cardiac monitor, non-invasive blood pressure, SPO2 monitor. On RA.  IV of NS on pump at 25 ml/hr. Patient status doing well without problems. Patient is A&Ox 4. Patient reports no complaints.     Patient medicated during procedure with orders obtained and verified by Dr. Fields.    Refer to patients Cardiac Cath Lab PROCEDURE REPORT for vital signs, assessment, status, and response during procedure, printed at end of case. Printed report on chart or scanned into chart.

## 2024-07-31 NOTE — PROGRESS NOTES
ADVANCED HEART FAILURE CENTER  [facility] in [place], [place]       Procedure: Right Heart Catherization with Dr. Fields     Fixed Speed: 5000  Low Speed: 4600      Pre procedure:  Speed change 1:  Speed change 2:  Post procedure:    Time  0739 0827 0839 0841   Speed  5000   4800 5000 5000   Flow  3.5    3.3 3.8 3.8   MAP  94 91 91 91   PI  7.1  7.6 7.2 6.9   Power  3.5   3.2 3.4 3.4   LvIdd  4.1cm 4.7cm    RV  dimmension Unable to measure Unable to measure    AV  Unable to visualize  Unable to visualize     MV  No MR  No MR     TV  Unable to visualize  Unable to visualize     Septal Position  Bows left  Bows left         Post procedure:       Fixed Speed: 5000  Low Speed: 4600     Ramp RHC and ECHO performed with Dr. Fields. Speed changes performed per VORB from Dr. Fields, measurements entered per provider assessment of echo images.     VAD Coordinator managed LVAD equipment during procedure.       Bety Aranda, RN  LVAD coordinator

## 2024-07-31 NOTE — PROGRESS NOTES
Cardiac Cath Lab Recovery Arrival Note:      Bishop Love arrived to Cardiac Cath Lab, Recovery Area. Staff introduced to patient. Patient identifiers verified with NAME and DATE OF BIRTH. Procedure verified with patient. Consent forms reviewed and signed by patient or authorized representative and verified. Allergies verified.     Patient and family oriented to department. Patient and family informed of procedure and plan of care.     Questions answered with review. Patient prepped for procedure, per orders from physician, prior to arrival.    Patient on cardiac monitor, non-invasive blood pressure, SPO2 monitor. On Room air. Patient is A&Ox 4. Patient reports No Pain.     Patient in stretcher, in low position, with side rails up, call bell within reach, patient instructed to call if assistance as needed.    Patient prep in: [facility] Recovery Area, [place] FT4.   Patient family : Cristiano Love  Family in: At Home.   Prep by: Pennie Dietz RN

## 2024-07-31 NOTE — PROGRESS NOTES
ADVANCED HEART FAILURE CENTER  [facility] in [place], [place]      INR result reviewed with Dr. Donnie MD  who made the following recommendations (VORB): no changes and recheck 1 week. Patient notified and verbalized understanding. They had no further questions. Patient's home health nurse Cheryle also notified  (See anticoag tracker)     Bety Aranda RN

## 2024-07-31 NOTE — PROGRESS NOTES
Ambulated patient to the bathroom with a steady gait with standby assist, voided without difficulty. Returned to stretcher. Vital signs stable.     Site is clean, dry, and intact. No bleeding, no hematoma.     Assisted patient in dressing/Patient dressed self.   Educated patient about their sedation precautions such as not driving, operating any machinery, or signing legal documents 24 hours post procedure.     Reviewed discharge instructions, including medications, and site care using the teach back method. Answered all questions. Verbalized understanding.     Removed peripheral IV    Escorted to discharge area in a wheelchair with all of their belongings including clothing, cell phone, wallet, LVAD equipment.    Relative present to take patient home. Reviewed discharge instructions with relative. Verbalized understanding.

## 2024-08-01 LAB
COMMENT:: NORMAL
COTININE UR QL SCN: NEGATIVE NG/ML

## 2024-08-02 ENCOUNTER — CLINICAL DOCUMENTATION (OUTPATIENT)
Age: 69
End: 2024-08-02

## 2024-08-02 NOTE — PROGRESS NOTES
Patient discussed with Dr. Fields. Patient scheduled 09/25 with Dr. Cuello for ICD extraction and replacement. Dr Fields stated she spoke with Dr. Cuello and stated that Dr. Cuello will see patient back to discuss either adding an extra lead to ICD or turning device off.

## 2024-08-08 ENCOUNTER — TELEPHONE (OUTPATIENT)
Age: 69
End: 2024-08-08

## 2024-08-08 ENCOUNTER — ANTI-COAG VISIT (OUTPATIENT)
Age: 69
End: 2024-08-08

## 2024-08-08 DIAGNOSIS — Z79.01 CHRONIC ANTICOAGULATION: Primary | ICD-10-CM

## 2024-08-08 LAB — INR BLD: 3.3

## 2024-08-08 NOTE — TELEPHONE ENCOUNTER
1422: call placed to patient regarding coumadin instructions. Patient reported that he is having more frequent low flow alarms in the morning prior to taking medications. States alarms stop after taking morning medications.     1424: placed call to patient's home health nurse Cheryle who stated patient also reported alarms to her. Reports MAP of 92 today with weak intermittent radial pulse.     Reviewed with CARMELINA Luevano who stated VORB no medication changes, requested weight check in with patient.     Norma Luevano, YASMIN - NP  You1 hour ago (2:43 PM)       How are his weights?  I would keep his meds the same if the alarms resolve after his medications.     You  Norma Luevano, YASMIN - NP1 hour ago (2:34 PM)     MP  Per patient alarms a little more frequent in the morning, we upped torsemide to 10mg 5x a week after cath with lavone last week     1558: placed call to patient. Informed him of provider instructions to continue current medications as ordered. He verbalized understanding.  Stated his weights are fluctuating between 135lb and 145lb, things his scale may be inaccurate. Is drinking 6-8 16 oz bottles of water daily.  Denied shortness or breath or peripheral edema.      08/09: reviewed with CARMELINA Luevano who stated no changes to plan of care or medications at this time

## 2024-08-08 NOTE — PROGRESS NOTES
ADVANCED HEART FAILURE CENTER  [facility] in [place], [place]      INR result reviewed with CARMELINA Luevano NP who made the following recommendations (VORB):  new maintenance dose: 1.5 mg every Thu; 2 mg all other days  and recheck 1 week. Patient notified and verbalized understanding. Patient's home health nurse Cheryle MATHEW Also notified.  They had no further questions. (See anticoag tracker)     Bety Aranda RN

## 2024-08-08 NOTE — TELEPHONE ENCOUNTER
Called and spoke with patient who stated he has not checked his INR this week. Stated that home health is coming later today. Requested he call with is INR at his home health visit today, he verbalized understanding.

## 2024-08-12 ENCOUNTER — ANTI-COAG VISIT (OUTPATIENT)
Age: 69
End: 2024-08-12

## 2024-08-12 DIAGNOSIS — Z79.01 CHRONIC ANTICOAGULATION: Primary | ICD-10-CM

## 2024-08-12 LAB — INR BLD: 2.9

## 2024-08-12 NOTE — PROGRESS NOTES
ADVANCED HEART FAILURE CENTER  [facility] in [place], [place]      INR result reviewed with CARMELINA Luevano NP who made the following recommendations (VORB): no changes and recheck 1 week. Patient notified, verified new dosing of 1.5mg on Thursdays,  verbalized understanding. Patient's home health nurse also notified.  They had no further questions. (See anticoag tracker)     Bety Aranda RN

## 2024-08-14 ENCOUNTER — CLINICAL DOCUMENTATION (OUTPATIENT)
Age: 69
End: 2024-08-14

## 2024-08-14 NOTE — PROGRESS NOTES
Patient discussed with Dr. Fields. Per Dr. Fields would consider referring patient to [facility] for transplant evaluation but unsure if patient would have coverage as [place] of [place] (patient has medicaid and medicare advantage plan). Dr. Fields requested patient's insurance info be sent to [facility] to see if patient would have coverage for transplant services.  Spoke with Bonnie Norman with [facility] who requested patient information be faxed to her at [phone]. Insurance information faxed per provider request.

## 2024-08-19 ENCOUNTER — TELEPHONE (OUTPATIENT)
Age: 69
End: 2024-08-19

## 2024-08-19 RX ORDER — SPIRONOLACTONE 25 MG/1
25 TABLET ORAL DAILY
Qty: 30 TABLET | Refills: 10 | OUTPATIENT
Start: 2024-08-19

## 2024-08-19 RX ORDER — TICAGRELOR 90 MG/1
90 TABLET ORAL 2 TIMES DAILY
Qty: 180 TABLET | Refills: 1 | Status: SHIPPED | OUTPATIENT
Start: 2024-08-19 | End: 2024-08-19

## 2024-08-19 NOTE — TELEPHONE ENCOUNTER
1651: left voicemail with patient's home health nurse inquiring if she would see patient tomorrow as he is overdue for INR. Requested call back to confirm if she has visit scheduled with patient.

## 2024-08-19 NOTE — TELEPHONE ENCOUNTER
Norma Gamble APRN - NP Pasti, Megan, RN  Caller: Unspecified (3 days ago,  6:55 PM)  yes          Previous Messages       ----- Message -----  From: Bety Aranda RN  Sent: 8/19/2024  11:17 AM EDT  To: YASMIN Crouch NP    ----- Message from Bety Aranda RN sent at 8/19/2024 11:17 AM EDT -----  Okay to refill this?      ----- Message -----  From: Mee Villa  Sent: 8/16/2024   6:55 PM EDT  To: *  Subject: Rx Auth Request    Requested Prescriptions     Signed Prescriptions Disp Refills    ticagrelor (BRILINTA) 90 MG TABS tablet 180 tablet 1     Sig: Take 1 tablet by mouth 2 times daily     Authorizing Provider: NORMA GAMBLE     Ordering User: BETY ARANDA

## 2024-08-20 ENCOUNTER — ANTI-COAG VISIT (OUTPATIENT)
Age: 69
End: 2024-08-20

## 2024-08-20 DIAGNOSIS — Z79.01 LONG TERM (CURRENT) USE OF ANTICOAGULANTS: Primary | ICD-10-CM

## 2024-08-20 LAB — INR BLD: 2

## 2024-08-20 NOTE — PROGRESS NOTES
ADVANCED HEART FAILURE CENTER  [facility] in [place], [place]      INR result reviewed with CARMELINA Luevano NP who made the following recommendations (VORB): no change to coumadin dosing and recheck a full set of labs in 1 week on 8/27/24. Patient notified and verbalized understanding. He had no further questions. Notified Kath at [facility] of no change to plan (See anticoag tracker)     CHARLENE DANIELS RN  VAD Coordinator

## 2024-08-23 RX ORDER — SPIRONOLACTONE 25 MG/1
25 TABLET ORAL DAILY
Qty: 90 TABLET | Refills: 0 | Status: SHIPPED | OUTPATIENT
Start: 2024-08-23

## 2024-08-23 NOTE — TELEPHONE ENCOUNTER
Requested Prescriptions     Signed Prescriptions Disp Refills    spironolactone (ALDACTONE) 25 MG tablet 90 tablet 0     Sig: TAKE 1 TABLET BY MOUTH DAILY     Authorizing Provider: SKYLAR SANCHEZ     Ordering User: LUIS STEVEN

## 2024-08-26 ENCOUNTER — ANTI-COAG VISIT (OUTPATIENT)
Age: 69
End: 2024-08-26
Payer: MEDICARE

## 2024-08-26 DIAGNOSIS — Z79.01 LONG TERM (CURRENT) USE OF ANTICOAGULANTS: Primary | ICD-10-CM

## 2024-08-26 DIAGNOSIS — Z95.811 LEFT VENTRICULAR ASSIST DEVICE PRESENT (HCC): ICD-10-CM

## 2024-08-26 DIAGNOSIS — I50.22 CHRONIC SYSTOLIC (CONGESTIVE) HEART FAILURE (HCC): ICD-10-CM

## 2024-08-26 LAB — INR BLD: 2.2

## 2024-08-26 PROCEDURE — 93793 ANTICOAG MGMT PT WARFARIN: CPT | Performed by: NURSE PRACTITIONER

## 2024-08-26 NOTE — PROGRESS NOTES
ADVANCED HEART FAILURE CENTER  [facility] in [place], [place]      INR result reviewed with CARMELINA Luevano NP who made the following recommendations (VORB): Continue current coumadin maintneance plan and recheck INR in 1 week. Spoke with TRINH Muhammad, home health nurse. She confirmed lab work was collected today, but did not obtain nicotine screening. She stated will collect during visit next week. She verbalized understanding of coumadin instructions and notified patient during call. They had no further questions. (See anticoag tracker)     LUIS STEVEN RN

## 2024-08-29 ENCOUNTER — TELEPHONE (OUTPATIENT)
Age: 69
End: 2024-08-29

## 2024-08-29 ENCOUNTER — HOSPITAL ENCOUNTER (OUTPATIENT)
Facility: HOSPITAL | Age: 69
Setting detail: INFUSION SERIES
Discharge: HOME | End: 2024-08-29
Payer: MEDICARE

## 2024-08-29 VITALS
SYSTOLIC BLOOD PRESSURE: 108 MMHG | HEART RATE: 59 BPM | TEMPERATURE: 97.6 F | HEIGHT: 69 IN | OXYGEN SATURATION: 100 % | BODY MASS INDEX: 20.44 KG/M2 | WEIGHT: 138 LBS | DIASTOLIC BLOOD PRESSURE: 88 MMHG | RESPIRATION RATE: 16 BRPM

## 2024-08-29 PROCEDURE — 2580000003 HC RX 258: Performed by: NURSE PRACTITIONER

## 2024-08-29 PROCEDURE — 6360000002 HC RX W HCPCS: Performed by: NURSE PRACTITIONER

## 2024-08-29 PROCEDURE — 96365 THER/PROPH/DIAG IV INF INIT: CPT

## 2024-08-29 RX ADMIN — FERRIC CARBOXYMALTOSE INJECTION 750 MG: 50 INJECTION, SOLUTION INTRAVENOUS at 11:01

## 2024-08-29 NOTE — PROGRESS NOTES
Pt  here  today   received  injectafer 750mm in 250ns vi well over  20min  this  was  2nd  dose   discharged with  instructions  no  further  orders  at  this  time     PAST SURGICAL HISTORY:  No significant past surgical history

## 2024-08-29 NOTE — TELEPHONE ENCOUNTER
1524: called and spoke with [facility]  office in [place]. Staff stated most recent labwork for patient is from 08/27, requested they fax this to [facility], staff stated they will fax results shortly.

## 2024-09-03 ENCOUNTER — ANTI-COAG VISIT (OUTPATIENT)
Age: 69
End: 2024-09-03

## 2024-09-03 DIAGNOSIS — Z79.01 CHRONIC ANTICOAGULATION: Primary | ICD-10-CM

## 2024-09-03 LAB — INR BLD: 2.4

## 2024-09-03 NOTE — PROGRESS NOTES
ADVANCED HEART FAILURE CENTER  [facility] in [place], [place]      INR result reviewed with LUPE Mondragon NP  who made the following recommendations (VORB): no changes and recheck 1 week. Patient notified and verbalized understanding. They had no further questions.     Patient's home health nurse Cheryle notified via voicemail. Also requested cheryle send most recent copy of patient labwork as none scanned in chart at this time  (See anticoag tracker)     Bety Aranda RN

## 2024-09-04 ENCOUNTER — TELEPHONE (OUTPATIENT)
Age: 69
End: 2024-09-04

## 2024-09-04 NOTE — TELEPHONE ENCOUNTER
1351: Home health labs received and reviewed by Dr. Fields who recommended VORB patient have repeat BMP due to sodium 121. Per Dr. Fields remainder of labs stable. Contacted Cheryle with [facility] home health to request repeat BMP. She verbalized understanding and stated will repeat BMP. She had no further questions.     1355: Contacted patient to notify of results and recommendations. He stated he has been drinking six 16oz bottles of water daily and is taking torsemide 5x weekly. Reviewed with Dr. Fields who recommended VORB patient reduce torsemide to 10mg M,W,F, and reduce fluid intake to 3-4 16oz bottles of water daily. Patient notified. He verbalized understanding and had no further questions.    1402: Notified Cheryle with [facility]. She verbalized understanding and had no further questions.    LUIS STEVEN RN  VAD Coordinator

## 2024-09-09 ENCOUNTER — ANTI-COAG VISIT (OUTPATIENT)
Age: 69
End: 2024-09-09
Payer: MEDICARE

## 2024-09-09 DIAGNOSIS — Z79.01 LONG TERM (CURRENT) USE OF ANTICOAGULANTS: Primary | ICD-10-CM

## 2024-09-09 LAB — INR BLD: 3.1

## 2024-09-09 PROCEDURE — 93793 ANTICOAG MGMT PT WARFARIN: CPT | Performed by: NURSE PRACTITIONER

## 2024-09-10 ENCOUNTER — TELEPHONE (OUTPATIENT)
Age: 69
End: 2024-09-10

## 2024-09-10 DIAGNOSIS — I50.22 CHRONIC SYSTOLIC (CONGESTIVE) HEART FAILURE (HCC): ICD-10-CM

## 2024-09-10 RX ORDER — TORSEMIDE 10 MG/1
10 TABLET ORAL
Qty: 13 TABLET | Refills: 1 | Status: SHIPPED | OUTPATIENT
Start: 2024-09-11

## 2024-09-13 ENCOUNTER — TELEPHONE (OUTPATIENT)
Age: 69
End: 2024-09-13

## 2024-09-16 ENCOUNTER — TELEPHONE (OUTPATIENT)
Age: 69
End: 2024-09-16

## 2024-09-16 ENCOUNTER — ANTI-COAG VISIT (OUTPATIENT)
Age: 69
End: 2024-09-16

## 2024-09-16 DIAGNOSIS — Z79.01 CHRONIC ANTICOAGULATION: Primary | ICD-10-CM

## 2024-09-16 LAB — INR BLD: 2.6

## 2024-09-17 ENCOUNTER — HOSPITAL ENCOUNTER (INPATIENT)
Facility: HOSPITAL | Age: 69
LOS: 42 days | Discharge: NURSING, REHAB OR LONG-TERM CARE | DRG: 640 | End: 2024-10-29
Attending: EMERGENCY MEDICINE | Admitting: FAMILY MEDICINE
Payer: MEDICARE

## 2024-09-17 ENCOUNTER — OFFICE VISIT (OUTPATIENT)
Age: 69
End: 2024-09-17
Payer: MEDICARE

## 2024-09-17 ENCOUNTER — APPOINTMENT (OUTPATIENT)
Facility: HOSPITAL | Age: 69
DRG: 640 | End: 2024-09-17
Payer: MEDICARE

## 2024-09-17 VITALS
HEIGHT: 69 IN | HEART RATE: 87 BPM | TEMPERATURE: 97.2 F | BODY MASS INDEX: 19.61 KG/M2 | SYSTOLIC BLOOD PRESSURE: 92 MMHG | RESPIRATION RATE: 18 BRPM | WEIGHT: 132.4 LBS

## 2024-09-17 DIAGNOSIS — I99.8 LOWER LIMB ISCHEMIA: Primary | ICD-10-CM

## 2024-09-17 DIAGNOSIS — I63.9 CARDIOEMBOLIC STROKE (HCC): Primary | ICD-10-CM

## 2024-09-17 DIAGNOSIS — Z95.811 LVAD (LEFT VENTRICULAR ASSIST DEVICE) PRESENT (HCC): ICD-10-CM

## 2024-09-17 DIAGNOSIS — E87.1 HYPONATREMIA: ICD-10-CM

## 2024-09-17 DIAGNOSIS — R78.81 BACTEREMIA: ICD-10-CM

## 2024-09-17 DIAGNOSIS — T14.8XXA AVULSION FRACTURE: ICD-10-CM

## 2024-09-17 DIAGNOSIS — S46.002A INJURY OF LEFT ROTATOR CUFF, INITIAL ENCOUNTER: ICD-10-CM

## 2024-09-17 PROBLEM — R21 GENERALIZED RASH: Status: ACTIVE | Noted: 2024-09-17

## 2024-09-17 LAB
ALBUMIN SERPL-MCNC: 3.4 G/DL (ref 3.5–5)
ALBUMIN/GLOB SERPL: 0.9 (ref 1.1–2.2)
ALP SERPL-CCNC: 150 U/L (ref 45–117)
ALT SERPL-CCNC: 43 U/L (ref 12–78)
ANION GAP SERPL CALC-SCNC: 7 MMOL/L (ref 2–12)
AST SERPL-CCNC: 60 U/L (ref 15–37)
BASOPHILS # BLD: 0.1 K/UL (ref 0–0.1)
BASOPHILS NFR BLD: 1 % (ref 0–1)
BILIRUB SERPL-MCNC: 1 MG/DL (ref 0.2–1)
BUN SERPL-MCNC: 17 MG/DL (ref 6–20)
BUN/CREAT SERPL: 13 (ref 12–20)
CALCIUM SERPL-MCNC: 8.7 MG/DL (ref 8.5–10.1)
CHLORIDE SERPL-SCNC: 101 MMOL/L (ref 97–108)
CO2 SERPL-SCNC: 21 MMOL/L (ref 21–32)
CREAT SERPL-MCNC: 1.28 MG/DL (ref 0.7–1.3)
DIFFERENTIAL METHOD BLD: ABNORMAL
EOSINOPHIL # BLD: 0.3 K/UL (ref 0–0.4)
EOSINOPHIL NFR BLD: 3 % (ref 0–7)
ERYTHROCYTE [DISTWIDTH] IN BLOOD BY AUTOMATED COUNT: 17.3 % (ref 11.5–14.5)
GLOBULIN SER CALC-MCNC: 4 G/DL (ref 2–4)
GLUCOSE SERPL-MCNC: 91 MG/DL (ref 65–100)
HCT VFR BLD AUTO: 29 % (ref 36.6–50.3)
HGB BLD-MCNC: 9.7 G/DL (ref 12.1–17)
IMM GRANULOCYTES # BLD AUTO: 0.2 K/UL (ref 0–0.04)
IMM GRANULOCYTES NFR BLD AUTO: 2 % (ref 0–0.5)
INR PPP: 3.1 (ref 0.9–1.1)
LYMPHOCYTES # BLD: 1.3 K/UL (ref 0.8–3.5)
LYMPHOCYTES NFR BLD: 15 % (ref 12–49)
MAGNESIUM SERPL-MCNC: 2.4 MG/DL (ref 1.6–2.4)
MCH RBC QN AUTO: 30.2 PG (ref 26–34)
MCHC RBC AUTO-ENTMCNC: 33.4 G/DL (ref 30–36.5)
MCV RBC AUTO: 90.3 FL (ref 80–99)
MONOCYTES # BLD: 1.2 K/UL (ref 0–1)
MONOCYTES NFR BLD: 14 % (ref 5–13)
NEUTS SEG # BLD: 5.3 K/UL (ref 1.8–8)
NEUTS SEG NFR BLD: 65 % (ref 32–75)
NRBC # BLD: 0.02 K/UL (ref 0–0.01)
NRBC BLD-RTO: 0.2 PER 100 WBC
NT PRO BNP: 2582 PG/ML
PLATELET # BLD AUTO: 235 K/UL (ref 150–400)
PMV BLD AUTO: 9.6 FL (ref 8.9–12.9)
POTASSIUM SERPL-SCNC: 5.2 MMOL/L (ref 3.5–5.1)
PROT SERPL-MCNC: 7.4 G/DL (ref 6.4–8.2)
PROTHROMBIN TIME: 29.8 SEC (ref 9–11.1)
RBC # BLD AUTO: 3.21 M/UL (ref 4.1–5.7)
RBC MORPH BLD: ABNORMAL
SODIUM SERPL-SCNC: 129 MMOL/L (ref 136–145)
WBC # BLD AUTO: 8.4 K/UL (ref 4.1–11.1)

## 2024-09-17 PROCEDURE — 85025 COMPLETE CBC W/AUTO DIFF WBC: CPT

## 2024-09-17 PROCEDURE — 83735 ASSAY OF MAGNESIUM: CPT

## 2024-09-17 PROCEDURE — 99285 EMERGENCY DEPT VISIT HI MDM: CPT

## 2024-09-17 PROCEDURE — 1123F ACP DISCUSS/DSCN MKR DOCD: CPT | Performed by: INTERNAL MEDICINE

## 2024-09-17 PROCEDURE — 36415 COLL VENOUS BLD VENIPUNCTURE: CPT

## 2024-09-17 PROCEDURE — 99214 OFFICE O/P EST MOD 30 MIN: CPT | Performed by: INTERNAL MEDICINE

## 2024-09-17 PROCEDURE — 2060000000 HC ICU INTERMEDIATE R&B

## 2024-09-17 PROCEDURE — 93750 INTERROGATION VAD IN PERSON: CPT | Performed by: INTERNAL MEDICINE

## 2024-09-17 PROCEDURE — 73080 X-RAY EXAM OF ELBOW: CPT

## 2024-09-17 PROCEDURE — APPNB30 APP NON BILLABLE TIME 0-30 MINS: Performed by: PHYSICIAN ASSISTANT

## 2024-09-17 PROCEDURE — 83880 ASSAY OF NATRIURETIC PEPTIDE: CPT

## 2024-09-17 PROCEDURE — 85610 PROTHROMBIN TIME: CPT

## 2024-09-17 PROCEDURE — 5A09357 ASSISTANCE WITH RESPIRATORY VENTILATION, LESS THAN 24 CONSECUTIVE HOURS, CONTINUOUS POSITIVE AIRWAY PRESSURE: ICD-10-PCS | Performed by: NURSE PRACTITIONER

## 2024-09-17 PROCEDURE — 80053 COMPREHEN METABOLIC PANEL: CPT

## 2024-09-17 PROCEDURE — 73030 X-RAY EXAM OF SHOULDER: CPT

## 2024-09-17 PROCEDURE — 6370000000 HC RX 637 (ALT 250 FOR IP): Performed by: FAMILY MEDICINE

## 2024-09-17 PROCEDURE — 2580000003 HC RX 258: Performed by: FAMILY MEDICINE

## 2024-09-17 RX ORDER — ATORVASTATIN CALCIUM 40 MG/1
80 TABLET, FILM COATED ORAL DAILY
Status: DISCONTINUED | OUTPATIENT
Start: 2024-09-18 | End: 2024-10-29 | Stop reason: HOSPADM

## 2024-09-17 RX ORDER — MAGNESIUM OXIDE 400 MG/1
400 TABLET ORAL DAILY
Status: DISCONTINUED | OUTPATIENT
Start: 2024-09-18 | End: 2024-09-17 | Stop reason: SDUPTHER

## 2024-09-17 RX ORDER — SODIUM CHLORIDE 0.9 % (FLUSH) 0.9 %
5-40 SYRINGE (ML) INJECTION PRN
Status: DISCONTINUED | OUTPATIENT
Start: 2024-09-17 | End: 2024-10-10 | Stop reason: SDUPTHER

## 2024-09-17 RX ORDER — SODIUM CHLORIDE 0.9 % (FLUSH) 0.9 %
5-40 SYRINGE (ML) INJECTION EVERY 12 HOURS SCHEDULED
Status: DISCONTINUED | OUTPATIENT
Start: 2024-09-17 | End: 2024-10-29 | Stop reason: HOSPADM

## 2024-09-17 RX ORDER — ONDANSETRON 4 MG/1
4 TABLET, ORALLY DISINTEGRATING ORAL EVERY 8 HOURS PRN
Status: DISCONTINUED | OUTPATIENT
Start: 2024-09-17 | End: 2024-10-29 | Stop reason: HOSPADM

## 2024-09-17 RX ORDER — HYDRALAZINE HYDROCHLORIDE 50 MG/1
100 TABLET, FILM COATED ORAL EVERY 8 HOURS SCHEDULED
Status: DISCONTINUED | OUTPATIENT
Start: 2024-09-17 | End: 2024-10-29 | Stop reason: HOSPADM

## 2024-09-17 RX ORDER — ASPIRIN 81 MG/1
81 TABLET ORAL DAILY
Status: DISCONTINUED | OUTPATIENT
Start: 2024-09-18 | End: 2024-10-29 | Stop reason: HOSPADM

## 2024-09-17 RX ORDER — AMLODIPINE BESYLATE 5 MG/1
2.5 TABLET ORAL NIGHTLY
Status: DISCONTINUED | OUTPATIENT
Start: 2024-09-17 | End: 2024-10-02

## 2024-09-17 RX ORDER — ASPIRIN 81 MG/1
81 TABLET ORAL DAILY
Qty: 90 TABLET | Refills: 0 | Status: ON HOLD | OUTPATIENT
Start: 2024-09-17

## 2024-09-17 RX ORDER — PANTOPRAZOLE SODIUM 40 MG/1
40 TABLET, DELAYED RELEASE ORAL
Status: DISCONTINUED | OUTPATIENT
Start: 2024-09-18 | End: 2024-10-29 | Stop reason: HOSPADM

## 2024-09-17 RX ORDER — ONDANSETRON 2 MG/ML
4 INJECTION INTRAMUSCULAR; INTRAVENOUS EVERY 6 HOURS PRN
Status: DISCONTINUED | OUTPATIENT
Start: 2024-09-17 | End: 2024-09-25 | Stop reason: SDUPTHER

## 2024-09-17 RX ORDER — SODIUM CHLORIDE 9 MG/ML
INJECTION, SOLUTION INTRAVENOUS PRN
Status: DISCONTINUED | OUTPATIENT
Start: 2024-09-17 | End: 2024-10-29 | Stop reason: HOSPADM

## 2024-09-17 RX ORDER — LANOLIN ALCOHOL/MO/W.PET/CERES
400 CREAM (GRAM) TOPICAL DAILY
Status: DISCONTINUED | OUTPATIENT
Start: 2024-09-18 | End: 2024-10-29 | Stop reason: HOSPADM

## 2024-09-17 RX ORDER — VITAMIN B COMPLEX
1000 TABLET ORAL DAILY
Status: DISCONTINUED | OUTPATIENT
Start: 2024-09-18 | End: 2024-10-29 | Stop reason: HOSPADM

## 2024-09-17 RX ORDER — ISOSORBIDE DINITRATE 20 MG/1
40 TABLET ORAL EVERY 8 HOURS
Status: DISCONTINUED | OUTPATIENT
Start: 2024-09-17 | End: 2024-10-29 | Stop reason: HOSPADM

## 2024-09-17 RX ORDER — SPIRONOLACTONE 25 MG/1
25 TABLET ORAL DAILY
Status: DISCONTINUED | OUTPATIENT
Start: 2024-09-18 | End: 2024-09-26

## 2024-09-17 RX ORDER — METOPROLOL SUCCINATE 25 MG/1
25 TABLET, EXTENDED RELEASE ORAL 2 TIMES DAILY
Status: DISCONTINUED | OUTPATIENT
Start: 2024-09-17 | End: 2024-10-29 | Stop reason: HOSPADM

## 2024-09-17 RX ORDER — POLYETHYLENE GLYCOL 3350 17 G/17G
17 POWDER, FOR SOLUTION ORAL DAILY PRN
Status: DISCONTINUED | OUTPATIENT
Start: 2024-09-17 | End: 2024-09-25 | Stop reason: SDUPTHER

## 2024-09-17 RX ORDER — AMIODARONE HYDROCHLORIDE 200 MG/1
200 TABLET ORAL DAILY
Status: DISCONTINUED | OUTPATIENT
Start: 2024-09-18 | End: 2024-10-21

## 2024-09-17 RX ORDER — AMLODIPINE BESYLATE 5 MG/1
5 TABLET ORAL EVERY MORNING
Status: DISCONTINUED | OUTPATIENT
Start: 2024-09-18 | End: 2024-10-29 | Stop reason: HOSPADM

## 2024-09-17 RX ORDER — WARFARIN SODIUM 1 MG/1
1 TABLET ORAL
Status: COMPLETED | OUTPATIENT
Start: 2024-09-17 | End: 2024-09-17

## 2024-09-17 RX ORDER — TORSEMIDE 20 MG/1
10 TABLET ORAL
Status: DISCONTINUED | OUTPATIENT
Start: 2024-09-18 | End: 2024-09-30

## 2024-09-17 RX ADMIN — AMLODIPINE BESYLATE 2.5 MG: 5 TABLET ORAL at 23:16

## 2024-09-17 RX ADMIN — HYDRALAZINE HYDROCHLORIDE 100 MG: 50 TABLET ORAL at 23:18

## 2024-09-17 RX ADMIN — SODIUM CHLORIDE, PRESERVATIVE FREE 10 ML: 5 INJECTION INTRAVENOUS at 23:19

## 2024-09-17 RX ADMIN — ISOSORBIDE DINITRATE 40 MG: 20 TABLET ORAL at 23:18

## 2024-09-17 RX ADMIN — WARFARIN SODIUM 1 MG: 1 TABLET ORAL at 23:18

## 2024-09-17 RX ADMIN — METOPROLOL SUCCINATE 25 MG: 25 TABLET, FILM COATED, EXTENDED RELEASE ORAL at 23:16

## 2024-09-17 ASSESSMENT — PAIN - FUNCTIONAL ASSESSMENT: PAIN_FUNCTIONAL_ASSESSMENT: NONE - DENIES PAIN

## 2024-09-17 ASSESSMENT — PATIENT HEALTH QUESTIONNAIRE - PHQ9
SUM OF ALL RESPONSES TO PHQ QUESTIONS 1-9: 0
SUM OF ALL RESPONSES TO PHQ QUESTIONS 1-9: 0
1. LITTLE INTEREST OR PLEASURE IN DOING THINGS: NOT AT ALL
SUM OF ALL RESPONSES TO PHQ9 QUESTIONS 1 & 2: 0
SUM OF ALL RESPONSES TO PHQ QUESTIONS 1-9: 0
SUM OF ALL RESPONSES TO PHQ QUESTIONS 1-9: 0
2. FEELING DOWN, DEPRESSED OR HOPELESS: NOT AT ALL

## 2024-09-17 NOTE — ED TRIAGE NOTES
Pt reports all over rash x1 week. Pt also reports he fell a week ago and injured his L shoulder and elbow. Pt denies LOC. Pt is on blood thinners.

## 2024-09-17 NOTE — ED NOTES
12:21 PM  I have evaluated the patient as the Provider in Rapid Medical Evaluation (RME). I have reviewed his vital signs and the triage nurse assessment. I have talked with the patient and any available family and advised that I am the provider in triage and have ordered the appropriate study to initiate their work up based on the clinical presentation during my assessment. I have advised that the patient will be accommodated in the Main ED as soon as possible. I have also requested to contact the triage nurse or myself immediately if the patient experiences any changes in their condition during this brief waiting period.  CHEYENNE Lloydmaximus Love is a 69 y.o. male with history of  has a past medical history of Atherosclerosis of native arteries of extremities with intermittent claudication, bilateral legs ([facility]), CAD (coronary artery disease), Chest pain, CHF (congestive heart failure) ([facility]), Chronic anticoagulation (10/31/2023), Chronic systolic (congestive) heart failure ([facility]) (10/31/2023), Coronary artery disease involving native coronary artery of native heart without angina pectoris (10/31/2023), Essential hypertension (10/31/2023), Heart attack ([facility]) (2014), Heart failure ([facility]), HTN (hypertension), Hyperlipidemia, ICD (implantable cardioverter-defibrillator) in place (10/31/2023), Ischemic cardiomyopathy, Ischemic cardiomyopathy (10/31/2023), Mild mitral valve regurgitation, Nonrheumatic mitral (valve) insufficiency, Positive fecal occult blood test, PVD (peripheral vascular disease) ([facility]), Rheumatic tricuspid insufficiency, and Sick sinus syndrome ([facility]). who presents from home to [facility] ED with cc of fall 1 week ago causing injury to his left shoulder and elbow. He has not been able to move his shoulder since the fall. He notes increased swelling of the entire left arm. Patient also notes new rash over his entire body. Reports fall was mechanical and denies any other injury. Sent

## 2024-09-17 NOTE — ED PROVIDER NOTES
guided vascular access performed by Jose Daniel Fields MD at [facility] CARDIAC CATH LAB    IR MECHANICAL ART THROMBECTOMY INTRACRANIAL  09/19/2023    IR MECHANICAL ART THROMBECTOMY INTRACRANIAL 9/19/2023 [facility] RAD ANGIO IR    LEFT VENTRICULAR ASSIST DEVICE      PACEMAKER      pacemaker/ICD    UPPER GASTROINTESTINAL ENDOSCOPY N/A 09/15/2023    EGD ESOPHAGOGASTRODUODENOSCOPY performed by Jania Christopher MD at [facility] ENDOSCOPY    UPPER GASTROINTESTINAL ENDOSCOPY N/A 1/15/2024    EGD ESOPHAGOGASTRODUODENOSCOPY, 3 clips placed, one clip fell off after being placed performed by Maryjo Simmons MD at [facility] ENDOSCOPY    VASCULAR SURGERY  2014    aortobifemoral bypass and bilateral femoral above knee popliteal bypass         CURRENT MEDICATIONS       Previous Medications    AMIODARONE (CORDARONE) 200 MG TABLET    Take 1 tablet by mouth daily    AMLODIPINE (NORVASC) 2.5 MG TABLET    Take 2 tablets by mouth in the morning and at bedtime    ASPIRIN 81 MG EC TABLET    Take 1 tablet by mouth daily    ATORVASTATIN (LIPITOR) 40 MG TABLET    Take 2 tablets by mouth daily    HYDRALAZINE (APRESOLINE) 100 MG TABLET    Take 1 tablet by mouth every 8 hours    ISOSORBIDE DINITRATE (ISORDIL) 20 MG TABLET    Take 2 tablets by mouth every 8 (eight) hours    MAGNESIUM OXIDE (MAG-OX) 400 MG TABLET    Take 1 tablet by mouth daily    METOPROLOL SUCCINATE (TOPROL XL) 25 MG EXTENDED RELEASE TABLET    Take 1 tablet by mouth in the morning and at bedtime    PANTOPRAZOLE (PROTONIX) 40 MG TABLET    Take 1 tablet by mouth every morning (before breakfast)    SPIRONOLACTONE (ALDACTONE) 25 MG TABLET    TAKE 1 TABLET BY MOUTH DAILY    TORSEMIDE (DEMADEX) 10 MG TABLET    Take 1 tablet by mouth three times a week Take one tablet by mouth on Monday, Wednesday and Friday    VITAMIN D (VITAMIN D3) 25 MCG (1000 UT) TABS TABLET    Take 1 tablet by mouth daily    WARFARIN (COUMADIN) 1 MG TABLET    Take 2 tablets by mouth daily Pt takes 2 mg daily       ALLERGIES     Patient

## 2024-09-18 PROBLEM — L98.9 SKIN EROSION: Status: ACTIVE | Noted: 2024-09-18

## 2024-09-18 PROBLEM — Q80.9 XERODERMA: Status: ACTIVE | Noted: 2024-09-18

## 2024-09-18 PROBLEM — S46.002A INJURY OF LEFT ROTATOR CUFF: Status: ACTIVE | Noted: 2024-09-18

## 2024-09-18 LAB
ALBUMIN SERPL-MCNC: 3.2 G/DL (ref 3.5–5)
ALBUMIN/GLOB SERPL: 1 (ref 1.1–2.2)
ALP SERPL-CCNC: 124 U/L (ref 45–117)
ALT SERPL-CCNC: 40 U/L (ref 12–78)
ANION GAP SERPL CALC-SCNC: 8 MMOL/L (ref 2–12)
APTT PPP: 42.2 SEC (ref 22.1–31)
AST SERPL-CCNC: 50 U/L (ref 15–37)
BASOPHILS # BLD: 0.1 K/UL (ref 0–0.1)
BASOPHILS NFR BLD: 1 % (ref 0–1)
BILIRUB SERPL-MCNC: 0.9 MG/DL (ref 0.2–1)
BUN SERPL-MCNC: 17 MG/DL (ref 6–20)
BUN/CREAT SERPL: 15 (ref 12–20)
CALCIUM SERPL-MCNC: 8.5 MG/DL (ref 8.5–10.1)
CHLORIDE SERPL-SCNC: 104 MMOL/L (ref 97–108)
CHOLEST SERPL-MCNC: 60 MG/DL
CO2 SERPL-SCNC: 22 MMOL/L (ref 21–32)
CREAT SERPL-MCNC: 1.14 MG/DL (ref 0.7–1.3)
DIFFERENTIAL METHOD BLD: ABNORMAL
EKG ATRIAL RATE: 60 BPM
EKG DIAGNOSIS: NORMAL
EKG P AXIS: -40 DEGREES
EKG P-R INTERVAL: 176 MS
EKG Q-T INTERVAL: 546 MS
EKG QRS DURATION: 180 MS
EKG QTC CALCULATION (BAZETT): 546 MS
EKG R AXIS: -41 DEGREES
EKG T AXIS: -60 DEGREES
EKG VENTRICULAR RATE: 60 BPM
EOSINOPHIL # BLD: 0.4 K/UL (ref 0–0.4)
EOSINOPHIL NFR BLD: 5 % (ref 0–7)
ERYTHROCYTE [DISTWIDTH] IN BLOOD BY AUTOMATED COUNT: 17.3 % (ref 11.5–14.5)
GLOBULIN SER CALC-MCNC: 3.1 G/DL (ref 2–4)
GLUCOSE SERPL-MCNC: 85 MG/DL (ref 65–100)
HCT VFR BLD AUTO: 25.4 % (ref 36.6–50.3)
HDLC SERPL-MCNC: 26 MG/DL
HDLC SERPL: 2.3 (ref 0–5)
HGB BLD-MCNC: 8.5 G/DL (ref 12.1–17)
IMM GRANULOCYTES # BLD AUTO: 0.2 K/UL (ref 0–0.04)
IMM GRANULOCYTES NFR BLD AUTO: 2 % (ref 0–0.5)
INR PPP: 2.8 (ref 0.9–1.1)
IRON SATN MFR SERPL: 46 % (ref 20–50)
IRON SERPL-MCNC: 80 UG/DL (ref 35–150)
LDLC SERPL CALC-MCNC: 22.2 MG/DL (ref 0–100)
LYMPHOCYTES # BLD: 1.1 K/UL (ref 0.8–3.5)
LYMPHOCYTES NFR BLD: 13 % (ref 12–49)
MAGNESIUM SERPL-MCNC: 2.3 MG/DL (ref 1.6–2.4)
MCH RBC QN AUTO: 30.2 PG (ref 26–34)
MCHC RBC AUTO-ENTMCNC: 33.5 G/DL (ref 30–36.5)
MCV RBC AUTO: 90.4 FL (ref 80–99)
MONOCYTES # BLD: 1.2 K/UL (ref 0–1)
MONOCYTES NFR BLD: 15 % (ref 5–13)
NEUTS SEG # BLD: 5.4 K/UL (ref 1.8–8)
NEUTS SEG NFR BLD: 64 % (ref 32–75)
NRBC # BLD: 0.02 K/UL (ref 0–0.01)
NRBC BLD-RTO: 0.2 PER 100 WBC
PLATELET # BLD AUTO: 248 K/UL (ref 150–400)
PMV BLD AUTO: 9.6 FL (ref 8.9–12.9)
POTASSIUM SERPL-SCNC: 4.2 MMOL/L (ref 3.5–5.1)
PROT SERPL-MCNC: 6.3 G/DL (ref 6.4–8.2)
PROTHROMBIN TIME: 27.5 SEC (ref 9–11.1)
RBC # BLD AUTO: 2.81 M/UL (ref 4.1–5.7)
SODIUM SERPL-SCNC: 134 MMOL/L (ref 136–145)
T4 FREE SERPL-MCNC: 2.4 NG/DL (ref 0.8–1.5)
THERAPEUTIC RANGE: ABNORMAL SECS (ref 58–77)
TIBC SERPL-MCNC: 174 UG/DL (ref 250–450)
TRIGL SERPL-MCNC: 59 MG/DL
TROPONIN I SERPL HS-MCNC: 36 NG/L (ref 0–76)
TSH SERPL DL<=0.05 MIU/L-ACNC: 1.4 UIU/ML (ref 0.36–3.74)
VLDLC SERPL CALC-MCNC: 11.8 MG/DL
WBC # BLD AUTO: 8.4 K/UL (ref 4.1–11.1)

## 2024-09-18 PROCEDURE — 84439 ASSAY OF FREE THYROXINE: CPT

## 2024-09-18 PROCEDURE — 83550 IRON BINDING TEST: CPT

## 2024-09-18 PROCEDURE — 80061 LIPID PANEL: CPT

## 2024-09-18 PROCEDURE — 83540 ASSAY OF IRON: CPT

## 2024-09-18 PROCEDURE — 80053 COMPREHEN METABOLIC PANEL: CPT

## 2024-09-18 PROCEDURE — 84443 ASSAY THYROID STIM HORMONE: CPT

## 2024-09-18 PROCEDURE — 2580000003 HC RX 258: Performed by: FAMILY MEDICINE

## 2024-09-18 PROCEDURE — 6370000000 HC RX 637 (ALT 250 FOR IP): Performed by: FAMILY MEDICINE

## 2024-09-18 PROCEDURE — APPNB60 APP NON BILLABLE TIME 46-60 MINS: Performed by: NURSE PRACTITIONER

## 2024-09-18 PROCEDURE — 99232 SBSQ HOSP IP/OBS MODERATE 35: CPT | Performed by: INTERNAL MEDICINE

## 2024-09-18 PROCEDURE — APPNB30 APP NON BILLABLE TIME 0-30 MINS: Performed by: NURSE PRACTITIONER

## 2024-09-18 PROCEDURE — 83735 ASSAY OF MAGNESIUM: CPT

## 2024-09-18 PROCEDURE — 85025 COMPLETE CBC W/AUTO DIFF WBC: CPT

## 2024-09-18 PROCEDURE — 6370000000 HC RX 637 (ALT 250 FOR IP): Performed by: HOSPITALIST

## 2024-09-18 PROCEDURE — 85610 PROTHROMBIN TIME: CPT

## 2024-09-18 PROCEDURE — 93750 INTERROGATION VAD IN PERSON: CPT | Performed by: INTERNAL MEDICINE

## 2024-09-18 PROCEDURE — 36415 COLL VENOUS BLD VENIPUNCTURE: CPT

## 2024-09-18 PROCEDURE — 85730 THROMBOPLASTIN TIME PARTIAL: CPT

## 2024-09-18 PROCEDURE — 2060000000 HC ICU INTERMEDIATE R&B

## 2024-09-18 PROCEDURE — 84484 ASSAY OF TROPONIN QUANT: CPT

## 2024-09-18 RX ORDER — METOPROLOL SUCCINATE 25 MG/1
25 TABLET, EXTENDED RELEASE ORAL 2 TIMES DAILY
Qty: 60 TABLET | Refills: 10 | OUTPATIENT
Start: 2024-09-18

## 2024-09-18 RX ORDER — HYDRALAZINE HYDROCHLORIDE 100 MG/1
100 TABLET, FILM COATED ORAL EVERY 8 HOURS SCHEDULED
Qty: 90 TABLET | Refills: 10 | OUTPATIENT
Start: 2024-09-18

## 2024-09-18 RX ORDER — SODIUM CHLORIDE 0.9 % (FLUSH) 0.9 %
5-40 SYRINGE (ML) INJECTION PRN
Status: DISCONTINUED | OUTPATIENT
Start: 2024-09-18 | End: 2024-10-29 | Stop reason: HOSPADM

## 2024-09-18 RX ORDER — AMMONIUM LACTATE 12 G/100G
CREAM TOPICAL 2 TIMES DAILY
Status: DISCONTINUED | OUTPATIENT
Start: 2024-09-18 | End: 2024-10-03

## 2024-09-18 RX ORDER — WARFARIN SODIUM 1 MG/1
2 TABLET ORAL
Status: COMPLETED | OUTPATIENT
Start: 2024-09-18 | End: 2024-09-18

## 2024-09-18 RX ORDER — ACETAMINOPHEN 325 MG/1
650 TABLET ORAL EVERY 4 HOURS PRN
Status: DISCONTINUED | OUTPATIENT
Start: 2024-09-18 | End: 2024-09-23

## 2024-09-18 RX ORDER — HYDRALAZINE HYDROCHLORIDE 20 MG/ML
10 INJECTION INTRAMUSCULAR; INTRAVENOUS EVERY 4 HOURS PRN
Status: DISCONTINUED | OUTPATIENT
Start: 2024-09-18 | End: 2024-10-29 | Stop reason: HOSPADM

## 2024-09-18 RX ADMIN — ATORVASTATIN CALCIUM 80 MG: 40 TABLET, FILM COATED ORAL at 09:19

## 2024-09-18 RX ADMIN — AMLODIPINE BESYLATE 2.5 MG: 5 TABLET ORAL at 20:22

## 2024-09-18 RX ADMIN — HYDRALAZINE HYDROCHLORIDE 100 MG: 50 TABLET ORAL at 20:22

## 2024-09-18 RX ADMIN — HYDRALAZINE HYDROCHLORIDE 100 MG: 50 TABLET ORAL at 14:04

## 2024-09-18 RX ADMIN — ASPIRIN 81 MG: 81 TABLET, COATED ORAL at 09:21

## 2024-09-18 RX ADMIN — AMIODARONE HYDROCHLORIDE 200 MG: 200 TABLET ORAL at 09:18

## 2024-09-18 RX ADMIN — METOPROLOL SUCCINATE 25 MG: 25 TABLET, FILM COATED, EXTENDED RELEASE ORAL at 09:20

## 2024-09-18 RX ADMIN — AMLODIPINE BESYLATE 5 MG: 5 TABLET ORAL at 09:20

## 2024-09-18 RX ADMIN — TORSEMIDE 10 MG: 20 TABLET ORAL at 09:16

## 2024-09-18 RX ADMIN — ISOSORBIDE DINITRATE 40 MG: 20 TABLET ORAL at 14:04

## 2024-09-18 RX ADMIN — SODIUM CHLORIDE, PRESERVATIVE FREE 10 ML: 5 INJECTION INTRAVENOUS at 09:25

## 2024-09-18 RX ADMIN — ACETAMINOPHEN 650 MG: 325 TABLET ORAL at 10:11

## 2024-09-18 RX ADMIN — SODIUM CHLORIDE, PRESERVATIVE FREE 10 ML: 5 INJECTION INTRAVENOUS at 20:23

## 2024-09-18 RX ADMIN — HYDRALAZINE HYDROCHLORIDE 100 MG: 50 TABLET ORAL at 07:22

## 2024-09-18 RX ADMIN — SPIRONOLACTONE 25 MG: 25 TABLET ORAL at 09:18

## 2024-09-18 RX ADMIN — METOPROLOL SUCCINATE 25 MG: 25 TABLET, FILM COATED, EXTENDED RELEASE ORAL at 20:23

## 2024-09-18 RX ADMIN — MAGNESIUM OXIDE TAB 400 MG (241.3 MG ELEMENTAL MG) 400 MG: 400 (241.3 MG) TAB at 09:22

## 2024-09-18 RX ADMIN — WARFARIN SODIUM 2 MG: 1 TABLET ORAL at 18:00

## 2024-09-18 RX ADMIN — PANTOPRAZOLE SODIUM 40 MG: 40 TABLET, DELAYED RELEASE ORAL at 07:22

## 2024-09-18 RX ADMIN — ISOSORBIDE DINITRATE 40 MG: 20 TABLET ORAL at 07:22

## 2024-09-18 RX ADMIN — Medication: at 20:25

## 2024-09-18 RX ADMIN — Medication 1000 UNITS: at 09:18

## 2024-09-18 RX ADMIN — ISOSORBIDE DINITRATE 40 MG: 20 TABLET ORAL at 20:22

## 2024-09-18 ASSESSMENT — PAIN DESCRIPTION - LOCATION
LOCATION: FOOT

## 2024-09-18 ASSESSMENT — PAIN SCALES - GENERAL
PAINLEVEL_OUTOF10: 2
PAINLEVEL_OUTOF10: 7
PAINLEVEL_OUTOF10: 7
PAINLEVEL_OUTOF10: 3

## 2024-09-18 ASSESSMENT — PAIN DESCRIPTION - ORIENTATION
ORIENTATION: LEFT

## 2024-09-18 ASSESSMENT — PAIN DESCRIPTION - DESCRIPTORS
DESCRIPTORS: ACHING
DESCRIPTORS: THROBBING
DESCRIPTORS: THROBBING

## 2024-09-18 NOTE — ED NOTES
Emergency Department LVAD Documentation   The ED nurse is responsible for assessment & documentation of the LVAD when assuming care & every 4 hours. Required documentation includes: Vital signs, LVAD Hum, doppler blood pressure, device parameters (Flow, RPM, Power, Pulsatility Index), driveline and dressing assessment, and back up equipment at bedside. LVAD provider should be notified upon patient arrival to the emergency department.       Date: 9/17/24   Time of assessment:     LVAD Hum auscultated/present over the left upper quadrant: Yes    Height: 1.753 m (5' 9\"), Weight - Scale: 60.1 kg (132 lb 6.4 oz), BP: -- (MAP 97)       Doppler Blood Pressure (MAP): 82    Goal MAP 70-90, unless otherwise noted. Notify provider if outside of defined limits.     LVAD dressing assessment: clean/dry and one sticker coming off; unable to replace d/t not having appropriate dressing here. Main dressing intact    LVAD driveline intact: Yes    Device Parameters  Flow:   RPM:  Power:  Pulsatility Index (PI):    Backup  at bedside: back-up , batteries x2, battery clips x2, power module, battery charger, vest, and equipment bag    Contact CVICU/CVSU for LVAD tower       Notify the Ventricular Assist Device (VAD) coordinator for power change of greater than 2 from baseline or PI less than 3.  Pager number: [phone]

## 2024-09-18 NOTE — CARE COORDINATION
Care Management Initial Assessment       RUR: 18%  Readmission? No  1st IM letter given? Yes - 9/18  1st  letter given: No    CM met w patient at bedside to introduce self/role as CM and to verify the Facesheet.  Patient reports he lives w his g/f in a 1 level home w 3 steps to enter and bilateral handrails. He reports 1 week ago he tripped on an area rug coming out of his bathroom and fell.  He did confirm he has thrown away the rug. Patient has a LVAD and uses a single point cane for mobility but also owns a RW.  He drives short distances and his sister transports him to [place] for medical appts. His girlfriends daughter does the driveline dressing changes and he has a hx of HH following his LVAD placement last fall and reported he stayed w a family member in [place] during that time. He confirmed his LVAD coordinator is Bety. Patient denied concerns w d/c home once medically ready and all questions have been answered.   CM will continue to follow for OLIVIA needs.    Anticipated OLIVIA needs:  Family should be able to provide transport home   09/18/24 1144   Service Assessment   Patient Orientation Alert and Oriented;Person;Place;Situation   Cognition Alert   History Provided By Patient   Primary Caregiver Self   Support Systems Spouse/Significant Other;Other (Comment)  (Girlfriends daughter does the driveline dressing changes)   PCP Verified by CM Yes  (Freddy Chandler MD)   Last Visit to PCP Within last 3 months   Prior Functional Level Assistance with the following:;Mobility   Can patient return to prior living arrangement Yes   Ability to make needs known: Good   Family able to assist with home care needs: Yes   Would you like for me to discuss the discharge plan with any other family members/significant others, and if so, who? Yes  (Sister, Catalina Santos [phone])   Financial Resources Medicare;Medicaid   Social/Functional History   Lives With Significant other   Type of Home House   Home

## 2024-09-18 NOTE — WOUND CARE
WOCN Note:     New consult for legs, feet, hands   Seen in 465/01     69 y.o. y/o male admitted on 9/17/2024    Hyponatremia [E87.1]  Avulsion fracture [T14.8XXA]  Injury of left rotator cuff, initial encounter [S46.002A]   History of CHF  No indication for wound culture  Diet: reg           Assessment:   Appropriately conversational and reports tenderness in splits on hands and feet.  Surface: GUZMAN mattress    Extremely dry skin to feet, legs, hands, & arms.  Some dryness noted to trunk.   Splits in web of thumb.  Splits on edge of thick plaque on sole of right foot.  Lotion applied generously.     1.  right medial ankle erosion, partial thickness  3 x 2 x 0.1 cm  Pink base with open margins  No exudate  Tx: cleaned with Vashe and covered with AG foam       2. POA left dorsal ankle wound, full thickness  3.3 x 1 x 0.2 cm  Deeper erosions within partial thickness wound  No exudate  Tx: cleaned with Vashe and covered with AG foam      Recommend:    Dry skin: remedy moisturizer in generous amounts daily  Ankles: clean with Vashe, cover with silver foam.  Change daily.  and Ryan Protocol:  Turn/reposition approximately every 2 hours  Offload heels with heels hanging off end of pillow at all times while in bed.  Sacral Preventive dressing: change as needed ~every 4 days. Discontinue if incontinence is frequently soiling dressing.       Seen with Dr Hope.    Transition of Care: Plan to follow weekly and as needed while admitted to hospital.     AMARILIS CernaN, RN, CWOCN  Certified Wound, Ostomy, Continence Nurse  office [phone]  Available via [employer]

## 2024-09-18 NOTE — H&P
History and Physical    Date of Service:  9/17/2024  Primary Care Provider: Freddy Chandler MD  Source of information: The patient and Chart review    Chief Complaint: Rash and Fall      History of Presenting Illness:   Bishop Love is a 69 y.o. male with past medical history of CAD, MI, pacemaker/ICD implantation, ischemic cardiomyopathy, chronic HFrEF, long-term anticoagulation on Coumadin, mitral vegetation, rheumatic tricuspid regurgitation, sick sinus syndrome, peripheral vascular disease, right femoral-popliteal bypass 8/24/2023, aortobifemoral bypass and bilateral femoral above-knee popliteal bypass 2014 presented to the emergency department with chief complaints of generalized body rash, left shoulder and elbow pain after fall.  Patient f reportedly had onset of a rash starting about 1 week ago.  Rash reportedly involves entire body with diffuse flaking of skin.  Portal has nonhealing left leg wound.  Reportedly fell 1 week ago and has pain of left shoulder and elbow which is severe, constant, aching, aggravated with movement.  There is no reports of loss of consciousness or head/neck trauma.  Patient is on blood thinner on Coumadin.  Today, patient was seen for follow-up by his Advanced Heart Failure cardiologist who referred patient to the ED.  On arrival in the ED, initial recorded vital signs were temperature 98.0 °F, heart rate 108, respiratory rate 18, O2 saturation 98% on room air.  Per ED MD note, patient has flaking dry skin without erythema.  Left elbow 3 view showed age-indeterminate fracture with bony spur at triceps insertion with adjacent soft tissue swelling.  Left shoulder 2 view x-rays show a high riding humeral head compatible with chronic massive cuff tear.  Abnormal labs included hemoglobin 9.7, sodium 129, K 5.2, proBNP 2582, albumin 3.4, alkaline phosphatase 150, AST 60, INR 3.1, PTT 29.8.  ED consulted orthopedic surgery who reportedly recommended application of a

## 2024-09-18 NOTE — DISCHARGE INSTR - DIET
Heart Failure Nutrition Therapy (2023)     This nutrition therapy for heart failure focuses on limiting sodium in your diet to manage your heart failure symptoms and maintaining your body weight.   You may need to limit fluid in your diet. Your registered dietitian nutritionist (RDN) will provide you with the Fluid Restriction Nutrition Therapy handout if you need to limit your fluid intake.  Sodium  Salt (sodium) makes your body hold water. You may feel shortness of breath and experience swelling when your body is holding water. You can help to prevent these symptoms by eating less salt. You may need to limit the sodium you get from food and drinks to less than 2,300 milligrams per day.  Read the nutrition label to find out how much sodium is in 1 serving of a food. Select foods with 140 milligrams of sodium or less per serving.  Foods with more than 300 milligrams of sodium per serving may not fit into a reduced-sodium meal plan depending on your other food selections. Your RDN can help you determine which foods fit into your eating plan.    Check serving sizes. If you eat more than 1 serving, you will get more sodium than the amount listed.  Tips for Limiting Sodium in Your Diet  How to limit sodium Strategies   Avoid processed foods    Choose fresh and frozen fruits and vegetables without added juices or sauces. These foods are naturally low in sodium.     Choose fresh meats. These foods are lower in sodium than processed meats, such as seay, sausage, and hot dogs. Read the nutrition label to help you find fresh meat that is low in sodium.   Use less salt at the table and when cooking Leave the salt out of recipes for pasta, casseroles, and soups. Just 1 teaspoon of table salt has 2,300 milligrams of sodium.  Ask your RDN how to cook your favorite recipes without sodium.   Understand  claims about sodium Choose food packages that include the following claims about sodium:    “Salt-free” or

## 2024-09-19 ENCOUNTER — APPOINTMENT (OUTPATIENT)
Facility: HOSPITAL | Age: 69
DRG: 640 | End: 2024-09-19
Attending: NURSE PRACTITIONER
Payer: MEDICARE

## 2024-09-19 PROBLEM — L85.3 XEROSIS OF SKIN: Status: ACTIVE | Noted: 2024-09-19

## 2024-09-19 PROBLEM — T14.8XXA SKIN ABRASION: Status: ACTIVE | Noted: 2024-09-19

## 2024-09-19 LAB
25(OH)D3 SERPL-MCNC: 32.3 NG/ML (ref 30–100)
ALBUMIN SERPL-MCNC: 3 G/DL (ref 3.5–5)
ALBUMIN/GLOB SERPL: 1 (ref 1.1–2.2)
ALP SERPL-CCNC: 196 U/L (ref 45–117)
ALT SERPL-CCNC: 47 U/L (ref 12–78)
ANION GAP SERPL CALC-SCNC: 9 MMOL/L (ref 2–12)
AST SERPL-CCNC: 62 U/L (ref 15–37)
BILIRUB SERPL-MCNC: 0.5 MG/DL (ref 0.2–1)
BUN SERPL-MCNC: 18 MG/DL (ref 6–20)
BUN/CREAT SERPL: 14 (ref 12–20)
CALCIUM SERPL-MCNC: 8.1 MG/DL (ref 8.5–10.1)
CHLORIDE SERPL-SCNC: 104 MMOL/L (ref 97–108)
CO2 SERPL-SCNC: 23 MMOL/L (ref 21–32)
CREAT SERPL-MCNC: 1.25 MG/DL (ref 0.7–1.3)
ECHO BSA: 1.71 M2
EST. AVERAGE GLUCOSE BLD GHB EST-MCNC: 108 MG/DL
GLOBULIN SER CALC-MCNC: 3.1 G/DL (ref 2–4)
GLUCOSE SERPL-MCNC: 111 MG/DL (ref 65–100)
HBA1C MFR BLD: 5.4 % (ref 4–5.6)
INR PPP: 3.8 (ref 0.9–1.1)
LDH SERPL L TO P-CCNC: 275 U/L (ref 85–241)
MAGNESIUM SERPL-MCNC: 2.1 MG/DL (ref 1.6–2.4)
NT PRO BNP: 3508 PG/ML
PHOSPHATE SERPL-MCNC: 2.1 MG/DL (ref 2.6–4.7)
POTASSIUM SERPL-SCNC: 4 MMOL/L (ref 3.5–5.1)
PROT SERPL-MCNC: 6.1 G/DL (ref 6.4–8.2)
PROTHROMBIN TIME: 36.6 SEC (ref 9–11.1)
SODIUM SERPL-SCNC: 136 MMOL/L (ref 136–145)
URATE SERPL-MCNC: 7.9 MG/DL (ref 3.5–7.2)

## 2024-09-19 PROCEDURE — 83735 ASSAY OF MAGNESIUM: CPT

## 2024-09-19 PROCEDURE — 2060000000 HC ICU INTERMEDIATE R&B

## 2024-09-19 PROCEDURE — 97161 PT EVAL LOW COMPLEX 20 MIN: CPT

## 2024-09-19 PROCEDURE — 99222 1ST HOSP IP/OBS MODERATE 55: CPT | Performed by: INTERNAL MEDICINE

## 2024-09-19 PROCEDURE — 83880 ASSAY OF NATRIURETIC PEPTIDE: CPT

## 2024-09-19 PROCEDURE — 83615 LACTATE (LD) (LDH) ENZYME: CPT

## 2024-09-19 PROCEDURE — APPNB30 APP NON BILLABLE TIME 0-30 MINS: Performed by: NURSE PRACTITIONER

## 2024-09-19 PROCEDURE — 93306 TTE W/DOPPLER COMPLETE: CPT

## 2024-09-19 PROCEDURE — 80053 COMPREHEN METABOLIC PANEL: CPT

## 2024-09-19 PROCEDURE — 84550 ASSAY OF BLOOD/URIC ACID: CPT

## 2024-09-19 PROCEDURE — 82306 VITAMIN D 25 HYDROXY: CPT

## 2024-09-19 PROCEDURE — 93750 INTERROGATION VAD IN PERSON: CPT | Performed by: INTERNAL MEDICINE

## 2024-09-19 PROCEDURE — 99232 SBSQ HOSP IP/OBS MODERATE 35: CPT | Performed by: INTERNAL MEDICINE

## 2024-09-19 PROCEDURE — 83036 HEMOGLOBIN GLYCOSYLATED A1C: CPT

## 2024-09-19 PROCEDURE — 97530 THERAPEUTIC ACTIVITIES: CPT

## 2024-09-19 PROCEDURE — 85610 PROTHROMBIN TIME: CPT

## 2024-09-19 PROCEDURE — 84100 ASSAY OF PHOSPHORUS: CPT

## 2024-09-19 PROCEDURE — 6370000000 HC RX 637 (ALT 250 FOR IP): Performed by: HOSPITALIST

## 2024-09-19 PROCEDURE — 2580000003 HC RX 258: Performed by: FAMILY MEDICINE

## 2024-09-19 PROCEDURE — 93308 TTE F-UP OR LMTD: CPT

## 2024-09-19 PROCEDURE — 93308 TTE F-UP OR LMTD: CPT | Performed by: INTERNAL MEDICINE

## 2024-09-19 PROCEDURE — 97535 SELF CARE MNGMENT TRAINING: CPT

## 2024-09-19 PROCEDURE — 97165 OT EVAL LOW COMPLEX 30 MIN: CPT

## 2024-09-19 PROCEDURE — 6370000000 HC RX 637 (ALT 250 FOR IP): Performed by: FAMILY MEDICINE

## 2024-09-19 PROCEDURE — 36415 COLL VENOUS BLD VENIPUNCTURE: CPT

## 2024-09-19 RX ORDER — AMMONIUM LACTATE 12 G/100G
CREAM TOPICAL
Qty: 1 EACH | Refills: 4 | Status: SHIPPED | OUTPATIENT
Start: 2024-09-19 | End: 2024-10-19

## 2024-09-19 RX ORDER — AMLODIPINE BESYLATE 2.5 MG/1
2.5 TABLET ORAL NIGHTLY
Qty: 30 TABLET | Refills: 3 | Status: SHIPPED | OUTPATIENT
Start: 2024-09-19 | End: 2024-10-29 | Stop reason: HOSPADM

## 2024-09-19 RX ORDER — AMLODIPINE BESYLATE 5 MG/1
5 TABLET ORAL EVERY MORNING
Qty: 30 TABLET | Refills: 3 | Status: SHIPPED | OUTPATIENT
Start: 2024-09-20

## 2024-09-19 RX ADMIN — Medication 1000 UNITS: at 10:03

## 2024-09-19 RX ADMIN — METOPROLOL SUCCINATE 25 MG: 25 TABLET, FILM COATED, EXTENDED RELEASE ORAL at 22:13

## 2024-09-19 RX ADMIN — HYDRALAZINE HYDROCHLORIDE 100 MG: 50 TABLET ORAL at 22:14

## 2024-09-19 RX ADMIN — ACETAMINOPHEN 650 MG: 325 TABLET ORAL at 22:12

## 2024-09-19 RX ADMIN — ATORVASTATIN CALCIUM 80 MG: 40 TABLET, FILM COATED ORAL at 10:01

## 2024-09-19 RX ADMIN — ISOSORBIDE DINITRATE 40 MG: 20 TABLET ORAL at 15:32

## 2024-09-19 RX ADMIN — MAGNESIUM OXIDE TAB 400 MG (241.3 MG ELEMENTAL MG) 400 MG: 400 (241.3 MG) TAB at 10:02

## 2024-09-19 RX ADMIN — METOPROLOL SUCCINATE 25 MG: 25 TABLET, FILM COATED, EXTENDED RELEASE ORAL at 10:01

## 2024-09-19 RX ADMIN — ISOSORBIDE DINITRATE 40 MG: 20 TABLET ORAL at 07:07

## 2024-09-19 RX ADMIN — ASPIRIN 81 MG: 81 TABLET, COATED ORAL at 10:00

## 2024-09-19 RX ADMIN — SODIUM CHLORIDE, PRESERVATIVE FREE 10 ML: 5 INJECTION INTRAVENOUS at 22:14

## 2024-09-19 RX ADMIN — ISOSORBIDE DINITRATE 40 MG: 20 TABLET ORAL at 22:13

## 2024-09-19 RX ADMIN — AMLODIPINE BESYLATE 2.5 MG: 5 TABLET ORAL at 22:13

## 2024-09-19 RX ADMIN — SPIRONOLACTONE 25 MG: 25 TABLET ORAL at 10:01

## 2024-09-19 RX ADMIN — AMLODIPINE BESYLATE 5 MG: 5 TABLET ORAL at 10:02

## 2024-09-19 RX ADMIN — Medication: at 22:14

## 2024-09-19 RX ADMIN — AMIODARONE HYDROCHLORIDE 200 MG: 200 TABLET ORAL at 10:02

## 2024-09-19 RX ADMIN — HYDRALAZINE HYDROCHLORIDE 100 MG: 50 TABLET ORAL at 07:07

## 2024-09-19 RX ADMIN — HYDRALAZINE HYDROCHLORIDE 100 MG: 50 TABLET ORAL at 15:32

## 2024-09-19 RX ADMIN — SODIUM CHLORIDE, PRESERVATIVE FREE 10 ML: 5 INJECTION INTRAVENOUS at 10:04

## 2024-09-19 RX ADMIN — PANTOPRAZOLE SODIUM 40 MG: 40 TABLET, DELAYED RELEASE ORAL at 07:07

## 2024-09-19 RX ADMIN — Medication: at 10:05

## 2024-09-19 ASSESSMENT — PAIN SCALES - GENERAL
PAINLEVEL_OUTOF10: 4
PAINLEVEL_OUTOF10: 8
PAINLEVEL_OUTOF10: 3
PAINLEVEL_OUTOF10: 8
PAINLEVEL_OUTOF10: 2
PAINLEVEL_OUTOF10: 5

## 2024-09-19 ASSESSMENT — PAIN DESCRIPTION - LOCATION
LOCATION: FOOT

## 2024-09-19 ASSESSMENT — PAIN DESCRIPTION - PAIN TYPE: TYPE: CHRONIC PAIN

## 2024-09-19 ASSESSMENT — PAIN DESCRIPTION - DESCRIPTORS
DESCRIPTORS: SHARP;SORE;SHOOTING
DESCRIPTORS: SORE;THROBBING
DESCRIPTORS: SHARP;STABBING

## 2024-09-19 ASSESSMENT — PAIN DESCRIPTION - ORIENTATION
ORIENTATION: RIGHT;LEFT;POSTERIOR
ORIENTATION: RIGHT;LEFT
ORIENTATION: LEFT

## 2024-09-19 ASSESSMENT — PAIN DESCRIPTION - FREQUENCY
FREQUENCY: INTERMITTENT
FREQUENCY: INTERMITTENT

## 2024-09-19 ASSESSMENT — PAIN - FUNCTIONAL ASSESSMENT
PAIN_FUNCTIONAL_ASSESSMENT: PREVENTS OR INTERFERES SOME ACTIVE ACTIVITIES AND ADLS
PAIN_FUNCTIONAL_ASSESSMENT: PREVENTS OR INTERFERES SOME ACTIVE ACTIVITIES AND ADLS

## 2024-09-19 NOTE — CARE COORDINATION
Transition of Care Plan:    RUR: 20%  Prior Level of Functioning: LVAD coord Bety. [place], g/f's brian does he driveline dressing changes, SPC for mobility. Owns RW  Disposition: [facility] and AVS updated  If SNF or IPR: Date FOC offered: NA  Follow up appointments: Specialist  DME needed: PT- owns DME required for d/c  Transportation at discharge: Sister  IM/IMM Medicare/ letter given: 9/18  Is patient a  and connected with VA?    If yes, was [place] transfer form completed and VA notified?   Caregiver Contact: Catalina Riddle [phone]   Discharge Caregiver contacted prior to discharge?   Care Conference needed?   Barriers to discharge:  Medical, HH order    Patient discussed in IDRs and team is recommending HH for SN (skin lesions) and PT.  CM spoke w patient at bedside and he agrees w HH referrals.    HH referrals in [facility].    Update 1:17pm: [facility] accepted and AVS updated. Need HH order w wound care instructions.     Update 4:39pm: The d/c order is in but patient doesn't have a ride home tonight (lives 2 hrs away).  His cousin will be here tomorrow around 11am to transport him home.  FERNANDO updated HH and Perfect Served Dr. Hope.    Simi Worthington, MSW CCM  Care Management

## 2024-09-19 NOTE — DISCHARGE SUMMARY
Discharge Summary       PATIENT ID: Bishop Love  MRN: [id number]   YOB: 1955    DATE OF ADMISSION: 9/17/2024  3:33 PM    DATE OF DISCHARGE: 9/19/2024   PRIMARY CARE PROVIDER: Freddy Chandler MD     ATTENDING PHYSICIAN: Dr Naga Hope  DISCHARGING PROVIDER: Naga Hope MD    To contact this individual call [phone] and ask the  to page.  If unavailable ask to be transferred the Adult Hospitalist Department.    CONSULTATIONS: IP CONSULT TO ADVANCED HEART FAILURE  IP CONSULT TO HOSPITALIST  IP CONSULT TO DIETITIAN  IP CONSULT TO ADVANCED HEART FAILURE  IP CONSULT TO PHARMACY  IP WOUND CARE NURSE CONSULT TO EVAL  IP CONSULT TO INFECTIOUS DISEASES    PROCEDURES/SURGERIES: * No surgery found *    ADMITTING DIAGNOSES & HOSPITAL COURSE:   Generalized rash  Dry skin lizzette U/LE  -Dry flaking skin  -Uncertain etiology   -suspect duration is longer than stated history of 2 weeks duration  -Appreciate discussion with wound care  -Appreciate ID, sec to dry skin    Acute hyponatremia: now resolved     Chronic HFrEF (heart failure reduced ejection fraction)  Ischemic cardiomyopathy  LVAD (left ventricular assist device)  -Elevated proBNP 2582  -Limited guideline directed medical therapies secondary to prior sternotomy and PVD  -Reportedly not transplant candidate  -Continue home medication regimen  -Chronic anticoagulation on Coumadin; consult pharmacy for Coumadin dosing  -Keep goal INR 2.0-3.0  -Appreciate Adv heart failure, ok to discharge     Fall, initial encounter  -Place on fall precautions and neurochecks     Injury left rotator cuff, initial encounter  Avulsion fracture  Pain   -Age-indeterminate fracture of bone spur at triceps insertion of left elbow  -Orthopedic surgery service consulted  -Per recommendation, order left upper extremity sling for immobilization  -Pain control     Hyperkalemia  -K = 5.2  -Repeat K level and continue telemetry monitoring     LFT elevation

## 2024-09-19 NOTE — DISCHARGE INSTRUCTIONS
Discharge SNF/Rehab Instructions/LTAC       PATIENT ID: Bishop Love  MRN: [id number]   YOB: 1955    DATE OF ADMISSION: 9/17/2024  3:33 PM  DATE OF DISCHARGE: 10/29/2024    PRIMARY CARE PROVIDER:  Freddy Chandler MD      ATTENDING PHYSICIAN:  Lindsey Fields MD, [facility]   DISCHARGING PROVIDER: Ricardo Govea MD     To contact this individual call [phone] and ask the  to page.   If unavailable ask to be transferred the Adult Hospitalist Department.    CONSULTATIONS:   IP CONSULT TO ADVANCED HEART FAILURE PROVIDER  IP CONSULT TO HOSPITALIST  IP CONSULT TO DIETITIAN  IP CONSULT TO ADVANCED HEART FAILURE PROVIDER  IP CONSULT TO PHARMACY  IP WOUND CARE NURSE CONSULT TO EVAL  IP CONSULT TO INFECTIOUS DISEASES  IP CONSULT HOME HEALTH  IP WOUND CARE NURSE CONSULT TO EVAL  IP CONSULT TO PODIATRY  IP WOUND CARE NURSE CONSULT TO EVAL  IP CONSULT TO PODIATRY  IP CONSULT TO INFECTIOUS DISEASES  IP CONSULT TO INFECTIOUS DISEASES  IP CONSULT TO INTENSIVIST  IP CONSULT TO DIETITIAN  IP CONSULT TO PODIATRY    PROCEDURES/SURGERIES: * No surgery found *    ADMITTING DIAGNOSES & HOSPITAL COURSE:      \"Bishop Love is a 69 y.o. male with past medical history of CAD, MI, pacemaker/ICD implantation, ischemic cardiomyopathy, chronic HFrEF, long-term anticoagulation on Coumadin, mitral vegetation, rheumatic tricuspid regurgitation, sick sinus syndrome, peripheral vascular disease, right femoral-popliteal bypass 8/24/2023, aortobifemoral bypass and bilateral femoral above-knee popliteal bypass 2014 presented to the emergency department with chief complaints of generalized body rash, left shoulder and elbow pain after fall.  Patient f reportedly had onset of a rash starting about 1 week ago.  Rash reportedly involves entire body with diffuse flaking of skin.  Portal has nonhealing left leg wound.  Reportedly fell 1 week ago and has pain of left shoulder and elbow which is severe, constant,

## 2024-09-20 LAB
ALBUMIN SERPL-MCNC: 3.1 G/DL (ref 3.5–5)
ALBUMIN SERPL-MCNC: 3.2 G/DL (ref 3.5–5)
ALBUMIN/GLOB SERPL: 0.9 (ref 1.1–2.2)
ALBUMIN/GLOB SERPL: 0.9 (ref 1.1–2.2)
ALP SERPL-CCNC: 200 U/L (ref 45–117)
ALP SERPL-CCNC: 201 U/L (ref 45–117)
ALT SERPL-CCNC: 42 U/L (ref 12–78)
ALT SERPL-CCNC: 42 U/L (ref 12–78)
ANION GAP SERPL CALC-SCNC: 10 MMOL/L (ref 2–12)
ANION GAP SERPL CALC-SCNC: 9 MMOL/L (ref 2–12)
AST SERPL-CCNC: 65 U/L (ref 15–37)
AST SERPL-CCNC: 72 U/L (ref 15–37)
BILIRUB DIRECT SERPL-MCNC: 0.2 MG/DL (ref 0–0.2)
BILIRUB SERPL-MCNC: 0.5 MG/DL (ref 0.2–1)
BILIRUB SERPL-MCNC: 0.5 MG/DL (ref 0.2–1)
BUN SERPL-MCNC: 20 MG/DL (ref 6–20)
BUN SERPL-MCNC: 20 MG/DL (ref 6–20)
BUN/CREAT SERPL: 18 (ref 12–20)
BUN/CREAT SERPL: 19 (ref 12–20)
CALCIUM SERPL-MCNC: 8.4 MG/DL (ref 8.5–10.1)
CALCIUM SERPL-MCNC: 8.4 MG/DL (ref 8.5–10.1)
CHLORIDE SERPL-SCNC: 102 MMOL/L (ref 97–108)
CHLORIDE SERPL-SCNC: 103 MMOL/L (ref 97–108)
CO2 SERPL-SCNC: 20 MMOL/L (ref 21–32)
CO2 SERPL-SCNC: 22 MMOL/L (ref 21–32)
CREAT SERPL-MCNC: 1.06 MG/DL (ref 0.7–1.3)
CREAT SERPL-MCNC: 1.11 MG/DL (ref 0.7–1.3)
GLOBULIN SER CALC-MCNC: 3.4 G/DL (ref 2–4)
GLOBULIN SER CALC-MCNC: 3.4 G/DL (ref 2–4)
GLUCOSE SERPL-MCNC: 101 MG/DL (ref 65–100)
GLUCOSE SERPL-MCNC: 99 MG/DL (ref 65–100)
INR PPP: 4.3 (ref 0.9–1.1)
LDH SERPL L TO P-CCNC: 355 U/L (ref 85–241)
MAGNESIUM SERPL-MCNC: 2 MG/DL (ref 1.6–2.4)
NT PRO BNP: 3376 PG/ML
POTASSIUM SERPL-SCNC: 4.5 MMOL/L (ref 3.5–5.1)
POTASSIUM SERPL-SCNC: 4.8 MMOL/L (ref 3.5–5.1)
PROT SERPL-MCNC: 6.5 G/DL (ref 6.4–8.2)
PROT SERPL-MCNC: 6.6 G/DL (ref 6.4–8.2)
PROTHROMBIN TIME: 40.8 SEC (ref 9–11.1)
SODIUM SERPL-SCNC: 132 MMOL/L (ref 136–145)
SODIUM SERPL-SCNC: 134 MMOL/L (ref 136–145)

## 2024-09-20 PROCEDURE — 83615 LACTATE (LD) (LDH) ENZYME: CPT

## 2024-09-20 PROCEDURE — APPSS45 APP SPLIT SHARED TIME 31-45 MINUTES: Performed by: NURSE PRACTITIONER

## 2024-09-20 PROCEDURE — 85610 PROTHROMBIN TIME: CPT

## 2024-09-20 PROCEDURE — 6370000000 HC RX 637 (ALT 250 FOR IP)

## 2024-09-20 PROCEDURE — 83735 ASSAY OF MAGNESIUM: CPT

## 2024-09-20 PROCEDURE — 36415 COLL VENOUS BLD VENIPUNCTURE: CPT

## 2024-09-20 PROCEDURE — 6370000000 HC RX 637 (ALT 250 FOR IP): Performed by: FAMILY MEDICINE

## 2024-09-20 PROCEDURE — 6370000000 HC RX 637 (ALT 250 FOR IP): Performed by: HOSPITALIST

## 2024-09-20 PROCEDURE — 93750 INTERROGATION VAD IN PERSON: CPT | Performed by: INTERNAL MEDICINE

## 2024-09-20 PROCEDURE — 2060000000 HC ICU INTERMEDIATE R&B

## 2024-09-20 PROCEDURE — 80053 COMPREHEN METABOLIC PANEL: CPT

## 2024-09-20 PROCEDURE — 99232 SBSQ HOSP IP/OBS MODERATE 35: CPT | Performed by: INTERNAL MEDICINE

## 2024-09-20 PROCEDURE — 97530 THERAPEUTIC ACTIVITIES: CPT

## 2024-09-20 PROCEDURE — 83880 ASSAY OF NATRIURETIC PEPTIDE: CPT

## 2024-09-20 PROCEDURE — 2580000003 HC RX 258: Performed by: FAMILY MEDICINE

## 2024-09-20 PROCEDURE — 80076 HEPATIC FUNCTION PANEL: CPT

## 2024-09-20 RX ORDER — OXYCODONE HYDROCHLORIDE 5 MG/1
5 TABLET ORAL ONCE AS NEEDED
Status: COMPLETED | OUTPATIENT
Start: 2024-09-20 | End: 2024-09-20

## 2024-09-20 RX ADMIN — ACETAMINOPHEN 650 MG: 325 TABLET ORAL at 07:03

## 2024-09-20 RX ADMIN — ACETAMINOPHEN 650 MG: 325 TABLET ORAL at 18:15

## 2024-09-20 RX ADMIN — ATORVASTATIN CALCIUM 80 MG: 40 TABLET, FILM COATED ORAL at 08:47

## 2024-09-20 RX ADMIN — SODIUM CHLORIDE, PRESERVATIVE FREE 10 ML: 5 INJECTION INTRAVENOUS at 21:38

## 2024-09-20 RX ADMIN — HYDRALAZINE HYDROCHLORIDE 100 MG: 50 TABLET ORAL at 21:23

## 2024-09-20 RX ADMIN — ISOSORBIDE DINITRATE 40 MG: 20 TABLET ORAL at 07:03

## 2024-09-20 RX ADMIN — ISOSORBIDE DINITRATE 40 MG: 20 TABLET ORAL at 15:59

## 2024-09-20 RX ADMIN — PANTOPRAZOLE SODIUM 40 MG: 40 TABLET, DELAYED RELEASE ORAL at 07:03

## 2024-09-20 RX ADMIN — MAGNESIUM OXIDE TAB 400 MG (241.3 MG ELEMENTAL MG) 400 MG: 400 (241.3 MG) TAB at 08:47

## 2024-09-20 RX ADMIN — Medication: at 08:47

## 2024-09-20 RX ADMIN — HYDRALAZINE HYDROCHLORIDE 100 MG: 50 TABLET ORAL at 15:59

## 2024-09-20 RX ADMIN — ISOSORBIDE DINITRATE 40 MG: 20 TABLET ORAL at 21:23

## 2024-09-20 RX ADMIN — Medication: at 21:27

## 2024-09-20 RX ADMIN — ASPIRIN 81 MG: 81 TABLET, COATED ORAL at 08:47

## 2024-09-20 RX ADMIN — AMLODIPINE BESYLATE 2.5 MG: 5 TABLET ORAL at 21:24

## 2024-09-20 RX ADMIN — METOPROLOL SUCCINATE 25 MG: 25 TABLET, FILM COATED, EXTENDED RELEASE ORAL at 08:46

## 2024-09-20 RX ADMIN — AMIODARONE HYDROCHLORIDE 200 MG: 200 TABLET ORAL at 08:46

## 2024-09-20 RX ADMIN — SODIUM CHLORIDE, PRESERVATIVE FREE 10 ML: 5 INJECTION INTRAVENOUS at 08:48

## 2024-09-20 RX ADMIN — HYDRALAZINE HYDROCHLORIDE 100 MG: 50 TABLET ORAL at 07:03

## 2024-09-20 RX ADMIN — OXYCODONE HYDROCHLORIDE 5 MG: 5 TABLET ORAL at 01:19

## 2024-09-20 RX ADMIN — AMLODIPINE BESYLATE 5 MG: 5 TABLET ORAL at 08:46

## 2024-09-20 RX ADMIN — SPIRONOLACTONE 25 MG: 25 TABLET ORAL at 08:46

## 2024-09-20 RX ADMIN — Medication 1000 UNITS: at 08:47

## 2024-09-20 ASSESSMENT — PAIN DESCRIPTION - DESCRIPTORS
DESCRIPTORS: STABBING;SORE;SHARP
DESCRIPTORS: SHARP;STABBING;SORE
DESCRIPTORS: ACHING

## 2024-09-20 ASSESSMENT — PAIN DESCRIPTION - ORIENTATION
ORIENTATION: LEFT;RIGHT
ORIENTATION: RIGHT;LEFT
ORIENTATION: RIGHT;LEFT

## 2024-09-20 ASSESSMENT — PAIN SCALES - GENERAL
PAINLEVEL_OUTOF10: 8
PAINLEVEL_OUTOF10: 5
PAINLEVEL_OUTOF10: 8
PAINLEVEL_OUTOF10: 6

## 2024-09-20 ASSESSMENT — PAIN DESCRIPTION - LOCATION
LOCATION: FOOT

## 2024-09-20 NOTE — CARE COORDINATION
Transition of Care Plan:     RUR: 19%  Prior Level of Functioning: LVAD coord Bety. [place], g/f's brian does he driveline dressing changes, SPC for mobility. Owns RW  Disposition: (Anticipated d/c 9/23) [facility] and AVS updated  If SNF or IPR: Date FOC offered: NA  Follow up appointments: Specialist  DME needed: PT- owns DME required for d/c  Transportation at discharge: Sister  IM/IMM Medicare/ letter given: 9/18  Is patient a  and connected with VA?               If yes, was  transfer form completed and VA notified?   Caregiver Contact: Catalina Riddle [phone]   Discharge Caregiver contacted prior to discharge?   Care Conference needed?   Barriers to discharge:  Medical-pain    Patient reported a lot of pain today and reports he didn't do well w therapy and is concerned about d/c home bc he is typically ind w a SPC.  Attending agrees and CM updated HH about anticipated d/c on 9/23.    CANDACE Hale CCM  Care Management

## 2024-09-21 LAB
ALBUMIN SERPL-MCNC: 3 G/DL (ref 3.5–5)
ALBUMIN SERPL-MCNC: 3.2 G/DL (ref 3.5–5)
ALBUMIN/GLOB SERPL: 0.9 (ref 1.1–2.2)
ALBUMIN/GLOB SERPL: 1.1 (ref 1.1–2.2)
ALP SERPL-CCNC: 204 U/L (ref 45–117)
ALP SERPL-CCNC: 214 U/L (ref 45–117)
ALT SERPL-CCNC: 40 U/L (ref 12–78)
ALT SERPL-CCNC: 40 U/L (ref 12–78)
ANION GAP SERPL CALC-SCNC: 7 MMOL/L (ref 2–12)
AST SERPL-CCNC: 60 U/L (ref 15–37)
AST SERPL-CCNC: 63 U/L (ref 15–37)
BILIRUB DIRECT SERPL-MCNC: 0.2 MG/DL (ref 0–0.2)
BILIRUB SERPL-MCNC: 0.4 MG/DL (ref 0.2–1)
BILIRUB SERPL-MCNC: 0.5 MG/DL (ref 0.2–1)
BUN SERPL-MCNC: 23 MG/DL (ref 6–20)
BUN/CREAT SERPL: 21 (ref 12–20)
CALCIUM SERPL-MCNC: 8.7 MG/DL (ref 8.5–10.1)
CHLORIDE SERPL-SCNC: 103 MMOL/L (ref 97–108)
CO2 SERPL-SCNC: 21 MMOL/L (ref 21–32)
CREAT SERPL-MCNC: 1.11 MG/DL (ref 0.7–1.3)
GLOBULIN SER CALC-MCNC: 3 G/DL (ref 2–4)
GLOBULIN SER CALC-MCNC: 3.3 G/DL (ref 2–4)
GLUCOSE SERPL-MCNC: 103 MG/DL (ref 65–100)
INR PPP: 4.3 (ref 0.9–1.1)
LDH SERPL L TO P-CCNC: 340 U/L (ref 85–241)
MAGNESIUM SERPL-MCNC: 2.1 MG/DL (ref 1.6–2.4)
NT PRO BNP: 3050 PG/ML
POTASSIUM SERPL-SCNC: 5 MMOL/L (ref 3.5–5.1)
PROT SERPL-MCNC: 6.2 G/DL (ref 6.4–8.2)
PROT SERPL-MCNC: 6.3 G/DL (ref 6.4–8.2)
PROTHROMBIN TIME: 41.2 SEC (ref 9–11.1)
SODIUM SERPL-SCNC: 131 MMOL/L (ref 136–145)

## 2024-09-21 PROCEDURE — 99231 SBSQ HOSP IP/OBS SF/LOW 25: CPT | Performed by: NURSE PRACTITIONER

## 2024-09-21 PROCEDURE — 80053 COMPREHEN METABOLIC PANEL: CPT

## 2024-09-21 PROCEDURE — 36415 COLL VENOUS BLD VENIPUNCTURE: CPT

## 2024-09-21 PROCEDURE — 6370000000 HC RX 637 (ALT 250 FOR IP): Performed by: FAMILY MEDICINE

## 2024-09-21 PROCEDURE — 2060000000 HC ICU INTERMEDIATE R&B

## 2024-09-21 PROCEDURE — 83615 LACTATE (LD) (LDH) ENZYME: CPT

## 2024-09-21 PROCEDURE — 85610 PROTHROMBIN TIME: CPT

## 2024-09-21 PROCEDURE — 83880 ASSAY OF NATRIURETIC PEPTIDE: CPT

## 2024-09-21 PROCEDURE — 2580000003 HC RX 258: Performed by: FAMILY MEDICINE

## 2024-09-21 PROCEDURE — 83735 ASSAY OF MAGNESIUM: CPT

## 2024-09-21 PROCEDURE — 93750 INTERROGATION VAD IN PERSON: CPT | Performed by: NURSE PRACTITIONER

## 2024-09-21 PROCEDURE — 6370000000 HC RX 637 (ALT 250 FOR IP): Performed by: INTERNAL MEDICINE

## 2024-09-21 PROCEDURE — 80076 HEPATIC FUNCTION PANEL: CPT

## 2024-09-21 PROCEDURE — 6370000000 HC RX 637 (ALT 250 FOR IP): Performed by: HOSPITALIST

## 2024-09-21 RX ORDER — TRAMADOL HYDROCHLORIDE 50 MG/1
50 TABLET ORAL EVERY 6 HOURS PRN
Status: DISCONTINUED | OUTPATIENT
Start: 2024-09-21 | End: 2024-10-06

## 2024-09-21 RX ADMIN — ISOSORBIDE DINITRATE 40 MG: 20 TABLET ORAL at 15:56

## 2024-09-21 RX ADMIN — PANTOPRAZOLE SODIUM 40 MG: 40 TABLET, DELAYED RELEASE ORAL at 06:30

## 2024-09-21 RX ADMIN — MAGNESIUM OXIDE TAB 400 MG (241.3 MG ELEMENTAL MG) 400 MG: 400 (241.3 MG) TAB at 10:08

## 2024-09-21 RX ADMIN — AMIODARONE HYDROCHLORIDE 200 MG: 200 TABLET ORAL at 10:08

## 2024-09-21 RX ADMIN — Medication 1000 UNITS: at 10:08

## 2024-09-21 RX ADMIN — ACETAMINOPHEN 650 MG: 325 TABLET ORAL at 21:02

## 2024-09-21 RX ADMIN — HYDRALAZINE HYDROCHLORIDE 100 MG: 50 TABLET ORAL at 15:56

## 2024-09-21 RX ADMIN — HYDRALAZINE HYDROCHLORIDE 100 MG: 50 TABLET ORAL at 06:30

## 2024-09-21 RX ADMIN — AMLODIPINE BESYLATE 5 MG: 5 TABLET ORAL at 10:08

## 2024-09-21 RX ADMIN — AMLODIPINE BESYLATE 2.5 MG: 5 TABLET ORAL at 20:56

## 2024-09-21 RX ADMIN — SODIUM CHLORIDE, PRESERVATIVE FREE 10 ML: 5 INJECTION INTRAVENOUS at 20:58

## 2024-09-21 RX ADMIN — HYDRALAZINE HYDROCHLORIDE 100 MG: 50 TABLET ORAL at 20:57

## 2024-09-21 RX ADMIN — Medication: at 20:58

## 2024-09-21 RX ADMIN — ACETAMINOPHEN 650 MG: 325 TABLET ORAL at 00:13

## 2024-09-21 RX ADMIN — Medication: at 10:08

## 2024-09-21 RX ADMIN — ATORVASTATIN CALCIUM 80 MG: 40 TABLET, FILM COATED ORAL at 10:08

## 2024-09-21 RX ADMIN — SODIUM CHLORIDE, PRESERVATIVE FREE 10 ML: 5 INJECTION INTRAVENOUS at 10:09

## 2024-09-21 RX ADMIN — ISOSORBIDE DINITRATE 40 MG: 20 TABLET ORAL at 20:56

## 2024-09-21 RX ADMIN — METOPROLOL SUCCINATE 25 MG: 25 TABLET, FILM COATED, EXTENDED RELEASE ORAL at 10:08

## 2024-09-21 RX ADMIN — ISOSORBIDE DINITRATE 40 MG: 20 TABLET ORAL at 06:29

## 2024-09-21 RX ADMIN — TRAMADOL HYDROCHLORIDE 50 MG: 50 TABLET ORAL at 11:11

## 2024-09-21 RX ADMIN — SPIRONOLACTONE 25 MG: 25 TABLET ORAL at 10:08

## 2024-09-21 RX ADMIN — ASPIRIN 81 MG: 81 TABLET, COATED ORAL at 10:08

## 2024-09-21 ASSESSMENT — PAIN DESCRIPTION - ORIENTATION
ORIENTATION: RIGHT;LEFT

## 2024-09-21 ASSESSMENT — PAIN SCALES - GENERAL
PAINLEVEL_OUTOF10: 10
PAINLEVEL_OUTOF10: 8
PAINLEVEL_OUTOF10: 5

## 2024-09-21 ASSESSMENT — PAIN DESCRIPTION - LOCATION
LOCATION: LEG;FOOT
LOCATION: FOOT
LOCATION: LEG;FOOT

## 2024-09-21 ASSESSMENT — PAIN DESCRIPTION - DESCRIPTORS
DESCRIPTORS: ACHING

## 2024-09-22 LAB
ALBUMIN SERPL-MCNC: 3.1 G/DL (ref 3.5–5)
ALBUMIN SERPL-MCNC: 3.2 G/DL (ref 3.5–5)
ALBUMIN/GLOB SERPL: 1 (ref 1.1–2.2)
ALBUMIN/GLOB SERPL: 1.1 (ref 1.1–2.2)
ALP SERPL-CCNC: 160 U/L (ref 45–117)
ALP SERPL-CCNC: 166 U/L (ref 45–117)
ALT SERPL-CCNC: 38 U/L (ref 12–78)
ALT SERPL-CCNC: 39 U/L (ref 12–78)
ANION GAP SERPL CALC-SCNC: 5 MMOL/L (ref 2–12)
ANION GAP SERPL CALC-SCNC: 5 MMOL/L (ref 2–12)
AST SERPL-CCNC: 59 U/L (ref 15–37)
AST SERPL-CCNC: 60 U/L (ref 15–37)
BILIRUB DIRECT SERPL-MCNC: 0.3 MG/DL (ref 0–0.2)
BILIRUB SERPL-MCNC: 0.6 MG/DL (ref 0.2–1)
BILIRUB SERPL-MCNC: 0.6 MG/DL (ref 0.2–1)
BUN SERPL-MCNC: 19 MG/DL (ref 6–20)
BUN SERPL-MCNC: 20 MG/DL (ref 6–20)
BUN/CREAT SERPL: 18 (ref 12–20)
BUN/CREAT SERPL: 19 (ref 12–20)
CALCIUM SERPL-MCNC: 8.7 MG/DL (ref 8.5–10.1)
CALCIUM SERPL-MCNC: 8.7 MG/DL (ref 8.5–10.1)
CHLORIDE SERPL-SCNC: 104 MMOL/L (ref 97–108)
CHLORIDE SERPL-SCNC: 104 MMOL/L (ref 97–108)
CO2 SERPL-SCNC: 24 MMOL/L (ref 21–32)
CO2 SERPL-SCNC: 24 MMOL/L (ref 21–32)
CREAT SERPL-MCNC: 1.07 MG/DL (ref 0.7–1.3)
CREAT SERPL-MCNC: 1.07 MG/DL (ref 0.7–1.3)
GLOBULIN SER CALC-MCNC: 3 G/DL (ref 2–4)
GLOBULIN SER CALC-MCNC: 3 G/DL (ref 2–4)
GLUCOSE BLD STRIP.AUTO-MCNC: 139 MG/DL (ref 65–117)
GLUCOSE SERPL-MCNC: 85 MG/DL (ref 65–100)
GLUCOSE SERPL-MCNC: 85 MG/DL (ref 65–100)
INR PPP: 3 (ref 0.9–1.1)
LDH SERPL L TO P-CCNC: 333 U/L (ref 85–241)
MAGNESIUM SERPL-MCNC: 2 MG/DL (ref 1.6–2.4)
NT PRO BNP: 3668 PG/ML
POTASSIUM SERPL-SCNC: 4.8 MMOL/L (ref 3.5–5.1)
POTASSIUM SERPL-SCNC: 4.9 MMOL/L (ref 3.5–5.1)
PROT SERPL-MCNC: 6.1 G/DL (ref 6.4–8.2)
PROT SERPL-MCNC: 6.2 G/DL (ref 6.4–8.2)
PROTHROMBIN TIME: 29.4 SEC (ref 9–11.1)
SERVICE CMNT-IMP: ABNORMAL
SODIUM SERPL-SCNC: 133 MMOL/L (ref 136–145)
SODIUM SERPL-SCNC: 133 MMOL/L (ref 136–145)

## 2024-09-22 PROCEDURE — 93750 INTERROGATION VAD IN PERSON: CPT | Performed by: NURSE PRACTITIONER

## 2024-09-22 PROCEDURE — 2060000000 HC ICU INTERMEDIATE R&B

## 2024-09-22 PROCEDURE — 99231 SBSQ HOSP IP/OBS SF/LOW 25: CPT | Performed by: NURSE PRACTITIONER

## 2024-09-22 PROCEDURE — 2580000003 HC RX 258: Performed by: FAMILY MEDICINE

## 2024-09-22 PROCEDURE — 83735 ASSAY OF MAGNESIUM: CPT

## 2024-09-22 PROCEDURE — 80053 COMPREHEN METABOLIC PANEL: CPT

## 2024-09-22 PROCEDURE — 36415 COLL VENOUS BLD VENIPUNCTURE: CPT

## 2024-09-22 PROCEDURE — 83880 ASSAY OF NATRIURETIC PEPTIDE: CPT

## 2024-09-22 PROCEDURE — 83615 LACTATE (LD) (LDH) ENZYME: CPT

## 2024-09-22 PROCEDURE — 80076 HEPATIC FUNCTION PANEL: CPT

## 2024-09-22 PROCEDURE — 82962 GLUCOSE BLOOD TEST: CPT

## 2024-09-22 PROCEDURE — 6370000000 HC RX 637 (ALT 250 FOR IP): Performed by: INTERNAL MEDICINE

## 2024-09-22 PROCEDURE — 6370000000 HC RX 637 (ALT 250 FOR IP): Performed by: FAMILY MEDICINE

## 2024-09-22 PROCEDURE — 85610 PROTHROMBIN TIME: CPT

## 2024-09-22 RX ORDER — WARFARIN SODIUM 1 MG/1
1 TABLET ORAL
Status: COMPLETED | OUTPATIENT
Start: 2024-09-22 | End: 2024-09-22

## 2024-09-22 RX ADMIN — ISOSORBIDE DINITRATE 40 MG: 20 TABLET ORAL at 21:17

## 2024-09-22 RX ADMIN — ISOSORBIDE DINITRATE 40 MG: 20 TABLET ORAL at 15:39

## 2024-09-22 RX ADMIN — AMLODIPINE BESYLATE 2.5 MG: 5 TABLET ORAL at 21:17

## 2024-09-22 RX ADMIN — HYDRALAZINE HYDROCHLORIDE 100 MG: 50 TABLET ORAL at 15:39

## 2024-09-22 RX ADMIN — MAGNESIUM OXIDE TAB 400 MG (241.3 MG ELEMENTAL MG) 400 MG: 400 (241.3 MG) TAB at 08:49

## 2024-09-22 RX ADMIN — Medication 1000 UNITS: at 08:49

## 2024-09-22 RX ADMIN — Medication: at 21:18

## 2024-09-22 RX ADMIN — ISOSORBIDE DINITRATE 40 MG: 20 TABLET ORAL at 05:11

## 2024-09-22 RX ADMIN — METOPROLOL SUCCINATE 25 MG: 25 TABLET, FILM COATED, EXTENDED RELEASE ORAL at 08:49

## 2024-09-22 RX ADMIN — SODIUM CHLORIDE, PRESERVATIVE FREE 10 ML: 5 INJECTION INTRAVENOUS at 08:50

## 2024-09-22 RX ADMIN — TRAMADOL HYDROCHLORIDE 50 MG: 50 TABLET ORAL at 08:50

## 2024-09-22 RX ADMIN — PANTOPRAZOLE SODIUM 40 MG: 40 TABLET, DELAYED RELEASE ORAL at 05:11

## 2024-09-22 RX ADMIN — AMLODIPINE BESYLATE 5 MG: 5 TABLET ORAL at 08:49

## 2024-09-22 RX ADMIN — ASPIRIN 81 MG: 81 TABLET, COATED ORAL at 08:49

## 2024-09-22 RX ADMIN — TRAMADOL HYDROCHLORIDE 50 MG: 50 TABLET ORAL at 21:22

## 2024-09-22 RX ADMIN — AMIODARONE HYDROCHLORIDE 200 MG: 200 TABLET ORAL at 08:49

## 2024-09-22 RX ADMIN — SPIRONOLACTONE 25 MG: 25 TABLET ORAL at 08:49

## 2024-09-22 RX ADMIN — HYDRALAZINE HYDROCHLORIDE 100 MG: 50 TABLET ORAL at 21:17

## 2024-09-22 RX ADMIN — WARFARIN SODIUM 1 MG: 1 TABLET ORAL at 18:45

## 2024-09-22 RX ADMIN — ATORVASTATIN CALCIUM 80 MG: 40 TABLET, FILM COATED ORAL at 08:50

## 2024-09-22 RX ADMIN — Medication: at 08:50

## 2024-09-22 RX ADMIN — HYDRALAZINE HYDROCHLORIDE 100 MG: 50 TABLET ORAL at 05:11

## 2024-09-22 RX ADMIN — SODIUM CHLORIDE, PRESERVATIVE FREE 10 ML: 5 INJECTION INTRAVENOUS at 21:17

## 2024-09-22 ASSESSMENT — PAIN DESCRIPTION - DESCRIPTORS
DESCRIPTORS: ACHING
DESCRIPTORS: ACHING

## 2024-09-22 ASSESSMENT — PAIN DESCRIPTION - ORIENTATION
ORIENTATION: RIGHT;LEFT
ORIENTATION: RIGHT;LEFT

## 2024-09-22 ASSESSMENT — PAIN DESCRIPTION - LOCATION
LOCATION: FOOT
LOCATION: LEG;FOOT

## 2024-09-22 ASSESSMENT — PAIN SCALES - GENERAL
PAINLEVEL_OUTOF10: 9
PAINLEVEL_OUTOF10: 8

## 2024-09-23 LAB
ALBUMIN SERPL-MCNC: 3.2 G/DL (ref 3.5–5)
ALBUMIN/GLOB SERPL: 0.9 (ref 1.1–2.2)
ALP SERPL-CCNC: 167 U/L (ref 45–117)
ALT SERPL-CCNC: 40 U/L (ref 12–78)
ANION GAP SERPL CALC-SCNC: 8 MMOL/L (ref 2–12)
AST SERPL-CCNC: 59 U/L (ref 15–37)
BILIRUB SERPL-MCNC: 0.6 MG/DL (ref 0.2–1)
BUN SERPL-MCNC: 22 MG/DL (ref 6–20)
BUN/CREAT SERPL: 19 (ref 12–20)
CALCIUM SERPL-MCNC: 8.6 MG/DL (ref 8.5–10.1)
CHLORIDE SERPL-SCNC: 102 MMOL/L (ref 97–108)
CO2 SERPL-SCNC: 22 MMOL/L (ref 21–32)
CREAT SERPL-MCNC: 1.17 MG/DL (ref 0.7–1.3)
ERYTHROCYTE [DISTWIDTH] IN BLOOD BY AUTOMATED COUNT: 18.3 % (ref 11.5–14.5)
GLOBULIN SER CALC-MCNC: 3.4 G/DL (ref 2–4)
GLUCOSE SERPL-MCNC: 113 MG/DL (ref 65–100)
HCT VFR BLD AUTO: 27.1 % (ref 36.6–50.3)
HGB BLD-MCNC: 8.9 G/DL (ref 12.1–17)
INR PPP: 2.3 (ref 0.9–1.1)
LDH SERPL L TO P-CCNC: 347 U/L (ref 85–241)
MCH RBC QN AUTO: 30.8 PG (ref 26–34)
MCHC RBC AUTO-ENTMCNC: 32.8 G/DL (ref 30–36.5)
MCV RBC AUTO: 93.8 FL (ref 80–99)
NRBC # BLD: 0.02 K/UL (ref 0–0.01)
NRBC BLD-RTO: 0.3 PER 100 WBC
NT PRO BNP: 2752 PG/ML
PLATELET # BLD AUTO: 244 K/UL (ref 150–400)
PMV BLD AUTO: 9.3 FL (ref 8.9–12.9)
POTASSIUM SERPL-SCNC: 4.6 MMOL/L (ref 3.5–5.1)
PROT SERPL-MCNC: 6.6 G/DL (ref 6.4–8.2)
PROTHROMBIN TIME: 23.1 SEC (ref 9–11.1)
RBC # BLD AUTO: 2.89 M/UL (ref 4.1–5.7)
SODIUM SERPL-SCNC: 132 MMOL/L (ref 136–145)
WBC # BLD AUTO: 7.9 K/UL (ref 4.1–11.1)

## 2024-09-23 PROCEDURE — 36415 COLL VENOUS BLD VENIPUNCTURE: CPT

## 2024-09-23 PROCEDURE — 2060000000 HC ICU INTERMEDIATE R&B

## 2024-09-23 PROCEDURE — 85610 PROTHROMBIN TIME: CPT

## 2024-09-23 PROCEDURE — 85027 COMPLETE CBC AUTOMATED: CPT

## 2024-09-23 PROCEDURE — 93750 INTERROGATION VAD IN PERSON: CPT | Performed by: INTERNAL MEDICINE

## 2024-09-23 PROCEDURE — 83615 LACTATE (LD) (LDH) ENZYME: CPT

## 2024-09-23 PROCEDURE — 97535 SELF CARE MNGMENT TRAINING: CPT

## 2024-09-23 PROCEDURE — 6370000000 HC RX 637 (ALT 250 FOR IP): Performed by: INTERNAL MEDICINE

## 2024-09-23 PROCEDURE — 6370000000 HC RX 637 (ALT 250 FOR IP): Performed by: HOSPITALIST

## 2024-09-23 PROCEDURE — 2580000003 HC RX 258: Performed by: FAMILY MEDICINE

## 2024-09-23 PROCEDURE — 83880 ASSAY OF NATRIURETIC PEPTIDE: CPT

## 2024-09-23 PROCEDURE — 6370000000 HC RX 637 (ALT 250 FOR IP): Performed by: FAMILY MEDICINE

## 2024-09-23 PROCEDURE — 99232 SBSQ HOSP IP/OBS MODERATE 35: CPT | Performed by: INTERNAL MEDICINE

## 2024-09-23 PROCEDURE — 97530 THERAPEUTIC ACTIVITIES: CPT

## 2024-09-23 PROCEDURE — 80053 COMPREHEN METABOLIC PANEL: CPT

## 2024-09-23 RX ORDER — ACETAMINOPHEN 325 MG/1
650 TABLET ORAL EVERY 6 HOURS
Status: DISCONTINUED | OUTPATIENT
Start: 2024-09-23 | End: 2024-10-29 | Stop reason: HOSPADM

## 2024-09-23 RX ORDER — WARFARIN SODIUM 1 MG/1
1 TABLET ORAL
Status: COMPLETED | OUTPATIENT
Start: 2024-09-23 | End: 2024-09-23

## 2024-09-23 RX ADMIN — ASPIRIN 81 MG: 81 TABLET, COATED ORAL at 09:29

## 2024-09-23 RX ADMIN — SPIRONOLACTONE 25 MG: 25 TABLET ORAL at 09:30

## 2024-09-23 RX ADMIN — Medication: at 21:51

## 2024-09-23 RX ADMIN — HYDRALAZINE HYDROCHLORIDE 100 MG: 50 TABLET ORAL at 16:09

## 2024-09-23 RX ADMIN — TRAMADOL HYDROCHLORIDE 50 MG: 50 TABLET ORAL at 11:32

## 2024-09-23 RX ADMIN — METOPROLOL SUCCINATE 25 MG: 25 TABLET, FILM COATED, EXTENDED RELEASE ORAL at 21:50

## 2024-09-23 RX ADMIN — Medication 1000 UNITS: at 09:30

## 2024-09-23 RX ADMIN — METOPROLOL SUCCINATE 25 MG: 25 TABLET, FILM COATED, EXTENDED RELEASE ORAL at 09:30

## 2024-09-23 RX ADMIN — ATORVASTATIN CALCIUM 80 MG: 40 TABLET, FILM COATED ORAL at 09:29

## 2024-09-23 RX ADMIN — WARFARIN SODIUM 1 MG: 1 TABLET ORAL at 18:21

## 2024-09-23 RX ADMIN — AMLODIPINE BESYLATE 5 MG: 5 TABLET ORAL at 09:29

## 2024-09-23 RX ADMIN — TORSEMIDE 10 MG: 20 TABLET ORAL at 09:31

## 2024-09-23 RX ADMIN — AMLODIPINE BESYLATE 2.5 MG: 5 TABLET ORAL at 21:50

## 2024-09-23 RX ADMIN — AMIODARONE HYDROCHLORIDE 200 MG: 200 TABLET ORAL at 09:30

## 2024-09-23 RX ADMIN — ACETAMINOPHEN 650 MG: 325 TABLET ORAL at 23:59

## 2024-09-23 RX ADMIN — SODIUM CHLORIDE, PRESERVATIVE FREE 10 ML: 5 INJECTION INTRAVENOUS at 09:31

## 2024-09-23 RX ADMIN — HYDRALAZINE HYDROCHLORIDE 100 MG: 50 TABLET ORAL at 05:07

## 2024-09-23 RX ADMIN — MAGNESIUM OXIDE TAB 400 MG (241.3 MG ELEMENTAL MG) 400 MG: 400 (241.3 MG) TAB at 09:30

## 2024-09-23 RX ADMIN — SODIUM CHLORIDE, PRESERVATIVE FREE 10 ML: 5 INJECTION INTRAVENOUS at 21:51

## 2024-09-23 RX ADMIN — ISOSORBIDE DINITRATE 40 MG: 20 TABLET ORAL at 05:07

## 2024-09-23 RX ADMIN — ISOSORBIDE DINITRATE 40 MG: 20 TABLET ORAL at 16:09

## 2024-09-23 RX ADMIN — TRAMADOL HYDROCHLORIDE 50 MG: 50 TABLET ORAL at 21:27

## 2024-09-23 RX ADMIN — Medication: at 09:31

## 2024-09-23 RX ADMIN — ISOSORBIDE DINITRATE 40 MG: 20 TABLET ORAL at 21:50

## 2024-09-23 RX ADMIN — HYDRALAZINE HYDROCHLORIDE 100 MG: 50 TABLET ORAL at 21:50

## 2024-09-23 RX ADMIN — ACETAMINOPHEN 650 MG: 325 TABLET ORAL at 18:21

## 2024-09-23 RX ADMIN — PANTOPRAZOLE SODIUM 40 MG: 40 TABLET, DELAYED RELEASE ORAL at 05:07

## 2024-09-23 ASSESSMENT — PAIN DESCRIPTION - LOCATION
LOCATION: FOOT
LOCATION: FOOT

## 2024-09-23 ASSESSMENT — PAIN DESCRIPTION - DESCRIPTORS
DESCRIPTORS: SHARP
DESCRIPTORS: SHARP;STABBING

## 2024-09-23 ASSESSMENT — PAIN SCALES - GENERAL
PAINLEVEL_OUTOF10: 8
PAINLEVEL_OUTOF10: 4
PAINLEVEL_OUTOF10: 8

## 2024-09-23 ASSESSMENT — PAIN DESCRIPTION - ORIENTATION
ORIENTATION: RIGHT;LEFT
ORIENTATION: RIGHT;LEFT

## 2024-09-24 ENCOUNTER — TELEPHONE (OUTPATIENT)
Age: 69
End: 2024-09-24

## 2024-09-24 LAB
ALBUMIN SERPL-MCNC: 3.1 G/DL (ref 3.5–5)
ALBUMIN/GLOB SERPL: 0.9 (ref 1.1–2.2)
ALP SERPL-CCNC: 170 U/L (ref 45–117)
ALT SERPL-CCNC: 39 U/L (ref 12–78)
ANION GAP SERPL CALC-SCNC: 7 MMOL/L (ref 2–12)
AST SERPL-CCNC: 57 U/L (ref 15–37)
BILIRUB SERPL-MCNC: 0.6 MG/DL (ref 0.2–1)
BUN SERPL-MCNC: 23 MG/DL (ref 6–20)
BUN/CREAT SERPL: 20 (ref 12–20)
CALCIUM SERPL-MCNC: 8.5 MG/DL (ref 8.5–10.1)
CHLORIDE SERPL-SCNC: 99 MMOL/L (ref 97–108)
CO2 SERPL-SCNC: 23 MMOL/L (ref 21–32)
CREAT SERPL-MCNC: 1.14 MG/DL (ref 0.7–1.3)
ERYTHROCYTE [DISTWIDTH] IN BLOOD BY AUTOMATED COUNT: 18.1 % (ref 11.5–14.5)
GLOBULIN SER CALC-MCNC: 3.6 G/DL (ref 2–4)
GLUCOSE BLD STRIP.AUTO-MCNC: 131 MG/DL (ref 65–117)
GLUCOSE SERPL-MCNC: 116 MG/DL (ref 65–100)
HCT VFR BLD AUTO: 28.4 % (ref 36.6–50.3)
HGB BLD-MCNC: 9.3 G/DL (ref 12.1–17)
INR PPP: 2.6 (ref 0.9–1.1)
LDH SERPL L TO P-CCNC: 347 U/L (ref 85–241)
MCH RBC QN AUTO: 30.1 PG (ref 26–34)
MCHC RBC AUTO-ENTMCNC: 32.7 G/DL (ref 30–36.5)
MCV RBC AUTO: 91.9 FL (ref 80–99)
NRBC # BLD: 0.02 K/UL (ref 0–0.01)
NRBC BLD-RTO: 0.2 PER 100 WBC
NT PRO BNP: 2669 PG/ML
PLATELET # BLD AUTO: 263 K/UL (ref 150–400)
PMV BLD AUTO: 9.7 FL (ref 8.9–12.9)
POTASSIUM SERPL-SCNC: 4.6 MMOL/L (ref 3.5–5.1)
PROT SERPL-MCNC: 6.7 G/DL (ref 6.4–8.2)
PROTHROMBIN TIME: 26 SEC (ref 9–11.1)
RBC # BLD AUTO: 3.09 M/UL (ref 4.1–5.7)
SERVICE CMNT-IMP: ABNORMAL
SODIUM SERPL-SCNC: 129 MMOL/L (ref 136–145)
WBC # BLD AUTO: 8.5 K/UL (ref 4.1–11.1)

## 2024-09-24 PROCEDURE — 85610 PROTHROMBIN TIME: CPT

## 2024-09-24 PROCEDURE — 6370000000 HC RX 637 (ALT 250 FOR IP): Performed by: FAMILY MEDICINE

## 2024-09-24 PROCEDURE — 6370000000 HC RX 637 (ALT 250 FOR IP): Performed by: INTERNAL MEDICINE

## 2024-09-24 PROCEDURE — 99232 SBSQ HOSP IP/OBS MODERATE 35: CPT | Performed by: INTERNAL MEDICINE

## 2024-09-24 PROCEDURE — 82962 GLUCOSE BLOOD TEST: CPT

## 2024-09-24 PROCEDURE — 97530 THERAPEUTIC ACTIVITIES: CPT

## 2024-09-24 PROCEDURE — APPNB45 APP NON BILLABLE 31-45 MINUTES: Performed by: NURSE PRACTITIONER

## 2024-09-24 PROCEDURE — 93750 INTERROGATION VAD IN PERSON: CPT | Performed by: INTERNAL MEDICINE

## 2024-09-24 PROCEDURE — 97535 SELF CARE MNGMENT TRAINING: CPT

## 2024-09-24 PROCEDURE — 85027 COMPLETE CBC AUTOMATED: CPT

## 2024-09-24 PROCEDURE — 2060000000 HC ICU INTERMEDIATE R&B

## 2024-09-24 PROCEDURE — 36415 COLL VENOUS BLD VENIPUNCTURE: CPT

## 2024-09-24 PROCEDURE — 80053 COMPREHEN METABOLIC PANEL: CPT

## 2024-09-24 PROCEDURE — 2580000003 HC RX 258: Performed by: FAMILY MEDICINE

## 2024-09-24 PROCEDURE — 83880 ASSAY OF NATRIURETIC PEPTIDE: CPT

## 2024-09-24 PROCEDURE — 83615 LACTATE (LD) (LDH) ENZYME: CPT

## 2024-09-24 RX ORDER — WARFARIN SODIUM 1 MG/1
1 TABLET ORAL
Status: COMPLETED | OUTPATIENT
Start: 2024-09-24 | End: 2024-09-24

## 2024-09-24 RX ADMIN — METOPROLOL SUCCINATE 25 MG: 25 TABLET, FILM COATED, EXTENDED RELEASE ORAL at 08:52

## 2024-09-24 RX ADMIN — ISOSORBIDE DINITRATE 40 MG: 20 TABLET ORAL at 05:06

## 2024-09-24 RX ADMIN — ACETAMINOPHEN 650 MG: 325 TABLET ORAL at 18:11

## 2024-09-24 RX ADMIN — ACETAMINOPHEN 650 MG: 325 TABLET ORAL at 05:06

## 2024-09-24 RX ADMIN — Medication 1000 UNITS: at 08:53

## 2024-09-24 RX ADMIN — ACETAMINOPHEN 650 MG: 325 TABLET ORAL at 13:31

## 2024-09-24 RX ADMIN — SODIUM CHLORIDE, PRESERVATIVE FREE 10 ML: 5 INJECTION INTRAVENOUS at 20:23

## 2024-09-24 RX ADMIN — ATORVASTATIN CALCIUM 80 MG: 40 TABLET, FILM COATED ORAL at 08:51

## 2024-09-24 RX ADMIN — ISOSORBIDE DINITRATE 40 MG: 20 TABLET ORAL at 13:31

## 2024-09-24 RX ADMIN — PANTOPRAZOLE SODIUM 40 MG: 40 TABLET, DELAYED RELEASE ORAL at 05:06

## 2024-09-24 RX ADMIN — Medication: at 08:54

## 2024-09-24 RX ADMIN — ASPIRIN 81 MG: 81 TABLET, COATED ORAL at 08:52

## 2024-09-24 RX ADMIN — ISOSORBIDE DINITRATE 40 MG: 20 TABLET ORAL at 20:23

## 2024-09-24 RX ADMIN — WARFARIN SODIUM 1 MG: 1 TABLET ORAL at 18:11

## 2024-09-24 RX ADMIN — AMLODIPINE BESYLATE 5 MG: 5 TABLET ORAL at 08:53

## 2024-09-24 RX ADMIN — SPIRONOLACTONE 25 MG: 25 TABLET ORAL at 08:53

## 2024-09-24 RX ADMIN — METOPROLOL SUCCINATE 25 MG: 25 TABLET, FILM COATED, EXTENDED RELEASE ORAL at 20:23

## 2024-09-24 RX ADMIN — HYDRALAZINE HYDROCHLORIDE 100 MG: 50 TABLET ORAL at 05:06

## 2024-09-24 RX ADMIN — TRAMADOL HYDROCHLORIDE 50 MG: 50 TABLET ORAL at 20:22

## 2024-09-24 RX ADMIN — SODIUM CHLORIDE, PRESERVATIVE FREE 10 ML: 5 INJECTION INTRAVENOUS at 08:54

## 2024-09-24 RX ADMIN — HYDRALAZINE HYDROCHLORIDE 100 MG: 50 TABLET ORAL at 13:31

## 2024-09-24 RX ADMIN — HYDRALAZINE HYDROCHLORIDE 100 MG: 50 TABLET ORAL at 20:22

## 2024-09-24 RX ADMIN — AMIODARONE HYDROCHLORIDE 200 MG: 200 TABLET ORAL at 08:52

## 2024-09-24 RX ADMIN — MAGNESIUM OXIDE TAB 400 MG (241.3 MG ELEMENTAL MG) 400 MG: 400 (241.3 MG) TAB at 08:52

## 2024-09-24 RX ADMIN — Medication: at 20:23

## 2024-09-24 RX ADMIN — AMLODIPINE BESYLATE 2.5 MG: 5 TABLET ORAL at 20:23

## 2024-09-24 ASSESSMENT — PAIN SCALES - GENERAL
PAINLEVEL_OUTOF10: 8
PAINLEVEL_OUTOF10: 3

## 2024-09-24 ASSESSMENT — PAIN DESCRIPTION - DESCRIPTORS: DESCRIPTORS: STABBING;SHARP;TENDER

## 2024-09-24 ASSESSMENT — PAIN DESCRIPTION - ORIENTATION: ORIENTATION: LEFT;RIGHT

## 2024-09-24 ASSESSMENT — PAIN DESCRIPTION - LOCATION: LOCATION: FOOT

## 2024-09-24 NOTE — WOUND CARE
WOCN Note:     Follow up for feet and hands  Seen in 465/01     69 y.o. y/o male admitted on 9/17/2024    Hyponatremia [E87.1]  Avulsion fracture [T14.8XXA]  Injury of left rotator cuff, initial encounter [S46.002A]   History of CHF  No indication for wound culture  Diet: reg           Assessment:   Appropriately conversational and reports improvement in hands & feet.  Sitting up in recliner with feet elevated  Surface: GUZMAN mattress    Extremely dry skin to feet, legs, hands, & arms and improving.   Large, hard crusts of skin removed from bottom of feet revealing tender intact skin.  Lotion applied generously and covered with ACE wrap.      1.  right medial ankle erosion, partial thickness  3 x 2 x 0.1 cm  Pink base with open margins  No exudate  Essentially unchanged  Tx: cleaned with Vashe and covered with AG foam    2. POA left dorsal ankle wound, full thickness  3.3 x 1 x 0.2 cm  Deeper erosions within partial thickness wound  No exudate  unchanged  Tx: cleaned with Vashe and covered with AG foam      Recommend:    Dry skin: remedy moisturizer in generous amounts daily  Ankles: clean with Vashe, cover with silver foam.  Change daily.  and Ryan Protocol:  Turn/reposition approximately every 2 hours  Offload heels with heels hanging off end of pillow at all times while in bed.  Sacral Preventive dressing: change as needed ~every 4 days. Discontinue if incontinence is frequently soiling dressing.       No concerns to relay to provider.    Transition of Care: Plan to follow as needed while admitted to hospital.     Lydia Domínguez, AMARILISN, RN, CWOCN  Certified Wound, Ostomy, Continence Nurse  office [phone]  Available via [employer]

## 2024-09-24 NOTE — CARE COORDINATION
Transition of Care Plan:    Referral sent to [facility] for SNF.  Insurance auth will be needed.     RUR: 19%  Prior Level of Functioning: LVAD coord Bety. Patient will go to SNF with Loaner LVAD.  Follow up appointments: Specialist  DME needed: PT- owns DME required for d/c  Transportation at discharge:   IM/JAYESH Medicare/ letter given: 9/18  Is patient a  and connected with VA?               If yes, was [place] transfer form completed and VA notified?   Caregiver Contact: Catalina Riddle [phone]   Discharge Caregiver contacted prior to discharge?   Care Conference needed?   Barriers to discharge:  Medical-pain/placement    Rona Meeks, RN/CRM  (

## 2024-09-25 ENCOUNTER — TELEPHONE (OUTPATIENT)
Age: 69
End: 2024-09-25

## 2024-09-25 LAB
ALBUMIN SERPL-MCNC: 3.2 G/DL (ref 3.5–5)
ALBUMIN/GLOB SERPL: 0.9 (ref 1.1–2.2)
ALP SERPL-CCNC: 172 U/L (ref 45–117)
ALT SERPL-CCNC: 44 U/L (ref 12–78)
ANION GAP SERPL CALC-SCNC: 9 MMOL/L (ref 2–12)
AST SERPL-CCNC: 62 U/L (ref 15–37)
BILIRUB SERPL-MCNC: 0.7 MG/DL (ref 0.2–1)
BUN SERPL-MCNC: 24 MG/DL (ref 6–20)
BUN/CREAT SERPL: 22 (ref 12–20)
CALCIUM SERPL-MCNC: 9.2 MG/DL (ref 8.5–10.1)
CHLORIDE SERPL-SCNC: 99 MMOL/L (ref 97–108)
CO2 SERPL-SCNC: 22 MMOL/L (ref 21–32)
CREAT SERPL-MCNC: 1.09 MG/DL (ref 0.7–1.3)
ERYTHROCYTE [DISTWIDTH] IN BLOOD BY AUTOMATED COUNT: 18 % (ref 11.5–14.5)
GLOBULIN SER CALC-MCNC: 3.6 G/DL (ref 2–4)
GLUCOSE SERPL-MCNC: 113 MG/DL (ref 65–100)
HCT VFR BLD AUTO: 28.4 % (ref 36.6–50.3)
HGB BLD-MCNC: 8.9 G/DL (ref 12.1–17)
INR PPP: 2.7 (ref 0.9–1.1)
INR PPP: NORMAL (ref 0.9–1.1)
LDH SERPL L TO P-CCNC: 376 U/L (ref 85–241)
MCH RBC QN AUTO: 30 PG (ref 26–34)
MCHC RBC AUTO-ENTMCNC: 31.3 G/DL (ref 30–36.5)
MCV RBC AUTO: 95.6 FL (ref 80–99)
NRBC # BLD: 0.02 K/UL (ref 0–0.01)
NRBC BLD-RTO: 0.3 PER 100 WBC
NT PRO BNP: 3320 PG/ML
PLATELET # BLD AUTO: 282 K/UL (ref 150–400)
PMV BLD AUTO: 9.9 FL (ref 8.9–12.9)
POTASSIUM SERPL-SCNC: 4.6 MMOL/L (ref 3.5–5.1)
PROT SERPL-MCNC: 6.8 G/DL (ref 6.4–8.2)
PROTHROMBIN TIME: 26.9 SEC (ref 9–11.1)
PROTHROMBIN TIME: NORMAL SEC (ref 9–11.1)
RBC # BLD AUTO: 2.97 M/UL (ref 4.1–5.7)
SODIUM SERPL-SCNC: 130 MMOL/L (ref 136–145)
WBC # BLD AUTO: 7.9 K/UL (ref 4.1–11.1)

## 2024-09-25 PROCEDURE — 93750 INTERROGATION VAD IN PERSON: CPT | Performed by: NURSE PRACTITIONER

## 2024-09-25 PROCEDURE — 2580000003 HC RX 258: Performed by: FAMILY MEDICINE

## 2024-09-25 PROCEDURE — 99232 SBSQ HOSP IP/OBS MODERATE 35: CPT | Performed by: INTERNAL MEDICINE

## 2024-09-25 PROCEDURE — 2060000000 HC ICU INTERMEDIATE R&B

## 2024-09-25 PROCEDURE — 93750 INTERROGATION VAD IN PERSON: CPT | Performed by: INTERNAL MEDICINE

## 2024-09-25 PROCEDURE — 83615 LACTATE (LD) (LDH) ENZYME: CPT

## 2024-09-25 PROCEDURE — 36415 COLL VENOUS BLD VENIPUNCTURE: CPT

## 2024-09-25 PROCEDURE — 6370000000 HC RX 637 (ALT 250 FOR IP): Performed by: INTERNAL MEDICINE

## 2024-09-25 PROCEDURE — 85610 PROTHROMBIN TIME: CPT

## 2024-09-25 PROCEDURE — 2580000003 HC RX 258: Performed by: INTERNAL MEDICINE

## 2024-09-25 PROCEDURE — 80053 COMPREHEN METABOLIC PANEL: CPT

## 2024-09-25 PROCEDURE — 83880 ASSAY OF NATRIURETIC PEPTIDE: CPT

## 2024-09-25 PROCEDURE — 85027 COMPLETE CBC AUTOMATED: CPT

## 2024-09-25 PROCEDURE — 6370000000 HC RX 637 (ALT 250 FOR IP): Performed by: FAMILY MEDICINE

## 2024-09-25 PROCEDURE — APPNB45 APP NON BILLABLE 31-45 MINUTES: Performed by: NURSE PRACTITIONER

## 2024-09-25 RX ORDER — SODIUM CHLORIDE 0.9 % (FLUSH) 0.9 %
5-40 SYRINGE (ML) INJECTION EVERY 12 HOURS SCHEDULED
Status: DISCONTINUED | OUTPATIENT
Start: 2024-09-25 | End: 2024-10-29 | Stop reason: HOSPADM

## 2024-09-25 RX ORDER — ACETAMINOPHEN 325 MG/1
650 TABLET ORAL EVERY 4 HOURS PRN
Status: DISCONTINUED | OUTPATIENT
Start: 2024-09-25 | End: 2024-10-29 | Stop reason: HOSPADM

## 2024-09-25 RX ORDER — POLYETHYLENE GLYCOL 3350 17 G/17G
17 POWDER, FOR SOLUTION ORAL DAILY PRN
Status: DISCONTINUED | OUTPATIENT
Start: 2024-09-25 | End: 2024-10-29 | Stop reason: HOSPADM

## 2024-09-25 RX ORDER — ONDANSETRON 2 MG/ML
4 INJECTION INTRAMUSCULAR; INTRAVENOUS EVERY 6 HOURS PRN
Status: DISCONTINUED | OUTPATIENT
Start: 2024-09-25 | End: 2024-10-29 | Stop reason: HOSPADM

## 2024-09-25 RX ORDER — SODIUM CHLORIDE 9 MG/ML
INJECTION, SOLUTION INTRAVENOUS PRN
Status: DISCONTINUED | OUTPATIENT
Start: 2024-09-25 | End: 2024-10-29 | Stop reason: HOSPADM

## 2024-09-25 RX ORDER — WARFARIN SODIUM 1 MG/1
1 TABLET ORAL
Status: COMPLETED | OUTPATIENT
Start: 2024-09-25 | End: 2024-09-25

## 2024-09-25 RX ORDER — SODIUM CHLORIDE 0.9 % (FLUSH) 0.9 %
5-40 SYRINGE (ML) INJECTION PRN
Status: DISCONTINUED | OUTPATIENT
Start: 2024-09-25 | End: 2024-10-10 | Stop reason: SDUPTHER

## 2024-09-25 RX ORDER — HYDRALAZINE HYDROCHLORIDE 20 MG/ML
10 INJECTION INTRAMUSCULAR; INTRAVENOUS EVERY 4 HOURS PRN
Status: DISCONTINUED | OUTPATIENT
Start: 2024-09-25 | End: 2024-10-29 | Stop reason: HOSPADM

## 2024-09-25 RX ADMIN — SPIRONOLACTONE 25 MG: 25 TABLET ORAL at 08:59

## 2024-09-25 RX ADMIN — SODIUM CHLORIDE, PRESERVATIVE FREE 10 ML: 5 INJECTION INTRAVENOUS at 15:22

## 2024-09-25 RX ADMIN — AMLODIPINE BESYLATE 2.5 MG: 5 TABLET ORAL at 20:01

## 2024-09-25 RX ADMIN — ASPIRIN 81 MG: 81 TABLET, COATED ORAL at 08:58

## 2024-09-25 RX ADMIN — ACETAMINOPHEN 650 MG: 325 TABLET ORAL at 12:15

## 2024-09-25 RX ADMIN — MAGNESIUM OXIDE TAB 400 MG (241.3 MG ELEMENTAL MG) 400 MG: 400 (241.3 MG) TAB at 08:58

## 2024-09-25 RX ADMIN — TRAMADOL HYDROCHLORIDE 50 MG: 50 TABLET ORAL at 02:20

## 2024-09-25 RX ADMIN — AMIODARONE HYDROCHLORIDE 200 MG: 200 TABLET ORAL at 08:58

## 2024-09-25 RX ADMIN — TRAMADOL HYDROCHLORIDE 50 MG: 50 TABLET ORAL at 15:47

## 2024-09-25 RX ADMIN — TRAMADOL HYDROCHLORIDE 50 MG: 50 TABLET ORAL at 21:47

## 2024-09-25 RX ADMIN — SODIUM CHLORIDE, PRESERVATIVE FREE 10 ML: 5 INJECTION INTRAVENOUS at 08:59

## 2024-09-25 RX ADMIN — AMLODIPINE BESYLATE 5 MG: 5 TABLET ORAL at 08:58

## 2024-09-25 RX ADMIN — SODIUM CHLORIDE, PRESERVATIVE FREE 10 ML: 5 INJECTION INTRAVENOUS at 20:02

## 2024-09-25 RX ADMIN — ACETAMINOPHEN 650 MG: 325 TABLET ORAL at 17:42

## 2024-09-25 RX ADMIN — ISOSORBIDE DINITRATE 40 MG: 20 TABLET ORAL at 05:05

## 2024-09-25 RX ADMIN — METOPROLOL SUCCINATE 25 MG: 25 TABLET, FILM COATED, EXTENDED RELEASE ORAL at 08:59

## 2024-09-25 RX ADMIN — HYDRALAZINE HYDROCHLORIDE 100 MG: 50 TABLET ORAL at 05:05

## 2024-09-25 RX ADMIN — ISOSORBIDE DINITRATE 40 MG: 20 TABLET ORAL at 15:22

## 2024-09-25 RX ADMIN — ATORVASTATIN CALCIUM 80 MG: 40 TABLET, FILM COATED ORAL at 08:58

## 2024-09-25 RX ADMIN — Medication: at 20:08

## 2024-09-25 RX ADMIN — HYDRALAZINE HYDROCHLORIDE 100 MG: 50 TABLET ORAL at 15:22

## 2024-09-25 RX ADMIN — Medication 1000 UNITS: at 08:59

## 2024-09-25 RX ADMIN — PANTOPRAZOLE SODIUM 40 MG: 40 TABLET, DELAYED RELEASE ORAL at 05:06

## 2024-09-25 RX ADMIN — ACETAMINOPHEN 650 MG: 325 TABLET ORAL at 05:05

## 2024-09-25 RX ADMIN — Medication: at 08:59

## 2024-09-25 RX ADMIN — ISOSORBIDE DINITRATE 40 MG: 20 TABLET ORAL at 20:07

## 2024-09-25 RX ADMIN — WARFARIN SODIUM 1 MG: 1 TABLET ORAL at 17:42

## 2024-09-25 RX ADMIN — TORSEMIDE 10 MG: 20 TABLET ORAL at 08:58

## 2024-09-25 RX ADMIN — HYDRALAZINE HYDROCHLORIDE 100 MG: 50 TABLET ORAL at 20:07

## 2024-09-25 RX ADMIN — ACETAMINOPHEN 650 MG: 325 TABLET ORAL at 00:12

## 2024-09-25 RX ADMIN — METOPROLOL SUCCINATE 25 MG: 25 TABLET, FILM COATED, EXTENDED RELEASE ORAL at 20:01

## 2024-09-25 ASSESSMENT — PAIN DESCRIPTION - DESCRIPTORS
DESCRIPTORS: ACHING;THROBBING;SHARP
DESCRIPTORS: ACHING;THROBBING
DESCRIPTORS: ACHING

## 2024-09-25 ASSESSMENT — PAIN DESCRIPTION - LOCATION
LOCATION: FOOT

## 2024-09-25 ASSESSMENT — PAIN SCALES - GENERAL
PAINLEVEL_OUTOF10: 8
PAINLEVEL_OUTOF10: 8
PAINLEVEL_OUTOF10: 3
PAINLEVEL_OUTOF10: 6
PAINLEVEL_OUTOF10: 7
PAINLEVEL_OUTOF10: 3

## 2024-09-25 ASSESSMENT — PAIN DESCRIPTION - ORIENTATION
ORIENTATION: RIGHT;LEFT
ORIENTATION: RIGHT;LEFT
ORIENTATION: LEFT;RIGHT

## 2024-09-25 NOTE — WOUND CARE
WOCN Note:     Follow up for feet and hands  Seen in 465/01     69 y.o. y/o male admitted on 9/17/2024    Hyponatremia [E87.1]  Avulsion fracture [T14.8XXA]  Injury of left rotator cuff, initial encounter [S46.002A]   History of CHF  No indication for wound culture  Diet: reg           Assessment:   Appropriately conversational and reports improvement in hands & feet.  Surface: GUZMAN mattress    Extremely dry skin to feet, legs, hands, & arms and improving.   Large, hard crusts of skin removed from bottom of feet revealing tender intact skin.  Lotion applied generously and covered with ACE wrap.    1.  right medial ankle erosion, partial thickness  3 x 2 x 0.1 cm  Pink base with open margins  No exudate  Essentially unchanged  Tx: cleaned with Vashe and covered with AG foam    2. POA left dorsal ankle wound, full thickness  3.3 x 1 x 0.2 cm  Deeper erosions within partial thickness wound  No exudate  unchanged  Tx: cleaned with Vashe and covered with AG foam    Recommend:    Dry skin: remedy moisturizer in generous amounts daily  Ankles: clean with Vashe, cover with silver foam.  Change daily.  and Ryan Protocol:  Turn/reposition approximately every 2 hours  Offload heels with heels hanging off end of pillow at all times while in bed.  Sacral Preventive dressing: change as needed ~every 4 days. Discontinue if incontinence is frequently soiling dressing.       No concerns to relay to provider.    Transition of Care: Plan to follow as needed while admitted to hospital.     Lydia Domínguez, AMARILISN, RN, CWOCN  Certified Wound, Ostomy, Continence Nurse  office [phone]  Available via [employer]

## 2024-09-25 NOTE — CARE COORDINATION
Transition of Care Plan:     RUR: 19%  Prior Level of Functioning: LVAD coord Bety. [place], g/f's brian does he driveline dressing changes, SPC for mobility. Owns RW   Disposition: [facility] and needs auth. Will go w loaner LVAD equipment.  If SNF or IPR: Date FOC offered: 9/24, 9/25  Date FOC received: 9/24, 9/25  Accepting facility: [facility]  Date authorization started with reference number: Needs  Date authorization received and expires:   Follow up appointments: Specialist  DME needed: Defer to rehab  Transportation at discharge: [facility]- [phone]  IM/IMM Medicare/ letter given: 9/18  Is patient a  and connected with VA?               If yes, was [place] transfer form completed and VA notified?   Caregiver Contact: SisterCatalina [phone]   Discharge Caregiver contacted prior to discharge?   Care Conference needed?   Barriers to discharge:  SNF needs to be retrained on LVAD    Kathi Duque reports [facility] has a Covid outbreak and [facility]  CM spoke w the LVAD team and with patient and they agree w this plan.  It sounds like [facility] by [facility] LVAD team and they will need a refresher before patient discharges.  Time frame on when this will happen is TBD.     CM left another message for patients' sisterCatalina and requested a call back.  Patient prefers to d/c home but will be WC level and is currently min-max assist x 2 for all ADLs.  He is also NWZAINAB RLE.     CANDACE Hale CCM  Care Management

## 2024-09-26 LAB
ALBUMIN SERPL-MCNC: 3.4 G/DL (ref 3.5–5)
ALBUMIN/GLOB SERPL: 0.9 (ref 1.1–2.2)
ALP SERPL-CCNC: 209 U/L (ref 45–117)
ALT SERPL-CCNC: 41 U/L (ref 12–78)
ANION GAP SERPL CALC-SCNC: 6 MMOL/L (ref 2–12)
AST SERPL-CCNC: 68 U/L (ref 15–37)
BILIRUB DIRECT SERPL-MCNC: 0.3 MG/DL (ref 0–0.2)
BILIRUB SERPL-MCNC: 0.7 MG/DL (ref 0.2–1)
BUN SERPL-MCNC: 26 MG/DL (ref 6–20)
BUN/CREAT SERPL: 22 (ref 12–20)
CALCIUM SERPL-MCNC: 8.6 MG/DL (ref 8.5–10.1)
CHLORIDE SERPL-SCNC: 98 MMOL/L (ref 97–108)
CO2 SERPL-SCNC: 24 MMOL/L (ref 21–32)
CREAT SERPL-MCNC: 1.19 MG/DL (ref 0.7–1.3)
ERYTHROCYTE [DISTWIDTH] IN BLOOD BY AUTOMATED COUNT: 18.2 % (ref 11.5–14.5)
GLOBULIN SER CALC-MCNC: 3.8 G/DL (ref 2–4)
GLUCOSE SERPL-MCNC: 108 MG/DL (ref 65–100)
HCT VFR BLD AUTO: 28.6 % (ref 36.6–50.3)
HGB BLD-MCNC: 9.5 G/DL (ref 12.1–17)
INR PPP: 2.8 (ref 0.9–1.1)
LACTATE SERPL-SCNC: 2 MMOL/L (ref 0.4–2)
LDH SERPL L TO P-CCNC: 457 U/L (ref 85–241)
MAGNESIUM SERPL-MCNC: 2 MG/DL (ref 1.6–2.4)
MCH RBC QN AUTO: 30.6 PG (ref 26–34)
MCHC RBC AUTO-ENTMCNC: 33.2 G/DL (ref 30–36.5)
MCV RBC AUTO: 92.3 FL (ref 80–99)
NRBC # BLD: 0.02 K/UL (ref 0–0.01)
NRBC BLD-RTO: 0.2 PER 100 WBC
NT PRO BNP: 2773 PG/ML
PLATELET # BLD AUTO: 300 K/UL (ref 150–400)
PMV BLD AUTO: 9.5 FL (ref 8.9–12.9)
POTASSIUM SERPL-SCNC: 5.2 MMOL/L (ref 3.5–5.1)
PROT SERPL-MCNC: 7.2 G/DL (ref 6.4–8.2)
PROTHROMBIN TIME: 27.7 SEC (ref 9–11.1)
RBC # BLD AUTO: 3.1 M/UL (ref 4.1–5.7)
SODIUM SERPL-SCNC: 128 MMOL/L (ref 136–145)
WBC # BLD AUTO: 9.7 K/UL (ref 4.1–11.1)

## 2024-09-26 PROCEDURE — 97535 SELF CARE MNGMENT TRAINING: CPT

## 2024-09-26 PROCEDURE — 93750 INTERROGATION VAD IN PERSON: CPT | Performed by: INTERNAL MEDICINE

## 2024-09-26 PROCEDURE — 93750 INTERROGATION VAD IN PERSON: CPT | Performed by: NURSE PRACTITIONER

## 2024-09-26 PROCEDURE — 85027 COMPLETE CBC AUTOMATED: CPT

## 2024-09-26 PROCEDURE — 99232 SBSQ HOSP IP/OBS MODERATE 35: CPT | Performed by: INTERNAL MEDICINE

## 2024-09-26 PROCEDURE — 83880 ASSAY OF NATRIURETIC PEPTIDE: CPT

## 2024-09-26 PROCEDURE — 83605 ASSAY OF LACTIC ACID: CPT

## 2024-09-26 PROCEDURE — 97530 THERAPEUTIC ACTIVITIES: CPT

## 2024-09-26 PROCEDURE — 83615 LACTATE (LD) (LDH) ENZYME: CPT

## 2024-09-26 PROCEDURE — 6370000000 HC RX 637 (ALT 250 FOR IP): Performed by: INTERNAL MEDICINE

## 2024-09-26 PROCEDURE — 2580000003 HC RX 258: Performed by: FAMILY MEDICINE

## 2024-09-26 PROCEDURE — 6370000000 HC RX 637 (ALT 250 FOR IP): Performed by: HOSPITALIST

## 2024-09-26 PROCEDURE — 6370000000 HC RX 637 (ALT 250 FOR IP): Performed by: FAMILY MEDICINE

## 2024-09-26 PROCEDURE — 83735 ASSAY OF MAGNESIUM: CPT

## 2024-09-26 PROCEDURE — 2060000000 HC ICU INTERMEDIATE R&B

## 2024-09-26 PROCEDURE — 80048 BASIC METABOLIC PNL TOTAL CA: CPT

## 2024-09-26 PROCEDURE — 2580000003 HC RX 258: Performed by: INTERNAL MEDICINE

## 2024-09-26 PROCEDURE — 80076 HEPATIC FUNCTION PANEL: CPT

## 2024-09-26 PROCEDURE — 36415 COLL VENOUS BLD VENIPUNCTURE: CPT

## 2024-09-26 PROCEDURE — 85610 PROTHROMBIN TIME: CPT

## 2024-09-26 PROCEDURE — APPNB45 APP NON BILLABLE 31-45 MINUTES: Performed by: NURSE PRACTITIONER

## 2024-09-26 RX ORDER — SPIRONOLACTONE 25 MG/1
12.5 TABLET ORAL DAILY
Status: DISCONTINUED | OUTPATIENT
Start: 2024-09-27 | End: 2024-10-12

## 2024-09-26 RX ORDER — WARFARIN SODIUM 1 MG/1
0.5 TABLET ORAL
Status: COMPLETED | OUTPATIENT
Start: 2024-09-26 | End: 2024-09-26

## 2024-09-26 RX ADMIN — TRAMADOL HYDROCHLORIDE 50 MG: 50 TABLET ORAL at 10:50

## 2024-09-26 RX ADMIN — ACETAMINOPHEN 650 MG: 325 TABLET ORAL at 23:51

## 2024-09-26 RX ADMIN — ISOSORBIDE DINITRATE 40 MG: 20 TABLET ORAL at 15:22

## 2024-09-26 RX ADMIN — SPIRONOLACTONE 25 MG: 25 TABLET ORAL at 08:12

## 2024-09-26 RX ADMIN — ASPIRIN 81 MG: 81 TABLET, COATED ORAL at 08:12

## 2024-09-26 RX ADMIN — SODIUM CHLORIDE, PRESERVATIVE FREE 10 ML: 5 INJECTION INTRAVENOUS at 22:00

## 2024-09-26 RX ADMIN — AMLODIPINE BESYLATE 2.5 MG: 5 TABLET ORAL at 22:09

## 2024-09-26 RX ADMIN — SODIUM CHLORIDE, PRESERVATIVE FREE 10 ML: 5 INJECTION INTRAVENOUS at 08:13

## 2024-09-26 RX ADMIN — ISOSORBIDE DINITRATE 40 MG: 20 TABLET ORAL at 06:59

## 2024-09-26 RX ADMIN — TRAMADOL HYDROCHLORIDE 50 MG: 50 TABLET ORAL at 22:09

## 2024-09-26 RX ADMIN — PETROLATUM: 420 OINTMENT TOPICAL at 22:00

## 2024-09-26 RX ADMIN — PANTOPRAZOLE SODIUM 40 MG: 40 TABLET, DELAYED RELEASE ORAL at 07:00

## 2024-09-26 RX ADMIN — ACETAMINOPHEN 650 MG: 325 TABLET ORAL at 18:17

## 2024-09-26 RX ADMIN — HYDRALAZINE HYDROCHLORIDE 100 MG: 50 TABLET ORAL at 15:22

## 2024-09-26 RX ADMIN — ACETAMINOPHEN 650 MG: 325 TABLET ORAL at 12:09

## 2024-09-26 RX ADMIN — Medication 1000 UNITS: at 08:12

## 2024-09-26 RX ADMIN — Medication: at 08:13

## 2024-09-26 RX ADMIN — HYDRALAZINE HYDROCHLORIDE 100 MG: 50 TABLET ORAL at 22:09

## 2024-09-26 RX ADMIN — ATORVASTATIN CALCIUM 80 MG: 40 TABLET, FILM COATED ORAL at 08:12

## 2024-09-26 RX ADMIN — AMLODIPINE BESYLATE 5 MG: 5 TABLET ORAL at 08:12

## 2024-09-26 RX ADMIN — Medication: at 22:11

## 2024-09-26 RX ADMIN — METOPROLOL SUCCINATE 25 MG: 25 TABLET, FILM COATED, EXTENDED RELEASE ORAL at 08:12

## 2024-09-26 RX ADMIN — MAGNESIUM OXIDE TAB 400 MG (241.3 MG ELEMENTAL MG) 400 MG: 400 (241.3 MG) TAB at 08:12

## 2024-09-26 RX ADMIN — ISOSORBIDE DINITRATE 40 MG: 20 TABLET ORAL at 22:09

## 2024-09-26 RX ADMIN — AMIODARONE HYDROCHLORIDE 200 MG: 200 TABLET ORAL at 08:12

## 2024-09-26 RX ADMIN — ACETAMINOPHEN 650 MG: 325 TABLET ORAL at 07:00

## 2024-09-26 RX ADMIN — METOPROLOL SUCCINATE 25 MG: 25 TABLET, FILM COATED, EXTENDED RELEASE ORAL at 22:09

## 2024-09-26 RX ADMIN — HYDRALAZINE HYDROCHLORIDE 100 MG: 50 TABLET ORAL at 07:00

## 2024-09-26 RX ADMIN — ACETAMINOPHEN 650 MG: 325 TABLET ORAL at 00:03

## 2024-09-26 RX ADMIN — WARFARIN SODIUM 0.5 MG: 1 TABLET ORAL at 18:17

## 2024-09-26 ASSESSMENT — PAIN SCALES - GENERAL
PAINLEVEL_OUTOF10: 7
PAINLEVEL_OUTOF10: 2
PAINLEVEL_OUTOF10: 8
PAINLEVEL_OUTOF10: 7
PAINLEVEL_OUTOF10: 2
PAINLEVEL_OUTOF10: 7
PAINLEVEL_OUTOF10: 2

## 2024-09-26 ASSESSMENT — PAIN DESCRIPTION - ORIENTATION
ORIENTATION: LEFT
ORIENTATION: RIGHT;LEFT
ORIENTATION: LEFT
ORIENTATION: LEFT

## 2024-09-26 ASSESSMENT — PAIN DESCRIPTION - LOCATION
LOCATION: SHOULDER
LOCATION: SHOULDER
LOCATION: FOOT
LOCATION: SHOULDER
LOCATION: FOOT

## 2024-09-26 NOTE — CARE COORDINATION
Transition of Care Plan:     RUR: 19%  Prior Level of Functioning: LVAD coord Bety. [place], g/f's brian does he driveline dressing changes, SPC for mobility. Owns RW   Disposition: (Pending refresher training at SNF) [facility] and needs [facility]. Will go w loaner LVAD equipment.  If SNF or IPR: Date FOC offered: 9/24, 9/25  Date FOC received: 9/24, 9/25  Accepting facility: [facility]  Date authorization started with reference number: Needs  Date authorization received and expires:   Follow up appointments: Specialist  DME needed: [facility] to rehab  Transportation at discharge: ELEAN[facility] GONZALE[facility]- [facility]- [phone]  IM/IMM Medicare/ letter given: 9/18  Is patient a  and connected with VA?               If yes, was  transfer form completed and VA notified?   Caregiver Contact: Catalina Riddle [phone]   Discharge Caregiver contacted prior to discharge?   Care Conference needed?   Barriers to discharge:  SNF needs to be retrained on LVAD    LVAD coordinator reports she is going to [facility] tomorrow to provide a refresher training on LVAD.  If all goes well SNF auth can then be started.    Update 4pm: Spoke w patients Catalina riddle and she reports the family and patients g/f all work so they are unable to meet patients needs at home and agree w d/c to [facility].  She is aware he will need an auth for this level of care.     Simi Worthington MSW CCM  Care Management

## 2024-09-27 ENCOUNTER — TELEPHONE (OUTPATIENT)
Age: 69
End: 2024-09-27

## 2024-09-27 LAB
ALBUMIN SERPL-MCNC: 3.1 G/DL (ref 3.5–5)
ALBUMIN/GLOB SERPL: 0.8 (ref 1.1–2.2)
ALP SERPL-CCNC: 179 U/L (ref 45–117)
ALT SERPL-CCNC: 40 U/L (ref 12–78)
ANION GAP SERPL CALC-SCNC: 8 MMOL/L (ref 2–12)
AST SERPL-CCNC: 61 U/L (ref 15–37)
BILIRUB DIRECT SERPL-MCNC: 0.3 MG/DL (ref 0–0.2)
BILIRUB SERPL-MCNC: 0.7 MG/DL (ref 0.2–1)
BUN SERPL-MCNC: 28 MG/DL (ref 6–20)
BUN/CREAT SERPL: 24 (ref 12–20)
CALCIUM SERPL-MCNC: 9.4 MG/DL (ref 8.5–10.1)
CHLORIDE SERPL-SCNC: 96 MMOL/L (ref 97–108)
CO2 SERPL-SCNC: 23 MMOL/L (ref 21–32)
CREAT SERPL-MCNC: 1.18 MG/DL (ref 0.7–1.3)
ERYTHROCYTE [DISTWIDTH] IN BLOOD BY AUTOMATED COUNT: 17.9 % (ref 11.5–14.5)
GLOBULIN SER CALC-MCNC: 3.8 G/DL (ref 2–4)
GLUCOSE SERPL-MCNC: 102 MG/DL (ref 65–100)
HCT VFR BLD AUTO: 26.2 % (ref 36.6–50.3)
HGB BLD-MCNC: 8.7 G/DL (ref 12.1–17)
INR PPP: 2.7 (ref 0.9–1.1)
LDH SERPL L TO P-CCNC: 368 U/L (ref 85–241)
MAGNESIUM SERPL-MCNC: 2 MG/DL (ref 1.6–2.4)
MCH RBC QN AUTO: 30.7 PG (ref 26–34)
MCHC RBC AUTO-ENTMCNC: 33.2 G/DL (ref 30–36.5)
MCV RBC AUTO: 92.6 FL (ref 80–99)
NRBC # BLD: 0 K/UL (ref 0–0.01)
NRBC BLD-RTO: 0 PER 100 WBC
NT PRO BNP: 2571 PG/ML
PLATELET # BLD AUTO: 297 K/UL (ref 150–400)
PMV BLD AUTO: 9.5 FL (ref 8.9–12.9)
POTASSIUM SERPL-SCNC: 4.7 MMOL/L (ref 3.5–5.1)
PROT SERPL-MCNC: 6.9 G/DL (ref 6.4–8.2)
PROTHROMBIN TIME: 26.9 SEC (ref 9–11.1)
RBC # BLD AUTO: 2.83 M/UL (ref 4.1–5.7)
SODIUM SERPL-SCNC: 127 MMOL/L (ref 136–145)
WBC # BLD AUTO: 9.1 K/UL (ref 4.1–11.1)

## 2024-09-27 PROCEDURE — 2580000003 HC RX 258: Performed by: INTERNAL MEDICINE

## 2024-09-27 PROCEDURE — 80048 BASIC METABOLIC PNL TOTAL CA: CPT

## 2024-09-27 PROCEDURE — 6370000000 HC RX 637 (ALT 250 FOR IP): Performed by: NURSE PRACTITIONER

## 2024-09-27 PROCEDURE — 93750 INTERROGATION VAD IN PERSON: CPT | Performed by: INTERNAL MEDICINE

## 2024-09-27 PROCEDURE — 6370000000 HC RX 637 (ALT 250 FOR IP): Performed by: INTERNAL MEDICINE

## 2024-09-27 PROCEDURE — 97535 SELF CARE MNGMENT TRAINING: CPT

## 2024-09-27 PROCEDURE — 99232 SBSQ HOSP IP/OBS MODERATE 35: CPT | Performed by: INTERNAL MEDICINE

## 2024-09-27 PROCEDURE — 85610 PROTHROMBIN TIME: CPT

## 2024-09-27 PROCEDURE — 83615 LACTATE (LD) (LDH) ENZYME: CPT

## 2024-09-27 PROCEDURE — APPNB45 APP NON BILLABLE 31-45 MINUTES: Performed by: NURSE PRACTITIONER

## 2024-09-27 PROCEDURE — 97530 THERAPEUTIC ACTIVITIES: CPT

## 2024-09-27 PROCEDURE — 6370000000 HC RX 637 (ALT 250 FOR IP): Performed by: HOSPITALIST

## 2024-09-27 PROCEDURE — 6370000000 HC RX 637 (ALT 250 FOR IP): Performed by: FAMILY MEDICINE

## 2024-09-27 PROCEDURE — 2580000003 HC RX 258: Performed by: FAMILY MEDICINE

## 2024-09-27 PROCEDURE — 83880 ASSAY OF NATRIURETIC PEPTIDE: CPT

## 2024-09-27 PROCEDURE — 93750 INTERROGATION VAD IN PERSON: CPT | Performed by: NURSE PRACTITIONER

## 2024-09-27 PROCEDURE — 83735 ASSAY OF MAGNESIUM: CPT

## 2024-09-27 PROCEDURE — 2060000000 HC ICU INTERMEDIATE R&B

## 2024-09-27 PROCEDURE — 85027 COMPLETE CBC AUTOMATED: CPT

## 2024-09-27 PROCEDURE — 80076 HEPATIC FUNCTION PANEL: CPT

## 2024-09-27 PROCEDURE — 36415 COLL VENOUS BLD VENIPUNCTURE: CPT

## 2024-09-27 RX ORDER — WARFARIN SODIUM 1 MG/1
0.5 TABLET ORAL
Status: COMPLETED | OUTPATIENT
Start: 2024-09-27 | End: 2024-09-27

## 2024-09-27 RX ADMIN — WARFARIN SODIUM 0.5 MG: 1 TABLET ORAL at 18:29

## 2024-09-27 RX ADMIN — AMLODIPINE BESYLATE 2.5 MG: 5 TABLET ORAL at 20:36

## 2024-09-27 RX ADMIN — ATORVASTATIN CALCIUM 80 MG: 40 TABLET, FILM COATED ORAL at 08:33

## 2024-09-27 RX ADMIN — ASPIRIN 81 MG: 81 TABLET, COATED ORAL at 08:33

## 2024-09-27 RX ADMIN — ISOSORBIDE DINITRATE 40 MG: 20 TABLET ORAL at 20:36

## 2024-09-27 RX ADMIN — Medication: at 20:37

## 2024-09-27 RX ADMIN — PETROLATUM: 420 OINTMENT TOPICAL at 08:39

## 2024-09-27 RX ADMIN — HYDRALAZINE HYDROCHLORIDE 100 MG: 50 TABLET ORAL at 14:23

## 2024-09-27 RX ADMIN — SODIUM CHLORIDE, PRESERVATIVE FREE 10 ML: 5 INJECTION INTRAVENOUS at 08:34

## 2024-09-27 RX ADMIN — ACETAMINOPHEN 650 MG: 325 TABLET ORAL at 18:28

## 2024-09-27 RX ADMIN — SODIUM CHLORIDE, PRESERVATIVE FREE 10 ML: 5 INJECTION INTRAVENOUS at 20:37

## 2024-09-27 RX ADMIN — ISOSORBIDE DINITRATE 40 MG: 20 TABLET ORAL at 14:23

## 2024-09-27 RX ADMIN — ACETAMINOPHEN 650 MG: 325 TABLET ORAL at 12:07

## 2024-09-27 RX ADMIN — HYDRALAZINE HYDROCHLORIDE 100 MG: 50 TABLET ORAL at 08:13

## 2024-09-27 RX ADMIN — Medication: at 08:33

## 2024-09-27 RX ADMIN — AMIODARONE HYDROCHLORIDE 200 MG: 200 TABLET ORAL at 08:33

## 2024-09-27 RX ADMIN — Medication 1000 UNITS: at 08:33

## 2024-09-27 RX ADMIN — PETROLATUM: 420 OINTMENT TOPICAL at 22:00

## 2024-09-27 RX ADMIN — HYDRALAZINE HYDROCHLORIDE 100 MG: 50 TABLET ORAL at 20:36

## 2024-09-27 RX ADMIN — ACETAMINOPHEN 650 MG: 325 TABLET ORAL at 08:14

## 2024-09-27 RX ADMIN — ISOSORBIDE DINITRATE 40 MG: 20 TABLET ORAL at 08:13

## 2024-09-27 RX ADMIN — AMLODIPINE BESYLATE 5 MG: 5 TABLET ORAL at 08:33

## 2024-09-27 RX ADMIN — METOPROLOL SUCCINATE 25 MG: 25 TABLET, FILM COATED, EXTENDED RELEASE ORAL at 08:33

## 2024-09-27 RX ADMIN — METOPROLOL SUCCINATE 25 MG: 25 TABLET, FILM COATED, EXTENDED RELEASE ORAL at 20:36

## 2024-09-27 RX ADMIN — TRAMADOL HYDROCHLORIDE 50 MG: 50 TABLET ORAL at 20:35

## 2024-09-27 RX ADMIN — PANTOPRAZOLE SODIUM 40 MG: 40 TABLET, DELAYED RELEASE ORAL at 08:13

## 2024-09-27 RX ADMIN — SPIRONOLACTONE 12.5 MG: 25 TABLET ORAL at 08:33

## 2024-09-27 RX ADMIN — MAGNESIUM OXIDE TAB 400 MG (241.3 MG ELEMENTAL MG) 400 MG: 400 (241.3 MG) TAB at 08:34

## 2024-09-27 ASSESSMENT — PAIN SCALES - GENERAL
PAINLEVEL_OUTOF10: 2
PAINLEVEL_OUTOF10: 8
PAINLEVEL_OUTOF10: 8
PAINLEVEL_OUTOF10: 6

## 2024-09-27 ASSESSMENT — PAIN DESCRIPTION - LOCATION: LOCATION: FOOT

## 2024-09-27 ASSESSMENT — PAIN DESCRIPTION - ORIENTATION: ORIENTATION: RIGHT;LEFT

## 2024-09-27 NOTE — CARE COORDINATION
Transition of Care Plan:     RUR: 19%  Prior Level of Functioning: LVAD coord Bety. [place], g/f's brian does he driveline dressing changes, SPC for mobility. Owns RW   Disposition: Delay-bed  Auth approved for [facility] and they will have a bed on Mon, 9/30. Will go w loaner LVAD equipment. (For nursing- patient needs at least 4 days worth of driveline supplies)  If SNF or IPR: Date FOC offered: 9/24, 9/25  Date FOC received: 9/24, 9/25  [facility]  Date authorization started with reference number: Needs  Date authorization received and expires:   Follow up appointments: Specialist  DME needed: [facility] to rehab  Transportation at discharge: [facility]- [phone]  IM/IMM Medicare/ letter given: 9/18  Is patient a [place] and connected with VA?               If yes, was  transfer form completed and VA notified?   Caregiver Contact: SisterCatalina [phone]   Discharge Caregiver contacted prior to discharge?   Care Conference needed?   Barriers to discharge:  SNF needs to be retrained on LVAD, auth     [facility] started auth w [facility].    Update 10:30am: LVAD coordinator reports refresher training went well w [facility] today.  They are not able to get his driveline dressing supplies so nursing will need to pack 4 days worth.  She will plan to reach out to family to see if they can bring his home supplies to SNF.     Update 3pm: SNF auth was approved and [facility] will have a bed on Monday.  CM updated Dr. Bah.     Simi Worthington, MSW CCM  Care Management

## 2024-09-28 LAB
ALBUMIN SERPL-MCNC: 2.8 G/DL (ref 3.5–5)
ALBUMIN/GLOB SERPL: 0.7 (ref 1.1–2.2)
ALP SERPL-CCNC: 131 U/L (ref 45–117)
ALT SERPL-CCNC: 34 U/L (ref 12–78)
ANION GAP SERPL CALC-SCNC: 8 MMOL/L (ref 2–12)
AST SERPL-CCNC: 56 U/L (ref 15–37)
BILIRUB DIRECT SERPL-MCNC: 0.4 MG/DL (ref 0–0.2)
BILIRUB SERPL-MCNC: 0.9 MG/DL (ref 0.2–1)
BUN SERPL-MCNC: 24 MG/DL (ref 6–20)
BUN/CREAT SERPL: 21 (ref 12–20)
CALCIUM SERPL-MCNC: 9 MG/DL (ref 8.5–10.1)
CHLORIDE SERPL-SCNC: 98 MMOL/L (ref 97–108)
CO2 SERPL-SCNC: 24 MMOL/L (ref 21–32)
CREAT SERPL-MCNC: 1.14 MG/DL (ref 0.7–1.3)
ERYTHROCYTE [DISTWIDTH] IN BLOOD BY AUTOMATED COUNT: 17.9 % (ref 11.5–14.5)
GLOBULIN SER CALC-MCNC: 4.3 G/DL (ref 2–4)
GLUCOSE SERPL-MCNC: 74 MG/DL (ref 65–100)
HCT VFR BLD AUTO: 23.5 % (ref 36.6–50.3)
HCT VFR BLD AUTO: 23.6 % (ref 36.6–50.3)
HEMOCCULT STL QL: NEGATIVE
HGB BLD-MCNC: 7.6 G/DL (ref 12.1–17)
HGB BLD-MCNC: 7.7 G/DL (ref 12.1–17)
INR PPP: 1.9 (ref 0.9–1.1)
INR PPP: 2.3 (ref 0.9–1.1)
LDH SERPL L TO P-CCNC: 360 U/L (ref 85–241)
MAGNESIUM SERPL-MCNC: 2 MG/DL (ref 1.6–2.4)
MCH RBC QN AUTO: 30.3 PG (ref 26–34)
MCHC RBC AUTO-ENTMCNC: 32.8 G/DL (ref 30–36.5)
MCV RBC AUTO: 92.5 FL (ref 80–99)
NRBC # BLD: 0 K/UL (ref 0–0.01)
NRBC BLD-RTO: 0 PER 100 WBC
NT PRO BNP: 2812 PG/ML
PLATELET # BLD AUTO: 277 K/UL (ref 150–400)
PMV BLD AUTO: 10.2 FL (ref 8.9–12.9)
POTASSIUM SERPL-SCNC: 4.4 MMOL/L (ref 3.5–5.1)
PROT SERPL-MCNC: 7.1 G/DL (ref 6.4–8.2)
PROTHROMBIN TIME: 19 SEC (ref 9–11.1)
PROTHROMBIN TIME: 22.7 SEC (ref 9–11.1)
RBC # BLD AUTO: 2.54 M/UL (ref 4.1–5.7)
SODIUM SERPL-SCNC: 130 MMOL/L (ref 136–145)
WBC # BLD AUTO: 9.9 K/UL (ref 4.1–11.1)

## 2024-09-28 PROCEDURE — 6370000000 HC RX 637 (ALT 250 FOR IP): Performed by: INTERNAL MEDICINE

## 2024-09-28 PROCEDURE — 85610 PROTHROMBIN TIME: CPT

## 2024-09-28 PROCEDURE — 83735 ASSAY OF MAGNESIUM: CPT

## 2024-09-28 PROCEDURE — 93750 INTERROGATION VAD IN PERSON: CPT | Performed by: NURSE PRACTITIONER

## 2024-09-28 PROCEDURE — 85027 COMPLETE CBC AUTOMATED: CPT

## 2024-09-28 PROCEDURE — 6370000000 HC RX 637 (ALT 250 FOR IP): Performed by: HOSPITALIST

## 2024-09-28 PROCEDURE — 85018 HEMOGLOBIN: CPT

## 2024-09-28 PROCEDURE — 36416 COLLJ CAPILLARY BLOOD SPEC: CPT

## 2024-09-28 PROCEDURE — 85014 HEMATOCRIT: CPT

## 2024-09-28 PROCEDURE — 6370000000 HC RX 637 (ALT 250 FOR IP): Performed by: FAMILY MEDICINE

## 2024-09-28 PROCEDURE — 2580000003 HC RX 258: Performed by: INTERNAL MEDICINE

## 2024-09-28 PROCEDURE — 2060000000 HC ICU INTERMEDIATE R&B

## 2024-09-28 PROCEDURE — 99232 SBSQ HOSP IP/OBS MODERATE 35: CPT | Performed by: NURSE PRACTITIONER

## 2024-09-28 PROCEDURE — 83615 LACTATE (LD) (LDH) ENZYME: CPT

## 2024-09-28 PROCEDURE — 82272 OCCULT BLD FECES 1-3 TESTS: CPT

## 2024-09-28 PROCEDURE — 83880 ASSAY OF NATRIURETIC PEPTIDE: CPT

## 2024-09-28 PROCEDURE — 2580000003 HC RX 258: Performed by: FAMILY MEDICINE

## 2024-09-28 PROCEDURE — 80076 HEPATIC FUNCTION PANEL: CPT

## 2024-09-28 PROCEDURE — 80048 BASIC METABOLIC PNL TOTAL CA: CPT

## 2024-09-28 RX ORDER — WARFARIN SODIUM 1 MG/1
1 TABLET ORAL
Status: COMPLETED | OUTPATIENT
Start: 2024-09-28 | End: 2024-09-28

## 2024-09-28 RX ADMIN — AMIODARONE HYDROCHLORIDE 200 MG: 200 TABLET ORAL at 09:33

## 2024-09-28 RX ADMIN — SODIUM CHLORIDE, PRESERVATIVE FREE 10 ML: 5 INJECTION INTRAVENOUS at 21:44

## 2024-09-28 RX ADMIN — ACETAMINOPHEN 650 MG: 325 TABLET ORAL at 11:54

## 2024-09-28 RX ADMIN — METOPROLOL SUCCINATE 25 MG: 25 TABLET, FILM COATED, EXTENDED RELEASE ORAL at 09:32

## 2024-09-28 RX ADMIN — TRAMADOL HYDROCHLORIDE 50 MG: 50 TABLET ORAL at 11:54

## 2024-09-28 RX ADMIN — ATORVASTATIN CALCIUM 80 MG: 40 TABLET, FILM COATED ORAL at 09:33

## 2024-09-28 RX ADMIN — PANTOPRAZOLE SODIUM 40 MG: 40 TABLET, DELAYED RELEASE ORAL at 07:07

## 2024-09-28 RX ADMIN — MAGNESIUM OXIDE TAB 400 MG (241.3 MG ELEMENTAL MG) 400 MG: 400 (241.3 MG) TAB at 09:32

## 2024-09-28 RX ADMIN — ISOSORBIDE DINITRATE 40 MG: 20 TABLET ORAL at 15:21

## 2024-09-28 RX ADMIN — SODIUM CHLORIDE, PRESERVATIVE FREE 10 ML: 5 INJECTION INTRAVENOUS at 09:36

## 2024-09-28 RX ADMIN — Medication: at 09:35

## 2024-09-28 RX ADMIN — PETROLATUM: 420 OINTMENT TOPICAL at 09:00

## 2024-09-28 RX ADMIN — ACETAMINOPHEN 650 MG: 325 TABLET ORAL at 00:38

## 2024-09-28 RX ADMIN — Medication: at 21:48

## 2024-09-28 RX ADMIN — ISOSORBIDE DINITRATE 40 MG: 20 TABLET ORAL at 21:45

## 2024-09-28 RX ADMIN — HYDRALAZINE HYDROCHLORIDE 100 MG: 50 TABLET ORAL at 15:21

## 2024-09-28 RX ADMIN — AMLODIPINE BESYLATE 5 MG: 5 TABLET ORAL at 09:32

## 2024-09-28 RX ADMIN — ISOSORBIDE DINITRATE 40 MG: 20 TABLET ORAL at 07:07

## 2024-09-28 RX ADMIN — HYDRALAZINE HYDROCHLORIDE 100 MG: 50 TABLET ORAL at 07:06

## 2024-09-28 RX ADMIN — WARFARIN SODIUM 1 MG: 1 TABLET ORAL at 18:10

## 2024-09-28 RX ADMIN — ACETAMINOPHEN 650 MG: 325 TABLET ORAL at 18:10

## 2024-09-28 RX ADMIN — TRAMADOL HYDROCHLORIDE 50 MG: 50 TABLET ORAL at 19:04

## 2024-09-28 RX ADMIN — METOPROLOL SUCCINATE 25 MG: 25 TABLET, FILM COATED, EXTENDED RELEASE ORAL at 21:45

## 2024-09-28 RX ADMIN — ACETAMINOPHEN 650 MG: 325 TABLET ORAL at 07:06

## 2024-09-28 RX ADMIN — ASPIRIN 81 MG: 81 TABLET, COATED ORAL at 09:32

## 2024-09-28 RX ADMIN — PETROLATUM: 420 OINTMENT TOPICAL at 21:53

## 2024-09-28 RX ADMIN — Medication 1000 UNITS: at 09:33

## 2024-09-28 RX ADMIN — AMLODIPINE BESYLATE 2.5 MG: 5 TABLET ORAL at 21:44

## 2024-09-28 RX ADMIN — PETROLATUM: 420 OINTMENT TOPICAL at 21:48

## 2024-09-28 RX ADMIN — HYDRALAZINE HYDROCHLORIDE 100 MG: 50 TABLET ORAL at 21:45

## 2024-09-28 ASSESSMENT — PAIN SCALES - GENERAL
PAINLEVEL_OUTOF10: 8
PAINLEVEL_OUTOF10: 8
PAINLEVEL_OUTOF10: 3
PAINLEVEL_OUTOF10: 3
PAINLEVEL_OUTOF10: 8
PAINLEVEL_OUTOF10: 2
PAINLEVEL_OUTOF10: 8

## 2024-09-28 ASSESSMENT — PAIN DESCRIPTION - ORIENTATION
ORIENTATION: RIGHT;LEFT
ORIENTATION: LEFT;RIGHT

## 2024-09-28 ASSESSMENT — PAIN DESCRIPTION - DESCRIPTORS
DESCRIPTORS: ACHING
DESCRIPTORS: ACHING

## 2024-09-28 ASSESSMENT — PAIN DESCRIPTION - LOCATION
LOCATION: FOOT
LOCATION: FOOT;LEG
LOCATION: FOOT

## 2024-09-29 ENCOUNTER — APPOINTMENT (OUTPATIENT)
Facility: HOSPITAL | Age: 69
DRG: 640 | End: 2024-09-29
Payer: MEDICARE

## 2024-09-29 LAB
ALBUMIN SERPL-MCNC: 2.8 G/DL (ref 3.5–5)
ALBUMIN/GLOB SERPL: 0.7 (ref 1.1–2.2)
ALP SERPL-CCNC: 164 U/L (ref 45–117)
ALT SERPL-CCNC: 35 U/L (ref 12–78)
ANION GAP SERPL CALC-SCNC: 8 MMOL/L (ref 2–12)
APPEARANCE UR: CLEAR
AST SERPL-CCNC: 53 U/L (ref 15–37)
BACTERIA URNS QL MICRO: ABNORMAL /HPF
BILIRUB DIRECT SERPL-MCNC: 0.4 MG/DL (ref 0–0.2)
BILIRUB SERPL-MCNC: 0.7 MG/DL (ref 0.2–1)
BILIRUB UR QL: NEGATIVE
BUN SERPL-MCNC: 26 MG/DL (ref 6–20)
BUN/CREAT SERPL: 25 (ref 12–20)
CALCIUM SERPL-MCNC: 8.5 MG/DL (ref 8.5–10.1)
CHLORIDE SERPL-SCNC: 98 MMOL/L (ref 97–108)
CO2 SERPL-SCNC: 22 MMOL/L (ref 21–32)
COLOR UR: ABNORMAL
CREAT SERPL-MCNC: 1.05 MG/DL (ref 0.7–1.3)
EPITH CASTS URNS QL MICRO: ABNORMAL /LPF
ERYTHROCYTE [DISTWIDTH] IN BLOOD BY AUTOMATED COUNT: 17.9 % (ref 11.5–14.5)
GLOBULIN SER CALC-MCNC: 3.8 G/DL (ref 2–4)
GLUCOSE SERPL-MCNC: 131 MG/DL (ref 65–100)
GLUCOSE UR STRIP.AUTO-MCNC: NEGATIVE MG/DL
HCT VFR BLD AUTO: 24.5 % (ref 36.6–50.3)
HGB BLD-MCNC: 8 G/DL (ref 12.1–17)
HGB UR QL STRIP: NEGATIVE
HYALINE CASTS URNS QL MICRO: ABNORMAL /LPF (ref 0–5)
INR PPP: 1.9 (ref 0.9–1.1)
KETONES UR QL STRIP.AUTO: ABNORMAL MG/DL
LDH SERPL L TO P-CCNC: 371 U/L (ref 85–241)
LEUKOCYTE ESTERASE UR QL STRIP.AUTO: ABNORMAL
MAGNESIUM SERPL-MCNC: 2 MG/DL (ref 1.6–2.4)
MCH RBC QN AUTO: 30.4 PG (ref 26–34)
MCHC RBC AUTO-ENTMCNC: 32.7 G/DL (ref 30–36.5)
MCV RBC AUTO: 93.2 FL (ref 80–99)
NITRITE UR QL STRIP.AUTO: NEGATIVE
NRBC # BLD: 0 K/UL (ref 0–0.01)
NRBC BLD-RTO: 0 PER 100 WBC
NT PRO BNP: 3827 PG/ML
PH UR STRIP: 6 (ref 5–8)
PLATELET # BLD AUTO: 305 K/UL (ref 150–400)
PMV BLD AUTO: 9.7 FL (ref 8.9–12.9)
POTASSIUM SERPL-SCNC: 4.8 MMOL/L (ref 3.5–5.1)
PROT SERPL-MCNC: 6.6 G/DL (ref 6.4–8.2)
PROT UR STRIP-MCNC: ABNORMAL MG/DL
PROTHROMBIN TIME: 19.3 SEC (ref 9–11.1)
RBC # BLD AUTO: 2.63 M/UL (ref 4.1–5.7)
RBC #/AREA URNS HPF: ABNORMAL /HPF (ref 0–5)
SODIUM SERPL-SCNC: 128 MMOL/L (ref 136–145)
SP GR UR REFRACTOMETRY: 1.02 (ref 1–1.03)
URINE CULTURE IF INDICATED: ABNORMAL
UROBILINOGEN UR QL STRIP.AUTO: 1 EU/DL (ref 0.2–1)
WBC # BLD AUTO: 15.8 K/UL (ref 4.1–11.1)
WBC URNS QL MICRO: ABNORMAL /HPF (ref 0–4)

## 2024-09-29 PROCEDURE — 83880 ASSAY OF NATRIURETIC PEPTIDE: CPT

## 2024-09-29 PROCEDURE — 85027 COMPLETE CBC AUTOMATED: CPT

## 2024-09-29 PROCEDURE — 83615 LACTATE (LD) (LDH) ENZYME: CPT

## 2024-09-29 PROCEDURE — 83735 ASSAY OF MAGNESIUM: CPT

## 2024-09-29 PROCEDURE — 6370000000 HC RX 637 (ALT 250 FOR IP): Performed by: INTERNAL MEDICINE

## 2024-09-29 PROCEDURE — 71045 X-RAY EXAM CHEST 1 VIEW: CPT

## 2024-09-29 PROCEDURE — 99232 SBSQ HOSP IP/OBS MODERATE 35: CPT | Performed by: NURSE PRACTITIONER

## 2024-09-29 PROCEDURE — 2580000003 HC RX 258: Performed by: INTERNAL MEDICINE

## 2024-09-29 PROCEDURE — 6370000000 HC RX 637 (ALT 250 FOR IP): Performed by: HOSPITALIST

## 2024-09-29 PROCEDURE — 80076 HEPATIC FUNCTION PANEL: CPT

## 2024-09-29 PROCEDURE — 80048 BASIC METABOLIC PNL TOTAL CA: CPT

## 2024-09-29 PROCEDURE — 36415 COLL VENOUS BLD VENIPUNCTURE: CPT

## 2024-09-29 PROCEDURE — 87086 URINE CULTURE/COLONY COUNT: CPT

## 2024-09-29 PROCEDURE — 6370000000 HC RX 637 (ALT 250 FOR IP): Performed by: FAMILY MEDICINE

## 2024-09-29 PROCEDURE — 85610 PROTHROMBIN TIME: CPT

## 2024-09-29 PROCEDURE — 2060000000 HC ICU INTERMEDIATE R&B

## 2024-09-29 PROCEDURE — 81001 URINALYSIS AUTO W/SCOPE: CPT

## 2024-09-29 PROCEDURE — 93750 INTERROGATION VAD IN PERSON: CPT | Performed by: NURSE PRACTITIONER

## 2024-09-29 PROCEDURE — 2580000003 HC RX 258: Performed by: FAMILY MEDICINE

## 2024-09-29 RX ORDER — WARFARIN SODIUM 1 MG/1
1 TABLET ORAL
Status: COMPLETED | OUTPATIENT
Start: 2024-09-29 | End: 2024-09-29

## 2024-09-29 RX ADMIN — PETROLATUM: 420 OINTMENT TOPICAL at 08:52

## 2024-09-29 RX ADMIN — Medication: at 08:52

## 2024-09-29 RX ADMIN — PETROLATUM: 420 OINTMENT TOPICAL at 21:03

## 2024-09-29 RX ADMIN — AMIODARONE HYDROCHLORIDE 200 MG: 200 TABLET ORAL at 08:51

## 2024-09-29 RX ADMIN — PETROLATUM: 420 OINTMENT TOPICAL at 21:02

## 2024-09-29 RX ADMIN — SODIUM CHLORIDE, PRESERVATIVE FREE 10 ML: 5 INJECTION INTRAVENOUS at 21:02

## 2024-09-29 RX ADMIN — HYDRALAZINE HYDROCHLORIDE 100 MG: 50 TABLET ORAL at 21:00

## 2024-09-29 RX ADMIN — ACETAMINOPHEN 650 MG: 325 TABLET ORAL at 00:22

## 2024-09-29 RX ADMIN — ASPIRIN 81 MG: 81 TABLET, COATED ORAL at 08:51

## 2024-09-29 RX ADMIN — SODIUM CHLORIDE, PRESERVATIVE FREE 10 ML: 5 INJECTION INTRAVENOUS at 08:53

## 2024-09-29 RX ADMIN — MAGNESIUM OXIDE TAB 400 MG (241.3 MG ELEMENTAL MG) 400 MG: 400 (241.3 MG) TAB at 08:51

## 2024-09-29 RX ADMIN — TRAMADOL HYDROCHLORIDE 50 MG: 50 TABLET ORAL at 05:44

## 2024-09-29 RX ADMIN — Medication 1000 UNITS: at 08:53

## 2024-09-29 RX ADMIN — AMLODIPINE BESYLATE 2.5 MG: 5 TABLET ORAL at 21:00

## 2024-09-29 RX ADMIN — ISOSORBIDE DINITRATE 40 MG: 20 TABLET ORAL at 05:44

## 2024-09-29 RX ADMIN — Medication: at 21:02

## 2024-09-29 RX ADMIN — HYDRALAZINE HYDROCHLORIDE 100 MG: 50 TABLET ORAL at 14:27

## 2024-09-29 RX ADMIN — METOPROLOL SUCCINATE 25 MG: 25 TABLET, FILM COATED, EXTENDED RELEASE ORAL at 21:00

## 2024-09-29 RX ADMIN — WARFARIN SODIUM 1 MG: 1 TABLET ORAL at 18:21

## 2024-09-29 RX ADMIN — TRAMADOL HYDROCHLORIDE 50 MG: 50 TABLET ORAL at 14:27

## 2024-09-29 RX ADMIN — ACETAMINOPHEN 650 MG: 325 TABLET ORAL at 05:44

## 2024-09-29 RX ADMIN — ACETAMINOPHEN 650 MG: 325 TABLET ORAL at 11:57

## 2024-09-29 RX ADMIN — PANTOPRAZOLE SODIUM 40 MG: 40 TABLET, DELAYED RELEASE ORAL at 05:44

## 2024-09-29 RX ADMIN — HYDRALAZINE HYDROCHLORIDE 100 MG: 50 TABLET ORAL at 05:44

## 2024-09-29 RX ADMIN — ATORVASTATIN CALCIUM 80 MG: 40 TABLET, FILM COATED ORAL at 08:51

## 2024-09-29 RX ADMIN — ISOSORBIDE DINITRATE 40 MG: 20 TABLET ORAL at 21:00

## 2024-09-29 RX ADMIN — ACETAMINOPHEN 650 MG: 325 TABLET ORAL at 18:20

## 2024-09-29 RX ADMIN — ISOSORBIDE DINITRATE 40 MG: 20 TABLET ORAL at 14:27

## 2024-09-29 RX ADMIN — METOPROLOL SUCCINATE 25 MG: 25 TABLET, FILM COATED, EXTENDED RELEASE ORAL at 08:51

## 2024-09-29 RX ADMIN — AMLODIPINE BESYLATE 5 MG: 5 TABLET ORAL at 08:51

## 2024-09-29 ASSESSMENT — PAIN DESCRIPTION - DESCRIPTORS: DESCRIPTORS: THROBBING

## 2024-09-29 ASSESSMENT — PAIN SCALES - GENERAL
PAINLEVEL_OUTOF10: 6
PAINLEVEL_OUTOF10: 2
PAINLEVEL_OUTOF10: 7
PAINLEVEL_OUTOF10: 8
PAINLEVEL_OUTOF10: 2
PAINLEVEL_OUTOF10: 2
PAINLEVEL_OUTOF10: 4
PAINLEVEL_OUTOF10: 8

## 2024-09-29 ASSESSMENT — PAIN DESCRIPTION - LOCATION
LOCATION: FOOT;LEG
LOCATION: FOOT

## 2024-09-29 ASSESSMENT — PAIN DESCRIPTION - ORIENTATION
ORIENTATION: LEFT;RIGHT
ORIENTATION: RIGHT;LEFT
ORIENTATION: RIGHT;LEFT

## 2024-09-30 ENCOUNTER — CLINICAL DOCUMENTATION (OUTPATIENT)
Age: 69
End: 2024-09-30

## 2024-09-30 LAB
ALBUMIN SERPL-MCNC: 2.8 G/DL (ref 3.5–5)
ALBUMIN/GLOB SERPL: 0.6 (ref 1.1–2.2)
ALP SERPL-CCNC: 193 U/L (ref 45–117)
ALT SERPL-CCNC: 42 U/L (ref 12–78)
ANION GAP SERPL CALC-SCNC: 6 MMOL/L (ref 2–12)
AST SERPL-CCNC: 61 U/L (ref 15–37)
BACTERIA SPEC CULT: ABNORMAL
BILIRUB DIRECT SERPL-MCNC: 0.6 MG/DL (ref 0–0.2)
BILIRUB SERPL-MCNC: 0.7 MG/DL (ref 0.2–1)
BUN SERPL-MCNC: 27 MG/DL (ref 6–20)
BUN/CREAT SERPL: 26 (ref 12–20)
CALCIUM SERPL-MCNC: 8.8 MG/DL (ref 8.5–10.1)
CC UR VC: ABNORMAL
CHLORIDE SERPL-SCNC: 98 MMOL/L (ref 97–108)
CO2 SERPL-SCNC: 22 MMOL/L (ref 21–32)
CREAT SERPL-MCNC: 1.04 MG/DL (ref 0.7–1.3)
ERYTHROCYTE [DISTWIDTH] IN BLOOD BY AUTOMATED COUNT: 18.1 % (ref 11.5–14.5)
GLOBULIN SER CALC-MCNC: 4.7 G/DL (ref 2–4)
GLUCOSE SERPL-MCNC: 141 MG/DL (ref 65–100)
HCT VFR BLD AUTO: 27.1 % (ref 36.6–50.3)
HGB BLD-MCNC: 8.5 G/DL (ref 12.1–17)
INR PPP: 1.7 (ref 0.9–1.1)
LDH SERPL L TO P-CCNC: 322 U/L (ref 85–241)
MAGNESIUM SERPL-MCNC: 2 MG/DL (ref 1.6–2.4)
MCH RBC QN AUTO: 30.1 PG (ref 26–34)
MCHC RBC AUTO-ENTMCNC: 31.4 G/DL (ref 30–36.5)
MCV RBC AUTO: 96.1 FL (ref 80–99)
NRBC # BLD: 0.02 K/UL (ref 0–0.01)
NRBC BLD-RTO: 0.1 PER 100 WBC
NT PRO BNP: 3950 PG/ML
PLATELET # BLD AUTO: 314 K/UL (ref 150–400)
PMV BLD AUTO: 9.5 FL (ref 8.9–12.9)
POTASSIUM SERPL-SCNC: 4.5 MMOL/L (ref 3.5–5.1)
PROCALCITONIN SERPL-MCNC: 0.31 NG/ML
PROT SERPL-MCNC: 7.5 G/DL (ref 6.4–8.2)
PROTHROMBIN TIME: 17.5 SEC (ref 9–11.1)
RBC # BLD AUTO: 2.82 M/UL (ref 4.1–5.7)
SERVICE CMNT-IMP: ABNORMAL
SODIUM SERPL-SCNC: 126 MMOL/L (ref 136–145)
WBC # BLD AUTO: 16.7 K/UL (ref 4.1–11.1)

## 2024-09-30 PROCEDURE — 87205 SMEAR GRAM STAIN: CPT

## 2024-09-30 PROCEDURE — 2580000003 HC RX 258: Performed by: HOSPITALIST

## 2024-09-30 PROCEDURE — 6360000002 HC RX W HCPCS: Performed by: NURSE PRACTITIONER

## 2024-09-30 PROCEDURE — 6370000000 HC RX 637 (ALT 250 FOR IP): Performed by: FAMILY MEDICINE

## 2024-09-30 PROCEDURE — 6370000000 HC RX 637 (ALT 250 FOR IP): Performed by: HOSPITALIST

## 2024-09-30 PROCEDURE — 83615 LACTATE (LD) (LDH) ENZYME: CPT

## 2024-09-30 PROCEDURE — 99232 SBSQ HOSP IP/OBS MODERATE 35: CPT | Performed by: INTERNAL MEDICINE

## 2024-09-30 PROCEDURE — 6370000000 HC RX 637 (ALT 250 FOR IP): Performed by: INTERNAL MEDICINE

## 2024-09-30 PROCEDURE — 2580000003 HC RX 258: Performed by: FAMILY MEDICINE

## 2024-09-30 PROCEDURE — 87077 CULTURE AEROBIC IDENTIFY: CPT

## 2024-09-30 PROCEDURE — 36415 COLL VENOUS BLD VENIPUNCTURE: CPT

## 2024-09-30 PROCEDURE — 80048 BASIC METABOLIC PNL TOTAL CA: CPT

## 2024-09-30 PROCEDURE — 85027 COMPLETE CBC AUTOMATED: CPT

## 2024-09-30 PROCEDURE — 87070 CULTURE OTHR SPECIMN AEROBIC: CPT

## 2024-09-30 PROCEDURE — 6360000002 HC RX W HCPCS: Performed by: HOSPITALIST

## 2024-09-30 PROCEDURE — 2060000000 HC ICU INTERMEDIATE R&B

## 2024-09-30 PROCEDURE — APPSS45 APP SPLIT SHARED TIME 31-45 MINUTES: Performed by: NURSE PRACTITIONER

## 2024-09-30 PROCEDURE — 97535 SELF CARE MNGMENT TRAINING: CPT

## 2024-09-30 PROCEDURE — 93750 INTERROGATION VAD IN PERSON: CPT | Performed by: INTERNAL MEDICINE

## 2024-09-30 PROCEDURE — 97530 THERAPEUTIC ACTIVITIES: CPT

## 2024-09-30 PROCEDURE — 84145 PROCALCITONIN (PCT): CPT

## 2024-09-30 PROCEDURE — 83880 ASSAY OF NATRIURETIC PEPTIDE: CPT

## 2024-09-30 PROCEDURE — 87186 SC STD MICRODIL/AGAR DIL: CPT

## 2024-09-30 PROCEDURE — 85610 PROTHROMBIN TIME: CPT

## 2024-09-30 PROCEDURE — 83735 ASSAY OF MAGNESIUM: CPT

## 2024-09-30 PROCEDURE — 2580000003 HC RX 258: Performed by: INTERNAL MEDICINE

## 2024-09-30 PROCEDURE — 80076 HEPATIC FUNCTION PANEL: CPT

## 2024-09-30 RX ORDER — CEFDINIR 300 MG/1
300 CAPSULE ORAL EVERY 12 HOURS SCHEDULED
Status: DISCONTINUED | OUTPATIENT
Start: 2024-09-30 | End: 2024-09-30

## 2024-09-30 RX ORDER — WARFARIN SODIUM 1 MG/1
1.5 TABLET ORAL
Status: COMPLETED | OUTPATIENT
Start: 2024-09-30 | End: 2024-09-30

## 2024-09-30 RX ORDER — ENOXAPARIN SODIUM 100 MG/ML
1 INJECTION SUBCUTANEOUS 2 TIMES DAILY
Status: DISCONTINUED | OUTPATIENT
Start: 2024-09-30 | End: 2024-10-06 | Stop reason: ALTCHOICE

## 2024-09-30 RX ADMIN — PETROLATUM: 420 OINTMENT TOPICAL at 21:14

## 2024-09-30 RX ADMIN — ACETAMINOPHEN 650 MG: 325 TABLET ORAL at 12:33

## 2024-09-30 RX ADMIN — ATORVASTATIN CALCIUM 80 MG: 40 TABLET, FILM COATED ORAL at 09:30

## 2024-09-30 RX ADMIN — METOPROLOL SUCCINATE 25 MG: 25 TABLET, FILM COATED, EXTENDED RELEASE ORAL at 21:10

## 2024-09-30 RX ADMIN — SODIUM CHLORIDE 1250 MG: 9 INJECTION, SOLUTION INTRAVENOUS at 12:22

## 2024-09-30 RX ADMIN — PETROLATUM: 420 OINTMENT TOPICAL at 21:13

## 2024-09-30 RX ADMIN — HYDRALAZINE HYDROCHLORIDE 100 MG: 50 TABLET ORAL at 21:11

## 2024-09-30 RX ADMIN — SODIUM CHLORIDE, PRESERVATIVE FREE 10 ML: 5 INJECTION INTRAVENOUS at 09:32

## 2024-09-30 RX ADMIN — Medication 1000 UNITS: at 09:30

## 2024-09-30 RX ADMIN — PETROLATUM: 420 OINTMENT TOPICAL at 09:32

## 2024-09-30 RX ADMIN — WARFARIN SODIUM 1.5 MG: 1 TABLET ORAL at 17:34

## 2024-09-30 RX ADMIN — MAGNESIUM OXIDE TAB 400 MG (241.3 MG ELEMENTAL MG) 400 MG: 400 (241.3 MG) TAB at 09:30

## 2024-09-30 RX ADMIN — ACETAMINOPHEN 650 MG: 325 TABLET ORAL at 23:35

## 2024-09-30 RX ADMIN — HYDRALAZINE HYDROCHLORIDE 100 MG: 50 TABLET ORAL at 07:11

## 2024-09-30 RX ADMIN — METOPROLOL SUCCINATE 25 MG: 25 TABLET, FILM COATED, EXTENDED RELEASE ORAL at 09:30

## 2024-09-30 RX ADMIN — Medication: at 09:31

## 2024-09-30 RX ADMIN — HYDRALAZINE HYDROCHLORIDE 100 MG: 50 TABLET ORAL at 12:34

## 2024-09-30 RX ADMIN — ISOSORBIDE DINITRATE 40 MG: 20 TABLET ORAL at 12:33

## 2024-09-30 RX ADMIN — VANCOMYCIN HYDROCHLORIDE 750 MG: 750 INJECTION, POWDER, LYOPHILIZED, FOR SOLUTION INTRAVENOUS at 23:36

## 2024-09-30 RX ADMIN — SODIUM CHLORIDE, PRESERVATIVE FREE 10 ML: 5 INJECTION INTRAVENOUS at 21:12

## 2024-09-30 RX ADMIN — PANTOPRAZOLE SODIUM 40 MG: 40 TABLET, DELAYED RELEASE ORAL at 07:11

## 2024-09-30 RX ADMIN — ENOXAPARIN SODIUM 60 MG: 100 INJECTION SUBCUTANEOUS at 12:11

## 2024-09-30 RX ADMIN — WATER 2000 MG: 1 INJECTION INTRAMUSCULAR; INTRAVENOUS; SUBCUTANEOUS at 12:12

## 2024-09-30 RX ADMIN — ISOSORBIDE DINITRATE 40 MG: 20 TABLET ORAL at 07:11

## 2024-09-30 RX ADMIN — TRAMADOL HYDROCHLORIDE 50 MG: 50 TABLET ORAL at 21:10

## 2024-09-30 RX ADMIN — Medication: at 21:14

## 2024-09-30 RX ADMIN — AMIODARONE HYDROCHLORIDE 200 MG: 200 TABLET ORAL at 09:30

## 2024-09-30 RX ADMIN — AMLODIPINE BESYLATE 5 MG: 5 TABLET ORAL at 09:30

## 2024-09-30 RX ADMIN — ASPIRIN 81 MG: 81 TABLET, COATED ORAL at 09:30

## 2024-09-30 RX ADMIN — ACETAMINOPHEN 650 MG: 325 TABLET ORAL at 17:34

## 2024-09-30 RX ADMIN — SODIUM CHLORIDE, PRESERVATIVE FREE 10 ML: 5 INJECTION INTRAVENOUS at 21:13

## 2024-09-30 RX ADMIN — AMLODIPINE BESYLATE 2.5 MG: 5 TABLET ORAL at 21:11

## 2024-09-30 RX ADMIN — CEFDINIR 300 MG: 300 CAPSULE ORAL at 09:30

## 2024-09-30 RX ADMIN — ENOXAPARIN SODIUM 60 MG: 100 INJECTION SUBCUTANEOUS at 21:11

## 2024-09-30 RX ADMIN — HYDRALAZINE HYDROCHLORIDE 10 MG: 20 INJECTION INTRAMUSCULAR; INTRAVENOUS at 15:19

## 2024-09-30 RX ADMIN — ISOSORBIDE DINITRATE 40 MG: 20 TABLET ORAL at 21:11

## 2024-09-30 RX ADMIN — ACETAMINOPHEN 650 MG: 325 TABLET ORAL at 07:11

## 2024-09-30 ASSESSMENT — PAIN SCALES - GENERAL
PAINLEVEL_OUTOF10: 3
PAINLEVEL_OUTOF10: 9
PAINLEVEL_OUTOF10: 2
PAINLEVEL_OUTOF10: 3

## 2024-09-30 ASSESSMENT — PAIN DESCRIPTION - DESCRIPTORS: DESCRIPTORS: THROBBING

## 2024-09-30 ASSESSMENT — PAIN DESCRIPTION - ORIENTATION: ORIENTATION: RIGHT;LEFT

## 2024-09-30 ASSESSMENT — PAIN DESCRIPTION - LOCATION: LOCATION: FOOT

## 2024-09-30 NOTE — PROGRESS NOTES
Late entry from 9/19/24  Annual LVAD clinic visit.    9/19; LESLIE met with pt at bedside for annual LVAD clinic visit. Pt admitted from clinic on 9/18 for hyponatremia, LE rash, fall, and injury to left rotator cuff. SW reintroduced [facility] SW role. Pt notes that he continues to reside at his girlfriend, Kaylin's brother's home in [place], [place]. Pt notes that his sister, Catalina Santos and cousin are still his primary caregivers. Pt denied any psychosocial changes or needs during this session. Pt presented as calm and pleasant throughout this session    SW met with LVAD RN Coordinator about any observed psychosocial needs based on their session with pt. LVAD RN Coordinator explained that she will speak with home care agency and pt about any living space concerns related to exterminating/pest control. Home care agency informed LVAD RN Coordinator that pt typically goes to his sister's home for all of his skilled home care RN visits/dressing changes. Home care agency explained that pt's living quarters are located in a separate house across from sister's home. Pt denied any concerns or needs regarding pest control/exterminating services for his living space.     Plan: 1) SW will continue to monitor and provide assist with access to services as needed.  2) Power form update (serious medical condition form) pending.       CANDACE Krause, LCSW  Clinical   [facility]  [facility]  Advanced Heart Failure  [phone]

## 2024-09-30 NOTE — CARE COORDINATION
WBC has increased and Dr. Bah has reconsulted podiatry.  Patient is not medically ready for SNF and transportation was cancelled. Also notified our liaison and SNF auth good until 9/30 so he needs a new auth once medically ready.      T

## 2024-10-01 LAB
ALBUMIN SERPL-MCNC: 2.4 G/DL (ref 3.5–5)
ALBUMIN/GLOB SERPL: 0.5 (ref 1.1–2.2)
ALP SERPL-CCNC: 170 U/L (ref 45–117)
ALT SERPL-CCNC: 34 U/L (ref 12–78)
ANION GAP SERPL CALC-SCNC: 6 MMOL/L (ref 2–12)
AST SERPL-CCNC: 45 U/L (ref 15–37)
BILIRUB DIRECT SERPL-MCNC: 0.3 MG/DL (ref 0–0.2)
BILIRUB SERPL-MCNC: 0.7 MG/DL (ref 0.2–1)
BUN SERPL-MCNC: 26 MG/DL (ref 6–20)
BUN/CREAT SERPL: 25 (ref 12–20)
CALCIUM SERPL-MCNC: 8.6 MG/DL (ref 8.5–10.1)
CHLORIDE SERPL-SCNC: 101 MMOL/L (ref 97–108)
CO2 SERPL-SCNC: 23 MMOL/L (ref 21–32)
CREAT SERPL-MCNC: 1.06 MG/DL (ref 0.7–1.3)
ERYTHROCYTE [DISTWIDTH] IN BLOOD BY AUTOMATED COUNT: 18.3 % (ref 11.5–14.5)
GLOBULIN SER CALC-MCNC: 4.7 G/DL (ref 2–4)
GLUCOSE SERPL-MCNC: 138 MG/DL (ref 65–100)
HCT VFR BLD AUTO: 24.5 % (ref 36.6–50.3)
HGB BLD-MCNC: 7.8 G/DL (ref 12.1–17)
INR PPP: 1.7 (ref 0.9–1.1)
LDH SERPL L TO P-CCNC: 309 U/L (ref 85–241)
MAGNESIUM SERPL-MCNC: 2.2 MG/DL (ref 1.6–2.4)
MCH RBC QN AUTO: 29.9 PG (ref 26–34)
MCHC RBC AUTO-ENTMCNC: 31.8 G/DL (ref 30–36.5)
MCV RBC AUTO: 93.9 FL (ref 80–99)
NRBC # BLD: 0.02 K/UL (ref 0–0.01)
NRBC BLD-RTO: 0.1 PER 100 WBC
NT PRO BNP: 5492 PG/ML
PLATELET # BLD AUTO: 309 K/UL (ref 150–400)
PMV BLD AUTO: 9.9 FL (ref 8.9–12.9)
POTASSIUM SERPL-SCNC: 4.4 MMOL/L (ref 3.5–5.1)
PROT SERPL-MCNC: 7.1 G/DL (ref 6.4–8.2)
PROTHROMBIN TIME: 16.9 SEC (ref 9–11.1)
RBC # BLD AUTO: 2.61 M/UL (ref 4.1–5.7)
SODIUM SERPL-SCNC: 130 MMOL/L (ref 136–145)
VANCOMYCIN SERPL-MCNC: 18.5 UG/ML
WBC # BLD AUTO: 19.6 K/UL (ref 4.1–11.1)

## 2024-10-01 PROCEDURE — 85027 COMPLETE CBC AUTOMATED: CPT

## 2024-10-01 PROCEDURE — 85610 PROTHROMBIN TIME: CPT

## 2024-10-01 PROCEDURE — 87040 BLOOD CULTURE FOR BACTERIA: CPT

## 2024-10-01 PROCEDURE — 6370000000 HC RX 637 (ALT 250 FOR IP): Performed by: HOSPITALIST

## 2024-10-01 PROCEDURE — 2580000003 HC RX 258: Performed by: FAMILY MEDICINE

## 2024-10-01 PROCEDURE — 6360000002 HC RX W HCPCS: Performed by: NURSE PRACTITIONER

## 2024-10-01 PROCEDURE — 93750 INTERROGATION VAD IN PERSON: CPT | Performed by: INTERNAL MEDICINE

## 2024-10-01 PROCEDURE — 83615 LACTATE (LD) (LDH) ENZYME: CPT

## 2024-10-01 PROCEDURE — 83735 ASSAY OF MAGNESIUM: CPT

## 2024-10-01 PROCEDURE — 36415 COLL VENOUS BLD VENIPUNCTURE: CPT

## 2024-10-01 PROCEDURE — 6370000000 HC RX 637 (ALT 250 FOR IP): Performed by: FAMILY MEDICINE

## 2024-10-01 PROCEDURE — APPSS30 APP SPLIT SHARED TIME 16-30 MINUTES: Performed by: NURSE PRACTITIONER

## 2024-10-01 PROCEDURE — 2580000003 HC RX 258: Performed by: HOSPITALIST

## 2024-10-01 PROCEDURE — 99232 SBSQ HOSP IP/OBS MODERATE 35: CPT | Performed by: INTERNAL MEDICINE

## 2024-10-01 PROCEDURE — 6370000000 HC RX 637 (ALT 250 FOR IP): Performed by: INTERNAL MEDICINE

## 2024-10-01 PROCEDURE — 87186 SC STD MICRODIL/AGAR DIL: CPT

## 2024-10-01 PROCEDURE — 2060000000 HC ICU INTERMEDIATE R&B

## 2024-10-01 PROCEDURE — 6360000002 HC RX W HCPCS: Performed by: HOSPITALIST

## 2024-10-01 PROCEDURE — 80048 BASIC METABOLIC PNL TOTAL CA: CPT

## 2024-10-01 PROCEDURE — 87154 CUL TYP ID BLD PTHGN 6+ TRGT: CPT

## 2024-10-01 PROCEDURE — 83880 ASSAY OF NATRIURETIC PEPTIDE: CPT

## 2024-10-01 PROCEDURE — 87077 CULTURE AEROBIC IDENTIFY: CPT

## 2024-10-01 PROCEDURE — 2580000003 HC RX 258: Performed by: INTERNAL MEDICINE

## 2024-10-01 PROCEDURE — 80202 ASSAY OF VANCOMYCIN: CPT

## 2024-10-01 PROCEDURE — 80076 HEPATIC FUNCTION PANEL: CPT

## 2024-10-01 RX ORDER — WARFARIN SODIUM 1 MG/1
1.5 TABLET ORAL
Status: COMPLETED | OUTPATIENT
Start: 2024-10-01 | End: 2024-10-01

## 2024-10-01 RX ADMIN — ATORVASTATIN CALCIUM 80 MG: 40 TABLET, FILM COATED ORAL at 09:07

## 2024-10-01 RX ADMIN — ENOXAPARIN SODIUM 60 MG: 100 INJECTION SUBCUTANEOUS at 21:10

## 2024-10-01 RX ADMIN — Medication: at 09:10

## 2024-10-01 RX ADMIN — PANTOPRAZOLE SODIUM 40 MG: 40 TABLET, DELAYED RELEASE ORAL at 06:12

## 2024-10-01 RX ADMIN — METOPROLOL SUCCINATE 25 MG: 25 TABLET, FILM COATED, EXTENDED RELEASE ORAL at 21:10

## 2024-10-01 RX ADMIN — VANCOMYCIN HYDROCHLORIDE 750 MG: 750 INJECTION, POWDER, LYOPHILIZED, FOR SOLUTION INTRAVENOUS at 13:58

## 2024-10-01 RX ADMIN — HYDRALAZINE HYDROCHLORIDE 100 MG: 50 TABLET ORAL at 06:12

## 2024-10-01 RX ADMIN — HYDRALAZINE HYDROCHLORIDE 100 MG: 50 TABLET ORAL at 21:10

## 2024-10-01 RX ADMIN — PETROLATUM: 420 OINTMENT TOPICAL at 09:12

## 2024-10-01 RX ADMIN — ACETAMINOPHEN 650 MG: 325 TABLET ORAL at 23:24

## 2024-10-01 RX ADMIN — HYDRALAZINE HYDROCHLORIDE 100 MG: 50 TABLET ORAL at 13:03

## 2024-10-01 RX ADMIN — ENOXAPARIN SODIUM 60 MG: 100 INJECTION SUBCUTANEOUS at 09:07

## 2024-10-01 RX ADMIN — Medication 1000 UNITS: at 09:06

## 2024-10-01 RX ADMIN — METOPROLOL SUCCINATE 25 MG: 25 TABLET, FILM COATED, EXTENDED RELEASE ORAL at 09:06

## 2024-10-01 RX ADMIN — POLYETHYLENE GLYCOL 3350 17 G: 17 POWDER, FOR SOLUTION ORAL at 21:10

## 2024-10-01 RX ADMIN — SODIUM CHLORIDE, PRESERVATIVE FREE 10 ML: 5 INJECTION INTRAVENOUS at 09:14

## 2024-10-01 RX ADMIN — TRAMADOL HYDROCHLORIDE 50 MG: 50 TABLET ORAL at 13:03

## 2024-10-01 RX ADMIN — SODIUM CHLORIDE, PRESERVATIVE FREE 10 ML: 5 INJECTION INTRAVENOUS at 09:13

## 2024-10-01 RX ADMIN — WARFARIN SODIUM 1.5 MG: 1 TABLET ORAL at 18:10

## 2024-10-01 RX ADMIN — MAGNESIUM OXIDE TAB 400 MG (241.3 MG ELEMENTAL MG) 400 MG: 400 (241.3 MG) TAB at 09:07

## 2024-10-01 RX ADMIN — ACETAMINOPHEN 650 MG: 325 TABLET ORAL at 18:10

## 2024-10-01 RX ADMIN — CEFEPIME 2000 MG: 2 INJECTION, POWDER, FOR SOLUTION INTRAVENOUS at 10:46

## 2024-10-01 RX ADMIN — ISOSORBIDE DINITRATE 40 MG: 20 TABLET ORAL at 21:10

## 2024-10-01 RX ADMIN — TRAMADOL HYDROCHLORIDE 50 MG: 50 TABLET ORAL at 06:14

## 2024-10-01 RX ADMIN — Medication: at 21:14

## 2024-10-01 RX ADMIN — ISOSORBIDE DINITRATE 40 MG: 20 TABLET ORAL at 13:03

## 2024-10-01 RX ADMIN — AMIODARONE HYDROCHLORIDE 200 MG: 200 TABLET ORAL at 09:07

## 2024-10-01 RX ADMIN — PETROLATUM: 420 OINTMENT TOPICAL at 09:30

## 2024-10-01 RX ADMIN — SODIUM CHLORIDE, PRESERVATIVE FREE 10 ML: 5 INJECTION INTRAVENOUS at 21:12

## 2024-10-01 RX ADMIN — ASPIRIN 81 MG: 81 TABLET, COATED ORAL at 09:06

## 2024-10-01 RX ADMIN — AMLODIPINE BESYLATE 2.5 MG: 5 TABLET ORAL at 21:10

## 2024-10-01 RX ADMIN — ISOSORBIDE DINITRATE 40 MG: 20 TABLET ORAL at 06:12

## 2024-10-01 RX ADMIN — ACETAMINOPHEN 650 MG: 325 TABLET ORAL at 13:02

## 2024-10-01 RX ADMIN — PETROLATUM: 420 OINTMENT TOPICAL at 09:08

## 2024-10-01 RX ADMIN — ACETAMINOPHEN 650 MG: 325 TABLET ORAL at 06:12

## 2024-10-01 RX ADMIN — PETROLATUM: 420 OINTMENT TOPICAL at 21:13

## 2024-10-01 RX ADMIN — AMLODIPINE BESYLATE 5 MG: 5 TABLET ORAL at 09:05

## 2024-10-01 ASSESSMENT — PAIN SCALES - GENERAL
PAINLEVEL_OUTOF10: 10
PAINLEVEL_OUTOF10: 9
PAINLEVEL_OUTOF10: 0
PAINLEVEL_OUTOF10: 7

## 2024-10-01 ASSESSMENT — PAIN DESCRIPTION - LOCATION
LOCATION: FOOT

## 2024-10-01 ASSESSMENT — PAIN DESCRIPTION - ORIENTATION
ORIENTATION: RIGHT;LEFT

## 2024-10-01 ASSESSMENT — PAIN DESCRIPTION - DESCRIPTORS
DESCRIPTORS: THROBBING
DESCRIPTORS: ACHING
DESCRIPTORS: ACHING;THROBBING

## 2024-10-01 ASSESSMENT — PAIN - FUNCTIONAL ASSESSMENT: PAIN_FUNCTIONAL_ASSESSMENT: PREVENTS OR INTERFERES SOME ACTIVE ACTIVITIES AND ADLS

## 2024-10-01 ASSESSMENT — PAIN DESCRIPTION - PAIN TYPE: TYPE: ACUTE PAIN

## 2024-10-01 ASSESSMENT — PAIN DESCRIPTION - FREQUENCY: FREQUENCY: CONTINUOUS

## 2024-10-02 PROBLEM — R78.81 BACTEREMIA: Status: ACTIVE | Noted: 2024-10-02

## 2024-10-02 LAB
ACB COMPLEX DNA BLD POS QL NAA+NON-PROBE: NOT DETECTED
ACCESSION NUMBER, LLC1M: ABNORMAL
ALBUMIN SERPL-MCNC: 2.5 G/DL (ref 3.5–5)
ALBUMIN/GLOB SERPL: 0.5 (ref 1.1–2.2)
ALP SERPL-CCNC: 179 U/L (ref 45–117)
ALT SERPL-CCNC: 37 U/L (ref 12–78)
ANION GAP SERPL CALC-SCNC: 9 MMOL/L (ref 2–12)
AST SERPL-CCNC: 44 U/L (ref 15–37)
B FRAGILIS DNA BLD POS QL NAA+NON-PROBE: NOT DETECTED
BILIRUB DIRECT SERPL-MCNC: 0.3 MG/DL (ref 0–0.2)
BILIRUB SERPL-MCNC: 0.7 MG/DL (ref 0.2–1)
BIOFIRE TEST COMMENT: ABNORMAL
BUN SERPL-MCNC: 27 MG/DL (ref 6–20)
BUN/CREAT SERPL: 27 (ref 12–20)
C ALBICANS DNA BLD POS QL NAA+NON-PROBE: NOT DETECTED
C AURIS DNA BLD POS QL NAA+NON-PROBE: NOT DETECTED
C GATTII+NEOFOR DNA BLD POS QL NAA+N-PRB: NOT DETECTED
C GLABRATA DNA BLD POS QL NAA+NON-PROBE: NOT DETECTED
C KRUSEI DNA BLD POS QL NAA+NON-PROBE: NOT DETECTED
C PARAP DNA BLD POS QL NAA+NON-PROBE: NOT DETECTED
C TROPICLS DNA BLD POS QL NAA+NON-PROBE: NOT DETECTED
CALCIUM SERPL-MCNC: 9 MG/DL (ref 8.5–10.1)
CHLORIDE SERPL-SCNC: 100 MMOL/L (ref 97–108)
CO2 SERPL-SCNC: 22 MMOL/L (ref 21–32)
COMMENT:: NORMAL
CREAT SERPL-MCNC: 1.01 MG/DL (ref 0.7–1.3)
E CLOAC COMP DNA BLD POS NAA+NON-PROBE: NOT DETECTED
E COLI DNA BLD POS QL NAA+NON-PROBE: NOT DETECTED
E FAECALIS DNA BLD POS QL NAA+NON-PROBE: NOT DETECTED
E FAECIUM DNA BLD POS QL NAA+NON-PROBE: NOT DETECTED
ENTEROBACTERALES DNA BLD POS NAA+N-PRB: NOT DETECTED
ERYTHROCYTE [DISTWIDTH] IN BLOOD BY AUTOMATED COUNT: 18.2 % (ref 11.5–14.5)
GLOBULIN SER CALC-MCNC: 5 G/DL (ref 2–4)
GLUCOSE SERPL-MCNC: 123 MG/DL (ref 65–100)
GP B STREP DNA BLD POS QL NAA+NON-PROBE: NOT DETECTED
HAEM INFLU DNA BLD POS QL NAA+NON-PROBE: NOT DETECTED
HCT VFR BLD AUTO: 26.8 % (ref 36.6–50.3)
HGB BLD-MCNC: 8.4 G/DL (ref 12.1–17)
INR PPP: 1.8 (ref 0.9–1.1)
K OXYTOCA DNA BLD POS QL NAA+NON-PROBE: NOT DETECTED
KLEBSIELLA SP DNA BLD POS QL NAA+NON-PRB: NOT DETECTED
KLEBSIELLA SP DNA BLD POS QL NAA+NON-PRB: NOT DETECTED
L MONOCYTOG DNA BLD POS QL NAA+NON-PROBE: NOT DETECTED
LDH SERPL L TO P-CCNC: 343 U/L (ref 85–241)
MAGNESIUM SERPL-MCNC: 2.3 MG/DL (ref 1.6–2.4)
MCH RBC QN AUTO: 29.9 PG (ref 26–34)
MCHC RBC AUTO-ENTMCNC: 31.3 G/DL (ref 30–36.5)
MCV RBC AUTO: 95.4 FL (ref 80–99)
MECA+MECC+MREJ ISLT/SPM QL: NOT DETECTED
N MEN DNA BLD POS QL NAA+NON-PROBE: NOT DETECTED
NRBC # BLD: 0.03 K/UL (ref 0–0.01)
NRBC BLD-RTO: 0.2 PER 100 WBC
NT PRO BNP: 4517 PG/ML
P AERUGINOSA DNA BLD POS NAA+NON-PROBE: NOT DETECTED
PLATELET # BLD AUTO: 345 K/UL (ref 150–400)
PMV BLD AUTO: 10.5 FL (ref 8.9–12.9)
POTASSIUM SERPL-SCNC: 4.6 MMOL/L (ref 3.5–5.1)
PROT SERPL-MCNC: 7.5 G/DL (ref 6.4–8.2)
PROTEUS SP DNA BLD POS QL NAA+NON-PROBE: NOT DETECTED
PROTHROMBIN TIME: 17.7 SEC (ref 9–11.1)
RBC # BLD AUTO: 2.81 M/UL (ref 4.1–5.7)
RESISTANT GENE TARGETS: ABNORMAL
S AUREUS DNA BLD POS QL NAA+NON-PROBE: DETECTED
S AUREUS+CONS DNA BLD POS NAA+NON-PROBE: DETECTED
S EPIDERMIDIS DNA BLD POS QL NAA+NON-PRB: NOT DETECTED
S LUGDUNENSIS DNA BLD POS QL NAA+NON-PRB: NOT DETECTED
S MALTOPHILIA DNA BLD POS QL NAA+NON-PRB: NOT DETECTED
S MARCESCENS DNA BLD POS NAA+NON-PROBE: NOT DETECTED
S PNEUM DNA BLD POS QL NAA+NON-PROBE: NOT DETECTED
S PYO DNA BLD POS QL NAA+NON-PROBE: NOT DETECTED
SALMONELLA DNA BLD POS QL NAA+NON-PROBE: NOT DETECTED
SODIUM SERPL-SCNC: 131 MMOL/L (ref 136–145)
SPECIMEN HOLD: NORMAL
STREPTOCOCCUS DNA BLD POS NAA+NON-PROBE: NOT DETECTED
WBC # BLD AUTO: 18.2 K/UL (ref 4.1–11.1)

## 2024-10-02 PROCEDURE — 6370000000 HC RX 637 (ALT 250 FOR IP): Performed by: INTERNAL MEDICINE

## 2024-10-02 PROCEDURE — 99232 SBSQ HOSP IP/OBS MODERATE 35: CPT | Performed by: NURSE PRACTITIONER

## 2024-10-02 PROCEDURE — 83615 LACTATE (LD) (LDH) ENZYME: CPT

## 2024-10-02 PROCEDURE — 2580000003 HC RX 258: Performed by: HOSPITALIST

## 2024-10-02 PROCEDURE — 83735 ASSAY OF MAGNESIUM: CPT

## 2024-10-02 PROCEDURE — 93750 INTERROGATION VAD IN PERSON: CPT | Performed by: NURSE PRACTITIONER

## 2024-10-02 PROCEDURE — 85610 PROTHROMBIN TIME: CPT

## 2024-10-02 PROCEDURE — 99231 SBSQ HOSP IP/OBS SF/LOW 25: CPT | Performed by: NURSE PRACTITIONER

## 2024-10-02 PROCEDURE — 2580000003 HC RX 258: Performed by: INTERNAL MEDICINE

## 2024-10-02 PROCEDURE — 36415 COLL VENOUS BLD VENIPUNCTURE: CPT

## 2024-10-02 PROCEDURE — 6370000000 HC RX 637 (ALT 250 FOR IP): Performed by: FAMILY MEDICINE

## 2024-10-02 PROCEDURE — 80048 BASIC METABOLIC PNL TOTAL CA: CPT

## 2024-10-02 PROCEDURE — 83880 ASSAY OF NATRIURETIC PEPTIDE: CPT

## 2024-10-02 PROCEDURE — 85027 COMPLETE CBC AUTOMATED: CPT

## 2024-10-02 PROCEDURE — 2580000003 HC RX 258: Performed by: FAMILY MEDICINE

## 2024-10-02 PROCEDURE — 80076 HEPATIC FUNCTION PANEL: CPT

## 2024-10-02 PROCEDURE — 2060000000 HC ICU INTERMEDIATE R&B

## 2024-10-02 PROCEDURE — 6370000000 HC RX 637 (ALT 250 FOR IP): Performed by: NURSE PRACTITIONER

## 2024-10-02 PROCEDURE — 6360000002 HC RX W HCPCS: Performed by: NURSE PRACTITIONER

## 2024-10-02 PROCEDURE — 6360000002 HC RX W HCPCS: Performed by: HOSPITALIST

## 2024-10-02 RX ORDER — AMLODIPINE BESYLATE 5 MG/1
5 TABLET ORAL NIGHTLY
Status: DISCONTINUED | OUTPATIENT
Start: 2024-10-02 | End: 2024-10-15

## 2024-10-02 RX ORDER — WARFARIN SODIUM 1 MG/1
1.5 TABLET ORAL
Status: COMPLETED | OUTPATIENT
Start: 2024-10-02 | End: 2024-10-02

## 2024-10-02 RX ADMIN — ACETAMINOPHEN 650 MG: 325 TABLET ORAL at 14:14

## 2024-10-02 RX ADMIN — ISOSORBIDE DINITRATE 40 MG: 20 TABLET ORAL at 14:13

## 2024-10-02 RX ADMIN — SODIUM CHLORIDE, PRESERVATIVE FREE 10 ML: 5 INJECTION INTRAVENOUS at 23:00

## 2024-10-02 RX ADMIN — Medication: at 23:21

## 2024-10-02 RX ADMIN — ATORVASTATIN CALCIUM 80 MG: 40 TABLET, FILM COATED ORAL at 09:58

## 2024-10-02 RX ADMIN — PETROLATUM: 420 OINTMENT TOPICAL at 10:01

## 2024-10-02 RX ADMIN — AMLODIPINE BESYLATE 5 MG: 5 TABLET ORAL at 09:57

## 2024-10-02 RX ADMIN — Medication 1000 UNITS: at 10:00

## 2024-10-02 RX ADMIN — ACETAMINOPHEN 650 MG: 325 TABLET ORAL at 19:05

## 2024-10-02 RX ADMIN — ACETAMINOPHEN 650 MG: 325 TABLET ORAL at 07:12

## 2024-10-02 RX ADMIN — HYDRALAZINE HYDROCHLORIDE 100 MG: 50 TABLET ORAL at 23:21

## 2024-10-02 RX ADMIN — CEFEPIME 2000 MG: 2 INJECTION, POWDER, FOR SOLUTION INTRAVENOUS at 09:52

## 2024-10-02 RX ADMIN — AMIODARONE HYDROCHLORIDE 200 MG: 200 TABLET ORAL at 09:56

## 2024-10-02 RX ADMIN — ISOSORBIDE DINITRATE 40 MG: 20 TABLET ORAL at 07:12

## 2024-10-02 RX ADMIN — Medication: at 10:02

## 2024-10-02 RX ADMIN — PETROLATUM: 420 OINTMENT TOPICAL at 23:24

## 2024-10-02 RX ADMIN — ISOSORBIDE DINITRATE 40 MG: 20 TABLET ORAL at 23:21

## 2024-10-02 RX ADMIN — ENOXAPARIN SODIUM 60 MG: 100 INJECTION SUBCUTANEOUS at 23:27

## 2024-10-02 RX ADMIN — SODIUM CHLORIDE, PRESERVATIVE FREE 10 ML: 5 INJECTION INTRAVENOUS at 10:00

## 2024-10-02 RX ADMIN — PANTOPRAZOLE SODIUM 40 MG: 40 TABLET, DELAYED RELEASE ORAL at 07:12

## 2024-10-02 RX ADMIN — VANCOMYCIN HYDROCHLORIDE 750 MG: 750 INJECTION, POWDER, LYOPHILIZED, FOR SOLUTION INTRAVENOUS at 14:21

## 2024-10-02 RX ADMIN — AMLODIPINE BESYLATE 5 MG: 5 TABLET ORAL at 23:21

## 2024-10-02 RX ADMIN — METOPROLOL SUCCINATE 25 MG: 25 TABLET, FILM COATED, EXTENDED RELEASE ORAL at 23:21

## 2024-10-02 RX ADMIN — VANCOMYCIN HYDROCHLORIDE 750 MG: 750 INJECTION, POWDER, LYOPHILIZED, FOR SOLUTION INTRAVENOUS at 02:33

## 2024-10-02 RX ADMIN — METOPROLOL SUCCINATE 25 MG: 25 TABLET, FILM COATED, EXTENDED RELEASE ORAL at 09:59

## 2024-10-02 RX ADMIN — WARFARIN SODIUM 1.5 MG: 1 TABLET ORAL at 19:05

## 2024-10-02 RX ADMIN — TRAMADOL HYDROCHLORIDE 50 MG: 50 TABLET ORAL at 23:21

## 2024-10-02 RX ADMIN — MAGNESIUM OXIDE TAB 400 MG (241.3 MG ELEMENTAL MG) 400 MG: 400 (241.3 MG) TAB at 09:58

## 2024-10-02 RX ADMIN — ENOXAPARIN SODIUM 60 MG: 100 INJECTION SUBCUTANEOUS at 09:53

## 2024-10-02 RX ADMIN — HYDRALAZINE HYDROCHLORIDE 100 MG: 50 TABLET ORAL at 07:12

## 2024-10-02 RX ADMIN — ASPIRIN 81 MG: 81 TABLET, COATED ORAL at 09:57

## 2024-10-02 RX ADMIN — HYDRALAZINE HYDROCHLORIDE 100 MG: 50 TABLET ORAL at 14:15

## 2024-10-02 RX ADMIN — ACETAMINOPHEN 650 MG: 325 TABLET ORAL at 23:20

## 2024-10-02 ASSESSMENT — PAIN SCALES - GENERAL
PAINLEVEL_OUTOF10: 5
PAINLEVEL_OUTOF10: 5
PAINLEVEL_OUTOF10: 8
PAINLEVEL_OUTOF10: 3
PAINLEVEL_OUTOF10: 4
PAINLEVEL_OUTOF10: 8
PAINLEVEL_OUTOF10: 2
PAINLEVEL_OUTOF10: 8

## 2024-10-02 ASSESSMENT — PAIN DESCRIPTION - FREQUENCY: FREQUENCY: CONTINUOUS

## 2024-10-02 ASSESSMENT — PAIN DESCRIPTION - ORIENTATION: ORIENTATION: RIGHT;LEFT

## 2024-10-02 ASSESSMENT — PAIN DESCRIPTION - LOCATION
LOCATION: FOOT
LOCATION: FOOT

## 2024-10-03 LAB
ALBUMIN SERPL-MCNC: 2.6 G/DL (ref 3.5–5)
ALBUMIN/GLOB SERPL: 0.5 (ref 1.1–2.2)
ALP SERPL-CCNC: 186 U/L (ref 45–117)
ALT SERPL-CCNC: 42 U/L (ref 12–78)
ANION GAP SERPL CALC-SCNC: 5 MMOL/L (ref 2–12)
AST SERPL-CCNC: 54 U/L (ref 15–37)
BILIRUB DIRECT SERPL-MCNC: 0.4 MG/DL (ref 0–0.2)
BILIRUB SERPL-MCNC: 0.9 MG/DL (ref 0.2–1)
BUN SERPL-MCNC: 25 MG/DL (ref 6–20)
BUN/CREAT SERPL: 29 (ref 12–20)
CALCIUM SERPL-MCNC: 8.7 MG/DL (ref 8.5–10.1)
CHLORIDE SERPL-SCNC: 103 MMOL/L (ref 97–108)
CO2 SERPL-SCNC: 24 MMOL/L (ref 21–32)
CREAT SERPL-MCNC: 0.86 MG/DL (ref 0.7–1.3)
CRP SERPL-MCNC: 22.8 MG/DL (ref 0–0.3)
ERYTHROCYTE [DISTWIDTH] IN BLOOD BY AUTOMATED COUNT: 17.9 % (ref 11.5–14.5)
GLOBULIN SER CALC-MCNC: 4.9 G/DL (ref 2–4)
GLUCOSE SERPL-MCNC: 73 MG/DL (ref 65–100)
HCT VFR BLD AUTO: 25.6 % (ref 36.6–50.3)
HGB BLD-MCNC: 8 G/DL (ref 12.1–17)
INR PPP: 1.6 (ref 0.9–1.1)
LDH SERPL L TO P-CCNC: 333 U/L (ref 85–241)
MAGNESIUM SERPL-MCNC: 2.2 MG/DL (ref 1.6–2.4)
MCH RBC QN AUTO: 29.9 PG (ref 26–34)
MCHC RBC AUTO-ENTMCNC: 31.3 G/DL (ref 30–36.5)
MCV RBC AUTO: 95.5 FL (ref 80–99)
NRBC # BLD: 0.03 K/UL (ref 0–0.01)
NRBC BLD-RTO: 0.2 PER 100 WBC
NT PRO BNP: 3894 PG/ML
PLATELET # BLD AUTO: 404 K/UL (ref 150–400)
PMV BLD AUTO: 10.6 FL (ref 8.9–12.9)
POTASSIUM SERPL-SCNC: 4.4 MMOL/L (ref 3.5–5.1)
PROT SERPL-MCNC: 7.5 G/DL (ref 6.4–8.2)
PROTHROMBIN TIME: 16.5 SEC (ref 9–11.1)
RBC # BLD AUTO: 2.68 M/UL (ref 4.1–5.7)
SODIUM SERPL-SCNC: 132 MMOL/L (ref 136–145)
WBC # BLD AUTO: 14.8 K/UL (ref 4.1–11.1)

## 2024-10-03 PROCEDURE — 2580000003 HC RX 258: Performed by: INTERNAL MEDICINE

## 2024-10-03 PROCEDURE — 76937 US GUIDE VASCULAR ACCESS: CPT

## 2024-10-03 PROCEDURE — 6360000002 HC RX W HCPCS: Performed by: NURSE PRACTITIONER

## 2024-10-03 PROCEDURE — 6370000000 HC RX 637 (ALT 250 FOR IP): Performed by: FAMILY MEDICINE

## 2024-10-03 PROCEDURE — 36415 COLL VENOUS BLD VENIPUNCTURE: CPT

## 2024-10-03 PROCEDURE — 80076 HEPATIC FUNCTION PANEL: CPT

## 2024-10-03 PROCEDURE — 2709999900 HC NON-CHARGEABLE SUPPLY

## 2024-10-03 PROCEDURE — 85610 PROTHROMBIN TIME: CPT

## 2024-10-03 PROCEDURE — 6370000000 HC RX 637 (ALT 250 FOR IP): Performed by: HOSPITALIST

## 2024-10-03 PROCEDURE — 99231 SBSQ HOSP IP/OBS SF/LOW 25: CPT | Performed by: NURSE PRACTITIONER

## 2024-10-03 PROCEDURE — 6370000000 HC RX 637 (ALT 250 FOR IP): Performed by: INTERNAL MEDICINE

## 2024-10-03 PROCEDURE — 2060000000 HC ICU INTERMEDIATE R&B

## 2024-10-03 PROCEDURE — 6360000002 HC RX W HCPCS: Performed by: HOSPITALIST

## 2024-10-03 PROCEDURE — 80048 BASIC METABOLIC PNL TOTAL CA: CPT

## 2024-10-03 PROCEDURE — 6370000000 HC RX 637 (ALT 250 FOR IP): Performed by: NURSE PRACTITIONER

## 2024-10-03 PROCEDURE — 83735 ASSAY OF MAGNESIUM: CPT

## 2024-10-03 PROCEDURE — 99232 SBSQ HOSP IP/OBS MODERATE 35: CPT | Performed by: NURSE PRACTITIONER

## 2024-10-03 PROCEDURE — 86140 C-REACTIVE PROTEIN: CPT

## 2024-10-03 PROCEDURE — 87040 BLOOD CULTURE FOR BACTERIA: CPT

## 2024-10-03 PROCEDURE — 83615 LACTATE (LD) (LDH) ENZYME: CPT

## 2024-10-03 PROCEDURE — 93750 INTERROGATION VAD IN PERSON: CPT | Performed by: NURSE PRACTITIONER

## 2024-10-03 PROCEDURE — 85027 COMPLETE CBC AUTOMATED: CPT

## 2024-10-03 PROCEDURE — 2580000003 HC RX 258: Performed by: FAMILY MEDICINE

## 2024-10-03 PROCEDURE — 2580000003 HC RX 258: Performed by: HOSPITALIST

## 2024-10-03 PROCEDURE — 83880 ASSAY OF NATRIURETIC PEPTIDE: CPT

## 2024-10-03 RX ORDER — WARFARIN SODIUM 1 MG/1
2 TABLET ORAL
Status: COMPLETED | OUTPATIENT
Start: 2024-10-03 | End: 2024-10-03

## 2024-10-03 RX ORDER — METRONIDAZOLE 250 MG/1
500 TABLET ORAL EVERY 8 HOURS SCHEDULED
Status: DISCONTINUED | OUTPATIENT
Start: 2024-10-03 | End: 2024-10-25

## 2024-10-03 RX ADMIN — ISOSORBIDE DINITRATE 40 MG: 20 TABLET ORAL at 08:41

## 2024-10-03 RX ADMIN — HYDRALAZINE HYDROCHLORIDE 100 MG: 50 TABLET ORAL at 13:39

## 2024-10-03 RX ADMIN — ACETAMINOPHEN 650 MG: 325 TABLET ORAL at 18:21

## 2024-10-03 RX ADMIN — TRAMADOL HYDROCHLORIDE 50 MG: 50 TABLET ORAL at 14:44

## 2024-10-03 RX ADMIN — ASPIRIN 81 MG: 81 TABLET, COATED ORAL at 10:03

## 2024-10-03 RX ADMIN — AMIODARONE HYDROCHLORIDE 200 MG: 200 TABLET ORAL at 10:04

## 2024-10-03 RX ADMIN — ACETAMINOPHEN 650 MG: 325 TABLET ORAL at 08:40

## 2024-10-03 RX ADMIN — Medication 1000 UNITS: at 10:05

## 2024-10-03 RX ADMIN — CEFEPIME 2000 MG: 2 INJECTION, POWDER, FOR SOLUTION INTRAVENOUS at 11:43

## 2024-10-03 RX ADMIN — METOPROLOL SUCCINATE 25 MG: 25 TABLET, FILM COATED, EXTENDED RELEASE ORAL at 10:04

## 2024-10-03 RX ADMIN — SODIUM CHLORIDE, PRESERVATIVE FREE 10 ML: 5 INJECTION INTRAVENOUS at 10:06

## 2024-10-03 RX ADMIN — ACETAMINOPHEN 650 MG: 325 TABLET ORAL at 23:15

## 2024-10-03 RX ADMIN — METRONIDAZOLE 500 MG: 250 TABLET ORAL at 09:15

## 2024-10-03 RX ADMIN — MAGNESIUM OXIDE TAB 400 MG (241.3 MG ELEMENTAL MG) 400 MG: 400 (241.3 MG) TAB at 10:03

## 2024-10-03 RX ADMIN — ENOXAPARIN SODIUM 60 MG: 100 INJECTION SUBCUTANEOUS at 23:14

## 2024-10-03 RX ADMIN — SODIUM CHLORIDE, PRESERVATIVE FREE 10 ML: 5 INJECTION INTRAVENOUS at 23:00

## 2024-10-03 RX ADMIN — ISOSORBIDE DINITRATE 40 MG: 20 TABLET ORAL at 23:15

## 2024-10-03 RX ADMIN — METRONIDAZOLE 500 MG: 250 TABLET ORAL at 13:48

## 2024-10-03 RX ADMIN — VANCOMYCIN HYDROCHLORIDE 750 MG: 750 INJECTION, POWDER, LYOPHILIZED, FOR SOLUTION INTRAVENOUS at 03:41

## 2024-10-03 RX ADMIN — VANCOMYCIN HYDROCHLORIDE 750 MG: 750 INJECTION, POWDER, LYOPHILIZED, FOR SOLUTION INTRAVENOUS at 13:47

## 2024-10-03 RX ADMIN — AMLODIPINE BESYLATE 5 MG: 5 TABLET ORAL at 23:15

## 2024-10-03 RX ADMIN — HYDRALAZINE HYDROCHLORIDE 100 MG: 50 TABLET ORAL at 08:41

## 2024-10-03 RX ADMIN — PETROLATUM: 420 OINTMENT TOPICAL at 10:11

## 2024-10-03 RX ADMIN — METOPROLOL SUCCINATE 25 MG: 25 TABLET, FILM COATED, EXTENDED RELEASE ORAL at 23:15

## 2024-10-03 RX ADMIN — WARFARIN SODIUM 2 MG: 1 TABLET ORAL at 18:21

## 2024-10-03 RX ADMIN — PANTOPRAZOLE SODIUM 40 MG: 40 TABLET, DELAYED RELEASE ORAL at 08:41

## 2024-10-03 RX ADMIN — TRAMADOL HYDROCHLORIDE 50 MG: 50 TABLET ORAL at 23:43

## 2024-10-03 RX ADMIN — ATORVASTATIN CALCIUM 80 MG: 40 TABLET, FILM COATED ORAL at 10:03

## 2024-10-03 RX ADMIN — ENOXAPARIN SODIUM 60 MG: 100 INJECTION SUBCUTANEOUS at 10:05

## 2024-10-03 RX ADMIN — HYDRALAZINE HYDROCHLORIDE 100 MG: 50 TABLET ORAL at 23:15

## 2024-10-03 RX ADMIN — METRONIDAZOLE 500 MG: 250 TABLET ORAL at 23:15

## 2024-10-03 RX ADMIN — ISOSORBIDE DINITRATE 40 MG: 20 TABLET ORAL at 13:40

## 2024-10-03 RX ADMIN — AMLODIPINE BESYLATE 5 MG: 5 TABLET ORAL at 10:04

## 2024-10-03 RX ADMIN — SODIUM CHLORIDE: 900 INJECTION INTRAVENOUS at 03:39

## 2024-10-03 RX ADMIN — SODIUM CHLORIDE, PRESERVATIVE FREE 10 ML: 5 INJECTION INTRAVENOUS at 10:11

## 2024-10-03 RX ADMIN — ACETAMINOPHEN 650 MG: 325 TABLET ORAL at 13:39

## 2024-10-03 ASSESSMENT — PAIN SCALES - GENERAL
PAINLEVEL_OUTOF10: 8
PAINLEVEL_OUTOF10: 10
PAINLEVEL_OUTOF10: 8
PAINLEVEL_OUTOF10: 7
PAINLEVEL_OUTOF10: 7

## 2024-10-03 ASSESSMENT — PAIN DESCRIPTION - LOCATION
LOCATION: FOOT
LOCATION: FOOT

## 2024-10-03 ASSESSMENT — PAIN DESCRIPTION - ORIENTATION
ORIENTATION: RIGHT;LEFT
ORIENTATION: RIGHT;LEFT

## 2024-10-03 ASSESSMENT — PAIN DESCRIPTION - DESCRIPTORS: DESCRIPTORS: ACHING;SHARP

## 2024-10-03 NOTE — WOUND CARE
WOCN Note:     Follow up for feet and legs.  Seen in 465/01     69 y.o. y/o male admitted on 9/17/2024    Hyponatremia [E87.1]  Avulsion fracture [T14.8XXA]  Injury of left rotator cuff, initial encounter [S46.002A]   History of CHF  WBC = 14.8 trending down  Cultures obtained 9/30/24  On Maxipime, flagyl, & vancomycin  Diet: reg           Assessment:   Appropriately conversational and reports improvement in hands & feet.  Surface: GUZMAN mattress    Sacrum intact without redness.  Readily turns himself onto his right side.     Extremely dry skin to feet, legs, hands, & arms and improving.   Large, hard crusts of skin previously removed from bottom of feet revealing tender intact skin.  Lotion applied by RN this morning.     1.  right medial ankle erosion, partial thickness  3 x 2 x 0.1 cm  Pink base with open margins  No exudate  Essentially unchanged  Other areas now open with dry skin plaques relieved; pink bases  Tx: cleaned with Vashe and covered with AG foam      2. POA left dorsal ankle wound, full thickness  6 x 4 x 0.6 cm  Full thickness wound with tendon visible  No purulence  Tx: cleaned with Vashe, packed with AG collagen and covered with AG foam      3. Left & right heels now partial thickness wounds post resolution of dry encrusted skin  Red bases with scant bleeding  Cleaned with Vashe and covered with silver foam      Recommend:    Dry skin: remedy moisturizer in generous amounts daily  Right leg & heels: clean with Vashe and cover with silver foam.  Change every 3 days.  Secure with LOOSELY wrapped ACE.   Left dorsal foot: clean with Vashe, apply Puracol AG (left in room) and cover with silver foam.  Change every 3 days.  Secure with LOOSELY wrapped ACE.   and Ryan Protocol:  Turn/reposition approximately every 2 hours  Offload heels with heels hanging off end of pillow at all times while in bed.  Sacral Preventive dressing: change as needed ~every 4 days. Discontinue if incontinence is frequently

## 2024-10-03 NOTE — CARDIO/PULMONARY
Chart reviewed: Patient is 69 y.o. male admitted with Hyponatremia [E87.1]  Avulsion fracture [T14.8XXA]  Injury of left rotator cuff, initial encounter [S46.002A].   s/p LVAD 9/2023.    Patient is not a candidate for cardiac rehab d/t non-healing foot wound, recent fall, and will be discharged to a SNF.    PETER HARO RN

## 2024-10-03 NOTE — CARE COORDINATION
Transition of Care Plan:     RUR: 19%  Prior Level of Functioning: LVAD coord Bety. [place] g[place]f's brian does he driveline dressing changes, SPC for mobility. Owns RW   Disposition: (Needs ID plan, PICC line) Plan is [facility] and needs auth. Will be on IV ABX x 6 weeks. Will go w loaner LVAD equipment. (For nursing- patient needs at least 4 days worth of driveline supplies)  If SNF or IPR: Date FOC offered: 9/24, 9/25  Date FOC received: 9/24, 9/25  Accepting facility: [facility]  Date authorization started with reference number: Needs  Date authorization received and expires:   Follow up appointments: Specialist  DME needed: Defer to rehab  Transportation at discharge: ELEANOR Delgado [facility]- [phone]  IM/IMM Medicare/ letter given: 9/18  Is patient a  and connected with VA?               If yes, was [place] transfer form completed and VA notified?   Caregiver Contact: SisterCatalina [phone]   Discharge Caregiver contacted prior to discharge?   Care Conference needed?   Barriers to discharge:  Medical- negative blood culture, SNF auth, Isolation room at SNF    Patient discussed in IDRs and patient will need IV ABX x 6 weeks. Waiting on negative blood culture before he can d/c to SNF. CM will continue to follow.    CANDACE Hale CCM  Care Management

## 2024-10-03 NOTE — PROCEDURES
Vascular Access Team    Peripheral IV catheter insertion note     A 20 gauge, 45 mm (1.75 inch) PIV was inserted into the right ventral forearm using ultrasound guidance. The IV flushed easily and had brisk blood return. The patient tolerated the procedure well.             Venipuncture for collection of venous blood note    Ultrasound guided venipuncture performed to collect blood for blood cultures performed in the left antecubital fossa. The patient tolerated the procedure well.          Tyler Villanueva RN

## 2024-10-04 PROBLEM — R53.81 DEBILITY: Status: ACTIVE | Noted: 2024-10-04

## 2024-10-04 PROBLEM — M79.604 PAIN IN BOTH LOWER EXTREMITIES: Status: ACTIVE | Noted: 2024-10-04

## 2024-10-04 PROBLEM — Z51.5 PALLIATIVE CARE ENCOUNTER: Status: ACTIVE | Noted: 2024-10-04

## 2024-10-04 PROBLEM — M79.605 PAIN IN BOTH LOWER EXTREMITIES: Status: ACTIVE | Noted: 2024-10-04

## 2024-10-04 LAB
ALBUMIN SERPL-MCNC: 2.4 G/DL (ref 3.5–5)
ALBUMIN/GLOB SERPL: 0.7 (ref 1.1–2.2)
ALP SERPL-CCNC: 210 U/L (ref 45–117)
ALT SERPL-CCNC: 39 U/L (ref 12–78)
ANION GAP SERPL CALC-SCNC: 4 MMOL/L (ref 2–12)
AST SERPL-CCNC: 46 U/L (ref 15–37)
BACTERIA SPEC CULT: ABNORMAL
BILIRUB DIRECT SERPL-MCNC: 0.4 MG/DL (ref 0–0.2)
BILIRUB SERPL-MCNC: 0.8 MG/DL (ref 0.2–1)
BUN SERPL-MCNC: 27 MG/DL (ref 6–20)
BUN/CREAT SERPL: 33 (ref 12–20)
CALCIUM SERPL-MCNC: 8.2 MG/DL (ref 8.5–10.1)
CHLORIDE SERPL-SCNC: 106 MMOL/L (ref 97–108)
CO2 SERPL-SCNC: 23 MMOL/L (ref 21–32)
COMMENT:: NORMAL
CREAT SERPL-MCNC: 0.82 MG/DL (ref 0.7–1.3)
ERYTHROCYTE [DISTWIDTH] IN BLOOD BY AUTOMATED COUNT: 17.8 % (ref 11.5–14.5)
GLOBULIN SER CALC-MCNC: 3.5 G/DL (ref 2–4)
GLUCOSE SERPL-MCNC: 86 MG/DL (ref 65–100)
GRAM STN SPEC: ABNORMAL
GRAM STN SPEC: ABNORMAL
HCT VFR BLD AUTO: 20.1 % (ref 36.6–50.3)
HCT VFR BLD AUTO: 23.1 % (ref 36.6–50.3)
HGB BLD-MCNC: 6.3 G/DL (ref 12.1–17)
HGB BLD-MCNC: 7.2 G/DL (ref 12.1–17)
HISTORY CHECK: NORMAL
INR PPP: 1.8 (ref 0.9–1.1)
LDH SERPL L TO P-CCNC: 370 U/L (ref 85–241)
MAGNESIUM SERPL-MCNC: 2.1 MG/DL (ref 1.6–2.4)
MCH RBC QN AUTO: 29.9 PG (ref 26–34)
MCHC RBC AUTO-ENTMCNC: 31.3 G/DL (ref 30–36.5)
MCV RBC AUTO: 95.3 FL (ref 80–99)
NRBC # BLD: 0.06 K/UL (ref 0–0.01)
NRBC BLD-RTO: 0.4 PER 100 WBC
NT PRO BNP: 4960 PG/ML
PLATELET # BLD AUTO: 382 K/UL (ref 150–400)
PMV BLD AUTO: 10.1 FL (ref 8.9–12.9)
POTASSIUM SERPL-SCNC: 4.6 MMOL/L (ref 3.5–5.1)
PROT SERPL-MCNC: 5.9 G/DL (ref 6.4–8.2)
PROTHROMBIN TIME: 18.2 SEC (ref 9–11.1)
RBC # BLD AUTO: 2.11 M/UL (ref 4.1–5.7)
SERVICE CMNT-IMP: ABNORMAL
SERVICE CMNT-IMP: ABNORMAL
SODIUM SERPL-SCNC: 133 MMOL/L (ref 136–145)
SPECIMEN HOLD: NORMAL
WBC # BLD AUTO: 16.3 K/UL (ref 4.1–11.1)

## 2024-10-04 PROCEDURE — 80048 BASIC METABOLIC PNL TOTAL CA: CPT

## 2024-10-04 PROCEDURE — 6370000000 HC RX 637 (ALT 250 FOR IP): Performed by: INTERNAL MEDICINE

## 2024-10-04 PROCEDURE — 2060000000 HC ICU INTERMEDIATE R&B

## 2024-10-04 PROCEDURE — 6370000000 HC RX 637 (ALT 250 FOR IP): Performed by: NURSE PRACTITIONER

## 2024-10-04 PROCEDURE — 86900 BLOOD TYPING SEROLOGIC ABO: CPT

## 2024-10-04 PROCEDURE — 6360000002 HC RX W HCPCS: Performed by: HOSPITALIST

## 2024-10-04 PROCEDURE — 6370000000 HC RX 637 (ALT 250 FOR IP): Performed by: FAMILY MEDICINE

## 2024-10-04 PROCEDURE — 99222 1ST HOSP IP/OBS MODERATE 55: CPT | Performed by: PHYSICAL MEDICINE & REHABILITATION

## 2024-10-04 PROCEDURE — 6360000002 HC RX W HCPCS: Performed by: NURSE PRACTITIONER

## 2024-10-04 PROCEDURE — 2580000003 HC RX 258: Performed by: INTERNAL MEDICINE

## 2024-10-04 PROCEDURE — 36430 TRANSFUSION BLD/BLD COMPNT: CPT

## 2024-10-04 PROCEDURE — 85018 HEMOGLOBIN: CPT

## 2024-10-04 PROCEDURE — P9016 RBC LEUKOCYTES REDUCED: HCPCS

## 2024-10-04 PROCEDURE — 2580000003 HC RX 258: Performed by: FAMILY MEDICINE

## 2024-10-04 PROCEDURE — 86901 BLOOD TYPING SEROLOGIC RH(D): CPT

## 2024-10-04 PROCEDURE — 86923 COMPATIBILITY TEST ELECTRIC: CPT

## 2024-10-04 PROCEDURE — 36415 COLL VENOUS BLD VENIPUNCTURE: CPT

## 2024-10-04 PROCEDURE — 99232 SBSQ HOSP IP/OBS MODERATE 35: CPT | Performed by: NURSE PRACTITIONER

## 2024-10-04 PROCEDURE — 83735 ASSAY OF MAGNESIUM: CPT

## 2024-10-04 PROCEDURE — 80076 HEPATIC FUNCTION PANEL: CPT

## 2024-10-04 PROCEDURE — 85610 PROTHROMBIN TIME: CPT

## 2024-10-04 PROCEDURE — 85014 HEMATOCRIT: CPT

## 2024-10-04 PROCEDURE — 83615 LACTATE (LD) (LDH) ENZYME: CPT

## 2024-10-04 PROCEDURE — 2580000003 HC RX 258: Performed by: HOSPITALIST

## 2024-10-04 PROCEDURE — 86850 RBC ANTIBODY SCREEN: CPT

## 2024-10-04 PROCEDURE — 85027 COMPLETE CBC AUTOMATED: CPT

## 2024-10-04 PROCEDURE — 93750 INTERROGATION VAD IN PERSON: CPT | Performed by: NURSE PRACTITIONER

## 2024-10-04 PROCEDURE — 83880 ASSAY OF NATRIURETIC PEPTIDE: CPT

## 2024-10-04 RX ORDER — SODIUM CHLORIDE 9 MG/ML
INJECTION, SOLUTION INTRAVENOUS PRN
Status: DISCONTINUED | OUTPATIENT
Start: 2024-10-04 | End: 2024-10-05

## 2024-10-04 RX ORDER — WARFARIN SODIUM 1 MG/1
2 TABLET ORAL
Status: COMPLETED | OUTPATIENT
Start: 2024-10-04 | End: 2024-10-04

## 2024-10-04 RX ADMIN — ENOXAPARIN SODIUM 60 MG: 100 INJECTION SUBCUTANEOUS at 09:38

## 2024-10-04 RX ADMIN — ENOXAPARIN SODIUM 60 MG: 100 INJECTION SUBCUTANEOUS at 22:25

## 2024-10-04 RX ADMIN — ACETAMINOPHEN 650 MG: 325 TABLET ORAL at 11:37

## 2024-10-04 RX ADMIN — PANTOPRAZOLE SODIUM 40 MG: 40 TABLET, DELAYED RELEASE ORAL at 07:55

## 2024-10-04 RX ADMIN — SODIUM CHLORIDE, PRESERVATIVE FREE 10 ML: 5 INJECTION INTRAVENOUS at 09:38

## 2024-10-04 RX ADMIN — SODIUM CHLORIDE: 900 INJECTION INTRAVENOUS at 18:21

## 2024-10-04 RX ADMIN — METRONIDAZOLE 500 MG: 250 TABLET ORAL at 15:49

## 2024-10-04 RX ADMIN — ISOSORBIDE DINITRATE 40 MG: 20 TABLET ORAL at 22:26

## 2024-10-04 RX ADMIN — TRAMADOL HYDROCHLORIDE 50 MG: 50 TABLET ORAL at 09:37

## 2024-10-04 RX ADMIN — ACETAMINOPHEN 650 MG: 325 TABLET ORAL at 07:56

## 2024-10-04 RX ADMIN — ACETAMINOPHEN 650 MG: 325 TABLET ORAL at 18:20

## 2024-10-04 RX ADMIN — METOPROLOL SUCCINATE 25 MG: 25 TABLET, FILM COATED, EXTENDED RELEASE ORAL at 09:37

## 2024-10-04 RX ADMIN — SODIUM CHLORIDE: 900 INJECTION INTRAVENOUS at 11:39

## 2024-10-04 RX ADMIN — METRONIDAZOLE 500 MG: 250 TABLET ORAL at 22:26

## 2024-10-04 RX ADMIN — HYDRALAZINE HYDROCHLORIDE 100 MG: 50 TABLET ORAL at 07:55

## 2024-10-04 RX ADMIN — SODIUM CHLORIDE, PRESERVATIVE FREE 10 ML: 5 INJECTION INTRAVENOUS at 22:23

## 2024-10-04 RX ADMIN — CEFEPIME 2000 MG: 2 INJECTION, POWDER, FOR SOLUTION INTRAVENOUS at 11:41

## 2024-10-04 RX ADMIN — ASPIRIN 81 MG: 81 TABLET, COATED ORAL at 09:37

## 2024-10-04 RX ADMIN — ISOSORBIDE DINITRATE 40 MG: 20 TABLET ORAL at 07:56

## 2024-10-04 RX ADMIN — AMLODIPINE BESYLATE 5 MG: 5 TABLET ORAL at 09:38

## 2024-10-04 RX ADMIN — Medication 1000 UNITS: at 09:37

## 2024-10-04 RX ADMIN — VANCOMYCIN HYDROCHLORIDE 750 MG: 750 INJECTION, POWDER, LYOPHILIZED, FOR SOLUTION INTRAVENOUS at 18:23

## 2024-10-04 RX ADMIN — METOPROLOL SUCCINATE 25 MG: 25 TABLET, FILM COATED, EXTENDED RELEASE ORAL at 22:26

## 2024-10-04 RX ADMIN — AMIODARONE HYDROCHLORIDE 200 MG: 200 TABLET ORAL at 09:37

## 2024-10-04 RX ADMIN — ISOSORBIDE DINITRATE 40 MG: 20 TABLET ORAL at 15:49

## 2024-10-04 RX ADMIN — MAGNESIUM OXIDE TAB 400 MG (241.3 MG ELEMENTAL MG) 400 MG: 400 (241.3 MG) TAB at 09:37

## 2024-10-04 RX ADMIN — WARFARIN SODIUM 2 MG: 1 TABLET ORAL at 18:20

## 2024-10-04 RX ADMIN — TRAMADOL HYDROCHLORIDE 50 MG: 50 TABLET ORAL at 22:26

## 2024-10-04 RX ADMIN — VANCOMYCIN HYDROCHLORIDE 750 MG: 750 INJECTION, POWDER, LYOPHILIZED, FOR SOLUTION INTRAVENOUS at 02:57

## 2024-10-04 RX ADMIN — METRONIDAZOLE 500 MG: 250 TABLET ORAL at 07:56

## 2024-10-04 RX ADMIN — ATORVASTATIN CALCIUM 80 MG: 40 TABLET, FILM COATED ORAL at 09:37

## 2024-10-04 RX ADMIN — HYDRALAZINE HYDROCHLORIDE 100 MG: 50 TABLET ORAL at 15:50

## 2024-10-04 RX ADMIN — AMLODIPINE BESYLATE 5 MG: 5 TABLET ORAL at 22:26

## 2024-10-04 RX ADMIN — HYDRALAZINE HYDROCHLORIDE 100 MG: 50 TABLET ORAL at 22:26

## 2024-10-04 ASSESSMENT — PAIN SCALES - GENERAL
PAINLEVEL_OUTOF10: 6
PAINLEVEL_OUTOF10: 3
PAINLEVEL_OUTOF10: 5
PAINLEVEL_OUTOF10: 6
PAINLEVEL_OUTOF10: 5
PAINLEVEL_OUTOF10: 2
PAINLEVEL_OUTOF10: 2
PAINLEVEL_OUTOF10: 8
PAINLEVEL_OUTOF10: 5

## 2024-10-04 ASSESSMENT — PAIN DESCRIPTION - LOCATION
LOCATION: LEG
LOCATION: FOOT
LOCATION: FOOT
LOCATION: LEG

## 2024-10-04 ASSESSMENT — PAIN DESCRIPTION - ORIENTATION
ORIENTATION: RIGHT;LEFT
ORIENTATION: LEFT;RIGHT
ORIENTATION: RIGHT;LEFT;LOWER

## 2024-10-04 ASSESSMENT — PAIN DESCRIPTION - DESCRIPTORS: DESCRIPTORS: ACHING;THROBBING

## 2024-10-04 NOTE — CONSENT
Informed Consent for Blood Component Transfusion Note    I have discussed with the patient the rationale for blood component transfusion; its benefits in treating or preventing fatigue, organ damage, or death; and its risk which includes mild transfusion reactions, rare risk of blood borne infection, or more serious but rare reactions. I have discussed the alternatives to transfusion, including the risk and consequences of not receiving transfusion. The patient had an opportunity to ask questions and had agreed to proceed with transfusion of blood components.    Electronically signed by YASMIN Harp NP on 10/4/24 at 10:18 AM EDT

## 2024-10-05 LAB
ABO + RH BLD: NORMAL
ALBUMIN SERPL-MCNC: 2.4 G/DL (ref 3.5–5)
ALBUMIN/GLOB SERPL: 0.6 (ref 1.1–2.2)
ALP SERPL-CCNC: 251 U/L (ref 45–117)
ALT SERPL-CCNC: 39 U/L (ref 12–78)
ANION GAP SERPL CALC-SCNC: 6 MMOL/L (ref 2–12)
AST SERPL-CCNC: 49 U/L (ref 15–37)
BACTERIA SPEC CULT: ABNORMAL
BILIRUB DIRECT SERPL-MCNC: 0.4 MG/DL (ref 0–0.2)
BILIRUB SERPL-MCNC: 0.7 MG/DL (ref 0.2–1)
BLD PROD TYP BPU: NORMAL
BLOOD BANK BLOOD PRODUCT EXPIRATION DATE: NORMAL
BLOOD BANK DISPENSE STATUS: NORMAL
BLOOD BANK ISBT PRODUCT BLOOD TYPE: 7300
BLOOD BANK PRODUCT CODE: NORMAL
BLOOD BANK UNIT TYPE AND RH: NORMAL
BLOOD GROUP ANTIBODIES SERPL: NORMAL
BPU ID: NORMAL
BUN SERPL-MCNC: 28 MG/DL (ref 6–20)
BUN/CREAT SERPL: 32 (ref 12–20)
CALCIUM SERPL-MCNC: 7.9 MG/DL (ref 8.5–10.1)
CHLORIDE SERPL-SCNC: 103 MMOL/L (ref 97–108)
CO2 SERPL-SCNC: 21 MMOL/L (ref 21–32)
CREAT SERPL-MCNC: 0.87 MG/DL (ref 0.7–1.3)
CROSSMATCH RESULT: NORMAL
ERYTHROCYTE [DISTWIDTH] IN BLOOD BY AUTOMATED COUNT: 17.5 % (ref 11.5–14.5)
GLOBULIN SER CALC-MCNC: 3.8 G/DL (ref 2–4)
GLUCOSE SERPL-MCNC: 123 MG/DL (ref 65–100)
GRAM STN SPEC: ABNORMAL
HCT VFR BLD AUTO: 24.5 % (ref 36.6–50.3)
HGB BLD-MCNC: 8 G/DL (ref 12.1–17)
INR PPP: 2.1 (ref 0.9–1.1)
LDH SERPL L TO P-CCNC: 390 U/L (ref 85–241)
MAGNESIUM SERPL-MCNC: 2.2 MG/DL (ref 1.6–2.4)
MCH RBC QN AUTO: 29.7 PG (ref 26–34)
MCHC RBC AUTO-ENTMCNC: 32.7 G/DL (ref 30–36.5)
MCV RBC AUTO: 91.1 FL (ref 80–99)
NRBC # BLD: 0.09 K/UL (ref 0–0.01)
NRBC BLD-RTO: 0.6 PER 100 WBC
NT PRO BNP: 3735 PG/ML
PLATELET # BLD AUTO: 363 K/UL (ref 150–400)
PMV BLD AUTO: 9.7 FL (ref 8.9–12.9)
POTASSIUM SERPL-SCNC: 5.1 MMOL/L (ref 3.5–5.1)
PROT SERPL-MCNC: 6.2 G/DL (ref 6.4–8.2)
PROTHROMBIN TIME: 20.9 SEC (ref 9–11.1)
RBC # BLD AUTO: 2.69 M/UL (ref 4.1–5.7)
SERVICE CMNT-IMP: ABNORMAL
SODIUM SERPL-SCNC: 130 MMOL/L (ref 136–145)
SPECIMEN EXP DATE BLD: NORMAL
UNIT DIVISION: 0
UNIT ISSUE DATE/TIME: NORMAL
VANCOMYCIN SERPL-MCNC: 18.5 UG/ML
WBC # BLD AUTO: 15.8 K/UL (ref 4.1–11.1)

## 2024-10-05 PROCEDURE — 93750 INTERROGATION VAD IN PERSON: CPT | Performed by: NURSE PRACTITIONER

## 2024-10-05 PROCEDURE — 85610 PROTHROMBIN TIME: CPT

## 2024-10-05 PROCEDURE — 6370000000 HC RX 637 (ALT 250 FOR IP): Performed by: NURSE PRACTITIONER

## 2024-10-05 PROCEDURE — 6370000000 HC RX 637 (ALT 250 FOR IP): Performed by: INTERNAL MEDICINE

## 2024-10-05 PROCEDURE — 2060000000 HC ICU INTERMEDIATE R&B

## 2024-10-05 PROCEDURE — 2580000003 HC RX 258: Performed by: FAMILY MEDICINE

## 2024-10-05 PROCEDURE — 36415 COLL VENOUS BLD VENIPUNCTURE: CPT

## 2024-10-05 PROCEDURE — 99232 SBSQ HOSP IP/OBS MODERATE 35: CPT | Performed by: NURSE PRACTITIONER

## 2024-10-05 PROCEDURE — 83615 LACTATE (LD) (LDH) ENZYME: CPT

## 2024-10-05 PROCEDURE — 6370000000 HC RX 637 (ALT 250 FOR IP): Performed by: FAMILY MEDICINE

## 2024-10-05 PROCEDURE — 6360000002 HC RX W HCPCS: Performed by: HOSPITALIST

## 2024-10-05 PROCEDURE — 6360000002 HC RX W HCPCS: Performed by: INTERNAL MEDICINE

## 2024-10-05 PROCEDURE — 2580000003 HC RX 258: Performed by: INTERNAL MEDICINE

## 2024-10-05 PROCEDURE — 6360000002 HC RX W HCPCS: Performed by: NURSE PRACTITIONER

## 2024-10-05 PROCEDURE — 85027 COMPLETE CBC AUTOMATED: CPT

## 2024-10-05 PROCEDURE — 6370000000 HC RX 637 (ALT 250 FOR IP): Performed by: HOSPITALIST

## 2024-10-05 PROCEDURE — 80076 HEPATIC FUNCTION PANEL: CPT

## 2024-10-05 PROCEDURE — 83880 ASSAY OF NATRIURETIC PEPTIDE: CPT

## 2024-10-05 PROCEDURE — 80048 BASIC METABOLIC PNL TOTAL CA: CPT

## 2024-10-05 PROCEDURE — 80202 ASSAY OF VANCOMYCIN: CPT

## 2024-10-05 PROCEDURE — 2580000003 HC RX 258: Performed by: HOSPITALIST

## 2024-10-05 PROCEDURE — 83735 ASSAY OF MAGNESIUM: CPT

## 2024-10-05 RX ORDER — WARFARIN SODIUM 1 MG/1
2 TABLET ORAL
Status: COMPLETED | OUTPATIENT
Start: 2024-10-05 | End: 2024-10-05

## 2024-10-05 RX ADMIN — SODIUM CHLORIDE, PRESERVATIVE FREE 10 ML: 5 INJECTION INTRAVENOUS at 09:59

## 2024-10-05 RX ADMIN — PETROLATUM: 420 OINTMENT TOPICAL at 18:48

## 2024-10-05 RX ADMIN — METRONIDAZOLE 500 MG: 250 TABLET ORAL at 20:28

## 2024-10-05 RX ADMIN — TRAMADOL HYDROCHLORIDE 50 MG: 50 TABLET ORAL at 20:28

## 2024-10-05 RX ADMIN — AMLODIPINE BESYLATE 5 MG: 5 TABLET ORAL at 20:28

## 2024-10-05 RX ADMIN — ACETAMINOPHEN 650 MG: 325 TABLET ORAL at 18:47

## 2024-10-05 RX ADMIN — Medication 1000 UNITS: at 09:59

## 2024-10-05 RX ADMIN — TRAMADOL HYDROCHLORIDE 50 MG: 50 TABLET ORAL at 07:09

## 2024-10-05 RX ADMIN — HYDRALAZINE HYDROCHLORIDE 100 MG: 50 TABLET ORAL at 07:03

## 2024-10-05 RX ADMIN — ACETAMINOPHEN 650 MG: 325 TABLET ORAL at 02:53

## 2024-10-05 RX ADMIN — METRONIDAZOLE 500 MG: 250 TABLET ORAL at 07:03

## 2024-10-05 RX ADMIN — HYDRALAZINE HYDROCHLORIDE 100 MG: 50 TABLET ORAL at 14:32

## 2024-10-05 RX ADMIN — AMIODARONE HYDROCHLORIDE 200 MG: 200 TABLET ORAL at 09:59

## 2024-10-05 RX ADMIN — WARFARIN SODIUM 2 MG: 1 TABLET ORAL at 18:46

## 2024-10-05 RX ADMIN — HYDRALAZINE HYDROCHLORIDE 100 MG: 50 TABLET ORAL at 20:28

## 2024-10-05 RX ADMIN — METRONIDAZOLE 500 MG: 250 TABLET ORAL at 14:32

## 2024-10-05 RX ADMIN — MAGNESIUM OXIDE TAB 400 MG (241.3 MG ELEMENTAL MG) 400 MG: 400 (241.3 MG) TAB at 09:59

## 2024-10-05 RX ADMIN — SODIUM CHLORIDE, PRESERVATIVE FREE 10 ML: 5 INJECTION INTRAVENOUS at 20:30

## 2024-10-05 RX ADMIN — ENOXAPARIN SODIUM 60 MG: 100 INJECTION SUBCUTANEOUS at 20:29

## 2024-10-05 RX ADMIN — METOPROLOL SUCCINATE 25 MG: 25 TABLET, FILM COATED, EXTENDED RELEASE ORAL at 09:59

## 2024-10-05 RX ADMIN — VANCOMYCIN HYDROCHLORIDE 750 MG: 750 INJECTION, POWDER, LYOPHILIZED, FOR SOLUTION INTRAVENOUS at 03:15

## 2024-10-05 RX ADMIN — ISOSORBIDE DINITRATE 40 MG: 20 TABLET ORAL at 14:32

## 2024-10-05 RX ADMIN — ISOSORBIDE DINITRATE 40 MG: 20 TABLET ORAL at 20:28

## 2024-10-05 RX ADMIN — ENOXAPARIN SODIUM 60 MG: 100 INJECTION SUBCUTANEOUS at 09:59

## 2024-10-05 RX ADMIN — SODIUM CHLORIDE: 900 INJECTION INTRAVENOUS at 14:30

## 2024-10-05 RX ADMIN — CEFEPIME 2000 MG: 2 INJECTION, POWDER, FOR SOLUTION INTRAVENOUS at 10:02

## 2024-10-05 RX ADMIN — ACETAMINOPHEN 650 MG: 325 TABLET ORAL at 07:03

## 2024-10-05 RX ADMIN — PANTOPRAZOLE SODIUM 40 MG: 40 TABLET, DELAYED RELEASE ORAL at 07:03

## 2024-10-05 RX ADMIN — ACETAMINOPHEN 650 MG: 325 TABLET ORAL at 14:32

## 2024-10-05 RX ADMIN — ATORVASTATIN CALCIUM 80 MG: 40 TABLET, FILM COATED ORAL at 09:59

## 2024-10-05 RX ADMIN — VANCOMYCIN HYDROCHLORIDE 750 MG: 750 INJECTION, POWDER, LYOPHILIZED, FOR SOLUTION INTRAVENOUS at 14:31

## 2024-10-05 RX ADMIN — CEFEPIME 2000 MG: 2 INJECTION, POWDER, FOR SOLUTION INTRAVENOUS at 20:26

## 2024-10-05 RX ADMIN — METOPROLOL SUCCINATE 25 MG: 25 TABLET, FILM COATED, EXTENDED RELEASE ORAL at 20:28

## 2024-10-05 RX ADMIN — AMLODIPINE BESYLATE 5 MG: 5 TABLET ORAL at 09:59

## 2024-10-05 RX ADMIN — ISOSORBIDE DINITRATE 40 MG: 20 TABLET ORAL at 07:03

## 2024-10-05 RX ADMIN — ASPIRIN 81 MG: 81 TABLET, COATED ORAL at 09:59

## 2024-10-05 ASSESSMENT — PAIN DESCRIPTION - ORIENTATION
ORIENTATION: LEFT;RIGHT
ORIENTATION: RIGHT;LEFT

## 2024-10-05 ASSESSMENT — PAIN SCALES - GENERAL
PAINLEVEL_OUTOF10: 2
PAINLEVEL_OUTOF10: 3
PAINLEVEL_OUTOF10: 2
PAINLEVEL_OUTOF10: 5
PAINLEVEL_OUTOF10: 2
PAINLEVEL_OUTOF10: 3
PAINLEVEL_OUTOF10: 8

## 2024-10-05 ASSESSMENT — PAIN DESCRIPTION - LOCATION
LOCATION: FOOT
LOCATION: FOOT

## 2024-10-05 ASSESSMENT — PAIN DESCRIPTION - DESCRIPTORS
DESCRIPTORS: ACHING;THROBBING;SHARP
DESCRIPTORS: ACHING;SHARP

## 2024-10-06 LAB
ALBUMIN SERPL-MCNC: 2.5 G/DL (ref 3.5–5)
ALBUMIN/GLOB SERPL: 0.6 (ref 1.1–2.2)
ALP SERPL-CCNC: 264 U/L (ref 45–117)
ALT SERPL-CCNC: 40 U/L (ref 12–78)
ANION GAP SERPL CALC-SCNC: 4 MMOL/L (ref 2–12)
AST SERPL-CCNC: 51 U/L (ref 15–37)
BACTERIA SPEC CULT: NORMAL
BILIRUB DIRECT SERPL-MCNC: 0.5 MG/DL (ref 0–0.2)
BILIRUB SERPL-MCNC: 0.8 MG/DL (ref 0.2–1)
BUN SERPL-MCNC: 27 MG/DL (ref 6–20)
BUN/CREAT SERPL: 31 (ref 12–20)
CALCIUM SERPL-MCNC: 7.9 MG/DL (ref 8.5–10.1)
CHLORIDE SERPL-SCNC: 107 MMOL/L (ref 97–108)
CO2 SERPL-SCNC: 21 MMOL/L (ref 21–32)
CREAT SERPL-MCNC: 0.86 MG/DL (ref 0.7–1.3)
ERYTHROCYTE [DISTWIDTH] IN BLOOD BY AUTOMATED COUNT: 17.9 % (ref 11.5–14.5)
GLOBULIN SER CALC-MCNC: 4 G/DL (ref 2–4)
GLUCOSE SERPL-MCNC: 115 MG/DL (ref 65–100)
HCT VFR BLD AUTO: 26.6 % (ref 36.6–50.3)
HGB BLD-MCNC: 8.5 G/DL (ref 12.1–17)
INR PPP: 2.3 (ref 0.9–1.1)
LDH SERPL L TO P-CCNC: 410 U/L (ref 85–241)
MAGNESIUM SERPL-MCNC: 2.2 MG/DL (ref 1.6–2.4)
MCH RBC QN AUTO: 30 PG (ref 26–34)
MCHC RBC AUTO-ENTMCNC: 32 G/DL (ref 30–36.5)
MCV RBC AUTO: 94 FL (ref 80–99)
NRBC # BLD: 0.08 K/UL (ref 0–0.01)
NRBC BLD-RTO: 0.5 PER 100 WBC
NT PRO BNP: 3554 PG/ML
PLATELET # BLD AUTO: 387 K/UL (ref 150–400)
PMV BLD AUTO: 9.6 FL (ref 8.9–12.9)
POTASSIUM SERPL-SCNC: 5.3 MMOL/L (ref 3.5–5.1)
PROT SERPL-MCNC: 6.5 G/DL (ref 6.4–8.2)
PROTHROMBIN TIME: 23.2 SEC (ref 9–11.1)
RBC # BLD AUTO: 2.83 M/UL (ref 4.1–5.7)
SERVICE CMNT-IMP: NORMAL
SODIUM SERPL-SCNC: 132 MMOL/L (ref 136–145)
WBC # BLD AUTO: 15.1 K/UL (ref 4.1–11.1)

## 2024-10-06 PROCEDURE — 85027 COMPLETE CBC AUTOMATED: CPT

## 2024-10-06 PROCEDURE — 6370000000 HC RX 637 (ALT 250 FOR IP): Performed by: FAMILY MEDICINE

## 2024-10-06 PROCEDURE — 6370000000 HC RX 637 (ALT 250 FOR IP): Performed by: INTERNAL MEDICINE

## 2024-10-06 PROCEDURE — 99232 SBSQ HOSP IP/OBS MODERATE 35: CPT | Performed by: NURSE PRACTITIONER

## 2024-10-06 PROCEDURE — 36415 COLL VENOUS BLD VENIPUNCTURE: CPT

## 2024-10-06 PROCEDURE — 2580000003 HC RX 258: Performed by: INTERNAL MEDICINE

## 2024-10-06 PROCEDURE — 83880 ASSAY OF NATRIURETIC PEPTIDE: CPT

## 2024-10-06 PROCEDURE — 80076 HEPATIC FUNCTION PANEL: CPT

## 2024-10-06 PROCEDURE — 2580000003 HC RX 258: Performed by: HOSPITALIST

## 2024-10-06 PROCEDURE — 93750 INTERROGATION VAD IN PERSON: CPT | Performed by: NURSE PRACTITIONER

## 2024-10-06 PROCEDURE — 6360000002 HC RX W HCPCS: Performed by: NURSE PRACTITIONER

## 2024-10-06 PROCEDURE — 6360000002 HC RX W HCPCS: Performed by: INTERNAL MEDICINE

## 2024-10-06 PROCEDURE — 83615 LACTATE (LD) (LDH) ENZYME: CPT

## 2024-10-06 PROCEDURE — 6370000000 HC RX 637 (ALT 250 FOR IP): Performed by: NURSE PRACTITIONER

## 2024-10-06 PROCEDURE — 83735 ASSAY OF MAGNESIUM: CPT

## 2024-10-06 PROCEDURE — 80048 BASIC METABOLIC PNL TOTAL CA: CPT

## 2024-10-06 PROCEDURE — 2060000000 HC ICU INTERMEDIATE R&B

## 2024-10-06 PROCEDURE — 2580000003 HC RX 258: Performed by: FAMILY MEDICINE

## 2024-10-06 PROCEDURE — 6360000002 HC RX W HCPCS

## 2024-10-06 PROCEDURE — 85610 PROTHROMBIN TIME: CPT

## 2024-10-06 PROCEDURE — 6360000002 HC RX W HCPCS: Performed by: HOSPITALIST

## 2024-10-06 RX ORDER — HYDROMORPHONE HYDROCHLORIDE 2 MG/1
2 TABLET ORAL EVERY 4 HOURS PRN
Status: DISCONTINUED | OUTPATIENT
Start: 2024-10-06 | End: 2024-10-29 | Stop reason: HOSPADM

## 2024-10-06 RX ORDER — WARFARIN SODIUM 1 MG/1
2 TABLET ORAL
Status: COMPLETED | OUTPATIENT
Start: 2024-10-06 | End: 2024-10-06

## 2024-10-06 RX ORDER — HYDROMORPHONE HYDROCHLORIDE 1 MG/ML
1 INJECTION, SOLUTION INTRAMUSCULAR; INTRAVENOUS; SUBCUTANEOUS DAILY PRN
Status: DISCONTINUED | OUTPATIENT
Start: 2024-10-06 | End: 2024-10-29 | Stop reason: HOSPADM

## 2024-10-06 RX ORDER — TRAMADOL HYDROCHLORIDE 50 MG/1
100 TABLET ORAL EVERY 6 HOURS PRN
Status: DISCONTINUED | OUTPATIENT
Start: 2024-10-06 | End: 2024-10-29 | Stop reason: HOSPADM

## 2024-10-06 RX ADMIN — ASPIRIN 81 MG: 81 TABLET, COATED ORAL at 08:48

## 2024-10-06 RX ADMIN — METOPROLOL SUCCINATE 25 MG: 25 TABLET, FILM COATED, EXTENDED RELEASE ORAL at 20:27

## 2024-10-06 RX ADMIN — METRONIDAZOLE 500 MG: 250 TABLET ORAL at 06:46

## 2024-10-06 RX ADMIN — HYDROMORPHONE HYDROCHLORIDE 2 MG: 2 TABLET ORAL at 20:25

## 2024-10-06 RX ADMIN — MAGNESIUM OXIDE TAB 400 MG (241.3 MG ELEMENTAL MG) 400 MG: 400 (241.3 MG) TAB at 08:48

## 2024-10-06 RX ADMIN — SODIUM CHLORIDE, PRESERVATIVE FREE 10 ML: 5 INJECTION INTRAVENOUS at 20:27

## 2024-10-06 RX ADMIN — HYDROMORPHONE HYDROCHLORIDE 0.25 MG: 1 INJECTION, SOLUTION INTRAMUSCULAR; INTRAVENOUS; SUBCUTANEOUS at 03:42

## 2024-10-06 RX ADMIN — METOPROLOL SUCCINATE 25 MG: 25 TABLET, FILM COATED, EXTENDED RELEASE ORAL at 08:48

## 2024-10-06 RX ADMIN — ACETAMINOPHEN 650 MG: 325 TABLET ORAL at 00:30

## 2024-10-06 RX ADMIN — ISOSORBIDE DINITRATE 40 MG: 20 TABLET ORAL at 20:27

## 2024-10-06 RX ADMIN — ISOSORBIDE DINITRATE 40 MG: 20 TABLET ORAL at 14:46

## 2024-10-06 RX ADMIN — SODIUM CHLORIDE, PRESERVATIVE FREE 10 ML: 5 INJECTION INTRAVENOUS at 08:50

## 2024-10-06 RX ADMIN — HYDRALAZINE HYDROCHLORIDE 100 MG: 50 TABLET ORAL at 20:27

## 2024-10-06 RX ADMIN — AMIODARONE HYDROCHLORIDE 200 MG: 200 TABLET ORAL at 08:47

## 2024-10-06 RX ADMIN — ATORVASTATIN CALCIUM 80 MG: 40 TABLET, FILM COATED ORAL at 08:48

## 2024-10-06 RX ADMIN — SODIUM CHLORIDE: 9 INJECTION, SOLUTION INTRAVENOUS at 09:06

## 2024-10-06 RX ADMIN — CEFEPIME 2000 MG: 2 INJECTION, POWDER, FOR SOLUTION INTRAVENOUS at 18:00

## 2024-10-06 RX ADMIN — ACETAMINOPHEN 650 MG: 325 TABLET ORAL at 06:46

## 2024-10-06 RX ADMIN — ENOXAPARIN SODIUM 60 MG: 100 INJECTION SUBCUTANEOUS at 08:49

## 2024-10-06 RX ADMIN — Medication 1000 UNITS: at 08:49

## 2024-10-06 RX ADMIN — TRAMADOL HYDROCHLORIDE 50 MG: 50 TABLET ORAL at 03:18

## 2024-10-06 RX ADMIN — ISOSORBIDE DINITRATE 40 MG: 20 TABLET ORAL at 06:46

## 2024-10-06 RX ADMIN — VANCOMYCIN HYDROCHLORIDE 750 MG: 750 INJECTION, POWDER, LYOPHILIZED, FOR SOLUTION INTRAVENOUS at 15:06

## 2024-10-06 RX ADMIN — TRAMADOL HYDROCHLORIDE 50 MG: 50 TABLET ORAL at 09:15

## 2024-10-06 RX ADMIN — WARFARIN SODIUM 2 MG: 1 TABLET ORAL at 17:58

## 2024-10-06 RX ADMIN — ACETAMINOPHEN 650 MG: 325 TABLET ORAL at 17:58

## 2024-10-06 RX ADMIN — METRONIDAZOLE 500 MG: 250 TABLET ORAL at 20:26

## 2024-10-06 RX ADMIN — TRAMADOL HYDROCHLORIDE 100 MG: 50 TABLET ORAL at 15:19

## 2024-10-06 RX ADMIN — AMLODIPINE BESYLATE 5 MG: 5 TABLET ORAL at 08:48

## 2024-10-06 RX ADMIN — HYDRALAZINE HYDROCHLORIDE 100 MG: 50 TABLET ORAL at 06:46

## 2024-10-06 RX ADMIN — HYDROMORPHONE HYDROCHLORIDE 2 MG: 2 TABLET ORAL at 12:12

## 2024-10-06 RX ADMIN — PANTOPRAZOLE SODIUM 40 MG: 40 TABLET, DELAYED RELEASE ORAL at 06:46

## 2024-10-06 RX ADMIN — ACETAMINOPHEN 650 MG: 325 TABLET ORAL at 12:13

## 2024-10-06 RX ADMIN — CEFEPIME 2000 MG: 2 INJECTION, POWDER, FOR SOLUTION INTRAVENOUS at 09:08

## 2024-10-06 RX ADMIN — METRONIDAZOLE 500 MG: 250 TABLET ORAL at 14:46

## 2024-10-06 RX ADMIN — VANCOMYCIN HYDROCHLORIDE 750 MG: 750 INJECTION, POWDER, LYOPHILIZED, FOR SOLUTION INTRAVENOUS at 03:01

## 2024-10-06 RX ADMIN — HYDRALAZINE HYDROCHLORIDE 100 MG: 50 TABLET ORAL at 15:09

## 2024-10-06 RX ADMIN — AMLODIPINE BESYLATE 5 MG: 5 TABLET ORAL at 20:27

## 2024-10-06 ASSESSMENT — PAIN SCALES - GENERAL
PAINLEVEL_OUTOF10: 8
PAINLEVEL_OUTOF10: 9
PAINLEVEL_OUTOF10: 8
PAINLEVEL_OUTOF10: 5
PAINLEVEL_OUTOF10: 6
PAINLEVEL_OUTOF10: 8
PAINLEVEL_OUTOF10: 5
PAINLEVEL_OUTOF10: 6
PAINLEVEL_OUTOF10: 8

## 2024-10-06 ASSESSMENT — PAIN DESCRIPTION - ORIENTATION
ORIENTATION: RIGHT;LEFT
ORIENTATION: LEFT;RIGHT

## 2024-10-06 ASSESSMENT — PAIN DESCRIPTION - LOCATION
LOCATION: FOOT
LOCATION: FOOT
LOCATION: LEG
LOCATION: FOOT
LOCATION: LEG

## 2024-10-06 ASSESSMENT — PAIN DESCRIPTION - DESCRIPTORS
DESCRIPTORS: SHARP;STABBING
DESCRIPTORS: THROBBING;STABBING
DESCRIPTORS: SHARP;STABBING

## 2024-10-07 ENCOUNTER — APPOINTMENT (OUTPATIENT)
Facility: HOSPITAL | Age: 69
DRG: 640 | End: 2024-10-07
Payer: MEDICARE

## 2024-10-07 PROBLEM — B95.61 BACTEREMIA DUE TO METHICILLIN SUSCEPTIBLE STAPHYLOCOCCUS AUREUS (MSSA): Status: ACTIVE | Noted: 2024-10-02

## 2024-10-07 LAB
ALBUMIN SERPL-MCNC: 2.5 G/DL (ref 3.5–5)
ALBUMIN/GLOB SERPL: 0.6 (ref 1.1–2.2)
ALP SERPL-CCNC: 268 U/L (ref 45–117)
ALT SERPL-CCNC: 41 U/L (ref 12–78)
ANION GAP SERPL CALC-SCNC: 6 MMOL/L (ref 2–12)
AST SERPL-CCNC: 46 U/L (ref 15–37)
BILIRUB DIRECT SERPL-MCNC: 0.4 MG/DL (ref 0–0.2)
BILIRUB SERPL-MCNC: 0.7 MG/DL (ref 0.2–1)
BUN SERPL-MCNC: 28 MG/DL (ref 6–20)
BUN/CREAT SERPL: 31 (ref 12–20)
CALCIUM SERPL-MCNC: 8.2 MG/DL (ref 8.5–10.1)
CHLORIDE SERPL-SCNC: 105 MMOL/L (ref 97–108)
CO2 SERPL-SCNC: 20 MMOL/L (ref 21–32)
CREAT SERPL-MCNC: 0.9 MG/DL (ref 0.7–1.3)
ERYTHROCYTE [DISTWIDTH] IN BLOOD BY AUTOMATED COUNT: 18.6 % (ref 11.5–14.5)
GLOBULIN SER CALC-MCNC: 4.1 G/DL (ref 2–4)
GLUCOSE SERPL-MCNC: 125 MG/DL (ref 65–100)
HCT VFR BLD AUTO: 27.6 % (ref 36.6–50.3)
HGB BLD-MCNC: 8.7 G/DL (ref 12.1–17)
INR PPP: 2.5 (ref 0.9–1.1)
LDH SERPL L TO P-CCNC: 403 U/L (ref 85–241)
MAGNESIUM SERPL-MCNC: 2.2 MG/DL (ref 1.6–2.4)
MCH RBC QN AUTO: 30.2 PG (ref 26–34)
MCHC RBC AUTO-ENTMCNC: 31.5 G/DL (ref 30–36.5)
MCV RBC AUTO: 95.8 FL (ref 80–99)
NRBC # BLD: 0.09 K/UL (ref 0–0.01)
NRBC BLD-RTO: 0.6 PER 100 WBC
NT PRO BNP: 3587 PG/ML
PLATELET # BLD AUTO: 440 K/UL (ref 150–400)
PMV BLD AUTO: 9.9 FL (ref 8.9–12.9)
POTASSIUM SERPL-SCNC: 5.4 MMOL/L (ref 3.5–5.1)
PROT SERPL-MCNC: 6.6 G/DL (ref 6.4–8.2)
PROTHROMBIN TIME: 24.6 SEC (ref 9–11.1)
RBC # BLD AUTO: 2.88 M/UL (ref 4.1–5.7)
SODIUM SERPL-SCNC: 131 MMOL/L (ref 136–145)
WBC # BLD AUTO: 14.7 K/UL (ref 4.1–11.1)

## 2024-10-07 PROCEDURE — 99232 SBSQ HOSP IP/OBS MODERATE 35: CPT | Performed by: INTERNAL MEDICINE

## 2024-10-07 PROCEDURE — 36415 COLL VENOUS BLD VENIPUNCTURE: CPT

## 2024-10-07 PROCEDURE — 80048 BASIC METABOLIC PNL TOTAL CA: CPT

## 2024-10-07 PROCEDURE — 6360000002 HC RX W HCPCS: Performed by: INTERNAL MEDICINE

## 2024-10-07 PROCEDURE — 2580000003 HC RX 258: Performed by: HOSPITALIST

## 2024-10-07 PROCEDURE — 83880 ASSAY OF NATRIURETIC PEPTIDE: CPT

## 2024-10-07 PROCEDURE — 80076 HEPATIC FUNCTION PANEL: CPT

## 2024-10-07 PROCEDURE — 85610 PROTHROMBIN TIME: CPT

## 2024-10-07 PROCEDURE — 6360000002 HC RX W HCPCS: Performed by: HOSPITALIST

## 2024-10-07 PROCEDURE — 6370000000 HC RX 637 (ALT 250 FOR IP): Performed by: INTERNAL MEDICINE

## 2024-10-07 PROCEDURE — 87040 BLOOD CULTURE FOR BACTERIA: CPT

## 2024-10-07 PROCEDURE — 6360000002 HC RX W HCPCS: Performed by: NURSE PRACTITIONER

## 2024-10-07 PROCEDURE — 2580000003 HC RX 258: Performed by: INTERNAL MEDICINE

## 2024-10-07 PROCEDURE — APPSS15 APP SPLIT SHARED TIME 0-15 MINUTES: Performed by: NURSE PRACTITIONER

## 2024-10-07 PROCEDURE — 2580000003 HC RX 258: Performed by: NURSE PRACTITIONER

## 2024-10-07 PROCEDURE — 2060000000 HC ICU INTERMEDIATE R&B

## 2024-10-07 PROCEDURE — 97535 SELF CARE MNGMENT TRAINING: CPT

## 2024-10-07 PROCEDURE — 93750 INTERROGATION VAD IN PERSON: CPT | Performed by: INTERNAL MEDICINE

## 2024-10-07 PROCEDURE — 6370000000 HC RX 637 (ALT 250 FOR IP): Performed by: NURSE PRACTITIONER

## 2024-10-07 PROCEDURE — 99232 SBSQ HOSP IP/OBS MODERATE 35: CPT | Performed by: NURSE PRACTITIONER

## 2024-10-07 PROCEDURE — 6370000000 HC RX 637 (ALT 250 FOR IP): Performed by: FAMILY MEDICINE

## 2024-10-07 PROCEDURE — 97530 THERAPEUTIC ACTIVITIES: CPT

## 2024-10-07 PROCEDURE — 83615 LACTATE (LD) (LDH) ENZYME: CPT

## 2024-10-07 PROCEDURE — 85027 COMPLETE CBC AUTOMATED: CPT

## 2024-10-07 PROCEDURE — 83735 ASSAY OF MAGNESIUM: CPT

## 2024-10-07 PROCEDURE — 2580000003 HC RX 258: Performed by: FAMILY MEDICINE

## 2024-10-07 PROCEDURE — APPNB30 APP NON BILLABLE TIME 0-30 MINS: Performed by: NURSE PRACTITIONER

## 2024-10-07 RX ORDER — WARFARIN SODIUM 1 MG/1
1.5 TABLET ORAL
Status: COMPLETED | OUTPATIENT
Start: 2024-10-07 | End: 2024-10-07

## 2024-10-07 RX ADMIN — VANCOMYCIN HYDROCHLORIDE 750 MG: 750 INJECTION, POWDER, LYOPHILIZED, FOR SOLUTION INTRAVENOUS at 03:53

## 2024-10-07 RX ADMIN — MAGNESIUM OXIDE TAB 400 MG (241.3 MG ELEMENTAL MG) 400 MG: 400 (241.3 MG) TAB at 08:26

## 2024-10-07 RX ADMIN — ASPIRIN 81 MG: 81 TABLET, COATED ORAL at 08:26

## 2024-10-07 RX ADMIN — Medication 1000 UNITS: at 08:26

## 2024-10-07 RX ADMIN — SODIUM CHLORIDE, PRESERVATIVE FREE 10 ML: 5 INJECTION INTRAVENOUS at 08:26

## 2024-10-07 RX ADMIN — AMLODIPINE BESYLATE 5 MG: 5 TABLET ORAL at 08:26

## 2024-10-07 RX ADMIN — ACETAMINOPHEN 650 MG: 325 TABLET ORAL at 01:32

## 2024-10-07 RX ADMIN — HYDROMORPHONE HYDROCHLORIDE 2 MG: 2 TABLET ORAL at 01:32

## 2024-10-07 RX ADMIN — HYDRALAZINE HYDROCHLORIDE 100 MG: 50 TABLET ORAL at 21:29

## 2024-10-07 RX ADMIN — WATER 2000 MG: 1 INJECTION INTRAMUSCULAR; INTRAVENOUS; SUBCUTANEOUS at 11:01

## 2024-10-07 RX ADMIN — PANTOPRAZOLE SODIUM 40 MG: 40 TABLET, DELAYED RELEASE ORAL at 05:53

## 2024-10-07 RX ADMIN — METOPROLOL SUCCINATE 25 MG: 25 TABLET, FILM COATED, EXTENDED RELEASE ORAL at 08:26

## 2024-10-07 RX ADMIN — ISOSORBIDE DINITRATE 40 MG: 20 TABLET ORAL at 15:06

## 2024-10-07 RX ADMIN — CEFEPIME 2000 MG: 2 INJECTION, POWDER, FOR SOLUTION INTRAVENOUS at 05:47

## 2024-10-07 RX ADMIN — HYDRALAZINE HYDROCHLORIDE 100 MG: 50 TABLET ORAL at 05:53

## 2024-10-07 RX ADMIN — METRONIDAZOLE 500 MG: 250 TABLET ORAL at 15:06

## 2024-10-07 RX ADMIN — ISOSORBIDE DINITRATE 40 MG: 20 TABLET ORAL at 05:53

## 2024-10-07 RX ADMIN — WARFARIN SODIUM 1.5 MG: 1 TABLET ORAL at 19:07

## 2024-10-07 RX ADMIN — AMIODARONE HYDROCHLORIDE 200 MG: 200 TABLET ORAL at 08:26

## 2024-10-07 RX ADMIN — ACETAMINOPHEN 650 MG: 325 TABLET ORAL at 07:04

## 2024-10-07 RX ADMIN — METRONIDAZOLE 500 MG: 250 TABLET ORAL at 21:28

## 2024-10-07 RX ADMIN — ACETAMINOPHEN 650 MG: 325 TABLET ORAL at 19:07

## 2024-10-07 RX ADMIN — ACETAMINOPHEN 650 MG: 325 TABLET ORAL at 13:01

## 2024-10-07 RX ADMIN — AMLODIPINE BESYLATE 5 MG: 5 TABLET ORAL at 21:28

## 2024-10-07 RX ADMIN — SODIUM CHLORIDE, PRESERVATIVE FREE 10 ML: 5 INJECTION INTRAVENOUS at 21:30

## 2024-10-07 RX ADMIN — WATER 2000 MG: 1 INJECTION INTRAMUSCULAR; INTRAVENOUS; SUBCUTANEOUS at 19:07

## 2024-10-07 RX ADMIN — METOPROLOL SUCCINATE 25 MG: 25 TABLET, FILM COATED, EXTENDED RELEASE ORAL at 21:28

## 2024-10-07 RX ADMIN — ISOSORBIDE DINITRATE 40 MG: 20 TABLET ORAL at 21:28

## 2024-10-07 RX ADMIN — METRONIDAZOLE 500 MG: 250 TABLET ORAL at 05:53

## 2024-10-07 RX ADMIN — ATORVASTATIN CALCIUM 80 MG: 40 TABLET, FILM COATED ORAL at 08:26

## 2024-10-07 RX ADMIN — HYDRALAZINE HYDROCHLORIDE 100 MG: 50 TABLET ORAL at 15:06

## 2024-10-07 RX ADMIN — ACETAMINOPHEN 650 MG: 325 TABLET ORAL at 23:24

## 2024-10-07 RX ADMIN — HYDROMORPHONE HYDROCHLORIDE 2 MG: 2 TABLET ORAL at 11:01

## 2024-10-07 ASSESSMENT — PAIN DESCRIPTION - LOCATION
LOCATION: LEG
LOCATION: SHOULDER
LOCATION: FOOT
LOCATION: SHOULDER

## 2024-10-07 ASSESSMENT — PAIN SCALES - GENERAL
PAINLEVEL_OUTOF10: 8
PAINLEVEL_OUTOF10: 10
PAINLEVEL_OUTOF10: 3
PAINLEVEL_OUTOF10: 10
PAINLEVEL_OUTOF10: 4
PAINLEVEL_OUTOF10: 5
PAINLEVEL_OUTOF10: 7
PAINLEVEL_OUTOF10: 3
PAINLEVEL_OUTOF10: 7
PAINLEVEL_OUTOF10: 8
PAINLEVEL_OUTOF10: 5
PAINLEVEL_OUTOF10: 10
PAINLEVEL_OUTOF10: 2
PAINLEVEL_OUTOF10: 5

## 2024-10-07 ASSESSMENT — PAIN DESCRIPTION - DESCRIPTORS
DESCRIPTORS: ACHING
DESCRIPTORS: ACHING
DESCRIPTORS: SHOOTING;STABBING;SHARP
DESCRIPTORS: ACHING
DESCRIPTORS: ACHING
DESCRIPTORS: SHARP;ACHING

## 2024-10-07 ASSESSMENT — PAIN DESCRIPTION - ORIENTATION
ORIENTATION: RIGHT;LEFT
ORIENTATION: RIGHT;LEFT
ORIENTATION: LEFT
ORIENTATION: RIGHT;LEFT
ORIENTATION: RIGHT;LEFT
ORIENTATION: LEFT

## 2024-10-07 ASSESSMENT — PAIN - FUNCTIONAL ASSESSMENT: PAIN_FUNCTIONAL_ASSESSMENT: PREVENTS OR INTERFERES SOME ACTIVE ACTIVITIES AND ADLS

## 2024-10-07 NOTE — WOUND CARE
WOCN Note:     Follow up for feet and legs.  Seen in 465/01     69 y.o. y/o male admitted on 9/17/2024    Hyponatremia [E87.1]  Avulsion fracture [T14.8XXA]  Injury of left rotator cuff, initial encounter [S46.002A]   History of CHF  WBC = 14.7  Cultures obtained 9/30/24  On flagyl, & ancef  Diet: reg           Assessment:   Appropriately conversational and sitting up in recliner with feet elevated  Surface: GUZMAN mattress    Extremely dry skin to feet, legs, hands, & arms and improving.   Large, hard crusts of skin previously removed from bottom of feet revealing tender intact skin.    1.  right medial ankle erosion, partial thickness  3 x 2 x 0.1 cm  Pink base with open margins  No exudate  Essentially unchanged  Other areas now open with dry skin plaques relieved; pink bases  Tx: cleaned with Vashe and covered with AG foam      2. POA left dorsal ankle wound, full thickness  6 x 4 x 0.6 cm  Full thickness wound with tendon visible  No purulence  Tx: cleaned with Vashe, packed with AG collagen and covered with AG foam      3. Left & right heels now partial thickness wounds post resolution of dry encrusted skin  Red bases with scant bleeding  Cleaned with Vashe and covered with silver foam        Recommend:    Dry skin: remedy moisturizer in generous amounts daily  Right leg & heels: clean with Vashe and cover with silver foam.  Change every 3 days.  Secure with LOOSELY wrapped ACE.   Left dorsal foot: clean with Vashe, apply Puracol AG (left in room) and cover with silver foam.  Change every 3 days.  Secure with LOOSELY wrapped ACE.   and Ryan Protocol:  Turn/reposition approximately every 2 hours  Offload heels with heels hanging off end of pillow at all times while in bed.  Sacral Preventive dressing: change as needed ~every 4 days. Discontinue if incontinence is frequently soiling dressing.       Has been seen by podiatry, infectious disease, and vascular - see notes.   No concerns to relay to

## 2024-10-07 NOTE — CARE COORDINATION
Transition of Care Plan:     RUR: 18%  Prior Level of Functioning: LVAD coord Bety. [place], g/f's brian does he driveline dressing changes, SPC for mobility. Owns RW   Disposition: (Needs ID plan, PICC line, paper rx's for narcotics) [facility] Will be on IV ABX x 6 weeks. Will go w loaner LVAD equipment. (For nursing- patient needs at least 4 days worth of driveline supplies)  If SNF or IPR: Date FOC offered: 9/24, 9/25  Date FOC received: 9/24, 9/25  Accepting facility: [facility]  Date authorization started with reference number: Needs  Date authorization received and expires:   Follow up appointments: Specialist  DME needed: Defer to rehab  Transportation at discharge: [facility]- [phone]  IM/IMM Medicare/ letter given: 9/18  Is patient a [place] and connected with VA?               If yes, was  transfer form completed and VA notified?   Caregiver Contact: SisterCatalina [phone]   Discharge Caregiver contacted prior to discharge?   Care Conference needed?   Barriers to discharge:  Medical, SNF auth    Patient discussed in IDRs and attending reports SNF can start auth today.  forwarded a message to [facility] liaison, Kathi and requested she start auth.  Patient will d/c on IV ABX x 6 weeks.     Update 11:43am: SNF will start auth after updated therapy notes are in.     CANDACE Hale CCM  Care Management

## 2024-10-08 ENCOUNTER — APPOINTMENT (OUTPATIENT)
Facility: HOSPITAL | Age: 69
DRG: 640 | End: 2024-10-08
Payer: MEDICARE

## 2024-10-08 PROBLEM — I50.23 ACUTE ON CHRONIC SYSTOLIC HEART FAILURE (HCC): Status: ACTIVE | Noted: 2023-10-31

## 2024-10-08 LAB
ALBUMIN SERPL-MCNC: 2.7 G/DL (ref 3.5–5)
ALBUMIN/GLOB SERPL: 0.5 (ref 1.1–2.2)
ALP SERPL-CCNC: 296 U/L (ref 45–117)
ALT SERPL-CCNC: 33 U/L (ref 12–78)
ANION GAP SERPL CALC-SCNC: 3 MMOL/L (ref 2–12)
AST SERPL-CCNC: 45 U/L (ref 15–37)
BILIRUB DIRECT SERPL-MCNC: 0.4 MG/DL (ref 0–0.2)
BILIRUB SERPL-MCNC: 0.7 MG/DL (ref 0.2–1)
BUN SERPL-MCNC: 30 MG/DL (ref 6–20)
BUN/CREAT SERPL: 27 (ref 12–20)
CALCIUM SERPL-MCNC: 9.5 MG/DL (ref 8.5–10.1)
CHLORIDE SERPL-SCNC: 103 MMOL/L (ref 97–108)
CO2 SERPL-SCNC: 23 MMOL/L (ref 21–32)
CREAT SERPL-MCNC: 1.1 MG/DL (ref 0.7–1.3)
ERYTHROCYTE [DISTWIDTH] IN BLOOD BY AUTOMATED COUNT: 19.3 % (ref 11.5–14.5)
GLOBULIN SER CALC-MCNC: 5.1 G/DL (ref 2–4)
GLUCOSE SERPL-MCNC: 135 MG/DL (ref 65–100)
HCT VFR BLD AUTO: 29.9 % (ref 36.6–50.3)
HGB BLD-MCNC: 9.2 G/DL (ref 12.1–17)
INR PPP: 3.3 (ref 0.9–1.1)
LDH SERPL L TO P-CCNC: 467 U/L (ref 85–241)
MAGNESIUM SERPL-MCNC: 2.4 MG/DL (ref 1.6–2.4)
MCH RBC QN AUTO: 30.2 PG (ref 26–34)
MCHC RBC AUTO-ENTMCNC: 30.8 G/DL (ref 30–36.5)
MCV RBC AUTO: 98 FL (ref 80–99)
NRBC # BLD: 0.11 K/UL (ref 0–0.01)
NRBC BLD-RTO: 0.7 PER 100 WBC
NT PRO BNP: 4033 PG/ML
PLATELET # BLD AUTO: 438 K/UL (ref 150–400)
PMV BLD AUTO: 9.6 FL (ref 8.9–12.9)
POTASSIUM SERPL-SCNC: 5.4 MMOL/L (ref 3.5–5.1)
PROT SERPL-MCNC: 7.8 G/DL (ref 6.4–8.2)
PROTHROMBIN TIME: 31.8 SEC (ref 9–11.1)
RBC # BLD AUTO: 3.05 M/UL (ref 4.1–5.7)
SODIUM SERPL-SCNC: 129 MMOL/L (ref 136–145)
WBC # BLD AUTO: 15.5 K/UL (ref 4.1–11.1)

## 2024-10-08 PROCEDURE — 6370000000 HC RX 637 (ALT 250 FOR IP): Performed by: FAMILY MEDICINE

## 2024-10-08 PROCEDURE — 85027 COMPLETE CBC AUTOMATED: CPT

## 2024-10-08 PROCEDURE — 83615 LACTATE (LD) (LDH) ENZYME: CPT

## 2024-10-08 PROCEDURE — 6370000000 HC RX 637 (ALT 250 FOR IP): Performed by: INTERNAL MEDICINE

## 2024-10-08 PROCEDURE — 85610 PROTHROMBIN TIME: CPT

## 2024-10-08 PROCEDURE — APPNB30 APP NON BILLABLE TIME 0-30 MINS: Performed by: NURSE PRACTITIONER

## 2024-10-08 PROCEDURE — 80076 HEPATIC FUNCTION PANEL: CPT

## 2024-10-08 PROCEDURE — 80048 BASIC METABOLIC PNL TOTAL CA: CPT

## 2024-10-08 PROCEDURE — 6370000000 HC RX 637 (ALT 250 FOR IP): Performed by: NURSE PRACTITIONER

## 2024-10-08 PROCEDURE — 97530 THERAPEUTIC ACTIVITIES: CPT

## 2024-10-08 PROCEDURE — 36415 COLL VENOUS BLD VENIPUNCTURE: CPT

## 2024-10-08 PROCEDURE — 6360000002 HC RX W HCPCS: Performed by: NURSE PRACTITIONER

## 2024-10-08 PROCEDURE — 2700000000 HC OXYGEN THERAPY PER DAY

## 2024-10-08 PROCEDURE — 2580000003 HC RX 258: Performed by: FAMILY MEDICINE

## 2024-10-08 PROCEDURE — 93750 INTERROGATION VAD IN PERSON: CPT | Performed by: INTERNAL MEDICINE

## 2024-10-08 PROCEDURE — 83735 ASSAY OF MAGNESIUM: CPT

## 2024-10-08 PROCEDURE — 99233 SBSQ HOSP IP/OBS HIGH 50: CPT | Performed by: INTERNAL MEDICINE

## 2024-10-08 PROCEDURE — 2580000003 HC RX 258: Performed by: INTERNAL MEDICINE

## 2024-10-08 PROCEDURE — 6360000002 HC RX W HCPCS: Performed by: INTERNAL MEDICINE

## 2024-10-08 PROCEDURE — 2580000003 HC RX 258: Performed by: NURSE PRACTITIONER

## 2024-10-08 PROCEDURE — 83880 ASSAY OF NATRIURETIC PEPTIDE: CPT

## 2024-10-08 PROCEDURE — 94760 N-INVAS EAR/PLS OXIMETRY 1: CPT

## 2024-10-08 PROCEDURE — 2060000000 HC ICU INTERMEDIATE R&B

## 2024-10-08 RX ORDER — AMMONIUM LACTATE 12 G/100G
CREAM TOPICAL 2 TIMES DAILY
Status: DISCONTINUED | OUTPATIENT
Start: 2024-10-08 | End: 2024-10-08 | Stop reason: ALTCHOICE

## 2024-10-08 RX ORDER — AMMONIUM LACTATE 12 G/100G
LOTION TOPICAL 2 TIMES DAILY
Status: DISCONTINUED | OUTPATIENT
Start: 2024-10-08 | End: 2024-10-16

## 2024-10-08 RX ORDER — FUROSEMIDE 10 MG/ML
40 INJECTION INTRAMUSCULAR; INTRAVENOUS 2 TIMES DAILY
Status: DISCONTINUED | OUTPATIENT
Start: 2024-10-08 | End: 2024-10-09

## 2024-10-08 RX ADMIN — WATER 2000 MG: 1 INJECTION INTRAMUSCULAR; INTRAVENOUS; SUBCUTANEOUS at 04:00

## 2024-10-08 RX ADMIN — METRONIDAZOLE 500 MG: 250 TABLET ORAL at 15:48

## 2024-10-08 RX ADMIN — HYDRALAZINE HYDROCHLORIDE 100 MG: 50 TABLET ORAL at 15:48

## 2024-10-08 RX ADMIN — HYDRALAZINE HYDROCHLORIDE 100 MG: 50 TABLET ORAL at 20:56

## 2024-10-08 RX ADMIN — ISOSORBIDE DINITRATE 40 MG: 20 TABLET ORAL at 07:13

## 2024-10-08 RX ADMIN — ATORVASTATIN CALCIUM 80 MG: 40 TABLET, FILM COATED ORAL at 08:42

## 2024-10-08 RX ADMIN — Medication: at 11:36

## 2024-10-08 RX ADMIN — ISOSORBIDE DINITRATE 40 MG: 20 TABLET ORAL at 20:56

## 2024-10-08 RX ADMIN — SODIUM CHLORIDE, PRESERVATIVE FREE 10 ML: 5 INJECTION INTRAVENOUS at 08:42

## 2024-10-08 RX ADMIN — ACETAMINOPHEN 650 MG: 325 TABLET ORAL at 11:32

## 2024-10-08 RX ADMIN — SODIUM CHLORIDE, PRESERVATIVE FREE 10 ML: 5 INJECTION INTRAVENOUS at 21:01

## 2024-10-08 RX ADMIN — TRAMADOL HYDROCHLORIDE 100 MG: 50 TABLET ORAL at 20:54

## 2024-10-08 RX ADMIN — AMLODIPINE BESYLATE 5 MG: 5 TABLET ORAL at 08:42

## 2024-10-08 RX ADMIN — ACETAMINOPHEN 650 MG: 325 TABLET ORAL at 18:55

## 2024-10-08 RX ADMIN — WATER 2000 MG: 1 INJECTION INTRAMUSCULAR; INTRAVENOUS; SUBCUTANEOUS at 18:55

## 2024-10-08 RX ADMIN — WATER 2000 MG: 1 INJECTION INTRAMUSCULAR; INTRAVENOUS; SUBCUTANEOUS at 11:33

## 2024-10-08 RX ADMIN — ASPIRIN 81 MG: 81 TABLET, COATED ORAL at 08:42

## 2024-10-08 RX ADMIN — FUROSEMIDE 40 MG: 10 INJECTION, SOLUTION INTRAMUSCULAR; INTRAVENOUS at 17:01

## 2024-10-08 RX ADMIN — HYDROMORPHONE HYDROCHLORIDE 2 MG: 2 TABLET ORAL at 08:45

## 2024-10-08 RX ADMIN — ACETAMINOPHEN 650 MG: 325 TABLET ORAL at 07:13

## 2024-10-08 RX ADMIN — METRONIDAZOLE 500 MG: 250 TABLET ORAL at 07:13

## 2024-10-08 RX ADMIN — METOPROLOL SUCCINATE 25 MG: 25 TABLET, FILM COATED, EXTENDED RELEASE ORAL at 08:42

## 2024-10-08 RX ADMIN — AMIODARONE HYDROCHLORIDE 200 MG: 200 TABLET ORAL at 08:42

## 2024-10-08 RX ADMIN — Medication: at 21:01

## 2024-10-08 RX ADMIN — MAGNESIUM OXIDE TAB 400 MG (241.3 MG ELEMENTAL MG) 400 MG: 400 (241.3 MG) TAB at 08:42

## 2024-10-08 RX ADMIN — SODIUM CHLORIDE, PRESERVATIVE FREE 10 ML: 5 INJECTION INTRAVENOUS at 08:43

## 2024-10-08 RX ADMIN — METOPROLOL SUCCINATE 25 MG: 25 TABLET, FILM COATED, EXTENDED RELEASE ORAL at 20:56

## 2024-10-08 RX ADMIN — HYDRALAZINE HYDROCHLORIDE 100 MG: 50 TABLET ORAL at 07:13

## 2024-10-08 RX ADMIN — ISOSORBIDE DINITRATE 40 MG: 20 TABLET ORAL at 15:48

## 2024-10-08 RX ADMIN — PANTOPRAZOLE SODIUM 40 MG: 40 TABLET, DELAYED RELEASE ORAL at 07:14

## 2024-10-08 RX ADMIN — Medication 1000 UNITS: at 08:42

## 2024-10-08 RX ADMIN — AMLODIPINE BESYLATE 5 MG: 5 TABLET ORAL at 20:56

## 2024-10-08 RX ADMIN — METRONIDAZOLE 500 MG: 250 TABLET ORAL at 20:56

## 2024-10-08 ASSESSMENT — PAIN DESCRIPTION - LOCATION
LOCATION: FOOT
LOCATION: LEG
LOCATION: FOOT
LOCATION: LEG
LOCATION: LEG
LOCATION: FOOT

## 2024-10-08 ASSESSMENT — PAIN DESCRIPTION - ORIENTATION
ORIENTATION: RIGHT;LEFT
ORIENTATION: LEFT;RIGHT
ORIENTATION: RIGHT;LEFT

## 2024-10-08 ASSESSMENT — PAIN SCALES - GENERAL
PAINLEVEL_OUTOF10: 0
PAINLEVEL_OUTOF10: 8
PAINLEVEL_OUTOF10: 9
PAINLEVEL_OUTOF10: 7
PAINLEVEL_OUTOF10: 3
PAINLEVEL_OUTOF10: 9
PAINLEVEL_OUTOF10: 8
PAINLEVEL_OUTOF10: 7
PAINLEVEL_OUTOF10: 9

## 2024-10-08 ASSESSMENT — PAIN DESCRIPTION - DESCRIPTORS
DESCRIPTORS: TENDER
DESCRIPTORS: ACHING
DESCRIPTORS: SHARP

## 2024-10-08 ASSESSMENT — PAIN - FUNCTIONAL ASSESSMENT: PAIN_FUNCTIONAL_ASSESSMENT: ACTIVITIES ARE NOT PREVENTED

## 2024-10-09 ENCOUNTER — APPOINTMENT (OUTPATIENT)
Facility: HOSPITAL | Age: 69
DRG: 640 | End: 2024-10-09
Payer: MEDICARE

## 2024-10-09 PROBLEM — J96.01 ACUTE HYPOXIC RESPIRATORY FAILURE: Status: ACTIVE | Noted: 2024-10-09

## 2024-10-09 PROBLEM — L08.9 WOUND INFECTION: Status: ACTIVE | Noted: 2024-09-19

## 2024-10-09 LAB
ALBUMIN SERPL-MCNC: 2.6 G/DL (ref 3.5–5)
ALBUMIN/GLOB SERPL: 0.6 (ref 1.1–2.2)
ALP SERPL-CCNC: 274 U/L (ref 45–117)
ALT SERPL-CCNC: 24 U/L (ref 12–78)
ANION GAP SERPL CALC-SCNC: 7 MMOL/L (ref 2–12)
ARTERIAL PATENCY WRIST A: ABNORMAL
ARTERIAL PATENCY WRIST A: POSITIVE
AST SERPL-CCNC: 48 U/L (ref 15–37)
BASE DEFICIT BLD-SCNC: 0.3 MMOL/L
BASE DEFICIT BLD-SCNC: 0.6 MMOL/L
BASE DEFICIT BLD-SCNC: 2.5 MMOL/L
BASE DEFICIT BLD-SCNC: 2.8 MMOL/L
BASE DEFICIT BLD-SCNC: 3.7 MMOL/L
BASE EXCESS BLD CALC-SCNC: 0 MMOL/L
BDY SITE: ABNORMAL
BILIRUB DIRECT SERPL-MCNC: 0.4 MG/DL (ref 0–0.2)
BILIRUB SERPL-MCNC: 0.7 MG/DL (ref 0.2–1)
BUN SERPL-MCNC: 32 MG/DL (ref 6–20)
BUN/CREAT SERPL: 30 (ref 12–20)
CALCIUM SERPL-MCNC: 8.8 MG/DL (ref 8.5–10.1)
CHLORIDE SERPL-SCNC: 104 MMOL/L (ref 97–108)
CO2 SERPL-SCNC: 21 MMOL/L (ref 21–32)
CREAT SERPL-MCNC: 1.08 MG/DL (ref 0.7–1.3)
ECHO BSA: 1.71 M2
ECHO EST RA PRESSURE: 8 MMHG
ECHO LV EF PHYSICIAN: 18 %
ECHO LV FRACTIONAL SHORTENING: 13 % (ref 28–44)
ECHO LV INTERNAL DIMENSION DIASTOLE INDEX: 2.61 CM/M2
ECHO LV INTERNAL DIMENSION DIASTOLIC: 4.7 CM (ref 4.2–5.9)
ECHO LV INTERNAL DIMENSION SYSTOLIC INDEX: 2.28 CM/M2
ECHO LV INTERNAL DIMENSION SYSTOLIC: 4.1 CM
ECHO LV IVSD: 0.8 CM (ref 0.6–1)
ECHO LV MASS 2D: 112.5 G (ref 88–224)
ECHO LV MASS INDEX 2D: 62.5 G/M2 (ref 49–115)
ECHO LV POSTERIOR WALL DIASTOLIC: 0.7 CM (ref 0.6–1)
ECHO LV RELATIVE WALL THICKNESS RATIO: 0.3
ECHO RIGHT VENTRICULAR SYSTOLIC PRESSURE (RVSP): 26 MMHG
ECHO TV REGURGITANT MAX VELOCITY: 2.11 M/S
ECHO TV REGURGITANT PEAK GRADIENT: 18 MMHG
ERYTHROCYTE [DISTWIDTH] IN BLOOD BY AUTOMATED COUNT: 19.7 % (ref 11.5–14.5)
GAS FLOW.O2 O2 DELIVERY SYS: ABNORMAL
GLOBULIN SER CALC-MCNC: 4.6 G/DL (ref 2–4)
GLUCOSE SERPL-MCNC: 115 MG/DL (ref 65–100)
HCO3 BLD-SCNC: 20.1 MMOL/L (ref 21–28)
HCO3 BLD-SCNC: 21 MMOL/L (ref 21–28)
HCO3 BLD-SCNC: 21.2 MMOL/L (ref 21–28)
HCO3 BLD-SCNC: 22.9 MMOL/L (ref 21–28)
HCO3 BLD-SCNC: 23.1 MMOL/L (ref 21–28)
HCO3 BLD-SCNC: 24 MMOL/L (ref 21–28)
HCT VFR BLD AUTO: 28.6 % (ref 36.6–50.3)
HGB BLD-MCNC: 9.2 G/DL (ref 12.1–17)
INR PPP: 3.4 (ref 0.9–1.1)
IPAP/PIP/HIGH PEEP: 14
IPAP/PIP/HIGH PEEP: 14
LACTATE BLD-SCNC: 1.63 MMOL/L (ref 0.4–2)
LACTATE SERPL-SCNC: 1.4 MMOL/L (ref 0.4–2)
LDH SERPL L TO P-CCNC: 524 U/L (ref 85–241)
MAGNESIUM SERPL-MCNC: 2.3 MG/DL (ref 1.6–2.4)
MCH RBC QN AUTO: 31 PG (ref 26–34)
MCHC RBC AUTO-ENTMCNC: 32.2 G/DL (ref 30–36.5)
MCV RBC AUTO: 96.3 FL (ref 80–99)
NRBC # BLD: 0.12 K/UL (ref 0–0.01)
NRBC BLD-RTO: 0.6 PER 100 WBC
NT PRO BNP: 4874 PG/ML
O2/TOTAL GAS SETTING VFR VENT: 100 %
O2/TOTAL GAS SETTING VFR VENT: 13 %
O2/TOTAL GAS SETTING VFR VENT: 13 %
PCO2 BLD: 30.9 MMHG (ref 35–48)
PCO2 BLD: 31.8 MMHG (ref 35–48)
PCO2 BLD: 31.8 MMHG (ref 35–48)
PCO2 BLD: 32.3 MMHG (ref 35–48)
PCO2 BLD: 32.6 MMHG (ref 35–48)
PCO2 BLD: 35.3 MMHG (ref 35–48)
PEEP RESPIRATORY: 10 CMH2O
PH BLD: 7.42 (ref 7.35–7.45)
PH BLD: 7.43 (ref 7.35–7.45)
PH BLD: 7.43 (ref 7.35–7.45)
PH BLD: 7.44 (ref 7.35–7.45)
PH BLD: 7.45 (ref 7.35–7.45)
PH BLD: 7.46 (ref 7.35–7.45)
PLATELET # BLD AUTO: 474 K/UL (ref 150–400)
PMV BLD AUTO: 9.8 FL (ref 8.9–12.9)
PO2 BLD: 123 MMHG (ref 83–108)
PO2 BLD: 134 MMHG (ref 83–108)
PO2 BLD: 37 MMHG (ref 83–108)
PO2 BLD: 40 MMHG (ref 83–108)
PO2 BLD: 44 MMHG (ref 83–108)
PO2 BLD: 47 MMHG (ref 83–108)
POTASSIUM SERPL-SCNC: 5.4 MMOL/L (ref 3.5–5.1)
PRESSURE SUPPORT SETTING VENT: 14 CMH2O
PROCALCITONIN SERPL-MCNC: 0.52 NG/ML
PROT SERPL-MCNC: 7.2 G/DL (ref 6.4–8.2)
PROTHROMBIN TIME: 32.9 SEC (ref 9–11.1)
RBC # BLD AUTO: 2.97 M/UL (ref 4.1–5.7)
SAO2 % BLD: 74.2 % (ref 92–97)
SAO2 % BLD: 77.1 % (ref 92–97)
SAO2 % BLD: 81.3 % (ref 92–97)
SAO2 % BLD: 84 % (ref 92–97)
SAO2 % BLD: 98.9 % (ref 92–97)
SAO2 % BLD: 99.2 % (ref 92–97)
SERVICE CMNT-IMP: ABNORMAL
SODIUM SERPL-SCNC: 132 MMOL/L (ref 136–145)
SPECIMEN TYPE: ABNORMAL
VENTILATION MODE VENT: ABNORMAL
WBC # BLD AUTO: 19.2 K/UL (ref 4.1–11.1)

## 2024-10-09 PROCEDURE — 85610 PROTHROMBIN TIME: CPT

## 2024-10-09 PROCEDURE — 2580000003 HC RX 258: Performed by: INTERNAL MEDICINE

## 2024-10-09 PROCEDURE — 6370000000 HC RX 637 (ALT 250 FOR IP): Performed by: NURSE PRACTITIONER

## 2024-10-09 PROCEDURE — 83605 ASSAY OF LACTIC ACID: CPT

## 2024-10-09 PROCEDURE — 6360000002 HC RX W HCPCS: Performed by: INTERNAL MEDICINE

## 2024-10-09 PROCEDURE — 6370000000 HC RX 637 (ALT 250 FOR IP): Performed by: FAMILY MEDICINE

## 2024-10-09 PROCEDURE — 36600 WITHDRAWAL OF ARTERIAL BLOOD: CPT

## 2024-10-09 PROCEDURE — 99233 SBSQ HOSP IP/OBS HIGH 50: CPT | Performed by: INTERNAL MEDICINE

## 2024-10-09 PROCEDURE — 6360000002 HC RX W HCPCS: Performed by: NURSE PRACTITIONER

## 2024-10-09 PROCEDURE — 99232 SBSQ HOSP IP/OBS MODERATE 35: CPT | Performed by: NURSE PRACTITIONER

## 2024-10-09 PROCEDURE — 71045 X-RAY EXAM CHEST 1 VIEW: CPT

## 2024-10-09 PROCEDURE — 2700000000 HC OXYGEN THERAPY PER DAY

## 2024-10-09 PROCEDURE — 6370000000 HC RX 637 (ALT 250 FOR IP): Performed by: INTERNAL MEDICINE

## 2024-10-09 PROCEDURE — 2580000003 HC RX 258: Performed by: FAMILY MEDICINE

## 2024-10-09 PROCEDURE — 93750 INTERROGATION VAD IN PERSON: CPT | Performed by: INTERNAL MEDICINE

## 2024-10-09 PROCEDURE — 36415 COLL VENOUS BLD VENIPUNCTURE: CPT

## 2024-10-09 PROCEDURE — 2060000000 HC ICU INTERMEDIATE R&B

## 2024-10-09 PROCEDURE — 85027 COMPLETE CBC AUTOMATED: CPT

## 2024-10-09 PROCEDURE — 80048 BASIC METABOLIC PNL TOTAL CA: CPT

## 2024-10-09 PROCEDURE — 83735 ASSAY OF MAGNESIUM: CPT

## 2024-10-09 PROCEDURE — 93308 TTE F-UP OR LMTD: CPT

## 2024-10-09 PROCEDURE — 84145 PROCALCITONIN (PCT): CPT

## 2024-10-09 PROCEDURE — 93325 DOPPLER ECHO COLOR FLOW MAPG: CPT | Performed by: INTERNAL MEDICINE

## 2024-10-09 PROCEDURE — 93308 TTE F-UP OR LMTD: CPT | Performed by: INTERNAL MEDICINE

## 2024-10-09 PROCEDURE — 80076 HEPATIC FUNCTION PANEL: CPT

## 2024-10-09 PROCEDURE — APPNB30 APP NON BILLABLE TIME 0-30 MINS: Performed by: NURSE PRACTITIONER

## 2024-10-09 PROCEDURE — APPSS15 APP SPLIT SHARED TIME 0-15 MINUTES: Performed by: NURSE PRACTITIONER

## 2024-10-09 PROCEDURE — 83880 ASSAY OF NATRIURETIC PEPTIDE: CPT

## 2024-10-09 PROCEDURE — 83615 LACTATE (LD) (LDH) ENZYME: CPT

## 2024-10-09 PROCEDURE — 94760 N-INVAS EAR/PLS OXIMETRY 1: CPT

## 2024-10-09 PROCEDURE — 93321 DOPPLER ECHO F-UP/LMTD STD: CPT | Performed by: INTERNAL MEDICINE

## 2024-10-09 PROCEDURE — 82803 BLOOD GASES ANY COMBINATION: CPT

## 2024-10-09 PROCEDURE — 2580000003 HC RX 258: Performed by: NURSE PRACTITIONER

## 2024-10-09 PROCEDURE — 87040 BLOOD CULTURE FOR BACTERIA: CPT

## 2024-10-09 RX ORDER — BUMETANIDE 0.25 MG/ML
2 INJECTION, SOLUTION INTRAMUSCULAR; INTRAVENOUS 3 TIMES DAILY
Status: DISCONTINUED | OUTPATIENT
Start: 2024-10-09 | End: 2024-10-12

## 2024-10-09 RX ORDER — WARFARIN SODIUM 1 MG/1
0.5 TABLET ORAL
Status: COMPLETED | OUTPATIENT
Start: 2024-10-09 | End: 2024-10-09

## 2024-10-09 RX ORDER — FUROSEMIDE 10 MG/ML
40 INJECTION INTRAMUSCULAR; INTRAVENOUS 3 TIMES DAILY
Status: DISCONTINUED | OUTPATIENT
Start: 2024-10-09 | End: 2024-10-09

## 2024-10-09 RX ADMIN — CEFEPIME 2000 MG: 2 INJECTION, POWDER, FOR SOLUTION INTRAVENOUS at 19:12

## 2024-10-09 RX ADMIN — ACETAMINOPHEN 650 MG: 325 TABLET ORAL at 00:19

## 2024-10-09 RX ADMIN — SODIUM CHLORIDE, PRESERVATIVE FREE 10 ML: 5 INJECTION INTRAVENOUS at 22:08

## 2024-10-09 RX ADMIN — HYDRALAZINE HYDROCHLORIDE 100 MG: 50 TABLET ORAL at 06:47

## 2024-10-09 RX ADMIN — WATER 2000 MG: 1 INJECTION INTRAMUSCULAR; INTRAVENOUS; SUBCUTANEOUS at 02:44

## 2024-10-09 RX ADMIN — PANTOPRAZOLE SODIUM 40 MG: 40 TABLET, DELAYED RELEASE ORAL at 05:32

## 2024-10-09 RX ADMIN — BUMETANIDE 2 MG: 0.25 INJECTION INTRAMUSCULAR; INTRAVENOUS at 12:38

## 2024-10-09 RX ADMIN — SODIUM CHLORIDE, PRESERVATIVE FREE 10 ML: 5 INJECTION INTRAVENOUS at 08:32

## 2024-10-09 RX ADMIN — HYDROMORPHONE HYDROCHLORIDE 2 MG: 2 TABLET ORAL at 05:30

## 2024-10-09 RX ADMIN — HYDROMORPHONE HYDROCHLORIDE 2 MG: 2 TABLET ORAL at 22:06

## 2024-10-09 RX ADMIN — ISOSORBIDE DINITRATE 40 MG: 20 TABLET ORAL at 06:47

## 2024-10-09 RX ADMIN — ACETAMINOPHEN 650 MG: 325 TABLET ORAL at 14:20

## 2024-10-09 RX ADMIN — MAGNESIUM OXIDE TAB 400 MG (241.3 MG ELEMENTAL MG) 400 MG: 400 (241.3 MG) TAB at 08:31

## 2024-10-09 RX ADMIN — AMLODIPINE BESYLATE 5 MG: 5 TABLET ORAL at 22:08

## 2024-10-09 RX ADMIN — AMIODARONE HYDROCHLORIDE 200 MG: 200 TABLET ORAL at 08:31

## 2024-10-09 RX ADMIN — Medication: at 22:09

## 2024-10-09 RX ADMIN — WARFARIN SODIUM 0.5 MG: 1 TABLET ORAL at 18:09

## 2024-10-09 RX ADMIN — HYDRALAZINE HYDROCHLORIDE 100 MG: 50 TABLET ORAL at 14:20

## 2024-10-09 RX ADMIN — WATER 2000 MG: 1 INJECTION INTRAMUSCULAR; INTRAVENOUS; SUBCUTANEOUS at 12:38

## 2024-10-09 RX ADMIN — ACETAMINOPHEN 650 MG: 325 TABLET ORAL at 23:42

## 2024-10-09 RX ADMIN — BUMETANIDE 2 MG: 0.25 INJECTION INTRAMUSCULAR; INTRAVENOUS at 22:08

## 2024-10-09 RX ADMIN — HYDROMORPHONE HYDROCHLORIDE 2 MG: 2 TABLET ORAL at 00:47

## 2024-10-09 RX ADMIN — METOPROLOL SUCCINATE 25 MG: 25 TABLET, FILM COATED, EXTENDED RELEASE ORAL at 08:31

## 2024-10-09 RX ADMIN — HYDROMORPHONE HYDROCHLORIDE 2 MG: 2 TABLET ORAL at 18:09

## 2024-10-09 RX ADMIN — SODIUM CHLORIDE 30 ML: 900 INJECTION INTRAVENOUS at 19:11

## 2024-10-09 RX ADMIN — Medication: at 08:32

## 2024-10-09 RX ADMIN — ATORVASTATIN CALCIUM 80 MG: 40 TABLET, FILM COATED ORAL at 08:30

## 2024-10-09 RX ADMIN — TRAMADOL HYDROCHLORIDE 100 MG: 50 TABLET ORAL at 20:15

## 2024-10-09 RX ADMIN — ACETAMINOPHEN 650 MG: 325 TABLET ORAL at 05:32

## 2024-10-09 RX ADMIN — ASPIRIN 81 MG: 81 TABLET, COATED ORAL at 08:31

## 2024-10-09 RX ADMIN — ACETAMINOPHEN 650 MG: 325 TABLET ORAL at 18:08

## 2024-10-09 RX ADMIN — METRONIDAZOLE 500 MG: 250 TABLET ORAL at 14:20

## 2024-10-09 RX ADMIN — METRONIDAZOLE 500 MG: 250 TABLET ORAL at 22:08

## 2024-10-09 RX ADMIN — Medication 1000 UNITS: at 08:31

## 2024-10-09 RX ADMIN — FUROSEMIDE 40 MG: 10 INJECTION, SOLUTION INTRAMUSCULAR; INTRAVENOUS at 08:32

## 2024-10-09 RX ADMIN — ISOSORBIDE DINITRATE 40 MG: 20 TABLET ORAL at 14:20

## 2024-10-09 RX ADMIN — METRONIDAZOLE 500 MG: 250 TABLET ORAL at 05:32

## 2024-10-09 RX ADMIN — HYDRALAZINE HYDROCHLORIDE 100 MG: 50 TABLET ORAL at 22:08

## 2024-10-09 RX ADMIN — AMLODIPINE BESYLATE 5 MG: 5 TABLET ORAL at 08:31

## 2024-10-09 RX ADMIN — HYDROMORPHONE HYDROCHLORIDE 1 MG: 1 INJECTION, SOLUTION INTRAMUSCULAR; INTRAVENOUS; SUBCUTANEOUS at 11:03

## 2024-10-09 RX ADMIN — METOPROLOL SUCCINATE 25 MG: 25 TABLET, FILM COATED, EXTENDED RELEASE ORAL at 22:08

## 2024-10-09 RX ADMIN — ISOSORBIDE DINITRATE 40 MG: 20 TABLET ORAL at 22:08

## 2024-10-09 RX ADMIN — HYDRALAZINE HYDROCHLORIDE 10 MG: 20 INJECTION INTRAMUSCULAR; INTRAVENOUS at 23:43

## 2024-10-09 ASSESSMENT — PAIN SCALES - GENERAL
PAINLEVEL_OUTOF10: 5
PAINLEVEL_OUTOF10: 6
PAINLEVEL_OUTOF10: 10
PAINLEVEL_OUTOF10: 0
PAINLEVEL_OUTOF10: 8
PAINLEVEL_OUTOF10: 6
PAINLEVEL_OUTOF10: 8
PAINLEVEL_OUTOF10: 9
PAINLEVEL_OUTOF10: 8
PAINLEVEL_OUTOF10: 5
PAINLEVEL_OUTOF10: 4
PAINLEVEL_OUTOF10: 2
PAINLEVEL_OUTOF10: 7
PAINLEVEL_OUTOF10: 8
PAINLEVEL_OUTOF10: 9

## 2024-10-09 ASSESSMENT — PAIN DESCRIPTION - DESCRIPTORS
DESCRIPTORS: ACHING;SHARP
DESCRIPTORS: TENDER;STABBING;SHARP
DESCRIPTORS: SHARP
DESCRIPTORS: THROBBING;STABBING;TENDER
DESCRIPTORS: SORE;TENDER;THROBBING

## 2024-10-09 ASSESSMENT — PAIN DESCRIPTION - LOCATION
LOCATION: FOOT
LOCATION: FOOT
LOCATION: FOOT;LEG
LOCATION: FOOT

## 2024-10-09 ASSESSMENT — PAIN DESCRIPTION - ORIENTATION
ORIENTATION: RIGHT;LEFT
ORIENTATION: LEFT;RIGHT
ORIENTATION: RIGHT;LEFT
ORIENTATION: RIGHT;LEFT
ORIENTATION: LEFT;RIGHT
ORIENTATION: RIGHT;LEFT

## 2024-10-09 NOTE — CARE COORDINATION
Transition of Care Plan:     RUR: 19%  Prior Level of Functioning: LVAD coord Bety. [place], g/f's brian does he driveline dressing changes, SPC for mobility. Owns RW   Disposition: (Needs ID plan, PICC line, paper rx's for narcotics) [facility] Will be on IV ABX x 6 weeks. Will go w loaner LVAD equipment. (For nursing- patient needs at least 4 days worth of driveline supplies)  If SNF or IPR: Date FOC offered: 9/24, 9/25  Date FOC received: 9/24, 9/25  [facility]  Date authorization started with reference number: Approved  Date authorization received and expires: Rec'd on 10/9 and expires 10/14  Follow up appointments: Specialist  DME needed: Defer to rehab  Transportation at discharge: (Assess need for new O2) ELEANOR RLE- [facility]- [phone]  IM/IMM Medicare/ letter given: 9/18  Is patient a [place] and connected with VA?               If yes, was  transfer form completed and VA notified?   Caregiver Contact: SisterCatalina [phone]   Discharge Caregiver contacted prior to discharge?   Care Conference needed?   Barriers to discharge:  Medical, new HFNC    Kathi reina [facility] reports the SNF auth was approved.  Patient was discussed in IDRs and he's not medically ready to d/c to SNF and is requiring new HFNC.  Kathi reports the auth expires on 10/14.  CM will continue to follow for OLIVIA needs.    CANDACE Hale CCM  Care Management

## 2024-10-10 ENCOUNTER — APPOINTMENT (OUTPATIENT)
Facility: HOSPITAL | Age: 69
DRG: 640 | End: 2024-10-10
Payer: MEDICARE

## 2024-10-10 PROBLEM — Z71.89 GOALS OF CARE, COUNSELING/DISCUSSION: Status: ACTIVE | Noted: 2024-10-10

## 2024-10-10 LAB
ALBUMIN SERPL-MCNC: 2.4 G/DL (ref 3.5–5)
ALBUMIN/GLOB SERPL: 0.6 (ref 1.1–2.2)
ALP SERPL-CCNC: 214 U/L (ref 45–117)
ALT SERPL-CCNC: 13 U/L (ref 12–78)
ANION GAP SERPL CALC-SCNC: 7 MMOL/L (ref 2–12)
AST SERPL-CCNC: 56 U/L (ref 15–37)
BASE EXCESS BLD CALC-SCNC: 2.8 MMOL/L
BILIRUB DIRECT SERPL-MCNC: 0.6 MG/DL (ref 0–0.2)
BILIRUB SERPL-MCNC: 1 MG/DL (ref 0.2–1)
BUN SERPL-MCNC: 33 MG/DL (ref 6–20)
BUN/CREAT SERPL: 34 (ref 12–20)
CALCIUM SERPL-MCNC: 8.8 MG/DL (ref 8.5–10.1)
CHLORIDE SERPL-SCNC: 104 MMOL/L (ref 97–108)
CO2 SERPL-SCNC: 24 MMOL/L (ref 21–32)
CREAT SERPL-MCNC: 0.98 MG/DL (ref 0.7–1.3)
CRP SERPL-MCNC: 29 MG/DL (ref 0–0.3)
ERYTHROCYTE [DISTWIDTH] IN BLOOD BY AUTOMATED COUNT: 19.5 % (ref 11.5–14.5)
FLUAV RNA SPEC QL NAA+PROBE: NOT DETECTED
FLUBV RNA SPEC QL NAA+PROBE: NOT DETECTED
GLOBULIN SER CALC-MCNC: 4.3 G/DL (ref 2–4)
GLUCOSE SERPL-MCNC: 70 MG/DL (ref 65–100)
HCO3 BLD-SCNC: 26.3 MMOL/L (ref 21–28)
HCT VFR BLD AUTO: 26.4 % (ref 36.6–50.3)
HGB BLD-MCNC: 8.5 G/DL (ref 12.1–17)
INR PPP: 3.1 (ref 0.9–1.1)
LDH SERPL L TO P-CCNC: 597 U/L (ref 85–241)
MAGNESIUM SERPL-MCNC: 1.8 MG/DL (ref 1.6–2.4)
MCH RBC QN AUTO: 29.8 PG (ref 26–34)
MCHC RBC AUTO-ENTMCNC: 32.2 G/DL (ref 30–36.5)
MCV RBC AUTO: 92.6 FL (ref 80–99)
NRBC # BLD: 0.06 K/UL (ref 0–0.01)
NRBC BLD-RTO: 0.3 PER 100 WBC
PCO2 BLD: 35 MMHG (ref 35–48)
PH BLD: 7.48 (ref 7.35–7.45)
PLATELET # BLD AUTO: 454 K/UL (ref 150–400)
PMV BLD AUTO: 9.7 FL (ref 8.9–12.9)
PO2 BLD: 164 MMHG (ref 83–108)
POTASSIUM SERPL-SCNC: 4 MMOL/L (ref 3.5–5.1)
PROT SERPL-MCNC: 6.7 G/DL (ref 6.4–8.2)
PROTHROMBIN TIME: 30.2 SEC (ref 9–11.1)
RBC # BLD AUTO: 2.85 M/UL (ref 4.1–5.7)
SAO2 % BLD: 99.6 % (ref 92–97)
SARS-COV-2 RNA RESP QL NAA+PROBE: NOT DETECTED
SODIUM SERPL-SCNC: 135 MMOL/L (ref 136–145)
SOURCE: NORMAL
SPECIMEN TYPE: ABNORMAL
WBC # BLD AUTO: 18.8 K/UL (ref 4.1–11.1)

## 2024-10-10 PROCEDURE — APPSS15 APP SPLIT SHARED TIME 0-15 MINUTES: Performed by: NURSE PRACTITIONER

## 2024-10-10 PROCEDURE — 86140 C-REACTIVE PROTEIN: CPT

## 2024-10-10 PROCEDURE — 6370000000 HC RX 637 (ALT 250 FOR IP): Performed by: NURSE PRACTITIONER

## 2024-10-10 PROCEDURE — APPNB30 APP NON BILLABLE TIME 0-30 MINS: Performed by: NURSE PRACTITIONER

## 2024-10-10 PROCEDURE — 6360000002 HC RX W HCPCS: Performed by: NURSE PRACTITIONER

## 2024-10-10 PROCEDURE — 85027 COMPLETE CBC AUTOMATED: CPT

## 2024-10-10 PROCEDURE — 82803 BLOOD GASES ANY COMBINATION: CPT

## 2024-10-10 PROCEDURE — 2580000003 HC RX 258: Performed by: NURSE PRACTITIONER

## 2024-10-10 PROCEDURE — 99233 SBSQ HOSP IP/OBS HIGH 50: CPT | Performed by: NURSE PRACTITIONER

## 2024-10-10 PROCEDURE — 93750 INTERROGATION VAD IN PERSON: CPT | Performed by: INTERNAL MEDICINE

## 2024-10-10 PROCEDURE — 2580000003 HC RX 258: Performed by: FAMILY MEDICINE

## 2024-10-10 PROCEDURE — 36415 COLL VENOUS BLD VENIPUNCTURE: CPT

## 2024-10-10 PROCEDURE — 6370000000 HC RX 637 (ALT 250 FOR IP): Performed by: HOSPITALIST

## 2024-10-10 PROCEDURE — 80048 BASIC METABOLIC PNL TOTAL CA: CPT

## 2024-10-10 PROCEDURE — 2580000003 HC RX 258: Performed by: INTERNAL MEDICINE

## 2024-10-10 PROCEDURE — 6370000000 HC RX 637 (ALT 250 FOR IP): Performed by: FAMILY MEDICINE

## 2024-10-10 PROCEDURE — 71045 X-RAY EXAM CHEST 1 VIEW: CPT

## 2024-10-10 PROCEDURE — 83735 ASSAY OF MAGNESIUM: CPT

## 2024-10-10 PROCEDURE — 85610 PROTHROMBIN TIME: CPT

## 2024-10-10 PROCEDURE — 99233 SBSQ HOSP IP/OBS HIGH 50: CPT | Performed by: INTERNAL MEDICINE

## 2024-10-10 PROCEDURE — 6370000000 HC RX 637 (ALT 250 FOR IP): Performed by: INTERNAL MEDICINE

## 2024-10-10 PROCEDURE — 80076 HEPATIC FUNCTION PANEL: CPT

## 2024-10-10 PROCEDURE — 83615 LACTATE (LD) (LDH) ENZYME: CPT

## 2024-10-10 PROCEDURE — 87636 SARSCOV2 & INF A&B AMP PRB: CPT

## 2024-10-10 PROCEDURE — 2060000000 HC ICU INTERMEDIATE R&B

## 2024-10-10 RX ORDER — WARFARIN SODIUM 1 MG/1
0.5 TABLET ORAL
Status: COMPLETED | OUTPATIENT
Start: 2024-10-10 | End: 2024-10-10

## 2024-10-10 RX ADMIN — SODIUM CHLORIDE, PRESERVATIVE FREE 10 ML: 5 INJECTION INTRAVENOUS at 10:06

## 2024-10-10 RX ADMIN — ISOSORBIDE DINITRATE 40 MG: 20 TABLET ORAL at 22:49

## 2024-10-10 RX ADMIN — BUMETANIDE 2 MG: 0.25 INJECTION INTRAMUSCULAR; INTRAVENOUS at 22:53

## 2024-10-10 RX ADMIN — HYDRALAZINE HYDROCHLORIDE 100 MG: 50 TABLET ORAL at 16:02

## 2024-10-10 RX ADMIN — Medication: at 23:06

## 2024-10-10 RX ADMIN — HYDRALAZINE HYDROCHLORIDE 100 MG: 50 TABLET ORAL at 22:49

## 2024-10-10 RX ADMIN — METOPROLOL SUCCINATE 25 MG: 25 TABLET, FILM COATED, EXTENDED RELEASE ORAL at 22:49

## 2024-10-10 RX ADMIN — PETROLATUM: 420 OINTMENT TOPICAL at 10:06

## 2024-10-10 RX ADMIN — AMLODIPINE BESYLATE 5 MG: 5 TABLET ORAL at 22:48

## 2024-10-10 RX ADMIN — SODIUM CHLORIDE, PRESERVATIVE FREE 10 ML: 5 INJECTION INTRAVENOUS at 10:05

## 2024-10-10 RX ADMIN — ISOSORBIDE DINITRATE 40 MG: 20 TABLET ORAL at 16:01

## 2024-10-10 RX ADMIN — SODIUM CHLORIDE, PRESERVATIVE FREE 10 ML: 5 INJECTION INTRAVENOUS at 22:53

## 2024-10-10 RX ADMIN — CEFEPIME 2000 MG: 2 INJECTION, POWDER, FOR SOLUTION INTRAVENOUS at 03:50

## 2024-10-10 RX ADMIN — ACETAMINOPHEN 650 MG: 325 TABLET ORAL at 05:43

## 2024-10-10 RX ADMIN — Medication 1000 UNITS: at 10:05

## 2024-10-10 RX ADMIN — PANTOPRAZOLE SODIUM 40 MG: 40 TABLET, DELAYED RELEASE ORAL at 05:44

## 2024-10-10 RX ADMIN — HYDROMORPHONE HYDROCHLORIDE 2 MG: 2 TABLET ORAL at 22:49

## 2024-10-10 RX ADMIN — ISOSORBIDE DINITRATE 40 MG: 20 TABLET ORAL at 05:43

## 2024-10-10 RX ADMIN — ACETAMINOPHEN 650 MG: 325 TABLET ORAL at 11:34

## 2024-10-10 RX ADMIN — ACETAMINOPHEN 650 MG: 325 TABLET ORAL at 18:55

## 2024-10-10 RX ADMIN — METRONIDAZOLE 500 MG: 250 TABLET ORAL at 22:49

## 2024-10-10 RX ADMIN — HYDRALAZINE HYDROCHLORIDE 100 MG: 50 TABLET ORAL at 05:44

## 2024-10-10 RX ADMIN — BUMETANIDE 2 MG: 0.25 INJECTION INTRAMUSCULAR; INTRAVENOUS at 10:05

## 2024-10-10 RX ADMIN — CEFEPIME 2000 MG: 2 INJECTION, POWDER, FOR SOLUTION INTRAVENOUS at 11:33

## 2024-10-10 RX ADMIN — CEFEPIME 2000 MG: 2 INJECTION, POWDER, FOR SOLUTION INTRAVENOUS at 19:03

## 2024-10-10 RX ADMIN — AMIODARONE HYDROCHLORIDE 200 MG: 200 TABLET ORAL at 10:05

## 2024-10-10 RX ADMIN — AMLODIPINE BESYLATE 5 MG: 5 TABLET ORAL at 10:05

## 2024-10-10 RX ADMIN — ACETAMINOPHEN 650 MG: 325 TABLET ORAL at 22:48

## 2024-10-10 RX ADMIN — BUMETANIDE 2 MG: 0.25 INJECTION INTRAMUSCULAR; INTRAVENOUS at 16:01

## 2024-10-10 RX ADMIN — ATORVASTATIN CALCIUM 80 MG: 40 TABLET, FILM COATED ORAL at 10:05

## 2024-10-10 RX ADMIN — ASPIRIN 81 MG: 81 TABLET, COATED ORAL at 10:05

## 2024-10-10 RX ADMIN — HYDROMORPHONE HYDROCHLORIDE 2 MG: 2 TABLET ORAL at 03:23

## 2024-10-10 RX ADMIN — MAGNESIUM OXIDE TAB 400 MG (241.3 MG ELEMENTAL MG) 400 MG: 400 (241.3 MG) TAB at 10:05

## 2024-10-10 RX ADMIN — METRONIDAZOLE 500 MG: 250 TABLET ORAL at 05:44

## 2024-10-10 RX ADMIN — METRONIDAZOLE 500 MG: 250 TABLET ORAL at 16:02

## 2024-10-10 RX ADMIN — WARFARIN SODIUM 0.5 MG: 1 TABLET ORAL at 18:55

## 2024-10-10 RX ADMIN — METOPROLOL SUCCINATE 25 MG: 25 TABLET, FILM COATED, EXTENDED RELEASE ORAL at 10:05

## 2024-10-10 ASSESSMENT — PAIN SCALES - GENERAL
PAINLEVEL_OUTOF10: 4
PAINLEVEL_OUTOF10: 8
PAINLEVEL_OUTOF10: 3
PAINLEVEL_OUTOF10: 2
PAINLEVEL_OUTOF10: 8
PAINLEVEL_OUTOF10: 5
PAINLEVEL_OUTOF10: 5
PAINLEVEL_OUTOF10: 6
PAINLEVEL_OUTOF10: 3
PAINLEVEL_OUTOF10: 4
PAINLEVEL_OUTOF10: 6

## 2024-10-10 ASSESSMENT — PAIN DESCRIPTION - LOCATION
LOCATION: FOOT
LOCATION: FOOT

## 2024-10-10 ASSESSMENT — PAIN DESCRIPTION - ORIENTATION
ORIENTATION: LEFT;RIGHT
ORIENTATION: LEFT;RIGHT

## 2024-10-11 ENCOUNTER — APPOINTMENT (OUTPATIENT)
Facility: HOSPITAL | Age: 69
DRG: 640 | End: 2024-10-11
Payer: MEDICARE

## 2024-10-11 LAB
ALBUMIN SERPL-MCNC: 2.2 G/DL (ref 3.5–5)
ALBUMIN/GLOB SERPL: 0.5 (ref 1.1–2.2)
ALP SERPL-CCNC: 174 U/L (ref 45–117)
ALT SERPL-CCNC: 12 U/L (ref 12–78)
ANION GAP SERPL CALC-SCNC: 9 MMOL/L (ref 2–12)
ARTERIAL PATENCY WRIST A: POSITIVE
AST SERPL-CCNC: 45 U/L (ref 15–37)
BACTERIA SPEC CULT: NORMAL
BACTERIA SPEC CULT: NORMAL
BASE EXCESS BLD CALC-SCNC: 3.2 MMOL/L
BDY SITE: ABNORMAL
BILIRUB DIRECT SERPL-MCNC: 0.7 MG/DL (ref 0–0.2)
BILIRUB SERPL-MCNC: 1.2 MG/DL (ref 0.2–1)
BUN SERPL-MCNC: 31 MG/DL (ref 6–20)
BUN/CREAT SERPL: 30 (ref 12–20)
CALCIUM SERPL-MCNC: 8.8 MG/DL (ref 8.5–10.1)
CHLORIDE SERPL-SCNC: 104 MMOL/L (ref 97–108)
CO2 SERPL-SCNC: 27 MMOL/L (ref 21–32)
CREAT SERPL-MCNC: 1.05 MG/DL (ref 0.7–1.3)
ERYTHROCYTE [DISTWIDTH] IN BLOOD BY AUTOMATED COUNT: 19.5 % (ref 11.5–14.5)
GAS FLOW.O2 O2 DELIVERY SYS: ABNORMAL
GLOBULIN SER CALC-MCNC: 4.3 G/DL (ref 2–4)
GLUCOSE SERPL-MCNC: 94 MG/DL (ref 65–100)
HCO3 BLD-SCNC: 27 MMOL/L (ref 21–28)
HCT VFR BLD AUTO: 24.8 % (ref 36.6–50.3)
HGB BLD-MCNC: 7.8 G/DL (ref 12.1–17)
INR PPP: 2.9 (ref 0.9–1.1)
LDH SERPL L TO P-CCNC: 466 U/L (ref 85–241)
MAGNESIUM SERPL-MCNC: 1.8 MG/DL (ref 1.6–2.4)
MCH RBC QN AUTO: 29.4 PG (ref 26–34)
MCHC RBC AUTO-ENTMCNC: 31.5 G/DL (ref 30–36.5)
MCV RBC AUTO: 93.6 FL (ref 80–99)
NRBC # BLD: 0.02 K/UL (ref 0–0.01)
NRBC BLD-RTO: 0.1 PER 100 WBC
NT PRO BNP: 2476 PG/ML
O2/TOTAL GAS SETTING VFR VENT: 60 %
PCO2 BLD: 36.9 MMHG (ref 35–48)
PEEP RESPIRATORY: 10 CMH2O
PH BLD: 7.47 (ref 7.35–7.45)
PLATELET # BLD AUTO: 447 K/UL (ref 150–400)
PMV BLD AUTO: 10 FL (ref 8.9–12.9)
PO2 BLD: 90 MMHG (ref 83–108)
POTASSIUM SERPL-SCNC: 3.1 MMOL/L (ref 3.5–5.1)
PRESSURE SUPPORT SETTING VENT: 4 CMH2O
PROT SERPL-MCNC: 6.5 G/DL (ref 6.4–8.2)
PROTHROMBIN TIME: 28.1 SEC (ref 9–11.1)
RBC # BLD AUTO: 2.65 M/UL (ref 4.1–5.7)
SAO2 % BLD: 97.6 % (ref 92–97)
SERVICE CMNT-IMP: NORMAL
SODIUM SERPL-SCNC: 140 MMOL/L (ref 136–145)
SPECIMEN TYPE: ABNORMAL
VENTILATION MODE VENT: ABNORMAL
WBC # BLD AUTO: 13.8 K/UL (ref 4.1–11.1)

## 2024-10-11 PROCEDURE — 6370000000 HC RX 637 (ALT 250 FOR IP): Performed by: INTERNAL MEDICINE

## 2024-10-11 PROCEDURE — 6360000002 HC RX W HCPCS: Performed by: NURSE PRACTITIONER

## 2024-10-11 PROCEDURE — 2580000003 HC RX 258: Performed by: INTERNAL MEDICINE

## 2024-10-11 PROCEDURE — 93750 INTERROGATION VAD IN PERSON: CPT | Performed by: INTERNAL MEDICINE

## 2024-10-11 PROCEDURE — 94760 N-INVAS EAR/PLS OXIMETRY 1: CPT

## 2024-10-11 PROCEDURE — 97530 THERAPEUTIC ACTIVITIES: CPT

## 2024-10-11 PROCEDURE — 83615 LACTATE (LD) (LDH) ENZYME: CPT

## 2024-10-11 PROCEDURE — 6370000000 HC RX 637 (ALT 250 FOR IP): Performed by: NURSE PRACTITIONER

## 2024-10-11 PROCEDURE — 94660 CPAP INITIATION&MGMT: CPT

## 2024-10-11 PROCEDURE — 85027 COMPLETE CBC AUTOMATED: CPT

## 2024-10-11 PROCEDURE — 99233 SBSQ HOSP IP/OBS HIGH 50: CPT | Performed by: INTERNAL MEDICINE

## 2024-10-11 PROCEDURE — 71045 X-RAY EXAM CHEST 1 VIEW: CPT

## 2024-10-11 PROCEDURE — 6370000000 HC RX 637 (ALT 250 FOR IP): Performed by: HOSPITALIST

## 2024-10-11 PROCEDURE — 85610 PROTHROMBIN TIME: CPT

## 2024-10-11 PROCEDURE — 36600 WITHDRAWAL OF ARTERIAL BLOOD: CPT

## 2024-10-11 PROCEDURE — 82803 BLOOD GASES ANY COMBINATION: CPT

## 2024-10-11 PROCEDURE — 36415 COLL VENOUS BLD VENIPUNCTURE: CPT

## 2024-10-11 PROCEDURE — 80048 BASIC METABOLIC PNL TOTAL CA: CPT

## 2024-10-11 PROCEDURE — 74018 RADEX ABDOMEN 1 VIEW: CPT

## 2024-10-11 PROCEDURE — 6370000000 HC RX 637 (ALT 250 FOR IP): Performed by: FAMILY MEDICINE

## 2024-10-11 PROCEDURE — 99232 SBSQ HOSP IP/OBS MODERATE 35: CPT | Performed by: INTERNAL MEDICINE

## 2024-10-11 PROCEDURE — 83735 ASSAY OF MAGNESIUM: CPT

## 2024-10-11 PROCEDURE — 80076 HEPATIC FUNCTION PANEL: CPT

## 2024-10-11 PROCEDURE — 83880 ASSAY OF NATRIURETIC PEPTIDE: CPT

## 2024-10-11 PROCEDURE — 2580000003 HC RX 258: Performed by: FAMILY MEDICINE

## 2024-10-11 PROCEDURE — 97535 SELF CARE MNGMENT TRAINING: CPT

## 2024-10-11 PROCEDURE — APPNB30 APP NON BILLABLE TIME 0-30 MINS: Performed by: NURSE PRACTITIONER

## 2024-10-11 PROCEDURE — 2060000000 HC ICU INTERMEDIATE R&B

## 2024-10-11 PROCEDURE — 2580000003 HC RX 258: Performed by: NURSE PRACTITIONER

## 2024-10-11 RX ORDER — POTASSIUM CHLORIDE 750 MG/1
40 TABLET, EXTENDED RELEASE ORAL ONCE
Status: COMPLETED | OUTPATIENT
Start: 2024-10-11 | End: 2024-10-11

## 2024-10-11 RX ORDER — WARFARIN SODIUM 1 MG/1
1 TABLET ORAL
Status: COMPLETED | OUTPATIENT
Start: 2024-10-11 | End: 2024-10-11

## 2024-10-11 RX ADMIN — BUMETANIDE 2 MG: 0.25 INJECTION INTRAMUSCULAR; INTRAVENOUS at 21:33

## 2024-10-11 RX ADMIN — MAGNESIUM OXIDE TAB 400 MG (241.3 MG ELEMENTAL MG) 400 MG: 400 (241.3 MG) TAB at 10:13

## 2024-10-11 RX ADMIN — AMLODIPINE BESYLATE 5 MG: 5 TABLET ORAL at 21:33

## 2024-10-11 RX ADMIN — ATORVASTATIN CALCIUM 80 MG: 40 TABLET, FILM COATED ORAL at 10:13

## 2024-10-11 RX ADMIN — ISOSORBIDE DINITRATE 40 MG: 20 TABLET ORAL at 21:33

## 2024-10-11 RX ADMIN — Medication: at 10:11

## 2024-10-11 RX ADMIN — HYDRALAZINE HYDROCHLORIDE 100 MG: 50 TABLET ORAL at 21:33

## 2024-10-11 RX ADMIN — BUMETANIDE 2 MG: 0.25 INJECTION INTRAMUSCULAR; INTRAVENOUS at 10:12

## 2024-10-11 RX ADMIN — SODIUM CHLORIDE, PRESERVATIVE FREE 10 ML: 5 INJECTION INTRAVENOUS at 21:46

## 2024-10-11 RX ADMIN — ISOSORBIDE DINITRATE 40 MG: 20 TABLET ORAL at 06:19

## 2024-10-11 RX ADMIN — AMLODIPINE BESYLATE 5 MG: 5 TABLET ORAL at 10:13

## 2024-10-11 RX ADMIN — SODIUM CHLORIDE: 900 INJECTION INTRAVENOUS at 03:47

## 2024-10-11 RX ADMIN — METOPROLOL SUCCINATE 25 MG: 25 TABLET, FILM COATED, EXTENDED RELEASE ORAL at 21:33

## 2024-10-11 RX ADMIN — ACETAMINOPHEN 650 MG: 325 TABLET ORAL at 06:19

## 2024-10-11 RX ADMIN — HYDROMORPHONE HYDROCHLORIDE 2 MG: 2 TABLET ORAL at 21:33

## 2024-10-11 RX ADMIN — AMIODARONE HYDROCHLORIDE 200 MG: 200 TABLET ORAL at 10:13

## 2024-10-11 RX ADMIN — SODIUM CHLORIDE, PRESERVATIVE FREE 10 ML: 5 INJECTION INTRAVENOUS at 10:12

## 2024-10-11 RX ADMIN — POTASSIUM CHLORIDE 40 MEQ: 750 TABLET, EXTENDED RELEASE ORAL at 10:13

## 2024-10-11 RX ADMIN — ACETAMINOPHEN 650 MG: 325 TABLET ORAL at 13:30

## 2024-10-11 RX ADMIN — Medication: at 21:36

## 2024-10-11 RX ADMIN — CEFEPIME 2000 MG: 2 INJECTION, POWDER, FOR SOLUTION INTRAVENOUS at 03:49

## 2024-10-11 RX ADMIN — PANTOPRAZOLE SODIUM 40 MG: 40 TABLET, DELAYED RELEASE ORAL at 06:20

## 2024-10-11 RX ADMIN — METRONIDAZOLE 500 MG: 250 TABLET ORAL at 06:19

## 2024-10-11 RX ADMIN — BUMETANIDE 2 MG: 0.25 INJECTION INTRAMUSCULAR; INTRAVENOUS at 15:53

## 2024-10-11 RX ADMIN — ASPIRIN 81 MG: 81 TABLET, COATED ORAL at 10:13

## 2024-10-11 RX ADMIN — HYDRALAZINE HYDROCHLORIDE 100 MG: 50 TABLET ORAL at 15:53

## 2024-10-11 RX ADMIN — SPIRONOLACTONE 12.5 MG: 25 TABLET ORAL at 10:13

## 2024-10-11 RX ADMIN — Medication 1000 UNITS: at 10:14

## 2024-10-11 RX ADMIN — METOPROLOL SUCCINATE 25 MG: 25 TABLET, FILM COATED, EXTENDED RELEASE ORAL at 10:13

## 2024-10-11 RX ADMIN — CEFEPIME 2000 MG: 2 INJECTION, POWDER, FOR SOLUTION INTRAVENOUS at 11:28

## 2024-10-11 RX ADMIN — METRONIDAZOLE 500 MG: 250 TABLET ORAL at 21:32

## 2024-10-11 RX ADMIN — HYDRALAZINE HYDROCHLORIDE 100 MG: 50 TABLET ORAL at 06:20

## 2024-10-11 RX ADMIN — HYDROMORPHONE HYDROCHLORIDE 2 MG: 2 TABLET ORAL at 06:19

## 2024-10-11 RX ADMIN — ISOSORBIDE DINITRATE 40 MG: 20 TABLET ORAL at 15:53

## 2024-10-11 RX ADMIN — METRONIDAZOLE 500 MG: 250 TABLET ORAL at 15:53

## 2024-10-11 RX ADMIN — ACETAMINOPHEN 650 MG: 325 TABLET ORAL at 19:47

## 2024-10-11 RX ADMIN — WARFARIN SODIUM 1 MG: 1 TABLET ORAL at 19:47

## 2024-10-11 ASSESSMENT — PAIN SCALES - GENERAL
PAINLEVEL_OUTOF10: 7
PAINLEVEL_OUTOF10: 9
PAINLEVEL_OUTOF10: 5
PAINLEVEL_OUTOF10: 6
PAINLEVEL_OUTOF10: 5

## 2024-10-11 ASSESSMENT — PAIN DESCRIPTION - LOCATION: LOCATION: FOOT

## 2024-10-11 ASSESSMENT — PAIN DESCRIPTION - ORIENTATION: ORIENTATION: LEFT;RIGHT

## 2024-10-12 LAB
ALBUMIN SERPL-MCNC: 2.2 G/DL (ref 3.5–5)
ALBUMIN/GLOB SERPL: 0.5 (ref 1.1–2.2)
ALP SERPL-CCNC: 151 U/L (ref 45–117)
ALT SERPL-CCNC: 13 U/L (ref 12–78)
ANION GAP SERPL CALC-SCNC: 7 MMOL/L (ref 2–12)
AST SERPL-CCNC: 40 U/L (ref 15–37)
BACTERIA SPEC CULT: NORMAL
BACTERIA SPEC CULT: NORMAL
BILIRUB DIRECT SERPL-MCNC: 0.6 MG/DL (ref 0–0.2)
BILIRUB SERPL-MCNC: 1 MG/DL (ref 0.2–1)
BUN SERPL-MCNC: 35 MG/DL (ref 6–20)
BUN/CREAT SERPL: 34 (ref 12–20)
CALCIUM SERPL-MCNC: 8.7 MG/DL (ref 8.5–10.1)
CHLORIDE SERPL-SCNC: 104 MMOL/L (ref 97–108)
CO2 SERPL-SCNC: 28 MMOL/L (ref 21–32)
CREAT SERPL-MCNC: 1.04 MG/DL (ref 0.7–1.3)
ERYTHROCYTE [DISTWIDTH] IN BLOOD BY AUTOMATED COUNT: 19.4 % (ref 11.5–14.5)
GLOBULIN SER CALC-MCNC: 4.3 G/DL (ref 2–4)
GLUCOSE BLD STRIP.AUTO-MCNC: 173 MG/DL (ref 65–117)
GLUCOSE BLD STRIP.AUTO-MCNC: 185 MG/DL (ref 65–117)
GLUCOSE SERPL-MCNC: 122 MG/DL (ref 65–100)
HCT VFR BLD AUTO: 24.9 % (ref 36.6–50.3)
HGB BLD-MCNC: 7.9 G/DL (ref 12.1–17)
INR PPP: 2.7 (ref 0.9–1.1)
MAGNESIUM SERPL-MCNC: 1.7 MG/DL (ref 1.6–2.4)
MCH RBC QN AUTO: 30.2 PG (ref 26–34)
MCHC RBC AUTO-ENTMCNC: 31.7 G/DL (ref 30–36.5)
MCV RBC AUTO: 95 FL (ref 80–99)
NRBC # BLD: 0.04 K/UL (ref 0–0.01)
NRBC BLD-RTO: 0.4 PER 100 WBC
NT PRO BNP: 2386 PG/ML
PHOSPHATE SERPL-MCNC: 3.3 MG/DL (ref 2.6–4.7)
PLATELET # BLD AUTO: 446 K/UL (ref 150–400)
PMV BLD AUTO: 9.6 FL (ref 8.9–12.9)
POTASSIUM SERPL-SCNC: 2.9 MMOL/L (ref 3.5–5.1)
PROT SERPL-MCNC: 6.5 G/DL (ref 6.4–8.2)
PROTHROMBIN TIME: 26.1 SEC (ref 9–11.1)
RBC # BLD AUTO: 2.62 M/UL (ref 4.1–5.7)
SERVICE CMNT-IMP: ABNORMAL
SERVICE CMNT-IMP: ABNORMAL
SERVICE CMNT-IMP: NORMAL
SERVICE CMNT-IMP: NORMAL
SODIUM SERPL-SCNC: 139 MMOL/L (ref 136–145)
TRIGL SERPL-MCNC: 62 MG/DL
WBC # BLD AUTO: 11.4 K/UL (ref 4.1–11.1)

## 2024-10-12 PROCEDURE — 85610 PROTHROMBIN TIME: CPT

## 2024-10-12 PROCEDURE — 6370000000 HC RX 637 (ALT 250 FOR IP): Performed by: INTERNAL MEDICINE

## 2024-10-12 PROCEDURE — 93750 INTERROGATION VAD IN PERSON: CPT | Performed by: INTERNAL MEDICINE

## 2024-10-12 PROCEDURE — 94660 CPAP INITIATION&MGMT: CPT

## 2024-10-12 PROCEDURE — 83880 ASSAY OF NATRIURETIC PEPTIDE: CPT

## 2024-10-12 PROCEDURE — 6370000000 HC RX 637 (ALT 250 FOR IP): Performed by: FAMILY MEDICINE

## 2024-10-12 PROCEDURE — 2580000003 HC RX 258: Performed by: FAMILY MEDICINE

## 2024-10-12 PROCEDURE — 99233 SBSQ HOSP IP/OBS HIGH 50: CPT | Performed by: INTERNAL MEDICINE

## 2024-10-12 PROCEDURE — 80048 BASIC METABOLIC PNL TOTAL CA: CPT

## 2024-10-12 PROCEDURE — 80076 HEPATIC FUNCTION PANEL: CPT

## 2024-10-12 PROCEDURE — 85027 COMPLETE CBC AUTOMATED: CPT

## 2024-10-12 PROCEDURE — 6360000002 HC RX W HCPCS: Performed by: NURSE PRACTITIONER

## 2024-10-12 PROCEDURE — 6360000002 HC RX W HCPCS: Performed by: INTERNAL MEDICINE

## 2024-10-12 PROCEDURE — 2580000003 HC RX 258: Performed by: INTERNAL MEDICINE

## 2024-10-12 PROCEDURE — 2060000000 HC ICU INTERMEDIATE R&B

## 2024-10-12 PROCEDURE — 6370000000 HC RX 637 (ALT 250 FOR IP): Performed by: NURSE PRACTITIONER

## 2024-10-12 PROCEDURE — 84100 ASSAY OF PHOSPHORUS: CPT

## 2024-10-12 PROCEDURE — 6370000000 HC RX 637 (ALT 250 FOR IP): Performed by: STUDENT IN AN ORGANIZED HEALTH CARE EDUCATION/TRAINING PROGRAM

## 2024-10-12 PROCEDURE — 84478 ASSAY OF TRIGLYCERIDES: CPT

## 2024-10-12 PROCEDURE — 2580000003 HC RX 258: Performed by: NURSE PRACTITIONER

## 2024-10-12 PROCEDURE — 36415 COLL VENOUS BLD VENIPUNCTURE: CPT

## 2024-10-12 PROCEDURE — 83735 ASSAY OF MAGNESIUM: CPT

## 2024-10-12 PROCEDURE — 82962 GLUCOSE BLOOD TEST: CPT

## 2024-10-12 RX ORDER — POTASSIUM CHLORIDE 750 MG/1
40 TABLET, EXTENDED RELEASE ORAL EVERY 4 HOURS
Status: COMPLETED | OUTPATIENT
Start: 2024-10-12 | End: 2024-10-12

## 2024-10-12 RX ORDER — SPIRONOLACTONE 25 MG/1
25 TABLET ORAL DAILY
Status: DISCONTINUED | OUTPATIENT
Start: 2024-10-12 | End: 2024-10-29 | Stop reason: HOSPADM

## 2024-10-12 RX ORDER — WARFARIN SODIUM 1 MG/1
1 TABLET ORAL
Status: COMPLETED | OUTPATIENT
Start: 2024-10-12 | End: 2024-10-12

## 2024-10-12 RX ORDER — BUMETANIDE 0.25 MG/ML
2 INJECTION, SOLUTION INTRAMUSCULAR; INTRAVENOUS 2 TIMES DAILY
Status: DISCONTINUED | OUTPATIENT
Start: 2024-10-12 | End: 2024-10-18

## 2024-10-12 RX ORDER — POTASSIUM CHLORIDE 750 MG/1
40 TABLET, EXTENDED RELEASE ORAL EVERY 4 HOURS
Status: DISCONTINUED | OUTPATIENT
Start: 2024-10-12 | End: 2024-10-12

## 2024-10-12 RX ADMIN — BUMETANIDE 2 MG: 0.25 INJECTION INTRAMUSCULAR; INTRAVENOUS at 19:07

## 2024-10-12 RX ADMIN — Medication: at 08:34

## 2024-10-12 RX ADMIN — POTASSIUM CHLORIDE 40 MEQ: 750 TABLET, EXTENDED RELEASE ORAL at 11:58

## 2024-10-12 RX ADMIN — ACETAMINOPHEN 650 MG: 325 TABLET ORAL at 11:57

## 2024-10-12 RX ADMIN — HYDROMORPHONE HYDROCHLORIDE 2 MG: 2 TABLET ORAL at 20:50

## 2024-10-12 RX ADMIN — SODIUM CHLORIDE, PRESERVATIVE FREE 10 ML: 5 INJECTION INTRAVENOUS at 08:35

## 2024-10-12 RX ADMIN — MAGNESIUM OXIDE TAB 400 MG (241.3 MG ELEMENTAL MG) 400 MG: 400 (241.3 MG) TAB at 07:25

## 2024-10-12 RX ADMIN — PANTOPRAZOLE SODIUM 40 MG: 40 TABLET, DELAYED RELEASE ORAL at 07:25

## 2024-10-12 RX ADMIN — ACETAMINOPHEN 650 MG: 325 TABLET ORAL at 23:41

## 2024-10-12 RX ADMIN — METRONIDAZOLE 500 MG: 250 TABLET ORAL at 15:13

## 2024-10-12 RX ADMIN — CEFEPIME 2000 MG: 2 INJECTION, POWDER, FOR SOLUTION INTRAVENOUS at 11:57

## 2024-10-12 RX ADMIN — AMLODIPINE BESYLATE 5 MG: 5 TABLET ORAL at 20:50

## 2024-10-12 RX ADMIN — HYDRALAZINE HYDROCHLORIDE 100 MG: 50 TABLET ORAL at 20:50

## 2024-10-12 RX ADMIN — HYDROMORPHONE HYDROCHLORIDE 2 MG: 2 TABLET ORAL at 11:58

## 2024-10-12 RX ADMIN — ISOSORBIDE DINITRATE 40 MG: 20 TABLET ORAL at 07:24

## 2024-10-12 RX ADMIN — METRONIDAZOLE 500 MG: 250 TABLET ORAL at 07:25

## 2024-10-12 RX ADMIN — SPIRONOLACTONE 25 MG: 25 TABLET ORAL at 09:32

## 2024-10-12 RX ADMIN — POTASSIUM CHLORIDE 40 MEQ: 750 TABLET, EXTENDED RELEASE ORAL at 15:14

## 2024-10-12 RX ADMIN — METOPROLOL SUCCINATE 25 MG: 25 TABLET, FILM COATED, EXTENDED RELEASE ORAL at 08:34

## 2024-10-12 RX ADMIN — WARFARIN SODIUM 1 MG: 1 TABLET ORAL at 19:08

## 2024-10-12 RX ADMIN — HYDRALAZINE HYDROCHLORIDE 100 MG: 50 TABLET ORAL at 07:24

## 2024-10-12 RX ADMIN — Medication 1000 UNITS: at 08:34

## 2024-10-12 RX ADMIN — METOPROLOL SUCCINATE 25 MG: 25 TABLET, FILM COATED, EXTENDED RELEASE ORAL at 20:49

## 2024-10-12 RX ADMIN — HYDROMORPHONE HYDROCHLORIDE 2 MG: 2 TABLET ORAL at 02:58

## 2024-10-12 RX ADMIN — SODIUM CHLORIDE, PRESERVATIVE FREE 10 ML: 5 INJECTION INTRAVENOUS at 20:51

## 2024-10-12 RX ADMIN — Medication: at 20:51

## 2024-10-12 RX ADMIN — ISOSORBIDE DINITRATE 40 MG: 20 TABLET ORAL at 20:50

## 2024-10-12 RX ADMIN — ATORVASTATIN CALCIUM 80 MG: 40 TABLET, FILM COATED ORAL at 08:35

## 2024-10-12 RX ADMIN — METRONIDAZOLE 500 MG: 250 TABLET ORAL at 20:50

## 2024-10-12 RX ADMIN — POTASSIUM CHLORIDE 40 MEQ: 750 TABLET, FILM COATED, EXTENDED RELEASE ORAL at 08:36

## 2024-10-12 RX ADMIN — AMLODIPINE BESYLATE 5 MG: 5 TABLET ORAL at 08:34

## 2024-10-12 RX ADMIN — SODIUM CHLORIDE: 900 INJECTION INTRAVENOUS at 23:27

## 2024-10-12 RX ADMIN — CEFEPIME 2000 MG: 2 INJECTION, POWDER, FOR SOLUTION INTRAVENOUS at 23:28

## 2024-10-12 RX ADMIN — ACETAMINOPHEN 650 MG: 325 TABLET ORAL at 07:25

## 2024-10-12 RX ADMIN — ASPIRIN 81 MG: 81 TABLET, COATED ORAL at 08:34

## 2024-10-12 RX ADMIN — CEFEPIME 2000 MG: 2 INJECTION, POWDER, FOR SOLUTION INTRAVENOUS at 00:04

## 2024-10-12 RX ADMIN — ACETAMINOPHEN 650 MG: 325 TABLET ORAL at 19:07

## 2024-10-12 RX ADMIN — HYDROMORPHONE HYDROCHLORIDE 2 MG: 2 TABLET ORAL at 07:25

## 2024-10-12 RX ADMIN — BUMETANIDE 2 MG: 0.25 INJECTION INTRAMUSCULAR; INTRAVENOUS at 09:32

## 2024-10-12 RX ADMIN — ACETAMINOPHEN 650 MG: 325 TABLET ORAL at 00:03

## 2024-10-12 RX ADMIN — AMIODARONE HYDROCHLORIDE 200 MG: 200 TABLET ORAL at 08:34

## 2024-10-12 ASSESSMENT — PAIN DESCRIPTION - ORIENTATION
ORIENTATION: LEFT;RIGHT
ORIENTATION: RIGHT;LEFT
ORIENTATION: LEFT;RIGHT

## 2024-10-12 ASSESSMENT — PAIN SCALES - GENERAL
PAINLEVEL_OUTOF10: 5
PAINLEVEL_OUTOF10: 9
PAINLEVEL_OUTOF10: 5
PAINLEVEL_OUTOF10: 8
PAINLEVEL_OUTOF10: 6
PAINLEVEL_OUTOF10: 6
PAINLEVEL_OUTOF10: 5
PAINLEVEL_OUTOF10: 9
PAINLEVEL_OUTOF10: 7
PAINLEVEL_OUTOF10: 8
PAINLEVEL_OUTOF10: 8
PAINLEVEL_OUTOF10: 7
PAINLEVEL_OUTOF10: 7
PAINLEVEL_OUTOF10: 8

## 2024-10-12 ASSESSMENT — PAIN DESCRIPTION - LOCATION
LOCATION: FOOT

## 2024-10-12 ASSESSMENT — PAIN DESCRIPTION - FREQUENCY: FREQUENCY: CONTINUOUS

## 2024-10-13 LAB
ALBUMIN SERPL-MCNC: 2.2 G/DL (ref 3.5–5)
ALBUMIN/GLOB SERPL: 0.4 (ref 1.1–2.2)
ALP SERPL-CCNC: 242 U/L (ref 45–117)
ALT SERPL-CCNC: 17 U/L (ref 12–78)
ANION GAP SERPL CALC-SCNC: 3 MMOL/L (ref 2–12)
AST SERPL-CCNC: 36 U/L (ref 15–37)
BASOPHILS # BLD: 0 K/UL (ref 0–0.1)
BASOPHILS NFR BLD: 0 % (ref 0–1)
BILIRUB DIRECT SERPL-MCNC: 0.3 MG/DL (ref 0–0.2)
BILIRUB SERPL-MCNC: 0.6 MG/DL (ref 0.2–1)
BUN SERPL-MCNC: 48 MG/DL (ref 6–20)
BUN/CREAT SERPL: 35 (ref 12–20)
CALCIUM SERPL-MCNC: 8.8 MG/DL (ref 8.5–10.1)
CHLORIDE SERPL-SCNC: 105 MMOL/L (ref 97–108)
CO2 SERPL-SCNC: 27 MMOL/L (ref 21–32)
CREAT SERPL-MCNC: 1.36 MG/DL (ref 0.7–1.3)
DIFFERENTIAL METHOD BLD: ABNORMAL
EOSINOPHIL # BLD: 0.4 K/UL (ref 0–0.4)
EOSINOPHIL NFR BLD: 3 % (ref 0–7)
ERYTHROCYTE [DISTWIDTH] IN BLOOD BY AUTOMATED COUNT: 18.7 % (ref 11.5–14.5)
GLOBULIN SER CALC-MCNC: 5.4 G/DL (ref 2–4)
GLUCOSE SERPL-MCNC: 146 MG/DL (ref 65–100)
HCT VFR BLD AUTO: 25.2 % (ref 36.6–50.3)
HGB BLD-MCNC: 8 G/DL (ref 12.1–17)
IMM GRANULOCYTES # BLD AUTO: 0.1 K/UL (ref 0–0.04)
IMM GRANULOCYTES NFR BLD AUTO: 1 % (ref 0–0.5)
INR PPP: 3.2 (ref 0.9–1.1)
LYMPHOCYTES # BLD: 1.2 K/UL (ref 0.8–3.5)
LYMPHOCYTES NFR BLD: 10 % (ref 12–49)
MAGNESIUM SERPL-MCNC: 1.8 MG/DL (ref 1.6–2.4)
MCH RBC QN AUTO: 30.3 PG (ref 26–34)
MCHC RBC AUTO-ENTMCNC: 31.7 G/DL (ref 30–36.5)
MCV RBC AUTO: 95.5 FL (ref 80–99)
MONOCYTES # BLD: 1.1 K/UL (ref 0–1)
MONOCYTES NFR BLD: 9 % (ref 5–13)
NEUTS SEG # BLD: 9.7 K/UL (ref 1.8–8)
NEUTS SEG NFR BLD: 77 % (ref 32–75)
NRBC # BLD: 0.02 K/UL (ref 0–0.01)
NRBC BLD-RTO: 0.2 PER 100 WBC
NT PRO BNP: 3533 PG/ML
PHOSPHATE SERPL-MCNC: 1.6 MG/DL (ref 2.6–4.7)
PLATELET # BLD AUTO: 477 K/UL (ref 150–400)
PMV BLD AUTO: 9.9 FL (ref 8.9–12.9)
POTASSIUM SERPL-SCNC: 4.6 MMOL/L (ref 3.5–5.1)
PROT SERPL-MCNC: 7.6 G/DL (ref 6.4–8.2)
PROTHROMBIN TIME: 31.4 SEC (ref 9–11.1)
RBC # BLD AUTO: 2.64 M/UL (ref 4.1–5.7)
SODIUM SERPL-SCNC: 135 MMOL/L (ref 136–145)
WBC # BLD AUTO: 12.4 K/UL (ref 4.1–11.1)

## 2024-10-13 PROCEDURE — 6360000002 HC RX W HCPCS: Performed by: INTERNAL MEDICINE

## 2024-10-13 PROCEDURE — 93750 INTERROGATION VAD IN PERSON: CPT | Performed by: INTERNAL MEDICINE

## 2024-10-13 PROCEDURE — 6370000000 HC RX 637 (ALT 250 FOR IP): Performed by: NURSE PRACTITIONER

## 2024-10-13 PROCEDURE — 2580000003 HC RX 258: Performed by: NURSE PRACTITIONER

## 2024-10-13 PROCEDURE — 80076 HEPATIC FUNCTION PANEL: CPT

## 2024-10-13 PROCEDURE — 80048 BASIC METABOLIC PNL TOTAL CA: CPT

## 2024-10-13 PROCEDURE — 2580000003 HC RX 258: Performed by: FAMILY MEDICINE

## 2024-10-13 PROCEDURE — 2060000000 HC ICU INTERMEDIATE R&B

## 2024-10-13 PROCEDURE — 6370000000 HC RX 637 (ALT 250 FOR IP): Performed by: INTERNAL MEDICINE

## 2024-10-13 PROCEDURE — 2580000003 HC RX 258: Performed by: INTERNAL MEDICINE

## 2024-10-13 PROCEDURE — 94660 CPAP INITIATION&MGMT: CPT

## 2024-10-13 PROCEDURE — 83735 ASSAY OF MAGNESIUM: CPT

## 2024-10-13 PROCEDURE — 6370000000 HC RX 637 (ALT 250 FOR IP): Performed by: FAMILY MEDICINE

## 2024-10-13 PROCEDURE — 6360000002 HC RX W HCPCS: Performed by: NURSE PRACTITIONER

## 2024-10-13 PROCEDURE — 36415 COLL VENOUS BLD VENIPUNCTURE: CPT

## 2024-10-13 PROCEDURE — 99233 SBSQ HOSP IP/OBS HIGH 50: CPT | Performed by: INTERNAL MEDICINE

## 2024-10-13 PROCEDURE — 84100 ASSAY OF PHOSPHORUS: CPT

## 2024-10-13 PROCEDURE — 85025 COMPLETE CBC W/AUTO DIFF WBC: CPT

## 2024-10-13 PROCEDURE — 85610 PROTHROMBIN TIME: CPT

## 2024-10-13 PROCEDURE — 83880 ASSAY OF NATRIURETIC PEPTIDE: CPT

## 2024-10-13 PROCEDURE — 94760 N-INVAS EAR/PLS OXIMETRY 1: CPT

## 2024-10-13 RX ADMIN — Medication: at 09:09

## 2024-10-13 RX ADMIN — METOPROLOL SUCCINATE 25 MG: 25 TABLET, FILM COATED, EXTENDED RELEASE ORAL at 09:09

## 2024-10-13 RX ADMIN — ACETAMINOPHEN 650 MG: 325 TABLET ORAL at 11:53

## 2024-10-13 RX ADMIN — ISOSORBIDE DINITRATE 40 MG: 20 TABLET ORAL at 15:29

## 2024-10-13 RX ADMIN — HYDROMORPHONE HYDROCHLORIDE 2 MG: 2 TABLET ORAL at 03:42

## 2024-10-13 RX ADMIN — SODIUM CHLORIDE, PRESERVATIVE FREE 10 ML: 5 INJECTION INTRAVENOUS at 21:08

## 2024-10-13 RX ADMIN — AMLODIPINE BESYLATE 5 MG: 5 TABLET ORAL at 09:09

## 2024-10-13 RX ADMIN — HYDROMORPHONE HYDROCHLORIDE 2 MG: 2 TABLET ORAL at 18:43

## 2024-10-13 RX ADMIN — Medication: at 21:06

## 2024-10-13 RX ADMIN — TRAMADOL HYDROCHLORIDE 100 MG: 50 TABLET ORAL at 21:06

## 2024-10-13 RX ADMIN — AMLODIPINE BESYLATE 5 MG: 5 TABLET ORAL at 21:06

## 2024-10-13 RX ADMIN — METRONIDAZOLE 500 MG: 250 TABLET ORAL at 21:07

## 2024-10-13 RX ADMIN — HYDRALAZINE HYDROCHLORIDE 100 MG: 50 TABLET ORAL at 21:07

## 2024-10-13 RX ADMIN — AMIODARONE HYDROCHLORIDE 200 MG: 200 TABLET ORAL at 09:09

## 2024-10-13 RX ADMIN — BUMETANIDE 2 MG: 0.25 INJECTION INTRAMUSCULAR; INTRAVENOUS at 09:09

## 2024-10-13 RX ADMIN — HYDROMORPHONE HYDROCHLORIDE 2 MG: 2 TABLET ORAL at 07:17

## 2024-10-13 RX ADMIN — SODIUM CHLORIDE, PRESERVATIVE FREE 10 ML: 5 INJECTION INTRAVENOUS at 09:09

## 2024-10-13 RX ADMIN — ISOSORBIDE DINITRATE 40 MG: 20 TABLET ORAL at 21:07

## 2024-10-13 RX ADMIN — ATORVASTATIN CALCIUM 80 MG: 40 TABLET, FILM COATED ORAL at 09:09

## 2024-10-13 RX ADMIN — HYDROMORPHONE HYDROCHLORIDE 2 MG: 2 TABLET ORAL at 23:04

## 2024-10-13 RX ADMIN — ISOSORBIDE DINITRATE 40 MG: 20 TABLET ORAL at 07:17

## 2024-10-13 RX ADMIN — METRONIDAZOLE 500 MG: 250 TABLET ORAL at 07:17

## 2024-10-13 RX ADMIN — BUMETANIDE 2 MG: 0.25 INJECTION INTRAMUSCULAR; INTRAVENOUS at 18:43

## 2024-10-13 RX ADMIN — ACETAMINOPHEN 650 MG: 325 TABLET ORAL at 23:04

## 2024-10-13 RX ADMIN — MAGNESIUM OXIDE TAB 400 MG (241.3 MG ELEMENTAL MG) 400 MG: 400 (241.3 MG) TAB at 09:09

## 2024-10-13 RX ADMIN — SPIRONOLACTONE 25 MG: 25 TABLET ORAL at 09:09

## 2024-10-13 RX ADMIN — ASPIRIN 81 MG: 81 TABLET, COATED ORAL at 09:09

## 2024-10-13 RX ADMIN — HYDRALAZINE HYDROCHLORIDE 100 MG: 50 TABLET ORAL at 15:29

## 2024-10-13 RX ADMIN — METOPROLOL SUCCINATE 25 MG: 25 TABLET, FILM COATED, EXTENDED RELEASE ORAL at 21:06

## 2024-10-13 RX ADMIN — ACETAMINOPHEN 650 MG: 325 TABLET ORAL at 07:17

## 2024-10-13 RX ADMIN — CEFEPIME 2000 MG: 2 INJECTION, POWDER, FOR SOLUTION INTRAVENOUS at 22:57

## 2024-10-13 RX ADMIN — SODIUM CHLORIDE, PRESERVATIVE FREE 10 ML: 5 INJECTION INTRAVENOUS at 09:10

## 2024-10-13 RX ADMIN — Medication 1000 UNITS: at 09:09

## 2024-10-13 RX ADMIN — HYDROMORPHONE HYDROCHLORIDE 2 MG: 2 TABLET ORAL at 15:29

## 2024-10-13 RX ADMIN — POLYETHYLENE GLYCOL 3350 17 G: 17 POWDER, FOR SOLUTION ORAL at 03:42

## 2024-10-13 RX ADMIN — HYDRALAZINE HYDROCHLORIDE 100 MG: 50 TABLET ORAL at 07:17

## 2024-10-13 RX ADMIN — METRONIDAZOLE 500 MG: 250 TABLET ORAL at 15:29

## 2024-10-13 RX ADMIN — HYDROMORPHONE HYDROCHLORIDE 1 MG: 1 INJECTION, SOLUTION INTRAMUSCULAR; INTRAVENOUS; SUBCUTANEOUS at 11:53

## 2024-10-13 RX ADMIN — ACETAMINOPHEN 650 MG: 325 TABLET ORAL at 18:43

## 2024-10-13 RX ADMIN — PANTOPRAZOLE SODIUM 40 MG: 40 TABLET, DELAYED RELEASE ORAL at 07:16

## 2024-10-13 RX ADMIN — CEFEPIME 2000 MG: 2 INJECTION, POWDER, FOR SOLUTION INTRAVENOUS at 11:30

## 2024-10-13 ASSESSMENT — PAIN SCALES - GENERAL
PAINLEVEL_OUTOF10: 8
PAINLEVEL_OUTOF10: 4
PAINLEVEL_OUTOF10: 7
PAINLEVEL_OUTOF10: 6
PAINLEVEL_OUTOF10: 8
PAINLEVEL_OUTOF10: 6
PAINLEVEL_OUTOF10: 6
PAINLEVEL_OUTOF10: 8
PAINLEVEL_OUTOF10: 8
PAINLEVEL_OUTOF10: 7
PAINLEVEL_OUTOF10: 10
PAINLEVEL_OUTOF10: 6

## 2024-10-13 ASSESSMENT — PAIN DESCRIPTION - LOCATION
LOCATION: FOOT

## 2024-10-13 ASSESSMENT — PAIN DESCRIPTION - ORIENTATION
ORIENTATION: RIGHT;LEFT
ORIENTATION: LEFT;RIGHT
ORIENTATION: RIGHT;LEFT

## 2024-10-13 ASSESSMENT — PAIN DESCRIPTION - DESCRIPTORS
DESCRIPTORS: ACHING

## 2024-10-13 ASSESSMENT — PAIN DESCRIPTION - FREQUENCY
FREQUENCY: CONTINUOUS
FREQUENCY: CONTINUOUS

## 2024-10-14 LAB
ALBUMIN SERPL-MCNC: 2.2 G/DL (ref 3.5–5)
ALBUMIN/GLOB SERPL: 0.4 (ref 1.1–2.2)
ALP SERPL-CCNC: 221 U/L (ref 45–117)
ALT SERPL-CCNC: 14 U/L (ref 12–78)
ANION GAP SERPL CALC-SCNC: 5 MMOL/L (ref 2–12)
AST SERPL-CCNC: 39 U/L (ref 15–37)
BACTERIA SPEC CULT: NORMAL
BACTERIA SPEC CULT: NORMAL
BASOPHILS # BLD: 0.1 K/UL (ref 0–0.1)
BASOPHILS NFR BLD: 1 % (ref 0–1)
BILIRUB DIRECT SERPL-MCNC: 0.5 MG/DL (ref 0–0.2)
BILIRUB SERPL-MCNC: 0.8 MG/DL (ref 0.2–1)
BUN SERPL-MCNC: 46 MG/DL (ref 6–20)
BUN/CREAT SERPL: 39 (ref 12–20)
CALCIUM SERPL-MCNC: 9 MG/DL (ref 8.5–10.1)
CHLORIDE SERPL-SCNC: 102 MMOL/L (ref 97–108)
CO2 SERPL-SCNC: 29 MMOL/L (ref 21–32)
CREAT SERPL-MCNC: 1.17 MG/DL (ref 0.7–1.3)
DIFFERENTIAL METHOD BLD: ABNORMAL
EOSINOPHIL # BLD: 0.6 K/UL (ref 0–0.4)
EOSINOPHIL NFR BLD: 5 % (ref 0–7)
ERYTHROCYTE [DISTWIDTH] IN BLOOD BY AUTOMATED COUNT: 18.5 % (ref 11.5–14.5)
GLOBULIN SER CALC-MCNC: 5.4 G/DL (ref 2–4)
GLUCOSE SERPL-MCNC: 105 MG/DL (ref 65–100)
HCT VFR BLD AUTO: 25.9 % (ref 36.6–50.3)
HGB BLD-MCNC: 8.4 G/DL (ref 12.1–17)
IMM GRANULOCYTES # BLD AUTO: 0.1 K/UL (ref 0–0.04)
IMM GRANULOCYTES NFR BLD AUTO: 1 % (ref 0–0.5)
INR PPP: 3.1 (ref 0.9–1.1)
LDH SERPL L TO P-CCNC: 413 U/L (ref 85–241)
LYMPHOCYTES # BLD: 1.5 K/UL (ref 0.8–3.5)
LYMPHOCYTES NFR BLD: 11 % (ref 12–49)
MAGNESIUM SERPL-MCNC: 1.8 MG/DL (ref 1.6–2.4)
MCH RBC QN AUTO: 30.1 PG (ref 26–34)
MCHC RBC AUTO-ENTMCNC: 32.4 G/DL (ref 30–36.5)
MCV RBC AUTO: 92.8 FL (ref 80–99)
MONOCYTES # BLD: 1.2 K/UL (ref 0–1)
MONOCYTES NFR BLD: 9 % (ref 5–13)
NEUTS SEG # BLD: 10 K/UL (ref 1.8–8)
NEUTS SEG NFR BLD: 73 % (ref 32–75)
NRBC # BLD: 0.02 K/UL (ref 0–0.01)
NRBC BLD-RTO: 0.1 PER 100 WBC
NT PRO BNP: 5685 PG/ML
PHOSPHATE SERPL-MCNC: 2.4 MG/DL (ref 2.6–4.7)
PLATELET # BLD AUTO: 436 K/UL (ref 150–400)
PMV BLD AUTO: 9.3 FL (ref 8.9–12.9)
POTASSIUM SERPL-SCNC: 4.3 MMOL/L (ref 3.5–5.1)
PROT SERPL-MCNC: 7.6 G/DL (ref 6.4–8.2)
PROTHROMBIN TIME: 30.3 SEC (ref 9–11.1)
RBC # BLD AUTO: 2.79 M/UL (ref 4.1–5.7)
SERVICE CMNT-IMP: NORMAL
SERVICE CMNT-IMP: NORMAL
SODIUM SERPL-SCNC: 136 MMOL/L (ref 136–145)
WBC # BLD AUTO: 13.6 K/UL (ref 4.1–11.1)

## 2024-10-14 PROCEDURE — 2580000003 HC RX 258: Performed by: FAMILY MEDICINE

## 2024-10-14 PROCEDURE — 6370000000 HC RX 637 (ALT 250 FOR IP): Performed by: INTERNAL MEDICINE

## 2024-10-14 PROCEDURE — 2700000000 HC OXYGEN THERAPY PER DAY

## 2024-10-14 PROCEDURE — 80048 BASIC METABOLIC PNL TOTAL CA: CPT

## 2024-10-14 PROCEDURE — 94660 CPAP INITIATION&MGMT: CPT

## 2024-10-14 PROCEDURE — 83615 LACTATE (LD) (LDH) ENZYME: CPT

## 2024-10-14 PROCEDURE — 6370000000 HC RX 637 (ALT 250 FOR IP): Performed by: NURSE PRACTITIONER

## 2024-10-14 PROCEDURE — 84100 ASSAY OF PHOSPHORUS: CPT

## 2024-10-14 PROCEDURE — 2060000000 HC ICU INTERMEDIATE R&B

## 2024-10-14 PROCEDURE — 6370000000 HC RX 637 (ALT 250 FOR IP): Performed by: HOSPITALIST

## 2024-10-14 PROCEDURE — 6360000002 HC RX W HCPCS: Performed by: NURSE PRACTITIONER

## 2024-10-14 PROCEDURE — 83735 ASSAY OF MAGNESIUM: CPT

## 2024-10-14 PROCEDURE — 2580000003 HC RX 258: Performed by: INTERNAL MEDICINE

## 2024-10-14 PROCEDURE — 99232 SBSQ HOSP IP/OBS MODERATE 35: CPT | Performed by: NURSE PRACTITIONER

## 2024-10-14 PROCEDURE — 85610 PROTHROMBIN TIME: CPT

## 2024-10-14 PROCEDURE — 85025 COMPLETE CBC W/AUTO DIFF WBC: CPT

## 2024-10-14 PROCEDURE — 83880 ASSAY OF NATRIURETIC PEPTIDE: CPT

## 2024-10-14 PROCEDURE — 99231 SBSQ HOSP IP/OBS SF/LOW 25: CPT | Performed by: NURSE PRACTITIONER

## 2024-10-14 PROCEDURE — 2580000003 HC RX 258: Performed by: NURSE PRACTITIONER

## 2024-10-14 PROCEDURE — 94760 N-INVAS EAR/PLS OXIMETRY 1: CPT

## 2024-10-14 PROCEDURE — 36415 COLL VENOUS BLD VENIPUNCTURE: CPT

## 2024-10-14 PROCEDURE — 80076 HEPATIC FUNCTION PANEL: CPT

## 2024-10-14 PROCEDURE — 93750 INTERROGATION VAD IN PERSON: CPT | Performed by: NURSE PRACTITIONER

## 2024-10-14 PROCEDURE — 6360000002 HC RX W HCPCS: Performed by: INTERNAL MEDICINE

## 2024-10-14 PROCEDURE — 6370000000 HC RX 637 (ALT 250 FOR IP): Performed by: FAMILY MEDICINE

## 2024-10-14 RX ORDER — DOCUSATE SODIUM 100 MG/1
100 CAPSULE, LIQUID FILLED ORAL DAILY
Status: DISCONTINUED | OUTPATIENT
Start: 2024-10-14 | End: 2024-10-29 | Stop reason: HOSPADM

## 2024-10-14 RX ORDER — WARFARIN SODIUM 1 MG/1
0.5 TABLET ORAL
Status: COMPLETED | OUTPATIENT
Start: 2024-10-14 | End: 2024-10-14

## 2024-10-14 RX ORDER — GABAPENTIN 100 MG/1
100 CAPSULE ORAL 3 TIMES DAILY
Status: DISCONTINUED | OUTPATIENT
Start: 2024-10-14 | End: 2024-10-15

## 2024-10-14 RX ADMIN — METRONIDAZOLE 500 MG: 250 TABLET ORAL at 20:29

## 2024-10-14 RX ADMIN — CEFEPIME 2000 MG: 2 INJECTION, POWDER, FOR SOLUTION INTRAVENOUS at 11:07

## 2024-10-14 RX ADMIN — ACETAMINOPHEN 650 MG: 325 TABLET ORAL at 18:54

## 2024-10-14 RX ADMIN — SODIUM CHLORIDE, PRESERVATIVE FREE 10 ML: 5 INJECTION INTRAVENOUS at 09:40

## 2024-10-14 RX ADMIN — AMIODARONE HYDROCHLORIDE 200 MG: 200 TABLET ORAL at 09:38

## 2024-10-14 RX ADMIN — HYDRALAZINE HYDROCHLORIDE 100 MG: 50 TABLET ORAL at 15:14

## 2024-10-14 RX ADMIN — ASPIRIN 81 MG: 81 TABLET, COATED ORAL at 09:39

## 2024-10-14 RX ADMIN — Medication 1000 UNITS: at 09:38

## 2024-10-14 RX ADMIN — ACETAMINOPHEN 650 MG: 325 TABLET ORAL at 05:53

## 2024-10-14 RX ADMIN — AMLODIPINE BESYLATE 5 MG: 5 TABLET ORAL at 20:29

## 2024-10-14 RX ADMIN — CEFEPIME 2000 MG: 2 INJECTION, POWDER, FOR SOLUTION INTRAVENOUS at 23:06

## 2024-10-14 RX ADMIN — GABAPENTIN 100 MG: 100 CAPSULE ORAL at 16:23

## 2024-10-14 RX ADMIN — METRONIDAZOLE 500 MG: 250 TABLET ORAL at 05:53

## 2024-10-14 RX ADMIN — Medication: at 09:39

## 2024-10-14 RX ADMIN — ISOSORBIDE DINITRATE 40 MG: 20 TABLET ORAL at 15:15

## 2024-10-14 RX ADMIN — HYDROMORPHONE HYDROCHLORIDE 2 MG: 2 TABLET ORAL at 23:10

## 2024-10-14 RX ADMIN — HYDROMORPHONE HYDROCHLORIDE 2 MG: 2 TABLET ORAL at 07:34

## 2024-10-14 RX ADMIN — ATORVASTATIN CALCIUM 80 MG: 40 TABLET, FILM COATED ORAL at 09:38

## 2024-10-14 RX ADMIN — SPIRONOLACTONE 25 MG: 25 TABLET ORAL at 09:39

## 2024-10-14 RX ADMIN — SODIUM CHLORIDE, PRESERVATIVE FREE 10 ML: 5 INJECTION INTRAVENOUS at 09:39

## 2024-10-14 RX ADMIN — DOCUSATE SODIUM 100 MG: 100 CAPSULE, LIQUID FILLED ORAL at 16:23

## 2024-10-14 RX ADMIN — HYDRALAZINE HYDROCHLORIDE 100 MG: 50 TABLET ORAL at 20:29

## 2024-10-14 RX ADMIN — MAGNESIUM OXIDE TAB 400 MG (241.3 MG ELEMENTAL MG) 400 MG: 400 (241.3 MG) TAB at 09:38

## 2024-10-14 RX ADMIN — BUMETANIDE 2 MG: 0.25 INJECTION INTRAMUSCULAR; INTRAVENOUS at 18:54

## 2024-10-14 RX ADMIN — HYDROMORPHONE HYDROCHLORIDE 2 MG: 2 TABLET ORAL at 18:54

## 2024-10-14 RX ADMIN — PANTOPRAZOLE SODIUM 40 MG: 40 TABLET, DELAYED RELEASE ORAL at 05:53

## 2024-10-14 RX ADMIN — HYDRALAZINE HYDROCHLORIDE 100 MG: 50 TABLET ORAL at 05:53

## 2024-10-14 RX ADMIN — ACETAMINOPHEN 650 MG: 325 TABLET ORAL at 23:10

## 2024-10-14 RX ADMIN — HYDROMORPHONE HYDROCHLORIDE 2 MG: 2 TABLET ORAL at 03:26

## 2024-10-14 RX ADMIN — SODIUM CHLORIDE, PRESERVATIVE FREE 10 ML: 5 INJECTION INTRAVENOUS at 20:30

## 2024-10-14 RX ADMIN — HYDROMORPHONE HYDROCHLORIDE 2 MG: 2 TABLET ORAL at 15:15

## 2024-10-14 RX ADMIN — ISOSORBIDE DINITRATE 40 MG: 20 TABLET ORAL at 05:53

## 2024-10-14 RX ADMIN — ACETAMINOPHEN 650 MG: 325 TABLET ORAL at 11:04

## 2024-10-14 RX ADMIN — METOPROLOL SUCCINATE 25 MG: 25 TABLET, FILM COATED, EXTENDED RELEASE ORAL at 09:38

## 2024-10-14 RX ADMIN — AMLODIPINE BESYLATE 5 MG: 5 TABLET ORAL at 09:39

## 2024-10-14 RX ADMIN — GABAPENTIN 100 MG: 100 CAPSULE ORAL at 20:29

## 2024-10-14 RX ADMIN — METRONIDAZOLE 500 MG: 250 TABLET ORAL at 15:14

## 2024-10-14 RX ADMIN — WARFARIN SODIUM 0.5 MG: 1 TABLET ORAL at 18:54

## 2024-10-14 RX ADMIN — BUMETANIDE 2 MG: 0.25 INJECTION INTRAMUSCULAR; INTRAVENOUS at 09:39

## 2024-10-14 RX ADMIN — Medication: at 20:28

## 2024-10-14 RX ADMIN — METOPROLOL SUCCINATE 25 MG: 25 TABLET, FILM COATED, EXTENDED RELEASE ORAL at 20:29

## 2024-10-14 RX ADMIN — ISOSORBIDE DINITRATE 40 MG: 20 TABLET ORAL at 20:29

## 2024-10-14 RX ADMIN — SODIUM CHLORIDE: 900 INJECTION INTRAVENOUS at 11:07

## 2024-10-14 ASSESSMENT — PAIN DESCRIPTION - DESCRIPTORS
DESCRIPTORS: ACHING

## 2024-10-14 ASSESSMENT — PAIN SCALES - GENERAL
PAINLEVEL_OUTOF10: 3
PAINLEVEL_OUTOF10: 8
PAINLEVEL_OUTOF10: 8
PAINLEVEL_OUTOF10: 6
PAINLEVEL_OUTOF10: 8
PAINLEVEL_OUTOF10: 6
PAINLEVEL_OUTOF10: 8
PAINLEVEL_OUTOF10: 7
PAINLEVEL_OUTOF10: 3

## 2024-10-14 ASSESSMENT — PAIN DESCRIPTION - ORIENTATION
ORIENTATION: RIGHT;LEFT
ORIENTATION: RIGHT;LEFT
ORIENTATION: LEFT;RIGHT
ORIENTATION: RIGHT;LEFT

## 2024-10-14 ASSESSMENT — PAIN DESCRIPTION - LOCATION
LOCATION: FOOT

## 2024-10-14 ASSESSMENT — PAIN DESCRIPTION - FREQUENCY
FREQUENCY: CONTINUOUS
FREQUENCY: CONTINUOUS

## 2024-10-15 PROBLEM — J81.0 ACUTE PULMONARY EDEMA: Status: ACTIVE | Noted: 2024-10-15

## 2024-10-15 LAB
ANION GAP SERPL CALC-SCNC: 6 MMOL/L (ref 2–12)
ARTERIAL PATENCY WRIST A: POSITIVE
BASE EXCESS BLD CALC-SCNC: 6.2 MMOL/L
BASOPHILS # BLD: 0.1 K/UL (ref 0–0.1)
BASOPHILS NFR BLD: 1 % (ref 0–1)
BDY SITE: ABNORMAL
BUN SERPL-MCNC: 44 MG/DL (ref 6–20)
BUN/CREAT SERPL: 36 (ref 12–20)
CALCIUM SERPL-MCNC: 9.3 MG/DL (ref 8.5–10.1)
CHLORIDE SERPL-SCNC: 99 MMOL/L (ref 97–108)
CO2 SERPL-SCNC: 28 MMOL/L (ref 21–32)
CREAT SERPL-MCNC: 1.21 MG/DL (ref 0.7–1.3)
DIFFERENTIAL METHOD BLD: ABNORMAL
EOSINOPHIL # BLD: 0.6 K/UL (ref 0–0.4)
EOSINOPHIL NFR BLD: 5 % (ref 0–7)
ERYTHROCYTE [DISTWIDTH] IN BLOOD BY AUTOMATED COUNT: 18.3 % (ref 11.5–14.5)
GAS FLOW.O2 O2 DELIVERY SYS: ABNORMAL
GLUCOSE SERPL-MCNC: 130 MG/DL (ref 65–100)
HCO3 BLD-SCNC: 29.5 MMOL/L (ref 21–28)
HCT VFR BLD AUTO: 27 % (ref 36.6–50.3)
HGB BLD-MCNC: 8.6 G/DL (ref 12.1–17)
IMM GRANULOCYTES # BLD AUTO: 0.1 K/UL (ref 0–0.04)
IMM GRANULOCYTES NFR BLD AUTO: 1 % (ref 0–0.5)
INR PPP: 3.1 (ref 0.9–1.1)
LDH SERPL L TO P-CCNC: 456 U/L (ref 85–241)
LYMPHOCYTES # BLD: 1.2 K/UL (ref 0.8–3.5)
LYMPHOCYTES NFR BLD: 10 % (ref 12–49)
MAGNESIUM SERPL-MCNC: 1.8 MG/DL (ref 1.6–2.4)
MCH RBC QN AUTO: 29.8 PG (ref 26–34)
MCHC RBC AUTO-ENTMCNC: 31.9 G/DL (ref 30–36.5)
MCV RBC AUTO: 93.4 FL (ref 80–99)
MONOCYTES # BLD: 1.2 K/UL (ref 0–1)
MONOCYTES NFR BLD: 9 % (ref 5–13)
NEUTS SEG # BLD: 9.1 K/UL (ref 1.8–8)
NEUTS SEG NFR BLD: 74 % (ref 32–75)
NRBC # BLD: 0 K/UL (ref 0–0.01)
NRBC BLD-RTO: 0 PER 100 WBC
NT PRO BNP: 3825 PG/ML
O2/TOTAL GAS SETTING VFR VENT: 80 %
PCO2 BLD: 36.4 MMHG (ref 35–48)
PH BLD: 7.52 (ref 7.35–7.45)
PHOSPHATE SERPL-MCNC: 3.1 MG/DL (ref 2.6–4.7)
PLATELET # BLD AUTO: 452 K/UL (ref 150–400)
PMV BLD AUTO: 9.9 FL (ref 8.9–12.9)
PO2 BLD: 123 MMHG (ref 83–108)
POTASSIUM SERPL-SCNC: 3.6 MMOL/L (ref 3.5–5.1)
PROTHROMBIN TIME: 30 SEC (ref 9–11.1)
RBC # BLD AUTO: 2.89 M/UL (ref 4.1–5.7)
SAO2 % BLD: 99.1 % (ref 92–97)
SODIUM SERPL-SCNC: 133 MMOL/L (ref 136–145)
SPECIMEN TYPE: ABNORMAL
WBC # BLD AUTO: 12.2 K/UL (ref 4.1–11.1)

## 2024-10-15 PROCEDURE — 94760 N-INVAS EAR/PLS OXIMETRY 1: CPT

## 2024-10-15 PROCEDURE — 80048 BASIC METABOLIC PNL TOTAL CA: CPT

## 2024-10-15 PROCEDURE — 82803 BLOOD GASES ANY COMBINATION: CPT

## 2024-10-15 PROCEDURE — 85025 COMPLETE CBC W/AUTO DIFF WBC: CPT

## 2024-10-15 PROCEDURE — 6370000000 HC RX 637 (ALT 250 FOR IP): Performed by: INTERNAL MEDICINE

## 2024-10-15 PROCEDURE — 36600 WITHDRAWAL OF ARTERIAL BLOOD: CPT

## 2024-10-15 PROCEDURE — 6370000000 HC RX 637 (ALT 250 FOR IP): Performed by: HOSPITALIST

## 2024-10-15 PROCEDURE — 85610 PROTHROMBIN TIME: CPT

## 2024-10-15 PROCEDURE — 2700000000 HC OXYGEN THERAPY PER DAY

## 2024-10-15 PROCEDURE — 93750 INTERROGATION VAD IN PERSON: CPT | Performed by: INTERNAL MEDICINE

## 2024-10-15 PROCEDURE — 6360000002 HC RX W HCPCS: Performed by: NURSE PRACTITIONER

## 2024-10-15 PROCEDURE — 6360000002 HC RX W HCPCS: Performed by: INTERNAL MEDICINE

## 2024-10-15 PROCEDURE — 2580000003 HC RX 258: Performed by: FAMILY MEDICINE

## 2024-10-15 PROCEDURE — 84100 ASSAY OF PHOSPHORUS: CPT

## 2024-10-15 PROCEDURE — 94660 CPAP INITIATION&MGMT: CPT

## 2024-10-15 PROCEDURE — 2060000000 HC ICU INTERMEDIATE R&B

## 2024-10-15 PROCEDURE — 36415 COLL VENOUS BLD VENIPUNCTURE: CPT

## 2024-10-15 PROCEDURE — 2580000003 HC RX 258: Performed by: NURSE PRACTITIONER

## 2024-10-15 PROCEDURE — 99233 SBSQ HOSP IP/OBS HIGH 50: CPT | Performed by: INTERNAL MEDICINE

## 2024-10-15 PROCEDURE — 83880 ASSAY OF NATRIURETIC PEPTIDE: CPT

## 2024-10-15 PROCEDURE — 93750 INTERROGATION VAD IN PERSON: CPT | Performed by: NURSE PRACTITIONER

## 2024-10-15 PROCEDURE — 83735 ASSAY OF MAGNESIUM: CPT

## 2024-10-15 PROCEDURE — 6370000000 HC RX 637 (ALT 250 FOR IP): Performed by: NURSE PRACTITIONER

## 2024-10-15 PROCEDURE — 2580000003 HC RX 258: Performed by: INTERNAL MEDICINE

## 2024-10-15 PROCEDURE — APPNB60 APP NON BILLABLE TIME 46-60 MINS: Performed by: NURSE PRACTITIONER

## 2024-10-15 PROCEDURE — 6370000000 HC RX 637 (ALT 250 FOR IP): Performed by: FAMILY MEDICINE

## 2024-10-15 PROCEDURE — 83615 LACTATE (LD) (LDH) ENZYME: CPT

## 2024-10-15 RX ORDER — WARFARIN SODIUM 1 MG/1
0.5 TABLET ORAL
Status: COMPLETED | OUTPATIENT
Start: 2024-10-15 | End: 2024-10-15

## 2024-10-15 RX ADMIN — GABAPENTIN 100 MG: 100 CAPSULE ORAL at 08:37

## 2024-10-15 RX ADMIN — Medication: at 08:48

## 2024-10-15 RX ADMIN — HYDROMORPHONE HYDROCHLORIDE 2 MG: 2 TABLET ORAL at 14:26

## 2024-10-15 RX ADMIN — HYDROMORPHONE HYDROCHLORIDE 2 MG: 2 TABLET ORAL at 23:15

## 2024-10-15 RX ADMIN — CEFEPIME 2000 MG: 2 INJECTION, POWDER, FOR SOLUTION INTRAVENOUS at 23:17

## 2024-10-15 RX ADMIN — METRONIDAZOLE 500 MG: 250 TABLET ORAL at 21:10

## 2024-10-15 RX ADMIN — ACETAMINOPHEN 650 MG: 325 TABLET ORAL at 13:11

## 2024-10-15 RX ADMIN — PANTOPRAZOLE SODIUM 40 MG: 40 TABLET, DELAYED RELEASE ORAL at 06:22

## 2024-10-15 RX ADMIN — MAGNESIUM OXIDE TAB 400 MG (241.3 MG ELEMENTAL MG) 400 MG: 400 (241.3 MG) TAB at 08:35

## 2024-10-15 RX ADMIN — ACETAMINOPHEN 650 MG: 325 TABLET ORAL at 06:22

## 2024-10-15 RX ADMIN — ISOSORBIDE DINITRATE 40 MG: 20 TABLET ORAL at 14:22

## 2024-10-15 RX ADMIN — ISOSORBIDE DINITRATE 40 MG: 20 TABLET ORAL at 06:22

## 2024-10-15 RX ADMIN — ISOSORBIDE DINITRATE 40 MG: 20 TABLET ORAL at 21:10

## 2024-10-15 RX ADMIN — METRONIDAZOLE 500 MG: 250 TABLET ORAL at 06:22

## 2024-10-15 RX ADMIN — Medication 1000 UNITS: at 08:36

## 2024-10-15 RX ADMIN — CEFEPIME 2000 MG: 2 INJECTION, POWDER, FOR SOLUTION INTRAVENOUS at 11:26

## 2024-10-15 RX ADMIN — ATORVASTATIN CALCIUM 80 MG: 40 TABLET, FILM COATED ORAL at 08:35

## 2024-10-15 RX ADMIN — ASPIRIN 81 MG: 81 TABLET, COATED ORAL at 08:36

## 2024-10-15 RX ADMIN — WARFARIN SODIUM 0.5 MG: 1 TABLET ORAL at 18:01

## 2024-10-15 RX ADMIN — SODIUM CHLORIDE, PRESERVATIVE FREE 10 ML: 5 INJECTION INTRAVENOUS at 21:11

## 2024-10-15 RX ADMIN — SPIRONOLACTONE 25 MG: 25 TABLET ORAL at 08:33

## 2024-10-15 RX ADMIN — BUMETANIDE 2 MG: 0.25 INJECTION INTRAMUSCULAR; INTRAVENOUS at 08:38

## 2024-10-15 RX ADMIN — METRONIDAZOLE 500 MG: 250 TABLET ORAL at 14:22

## 2024-10-15 RX ADMIN — SODIUM CHLORIDE: 900 INJECTION INTRAVENOUS at 23:16

## 2024-10-15 RX ADMIN — HYDRALAZINE HYDROCHLORIDE 100 MG: 50 TABLET ORAL at 14:22

## 2024-10-15 RX ADMIN — Medication: at 21:11

## 2024-10-15 RX ADMIN — HYDRALAZINE HYDROCHLORIDE 100 MG: 50 TABLET ORAL at 06:22

## 2024-10-15 RX ADMIN — ACETAMINOPHEN 650 MG: 325 TABLET ORAL at 18:00

## 2024-10-15 RX ADMIN — HYDRALAZINE HYDROCHLORIDE 100 MG: 50 TABLET ORAL at 21:10

## 2024-10-15 RX ADMIN — HYDROMORPHONE HYDROCHLORIDE 2 MG: 2 TABLET ORAL at 04:04

## 2024-10-15 RX ADMIN — METOPROLOL SUCCINATE 25 MG: 25 TABLET, FILM COATED, EXTENDED RELEASE ORAL at 21:10

## 2024-10-15 RX ADMIN — SODIUM CHLORIDE, PRESERVATIVE FREE 10 ML: 5 INJECTION INTRAVENOUS at 08:41

## 2024-10-15 RX ADMIN — AMIODARONE HYDROCHLORIDE 200 MG: 200 TABLET ORAL at 08:34

## 2024-10-15 RX ADMIN — METOPROLOL SUCCINATE 25 MG: 25 TABLET, FILM COATED, EXTENDED RELEASE ORAL at 08:35

## 2024-10-15 RX ADMIN — BUMETANIDE 2 MG: 0.25 INJECTION INTRAMUSCULAR; INTRAVENOUS at 18:38

## 2024-10-15 RX ADMIN — AMLODIPINE BESYLATE 5 MG: 5 TABLET ORAL at 08:34

## 2024-10-15 ASSESSMENT — PAIN SCALES - GENERAL
PAINLEVEL_OUTOF10: 8
PAINLEVEL_OUTOF10: 7
PAINLEVEL_OUTOF10: 3
PAINLEVEL_OUTOF10: 7
PAINLEVEL_OUTOF10: 5
PAINLEVEL_OUTOF10: 3
PAINLEVEL_OUTOF10: 8
PAINLEVEL_OUTOF10: 8
PAINLEVEL_OUTOF10: 4
PAINLEVEL_OUTOF10: 2

## 2024-10-15 ASSESSMENT — PAIN DESCRIPTION - ORIENTATION
ORIENTATION: LEFT
ORIENTATION: LEFT;RIGHT
ORIENTATION: RIGHT;LEFT
ORIENTATION: LEFT;RIGHT
ORIENTATION: LEFT;RIGHT

## 2024-10-15 ASSESSMENT — PAIN DESCRIPTION - FREQUENCY
FREQUENCY: CONTINUOUS
FREQUENCY: CONTINUOUS

## 2024-10-15 ASSESSMENT — PAIN DESCRIPTION - LOCATION
LOCATION: FOOT

## 2024-10-15 ASSESSMENT — PAIN DESCRIPTION - DESCRIPTORS
DESCRIPTORS: ACHING
DESCRIPTORS: ACHING;DULL
DESCRIPTORS: ACHING

## 2024-10-15 NOTE — WOUND CARE
WOCN Note:     Follow up for feet and legs.  Seen in 465/01     69 y.o. y/o male admitted on 9/17/2024   Hyponatremia [E87.1]  Avulsion fracture [T14.8XXA]  Injury of left rotator cuff, initial encounter [S46.002A]   History of CHF  WBC = 12.2  Cultures obtained 9/30/24  On flagyl & maxipime  Diet: reg           Assessment:   Appropriately conversational and sleepy today.  Surface: GUZAMN mattress    Extremely dry skin to feet, legs, hands, & arms and improving.   Large, hard crusts of skin now almost all gone.     1.  right medial ankle erosion, partial thickness  Fully resurfaced    Right medial ankle 1x1x0.1 cm partial thickness tissue loss with pink base  AG collagen applied and covered with foam    2. POA left dorsal ankle wound, full thickness  6 x 4 x 0.6 cm  Full thickness wound with tendon visible  No purulence  Tx: cleaned with Vashe, packed with AG collagen and covered with foam      3. Left & right heels previous partial thickness wounds  Right is fully resolved  Left remain partial thickness with ~50% resurfacing  Red base  Cleaned with Vashe, applied AG collagen and covered with foam        Recommend:    Dry skin: remedy moisturizer in generous amounts daily  Left & right dorsal foot & left heel: clean with Vashe, apply Puracol AG (left in room) and cover with silver foam.  Change every 3 days.  and Ryan Protocol:  Turn/reposition approximately every 2 hours  Offload heels with heels hanging off end of pillow at all times while in bed.  Sacral Preventive dressing: change as needed ~every 4 days. Discontinue if incontinence is frequently soiling dressing.       Has been seen by podiatry, infectious disease, and vascular - see notes.   Discussed with Dr. Hope at bedside.     Transition of Care: Plan to follow as needed while admitted to hospital.     Lydia Domínguez, AMARILISN, RN, CWOCN  Certified Wound, Ostomy, Continence Nurse  office [phone]  Available via [employer]

## 2024-10-16 LAB
ALBUMIN SERPL-MCNC: 2.1 G/DL (ref 3.5–5)
ALBUMIN/GLOB SERPL: 0.4 (ref 1.1–2.2)
ALP SERPL-CCNC: 217 U/L (ref 45–117)
ALT SERPL-CCNC: 21 U/L (ref 12–78)
ANION GAP SERPL CALC-SCNC: 7 MMOL/L (ref 2–12)
AST SERPL-CCNC: 41 U/L (ref 15–37)
BILIRUB DIRECT SERPL-MCNC: 0.3 MG/DL (ref 0–0.2)
BILIRUB SERPL-MCNC: 0.5 MG/DL (ref 0.2–1)
BUN SERPL-MCNC: 51 MG/DL (ref 6–20)
BUN/CREAT SERPL: 41 (ref 12–20)
CALCIUM SERPL-MCNC: 9.4 MG/DL (ref 8.5–10.1)
CHLORIDE SERPL-SCNC: 100 MMOL/L (ref 97–108)
CO2 SERPL-SCNC: 26 MMOL/L (ref 21–32)
CREAT SERPL-MCNC: 1.23 MG/DL (ref 0.7–1.3)
GLOBULIN SER CALC-MCNC: 5.4 G/DL (ref 2–4)
GLUCOSE SERPL-MCNC: 132 MG/DL (ref 65–100)
INR PPP: 2.8 (ref 0.9–1.1)
LDH SERPL L TO P-CCNC: 430 U/L (ref 85–241)
MAGNESIUM SERPL-MCNC: 1.9 MG/DL (ref 1.6–2.4)
NT PRO BNP: 2211 PG/ML
PHOSPHATE SERPL-MCNC: 3.1 MG/DL (ref 2.6–4.7)
POTASSIUM SERPL-SCNC: 3.8 MMOL/L (ref 3.5–5.1)
PROT SERPL-MCNC: 7.5 G/DL (ref 6.4–8.2)
PROTHROMBIN TIME: 27.7 SEC (ref 9–11.1)
SODIUM SERPL-SCNC: 133 MMOL/L (ref 136–145)

## 2024-10-16 PROCEDURE — 99233 SBSQ HOSP IP/OBS HIGH 50: CPT | Performed by: INTERNAL MEDICINE

## 2024-10-16 PROCEDURE — APPNB60 APP NON BILLABLE TIME 46-60 MINS: Performed by: NURSE PRACTITIONER

## 2024-10-16 PROCEDURE — 97530 THERAPEUTIC ACTIVITIES: CPT

## 2024-10-16 PROCEDURE — 6360000002 HC RX W HCPCS: Performed by: INTERNAL MEDICINE

## 2024-10-16 PROCEDURE — 6370000000 HC RX 637 (ALT 250 FOR IP): Performed by: INTERNAL MEDICINE

## 2024-10-16 PROCEDURE — 2580000003 HC RX 258: Performed by: INTERNAL MEDICINE

## 2024-10-16 PROCEDURE — 84100 ASSAY OF PHOSPHORUS: CPT

## 2024-10-16 PROCEDURE — 83615 LACTATE (LD) (LDH) ENZYME: CPT

## 2024-10-16 PROCEDURE — 80076 HEPATIC FUNCTION PANEL: CPT

## 2024-10-16 PROCEDURE — 94760 N-INVAS EAR/PLS OXIMETRY 1: CPT

## 2024-10-16 PROCEDURE — 2700000000 HC OXYGEN THERAPY PER DAY

## 2024-10-16 PROCEDURE — 6360000002 HC RX W HCPCS: Performed by: NURSE PRACTITIONER

## 2024-10-16 PROCEDURE — 93750 INTERROGATION VAD IN PERSON: CPT | Performed by: NURSE PRACTITIONER

## 2024-10-16 PROCEDURE — 6370000000 HC RX 637 (ALT 250 FOR IP): Performed by: FAMILY MEDICINE

## 2024-10-16 PROCEDURE — 93750 INTERROGATION VAD IN PERSON: CPT | Performed by: INTERNAL MEDICINE

## 2024-10-16 PROCEDURE — 83735 ASSAY OF MAGNESIUM: CPT

## 2024-10-16 PROCEDURE — 36415 COLL VENOUS BLD VENIPUNCTURE: CPT

## 2024-10-16 PROCEDURE — 6370000000 HC RX 637 (ALT 250 FOR IP): Performed by: HOSPITALIST

## 2024-10-16 PROCEDURE — 2060000000 HC ICU INTERMEDIATE R&B

## 2024-10-16 PROCEDURE — 6370000000 HC RX 637 (ALT 250 FOR IP): Performed by: NURSE PRACTITIONER

## 2024-10-16 PROCEDURE — 80048 BASIC METABOLIC PNL TOTAL CA: CPT

## 2024-10-16 PROCEDURE — 2580000003 HC RX 258: Performed by: FAMILY MEDICINE

## 2024-10-16 PROCEDURE — 97535 SELF CARE MNGMENT TRAINING: CPT

## 2024-10-16 PROCEDURE — 83880 ASSAY OF NATRIURETIC PEPTIDE: CPT

## 2024-10-16 PROCEDURE — 2580000003 HC RX 258: Performed by: NURSE PRACTITIONER

## 2024-10-16 PROCEDURE — 94660 CPAP INITIATION&MGMT: CPT

## 2024-10-16 PROCEDURE — 85610 PROTHROMBIN TIME: CPT

## 2024-10-16 RX ORDER — WARFARIN SODIUM 1 MG/1
0.5 TABLET ORAL
Status: COMPLETED | OUTPATIENT
Start: 2024-10-16 | End: 2024-10-16

## 2024-10-16 RX ADMIN — ASPIRIN 81 MG: 81 TABLET, COATED ORAL at 09:32

## 2024-10-16 RX ADMIN — ATORVASTATIN CALCIUM 80 MG: 40 TABLET, FILM COATED ORAL at 09:32

## 2024-10-16 RX ADMIN — PANTOPRAZOLE SODIUM 40 MG: 40 TABLET, DELAYED RELEASE ORAL at 06:58

## 2024-10-16 RX ADMIN — AMLODIPINE BESYLATE 5 MG: 5 TABLET ORAL at 09:32

## 2024-10-16 RX ADMIN — HYDROMORPHONE HYDROCHLORIDE 2 MG: 2 TABLET ORAL at 21:37

## 2024-10-16 RX ADMIN — BUMETANIDE 2 MG: 0.25 INJECTION INTRAMUSCULAR; INTRAVENOUS at 09:38

## 2024-10-16 RX ADMIN — METRONIDAZOLE 500 MG: 250 TABLET ORAL at 14:13

## 2024-10-16 RX ADMIN — METRONIDAZOLE 500 MG: 250 TABLET ORAL at 21:37

## 2024-10-16 RX ADMIN — HYDRALAZINE HYDROCHLORIDE 100 MG: 50 TABLET ORAL at 14:13

## 2024-10-16 RX ADMIN — HYDROMORPHONE HYDROCHLORIDE 2 MG: 2 TABLET ORAL at 14:23

## 2024-10-16 RX ADMIN — METOPROLOL SUCCINATE 25 MG: 25 TABLET, FILM COATED, EXTENDED RELEASE ORAL at 09:32

## 2024-10-16 RX ADMIN — ACETAMINOPHEN 650 MG: 325 TABLET ORAL at 23:48

## 2024-10-16 RX ADMIN — CEFEPIME 2000 MG: 2 INJECTION, POWDER, FOR SOLUTION INTRAVENOUS at 23:47

## 2024-10-16 RX ADMIN — ACETAMINOPHEN 650 MG: 325 TABLET ORAL at 06:57

## 2024-10-16 RX ADMIN — MAGNESIUM OXIDE TAB 400 MG (241.3 MG ELEMENTAL MG) 400 MG: 400 (241.3 MG) TAB at 09:31

## 2024-10-16 RX ADMIN — WARFARIN SODIUM 0.5 MG: 1 TABLET ORAL at 17:57

## 2024-10-16 RX ADMIN — SODIUM CHLORIDE: 900 INJECTION INTRAVENOUS at 23:45

## 2024-10-16 RX ADMIN — SODIUM CHLORIDE, PRESERVATIVE FREE 10 ML: 5 INJECTION INTRAVENOUS at 21:39

## 2024-10-16 RX ADMIN — ISOSORBIDE DINITRATE 40 MG: 20 TABLET ORAL at 14:12

## 2024-10-16 RX ADMIN — HYDRALAZINE HYDROCHLORIDE 100 MG: 50 TABLET ORAL at 21:37

## 2024-10-16 RX ADMIN — SODIUM CHLORIDE, PRESERVATIVE FREE 10 ML: 5 INJECTION INTRAVENOUS at 09:20

## 2024-10-16 RX ADMIN — Medication 1000 UNITS: at 09:32

## 2024-10-16 RX ADMIN — ACETAMINOPHEN 650 MG: 325 TABLET ORAL at 11:38

## 2024-10-16 RX ADMIN — AMIODARONE HYDROCHLORIDE 200 MG: 200 TABLET ORAL at 09:32

## 2024-10-16 RX ADMIN — ISOSORBIDE DINITRATE 40 MG: 20 TABLET ORAL at 21:37

## 2024-10-16 RX ADMIN — METRONIDAZOLE 500 MG: 250 TABLET ORAL at 06:30

## 2024-10-16 RX ADMIN — CEFEPIME 2000 MG: 2 INJECTION, POWDER, FOR SOLUTION INTRAVENOUS at 11:29

## 2024-10-16 RX ADMIN — DOCUSATE SODIUM 100 MG: 100 CAPSULE, LIQUID FILLED ORAL at 18:17

## 2024-10-16 RX ADMIN — BUMETANIDE 2 MG: 0.25 INJECTION INTRAMUSCULAR; INTRAVENOUS at 18:00

## 2024-10-16 RX ADMIN — SPIRONOLACTONE 25 MG: 25 TABLET ORAL at 09:32

## 2024-10-16 RX ADMIN — ACETAMINOPHEN 650 MG: 325 TABLET ORAL at 17:56

## 2024-10-16 RX ADMIN — METOPROLOL SUCCINATE 25 MG: 25 TABLET, FILM COATED, EXTENDED RELEASE ORAL at 21:37

## 2024-10-16 ASSESSMENT — PAIN - FUNCTIONAL ASSESSMENT: PAIN_FUNCTIONAL_ASSESSMENT: PREVENTS OR INTERFERES WITH MANY ACTIVE NOT PASSIVE ACTIVITIES

## 2024-10-16 ASSESSMENT — PAIN DESCRIPTION - LOCATION
LOCATION: FOOT

## 2024-10-16 ASSESSMENT — PAIN DESCRIPTION - FREQUENCY: FREQUENCY: CONTINUOUS

## 2024-10-16 ASSESSMENT — PAIN SCALES - GENERAL
PAINLEVEL_OUTOF10: 8
PAINLEVEL_OUTOF10: 7
PAINLEVEL_OUTOF10: 7
PAINLEVEL_OUTOF10: 6
PAINLEVEL_OUTOF10: 8
PAINLEVEL_OUTOF10: 4
PAINLEVEL_OUTOF10: 6
PAINLEVEL_OUTOF10: 4
PAINLEVEL_OUTOF10: 8

## 2024-10-16 ASSESSMENT — PAIN DESCRIPTION - ORIENTATION
ORIENTATION: RIGHT;LEFT
ORIENTATION: LEFT;RIGHT
ORIENTATION: RIGHT;LEFT
ORIENTATION: RIGHT;LEFT
ORIENTATION: LEFT

## 2024-10-16 ASSESSMENT — PAIN DESCRIPTION - DESCRIPTORS
DESCRIPTORS: ACHING

## 2024-10-16 ASSESSMENT — PAIN DESCRIPTION - PAIN TYPE: TYPE: CHRONIC PAIN

## 2024-10-17 ENCOUNTER — APPOINTMENT (OUTPATIENT)
Facility: HOSPITAL | Age: 69
DRG: 640 | End: 2024-10-17
Payer: MEDICARE

## 2024-10-17 LAB
ANION GAP SERPL CALC-SCNC: 4 MMOL/L (ref 2–12)
BUN SERPL-MCNC: 60 MG/DL (ref 6–20)
BUN/CREAT SERPL: 48 (ref 12–20)
CALCIUM SERPL-MCNC: 9.1 MG/DL (ref 8.5–10.1)
CHLORIDE SERPL-SCNC: 101 MMOL/L (ref 97–108)
CO2 SERPL-SCNC: 29 MMOL/L (ref 21–32)
COMMENT:: NORMAL
CREAT SERPL-MCNC: 1.24 MG/DL (ref 0.7–1.3)
GLUCOSE SERPL-MCNC: 148 MG/DL (ref 65–100)
INR PPP: 2.3 (ref 0.9–1.1)
MAGNESIUM SERPL-MCNC: 2 MG/DL (ref 1.6–2.4)
NT PRO BNP: 2997 PG/ML
PHOSPHATE SERPL-MCNC: 2.3 MG/DL (ref 2.6–4.7)
POTASSIUM SERPL-SCNC: 4 MMOL/L (ref 3.5–5.1)
PROTHROMBIN TIME: 22.8 SEC (ref 9–11.1)
SODIUM SERPL-SCNC: 134 MMOL/L (ref 136–145)
SPECIMEN HOLD: NORMAL

## 2024-10-17 PROCEDURE — 6370000000 HC RX 637 (ALT 250 FOR IP): Performed by: INTERNAL MEDICINE

## 2024-10-17 PROCEDURE — 93750 INTERROGATION VAD IN PERSON: CPT | Performed by: INTERNAL MEDICINE

## 2024-10-17 PROCEDURE — 71045 X-RAY EXAM CHEST 1 VIEW: CPT

## 2024-10-17 PROCEDURE — 94660 CPAP INITIATION&MGMT: CPT

## 2024-10-17 PROCEDURE — 2580000003 HC RX 258: Performed by: INTERNAL MEDICINE

## 2024-10-17 PROCEDURE — 2700000000 HC OXYGEN THERAPY PER DAY

## 2024-10-17 PROCEDURE — 80048 BASIC METABOLIC PNL TOTAL CA: CPT

## 2024-10-17 PROCEDURE — 6370000000 HC RX 637 (ALT 250 FOR IP): Performed by: HOSPITALIST

## 2024-10-17 PROCEDURE — 85610 PROTHROMBIN TIME: CPT

## 2024-10-17 PROCEDURE — 83735 ASSAY OF MAGNESIUM: CPT

## 2024-10-17 PROCEDURE — 99233 SBSQ HOSP IP/OBS HIGH 50: CPT | Performed by: INTERNAL MEDICINE

## 2024-10-17 PROCEDURE — 6360000002 HC RX W HCPCS: Performed by: INTERNAL MEDICINE

## 2024-10-17 PROCEDURE — 6370000000 HC RX 637 (ALT 250 FOR IP): Performed by: FAMILY MEDICINE

## 2024-10-17 PROCEDURE — 36415 COLL VENOUS BLD VENIPUNCTURE: CPT

## 2024-10-17 PROCEDURE — 93750 INTERROGATION VAD IN PERSON: CPT | Performed by: NURSE PRACTITIONER

## 2024-10-17 PROCEDURE — 84100 ASSAY OF PHOSPHORUS: CPT

## 2024-10-17 PROCEDURE — 6360000002 HC RX W HCPCS: Performed by: NURSE PRACTITIONER

## 2024-10-17 PROCEDURE — 94760 N-INVAS EAR/PLS OXIMETRY 1: CPT

## 2024-10-17 PROCEDURE — 83880 ASSAY OF NATRIURETIC PEPTIDE: CPT

## 2024-10-17 PROCEDURE — 6370000000 HC RX 637 (ALT 250 FOR IP): Performed by: NURSE PRACTITIONER

## 2024-10-17 PROCEDURE — 2060000000 HC ICU INTERMEDIATE R&B

## 2024-10-17 PROCEDURE — 2580000003 HC RX 258: Performed by: NURSE PRACTITIONER

## 2024-10-17 PROCEDURE — APPNB60 APP NON BILLABLE TIME 46-60 MINS: Performed by: NURSE PRACTITIONER

## 2024-10-17 PROCEDURE — 2580000003 HC RX 258: Performed by: FAMILY MEDICINE

## 2024-10-17 RX ORDER — WARFARIN SODIUM 1 MG/1
1 TABLET ORAL
Status: COMPLETED | OUTPATIENT
Start: 2024-10-17 | End: 2024-10-17

## 2024-10-17 RX ADMIN — BUMETANIDE 2 MG: 0.25 INJECTION INTRAMUSCULAR; INTRAVENOUS at 18:05

## 2024-10-17 RX ADMIN — SODIUM CHLORIDE, PRESERVATIVE FREE 10 ML: 5 INJECTION INTRAVENOUS at 09:00

## 2024-10-17 RX ADMIN — SPIRONOLACTONE 25 MG: 25 TABLET ORAL at 08:52

## 2024-10-17 RX ADMIN — ISOSORBIDE DINITRATE 40 MG: 20 TABLET ORAL at 22:20

## 2024-10-17 RX ADMIN — Medication 1000 UNITS: at 08:53

## 2024-10-17 RX ADMIN — AMLODIPINE BESYLATE 5 MG: 5 TABLET ORAL at 08:52

## 2024-10-17 RX ADMIN — HYDROMORPHONE HYDROCHLORIDE 2 MG: 2 TABLET ORAL at 11:05

## 2024-10-17 RX ADMIN — SODIUM CHLORIDE, PRESERVATIVE FREE 10 ML: 5 INJECTION INTRAVENOUS at 22:22

## 2024-10-17 RX ADMIN — METRONIDAZOLE 500 MG: 250 TABLET ORAL at 05:47

## 2024-10-17 RX ADMIN — BUMETANIDE 2 MG: 0.25 INJECTION INTRAMUSCULAR; INTRAVENOUS at 08:57

## 2024-10-17 RX ADMIN — HYDROMORPHONE HYDROCHLORIDE 2 MG: 2 TABLET ORAL at 05:16

## 2024-10-17 RX ADMIN — TRAMADOL HYDROCHLORIDE 100 MG: 50 TABLET ORAL at 22:20

## 2024-10-17 RX ADMIN — ATORVASTATIN CALCIUM 80 MG: 40 TABLET, FILM COATED ORAL at 08:53

## 2024-10-17 RX ADMIN — AMIODARONE HYDROCHLORIDE 200 MG: 200 TABLET ORAL at 08:53

## 2024-10-17 RX ADMIN — ACETAMINOPHEN 650 MG: 325 TABLET ORAL at 18:05

## 2024-10-17 RX ADMIN — WARFARIN SODIUM 1 MG: 1 TABLET ORAL at 18:05

## 2024-10-17 RX ADMIN — METRONIDAZOLE 500 MG: 250 TABLET ORAL at 22:20

## 2024-10-17 RX ADMIN — HYDRALAZINE HYDROCHLORIDE 100 MG: 50 TABLET ORAL at 15:34

## 2024-10-17 RX ADMIN — METOPROLOL SUCCINATE 25 MG: 25 TABLET, FILM COATED, EXTENDED RELEASE ORAL at 22:20

## 2024-10-17 RX ADMIN — METRONIDAZOLE 500 MG: 250 TABLET ORAL at 15:34

## 2024-10-17 RX ADMIN — HYDRALAZINE HYDROCHLORIDE 100 MG: 50 TABLET ORAL at 05:16

## 2024-10-17 RX ADMIN — ACETAMINOPHEN 650 MG: 325 TABLET ORAL at 12:17

## 2024-10-17 RX ADMIN — METOPROLOL SUCCINATE 25 MG: 25 TABLET, FILM COATED, EXTENDED RELEASE ORAL at 08:53

## 2024-10-17 RX ADMIN — DOCUSATE SODIUM 100 MG: 100 CAPSULE, LIQUID FILLED ORAL at 08:53

## 2024-10-17 RX ADMIN — ASPIRIN 81 MG: 81 TABLET, COATED ORAL at 08:53

## 2024-10-17 RX ADMIN — PANTOPRAZOLE SODIUM 40 MG: 40 TABLET, DELAYED RELEASE ORAL at 05:16

## 2024-10-17 RX ADMIN — CEFEPIME 2000 MG: 2 INJECTION, POWDER, FOR SOLUTION INTRAVENOUS at 11:13

## 2024-10-17 RX ADMIN — ISOSORBIDE DINITRATE 40 MG: 20 TABLET ORAL at 15:34

## 2024-10-17 RX ADMIN — HYDRALAZINE HYDROCHLORIDE 100 MG: 50 TABLET ORAL at 22:20

## 2024-10-17 RX ADMIN — ACETAMINOPHEN 650 MG: 325 TABLET ORAL at 05:16

## 2024-10-17 RX ADMIN — MAGNESIUM OXIDE TAB 400 MG (241.3 MG ELEMENTAL MG) 400 MG: 400 (241.3 MG) TAB at 08:53

## 2024-10-17 RX ADMIN — SODIUM CHLORIDE, PRESERVATIVE FREE 10 ML: 5 INJECTION INTRAVENOUS at 09:02

## 2024-10-17 RX ADMIN — HYDROMORPHONE HYDROCHLORIDE 2 MG: 2 TABLET ORAL at 18:05

## 2024-10-17 RX ADMIN — ISOSORBIDE DINITRATE 40 MG: 20 TABLET ORAL at 05:16

## 2024-10-17 ASSESSMENT — PAIN DESCRIPTION - DESCRIPTORS
DESCRIPTORS: ACHING

## 2024-10-17 ASSESSMENT — PAIN SCALES - GENERAL
PAINLEVEL_OUTOF10: 6
PAINLEVEL_OUTOF10: 8
PAINLEVEL_OUTOF10: 7
PAINLEVEL_OUTOF10: 7
PAINLEVEL_OUTOF10: 8
PAINLEVEL_OUTOF10: 6
PAINLEVEL_OUTOF10: 6
PAINLEVEL_OUTOF10: 8
PAINLEVEL_OUTOF10: 6
PAINLEVEL_OUTOF10: 8
PAINLEVEL_OUTOF10: 6

## 2024-10-17 ASSESSMENT — PAIN DESCRIPTION - ORIENTATION
ORIENTATION: RIGHT;LEFT

## 2024-10-17 ASSESSMENT — PAIN DESCRIPTION - LOCATION
LOCATION: FOOT

## 2024-10-18 ENCOUNTER — APPOINTMENT (OUTPATIENT)
Facility: HOSPITAL | Age: 69
DRG: 640 | End: 2024-10-18
Payer: MEDICARE

## 2024-10-18 LAB
ALBUMIN SERPL-MCNC: 2.3 G/DL (ref 3.5–5)
ALBUMIN/GLOB SERPL: 0.4 (ref 1.1–2.2)
ALP SERPL-CCNC: 218 U/L (ref 45–117)
ALT SERPL-CCNC: 22 U/L (ref 12–78)
ANION GAP SERPL CALC-SCNC: 5 MMOL/L (ref 2–12)
ARTERIAL PATENCY WRIST A: POSITIVE
AST SERPL-CCNC: 44 U/L (ref 15–37)
BASE EXCESS BLD CALC-SCNC: 3.3 MMOL/L
BDY SITE: ABNORMAL
BILIRUB DIRECT SERPL-MCNC: 0.2 MG/DL (ref 0–0.2)
BILIRUB SERPL-MCNC: 0.5 MG/DL (ref 0.2–1)
BUN SERPL-MCNC: 58 MG/DL (ref 6–20)
BUN/CREAT SERPL: 38 (ref 12–20)
CALCIUM SERPL-MCNC: 9.1 MG/DL (ref 8.5–10.1)
CHLORIDE SERPL-SCNC: 101 MMOL/L (ref 97–108)
CO2 SERPL-SCNC: 30 MMOL/L (ref 21–32)
CREAT SERPL-MCNC: 1.53 MG/DL (ref 0.7–1.3)
GAS FLOW.O2 O2 DELIVERY SYS: ABNORMAL
GLOBULIN SER CALC-MCNC: 5.6 G/DL (ref 2–4)
GLUCOSE SERPL-MCNC: 192 MG/DL (ref 65–100)
HCO3 BLD-SCNC: 27.1 MMOL/L (ref 21–28)
INR PPP: 2 (ref 0.9–1.1)
MAGNESIUM SERPL-MCNC: 2 MG/DL (ref 1.6–2.4)
NT PRO BNP: 2562 PG/ML
PCO2 BLD: 37.7 MMHG (ref 35–48)
PH BLD: 7.46 (ref 7.35–7.45)
PHOSPHATE SERPL-MCNC: 3 MG/DL (ref 2.6–4.7)
PO2 BLD: 75 MMHG (ref 83–108)
POTASSIUM SERPL-SCNC: 4.2 MMOL/L (ref 3.5–5.1)
PROT SERPL-MCNC: 7.9 G/DL (ref 6.4–8.2)
PROTHROMBIN TIME: 19.9 SEC (ref 9–11.1)
SAO2 % BLD: 95.7 % (ref 92–97)
SODIUM SERPL-SCNC: 136 MMOL/L (ref 136–145)
SPECIMEN TYPE: ABNORMAL

## 2024-10-18 PROCEDURE — 97530 THERAPEUTIC ACTIVITIES: CPT

## 2024-10-18 PROCEDURE — 6360000002 HC RX W HCPCS: Performed by: INTERNAL MEDICINE

## 2024-10-18 PROCEDURE — 6370000000 HC RX 637 (ALT 250 FOR IP): Performed by: FAMILY MEDICINE

## 2024-10-18 PROCEDURE — 94660 CPAP INITIATION&MGMT: CPT

## 2024-10-18 PROCEDURE — 80076 HEPATIC FUNCTION PANEL: CPT

## 2024-10-18 PROCEDURE — 2700000000 HC OXYGEN THERAPY PER DAY

## 2024-10-18 PROCEDURE — 36600 WITHDRAWAL OF ARTERIAL BLOOD: CPT

## 2024-10-18 PROCEDURE — 36415 COLL VENOUS BLD VENIPUNCTURE: CPT

## 2024-10-18 PROCEDURE — 6360000002 HC RX W HCPCS: Performed by: NURSE PRACTITIONER

## 2024-10-18 PROCEDURE — 2580000003 HC RX 258: Performed by: FAMILY MEDICINE

## 2024-10-18 PROCEDURE — 6370000000 HC RX 637 (ALT 250 FOR IP): Performed by: NURSE PRACTITIONER

## 2024-10-18 PROCEDURE — 6360000002 HC RX W HCPCS: Performed by: HOSPITALIST

## 2024-10-18 PROCEDURE — 83735 ASSAY OF MAGNESIUM: CPT

## 2024-10-18 PROCEDURE — 94760 N-INVAS EAR/PLS OXIMETRY 1: CPT

## 2024-10-18 PROCEDURE — 85610 PROTHROMBIN TIME: CPT

## 2024-10-18 PROCEDURE — 2580000003 HC RX 258: Performed by: INTERNAL MEDICINE

## 2024-10-18 PROCEDURE — 6370000000 HC RX 637 (ALT 250 FOR IP): Performed by: HOSPITALIST

## 2024-10-18 PROCEDURE — 80048 BASIC METABOLIC PNL TOTAL CA: CPT

## 2024-10-18 PROCEDURE — 6370000000 HC RX 637 (ALT 250 FOR IP): Performed by: INTERNAL MEDICINE

## 2024-10-18 PROCEDURE — 99233 SBSQ HOSP IP/OBS HIGH 50: CPT | Performed by: INTERNAL MEDICINE

## 2024-10-18 PROCEDURE — 71045 X-RAY EXAM CHEST 1 VIEW: CPT

## 2024-10-18 PROCEDURE — 82803 BLOOD GASES ANY COMBINATION: CPT

## 2024-10-18 PROCEDURE — 84100 ASSAY OF PHOSPHORUS: CPT

## 2024-10-18 PROCEDURE — 2580000003 HC RX 258: Performed by: NURSE PRACTITIONER

## 2024-10-18 PROCEDURE — 83880 ASSAY OF NATRIURETIC PEPTIDE: CPT

## 2024-10-18 PROCEDURE — 93750 INTERROGATION VAD IN PERSON: CPT | Performed by: INTERNAL MEDICINE

## 2024-10-18 PROCEDURE — P9047 ALBUMIN (HUMAN), 25%, 50ML: HCPCS | Performed by: HOSPITALIST

## 2024-10-18 PROCEDURE — 2060000000 HC ICU INTERMEDIATE R&B

## 2024-10-18 PROCEDURE — APPSS45 APP SPLIT SHARED TIME 31-45 MINUTES: Performed by: NURSE PRACTITIONER

## 2024-10-18 RX ORDER — ALBUMIN (HUMAN) 12.5 G/50ML
25 SOLUTION INTRAVENOUS EVERY 6 HOURS
Status: COMPLETED | OUTPATIENT
Start: 2024-10-18 | End: 2024-10-19

## 2024-10-18 RX ORDER — BUMETANIDE 0.25 MG/ML
1 INJECTION, SOLUTION INTRAMUSCULAR; INTRAVENOUS 2 TIMES DAILY
Status: DISCONTINUED | OUTPATIENT
Start: 2024-10-18 | End: 2024-10-20

## 2024-10-18 RX ORDER — WARFARIN SODIUM 1 MG/1
1 TABLET ORAL
Status: COMPLETED | OUTPATIENT
Start: 2024-10-18 | End: 2024-10-18

## 2024-10-18 RX ADMIN — ACETAMINOPHEN 650 MG: 325 TABLET ORAL at 00:26

## 2024-10-18 RX ADMIN — ACETAMINOPHEN 650 MG: 325 TABLET ORAL at 18:24

## 2024-10-18 RX ADMIN — METOPROLOL SUCCINATE 25 MG: 25 TABLET, FILM COATED, EXTENDED RELEASE ORAL at 09:18

## 2024-10-18 RX ADMIN — SODIUM CHLORIDE, PRESERVATIVE FREE 10 ML: 5 INJECTION INTRAVENOUS at 09:19

## 2024-10-18 RX ADMIN — BUMETANIDE 2 MG: 0.25 INJECTION INTRAMUSCULAR; INTRAVENOUS at 09:18

## 2024-10-18 RX ADMIN — HYDROMORPHONE HYDROCHLORIDE 2 MG: 2 TABLET ORAL at 06:52

## 2024-10-18 RX ADMIN — MAGNESIUM OXIDE TAB 400 MG (241.3 MG ELEMENTAL MG) 400 MG: 400 (241.3 MG) TAB at 09:18

## 2024-10-18 RX ADMIN — Medication 1000 UNITS: at 09:18

## 2024-10-18 RX ADMIN — CEFEPIME 2000 MG: 2 INJECTION, POWDER, FOR SOLUTION INTRAVENOUS at 23:09

## 2024-10-18 RX ADMIN — SODIUM CHLORIDE, PRESERVATIVE FREE 10 ML: 5 INJECTION INTRAVENOUS at 22:00

## 2024-10-18 RX ADMIN — BUMETANIDE 1 MG: 0.25 INJECTION INTRAMUSCULAR; INTRAVENOUS at 18:24

## 2024-10-18 RX ADMIN — CEFEPIME 2000 MG: 2 INJECTION, POWDER, FOR SOLUTION INTRAVENOUS at 00:25

## 2024-10-18 RX ADMIN — AMLODIPINE BESYLATE 5 MG: 5 TABLET ORAL at 09:18

## 2024-10-18 RX ADMIN — WARFARIN SODIUM 1 MG: 1 TABLET ORAL at 18:24

## 2024-10-18 RX ADMIN — CEFEPIME 2000 MG: 2 INJECTION, POWDER, FOR SOLUTION INTRAVENOUS at 11:25

## 2024-10-18 RX ADMIN — PANTOPRAZOLE SODIUM 40 MG: 40 TABLET, DELAYED RELEASE ORAL at 06:52

## 2024-10-18 RX ADMIN — ALBUMIN (HUMAN) 25 G: 0.25 INJECTION, SOLUTION INTRAVENOUS at 09:29

## 2024-10-18 RX ADMIN — ACETAMINOPHEN 650 MG: 325 TABLET ORAL at 23:16

## 2024-10-18 RX ADMIN — HYDRALAZINE HYDROCHLORIDE 100 MG: 50 TABLET ORAL at 15:14

## 2024-10-18 RX ADMIN — HYDROMORPHONE HYDROCHLORIDE 2 MG: 2 TABLET ORAL at 11:54

## 2024-10-18 RX ADMIN — METOPROLOL SUCCINATE 25 MG: 25 TABLET, FILM COATED, EXTENDED RELEASE ORAL at 23:16

## 2024-10-18 RX ADMIN — ACETAMINOPHEN 650 MG: 325 TABLET ORAL at 06:52

## 2024-10-18 RX ADMIN — ACETAMINOPHEN 650 MG: 325 TABLET ORAL at 11:55

## 2024-10-18 RX ADMIN — METRONIDAZOLE 500 MG: 250 TABLET ORAL at 15:14

## 2024-10-18 RX ADMIN — SPIRONOLACTONE 25 MG: 25 TABLET ORAL at 09:18

## 2024-10-18 RX ADMIN — HYDRALAZINE HYDROCHLORIDE 100 MG: 50 TABLET ORAL at 06:52

## 2024-10-18 RX ADMIN — SODIUM CHLORIDE: 900 INJECTION INTRAVENOUS at 00:24

## 2024-10-18 RX ADMIN — ATORVASTATIN CALCIUM 80 MG: 40 TABLET, FILM COATED ORAL at 09:18

## 2024-10-18 RX ADMIN — ISOSORBIDE DINITRATE 40 MG: 20 TABLET ORAL at 06:52

## 2024-10-18 RX ADMIN — ASPIRIN 81 MG: 81 TABLET, COATED ORAL at 09:18

## 2024-10-18 RX ADMIN — ISOSORBIDE DINITRATE 40 MG: 20 TABLET ORAL at 23:17

## 2024-10-18 RX ADMIN — ALBUMIN (HUMAN) 25 G: 0.25 INJECTION, SOLUTION INTRAVENOUS at 22:00

## 2024-10-18 RX ADMIN — METRONIDAZOLE 500 MG: 250 TABLET ORAL at 06:52

## 2024-10-18 RX ADMIN — ALBUMIN (HUMAN) 25 G: 0.25 INJECTION, SOLUTION INTRAVENOUS at 15:41

## 2024-10-18 RX ADMIN — HYDROMORPHONE HYDROCHLORIDE 2 MG: 2 TABLET ORAL at 00:26

## 2024-10-18 RX ADMIN — DOCUSATE SODIUM 100 MG: 100 CAPSULE, LIQUID FILLED ORAL at 09:18

## 2024-10-18 RX ADMIN — METRONIDAZOLE 500 MG: 250 TABLET ORAL at 23:15

## 2024-10-18 RX ADMIN — HYDROMORPHONE HYDROCHLORIDE 1 MG: 1 INJECTION, SOLUTION INTRAMUSCULAR; INTRAVENOUS; SUBCUTANEOUS at 18:48

## 2024-10-18 RX ADMIN — ISOSORBIDE DINITRATE 40 MG: 20 TABLET ORAL at 15:14

## 2024-10-18 RX ADMIN — HYDRALAZINE HYDROCHLORIDE 100 MG: 50 TABLET ORAL at 23:16

## 2024-10-18 ASSESSMENT — PAIN DESCRIPTION - LOCATION
LOCATION: FOOT

## 2024-10-18 ASSESSMENT — PAIN DESCRIPTION - ORIENTATION
ORIENTATION: LEFT;RIGHT
ORIENTATION: RIGHT;LEFT
ORIENTATION: RIGHT;LEFT

## 2024-10-18 ASSESSMENT — PAIN SCALES - GENERAL
PAINLEVEL_OUTOF10: 8
PAINLEVEL_OUTOF10: 7
PAINLEVEL_OUTOF10: 8
PAINLEVEL_OUTOF10: 8

## 2024-10-18 ASSESSMENT — PAIN DESCRIPTION - DESCRIPTORS
DESCRIPTORS: ACHING
DESCRIPTORS: ACHING

## 2024-10-19 LAB
ALBUMIN SERPL-MCNC: 3.5 G/DL (ref 3.5–5)
ALBUMIN/GLOB SERPL: 0.9 (ref 1.1–2.2)
ALP SERPL-CCNC: 165 U/L (ref 45–117)
ALT SERPL-CCNC: 22 U/L (ref 12–78)
ANION GAP SERPL CALC-SCNC: 5 MMOL/L (ref 2–12)
AST SERPL-CCNC: 36 U/L (ref 15–37)
BASOPHILS # BLD: 0.1 K/UL (ref 0–0.1)
BASOPHILS NFR BLD: 1 % (ref 0–1)
BILIRUB SERPL-MCNC: 0.7 MG/DL (ref 0.2–1)
BUN SERPL-MCNC: 55 MG/DL (ref 6–20)
BUN/CREAT SERPL: 43 (ref 12–20)
CALCIUM SERPL-MCNC: 8.9 MG/DL (ref 8.5–10.1)
CHLORIDE SERPL-SCNC: 103 MMOL/L (ref 97–108)
CO2 SERPL-SCNC: 27 MMOL/L (ref 21–32)
CREAT SERPL-MCNC: 1.29 MG/DL (ref 0.7–1.3)
DIFFERENTIAL METHOD BLD: ABNORMAL
EOSINOPHIL # BLD: 0.8 K/UL (ref 0–0.4)
EOSINOPHIL NFR BLD: 9 % (ref 0–7)
ERYTHROCYTE [DISTWIDTH] IN BLOOD BY AUTOMATED COUNT: 19.4 % (ref 11.5–14.5)
GLOBULIN SER CALC-MCNC: 3.7 G/DL (ref 2–4)
GLUCOSE SERPL-MCNC: 114 MG/DL (ref 65–100)
HCT VFR BLD AUTO: 25.5 % (ref 36.6–50.3)
HGB BLD-MCNC: 8 G/DL (ref 12.1–17)
IMM GRANULOCYTES # BLD AUTO: 0.1 K/UL (ref 0–0.04)
IMM GRANULOCYTES NFR BLD AUTO: 1 % (ref 0–0.5)
INR PPP: 1.8 (ref 0.9–1.1)
LYMPHOCYTES # BLD: 0.8 K/UL (ref 0.8–3.5)
LYMPHOCYTES NFR BLD: 9 % (ref 12–49)
MCH RBC QN AUTO: 30.5 PG (ref 26–34)
MCHC RBC AUTO-ENTMCNC: 31.4 G/DL (ref 30–36.5)
MCV RBC AUTO: 97.3 FL (ref 80–99)
MONOCYTES # BLD: 1 K/UL (ref 0–1)
MONOCYTES NFR BLD: 12 % (ref 5–13)
NEUTS SEG # BLD: 5.8 K/UL (ref 1.8–8)
NEUTS SEG NFR BLD: 68 % (ref 32–75)
NRBC # BLD: 0.02 K/UL (ref 0–0.01)
NRBC BLD-RTO: 0.2 PER 100 WBC
NT PRO BNP: 1937 PG/ML
PLATELET # BLD AUTO: 415 K/UL (ref 150–400)
PMV BLD AUTO: 10.2 FL (ref 8.9–12.9)
POTASSIUM SERPL-SCNC: 4.2 MMOL/L (ref 3.5–5.1)
PROT SERPL-MCNC: 7.2 G/DL (ref 6.4–8.2)
PROTHROMBIN TIME: 18.2 SEC (ref 9–11.1)
RBC # BLD AUTO: 2.62 M/UL (ref 4.1–5.7)
SODIUM SERPL-SCNC: 135 MMOL/L (ref 136–145)
WBC # BLD AUTO: 8.6 K/UL (ref 4.1–11.1)

## 2024-10-19 PROCEDURE — 6360000002 HC RX W HCPCS: Performed by: HOSPITALIST

## 2024-10-19 PROCEDURE — 85610 PROTHROMBIN TIME: CPT

## 2024-10-19 PROCEDURE — 6370000000 HC RX 637 (ALT 250 FOR IP): Performed by: HOSPITALIST

## 2024-10-19 PROCEDURE — 2700000000 HC OXYGEN THERAPY PER DAY

## 2024-10-19 PROCEDURE — 6370000000 HC RX 637 (ALT 250 FOR IP): Performed by: INTERNAL MEDICINE

## 2024-10-19 PROCEDURE — 94660 CPAP INITIATION&MGMT: CPT

## 2024-10-19 PROCEDURE — 99231 SBSQ HOSP IP/OBS SF/LOW 25: CPT | Performed by: NURSE PRACTITIONER

## 2024-10-19 PROCEDURE — 83880 ASSAY OF NATRIURETIC PEPTIDE: CPT

## 2024-10-19 PROCEDURE — 2060000000 HC ICU INTERMEDIATE R&B

## 2024-10-19 PROCEDURE — 36415 COLL VENOUS BLD VENIPUNCTURE: CPT

## 2024-10-19 PROCEDURE — 2580000003 HC RX 258: Performed by: INTERNAL MEDICINE

## 2024-10-19 PROCEDURE — P9047 ALBUMIN (HUMAN), 25%, 50ML: HCPCS | Performed by: HOSPITALIST

## 2024-10-19 PROCEDURE — 2580000003 HC RX 258: Performed by: NURSE PRACTITIONER

## 2024-10-19 PROCEDURE — 6370000000 HC RX 637 (ALT 250 FOR IP): Performed by: FAMILY MEDICINE

## 2024-10-19 PROCEDURE — 51798 US URINE CAPACITY MEASURE: CPT

## 2024-10-19 PROCEDURE — 2580000003 HC RX 258: Performed by: FAMILY MEDICINE

## 2024-10-19 PROCEDURE — 6370000000 HC RX 637 (ALT 250 FOR IP): Performed by: NURSE PRACTITIONER

## 2024-10-19 PROCEDURE — 85025 COMPLETE CBC W/AUTO DIFF WBC: CPT

## 2024-10-19 PROCEDURE — 6360000002 HC RX W HCPCS: Performed by: NURSE PRACTITIONER

## 2024-10-19 PROCEDURE — 80053 COMPREHEN METABOLIC PANEL: CPT

## 2024-10-19 RX ORDER — WARFARIN SODIUM 1 MG/1
1.5 TABLET ORAL
Status: COMPLETED | OUTPATIENT
Start: 2024-10-19 | End: 2024-10-19

## 2024-10-19 RX ADMIN — ISOSORBIDE DINITRATE 40 MG: 20 TABLET ORAL at 07:19

## 2024-10-19 RX ADMIN — HYDROMORPHONE HYDROCHLORIDE 2 MG: 2 TABLET ORAL at 10:34

## 2024-10-19 RX ADMIN — WARFARIN SODIUM 1.5 MG: 1 TABLET ORAL at 19:19

## 2024-10-19 RX ADMIN — METRONIDAZOLE 500 MG: 250 TABLET ORAL at 07:19

## 2024-10-19 RX ADMIN — DOCUSATE SODIUM 100 MG: 100 CAPSULE, LIQUID FILLED ORAL at 08:51

## 2024-10-19 RX ADMIN — BUMETANIDE 1 MG: 0.25 INJECTION INTRAMUSCULAR; INTRAVENOUS at 08:56

## 2024-10-19 RX ADMIN — SPIRONOLACTONE 25 MG: 25 TABLET ORAL at 08:50

## 2024-10-19 RX ADMIN — ALBUMIN (HUMAN) 25 G: 0.25 INJECTION, SOLUTION INTRAVENOUS at 04:47

## 2024-10-19 RX ADMIN — ACETAMINOPHEN 650 MG: 325 TABLET ORAL at 12:20

## 2024-10-19 RX ADMIN — SODIUM CHLORIDE, PRESERVATIVE FREE 10 ML: 5 INJECTION INTRAVENOUS at 09:03

## 2024-10-19 RX ADMIN — HYDROMORPHONE HYDROCHLORIDE 2 MG: 2 TABLET ORAL at 19:19

## 2024-10-19 RX ADMIN — HYDRALAZINE HYDROCHLORIDE 100 MG: 50 TABLET ORAL at 15:06

## 2024-10-19 RX ADMIN — PANTOPRAZOLE SODIUM 40 MG: 40 TABLET, DELAYED RELEASE ORAL at 07:19

## 2024-10-19 RX ADMIN — BUMETANIDE 1 MG: 0.25 INJECTION INTRAMUSCULAR; INTRAVENOUS at 19:19

## 2024-10-19 RX ADMIN — HYDRALAZINE HYDROCHLORIDE 100 MG: 50 TABLET ORAL at 07:19

## 2024-10-19 RX ADMIN — METRONIDAZOLE 500 MG: 250 TABLET ORAL at 15:06

## 2024-10-19 RX ADMIN — ATORVASTATIN CALCIUM 80 MG: 40 TABLET, FILM COATED ORAL at 08:51

## 2024-10-19 RX ADMIN — MAGNESIUM OXIDE TAB 400 MG (241.3 MG ELEMENTAL MG) 400 MG: 400 (241.3 MG) TAB at 08:55

## 2024-10-19 RX ADMIN — Medication 1000 UNITS: at 08:49

## 2024-10-19 RX ADMIN — ASPIRIN 81 MG: 81 TABLET, COATED ORAL at 08:49

## 2024-10-19 RX ADMIN — METOPROLOL SUCCINATE 25 MG: 25 TABLET, FILM COATED, EXTENDED RELEASE ORAL at 08:55

## 2024-10-19 RX ADMIN — CEFEPIME 2000 MG: 2 INJECTION, POWDER, FOR SOLUTION INTRAVENOUS at 10:38

## 2024-10-19 RX ADMIN — ACETAMINOPHEN 650 MG: 325 TABLET ORAL at 19:20

## 2024-10-19 RX ADMIN — ACETAMINOPHEN 650 MG: 325 TABLET ORAL at 07:19

## 2024-10-19 RX ADMIN — ISOSORBIDE DINITRATE 40 MG: 20 TABLET ORAL at 15:06

## 2024-10-19 RX ADMIN — AMLODIPINE BESYLATE 5 MG: 5 TABLET ORAL at 08:55

## 2024-10-19 ASSESSMENT — PAIN SCALES - GENERAL
PAINLEVEL_OUTOF10: 8
PAINLEVEL_OUTOF10: 10
PAINLEVEL_OUTOF10: 8
PAINLEVEL_OUTOF10: 5

## 2024-10-19 ASSESSMENT — PAIN DESCRIPTION - ORIENTATION: ORIENTATION: LEFT

## 2024-10-19 ASSESSMENT — PAIN DESCRIPTION - LOCATION
LOCATION: ANKLE

## 2024-10-20 LAB
ALBUMIN SERPL-MCNC: 3.6 G/DL (ref 3.5–5)
ALBUMIN/GLOB SERPL: 0.8 (ref 1.1–2.2)
ALP SERPL-CCNC: 206 U/L (ref 45–117)
ALT SERPL-CCNC: 25 U/L (ref 12–78)
ANION GAP SERPL CALC-SCNC: 4 MMOL/L (ref 2–12)
AST SERPL-CCNC: 40 U/L (ref 15–37)
BASOPHILS # BLD: 0.1 K/UL (ref 0–0.1)
BASOPHILS NFR BLD: 1 % (ref 0–1)
BILIRUB SERPL-MCNC: 0.7 MG/DL (ref 0.2–1)
BUN SERPL-MCNC: 63 MG/DL (ref 6–20)
BUN/CREAT SERPL: 48 (ref 12–20)
CALCIUM SERPL-MCNC: 9.5 MG/DL (ref 8.5–10.1)
CHLORIDE SERPL-SCNC: 99 MMOL/L (ref 97–108)
CO2 SERPL-SCNC: 30 MMOL/L (ref 21–32)
CREAT SERPL-MCNC: 1.32 MG/DL (ref 0.7–1.3)
DIFFERENTIAL METHOD BLD: ABNORMAL
EKG ATRIAL RATE: 59 BPM
EKG DIAGNOSIS: NORMAL
EKG P AXIS: -5 DEGREES
EKG P-R INTERVAL: 80 MS
EKG Q-T INTERVAL: 572 MS
EKG QRS DURATION: 180 MS
EKG QTC CALCULATION (BAZETT): 575 MS
EKG R AXIS: 230 DEGREES
EKG T AXIS: 163 DEGREES
EKG VENTRICULAR RATE: 61 BPM
EOSINOPHIL # BLD: 0.9 K/UL (ref 0–0.4)
EOSINOPHIL NFR BLD: 9 % (ref 0–7)
ERYTHROCYTE [DISTWIDTH] IN BLOOD BY AUTOMATED COUNT: 19.6 % (ref 11.5–14.5)
GLOBULIN SER CALC-MCNC: 4.4 G/DL (ref 2–4)
GLUCOSE SERPL-MCNC: 155 MG/DL (ref 65–100)
HCT VFR BLD AUTO: 30.6 % (ref 36.6–50.3)
HGB BLD-MCNC: 9.4 G/DL (ref 12.1–17)
IMM GRANULOCYTES # BLD AUTO: 0.2 K/UL (ref 0–0.04)
IMM GRANULOCYTES NFR BLD AUTO: 2 % (ref 0–0.5)
INR PPP: 1.6 (ref 0.9–1.1)
LYMPHOCYTES # BLD: 0.8 K/UL (ref 0.8–3.5)
LYMPHOCYTES NFR BLD: 8 % (ref 12–49)
MCH RBC QN AUTO: 30.1 PG (ref 26–34)
MCHC RBC AUTO-ENTMCNC: 30.7 G/DL (ref 30–36.5)
MCV RBC AUTO: 98.1 FL (ref 80–99)
MONOCYTES # BLD: 1.1 K/UL (ref 0–1)
MONOCYTES NFR BLD: 11 % (ref 5–13)
NEUTS SEG # BLD: 7.1 K/UL (ref 1.8–8)
NEUTS SEG NFR BLD: 69 % (ref 32–75)
NRBC # BLD: 0 K/UL (ref 0–0.01)
NRBC BLD-RTO: 0 PER 100 WBC
NT PRO BNP: 3248 PG/ML
PLATELET # BLD AUTO: 497 K/UL (ref 150–400)
PMV BLD AUTO: 10.6 FL (ref 8.9–12.9)
POTASSIUM SERPL-SCNC: 4.4 MMOL/L (ref 3.5–5.1)
PROT SERPL-MCNC: 8 G/DL (ref 6.4–8.2)
PROTHROMBIN TIME: 16.7 SEC (ref 9–11.1)
RBC # BLD AUTO: 3.12 M/UL (ref 4.1–5.7)
SODIUM SERPL-SCNC: 133 MMOL/L (ref 136–145)
WBC # BLD AUTO: 10.1 K/UL (ref 4.1–11.1)

## 2024-10-20 PROCEDURE — 6360000002 HC RX W HCPCS: Performed by: NURSE PRACTITIONER

## 2024-10-20 PROCEDURE — 2580000003 HC RX 258: Performed by: FAMILY MEDICINE

## 2024-10-20 PROCEDURE — 6370000000 HC RX 637 (ALT 250 FOR IP): Performed by: HOSPITALIST

## 2024-10-20 PROCEDURE — 6370000000 HC RX 637 (ALT 250 FOR IP): Performed by: FAMILY MEDICINE

## 2024-10-20 PROCEDURE — 85610 PROTHROMBIN TIME: CPT

## 2024-10-20 PROCEDURE — 2060000000 HC ICU INTERMEDIATE R&B

## 2024-10-20 PROCEDURE — 6370000000 HC RX 637 (ALT 250 FOR IP): Performed by: INTERNAL MEDICINE

## 2024-10-20 PROCEDURE — 94660 CPAP INITIATION&MGMT: CPT

## 2024-10-20 PROCEDURE — 36415 COLL VENOUS BLD VENIPUNCTURE: CPT

## 2024-10-20 PROCEDURE — 2580000003 HC RX 258: Performed by: INTERNAL MEDICINE

## 2024-10-20 PROCEDURE — 83880 ASSAY OF NATRIURETIC PEPTIDE: CPT

## 2024-10-20 PROCEDURE — 99231 SBSQ HOSP IP/OBS SF/LOW 25: CPT | Performed by: NURSE PRACTITIONER

## 2024-10-20 PROCEDURE — 80053 COMPREHEN METABOLIC PANEL: CPT

## 2024-10-20 PROCEDURE — 6370000000 HC RX 637 (ALT 250 FOR IP): Performed by: NURSE PRACTITIONER

## 2024-10-20 PROCEDURE — 2700000000 HC OXYGEN THERAPY PER DAY

## 2024-10-20 PROCEDURE — 2580000003 HC RX 258: Performed by: NURSE PRACTITIONER

## 2024-10-20 PROCEDURE — 85025 COMPLETE CBC W/AUTO DIFF WBC: CPT

## 2024-10-20 PROCEDURE — 93005 ELECTROCARDIOGRAM TRACING: CPT

## 2024-10-20 RX ORDER — WARFARIN SODIUM 1 MG/1
1.5 TABLET ORAL
Status: COMPLETED | OUTPATIENT
Start: 2024-10-20 | End: 2024-10-20

## 2024-10-20 RX ORDER — ENOXAPARIN SODIUM 100 MG/ML
1 INJECTION SUBCUTANEOUS 2 TIMES DAILY
Status: DISCONTINUED | OUTPATIENT
Start: 2024-10-20 | End: 2024-10-24

## 2024-10-20 RX ORDER — BUMETANIDE 0.25 MG/ML
1 INJECTION, SOLUTION INTRAMUSCULAR; INTRAVENOUS DAILY
Status: DISCONTINUED | OUTPATIENT
Start: 2024-10-21 | End: 2024-10-25

## 2024-10-20 RX ADMIN — ENOXAPARIN SODIUM 50 MG: 100 INJECTION SUBCUTANEOUS at 22:24

## 2024-10-20 RX ADMIN — ISOSORBIDE DINITRATE 40 MG: 20 TABLET ORAL at 06:49

## 2024-10-20 RX ADMIN — SPIRONOLACTONE 25 MG: 25 TABLET ORAL at 09:45

## 2024-10-20 RX ADMIN — METOPROLOL SUCCINATE 25 MG: 25 TABLET, FILM COATED, EXTENDED RELEASE ORAL at 00:27

## 2024-10-20 RX ADMIN — METOPROLOL SUCCINATE 25 MG: 25 TABLET, FILM COATED, EXTENDED RELEASE ORAL at 09:45

## 2024-10-20 RX ADMIN — ATORVASTATIN CALCIUM 80 MG: 40 TABLET, FILM COATED ORAL at 09:45

## 2024-10-20 RX ADMIN — METRONIDAZOLE 500 MG: 250 TABLET ORAL at 22:24

## 2024-10-20 RX ADMIN — CEFEPIME 2000 MG: 2 INJECTION, POWDER, FOR SOLUTION INTRAVENOUS at 00:33

## 2024-10-20 RX ADMIN — CEFEPIME 2000 MG: 2 INJECTION, POWDER, FOR SOLUTION INTRAVENOUS at 23:48

## 2024-10-20 RX ADMIN — SODIUM CHLORIDE, PRESERVATIVE FREE 10 ML: 5 INJECTION INTRAVENOUS at 00:28

## 2024-10-20 RX ADMIN — ACETAMINOPHEN 650 MG: 325 TABLET ORAL at 11:56

## 2024-10-20 RX ADMIN — SODIUM CHLORIDE, PRESERVATIVE FREE 10 ML: 5 INJECTION INTRAVENOUS at 22:27

## 2024-10-20 RX ADMIN — ISOSORBIDE DINITRATE 40 MG: 20 TABLET ORAL at 00:27

## 2024-10-20 RX ADMIN — CEFEPIME 2000 MG: 2 INJECTION, POWDER, FOR SOLUTION INTRAVENOUS at 12:04

## 2024-10-20 RX ADMIN — HYDRALAZINE HYDROCHLORIDE 100 MG: 50 TABLET ORAL at 00:27

## 2024-10-20 RX ADMIN — ASPIRIN 81 MG: 81 TABLET, COATED ORAL at 09:46

## 2024-10-20 RX ADMIN — SODIUM CHLORIDE, PRESERVATIVE FREE 10 ML: 5 INJECTION INTRAVENOUS at 09:56

## 2024-10-20 RX ADMIN — PANTOPRAZOLE SODIUM 40 MG: 40 TABLET, DELAYED RELEASE ORAL at 06:50

## 2024-10-20 RX ADMIN — POLYETHYLENE GLYCOL 3350 17 G: 17 POWDER, FOR SOLUTION ORAL at 15:52

## 2024-10-20 RX ADMIN — Medication 1000 UNITS: at 09:45

## 2024-10-20 RX ADMIN — DOCUSATE SODIUM 100 MG: 100 CAPSULE, LIQUID FILLED ORAL at 09:46

## 2024-10-20 RX ADMIN — HYDROMORPHONE HYDROCHLORIDE 2 MG: 2 TABLET ORAL at 09:45

## 2024-10-20 RX ADMIN — METRONIDAZOLE 500 MG: 250 TABLET ORAL at 00:27

## 2024-10-20 RX ADMIN — HYDRALAZINE HYDROCHLORIDE 100 MG: 50 TABLET ORAL at 15:38

## 2024-10-20 RX ADMIN — BUMETANIDE 1 MG: 0.25 INJECTION INTRAMUSCULAR; INTRAVENOUS at 09:46

## 2024-10-20 RX ADMIN — ISOSORBIDE DINITRATE 40 MG: 20 TABLET ORAL at 15:38

## 2024-10-20 RX ADMIN — METRONIDAZOLE 500 MG: 250 TABLET ORAL at 15:39

## 2024-10-20 RX ADMIN — ENOXAPARIN SODIUM 50 MG: 100 INJECTION SUBCUTANEOUS at 10:01

## 2024-10-20 RX ADMIN — METRONIDAZOLE 500 MG: 250 TABLET ORAL at 06:49

## 2024-10-20 RX ADMIN — SODIUM CHLORIDE, PRESERVATIVE FREE 10 ML: 5 INJECTION INTRAVENOUS at 22:28

## 2024-10-20 RX ADMIN — WARFARIN SODIUM 1.5 MG: 1 TABLET ORAL at 19:20

## 2024-10-20 RX ADMIN — HYDRALAZINE HYDROCHLORIDE 100 MG: 50 TABLET ORAL at 06:49

## 2024-10-20 RX ADMIN — ACETAMINOPHEN 650 MG: 325 TABLET ORAL at 00:42

## 2024-10-20 RX ADMIN — HYDROMORPHONE HYDROCHLORIDE 2 MG: 2 TABLET ORAL at 22:24

## 2024-10-20 RX ADMIN — ACETAMINOPHEN 650 MG: 325 TABLET ORAL at 19:20

## 2024-10-20 RX ADMIN — AMLODIPINE BESYLATE 5 MG: 5 TABLET ORAL at 09:45

## 2024-10-20 RX ADMIN — ACETAMINOPHEN 650 MG: 325 TABLET ORAL at 06:49

## 2024-10-20 RX ADMIN — MAGNESIUM OXIDE TAB 400 MG (241.3 MG ELEMENTAL MG) 400 MG: 400 (241.3 MG) TAB at 09:46

## 2024-10-20 ASSESSMENT — PAIN SCALES - GENERAL
PAINLEVEL_OUTOF10: 5
PAINLEVEL_OUTOF10: 7
PAINLEVEL_OUTOF10: 10
PAINLEVEL_OUTOF10: 8
PAINLEVEL_OUTOF10: 10
PAINLEVEL_OUTOF10: 7
PAINLEVEL_OUTOF10: 8
PAINLEVEL_OUTOF10: 8
PAINLEVEL_OUTOF10: 10

## 2024-10-20 ASSESSMENT — PAIN DESCRIPTION - ORIENTATION: ORIENTATION: RIGHT;LEFT

## 2024-10-20 ASSESSMENT — PAIN DESCRIPTION - LOCATION
LOCATION: LEG
LOCATION: FOOT
LOCATION: FOOT;LEG

## 2024-10-21 LAB
ALBUMIN SERPL-MCNC: 3.1 G/DL (ref 3.5–5)
ALBUMIN/GLOB SERPL: 0.6 (ref 1.1–2.2)
ALP SERPL-CCNC: 209 U/L (ref 45–117)
ALT SERPL-CCNC: 20 U/L (ref 12–78)
ANION GAP SERPL CALC-SCNC: 5 MMOL/L (ref 2–12)
AST SERPL-CCNC: 36 U/L (ref 15–37)
BASOPHILS # BLD: 0.1 K/UL (ref 0–0.1)
BASOPHILS NFR BLD: 1 % (ref 0–1)
BILIRUB SERPL-MCNC: 0.5 MG/DL (ref 0.2–1)
BUN SERPL-MCNC: 53 MG/DL (ref 6–20)
BUN/CREAT SERPL: 41 (ref 12–20)
CALCIUM SERPL-MCNC: 9.6 MG/DL (ref 8.5–10.1)
CHLORIDE SERPL-SCNC: 104 MMOL/L (ref 97–108)
CO2 SERPL-SCNC: 28 MMOL/L (ref 21–32)
CREAT SERPL-MCNC: 1.3 MG/DL (ref 0.7–1.3)
DIFFERENTIAL METHOD BLD: ABNORMAL
EOSINOPHIL # BLD: 1 K/UL (ref 0–0.4)
EOSINOPHIL NFR BLD: 11 % (ref 0–7)
ERYTHROCYTE [DISTWIDTH] IN BLOOD BY AUTOMATED COUNT: 19.8 % (ref 11.5–14.5)
GLOBULIN SER CALC-MCNC: 4.9 G/DL (ref 2–4)
GLUCOSE SERPL-MCNC: 151 MG/DL (ref 65–100)
HCT VFR BLD AUTO: 28.2 % (ref 36.6–50.3)
HGB BLD-MCNC: 8.8 G/DL (ref 12.1–17)
IMM GRANULOCYTES # BLD AUTO: 0.1 K/UL (ref 0–0.04)
IMM GRANULOCYTES NFR BLD AUTO: 1 % (ref 0–0.5)
INR PPP: 1.6 (ref 0.9–1.1)
LYMPHOCYTES # BLD: 1 K/UL (ref 0.8–3.5)
LYMPHOCYTES NFR BLD: 11 % (ref 12–49)
MCH RBC QN AUTO: 30.4 PG (ref 26–34)
MCHC RBC AUTO-ENTMCNC: 31.2 G/DL (ref 30–36.5)
MCV RBC AUTO: 97.6 FL (ref 80–99)
MONOCYTES # BLD: 1.1 K/UL (ref 0–1)
MONOCYTES NFR BLD: 12 % (ref 5–13)
NEUTS SEG # BLD: 6.1 K/UL (ref 1.8–8)
NEUTS SEG NFR BLD: 64 % (ref 32–75)
NRBC # BLD: 0 K/UL (ref 0–0.01)
NRBC BLD-RTO: 0 PER 100 WBC
NT PRO BNP: 2154 PG/ML
PLATELET # BLD AUTO: 476 K/UL (ref 150–400)
PMV BLD AUTO: 10.9 FL (ref 8.9–12.9)
POTASSIUM SERPL-SCNC: 3.9 MMOL/L (ref 3.5–5.1)
PROT SERPL-MCNC: 8 G/DL (ref 6.4–8.2)
PROTHROMBIN TIME: 16.6 SEC (ref 9–11.1)
RBC # BLD AUTO: 2.89 M/UL (ref 4.1–5.7)
SODIUM SERPL-SCNC: 137 MMOL/L (ref 136–145)
WBC # BLD AUTO: 9.4 K/UL (ref 4.1–11.1)

## 2024-10-21 PROCEDURE — 97535 SELF CARE MNGMENT TRAINING: CPT

## 2024-10-21 PROCEDURE — 6370000000 HC RX 637 (ALT 250 FOR IP): Performed by: HOSPITALIST

## 2024-10-21 PROCEDURE — 6370000000 HC RX 637 (ALT 250 FOR IP): Performed by: NURSE PRACTITIONER

## 2024-10-21 PROCEDURE — 94660 CPAP INITIATION&MGMT: CPT

## 2024-10-21 PROCEDURE — 99232 SBSQ HOSP IP/OBS MODERATE 35: CPT | Performed by: NURSE PRACTITIONER

## 2024-10-21 PROCEDURE — 6370000000 HC RX 637 (ALT 250 FOR IP): Performed by: INTERNAL MEDICINE

## 2024-10-21 PROCEDURE — 6360000002 HC RX W HCPCS: Performed by: NURSE PRACTITIONER

## 2024-10-21 PROCEDURE — 6360000002 HC RX W HCPCS: Performed by: INTERNAL MEDICINE

## 2024-10-21 PROCEDURE — 2700000000 HC OXYGEN THERAPY PER DAY

## 2024-10-21 PROCEDURE — 2580000003 HC RX 258: Performed by: NURSE PRACTITIONER

## 2024-10-21 PROCEDURE — 2580000003 HC RX 258: Performed by: FAMILY MEDICINE

## 2024-10-21 PROCEDURE — 85610 PROTHROMBIN TIME: CPT

## 2024-10-21 PROCEDURE — 83880 ASSAY OF NATRIURETIC PEPTIDE: CPT

## 2024-10-21 PROCEDURE — 97530 THERAPEUTIC ACTIVITIES: CPT

## 2024-10-21 PROCEDURE — 94760 N-INVAS EAR/PLS OXIMETRY 1: CPT

## 2024-10-21 PROCEDURE — 80053 COMPREHEN METABOLIC PANEL: CPT

## 2024-10-21 PROCEDURE — 2060000000 HC ICU INTERMEDIATE R&B

## 2024-10-21 PROCEDURE — 93750 INTERROGATION VAD IN PERSON: CPT | Performed by: NURSE PRACTITIONER

## 2024-10-21 PROCEDURE — 6370000000 HC RX 637 (ALT 250 FOR IP)

## 2024-10-21 PROCEDURE — 2580000003 HC RX 258: Performed by: INTERNAL MEDICINE

## 2024-10-21 PROCEDURE — 6370000000 HC RX 637 (ALT 250 FOR IP): Performed by: FAMILY MEDICINE

## 2024-10-21 PROCEDURE — 85025 COMPLETE CBC W/AUTO DIFF WBC: CPT

## 2024-10-21 PROCEDURE — 36415 COLL VENOUS BLD VENIPUNCTURE: CPT

## 2024-10-21 RX ORDER — WARFARIN SODIUM 1 MG/1
2 TABLET ORAL
Status: COMPLETED | OUTPATIENT
Start: 2024-10-21 | End: 2024-10-21

## 2024-10-21 RX ORDER — AMIODARONE HYDROCHLORIDE 200 MG/1
100 TABLET ORAL DAILY
Status: DISCONTINUED | OUTPATIENT
Start: 2024-10-22 | End: 2024-10-29 | Stop reason: HOSPADM

## 2024-10-21 RX ADMIN — ENOXAPARIN SODIUM 50 MG: 100 INJECTION SUBCUTANEOUS at 09:00

## 2024-10-21 RX ADMIN — CEFEPIME 2000 MG: 2 INJECTION, POWDER, FOR SOLUTION INTRAVENOUS at 23:21

## 2024-10-21 RX ADMIN — SODIUM CHLORIDE, PRESERVATIVE FREE 10 ML: 5 INJECTION INTRAVENOUS at 21:46

## 2024-10-21 RX ADMIN — ACETAMINOPHEN 650 MG: 325 TABLET ORAL at 11:39

## 2024-10-21 RX ADMIN — ASPIRIN 81 MG: 81 TABLET, COATED ORAL at 08:59

## 2024-10-21 RX ADMIN — HYDRALAZINE HYDROCHLORIDE 100 MG: 50 TABLET ORAL at 15:07

## 2024-10-21 RX ADMIN — HYDROMORPHONE HYDROCHLORIDE 2 MG: 2 TABLET ORAL at 05:28

## 2024-10-21 RX ADMIN — ACETAMINOPHEN 650 MG: 325 TABLET ORAL at 23:16

## 2024-10-21 RX ADMIN — METOPROLOL SUCCINATE 25 MG: 25 TABLET, FILM COATED, EXTENDED RELEASE ORAL at 09:00

## 2024-10-21 RX ADMIN — ISOSORBIDE DINITRATE 40 MG: 20 TABLET ORAL at 21:44

## 2024-10-21 RX ADMIN — AMLODIPINE BESYLATE 5 MG: 5 TABLET ORAL at 09:00

## 2024-10-21 RX ADMIN — ATORVASTATIN CALCIUM 80 MG: 40 TABLET, FILM COATED ORAL at 08:59

## 2024-10-21 RX ADMIN — HYDRALAZINE HYDROCHLORIDE 100 MG: 50 TABLET ORAL at 21:43

## 2024-10-21 RX ADMIN — METOPROLOL SUCCINATE 25 MG: 25 TABLET, FILM COATED, EXTENDED RELEASE ORAL at 21:44

## 2024-10-21 RX ADMIN — ENOXAPARIN SODIUM 50 MG: 100 INJECTION SUBCUTANEOUS at 21:43

## 2024-10-21 RX ADMIN — BUMETANIDE 1 MG: 0.25 INJECTION INTRAMUSCULAR; INTRAVENOUS at 08:59

## 2024-10-21 RX ADMIN — SODIUM CHLORIDE: 900 INJECTION INTRAVENOUS at 23:20

## 2024-10-21 RX ADMIN — AMIODARONE HYDROCHLORIDE 200 MG: 200 TABLET ORAL at 09:00

## 2024-10-21 RX ADMIN — ISOSORBIDE DINITRATE 40 MG: 20 TABLET ORAL at 05:29

## 2024-10-21 RX ADMIN — SPIRONOLACTONE 25 MG: 25 TABLET ORAL at 09:00

## 2024-10-21 RX ADMIN — HYDROMORPHONE HYDROCHLORIDE 1 MG: 1 INJECTION, SOLUTION INTRAMUSCULAR; INTRAVENOUS; SUBCUTANEOUS at 19:04

## 2024-10-21 RX ADMIN — MAGNESIUM OXIDE TAB 400 MG (241.3 MG ELEMENTAL MG) 400 MG: 400 (241.3 MG) TAB at 09:00

## 2024-10-21 RX ADMIN — ISOSORBIDE DINITRATE 40 MG: 20 TABLET ORAL at 15:07

## 2024-10-21 RX ADMIN — HYDROMORPHONE HYDROCHLORIDE 2 MG: 2 TABLET ORAL at 23:16

## 2024-10-21 RX ADMIN — METRONIDAZOLE 500 MG: 250 TABLET ORAL at 05:32

## 2024-10-21 RX ADMIN — CEFEPIME 2000 MG: 2 INJECTION, POWDER, FOR SOLUTION INTRAVENOUS at 11:31

## 2024-10-21 RX ADMIN — PANTOPRAZOLE SODIUM 40 MG: 40 TABLET, DELAYED RELEASE ORAL at 05:29

## 2024-10-21 RX ADMIN — Medication 1000 UNITS: at 09:00

## 2024-10-21 RX ADMIN — ACETAMINOPHEN 650 MG: 325 TABLET ORAL at 05:28

## 2024-10-21 RX ADMIN — METRONIDAZOLE 500 MG: 250 TABLET ORAL at 15:07

## 2024-10-21 RX ADMIN — HYDRALAZINE HYDROCHLORIDE 100 MG: 50 TABLET ORAL at 05:29

## 2024-10-21 RX ADMIN — SODIUM CHLORIDE, PRESERVATIVE FREE 10 ML: 5 INJECTION INTRAVENOUS at 09:00

## 2024-10-21 RX ADMIN — WARFARIN SODIUM 2 MG: 1 TABLET ORAL at 18:26

## 2024-10-21 RX ADMIN — DOCUSATE SODIUM 100 MG: 100 CAPSULE, LIQUID FILLED ORAL at 08:59

## 2024-10-21 RX ADMIN — ACETAMINOPHEN 650 MG: 325 TABLET ORAL at 18:26

## 2024-10-21 RX ADMIN — METRONIDAZOLE 500 MG: 250 TABLET ORAL at 21:44

## 2024-10-21 ASSESSMENT — PAIN DESCRIPTION - LOCATION
LOCATION: LEG
LOCATION: FOOT
LOCATION: LEG
LOCATION: FOOT
LOCATION: FOOT

## 2024-10-21 ASSESSMENT — PAIN SCALES - GENERAL
PAINLEVEL_OUTOF10: 10
PAINLEVEL_OUTOF10: 8
PAINLEVEL_OUTOF10: 7
PAINLEVEL_OUTOF10: 6
PAINLEVEL_OUTOF10: 8
PAINLEVEL_OUTOF10: 8
PAINLEVEL_OUTOF10: 7

## 2024-10-21 ASSESSMENT — PAIN DESCRIPTION - ORIENTATION
ORIENTATION: RIGHT;LEFT
ORIENTATION: LEFT
ORIENTATION: LEFT
ORIENTATION: RIGHT;LEFT
ORIENTATION: LEFT

## 2024-10-21 ASSESSMENT — PAIN DESCRIPTION - DESCRIPTORS
DESCRIPTORS: ACHING

## 2024-10-21 NOTE — CARE COORDINATION
Transition of Care Plan:     RUR: 21%  Prior Level of Functioning: LVAD coord Bety. [place], g/f's brian does he driveline dressing changes, SPC for mobility. Owns RW   Disposition: (Needs ID plan, PICC line, paper rx's for narcotics) Will need auth for [facility]. Will be on IV ABX x 6 weeks. Will go w loaner LVAD equipment. (For nursing- patient needs at least 4 days worth of driveline supplies)  If SNF or IPR: Date FOC offered: 9/24, 9/25  Date FOC received: 9/24, 9/25  [facility]  Date authorization started with reference number: Approved, Will need again  Date authorization received and expires: Rec'd on 10/9 and expires 10/14  Follow up appointments: Specialist  DME needed: Defer to rehab  Transportation at discharge: (Assess need for new O2) [facility]- [facility]- [phone]  IM/[facility] Medicare/ letter given: 9/18  Is patient a [place] and connected with VA?               If yes, was [place] transfer form completed and VA notified?   Caregiver Contact: Sister, Catalina Santos [phone]   Discharge Caregiver contacted prior to discharge?   Care Conference needed?   Barriers to discharge:  Medical, new HFNC    Patient discussed in [facility] IDRs and he's still on 20L HFNC and his INR is not therapeutic at 1.6.  Dr. Amezcua recommended bipap QHS to help w HFNC wean.  CM updated liaison w [facility] and will let her know when they can start auth.  CM will continue to follow for OLIVIA needs.     Update 2pm: Patients cousinNorah who lives local and visits every other day called for an update and CM provided update to her and patient. Patient still agreeable to [facility] once medically ready.    CANDACE Hale CCM  Care Management

## 2024-10-22 LAB
ALBUMIN SERPL-MCNC: 3.2 G/DL (ref 3.5–5)
ALBUMIN/GLOB SERPL: 0.7 (ref 1.1–2.2)
ALP SERPL-CCNC: 195 U/L (ref 45–117)
ALT SERPL-CCNC: 27 U/L (ref 12–78)
ANION GAP SERPL CALC-SCNC: 8 MMOL/L (ref 2–12)
ARTERIAL PATENCY WRIST A: POSITIVE
AST SERPL-CCNC: 44 U/L (ref 15–37)
BASE EXCESS BLD CALC-SCNC: 1.1 MMOL/L
BDY SITE: ABNORMAL
BILIRUB SERPL-MCNC: 0.5 MG/DL (ref 0.2–1)
BUN SERPL-MCNC: 54 MG/DL (ref 6–20)
BUN/CREAT SERPL: 42 (ref 12–20)
CALCIUM SERPL-MCNC: 9.9 MG/DL (ref 8.5–10.1)
CHLORIDE SERPL-SCNC: 104 MMOL/L (ref 97–108)
CO2 SERPL-SCNC: 24 MMOL/L (ref 21–32)
COMMENT:: NORMAL
CREAT SERPL-MCNC: 1.28 MG/DL (ref 0.7–1.3)
ERYTHROCYTE [DISTWIDTH] IN BLOOD BY AUTOMATED COUNT: 20.3 % (ref 11.5–14.5)
GAS FLOW.O2 O2 DELIVERY SYS: ABNORMAL
GLOBULIN SER CALC-MCNC: 4.7 G/DL (ref 2–4)
GLUCOSE SERPL-MCNC: 126 MG/DL (ref 65–100)
HCO3 BLD-SCNC: 24.9 MMOL/L (ref 21–28)
HCT VFR BLD AUTO: 28.9 % (ref 36.6–50.3)
HGB BLD-MCNC: 8.6 G/DL (ref 12.1–17)
INR PPP: 1.7 (ref 0.9–1.1)
MCH RBC QN AUTO: 30.3 PG (ref 26–34)
MCHC RBC AUTO-ENTMCNC: 29.8 G/DL (ref 30–36.5)
MCV RBC AUTO: 101.8 FL (ref 80–99)
NRBC # BLD: 0.02 K/UL (ref 0–0.01)
NRBC BLD-RTO: 0.2 PER 100 WBC
NT PRO BNP: 2652 PG/ML
O2/TOTAL GAS SETTING VFR VENT: 40 %
PCO2 BLD: 35.1 MMHG (ref 35–48)
PH BLD: 7.46 (ref 7.35–7.45)
PLATELET # BLD AUTO: 464 K/UL (ref 150–400)
PO2 BLD: 71 MMHG (ref 83–108)
POTASSIUM SERPL-SCNC: 4.2 MMOL/L (ref 3.5–5.1)
PROT SERPL-MCNC: 7.9 G/DL (ref 6.4–8.2)
PROTHROMBIN TIME: 17.4 SEC (ref 9–11.1)
RBC # BLD AUTO: 2.84 M/UL (ref 4.1–5.7)
SAO2 % BLD: 95 % (ref 92–97)
SODIUM SERPL-SCNC: 136 MMOL/L (ref 136–145)
SPECIMEN HOLD: NORMAL
SPECIMEN TYPE: ABNORMAL
WBC # BLD AUTO: 10.5 K/UL (ref 4.1–11.1)

## 2024-10-22 PROCEDURE — 94660 CPAP INITIATION&MGMT: CPT

## 2024-10-22 PROCEDURE — 36600 WITHDRAWAL OF ARTERIAL BLOOD: CPT

## 2024-10-22 PROCEDURE — 6370000000 HC RX 637 (ALT 250 FOR IP): Performed by: INTERNAL MEDICINE

## 2024-10-22 PROCEDURE — 85610 PROTHROMBIN TIME: CPT

## 2024-10-22 PROCEDURE — 36415 COLL VENOUS BLD VENIPUNCTURE: CPT

## 2024-10-22 PROCEDURE — 6370000000 HC RX 637 (ALT 250 FOR IP)

## 2024-10-22 PROCEDURE — 2580000003 HC RX 258: Performed by: NURSE PRACTITIONER

## 2024-10-22 PROCEDURE — 6360000002 HC RX W HCPCS: Performed by: NURSE PRACTITIONER

## 2024-10-22 PROCEDURE — 6370000000 HC RX 637 (ALT 250 FOR IP): Performed by: FAMILY MEDICINE

## 2024-10-22 PROCEDURE — 94760 N-INVAS EAR/PLS OXIMETRY 1: CPT

## 2024-10-22 PROCEDURE — 93750 INTERROGATION VAD IN PERSON: CPT | Performed by: NURSE PRACTITIONER

## 2024-10-22 PROCEDURE — 99231 SBSQ HOSP IP/OBS SF/LOW 25: CPT | Performed by: NURSE PRACTITIONER

## 2024-10-22 PROCEDURE — 2580000003 HC RX 258: Performed by: INTERNAL MEDICINE

## 2024-10-22 PROCEDURE — 82803 BLOOD GASES ANY COMBINATION: CPT

## 2024-10-22 PROCEDURE — 97110 THERAPEUTIC EXERCISES: CPT

## 2024-10-22 PROCEDURE — 6370000000 HC RX 637 (ALT 250 FOR IP): Performed by: HOSPITALIST

## 2024-10-22 PROCEDURE — 6370000000 HC RX 637 (ALT 250 FOR IP): Performed by: NURSE PRACTITIONER

## 2024-10-22 PROCEDURE — 83880 ASSAY OF NATRIURETIC PEPTIDE: CPT

## 2024-10-22 PROCEDURE — 85027 COMPLETE CBC AUTOMATED: CPT

## 2024-10-22 PROCEDURE — 2580000003 HC RX 258: Performed by: FAMILY MEDICINE

## 2024-10-22 PROCEDURE — 2060000000 HC ICU INTERMEDIATE R&B

## 2024-10-22 PROCEDURE — 2700000000 HC OXYGEN THERAPY PER DAY

## 2024-10-22 RX ORDER — WARFARIN SODIUM 1 MG/1
2 TABLET ORAL
Status: COMPLETED | OUTPATIENT
Start: 2024-10-22 | End: 2024-10-22

## 2024-10-22 RX ADMIN — METOPROLOL SUCCINATE 25 MG: 25 TABLET, FILM COATED, EXTENDED RELEASE ORAL at 09:10

## 2024-10-22 RX ADMIN — ISOSORBIDE DINITRATE 40 MG: 20 TABLET ORAL at 21:06

## 2024-10-22 RX ADMIN — ACETAMINOPHEN 650 MG: 325 TABLET ORAL at 05:17

## 2024-10-22 RX ADMIN — ACETAMINOPHEN 650 MG: 325 TABLET ORAL at 10:47

## 2024-10-22 RX ADMIN — AMLODIPINE BESYLATE 5 MG: 5 TABLET ORAL at 09:07

## 2024-10-22 RX ADMIN — Medication 1000 UNITS: at 09:09

## 2024-10-22 RX ADMIN — SODIUM CHLORIDE: 900 INJECTION INTRAVENOUS at 22:56

## 2024-10-22 RX ADMIN — SODIUM CHLORIDE, PRESERVATIVE FREE 10 ML: 5 INJECTION INTRAVENOUS at 21:12

## 2024-10-22 RX ADMIN — SODIUM CHLORIDE, PRESERVATIVE FREE 10 ML: 5 INJECTION INTRAVENOUS at 09:13

## 2024-10-22 RX ADMIN — ACETAMINOPHEN 650 MG: 325 TABLET ORAL at 22:57

## 2024-10-22 RX ADMIN — ASPIRIN 81 MG: 81 TABLET, COATED ORAL at 09:08

## 2024-10-22 RX ADMIN — HYDROMORPHONE HYDROCHLORIDE 2 MG: 2 TABLET ORAL at 13:11

## 2024-10-22 RX ADMIN — METRONIDAZOLE 500 MG: 250 TABLET ORAL at 16:00

## 2024-10-22 RX ADMIN — METOPROLOL SUCCINATE 25 MG: 25 TABLET, FILM COATED, EXTENDED RELEASE ORAL at 21:06

## 2024-10-22 RX ADMIN — ISOSORBIDE DINITRATE 40 MG: 20 TABLET ORAL at 05:17

## 2024-10-22 RX ADMIN — AMIODARONE HYDROCHLORIDE 100 MG: 200 TABLET ORAL at 09:08

## 2024-10-22 RX ADMIN — HYDROMORPHONE HYDROCHLORIDE 2 MG: 2 TABLET ORAL at 21:12

## 2024-10-22 RX ADMIN — HYDROMORPHONE HYDROCHLORIDE 2 MG: 2 TABLET ORAL at 17:11

## 2024-10-22 RX ADMIN — ATORVASTATIN CALCIUM 80 MG: 40 TABLET, FILM COATED ORAL at 09:08

## 2024-10-22 RX ADMIN — ENOXAPARIN SODIUM 50 MG: 100 INJECTION SUBCUTANEOUS at 09:09

## 2024-10-22 RX ADMIN — MAGNESIUM OXIDE TAB 400 MG (241.3 MG ELEMENTAL MG) 400 MG: 400 (241.3 MG) TAB at 09:09

## 2024-10-22 RX ADMIN — ACETAMINOPHEN 650 MG: 325 TABLET ORAL at 17:35

## 2024-10-22 RX ADMIN — PANTOPRAZOLE SODIUM 40 MG: 40 TABLET, DELAYED RELEASE ORAL at 05:17

## 2024-10-22 RX ADMIN — BUMETANIDE 1 MG: 0.25 INJECTION INTRAMUSCULAR; INTRAVENOUS at 09:09

## 2024-10-22 RX ADMIN — SPIRONOLACTONE 25 MG: 25 TABLET ORAL at 09:08

## 2024-10-22 RX ADMIN — DOCUSATE SODIUM 100 MG: 100 CAPSULE, LIQUID FILLED ORAL at 09:07

## 2024-10-22 RX ADMIN — HYDRALAZINE HYDROCHLORIDE 100 MG: 50 TABLET ORAL at 21:06

## 2024-10-22 RX ADMIN — ENOXAPARIN SODIUM 50 MG: 100 INJECTION SUBCUTANEOUS at 21:06

## 2024-10-22 RX ADMIN — METRONIDAZOLE 500 MG: 250 TABLET ORAL at 21:06

## 2024-10-22 RX ADMIN — WARFARIN SODIUM 2 MG: 1 TABLET ORAL at 17:35

## 2024-10-22 RX ADMIN — HYDRALAZINE HYDROCHLORIDE 100 MG: 50 TABLET ORAL at 05:17

## 2024-10-22 RX ADMIN — CEFEPIME 2000 MG: 2 INJECTION, POWDER, FOR SOLUTION INTRAVENOUS at 22:57

## 2024-10-22 RX ADMIN — CEFEPIME 2000 MG: 2 INJECTION, POWDER, FOR SOLUTION INTRAVENOUS at 10:47

## 2024-10-22 RX ADMIN — METRONIDAZOLE 500 MG: 250 TABLET ORAL at 05:17

## 2024-10-22 ASSESSMENT — PAIN DESCRIPTION - ORIENTATION
ORIENTATION: RIGHT;LEFT

## 2024-10-22 ASSESSMENT — PAIN SCALES - GENERAL
PAINLEVEL_OUTOF10: 7
PAINLEVEL_OUTOF10: 6
PAINLEVEL_OUTOF10: 7
PAINLEVEL_OUTOF10: 7
PAINLEVEL_OUTOF10: 6
PAINLEVEL_OUTOF10: 5
PAINLEVEL_OUTOF10: 7
PAINLEVEL_OUTOF10: 8
PAINLEVEL_OUTOF10: 4
PAINLEVEL_OUTOF10: 3

## 2024-10-22 ASSESSMENT — PAIN DESCRIPTION - LOCATION
LOCATION: FOOT
LOCATION: FOOT
LOCATION: LEG

## 2024-10-22 NOTE — WOUND CARE
WOCN Note:     Follow up for feet and legs.  Seen in 465/01     69 y.o. y/o male admitted on 9/17/2024   Hyponatremia [E87.1]  Avulsion fracture [T14.8XXA]  Injury of left rotator cuff, initial encounter [S46.002A]   History of CHF  WBC = 10.5  Cultures obtained 9/30/24  On flagyl & maxipime  Diet: reg           Assessment:   Appropriately conversational and finishing his lunch.  Surface: GUZMAN mattress    Extremely dry skin to feet, legs, hands, & arms and improving.   Large, hard crusts of skin now almost all gone. Toe tips remain hardened.    POA left dorsal ankle wound, full thickness  6 x 4 x 0.6 cm  Full thickness wound with tendon visible  No purulence  Tx: cleaned with Vashe, applied collagen Ag to red tissue and petrolatum to connective tissue to keep moist; covered with foam      Left & right heels previous partial thickness wounds  Right is fully resolved/resurfaced  Left remains partial thickness with ~50% resurfacing  Red base  Cleaned with Vashe, applied AG collagen and covered with foam          Recommend:    Dry skin: remedy moisturizer in generous amounts daily  Left dorsal foot & left heel: clean with Vashe, apply Puracol AG (left in room) and cover with dry dressing.  Change every 3 days.  Will order Santyl for next dressing change  and Ryan Protocol:  Turn/reposition approximately every 2 hours  Offload heels with heels hanging off end of pillow at all times while in bed.  Sacral Preventive dressing: change as needed ~every 4 days. Discontinue if incontinence is frequently soiling dressing.       Has been seen by podiatry, infectious disease, and vascular - see notes.     Transition of Care: Plan to follow as needed while admitted to hospital.     Lydia Domínguez, AMARILISN, RN, CWOCN  Certified Wound, Ostomy, Continence Nurse  office [phone]  Available via [employer]

## 2024-10-23 LAB
ALBUMIN SERPL-MCNC: 2.9 G/DL (ref 3.5–5)
ALBUMIN/GLOB SERPL: 0.6 (ref 1.1–2.2)
ALP SERPL-CCNC: 167 U/L (ref 45–117)
ALT SERPL-CCNC: 22 U/L (ref 12–78)
ANION GAP SERPL CALC-SCNC: 6 MMOL/L (ref 2–12)
AST SERPL-CCNC: 37 U/L (ref 15–37)
BILIRUB SERPL-MCNC: 0.7 MG/DL (ref 0.2–1)
BUN SERPL-MCNC: 55 MG/DL (ref 6–20)
BUN/CREAT SERPL: 45 (ref 12–20)
CALCIUM SERPL-MCNC: 9.5 MG/DL (ref 8.5–10.1)
CHLORIDE SERPL-SCNC: 105 MMOL/L (ref 97–108)
CO2 SERPL-SCNC: 25 MMOL/L (ref 21–32)
CREAT SERPL-MCNC: 1.22 MG/DL (ref 0.7–1.3)
ERYTHROCYTE [DISTWIDTH] IN BLOOD BY AUTOMATED COUNT: 19.8 % (ref 11.5–14.5)
GLOBULIN SER CALC-MCNC: 4.9 G/DL (ref 2–4)
GLUCOSE SERPL-MCNC: 90 MG/DL (ref 65–100)
HCT VFR BLD AUTO: 27.4 % (ref 36.6–50.3)
HGB BLD-MCNC: 8.5 G/DL (ref 12.1–17)
INR PPP: 1.8 (ref 0.9–1.1)
LDH SERPL L TO P-CCNC: 381 U/L (ref 85–241)
MCH RBC QN AUTO: 30.4 PG (ref 26–34)
MCHC RBC AUTO-ENTMCNC: 31 G/DL (ref 30–36.5)
MCV RBC AUTO: 97.9 FL (ref 80–99)
NRBC # BLD: 0 K/UL (ref 0–0.01)
NRBC BLD-RTO: 0 PER 100 WBC
NT PRO BNP: 1888 PG/ML
PLATELET # BLD AUTO: 447 K/UL (ref 150–400)
PMV BLD AUTO: 10.5 FL (ref 8.9–12.9)
POTASSIUM SERPL-SCNC: 4.1 MMOL/L (ref 3.5–5.1)
PROT SERPL-MCNC: 7.8 G/DL (ref 6.4–8.2)
PROTHROMBIN TIME: 17.9 SEC (ref 9–11.1)
RBC # BLD AUTO: 2.8 M/UL (ref 4.1–5.7)
SODIUM SERPL-SCNC: 136 MMOL/L (ref 136–145)
WBC # BLD AUTO: 9.1 K/UL (ref 4.1–11.1)

## 2024-10-23 PROCEDURE — 6370000000 HC RX 637 (ALT 250 FOR IP): Performed by: FAMILY MEDICINE

## 2024-10-23 PROCEDURE — 2700000000 HC OXYGEN THERAPY PER DAY

## 2024-10-23 PROCEDURE — 6370000000 HC RX 637 (ALT 250 FOR IP)

## 2024-10-23 PROCEDURE — 2580000003 HC RX 258: Performed by: FAMILY MEDICINE

## 2024-10-23 PROCEDURE — 85027 COMPLETE CBC AUTOMATED: CPT

## 2024-10-23 PROCEDURE — 83880 ASSAY OF NATRIURETIC PEPTIDE: CPT

## 2024-10-23 PROCEDURE — 2580000003 HC RX 258: Performed by: NURSE PRACTITIONER

## 2024-10-23 PROCEDURE — 2580000003 HC RX 258: Performed by: INTERNAL MEDICINE

## 2024-10-23 PROCEDURE — 6370000000 HC RX 637 (ALT 250 FOR IP): Performed by: NURSE PRACTITIONER

## 2024-10-23 PROCEDURE — 6370000000 HC RX 637 (ALT 250 FOR IP): Performed by: INTERNAL MEDICINE

## 2024-10-23 PROCEDURE — 80053 COMPREHEN METABOLIC PANEL: CPT

## 2024-10-23 PROCEDURE — 6370000000 HC RX 637 (ALT 250 FOR IP): Performed by: HOSPITALIST

## 2024-10-23 PROCEDURE — 85610 PROTHROMBIN TIME: CPT

## 2024-10-23 PROCEDURE — 99231 SBSQ HOSP IP/OBS SF/LOW 25: CPT | Performed by: NURSE PRACTITIONER

## 2024-10-23 PROCEDURE — 83615 LACTATE (LD) (LDH) ENZYME: CPT

## 2024-10-23 PROCEDURE — 6360000002 HC RX W HCPCS: Performed by: NURSE PRACTITIONER

## 2024-10-23 PROCEDURE — 94760 N-INVAS EAR/PLS OXIMETRY 1: CPT

## 2024-10-23 PROCEDURE — 94660 CPAP INITIATION&MGMT: CPT

## 2024-10-23 PROCEDURE — 36415 COLL VENOUS BLD VENIPUNCTURE: CPT

## 2024-10-23 PROCEDURE — 2060000000 HC ICU INTERMEDIATE R&B

## 2024-10-23 RX ORDER — WARFARIN SODIUM 1 MG/1
2 TABLET ORAL
Status: COMPLETED | OUTPATIENT
Start: 2024-10-23 | End: 2024-10-23

## 2024-10-23 RX ADMIN — ENOXAPARIN SODIUM 50 MG: 100 INJECTION SUBCUTANEOUS at 09:01

## 2024-10-23 RX ADMIN — ATORVASTATIN CALCIUM 80 MG: 40 TABLET, FILM COATED ORAL at 08:59

## 2024-10-23 RX ADMIN — AMLODIPINE BESYLATE 5 MG: 5 TABLET ORAL at 09:00

## 2024-10-23 RX ADMIN — WARFARIN SODIUM 2 MG: 1 TABLET ORAL at 18:27

## 2024-10-23 RX ADMIN — ACETAMINOPHEN 650 MG: 325 TABLET ORAL at 10:55

## 2024-10-23 RX ADMIN — SPIRONOLACTONE 25 MG: 25 TABLET ORAL at 08:59

## 2024-10-23 RX ADMIN — SODIUM CHLORIDE, PRESERVATIVE FREE 10 ML: 5 INJECTION INTRAVENOUS at 09:02

## 2024-10-23 RX ADMIN — METRONIDAZOLE 500 MG: 250 TABLET ORAL at 21:41

## 2024-10-23 RX ADMIN — HYDRALAZINE HYDROCHLORIDE 100 MG: 50 TABLET ORAL at 21:41

## 2024-10-23 RX ADMIN — METOPROLOL SUCCINATE 25 MG: 25 TABLET, FILM COATED, EXTENDED RELEASE ORAL at 21:42

## 2024-10-23 RX ADMIN — CEFEPIME 2000 MG: 2 INJECTION, POWDER, FOR SOLUTION INTRAVENOUS at 23:59

## 2024-10-23 RX ADMIN — ISOSORBIDE DINITRATE 40 MG: 20 TABLET ORAL at 21:42

## 2024-10-23 RX ADMIN — METRONIDAZOLE 500 MG: 250 TABLET ORAL at 05:46

## 2024-10-23 RX ADMIN — ACETAMINOPHEN 650 MG: 325 TABLET ORAL at 05:46

## 2024-10-23 RX ADMIN — ACETAMINOPHEN 650 MG: 325 TABLET ORAL at 18:28

## 2024-10-23 RX ADMIN — SODIUM CHLORIDE, PRESERVATIVE FREE 10 ML: 5 INJECTION INTRAVENOUS at 21:43

## 2024-10-23 RX ADMIN — SODIUM CHLORIDE, PRESERVATIVE FREE 10 ML: 5 INJECTION INTRAVENOUS at 21:42

## 2024-10-23 RX ADMIN — DOCUSATE SODIUM 100 MG: 100 CAPSULE, LIQUID FILLED ORAL at 08:59

## 2024-10-23 RX ADMIN — METOPROLOL SUCCINATE 25 MG: 25 TABLET, FILM COATED, EXTENDED RELEASE ORAL at 09:02

## 2024-10-23 RX ADMIN — ASPIRIN 81 MG: 81 TABLET, COATED ORAL at 08:59

## 2024-10-23 RX ADMIN — HYDRALAZINE HYDROCHLORIDE 100 MG: 50 TABLET ORAL at 15:01

## 2024-10-23 RX ADMIN — ENOXAPARIN SODIUM 50 MG: 100 INJECTION SUBCUTANEOUS at 21:41

## 2024-10-23 RX ADMIN — MAGNESIUM OXIDE TAB 400 MG (241.3 MG ELEMENTAL MG) 400 MG: 400 (241.3 MG) TAB at 09:00

## 2024-10-23 RX ADMIN — HYDROMORPHONE HYDROCHLORIDE 2 MG: 2 TABLET ORAL at 04:22

## 2024-10-23 RX ADMIN — ISOSORBIDE DINITRATE 40 MG: 20 TABLET ORAL at 15:00

## 2024-10-23 RX ADMIN — ISOSORBIDE DINITRATE 40 MG: 20 TABLET ORAL at 05:46

## 2024-10-23 RX ADMIN — CEFEPIME 2000 MG: 2 INJECTION, POWDER, FOR SOLUTION INTRAVENOUS at 10:54

## 2024-10-23 RX ADMIN — METRONIDAZOLE 500 MG: 250 TABLET ORAL at 15:00

## 2024-10-23 RX ADMIN — HYDRALAZINE HYDROCHLORIDE 100 MG: 50 TABLET ORAL at 05:46

## 2024-10-23 RX ADMIN — ACETAMINOPHEN 650 MG: 325 TABLET ORAL at 23:59

## 2024-10-23 RX ADMIN — AMIODARONE HYDROCHLORIDE 100 MG: 200 TABLET ORAL at 09:00

## 2024-10-23 RX ADMIN — SODIUM CHLORIDE: 900 INJECTION INTRAVENOUS at 23:58

## 2024-10-23 RX ADMIN — Medication 1000 UNITS: at 08:59

## 2024-10-23 RX ADMIN — BUMETANIDE 1 MG: 0.25 INJECTION INTRAMUSCULAR; INTRAVENOUS at 09:00

## 2024-10-23 RX ADMIN — PANTOPRAZOLE SODIUM 40 MG: 40 TABLET, DELAYED RELEASE ORAL at 05:46

## 2024-10-23 ASSESSMENT — PAIN SCALES - GENERAL
PAINLEVEL_OUTOF10: 7
PAINLEVEL_OUTOF10: 6
PAINLEVEL_OUTOF10: 7
PAINLEVEL_OUTOF10: 6
PAINLEVEL_OUTOF10: 1
PAINLEVEL_OUTOF10: 6
PAINLEVEL_OUTOF10: 2

## 2024-10-23 ASSESSMENT — PAIN DESCRIPTION - ORIENTATION
ORIENTATION: RIGHT;LEFT
ORIENTATION: RIGHT;LEFT

## 2024-10-23 ASSESSMENT — PAIN DESCRIPTION - LOCATION
LOCATION: LEG
LOCATION: LEG

## 2024-10-24 LAB
ALBUMIN SERPL-MCNC: 2.8 G/DL (ref 3.5–5)
ALBUMIN/GLOB SERPL: 0.5 (ref 1.1–2.2)
ALP SERPL-CCNC: 181 U/L (ref 45–117)
ALT SERPL-CCNC: 22 U/L (ref 12–78)
ANION GAP SERPL CALC-SCNC: 6 MMOL/L (ref 2–12)
AST SERPL-CCNC: 36 U/L (ref 15–37)
BILIRUB SERPL-MCNC: 0.5 MG/DL (ref 0.2–1)
BUN SERPL-MCNC: 54 MG/DL (ref 6–20)
BUN/CREAT SERPL: 43 (ref 12–20)
CALCIUM SERPL-MCNC: 8.6 MG/DL (ref 8.5–10.1)
CHLORIDE SERPL-SCNC: 105 MMOL/L (ref 97–108)
CO2 SERPL-SCNC: 25 MMOL/L (ref 21–32)
CREAT SERPL-MCNC: 1.27 MG/DL (ref 0.7–1.3)
ERYTHROCYTE [DISTWIDTH] IN BLOOD BY AUTOMATED COUNT: 19.6 % (ref 11.5–14.5)
GLOBULIN SER CALC-MCNC: 5.1 G/DL (ref 2–4)
GLUCOSE SERPL-MCNC: 170 MG/DL (ref 65–100)
HCT VFR BLD AUTO: 27.2 % (ref 36.6–50.3)
HGB BLD-MCNC: 8.3 G/DL (ref 12.1–17)
INR PPP: 2 (ref 0.9–1.1)
LDH SERPL L TO P-CCNC: 357 U/L (ref 85–241)
MCH RBC QN AUTO: 30.4 PG (ref 26–34)
MCHC RBC AUTO-ENTMCNC: 30.5 G/DL (ref 30–36.5)
MCV RBC AUTO: 99.6 FL (ref 80–99)
NRBC # BLD: 0.02 K/UL (ref 0–0.01)
NRBC BLD-RTO: 0.2 PER 100 WBC
NT PRO BNP: 1358 PG/ML
PLATELET # BLD AUTO: 445 K/UL (ref 150–400)
PMV BLD AUTO: 9.9 FL (ref 8.9–12.9)
POTASSIUM SERPL-SCNC: 4 MMOL/L (ref 3.5–5.1)
PROT SERPL-MCNC: 7.9 G/DL (ref 6.4–8.2)
PROTHROMBIN TIME: 20.1 SEC (ref 9–11.1)
RBC # BLD AUTO: 2.73 M/UL (ref 4.1–5.7)
SODIUM SERPL-SCNC: 136 MMOL/L (ref 136–145)
WBC # BLD AUTO: 9 K/UL (ref 4.1–11.1)

## 2024-10-24 PROCEDURE — 99231 SBSQ HOSP IP/OBS SF/LOW 25: CPT | Performed by: NURSE PRACTITIONER

## 2024-10-24 PROCEDURE — 80053 COMPREHEN METABOLIC PANEL: CPT

## 2024-10-24 PROCEDURE — 6370000000 HC RX 637 (ALT 250 FOR IP)

## 2024-10-24 PROCEDURE — 83880 ASSAY OF NATRIURETIC PEPTIDE: CPT

## 2024-10-24 PROCEDURE — 6370000000 HC RX 637 (ALT 250 FOR IP): Performed by: NURSE PRACTITIONER

## 2024-10-24 PROCEDURE — 6370000000 HC RX 637 (ALT 250 FOR IP): Performed by: STUDENT IN AN ORGANIZED HEALTH CARE EDUCATION/TRAINING PROGRAM

## 2024-10-24 PROCEDURE — 83615 LACTATE (LD) (LDH) ENZYME: CPT

## 2024-10-24 PROCEDURE — 30233N1 TRANSFUSION OF NONAUTOLOGOUS RED BLOOD CELLS INTO PERIPHERAL VEIN, PERCUTANEOUS APPROACH: ICD-10-PCS | Performed by: HOSPITALIST

## 2024-10-24 PROCEDURE — 6370000000 HC RX 637 (ALT 250 FOR IP): Performed by: INTERNAL MEDICINE

## 2024-10-24 PROCEDURE — 6370000000 HC RX 637 (ALT 250 FOR IP): Performed by: FAMILY MEDICINE

## 2024-10-24 PROCEDURE — 85027 COMPLETE CBC AUTOMATED: CPT

## 2024-10-24 PROCEDURE — 2700000000 HC OXYGEN THERAPY PER DAY

## 2024-10-24 PROCEDURE — 36415 COLL VENOUS BLD VENIPUNCTURE: CPT

## 2024-10-24 PROCEDURE — 2060000000 HC ICU INTERMEDIATE R&B

## 2024-10-24 PROCEDURE — 2580000003 HC RX 258: Performed by: FAMILY MEDICINE

## 2024-10-24 PROCEDURE — 6370000000 HC RX 637 (ALT 250 FOR IP): Performed by: HOSPITALIST

## 2024-10-24 PROCEDURE — 2580000003 HC RX 258: Performed by: INTERNAL MEDICINE

## 2024-10-24 PROCEDURE — 97530 THERAPEUTIC ACTIVITIES: CPT

## 2024-10-24 PROCEDURE — 6360000002 HC RX W HCPCS: Performed by: NURSE PRACTITIONER

## 2024-10-24 PROCEDURE — 97535 SELF CARE MNGMENT TRAINING: CPT

## 2024-10-24 PROCEDURE — 94760 N-INVAS EAR/PLS OXIMETRY 1: CPT

## 2024-10-24 PROCEDURE — 2580000003 HC RX 258: Performed by: NURSE PRACTITIONER

## 2024-10-24 PROCEDURE — 85610 PROTHROMBIN TIME: CPT

## 2024-10-24 PROCEDURE — 93750 INTERROGATION VAD IN PERSON: CPT | Performed by: NURSE PRACTITIONER

## 2024-10-24 RX ORDER — WARFARIN SODIUM 2 MG/1
2 TABLET ORAL
Status: COMPLETED | OUTPATIENT
Start: 2024-10-24 | End: 2024-10-24

## 2024-10-24 RX ORDER — AMMONIUM LACTATE 12 G/100G
LOTION TOPICAL 2 TIMES DAILY
Status: DISCONTINUED | OUTPATIENT
Start: 2024-10-24 | End: 2024-10-29 | Stop reason: HOSPADM

## 2024-10-24 RX ADMIN — PANTOPRAZOLE SODIUM 40 MG: 40 TABLET, DELAYED RELEASE ORAL at 06:28

## 2024-10-24 RX ADMIN — Medication 1000 UNITS: at 09:10

## 2024-10-24 RX ADMIN — METRONIDAZOLE 500 MG: 250 TABLET ORAL at 21:37

## 2024-10-24 RX ADMIN — BUMETANIDE 1 MG: 0.25 INJECTION INTRAMUSCULAR; INTRAVENOUS at 09:10

## 2024-10-24 RX ADMIN — ASPIRIN 81 MG: 81 TABLET, COATED ORAL at 09:10

## 2024-10-24 RX ADMIN — SODIUM CHLORIDE, PRESERVATIVE FREE 10 ML: 5 INJECTION INTRAVENOUS at 21:39

## 2024-10-24 RX ADMIN — ACETAMINOPHEN 650 MG: 325 TABLET ORAL at 06:28

## 2024-10-24 RX ADMIN — ACETAMINOPHEN 650 MG: 325 TABLET ORAL at 23:04

## 2024-10-24 RX ADMIN — AMLODIPINE BESYLATE 5 MG: 5 TABLET ORAL at 09:11

## 2024-10-24 RX ADMIN — ACETAMINOPHEN 650 MG: 325 TABLET ORAL at 11:54

## 2024-10-24 RX ADMIN — METRONIDAZOLE 500 MG: 250 TABLET ORAL at 16:23

## 2024-10-24 RX ADMIN — METRONIDAZOLE 500 MG: 250 TABLET ORAL at 06:28

## 2024-10-24 RX ADMIN — Medication: at 21:38

## 2024-10-24 RX ADMIN — ISOSORBIDE DINITRATE 40 MG: 20 TABLET ORAL at 21:37

## 2024-10-24 RX ADMIN — HYDROMORPHONE HYDROCHLORIDE 2 MG: 2 TABLET ORAL at 21:37

## 2024-10-24 RX ADMIN — MAGNESIUM OXIDE TAB 400 MG (241.3 MG ELEMENTAL MG) 400 MG: 400 (241.3 MG) TAB at 09:11

## 2024-10-24 RX ADMIN — HYDRALAZINE HYDROCHLORIDE 100 MG: 50 TABLET ORAL at 06:28

## 2024-10-24 RX ADMIN — ACETAMINOPHEN 650 MG: 325 TABLET ORAL at 17:31

## 2024-10-24 RX ADMIN — AMIODARONE HYDROCHLORIDE 100 MG: 200 TABLET ORAL at 09:10

## 2024-10-24 RX ADMIN — HYDROMORPHONE HYDROCHLORIDE 2 MG: 2 TABLET ORAL at 16:23

## 2024-10-24 RX ADMIN — HYDRALAZINE HYDROCHLORIDE 100 MG: 50 TABLET ORAL at 16:23

## 2024-10-24 RX ADMIN — SPIRONOLACTONE 25 MG: 25 TABLET ORAL at 09:11

## 2024-10-24 RX ADMIN — METOPROLOL SUCCINATE 25 MG: 25 TABLET, FILM COATED, EXTENDED RELEASE ORAL at 21:38

## 2024-10-24 RX ADMIN — DOCUSATE SODIUM 100 MG: 100 CAPSULE, LIQUID FILLED ORAL at 09:10

## 2024-10-24 RX ADMIN — SODIUM CHLORIDE: 900 INJECTION INTRAVENOUS at 23:09

## 2024-10-24 RX ADMIN — ISOSORBIDE DINITRATE 40 MG: 20 TABLET ORAL at 06:28

## 2024-10-24 RX ADMIN — WARFARIN SODIUM 2 MG: 2 TABLET ORAL at 17:32

## 2024-10-24 RX ADMIN — CEFEPIME 2000 MG: 2 INJECTION, POWDER, FOR SOLUTION INTRAVENOUS at 11:56

## 2024-10-24 RX ADMIN — ISOSORBIDE DINITRATE 40 MG: 20 TABLET ORAL at 16:23

## 2024-10-24 RX ADMIN — ATORVASTATIN CALCIUM 80 MG: 40 TABLET, FILM COATED ORAL at 09:11

## 2024-10-24 RX ADMIN — SODIUM CHLORIDE, PRESERVATIVE FREE 10 ML: 5 INJECTION INTRAVENOUS at 09:12

## 2024-10-24 RX ADMIN — METOPROLOL SUCCINATE 25 MG: 25 TABLET, FILM COATED, EXTENDED RELEASE ORAL at 09:11

## 2024-10-24 RX ADMIN — HYDROMORPHONE HYDROCHLORIDE 2 MG: 2 TABLET ORAL at 09:11

## 2024-10-24 RX ADMIN — CEFEPIME 2000 MG: 2 INJECTION, POWDER, FOR SOLUTION INTRAVENOUS at 23:10

## 2024-10-24 RX ADMIN — HYDRALAZINE HYDROCHLORIDE 100 MG: 50 TABLET ORAL at 21:37

## 2024-10-24 ASSESSMENT — PAIN SCALES - GENERAL
PAINLEVEL_OUTOF10: 8
PAINLEVEL_OUTOF10: 4
PAINLEVEL_OUTOF10: 3
PAINLEVEL_OUTOF10: 3
PAINLEVEL_OUTOF10: 1
PAINLEVEL_OUTOF10: 3
PAINLEVEL_OUTOF10: 3
PAINLEVEL_OUTOF10: 8
PAINLEVEL_OUTOF10: 8
PAINLEVEL_OUTOF10: 4
PAINLEVEL_OUTOF10: 8
PAINLEVEL_OUTOF10: 1
PAINLEVEL_OUTOF10: 3
PAINLEVEL_OUTOF10: 10

## 2024-10-24 ASSESSMENT — PAIN DESCRIPTION - ORIENTATION
ORIENTATION: RIGHT;LEFT

## 2024-10-24 ASSESSMENT — PAIN DESCRIPTION - LOCATION
LOCATION: FOOT
LOCATION: FOOT
LOCATION: LEG
LOCATION: FOOT
LOCATION: LEG
LOCATION: LEG
LOCATION: FOOT

## 2024-10-24 ASSESSMENT — PAIN DESCRIPTION - DESCRIPTORS: DESCRIPTORS: THROBBING

## 2024-10-24 NOTE — WOUND CARE
WOCN Note:     Follow up for left anterior ankle wound.  Seen in 467/01     69 y.o. y/o male admitted on 9/17/2024   Hyponatremia [E87.1]  Avulsion fracture [T14.8XXA]  Injury of left rotator cuff, initial encounter [S46.002A]   History of CHF  WBC = 9  Cultures obtained 9/30/24  On flagyl & maxipime  Diet: reg           Assessment:   Appropriately conversational and finishing his lunch.  Surface: GUZMAN mattress    Extremely dry skin to feet, legs, hands, & arms and improving.   Large, hard crusts of skin now almost all gone. Toe tips remain hardened.    POA left dorsal ankle wound, full thickness  6 x 4 x 0.6 cm  Full thickness wound with tendon visible  No purulence  Tx: cleaned with Vashe, applied collagen Ag to red tissue and Santyl to yellow; covered with foam    Left heel partial thickness wound  Red base  Cleaned with Vashe, applied AG collagen and covered with dry dressing    Recommend:    Dry skin: remedy moisturizer in generous amounts daily  Left dorsal foot & left heel: clean with Vashe, apply Puracol AG (left in room) and cover with dry dressing.  Change every 3 days.  Will order Santyl for next dressing change  and Ryan Protocol:  Turn/reposition approximately every 2 hours  Offload heels with heels hanging off end of pillow at all times while in bed.  Sacral Preventive dressing: change as needed ~every 4 days. Discontinue if incontinence is frequently soiling dressing.       Has been seen by podiatry, infectious disease, and vascular - see notes.     Transition of Care: Plan to follow as needed while admitted to hospital.     AMARILIS CernaN, RN, CWOCN  Certified Wound, Ostomy, Continence Nurse  office [phone]  Available via [employer]

## 2024-10-24 NOTE — CONSULTS
[address]  [address]                              CONSULTATION      PATIENT NAME: PIETRO RDZ              : 1955  MED REC NO: [id number]                       ROOM: [id number]  ACCOUNT NO: [id number]                       ADMIT DATE: 2024  PROVIDER: Gabino Acosta MD    DATE OF SERVICE:  2024    ATTENDING PHYSICIAN:  BRYANNA AGGARWAL    REASON FOR CONSULTATION:  Chronic peripheral vascular disease, right heel wound.    HISTORY OF PRESENT ILLNESS:  The patient is a severely debilitated 69-year-old gentleman with extensive cardiac history, is a current LVAD patient, whom we are asked to see regarding chronic peripheral vascular disease with a concerning right foot wound.  The patient is a relatively poor historian, but apparently has a significant history of vascular disease performed at the [facility] involving an aortofemoral bypass, bilateral lower extremity bypasses, which are both chronically occluded.  These have been thrombectomized and revised as far as I can tell, multiple times.  He reports being nonambulatory for some time and has been made nonweightbearing on the right foot.  All of this information is obtained from the records.  The patient is unable to really provide any history at all.    PAST MEDICAL HISTORY:  Significant for history of stroke, history of heart failure, LVAD present, history of longstanding peripheral vascular disease, coronary artery disease, paroxysmal atrial fibrillation on Coumadin, hypertension, diabetes.    CURRENT MEDICINES:  Include amiodarone, Norvasc, Tylenol, ammonium lactate cream, baby aspirin, Lipitor, cefepime, vancomycin, warfarin daily.  Spironolactone and torsemide were being held.  Isosorbide, hydralazine, metoprolol, Protonix, Lovenox.    ALLERGIES:  NO KNOWN DRUG ALLERGIES.      SOCIAL HISTORY:  Positive for previous tobacco.  No current alcohol use.    PHYSICAL EXAMINATION:  VITAL SIGNS:  
  Brief Ortho    ORTHO:    Telephone consult with Dr Mcfarland who describes a 68yo M with history of CHF now with LVAD but not a candidate for transplant sent to ED by Cardiology with complaints of left shoulder pain and inability to raise above head following a fall last week. No fractures of shoulder noted on imaging but significant degenerative changes and evidence of chronic large cuff tear and only small bone spur fracture noted in elbow. NVI per ER and being admitted for NypoNa reasons. Recommend sling for comfort and working with PT/OT as tolerates. No weight bearing restrictions. Will ultimately require reverse total shoulder   replacement for return of function but baseline medical conditions will likely make difficult. Needs outpatient follow up with Dr. Fede Damico, call for appointment.    Dr Fede Damico aware of patient and has reviewed imaging and in agreement with current plan.    Thank you for allowing us to take part in this patients care.    José Luis Durand PA-C  Orthopedic Trauma Service  [facility]          
Consult received.  Will review chart and see pt.  Full note to follow.    Leslie Mondragon, APRN-NP  Advanced Heart Failure  
Infectious Disease Consult    Today's Date: 9/18/2024   Admit Date: 9/17/2024    Date of Consultation:  September 18, 2024  Reason for consult: skin lesions  Referring Physician: MD Herminia    HPI:  Patient is a 69 y.o. male with medical history of CAD, MI, s/p AICD, cardiomyopathy, s/p LVAD, CHF, chronic AC therapy, past right MCA stroke, s/p thrombectomy post LVAD,  GI bleed due to Evelyn tear,  and PVD presented to ER on 9/17 after the fall with left shoulder pain and itching.      In ER, temp 98, wbc 8.4, XR of left shoulder revealed High riding humeral head compatible with chronic massive cuff tear. No fracture. Left elbow XR revealed  Age-indeterminate fracture of a bony spur at the triceps insertion. There is adjacent soft tissue swelling.     During visit, pt is c/o LUE pain, dry skin, and itching. No fever, chills, nausea, vomiting, sob, grigsby, chest pain, abd pain, diarrhea, or dysuria.     ID team was consulted for evaluation and treatment recommendations regarding skin lesions.      Impression:   Xeroderma  Right medial ankle erosion  Left dorsal ankle wound  - none appear infected    Skin care per wound care team recommendation    CHF  CAD  Hypertension  Cardiomyopathy  S/p LAVD, AICD  - heart failure team following    S/p fall  LUE pain  - XR of LUE;  XR of left shoulder revealed High riding humeral head compatible with chronic massive cuff tear.    To be evaluated by ortho team    Plan:     - supportive care for xeroderma    Appreciate wound care team's input    Apply lactic lotion, apply extra moisture    None appear infected        skin lesions appears due to extreme xeroderma. If symptoms get worse then consider skin bx.      ID team signing off. Please contact us with any questions.   Plan of care d/w pt and Dr. Jimenez          Anti-inflectives:   none    Past Medical History:   Diagnosis Date    Atherosclerosis of native arteries of extremities with intermittent claudication, bilateral legs (HCC)     
Nutrition Note:   Acknowledge Consult for Education: heart failure diet education.    Initial education to be completed by Heart Failure Nurse Navigator. Will defer RD intervention at this time, low sodium diet information attached to AVS.  Navigator/provider will reconsult RD if additional diet education intervention needed.    Thank you.    Otto Alejo RD   Available via [employer]  
Palliative Medicine  Patient Name: Bishop Love  YOB: 1955  MRN: [id number]  Age: 69 y.o.  Gender: male    Date of Initial Consult: 10/4/24  Date of Service: 10/4/2024  Time: 1:27 PM  Provider: Ramila Carr MD  Hospital Day: 18  Admit Date: 9/17/2024  Referring Provider: Dr Mckeon       Reasons for Consultation:  Goals of Care and Overwhelming Symptoms    HISTORY OF PRESENT ILLNESS (HPI):   Bishop Love is a 69 y.o. male with a past medical history of CAD, ICM, HM3 LVAD for destination therapy implanted 9/2023 w/ post operative course complicated by R MCA stroke s/p thrombectomy. Also with RV failure, GIB, peripheral vascular disease, R fem-pop bypass 8/2023.     Ptwas admitted on 9/17/2024 from an urgent Advance Heart Failure clinic after his HH nurse called - pt with a fall one week prior after tripping over area rug, L shoulder pain and rash. Ortho eval, recommends therapy and sling, would require total shoulder replacement in future but high risk. Pt with skin lesions in LE- ID initially saw him soon after admission, then again when Bcx w/ staph 10/1. Plan is for 6 weeks of abx. He is not a candidate for revascularization per vascular surgery, not even certain good candidate for amputation, no podiatric surgery interventions.     Currently NWB on R foot.     Psychosocial: Pt lives w/ girlfriend Kaylin- they have been together x 20+ years.  He does not have children. Kaylin's dtr helps him w/ drive line dressing changes. He has 4 siblings incl sisters Catalina, Rosa and brothers Chadd and Jamarcus.     Prior tot his admission walks w/ cane and sometimes rolling walker. Drives short distances. He likes to walk around his neighborhood in [place], [place] and watch TV.     PALLIATIVE DIAGNOSES:    LE wounds and discomfort, controlled on Tramadol   Debility - NWB on RLE  Palliative care encounter     ASSESSMENT AND PLAN:   Pt known to our team briefly, before LVAD placed 2023.   Recent baseline:   Pt 
Podiatry Consult    Subjective:         Date of Consultation: September 23, 2024     Patient is a 69 y.o.  male who is being seen for B/L foot wounds.  Pt was admitted to [facility] s/p having a fall.  Pt a lengthy cardiac history and extensive history of PAD.  Pt states he has been dealing with foot wounds for about a month.  States his family member has been wrapping them up at home.  Presently, he denies any n/v/f/ns/c. States he has pain from his feet.     Patient Active Problem List    Diagnosis Date Noted    Xerosis of skin 09/19/2024    Skin abrasion 09/19/2024    Xeroderma 09/18/2024    Skin erosion 09/18/2024    Injury of left rotator cuff 09/18/2024    Hyponatremia 09/17/2024    Generalized rash 09/17/2024    Iron deficiency 06/13/2024    Iron (Fe) deficiency anemia 06/13/2024    Positive fecal occult blood test 04/30/2024    Urinary tract infection without hematuria 02/07/2024    Acute upper GI bleed 01/14/2024    Elevated PSA 12/31/2023    Chronic systolic heart failure (HCC) 10/31/2023    Ischemic cardiomyopathy 10/31/2023    Coronary artery disease involving native coronary artery of native heart without angina pectoris 10/31/2023    ICD (implantable cardioverter-defibrillator) in place 10/31/2023    Essential hypertension 10/31/2023    Chronic anticoagulation 10/31/2023    Cerebrovascular accident (CVA) ([facility]) 09/20/2023    Transient hyperglycemia post procedure 09/20/2023    Cardioembolic stroke (HCC) 09/20/2023    LVAD (left ventricular assist device) present ([facility]) 09/19/2023    SOB (shortness of breath) 08/28/2023    Critical lower limb ischemia (HCC) 08/24/2023    Lower limb ischemia 02/24/2023    Ischemic foot ulcer due to atherosclerosis of native artery of limb ([facility]) 06/17/2021    Peripheral vascular disease, unspecified ([facility]) 06/17/2021     Past Medical History:   Diagnosis Date    Atherosclerosis of native arteries of extremities with intermittent claudication, bilateral legs ([facility])  
This is not a new consult. Pt was evaluated on 9/19. Please see today's progress note.   
This is not a new consult. Pt was seen on 9/19. Please see today's progress note.   
spent 35  minutes of critical care time.  This is time spent at this critically ill patient's bedside actively involved in patient care as well as the coordination of care and discussions with the patient's family.  This does not include any procedural time which has been billed separately.        Review of systems:     Review of Systems         Objective:     Vital Signs:  BP (!) 88/0   Pulse 64   Temp 97 °F (36.1 °C) (Axillary)   Resp 16   Ht 1.753 m (5' 9\")   Wt 65.4 kg (144 lb 2.9 oz)   SpO2 100%   BMI 21.29 kg/m²      Temp (24hrs), Av.4 °F (36.3 °C), Min:96.7 °F (35.9 °C), Max:98 °F (36.7 °C)           Intake/Output:     Intake/Output Summary (Last 24 hours) at 10/9/2024 1410  Last data filed at 10/9/2024 1122  Gross per 24 hour   Intake 625 ml   Output 1600 ml   Net -975 ml       Physical Exam:  Physical Exam    Past Medical History:        has a past medical history of Atherosclerosis of native arteries of extremities with intermittent claudication, bilateral legs ([facility]), CAD (coronary artery disease), Chest pain, CHF (congestive heart failure) ([facility]), Chronic anticoagulation, Chronic systolic (congestive) heart failure ([facility]), Coronary artery disease involving native coronary artery of native heart without angina pectoris, Essential hypertension, Heart attack ([facility]), Heart failure ([facility]), HTN (hypertension), Hyperlipidemia, ICD (implantable cardioverter-defibrillator) in place, Ischemic cardiomyopathy, Ischemic cardiomyopathy, Mild mitral valve regurgitation, Nonrheumatic mitral (valve) insufficiency, Positive fecal occult blood test, PVD (peripheral vascular disease) ([facility]), Rheumatic tricuspid insufficiency, and Sick sinus syndrome ([facility]).    Past Surgical History:      has a past surgical history that includes Cardiac defibrillator placement (); Cardiac valuve replacement (2017); hernia repair; vascular surgery (2014); pacemaker; femoral bypass (Right, 2023); Cardiac surgery (Right, 
Jose Daniel Fields MD        hydrALAZINE (APRESOLINE) injection 10 mg  10 mg IntraVENous Q4H PRN Jose Daniel Fields MD        magnesium hydroxide (MILK OF MAGNESIA) 400 MG/5ML suspension 30 mL  30 mL Oral Daily PRN Jose Daniel Fields MD        polyethylene glycol (GLYCOLAX) packet 17 g  17 g Oral Daily PRN Jose Daniel Fields MD   17 g at 10/20/24 1552    acetaminophen (TYLENOL) tablet 650 mg  650 mg Oral Q4H PRN Jose Daniel Fields MD        acetaminophen (TYLENOL) tablet 650 mg  650 mg Oral Q6H Jose Daniel Fields MD   650 mg at 10/24/24 1154    hydrALAZINE (APRESOLINE) injection 10 mg  10 mg IntraVENous Q4H PRN Leslie Mondragon APRN - NP   10 mg at 10/09/24 2343    sodium chloride flush 0.9 % injection 5-40 mL  5-40 mL IntraVENous PRN Leslie Mondragon APRN - NP        sodium chloride flush 0.9 % injection 5-40 mL  5-40 mL IntraVENous 2 times per day Hong Ernst MD   10 mL at 10/24/24 0912    0.9 % sodium chloride infusion   IntraVENous PRN Hong Ernst MD 25 mL/hr at 10/24/24 1156 Restarted at 10/24/24 1156    ondansetron (ZOFRAN-ODT) disintegrating tablet 4 mg  4 mg Oral Q8H PRN Hong Ernst MD        amLODIPine (NORVASC) tablet 5 mg  5 mg Oral QAM Jose Daniel Fields MD   5 mg at 10/24/24 0911    aspirin EC tablet 81 mg  81 mg Oral Daily Hong Ernst MD   81 mg at 10/24/24 0910    atorvastatin (LIPITOR) tablet 80 mg  80 mg Oral Daily Hong Ernst MD   80 mg at 10/24/24 0911    hydrALAZINE (APRESOLINE) tablet 100 mg  100 mg Oral 3 times per day Jose Daniel Fields MD   100 mg at 10/24/24 0628    isosorbide dinitrate (ISORDIL) tablet 40 mg  40 mg Oral q8h Jose Daniel Fields MD   40 mg at 10/24/24 0628    metoprolol succinate (TOPROL XL) extended release tablet 25 mg  25 mg Oral BID Samantha Cristina APRN - CNP   25 mg at 10/24/24 0911    pantoprazole (PROTONIX) tablet 40 mg  40 mg Oral [facility] Hong Villarreal MD   40 mg at 10/24/24 0628    Vitamin D (CHOLECALCIFEROL) tablet 1,000 Units  1,000 
the left anterior ankle wound. Cultures were obtained from both of these areas.   -I reviewed the patient's lower extremity vascular studies from 6/14/24. Based on this and his cardiac history, it appears that the wounds are going to be significantly difficult to heal.   -No surgical intervention at this point from a podiatry standpoint. Continue local wound care--defer orders to wound care team. Adjust antibiotics based on cultures.   -Will consult vascular surgery based on worsening RLE pain (likely due to ischemia) and progression of ulcers.  -In terms of weightbearing, Pt can be full weightbearing to left foot, and non weightbearing to right foot with assistive device. PT on board.   -Will signoff. Thank you for the consult. Please reconsult as needed.

## 2024-10-24 NOTE — CARE COORDINATION
Transition of Care Plan:     RUR: 21%  Prior Level of Functioning: LVAD coord Bety. [place] g/f's brian does he driveline dressing changes, SPC for mobility. Owns RW   Disposition: (Needs ID plan, PICC line, paper rx's for narcotics) Auth pending [facility] and [facility].   Will go w loaner LVAD equipment. (For nursing- patient needs at least 4 days worth of driveline supplies)  If SNF or IPR: Date FOC offered: 9/24, 9/25  Date FOC received: 9/24, 9/25  Accepting facility: [facility]  Date authorization started with reference number: 10/24  Date authorization received and expires: Rec'd on 10/9 and expires 10/14  Follow up appointments: Specialist  DME needed: Defer to rehab  Transportation at discharge: (Will need new O2) ELEANOR KYLEE- [facility]- [phone]  IM/IMM Medicare/ letter given: 9/18  Is patient a  and connected with [facility]?               If yes, was  transfer form completed and VA notified?   Caregiver Contact: SisterCatalina [phone]   Discharge Caregiver contacted prior to discharge?   Care Conference needed?   Barriers to discharge:  Medical    Patient discussed in cardiac surgery IDRs this date and is ready for SNF auth to start today.    CM messaged liaison [facility] and requested [facility] start [facility].     CANDACE Hale [facility]  Care Management

## 2024-10-25 ENCOUNTER — APPOINTMENT (OUTPATIENT)
Facility: HOSPITAL | Age: 69
DRG: 640 | End: 2024-10-25
Payer: MEDICARE

## 2024-10-25 ENCOUNTER — TELEPHONE (OUTPATIENT)
Age: 69
End: 2024-10-25

## 2024-10-25 LAB
ALBUMIN SERPL-MCNC: 3.1 G/DL (ref 3.5–5)
ALBUMIN/GLOB SERPL: 0.7 (ref 1.1–2.2)
ALP SERPL-CCNC: 197 U/L (ref 45–117)
ALT SERPL-CCNC: 27 U/L (ref 12–78)
ANION GAP SERPL CALC-SCNC: 3 MMOL/L (ref 2–12)
ANION GAP SERPL CALC-SCNC: 6 MMOL/L (ref 2–12)
AST SERPL-CCNC: 51 U/L (ref 15–37)
BILIRUB SERPL-MCNC: 0.6 MG/DL (ref 0.2–1)
BUN SERPL-MCNC: 46 MG/DL (ref 6–20)
BUN SERPL-MCNC: 50 MG/DL (ref 6–20)
BUN/CREAT SERPL: 40 (ref 12–20)
BUN/CREAT SERPL: 40 (ref 12–20)
CALCIUM SERPL-MCNC: 9 MG/DL (ref 8.5–10.1)
CALCIUM SERPL-MCNC: 9.1 MG/DL (ref 8.5–10.1)
CHLORIDE SERPL-SCNC: 105 MMOL/L (ref 97–108)
CHLORIDE SERPL-SCNC: 105 MMOL/L (ref 97–108)
CO2 SERPL-SCNC: 23 MMOL/L (ref 21–32)
CO2 SERPL-SCNC: 28 MMOL/L (ref 21–32)
CREAT SERPL-MCNC: 1.16 MG/DL (ref 0.7–1.3)
CREAT SERPL-MCNC: 1.24 MG/DL (ref 0.7–1.3)
ERYTHROCYTE [DISTWIDTH] IN BLOOD BY AUTOMATED COUNT: 19.5 % (ref 11.5–14.5)
GLOBULIN SER CALC-MCNC: 4.4 G/DL (ref 2–4)
GLUCOSE SERPL-MCNC: 107 MG/DL (ref 65–100)
GLUCOSE SERPL-MCNC: 110 MG/DL (ref 65–100)
HCT VFR BLD AUTO: 29 % (ref 36.6–50.3)
HGB BLD-MCNC: 8.7 G/DL (ref 12.1–17)
INR PPP: 2.1 (ref 0.9–1.1)
LDH SERPL L TO P-CCNC: 460 U/L (ref 85–241)
MCH RBC QN AUTO: 30.4 PG (ref 26–34)
MCHC RBC AUTO-ENTMCNC: 30 G/DL (ref 30–36.5)
MCV RBC AUTO: 101.4 FL (ref 80–99)
NRBC # BLD: 0 K/UL (ref 0–0.01)
NRBC BLD-RTO: 0 PER 100 WBC
NT PRO BNP: 1583 PG/ML
PLATELET # BLD AUTO: 420 K/UL (ref 150–400)
PMV BLD AUTO: 10.2 FL (ref 8.9–12.9)
POTASSIUM SERPL-SCNC: 4.6 MMOL/L (ref 3.5–5.1)
POTASSIUM SERPL-SCNC: 5.3 MMOL/L (ref 3.5–5.1)
PROT SERPL-MCNC: 7.5 G/DL (ref 6.4–8.2)
PROTHROMBIN TIME: 21.1 SEC (ref 9–11.1)
RBC # BLD AUTO: 2.86 M/UL (ref 4.1–5.7)
SODIUM SERPL-SCNC: 134 MMOL/L (ref 136–145)
SODIUM SERPL-SCNC: 136 MMOL/L (ref 136–145)
WBC # BLD AUTO: 9.6 K/UL (ref 4.1–11.1)

## 2024-10-25 PROCEDURE — 6370000000 HC RX 637 (ALT 250 FOR IP): Performed by: STUDENT IN AN ORGANIZED HEALTH CARE EDUCATION/TRAINING PROGRAM

## 2024-10-25 PROCEDURE — 2580000003 HC RX 258: Performed by: INTERNAL MEDICINE

## 2024-10-25 PROCEDURE — 80053 COMPREHEN METABOLIC PANEL: CPT

## 2024-10-25 PROCEDURE — 2060000000 HC ICU INTERMEDIATE R&B

## 2024-10-25 PROCEDURE — 85027 COMPLETE CBC AUTOMATED: CPT

## 2024-10-25 PROCEDURE — 6370000000 HC RX 637 (ALT 250 FOR IP): Performed by: FAMILY MEDICINE

## 2024-10-25 PROCEDURE — 71045 X-RAY EXAM CHEST 1 VIEW: CPT

## 2024-10-25 PROCEDURE — 6370000000 HC RX 637 (ALT 250 FOR IP): Performed by: NURSE PRACTITIONER

## 2024-10-25 PROCEDURE — 2700000000 HC OXYGEN THERAPY PER DAY

## 2024-10-25 PROCEDURE — 85610 PROTHROMBIN TIME: CPT

## 2024-10-25 PROCEDURE — 2580000003 HC RX 258: Performed by: NURSE PRACTITIONER

## 2024-10-25 PROCEDURE — 83880 ASSAY OF NATRIURETIC PEPTIDE: CPT

## 2024-10-25 PROCEDURE — 36415 COLL VENOUS BLD VENIPUNCTURE: CPT

## 2024-10-25 PROCEDURE — 6370000000 HC RX 637 (ALT 250 FOR IP): Performed by: HOSPITALIST

## 2024-10-25 PROCEDURE — 6370000000 HC RX 637 (ALT 250 FOR IP)

## 2024-10-25 PROCEDURE — 6360000002 HC RX W HCPCS: Performed by: NURSE PRACTITIONER

## 2024-10-25 PROCEDURE — 83615 LACTATE (LD) (LDH) ENZYME: CPT

## 2024-10-25 PROCEDURE — 6370000000 HC RX 637 (ALT 250 FOR IP): Performed by: INTERNAL MEDICINE

## 2024-10-25 PROCEDURE — 2580000003 HC RX 258: Performed by: FAMILY MEDICINE

## 2024-10-25 PROCEDURE — 99233 SBSQ HOSP IP/OBS HIGH 50: CPT | Performed by: INTERNAL MEDICINE

## 2024-10-25 PROCEDURE — 99231 SBSQ HOSP IP/OBS SF/LOW 25: CPT | Performed by: NURSE PRACTITIONER

## 2024-10-25 PROCEDURE — 93750 INTERROGATION VAD IN PERSON: CPT | Performed by: NURSE PRACTITIONER

## 2024-10-25 PROCEDURE — 94760 N-INVAS EAR/PLS OXIMETRY 1: CPT

## 2024-10-25 RX ORDER — BUMETANIDE 1 MG/1
2 TABLET ORAL DAILY
Status: DISCONTINUED | OUTPATIENT
Start: 2024-10-25 | End: 2024-10-28

## 2024-10-25 RX ORDER — WARFARIN SODIUM 2 MG/1
2 TABLET ORAL
Status: COMPLETED | OUTPATIENT
Start: 2024-10-25 | End: 2024-10-25

## 2024-10-25 RX ADMIN — PANTOPRAZOLE SODIUM 40 MG: 40 TABLET, DELAYED RELEASE ORAL at 07:09

## 2024-10-25 RX ADMIN — COLLAGENASE SANTYL: 250 OINTMENT TOPICAL at 09:00

## 2024-10-25 RX ADMIN — METOPROLOL SUCCINATE 25 MG: 25 TABLET, FILM COATED, EXTENDED RELEASE ORAL at 22:06

## 2024-10-25 RX ADMIN — MAGNESIUM OXIDE TAB 400 MG (241.3 MG ELEMENTAL MG) 400 MG: 400 (241.3 MG) TAB at 08:59

## 2024-10-25 RX ADMIN — SODIUM CHLORIDE, PRESERVATIVE FREE 10 ML: 5 INJECTION INTRAVENOUS at 22:07

## 2024-10-25 RX ADMIN — WARFARIN SODIUM 2 MG: 2 TABLET ORAL at 18:27

## 2024-10-25 RX ADMIN — HYDROMORPHONE HYDROCHLORIDE 2 MG: 2 TABLET ORAL at 22:06

## 2024-10-25 RX ADMIN — SODIUM CHLORIDE: 900 INJECTION INTRAVENOUS at 22:32

## 2024-10-25 RX ADMIN — DOCUSATE SODIUM 100 MG: 100 CAPSULE, LIQUID FILLED ORAL at 08:59

## 2024-10-25 RX ADMIN — Medication: at 09:00

## 2024-10-25 RX ADMIN — CEFEPIME 2000 MG: 2 INJECTION, POWDER, FOR SOLUTION INTRAVENOUS at 22:32

## 2024-10-25 RX ADMIN — ACETAMINOPHEN 650 MG: 325 TABLET ORAL at 11:22

## 2024-10-25 RX ADMIN — SODIUM CHLORIDE, PRESERVATIVE FREE 10 ML: 5 INJECTION INTRAVENOUS at 09:00

## 2024-10-25 RX ADMIN — Medication: at 22:06

## 2024-10-25 RX ADMIN — ACETAMINOPHEN 650 MG: 325 TABLET ORAL at 07:09

## 2024-10-25 RX ADMIN — HYDRALAZINE HYDROCHLORIDE 100 MG: 50 TABLET ORAL at 15:13

## 2024-10-25 RX ADMIN — AMIODARONE HYDROCHLORIDE 100 MG: 200 TABLET ORAL at 08:59

## 2024-10-25 RX ADMIN — CEFEPIME 2000 MG: 2 INJECTION, POWDER, FOR SOLUTION INTRAVENOUS at 11:21

## 2024-10-25 RX ADMIN — METOPROLOL SUCCINATE 25 MG: 25 TABLET, FILM COATED, EXTENDED RELEASE ORAL at 08:59

## 2024-10-25 RX ADMIN — HYDRALAZINE HYDROCHLORIDE 100 MG: 50 TABLET ORAL at 22:06

## 2024-10-25 RX ADMIN — ISOSORBIDE DINITRATE 40 MG: 20 TABLET ORAL at 15:13

## 2024-10-25 RX ADMIN — ISOSORBIDE DINITRATE 40 MG: 20 TABLET ORAL at 07:09

## 2024-10-25 RX ADMIN — SPIRONOLACTONE 25 MG: 25 TABLET ORAL at 08:59

## 2024-10-25 RX ADMIN — HYDROMORPHONE HYDROCHLORIDE 2 MG: 2 TABLET ORAL at 07:09

## 2024-10-25 RX ADMIN — METRONIDAZOLE 500 MG: 250 TABLET ORAL at 15:13

## 2024-10-25 RX ADMIN — Medication 1000 UNITS: at 08:59

## 2024-10-25 RX ADMIN — METRONIDAZOLE 500 MG: 250 TABLET ORAL at 07:09

## 2024-10-25 RX ADMIN — ATORVASTATIN CALCIUM 80 MG: 40 TABLET, FILM COATED ORAL at 08:58

## 2024-10-25 RX ADMIN — BUMETANIDE 2 MG: 1 TABLET ORAL at 09:17

## 2024-10-25 RX ADMIN — ACETAMINOPHEN 650 MG: 325 TABLET ORAL at 18:27

## 2024-10-25 RX ADMIN — ASPIRIN 81 MG: 81 TABLET, COATED ORAL at 08:58

## 2024-10-25 RX ADMIN — ISOSORBIDE DINITRATE 40 MG: 20 TABLET ORAL at 22:05

## 2024-10-25 RX ADMIN — HYDRALAZINE HYDROCHLORIDE 100 MG: 50 TABLET ORAL at 07:09

## 2024-10-25 RX ADMIN — AMLODIPINE BESYLATE 5 MG: 5 TABLET ORAL at 08:59

## 2024-10-25 ASSESSMENT — PAIN SCALES - GENERAL
PAINLEVEL_OUTOF10: 9
PAINLEVEL_OUTOF10: 2
PAINLEVEL_OUTOF10: 8
PAINLEVEL_OUTOF10: 4

## 2024-10-25 ASSESSMENT — PAIN DESCRIPTION - ORIENTATION
ORIENTATION: RIGHT;LEFT
ORIENTATION: RIGHT;LEFT
ORIENTATION: LEFT
ORIENTATION: RIGHT;LEFT

## 2024-10-25 ASSESSMENT — PAIN DESCRIPTION - LOCATION
LOCATION: LEG
LOCATION: FOOT
LOCATION: FOOT
LOCATION: LEG

## 2024-10-25 ASSESSMENT — PAIN DESCRIPTION - DESCRIPTORS: DESCRIPTORS: ACHING

## 2024-10-25 NOTE — CARE COORDINATION
Transition of Care Plan:     RUR: 21%  Prior Level of Functioning: LVAD coord Bety. [place], g/f's brian does he driveline dressing changes, SPC for mobility. Owns RW   Disposition: (Needs ID plan, PICC line, paper rx's for narcotics) Auth approved [facility] .   Will go w loaner LVAD equipment. (For nursing- patient needs at least 4 days worth of driveline supplies)  If SNF or IPR: Date FOC offered: 9/24, 9/25  Date FOC received: 9/24, 9/25  Accepting facility: [facility]  Date authorization started with reference number: 10/24   Date authorization received and expires: Rec'd 10/25   Follow up appointments: Specialist  DME needed: Defer to rehab  Transportation at discharge: (Will need new O2) ELEANOR KYLEE- [facility]- [phone]  IM/IMM Medicare/ letter given: 9/18  Is patient a [place] and connected with [facility]?               If yes, was  transfer form completed and [facility] notified?   Caregiver Contact: SisterCatalina [phone]   Discharge Caregiver contacted prior to discharge?   Care Conference needed?   Barriers to discharge:  Medical    [facility] reports the SNF auth has been approved and they can accept today and request he get there as early as possible. CM will update the team during IDRs today.    Update 12:46pm: During rounds the hospitalist reports patient is not ready for d/c today.  [facility] doesn't accept LVADs on the weekend so anticipated d/c is Monday, 10/28.    CANDACE Hale CCM  Care Management

## 2024-10-25 NOTE — TELEPHONE ENCOUNTER
Patient discussed with with CARMELINA Luevano, per provider he likely discharge to [facility] and [facility] today.     0848: called and spoke with patient's sister Catalina, requested Catalina bring dressing change kits to [place] for patient to use until facility is able to stock these, Catalina verbalized understanding, stated she will bring some kits to [place] on Saturday for patient to use until facility is able to stock these. [facility] and [facility] DON notified of this via secure email

## 2024-10-26 LAB
ALBUMIN SERPL-MCNC: 3 G/DL (ref 3.5–5)
ALBUMIN/GLOB SERPL: 0.7 (ref 1.1–2.2)
ALP SERPL-CCNC: 186 U/L (ref 45–117)
ALT SERPL-CCNC: 30 U/L (ref 12–78)
ANION GAP SERPL CALC-SCNC: 7 MMOL/L (ref 2–12)
AST SERPL-CCNC: 48 U/L (ref 15–37)
BILIRUB SERPL-MCNC: 0.6 MG/DL (ref 0.2–1)
BUN SERPL-MCNC: 46 MG/DL (ref 6–20)
BUN/CREAT SERPL: 40 (ref 12–20)
CALCIUM SERPL-MCNC: 8.9 MG/DL (ref 8.5–10.1)
CHLORIDE SERPL-SCNC: 106 MMOL/L (ref 97–108)
CO2 SERPL-SCNC: 24 MMOL/L (ref 21–32)
CREAT SERPL-MCNC: 1.15 MG/DL (ref 0.7–1.3)
ERYTHROCYTE [DISTWIDTH] IN BLOOD BY AUTOMATED COUNT: 19.5 % (ref 11.5–14.5)
GLOBULIN SER CALC-MCNC: 4.3 G/DL (ref 2–4)
GLUCOSE SERPL-MCNC: 116 MG/DL (ref 65–100)
HCT VFR BLD AUTO: 28.2 % (ref 36.6–50.3)
HGB BLD-MCNC: 8.7 G/DL (ref 12.1–17)
INR PPP: 2.1 (ref 0.9–1.1)
LDH SERPL L TO P-CCNC: 400 U/L (ref 85–241)
MCH RBC QN AUTO: 30.4 PG (ref 26–34)
MCHC RBC AUTO-ENTMCNC: 30.9 G/DL (ref 30–36.5)
MCV RBC AUTO: 98.6 FL (ref 80–99)
NRBC # BLD: 0 K/UL (ref 0–0.01)
NRBC BLD-RTO: 0 PER 100 WBC
NT PRO BNP: 1541 PG/ML
PLATELET # BLD AUTO: 394 K/UL (ref 150–400)
PMV BLD AUTO: 10.1 FL (ref 8.9–12.9)
POTASSIUM SERPL-SCNC: 4.6 MMOL/L (ref 3.5–5.1)
PROT SERPL-MCNC: 7.3 G/DL (ref 6.4–8.2)
PROTHROMBIN TIME: 21.3 SEC (ref 9–11.1)
RBC # BLD AUTO: 2.86 M/UL (ref 4.1–5.7)
SODIUM SERPL-SCNC: 137 MMOL/L (ref 136–145)
WBC # BLD AUTO: 7.2 K/UL (ref 4.1–11.1)

## 2024-10-26 PROCEDURE — 6370000000 HC RX 637 (ALT 250 FOR IP): Performed by: INTERNAL MEDICINE

## 2024-10-26 PROCEDURE — 6370000000 HC RX 637 (ALT 250 FOR IP): Performed by: FAMILY MEDICINE

## 2024-10-26 PROCEDURE — 85610 PROTHROMBIN TIME: CPT

## 2024-10-26 PROCEDURE — 80053 COMPREHEN METABOLIC PANEL: CPT

## 2024-10-26 PROCEDURE — 6370000000 HC RX 637 (ALT 250 FOR IP): Performed by: STUDENT IN AN ORGANIZED HEALTH CARE EDUCATION/TRAINING PROGRAM

## 2024-10-26 PROCEDURE — 6370000000 HC RX 637 (ALT 250 FOR IP)

## 2024-10-26 PROCEDURE — 6370000000 HC RX 637 (ALT 250 FOR IP): Performed by: NURSE PRACTITIONER

## 2024-10-26 PROCEDURE — 93750 INTERROGATION VAD IN PERSON: CPT | Performed by: NURSE PRACTITIONER

## 2024-10-26 PROCEDURE — 2580000003 HC RX 258: Performed by: FAMILY MEDICINE

## 2024-10-26 PROCEDURE — 2580000003 HC RX 258: Performed by: NURSE PRACTITIONER

## 2024-10-26 PROCEDURE — 83880 ASSAY OF NATRIURETIC PEPTIDE: CPT

## 2024-10-26 PROCEDURE — 99232 SBSQ HOSP IP/OBS MODERATE 35: CPT | Performed by: NURSE PRACTITIONER

## 2024-10-26 PROCEDURE — 85027 COMPLETE CBC AUTOMATED: CPT

## 2024-10-26 PROCEDURE — 6370000000 HC RX 637 (ALT 250 FOR IP): Performed by: HOSPITALIST

## 2024-10-26 PROCEDURE — 2060000000 HC ICU INTERMEDIATE R&B

## 2024-10-26 PROCEDURE — 36416 COLLJ CAPILLARY BLOOD SPEC: CPT

## 2024-10-26 PROCEDURE — 83615 LACTATE (LD) (LDH) ENZYME: CPT

## 2024-10-26 PROCEDURE — 2580000003 HC RX 258: Performed by: INTERNAL MEDICINE

## 2024-10-26 PROCEDURE — 6360000002 HC RX W HCPCS: Performed by: NURSE PRACTITIONER

## 2024-10-26 RX ORDER — WARFARIN SODIUM 1 MG/1
2 TABLET ORAL
Status: COMPLETED | OUTPATIENT
Start: 2024-10-26 | End: 2024-10-26

## 2024-10-26 RX ADMIN — SODIUM CHLORIDE, PRESERVATIVE FREE 10 ML: 5 INJECTION INTRAVENOUS at 21:20

## 2024-10-26 RX ADMIN — ISOSORBIDE DINITRATE 40 MG: 20 TABLET ORAL at 21:20

## 2024-10-26 RX ADMIN — SPIRONOLACTONE 25 MG: 25 TABLET ORAL at 07:57

## 2024-10-26 RX ADMIN — Medication: at 07:59

## 2024-10-26 RX ADMIN — COLLAGENASE SANTYL: 250 OINTMENT TOPICAL at 07:58

## 2024-10-26 RX ADMIN — ACETAMINOPHEN 650 MG: 325 TABLET ORAL at 13:12

## 2024-10-26 RX ADMIN — METOPROLOL SUCCINATE 25 MG: 25 TABLET, FILM COATED, EXTENDED RELEASE ORAL at 21:20

## 2024-10-26 RX ADMIN — HYDROMORPHONE HYDROCHLORIDE 2 MG: 2 TABLET ORAL at 15:23

## 2024-10-26 RX ADMIN — SODIUM CHLORIDE, PRESERVATIVE FREE 10 ML: 5 INJECTION INTRAVENOUS at 07:58

## 2024-10-26 RX ADMIN — HYDRALAZINE HYDROCHLORIDE 100 MG: 50 TABLET ORAL at 06:55

## 2024-10-26 RX ADMIN — CEFEPIME 2000 MG: 2 INJECTION, POWDER, FOR SOLUTION INTRAVENOUS at 11:22

## 2024-10-26 RX ADMIN — HYDRALAZINE HYDROCHLORIDE 100 MG: 50 TABLET ORAL at 15:22

## 2024-10-26 RX ADMIN — AMLODIPINE BESYLATE 5 MG: 5 TABLET ORAL at 07:57

## 2024-10-26 RX ADMIN — HYDRALAZINE HYDROCHLORIDE 100 MG: 50 TABLET ORAL at 21:20

## 2024-10-26 RX ADMIN — AMIODARONE HYDROCHLORIDE 100 MG: 200 TABLET ORAL at 07:58

## 2024-10-26 RX ADMIN — ATORVASTATIN CALCIUM 80 MG: 40 TABLET, FILM COATED ORAL at 07:58

## 2024-10-26 RX ADMIN — WARFARIN SODIUM 2 MG: 1 TABLET ORAL at 18:51

## 2024-10-26 RX ADMIN — METOPROLOL SUCCINATE 25 MG: 25 TABLET, FILM COATED, EXTENDED RELEASE ORAL at 07:57

## 2024-10-26 RX ADMIN — ACETAMINOPHEN 650 MG: 325 TABLET ORAL at 23:54

## 2024-10-26 RX ADMIN — MAGNESIUM OXIDE TAB 400 MG (241.3 MG ELEMENTAL MG) 400 MG: 400 (241.3 MG) TAB at 07:57

## 2024-10-26 RX ADMIN — DOCUSATE SODIUM 100 MG: 100 CAPSULE, LIQUID FILLED ORAL at 07:57

## 2024-10-26 RX ADMIN — SODIUM CHLORIDE: 900 INJECTION INTRAVENOUS at 23:53

## 2024-10-26 RX ADMIN — PANTOPRAZOLE SODIUM 40 MG: 40 TABLET, DELAYED RELEASE ORAL at 06:54

## 2024-10-26 RX ADMIN — SODIUM CHLORIDE, PRESERVATIVE FREE 10 ML: 5 INJECTION INTRAVENOUS at 07:59

## 2024-10-26 RX ADMIN — ACETAMINOPHEN 650 MG: 325 TABLET ORAL at 18:51

## 2024-10-26 RX ADMIN — ISOSORBIDE DINITRATE 40 MG: 20 TABLET ORAL at 15:21

## 2024-10-26 RX ADMIN — ACETAMINOPHEN 650 MG: 325 TABLET ORAL at 06:54

## 2024-10-26 RX ADMIN — CEFEPIME 2000 MG: 2 INJECTION, POWDER, FOR SOLUTION INTRAVENOUS at 23:53

## 2024-10-26 RX ADMIN — Medication 1000 UNITS: at 07:57

## 2024-10-26 RX ADMIN — Medication: at 21:21

## 2024-10-26 RX ADMIN — ASPIRIN 81 MG: 81 TABLET, COATED ORAL at 07:58

## 2024-10-26 RX ADMIN — BUMETANIDE 2 MG: 1 TABLET ORAL at 07:57

## 2024-10-26 RX ADMIN — ISOSORBIDE DINITRATE 40 MG: 20 TABLET ORAL at 06:54

## 2024-10-26 ASSESSMENT — PAIN SCALES - GENERAL
PAINLEVEL_OUTOF10: 8
PAINLEVEL_OUTOF10: 0
PAINLEVEL_OUTOF10: 8

## 2024-10-26 ASSESSMENT — PAIN DESCRIPTION - ORIENTATION
ORIENTATION: LEFT
ORIENTATION: LEFT

## 2024-10-26 ASSESSMENT — PAIN DESCRIPTION - LOCATION
LOCATION: LEG
LOCATION: LEG

## 2024-10-26 ASSESSMENT — PAIN DESCRIPTION - DESCRIPTORS
DESCRIPTORS: ACHING
DESCRIPTORS: SORE

## 2024-10-27 LAB
ALBUMIN SERPL-MCNC: 3 G/DL (ref 3.5–5)
ALBUMIN/GLOB SERPL: 0.6 (ref 1.1–2.2)
ALP SERPL-CCNC: 171 U/L (ref 45–117)
ALT SERPL-CCNC: 29 U/L (ref 12–78)
ANION GAP SERPL CALC-SCNC: 6 MMOL/L (ref 2–12)
AST SERPL-CCNC: 48 U/L (ref 15–37)
BILIRUB SERPL-MCNC: 0.7 MG/DL (ref 0.2–1)
BUN SERPL-MCNC: 48 MG/DL (ref 6–20)
BUN/CREAT SERPL: 41 (ref 12–20)
CALCIUM SERPL-MCNC: 9.4 MG/DL (ref 8.5–10.1)
CHLORIDE SERPL-SCNC: 109 MMOL/L (ref 97–108)
CO2 SERPL-SCNC: 23 MMOL/L (ref 21–32)
CREAT SERPL-MCNC: 1.16 MG/DL (ref 0.7–1.3)
ERYTHROCYTE [DISTWIDTH] IN BLOOD BY AUTOMATED COUNT: 19.8 % (ref 11.5–14.5)
GLOBULIN SER CALC-MCNC: 4.7 G/DL (ref 2–4)
GLUCOSE SERPL-MCNC: 108 MG/DL (ref 65–100)
HCT VFR BLD AUTO: 31.8 % (ref 36.6–50.3)
HGB BLD-MCNC: 9.3 G/DL (ref 12.1–17)
INR PPP: 2.2 (ref 0.9–1.1)
LDH SERPL L TO P-CCNC: 530 U/L (ref 85–241)
MCH RBC QN AUTO: 30.8 PG (ref 26–34)
MCHC RBC AUTO-ENTMCNC: 29.2 G/DL (ref 30–36.5)
MCV RBC AUTO: 105.3 FL (ref 80–99)
NRBC # BLD: 0 K/UL (ref 0–0.01)
NRBC BLD-RTO: 0 PER 100 WBC
NT PRO BNP: 1636 PG/ML
PLATELET # BLD AUTO: 244 K/UL (ref 150–400)
PMV BLD AUTO: 11.3 FL (ref 8.9–12.9)
POTASSIUM SERPL-SCNC: 4.9 MMOL/L (ref 3.5–5.1)
PROT SERPL-MCNC: 7.7 G/DL (ref 6.4–8.2)
PROTHROMBIN TIME: 22 SEC (ref 9–11.1)
RBC # BLD AUTO: 3.02 M/UL (ref 4.1–5.7)
SODIUM SERPL-SCNC: 138 MMOL/L (ref 136–145)
WBC # BLD AUTO: 6 K/UL (ref 4.1–11.1)

## 2024-10-27 PROCEDURE — 6370000000 HC RX 637 (ALT 250 FOR IP): Performed by: INTERNAL MEDICINE

## 2024-10-27 PROCEDURE — 83880 ASSAY OF NATRIURETIC PEPTIDE: CPT

## 2024-10-27 PROCEDURE — 83615 LACTATE (LD) (LDH) ENZYME: CPT

## 2024-10-27 PROCEDURE — 93750 INTERROGATION VAD IN PERSON: CPT | Performed by: NURSE PRACTITIONER

## 2024-10-27 PROCEDURE — 6370000000 HC RX 637 (ALT 250 FOR IP): Performed by: STUDENT IN AN ORGANIZED HEALTH CARE EDUCATION/TRAINING PROGRAM

## 2024-10-27 PROCEDURE — 6360000002 HC RX W HCPCS: Performed by: INTERNAL MEDICINE

## 2024-10-27 PROCEDURE — 2060000000 HC ICU INTERMEDIATE R&B

## 2024-10-27 PROCEDURE — 80053 COMPREHEN METABOLIC PANEL: CPT

## 2024-10-27 PROCEDURE — 2580000003 HC RX 258: Performed by: INTERNAL MEDICINE

## 2024-10-27 PROCEDURE — 6370000000 HC RX 637 (ALT 250 FOR IP): Performed by: NURSE PRACTITIONER

## 2024-10-27 PROCEDURE — 85610 PROTHROMBIN TIME: CPT

## 2024-10-27 PROCEDURE — 2700000000 HC OXYGEN THERAPY PER DAY

## 2024-10-27 PROCEDURE — 2580000003 HC RX 258: Performed by: FAMILY MEDICINE

## 2024-10-27 PROCEDURE — 99232 SBSQ HOSP IP/OBS MODERATE 35: CPT | Performed by: NURSE PRACTITIONER

## 2024-10-27 PROCEDURE — 6370000000 HC RX 637 (ALT 250 FOR IP): Performed by: FAMILY MEDICINE

## 2024-10-27 PROCEDURE — 6370000000 HC RX 637 (ALT 250 FOR IP): Performed by: HOSPITALIST

## 2024-10-27 PROCEDURE — 6360000002 HC RX W HCPCS: Performed by: NURSE PRACTITIONER

## 2024-10-27 PROCEDURE — 2580000003 HC RX 258: Performed by: NURSE PRACTITIONER

## 2024-10-27 PROCEDURE — 36415 COLL VENOUS BLD VENIPUNCTURE: CPT

## 2024-10-27 PROCEDURE — 85027 COMPLETE CBC AUTOMATED: CPT

## 2024-10-27 PROCEDURE — 6370000000 HC RX 637 (ALT 250 FOR IP)

## 2024-10-27 RX ORDER — WARFARIN SODIUM 1 MG/1
2 TABLET ORAL
Status: COMPLETED | OUTPATIENT
Start: 2024-10-27 | End: 2024-10-27

## 2024-10-27 RX ADMIN — ACETAMINOPHEN 650 MG: 325 TABLET ORAL at 13:10

## 2024-10-27 RX ADMIN — METOPROLOL SUCCINATE 25 MG: 25 TABLET, FILM COATED, EXTENDED RELEASE ORAL at 22:54

## 2024-10-27 RX ADMIN — ACETAMINOPHEN 650 MG: 325 TABLET ORAL at 18:21

## 2024-10-27 RX ADMIN — MAGNESIUM OXIDE TAB 400 MG (241.3 MG ELEMENTAL MG) 400 MG: 400 (241.3 MG) TAB at 08:48

## 2024-10-27 RX ADMIN — SODIUM CHLORIDE: 900 INJECTION INTRAVENOUS at 22:53

## 2024-10-27 RX ADMIN — SPIRONOLACTONE 25 MG: 25 TABLET ORAL at 08:48

## 2024-10-27 RX ADMIN — Medication: at 22:55

## 2024-10-27 RX ADMIN — Medication: at 08:49

## 2024-10-27 RX ADMIN — ISOSORBIDE DINITRATE 40 MG: 20 TABLET ORAL at 07:15

## 2024-10-27 RX ADMIN — HYDRALAZINE HYDROCHLORIDE 100 MG: 50 TABLET ORAL at 22:54

## 2024-10-27 RX ADMIN — PANTOPRAZOLE SODIUM 40 MG: 40 TABLET, DELAYED RELEASE ORAL at 07:15

## 2024-10-27 RX ADMIN — COLLAGENASE SANTYL: 250 OINTMENT TOPICAL at 08:48

## 2024-10-27 RX ADMIN — HYDRALAZINE HYDROCHLORIDE 100 MG: 50 TABLET ORAL at 07:15

## 2024-10-27 RX ADMIN — ISOSORBIDE DINITRATE 40 MG: 20 TABLET ORAL at 16:16

## 2024-10-27 RX ADMIN — Medication 1000 UNITS: at 08:48

## 2024-10-27 RX ADMIN — BUMETANIDE 2 MG: 1 TABLET ORAL at 08:48

## 2024-10-27 RX ADMIN — ATORVASTATIN CALCIUM 80 MG: 40 TABLET, FILM COATED ORAL at 08:48

## 2024-10-27 RX ADMIN — SODIUM CHLORIDE, PRESERVATIVE FREE 10 ML: 5 INJECTION INTRAVENOUS at 22:54

## 2024-10-27 RX ADMIN — WARFARIN SODIUM 2 MG: 1 TABLET ORAL at 18:21

## 2024-10-27 RX ADMIN — HYDROMORPHONE HYDROCHLORIDE 1 MG: 1 INJECTION, SOLUTION INTRAMUSCULAR; INTRAVENOUS; SUBCUTANEOUS at 22:59

## 2024-10-27 RX ADMIN — CEFEPIME 2000 MG: 2 INJECTION, POWDER, FOR SOLUTION INTRAVENOUS at 11:06

## 2024-10-27 RX ADMIN — DOCUSATE SODIUM 100 MG: 100 CAPSULE, LIQUID FILLED ORAL at 08:48

## 2024-10-27 RX ADMIN — ASPIRIN 81 MG: 81 TABLET, COATED ORAL at 08:48

## 2024-10-27 RX ADMIN — ISOSORBIDE DINITRATE 40 MG: 20 TABLET ORAL at 22:54

## 2024-10-27 RX ADMIN — ACETAMINOPHEN 650 MG: 325 TABLET ORAL at 07:15

## 2024-10-27 RX ADMIN — HYDRALAZINE HYDROCHLORIDE 100 MG: 50 TABLET ORAL at 16:16

## 2024-10-27 RX ADMIN — HYDROMORPHONE HYDROCHLORIDE 2 MG: 2 TABLET ORAL at 11:21

## 2024-10-27 RX ADMIN — SODIUM CHLORIDE, PRESERVATIVE FREE 10 ML: 5 INJECTION INTRAVENOUS at 08:49

## 2024-10-27 RX ADMIN — METOPROLOL SUCCINATE 25 MG: 25 TABLET, FILM COATED, EXTENDED RELEASE ORAL at 08:49

## 2024-10-27 RX ADMIN — SODIUM CHLORIDE, PRESERVATIVE FREE 10 ML: 5 INJECTION INTRAVENOUS at 22:51

## 2024-10-27 RX ADMIN — AMIODARONE HYDROCHLORIDE 100 MG: 200 TABLET ORAL at 08:48

## 2024-10-27 RX ADMIN — AMLODIPINE BESYLATE 5 MG: 5 TABLET ORAL at 08:48

## 2024-10-27 RX ADMIN — CEFEPIME 2000 MG: 2 INJECTION, POWDER, FOR SOLUTION INTRAVENOUS at 22:53

## 2024-10-27 RX ADMIN — ACETAMINOPHEN 650 MG: 325 TABLET ORAL at 22:54

## 2024-10-27 ASSESSMENT — PAIN DESCRIPTION - LOCATION
LOCATION: FOOT
LOCATION: FOOT

## 2024-10-27 ASSESSMENT — PAIN SCALES - GENERAL
PAINLEVEL_OUTOF10: 5
PAINLEVEL_OUTOF10: 8
PAINLEVEL_OUTOF10: 8
PAINLEVEL_OUTOF10: 5
PAINLEVEL_OUTOF10: 5

## 2024-10-28 ENCOUNTER — APPOINTMENT (OUTPATIENT)
Facility: HOSPITAL | Age: 69
DRG: 640 | End: 2024-10-28
Payer: MEDICARE

## 2024-10-28 LAB
ALBUMIN SERPL-MCNC: 3.3 G/DL (ref 3.5–5)
ALBUMIN/GLOB SERPL: 0.6 (ref 1.1–2.2)
ALP SERPL-CCNC: 215 U/L (ref 45–117)
ALT SERPL-CCNC: 35 U/L (ref 12–78)
ANION GAP SERPL CALC-SCNC: 6 MMOL/L (ref 2–12)
AST SERPL-CCNC: 57 U/L (ref 15–37)
BILIRUB SERPL-MCNC: 0.5 MG/DL (ref 0.2–1)
BUN SERPL-MCNC: 56 MG/DL (ref 6–20)
BUN/CREAT SERPL: 43 (ref 12–20)
CALCIUM SERPL-MCNC: 9 MG/DL (ref 8.5–10.1)
CHLORIDE SERPL-SCNC: 104 MMOL/L (ref 97–108)
CO2 SERPL-SCNC: 27 MMOL/L (ref 21–32)
CREAT SERPL-MCNC: 1.31 MG/DL (ref 0.7–1.3)
ERYTHROCYTE [DISTWIDTH] IN BLOOD BY AUTOMATED COUNT: 19.3 % (ref 11.5–14.5)
GLOBULIN SER CALC-MCNC: 5.1 G/DL (ref 2–4)
GLUCOSE SERPL-MCNC: 138 MG/DL (ref 65–100)
HCT VFR BLD AUTO: 32.6 % (ref 36.6–50.3)
HGB BLD-MCNC: 10.1 G/DL (ref 12.1–17)
INR PPP: 2.3 (ref 0.9–1.1)
LDH SERPL L TO P-CCNC: 432 U/L (ref 85–241)
MCH RBC QN AUTO: 31.1 PG (ref 26–34)
MCHC RBC AUTO-ENTMCNC: 31 G/DL (ref 30–36.5)
MCV RBC AUTO: 100.3 FL (ref 80–99)
NRBC # BLD: 0 K/UL (ref 0–0.01)
NRBC BLD-RTO: 0 PER 100 WBC
NT PRO BNP: 2184 PG/ML
PLATELET # BLD AUTO: 410 K/UL (ref 150–400)
PMV BLD AUTO: 10.6 FL (ref 8.9–12.9)
POTASSIUM SERPL-SCNC: 4.4 MMOL/L (ref 3.5–5.1)
PROT SERPL-MCNC: 8.4 G/DL (ref 6.4–8.2)
PROTHROMBIN TIME: 23 SEC (ref 9–11.1)
RBC # BLD AUTO: 3.25 M/UL (ref 4.1–5.7)
SODIUM SERPL-SCNC: 137 MMOL/L (ref 136–145)
WBC # BLD AUTO: 7.1 K/UL (ref 4.1–11.1)

## 2024-10-28 PROCEDURE — 94760 N-INVAS EAR/PLS OXIMETRY 1: CPT

## 2024-10-28 PROCEDURE — 2580000003 HC RX 258: Performed by: FAMILY MEDICINE

## 2024-10-28 PROCEDURE — 2580000003 HC RX 258: Performed by: INTERNAL MEDICINE

## 2024-10-28 PROCEDURE — 2580000003 HC RX 258: Performed by: NURSE PRACTITIONER

## 2024-10-28 PROCEDURE — 71045 X-RAY EXAM CHEST 1 VIEW: CPT

## 2024-10-28 PROCEDURE — 6370000000 HC RX 637 (ALT 250 FOR IP): Performed by: FAMILY MEDICINE

## 2024-10-28 PROCEDURE — 6370000000 HC RX 637 (ALT 250 FOR IP): Performed by: NURSE PRACTITIONER

## 2024-10-28 PROCEDURE — 6370000000 HC RX 637 (ALT 250 FOR IP): Performed by: INTERNAL MEDICINE

## 2024-10-28 PROCEDURE — 6370000000 HC RX 637 (ALT 250 FOR IP): Performed by: STUDENT IN AN ORGANIZED HEALTH CARE EDUCATION/TRAINING PROGRAM

## 2024-10-28 PROCEDURE — 36415 COLL VENOUS BLD VENIPUNCTURE: CPT

## 2024-10-28 PROCEDURE — 80053 COMPREHEN METABOLIC PANEL: CPT

## 2024-10-28 PROCEDURE — 6370000000 HC RX 637 (ALT 250 FOR IP)

## 2024-10-28 PROCEDURE — 2700000000 HC OXYGEN THERAPY PER DAY

## 2024-10-28 PROCEDURE — 83880 ASSAY OF NATRIURETIC PEPTIDE: CPT

## 2024-10-28 PROCEDURE — APPNB30 APP NON BILLABLE TIME 0-30 MINS: Performed by: NURSE PRACTITIONER

## 2024-10-28 PROCEDURE — 6360000002 HC RX W HCPCS: Performed by: NURSE PRACTITIONER

## 2024-10-28 PROCEDURE — 85610 PROTHROMBIN TIME: CPT

## 2024-10-28 PROCEDURE — 6370000000 HC RX 637 (ALT 250 FOR IP): Performed by: HOSPITALIST

## 2024-10-28 PROCEDURE — 85027 COMPLETE CBC AUTOMATED: CPT

## 2024-10-28 PROCEDURE — 2060000000 HC ICU INTERMEDIATE R&B

## 2024-10-28 PROCEDURE — 83615 LACTATE (LD) (LDH) ENZYME: CPT

## 2024-10-28 RX ORDER — WARFARIN SODIUM 1 MG/1
1.5 TABLET ORAL
Status: COMPLETED | OUTPATIENT
Start: 2024-10-28 | End: 2024-10-28

## 2024-10-28 RX ORDER — BUMETANIDE 1 MG/1
2 TABLET ORAL 2 TIMES DAILY
Status: DISCONTINUED | OUTPATIENT
Start: 2024-10-28 | End: 2024-10-29 | Stop reason: HOSPADM

## 2024-10-28 RX ADMIN — HYDROMORPHONE HYDROCHLORIDE 2 MG: 2 TABLET ORAL at 15:42

## 2024-10-28 RX ADMIN — DOCUSATE SODIUM 100 MG: 100 CAPSULE, LIQUID FILLED ORAL at 08:06

## 2024-10-28 RX ADMIN — ACETAMINOPHEN 650 MG: 325 TABLET ORAL at 19:01

## 2024-10-28 RX ADMIN — PANTOPRAZOLE SODIUM 40 MG: 40 TABLET, DELAYED RELEASE ORAL at 06:26

## 2024-10-28 RX ADMIN — ASPIRIN 81 MG: 81 TABLET, COATED ORAL at 08:06

## 2024-10-28 RX ADMIN — SODIUM CHLORIDE, PRESERVATIVE FREE 10 ML: 5 INJECTION INTRAVENOUS at 22:57

## 2024-10-28 RX ADMIN — SODIUM CHLORIDE: 900 INJECTION INTRAVENOUS at 23:07

## 2024-10-28 RX ADMIN — HYDROMORPHONE HYDROCHLORIDE 2 MG: 2 TABLET ORAL at 22:55

## 2024-10-28 RX ADMIN — ATORVASTATIN CALCIUM 80 MG: 40 TABLET, FILM COATED ORAL at 08:06

## 2024-10-28 RX ADMIN — Medication: at 22:55

## 2024-10-28 RX ADMIN — CEFEPIME 2000 MG: 2 INJECTION, POWDER, FOR SOLUTION INTRAVENOUS at 23:10

## 2024-10-28 RX ADMIN — ISOSORBIDE DINITRATE 40 MG: 20 TABLET ORAL at 22:55

## 2024-10-28 RX ADMIN — ACETAMINOPHEN 650 MG: 325 TABLET ORAL at 06:26

## 2024-10-28 RX ADMIN — HYDRALAZINE HYDROCHLORIDE 100 MG: 50 TABLET ORAL at 06:27

## 2024-10-28 RX ADMIN — ACETAMINOPHEN 650 MG: 325 TABLET ORAL at 11:23

## 2024-10-28 RX ADMIN — SPIRONOLACTONE 25 MG: 25 TABLET ORAL at 08:06

## 2024-10-28 RX ADMIN — ISOSORBIDE DINITRATE 40 MG: 20 TABLET ORAL at 15:33

## 2024-10-28 RX ADMIN — AMLODIPINE BESYLATE 5 MG: 5 TABLET ORAL at 08:06

## 2024-10-28 RX ADMIN — Medication: at 08:07

## 2024-10-28 RX ADMIN — WARFARIN SODIUM 1.5 MG: 1 TABLET ORAL at 19:00

## 2024-10-28 RX ADMIN — HYDRALAZINE HYDROCHLORIDE 100 MG: 50 TABLET ORAL at 15:33

## 2024-10-28 RX ADMIN — SODIUM CHLORIDE, PRESERVATIVE FREE 10 ML: 5 INJECTION INTRAVENOUS at 08:07

## 2024-10-28 RX ADMIN — MAGNESIUM HYDROXIDE 30 ML: 400 SUSPENSION ORAL at 22:49

## 2024-10-28 RX ADMIN — MAGNESIUM OXIDE TAB 400 MG (241.3 MG ELEMENTAL MG) 400 MG: 400 (241.3 MG) TAB at 08:06

## 2024-10-28 RX ADMIN — BUMETANIDE 2 MG: 1 TABLET ORAL at 08:06

## 2024-10-28 RX ADMIN — Medication 1000 UNITS: at 08:06

## 2024-10-28 RX ADMIN — BUMETANIDE 2 MG: 1 TABLET ORAL at 19:00

## 2024-10-28 RX ADMIN — SODIUM CHLORIDE, PRESERVATIVE FREE 10 ML: 5 INJECTION INTRAVENOUS at 22:56

## 2024-10-28 RX ADMIN — METOPROLOL SUCCINATE 25 MG: 25 TABLET, FILM COATED, EXTENDED RELEASE ORAL at 22:55

## 2024-10-28 RX ADMIN — CEFEPIME 2000 MG: 2 INJECTION, POWDER, FOR SOLUTION INTRAVENOUS at 11:23

## 2024-10-28 RX ADMIN — ISOSORBIDE DINITRATE 40 MG: 20 TABLET ORAL at 06:26

## 2024-10-28 RX ADMIN — METOPROLOL SUCCINATE 25 MG: 25 TABLET, FILM COATED, EXTENDED RELEASE ORAL at 08:06

## 2024-10-28 RX ADMIN — HYDRALAZINE HYDROCHLORIDE 100 MG: 50 TABLET ORAL at 22:55

## 2024-10-28 RX ADMIN — COLLAGENASE SANTYL: 250 OINTMENT TOPICAL at 08:07

## 2024-10-28 RX ADMIN — AMIODARONE HYDROCHLORIDE 100 MG: 200 TABLET ORAL at 08:06

## 2024-10-28 ASSESSMENT — PAIN SCALES - GENERAL
PAINLEVEL_OUTOF10: 8
PAINLEVEL_OUTOF10: 6
PAINLEVEL_OUTOF10: 8

## 2024-10-28 ASSESSMENT — PAIN DESCRIPTION - ORIENTATION
ORIENTATION: LEFT
ORIENTATION: LEFT

## 2024-10-28 ASSESSMENT — ENCOUNTER SYMPTOMS
ABDOMINAL DISTENTION: 0
SHORTNESS OF BREATH: 0
COUGH: 0
NAUSEA: 0

## 2024-10-28 ASSESSMENT — PAIN DESCRIPTION - LOCATION
LOCATION: LEG
LOCATION: LEG

## 2024-10-28 ASSESSMENT — PAIN DESCRIPTION - DESCRIPTORS
DESCRIPTORS: ACHING
DESCRIPTORS: ACHING

## 2024-10-28 NOTE — CARE COORDINATION
Heart failure team reports patient is not  not medically ready to d/c to SNF today bc of worsening CXR.  CM will update patient.  Transport cancelled and SNF notified. SNF auth expires on 10/30.

## 2024-10-29 ENCOUNTER — APPOINTMENT (OUTPATIENT)
Facility: HOSPITAL | Age: 69
DRG: 640 | End: 2024-10-29
Payer: MEDICARE

## 2024-10-29 VITALS
TEMPERATURE: 98.2 F | HEART RATE: 70 BPM | SYSTOLIC BLOOD PRESSURE: 88 MMHG | HEIGHT: 69 IN | RESPIRATION RATE: 14 BRPM | WEIGHT: 126.6 LBS | OXYGEN SATURATION: 100 % | BODY MASS INDEX: 18.75 KG/M2

## 2024-10-29 LAB
ALBUMIN SERPL-MCNC: 3 G/DL (ref 3.5–5)
ALBUMIN/GLOB SERPL: 0.6 (ref 1.1–2.2)
ALP SERPL-CCNC: 193 U/L (ref 45–117)
ALT SERPL-CCNC: 36 U/L (ref 12–78)
ANION GAP SERPL CALC-SCNC: 7 MMOL/L (ref 2–12)
AST SERPL-CCNC: 52 U/L (ref 15–37)
BILIRUB SERPL-MCNC: 0.5 MG/DL (ref 0.2–1)
BUN SERPL-MCNC: 57 MG/DL (ref 6–20)
BUN/CREAT SERPL: 44 (ref 12–20)
CALCIUM SERPL-MCNC: 9 MG/DL (ref 8.5–10.1)
CHLORIDE SERPL-SCNC: 104 MMOL/L (ref 97–108)
CO2 SERPL-SCNC: 27 MMOL/L (ref 21–32)
CREAT SERPL-MCNC: 1.3 MG/DL (ref 0.7–1.3)
ERYTHROCYTE [DISTWIDTH] IN BLOOD BY AUTOMATED COUNT: 18.8 % (ref 11.5–14.5)
GLOBULIN SER CALC-MCNC: 4.9 G/DL (ref 2–4)
GLUCOSE SERPL-MCNC: 144 MG/DL (ref 65–100)
HCT VFR BLD AUTO: 28 % (ref 36.6–50.3)
HGB BLD-MCNC: 8.8 G/DL (ref 12.1–17)
INR PPP: 2.7 (ref 0.9–1.1)
LDH SERPL L TO P-CCNC: 377 U/L (ref 85–241)
MCH RBC QN AUTO: 30.9 PG (ref 26–34)
MCHC RBC AUTO-ENTMCNC: 31.4 G/DL (ref 30–36.5)
MCV RBC AUTO: 98.2 FL (ref 80–99)
NRBC # BLD: 0 K/UL (ref 0–0.01)
NRBC BLD-RTO: 0 PER 100 WBC
NT PRO BNP: 2571 PG/ML
PLATELET # BLD AUTO: 341 K/UL (ref 150–400)
PMV BLD AUTO: 10.4 FL (ref 8.9–12.9)
POTASSIUM SERPL-SCNC: 3.9 MMOL/L (ref 3.5–5.1)
PROT SERPL-MCNC: 7.9 G/DL (ref 6.4–8.2)
PROTHROMBIN TIME: 26.7 SEC (ref 9–11.1)
RBC # BLD AUTO: 2.85 M/UL (ref 4.1–5.7)
SODIUM SERPL-SCNC: 138 MMOL/L (ref 136–145)
WBC # BLD AUTO: 7.8 K/UL (ref 4.1–11.1)

## 2024-10-29 PROCEDURE — 97530 THERAPEUTIC ACTIVITIES: CPT

## 2024-10-29 PROCEDURE — 97535 SELF CARE MNGMENT TRAINING: CPT

## 2024-10-29 PROCEDURE — 97110 THERAPEUTIC EXERCISES: CPT

## 2024-10-29 PROCEDURE — 71045 X-RAY EXAM CHEST 1 VIEW: CPT

## 2024-10-29 PROCEDURE — 80053 COMPREHEN METABOLIC PANEL: CPT

## 2024-10-29 PROCEDURE — 6370000000 HC RX 637 (ALT 250 FOR IP): Performed by: FAMILY MEDICINE

## 2024-10-29 PROCEDURE — 94760 N-INVAS EAR/PLS OXIMETRY 1: CPT

## 2024-10-29 PROCEDURE — 6370000000 HC RX 637 (ALT 250 FOR IP): Performed by: NURSE PRACTITIONER

## 2024-10-29 PROCEDURE — 6370000000 HC RX 637 (ALT 250 FOR IP)

## 2024-10-29 PROCEDURE — 2580000003 HC RX 258: Performed by: FAMILY MEDICINE

## 2024-10-29 PROCEDURE — 6370000000 HC RX 637 (ALT 250 FOR IP): Performed by: INTERNAL MEDICINE

## 2024-10-29 PROCEDURE — 85027 COMPLETE CBC AUTOMATED: CPT

## 2024-10-29 PROCEDURE — APPNB30 APP NON BILLABLE TIME 0-30 MINS: Performed by: NURSE PRACTITIONER

## 2024-10-29 PROCEDURE — 6360000002 HC RX W HCPCS: Performed by: NURSE PRACTITIONER

## 2024-10-29 PROCEDURE — 83880 ASSAY OF NATRIURETIC PEPTIDE: CPT

## 2024-10-29 PROCEDURE — 83615 LACTATE (LD) (LDH) ENZYME: CPT

## 2024-10-29 PROCEDURE — 85610 PROTHROMBIN TIME: CPT

## 2024-10-29 PROCEDURE — 6370000000 HC RX 637 (ALT 250 FOR IP): Performed by: HOSPITALIST

## 2024-10-29 PROCEDURE — 36415 COLL VENOUS BLD VENIPUNCTURE: CPT

## 2024-10-29 PROCEDURE — 2700000000 HC OXYGEN THERAPY PER DAY

## 2024-10-29 PROCEDURE — 2580000003 HC RX 258: Performed by: NURSE PRACTITIONER

## 2024-10-29 RX ORDER — POLYETHYLENE GLYCOL 3350 17 G/17G
17 POWDER, FOR SOLUTION ORAL DAILY PRN
Qty: 15 EACH | Refills: 0 | Status: SHIPPED
Start: 2024-10-29 | End: 2024-11-28

## 2024-10-29 RX ORDER — OXYCODONE HYDROCHLORIDE 5 MG/1
5 TABLET ORAL EVERY 6 HOURS PRN
Qty: 12 TABLET | Refills: 0 | Status: SHIPPED | OUTPATIENT
Start: 2024-10-29 | End: 2024-11-01

## 2024-10-29 RX ORDER — OXYCODONE HYDROCHLORIDE 5 MG/1
5 TABLET ORAL EVERY 6 HOURS PRN
Qty: 12 TABLET | Refills: 0 | Status: SHIPPED | OUTPATIENT
Start: 2024-10-29 | End: 2024-10-29

## 2024-10-29 RX ORDER — WARFARIN SODIUM 1 MG/1
0.5 TABLET ORAL
Status: COMPLETED | OUTPATIENT
Start: 2024-10-29 | End: 2024-10-29

## 2024-10-29 RX ORDER — AMIODARONE HYDROCHLORIDE 100 MG/1
100 TABLET ORAL DAILY
Qty: 30 TABLET | Refills: 1 | Status: SHIPPED | OUTPATIENT
Start: 2024-10-30 | End: 2024-12-29

## 2024-10-29 RX ORDER — BUMETANIDE 2 MG/1
2 TABLET ORAL 2 TIMES DAILY
Qty: 30 TABLET | Refills: 3 | Status: SHIPPED
Start: 2024-10-29

## 2024-10-29 RX ORDER — CEPHALEXIN 500 MG/1
500 CAPSULE ORAL 4 TIMES DAILY
Qty: 120 CAPSULE | Refills: 1 | Status: SHIPPED | OUTPATIENT
Start: 2024-10-29 | End: 2024-12-28

## 2024-10-29 RX ORDER — PSEUDOEPHEDRINE HCL 30 MG
100 TABLET ORAL DAILY
Qty: 15 CAPSULE | Refills: 0 | Status: SHIPPED
Start: 2024-10-30 | End: 2024-11-14

## 2024-10-29 RX ORDER — WARFARIN SODIUM 1 MG/1
1 TABLET ORAL DAILY
Qty: 180 TABLET | Refills: 0 | Status: SHIPPED
Start: 2024-10-29

## 2024-10-29 RX ADMIN — SPIRONOLACTONE 25 MG: 25 TABLET ORAL at 09:39

## 2024-10-29 RX ADMIN — HYDRALAZINE HYDROCHLORIDE 100 MG: 50 TABLET ORAL at 13:46

## 2024-10-29 RX ADMIN — PANTOPRAZOLE SODIUM 40 MG: 40 TABLET, DELAYED RELEASE ORAL at 07:23

## 2024-10-29 RX ADMIN — WARFARIN SODIUM 0.5 MG: 1 TABLET ORAL at 17:36

## 2024-10-29 RX ADMIN — METOPROLOL SUCCINATE 25 MG: 25 TABLET, FILM COATED, EXTENDED RELEASE ORAL at 09:40

## 2024-10-29 RX ADMIN — Medication 1000 UNITS: at 09:39

## 2024-10-29 RX ADMIN — ASPIRIN 81 MG: 81 TABLET, COATED ORAL at 09:38

## 2024-10-29 RX ADMIN — Medication: at 09:41

## 2024-10-29 RX ADMIN — MAGNESIUM OXIDE TAB 400 MG (241.3 MG ELEMENTAL MG) 400 MG: 400 (241.3 MG) TAB at 09:40

## 2024-10-29 RX ADMIN — BUMETANIDE 2 MG: 1 TABLET ORAL at 17:36

## 2024-10-29 RX ADMIN — ACETAMINOPHEN 650 MG: 325 TABLET ORAL at 17:37

## 2024-10-29 RX ADMIN — CEFEPIME 2000 MG: 2 INJECTION, POWDER, FOR SOLUTION INTRAVENOUS at 09:38

## 2024-10-29 RX ADMIN — COLLAGENASE SANTYL: 250 OINTMENT TOPICAL at 10:39

## 2024-10-29 RX ADMIN — SODIUM CHLORIDE, PRESERVATIVE FREE 10 ML: 5 INJECTION INTRAVENOUS at 09:41

## 2024-10-29 RX ADMIN — HYDROMORPHONE HYDROCHLORIDE 2 MG: 2 TABLET ORAL at 10:36

## 2024-10-29 RX ADMIN — ISOSORBIDE DINITRATE 40 MG: 20 TABLET ORAL at 07:24

## 2024-10-29 RX ADMIN — ACETAMINOPHEN 650 MG: 325 TABLET ORAL at 13:44

## 2024-10-29 RX ADMIN — AMIODARONE HYDROCHLORIDE 100 MG: 200 TABLET ORAL at 09:40

## 2024-10-29 RX ADMIN — HYDROMORPHONE HYDROCHLORIDE 2 MG: 2 TABLET ORAL at 07:23

## 2024-10-29 RX ADMIN — HYDRALAZINE HYDROCHLORIDE 100 MG: 50 TABLET ORAL at 07:24

## 2024-10-29 RX ADMIN — DOCUSATE SODIUM 100 MG: 100 CAPSULE, LIQUID FILLED ORAL at 09:38

## 2024-10-29 RX ADMIN — ATORVASTATIN CALCIUM 80 MG: 40 TABLET, FILM COATED ORAL at 09:39

## 2024-10-29 RX ADMIN — AMLODIPINE BESYLATE 5 MG: 5 TABLET ORAL at 09:39

## 2024-10-29 RX ADMIN — BUMETANIDE 2 MG: 1 TABLET ORAL at 09:39

## 2024-10-29 RX ADMIN — ACETAMINOPHEN 650 MG: 325 TABLET ORAL at 07:23

## 2024-10-29 RX ADMIN — ISOSORBIDE DINITRATE 40 MG: 20 TABLET ORAL at 13:45

## 2024-10-29 ASSESSMENT — PAIN SCALES - GENERAL
PAINLEVEL_OUTOF10: 7
PAINLEVEL_OUTOF10: 8
PAINLEVEL_OUTOF10: 6
PAINLEVEL_OUTOF10: 8

## 2024-10-29 ASSESSMENT — ENCOUNTER SYMPTOMS
NAUSEA: 0
ABDOMINAL DISTENTION: 0
COUGH: 0
SHORTNESS OF BREATH: 0

## 2024-10-29 ASSESSMENT — PAIN DESCRIPTION - LOCATION
LOCATION: FOOT

## 2024-10-29 ASSESSMENT — PAIN DESCRIPTION - ORIENTATION: ORIENTATION: RIGHT;LEFT

## 2024-10-29 NOTE — DISCHARGE SUMMARY
Discharge Summary       PATIENT ID: Bishop Love  MRN: [id number]   YOB: 1955    DATE OF ADMISSION: 9/17/2024  3:33 PM    DATE OF DISCHARGE: 10/29/2024   PRIMARY CARE PROVIDER: Freddy Chandler MD     ATTENDING PHYSICIAN:   Lindsey Fields MD, [facility]   DISCHARGING PROVIDER: Ricardo Govea MD    To contact this individual call [phone] and ask the  to page.  If unavailable ask to be transferred the Adult Hospitalist Department.    CONSULTATIONS: IP CONSULT TO ADVANCED HEART FAILURE PROVIDER  IP CONSULT TO HOSPITALIST  IP CONSULT TO DIETITIAN  IP CONSULT TO ADVANCED HEART FAILURE PROVIDER  IP CONSULT TO PHARMACY  IP WOUND CARE NURSE CONSULT TO EVAL  IP CONSULT TO INFECTIOUS DISEASES  IP CONSULT HOME HEALTH  IP WOUND CARE NURSE CONSULT TO EVAL  IP CONSULT TO PODIATRY  IP WOUND CARE NURSE CONSULT TO EVAL  IP CONSULT TO PODIATRY  IP CONSULT TO INFECTIOUS DISEASES  IP CONSULT TO INFECTIOUS DISEASES  IP CONSULT TO INTENSIVIST  IP CONSULT TO DIETITIAN  IP CONSULT TO PODIATRY    PROCEDURES/SURGERIES: * No surgery found *    ADMITTING DIAGNOSES & HOSPITAL COURSE:     \"Bishop Love is a 69 y.o. male with past medical history of CAD, MI, pacemaker/ICD implantation, ischemic cardiomyopathy, chronic HFrEF, long-term anticoagulation on Coumadin, mitral vegetation, rheumatic tricuspid regurgitation, sick sinus syndrome, peripheral vascular disease, right femoral-popliteal bypass 8/24/2023, aortobifemoral bypass and bilateral femoral above-knee popliteal bypass 2014 presented to the emergency department with chief complaints of generalized body rash, left shoulder and elbow pain after fall.  Patient f reportedly had onset of a rash starting about 1 week ago.  Rash reportedly involves entire body with diffuse flaking of skin.  Portal has nonhealing left leg wound.  Reportedly fell 1 week ago and has pain of left shoulder and elbow which is severe, constant, aching, aggravated with movement.  Family

## 2024-10-29 NOTE — PROGRESS NOTES
Critical Care Documentation    Name: Bishop Love  YOB: 1955  MRN: [id number]  Admission Date: 9/17/2024  3:33 PM    Date of service: 10/9/2024    Active Diagnoses: Status post code sepsis      Chief Complaint: Patient was admitted for lower extremity wound LVAD patient      Clinical Presentation: This morning patient was not doing well he was on high flow nasal cannula and respiratory's rate got worse requiring more more oxygen to keep saturation up and patient was placed on BiPAP and a code sepsis was called.  Patient is on BiPAP and LVAD patient MAP is more than 60 and patient has no symptoms but oxygen saturation went down.  Also has lower extremity wound which are exposed to the bone and tendons unable to do any surgery vascular surgery was consulted and was awaiting recommendation      Physical Exam:     General : alert, no acute distress  Chest: Clear to auscultation bilaterally   CVS: LVAD hum  abd: soft/ non tender, non distended  Ext: b/l LE wrapped  Neuro/Psych: pleasant mood and affect, awake, appropriate  Skin: dry       Data Reviewed:   All diagnostic labs and studies have been reviewed.      Assessment and Plan:    Acute hypoxic respiratory failure possibly volume overload/pneumonia unclear etiology  -- Initially patient was struggling to keep on oxygen with high flow nasal cannula later placed on BiPAP ABG requested and reviewed repeat ABG in 4 hours and wean off BiPAP as tolerates  -- Code sepsis was called but due to elevated fluid resuscitation was not done due to possibility of volume overload with low ejection fraction  -- Patient was seen by ID continue current antibiotics  --Continue-wound care and vascular surgery re consulted  -- Blood culture sent  -- Previously patient seen by palliative care not doing well but patient wants full restorative measures so I discussed with ICU attending    Code sepsis was called 
        [facility]                                                                                          Hospitalist Progress Note  Vita Mckeon MD  Office Phone: [phone]        Date of Service:  10/3/2024  NAME:  Bishop Love  :  1955  MRN:  [id number]       Admission Summary:   Bishop Love is a 69 y.o. male with past medical history of CAD, MI, pacemaker/ICD implantation, ischemic cardiomyopathy, chronic HFrEF, long-term anticoagulation on Coumadin, mitral vegetation, rheumatic tricuspid regurgitation, sick sinus syndrome, peripheral vascular disease, right femoral-popliteal bypass 2023, aortobifemoral bypass and bilateral femoral above-knee popliteal bypass  presented to the emergency department with chief complaints of generalized body rash, left shoulder and elbow pain after fall.  Patient f reportedly had onset of a rash starting about 1 week ago.  Rash reportedly involves entire body with diffuse flaking of skin.  Portal has nonhealing left leg wound.  Reportedly fell 1 week ago and has pain of left shoulder and elbow which is severe, constant, aching, aggravated with movement.  There is no reports of loss of consciousness or head/neck trauma.  Patient is on blood thinner on Coumadin.  Today, patient was seen for follow-up by his Advanced Heart Failure cardiologist who referred patient to the ED.  On arrival in the ED, initial recorded vital signs were temperature 98.0 °F, heart rate 108, respiratory rate 18, O2 saturation 98% on room air.  Per ED MD note, patient has flaking dry skin without erythema.  Left elbow 3 view showed age-indeterminate fracture with bony spur at triceps insertion with adjacent soft tissue swelling.  Left shoulder 2 view x-rays show a high riding humeral head compatible with chronic massive cuff tear.  Abnormal labs included hemoglobin 9.7, sodium 129, K 5.2, proBNP 2582, albumin 3.4, alkaline phosphatase 150, 
        [facility]                                                                                          Hospitalist Progress Note  Naga Hope MD  Office Phone: [phone]        Date of Service:  2024  NAME:  Bishop Love  :  1955  MRN:  [id number]       Admission Summary:   Bishop Love is a 69 y.o. male with past medical history of CAD, MI, pacemaker/ICD implantation, ischemic cardiomyopathy, chronic HFrEF, long-term anticoagulation on Coumadin, mitral vegetation, rheumatic tricuspid regurgitation, sick sinus syndrome, peripheral vascular disease, right femoral-popliteal bypass 2023, aortobifemoral bypass and bilateral femoral above-knee popliteal bypass  presented to the emergency department with chief complaints of generalized body rash, left shoulder and elbow pain after fall.  Patient f reportedly had onset of a rash starting about 1 week ago.  Rash reportedly involves entire body with diffuse flaking of skin.  Portal has nonhealing left leg wound.  Reportedly fell 1 week ago and has pain of left shoulder and elbow which is severe, constant, aching, aggravated with movement.  There is no reports of loss of consciousness or head/neck trauma.  Patient is on blood thinner on Coumadin.  Today, patient was seen for follow-up by his Advanced Heart Failure cardiologist who referred patient to the ED.  On arrival in the ED, initial recorded vital signs were temperature 98.0 °F, heart rate 108, respiratory rate 18, O2 saturation 98% on room air.  Per ED MD note, patient has flaking dry skin without erythema.  Left elbow 3 view showed age-indeterminate fracture with bony spur at triceps insertion with adjacent soft tissue swelling.  Left shoulder 2 view x-rays show a high riding humeral head compatible with chronic massive cuff tear.  Abnormal labs included hemoglobin 9.7, sodium 129, K 5.2, proBNP 2582, albumin 3.4, alkaline phosphatase 150, AST 
        [facility]                                                                                          Hospitalist Progress Note  Mauricio Carlos MD  Office Phone: [phone]        Date of Service:  2024  NAME:  Bishop Love  :  1955  MRN:  [id number]       Admission Summary:   Bishop Love is a 69 y.o. male with past medical history of CAD, MI, pacemaker/ICD implantation, ischemic cardiomyopathy, chronic HFrEF, long-term anticoagulation on Coumadin, mitral vegetation, rheumatic tricuspid regurgitation, sick sinus syndrome, peripheral vascular disease, right femoral-popliteal bypass 2023, aortobifemoral bypass and bilateral femoral above-knee popliteal bypass  presented to the emergency department with chief complaints of generalized body rash, left shoulder and elbow pain after fall.  Patient f reportedly had onset of a rash starting about 1 week ago.  Rash reportedly involves entire body with diffuse flaking of skin.  Portal has nonhealing left leg wound.  Reportedly fell 1 week ago and has pain of left shoulder and elbow which is severe, constant, aching, aggravated with movement.  There is no reports of loss of consciousness or head/neck trauma.  Patient is on blood thinner on Coumadin.  Today, patient was seen for follow-up by his Advanced Heart Failure cardiologist who referred patient to the ED.  On arrival in the ED, initial recorded vital signs were temperature 98.0 °F, heart rate 108, respiratory rate 18, O2 saturation 98% on room air.  Per ED MD note, patient has flaking dry skin without erythema.  Left elbow 3 view showed age-indeterminate fracture with bony spur at triceps insertion with adjacent soft tissue swelling.  Left shoulder 2 view x-rays show a high riding humeral head compatible with chronic massive cuff tear.  Abnormal labs included hemoglobin 9.7, sodium 129, K 5.2, proBNP 2582, albumin 3.4, alkaline phosphatase 150, 
        [facility]                                                                                          Hospitalist Progress Note  Mauricio Carlos MD  Office Phone: [phone]        Date of Service:  2024  NAME:  Bishop Lvoe  :  1955  MRN:  [id number]       Admission Summary:   Bishop Love is a 69 y.o. male with past medical history of CAD, MI, pacemaker/ICD implantation, ischemic cardiomyopathy, chronic HFrEF, long-term anticoagulation on Coumadin, mitral vegetation, rheumatic tricuspid regurgitation, sick sinus syndrome, peripheral vascular disease, right femoral-popliteal bypass 2023, aortobifemoral bypass and bilateral femoral above-knee popliteal bypass  presented to the emergency department with chief complaints of generalized body rash, left shoulder and elbow pain after fall.  Patient f reportedly had onset of a rash starting about 1 week ago.  Rash reportedly involves entire body with diffuse flaking of skin.  Portal has nonhealing left leg wound.  Reportedly fell 1 week ago and has pain of left shoulder and elbow which is severe, constant, aching, aggravated with movement.  There is no reports of loss of consciousness or head/neck trauma.  Patient is on blood thinner on Coumadin.  Today, patient was seen for follow-up by his Advanced Heart Failure cardiologist who referred patient to the ED.  On arrival in the ED, initial recorded vital signs were temperature 98.0 °F, heart rate 108, respiratory rate 18, O2 saturation 98% on room air.  Per ED MD note, patient has flaking dry skin without erythema.  Left elbow 3 view showed age-indeterminate fracture with bony spur at triceps insertion with adjacent soft tissue swelling.  Left shoulder 2 view x-rays show a high riding humeral head compatible with chronic massive cuff tear.  Abnormal labs included hemoglobin 9.7, sodium 129, K 5.2, proBNP 2582, albumin 3.4, alkaline phosphatase 150, 
        [facility] [place] [facility]                                                                                          Hospitalist Progress Note  Mauricio Carlos MD  Office Phone: [phone]        Date of Service:  2024  NAME:  Bishop Love  :  1955  MRN:  [id number]       Admission Summary:   Bishop Love is a 69 y.o. male with past medical history of CAD, MI, pacemaker/ICD implantation, ischemic cardiomyopathy, chronic HFrEF, long-term anticoagulation on Coumadin, mitral vegetation, rheumatic tricuspid regurgitation, sick sinus syndrome, peripheral vascular disease, right femoral-popliteal bypass 2023, aortobifemoral bypass and bilateral femoral above-knee popliteal bypass  presented to the emergency department with chief complaints of generalized body rash, left shoulder and elbow pain after fall.  Patient f reportedly had onset of a rash starting about 1 week ago.  Rash reportedly involves entire body with diffuse flaking of skin.  Portal has nonhealing left leg wound.  Reportedly fell 1 week ago and has pain of left shoulder and elbow which is severe, constant, aching, aggravated with movement.  There is no reports of loss of consciousness or head/neck trauma.  Patient is on blood thinner on Coumadin.  Today, patient was seen for follow-up by his Advanced Heart Failure cardiologist who referred patient to the ED.  On arrival in the ED, initial recorded vital signs were temperature 98.0 °F, heart rate 108, respiratory rate 18, O2 saturation 98% on room air.  Per ED MD note, patient has flaking dry skin without erythema.  Left elbow 3 view showed age-indeterminate fracture with bony spur at triceps insertion with adjacent soft tissue swelling.  Left shoulder 2 view x-rays show a high riding humeral head compatible with chronic massive cuff tear.  Abnormal labs included hemoglobin 9.7, sodium 129, K 5.2, proBNP 2582, albumin 3.4, alkaline phosphatase 150, 
        [facility]                                                                                          Hospitalist Progress Note  Rodri Abarca MD  Office Phone: [phone]        Date of Service:  2024  NAME:  Bishop Love  :  1955  MRN:  [id number]       Admission Summary:   Bishop Love is a 69 y.o. male with past medical history of CAD, MI, pacemaker/ICD implantation, ischemic cardiomyopathy, chronic HFrEF, long-term anticoagulation on Coumadin, mitral vegetation, rheumatic tricuspid regurgitation, sick sinus syndrome, peripheral vascular disease, right femoral-popliteal bypass 2023, aortobifemoral bypass and bilateral femoral above-knee popliteal bypass  presented to the emergency department with chief complaints of generalized body rash, left shoulder and elbow pain after fall.  Patient f reportedly had onset of a rash starting about 1 week ago.  Rash reportedly involves entire body with diffuse flaking of skin.  Portal has nonhealing left leg wound.  Reportedly fell 1 week ago and has pain of left shoulder and elbow which is severe, constant, aching, aggravated with movement.  There is no reports of loss of consciousness or head/neck trauma.  Patient is on blood thinner on Coumadin.  Today, patient was seen for follow-up by his Advanced Heart Failure cardiologist who referred patient to the ED.  On arrival in the ED, initial recorded vital signs were temperature 98.0 °F, heart rate 108, respiratory rate 18, O2 saturation 98% on room air.  Per ED MD note, patient has flaking dry skin without erythema.  Left elbow 3 view showed age-indeterminate fracture with bony spur at triceps insertion with adjacent soft tissue swelling.  Left shoulder 2 view x-rays show a high riding humeral head compatible with chronic massive cuff tear.  Abnormal labs included hemoglobin 9.7, sodium 129, K 5.2, proBNP 2582, albumin 3.4, alkaline phosphatase 150, AST 
        [facility]                                                                                          Hospitalist Progress Note  Naga Hope MD  Office Phone: [phone]        Date of Service:  2024  NAME:  Bishop Love  :  1955  MRN:  [id number]       Admission Summary:   Bishop Love is a 69 y.o. male with past medical history of CAD, MI, pacemaker/ICD implantation, ischemic cardiomyopathy, chronic HFrEF, long-term anticoagulation on Coumadin, mitral vegetation, rheumatic tricuspid regurgitation, sick sinus syndrome, peripheral vascular disease, right femoral-popliteal bypass 2023, aortobifemoral bypass and bilateral femoral above-knee popliteal bypass  presented to the emergency department with chief complaints of generalized body rash, left shoulder and elbow pain after fall.  Patient f reportedly had onset of a rash starting about 1 week ago.  Rash reportedly involves entire body with diffuse flaking of skin.  Portal has nonhealing left leg wound.  Reportedly fell 1 week ago and has pain of left shoulder and elbow which is severe, constant, aching, aggravated with movement.  There is no reports of loss of consciousness or head/neck trauma.  Patient is on blood thinner on Coumadin.  Today, patient was seen for follow-up by his Advanced Heart Failure cardiologist who referred patient to the ED.  On arrival in the ED, initial recorded vital signs were temperature 98.0 °F, heart rate 108, respiratory rate 18, O2 saturation 98% on room air.  Per ED MD note, patient has flaking dry skin without erythema.  Left elbow 3 view showed age-indeterminate fracture with bony spur at triceps insertion with adjacent soft tissue swelling.  Left shoulder 2 view x-rays show a high riding humeral head compatible with chronic massive cuff tear.  Abnormal labs included hemoglobin 9.7, sodium 129, K 5.2, proBNP 2582, albumin 3.4, alkaline phosphatase 150, AST 
        [facility]                                                                                          Hospitalist Progress Note  Naga Hope MD  Office Phone: [phone]        Date of Service:  2024  NAME:  Bishop Love  :  1955  MRN:  [id number]       Admission Summary:   Bishop Love is a 69 y.o. male with past medical history of CAD, MI, pacemaker/ICD implantation, ischemic cardiomyopathy, chronic HFrEF, long-term anticoagulation on Coumadin, mitral vegetation, rheumatic tricuspid regurgitation, sick sinus syndrome, peripheral vascular disease, right femoral-popliteal bypass 2023, aortobifemoral bypass and bilateral femoral above-knee popliteal bypass  presented to the emergency department with chief complaints of generalized body rash, left shoulder and elbow pain after fall.  Patient f reportedly had onset of a rash starting about 1 week ago.  Rash reportedly involves entire body with diffuse flaking of skin.  Portal has nonhealing left leg wound.  Reportedly fell 1 week ago and has pain of left shoulder and elbow which is severe, constant, aching, aggravated with movement.  There is no reports of loss of consciousness or head/neck trauma.  Patient is on blood thinner on Coumadin.  Today, patient was seen for follow-up by his Advanced Heart Failure cardiologist who referred patient to the ED.  On arrival in the ED, initial recorded vital signs were temperature 98.0 °F, heart rate 108, respiratory rate 18, O2 saturation 98% on room air.  Per ED MD note, patient has flaking dry skin without erythema.  Left elbow 3 view showed age-indeterminate fracture with bony spur at triceps insertion with adjacent soft tissue swelling.  Left shoulder 2 view x-rays show a high riding humeral head compatible with chronic massive cuff tear.  Abnormal labs included hemoglobin 9.7, sodium 129, K 5.2, proBNP 2582, albumin 3.4, alkaline phosphatase 150, AST 
        [facility]                                                                                          Hospitalist Progress Note  Kimberly Bah MD  Office Phone: [phone]        Date of Service:  2024  NAME:  Bishop Love  :  1955  MRN:  [id number]       Admission Summary:   Bishop Love is a 69 y.o. male with past medical history of CAD, MI, pacemaker/ICD implantation, ischemic cardiomyopathy, chronic HFrEF, long-term anticoagulation on Coumadin, mitral vegetation, rheumatic tricuspid regurgitation, sick sinus syndrome, peripheral vascular disease, right femoral-popliteal bypass 2023, aortobifemoral bypass and bilateral femoral above-knee popliteal bypass  presented to the emergency department with chief complaints of generalized body rash, left shoulder and elbow pain after fall.  Patient f reportedly had onset of a rash starting about 1 week ago.  Rash reportedly involves entire body with diffuse flaking of skin.  Portal has nonhealing left leg wound.  Reportedly fell 1 week ago and has pain of left shoulder and elbow which is severe, constant, aching, aggravated with movement.  There is no reports of loss of consciousness or head/neck trauma.  Patient is on blood thinner on Coumadin.  Today, patient was seen for follow-up by his Advanced Heart Failure cardiologist who referred patient to the ED.  On arrival in the ED, initial recorded vital signs were temperature 98.0 °F, heart rate 108, respiratory rate 18, O2 saturation 98% on room air.  Per ED MD note, patient has flaking dry skin without erythema.  Left elbow 3 view showed age-indeterminate fracture with bony spur at triceps insertion with adjacent soft tissue swelling.  Left shoulder 2 view x-rays show a high riding humeral head compatible with chronic massive cuff tear.  Abnormal labs included hemoglobin 9.7, sodium 129, K 5.2, proBNP 2582, albumin 3.4, alkaline phosphatase 150, AST 60, 
        [facility]                                                                                          Hospitalist Progress Note  Kimberly Bah MD  Office Phone: [phone]        Date of Service:  2024  NAME:  Bishop Love  :  1955  MRN:  [id number]       Admission Summary:   Bishop Love is a 69 y.o. male with past medical history of CAD, MI, pacemaker/ICD implantation, ischemic cardiomyopathy, chronic HFrEF, long-term anticoagulation on Coumadin, mitral vegetation, rheumatic tricuspid regurgitation, sick sinus syndrome, peripheral vascular disease, right femoral-popliteal bypass 2023, aortobifemoral bypass and bilateral femoral above-knee popliteal bypass  presented to the emergency department with chief complaints of generalized body rash, left shoulder and elbow pain after fall.  Patient f reportedly had onset of a rash starting about 1 week ago.  Rash reportedly involves entire body with diffuse flaking of skin.  Portal has nonhealing left leg wound.  Reportedly fell 1 week ago and has pain of left shoulder and elbow which is severe, constant, aching, aggravated with movement.  There is no reports of loss of consciousness or head/neck trauma.  Patient is on blood thinner on Coumadin.  Today, patient was seen for follow-up by his Advanced Heart Failure cardiologist who referred patient to the ED.  On arrival in the ED, initial recorded vital signs were temperature 98.0 °F, heart rate 108, respiratory rate 18, O2 saturation 98% on room air.  Per ED MD note, patient has flaking dry skin without erythema.  Left elbow 3 view showed age-indeterminate fracture with bony spur at triceps insertion with adjacent soft tissue swelling.  Left shoulder 2 view x-rays show a high riding humeral head compatible with chronic massive cuff tear.  Abnormal labs included hemoglobin 9.7, sodium 129, K 5.2, proBNP 2582, albumin 3.4, alkaline phosphatase 150, AST 60, 
    [facility] [place] [facility]                                                                                          Hospitalist Progress Note  Justice Amezcua MD  Office Phone: [phone]        Date of Service:  10/18/2024  NAME:  Bishop Love  :  1955  MRN:  [id number]       Admission Summary:   Bishop Love is a 69 y.o. male with past medical history of CAD, MI, pacemaker/ICD implantation, ischemic cardiomyopathy, chronic HFrEF, long-term anticoagulation on Coumadin, mitral vegetation, rheumatic tricuspid regurgitation, sick sinus syndrome, peripheral vascular disease, right femoral-popliteal bypass 2023, aortobifemoral bypass and bilateral femoral above-knee popliteal bypass  presented to the emergency department with chief complaints of generalized body rash, left shoulder and elbow pain after fall.  Patient f reportedly had onset of a rash starting about 1 week ago.  Rash reportedly involves entire body with diffuse flaking of skin.  Portal has nonhealing left leg wound.  Reportedly fell 1 week ago and has pain of left shoulder and elbow which is severe, constant, aching, aggravated with movement.  There is no reports of loss of consciousness or head/neck trauma.  Patient is on blood thinner on Coumadin.  Today, patient was seen for follow-up by his Advanced Heart Failure cardiologist who referred patient to the ED.  On arrival in the ED, initial recorded vital signs were temperature 98.0 °F, heart rate 108, respiratory rate 18, O2 saturation 98% on room air.  Per ED MD note, patient has flaking dry skin without erythema.  Left elbow 3 view showed age-indeterminate fracture with bony spur at triceps insertion with adjacent soft tissue swelling.  Left shoulder 2 view x-rays show a high riding humeral head compatible with chronic massive cuff tear.  Abnormal labs included hemoglobin 9.7, sodium 129, K 5.2, proBNP 2582, albumin 3.4, alkaline phosphatase 150, AST 
    [facility]                                                                                          Hospitalist Progress Note  Marianne Pereira MD  Office Phone: [phone]        Date of Service:  10/24/2024  NAME:  Bishop Love  :  1955  MRN:  [id number]       Admission Summary:   Bishop Love is a 69 y.o. male with past medical history of CAD, MI, pacemaker/ICD implantation, ischemic cardiomyopathy, chronic HFrEF, long-term anticoagulation on Coumadin, mitral vegetation, rheumatic tricuspid regurgitation, sick sinus syndrome, peripheral vascular disease, right femoral-popliteal bypass 2023, aortobifemoral bypass and bilateral femoral above-knee popliteal bypass  presented to the emergency department with chief complaints of generalized body rash, left shoulder and elbow pain after fall.  Patient f reportedly had onset of a rash starting about 1 week ago.  Rash reportedly involves entire body with diffuse flaking of skin.  Portal has nonhealing left leg wound.  Reportedly fell 1 week ago and has pain of left shoulder and elbow which is severe, constant, aching, aggravated with movement.  There is no reports of loss of consciousness or head/neck trauma.  Patient is on blood thinner on Coumadin.  Today, patient was seen for follow-up by his Advanced Heart Failure cardiologist who referred patient to the ED.  On arrival in the ED, initial recorded vital signs were temperature 98.0 °F, heart rate 108, respiratory rate 18, O2 saturation 98% on room air.  Per ED MD note, patient has flaking dry skin without erythema.  Left elbow 3 view showed age-indeterminate fracture with bony spur at triceps insertion with adjacent soft tissue swelling.  Left shoulder 2 view x-rays show a high riding humeral head compatible with chronic massive cuff tear.  Abnormal labs included hemoglobin 9.7, sodium 129, K 5.2, proBNP 2582, albumin 3.4, alkaline phosphatase 150, AST 
    [facility]                                                                                          Hospitalist Progress Note  Naga Hope MD  Office Phone: [phone]        Date of Service:  10/10/2024  NAME:  Bishop Love  :  1955  MRN:  [id number]       Admission Summary:   Bishop Love is a 69 y.o. male with past medical history of CAD, MI, pacemaker/ICD implantation, ischemic cardiomyopathy, chronic HFrEF, long-term anticoagulation on Coumadin, mitral vegetation, rheumatic tricuspid regurgitation, sick sinus syndrome, peripheral vascular disease, right femoral-popliteal bypass 2023, aortobifemoral bypass and bilateral femoral above-knee popliteal bypass  presented to the emergency department with chief complaints of generalized body rash, left shoulder and elbow pain after fall.  Patient f reportedly had onset of a rash starting about 1 week ago.  Rash reportedly involves entire body with diffuse flaking of skin.  Portal has nonhealing left leg wound.  Reportedly fell 1 week ago and has pain of left shoulder and elbow which is severe, constant, aching, aggravated with movement.  There is no reports of loss of consciousness or head/neck trauma.  Patient is on blood thinner on Coumadin.  Today, patient was seen for follow-up by his Advanced Heart Failure cardiologist who referred patient to the ED.  On arrival in the ED, initial recorded vital signs were temperature 98.0 °F, heart rate 108, respiratory rate 18, O2 saturation 98% on room air.  Per ED MD note, patient has flaking dry skin without erythema.  Left elbow 3 view showed age-indeterminate fracture with bony spur at triceps insertion with adjacent soft tissue swelling.  Left shoulder 2 view x-rays show a high riding humeral head compatible with chronic massive cuff tear.  Abnormal labs included hemoglobin 9.7, sodium 129, K 5.2, proBNP 2582, albumin 3.4, alkaline phosphatase 150, AST 60, 
    [facility]                                                                                          Hospitalist Progress Note  Marianne Pereira MD  Office Phone: [phone]        Date of Service:  10/27/2024  NAME:  Bishop Love  :  1955  MRN:  [id number]       Admission Summary:   Bishop Love is a 69 y.o. male with past medical history of CAD, MI, pacemaker/ICD implantation, ischemic cardiomyopathy, chronic HFrEF, long-term anticoagulation on Coumadin, mitral vegetation, rheumatic tricuspid regurgitation, sick sinus syndrome, peripheral vascular disease, right femoral-popliteal bypass 2023, aortobifemoral bypass and bilateral femoral above-knee popliteal bypass  presented to the emergency department with chief complaints of generalized body rash, left shoulder and elbow pain after fall.  Patient f reportedly had onset of a rash starting about 1 week ago.  Rash reportedly involves entire body with diffuse flaking of skin.  Portal has nonhealing left leg wound.  Reportedly fell 1 week ago and has pain of left shoulder and elbow which is severe, constant, aching, aggravated with movement.  There is no reports of loss of consciousness or head/neck trauma.  Patient is on blood thinner on Coumadin.  Today, patient was seen for follow-up by his Advanced Heart Failure cardiologist who referred patient to the ED.  On arrival in the ED, initial recorded vital signs were temperature 98.0 °F, heart rate 108, respiratory rate 18, O2 saturation 98% on room air.  Per ED MD note, patient has flaking dry skin without erythema.  Left elbow 3 view showed age-indeterminate fracture with bony spur at triceps insertion with adjacent soft tissue swelling.  Left shoulder 2 view x-rays show a high riding humeral head compatible with chronic massive cuff tear.  Abnormal labs included hemoglobin 9.7, sodium 129, K 5.2, proBNP 2582, albumin 3.4, alkaline phosphatase 150, AST 
    [facility]                                                                                          Hospitalist Progress Note  Justice Amezcua MD  Office Phone: [phone]        Date of Service:  10/20/2024  NAME:  Bishop Love  :  1955  MRN:  [id number]       Admission Summary:   Bishop Love is a 69 y.o. male with past medical history of CAD, MI, pacemaker/ICD implantation, ischemic cardiomyopathy, chronic HFrEF, long-term anticoagulation on Coumadin, mitral vegetation, rheumatic tricuspid regurgitation, sick sinus syndrome, peripheral vascular disease, right femoral-popliteal bypass 2023, aortobifemoral bypass and bilateral femoral above-knee popliteal bypass  presented to the emergency department with chief complaints of generalized body rash, left shoulder and elbow pain after fall.  Patient f reportedly had onset of a rash starting about 1 week ago.  Rash reportedly involves entire body with diffuse flaking of skin.  Portal has nonhealing left leg wound.  Reportedly fell 1 week ago and has pain of left shoulder and elbow which is severe, constant, aching, aggravated with movement.  There is no reports of loss of consciousness or head/neck trauma.  Patient is on blood thinner on Coumadin.  Today, patient was seen for follow-up by his Advanced Heart Failure cardiologist who referred patient to the ED.  On arrival in the ED, initial recorded vital signs were temperature 98.0 °F, heart rate 108, respiratory rate 18, O2 saturation 98% on room air.  Per ED MD note, patient has flaking dry skin without erythema.  Left elbow 3 view showed age-indeterminate fracture with bony spur at triceps insertion with adjacent soft tissue swelling.  Left shoulder 2 view x-rays show a high riding humeral head compatible with chronic massive cuff tear.  Abnormal labs included hemoglobin 9.7, sodium 129, K 5.2, proBNP 2582, albumin 3.4, alkaline phosphatase 150, AST 
    [facility]                                                                                          Hospitalist Progress Note  Kimberly Bah MD  Office Phone: [phone]        Date of Service:  10/13/2024  NAME:  Bishop Love  :  1955  MRN:  [id number]       Admission Summary:   Bishop Love is a 69 y.o. male with past medical history of CAD, MI, pacemaker/ICD implantation, ischemic cardiomyopathy, chronic HFrEF, long-term anticoagulation on Coumadin, mitral vegetation, rheumatic tricuspid regurgitation, sick sinus syndrome, peripheral vascular disease, right femoral-popliteal bypass 2023, aortobifemoral bypass and bilateral femoral above-knee popliteal bypass  presented to the emergency department with chief complaints of generalized body rash, left shoulder and elbow pain after fall.  Patient f reportedly had onset of a rash starting about 1 week ago.  Rash reportedly involves entire body with diffuse flaking of skin.  Portal has nonhealing left leg wound.  Reportedly fell 1 week ago and has pain of left shoulder and elbow which is severe, constant, aching, aggravated with movement.  There is no reports of loss of consciousness or head/neck trauma.  Patient is on blood thinner on Coumadin.  Today, patient was seen for follow-up by his Advanced Heart Failure cardiologist who referred patient to the ED.  On arrival in the ED, initial recorded vital signs were temperature 98.0 °F, heart rate 108, respiratory rate 18, O2 saturation 98% on room air.  Per ED MD note, patient has flaking dry skin without erythema.  Left elbow 3 view showed age-indeterminate fracture with bony spur at triceps insertion with adjacent soft tissue swelling.  Left shoulder 2 view x-rays show a high riding humeral head compatible with chronic massive cuff tear.  Abnormal labs included hemoglobin 9.7, sodium 129, K 5.2, proBNP 2582, albumin 3.4, alkaline phosphatase 150, AST 60, INR 
    [facility]                                                                                          Hospitalist Progress Note  Justice Amezcua MD  Office Phone: [phone]        Date of Service:  10/21/2024  NAME:  Bishop Love  :  1955  MRN:  [id number]       Admission Summary:   Bishop Love is a 69 y.o. male with past medical history of CAD, MI, pacemaker/ICD implantation, ischemic cardiomyopathy, chronic HFrEF, long-term anticoagulation on Coumadin, mitral vegetation, rheumatic tricuspid regurgitation, sick sinus syndrome, peripheral vascular disease, right femoral-popliteal bypass 2023, aortobifemoral bypass and bilateral femoral above-knee popliteal bypass  presented to the emergency department with chief complaints of generalized body rash, left shoulder and elbow pain after fall.  Patient f reportedly had onset of a rash starting about 1 week ago.  Rash reportedly involves entire body with diffuse flaking of skin.  Portal has nonhealing left leg wound.  Reportedly fell 1 week ago and has pain of left shoulder and elbow which is severe, constant, aching, aggravated with movement.  There is no reports of loss of consciousness or head/neck trauma.  Patient is on blood thinner on Coumadin.  Today, patient was seen for follow-up by his Advanced Heart Failure cardiologist who referred patient to the ED.  On arrival in the ED, initial recorded vital signs were temperature 98.0 °F, heart rate 108, respiratory rate 18, O2 saturation 98% on room air.  Per ED MD note, patient has flaking dry skin without erythema.  Left elbow 3 view showed age-indeterminate fracture with bony spur at triceps insertion with adjacent soft tissue swelling.  Left shoulder 2 view x-rays show a high riding humeral head compatible with chronic massive cuff tear.  Abnormal labs included hemoglobin 9.7, sodium 129, K 5.2, proBNP 2582, albumin 3.4, alkaline phosphatase 150, AST 
    [facility]                                                                                          Hospitalist Progress Note  Ramila Fields MD  Office Phone: [phone]        Date of Service:  10/6/2024  NAME:  Bishop Love  :  1955  MRN:  [id number]       Admission Summary:   Bishop Love is a 69 y.o. male with past medical history of CAD, MI, pacemaker/ICD implantation, ischemic cardiomyopathy, chronic HFrEF, long-term anticoagulation on Coumadin, mitral vegetation, rheumatic tricuspid regurgitation, sick sinus syndrome, peripheral vascular disease, right femoral-popliteal bypass 2023, aortobifemoral bypass and bilateral femoral above-knee popliteal bypass  presented to the emergency department with chief complaints of generalized body rash, left shoulder and elbow pain after fall.  Patient f reportedly had onset of a rash starting about 1 week ago.  Rash reportedly involves entire body with diffuse flaking of skin.  Portal has nonhealing left leg wound.  Reportedly fell 1 week ago and has pain of left shoulder and elbow which is severe, constant, aching, aggravated with movement.  There is no reports of loss of consciousness or head/neck trauma.  Patient is on blood thinner on Coumadin.  Today, patient was seen for follow-up by his Advanced Heart Failure cardiologist who referred patient to the ED.  On arrival in the ED, initial recorded vital signs were temperature 98.0 °F, heart rate 108, respiratory rate 18, O2 saturation 98% on room air.  Per ED MD note, patient has flaking dry skin without erythema.  Left elbow 3 view showed age-indeterminate fracture with bony spur at triceps insertion with adjacent soft tissue swelling.  Left shoulder 2 view x-rays show a high riding humeral head compatible with chronic massive cuff tear.  Abnormal labs included hemoglobin 9.7, sodium 129, K 5.2, proBNP 2582, albumin 3.4, alkaline phosphatase 150, AST 60, 
    [facility]                                                                                          Hospitalist Progress Note  Naga Hope MD  Office Phone: [phone]        Date of Service:  10/15/2024  NAME:  Bishop Love  :  1955  MRN:  [id number]       Admission Summary:   Bishop Love is a 69 y.o. male with past medical history of CAD, MI, pacemaker/ICD implantation, ischemic cardiomyopathy, chronic HFrEF, long-term anticoagulation on Coumadin, mitral vegetation, rheumatic tricuspid regurgitation, sick sinus syndrome, peripheral vascular disease, right femoral-popliteal bypass 2023, aortobifemoral bypass and bilateral femoral above-knee popliteal bypass  presented to the emergency department with chief complaints of generalized body rash, left shoulder and elbow pain after fall.  Patient f reportedly had onset of a rash starting about 1 week ago.  Rash reportedly involves entire body with diffuse flaking of skin.  Portal has nonhealing left leg wound.  Reportedly fell 1 week ago and has pain of left shoulder and elbow which is severe, constant, aching, aggravated with movement.  There is no reports of loss of consciousness or head/neck trauma.  Patient is on blood thinner on Coumadin.  Today, patient was seen for follow-up by his Advanced Heart Failure cardiologist who referred patient to the ED.  On arrival in the ED, initial recorded vital signs were temperature 98.0 °F, heart rate 108, respiratory rate 18, O2 saturation 98% on room air.  Per ED MD note, patient has flaking dry skin without erythema.  Left elbow 3 view showed age-indeterminate fracture with bony spur at triceps insertion with adjacent soft tissue swelling.  Left shoulder 2 view x-rays show a high riding humeral head compatible with chronic massive cuff tear.  Abnormal labs included hemoglobin 9.7, sodium 129, K 5.2, proBNP 2582, albumin 3.4, alkaline phosphatase 150, AST 60, 
    [facility]                                                                                          Hospitalist Progress Note  Ramila Fields MD  Office Phone: [phone]        Date of Service:  10/5/2024  NAME:  Bishop Love  :  1955  MRN:  [id number]       Admission Summary:   Bishop Love is a 69 y.o. male with past medical history of CAD, MI, pacemaker/ICD implantation, ischemic cardiomyopathy, chronic HFrEF, long-term anticoagulation on Coumadin, mitral vegetation, rheumatic tricuspid regurgitation, sick sinus syndrome, peripheral vascular disease, right femoral-popliteal bypass 2023, aortobifemoral bypass and bilateral femoral above-knee popliteal bypass  presented to the emergency department with chief complaints of generalized body rash, left shoulder and elbow pain after fall.  Patient f reportedly had onset of a rash starting about 1 week ago.  Rash reportedly involves entire body with diffuse flaking of skin.  Portal has nonhealing left leg wound.  Reportedly fell 1 week ago and has pain of left shoulder and elbow which is severe, constant, aching, aggravated with movement.  There is no reports of loss of consciousness or head/neck trauma.  Patient is on blood thinner on Coumadin.  Today, patient was seen for follow-up by his Advanced Heart Failure cardiologist who referred patient to the ED.  On arrival in the ED, initial recorded vital signs were temperature 98.0 °F, heart rate 108, respiratory rate 18, O2 saturation 98% on room air.  Per ED MD note, patient has flaking dry skin without erythema.  Left elbow 3 view showed age-indeterminate fracture with bony spur at triceps insertion with adjacent soft tissue swelling.  Left shoulder 2 view x-rays show a high riding humeral head compatible with chronic massive cuff tear.  Abnormal labs included hemoglobin 9.7, sodium 129, K 5.2, proBNP 2582, albumin 3.4, alkaline phosphatase 150, AST 60, 
    [facility]                                                                                          Hospitalist Progress Note  Marianne Pereira MD  Office Phone: [phone]        Date of Service:  10/26/2024  NAME:  Bishop Love  :  1955  MRN:  [id number]       Admission Summary:   Bishop Love is a 69 y.o. male with past medical history of CAD, MI, pacemaker/ICD implantation, ischemic cardiomyopathy, chronic HFrEF, long-term anticoagulation on Coumadin, mitral vegetation, rheumatic tricuspid regurgitation, sick sinus syndrome, peripheral vascular disease, right femoral-popliteal bypass 2023, aortobifemoral bypass and bilateral femoral above-knee popliteal bypass  presented to the emergency department with chief complaints of generalized body rash, left shoulder and elbow pain after fall.  Patient f reportedly had onset of a rash starting about 1 week ago.  Rash reportedly involves entire body with diffuse flaking of skin.  Portal has nonhealing left leg wound.  Reportedly fell 1 week ago and has pain of left shoulder and elbow which is severe, constant, aching, aggravated with movement.  There is no reports of loss of consciousness or head/neck trauma.  Patient is on blood thinner on Coumadin.  Today, patient was seen for follow-up by his Advanced Heart Failure cardiologist who referred patient to the ED.  On arrival in the ED, initial recorded vital signs were temperature 98.0 °F, heart rate 108, respiratory rate 18, O2 saturation 98% on room air.  Per ED MD note, patient has flaking dry skin without erythema.  Left elbow 3 view showed age-indeterminate fracture with bony spur at triceps insertion with adjacent soft tissue swelling.  Left shoulder 2 view x-rays show a high riding humeral head compatible with chronic massive cuff tear.  Abnormal labs included hemoglobin 9.7, sodium 129, K 5.2, proBNP 2582, albumin 3.4, alkaline phosphatase 150, AST 
    [facility]                                                                                          Hospitalist Progress Note  Justice Amezcua MD  Office Phone: [phone]        Date of Service:  10/22/2024  NAME:  Bishop Love  :  1955  MRN:  [id number]       Admission Summary:   Bishop Love is a 69 y.o. male with past medical history of CAD, MI, pacemaker/ICD implantation, ischemic cardiomyopathy, chronic HFrEF, long-term anticoagulation on Coumadin, mitral vegetation, rheumatic tricuspid regurgitation, sick sinus syndrome, peripheral vascular disease, right femoral-popliteal bypass 2023, aortobifemoral bypass and bilateral femoral above-knee popliteal bypass  presented to the emergency department with chief complaints of generalized body rash, left shoulder and elbow pain after fall.  Patient f reportedly had onset of a rash starting about 1 week ago.  Rash reportedly involves entire body with diffuse flaking of skin.  Portal has nonhealing left leg wound.  Reportedly fell 1 week ago and has pain of left shoulder and elbow which is severe, constant, aching, aggravated with movement.  There is no reports of loss of consciousness or head/neck trauma.  Patient is on blood thinner on Coumadin.  Today, patient was seen for follow-up by his Advanced Heart Failure cardiologist who referred patient to the ED.  On arrival in the ED, initial recorded vital signs were temperature 98.0 °F, heart rate 108, respiratory rate 18, O2 saturation 98% on room air.  Per ED MD note, patient has flaking dry skin without erythema.  Left elbow 3 view showed age-indeterminate fracture with bony spur at triceps insertion with adjacent soft tissue swelling.  Left shoulder 2 view x-rays show a high riding humeral head compatible with chronic massive cuff tear.  Abnormal labs included hemoglobin 9.7, sodium 129, K 5.2, proBNP 2582, albumin 3.4, alkaline phosphatase 150, AST 
    [facility]                                                                                          Hospitalist Progress Note  Naga Hope MD  Office Phone: [phone]        Date of Service:  10/11/2024  NAME:  Bishop Love  :  1955  MRN:  [id number]       Admission Summary:   Bishop Love is a 69 y.o. male with past medical history of CAD, MI, pacemaker/ICD implantation, ischemic cardiomyopathy, chronic HFrEF, long-term anticoagulation on Coumadin, mitral vegetation, rheumatic tricuspid regurgitation, sick sinus syndrome, peripheral vascular disease, right femoral-popliteal bypass 2023, aortobifemoral bypass and bilateral femoral above-knee popliteal bypass  presented to the emergency department with chief complaints of generalized body rash, left shoulder and elbow pain after fall.  Patient f reportedly had onset of a rash starting about 1 week ago.  Rash reportedly involves entire body with diffuse flaking of skin.  Portal has nonhealing left leg wound.  Reportedly fell 1 week ago and has pain of left shoulder and elbow which is severe, constant, aching, aggravated with movement.  There is no reports of loss of consciousness or head/neck trauma.  Patient is on blood thinner on Coumadin.  Today, patient was seen for follow-up by his Advanced Heart Failure cardiologist who referred patient to the ED.  On arrival in the ED, initial recorded vital signs were temperature 98.0 °F, heart rate 108, respiratory rate 18, O2 saturation 98% on room air.  Per ED MD note, patient has flaking dry skin without erythema.  Left elbow 3 view showed age-indeterminate fracture with bony spur at triceps insertion with adjacent soft tissue swelling.  Left shoulder 2 view x-rays show a high riding humeral head compatible with chronic massive cuff tear.  Abnormal labs included hemoglobin 9.7, sodium 129, K 5.2, proBNP 2582, albumin 3.4, alkaline phosphatase 150, AST 60, 
    [facility]                                                                                          Hospitalist Progress Note  Justice Amezcua MD  Office Phone: [phone]        Date of Service:  10/23/2024  NAME:  Bishop Love  :  1955  MRN:  [id number]       Admission Summary:   Bishop Love is a 69 y.o. male with past medical history of CAD, MI, pacemaker/ICD implantation, ischemic cardiomyopathy, chronic HFrEF, long-term anticoagulation on Coumadin, mitral vegetation, rheumatic tricuspid regurgitation, sick sinus syndrome, peripheral vascular disease, right femoral-popliteal bypass 2023, aortobifemoral bypass and bilateral femoral above-knee popliteal bypass  presented to the emergency department with chief complaints of generalized body rash, left shoulder and elbow pain after fall.  Patient f reportedly had onset of a rash starting about 1 week ago.  Rash reportedly involves entire body with diffuse flaking of skin.  Portal has nonhealing left leg wound.  Reportedly fell 1 week ago and has pain of left shoulder and elbow which is severe, constant, aching, aggravated with movement.  There is no reports of loss of consciousness or head/neck trauma.  Patient is on blood thinner on Coumadin.  Today, patient was seen for follow-up by his Advanced Heart Failure cardiologist who referred patient to the ED.  On arrival in the ED, initial recorded vital signs were temperature 98.0 °F, heart rate 108, respiratory rate 18, O2 saturation 98% on room air.  Per ED MD note, patient has flaking dry skin without erythema.  Left elbow 3 view showed age-indeterminate fracture with bony spur at triceps insertion with adjacent soft tissue swelling.  Left shoulder 2 view x-rays show a high riding humeral head compatible with chronic massive cuff tear.  Abnormal labs included hemoglobin 9.7, sodium 129, K 5.2, proBNP 2582, albumin 3.4, alkaline phosphatase 150, AST 
    [facility]                                                                                          Hospitalist Progress Note  Vita Mckeon MD  Office Phone: [phone]        Date of Service:  10/8/2024  NAME:  Bishop Love  :  1955  MRN:  [id number]       Admission Summary:   Bishop Love is a 69 y.o. male with past medical history of CAD, MI, pacemaker/ICD implantation, ischemic cardiomyopathy, chronic HFrEF, long-term anticoagulation on Coumadin, mitral vegetation, rheumatic tricuspid regurgitation, sick sinus syndrome, peripheral vascular disease, right femoral-popliteal bypass 2023, aortobifemoral bypass and bilateral femoral above-knee popliteal bypass  presented to the emergency department with chief complaints of generalized body rash, left shoulder and elbow pain after fall.  Patient f reportedly had onset of a rash starting about 1 week ago.  Rash reportedly involves entire body with diffuse flaking of skin.  Portal has nonhealing left leg wound.  Reportedly fell 1 week ago and has pain of left shoulder and elbow which is severe, constant, aching, aggravated with movement.  There is no reports of loss of consciousness or head/neck trauma.  Patient is on blood thinner on Coumadin.  Today, patient was seen for follow-up by his Advanced Heart Failure cardiologist who referred patient to the ED.  On arrival in the ED, initial recorded vital signs were temperature 98.0 °F, heart rate 108, respiratory rate 18, O2 saturation 98% on room air.  Per ED MD note, patient has flaking dry skin without erythema.  Left elbow 3 view showed age-indeterminate fracture with bony spur at triceps insertion with adjacent soft tissue swelling.  Left shoulder 2 view x-rays show a high riding humeral head compatible with chronic massive cuff tear.  Abnormal labs included hemoglobin 9.7, sodium 129, K 5.2, proBNP 2582, albumin 3.4, alkaline phosphatase 150, AST 
  Pharmacy Note - Renal Dosing    Cefepime 2000mg Q12h for treatment of Skin and soft tissue infection. Per [facility] Renal Dose Adjustment Policy, cefepime will be changed to 2000mg load followed by 2000mg Q24h extended infusion    Estimated Creatinine Clearance: Estimated Creatinine Clearance: 56 mL/min (based on SCr of 1.04 mg/dL).    BMI: Body mass index is 18.82 kg/m².    Rationale for Adjustment: Agent is renally eliminated and demonstrates time-dependent effect on bacterial eradication. Extended-infusion dosing strategy aims to enhance microbiologic and clinical efficacy.    Pharmacy will continue to monitor renal function and adjust dose as necessary.      Please call with any questions.    Thank you,    Yessenia Solano [facility]    
0600: sana lifted for weight  
0730: Bedside and Verbal shift change report given to Ivy RN   (oncoming nurse) by Brittney RN (offgoing nurse). Report included the following information Nurse Handoff Report, Index, Adult Overview, Surgery Report, Intake/Output, MAR, Recent Results, and Cardiac Rhythm NSR .     0845: Called RT to get ordered ABG.    0938: Clarified pt's weight order with NP Alanna.     0940: Xray tech at bedside for CXR.     1445: Per pt's sister Norah, pt has refused any type of nutritional tube as of yesterday. MD Hope notified and aware, request MD Hope come to bedside to speak with pt.     1500: Per MD Hope, spoke with pt and pt agreed to tube feeds today 10/11.     1530: No orders have been placed for tube feeds in pt's chart. MD Hope notified and aware.     1900: Inserted NG tube per order.     1930: Bedside and Verbal shift change report given to Brittney RN (oncoming nurse) by Ivy RN (offgoing nurse). Report included the following information Nurse Handoff Report, Index, Adult Overview, Surgery Report, Intake/Output, MAR, Recent Results, and Cardiac Rhythm NSR  .           Care Plan:   Problem: Discharge Planning  Goal: Discharge to home or other facility with appropriate resources  Outcome: Progressing     Problem: Safety - Adult  Goal: Free from fall injury  Outcome: Progressing     Problem: Chronic Conditions and Co-morbidities  Goal: Patient's chronic conditions and co-morbidity symptoms are monitored and maintained or improved  Outcome: Progressing     Problem: Pain  Goal: Verbalizes/displays adequate comfort level or baseline comfort level  Outcome: Progressing  Flowsheets  Taken 10/11/2024 1315  Verbalizes/displays adequate comfort level or baseline comfort level: Encourage patient to monitor pain and request assistance  Taken 10/11/2024 1115  Verbalizes/displays adequate comfort level or baseline comfort level: Encourage patient to monitor pain and request assistance  Taken 10/11/2024 0750  Verbalizes/displays 
0730: Bedside and Verbal shift change report given to Ivy RN (oncoming nurse) by Brittney RN (offgoing nurse). Report included the following information Nurse Handoff Report, Index, Adult Overview, Surgery Report, Intake/Output, MAR, Recent Results, and Cardiac Rhythm A paced .     0930: Set pt up for breakfast.     1900: Changed dressing on BL feet per wound care order.     1930: Bedside and Verbal shift change report given to Brittney RN (oncoming nurse) by Ivy RN (offgoing nurse). Report included the following information Nurse Handoff Report, Index, Adult Overview, Surgery Report, Intake/Output, MAR, Recent Results, and Cardiac Rhythm A paced .       Care Plan:   Problem: Discharge Planning  Goal: Discharge to home or other facility with appropriate resources  9/27/2024 0942 by Ivy Cohen RN  Outcome: Progressing  9/27/2024 0309 by Brittney Quispe RN  Outcome: Progressing  Flowsheets (Taken 9/26/2024 2000)  Discharge to home or other facility with appropriate resources: Identify barriers to discharge with patient and caregiver     Problem: Safety - Adult  Goal: Free from fall injury  9/27/2024 0942 by Ivy Cohen RN  Outcome: Progressing  9/27/2024 0309 by Brittney Quispe RN  Outcome: Progressing     Problem: Chronic Conditions and Co-morbidities  Goal: Patient's chronic conditions and co-morbidity symptoms are monitored and maintained or improved  9/27/2024 0942 by Ivy Cohen RN  Outcome: Progressing  9/27/2024 0309 by Brittney Quispe RN  Outcome: Progressing  Flowsheets (Taken 9/26/2024 2000)  Care Plan - Patient's Chronic Conditions and Co-Morbidity Symptoms are Monitored and Maintained or Improved: Monitor and assess patient's chronic conditions and comorbid symptoms for stability, deterioration, or improvement     Problem: Pain  Goal: Verbalizes/displays adequate comfort level or baseline comfort level  9/27/2024 0942 by Ivy Cohen RN  Outcome: Progressing  Flowsheets (Taken 
0730: Bedside and Verbal shift change report given to Ivy RN (oncoming nurse) by Brittney RN (offgoing nurse). Report included the following information Nurse Handoff Report, Index, Adult Overview, Surgery Report, Intake/Output, MAR, Recent Results, and Cardiac Rhythm A paced .     1530: Pt states that he did not get any sleep last night and has been very tired today.     1815: Pt's fecal occult stool sample sent.      1930: Bedside and Verbal shift change report given to Brittney RN (oncoming nurse) by Ivy RN (offgoing nurse). Report included the following information Nurse Handoff Report, Index, Adult Overview, Surgery Report, Intake/Output, MAR, Recent Results, and Cardiac Rhythm A paced .           Care Plan:   Problem: Discharge Planning  Goal: Discharge to home or other facility with appropriate resources  9/28/2024 1026 by Ivy Cohen RN  Outcome: Progressing  9/28/2024 0224 by Brittney Quispe RN  Outcome: Progressing  Flowsheets (Taken 9/27/2024 2000)  Discharge to home or other facility with appropriate resources: Identify barriers to discharge with patient and caregiver     Problem: Safety - Adult  Goal: Free from fall injury  9/28/2024 1026 by Ivy Cohen RN  Outcome: Progressing  9/28/2024 0224 by Brittney Quispe RN  Outcome: Progressing     Problem: Chronic Conditions and Co-morbidities  Goal: Patient's chronic conditions and co-morbidity symptoms are monitored and maintained or improved  9/28/2024 1026 by Ivy Cohen RN  Outcome: Progressing  9/28/2024 0224 by Brittney Quispe RN  Outcome: Progressing  Flowsheets (Taken 9/27/2024 2000)  Care Plan - Patient's Chronic Conditions and Co-Morbidity Symptoms are Monitored and Maintained or Improved: Monitor and assess patient's chronic conditions and comorbid symptoms for stability, deterioration, or improvement     Problem: Pain  Goal: Verbalizes/displays adequate comfort level or baseline comfort level  9/28/2024 1026 by Ivy Cohen 
0730: Bedside and Verbal shift change report given to Ivy RN (oncoming nurse) by Brittney RN (offgoing nurse). Report included the following information Nurse Handoff Report, Index, Adult Overview, Surgery Report, Intake/Output, MAR, Recent Results, and Cardiac Rhythm __ RN .                                           1930: Bedside and Verbal shift change report given to __ RN (oncoming nurse) by Ivy RN (offgoing nurse). Report included the following information Nurse Handoff Report, Index, Adult Overview, Surgery Report, Intake/Output, MAR, Recent Results, and Cardiac Rhythm ___ .           Care Plan:       
0730: Bedside and Verbal shift change report given to Ivy RN (oncoming nurse) by Devora RN (offgoing nurse). Report included the following information Nurse Handoff Report, Index, Adult Overview, Surgery Report, Intake/Output, MAR, Recent Results, and Cardiac Rhythm A paced .     1430: Pt up to chair with PT.     1930: Bedside and Verbal shift change report given to Brittney RN (oncoming nurse) by Ivy RN (offgoing nurse). Report included the following information Nurse Handoff Report, Index, Adult Overview, Surgery Report, Intake/Output, MAR, Recent Results, and Cardiac Rhythm A paced .       Care plan:  Problem: Discharge Planning  Goal: Discharge to home or other facility with appropriate resources  9/26/2024 0856 by Ivy Cohen RN  Outcome: Progressing  9/25/2024 2223 by Devora Crowder RN  Outcome: Progressing     Problem: Safety - Adult  Goal: Free from fall injury  9/26/2024 0856 by Ivy Cohen RN  Outcome: Progressing  9/25/2024 2223 by Devora Crowder RN  Outcome: Progressing     Problem: Chronic Conditions and Co-morbidities  Goal: Patient's chronic conditions and co-morbidity symptoms are monitored and maintained or improved  9/26/2024 0856 by Ivy Cohen RN  Outcome: Progressing  9/25/2024 2223 by Devora Crowder RN  Outcome: Progressing     Problem: Pain  Goal: Verbalizes/displays adequate comfort level or baseline comfort level  9/26/2024 0856 by Ivy Cohen RN  Outcome: Progressing  9/25/2024 2223 by Devora Crowder RN  Outcome: Progressing     Problem: Skin/Tissue Integrity  Goal: Absence of new skin breakdown  Description: 1.  Monitor for areas of redness and/or skin breakdown  2.  Assess vascular access sites hourly  3.  Every 4-6 hours minimum:  Change oxygen saturation probe site  4.  Every 4-6 hours:  If on nasal continuous positive airway pressure, respiratory therapy assess nares and determine need for appliance change or resting period.  9/26/2024 0856 by Ivy Cohen RN  Outcome: 
0730: Bedside and Verbal shift change report given to Ivy RN (oncoming nurse) by Megan RN (offgoing nurse). Report included the following information Nurse Handoff Report, Index, Adult Overview, Surgery Report, Intake/Output, MAR, Recent Results, and Cardiac Rhythm NSR .     1600: Family at bedside.     1930: Bedside and Verbal shift change report given to Na RN (oncoming nurse) by Ivy RN (offgoing nurse). Report included the following information Nurse Handoff Report, Index, Adult Overview, Surgery Report, Intake/Output, MAR, Recent Results, and Cardiac Rhythm NSR .           Care Plan:   Problem: Discharge Planning  Goal: Discharge to home or other facility with appropriate resources  Outcome: Progressing     Problem: Safety - Adult  Goal: Free from fall injury  Outcome: Progressing     Problem: Chronic Conditions and Co-morbidities  Goal: Patient's chronic conditions and co-morbidity symptoms are monitored and maintained or improved  Outcome: Progressing     Problem: Pain  Goal: Verbalizes/displays adequate comfort level or baseline comfort level  Outcome: Progressing     Problem: Skin/Tissue Integrity  Goal: Absence of new skin breakdown  Description: 1.  Monitor for areas of redness and/or skin breakdown  2.  Assess vascular access sites hourly  3.  Every 4-6 hours minimum:  Change oxygen saturation probe site  4.  Every 4-6 hours:  If on nasal continuous positive airway pressure, respiratory therapy assess nares and determine need for appliance change or resting period.  Outcome: Progressing     Problem: Nutrition Deficit:  Goal: Optimize nutritional status  Outcome: Progressing     Problem: ABCDS Injury Assessment  Goal: Absence of physical injury  Outcome: Progressing       
0730: Bedside and Verbal shift change report given to Ivy RN (oncoming nurse) by Na ROBERT (offgoing nurse). Report included the following information Nurse Handoff Report, Index, Adult Overview, Surgery Report, Intake/Output, MAR, Recent Results, and Cardiac Rhythm A paced .     1300: Pt's family at bedside.     1930: Bedside and Verbal shift change report given to Brittney RN (oncoming nurse) by Ivy RN (offgoing nurse). Report included the following information Nurse Handoff Report, Index, Adult Overview, Surgery Report, Intake/Output, MAR, Recent Results, and Cardiac Rhythm A paced .           Care Plan:   Problem: Discharge Planning  Goal: Discharge to home or other facility with appropriate resources  10/20/2024 1103 by Ivy Cohen RN  Outcome: Progressing  Flowsheets (Taken 10/20/2024 0750)  Discharge to home or other facility with appropriate resources: Identify barriers to discharge with patient and caregiver  10/20/2024 0126 by Na Sullivan RN  Outcome: Progressing  Flowsheets (Taken 10/19/2024 2000)  Discharge to home or other facility with appropriate resources: Identify barriers to discharge with patient and caregiver     Problem: Safety - Adult  Goal: Free from fall injury  10/20/2024 1103 by Ivy Cohen RN  Outcome: Progressing  10/20/2024 0126 by Na Sullivan RN  Outcome: Progressing  Flowsheets (Taken 10/20/2024 0126)  Free From Fall Injury: Instruct family/caregiver on patient safety     Problem: Chronic Conditions and Co-morbidities  Goal: Patient's chronic conditions and co-morbidity symptoms are monitored and maintained or improved  10/20/2024 1103 by Ivy Cohen RN  Outcome: Progressing  Flowsheets (Taken 10/20/2024 0750)  Care Plan - Patient's Chronic Conditions and Co-Morbidity Symptoms are Monitored and Maintained or Improved: Monitor and assess patient's chronic conditions and comorbid symptoms for stability, deterioration, or improvement  10/20/2024 0126 by Laurie 
0730: Bedside and Verbal shift change report given to Ivy ROBERT (oncoming nurse) by Brittney RN (offgoing nurse). Report included the following information Nurse Handoff Report, Index, Adult Overview, Surgery Report, Intake/Output, Recent Results, and Cardiac Rhythm A paced .     1500: Pt's wound care changed per order. Images uploaded in chart.     1515: Wound care order in to reevaluate pt on Monday. NP Ibeth notified and aware about pt's wound conditions. Verbal order received for wound culture to be sent. See orders placed in pt chart.     1730: Per lab, wound culture has to be in a long clear tube when sent. Reordered tomorrow to be sent post wound care.     1930: Bedside and Verbal shift change report given to Mariama ROBERT (oncoming nurse) by Ivy RN (offgoing nurse). Report included the following information Nurse Handoff Report, Index, Adult Overview, Surgery Report, Intake/Output, MAR, Recent Results, and Cardiac Rhythm A paced .     Care Plan:   Problem: Discharge Planning  Goal: Discharge to home or other facility with appropriate resources  9/29/2024 0856 by Ivy Cohen RN  Outcome: Progressing  9/29/2024 0353 by Brittney Quispe RN  Outcome: Progressing  Flowsheets (Taken 9/28/2024 2000)  Discharge to home or other facility with appropriate resources: Identify barriers to discharge with patient and caregiver     Problem: Safety - Adult  Goal: Free from fall injury  9/29/2024 0856 by Ivy Cohen RN  Outcome: Progressing  9/29/2024 0353 by Brittney Quispe RN  Outcome: Progressing     Problem: Chronic Conditions and Co-morbidities  Goal: Patient's chronic conditions and co-morbidity symptoms are monitored and maintained or improved  9/29/2024 0856 by Ivy Cohen RN  Outcome: Progressing  9/29/2024 0353 by Brittney Quispe RN  Outcome: Progressing  Flowsheets (Taken 9/28/2024 2000)  Care Plan - Patient's Chronic Conditions and Co-Morbidity Symptoms are Monitored and Maintained or Improved: 
0730: Bedside and Verbal shift change report given to JAKOB Lam (oncoming nurse) by JAKOB Lugo (offgoing nurse). Report included the following information Nurse Handoff Report, Index, Adult Overview, MAR, Recent Results, and Cardiac Rhythm NSR / A. paced .     1300: New dressing applied to BLE wounds per order, see flowsheets.     1930: Bedside and Verbal shift change report given to JAKOB Cook (oncoming nurse) by JAKOB Lam (offgoing nurse). Report included the following information Nurse Handoff Report, Index, Adult Overview, MAR, Recent Results, and Cardiac Rhythm NSR / A. paced .           Care Plan:   Problem: Discharge Planning  Goal: Discharge to home or other facility with appropriate resources  10/13/2024 0733 by Bernadette Hauser RN  Outcome: Progressing  10/13/2024 0118 by Brittney Quispe RN  Outcome: Progressing     Problem: Safety - Adult  Goal: Free from fall injury  10/13/2024 0733 by Bernadette Hauser RN  Outcome: Progressing  10/13/2024 0118 by Brittney Quispe RN  Outcome: Progressing     Problem: Chronic Conditions and Co-morbidities  Goal: Patient's chronic conditions and co-morbidity symptoms are monitored and maintained or improved  10/13/2024 0733 by Bernadette Hauser RN  Outcome: Progressing  10/13/2024 0118 by Brittney Quispe RN  Outcome: Progressing     Problem: Pain  Goal: Verbalizes/displays adequate comfort level or baseline comfort level  10/13/2024 0733 by Bernadette Hauser RN  Outcome: Progressing  10/13/2024 0118 by Brittney Quispe RN  Outcome: Progressing  Flowsheets  Taken 10/12/2024 2000 by Brittney Quispe RN  Verbalizes/displays adequate comfort level or baseline comfort level: Encourage patient to monitor pain and request assistance  Taken 10/12/2024 1900 by Ivy Cohen RN  Verbalizes/displays adequate comfort level or baseline comfort level: Encourage patient to monitor pain and request assistance  Taken 10/12/2024 1610 by Ivy Cohen RN  Verbalizes/displays adequate 
0800: unable to get Blood cultures/labs at this time, poor vasculature  
0905: Patient drowsy & lethargic. Arouses and is A&O x 4 once awakened. Attending made aware and orders for ABG obtained.    1000: ABG resulted and RT notified to decrease O2 per [facility] NP. Will hold next dose of gabapentin as well.    1150: MAP 69. F NP made aware. No new orders received.    1715: Clarified with Norma [facility] NP to discontinue gabapentin order as patient was drowsy and lethargic this AM.  
1529: 0730: Bedside and Verbal shift change report given to JAKOB Lam (oncoming nurse) by JAKOB Cook (offgoing nurse). Report included the following information Nurse Handoff Report, Index, Adult Overview, MAR, Recent Results, and Cardiac Rhythm NSR / SB / A. paced .     1200: Per MD Hope begin to wean HFNC, RT paged and aware.     1529: Discussed w/ MD Hope about pt pain and recommended possible gabapentin and pt request for stool softener, verbal order received for gabapentin 100 mg PO TID and colace 100 mg PO daily.     1930: Bedside and Verbal shift change report given to JAKOB Cook (oncoming nurse) by JAKOB Lam (offgoing nurse). Report included the following information Nurse Handoff Report, Index, Adult Overview, MAR, Recent Results, and Cardiac Rhythm NSR / SB / A. paced .           Care Plan:   Problem: Discharge Planning  Goal: Discharge to home or other facility with appropriate resources  10/14/2024 0730 by Bernadette Hauser RN  Outcome: Progressing  10/13/2024 2236 by Joey Evangelista RN  Outcome: Progressing  Flowsheets (Taken 10/13/2024 2045)  Discharge to home or other facility with appropriate resources: Identify barriers to discharge with patient and caregiver     Problem: Safety - Adult  Goal: Free from fall injury  10/14/2024 0730 by Bernadette Hauser RN  Outcome: Progressing  10/13/2024 2236 by Joey Evangelista RN  Outcome: Progressing     Problem: Chronic Conditions and Co-morbidities  Goal: Patient's chronic conditions and co-morbidity symptoms are monitored and maintained or improved  10/14/2024 0730 by Bernadette Hauser RN  Outcome: Progressing  10/13/2024 2236 by Joey Evangelista RN  Outcome: Progressing  Flowsheets (Taken 10/13/2024 2045)  Care Plan - Patient's Chronic Conditions and Co-Morbidity Symptoms are Monitored and Maintained or Improved: Monitor and assess patient's chronic conditions and comorbid symptoms for stability, deterioration, or improvement     Problem: Pain  Goal: 
1620  [facility] Confirmation number: [id number]  
1930  Verbal bedside report given to JAKOB Lozoya by JAKOB Frye. Report included updated vitals and SBAR. Patient is in bed asleep and respirations are even and unlabored.     1825  Completed blood transfusion.    1500  Started blood transfusion. RN stayed for 15 minutes.     1250  Attending provider ordered one pack of RBCs at this time. RN calling blood bank.     1145  Notified provider of new hemoglobin level.     1015  AHF at bedside. Blood consent obtained.     0924  Notified attending provider of hemoglobin level.     0730  Verbal bedside report received from JAKOB Lugo given to JAKOB Frye. Report included vital signs, SBAR, and plan for the day. Patient rhythm NSR/A-paced. Patient is in bed awake and respirations are even and unlabored. Call bell is within reach. Bed alarm is on.     Care Plan:    Problem: Discharge Planning  Goal: Discharge to home or other facility with appropriate resources  10/4/2024 1301 by Melva Joe RN  Outcome: Progressing  10/4/2024 0218 by Brittney Quispe RN  Outcome: Progressing  Flowsheets (Taken 10/3/2024 2000)  Discharge to home or other facility with appropriate resources: Identify barriers to discharge with patient and caregiver     Problem: Safety - Adult  Goal: Free from fall injury  10/4/2024 1301 by Melva Joe RN  Outcome: Progressing  10/4/2024 0218 by Brittney Quispe RN  Outcome: Progressing     Problem: Chronic Conditions and Co-morbidities  Goal: Patient's chronic conditions and co-morbidity symptoms are monitored and maintained or improved  10/4/2024 1301 by Melva Joe RN  Outcome: Progressing  10/4/2024 0218 by Brittney Quispe RN  Outcome: Progressing  Flowsheets (Taken 10/3/2024 2000)  Care Plan - Patient's Chronic Conditions and Co-Morbidity Symptoms are Monitored and Maintained or Improved: Monitor and assess patient's chronic conditions and comorbid symptoms for stability, deterioration, or improvement     Problem: Pain  Goal: Verbalizes/displays 
1930  Verbal bedside report given to JAKOB Lozoya by JAKOB Frye. Report included updated vitals and SBAR. Patient is in bed awake and respirations are even and unlabored.     1830  Full bed linen change completed. CHG bath completed.     0730  Verbal bedside report received from JAKOB Lozoya given to JAKOB Frye. Report included vital signs, SBAR, and plan for the day. Patient rhythm A-paced. Patient is in bed awake and respirations are even and unlabored. Call bell is within reach. Bed alarm is on.     Care Plan:    Problem: Discharge Planning  Goal: Discharge to home or other facility with appropriate resources  10/5/2024 1309 by Melva Joe RN  Outcome: Progressing  10/5/2024 0017 by Devora Crowder RN  Outcome: Progressing     Problem: Safety - Adult  Goal: Free from fall injury  10/5/2024 1309 by Melva Joe RN  Outcome: Progressing  10/5/2024 0017 by Devora Crowder RN  Outcome: Progressing     Problem: Chronic Conditions and Co-morbidities  Goal: Patient's chronic conditions and co-morbidity symptoms are monitored and maintained or improved  10/5/2024 1309 by Melva Joe RN  Outcome: Progressing  10/5/2024 0017 by Devora Crowder RN  Outcome: Progressing     Problem: Pain  Goal: Verbalizes/displays adequate comfort level or baseline comfort level  10/5/2024 1309 by Melva Joe RN  Outcome: Progressing  10/5/2024 0017 by Devora Crowder RN  Outcome: Progressing     Problem: Skin/Tissue Integrity  Goal: Absence of new skin breakdown  Description: 1.  Monitor for areas of redness and/or skin breakdown  2.  Assess vascular access sites hourly  3.  Every 4-6 hours minimum:  Change oxygen saturation probe site  4.  Every 4-6 hours:  If on nasal continuous positive airway pressure, respiratory therapy assess nares and determine need for appliance change or resting period.  10/5/2024 1309 by Melva Joe RN  Outcome: Progressing  10/5/2024 0017 by Devora Crowder RN  Outcome: Progressing     Problem: Nutrition Deficit:  Goal: 
1930  Verbal bedside report given to JAKOB Lugo by JAKOB Frye. Report included updated vitals and SBAR. Patient is in bed awake and respirations are even and unlabored.     1545  RN and PCT were kulwant to get patient weight using Edvin lift. While patient was in Edvin, RN zeroed the bed. When placing patient back in bed, bed weight is not accurate after zero. Recommendation to use Edvin lift daily for weight.     1530  RT at bedside lowing FiO2 per order from [facility].     1000  Respiratory cultures sent.     0945  ID at bedside.     0800  Verbal bedside report received from JAKOB Lozoya given to JAKOB Frye. Report included vital signs, SBAR, and plan for the day. Patient rhythm NSR. Patient is in bed asleep and respirations are even and unlabored. Call bell is within reach. Bed alarm is on.     Care Plan:    Problem: Discharge Planning  Goal: Discharge to home or other facility with appropriate resources  10/10/2024 1650 by Melva Joe RN  Outcome: Progressing  10/10/2024 0313 by Devora Crowder RN  Outcome: Progressing     Problem: Safety - Adult  Goal: Free from fall injury  10/10/2024 1650 by Melva Joe RN  Outcome: Progressing  10/10/2024 0313 by Devora Crowder RN  Outcome: Progressing     Problem: Chronic Conditions and Co-morbidities  Goal: Patient's chronic conditions and co-morbidity symptoms are monitored and maintained or improved  10/10/2024 1650 by Melva Joe RN  Outcome: Progressing  10/10/2024 0313 by Devora Crowder RN  Outcome: Progressing     Problem: Pain  Goal: Verbalizes/displays adequate comfort level or baseline comfort level  10/10/2024 1650 by Melva Joe RN  Outcome: Progressing  10/10/2024 0313 by Devora Crowder RN  Outcome: Progressing     Problem: Skin/Tissue Integrity  Goal: Absence of new skin breakdown  Description: 1.  Monitor for areas of redness and/or skin breakdown  2.  Assess vascular access sites hourly  3.  Every 4-6 hours minimum:  Change oxygen saturation probe site  4.  Every 4-6 
1930  Verbal bedside report given to JAKOB Lugo by JAKOB Frye. Report included updated vitals and SBAR. Patient is in bed awake and respirations are even and unlabored.     1900  New peripheral IV placed. Wound care completed.     1530  Vancomycin IV infiltrated. RN stopped infusion, aspirated catheter and removed IV. RN placed heat pack proximal to the site.     0755  RN messaged Infection Prevention.     0730  Verbal bedside report received from JAKOB Leslie given to JAKOB Frye. Report included vital signs, SBAR, and plan for the day. Patient rhythm A-paced. Patient is in bed awake and respirations are even and unlabored. Call bell is within reach. Bed alarm is on.     Medications will be given by student nurse Radha during the morning medication pass under supervison of a preceptor.      Care Plan:    Problem: Discharge Planning  Goal: Discharge to home or other facility with appropriate resources  Outcome: Progressing     Problem: Safety - Adult  Goal: Free from fall injury  Outcome: Progressing     Problem: Chronic Conditions and Co-morbidities  Goal: Patient's chronic conditions and co-morbidity symptoms are monitored and maintained or improved  Outcome: Progressing     Problem: Pain  Goal: Verbalizes/displays adequate comfort level or baseline comfort level  Outcome: Progressing     Problem: Skin/Tissue Integrity  Goal: Absence of new skin breakdown  Description: 1.  Monitor for areas of redness and/or skin breakdown  2.  Assess vascular access sites hourly  3.  Every 4-6 hours minimum:  Change oxygen saturation probe site  4.  Every 4-6 hours:  If on nasal continuous positive airway pressure, respiratory therapy assess nares and determine need for appliance change or resting period.  Outcome: Progressing     Problem: Nutrition Deficit:  Goal: Optimize nutritional status  Outcome: Progressing  
1930  Verbal bedside report given to MARI Woods RN oncoming nurse by AUGUSTA Brumfield RN off-going nurse.  Report included current pt status and condition, recent results, hx of present illness, heart rate and rhythm (A-paced), and respiratory status.       1530  Up to chair with PT    1230  Dressings on feet changed.    1215  Podiatry at bedside, verbal orders received to culture L heel as well as R.  Cultures collected.    1110  Updated family member on plan to keep pt in hospital for the present.    1100  Spoke with Podiatrist, will see him later today.    0730  Verbal bedside report received from MARI Woods RN off-going nurse by AUGUSTA Brumfield RN oncoming nurse.  Report included current pt status and condition, recent results, hx of present illness, heart rate and rhythm (A-paced), and respiratory status.  Pt talking on phone.    
1930  Verbal bedside report given to ROGELIO Crowder RN oncoming nurse by AUGUSTA Brumfield RN off-going nurse.  Report included current pt status and condition, recent results, hx of present illness, heart rate and rhythm (V-paced), and respiratory status.       1658  Changed LVAD dressing using sterile technique.    1100  Wound care at bedside.    0930  Heart failure at bedside.    0900  Pt awake and alert, in no acute distress this morning.    0810  Verbal bedside report received from ROGELIO Crowder RN off-going nurse by AUGUSTA Brumfield RN oncoming nurse.  Report included current pt status and condition, recent results, hx of present illness, heart rate and rhythm (V-paced), and respiratory status.  Pt awake in bed.    
1930: Bedside and Verbal shift change report given to Brittney RN (oncoming nurse) by Ivy RN (offgoing nurse). Report included the following information Nurse Handoff Report, Index, Adult Overview, Intake/Output, and Cardiac Rhythm SR      0600: pt bathed, LVAD dressing change complete, linen changed      0800: Bedside and Verbal shift change report given to Kirstin RN (oncoming nurse) by Brittney RN (offgoing nurse). Report included the following information Nurse Handoff Report, Index, Adult Overview, Intake/Output, and Cardiac Rhythm SR   
1930: Bedside and Verbal shift change report given to Brittney RN (oncoming nurse) by Ivy RN (offgoing nurse). Report included the following information Nurse Handoff Report, Index, Adult Overview, Surgery Report, Intake/Output, MAR, Recent Results, and Cardiac Rhythm SR/A paced .       0300: Pt bathed, linen changed, wick replaced      0730:Bedside and Verbal shift change report given to Carole ROBERT (oncoming nurse) by Brittney RN (offgoing nurse). Report included the following information Nurse Handoff Report, Index, Adult Overview, Surgery Report, Intake/Output, MAR, Recent Results, and Cardiac Rhythm SR/A paced .   
1930: Bedside and Verbal shift change report given to JAKOB Cintron (oncoming nurse) by JAKOB Castañeda (offgoing nurse). Report included the following information Nurse Handoff Report, Index, Adult Overview, Intake/Output, MAR, Recent Results, and Cardiac Rhythm   .      0730: Bedside and Verbal shift change report given to JAKOB Castañeda (oncoming nurse) by JAKOB Cintron (offgoing nurse). Report included the following information Nurse Handoff Report, Index, Adult Overview, Intake/Output, MAR, Recent Results, and Cardiac Rhythm   .        Problem: Discharge Planning  Goal: Discharge to home or other facility with appropriate resources  Outcome: Progressing     Problem: Safety - Adult  Goal: Free from fall injury  Outcome: Progressing     Problem: Chronic Conditions and Co-morbidities  Goal: Patient's chronic conditions and co-morbidity symptoms are monitored and maintained or improved  Outcome: Progressing     Problem: Pain  Goal: Verbalizes/displays adequate comfort level or baseline comfort level  Outcome: Progressing     Problem: Skin/Tissue Integrity  Goal: Absence of new skin breakdown  Description: 1.  Monitor for areas of redness and/or skin breakdown  2.  Assess vascular access sites hourly  3.  Every 4-6 hours minimum:  Change oxygen saturation probe site  4.  Every 4-6 hours:  If on nasal continuous positive airway pressure, respiratory therapy assess nares and determine need for appliance change or resting period.  Outcome: Progressing     
1930: Bedside and Verbal shift change report given to JAKOB Cintron (oncoming nurse) by JAKOB Castañeda (offgoing nurse). Report included the following information Nurse Handoff Report, Index, Adult Overview, Intake/Output, MAR, Recent Results, and Cardiac Rhythm   .      0730: Bedside and Verbal shift change report given to JAKOB Panda (oncoming nurse) by JAKOB Cintron (offgoing nurse). Report included the following information Nurse Handoff Report, Index, Adult Overview, Intake/Output, MAR, Recent Results, and Cardiac Rhythm   .        Problem: Discharge Planning  Goal: Discharge to home or other facility with appropriate resources  9/23/2024 0007 by Abdulaziz Bennett RN  Outcome: Progressing  9/22/2024 1738 by Gatito Almonte RN  Outcome: Progressing     Problem: Safety - Adult  Goal: Free from fall injury  9/23/2024 0007 by Abdulaziz Bennett RN  Outcome: Progressing  9/22/2024 1738 by Gatito Almonte RN  Outcome: Progressing     Problem: Chronic Conditions and Co-morbidities  Goal: Patient's chronic conditions and co-morbidity symptoms are monitored and maintained or improved  9/23/2024 0007 by Abdulaziz Bennett RN  Outcome: Progressing  9/22/2024 1738 by Gatito Almonte RN  Outcome: Progressing     Problem: Pain  Goal: Verbalizes/displays adequate comfort level or baseline comfort level  9/23/2024 0007 by Abdulaziz Bennett RN  Outcome: Progressing  9/22/2024 1738 by Gatito Almonte RN  Outcome: Progressing     Problem: Skin/Tissue Integrity  Goal: Absence of new skin breakdown  Description: 1.  Monitor for areas of redness and/or skin breakdown  2.  Assess vascular access sites hourly  3.  Every 4-6 hours minimum:  Change oxygen saturation probe site  4.  Every 4-6 hours:  If on nasal continuous positive airway pressure, respiratory therapy assess nares and determine need for appliance change or resting period.  9/23/2024 0007 by Abdulaziz Bennett RN  Outcome: Progressing  9/22/2024 1738 by Gatito Almonte, 
1930; Bedside and Verbal shift change report given to JAKOB Cintron (oncoming nurse) by JAKOB Castañeda (offgoing nurse). Report included the following information Nurse Handoff Report, Index, Adult Overview, Intake/Output, MAR, Recent Results, and Cardiac Rhythm   .      0730: Bedside and Verbal shift change report given to JAKOB Castañeda (oncoming nurse) by JAKOB Cintron (offgoing nurse). Report included the following information Nurse Handoff Report, Index, Adult Overview, Intake/Output, MAR, Recent Results, and Cardiac Rhythm   .        Problem: Discharge Planning  Goal: Discharge to home or other facility with appropriate resources  9/21/2024 2308 by Abdulaziz Bennett RN  Outcome: Progressing  9/21/2024 1129 by Gatito Almonte RN  Outcome: Progressing     Problem: Safety - Adult  Goal: Free from fall injury  9/21/2024 2308 by Abdulaziz Bennett RN  Outcome: Progressing  9/21/2024 1129 by Gatito Almonte RN  Outcome: Progressing     Problem: Chronic Conditions and Co-morbidities  Goal: Patient's chronic conditions and co-morbidity symptoms are monitored and maintained or improved  9/21/2024 2308 by Abdulaziz Bennett RN  Outcome: Progressing  9/21/2024 1129 by Gatito Almonte RN  Outcome: Progressing     Problem: Pain  Goal: Verbalizes/displays adequate comfort level or baseline comfort level  9/21/2024 2308 by Abdulaziz Bennett RN  Outcome: Progressing  9/21/2024 1129 by Gatito Almonte RN  Outcome: Progressing     Problem: Skin/Tissue Integrity  Goal: Absence of new skin breakdown  Description: 1.  Monitor for areas of redness and/or skin breakdown  2.  Assess vascular access sites hourly  3.  Every 4-6 hours minimum:  Change oxygen saturation probe site  4.  Every 4-6 hours:  If on nasal continuous positive airway pressure, respiratory therapy assess nares and determine need for appliance change or resting period.  9/21/2024 2308 by Abdulaziz Bennett RN  Outcome: Progressing  9/21/2024 1129 by Gatito Almonte, 
1945  Verbal bedside report given to JAKOB Lozoya by JAKOB Frye. Report included updated vitals and SBAR. Patient is in bed awake and respirations are even and unlabored.     1845  Full bed bath and linen change completed.     1200  Consulted MD Donnie regarding pain medication. See orders.     5476  Verbal bedside report received from JAKOB Lozoya given to JAKOB Frye. Report included vital signs, SBAR, and plan for the day. Patient rhythm A-paced. Patient is in bed awake and respirations are even and unlabored. Call bell is within reach. Bed alarm is on.     Care Plan:    Problem: Discharge Planning  Goal: Discharge to home or other facility with appropriate resources  10/6/2024 1102 by Melva Joe RN  Outcome: Progressing  10/5/2024 2358 by Devora Crowder RN  Outcome: Progressing     Problem: Safety - Adult  Goal: Free from fall injury  10/6/2024 1102 by Melva Joe RN  Outcome: Progressing  10/5/2024 2358 by Devora Crowder RN  Outcome: Progressing     Problem: Chronic Conditions and Co-morbidities  Goal: Patient's chronic conditions and co-morbidity symptoms are monitored and maintained or improved  10/6/2024 1102 by Melva Joe RN  Outcome: Progressing  10/5/2024 2358 by Devora Crowder RN  Outcome: Progressing     Problem: Pain  Goal: Verbalizes/displays adequate comfort level or baseline comfort level  10/6/2024 1102 by Melva Joe RN  Outcome: Progressing  10/5/2024 2358 by Devora Crowder RN  Outcome: Progressing     Problem: Skin/Tissue Integrity  Goal: Absence of new skin breakdown  Description: 1.  Monitor for areas of redness and/or skin breakdown  2.  Assess vascular access sites hourly  3.  Every 4-6 hours minimum:  Change oxygen saturation probe site  4.  Every 4-6 hours:  If on nasal continuous positive airway pressure, respiratory therapy assess nares and determine need for appliance change or resting period.  10/6/2024 1102 by Melva Joe RN  Outcome: Progressing  10/5/2024 2358 by Devora Crowder 
2000: Bedside and Verbal shift change report given to Brittney RN (oncoming nurse) by Ivy RN (offgoing nurse). Report included the following information Nurse Handoff Report, Index, Adult Overview, Surgery Report, Intake/Output, MAR, Recent Results, and Cardiac Rhythm SR/A paced .     2016: NP shellie updated on pt status and NG tube, spoke to NP about trial to wean pt off bipap d/t concern for aspiration on tube feeds, per NP no order needed to trial wean.     2020: RN spoke to RT about assisting with weaning pt off bipap, RT to come to bedside     2115: RT came to bedside, put pt on heated high flow, O2 at 93% at this time     2150: called radiology, per radiology order needs to be changed to \"KUB\" from portable xray for them to come verify placement of NG tube, order corrected by this RN      2215: Radiology at bedside    2242: pt O2 dipping into upper 80's on high flow, FIO2 increased to 60%, O2 96%       0330: pt O2 going between 87%- 91% on heated high flow; RN placed pt back on bipap, 02 97%      Pt weighed with Edvin Lift    0730:Bedside and Verbal shift change report given to Ivy ROBERT (oncoming nurse) by Brittney RN (offgoing nurse). Report included the following information Nurse Handoff Report, Index, Adult Overview, Surgery Report, Intake/Output, MAR, Recent Results, and Cardiac Rhythm SR/A paced .   
2023: Verbal report given to Brittney ROBERT by Luis RBOERT .  Report included Nurse Handoff Report, Index, Adult Overview, MAR, Intake and Output, and Cardiac Rhythm SR .     2130: Pt arrived to unit, connected to monitor and VS obtained. Pt oriented to unit and educated regarding unit safety measures, pt verbalized and acknowledged information and education provided. Pt in bed resting with bed in lowest position and call light within reach.     Wound care complete at this time.     0000: LVAD dressing change complete     0700: CHG complete     0800: Bedside and Verbal shift change report given to Carole ROBERT (oncoming nurse) by Brittney ROBERT (offgoing nurse). Report included the following information Nurse Handoff Report, Index, Adult Overview, Intake/Output, MAR, and Cardiac Rhythm Apaced/1 deg AV block .     
2300: , PRN hydralazine given.    0018: GITA Urbina notified of PO2 critical result. ABG drawn by RT-- per RT, PO2 critical result may be inaccurate due to the difficulty of obtaining an art stick from the pt, the blood was likely venous. Orders placed for stat ABG redraw.     0040: ABG redraw successful after two attempts. See results.      0106: .    0335: PIV infiltrated, new 22 g IV placed. US guidance needed for successful attempt.   
4 Eyes Skin Assessment     NAME:  Bishop Love  YOB: 1955  MEDICAL RECORD NUMBER:  [id number]    The patient is being assessed for  Admission    I agree that at least one RN has performed a thorough Head to Toe Skin Assessment on the patient. ALL assessment sites listed below have been assessed.      Areas assessed by both nurses:    Head, Face, Ears, Shoulders, Back, Chest, Arms, Elbows, Hands, Sacrum. Buttock, Coccyx, Ischium, Legs. Feet and Heels, and Under Medical Devices         Does the Patient have a Wound? Yes wound(s) were present on assessment. LDA wound assessment was Initiated and completed by RN       Ryan Prevention initiated by RN: Yes  Wound Care Orders initiated by RN: Yes    Pressure Injury (Stage 3,4, Unstageable, DTI, NWPT, and Complex wounds) if present, place Wound referral order by RN under : Yes    New Ostomies, if present place, Ostomy referral order under : No     Nurse 1 eSignature: Electronically signed by Brittney Quispe RN on 9/18/24 at 4:36 AM EDT    **SHARE this note so that the co-signing nurse can place an eSignature**    Nurse 2 eSignature: Electronically signed by Na Sullivan RN on 9/18/24 at 4:37 AM EDT   
[facility]  [place], [place]  Mechanical Circulatory Support inpatient progress Note    Patient name: Bishop Love  Patient : 1955  Patient MRN: [id number]  Date of service: 24    Primary care physician: Freddy Chandler MD  LVAD cardiologist: MICHAEL    REASON FOR CONSULT:  LVAD management    ASSESSMENT:  Bishop Love is a 69 y.o. male with history of chronic systolic heart failure due to  ischemic cardiomyopathy, s/p HM3 LVAD implantation (2023)  initially as destination therapy, stage D, on optimal GDMT limited by RV dysfunction-not a transplant candidate as per [facility] due to high risk given previous sternotomy and severe peripheral vascular disease with nonhealing right foot ulcer  Next annual LVAD evaluation in 1 month  History of fall with with inability to lift left shoulder concern for left shoulder dislocation also fracture of forearm  Generalized peeling of skin with flaky--with raw area left leg concern for atopic dermatitis  Severe protein energy malnutrition with weight loss    INTERVAL HISTORY:  -MAPS 80s  -labs pBNP 3668; creat steady; K 5.0; INR 3.0  -weight down 5lb?; I/O neg 900ml  -Bishop Love is still having foot pain, but it is a little better.  Hasn't been up or had breakfast yet today.  Denies SOB, chest pain, palpitations, dizziness.     PLAN:  Heart Failure/Cardiomyopathy with severe RV dilatation and RV failure-fairly well compensated patient appears to be euvolemic more towards dry side  Continue current medical therapy for heart failure:  Beta-blocker: Continue metoprolol 25 mg twice daily   ACE/ARB/ARNi: Not on Entresto due to severe cough leading to Evelyn-Tapia tear  Hydralazine/nitrate: Continue hydralazine 100 mg 3 times daily  MRA: Continue spironolactone 25 mg twice daily  SGLT2 inhibitor: Not on SGLT2 due to recurrent UTI  Diuretic: Currently on torsemide 10 mg 3 times weekly  Patient not a candidate 
[facility]  [place], [place]  Mechanical Circulatory Support Inpatient progress Note    Patient name: Bishop Love  Patient : 1955  Patient MRN: [id number]  Date of service: 10/17/24    Primary care physician: Freddy Chandler MD  LVAD cardiologist: MICHAEL    REASON FOR CONSULT:  LVAD management    ASSESSMENT:  Bishop Love is a 69 y.o. male with history of chronic systolic heart failure due to  ischemic cardiomyopathy, s/p HM3 LVAD implantation (2023)  initially as destination therapy, stage D, on optimal GDMT limited by RV dysfunction-not a transplant candidate as per [facility] due to high risk given previous sternotomy and severe peripheral vascular disease with nonhealing foot ulcers  History of fall with with inability to lift left shoulder concern for left shoulder dislocation also fracture of forearm  Generalized peeling of skin with flaky--with raw area left leg concern for atopic dermatitis  Severe protein energy malnutrition with weight loss    INTERVAL HISTORY:  NAEO  Weaning FiO2 slowly  Labs reviewed- creatinine stable, PBNP up slightly today   Net neg 800ml    PLAN:  Acute hypoxic respiratory failure- due to pulmonary edema +/- superimposed infection given multiple infectious sources  Weaning oxygen, remains on high flow on 25L, 40%  Continue Bumex 2 mg IV BID for diuresis  Repeat CXR next week     Sepsis/foot wounds, bacteremia:  Urine with alpha strep  Foot wound culture with MRSA, MSSA, pseudomonas   Blood cultures 10/1 and 10/3 grew MSSA  BC 10/7 NTD  ID following PRN  Overall very poor prognosis as wounds will not heal and he will have unending infections. ID recommended hospice. Patient not interested in hospice at this time  Continue Cefepime and Flagyl  Poor surgical candidate for amputations    Heart Failure/Cardiomyopathy with severe RV dilatation and RV failure  Continue current medical therapy for heart 
[facility]  [facility] in [place], [place]  Mechanical Circulatory Support Inpatient progress Note    Patient name: Bishop Love  Patient : 1955  Patient MRN: [id number]  Date of service: 10/08/24    Primary care physician: Freddy Chandler MD  LVAD cardiologist: MICHAEL    REASON FOR CONSULT:  LVAD management    ASSESSMENT:  Bishop Love is a 69 y.o. male with history of chronic systolic heart failure due to  ischemic cardiomyopathy, s/p HM3 LVAD implantation (2023)  initially as destination therapy, stage D, on optimal GDMT limited by RV dysfunction-not a transplant candidate as per [facility] due to high risk given previous sternotomy and severe peripheral vascular disease with nonhealing right foot ulcer  History of fall with with inability to lift left shoulder concern for left shoulder dislocation also fracture of forearm  Generalized peeling of skin with flaky--with raw area left leg concern for atopic dermatitis  Severe protein energy malnutrition with weight loss    INTERVAL HISTORY:  NAEO, HDS  Labs reviewed  Feels well from a HF standpoint but still having some leg pain this am    PLAN:  Leukocytosis:  WBC decreased further today.    Urine with alpha strep  Foot wound culture with MRSA, MSSA, pseudomonas   Blood cultures 10/1 and 10/3 grew staph aureus  BC 10/7 NTD  ID following  As per ID: plan to treat for 6 weeks from negative BC with IV Ancef and PO Flagyl then with life long PO Keflex    Heart Failure/Cardiomyopathy with severe RV dilatation and RV failure  Continue current medical therapy for heart failure:  Beta-blocker: Continue Metoprolol 25 mg BID   ACE/ARB/ARNi: Not on Entresto due to severe cough leading to Evelyn-Tapia tear  Hydralazine/nitrate: Continue Hydralazine 100 mg TID and Isordil 40 mg TID  MRA: hold spironolactone due to hyponatremia and hyperkalemia.   SGLT2 inhibitor: Not on SGLT2 due to recurrent UTI  Diuretic: hold diuretics, 
[facility]  [place], [place]  Mechanical Circulatory Support Inpatient progress Note    Patient name: Bishop Love  Patient : 1955  Patient MRN: [id number]  Date of service: 10/09/24    Primary care physician: Freddy Chandler MD  LVAD cardiologist: MICHAEL    REASON FOR CONSULT:  LVAD management    ASSESSMENT:  Bishop Love is a 69 y.o. male with history of chronic systolic heart failure due to  ischemic cardiomyopathy, s/p HM3 LVAD implantation (2023)  initially as destination therapy, stage D, on optimal GDMT limited by RV dysfunction-not a transplant candidate as per [facility] due to high risk given previous sternotomy and severe peripheral vascular disease with nonhealing right foot ulcer  History of fall with with inability to lift left shoulder concern for left shoulder dislocation also fracture of forearm  Generalized peeling of skin with flaky--with raw area left leg concern for atopic dermatitis  Severe protein energy malnutrition with weight loss    INTERVAL HISTORY:  Increased oxygen requirements overnight, on 13 L midflow this morning  Net neg 775, no weight today  Labs reviewed  Feels SOB today    PLAN:  Acute hypoxic respiratory failure  CXR with new moderate pulmonary edema  ABG done and showed a PO2 of 40, repeat 37  Place on HFNC at 50 LPM and 100% FiO2  Repeat ABG after being on HFNC  Increase bumex to 2 mg IV TID    Leukocytosis:  WBC increased to 19.2 today    Urine with alpha strep  Foot wound culture with MRSA, MSSA, pseudomonas   Blood cultures 10/1 and 10/3 grew staph aureus  BC 10/7 NTD  ID following  As per ID: plan to treat for 6 weeks from negative BC with IV Ancef and PO Flagyl then with life long PO Keflex    Heart Failure/Cardiomyopathy with severe RV dilatation and RV failure  Continue current medical therapy for heart failure:  Beta-blocker: Continue Metoprolol 25 mg BID   ACE/ARB/ARNi: Not on Entresto due to severe 
[facility]  [facility] in [place], [place]  Mechanical Circulatory Support Inpatient progress Note    Patient name: Bishop Love  Patient : 1955  Patient MRN: [id number]  Date of service: 10/22/24    Primary care physician: Freddy Chandler MD  LVAD cardiologist: MICHAEL    REASON FOR CONSULT:  LVAD management    ASSESSMENT:  iBshop Love is a 69 y.o. male with history of chronic systolic heart failure due to  ischemic cardiomyopathy, s/p HM3 LVAD implantation (2023)  initially as destination therapy, stage D, on optimal GDMT limited by RV dysfunction-not a transplant candidate as per [facility] due to high risk given previous sternotomy and severe peripheral vascular disease with nonhealing foot ulcers  Severe protein energy malnutrition with weight loss  Foot wounds with multiple organisms.  Receiving IV antibiotics.  Bacteremia cleared.  Not a good candidate for amputation.  ID does not think infection will clear and recommends hospice.        INTERVAL HISTORY:  -MAP 70-80s; FiO2 27%  -pBNP steady; INR 1.7; Hg 8.6; no chemistry panel  -weight steady; I/O steady  -Mr. Love is feeling about the same. Continued foot pain.  No SOB.  Denies dizziness.  Continues to work with PT      PLAN:  Acute hypoxic respiratory failure- due to pulmonary edema +/- superimposed infection given multiple infectious sources  Weaning oxygen, remains on high flow on 25L, 27%.  Need to attempt to wean   ABG PRN if unable to obtain reliable pleth on O2 monitor       Sepsis/foot wounds, bacteremia:  Urine with alpha strep  Foot wound culture with MRSA, MSSA, pseudomonas   Blood cultures 10/1 and 10/3 grew MSSA  BC 10/7 NTD  ID following PRN  Overall very poor prognosis as wounds will not heal and he will have unending infections. ID recommended hospice. Patient not interested in hospice at this time  Continue Cefepime and Flagyl  Poor surgical candidate for amputations    Heart 
[facility]  [facility] in [place], [place]  Mechanical Circulatory Support Inpatient progress Note    Patient name: Bishop Love  Patient : 1955  Patient MRN: [id number]  Date of service: 10/12/24    Primary care physician: Freddy Chandler MD  LVAD cardiologist: MICHAEL    REASON FOR CONSULT:  LVAD management    ASSESSMENT:  Bishop Love is a 69 y.o. male with history of chronic systolic heart failure due to  ischemic cardiomyopathy, s/p HM3 LVAD implantation (2023)  initially as destination therapy, stage D, on optimal GDMT limited by RV dysfunction-not a transplant candidate as per [facility] due to high risk given previous sternotomy and severe peripheral vascular disease with nonhealing foot ulcers  History of fall with with inability to lift left shoulder concern for left shoulder dislocation also fracture of forearm  Generalized peeling of skin with flaky--with raw area left leg concern for atopic dermatitis  Severe protein energy malnutrition with weight loss    INTERVAL HISTORY:  No events  Good duiresis  Still requiring Bipap, FiO2 weaned down to 45%  pro-BNP downtrending  Still having pain in feet      PLAN:  Acute hypoxic respiratory failure- due to pulmonary edema +/- superimposed infection given multiple infectious sources  Continue Bipap, wean as able.    Covid/flu negative  Good diuresis, reduce Bumex to 2 mg IV BID for hypokalemia. Replace potassium    Sepsis/foot wounds, bacteremia:  Urine with alpha strep  Foot wound culture with MRSA, MSSA, pseudomonas   Blood cultures 10/1 and 10/3 grew MSSA  BC 10/7 NTD  ID following  Overall very poor prognosis as wounds will not heal and he will have unending infections. ID recommended hospice. Patient not interested in hospice at this time  Continue Cefepime and Flagyl    Heart Failure/Cardiomyopathy with severe RV dilatation and RV failure  Continue current medical therapy for heart failure:  Beta-blocker: 
[facility]  [place], [place]  Mechanical Circulatory Support Inpatient progress Note    Patient name: Bishop Love  Patient : 1955  Patient MRN: [id number]  Date of service: 10/27/24    Primary care physician: Freddy Chandler MD  LVAD cardiologist: MICHAEL    REASON FOR CONSULT:  LVAD management    ASSESSMENT:  Bishop Love is a 69 y.o. male with history of chronic systolic heart failure due to  ischemic cardiomyopathy, s/p HM3 LVAD implantation (2023)  initially as destination therapy, stage D, on optimal GDMT limited by RV dysfunction-not a transplant candidate as per [facility] due to high risk given previous sternotomy and severe peripheral vascular disease with nonhealing foot ulcers  Severe protein energy malnutrition with weight loss  Foot wounds with multiple organisms.  Receiving IV antibiotics.  Bacteremia cleared.  Not a good candidate for amputation.  ID does not think infection will clear and recommends hospice.    Planning on DC to rehab on Monday if stable on NC and PO diuretics       INTERVAL HISTORY:  NAEO  Remains on 5LNC  Labs reviewed- stable  Patient states he feels the same      PLAN:  Acute hypoxic respiratory failure- improving.    due to pulmonary edema +/- superimposed infection given multiple infectious sources  Weaning oxygen, on 5LNC  ABG PRN if unable to obtain reliable pleth on O2 monitor       Sepsis/foot wounds, bacteremia:  Urine with alpha strep  Foot wound culture with MRSA, MSSA, pseudomonas   Blood cultures 10/1 and 10/3 grew MSSA  BC 10/7 NTD  ID following PRN  Overall very poor prognosis as wounds will not heal and he will have unending infections. ID recommended hospice. Patient not interested in hospice at this time  Continue Cefepime and Flagyl  Poor surgical candidate for amputations    Heart Failure/Cardiomyopathy with severe RV dilatation and RV failure  Continue current medical therapy for heart 
[facility]  [place], [place]  Mechanical Circulatory Support inpatient progress Note    Patient name: Bishop Love  Patient : 1955  Patient MRN: [id number]  Date of service: 24    Primary care physician: Freddy Chandler MD  LVAD cardiologist: MICHAEL    REASON FOR CONSULT:  LVAD management    ASSESSMENT:  Bishop Love is a 69 y.o. male with history of chronic systolic heart failure due to  ischemic cardiomyopathy, s/p HM3 LVAD implantation (2023)  initially as destination therapy, stage D, on optimal GDMT limited by RV dysfunction-not a transplant candidate as per [facility] due to high risk given previous sternotomy and severe peripheral vascular disease with nonhealing right foot ulcer  Next annual LVAD evaluation in 1 month  History of fall with with inability to lift left shoulder concern for left shoulder dislocation also fracture of forearm  Generalized peeling of skin with flaky--with raw area left leg concern for atopic dermatitis  Severe protein energy malnutrition with weight loss    INTERVAL HISTORY:  -MAPS 80s  -labs pBNP 3050; creat steady; K 5.0; INR 4.3  -weight donw 1lb;   -Bishop Love is still having foot pain.  Itching at his dry skin. He denies chest pain, SOB, swelling, orthopnea, dizziness.     PLAN:  Heart Failure/Cardiomyopathy with severe RV dilatation and RV failure-fairly well compensated patient appears to be euvolemic more towards dry side  Continue current medical therapy for heart failure:  Beta-blocker: Continue metoprolol 25 mg twice daily   ACE/ARB/ARNi: Not on Entresto due to severe cough leading to Evelyn-Tapia tear  Hydralazine/nitrate: Continue hydralazine 100 mg 3 times daily  MRA: Continue spironolactone 25 mg twice daily  SGLT2 inhibitor: Not on SGLT2 due to recurrent UTI  Diuretic: Currently on torsemide 10 mg 3 times weekly  Patient not a candidate for heart transplant as per [facility] due to severe 
[facility]  [facility] in [place], [place]  Mechanical Circulatory Support Inpatient progress Note    Patient name: Bishop Love  Patient : 1955  Patient MRN: [id number]  Date of service: 10/04/24    Primary care physician: Freddy Chandler MD  LVAD cardiologist: MICHAEL    REASON FOR CONSULT:  LVAD management    ASSESSMENT:  Bishop Love is a 69 y.o. male with history of chronic systolic heart failure due to  ischemic cardiomyopathy, s/p HM3 LVAD implantation (2023)  initially as destination therapy, stage D, on optimal GDMT limited by RV dysfunction-not a transplant candidate as per [facility] due to high risk given previous sternotomy and severe peripheral vascular disease with nonhealing right foot ulcer  History of fall with with inability to lift left shoulder concern for left shoulder dislocation also fracture of forearm  Generalized peeling of skin with flaky--with raw area left leg concern for atopic dermatitis  Severe protein energy malnutrition with weight loss    INTERVAL HISTORY:  NAEO, HDS  Labs reviewed: Hg 8-->6.3, repeat 7.2, no s/o bleeding, all other labs stable  No acute complaints this morning    PLAN:  Leukocytosis:  WBC decreased further today.    Urine with alpha strep  Foot wound culture with MRSA, MSSA, pseudomonas   Blood cultures staph aureus  on ABX per hospitalist.  ID following as well       Heart Failure/Cardiomyopathy with severe RV dilatation and RV failure  Continue current medical therapy for heart failure:  Beta-blocker: Continue Metoprolol 25 mg BID   ACE/ARB/ARNi: Not on Entresto due to severe cough leading to Evelyn-Tapia tear  Hydralazine/nitrate: Continue Hydralazine 100 mg TID and Isordil 40 mg TID  MRA: hold spironolactone due to hyponatremia.  Likely restart tomorrow   SGLT2 inhibitor: Not on SGLT2 due to recurrent UTI  Diuretic: hold diuretics, can use PRN bumex 1mg PO.  He attributes his flaking skin to torsemide.  
[facility]  [place], [place]  Mechanical Circulatory Support inpatient progress Note    Patient name: Bishop Love  Patient : 1955  Patient MRN: [id number]  Date of service: 24    Primary care physician: Freddy Chandler MD  LVAD cardiologist: MICHAEL    REASON FOR CONSULT:  LVAD management    ASSESSMENT:  Bishop Love is a 69 y.o. male with history of chronic systolic heart failure due to  ischemic cardiomyopathy, s/p HM3 LVAD implantation (2023)  initially as destination therapy, stage D, on optimal GDMT limited by RV dysfunction-not a transplant candidate as per [facility] due to high risk given previous sternotomy and severe peripheral vascular disease with nonhealing right foot ulcer  Next annual LVAD evaluation in 1 month  History of fall with with inability to lift left shoulder concern for left shoulder dislocation also fracture of forearm  Generalized peeling of skin with flaky--with raw area left leg concern for atopic dermatitis  Severe protein energy malnutrition with weight loss    INTERVAL HISTORY:  No cardiopulmonary symptoms  Ongoing foot pain and inability to walk/stand on feet    PLAN:  Heart Failure/Cardiomyopathy with severe RV dilatation and RV failure  Continue current medical therapy for heart failure:  Beta-blocker: Continue Metoprolol 25 mg BID   ACE/ARB/ARNi: Not on Entresto due to severe cough leading to Evelyn-Tapia tear  Hydralazine/nitrate: Continue Hydralazine 100 mg TID and Isordil 40 mg TID  MRA: Continue Spironolactone 25 mg daily  SGLT2 inhibitor: Not on SGLT2 due to recurrent UTI  Diuretic: Currently Torsemide 10 mg 3 times weekly  Patient not a candidate for heart transplant as per [facility] due to severe peripheral vascular disease and nonhealing leg ulcer and redo sternotomy  Will also be evaluated by [facility]    LVAD/Anticoagulation: With post LVAD RV failure near euvolemic  Continue current device speed; 5000 
[facility]  [place], [place]  Mechanical Circulatory Support inpatient progress Note    Patient name: Bishop Love  Patient : 1955  Patient MRN: [id number]  Date of service: 24    Primary care physician: Freddy Chandler MD  LVAD cardiologist: MICHAEL    REASON FOR CONSULT:  LVAD management    ASSESSMENT:  Bishop Love is a 69 y.o. male with history of chronic systolic heart failure due to  ischemic cardiomyopathy, s/p HM3 LVAD implantation (2023)  initially as destination therapy, stage D, on optimal GDMT limited by RV dysfunction-not a transplant candidate as per [facility] due to high risk given previous sternotomy and severe peripheral vascular disease with nonhealing right foot ulcer  Next annual LVAD evaluation in 1 month  History of fall with with inability to lift left shoulder concern for left shoulder dislocation also fracture of forearm  Generalized peeling of skin with flaky--with raw area left leg concern for atopic dermatitis  Severe protein energy malnutrition with weight loss    INTERVAL HISTORY:  -MAPS 80s  -labs pBNP 3376; creat 1.06; K 4.8; INR 4.3  -weight up 3lbs; I/O neg 500ml  -Bishop Love is c/o severe foot pain and states he cannot walk.  He would like to stay in the hospital until he can walk.   He denies trouble breathing, dizziness, palpitations, chest pain.  His shoulder pain is doing better in the sling.     PLAN:  Heart Failure/Cardiomyopathy with severe RV dilatation and RV failure-fairly well compensated patient appears to be euvolemic more towards dry side  Continue current medical therapy for heart failure:  Beta-blocker: Continue metoprolol 25 mg twice daily   ACE/ARB/ARNi: Not on Entresto due to severe cough leading to Evelyn-Tapia tear  Hydralazine/nitrate: Continue hydralazine 100 mg 3 times daily  MRA: Continue spironolactone 25 mg twice daily  SGLT2 inhibitor: Not on SGLT2 due to recurrent 
[facility]  [place], [place]  Mechanical Circulatory Support inpatient progress Note    Patient name: Bishop Love  Patient : 1955  Patient MRN: [id number]  Date of service: 24    Primary care physician: Freddy Chandler MD  LVAD cardiologist: MICHAEL    REASON FOR CONSULT:  LVAD management    ASSESSMENT:  Bishop Love is a 69 y.o. male with history of chronic systolic heart failure due to  ischemic cardiomyopathy, s/p HM3 LVAD implantation (2023)  initially as destination therapy, stage D, on optimal GDMT limited by RV dysfunction-not a transplant candidate as per [facility] due to high risk given previous sternotomy and severe peripheral vascular disease with nonhealing right foot ulcer  Next annual LVAD evaluation in 1 month  History of fall with with inability to lift left shoulder concern for left shoulder dislocation also fracture of forearm  Generalized peeling of skin with flaky--with raw area left leg concern for atopic dermatitis  Severe protein energy malnutrition with weight loss    PLAN:  Heart Failure/Cardiomyopathy with severe RV dilatation and RV failure-fairly well compensated patient appears to be euvolemic more towards dry side  Continue current medical therapy for heart failure:  Beta-blocker: Continue metoprolol 25 mg twice daily   ACE/ARB/ARNi: Not on Entresto due to severe cough leading to Evelyn-Tapia tear  Hydralazine/nitrate: Continue hydralazine 100 mg 3 times daily  MRA: Continue spironolactone 25 mg twice daily  SGLT2 inhibitor: None noted on SGLT2 due to recurrent UTI  Diuretic: Currently on torsemide 10 mg 3 times weekly  Patient not a candidate for heart transplant as per [facility] due to severe peripheral vascular disease and nonhealing leg ulcer and redo sternotomy  And will also be evaluated by [facility]    LVAD/Anticoagulation: With post LVAD RV failure near euvolemic  Continue current device speed; 5000 rpm  No signs 
[facility]  [place], [place]  Mechanical Circulatory Support inpatient progress Note    Patient name: Bishop Love  Patient : 1955  Patient MRN: [id number]  Date of service: 24    Primary care physician: Freddy Cahndler MD  LVAD cardiologist: MICHAEL    REASON FOR CONSULT:  LVAD management    ASSESSMENT:  Bishop Love is a 69 y.o. male with history of chronic systolic heart failure due to  ischemic cardiomyopathy, s/p HM3 LVAD implantation (2023)  initially as destination therapy, stage D, on optimal GDMT limited by RV dysfunction-not a transplant candidate as per [facility] due to high risk given previous sternotomy and severe peripheral vascular disease with nonhealing right foot ulcer  Next annual LVAD evaluation in 1 month  History of fall with with inability to lift left shoulder concern for left shoulder dislocation also fracture of forearm  Generalized peeling of skin with flaky--with raw area left leg concern for atopic dermatitis  Severe protein energy malnutrition with weight loss    INTERVAL HISTORY:  NAEO  Labs reviewed- NA improved, creatinine stable, Hgb down to 7.7?  No sign of active bleeding  MAPs 70-80s  Pt states he feels ok today, pain is about the same     PLAN:  Heart Failure/Cardiomyopathy with severe RV dilatation and RV failure  Continue current medical therapy for heart failure:  Beta-blocker: Continue Metoprolol 25 mg BID   ACE/ARB/ARNi: Not on Entresto due to severe cough leading to Evelyn-Tapia tear  Hydralazine/nitrate: Continue Hydralazine 100 mg TID and Isordil 40 mg TID  MRA: Stop spironolactone due to hyponatremia   SGLT2 inhibitor: Not on SGLT2 due to recurrent UTI  Diuretic: hold diuretics, can use PRN bumex 1mg PO  Patient not a candidate for heart transplant as per [facility] due to severe peripheral vascular disease and nonhealing leg ulcer and redo sternotomy  Will also be evaluated by 
[facility]  [place], [place]  Mechanical Circulatory Support inpatient progress Note    Patient name: Bishop Love  Patient : 1955  Patient MRN: [id number]  Date of service: 24    Primary care physician: Freddy Chandler MD  LVAD cardiologist: MICHAEL    REASON FOR CONSULT:  LVAD management    ASSESSMENT:  Bishop Love is a 69 y.o. male with history of chronic systolic heart failure due to  ischemic cardiomyopathy, s/p HM3 LVAD implantation (2023)  initially as destination therapy, stage D, on optimal GDMT limited by RV dysfunction-not a transplant candidate as per [facility] due to high risk given previous sternotomy and severe peripheral vascular disease with nonhealing right foot ulcer  Next annual LVAD evaluation in 1 month  History of fall with with inability to lift left shoulder concern for left shoulder dislocation also fracture of forearm  Generalized peeling of skin with flaky--with raw area left leg concern for atopic dermatitis  Severe protein energy malnutrition with weight loss    INTERVAL HISTORY:  NAEO  Labs reviewed- NA down, K up to 5.2.   Net neg 700ml, weight trending down   Pain in feet is about the same     PLAN:  Heart Failure/Cardiomyopathy with severe RV dilatation and RV failure  Continue current medical therapy for heart failure:  Beta-blocker: Continue Metoprolol 25 mg BID   ACE/ARB/ARNi: Not on Entresto due to severe cough leading to Evelyn-Tapia tear  Hydralazine/nitrate: Continue Hydralazine 100 mg TID and Isordil 40 mg TID  MRA: Decrease Spironolactone 12.5 mg daily  SGLT2 inhibitor: Not on SGLT2 due to recurrent UTI  Diuretic: hold diuretics, can use PRN   Patient not a candidate for heart transplant as per [facility] due to severe peripheral vascular disease and nonhealing leg ulcer and redo sternotomy  Will also be evaluated by [facility]    LVAD/Anticoagulation: With post LVAD RV failure near euvolemic  Continue current 
[facility]  [facility] in [place], [place]    Met with patient and discussed LVAD pager system. Notified patient that effective October 2, 2024 the LVAD emergency pager ([phone]) will no longer be in service. Instructed them that patients and caregivers should contact the answering service at [phone] to reach the LVAD provider for all after hours needs or patient emergencies. They verbalized understanding. Lorrie deleted old pager from phone, assisted patient to save [phone] number in his phone.     
[facility]  [facility] in [place], [place]  Mechanical Circulatory Support Inpatient progress Note    Patient name: Bishop Love  Patient : 1955  Patient MRN: [id number]  Date of service: 10/01/24    Primary care physician: Freddy Chandler MD  LVAD cardiologist: MICHAEL    REASON FOR CONSULT:  LVAD management    ASSESSMENT:  Bishop Love is a 69 y.o. male with history of chronic systolic heart failure due to  ischemic cardiomyopathy, s/p HM3 LVAD implantation (2023)  initially as destination therapy, stage D, on optimal GDMT limited by RV dysfunction-not a transplant candidate as per [facility] due to high risk given previous sternotomy and severe peripheral vascular disease with nonhealing right foot ulcer  History of fall with with inability to lift left shoulder concern for left shoulder dislocation also fracture of forearm  Generalized peeling of skin with flaky--with raw area left leg concern for atopic dermatitis  Severe protein energy malnutrition with weight loss    INTERVAL HISTORY:  -MAPS 80s  -labs ;  INR 1.7; procal 0.31; pBNP steady up to 5492;  WBC up to 19.6; alb 2.4; urine with alpha strep  -weight steady; I/O neg 700ml  -Bishop Love is feeling okay.  Continues to have severe foot pain and cannot walk.   No SOB, chest pain, dizziness, or issues with his drive line.   Skin is starting to clear up.        PLAN:  Leukocytosis:  WBC increased further today  Urine with alpha strep  Foot wound culture with staph aureas and light possible pseudomonas  Blood cultures drawn  on ABX      Heart Failure/Cardiomyopathy with severe RV dilatation and RV failure  Continue current medical therapy for heart failure:  Beta-blocker: Continue Metoprolol 25 mg BID   ACE/ARB/ARNi: Not on Entresto due to severe cough leading to Evelyn-Tapia tear  Hydralazine/nitrate: Continue Hydralazine 100 mg TID and Isordil 40 mg TID  MRA: hold spironolactone due to 
[facility]  [facility] in [place], [place]  Mechanical Circulatory Support Inpatient progress Note    Patient name: Bishop Love  Patient : 1955  Patient MRN: [id number]  Date of service: 10/11/24    Primary care physician: Freddy Chandler MD  LVAD cardiologist: MICHAEL    REASON FOR CONSULT:  LVAD management    ASSESSMENT:  Bishop Love is a 69 y.o. male with history of chronic systolic heart failure due to  ischemic cardiomyopathy, s/p HM3 LVAD implantation (2023)  initially as destination therapy, stage D, on optimal GDMT limited by RV dysfunction-not a transplant candidate as per [facility] due to high risk given previous sternotomy and severe peripheral vascular disease with nonhealing right foot ulcer  History of fall with with inability to lift left shoulder concern for left shoulder dislocation also fracture of forearm  Generalized peeling of skin with flaky--with raw area left leg concern for atopic dermatitis  Severe protein energy malnutrition with weight loss    INTERVAL HISTORY:  Overnight: NAEO, HDS  Net neg 2 L  Still requiring Bipap, FiO2 weaned down to 60% by this morning  Labs reviewed, pro-BNP downtrending  Feels ok today, breathing comfortably on Bipap.        PLAN:  Acute hypoxic respiratory failure- due to pulmonary edema +/- superimposed infection given multiple infectious sources  Repeat CXR showed persistent pulmonary edema with mild improvement  Continue Bipap, wean as able.    Repeat ABG this am showed PaO2 of 90 on Bipap 60% 14/10  Send Covid/flu  Continue bumex to 2 mg IV TID- good out put with this    Sepsis:    Urine with alpha strep  Foot wound culture with MRSA, MSSA, pseudomonas   Blood cultures 10/1 and 10/3 grew staph aureus  BC 10/7 NTD  ID following  As per ID: plan to treat for 6 weeks from negative BC with IV Ancef and PO Flagyl then with life long PO Keflex  Lactic acid 1.4    Heart Failure/Cardiomyopathy with severe RV 
[facility]  [facility] in [place], [place]      Met with patient at bedside. Discussed team concern of possible pests in patient's home as some insects were seen on his LVAD equipment in clinic. Discussed with patient that pests can have negative health effects (respiratory irritation, infection, lack of sterile environment when doing wound care) and team wants to ensure his environment posing yair health risk to him. He verbalized understanding, states he sees insects in his home \"very seldomly\" and does not feel they are issue in his home. States that he has had his home treated (\"fogged\") a few times to address this issue.     Inquired if patient would like [facility]  to investigate services to assess and treat any pests in the home for him, patient declined, stated that he does not feel they are to much of an issue. Discussed with patient that if he changes his him in the future [facility]  is available to assist him in locating resources to address this, he verbalized understanding. SUE Palencia, Dr. Fields updated.      Bety Aranda RN    
Charting and patient care of Bishop Love by keshawn Haynes from 0730 to 2000 was supervised and reviewed by this RN.    
Charting and patient care of Bishop Love by keshawn KONG RN from 0730 to 2000 was supervised and reviewed by this RN.    
Charting and patient care of Bishop Lvoe by keshawn KONG RN from 0730 to 2000 was supervised and reviewed by this RN.      
Comprehensive Nutrition Assessment    Type and Reason for Visit: Reassess    Nutrition Recommendations/Plan:   Continue Low Fat/Low chol/2gm Na+ diet  Continue Ensure Enlive TID     Malnutrition Assessment:  Malnutrition Status:  Severe malnutrition (09/25/24 1314)    Context:  Acute Illness     Findings of the 6 clinical characteristics of malnutrition:  Energy Intake:  50% or less of estimated energy requirements for 5 or more days  Weight Loss:  Greater than 5% over 1 month     Body Fat Loss:  Moderate body fat loss Buccal region, Triceps   Muscle Mass Loss:  Moderate muscle mass loss Temples (temporalis)  Fluid Accumulation:  Mild Extremities   Strength:  Not Performed     Nutrition Assessment:    68 yo male admitted for hyponatremia.  Pmhx: CAD, MI, pacemaker/ICD implantation, ischemic cardiomyopathy, chronic HFrEF, long-term anticoagulation on Coumadin, mitral vegetation, rheumatic tricuspid regurgitation, sick sinus syndrome, peripheral vascular disease.  Podiatry and WOCN following for bilateral foot wounds.    10/14:  Checked in on TF order + PO.  Spoke with pt at bedside.  No NGT in.  He was sitting up eating lunch.  Spoke with RN who states TF ran for only one night due to pt being on Bipap.  The next day he transitioned back to HFNC and was able to tolerate PO again.  Currently on 30 L HFNC.  Will discontinue TF order.  Pt eating well.  Intakes documented as % meals last 2 days since coming off TF.  RN states pt drinking Ensure shakes which pt also confirmed he is drinking.  Weight is down to 98 lbs via lift scale.     10/11: f/u. Code sepsis called 10/9, pt was on high flow NC and had to be placed on BiPAP, lower extremity wound has worsened. Acute hypoxic failure d/t pulmonary edema. Vascular feels pt is high risk for anesthesia/amputation, recommending pt consider palliative care/comfort care. Pt continues to want full restorative measures. Pt NPO per RT (acute NIPPV orders). PO intake was 
Comprehensive Nutrition Assessment    Type and Reason for Visit: Reassess    Nutrition Recommendations/Plan:   Continue MELVIN and low K+ diet, will trial Soft and Bite Sized consistency d/t difficulty chewing  Fluid restriction per MD  Continue Ensure Enlive TID for increased kcal/protein intake     Malnutrition Assessment:  Malnutrition Status:  Severe malnutrition (09/25/24 1314)    Context:  Acute Illness     Findings of the 6 clinical characteristics of malnutrition:  Energy Intake:  50% or less of estimated energy requirements for 5 or more days  Weight Loss:  Greater than 5% over 1 month     Body Fat Loss:  Moderate body fat loss Buccal region, Triceps   Muscle Mass Loss:  Moderate muscle mass loss Temples (temporalis)  Fluid Accumulation:  Mild Extremities   Strength:  Not Performed     Nutrition Assessment:    68 yo male admitted for hyponatremia.  Pmhx: CAD, MI, pacemaker/ICD implantation, ischemic cardiomyopathy, chronic HFrEF, long-term anticoagulation on Coumadin, mitral vegetation, rheumatic tricuspid regurgitation, sick sinus syndrome, peripheral vascular disease.  Podiatry and WOCN following for bilateral foot wounds.    10/8: f/u. Wound with MRSA, MSSA, wound care following. Spoke with pt at bedside, he reports minimal PO intake d/t not liking the food and having trouble chewing, around 50%, consistent with flowsheet documentation. He endorses still having difficulty chewing, asked him if he would like a softer diet and he agreed. Will trial soft and bite sized diet. He says he will drink at least 2 full Ensures per day, if not all 3. Sometimes he does not finish one of them. RD to continue to monitor PO/ONS intake and chewing difficulty.   Labs: Na+ 129, K+ 5.4     10/1:  Follow up.  Podiatry re-consulted secondary to infection of right heel and left ankle wounds.  No surgical plan at this time.  WOCN following.  Spoke with pt at bedside.  He had just finished eating lunch, < 25% consumed, states 
Comprehensive Nutrition Assessment    Type and Reason for Visit: Reassess    Nutrition Recommendations/Plan:   Continue with 2 gm Na+ diet - will change consistency due to difficulty chewing  Fluid restriction per MD  Continue Ensure Enlive TID for increased kcal/protein intake       Malnutrition Assessment:  Malnutrition Status:  Severe malnutrition (09/25/24 1314)    Context:  Acute Illness     Findings of the 6 clinical characteristics of malnutrition:  Energy Intake:  50% or less of estimated energy requirements for 5 or more days  Weight Loss:  Greater than 5% over 1 month     Body Fat Loss:  Moderate body fat loss Buccal region, Triceps   Muscle Mass Loss:  Moderate muscle mass loss Temples (temporalis)  Fluid Accumulation:  Mild Extremities   Strength:  Not Performed       Nutrition Assessment:    70 yo male admitted for hyponatremia.  Pmhx: CAD, MI, pacemaker/ICD implantation, ischemic cardiomyopathy, chronic HFrEF, long-term anticoagulation on Coumadin, mitral vegetation, rheumatic tricuspid regurgitation, sick sinus syndrome, peripheral vascular disease.  Podiatry and WOCN following for bilateral foot wounds.     10/1:  Follow up.  Podiatry re-consulted secondary to infection of right heel and left ankle wounds.  No surgical plan at this time.  WOCN following.  Spoke with pt at bedside.  He had just finished eating lunch, < 25% consumed, states it was too tough for him to chew.  Asked if he has been having difficulty chewing other meals and he said yes.  He is now in agreement to having foods chopped up/softer for him, will alter diet consistency.  Intakes are documented by nursing as % most meals. Ensure Enlive ordered TID, he was drinking that during my visit and states he loves them and has been drinking them all.  Weight stable from last assessment.  Labs: Na+ 130    9/25: Seeing pt for LOS.  Spoke with pt at bedside.  He reports not eating much of the food here due to not liking it.  Has 
Comprehensive Nutrition Assessment    Type and Reason for Visit: Reassess    Nutrition Recommendations/Plan:   Continue with Low Fat/Low Chol/2gm Na+ diet  Will increase Ensure Enlive to 4x/day       Malnutrition Assessment:  Malnutrition Status:  Severe malnutrition (09/25/24 1314)    Context:  Acute Illness     Findings of the 6 clinical characteristics of malnutrition:  Energy Intake:  50% or less of estimated energy requirements for 5 or more days  Weight Loss:  Greater than 5% over 1 month     Body Fat Loss:  Moderate body fat loss Buccal region, Triceps   Muscle Mass Loss:  Moderate muscle mass loss Temples (temporalis)  Fluid Accumulation:  Mild Extremities   Strength:  Not Performed       Nutrition Assessment:    70 yo male admitted for hyponatremia.  Pmhx: CAD, MI, pacemaker/ICD implantation, ischemic cardiomyopathy, chronic HFrEF, long-term anticoagulation on Coumadin, mitral vegetation, rheumatic tricuspid regurgitation, sick sinus syndrome, peripheral vascular disease.  Podiatry and WOCN following for bilateral foot wounds.    10/21:  Follow up.  Remains on Heated HFNC - 20 L.  Pt sleeping soundly at time of visit.  Breakfast tray in room, pt ate ~50% of food, drank 100% milk and Ensure.  Intakes documented as varied: 1-100%.  He always drinks the Ensure shakes so will increase to 4/day.  Weight is down 11 lbs over last week, concerning the amount of weight he is losing.  Noted in chart that he might be over-diuresed due to rising Cr.  BG slightly elevated but HgbA1c WNL.       10/14:  Checked in on TF order + PO.  Spoke with pt at bedside.  No NGT in.  He was sitting up eating lunch.  Spoke with RN who states TF ran for only one night due to pt being on Bipap.  The next day he transitioned back to HFNC and was able to tolerate PO again.  Currently on 30 L HFNC.  Will discontinue TF order.  Pt eating well.  Intakes documented as % meals last 2 days since coming off TF.  RN states pt drinking 
Comprehensive Nutrition Assessment    Type and Reason for Visit: Reassess    Nutrition Recommendations/Plan:   Continue with Low Fat/Low Chol/2gm Na+, Low K+ diet  Continue with Ensure Enlive 4x/day       Malnutrition Assessment:  Malnutrition Status:  Severe malnutrition (09/25/24 1314)    Context:  Acute Illness     Findings of the 6 clinical characteristics of malnutrition:  Energy Intake:  50% or less of estimated energy requirements for 5 or more days  Weight Loss:  Greater than 5% over 1 month     Body Fat Loss:  Moderate body fat loss Buccal region, Triceps   Muscle Mass Loss:  Moderate muscle mass loss Temples (temporalis)  Fluid Accumulation:  Mild Extremities   Strength:  Not Performed       Nutrition Assessment:    68 yo male admitted for hyponatremia.  Pmhx: CAD, MI, pacemaker/ICD implantation, ischemic cardiomyopathy, chronic HFrEF, long-term anticoagulation on Coumadin, mitral vegetation, rheumatic tricuspid regurgitation, sick sinus syndrome, peripheral vascular disease.  Podiatry and WOCN following for bilateral foot wounds.    10/29: Follow up.  Currently on 3.5L NC O2.  Spoke with pt at bedside.  He reports eating most of his meals.  Intakes documented as % most meals.  States he is still drinking all of the Ensure shakes.  Planning to d/c soon.  Weight is back up 12 lbs over last week. BG remains slightly elevated.  K+ restriction added to diet order due to high K+ on 10/25. Will leave diet as is since he is getting 1880 mg K+/day with Ensure shakes alone.         10/21:  Follow up.  Remains on Heated HFNC - 20 L.  Pt sleeping soundly at time of visit.  Breakfast tray in room, pt ate ~50% of food, drank 100% milk and Ensure.  Intakes documented as varied: 1-100%.  He always drinks the Ensure shakes so will increase to 4/day.  Weight is down 11 lbs over last week, concerning the amount of weight he is losing.  Noted in chart that he might be over-diuresed due to rising Cr.  BG slightly 
Comprehensive Nutrition Assessment    Type and Reason for Visit: Reassess    Nutrition Recommendations/Plan:   TF recommendations  Jevity 1.5 via DHT` @ 60 ml/hr with 150 ml FWF q 4 hours   Initiate at 20 ml/hr, increase by 10 ml q 8 hours, or as pt tolerated, until goal met    Low K+, replete and monitor  Monitor weight     Malnutrition Assessment:  Malnutrition Status:  Severe malnutrition (09/25/24 1314)    Context:  Acute Illness     Findings of the 6 clinical characteristics of malnutrition:  Energy Intake:  50% or less of estimated energy requirements for 5 or more days  Weight Loss:  Greater than 5% over 1 month     Body Fat Loss:  Moderate body fat loss Buccal region, Triceps   Muscle Mass Loss:  Moderate muscle mass loss Temples (temporalis)  Fluid Accumulation:  Mild Extremities   Strength:  Not Performed     Nutrition Assessment:    70 yo male admitted for hyponatremia.  Pmhx: CAD, MI, pacemaker/ICD implantation, ischemic cardiomyopathy, chronic HFrEF, long-term anticoagulation on Coumadin, mitral vegetation, rheumatic tricuspid regurgitation, sick sinus syndrome, peripheral vascular disease.  Podiatry and WOCN following for bilateral foot wounds.    10/11: f/u. Code sepsis called 10/9, pt was on high flow NC and had to be placed on BiPAP, lower extremity wound has worsened. Acute hypoxic failure d/t pulmonary edema. Vascular feels pt is high risk for anesthesia/amputation, recommending pt consider palliative care/comfort care. Pt continues to want full restorative measures. Pt NPO per RT (acute NIPPV orders). PO intake was variable prior to NPO order, ranged from 0-100% over the past few days, averaged ~50%.    Pt weight has dropped from 144# via standing scale 10/8 to 101# via Edvin lift scale. Pt also has standing scale weight of 125# from 9/27. Unsure if pt has actually dropped 20-30#? Will continue to monitor his weight. Pt does have noted moderate muscle wasting/fat loss. Palliative following 
First ABG done in Right Radial, blood returned quickly like normal arterial flow. Results critically low on Oxygen. Different therapist did repeat ABG in Right Brachial with same results. ABGs drawn on 13 lpm Midflow cannula. Changed to Heated High Flow at 30 lpm and 95%. O2 Sat still unable to  reading after multiple location attempts by multiple staff.   
Infectious Disease Progress     Impression:   Bacteremia  Right medial ankle erosion  Left dorsal ankle wound  - afebrile, wbc 13.6    CRP (10/3) 22.80    Blood cx (10/1, 10/3) MSSA, (10/7 & 10/9) no growth so far    Wound cx (9/30) MRSA, MSSA, pseudomonas, anaerobe organism    U/A (9/29) wbc 0-4, 2+ bacteria, cx - alpha strep    Xeroderma  - in progress    CHF  CAD  Hypertension  Cardiomyopathy  S/p LAVD, AICD  - heart failure team following     S/p fall  LUE pain  - XR of LUE;  XR of left shoulder revealed High riding humeral head compatible with chronic massive cuff tear.     Evaluated by ortho team on 9/17; recommend outpatient follow up     Anemia  - hgb 6.3->9.2; s/p transfusion    primary team following  Plan:     - continue with IV cefepime and Flagyl and trend WBC    prognosis poor overall     Pt is alert and orient to self and place. Pt elected not to discuss on goals' of care during visit.       Ultimate discharge plan    If 10/7 blood cultures and TTE are negative for evidence of endocarditis, then will coordinate a 6 week Cefazolin course until 11/17/24, inclusive.    PO Flagyl duration until 10/21/24, inclusive.    On 11/18/24 - transition to lifelong Keflex 500mg PO BID suppressive therapy.    May place PICC line as long 10/7 BC remain negative and wbc trending down or getting ready to discharge whichever happens first       Amlactin 12% topical BID to all areas on b/l LE of dry skin     Per Vascular - not a candidate for revascularization   Per Podiatry -  no surgical intervention     Plan of care d/w pt, pt's nurse, and Dr. Tony Fong  Flagyl  Cefepime   Vancomycin   History of Present Illness   9/18/2024  Patient is a 69 y.o. male with medical history of CAD, MI, s/p AICD, cardiomyopathy, s/p LVAD, CHF, chronic AC therapy, past right MCA stroke, s/p thrombectomy post LVAD,  GI bleed due to Evelyn tear,  and PVD presented to ER on 9/17 after the fall with left shoulder pain and itching.          
Infectious Disease Progress     Impression:   Bacteremia  Right medial ankle erosion  Left dorsal ankle wound  - afebrile, wbc 18.2    Blood cx (10/1) SA species    Wound cx (9/30) MRSA, MSSA, pseudomonas, anaerobe organism    U/A (9/29) wbc 0-4, 2+ bacteria, cx - alpha strep    Xeroderma  - resolved    CHF  CAD  Hypertension  Cardiomyopathy  S/p LAVD, AICD  - heart failure team following     S/p fall  LUE pain  - XR of LUE;  XR of left shoulder revealed High riding humeral head compatible with chronic massive cuff tear.    To be evaluated by ortho team       Plan:     - continue with IV cefepime and vancomycin    Start on PO flagyl    Pharmacy to dose per creatinine clearance.     Will follow Repeat BC     Echo to r/o vegetation; however, our plan of care will not changed. Will defer that to cardiology and primary team      Plan to treat 6 weeks at this time.  May need suppressive therapy       Vascular - not a candidate for revascularization    Podiatry -  no surgical intervention      ID team will follow  Plan of care d/w pt, pt's nurse, and Dr. Honeycutt            History of Present Illness   9/18/2024  Patient is a 69 y.o. male with medical history of CAD, MI, s/p AICD, cardiomyopathy, s/p LVAD, CHF, chronic AC therapy, past right MCA stroke, s/p thrombectomy post LVAD,  GI bleed due to Evelyn tear,  and PVD presented to ER on 9/17 after the fall with left shoulder pain and itching.                 In ER, temp 98, wbc 8.4, XR of left shoulder revealed High riding humeral head compatible with chronic massive cuff tear. No fracture. Left elbow XR revealed  Age-indeterminate fracture of a bony spur at the triceps insertion. There is adjacent soft tissue swelling.     During visit, pt is c/o LUE pain, dry skin, and itching. No fever, chills, nausea, vomiting, sob, grigsby, chest pain, abd pain, diarrhea, or dysuria.      ID team was consulted for evaluation and treatment recommendations regarding skin lesions. 
Infectious Disease Progress     Impression:   Bacteremia  Right medial ankle erosion  Left dorsal ankle wound  - afebrile, wbc 18.2    Blood cx (10/1) SA species    Wound cx (9/30) SA, GNR - multiple colonies    U/A (9/29) wbc 0-4, 2+ bacteria, cx - alpha strep    Xeroderma  - resolved    CHF  CAD  Hypertension  Cardiomyopathy  S/p LAVD, AICD  - heart failure team following     S/p fall  LUE pain  - XR of LUE;  XR of left shoulder revealed High riding humeral head compatible with chronic massive cuff tear.    To be evaluated by ortho team       Plan:     - continue with IV cefepime and vancomycin    Pharmacy to dose per creatinine clearance.     Repeat BC in am    Echo to r/o vegetation     Plan to treat 6 weeks at this time.       Vascular - not a candidate for revascularization    Podiatry -  no surgical intervention      ID team will follow  Plan of care d/w pt, pt's nurse, and Dr. Moreno            History of Present Illness   9/18/2024  Patient is a 69 y.o. male with medical history of CAD, MI, s/p AICD, cardiomyopathy, s/p LVAD, CHF, chronic AC therapy, past right MCA stroke, s/p thrombectomy post LVAD,  GI bleed due to Evelyn tear,  and PVD presented to ER on 9/17 after the fall with left shoulder pain and itching.                 In ER, temp 98, wbc 8.4, XR of left shoulder revealed High riding humeral head compatible with chronic massive cuff tear. No fracture. Left elbow XR revealed  Age-indeterminate fracture of a bony spur at the triceps insertion. There is adjacent soft tissue swelling.     During visit, pt is c/o LUE pain, dry skin, and itching. No fever, chills, nausea, vomiting, sob, grigsby, chest pain, abd pain, diarrhea, or dysuria.      ID team was consulted for evaluation and treatment recommendations regarding skin lesions.     10/2/2024  Re consulted for bacteremia    Subjective:    C/o right heel pain    Review of Systems:            Symptom Y/N Comments   Symptom Y/N Comments   Fever/Chills n    
Occupational Therapy    Chart reviewed, attempted to see patient for OT however patient declining at this time, educated on benefits of participating in therapy and patient continued to decline. Will continue to follow up as able.     Ashlie Mcnamara MS, OTR/L    
Occupational Therapy  10/01/24    Chart reviewed up to this date, cleared to see by RN. Pt received sitting in bed with PCT at bedside setting up lunch tray. Pt politely declined OT intervention at this time requesting to eat lunch. Will defer and continue to follow up as able/medically appropriate.     Tiffanie Xiong, OTSUBHA, OTR/L      
Occupational Therapy  10/03/24    Chart reviewed up tot his date, cleared to see by RN. Pt received resting in bed and politely declined OT intervention at this time citing increased pain following wound care just prior to OT arrival. Despite max verbal encouragement/education, pt continued to decline. Will defer and continue to follow up as able/medically appropriate.     Tiffanie Xiong, OTD, OTR/L    
Occupational Therapy  10/04/24    Chart reviewed up to this date. Noted most recent Hgb: 6.3 with plan for blood transfusion. Will defer and continue to follow up as able/medically appropriate.     BAM Johnson, OTR/L    
Occupational Therapy  10/14/24    Chart reviewed up to this date and cleared to see by RN. Pt received resting in bed and reported 10/10 pain in BLE, RN made aware. Pt declined OT intervention at this time. Will defer and continue to follow up as able/medically appropriate.     Tiffanie Xiong, OTD, OTR/L      
Occupational Therapy  10/15/24    Chart reviewed up to this date. Attempted to see for OT session. Patient just getting set up for breakfast and politely deferred therapy at this time. Agreeable to PT/OT follow up later today as time allows. Thank you.    1500: Re-attempted to see pt for OT intervention, however, pt resting in bed and politely declined OT intervention citing increased BLE pain, RN made aware. Will continue to follow.     Tiffanie Xiong, OTD, OTR/L        
Occupational Therapy  9/19/2024    Attempted to see patient for OT evaluation. Patient resting in bed but easily wakes to voice. Patient declining all offered ADLs and EOB activity, requesting to continue to rest at this time. Will defer and reattempt as able.     Ivana Simon, OTD, OTR/L    
Palliative Medicine     Reconsulted by ID- discussed w/ Manju Ley, as pt thinking about code/resuscitation status.    I met pt 10/4/24, goals clear at that time for full restorative measures and rehab. Open to surgery incl amputation if recommended and could be safely done.     Touched base w/ [facility] Leslie Mondragon NP- pt has known them well since before LVAD placed over a year ago. At this time, their plan is to talk w/ pt about goals and options first- and then will reach out to me/Palliative team directly. Always glad to be part of goals of care conversations as needed.     Following w/ you.      
Pharmacist Note - Vancomycin Dosing    Consult provided for this 69 y.o. male for indication of SSTI.     - BLE wounds secondary to PAD last evaluated by podiatry , podiatry re-consulted.  Antibiotic regimen(s): vanc + cefepime  Patient on vancomycin PTA? NO     Recent Labs     24  0054 24  0032 24  0007   WBC 9.9 15.8* 16.7*   CREATININE 1.14 1.05 1.04   BUN 24* 26* 27*     Frequency of BMP: daily through   Height: 175.3 cm  Weight: 57.8 kg  Est CrCl: 56 ml/min; UO: 0.6 ml/kg/hr  Temp (24hrs), Av.3 °F (36.8 °C), Min:97.8 °F (36.6 °C), Max:98.7 °F (37.1 °C)    Cultures:   UA - 2+ bacteria, small leuk esterase    urine - in process    MRSA Swab ordered (if applicable)? N/A    The plan below is expected to result in a target range of AUC/BRAYAN 400-500    Therapy will be initiated with a loading dose of 1250 mg IV x 1 to be followed by a maintenance dose of 750 mg IV every 12 hours.  Pharmacy to follow patient daily and order levels / make dose adjustments as appropriate.    *Vancomycin has been dosed used Bayesian kinetics software to target an AUC/BRAYAN of 400-600, which provides adequate exposure for an assumed infection due to MRSA with an BRAYAN of 1 or less while reducing the risk of nephrotoxicity as seen with traditional trough based dosing goals.    
Pharmacist Note - Vancomycin Dosing  Therapy day 2  Indication: SSTI  Current regimen: 750 mg IV Q12H    Recent Labs     09/29/24  0032 09/30/24  0007 10/01/24  0302   WBC 15.8* 16.7* 19.6*   CREATININE 1.05 1.04 1.06   BUN 26* 27* 26*       A random vancomycin level of 18.5 mcg/mL was obtained and from this level, the patient's AUC24 is calculated to be 461 with the current regimen.     Goal target range AUC/BRAYAN 400-500      Plan: Continue current regimen. Pharmacy will continue to monitor this patient daily for changes in clinical status and renal function.    *Random vancomycin levels are used to calculate AUC/BRAYAN, this level should not be interpreted as a trough. Vancomycin has been dosed using Bayesian kinetics software to target an AUC24:BRAYAN of 400-600, which provides adequate exposure for as assumed infection due to MRSA with an BRAYAN of 1 or less while reducing the risk of nephrotoxicity as seen with traditional trough based dosing goals.    
Pharmacist Note - Vancomycin Dosing  Therapy day 6  Indication: MSSA bacteremia/SSTI  Current regimen: 750 mg IV Q 12 hours    Recent Labs     10/03/24  0957 10/04/24  0652 10/05/24  0311   WBC 14.8* 16.3* 15.8*   CREATININE 0.86 0.82 0.87   BUN 25* 27* 28*       A random vancomycin level of 18.5 mcg/mL was obtained and from this level, the patient's AUC24 is calculated to be 502 with the current regimen.     Goal target range AUC/BRAYAN 400-600      Plan: Continue current regimen. Pharmacy will continue to monitor this patient daily for changes in clinical status and renal function.      *Random vancomycin levels are used to calculate AUC/BRAYAN, this level should not be interpreted as a trough. Vancomycin has been dosed using Bayesian kinetics software to target an AUC24:BRAYAN of 400-600, which provides adequate exposure for as assumed infection due to MRSA with an BRAYAN of 1 or less while reducing the risk of nephrotoxicity as seen with traditional trough based dosing goals.    
Pharmacist Note - Warfarin Dosing  Consult provided for this 69 y.o.male to manage warfarin for LVAD    INR Goal: 2 - 3    Home regimen/ tablet size: 1.5 mg on Thursday, 2 mg all other days    Drugs that may increase INR: Amiodarone (PTA med)  Drugs that may decrease INR: None  Other medications that may increase bleeding risk: Aspirin  Risk factors: Age > 65  Daily INR ordered through: Daily through 9/27    Recent Labs     09/20/24  0140 09/21/24  0016 09/22/24  0227   INR 4.3* 4.3* 3.0*     Date               INR                  Dose  9/17  3.1  1 mg   9/18  2.8  2 mg  9/19  3.8  Hold  9/20  4.3  Hold  9/21  4.3  Hold  9/22  3  1 mg                                                                               Assessment/ Plan:  Will order warfarin 1 mg x 1 dose for today.    Pharmacy will continue to monitor daily and adjust therapy as indicated.  Please contact the pharmacist at [phone] for outpatient recommendations if needed.        
Pharmacist Note - Warfarin Dosing  Consult provided for this 69 y.o.male to manage warfarin for LVAD    INR Goal: 2 - 3    Home regimen/ tablet size: 1.5 mg on thurs, 2 mg all other days    Drugs that may increase INR: Amiodarone  Drugs that may decrease INR: None  Other current anticoagulants/ drugs that may increase bleeding risk: Aspirin  Risk factors: Age > 65  Daily INR ordered through: Daily through 9/27    Recent Labs     09/16/24  0000 09/17/24  1711 09/18/24  0114   HGB  --  9.7* 8.5*   INR 2.60 3.1* 2.8*     Date               INR                  Dose  9/17  3.1  1 mg   9/18  2.8  2 mg                                                                                 Assessment/ Plan:  Will order home dose of warfarin 2 mg PO x 1 dose. INR within goal.    Pharmacy will continue to monitor daily and adjust therapy as indicated.  Please contact the pharmacist at [phone] or [phone] for outpatient recommendations if needed.        
Pharmacist Note - Warfarin Dosing  Consult provided for this 69 y.o.male to manage warfarin for LVAD    INR Goal: 2 - 3    Home regimen/ tablet size: 1.5 mg on thurs, 2 mg all other days    Drugs that may increase INR: Amiodarone (PTA med)  Drugs that may decrease INR: None  Other current anticoagulants/ drugs that may increase bleeding risk: Aspirin  Risk factors: Age > 65  Daily INR ordered through: Daily through 9/27    Recent Labs     09/17/24  1711 09/18/24  0114 09/19/24  0230   HGB 9.7* 8.5*  --    INR 3.1* 2.8* 3.8*     Date               INR                  Dose  9/17  3.1  1 mg   9/18  2.8  2 mg  9/19  3.8  HOLD                                                                                   Assessment/ Plan:  Will HOLD warfarin dose today for supratherapeutic INR.    Pharmacy will continue to monitor daily and adjust therapy as indicated.  Please contact the pharmacist at [phone] or [phone] for outpatient recommendations if needed.          
Pharmacist Note - Warfarin Dosing  Consult provided for this 69 y.o.male to manage warfarin for LVAD    INR Goal: 2 - 3    Home regimen/ tablet size: 1.5 mg on thurs, 2 mg all other days    Drugs that may increase INR: Amiodarone (PTA med)  Drugs that may decrease INR: None  Other current anticoagulants/ drugs that may increase bleeding risk: Aspirin  Risk factors: Age > 65  Daily INR ordered through: Daily through 9/27    Recent Labs     09/17/24  1711 09/18/24  0114 09/19/24  0230 09/20/24  0140   HGB 9.7* 8.5*  --   --    INR 3.1* 2.8* 3.8* 4.3*     Date               INR                  Dose  9/17  3.1  1 mg   9/18  2.8  2 mg  9/19  3.8  Hold  9/20  4.3  Hold                                                                                     Assessment/ Plan:  Will HOLD warfarin dose today for supratherapeutic INR.    Pharmacy will continue to monitor daily and adjust therapy as indicated.  Please contact the pharmacist at [phone] or [phone] for outpatient recommendations if needed.            
Pharmacist Note - Warfarin Dosing  Consult provided for this 69 y.o.male to manage warfarin for LVAD    INR Goal: 2 - 3    Home regimen/ tablet size: 1.5 mg on thurs, 2 mg all other days    Drugs that may increase INR: Amiodarone (PTA med)  Drugs that may decrease INR: None  Other current anticoagulants/ drugs that may increase bleeding risk: Aspirin  Risk factors: Age > 65  Daily INR ordered through: Daily through 9/27    Recent Labs     09/19/24  0230 09/20/24  0140 09/21/24  0016   INR 3.8* 4.3* 4.3*     Date               INR                  Dose  9/17  3.1  1 mg   9/18  2.8  2 mg  9/19  3.8  Hold  9/20  4.3  Hold  9/21  4.3  Hold                                                                               Assessment/ Plan:  Will HOLD warfarin dose today for supratherapeutic INR.    Pharmacy will continue to monitor daily and adjust therapy as indicated.  Please contact the pharmacist at [phone] or [phone] for outpatient recommendations if needed.      
Pharmacist Note - Warfarin Dosing  Consult provided for this 69 y.o.male to manage warfarin for LVAD    INR Goal: 2 - 3    Home regimen/ tablet size: 1.5 mg on thurs, 2 mg all other days (last dose 9/16)    Drugs that may increase INR: Amiodarone  Drugs that may decrease INR: None  Other current anticoagulants/ drugs that may increase bleeding risk: Aspirin  Risk factors: Age > 65  Daily INR ordered through: Daily through 9/27    Recent Labs     09/16/24  0000 09/17/24  1711   HGB  --  9.7*   INR 2.60 3.1*     Date               INR                  Dose  9/17  3.1  1 mg                                                                                Assessment/ Plan:  Will order warfarin 1 mg PO x 1 dose. Reduced dose for INR just above goal    Pharmacy will continue to monitor daily and adjust therapy as indicated.  Please contact the pharmacist at [phone] for outpatient recommendations if needed.      
Pharmacist Note - Warfarin Dosing  Consult provided for this 69 y.o.male to manage warfarin for LVAD    INR Goal: 2 - 3    Home regimen: 1.5 mg on Thursday, 2 mg all other days    Drugs that may increase INR: Amiodarone (PTA med)  Drugs that may decrease INR: None  Other medications that may increase bleeding risk: Aspirin  Risk factors: Age > 65  Daily INR ordered through: Daily through 10/2    Recent Labs     09/23/24  0033 09/24/24  0005 09/25/24  0056 09/25/24  0236   HGB 8.9* 9.3*  --  8.9*   INR 2.3* 2.6* PLEASE DISREGARD RESULTS 2.7*     Date               INR                  Dose  9/17  3.1  1 mg   9/18  2.8  2 mg  9/19  3.8  Hold  9/20  4.3  Hold  9/21  4.3  Hold  9/22  3  1 mg  9/23  2.3  1 mg  9/24  2.6  1 mg  9/25  2.7  1 mg                                                                               Assessment/ Plan:  Will order warfarin 1 mg x 1 dose for today.    Pharmacy will continue to monitor daily and adjust therapy as indicated.  Please contact the pharmacist at [phone] for outpatient recommendations if needed.              
Pharmacist Note - Warfarin Dosing  Consult provided for this 69 y.o.male to manage warfarin for LVAD    INR Goal: 2 - 3    Home regimen: 1.5 mg on Thursday, 2 mg all other days    Drugs that may increase INR: Amiodarone (PTA med)  Drugs that may decrease INR: None  Other medications that may increase bleeding risk: Aspirin  Risk factors: Age > 65  Daily INR ordered through: Daily through 10/2    Recent Labs     09/24/24  0005 09/25/24  0056 09/25/24  0236 09/26/24  0018   HGB 9.3*  --  8.9* 9.5*   INR 2.6* PLEASE DISREGARD RESULTS 2.7* 2.8*     Date               INR                  Dose  9/17  3.1  1 mg   9/18  2.8  2 mg  9/19  3.8  Hold  9/20  4.3  Hold  9/21  4.3  Hold  9/22  3  1 mg  9/23  2.3  1 mg  9/24  2.6  1 mg  9/25  2.7  1 mg  9/26  2.8  0.5 mg                                                                                Assessment/ Plan:  INR continues to climb on 1mg; will order warfarin 0.5 mg x 1 dose today.    Pharmacy will continue to monitor daily and adjust therapy as indicated.  Please contact the pharmacist at [phone] for outpatient recommendations if needed.      
Pharmacist Note - Warfarin Dosing  Consult provided for this 69 y.o.male to manage warfarin for LVAD    INR Goal: 2 - 3    Home regimen: 1.5 mg on Thursday, 2 mg all other days    Drugs that may increase INR: Amiodarone (PTA med)  Drugs that may decrease INR: None  Other medications that may increase bleeding risk: Aspirin  Risk factors: Age > 65  Daily INR ordered through: Daily through 10/2    Recent Labs     09/25/24  0236 09/26/24  0018 09/27/24  0356   HGB 8.9* 9.5* 8.7*   INR 2.7* 2.8* 2.7*     Date               INR                  Dose  9/17  3.1  1 mg   9/18  2.8  2 mg  9/19  3.8  Hold  9/20  4.3  Hold  9/21  4.3  Hold  9/22  3  1 mg  9/23  2.3  1 mg  9/24  2.6  1 mg  9/25  2.7  1 mg  9/26  2.8  0.5 mg   9/27  2.7  0.5 mg                                                                                Assessment/ Plan:  Will order warfarin 0.5 mg x 1 dose today.    Pharmacy will continue to monitor daily and adjust therapy as indicated.  Please contact the pharmacist at [phone] for outpatient recommendations if needed.        
Pharmacist Note - Warfarin Dosing  Consult provided for this 69 y.o.male to manage warfarin for LVAD    INR Goal: 2 - 3    Home regimen: 1.5 mg on Thursday, 2 mg all other days    Drugs that may increase INR: Amiodarone (PTA med)  Drugs that may decrease INR: None  Other medications that may increase bleeding risk: Aspirin  Risk factors: Age > 65  Daily INR ordered through: Daily through 10/2    Recent Labs     09/26/24  0018 09/27/24  0356 09/28/24  0054   HGB 9.5* 8.7* 7.7*   INR 2.8* 2.7* 2.3*     Date               INR                  Dose  9/17  3.1  1 mg   9/18  2.8  2 mg  9/19  3.8  Hold  9/20  4.3  Hold  9/21  4.3  Hold  9/22  3  1 mg  9/23  2.3  1 mg  9/24  2.6  1 mg  9/25  2.7  1 mg  9/26  2.8  0.5 mg   9/27  2.7  0.5 mg   9/28  2.3  1 mg                                                                                Assessment/ Plan:  Will order warfarin 1 mg x 1 dose today. Hgb trending down - will watch closely.     Pharmacy will continue to monitor daily and adjust therapy as indicated.  Please contact the pharmacist at [phone] for outpatient recommendations if needed.          
Pharmacist Note - Warfarin Dosing  Consult provided for this 69 y.o.male to manage warfarin for LVAD    INR Goal: 2 - 3    Home regimen: 1.5 mg on Thursday, 2 mg all other days    Drugs that may increase INR: Amiodarone (PTA med)  Drugs that may decrease INR: None  Other medications that may increase bleeding risk: Aspirin  Risk factors: Age > 65  Daily INR ordered through: Daily through 10/2    Recent Labs     09/28/24  0054 09/28/24  0946 09/29/24  0032   HGB 7.7* 7.6* 8.0*   INR 2.3* 1.9* 1.9*     Date               INR                  Dose  9/17  3.1  1 mg   9/18  2.8  2 mg  9/19  3.8  Hold  9/20  4.3  Hold  9/21  4.3  Hold  9/22  3  1 mg  9/23  2.3  1 mg  9/24  2.6  1 mg  9/25  2.7  1 mg  9/26  2.8  0.5 mg   9/27  2.7  0.5 mg   9/28  2.3/1.9  1 mg   9/29  1.9  1 mg                                                                                Assessment/ Plan:  Will order warfarin 1 mg x 1 dose today. Hgb low but appears stable - will watch closely.     Pharmacy will continue to monitor daily and adjust therapy as indicated.  Please contact the pharmacist at [phone] for outpatient recommendations if needed.      
Pharmacist Note - Warfarin Dosing  Consult provided for this 69 y.o.male to manage warfarin for LVAD    INR Goal: 2 - 3    Home regimen: 1.5 mg on Thursday, 2 mg all other days    Drugs that may increase INR: Amiodarone (PTA med)  Drugs that may decrease INR: None  Other medications that may increase bleeding risk: Aspirin  Risk factors: Age > 65  Daily INR ordered through: Daily through 10/2    Recent Labs     09/28/24  0946 09/29/24  0032 09/30/24  0007   HGB 7.6* 8.0* 8.5*   INR 1.9* 1.9* 1.7*     Date               INR                  Dose  9/17  3.1  1 mg   9/18  2.8  2 mg  9/19  3.8  Hold  9/20  4.3  Hold  9/21  4.3  Hold  9/22  3  1 mg  9/23  2.3  1 mg  9/24  2.6  1 mg  9/25  2.7  1 mg  9/26  2.8  0.5 mg   9/27  2.7  0.5 mg   9/28  2.3/1.9  1 mg   9/29  1.9  1 mg   9/30  1.7  1.5 mg                                                                               Assessment/ Plan:  Will order warfarin 1.5 mg x 1 dose today.    Pharmacy will continue to monitor daily and adjust therapy as indicated.  Please contact the pharmacist at [phone] for outpatient recommendations if needed.     
Pharmacist Note - Warfarin Dosing  Consult provided for this 69 y.o.male to manage warfarin for LVAD    INR Goal: 2 - 3    Home regimen: 1.5 mg on Thursday, 2 mg all other days    Drugs that may increase INR: Amiodarone (PTA med)  Drugs that may decrease INR: None  Other medications that may increase bleeding risk: Aspirin  Risk factors: Age > 65  Daily INR ordered through: Daily through 10/2    Recent Labs     09/29/24  0032 09/30/24  0007 10/01/24  0302   HGB 8.0* 8.5* 7.8*   INR 1.9* 1.7* 1.7*     Date               INR                  Dose  9/17  3.1  1 mg   9/18  2.8  2 mg  9/19  3.8  Hold  9/20  4.3  Hold  9/21  4.3  Hold  9/22  3  1 mg  9/23  2.3  1 mg  9/24  2.6  1 mg  9/25  2.7  1 mg  9/26  2.8  0.5 mg   9/27  2.7  0.5 mg   9/28  2.3/1.9  1 mg   9/29  1.9  1 mg   9/30  1.7  1.5 mg  10/1  1.7  1.5 mg                                                                               Assessment/ Plan:  Will order warfarin 1.5 mg x 1 dose today.    Pharmacy will continue to monitor daily and adjust therapy as indicated.  Please contact the pharmacist at [phone] for outpatient recommendations if needed.       
Pharmacist Note - Warfarin Dosing  Consult provided for this 69 y.o.male to manage warfarin for LVAD    INR Goal: 2 - 3    Home regimen: 1.5 mg on Thursday, 2 mg all other days    Drugs that may increase INR: Amiodarone (PTA med)  Drugs that may decrease INR: None  Other medications that may increase bleeding risk: Aspirin  Risk factors: Age > 65  Daily INR ordered through: Daily through 10/2    Recent Labs     09/30/24  0007 10/01/24  0302 10/02/24  0012   HGB 8.5* 7.8* 8.4*   INR 1.7* 1.7* 1.8*     Date               INR                  Dose  9/17  3.1  1 mg   9/18  2.8  2 mg  9/19  3.8  Hold  9/20  4.3  Hold  9/21  4.3  Hold  9/22  3  1 mg  9/23  2.3  1 mg  9/24  2.6  1 mg  9/25  2.7  1 mg  9/26  2.8  0.5 mg   9/27  2.7  0.5 mg   9/28  2.3/1.9  1 mg   9/29  1.9  1 mg   9/30  1.7  1.5 mg  10/1  1.7  1.5 mg  10/2  1.8  1.5 mg                                                                               Assessment/ Plan:  Will order warfarin 1.5 mg x 1 dose today.    Pharmacy will continue to monitor daily and adjust therapy as indicated.  Please contact the pharmacist at [phone] for outpatient recommendations if needed.         
Pharmacist Note - Warfarin Dosing  Consult provided for this 69 y.o.male to manage warfarin for LVAD    INR Goal: 2 - 3    Home regimen: 1.5 mg on Thursday, 2 mg all other days    Drugs that may increase INR: Amiodarone (PTA med)  Drugs that may decrease INR: None  Other medications that may increase bleeding risk: Aspirin  Risk factors: Age > 65  Daily INR ordered through: Daily through 9/27    Recent Labs     09/21/24  0016 09/22/24  0227 09/23/24  0033   HGB  --   --  8.9*   INR 4.3* 3.0* 2.3*     Date               INR                  Dose  9/17  3.1  1 mg   9/18  2.8  2 mg  9/19  3.8  Hold  9/20  4.3  Hold  9/21  4.3  Hold  9/22  3  1 mg  9/23  2.3  1 mg                                                                               Assessment/ Plan:  Will order warfarin 1 mg x 1 dose for today.    Pharmacy will continue to monitor daily and adjust therapy as indicated.  Please contact the pharmacist at [phone] for outpatient recommendations if needed.          
Pharmacist Note - Warfarin Dosing  Consult provided for this 69 y.o.male to manage warfarin for LVAD    INR Goal: 2 - 3    Home regimen: 1.5 mg on Thursday, 2 mg all other days    Drugs that may increase INR: Amiodarone (PTA med)  Drugs that may decrease INR: None  Other medications that may increase bleeding risk: Aspirin  Risk factors: Age > 65  Daily INR ordered through: Daily through 9/27    Recent Labs     09/22/24  0227 09/23/24  0033 09/24/24  0005   HGB  --  8.9* 9.3*   INR 3.0* 2.3* 2.6*     Date               INR                  Dose  9/17  3.1  1 mg   9/18  2.8  2 mg  9/19  3.8  Hold  9/20  4.3  Hold  9/21  4.3  Hold  9/22  3  1 mg  9/23  2.3  1 mg  9/24  2.6  1 mg                                                                               Assessment/ Plan:  Will order warfarin 1 mg x 1 dose for today.    Pharmacy will continue to monitor daily and adjust therapy as indicated.  Please contact the pharmacist at [phone] for outpatient recommendations if needed.            
Pharmacist Note - Warfarin Dosing  Consult provided for this 69 y.o.male to manage warfarin for LVAD    INR Goal: 2 - 3    Home regimen: 1.5 mg on Thursday, 2 mg all other days    Drugs that may increase INR: Amiodarone (PTA med)  Drugs that may decrease INR: None  Other medications that may increase bleeding risk: Aspirin + Lovenox  Risk factors: Age > 65  Daily INR ordered through: Daily through 12/17    Recent Labs     10/01/24  0302 10/02/24  0012 10/03/24  0957   HGB 7.8* 8.4* 8.0*   INR 1.7* 1.8* 1.6*     Date               INR                  Dose  9/17  3.1  1 mg   9/18  2.8  2 mg  9/19  3.8  Hold  9/20  4.3  Hold  9/21  4.3  Hold  9/22  3  1 mg  9/23  2.3  1 mg  9/24  2.6  1 mg  9/25  2.7  1 mg  9/26  2.8  0.5 mg   9/27  2.7  0.5 mg   9/28  2.3/1.9  1 mg   9/29  1.9  1 mg   9/30  1.7  1.5 mg  10/1  1.7  1.5 mg  10/2  1.8  1.5 mg  10/3                 1.6                  2 mg                                                                               Assessment/ Plan:  Will order warfarin 2 mg x 1 dose.    Pharmacy will continue to monitor daily and adjust therapy as indicated.  Please contact the pharmacist at [phone] for outpatient recommendations if needed.             
Pharmacist Note - Warfarin Dosing  Consult provided for this 69 y.o.male to manage warfarin for LVAD    INR Goal: 2 - 3    Home regimen: 1.5 mg on Thursday, 2 mg all other days    Drugs that may increase INR: Amiodarone (PTA med), metronidazole (10/3-)  Drugs that may decrease INR: None  Other medications that may increase bleeding risk: Aspirin  Risk factors: Age > 65  Daily INR ordered through: Daily through 12/17    Recent Labs     10/04/24  0652 10/04/24  1022 10/05/24  0311 10/06/24  0431   HGB 6.3* 7.2* 8.0* 8.5*   INR 1.8*  --  2.1* 2.3*     Date               INR                  Dose  9/17  3.1  1 mg   9/18  2.8  2 mg  9/19  3.8  Hold  9/20  4.3  Hold  9/21  4.3  Hold  9/22  3  1 mg  9/23  2.3  1 mg  9/24  2.6  1 mg  9/25  2.7  1 mg  9/26  2.8  0.5 mg   9/27  2.7  0.5 mg   9/28  2.3/1.9  1 mg   9/29  1.9  1 mg   9/30  1.7  1.5 mg  10/1  1.7  1.5 mg  10/2  1.8  1.5 mg  10/3                 1.6                  2 mg  10/4                 1.8                  2 mg  10/5                 2.1                  2 mg  10/6                 2.3                  2 mg                                                                               Assessment/ Plan:  Will order warfarin 2 mg x 1 dose.     Pharmacy will continue to monitor daily and adjust therapy as indicated.  Please contact the pharmacist at [phone] for outpatient recommendations if needed.                   
Pharmacist Note - Warfarin Dosing  Consult provided for this 69 y.o.male to manage warfarin for LVAD    INR Goal: 2 - 3    Home regimen: 1.5 mg on Thursday, 2 mg all other days    Drugs that may increase INR: Amiodarone (PTA med), metronidazole (10/3-)  Drugs that may decrease INR: None  Other medications that may increase bleeding risk: Aspirin  Risk factors: Age > 65  Daily INR ordered through: Daily through 12/17    Recent Labs     10/05/24  0311 10/06/24  0431 10/07/24  0239   HGB 8.0* 8.5* 8.7*   INR 2.1* 2.3* 2.5*     Date               INR                  Dose  9/17  3.1  1 mg   9/18  2.8  2 mg  9/19  3.8  Hold  9/20  4.3  Hold  9/21  4.3  Hold  9/22  3  1 mg  9/23  2.3  1 mg  9/24  2.6  1 mg  9/25  2.7  1 mg  9/26  2.8  0.5 mg   9/27  2.7  0.5 mg   9/28  2.3/1.9  1 mg   9/29  1.9  1 mg   9/30  1.7  1.5 mg  10/1  1.7  1.5 mg  10/2  1.8  1.5 mg  10/3                 1.6                  2 mg  10/4                 1.8                  2 mg  10/5                 2.1                  2 mg  10/6                 2.3                  2 mg  10/7                 2.5                  1.5 mg                                                                               Assessment/ Plan:  Will order warfarin 1.5 mg x 1 dose.     Pharmacy will continue to monitor daily and adjust therapy as indicated.  Please contact the pharmacist at [phone] for outpatient recommendations if needed.                     
Pharmacist Note - Warfarin Dosing  Consult provided for this 69 y.o.male to manage warfarin for LVAD    INR Goal: 2 - 3    Home regimen: 1.5 mg on Thursday, 2 mg all other days    Drugs that may increase INR: Amiodarone (PTA med), metronidazole (10/3-)  Drugs that may decrease INR: None  Other medications that may increase bleeding risk: Aspirin  Risk factors: Age > 65  Daily INR ordered through: Daily through 12/17    Recent Labs     10/06/24  0431 10/07/24  0239 10/08/24  0340   HGB 8.5* 8.7* 9.2*   INR 2.3* 2.5* 3.3*     Date               INR                  Dose  9/17  3.1  1 mg   9/18  2.8  2 mg  9/19  3.8  Hold  9/20  4.3  Hold  9/21  4.3  Hold  9/22  3  1 mg  9/23  2.3  1 mg  9/24  2.6  1 mg  9/25  2.7  1 mg  9/26  2.8  0.5 mg   9/27  2.7  0.5 mg   9/28  2.3/1.9  1 mg   9/29  1.9  1 mg   9/30  1.7  1.5 mg  10/1  1.7  1.5 mg  10/2  1.8  1.5 mg  10/3                 1.6                  2 mg  10/4                 1.8                  2 mg  10/5                 2.1                  2 mg  10/6                 2.3                  2 mg  10/7                 2.5                  1.5 mg  10/8                 3.3                  HOLD                                                                               Assessment/ Plan:  Will hold warfarin for INR 3.3 and large jump from 2.5 -> 3.3.    Pharmacy will continue to monitor daily and adjust therapy as indicated.  Please contact the pharmacist at [phone] for outpatient recommendations if needed.                       
Pharmacist Note - Warfarin Dosing  Consult provided for this 69 y.o.male to manage warfarin for LVAD    INR Goal: 2 - 3    Home regimen: 1.5 mg on Thursday, 2 mg all other days    Drugs that may increase INR: Amiodarone (PTA med), metronidazole (10/3-)  Drugs that may decrease INR: None  Other medications that may increase bleeding risk: Aspirin  Risk factors: Age > 65  Daily INR ordered through: Daily through 12/17    Recent Labs     10/07/24  0239 10/08/24  0340 10/09/24  0039   HGB 8.7* 9.2* 9.2*   INR 2.5* 3.3* 3.4*     Date               INR                  Dose  9/17  3.1  1 mg   9/18  2.8  2 mg  9/19  3.8  Hold  9/20  4.3  Hold  9/21  4.3  Hold  9/22  3  1 mg  9/23  2.3  1 mg  9/24  2.6  1 mg  9/25  2.7  1 mg  9/26  2.8  0.5 mg   9/27  2.7  0.5 mg   9/28  2.3/1.9  1 mg   9/29  1.9  1 mg   9/30  1.7  1.5 mg  10/1  1.7  1.5 mg  10/2  1.8  1.5 mg  10/3                 1.6                  2 mg  10/4                 1.8                  2 mg  10/5                 2.1                  2 mg  10/6                 2.3                  2 mg  10/7                 2.5                  1.5 mg  10/8                 3.3                  HOLD  10/9                 3.4                   0.5 mg                                                                               Assessment/ Plan:  Will order warfarin 0.5 mg once.    Pharmacy will continue to monitor daily and adjust therapy as indicated.  Please contact the pharmacist at [phone] for outpatient recommendations if needed.                         
Pharmacist Note - Warfarin Dosing  Consult provided for this 69 y.o.male to manage warfarin for LVAD    INR Goal: 2 - 3    Home regimen: 1.5 mg on Thursday, 2 mg all other days    Drugs that may increase INR: Amiodarone (PTA med), metronidazole (10/3-)  Drugs that may decrease INR: None  Other medications that may increase bleeding risk: Aspirin  Risk factors: Age > 65  Daily INR ordered through: Daily through 12/17    Recent Labs     10/08/24  0340 10/09/24  0039 10/10/24  0131   HGB 9.2* 9.2* 8.5*   INR 3.3* 3.4* 3.1*     Date               INR                  Dose  10/1  1.7  1.5 mg  10/2  1.8  1.5 mg  10/3                 1.6                  2 mg  10/4                 1.8                  2 mg  10/5                 2.1                  2 mg  10/6                 2.3                  2 mg  10/7                 2.5                  1.5 mg  10/8                 3.3                  HOLD  10/9                 3.4                  0.5 mg  10/10  3.1  0.5                                                                                Assessment/ Plan:  Will order warfarin 0.5 mg x1 today    Pharmacy will continue to monitor daily and adjust therapy as indicated.  Please contact the pharmacist at [phone] for outpatient recommendations if needed.       
Pharmacist Note - Warfarin Dosing  Consult provided for this 69 y.o.male to manage warfarin for LVAD    INR Goal: 2 - 3    Home regimen: 1.5 mg on Thursday, 2 mg all other days    Drugs that may increase INR: Amiodarone (PTA med), metronidazole (10/3-)  Drugs that may decrease INR: None  Other medications that may increase bleeding risk: Aspirin  Risk factors: Age > 65  Daily INR ordered through: Daily through 12/17    Recent Labs     10/09/24  0039 10/10/24  0131 10/11/24  0411   HGB 9.2* 8.5* 7.8*   INR 3.4* 3.1* 2.9*     Date               INR                  Dose  10/1  1.7  1.5 mg  10/2  1.8  1.5 mg  10/3                 1.6                  2 mg  10/4                 1.8                  2 mg  10/5                 2.1                  2 mg  10/6                 2.3                  2 mg  10/7                 2.5                  1.5 mg  10/8                 3.3                  HOLD  10/9                 3.4                  0.5 mg  10/10  3.1  0.5 mg  10/11  2.9  1 mg                                                                                Assessment/ Plan:  Will order warfarin 1 mg x1 today    Pharmacy will continue to monitor daily and adjust therapy as indicated.  Please contact the pharmacist at [phone] for outpatient recommendations if needed.         
Pharmacist Note - Warfarin Dosing  Consult provided for this 69 y.o.male to manage warfarin for LVAD    INR Goal: 2 - 3    Home regimen: 1.5 mg on Thursday, 2 mg all other days    Drugs that may increase INR: Amiodarone (PTA med), metronidazole (10/3-)  Drugs that may decrease INR: None  Other medications that may increase bleeding risk: Aspirin + Lovenox  Risk factors: Age > 65  Daily INR ordered through: Daily through 12/17    Recent Labs     10/02/24  0012 10/03/24  0957 10/04/24  0652   HGB 8.4* 8.0* 6.3*   INR 1.8* 1.6* 1.8*     Date               INR                  Dose  9/17  3.1  1 mg   9/18  2.8  2 mg  9/19  3.8  Hold  9/20  4.3  Hold  9/21  4.3  Hold  9/22  3  1 mg  9/23  2.3  1 mg  9/24  2.6  1 mg  9/25  2.7  1 mg  9/26  2.8  0.5 mg   9/27  2.7  0.5 mg   9/28  2.3/1.9  1 mg   9/29  1.9  1 mg   9/30  1.7  1.5 mg  10/1  1.7  1.5 mg  10/2  1.8  1.5 mg  10/3                 1.6                  2 mg  10/4                 1.8                  2 mg                                                                               Assessment/ Plan:  Will order warfarin 2 mg x 1 dose. 1 units PRBCs ordered today (Hgb = 6.3) by MD.    Pharmacy will continue to monitor daily and adjust therapy as indicated.  Please contact the pharmacist at [phone] for outpatient recommendations if needed.               
Pharmacist Note - Warfarin Dosing  Consult provided for this 69 y.o.male to manage warfarin for LVAD    INR Goal: 2 - 3    Home regimen: 1.5 mg on Thursday, 2 mg all other days    Drugs that may increase INR: Amiodarone (PTA med), metronidazole (10/3-)  Drugs that may decrease INR: None  Other medications that may increase bleeding risk: Aspirin + Lovenox  Risk factors: Age > 65  Daily INR ordered through: Daily through 12/17    Recent Labs     10/03/24  0957 10/04/24  0652 10/04/24  1022 10/05/24  0311   HGB 8.0* 6.3* 7.2* 8.0*   INR 1.6* 1.8*  --  2.1*     Date               INR                  Dose  9/17  3.1  1 mg   9/18  2.8  2 mg  9/19  3.8  Hold  9/20  4.3  Hold  9/21  4.3  Hold  9/22  3  1 mg  9/23  2.3  1 mg  9/24  2.6  1 mg  9/25  2.7  1 mg  9/26  2.8  0.5 mg   9/27  2.7  0.5 mg   9/28  2.3/1.9  1 mg   9/29  1.9  1 mg   9/30  1.7  1.5 mg  10/1  1.7  1.5 mg  10/2  1.8  1.5 mg  10/3                 1.6                  2 mg  10/4                 1.8                  2 mg  10/5                 2.1                  2 mg                                                                               Assessment/ Plan:  Will order warfarin 2 mg x 1 dose. If INR remains therapeutic on 10/6, recommend to discontinue therapeutic lovenox.    Pharmacy will continue to monitor daily and adjust therapy as indicated.  Please contact the pharmacist at [phone] for outpatient recommendations if needed.                 
Pharmacist Note - Warfarin Dosing  Consult provided for this 69 y.o.male to manage warfarin for LVAD.    INR Goal: 2 - 3    Home regimen: 1.5 mg on Thursday, 2 mg all other days    Drugs that may increase INR: Amiodarone (PTA med), metronidazole (10/3-)  Drugs that may decrease INR: None  Other medications that may increase bleeding risk: Aspirin  Risk factors: Age > 65  Daily INR ordered through: Daily through 12/17    Recent Labs     10/10/24  0131 10/11/24  0411 10/12/24  0224   HGB 8.5* 7.8* 7.9*   INR 3.1* 2.9* 2.7*     Date               INR                  Dose  10/1  1.7  1.5 mg  10/2  1.8  1.5 mg  10/3                 1.6                  2 mg  10/4                 1.8                  2 mg  10/5                 2.1                  2 mg  10/6                 2.3                  2 mg  10/7                 2.5                  1.5 mg  10/8                 3.3                  HOLD  10/9                 3.4                  0.5 mg  10/10  3.1  0.5 mg  10/11  2.9  1 mg   10/12  2.7  1 mg                                                                                Assessment/ Plan:  Will order warfarin 1 mg x1 today    Pharmacy will continue to monitor daily and adjust therapy as indicated.  Please contact the pharmacist at [phone] for outpatient recommendations if needed.           
Pharmacist Note - Warfarin Dosing  Consult provided for this 69 y.o.male to manage warfarin for LVAD.    INR Goal: 2 - 3    Home regimen: 1.5 mg on Thursday, 2 mg all other days    Drugs that may increase INR: Amiodarone (PTA med), metronidazole (10/3-)  Drugs that may decrease INR: None  Other medications that may increase bleeding risk: Aspirin  Risk factors: Age > 65  Daily INR ordered through: Daily through 12/17    Recent Labs     10/11/24  0411 10/12/24  0224 10/13/24  0303   HGB 7.8* 7.9* 8.0*   INR 2.9* 2.7* 3.2*     Date               INR                  Dose  10/1  1.7  1.5 mg  10/2  1.8  1.5 mg  10/3                 1.6                  2 mg  10/4                 1.8                  2 mg  10/5                 2.1                  2 mg  10/6                 2.3                  2 mg  10/7                 2.5                  1.5 mg  10/8                 3.3                  HOLD  10/9                 3.4                  0.5 mg  10/10  3.1  0.5 mg  10/11  2.9  1 mg   10/12  2.7  1 mg   10/13  3.2  HOLD                                                                                Assessment/ Plan:  Will hold warfarin x 1 dose today.    Pharmacy will continue to monitor daily and adjust therapy as indicated.  Please contact the pharmacist at [phone] for outpatient recommendations if needed.     
Pharmacist Note - Warfarin Dosing  Consult provided for this 69 y.o.male to manage warfarin for LVAD.    INR Goal: 2 - 3    Home regimen: 1.5 mg on Thursday, 2 mg all other days    Drugs that may increase INR: Amiodarone (PTA med), metronidazole (10/3-)  Drugs that may decrease INR: None  Other medications that may increase bleeding risk: Aspirin  Risk factors: Age > 65  Daily INR ordered through: Daily through 12/17    Recent Labs     10/12/24  0224 10/13/24  0303 10/14/24  0308   HGB 7.9* 8.0* 8.4*   INR 2.7* 3.2* 3.1*     Date               INR                  Dose  10/1  1.7  1.5 mg  10/2  1.8  1.5 mg  10/3                 1.6                  2 mg  10/4                 1.8                  2 mg  10/5                 2.1                  2 mg  10/6                 2.3                  2 mg  10/7                 2.5                  1.5 mg  10/8                 3.3                  HOLD  10/9                 3.4                  0.5 mg  10/10  3.1  0.5 mg  10/11  2.9  1 mg   10/12  2.7  1 mg   10/13  3.2  HOLD   10/14               3.1                   0.5 mg                                                                               Assessment/ Plan:  Will order warfarin 0.5 mg x  1 dose today.    Pharmacy will continue to monitor daily and adjust therapy as indicated.  Please contact the pharmacist at [phone] for outpatient recommendations if needed.       
Pharmacist Note - Warfarin Dosing  Consult provided for this 69 y.o.male to manage warfarin for LVAD.    INR Goal: 2 - 3    Home regimen: 1.5 mg on Thursday, 2 mg all other days    Drugs that may increase INR: Amiodarone (PTA med), metronidazole (10/3-)  Drugs that may decrease INR: None  Other medications that may increase bleeding risk: Aspirin  Risk factors: Age > 65  Daily INR ordered through: Daily through 12/17    Recent Labs     10/13/24  0303 10/14/24  0308 10/15/24  0400   HGB 8.0* 8.4* 8.6*   INR 3.2* 3.1* 3.1*     Date               INR                  Dose  10/1  1.7  1.5 mg  10/2  1.8  1.5 mg  10/3                 1.6                  2 mg  10/4                 1.8                  2 mg  10/5                 2.1                  2 mg  10/6                 2.3                  2 mg  10/7                 2.5                  1.5 mg  10/8                 3.3                  HOLD  10/9                 3.4                  0.5 mg  10/10  3.1  0.5 mg  10/11  2.9  1 mg   10/12  2.7  1 mg   10/13  3.2  HOLD   10/14               3.1                   0.5 mg  10/15               3.1                   0.5                                                                               Assessment/ Plan:  Will order warfarin 0.5 mg x  1 dose today.    Pharmacy will continue to monitor daily and adjust therapy as indicated.  Please contact the pharmacist at [phone] for outpatient recommendations if needed.         
Pharmacist Note - Warfarin Dosing  Consult provided for this 69 y.o.male to manage warfarin for LVAD.    INR Goal: 2 - 3    Home regimen: 1.5 mg on Thursday, 2 mg all other days    Drugs that may increase INR: Amiodarone (PTA med), metronidazole (10/3-)  Drugs that may decrease INR: None  Other medications that may increase bleeding risk: Aspirin  Risk factors: Age > 65  Daily INR ordered through: Daily through 12/17    Recent Labs     10/14/24  0308 10/15/24  0400 10/16/24  0432   HGB 8.4* 8.6*  --    INR 3.1* 3.1* 2.8*     Date               INR                  Dose  10/1  1.7  1.5 mg  10/2  1.8  1.5 mg  10/3                 1.6                  2 mg  10/4                 1.8                  2 mg  10/5                 2.1                  2 mg  10/6                 2.3                  2 mg  10/7                 2.5                  1.5 mg  10/8                 3.3                  HOLD  10/9                 3.4                  0.5 mg  10/10  3.1  0.5 mg  10/11  2.9  1 mg   10/12  2.7  1 mg   10/13  3.2  HOLD   10/14               3.1                   0.5 mg  10/15               3.1                   0.5 mg  10/16               2.8                   0.5 mg                                                                               Assessment/ Plan:  Will order warfarin 0.5 mg x  1 dose today.    Pharmacy will continue to monitor daily and adjust therapy as indicated.  Please contact the pharmacist at [phone] for outpatient recommendations if needed.           
Pharmacist Note - Warfarin Dosing  Consult provided for this 69 y.o.male to manage warfarin for LVAD.    INR Goal: 2 - 3    Home regimen: 1.5 mg on Thursday, 2 mg all other days    Drugs that may increase INR: Amiodarone (PTA med), metronidazole (10/3-)  Drugs that may decrease INR: None  Other medications that may increase bleeding risk: Aspirin  Risk factors: Age > 65  Daily INR ordered through: Daily through 12/17    Recent Labs     10/15/24  0400 10/16/24  0432 10/17/24  0508   HGB 8.6*  --   --    INR 3.1* 2.8* 2.3*     Date               INR                  Dose  10/1  1.7  1.5 mg  10/2  1.8  1.5 mg  10/3                 1.6                  2 mg  10/4                 1.8                  2 mg  10/5                 2.1                  2 mg  10/6                 2.3                  2 mg  10/7                 2.5                  1.5 mg  10/8                 3.3                  HOLD  10/9                 3.4                  0.5 mg  10/10  3.1  0.5 mg  10/11  2.9  1 mg   10/12  2.7  1 mg   10/13  3.2  HOLD   10/14               3.1                   0.5 mg  10/15               3.1                   0.5 mg  10/16               2.8                   0.5 mg  10/17  2.3  1 mg                                                                                Assessment/ Plan:  Will order warfarin 1 mg x 1 dose today.    Pharmacy will continue to monitor daily and adjust therapy as indicated.  Please contact the pharmacist at [phone] for outpatient recommendations if needed.     
Pharmacist Note - Warfarin Dosing  Consult provided for this 69 y.o.male to manage warfarin for LVAD.    INR Goal: 2 - 3    Home regimen: 1.5 mg on Thursday, 2 mg all other days    Drugs that may increase INR: Amiodarone (PTA med), metronidazole (10/3-)  Drugs that may decrease INR: None  Other medications that may increase bleeding risk: Aspirin  Risk factors: Age > 65  Daily INR ordered through: Daily through 12/17    Recent Labs     10/16/24  0432 10/17/24  0508 10/18/24  0046   INR 2.8* 2.3* 2.0*     Date               INR                  Dose  10/1  1.7  1.5 mg  10/2  1.8  1.5 mg  10/3                 1.6                  2 mg  10/4                 1.8                  2 mg  10/5                 2.1                  2 mg  10/6                 2.3                  2 mg  10/7                 2.5                  1.5 mg  10/8                 3.3                  HOLD  10/9                 3.4                  0.5 mg  10/10  3.1  0.5 mg  10/11  2.9  1 mg   10/12  2.7  1 mg   10/13  3.2  HOLD   10/14               3.1                   0.5 mg  10/15               3.1                   0.5 mg  10/16               2.8                   0.5 mg  10/17  2.3  1 mg   10/18  2  1 mg                                                                                Assessment/ Plan:  Will order warfarin 1 mg x 1 dose today.    Pharmacy will continue to monitor daily and adjust therapy as indicated.  Please contact the pharmacist at [phone] for outpatient recommendations if needed.       
Pharmacist Note - Warfarin Dosing  Consult provided for this 69 y.o.male to manage warfarin for LVAD.    INR Goal: 2 - 3    Home regimen: 1.5 mg on Thursday, 2 mg all other days    Drugs that may increase INR: Amiodarone (PTA med), metronidazole (10/3-)  Drugs that may decrease INR: None  Other medications that may increase bleeding risk: Aspirin  Risk factors: Age > 65  Daily INR ordered through: Daily through 12/17    Recent Labs     10/17/24  0508 10/18/24  0046 10/19/24  0501 10/19/24  0850   HGB  --   --   --  8.0*   INR 2.3* 2.0* 1.8*  --      Date               INR                  Dose  10/1  1.7  1.5 mg  10/2  1.8  1.5 mg  10/3                 1.6                  2 mg  10/4                 1.8                  2 mg  10/5                 2.1                  2 mg  10/6                 2.3                  2 mg  10/7                 2.5                  1.5 mg  10/8                 3.3                  HOLD  10/9                 3.4                  0.5 mg  10/10  3.1  0.5 mg  10/11  2.9  1 mg   10/12  2.7  1 mg   10/13  3.2  HOLD   10/14               3.1                   0.5 mg  10/15               3.1                   0.5 mg  10/16               2.8                   0.5 mg  10/17  2.3  1 mg   10/18  2  1 mg   10/19  1.8  1.5 mg                                                                                Assessment/ Plan:  Will order warfarin 1.5 mg x 1 dose today.    Pharmacy will continue to monitor daily and adjust therapy as indicated.  Please contact the pharmacist at [phone] for outpatient recommendations if needed.         
Pharmacist Note - Warfarin Dosing  Consult provided for this 69 y.o.male to manage warfarin for LVAD.    INR Goal: 2 - 3    Home regimen: 1.5 mg on Thursday, 2 mg all other days    Drugs that may increase INR: Amiodarone (PTA med), metronidazole (10/3-)  Drugs that may decrease INR: None  Other medications that may increase bleeding risk: Aspirin  Risk factors: Age > 65  Daily INR ordered through: Daily through 12/17    Recent Labs     10/19/24  0501 10/19/24  0850 10/20/24  0024 10/21/24  0647   HGB  --  8.0* 9.4* 8.8*   INR 1.8*  --  1.6* 1.6*     Date               INR                  Dose  10/1  1.7  1.5 mg  10/2  1.8  1.5 mg  10/3                 1.6                  2 mg  10/4                 1.8                  2 mg  10/5                 2.1                  2 mg  10/6                 2.3                  2 mg  10/7                 2.5                  1.5 mg  10/8                 3.3                  HOLD  10/9                 3.4                  0.5 mg  10/10  3.1  0.5 mg  10/11  2.9  1 mg   10/12  2.7  1 mg   10/13  3.2  HOLD   10/14               3.1                   0.5 mg  10/15               3.1                   0.5 mg  10/16               2.8                   0.5 mg  10/17  2.3  1 mg   10/18  2  1 mg   10/19  1.8  1.5 mg   10/20  1.6  1.5 mg   10/21  1.6  2 mg                                                                               Assessment/ Plan:  Will order warfarin 2 mg x 1 dose today.    Pharmacy will continue to monitor daily and adjust therapy as indicated.  Please contact the pharmacist at [phone] for outpatient recommendations if needed.             
Pharmacist Note - Warfarin Dosing  Consult provided for this 69 y.o.male to manage warfarin for LVAD.    INR Goal: 2 - 3    Home regimen: 1.5 mg on Thursday, 2 mg all other days    Drugs that may increase INR: Amiodarone (PTA med), metronidazole (10/3-)  Drugs that may decrease INR: None  Other medications that may increase bleeding risk: Aspirin  Risk factors: Age > 65  Daily INR ordered through: Daily through 12/17    Recent Labs     10/20/24  0024 10/21/24  0647 10/22/24  0250   HGB 9.4* 8.8* 8.6*   INR 1.6* 1.6* 1.7*     Date               INR                  Dose  10/1  1.7  1.5 mg  10/2  1.8  1.5 mg  10/3                 1.6                  2 mg  10/4                 1.8                  2 mg  10/5                 2.1                  2 mg  10/6                 2.3                  2 mg  10/7                 2.5                  1.5 mg  10/8                 3.3                  HOLD  10/9                 3.4                  0.5 mg  10/10  3.1  0.5 mg  10/11  2.9  1 mg   10/12  2.7  1 mg   10/13  3.2  HOLD   10/14               3.1                   0.5 mg  10/15               3.1                   0.5 mg  10/16               2.8                   0.5 mg  10/17  2.3  1 mg   10/18  2  1 mg   10/19  1.8  1.5 mg   10/20  1.6  1.5 mg   10/21  1.6  2 mg  10/22  1.7  2 mg                                                                               Assessment/ Plan:  Will order warfarin 2 mg x 1 dose today.    Pharmacy will continue to monitor daily and adjust therapy as indicated.  Please contact the pharmacist at [phone] for outpatient recommendations if needed.     
Pharmacist Note - Warfarin Dosing  Consult provided for this 69 y.o.male to manage warfarin for LVAD.    INR Goal: 2 - 3    Home regimen: 1.5 mg on Thursday, 2 mg all other days    Drugs that may increase INR: Amiodarone (PTA med), metronidazole (10/3-)  Drugs that may decrease INR: None  Other medications that may increase bleeding risk: Aspirin  Risk factors: Age > 65  Daily INR ordered through: Daily through 12/17    Recent Labs     10/21/24  0647 10/22/24  0250 10/23/24  0428   HGB 8.8* 8.6* 8.5*   INR 1.6* 1.7* 1.8*     Date               INR                  Dose  10/1  1.7  1.5 mg  10/2  1.8  1.5 mg  10/3                 1.6                  2 mg  10/4                 1.8                  2 mg  10/5                 2.1                  2 mg  10/6                 2.3                  2 mg  10/7                 2.5                  1.5 mg  10/8                 3.3                  HOLD  10/9                 3.4                  0.5 mg  10/10  3.1  0.5 mg  10/11  2.9  1 mg   10/12  2.7  1 mg   10/13  3.2  HOLD   10/14               3.1                   0.5 mg  10/15               3.1                   0.5 mg  10/16               2.8                   0.5 mg  10/17  2.3  1 mg   10/18  2  1 mg   10/19  1.8  1.5 mg   10/20  1.6  1.5 mg   10/21  1.6  2 mg  10/22  1.7  2 mg  10/23  1.8  2 mg                                                                               Assessment/ Plan:  Will order warfarin 2 mg x 1 dose today.    Pharmacy will continue to monitor daily and adjust therapy as indicated.  Please contact the pharmacist at [phone] for outpatient recommendations if needed.       
Pharmacist Note - Warfarin Dosing  Consult provided for this 69 y.o.male to manage warfarin for LVAD.    INR Goal: 2 - 3    Home regimen: 1.5 mg on Thursday, 2 mg all other days    Drugs that may increase INR: Amiodarone (PTA med), metronidazole (10/3-)  Drugs that may decrease INR: None  Other medications that may increase bleeding risk: Aspirin  Risk factors: Age > 65  Daily INR ordered through: Daily through 12/17    Recent Labs     10/22/24  0250 10/23/24  0428 10/24/24  0007   HGB 8.6* 8.5* 8.3*   INR 1.7* 1.8* 2.0*     Date               INR                  Dose  10/1  1.7  1.5 mg  10/2  1.8  1.5 mg  10/3                 1.6                  2 mg  10/4                 1.8                  2 mg  10/5                 2.1                  2 mg  10/6                 2.3                  2 mg  10/7                 2.5                  1.5 mg  10/8                 3.3                  HOLD  10/9                 3.4                  0.5 mg  10/10  3.1  0.5 mg  10/11  2.9  1 mg   10/12  2.7  1 mg   10/13  3.2  HOLD   10/14               3.1                   0.5 mg  10/15               3.1                   0.5 mg  10/16               2.8                   0.5 mg  10/17  2.3  1 mg   10/18  2  1 mg   10/19  1.8  1.5 mg   10/20  1.6  1.5 mg   10/21  1.6  2 mg  10/22  1.7  2 mg  10/23  1.8  2 mg  10/24  2  2 mg                                                                                Assessment/ Plan:  Will order warfarin 2 mg x 1 dose today.    Pharmacy will continue to monitor daily and adjust therapy as indicated.  Please contact the pharmacist at [phone] for outpatient recommendations if needed.     
Pharmacist Note - Warfarin Dosing  Consult provided for this 69 y.o.male to manage warfarin for LVAD.    INR Goal: 2 - 3    Home regimen: 1.5 mg on Thursday, 2 mg all other days    Drugs that may increase INR: Amiodarone (PTA med), metronidazole (10/3-)  Drugs that may decrease INR: None  Other medications that may increase bleeding risk: Aspirin  Risk factors: Age > 65  Daily INR ordered through: Daily through 12/17    Recent Labs     10/23/24  0428 10/24/24  0007 10/25/24  0350   HGB 8.5* 8.3* 8.7*   INR 1.8* 2.0* 2.1*     Date               INR                  Dose  10/1  1.7  1.5 mg  10/2  1.8  1.5 mg  10/3                 1.6                  2 mg  10/4                 1.8                  2 mg  10/5                 2.1                  2 mg  10/6                 2.3                  2 mg  10/7                 2.5                  1.5 mg  10/8                 3.3                  HOLD  10/9                 3.4                  0.5 mg  10/10  3.1  0.5 mg  10/11  2.9  1 mg   10/12  2.7  1 mg   10/13  3.2  HOLD   10/14               3.1                   0.5 mg  10/15               3.1                   0.5 mg  10/16               2.8                   0.5 mg  10/17  2.3  1 mg   10/18  2  1 mg   10/19  1.8  1.5 mg   10/20  1.6  1.5 mg   10/21  1.6  2 mg  10/22  1.7  2 mg  10/23  1.8  2 mg  10/24  2  2 mg  1/25  2.1  2 mg                                                                                 Assessment/ Plan:  Will order warfarin 2 mg x 1 dose today.    Pharmacy will continue to monitor daily and adjust therapy as indicated.  Please contact the pharmacist at [phone] for outpatient recommendations if needed.       
Pharmacist Note - Warfarin Dosing  Consult provided for this 69 y.o.male to manage warfarin for LVAD.    INR Goal: 2 - 3    Home regimen: 1.5 mg on Thursday, 2 mg all other days    Drugs that may increase INR: Amiodarone (PTA med), metronidazole (10/3-)  Drugs that may decrease INR: None  Other medications that may increase bleeding risk: Aspirin and Enoxaparin  Risk factors: Age > 65  Daily INR ordered through: Daily through 12/17    Recent Labs     10/18/24  0046 10/19/24  0501 10/19/24  0850 10/20/24  0024   HGB  --   --  8.0* 9.4*   INR 2.0* 1.8*  --  1.6*     Date               INR                  Dose  10/1  1.7  1.5 mg  10/2  1.8  1.5 mg  10/3                 1.6                  2 mg  10/4                 1.8                  2 mg  10/5                 2.1                  2 mg  10/6                 2.3                  2 mg  10/7                 2.5                  1.5 mg  10/8                 3.3                  HOLD  10/9                 3.4                  0.5 mg  10/10  3.1  0.5 mg  10/11  2.9  1 mg   10/12  2.7  1 mg   10/13  3.2  HOLD   10/14               3.1                   0.5 mg  10/15               3.1                   0.5 mg  10/16               2.8                   0.5 mg  10/17  2.3  1 mg   10/18  2  1 mg   10/19  1.8  1.5 mg   10/20  1.6  1.5 mg                                                                                Assessment/ Plan:  Will order warfarin 1.5 mg x 1 dose today.    Pharmacy will continue to monitor daily and adjust therapy as indicated.  Please contact the pharmacist at [phone] for outpatient recommendations if needed.           
Pharmacist Note - Warfarin Dosing  Consult provided for this 69 y.o.male to manage warfarin for LVAD.    INR Goal: 2 - 3    Home regimen: 1.5 mg on Thursday, 2 mg all other days    Drugs that may increase INR: Amiodarone (PTA med), metronidazole (10/3-10/25)  Drugs that may decrease INR: None  Other medications that may increase bleeding risk: Aspirin  Risk factors: Age > 65  Daily INR ordered through: Daily through 12/17    Recent Labs     10/24/24  0007 10/25/24  0350 10/26/24  0443   HGB 8.3* 8.7* 8.7*   INR 2.0* 2.1* 2.1*     Date               INR                  Dose  10/1  1.7  1.5 mg  10/2  1.8  1.5 mg  10/3                 1.6                  2 mg  10/4                 1.8                  2 mg  10/5                 2.1                  2 mg  10/6                 2.3                  2 mg  10/7                 2.5                  1.5 mg  10/8                 3.3                  HOLD  10/9                 3.4                  0.5 mg  10/10  3.1  0.5 mg  10/11  2.9  1 mg   10/12  2.7  1 mg   10/13  3.2  HOLD   10/14               3.1                   0.5 mg  10/15               3.1                   0.5 mg  10/16               2.8                   0.5 mg  10/17  2.3  1 mg   10/18  2  1 mg   10/19  1.8  1.5 mg   10/20  1.6  1.5 mg   10/21  1.6  2 mg  10/22  1.7  2 mg  10/23  1.8  2 mg  10/24  2  2 mg  10/25  2.1  2 mg    10/26               2.1                  2 mg                                                                               Assessment/ Plan:  Will order warfarin 2 mg x 1 dose today.    Pharmacy will continue to monitor daily and adjust therapy as indicated.  Please contact the pharmacist at [phone] for outpatient recommendations if needed.         
Pharmacist Note - Warfarin Dosing  Consult provided for this 69 y.o.male to manage warfarin for LVAD.    INR Goal: 2 - 3    Home regimen: 1.5 mg on Thursday, 2 mg all other days    Drugs that may increase INR: Amiodarone (PTA med), metronidazole (10/3-10/25)  Drugs that may decrease INR: None  Other medications that may increase bleeding risk: Aspirin  Risk factors: Age > 65  Daily INR ordered through: Daily through 12/17    Recent Labs     10/25/24  0350 10/26/24  0443 10/27/24  0758   HGB 8.7* 8.7* 9.3*   INR 2.1* 2.1* 2.2*     Date               INR                  Dose  10/1  1.7  1.5 mg  10/2  1.8  1.5 mg  10/3                 1.6                  2 mg  10/4                 1.8                  2 mg  10/5                 2.1                  2 mg  10/6                 2.3                  2 mg  10/7                 2.5                  1.5 mg  10/8                 3.3                  HOLD  10/9                 3.4                  0.5 mg  10/10  3.1  0.5 mg  10/11  2.9  1 mg   10/12  2.7  1 mg   10/13  3.2  HOLD   10/14               3.1                   0.5 mg  10/15               3.1                   0.5 mg  10/16               2.8                   0.5 mg  10/17  2.3  1 mg   10/18  2  1 mg   10/19  1.8  1.5 mg   10/20  1.6  1.5 mg   10/21  1.6  2 mg  10/22  1.7  2 mg  10/23  1.8  2 mg  10/24  2  2 mg  10/25  2.1  2 mg    10/26               2.1                  2 mg  10/27               2.2                  2 mg                                                                               Assessment/ Plan:  Will order warfarin 2 mg x 1 dose today.    Pharmacy will continue to monitor daily and adjust therapy as indicated.  Please contact the pharmacist at [phone] for outpatient recommendations if needed.           
Pharmacist Note - Warfarin Dosing  Consult provided for this 69 y.o.male to manage warfarin for LVAD.    INR Goal: 2 - 3    Home regimen: 1.5 mg on Thursday, 2 mg all other days    Drugs that may increase INR: Amiodarone (PTA med), metronidazole (10/3-10/25)  Drugs that may decrease INR: None  Other medications that may increase bleeding risk: Aspirin  Risk factors: Age > 65  Daily INR ordered through: Daily through 12/17    Recent Labs     10/26/24  0443 10/27/24  0758 10/28/24  0034   HGB 8.7* 9.3* 10.1*   INR 2.1* 2.2* 2.3*     Date               INR                  Dose  10/1  1.7  1.5 mg  10/2  1.8  1.5 mg  10/3                 1.6                  2 mg  10/4                 1.8                  2 mg  10/5                 2.1                  2 mg  10/6                 2.3                  2 mg  10/7                 2.5                  1.5 mg  10/8                 3.3                  HOLD  10/9                 3.4                  0.5 mg  10/10  3.1  0.5 mg  10/11  2.9  1 mg   10/12  2.7  1 mg   10/13  3.2  HOLD   10/14               3.1                   0.5 mg  10/15               3.1                   0.5 mg  10/16               2.8                   0.5 mg  10/17  2.3  1 mg   10/18  2  1 mg   10/19  1.8  1.5 mg   10/20  1.6  1.5 mg   10/21  1.6  2 mg  10/22  1.7  2 mg  10/23  1.8  2 mg  10/24  2  2 mg  10/25  2.1  2 mg    10/26               2.1                  2 mg  10/27               2.2                  2 mg  10/28  2.3  1.5 mg                                                                                Assessment/ Plan:  Will order warfarin 1.5 mg x 1 dose today.    Pharmacy will continue to monitor daily and adjust therapy as indicated.  Please contact the pharmacist at [phone] for outpatient recommendations if needed.             
Pharmacist Note - Warfarin Dosing  Consult provided for this 69 y.o.male to manage warfarin for LVAD.    INR Goal: 2 - 3    Home regimen: 1.5 mg on Thursday, 2 mg all other days    Drugs that may increase INR: Amiodarone (PTA med), metronidazole (10/3-10/25)  Drugs that may decrease INR: None  Other medications that may increase bleeding risk: Aspirin  Risk factors: Age > 65  Daily INR ordered through: Daily through 12/17    Recent Labs     10/27/24  0758 10/28/24  0034 10/29/24  0421   HGB 9.3* 10.1* 8.8*   INR 2.2* 2.3* 2.7*     Date               INR                  Dose  10/1  1.7  1.5 mg  10/2  1.8  1.5 mg  10/3                 1.6                  2 mg  10/4                 1.8                  2 mg  10/5                 2.1                  2 mg  10/6                 2.3                  2 mg  10/7                 2.5                  1.5 mg  10/8                 3.3                  HOLD  10/9                 3.4                  0.5 mg  10/10  3.1  0.5 mg  10/11  2.9  1 mg   10/12  2.7  1 mg   10/13  3.2  HOLD   10/14               3.1                   0.5 mg  10/15               3.1                   0.5 mg  10/16               2.8                   0.5 mg  10/17  2.3  1 mg   10/18  2  1 mg   10/19  1.8  1.5 mg   10/20  1.6  1.5 mg   10/21  1.6  2 mg  10/22  1.7  2 mg  10/23  1.8  2 mg  10/24  2  2 mg  10/25  2.1  2 mg    10/26               2.1                  2 mg  10/27               2.2                  2 mg  10/28  2.3  1.5 mg   10/29  2.7  0.5 mg                                                                                    Assessment/ Plan:  Will order warfarin 0.5 mg x 1 dose today.    Pharmacy will continue to monitor daily and adjust therapy as indicated.  Please contact the pharmacist at [phone] for outpatient recommendations if needed.               
Pharmacy renal dose protocol: Cefepime  Indication:  bacteremia, wound infection  Current regimen:  2g IV q8h extended infusion    Recent Labs     10/09/24  0039 10/10/24  0131 10/11/24  0411   WBC 19.2* 18.8* 13.8*   CREATININE 1.08 0.98 1.05   BUN 32* 33* 31*     Est CrCl: 44 ml/min; UO: >1 ml/kg/hr  Temp (24hrs), Av °F (36.7 °C), Min:97.7 °F (36.5 °C), Max:98.5 °F (36.9 °C)    Cultures: MSSA,  P. Aeruginosa S cefepime    Plan: Change to 2g IV q12h extended infusion for updated weight of 46 kg w/crcl 44 ml/min   
Physical Therapy    PT treatment deferred due to pt with Hgb 6.3 pending transfusion. Will con't to follow.    Cheryle Epstein, PT, MPT      
Physical Therapy  09/23/2024    PT continues to follow patient.  Discussed patient at bedside with provider (Dr. Carlos).  Provider requesting to limit wtbearing through BLE until patient is assessed by podiatry and wound care due to extensive wounds.  Okay to mobilize OOBTC as tolerated.  Patient currently declining OOB activity at this time and declining OOBTC due to pain.  Will follow-up for PT intervention tomorrow as patient is medically appropriate and available for PT.    Thank you,  Rosa Ludwig, PT, DPT  
Physical Therapy  10/14/2024    Chart reviewed up to this date and cleared to see by RN. Pt received resting in bed and reported 10/10 pain in BLE, RN made aware. Pt declined PT intervention at this time. Will defer and continue to follow up as able/medically appropriate.     Thank you.    Becky Winkler, PT, DPT, CCS    
Physical Therapy  10/15/2024    14:50--Followed up with patient who still deferred mobility despite max education on benefits of activity, importance of mobilizing, and role of therapy in progressing tolerance for goal of SNF at d/c per patient goals. Reviewed that he would need to work with therapy to facilitate transfer to SNF when medically ready and that currently therapy could help with weaning O2 as he changes position and mobilizes. Unsure of his insight into how his refusals impact his progress.     Chart reviewed. Attempted to see for PT session. Patient just getting set up for breakfast and politely deferred therapy at this time. Agreeable to PT/OT follow up later today as time allows. Thank you.    Becky Winkler, PT, DPT, CCS  
Physical Therapy:  10/09/24     Chart reviewed in preparation for PT treatment. Spoke to RN who reports patient just received ABG, with reading indicating poor oxygenation. Patient is being prepped for HFNC. RN requesting PT defer at this time. Will defer and continue to follow as able.     Thank you,  Ashlie Kenny, PT, DPT    
Pt will be seen as need by ID team. Please contact us with any questions or concerns.       
RT called to assess pt. Upon arrival to bedside pt was wearing bipap. Pt appeared to be in no respiratory distress and denies any shortness of breath. Pt trial on high flow 30L 40% pt denies shortness of breath and chest tightness while on high flow pt remains on high flow with spo2 96%  
Received critical lab of pt's foot wound cx - positive for MRSA. Pt already on iso precaution for hx of MRSA.    Notified MD Mckeon & GITA Garcia.No new orders @ this time.    Updated primary RN Kirstin.   
Referral source:   Bishop Love at [facility] in [facility] 4 CV SERVICES UNIT.  attended rounds on the CV Services Unit as part of the Interdisciplinary team where the patient's ongoing care was discussed. I reviewed the medical record as part of this encounter.     Outcome: Interdisciplinary team are aware of  availability and were encouraged to request Spiritual Health support as needed.      The  on-call can be reached at ([phone]).     Rev. Rosanna Calix MDiv, [facility]  Staff     
Referral source:   Bishop Love at [facility] in [facility] 4 CV SERVICES UNIT.  attended rounds on the CV Services Unit as part of the Interdisciplinary team where the patient's ongoing care was discussed. I reviewed the medical record as part of this encounter.     Outcome: Interdisciplinary team are aware of  availability and were encouraged to request Spiritual Health support as needed.      The  on-call can be reached at ([phone]).     Rev. Rosanna Calix MDiv, [facility]  Staff     
Referral source:   Bishop Love at [facility] in [facility] 4 CV SERVICES UNIT.  attended rounds on the CV Services Unit as part of the Interdisciplinary team where the patient's ongoing care was discussed. I reviewed the medical record as part of this encounter.     Outcome: Interdisciplinary team are aware of  availability and were encouraged to request Spiritual Health support as needed.      The  on-call can be reached at ([phone]).     Rev. Rosanna Calix MDiv, [facility]  Staff     
Referral source:   Bishop Love at [facility] in [facility] 4 CV SERVICES UNIT.  attended rounds on the CV Services Unit as part of the Interdisciplinary team where the patient's ongoing care was discussed. I reviewed the medical record as part of this encounter.     Outcome: Interdisciplinary team are aware of  availability and were encouraged to request Spiritual Health support as needed.      The  on-call can be reached at [phone]).     Rev. Rosanna Calix MDiv, Psychiatric  Staff     
Renal Dosing/Monitoring  Medication: Cefepime   Current regimen:  2 grams IV every 12 hr  Recent Labs     10/04/24  0652 10/05/24  0311 10/06/24  0431   CREATININE 0.82 0.87 0.86   BUN 27* 28* 27*     Estimated CrCl:  73 ml/min  Plan: Change to 2 grams IV every 8 hours per [facility] P&T Committee Protocol with respect to indication (including bacteremia) and renal function.  Pharmacy will continue to monitor patient daily and will make dosage adjustments based upon changing renal function.      
Renal Dosing/Monitoring  Medication: Cefepime   Current regimen:  2 grams IV every 24 hr  Recent Labs     10/03/24  0957 10/04/24  0652 10/05/24  0311   CREATININE 0.86 0.82 0.87   BUN 25* 27* 28*     Estimated CrCl:  73 ml/min  Plan: Change to 2 grams IV every 12 hours per [facility] P&T Committee Protocol with respect to renal function.  Pharmacy will continue to monitor patient daily and will make dosage adjustments based upon changing renal function.      
Spiritual Health History and Assessment/Progress Note  [facility]    Attempted Encounter,  , Life Adjustments, Adjustment to illness,      Name: Bishop Love MRN: [id number]    Age: 69 y.o.     Sex: male   Language: English   Scientology: Zoroastrianism   Hyponatremia     Date: 9/25/2024            Total Time Calculated: 10 min              Spiritual Assessment continued in [facility] CV SERVICES UNIT        Referral/Consult From: Rounding   Encounter Overview/Reason: Attempted Encounter  Service Provided For: Patient    Vashti, Belief, Meaning:   Patient unable to assess at this time  Family/Friends No family/friends present      Importance and Influence:  Patient unable to assess at this time  Family/Friends No family/friends present    Community:  Patient Other: Unable to assess  Family/Friends No family/friends present    Assessment and Plan of Care:     Patient Interventions include: Other: Unable to assess  Family/Friends Interventions include: No family/friends present    Patient Plan of Care: Spiritual Care available upon further referral  Family/Friends Plan of Care: Spiritual Care available upon further referral    Electronically signed by Jordan Gibson Chaplain Resident on 9/25/2024 at 11:16 AM   
Spiritual Health History and Assessment/Progress Note  [facility]    Initial Encounter,  , Life Adjustments, Adjustment to illness,      Name: Bishop Love MRN: [id number]    Age: 69 y.o.     Sex: male   Language: English   Catholic: Protestant   Hyponatremia     Date: 9/21/2024            Total Time Calculated: 20 min              Spiritual Assessment began in [facility] 4 CV SERVICES UNIT        Referral/Consult From: Rounding   Encounter Overview/Reason: Initial Encounter  Service Provided For: Patient and family together    Vashti, Belief, Meaning:   Patient identifies as spiritual and is connected with a vashti tradition or spiritual practice  Family/Friends identify as spiritual and are connected with a vashti tradition or spiritual practice      Importance and Influence:  Patient has spiritual/personal beliefs that influence decisions regarding their health  Family/Friends have spiritual/personal beliefs that influence decisions regarding the patient's health    Community:  Patient is connected with a spiritual community  Family/Friends are connected with a spiritual community:    Assessment and Plan of Care:  Intro to spiritual health to patient and family present at bedside. Mr Alas identified as Protestant and shared that he receives support from family and vashti community. No spiritual needs noted.     Patient Interventions include: Facilitated expression of thoughts and feelings, Affirmed coping skills/support systems, and Facilitated life review and/ or legacy  Family/Friends Interventions include: Explored spiritual coping/struggle/distress and Affirmed coping skills/support systems    Patient Plan of Care: Spiritual Care available upon further referral  Family/Friends Plan of Care: No future visits per patient/family request    Electronically signed by REV. JORGE VALENZUELA M.Div,  [facility] on 9/21/2024 at 12:10 PM+  
Third ABG drawn by a third therapist. We all believe we are getting arterial blood not venous. Still extremely low oxygen. Sats still not reading on pulse ox. Will start Bipap with higher PEEP and recheck an ABG in an hour.    
Vascular Surgery  --patient re-examined yesterday and discussed in detail  --given cardiopulmonary issues, I feel is near prohibitive high risk for general anesthesia/ even amputation  --I think palliative care or comfort approach should be considered  --if this is not an option, can proceed with amputation--maybe spinal anesthesia; would need some holding of anticoagulation and respiratory improvement;   --will check in with primary team  
60, INR 3.1, PTT 29.8.  ED consulted orthopedic surgery who reportedly recommended application of a sling.  ED consulted hospitalist for admission.     Interval history / Subjective:   Follow-up for bacteremia and LVAD  - Patient resting quietly, still on HF oxygen 25 mL/min, 38% FiO2.  He is not in distress.  Pulse ox on the fingers as well as the earlobes not giving accurate and persistent reading.       Assessment & Plan:     Acute hypoxic respiratory failure--improving currently on HFNC 25 L/min at 40%  Heart failure with reduced ejection fraction s/p HeartMate 3 LVAD 9/19/2023  Ischemic cardiomyopathy  History of CAD status post PCI  RV failure  History of paroxysmal defibrillation  Status post AICD  Advanced heart failure team is managing the medication    Heart Failure/Cardiomyopathy with severe RV dilatation and RV failure  Continue current medical therapy for heart failure:  Beta-blocker: Continue Metoprolol 25 mg BID   ACE/ARB/ARNi: Not on Entresto due to severe cough leading to Evelyn-Tapia tear  Hydralazine/nitrate: Continue Hydralazine 100 mg TID and Isordil 40 mg TID  MRA: Aldactone resumed  SGLT2 inhibitor: Not on SGLT2 due to recurrent UTI  Diuretic: iv bumex   Patient not a candidate for heart transplant as per [facility] due to severe peripheral vascular disease and nonhealing leg ulcer and redo sternotomy  Continue Coumadin  -Was on bipap but now on HFNC, weaning it as tolerated  -Nighttime BiPAP ordered to help wean off high flow oxygen  IV diuretics per AHF team, pro BNP getting better        MSSA Bacteremia  Right ankle erosion, left ankle wound  -podiatry re-evaled: rec local wound care and iv abx  -wound cx with MRSA, MSSA, pseudomonas, anaerobe  -Wound care on board appreciate help  -Assessed by podiatry appreciate help.  Full weightbearing left foot, non weightbearing right foot  -Turn patient every 2 hours, float heels and specialty bed  -topic petrolatum ointment to dry skin twice a day  : dry 
AST 60, INR 3.1, PTT 29.8.  ED consulted orthopedic surgery who reportedly recommended application of a sling.  ED consulted hospitalist for admission.          Interval history / Subjective:     Blood culture positive ID following gram-positive cocci in cluster  Staph aureus 1 /2  bottles ID following     Assessment & Plan:       Heart failure with reduced ejection fraction s/p HeartMate 3 LVAD 9/19/2023  Ischemic cardiomyopathy  History of CAD status post PCI  RV failure  History of paroxysmal defibrillation  Status post AICD  Last TTE 7/31/2024 significantly reduced left ventricular systolic function.    -Beta-blocker: Not a candidate due to RV failure  -ACEi/ARB/ARNI: Not on Entresto due to severe cough  -Hydralazine/Nitrate:   Hydralazine/isosorbide  -MRA: Aldactone  -SGLT2i: Not on Jardiance due to recurrent UTI  -I/O, daily weight, FR 1200 ml and Low sodium diet   -Continue warfarin for anticoagulation  - diuresis on hold now, will use prn per cardiologist   -Continue aspirin and statin  -Continue amiodarone 200 mg daily  -Daily INR: INR 1.8 , sc lovenox bid started today per AHF to bridge     Leukocytosis  Concerning right heel wound infection   WBC up to 15K ...16.7 ...18.2  Foot wound examed, noticed right heel wound increasing smell and discharge   -wound culture  -empirically iv vanco, cefepime  -Blood culture positive gram-positive cocci in clusters on vancomycin  -podiatry re-evaled: rec local wound care and iv abx  Vascular surgeon consuled:not a candidate for any further open revascularization   -would culture preliminary pseudomonas and staph, continue vanco and cefepime for now     Generalized desquamating skin/dry skin   Lower extremity wounds due to above  Lower extremity pain due to above  -Wound care on board appreciate help  -Assessed by podiatry appreciate help.  Full weightbearing left foot   -Not weightbearing right foot: podiatry to reveal right foot   -Turn patient every 2 hours, float 
JAKOB Fleming  Outcome: Progressing  10/23/2024 2245 by Argentina Hankins, RN  Outcome: Progressing  Flowsheets (Taken 10/20/2024 2000 by Brittney Quispe, RN)  Verbalizes/displays adequate comfort level or baseline comfort level: Encourage patient to monitor pain and request assistance     Problem: Skin/Tissue Integrity  Goal: Absence of new skin breakdown  Description: 1.  Monitor for areas of redness and/or skin breakdown  2.  Assess vascular access sites hourly  3.  Every 4-6 hours minimum:  Change oxygen saturation probe site  4.  Every 4-6 hours:  If on nasal continuous positive airway pressure, respiratory therapy assess nares and determine need for appliance change or resting period.  Outcome: Progressing     Problem: Nutrition Deficit:  Goal: Optimize nutritional status  Outcome: Progressing     Problem: ABCDS Injury Assessment  Goal: Absence of physical injury  Outcome: Progressing        
Progressing  Flowsheets (Taken 10/18/2024 2000)  Care Plan - Patient's Chronic Conditions and Co-Morbidity Symptoms are Monitored and Maintained or Improved: Monitor and assess patient's chronic conditions and comorbid symptoms for stability, deterioration, or improvement     Problem: Pain  Goal: Verbalizes/displays adequate comfort level or baseline comfort level  10/19/2024 1130 by Ivy Cohen RN  Outcome: Progressing  Flowsheets  Taken 10/19/2024 1100  Verbalizes/displays adequate comfort level or baseline comfort level: Encourage patient to monitor pain and request assistance  Taken 10/19/2024 0750  Verbalizes/displays adequate comfort level or baseline comfort level: Encourage patient to monitor pain and request assistance  10/19/2024 0141 by Na Sullivan RN  Outcome: Progressing  Flowsheets (Taken 10/18/2024 2000)  Verbalizes/displays adequate comfort level or baseline comfort level: Encourage patient to monitor pain and request assistance     Problem: Skin/Tissue Integrity  Goal: Absence of new skin breakdown  Description: 1.  Monitor for areas of redness and/or skin breakdown  2.  Assess vascular access sites hourly  3.  Every 4-6 hours minimum:  Change oxygen saturation probe site  4.  Every 4-6 hours:  If on nasal continuous positive airway pressure, respiratory therapy assess nares and determine need for appliance change or resting period.  10/19/2024 1130 by Ivy Cohen RN  Outcome: Progressing  10/19/2024 0141 by Na Sullivan RN  Outcome: Progressing     Problem: Nutrition Deficit:  Goal: Optimize nutritional status  Outcome: Progressing     Problem: ABCDS Injury Assessment  Goal: Absence of physical injury  10/19/2024 1130 by Ivy Cohen RN  Outcome: Progressing  10/19/2024 0141 by Na Sullivan RN  Outcome: Progressing  Flowsheets (Taken 10/19/2024 0141)  Absence of Physical Injury: Implement safety measures based on patient assessment       
candidate for amputations    Heart Failure/Cardiomyopathy with severe RV dilatation and RV failure  Continue current medical therapy for heart failure:  Beta-blocker: Continue Metoprolol 25 mg BID   ACE/ARB/ARNi: Not on Entresto due to severe cough leading to Evelyn-Tapia tear  Hydralazine/nitrate: Continue Hydralazine 100 mg TID and Isordil 40 mg TID  MRA: Continue Spironolactone 25 mg daily   SGLT2 inhibitor: Not on SGLT2 due to recurrent UTI  Diuretic: Bumex 2mg PO BID   Patient not a candidate for heart transplant as per [facility] due to severe peripheral vascular disease and nonhealing leg ulcer and redo sternotomy    LVAD/Anticoagulation: With post LVAD RV failure euvolemic  Continue current device speed; 5000 rpm  No signs of bleeding, infection or stroke.   Continue dressing changes three times weekly  Continue aspirin for peripheral vascular disease and STEMI  Continue Coumadin with a target INR of 2-3, pharmacy to dose while inpatient.    LVAD/anticoagulation labs: CBC, CMP, Mg, LDH, PT/INR daily    History of fall with injury to left forearm and inability to lift left shoulder  XRay done and showed age-indeterminate fracture of bone spur at triceps insertion of left elbow, also s/o massive cuff tear  Orthopedic surgery service consulted, no plans for surgical intervention at this time, would be high risk for shoulder replacement    Generalized rash with flaky skin and peeling-May-be atopic dermatitis- skin starting to dry and flake again  Patient states this started after initiation of torsemide.  Torsemide stopped  Wound care consult  Per hospitalist  Continue ammonium lactate lotion that helped his skin a lot previously     Severe protein energy malnutrition with weight loss and muscle loss  Encourage PO intake including protein supplements       CAD with history of non-STEMI status post drug-eluting stent to severe distal left main to ramus  Continue  aspirin 81 mg p.o. daily     Hyperlipidemia fairly 
injury  Outcome: Progressing        
Measures:  Height: 175.3 cm (5' 9\")  Ideal Body Weight (IBW): 160 lbs (73 kg)       Current Body Weight: 57.5 kg (126 lb 12.2 oz), 79.2 % IBW. Weight Source: Bed Scale  Current BMI (kg/m2): 18.7        Weight Adjustment For: No Adjustment                 BMI Categories: Underweight (BMI less than 22) age over 65    Wt Readings from Last 10 Encounters:   09/25/24 57.5 kg (126 lb 12.8 oz)   09/17/24 60.1 kg (132 lb 6.4 oz)   08/29/24 62.6 kg (138 lb)   07/31/24 62.6 kg (138 lb)   07/18/24 65.8 kg (145 lb)   07/16/24 61.7 kg (136 lb)   06/18/24 64.9 kg (143 lb)   05/22/24 62.6 kg (138 lb)   05/23/24 61.7 kg (136 lb)   04/30/24 62.6 kg (138 lb)           Nutrition Diagnosis:   Severe malnutrition related to inadequate protein-energy intake as evidenced by weight loss, severe muscle loss, severe loss of subcutaneous fat    Nutrition Interventions:   Food and/or Nutrient Delivery: Modify Current Diet, Modify Oral Nutrition Supplement  Nutrition Education/Counseling: No recommendation at this time  Coordination of Nutrition Care: Continue to monitor while inpatient       Goals:     Goals: Meet at least 75% of estimated needs, by next RD assessment       Nutrition Monitoring and Evaluation:   Behavioral-Environmental Outcomes: None Identified  Food/Nutrient Intake Outcomes: Food and Nutrient Intake, Supplement Intake  Physical Signs/Symptoms Outcomes: Biochemical Data, GI Status, Weight    Discharge Planning:    Continue current diet, Continue Oral Nutrition Supplement     Otto Alejo RD  Available via "[employer]"    
Oral Once Marianne Pereira MD        ammonium lactate (LAC-HYDRIN) 12 % lotion   Topical BID [place] Hot SpringsLina izquierdo APRN - NP   Given at 10/25/24 0900    collagenase ointment   Topical Daily Justice Amezcua MD   Given at 10/25/24 0900    amiodarone (CORDARONE) tablet 100 mg  100 mg Oral Daily Norma Luevano APRN - NP   100 mg at 10/25/24 0859    docusate sodium (COLACE) capsule 100 mg  100 mg Oral Daily Naga Hope MD   100 mg at 10/25/24 0859    spironolactone (ALDACTONE) tablet 25 mg  25 mg Oral Daily Jose Daniel Fields MD   25 mg at 10/25/24 0859    ceFEPIme (MAXIPIME) 2,000 mg in sodium chloride 0.9 % 100 mL IVPB (mini-bag)  2,000 mg IntraVENous Q12H Anoop Ley APRN - NP   Stopped at 10/25/24 1530    HYDROmorphone HCl PF (DILAUDID) injection 1 mg  1 mg IntraVENous Daily PRN Ramila Fields MD   1 mg at 10/21/24 1904    traMADol (ULTRAM) tablet 100 mg  100 mg Oral Q6H PRN Ramila Fields MD   100 mg at 10/17/24 2220    HYDROmorphone (DILAUDID) tablet 2 mg  2 mg Oral Q4H PRN Ramila Fields MD   2 mg at 10/25/24 0709    white petrolatum ointment   Topical BID PRN Kimberly Bah MD   Given at 10/10/24 1006    sodium chloride flush 0.9 % injection 5-40 mL  5-40 mL IntraVENous 2 times per day Jose Daniel Fields MD   10 mL at 10/25/24 0900    0.9 % sodium chloride infusion   IntraVENous PRN Jose Daniel Fields MD 15 mL/hr at 10/06/24 0906 New Bag at 10/06/24 0906    ondansetron (ZOFRAN) injection 4 mg  4 mg IntraVENous Q6H PRN Jose Daniel Fields MD        hydrALAZINE (APRESOLINE) injection 10 mg  10 mg IntraVENous Q4H PRN Jose Daniel Fields MD        magnesium hydroxide (MILK OF MAGNESIA) 400 MG/5ML suspension 30 mL  30 mL Oral Daily PRN Jose Daniel Fields MD        polyethylene glycol (GLYCOLAX) packet 17 g  17 g Oral Daily PRN Jose Daniel Fields MD   17 g at 10/20/24 1552    acetaminophen (TYLENOL) tablet 650 mg  650 mg Oral Q4H PRN Jose Daniel Fields MD        acetaminophen (TYLENOL) tablet 650 mg  650 mg Oral Q6H Jose Daniel Fields 
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 male with a history chronic systolic heart failure due to ischemic cardiomyopathy. He has CAD and PVD. He has a history of SSS and is s/p PPM.      He presented 8/24/2023 for scheduled Fem-Pop bypass for RLE ischemia and non healing wound. His post op course was complicated by NSTEMI and cardiogenic shock. Rapid response was called on 8/27 for severe respiratory distress, hypotension, and tachycardia. On initial evaluation, patient was sweaty and tachypnic with belly breathing. HR in 160s, cold peripheries, BP in 60s systolic and RR in 30s. BiPAP initiated. CXR showed pulmonary edema. EKG showed LBBB and telemetry showed wide complex tachycardia. Patient moved to ICU and underwent synchronized cardioversion with 200J emergently. Levophed was initiated. Heart rate 120s and blood pressure improved to 90s/60s systolic. He had persistent shock. CV surgery was consulted and Impella 5.5 was implanted 8/28/2023. Coronary angiogram performed on 8/29/23 and showed severe distal LM to Ramus stenosis,  LAD and  RCA. He underwent PCI to the LM-Ramus. RCA territory could not be revascularized. He was transferred to [place] for Impella management and LVAD evaluation.      Patient was stabilized on Impella support. We made attempts at weaning Impella. He had worsened hemodynamics with cerebral hypoperfusion and altered mental status, elevated LFTs and creatinine at P3. He failed Impella wean and performance level was increased back to P7 on 9/11 with improvement. LVAD evaluation was completed. He underwent HeartMate 3 LVAD for destination therapy on 9/19/2023. He was a current smoker and not a transplant candidate.      His initial post op course was complicated by right MCA stroke. Symptoms were recognized very early by nursing staff and he underwent thrombectomy of the right M1 on the 
or resting period.  10/12/2024 1008 by Ivy Cohen RN  Outcome: Progressing  10/12/2024 0120 by Brittney Quispe RN  Outcome: Progressing     Problem: Nutrition Deficit:  Goal: Optimize nutritional status  10/12/2024 1008 by Ivy Cohen RN  Outcome: Progressing  10/12/2024 0120 by Brittney Quispe RN  Outcome: Progressing       
with EP cardiologist- Dr. Cuello   Patient is also awaiting new RV lead     Hypertension:  Doppler MAP within goal (70-90)    Continue current regimen:  Amlodipine 5 mg p.o. twice daily   Hydralazine 100 mg 3 times daily/Isordil 40 mg TID  Metoprolol XL 25 mg twice daily     MAX:  Continue CPAP    History of peripheral vascular disease status post femoropopliteal bypass  Was on Brilinta now switched to aspirin 81 mg daily since it has been more than a year post STEMI  States hurt too much to walk.   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 male with a history chronic systolic heart failure due to ischemic cardiomyopathy. He has CAD and PVD. He has a history of SSS and is s/p PPM.      He presented 8/24/2023 for scheduled Fem-Pop bypass for RLE ischemia and non healing wound. His post op course was complicated by NSTEMI and cardiogenic shock. Rapid response was called on 8/27 for severe respiratory distress, hypotension, and tachycardia. On initial evaluation, patient was sweaty and tachypnic with belly breathing. HR in 160s, cold peripheries, BP in 60s systolic and RR in 30s. BiPAP initiated. CXR showed pulmonary edema. EKG showed LBBB and telemetry showed wide complex tachycardia. Patient moved to ICU and underwent synchronized cardioversion with 200J emergently. Levophed was initiated. Heart rate 120s and blood pressure improved to 90s/60s systolic. He had persistent shock. CV surgery was consulted and Impella 5.5 was implanted 8/28/2023. Coronary angiogram performed on 8/29/23 and showed severe distal LM to Ramus stenosis,  LAD and  RCA. He underwent PCI to the LM-Ramus. RCA territory could not be revascularized. He was transferred to [place] for Impella management and LVAD evaluation.      Patient was stabilized on Impella support. We made attempts at weaning Impella. He had worsened hemodynamics 
160s, cold peripheries, BP in 60s systolic and RR in 30s. BiPAP initiated. CXR showed pulmonary edema. EKG showed LBBB and telemetry showed wide complex tachycardia. Patient moved to ICU and underwent synchronized cardioversion with 200J emergently. Levophed was initiated. Heart rate 120s and blood pressure improved to 90s/60s systolic. He had persistent shock. CV surgery was consulted and Impella 5.5 was implanted 8/28/2023. Coronary angiogram performed on 8/29/23 and showed severe distal LM to Ramus stenosis,  LAD and  RCA. He underwent PCI to the LM-Ramus. RCA territory could not be revascularized. He was transferred to [place] for Impella management and LVAD evaluation.      Patient was stabilized on Impella support. We made attempts at weaning Impella. He had worsened hemodynamics with cerebral hypoperfusion and altered mental status, elevated LFTs and creatinine at P3. He failed Impella wean and performance level was increased back to P7 on 9/11 with improvement. LVAD evaluation was completed. He underwent HeartMate 3 LVAD for destination therapy on 9/19/2023. He was a current smoker and not a transplant candidate.      His initial post op course was complicated by right MCA stroke. Symptoms were recognized very early by nursing staff and he underwent thrombectomy of the right M1 on the evening of 9/19/23. He had resolution of left sided weakness. Post op course was also complicated by RV failure and liver dysfunction requiring inotrope support. He was discharged on POD 14 to inpatient rehab.     Patient was hospitalized in January 2024 with severe cough secondary to Entresto leading to upper GI bleeding due to Evelyn-Tapia tear. He was managed with hemoclips x 3 with stabilization.     Patient was brought to clinic on 6/18/2024 for LVAD speed reduction due to severe RV dysfunction with septum protruding into LV. Speed was reduced from 5200 to 5000 RPM. A Hemodynamic RHC was performed 7/31/24 showing 
HPI.       Vital Signs:    Last 24hrs VS reviewed since prior progress note. Most recent are:  Vitals:    09/29/24 1200   Pulse: 63   Resp:    Temp:    SpO2:          Intake/Output Summary (Last 24 hours) at 9/29/2024 1334  Last data filed at 9/29/2024 1100  Gross per 24 hour   Intake 900 ml   Output 750 ml   Net 150 ml        Physical Examination:     I had a face to face encounter with this patient and independently examined them on 9/29/2024 as outlined below:          General : alert x 3, awake, no acute distress,   HEENT: PEERL, EOMI, moist mucus membrane, TM clear  Neck: supple, no JVD, no meningeal signs  Chest: Clear to auscultation bilaterally   CVS: LVAD thrill   abd: soft/ non tender, non distended, BS physiological,   Ext: no clubbing, no cyanosis, no edema, brisk 2+ DP pulses, tender to palpate lower extremity with multiple open wounds  Neuro/Psych: pleasant mood and affect, CN 2-12 grossly intact, sensory grossly within normal limit, Strength 5/5 in all extremities, DTR 1+ x 4  Skin: Multiple desquamating areas noted around the skin.  Lower extremity multiple wounds.  Tender to palpate              Data Review:    Review and/or order of clinical lab test  Review and/or order of tests in the radiology section of CPT  Review and/or order of tests in the medicine section of CPT      I have personally and independently reviewed all pertinent labs, diagnostic studies, imaging, and have provided independent interpretation of the same.     Labs:     Recent Labs     09/28/24  0054 09/28/24  0946 09/29/24  0032   WBC 9.9  --  15.8*   HGB 7.7* 7.6* 8.0*   HCT 23.5* 23.6* 24.5*     --  305     Recent Labs     09/27/24  0356 09/28/24  0054 09/29/24  0032   * 130* 128*   K 4.7 4.4 4.8   CL 96* 98 98   CO2 23 24 22   BUN 28* 24* 26*   MG 2.0 2.0 2.0     Recent Labs     09/27/24  0356 09/28/24  0054 09/29/24  0032   ALT 40 34 35   GLOB 3.8 4.3* 3.8     Recent Labs     09/28/24  0054 09/28/24  0946 
  HGB 8.0* 8.5*   HCT 24.5* 27.1*    314     Recent Labs     09/28/24  0054 09/29/24  0032 09/30/24  0007   * 128* 126*   K 4.4 4.8 4.5   CL 98 98 98   CO2 24 22 22   BUN 24* 26* 27*   MG 2.0 2.0 2.0     Recent Labs     09/28/24  0054 09/29/24  0032 09/30/24  0007   ALT 34 35 42   GLOB 4.3* 3.8 4.7*     Recent Labs     09/28/24  0946 09/29/24  0032 09/30/24  0007   INR 1.9* 1.9* 1.7*      No results for input(s): \"TIBC\" in the last 72 hours.    Invalid input(s): \"FE\", \"PSAT\", \"FERR\"   No results found for: \"RBCF\"   No results for input(s): \"PH\", \"PCO2\", \"PO2\" in the last 72 hours.  No results for input(s): \"CPK\" in the last 72 hours.    Invalid input(s): \"CPKMB\", \"CKNDX\", \"TROIQ\"  Lab Results   Component Value Date/Time    CHOL 60 09/18/2024 01:14 AM    HDL 26 09/18/2024 01:14 AM    LDL 22.2 09/18/2024 01:14 AM    LDL  09/19/2023 09:45 PM     Unable to calculate LDL or VLDL due to low cholesterol.     No results found for: \"GLUCPOC\"  [unfilled]    Notes reviewed from all clinical/nonclinical/nursing services involved in patient's clinical care. Care coordination discussions were held with appropriate clinical/nonclinical/ nursing providers based on care coordination needs.         Patients current active Medications were reviewed, considered, added and adjusted based on the clinical condition today.      Home Medications were reconciled to the best of my ability given all available resources at the time of admission. Route is PO if not otherwise noted.      Admission Status:[id number]:::1}      Medications Reviewed:     Current Facility-Administered Medications   Medication Dose Route Frequency    warfarin (COUMADIN) tablet 1.5 mg  1.5 mg Oral Once    enoxaparin (LOVENOX) injection 60 mg  1 mg/kg SubCUTAneous BID    ceFEPIme (MAXIPIME) 2,000 mg in sterile water 20 mL IV syringe  2,000 mg IntraVENous Once    Followed by    [START ON 10/1/2024] ceFEPIme (MAXIPIME) 2,000 mg in sodium chloride 0.9 % 100 mL IVPB 
10/05/24  0311 10/06/24  0431 10/07/24  0239   ALT 39 40 41   GLOB 3.8 4.0 4.1*     Recent Labs     10/05/24  0311 10/06/24  0431 10/07/24  0239   INR 2.1* 2.3* 2.5*      No results for input(s): \"TIBC\" in the last 72 hours.    Invalid input(s): \"FE\", \"PSAT\", \"FERR\"   No results found for: \"RBCF\"   No results for input(s): \"PH\", \"PCO2\", \"PO2\" in the last 72 hours.  No results for input(s): \"CPK\" in the last 72 hours.    Invalid input(s): \"CPKMB\", \"CKNDX\", \"TROIQ\"  Lab Results   Component Value Date/Time    CHOL 60 09/18/2024 01:14 AM    HDL 26 09/18/2024 01:14 AM    LDL 22.2 09/18/2024 01:14 AM    LDL  09/19/2023 09:45 PM     Unable to calculate LDL or VLDL due to low cholesterol.     No results found for: \"GLUCPOC\"    Notes reviewed from all clinical/nonclinical/nursing services involved in patient's clinical care. Care coordination discussions were held with appropriate clinical/nonclinical/ nursing providers based on care coordination needs.     Patients current active Medications were reviewed, considered, added and adjusted based on the clinical condition today.      Medications Reviewed:     Current Facility-Administered Medications   Medication Dose Route Frequency    ceFAZolin (ANCEF) 2,000 mg in sterile water 20 mL IV syringe  2,000 mg IntraVENous Q8H    warfarin (COUMADIN) tablet 1.5 mg  1.5 mg Oral Once    HYDROmorphone HCl PF (DILAUDID) injection 1 mg  1 mg IntraVENous Daily PRN    traMADol (ULTRAM) tablet 100 mg  100 mg Oral Q6H PRN    HYDROmorphone (DILAUDID) tablet 2 mg  2 mg Oral Q4H PRN    metroNIDAZOLE (FLAGYL) tablet 500 mg  500 mg Oral 3 times per day    amLODIPine (NORVASC) tablet 5 mg  5 mg Oral Nightly    white petrolatum ointment   Topical BID PRN    [Held by provider] spironolactone (ALDACTONE) tablet 12.5 mg  12.5 mg Oral Daily    sodium chloride flush 0.9 % injection 5-40 mL  5-40 mL IntraVENous 2 times per day    sodium chloride flush 0.9 % injection 5-40 mL  5-40 mL IntraVENous PRN    
\"PSAT\", \"FERR\"   No results found for: \"RBCF\"   No results for input(s): \"PH\", \"PCO2\", \"PO2\" in the last 72 hours.  No results for input(s): \"CPK\" in the last 72 hours.    Invalid input(s): \"CPKMB\", \"CKNDX\", \"TROIQ\"  Lab Results   Component Value Date/Time    CHOL 60 09/18/2024 01:14 AM    HDL 26 09/18/2024 01:14 AM    LDL 22.2 09/18/2024 01:14 AM    LDL  09/19/2023 09:45 PM     Unable to calculate LDL or VLDL due to low cholesterol.     No results found for: \"GLUCPOC\"  [unfilled]    Notes reviewed from all clinical/nonclinical/nursing services involved in patient's clinical care. Care coordination discussions were held with appropriate clinical/nonclinical/ nursing providers based on care coordination needs.         Patients current active Medications were reviewed, considered, added and adjusted based on the clinical condition today.      Home Medications were reconciled to the best of my ability given all available resources at the time of admission. Route is PO if not otherwise noted.      Admission Status:[id number]:::1}      Medications Reviewed:     Current Facility-Administered Medications   Medication Dose Route Frequency    warfarin (COUMADIN) tablet 1 mg  1 mg Oral Once    white petrolatum ointment   Topical BID PRN    [Held by provider] spironolactone (ALDACTONE) tablet 12.5 mg  12.5 mg Oral Daily    white petrolatum ointment   Topical BID    sodium chloride flush 0.9 % injection 5-40 mL  5-40 mL IntraVENous 2 times per day    sodium chloride flush 0.9 % injection 5-40 mL  5-40 mL IntraVENous PRN    0.9 % sodium chloride infusion   IntraVENous PRN    ondansetron (ZOFRAN) injection 4 mg  4 mg IntraVENous Q6H PRN    hydrALAZINE (APRESOLINE) injection 10 mg  10 mg IntraVENous Q4H PRN    magnesium hydroxide (MILK OF MAGNESIA) 400 MG/5ML suspension 30 mL  30 mL Oral Daily PRN    polyethylene glycol (GLYCOLAX) packet 17 g  17 g Oral Daily PRN    acetaminophen (TYLENOL) tablet 650 mg  650 mg Oral Q4H PRN    
flaking of the skin.     Imaging in the ER showed no fracture but significant degenerative disease in the shoulder and large cuff tear which was chronic. There was a bone spur fracture in the elbow. He was evaluated by orthopedics and recommended sling and PT/OT. He would require reverse total should replacement for return of function but high risk due to underlying medical issues.     PROBLEM LIST:  Chronic massive cuff tear left shoulder  Bony spur fracture left elbow  Chronic systolic heart failure  Stage D  Ischemic Cardiomyopathy  Ischemic RLE  S/p Right Fem-pop bypass 8/24/2023  Complicated by:  NSTEMI  Cardiogenic Shock s/p Impella 5.5 8/28/2023-9/19/23  Lactic acidosis  Acute Renal Failure  S/p HeartMate III LVAD for destination therapy 9/19/23  Complicated by right MCA stroke  S/p right M1 thrombectomy with resolution of symptoms  CAD  Cath 8/29/2023: Severe distal LM to Ramus stenosis,  LAD,  RCA  S/p PCI LM to Ramus, unable to revascularize RCA territory  PVD  S/p aortobifemoral bypass and bilateral femoral above-the-knee popliteal bypass in 2014  S/p Fem-Fem bypass 2/24/2023  S/p right Fem-Pop bypass 8/24/23  Chronic bilateral lower extremity wounds  Mitral regurgitation, severe  S/p dual chamber ICD  SSS s/p PPM  VT s/p emergent cardioversion 8/28/23  HTN  HLD  Anemia  MAX  History of UTI  Former smoker, quit 9/2023  GI Bleed due to Evelyn Tapia tear 1/2024    LIFE GOALS:  Lifestyle goals reviewed with the patient.  Patient's personal goals include: Discussed      CARDIAC IMAGING and DIAGNOSTICS REVIEWED:  Echo 7/31/24    LVAD speed 5000 RPM    Left Ventricle: Quantitative assessment of EF is limited by presence of an LVAD. Severely reduced left ventricular systolic function. Left ventricle size is normal. Normal wall thickness.    Right Ventricle: Not well visualized.  Echo 6/14/24    Left Ventricle: Severely reduced left ventricular systolic function with a visually estimated EF of 15 - 20%. 
input(s): \"CPKMB\", \"CKNDX\", \"TROIQ\"  Lab Results   Component Value Date/Time    CHOL 60 09/18/2024 01:14 AM    HDL 26 09/18/2024 01:14 AM    LDL 22.2 09/18/2024 01:14 AM    LDL  09/19/2023 09:45 PM     Unable to calculate LDL or VLDL due to low cholesterol.     No results found for: \"GLUCPOC\"  [unfilled]    Notes reviewed from all clinical/nonclinical/nursing services involved in patient's clinical care. Care coordination discussions were held with appropriate clinical/nonclinical/ nursing providers based on care coordination needs.         Patients current active Medications were reviewed, considered, added and adjusted based on the clinical condition today.      Home Medications were reconciled to the best of my ability given all available resources at the time of admission. Route is PO if not otherwise noted.      Admission Status:[id number]:::1}      Medications Reviewed:     Current Facility-Administered Medications   Medication Dose Route Frequency    acetaminophen (TYLENOL) tablet 650 mg  650 mg Oral Q4H PRN    ammonium lactate (AMLACTIN) 12 % cream   Topical BID    hydrALAZINE (APRESOLINE) injection 10 mg  10 mg IntraVENous Q4H PRN    sodium chloride flush 0.9 % injection 5-40 mL  5-40 mL IntraVENous PRN    sodium chloride flush 0.9 % injection 5-40 mL  5-40 mL IntraVENous 2 times per day    sodium chloride flush 0.9 % injection 5-40 mL  5-40 mL IntraVENous PRN    0.9 % sodium chloride infusion   IntraVENous PRN    ondansetron (ZOFRAN-ODT) disintegrating tablet 4 mg  4 mg Oral Q8H PRN    Or    ondansetron (ZOFRAN) injection 4 mg  4 mg IntraVENous Q6H PRN    polyethylene glycol (GLYCOLAX) packet 17 g  17 g Oral Daily PRN    amiodarone (CORDARONE) tablet 200 mg  200 mg Oral Daily    amLODIPine (NORVASC) tablet 5 mg  5 mg Oral QAM    amLODIPine (NORVASC) tablet 2.5 mg  2.5 mg Oral Nightly    aspirin EC tablet 81 mg  81 mg Oral Daily    atorvastatin (LIPITOR) tablet 80 mg  80 mg Oral Daily    hydrALAZINE (APRESOLINE) 
  Vaping Use    Vaping status: Never Used   Substance and Sexual Activity    Alcohol use: Not Currently    Drug use: Never    Sexual activity: Not on file   Other Topics Concern    Not on file   Social History Narrative    Not on file     Social Determinants of Health     Financial Resource Strain: Not on file   Food Insecurity: Patient Declined (1/15/2024)    Hunger Vital Sign     Worried About Running Out of Food in the Last Year: Patient declined     Ran Out of Food in the Last Year: Patient declined   Transportation Needs: Patient Declined (1/15/2024)    PRAPARE - Transportation     Lack of Transportation (Medical): Patient declined     Lack of Transportation (Non-Medical): Patient declined   Physical Activity: Not on file   Stress: Not on file   Social Connections: Feeling Socially Integrated (12/8/2023)    OASIS : Social Isolation     Frequency of experiencing loneliness or isolation: Never   Intimate Partner Violence: Not on file   Housing Stability: Patient Declined (1/15/2024)    Housing Stability Vital Sign     Unable to Pay for Housing in the Last Year: Patient declined     Number of Places Lived in the Last Year: 1     Unstable Housing in the Last Year: Patient declined       Patient Active Problem List   Diagnosis    Ischemic foot ulcer due to atherosclerosis of native artery of limb ([facility])    PVD (peripheral vascular disease) ([facility])    Lower limb ischemia    Critical lower limb ischemia ([facility])    SOB (shortness of breath)    LVAD (left ventricular assist device) present ([facility])    Cerebrovascular accident (CVA) ([facility])    Transient hyperglycemia post procedure    Cardioembolic stroke (H[facility])    Chronic systolic heart failure (HCC)    Ischemic cardiomyopathy    Coronary artery disease involving native coronary artery of native heart without angina pectoris    ICD (implantable cardioverter-defibrillator) in place    Essential hypertension    Chronic anticoagulation    Elevated PSA    Acute upper GI bleed    
(HCC)    SOB (shortness of breath)    LVAD (left ventricular assist device) present (HCC)    Cerebrovascular accident (CVA) (HCC)    Transient hyperglycemia post procedure    Cardioembolic stroke (HCC)    Chronic systolic heart failure (HCC)    Ischemic cardiomyopathy    Coronary artery disease involving native coronary artery of native heart without angina pectoris    ICD (implantable cardioverter-defibrillator) in place    Essential hypertension    Chronic anticoagulation    Elevated PSA    Acute upper GI bleed    Urinary tract infection without hematuria    Positive fecal occult blood test    Iron deficiency    Iron (Fe) deficiency anemia    Hyponatremia    Generalized rash    Xeroderma    Skin erosion    Injury of left rotator cuff    Xerosis of skin    Skin abrasion    Bacteremia due to methicillin susceptible Staphylococcus aureus (MSSA)    Debility    Pain in both lower extremities    Palliative care encounter          Objective:    Blood pressure (!) 88/0, pulse (!) 114, temperature 97.6 °F (36.4 °C), temperature source Oral, resp. rate 17, height 1.753 m (5' 9\"), weight 65.4 kg (144 lb 2.9 oz), SpO2 (!) 88%.    Physical Exam:   BP (!) 88/0   Pulse (!) 114   Temp 97.6 °F (36.4 °C) (Oral)   Resp 17   Ht 1.753 m (5' 9\")   Wt 65.4 kg (144 lb 2.9 oz)   SpO2 (!) 88%   BMI 21.29 kg/m²     BP (!) 88/0   Pulse (!) 114   Temp 97.6 °F (36.4 °C) (Oral)   Resp 17   Ht 1.753 m (5' 9\")   Wt 65.4 kg (144 lb 2.9 oz)   SpO2 (!) 88%   BMI 21.29 kg/m²     PHYSICAL EXAM:  General: NAD  EENT:  Anicteric sclerae.   Resp:  + LVAD hum  CV:  LVAD hum  GI:  Soft, Non distended, Non tender.  +Bowel sounds  Neurologic:  Alert and oriented X 3, normal speech,   Psych:   Good insight. Not anxious nor agitated  Skin:  LLE wound with mild exposure of tendon, less purulence today              Data Review:     CBC:  Recent Labs     10/06/24  0431 10/07/24  0239 10/08/24  0340   WBC 15.1* 14.7* 15.5*   HGB 8.5* 8.7* 9.2*   HCT 
15 - 20%. Left ventricle size is normal. Normal wall thickness. Global hypokinesis present. Normal diastolic function.   Echo 11/29/23    LVAD speed 5200 RPM    Left Ventricle: Unable to assess left ventricular systolic function. Not well visualized. Left ventricle size is normal. The intraventricular septum appears midline in limited views.    Right Ventricle: The right ventricle is visualized only in the parasternal long axis view. In this view, the function appears normal and RV size appears normal.    Aortic Valve: Not well visualized. No regurgitation. On LVAD support, the aortic valve does not appear to open.    PYP 9/11/2023  Semiquantitative visual grading of myocardial radiotracer uptake compared to osseous/rib uptake: 1. H/CL ratio: 1.25. Equivocal for myocardial TTR amyloidosis.    RHC 7/31/24  Baseline; LVAD Speed 5000 RPM  Hemodynamics  Right Atrium 25/22/20   Right Ventricle 34/9, RVEDP 18   Pulmonary Artery 35/17 (24)   PCWP 17/17/15  Transpulmonary Gradient 9     Saturations:  Right Atrial (RA) : 61%  Pulmonary Artery (PA) : 62.1%  Systemic Arterial : 100%    Outputs:  Cardiac Output (Celestina): 3.9 L/min  Cardiac Index (Celestina): 2.21 L/min/m2    Pulmonary Vascular Resistance (PVR): 2.31 HERNANDEZ     Review of Symptoms:  Constitutional: negative  Eyes: negative  Ears, nose, mouth, throat, and face: negative  Respiratory: negative  Cardiovascular: negative  Gastrointestinal: negative  Genitourinary:negative  Musculoskeletal: bilateral foot pain;  wounds;     Neurological: negative  Behvioral/Psych: negative  Endocrine: negative         PHYSICAL EXAM:  BP (!) 88/0   Pulse 90   Temp 97.6 °F (36.4 °C) (Oral)   Resp 21   Ht 1.753 m (5' 9\")   Wt 63.2 kg (139 lb 6.4 oz)   SpO2 93%   BMI 20.59 kg/m²     Physical Exam  Vitals and nursing note reviewed.   Constitutional:       Appearance: Normal appearance.   Cardiovascular:      Rate and Rhythm: Normal rate and regular rhythm.      Pulses: Decreased pulses.      
Daily    docusate sodium (COLACE) capsule 100 mg  100 mg Oral Daily    spironolactone (ALDACTONE) tablet 25 mg  25 mg Oral Daily    ceFEPIme (MAXIPIME) 2,000 mg in sodium chloride 0.9 % 100 mL IVPB (mini-bag)  2,000 mg IntraVENous Q12H    HYDROmorphone HCl PF (DILAUDID) injection 1 mg  1 mg IntraVENous Daily PRN    traMADol (ULTRAM) tablet 100 mg  100 mg Oral Q6H PRN    HYDROmorphone (DILAUDID) tablet 2 mg  2 mg Oral Q4H PRN    white petrolatum ointment   Topical BID PRN    sodium chloride flush 0.9 % injection 5-40 mL  5-40 mL IntraVENous 2 times per day    0.9 % sodium chloride infusion   IntraVENous PRN    ondansetron (ZOFRAN) injection 4 mg  4 mg IntraVENous Q6H PRN    hydrALAZINE (APRESOLINE) injection 10 mg  10 mg IntraVENous Q4H PRN    magnesium hydroxide (MILK OF MAGNESIA) 400 MG/5ML suspension 30 mL  30 mL Oral Daily PRN    polyethylene glycol (GLYCOLAX) packet 17 g  17 g Oral Daily PRN    acetaminophen (TYLENOL) tablet 650 mg  650 mg Oral Q4H PRN    acetaminophen (TYLENOL) tablet 650 mg  650 mg Oral Q6H    hydrALAZINE (APRESOLINE) injection 10 mg  10 mg IntraVENous Q4H PRN    sodium chloride flush 0.9 % injection 5-40 mL  5-40 mL IntraVENous PRN    sodium chloride flush 0.9 % injection 5-40 mL  5-40 mL IntraVENous 2 times per day    0.9 % sodium chloride infusion   IntraVENous PRN    ondansetron (ZOFRAN-ODT) disintegrating tablet 4 mg  4 mg Oral Q8H PRN    amLODIPine (NORVASC) tablet 5 mg  5 mg Oral QAM    aspirin EC tablet 81 mg  81 mg Oral Daily    atorvastatin (LIPITOR) tablet 80 mg  80 mg Oral Daily    hydrALAZINE (APRESOLINE) tablet 100 mg  100 mg Oral 3 times per day    isosorbide dinitrate (ISORDIL) tablet 40 mg  40 mg Oral q8h    metoprolol succinate (TOPROL XL) extended release tablet 25 mg  25 mg Oral BID    pantoprazole (PROTONIX) tablet 40 mg  40 mg Oral QAM AC    Vitamin D (CHOLECALCIFEROL) tablet 1,000 Units  1,000 Units Oral Daily    magnesium oxide (MAG-OX) tablet 400 mg  400 mg Oral Daily 
VLDL due to low cholesterol.     No results found for: \"GLUCPOC\"    Notes reviewed from all clinical/nonclinical/nursing services involved in patient's clinical care. Care coordination discussions were held with appropriate clinical/nonclinical/ nursing providers based on care coordination needs.     Patients current active Medications were reviewed, considered, added and adjusted based on the clinical condition today.      Medications Reviewed:     Current Facility-Administered Medications   Medication Dose Route Frequency    warfarin (COUMADIN) tablet 1 mg  1 mg Oral Once    docusate sodium (COLACE) capsule 100 mg  100 mg Oral Daily    bumetanide (BUMEX) injection 2 mg  2 mg IntraVENous BID    spironolactone (ALDACTONE) tablet 25 mg  25 mg Oral Daily    ceFEPIme (MAXIPIME) 2,000 mg in sodium chloride 0.9 % 100 mL IVPB (mini-bag)  2,000 mg IntraVENous Q12H    HYDROmorphone HCl PF (DILAUDID) injection 1 mg  1 mg IntraVENous Daily PRN    traMADol (ULTRAM) tablet 100 mg  100 mg Oral Q6H PRN    HYDROmorphone (DILAUDID) tablet 2 mg  2 mg Oral Q4H PRN    metroNIDAZOLE (FLAGYL) tablet 500 mg  500 mg Oral 3 times per day    white petrolatum ointment   Topical BID PRN    sodium chloride flush 0.9 % injection 5-40 mL  5-40 mL IntraVENous 2 times per day    0.9 % sodium chloride infusion   IntraVENous PRN    ondansetron (ZOFRAN) injection 4 mg  4 mg IntraVENous Q6H PRN    hydrALAZINE (APRESOLINE) injection 10 mg  10 mg IntraVENous Q4H PRN    magnesium hydroxide (MILK OF MAGNESIA) 400 MG/5ML suspension 30 mL  30 mL Oral Daily PRN    polyethylene glycol (GLYCOLAX) packet 17 g  17 g Oral Daily PRN    acetaminophen (TYLENOL) tablet 650 mg  650 mg Oral Q4H PRN    acetaminophen (TYLENOL) tablet 650 mg  650 mg Oral Q6H    hydrALAZINE (APRESOLINE) injection 10 mg  10 mg IntraVENous Q4H PRN    sodium chloride flush 0.9 % injection 5-40 mL  5-40 mL IntraVENous PRN    sodium chloride flush 0.9 % injection 5-40 mL  5-40 mL IntraVENous 2 
bypass and bilateral femoral above-the-knee popliteal bypass in 2014  S/p Fem-Fem bypass 2/24/2023  S/p right Fem-Pop bypass 8/24/23  Mitral regurgitation, severe  S/p dual chamber ICD  SSS s/p PPM  VT s/p emergent cardioversion 8/28/23  HTN  HLD  Anemia  MAX  History of UTI  Former smoker, quit 9/2023  GI Bleed due to Evelyn Tapia tear 1/2024    LIFE GOALS:  Lifestyle goals reviewed with the patient.  Patient's personal goals include: Discussed      CARDIAC IMAGING and DIAGNOSTICS REVIEWED:  Echo 10/9/24    Left Ventricle: Severely reduced left ventricular systolic function with a visually estimated EF of 15 - 20%. Left ventricle size is normal. Normal wall thickness. The intraventricular septum bows to the left.    Right Ventricle: Right ventricle is dilated. Lead present in the right ventricle. Reduced systolic function.    Aortic Valve: Mild regurgitation. There is no vegetation present. On LVAD support, the aortic valve opens every cardiac cycle.    Mitral Valve: Mild regurgitation. There is no vegetation present.    Tricuspid Valve: Mild regurgitation. No vegetation present.    IVC/SVC: IVC diameter is greater than 21 mm and decreases less than 50% during inspiration; therefore the estimated right atrial pressure is elevated (~15 mmHg).  Echo 7/31/24    LVAD speed 5000 RPM    Left Ventricle: Quantitative assessment of EF is limited by presence of an LVAD. Severely reduced left ventricular systolic function. Left ventricle size is normal. Normal wall thickness.    Right Ventricle: Not well visualized.  Echo 6/14/24    Left Ventricle: Severely reduced left ventricular systolic function with a visually estimated EF of 15 - 20%. Left ventricle size is normal. Normal wall thickness. Global hypokinesis present. Normal diastolic function.   Echo 11/29/23    LVAD speed 5200 RPM    Left Ventricle: Unable to assess left ventricular systolic function. Not well visualized. Left ventricle size is normal. The 
independently reviewed all pertinent labs, diagnostic studies, imaging, and have provided independent interpretation of the same.     Labs:     Recent Labs     10/03/24  0957 10/04/24  0652 10/04/24  1022   WBC 14.8* 16.3*  --    HGB 8.0* 6.3* 7.2*   HCT 25.6* 20.1* 23.1*   * 382  --      Recent Labs     10/02/24  0012 10/03/24  0957 10/04/24  0652   * 132* 133*   K 4.6 4.4 4.6    103 106   CO2 22 24 23   BUN 27* 25* 27*   MG 2.3 2.2 2.1     Recent Labs     10/02/24  0012 10/03/24  0957 10/04/24  0652   ALT 37 42 39   GLOB 5.0* 4.9* 3.5     Recent Labs     10/02/24  0012 10/03/24  0957 10/04/24  0652   INR 1.8* 1.6* 1.8*      No results for input(s): \"TIBC\" in the last 72 hours.    Invalid input(s): \"FE\", \"PSAT\", \"FERR\"   No results found for: \"RBCF\"   No results for input(s): \"PH\", \"PCO2\", \"PO2\" in the last 72 hours.  No results for input(s): \"CPK\" in the last 72 hours.    Invalid input(s): \"CPKMB\", \"CKNDX\", \"TROIQ\"  Lab Results   Component Value Date/Time    CHOL 60 09/18/2024 01:14 AM    HDL 26 09/18/2024 01:14 AM    LDL 22.2 09/18/2024 01:14 AM    LDL  09/19/2023 09:45 PM     Unable to calculate LDL or VLDL due to low cholesterol.     No results found for: \"GLUCPOC\"  [unfilled]    Notes reviewed from all clinical/nonclinical/nursing services involved in patient's clinical care. Care coordination discussions were held with appropriate clinical/nonclinical/ nursing providers based on care coordination needs.         Patients current active Medications were reviewed, considered, added and adjusted based on the clinical condition today.      Home Medications were reconciled to the best of my ability given all available resources at the time of admission. Route is PO if not otherwise noted.      Admission Status:[id number]:::1}      Medications Reviewed:     Current Facility-Administered Medications   Medication Dose Route Frequency    0.9 % sodium chloride infusion   IntraVENous PRN    [START ON 
injection 60 mg  1 mg/kg SubCUTAneous BID    ceFEPIme (MAXIPIME) 2,000 mg in sodium chloride 0.9 % 100 mL IVPB (mini-bag)  2,000 mg IntraVENous Q24H    vancomycin (VANCOCIN) intermittent dosing (placeholder)  1 each Other RX Placeholder    vancomycin (VANCOCIN) 750 mg in sodium chloride 0.9 % 250 mL IVPB ([id number])  750 mg IntraVENous Q12H    vancomycin level 10/1 with AM labs  1 each Other Once    white petrolatum ointment   Topical BID PRN    [Held by provider] spironolactone (ALDACTONE) tablet 12.5 mg  12.5 mg Oral Daily    white petrolatum ointment   Topical BID    sodium chloride flush 0.9 % injection 5-40 mL  5-40 mL IntraVENous 2 times per day    sodium chloride flush 0.9 % injection 5-40 mL  5-40 mL IntraVENous PRN    0.9 % sodium chloride infusion   IntraVENous PRN    ondansetron (ZOFRAN) injection 4 mg  4 mg IntraVENous Q6H PRN    hydrALAZINE (APRESOLINE) injection 10 mg  10 mg IntraVENous Q4H PRN    magnesium hydroxide (MILK OF MAGNESIA) 400 MG/5ML suspension 30 mL  30 mL Oral Daily PRN    polyethylene glycol (GLYCOLAX) packet 17 g  17 g Oral Daily PRN    acetaminophen (TYLENOL) tablet 650 mg  650 mg Oral Q4H PRN    acetaminophen (TYLENOL) tablet 650 mg  650 mg Oral Q6H    traMADol (ULTRAM) tablet 50 mg  50 mg Oral Q6H PRN    ammonium lactate (AMLACTIN) 12 % cream   Topical BID    hydrALAZINE (APRESOLINE) injection 10 mg  10 mg IntraVENous Q4H PRN    sodium chloride flush 0.9 % injection 5-40 mL  5-40 mL IntraVENous PRN    sodium chloride flush 0.9 % injection 5-40 mL  5-40 mL IntraVENous 2 times per day    sodium chloride flush 0.9 % injection 5-40 mL  5-40 mL IntraVENous PRN    0.9 % sodium chloride infusion   IntraVENous PRN    ondansetron (ZOFRAN-ODT) disintegrating tablet 4 mg  4 mg Oral Q8H PRN    amiodarone (CORDARONE) tablet 200 mg  200 mg Oral Daily    amLODIPine (NORVASC) tablet 5 mg  5 mg Oral QAM    amLODIPine (NORVASC) tablet 2.5 mg  2.5 mg Oral Nightly    aspirin EC tablet 81 mg  81 mg Oral 
input(s): \"PH\", \"PCO2\", \"PO2\" in the last 72 hours.  No results for input(s): \"CPK\" in the last 72 hours.    Invalid input(s): \"CPKMB\", \"CKNDX\", \"TROIQ\"  Lab Results   Component Value Date/Time    CHOL 60 09/18/2024 01:14 AM    HDL 26 09/18/2024 01:14 AM    LDL 22.2 09/18/2024 01:14 AM    LDL  09/19/2023 09:45 PM     Unable to calculate LDL or VLDL due to low cholesterol.     No results found for: \"GLUCPOC\"    Notes reviewed from all clinical/nonclinical/nursing services involved in patient's clinical care. Care coordination discussions were held with appropriate clinical/nonclinical/ nursing providers based on care coordination needs.     Patients current active Medications were reviewed, considered, added and adjusted based on the clinical condition today.      Medications Reviewed:     Current Facility-Administered Medications   Medication Dose Route Frequency    warfarin (COUMADIN) tablet 0.5 mg  0.5 mg Oral Once    gabapentin (NEURONTIN) capsule 100 mg  100 mg Oral TID    docusate sodium (COLACE) capsule 100 mg  100 mg Oral Daily    bumetanide (BUMEX) injection 2 mg  2 mg IntraVENous BID    spironolactone (ALDACTONE) tablet 25 mg  25 mg Oral Daily    ceFEPIme (MAXIPIME) 2,000 mg in sodium chloride 0.9 % 100 mL IVPB (mini-bag)  2,000 mg IntraVENous Q12H    ammonium lactate (LAC-HYDRIN) 12 % lotion   Topical BID    HYDROmorphone HCl PF (DILAUDID) injection 1 mg  1 mg IntraVENous Daily PRN    traMADol (ULTRAM) tablet 100 mg  100 mg Oral Q6H PRN    HYDROmorphone (DILAUDID) tablet 2 mg  2 mg Oral Q4H PRN    metroNIDAZOLE (FLAGYL) tablet 500 mg  500 mg Oral 3 times per day    amLODIPine (NORVASC) tablet 5 mg  5 mg Oral Nightly    white petrolatum ointment   Topical BID PRN    sodium chloride flush 0.9 % injection 5-40 mL  5-40 mL IntraVENous 2 times per day    0.9 % sodium chloride infusion   IntraVENous PRN    ondansetron (ZOFRAN) injection 4 mg  4 mg IntraVENous Q6H PRN    hydrALAZINE (APRESOLINE) injection 10 mg  
 Effort: Pulmonary effort is normal. No respiratory distress.      Breath sounds: Normal breath sounds.   Abdominal:      General: There is no distension.      Palpations: Abdomen is soft.   Musculoskeletal:         General: No swelling.   Skin:     General: Skin is warm and dry.      Findings: Lesion and rash present.      Comments: Total body itching and peeling skin, deglovement of feet   Neurological:      General: No focal deficit present.      Mental Status: He is alert and oriented to person, place, and time.   Psychiatric:         Mood and Affect: Mood normal.         LVAD  LVAD Type:: Left Ventricular Assist Device (LVAD)  Pump Speed (rpm): 5000  Pump Flow (lpm): 4  MAP (mmHg): 82  Pump Pulse Index (PI): 4.7  Pump Power (Villanueva): 3.4  Battery Life Checked: No  Backup Controller Present: Yes  Power Module Test: No  Driveline Dressing: Changed per order  Outpatient: No  MAP in Therapeutic Range (Outpatient): Yes      I interrogated his LVAD today. Parameters are as documented above. There was 1 PI events in the last 24 hours. The LVAD is functioning properly. No programing changes were made. The driveline was examined and there is no erythema, tenderness or drainage from the driveline exit site.     PAST MEDICAL HISTORY:  Past Medical History:   Diagnosis Date    Atherosclerosis of native arteries of extremities with intermittent claudication, bilateral legs ([facility])     [facility]    Chest pain     CHF (congestive heart failure) ([facility])     Chronic anticoagulation 10/31/2023    Chronic systolic (congestive) heart failure ([facility]) 10/31/2023    Coronary artery disease involving native coronary artery of native heart without angina pectoris 10/31/2023    Essential hypertension 10/31/2023    Heart attack ([facility]) 2014    Heart failure ([facility])     HTN (hypertension)     Hyperlipidemia     ICD (implantable cardioverter-defibrillator) in place 
% injection 5-40 mL  5-40 mL IntraVENous 2 times per day    0.9 % sodium chloride infusion   IntraVENous PRN    ondansetron (ZOFRAN) injection 4 mg  4 mg IntraVENous Q6H PRN    hydrALAZINE (APRESOLINE) injection 10 mg  10 mg IntraVENous Q4H PRN    magnesium hydroxide (MILK OF MAGNESIA) 400 MG/5ML suspension 30 mL  30 mL Oral Daily PRN    polyethylene glycol (GLYCOLAX) packet 17 g  17 g Oral Daily PRN    acetaminophen (TYLENOL) tablet 650 mg  650 mg Oral Q4H PRN    acetaminophen (TYLENOL) tablet 650 mg  650 mg Oral Q6H    hydrALAZINE (APRESOLINE) injection 10 mg  10 mg IntraVENous Q4H PRN    sodium chloride flush 0.9 % injection 5-40 mL  5-40 mL IntraVENous PRN    sodium chloride flush 0.9 % injection 5-40 mL  5-40 mL IntraVENous 2 times per day    0.9 % sodium chloride infusion   IntraVENous PRN    ondansetron (ZOFRAN-ODT) disintegrating tablet 4 mg  4 mg Oral Q8H PRN    amiodarone (CORDARONE) tablet 200 mg  200 mg Oral Daily    amLODIPine (NORVASC) tablet 5 mg  5 mg Oral QAM    aspirin EC tablet 81 mg  81 mg Oral Daily    atorvastatin (LIPITOR) tablet 80 mg  80 mg Oral Daily    hydrALAZINE (APRESOLINE) tablet 100 mg  100 mg Oral 3 times per day    isosorbide dinitrate (ISORDIL) tablet 40 mg  40 mg Oral q8h    metoprolol succinate (TOPROL XL) extended release tablet 25 mg  25 mg Oral BID    pantoprazole (PROTONIX) tablet 40 mg  40 mg Oral QAM AC    Vitamin D (CHOLECALCIFEROL) tablet 1,000 Units  1,000 Units Oral Daily    magnesium oxide (MAG-OX) tablet 400 mg  400 mg Oral Daily    warfarin placeholder: dosing by pharmacy   Other RX Placeholder     ______________________________________________________________________  EXPECTED LENGTH OF STAY: 29  ACTUAL LENGTH OF STAY:          25                 Favio Marley MD     
(NORVASC) tablet 5 mg  5 mg Oral Nightly    white petrolatum ointment   Topical BID PRN    [Held by provider] spironolactone (ALDACTONE) tablet 12.5 mg  12.5 mg Oral Daily    sodium chloride flush 0.9 % injection 5-40 mL  5-40 mL IntraVENous 2 times per day    sodium chloride flush 0.9 % injection 5-40 mL  5-40 mL IntraVENous PRN    0.9 % sodium chloride infusion   IntraVENous PRN    ondansetron (ZOFRAN) injection 4 mg  4 mg IntraVENous Q6H PRN    hydrALAZINE (APRESOLINE) injection 10 mg  10 mg IntraVENous Q4H PRN    magnesium hydroxide (MILK OF MAGNESIA) 400 MG/5ML suspension 30 mL  30 mL Oral Daily PRN    polyethylene glycol (GLYCOLAX) packet 17 g  17 g Oral Daily PRN    acetaminophen (TYLENOL) tablet 650 mg  650 mg Oral Q4H PRN    acetaminophen (TYLENOL) tablet 650 mg  650 mg Oral Q6H    hydrALAZINE (APRESOLINE) injection 10 mg  10 mg IntraVENous Q4H PRN    sodium chloride flush 0.9 % injection 5-40 mL  5-40 mL IntraVENous PRN    sodium chloride flush 0.9 % injection 5-40 mL  5-40 mL IntraVENous 2 times per day    sodium chloride flush 0.9 % injection 5-40 mL  5-40 mL IntraVENous PRN    0.9 % sodium chloride infusion   IntraVENous PRN    ondansetron (ZOFRAN-ODT) disintegrating tablet 4 mg  4 mg Oral Q8H PRN    amiodarone (CORDARONE) tablet 200 mg  200 mg Oral Daily    amLODIPine (NORVASC) tablet 5 mg  5 mg Oral QAM    aspirin EC tablet 81 mg  81 mg Oral Daily    atorvastatin (LIPITOR) tablet 80 mg  80 mg Oral Daily    hydrALAZINE (APRESOLINE) tablet 100 mg  100 mg Oral 3 times per day    isosorbide dinitrate (ISORDIL) tablet 40 mg  40 mg Oral q8h    metoprolol succinate (TOPROL XL) extended release tablet 25 mg  25 mg Oral BID    pantoprazole (PROTONIX) tablet 40 mg  40 mg Oral QAM AC    Vitamin D (CHOLECALCIFEROL) tablet 1,000 Units  1,000 Units Oral Daily    magnesium oxide (MAG-OX) tablet 400 mg  400 mg Oral Daily    warfarin placeholder: dosing by pharmacy   Other RX Placeholder 
0.9 % injection 5-40 mL  5-40 mL IntraVENous PRN    sodium chloride flush 0.9 % injection 5-40 mL  5-40 mL IntraVENous 2 times per day    0.9 % sodium chloride infusion   IntraVENous PRN    ondansetron (ZOFRAN-ODT) disintegrating tablet 4 mg  4 mg Oral Q8H PRN    amiodarone (CORDARONE) tablet 200 mg  200 mg Oral Daily    amLODIPine (NORVASC) tablet 5 mg  5 mg Oral QAM    aspirin EC tablet 81 mg  81 mg Oral Daily    atorvastatin (LIPITOR) tablet 80 mg  80 mg Oral Daily    hydrALAZINE (APRESOLINE) tablet 100 mg  100 mg Oral 3 times per day    isosorbide dinitrate (ISORDIL) tablet 40 mg  40 mg Oral q8h    metoprolol succinate (TOPROL XL) extended release tablet 25 mg  25 mg Oral BID    pantoprazole (PROTONIX) tablet 40 mg  40 mg Oral QAM AC    Vitamin D (CHOLECALCIFEROL) tablet 1,000 Units  1,000 Units Oral Daily    magnesium oxide (MAG-OX) tablet 400 mg  400 mg Oral Daily    warfarin placeholder: dosing by pharmacy   Other RX Placeholder     ______________________________________________________________________  EXPECTED LENGTH OF STAY: 31  ACTUAL LENGTH OF STAY:          29                 Naga Hope MD     
STAY: 41  ACTUAL LENGTH OF STAY:          38                 Marianne Pereira MD     
erythema, or tenderness.      Have you had any alarms    no  Have you had any redness at site   no  Have you had any drainage at site/on dressing no  Have you had any pain at site   no  Have you had any swelling at site   no  Do you need any equipment?     no      PAST MEDICAL HISTORY:  Past Medical History:   Diagnosis Date    Atherosclerosis of native arteries of extremities with intermittent claudication, bilateral legs ([facility])     [facility]    C[facility]     CHF (congestive heart failure) ([facility])     Chronic anticoagulation 10/31/2023    Chronic systolic (congestive) heart failure ([facility]) 10/31/2023    Coronary artery disease involving native coronary artery of native heart without angina pectoris 10/31/2023    Essential hypertension 10/31/2023    Heart attack ([facility]) 2014    Heart failure ([facility])     HTN (hypertension)     Hyperlipidemia     ICD (implantable cardioverter-defibrillator) in place 10/31/2023    Ischemic cardiomyopathy     Ischemic cardiomyopathy 10/31/2023    Mild mitral valve regurgitation     Nonrheumatic mitral (valve) insufficiency     Positive fecal occult blood test     PVD (peripheral vascular disease) ([facility])     Rheumatic tricuspid insufficiency     Sick sinus syndrome ([facility])        PAST SURGICAL HISTORY:  Past Surgical History:   Procedure Laterality Date    CARDIAC DEFIBRILLATOR PLACEMENT  2014    CARDIAC PROCEDURE N/A 08/29/2023    Left heart cath / coronary angiography performed by Sole Allen MD at [facility] CARDIAC CATH LAB    CARDIAC PROCEDURE N/A 08/29/2023    Percutaneous coronary intervention performed by Sole Allen MD at [facility] CARDIAC CATH LAB    CARDIAC PROCEDURE N/A 08/29/2023    Intravascular ultrasound performed by Sole Allen MD at [facility] CARDIAC CATH LAB    CARDIAC PROCEDURE N/A 08/29/2023    Insert stent manjit coronary performed by Sole Allen MD at [facility] CARDIAC CATH LAB    CARDIAC 
long axis view. In this view, the function appears normal and RV size appears normal.    Aortic Valve: Not well visualized. No regurgitation. On LVAD support, the aortic valve does not appear to open.    PYP 9/11/2023  Semiquantitative visual grading of myocardial radiotracer uptake compared to osseous/rib uptake: 1. H/CL ratio: 1.25. Equivocal for myocardial TTR amyloidosis.    RHC 7/31/24  Baseline; LVAD Speed 5000 RPM  Hemodynamics  Right Atrium 25/22/20   Right Ventricle 34/9, RVEDP 18   Pulmonary Artery 35/17 (24)   PCWP 17/17/15  Transpulmonary Gradient 9     Saturations:  Right Atrial (RA) : 61%  Pulmonary Artery (PA) : 62.1%  Systemic Arterial : 100%    Outputs:  Cardiac Output (Celestina): 3.9 L/min  Cardiac Index (Celestina): 2.21 L/min/m2    Pulmonary Vascular Resistance (PVR): 2.31 HERNANDEZ     Review of Symptoms:  Constitutional: negative  Eyes: negative  Ears, nose, mouth, throat, and face: negative  Respiratory: negative  Cardiovascular: negative  Gastrointestinal: negative  Genitourinary:negative  Musculoskeletal: BLE pain;  wounds;     Neurological: negative  Behvioral/Psych: negative  Endocrine: negative    PHYSICAL EXAM:  BP (!) 88/0   Pulse 78   Temp 98.7 °F (37.1 °C)   Resp 25   Ht 1.753 m (5' 9\")   Wt 44.4 kg (97 lb 14.2 oz)   SpO2 94%   BMI 14.45 kg/m²     Physical Exam  Vitals and nursing note reviewed.   Constitutional:       Appearance: Normal appearance.   Cardiovascular:      Rate and Rhythm: Normal rate and regular rhythm.      Pulses: Decreased pulses.      Heart sounds: Normal heart sounds. No murmur heard.     Comments: + VAD hum   Non-palpable pulses  Pulmonary:      Effort: No respiratory distress.   Abdominal:      General: There is no distension.      Palpations: Abdomen is soft.   Musculoskeletal:         General: No swelling.   Skin:     General: Skin is warm and dry.      Findings: Lesion and rash present.      Comments: wounds covered with dressings on BLE    Neurological:      
N/A 2023    Ultrasound guided vascular access performed by Jose Daniel Fields MD at [facility] CARDIAC CATH LAB    INVASIVE VASCULAR N/A 2024    Ultrasound guided vascular access performed by Jose Daniel Fields MD at [facility] CARDIAC CATH LAB    IR MECHANICAL ART THROMBECTOMY INTRACRANIAL  2023    IR MECHANICAL ART THROMBECTOMY INTRACRANIAL 2023 [facility] RAD ANGIO IR    LEFT VENTRICULAR ASSIST DEVICE      PACEMAKER      pacemaker/ICD    UPPER GASTROINTESTINAL ENDOSCOPY N/A 09/15/2023    EGD ESOPHAGOGASTRODUODENOSCOPY performed by Jania Christopher MD at [facility] ENDOSCOPY    UPPER GASTROINTESTINAL ENDOSCOPY N/A 1/15/2024    EGD ESOPHAGOGASTRODUODENOSCOPY, 3 clips placed, one clip fell off after being placed performed by Maryjo Simmons MD at [facility] ENDOSCOPY    VASCULAR SURGERY      aortobifemoral bypass and bilateral femoral above knee popliteal bypass       FAMILY HISTORY:  Family History   Problem Relation Age of Onset    Hypertension Mother     Hypertension Father     Heart Attack Father        SOCIAL HISTORY:  Social History     Socioeconomic History    Marital status: Single   Tobacco Use    Smoking status: Former     Current packs/day: 0.00     Average packs/day: 0.3 packs/day for 40.0 years (10.0 ttl pk-yrs)     Types: Cigarettes     Start date: 1983     Quit date: 2023     Years since quittin.1    Smokeless tobacco: Never   Vaping Use    Vaping status: Never Used   Substance and Sexual Activity    Alcohol use: Not Currently    Drug use: Never     Social Determinants of Health     Food Insecurity: Patient Declined (1/15/2024)    Hunger Vital Sign     Worried About Running Out of Food in the Last Year: Patient declined     Ran Out of Food in the Last Year: Patient declined   Transportation Needs: Patient Declined (1/15/2024)    PRAPARE - Transportation     Lack of Transportation (Medical): Patient declined     Lack of Transportation (Non-Medical): Patient declined   Social Connections: Feeling 
POPLITEAL BYPASS WITH POLYTETRAFLUOROETHYLENE performed by Sean Barger MD at [facility] MAIN OR    HERNIA REPAIR      INVASIVE VASCULAR N/A 2023    Ultrasound guided vascular access performed by Sole Allen MD at [facility] CARDIAC CATH LAB    INVASIVE VASCULAR N/A 2023    Ultrasound guided vascular access performed by Jose Daniel Fields MD at [facility] CARDIAC CATH LAB    INVASIVE VASCULAR N/A 2024    Ultrasound guided vascular access performed by Jose Daniel Fields MD at [facility] CARDIAC CATH LAB    IR MECHANICAL ART THROMBECTOMY INTRACRANIAL  2023    IR MECHANICAL ART THROMBECTOMY INTRACRANIAL 2023 [facility] RAD ANGIO IR    LEFT VENTRICULAR ASSIST DEVICE      PACEMAKER      pacemaker/ICD    UPPER GASTROINTESTINAL ENDOSCOPY N/A 09/15/2023    EGD ESOPHAGOGASTRODUODENOSCOPY performed by Jania Christopher MD at [facility] ENDOSCOPY    UPPER GASTROINTESTINAL ENDOSCOPY N/A 1/15/2024    EGD ESOPHAGOGASTRODUODENOSCOPY, 3 clips placed, one clip fell off after being placed performed by Maryjo Simmons MD at [facility] ENDOSCOPY    VASCULAR SURGERY  2014    aortobifemoral bypass and bilateral femoral above knee popliteal bypass       FAMILY HISTORY:  Family History   Problem Relation Age of Onset    Hypertension Mother     Hypertension Father     Heart Attack Father        SOCIAL HISTORY:  Social History     Socioeconomic History    Marital status: Single   Tobacco Use    Smoking status: Former     Current packs/day: 0.00     Average packs/day: 0.3 packs/day for 40.0 years (10.0 ttl pk-yrs)     Types: Cigarettes     Start date: 1983     Quit date: 2023     Years since quittin.1    Smokeless tobacco: Never   Vaping Use    Vaping status: Never Used   Substance and Sexual Activity    Alcohol use: Not Currently    Drug use: Never     Social Determinants of Health     Food Insecurity: Patient Declined (1/15/2024)    Hunger Vital Sign     Worried About Running Out of Food in the Last Year: Patient declined     Ran Out 
REQUESTS  RIGHT  FOREARM       Culture NO GROWTH 3 DAYS       Culture, Blood 1 [[id number]] Collected: 10/07/24 0239    Order Status: Completed Specimen: Blood Updated: 10/10/24 0613     Special Requests --        NO SPECIAL REQUESTS  RIGHT  HAND       Culture NO GROWTH 3 DAYS       Culture, Blood 1 [[id number]]  (Abnormal) Collected: 10/03/24 0957    Order Status: Completed Specimen: Blood Updated: 10/05/24 0902     Special Requests --        NO SPECIAL REQUESTS  LEFT  Antecubital       Culture       Staphylococcus aureus GROWING IN BOTH BOTTLES DRAWN SITE =LAC REFER TO [id number] FOR SENSITIVITIES          Culture, Blood 2 [[id number]]  (Abnormal) Collected: 10/03/24 0957    Order Status: Completed Specimen: Blood Updated: 10/05/24 0901     Special Requests --        NO SPECIAL REQUESTS  RIGHT  FOREARM       Culture       Staphylococcus aureus GROWING IN BOTH BOTTLES DRAWN SITE = RFA REFER TO [id number] FOR SENSITIVITIES          Culture, Blood 1 [[id number]] Collected: 10/01/24 1219    Order Status: Completed Specimen: Blood Updated: 10/06/24 1258     Special Requests --        RIGHT  Antecubital       Culture NO GROWTH 5 DAYS       Culture, Blood 2 [[id number]]  (Abnormal)  (Susceptibility) Collected: 10/01/24 1219    Order Status: Completed Specimen: Blood Updated: 10/04/24 0902     Special Requests --        RIGHT  HAND       Culture       Staphylococcus aureus GROWING IN BOTH BOTTLES DRAWN SITE = R HAND            (NOTE) [facility] CALLED TO AND READ BACK BY MARISA KATZ RN AT 0721 ON 10/02/2024 RE    Susceptibility        Staphylococcus aureus      BACTERIAL SUSCEPTIBILITY PANEL BRAYAN      ciprofloxacin <=0.5 ug/mL Sensitive      clindamycin 0.25 ug/mL Sensitive      DAPTOmycin 0.25 ug/mL Sensitive      doxycycline <=0.5 ug/mL Sensitive      erythromycin <=0.25 ug/mL Sensitive      gentamicin <=0.5 ug/mL Sensitive      levofloxacin 0.25 ug/mL Sensitive      linezolid 2 ug/mL Sensitive      moxifloxacin 
examined and there is no erythema, tenderness or drainage from the driveline exit site.     PAST MEDICAL HISTORY:  Past Medical History:   Diagnosis Date    Atherosclerosis of native arteries of extremities with intermittent claudication, bilateral legs ([facility])     [facility]    C[facility] pain     CHF (congestive heart failure) ([facility])     Chronic anticoagulation 10/31/2023    Chronic systolic (congestive) heart failure ([facility]) 10/31/2023    Coronary artery disease involving native coronary artery of native heart without angina pectoris 10/31/2023    Essential hypertension 10/31/2023    Heart attack ([facility]) 2014    Heart failure ([facility])     HTN (hypertension)     Hyperlipidemia     ICD (implantable cardioverter-defibrillator) in place 10/31/2023    Ischemic cardiomyopathy     Ischemic cardiomyopathy 10/31/2023    Mild mitral valve regurgitation     Nonrheumatic mitral (valve) insufficiency     Positive fecal occult blood test     PVD (peripheral vascular disease) ([facility])     Rheumatic tricuspid insufficiency     Sick sinus syndrome ([facility])        PAST SURGICAL HISTORY:  Past Surgical History:   Procedure Laterality Date    CARDIAC DEFIBRILLATOR PLACEMENT  2014    CARDIAC PROCEDURE N/A 08/29/2023    Left heart cath / coronary angiography performed by Sole Allen MD at [facility] CARDIAC CATH LAB    CARDIAC PROCEDURE N/A 08/29/2023    Percutaneous coronary intervention performed by Sole Allen MD at [facility] CARDIAC CATH LAB    CARDIAC PROCEDURE N/A 08/29/2023    Intravascular ultrasound performed by Sole Allen MD at [facility] CARDIAC CATH LAB    CARDIAC PROCEDURE N/A 08/29/2023    Insert stent manjit coronary performed by Sole Allen MD at [facility] CARDIAC CATH LAB    CARDIAC PROCEDURE N/A 11/29/2023    Right heart cath performed by Jose Daniel Fields MD at [facility] CARDIAC CATH LAB    CARDIAC PROCEDURE N/A 7/31/2024    Right heart cath performed by Donnie 
    vancomycin 1 ug/mL Sensitive                   [1]  Rifampin is not to be used for mono-therapy.                Susceptibility        Methicillin-Resistant Staphylococcus aureus      BACTERIAL SUSCEPTIBILITY PANEL BRAYAN      ciprofloxacin >=8 ug/mL Resistant      clindamycin 0.25 ug/mL Sensitive      DAPTOmycin 1 ug/mL Sensitive      doxycycline <=0.5 ug/mL Sensitive      erythromycin >=8 ug/mL Resistant      gentamicin <=0.5 ug/mL Sensitive      levofloxacin 4 ug/mL Intermediate      linezolid 2 ug/mL Sensitive      oxacillin >=4 ug/mL Resistant      rifampin <=0.5 ug/mL Sensitive  [1]       tetracycline <=1 ug/mL Sensitive      trimethoprim-sulfamethoxazole <=10 ug/mL Sensitive      vancomycin 1 ug/mL Sensitive                   [1]  Rifampin is not to be used for mono-therapy.                Susceptibility        Pseudomonas aeruginosa      BACTERIAL SUSCEPTIBILITY PANEL BRAYAN      amikacin 8 ug/mL Sensitive      cefepime 2 ug/mL Sensitive      cefTAZidime 4 ug/mL Sensitive      ciprofloxacin <=0.25 ug/mL Sensitive      gentamicin 2 ug/mL Sensitive      levofloxacin 1 ug/mL Sensitive      meropenem 4 ug/mL Intermediate      piperacillin-tazobactam 16 ug/mL Sensitive      tobramycin <=1 ug/mL Sensitive                           Culture, Wound [[id number]] Collected: 09/30/24 0600    Order Status: Canceled Specimen: Foot     Culture, Wound [[id number]] Collected: 09/29/24 1616    Order Status: Canceled Specimen: Foot     Culture, Urine [[id number]]     Order Status: Canceled Specimen: Urine, clean catch     Culture, Urine [[id number]]  (Abnormal) Collected: 09/29/24 0032    Order Status: Completed Specimen: Urine, clean catch Updated: 09/30/24 1405     Special Requests NO SPECIAL REQUESTS        [place] count --        >100,000  COLONIES/mL       Culture ALPHA STREPTOCOCCUS                Labs:   Labs:   Lab Results   Component Value Date/Time    WBC 14.8 10/03/2024 09:57 AM    HGB 8.0 10/03/2024 09:57 AM    HCT 
MD Beronica at [facility] MAIN OR    CARDIAC SURGERY N/A 09/19/2023    LEFT VENTRICULAR ASSIST DEVICE (LVAD) IMPLANTATION;TEMPORARY RIGHT AXILLARY LVAD REMOVAL (IMPELLA); HUDSON & EPIAORTIC US BY DR. CADENA performed by Jose Francisco Del Toro MD at [facility] OPEN HEART    CARDIAC VALVE SURGERY  2017    COLONOSCOPY N/A 09/15/2023    COLONOSCOPY DIAGNOSTIC performed by Jania Christopher MD at [facility] ENDOSCOPY    FEMORAL BYPASS Right 08/24/2023    RIGHT FEMORAL POPLITEAL BYPASS WITH POLYTETRAFLUOROETHYLENE performed by Sean Barger MD at [facility] MAIN OR    HERNIA REPAIR      INVASIVE VASCULAR N/A 08/29/2023    Ultrasound guided vascular access performed by Sole Allen MD at [facility] CARDIAC CATH LAB    INVASIVE VASCULAR N/A 11/29/2023    Ultrasound guided vascular access performed by Jose Daniel Fields MD at [facility] CARDIAC CATH LAB    INVASIVE VASCULAR N/A 7/31/2024    Ultrasound guided vascular access performed by Jose Daniel Fields MD at [facility] CARDIAC CATH LAB    IR MECHANICAL ART THROMBECTOMY INTRACRANIAL  09/19/2023    IR MECHANICAL ART THROMBECTOMY INTRACRANIAL 9/19/2023 [facility] RAD ANGIO IR    LEFT VENTRICULAR ASSIST DEVICE      PACEMAKER      pacemaker/ICD    UPPER GASTROINTESTINAL ENDOSCOPY N/A 09/15/2023    EGD ESOPHAGOGASTRODUODENOSCOPY performed by Jania Christopher MD at [facility] ENDOSCOPY    UPPER GASTROINTESTINAL ENDOSCOPY N/A 1/15/2024    EGD ESOPHAGOGASTRODUODENOSCOPY, 3 clips placed, one clip fell off after being placed performed by Maryjo Simmons MD at [facility] ENDOSCOPY    VASCULAR SURGERY  2014    aortobifemoral bypass and bilateral femoral above knee popliteal bypass       FAMILY HISTORY:  Family History   Problem Relation Age of Onset    Hypertension Mother     Hypertension Father     Heart Attack Father        SOCIAL HISTORY:  Social History     Socioeconomic History    Marital status: Single   Tobacco Use    Smoking status: Former     Current packs/day: 0.00     Average packs/day: 0.3 packs/day for 40.0 years (10.0 ttl pk-yrs)     
(Susceptibility) Collected: 09/30/24 1238    Order Status: Completed Specimen: Foot Updated: 10/05/24 0802     Special Requests NO SPECIAL REQUESTS        Gram Stain NO WBC'S SEEN               FEW Gram positive cocci IN PAIRS AND CHAINS                  OCCASIONAL GRAM VARIABLE RODS           Culture       HEAVY Staphylococcus aureus                  HEAVY Mixed Gram Negative Rods                  HEAVY MIXED SKIN FARHAN ISOLATED                  MODERATE ANAEROBIC GRAM NEGATIVE RODS Multiple Shirley Types                  LIGHT PSEUDOMONAS AERUGINOSA          Susceptibility        Staphylococcus aureus      BACTERIAL SUSCEPTIBILITY PANEL BRAYAN      ciprofloxacin <=0.5 ug/mL Sensitive      clindamycin 0.25 ug/mL Sensitive      DAPTOmycin 0.25 ug/mL Sensitive      doxycycline <=0.5 ug/mL Sensitive      erythromycin <=0.25 ug/mL Sensitive      gentamicin <=0.5 ug/mL Sensitive      levofloxacin 0.25 ug/mL Sensitive      linezolid 2 ug/mL Sensitive      moxifloxacin <=0.25 ug/mL Sensitive      oxacillin 0.5 ug/mL Sensitive      rifampin <=0.5 ug/mL Sensitive  [1]       tetracycline <=1 ug/mL Sensitive      trimethoprim-sulfamethoxazole <=10 ug/mL Sensitive      vancomycin 1 ug/mL Sensitive                   [1]  Rifampin is not to be used for mono-therapy.                Susceptibility        Pseudomonas aeruginosa      BACTERIAL SUSCEPTIBILITY PANEL BRAYAN      amikacin <=2 ug/mL Sensitive      cefepime 2 ug/mL Sensitive      cefTAZidime 4 ug/mL Sensitive      ciprofloxacin <=0.25 ug/mL Sensitive      gentamicin <=1 ug/mL Sensitive      levofloxacin 1 ug/mL Sensitive      meropenem 4 ug/mL Intermediate      piperacillin-tazobactam 8 ug/mL Sensitive      tobramycin <=1 ug/mL Sensitive                           Culture, Wound [[id number]]  (Abnormal)  (Susceptibility) Collected: 09/30/24 1238    Order Status: Completed Specimen: Foot Updated: 10/04/24 0713     Special Requests NO SPECIAL REQUESTS        Gram Stain 2+ WBCS SEEN 
0859    sodium chloride flush 0.9 % injection 5-40 mL, 5-40 mL, IntraVENous, PRN, Hong Ernst MD    0.9 % sodium chloride infusion, , IntraVENous, PRN, Hong Ernst MD    ondansetron (ZOFRAN-ODT) disintegrating tablet 4 mg, 4 mg, Oral, Q8H PRN **OR** ondansetron (ZOFRAN) injection 4 mg, 4 mg, IntraVENous, Q6H PRN, Hong Ernst MD    polyethylene glycol (GLYCOLAX) packet 17 g, 17 g, Oral, Daily PRN, Hong Ernst MD    amiodarone (CORDARONE) tablet 200 mg, 200 mg, Oral, Daily, AshfieldHong MD, 200 mg at 09/25/24 0858    amLODIPine (NORVASC) tablet 5 mg, 5 mg, Oral, QAM, AshfieldHong de MD, 5 mg at 09/25/24 0858    amLODIPine (NORVASC) tablet 2.5 mg, 2.5 mg, Oral, Nightly, AshfieldHong de MD, 2.5 mg at 09/24/24 2023    aspirin EC tablet 81 mg, 81 mg, Oral, Daily, SujataHong MD, 81 mg at 09/25/24 0858    atorvastatin (LIPITOR) tablet 80 mg, 80 mg, Oral, Daily, SujataHong MD, 80 mg at 09/25/24 0858    hydrALAZINE (APRESOLINE) tablet 100 mg, 100 mg, Oral, 3 times per day, SujataHong de MD, 100 mg at 09/25/24 0505    isosorbide dinitrate (ISORDIL) tablet 40 mg, 40 mg, Oral, q8h, AshfieldHong de MD, 40 mg at 09/25/24 0505    metoprolol succinate (TOPROL XL) extended release tablet 25 mg, 25 mg, Oral, BID, SujataHong MD, 25 mg at 09/25/24 0859    pantoprazole (PROTONIX) tablet 40 mg, 40 mg, Oral, QAM AC, AshfieldHong MD, 40 mg at 09/25/24 0506    spironolactone (ALDACTONE) tablet 25 mg, 25 mg, Oral, Daily, Hong Ernst MD, 25 mg at 09/25/24 0859    torsemide (DEMADEX) tablet 10 mg, 10 mg, Oral, Once per day on Monday Wednesday Friday, Hong Ernst MD, 10 mg at 09/25/24 0858    Vitamin D (CHOLECALCIFEROL) tablet 1,000 Units, 1,000 Units, Oral, Daily, Hong Ernst MD, 1,000 Units at 09/25/24 0859    magnesium oxide (MAG-OX) tablet 400 mg, 400 mg, Oral, Daily, Hong Ernst MD, 400 mg at 09/25/24 0858    warfarin placeholder: dosing by pharmacy, , 
Stability Vital Sign     Unable to Pay for Housing in the Last Year: No     Number of Times Moved in the Last Year: 1     Homeless in the Last Year: No       LABORATORY RESULTS:  Lab Results   Component Value Date/Time     10/16/2024 04:32 AM    K 3.8 10/16/2024 04:32 AM     10/16/2024 04:32 AM    CO2 PENDING 10/16/2024 04:32 AM    BUN PENDING 10/16/2024 04:32 AM    GLOB 5.4 10/14/2024 03:08 AM    ALT 14 10/14/2024 03:08 AM    AST 39 10/14/2024 03:08 AM       Lab Results   Component Value Date/Time    WBC 12.2 10/15/2024 04:00 AM    HGB 8.6 10/15/2024 04:00 AM    HCT 27.0 10/15/2024 04:00 AM     10/15/2024 04:00 AM    MCV 93.4 10/15/2024 04:00 AM    INR 2.8 10/16/2024 04:32 AM       ALLERGY:  No Known Allergies    CURRENT MEDICATIONS:    Current Outpatient Medications:     amLODIPine (NORVASC) 5 MG tablet, Take 1 tablet by mouth every morning, Disp: 30 tablet, Rfl: 3    amLODIPine (NORVASC) 2.5 MG tablet, Take 1 tablet by mouth nightly, Disp: 30 tablet, Rfl: 3    ammonium lactate (AMLACTIN) 12 % cream, Apply topically as needed., Disp: 1 each, Rfl: 4    Current Facility-Administered Medications:     docusate sodium (COLACE) capsule 100 mg, 100 mg, Oral, Daily, Naga Hope MD, 100 mg at 10/14/24 1623    bumetanide (BUMEX) injection 2 mg, 2 mg, IntraVENous, BID, Jose Daniel Fields MD, 2 mg at 10/15/24 1838    spironolactone (ALDACTONE) tablet 25 mg, 25 mg, Oral, Daily, Jose Daniel Fields MD, 25 mg at 10/15/24 0833    ceFEPIme (MAXIPIME) 2,000 mg in sodium chloride 0.9 % 100 mL IVPB (mini-bag), 2,000 mg, IntraVENous, Q12H, Anoop Ley, APRN - NP, Stopped at 10/16/24 0317    HYDROmorphone HCl PF (DILAUDID) injection 1 mg, 1 mg, IntraVENous, Daily PRN, Ramila Fields MD, 1 mg at 10/13/24 1153    traMADol (ULTRAM) tablet 100 mg, 100 mg, Oral, Q6H PRN, Ramila Fields MD, 100 mg at 10/13/24 2106    HYDROmorphone (DILAUDID) tablet 2 mg, 2 mg, Oral, Q4H PRN, Ramila Fields MD, 2 mg at 10/15/24 2777    
(NORVASC) tablet 2.5 mg, 2.5 mg, Oral, Nightly, Hong Ernst MD, 2.5 mg at 09/28/24 2144    aspirin EC tablet 81 mg, 81 mg, Oral, Daily, Hong Ernst MD, 81 mg at 09/29/24 0851    atorvastatin (LIPITOR) tablet 80 mg, 80 mg, Oral, Daily, Hong Ernst MD, 80 mg at 09/29/24 0851    hydrALAZINE (APRESOLINE) tablet 100 mg, 100 mg, Oral, 3 times per day, Hong Ernst MD, 100 mg at 09/29/24 0544    isosorbide dinitrate (ISORDIL) tablet 40 mg, 40 mg, Oral, q8h, Hong Ernst MD, 40 mg at 09/29/24 0544    metoprolol succinate (TOPROL XL) extended release tablet 25 mg, 25 mg, Oral, BID, Hong Ernst MD, 25 mg at 09/29/24 0851    pantoprazole (PROTONIX) tablet 40 mg, 40 mg, Oral, QAM AC, Hong Ernst MD, 40 mg at 09/29/24 0544    [Held by provider] torsemide (DEMADEX) tablet 10 mg, 10 mg, Oral, Once per day on Monday Wednesday Friday, Hong Ernst MD, 10 mg at 09/25/24 0858    Vitamin D (CHOLECALCIFEROL) tablet 1,000 Units, 1,000 Units, Oral, Daily, Hong Ernst MD, 1,000 Units at 09/29/24 0853    magnesium oxide (MAG-OX) tablet 400 mg, 400 mg, Oral, Daily, Hnog Ernst MD, 400 mg at 09/29/24 0851    warfarin placeholder: dosing by pharmacy, , Other, RX Placeholder, Hong Ernst MD   PATIENT CARE TEAM:  Patient Care Team:  Freddy Chandler MD as PCP - General  Shay Zamarripa MD as Referring Physician  Dalton Iqbal III, DO as Consulting Physician (Cardiology)  Jose Francisco Del Toro MD as Consulting Physician (Cardiothoracic Surgery)  Sole Allen MD as Consulting Physician (Cardiology)  Norma Luevano APRN - NP (Nurse Practitioner)    Thank you for your referral and allowing me to participate in this patient's care.    YASMIN Crouch NP  [facility]  [facility]  [address], [address]  Phone: [phone]    On this date 09/29/24  , I have spent a total time of  35 minutes personally reviewing new 
10 mL at 10/10/24 1006    0.9 % sodium chloride infusion, , IntraVENous, PRN, Jose Daniel Fields MD, Last Rate: 15 mL/hr at 10/06/24 0906, New Bag at 10/06/24 0906    ondansetron (ZOFRAN) injection 4 mg, 4 mg, IntraVENous, Q6H PRN, Jose Daniel Fields MD    hydrALAZINE (APRESOLINE) injection 10 mg, 10 mg, IntraVENous, Q4H PRN, Jose Daniel Fields MD    magnesium hydroxide (MILK OF MAGNESIA) 400 MG/5ML suspension 30 mL, 30 mL, Oral, Daily PRN, Jose Daniel Fields MD    polyethylene glycol (GLYCOLAX) packet 17 g, 17 g, Oral, Daily PRN, Jose Daniel Fields MD, 17 g at 10/01/24 2110    acetaminophen (TYLENOL) tablet 650 mg, 650 mg, Oral, Q4H PRN, Jose Daniel Fields MD    acetaminophen (TYLENOL) tablet 650 mg, 650 mg, Oral, Q6H, Jose Daniel Fields MD, 650 mg at 10/10/24 1134    hydrALAZINE (APRESOLINE) injection 10 mg, 10 mg, IntraVENous, Q4H PRN, Leslie Mondragon APRN - NP, 10 mg at 10/09/24 2343    sodium chloride flush 0.9 % injection 5-40 mL, 5-40 mL, IntraVENous, PRN, Leslie Mondragon, APRN - NP    sodium chloride flush 0.9 % injection 5-40 mL, 5-40 mL, IntraVENous, 2 times per day, Hong Ernst MD, 10 mL at 10/10/24 1005    0.9 % sodium chloride infusion, , IntraVENous, PRN, Hong Ernst MD, Last Rate: 25 mL/hr at 10/09/24 1911, 30 mL at 10/09/24 1911    ondansetron (ZOFRAN-ODT) disintegrating tablet 4 mg, 4 mg, Oral, Q8H PRN **OR** [DISCONTINUED] ondansetron (ZOFRAN) injection 4 mg, 4 mg, IntraVENous, Q6H PRN, Hong Ernst MD    amiodarone (CORDARONE) tablet 200 mg, 200 mg, Oral, Daily, WawakaHong de MD, 200 mg at 10/10/24 1005    amLODIPine (NORVASC) tablet 5 mg, 5 mg, Oral, QAM, Hong Ernst MD, 5 mg at 10/10/24 1005    aspirin EC tablet 81 mg, 81 mg, Oral, Daily, WawakaHong de MD, 81 mg at 10/10/24 1005    atorvastatin (LIPITOR) tablet 80 mg, 80 mg, Oral, Daily, SujataHong de MD, 80 mg at 10/10/24 1005    hydrALAZINE (APRESOLINE) tablet 100 mg, 100 mg, Oral, 3 times per day, [place], 
5-40 mL, IntraVENous, PRN, Leslie Mondragon, APRN - NP    sodium chloride flush 0.9 % injection 5-40 mL, 5-40 mL, IntraVENous, 2 times per day, Hong Ernst MD, 10 mL at 10/05/24 0959    sodium chloride flush 0.9 % injection 5-40 mL, 5-40 mL, IntraVENous, PRN, Hong Ernst MD    0.9 % sodium chloride infusion, , IntraVENous, PRN, Hong Ernst MD, Last Rate: 15 mL/hr at 10/04/24 2021, Rate Verify at 10/04/24 2021    ondansetron (ZOFRAN-ODT) disintegrating tablet 4 mg, 4 mg, Oral, Q8H PRN **OR** [DISCONTINUED] ondansetron (ZOFRAN) injection 4 mg, 4 mg, IntraVENous, Q6H PRN, Hong Ernst MD    amiodarone (CORDARONE) tablet 200 mg, 200 mg, Oral, Daily, MayflowerHong MD, 200 mg at 10/05/24 0959    amLODIPine (NORVASC) tablet 5 mg, 5 mg, Oral, QAM, MayflowerHong MD, 5 mg at 10/05/24 0959    aspirin EC tablet 81 mg, 81 mg, Oral, Daily, Sujata, Hong L, MD, 81 mg at 10/05/24 0959    atorvastatin (LIPITOR) tablet 80 mg, 80 mg, Oral, Daily, SujataHong MD, 80 mg at 10/05/24 0959    hydrALAZINE (APRESOLINE) tablet 100 mg, 100 mg, Oral, 3 times per day, Sujata, Hong SHEARER MD, 100 mg at 10/05/24 0703    isosorbide dinitrate (ISORDIL) tablet 40 mg, 40 mg, Oral, q8h, Mayflower, Hong SHEARER MD, 40 mg at 10/05/24 0703    metoprolol succinate (TOPROL XL) extended release tablet 25 mg, 25 mg, Oral, BID, [place]flower, Hong SHEARER MD, 25 mg at 10/05/24 0959    pantoprazole (PROTONIX) tablet 40 mg, 40 mg, Oral, QAM AC, Mayflower, Hong L, MD, 40 mg at 10/05/24 0703    Vitamin D (CHOLECALCIFEROL) tablet 1,000 Units, 1,000 Units, Oral, Daily, Hong Ernst MD, 1,000 Units at 10/05/24 0959    magnesium oxide (MAG-OX) tablet 400 mg, 400 mg, Oral, Daily, Hong Ernst MD, 400 mg at 10/05/24 0959    warfarin placeholder: dosing by pharmacy, , Other, RX Placeholder, Hong Ernst MD   PATIENT CARE TEAM:  Patient Care Team:  Freddy Chandler MD as PCP - General  Shay Zamarripa MD as Referring 
white petrolatum ointment, , Topical, BID PRN, Kimberly Bah MD, Given at 10/10/24 1006    sodium chloride flush 0.9 % injection 5-40 mL, 5-40 mL, IntraVENous, 2 times per day, Jose Daniel Fields MD, 10 mL at 10/14/24 2030    0.9 % sodium chloride infusion, , IntraVENous, PRN, Jose Daniel Fields MD, Last Rate: 15 mL/hr at 10/06/24 0906, New Bag at 10/06/24 0906    ondansetron (ZOFRAN) injection 4 mg, 4 mg, IntraVENous, Q6H PRN, Jose Daniel Fields MD    hydrALAZINE (APRESOLINE) injection 10 mg, 10 mg, IntraVENous, Q4H PRN, Jose Daniel Fields MD    magnesium hydroxide (MILK OF MAGNESIA) 400 MG/5ML suspension 30 mL, 30 mL, Oral, Daily PRN, Jose Daniel Fields MD    polyethylene glycol (GLYCOLAX) packet 17 g, 17 g, Oral, Daily PRN, Jose Daniel Fields MD, 17 g at 10/13/24 0342    acetaminophen (TYLENOL) tablet 650 mg, 650 mg, Oral, Q4H PRN, Jose Daniel Fields MD    acetaminophen (TYLENOL) tablet 650 mg, 650 mg, Oral, Q6H, Jose Daniel Fields MD, 650 mg at 10/15/24 0622    hydrALAZINE (APRESOLINE) injection 10 mg, 10 mg, IntraVENous, Q4H PRN, Leslie Mondragon APRN - NP, 10 mg at 10/09/24 2343    sodium chloride flush 0.9 % injection 5-40 mL, 5-40 mL, IntraVENous, PRN, Leslie Mondragon APRN - NP    sodium chloride flush 0.9 % injection 5-40 mL, 5-40 mL, IntraVENous, 2 times per day, Hong Ernst MD, 10 mL at 10/15/24 0841    0.9 % sodium chloride infusion, , IntraVENous, PRN, Hong Ernst MD, Last Rate: 25 mL/hr at 10/14/24 1107, New Bag at 10/14/24 1107    ondansetron (ZOFRAN-ODT) disintegrating tablet 4 mg, 4 mg, Oral, Q8H PRN **OR** [DISCONTINUED] ondansetron (ZOFRAN) injection 4 mg, 4 mg, IntraVENous, Q6H PRN, Hong Ernst MD    amiodarone (CORDARONE) tablet 200 mg, 200 mg, Oral, Daily, Hong Ernst MD, 200 mg at 10/15/24 0834    amLODIPine (NORVASC) tablet 5 mg, 5 mg, Oral, QAM, Hong Ernst MD, 5 mg at 10/15/24 0834    aspirin EC tablet 81 mg, 81 mg, Oral, Daily, Hong Ernst MD, 81 mg at 10/15/24 
10/25/24 1827    hydrALAZINE (APRESOLINE) injection 10 mg, 10 mg, IntraVENous, Q4H PRN, Leslie Mondragon, APRN - NP, 10 mg at 10/09/24 2343    sodium chloride flush 0.9 % injection 5-40 mL, 5-40 mL, IntraVENous, PRN, Leslie Mondragon, APRN - NP    sodium chloride flush 0.9 % injection 5-40 mL, 5-40 mL, IntraVENous, 2 times per day, Hong Ernst MD, 10 mL at 10/25/24 2207    0.9 % sodium chloride infusion, , IntraVENous, PRN, Hong Ernst MD, Last Rate: 5 mL/hr at 10/25/24 2232, New Bag at 10/25/24 2232    ondansetron (ZOFRAN-ODT) disintegrating tablet 4 mg, 4 mg, Oral, Q8H PRN **OR** [DISCONTINUED] ondansetron (ZOFRAN) injection 4 mg, 4 mg, IntraVENous, Q6H PRN, Hong Ernst MD    amLODIPine (NORVASC) tablet 5 mg, 5 mg, Oral, QADALILA, Jose Daniel Fields MD, 5 mg at 10/25/24 0859    aspirin EC tablet 81 mg, 81 mg, Oral, Daily, Hong Ernst MD, 81 mg at 10/25/24 0858    atorvastatin (LIPITOR) tablet 80 mg, 80 mg, Oral, Daily, Hong Ernst MD, 80 mg at 10/25/24 0858    hydrALAZINE (APRESOLINE) tablet 100 mg, 100 mg, Oral, 3 times per day, Jose Daniel Fields MD, 100 mg at 10/25/24 2206    isosorbide dinitrate (ISORDIL) tablet 40 mg, 40 mg, Oral, q8h, Jose Daniel Fields MD, 40 mg at 10/25/24 2205    metoprolol succinate (TOPROL XL) extended release tablet 25 mg, 25 mg, Oral, BID, Samantha Cristina APRN - CNP, 25 mg at 10/25/24 2206    pantoprazole (PROTONIX) tablet 40 mg, 40 mg, Oral, QAM AC, Hong Ernst MD, 40 mg at 10/25/24 0709    Vitamin D (CHOLECALCIFEROL) tablet 1,000 Units, 1,000 Units, Oral, Daily, Hong Ernst MD, 1,000 Units at 10/25/24 0859    magnesium oxide (MAG-OX) tablet 400 mg, 400 mg, Oral, Daily, Hong Ernst MD, 400 mg at 10/25/24 0859    warfarin placeholder: dosing by pharmacy, , Other, RX Placeholder, Hong Ernst MD   PATIENT CARE TEAM:  Patient Care Team:  Freddy Chandler MD as PCP - Shay Miller MD as Referring Physician  Farida 
g, Oral, Daily PRN, Jose Daniel Fields MD, 17 g at 10/20/24 1552    acetaminophen (TYLENOL) tablet 650 mg, 650 mg, Oral, Q4H PRN, Jose Daniel Fields MD    acetaminophen (TYLENOL) tablet 650 mg, 650 mg, Oral, Q6H, Jose Daniel Fields MD, 650 mg at 10/23/24 1055    hydrALAZINE (APRESOLINE) injection 10 mg, 10 mg, IntraVENous, Q4H PRN, Leslie Mondragon APRN - NP, 10 mg at 10/09/24 2343    sodium chloride flush 0.9 % injection 5-40 mL, 5-40 mL, IntraVENous, PRN, Leslie Mondragon APRN - NP    sodium chloride flush 0.9 % injection 5-40 mL, 5-40 mL, IntraVENous, 2 times per day, Hong Ernst MD, 10 mL at 10/23/24 0902    0.9 % sodium chloride infusion, , IntraVENous, PRN, Hong Ernst MD, Last Rate: 10 mL/hr at 10/22/24 2256, New Bag at 10/22/24 2256    ondansetron (ZOFRAN-ODT) disintegrating tablet 4 mg, 4 mg, Oral, Q8H PRN **OR** [DISCONTINUED] ondansetron (ZOFRAN) injection 4 mg, 4 mg, IntraVENous, Q6H PRN, Hong Ernst MD    amLODIPine (NORVASC) tablet 5 mg, 5 mg, Oral, QAM, Jose Daniel Fields MD, 5 mg at 10/23/24 0900    aspirin EC tablet 81 mg, 81 mg, Oral, Daily, Hong Ernst MD, 81 mg at 10/23/24 0859    atorvastatin (LIPITOR) tablet 80 mg, 80 mg, Oral, Daily, Hong Ernst MD, 80 mg at 10/23/24 0859    hydrALAZINE (APRESOLINE) tablet 100 mg, 100 mg, Oral, 3 times per day, Jose Daniel Fields MD, 100 mg at 10/23/24 1501    isosorbide dinitrate (ISORDIL) tablet 40 mg, 40 mg, Oral, q8h, Jose Daniel Fields MD, 40 mg at 10/23/24 1500    metoprolol succinate (TOPROL XL) extended release tablet 25 mg, 25 mg, Oral, BID, Samantha Cristina APRN - CNP, 25 mg at 10/23/24 0902    pantoprazole (PROTONIX) tablet 40 mg, 40 mg, Oral, QA AC, Hong Ernst MD, 40 mg at 10/23/24 0546    Vitamin D (CHOLECALCIFEROL) tablet 1,000 Units, 1,000 Units, Oral, Daily, Hong Ernst MD, 1,000 Units at 10/23/24 0859    magnesium oxide (MAG-OX) tablet 400 mg, 400 mg, Oral, Daily, Hong Ernst MD, 400 mg at 
Oral, Daily, Hong Ernst MD, 400 mg at 10/24/24 0911    warfarin placeholder: dosing by pharmacy, , Other, RX Placeholder, Hong Ernst MD   PATIENT CARE TEAM:  Patient Care Team:  Freddy Chandler MD as PCP - General  ZamarripaShay MD as Referring Physician  Dalton Iqbal III DO as Consulting Physician (Cardiology)  Jose Francisco Del Toro MD as Consulting Physician (Cardiothoracic Surgery)  Sole Allen MD as Consulting Physician (Cardiology)  Norma Luevano APRN - NP (Nurse Practitioner)    Thank you for your referral and allowing me to participate in this patient's care.    YASMIN Chen NP  [facility]  [facility]  [address] [place] [address], [address]  Phone: [phone]      On this date 10/24/24 , I have spent a total time of  25 minutes personally reviewing new vitals, test results, notes from recent visits, face to face encounter/physical exam of patient with counseling, writing orders, performing medical decision making, and documenting.     
RX Sujata Melara Melvin L, MD   PATIENT CARE TEAM:  Patient Care Team:  Freddy Chandler MD as PCP - General  Shay Zamarripa MD as Referring Physician  Dalton Iqbal III, DO as Consulting Physician (Cardiology)  Jose Francisco Del Toro MD as Consulting Physician (Cardiothoracic Surgery)  Sole Allen MD as Consulting Physician (Cardiology)  Norma Luevano APRN - NP (Nurse Practitioner)    Thank you for your referral and allowing me to participate in this patient's care.    YASMIN Chen NP  [facility]    [address], [address]  Phone: [phone]    Electronically signed by YASMIN Chen NP on 10/18/24 at 10:48 AM EDT    
pantoprazole (PROTONIX) tablet 40 mg, 40 mg, Oral, QAM AC, Hong Ernst MD, 40 mg at 10/25/24 0709    Vitamin D (CHOLECALCIFEROL) tablet 1,000 Units, 1,000 Units, Oral, Daily, Hong Ernst MD, 1,000 Units at 10/25/24 0859    magnesium oxide (MAG-OX) tablet 400 mg, 400 mg, Oral, Daily, Hong Ernst MD, 400 mg at 10/25/24 0859    warfarin placeholder: dosing by pharmacy, , Other, RX Placeholder, Hong Ernst MD   PATIENT CARE TEAM:  Patient Care Team:  Freddy Chandler MD as PCP - Shay Miller MD as Referring Physician  Dalton Iqbal III, DO as Consulting Physician (Cardiology)  Jose Francisco Del Toro MD as Consulting Physician (Cardiothoracic Surgery)  Sole Allen MD as Consulting Physician (Cardiology)  Norma Luevano APRN - NP (Nurse Practitioner)    Thank you for your referral and allowing me to participate in this patient's care.    YASMIN Chen NP  [facility]  [facility]  [address], [address]  Phone: [phone]      On this date 10/25/24 , I have spent a total time of  25 minutes personally reviewing new vitals, test results, notes from recent visits, face to face encounter/physical exam of patient with counseling, writing orders, performing medical decision making, and documenting.     
tablet 40 mg, 40 mg, Oral, QAM AC, Hong Ernst MD, 40 mg at 10/28/24 0626    Vitamin D (CHOLECALCIFEROL) tablet 1,000 Units, 1,000 Units, Oral, Daily, Hong Ernst MD, 1,000 Units at 10/28/24 0806    magnesium oxide (MAG-OX) tablet 400 mg, 400 mg, Oral, Daily, Hong Ernst MD, 400 mg at 10/28/24 0806    warfarin placeholder: dosing by pharmacy, , Other, RX Placeholder, Hong Ernst MD   PATIENT CARE TEAM:  Patient Care Team:  Freddy Chandler MD as PCP - General  Shay Zamarripa MD as Referring Physician  Dalton Iqbal III, DO as Consulting Physician (Cardiology)  Jose Francisco Del Toro MD as Consulting Physician (Cardiothoracic Surgery)  Sole Allen MD as Consulting Physician (Cardiology)  Norma Luevano APRN - NP (Nurse Practitioner)    Thank you for your referral and allowing me to participate in this patient's care.    YASMIN Harp - NP  [facility]  [facility]  [address], [address]  Phone: [phone]      
list, visit note) on the day of the encounter    YASMIN Verma NP

## 2024-10-29 NOTE — CARE COORDINATION
Transition of Care Plan to SNF/Rehab    Communication to Patient/Family:  Met with patient and family and they are agreeable to the transition plan. The Plan for Transition of Care is related to the following treatment goals: [facility] SNF, room 415A. Report [id number]. [facility] stretcher transporting w O2 at 7pm.     The Patient and/or patient representative was provided with a choice of provider and agrees  with the discharge plan.      Yes [x] No []    A Freedom of choice list was provided with basic dialogue that supports the patient's individualized plan of care/goals and shares the quality data associated with the providers.       Yes [x] No []    SNF/Rehab Transition:  Patient has been accepted to [facility] SNF/Rehab and meets criteria for admission.   Date of Inpatient Status Order:   Patient will transported by [facility] and expected to leave at 7pm.    Communication to SNF/Rehab:  Bedside RN, Amarilys, has been notified to update the transition plan to the facility and call report (phone number).  Discharge information has been updated on the AVS. And communicated to facility via [place] [place], or CC link.       Nursing Please include all hard scripts for controlled substances, med rec and dc summary, and AVS in packet.     Reviewed and confirmed with facility, [facility], can manage the patient care needs for the following:     Javier with (X) only those applicable:  Medication:  [x]Medications are available at the facility  []IV Antibiotics    [x]Controlled Substance - hard copies available sent.  []Weekly Labs    Equipment:  []CPAP/BiPAP  []Wound Vacuum  []Allred or Urinary Device  []PICC/Central Line  []Nebulizer  []Ventilator    Treatment:  []Isolation (for MRSA, VRE, etc.)  []Surgical Drain Management  []Tracheostomy Care  [x]Dressing Changes  []Dialysis with transportation  []PEG Care  [x]Oxygen  []Daily Weights for Heart Failure Needs 4 days of supplies for

## 2024-10-29 NOTE — PLAN OF CARE
Care Plan:  Problem: Discharge Planning  Goal: Discharge to home or other facility with appropriate resources  Outcome: Progressing  Flowsheets (Taken 10/28/2024 2000)  Discharge to home or other facility with appropriate resources: Arrange for needed discharge resources and transportation as appropriate     Problem: Safety - Adult  Goal: Free from fall injury  Outcome: Progressing     Problem: Chronic Conditions and Co-morbidities  Goal: Patient's chronic conditions and co-morbidity symptoms are monitored and maintained or improved  Outcome: Progressing  Flowsheets (Taken 10/28/2024 2000)  Care Plan - Patient's Chronic Conditions and Co-Morbidity Symptoms are Monitored and Maintained or Improved: Monitor and assess patient's chronic conditions and comorbid symptoms for stability, deterioration, or improvement     Problem: Pain  Goal: Verbalizes/displays adequate comfort level or baseline comfort level  Outcome: Progressing  Flowsheets (Taken 10/28/2024 2000)  Verbalizes/displays adequate comfort level or baseline comfort level: Encourage patient to monitor pain and request assistance     Problem: Skin/Tissue Integrity  Goal: Absence of new skin breakdown  Description: 1.  Monitor for areas of redness and/or skin breakdown  2.  Assess vascular access sites hourly  3.  Every 4-6 hours minimum:  Change oxygen saturation probe site  4.  Every 4-6 hours:  If on nasal continuous positive airway pressure, respiratory therapy assess nares and determine need for appliance change or resting period.  Outcome: Progressing     Problem: Nutrition Deficit:  Goal: Optimize nutritional status  Outcome: Progressing     Problem: ABCDS Injury Assessment  Goal: Absence of physical injury  Outcome: Progressing     
  Problem: Discharge Planning  Goal: Discharge to home or other facility with appropriate resources  10/12/2024 0120 by Brittney Quispe RN  Outcome: Progressing  Flowsheets (Taken 10/11/2024 2000)  Discharge to home or other facility with appropriate resources: Identify barriers to discharge with patient and caregiver  10/11/2024 1350 by Ivy Cohen RN  Outcome: Progressing  Flowsheets (Taken 10/11/2024 0750)  Discharge to home or other facility with appropriate resources: Identify barriers to discharge with patient and caregiver     Problem: Safety - Adult  Goal: Free from fall injury  10/12/2024 0120 by Brittney Quispe RN  Outcome: Progressing  10/11/2024 1350 by Ivy Cohen RN  Outcome: Progressing     Problem: Chronic Conditions and Co-morbidities  Goal: Patient's chronic conditions and co-morbidity symptoms are monitored and maintained or improved  10/12/2024 0120 by Brittney Quispe RN  Outcome: Progressing  Flowsheets (Taken 10/11/2024 2000)  Care Plan - Patient's Chronic Conditions and Co-Morbidity Symptoms are Monitored and Maintained or Improved: Monitor and assess patient's chronic conditions and comorbid symptoms for stability, deterioration, or improvement  10/11/2024 1350 by Ivy Cohen RN  Outcome: Progressing  Flowsheets (Taken 10/11/2024 0750)  Care Plan - Patient's Chronic Conditions and Co-Morbidity Symptoms are Monitored and Maintained or Improved: Monitor and assess patient's chronic conditions and comorbid symptoms for stability, deterioration, or improvement     Problem: Pain  Goal: Verbalizes/displays adequate comfort level or baseline comfort level  10/12/2024 0120 by Brittney Quispe RN  Outcome: Progressing  Flowsheets  Taken 10/11/2024 2000 by Brittney Quispe RN  Verbalizes/displays adequate comfort level or baseline comfort level: Encourage patient to monitor pain and request assistance  Taken 10/11/2024 1515 by Ivy Cohen RN  Verbalizes/displays adequate comfort 
  Problem: Discharge Planning  Goal: Discharge to home or other facility with appropriate resources  10/20/2024 0126 by Na Sullivan RN  Outcome: Progressing  Flowsheets (Taken 10/19/2024 2000)  Discharge to home or other facility with appropriate resources: Identify barriers to discharge with patient and caregiver     Problem: Safety - Adult  Goal: Free from fall injury  10/20/2024 0126 by Na Sullivan RN  Outcome: Progressing  Flowsheets (Taken 10/20/2024 0126)  Free From Fall Injury: Instruct family/caregiver on patient safety     Problem: Chronic Conditions and Co-morbidities  Goal: Patient's chronic conditions and co-morbidity symptoms are monitored and maintained or improved  10/20/2024 0126 by Na Sullivan RN  Outcome: Progressing  Flowsheets (Taken 10/19/2024 2000)  Care Plan - Patient's Chronic Conditions and Co-Morbidity Symptoms are Monitored and Maintained or Improved: Monitor and assess patient's chronic conditions and comorbid symptoms for stability, deterioration, or improvement     Problem: Pain  Goal: Verbalizes/displays adequate comfort level or baseline comfort level  10/20/2024 0126 by Na Sullivan RN  Outcome: Progressing  Flowsheets  Taken 10/19/2024 2000 by Na Sullivan RN  Verbalizes/displays adequate comfort level or baseline comfort level: Encourage patient to monitor pain and request assistance    Problem: Skin/Tissue Integrity  Goal: Absence of new skin breakdown  Description: 1.  Monitor for areas of redness and/or skin breakdown  2.  Assess vascular access sites hourly  3.  Every 4-6 hours minimum:  Change oxygen saturation probe site  4.  Every 4-6 hours:  If on nasal continuous positive airway pressure, respiratory therapy assess nares and determine need for appliance change or resting period.  10/20/2024 0126 by Na Sullivan RN  Outcome: Progressing     Problem: ABCDS Injury Assessment  Goal: Absence of physical injury  10/20/2024 0126 by Laurie 
  Problem: Discharge Planning  Goal: Discharge to home or other facility with appropriate resources  10/22/2024 0045 by Argentina Hankins RN  Outcome: Progressing  Flowsheets (Taken 10/20/2024 2000 by Brittney Quispe, RN)  Discharge to home or other facility with appropriate resources: Identify barriers to discharge with patient and caregiver  10/21/2024 1109 by Ibeth Atkinson RN  Outcome: Progressing     Problem: Safety - Adult  Goal: Free from fall injury  10/22/2024 0045 by Argentina Hankins RN  Outcome: Progressing  Flowsheets (Taken 10/20/2024 0126 by Na Sullivan RN)  Free From Fall Injury: Instruct family/caregiver on patient safety  10/21/2024 1109 by Ibeth Atkinson RN  Outcome: Progressing     Problem: Chronic Conditions and Co-morbidities  Goal: Patient's chronic conditions and co-morbidity symptoms are monitored and maintained or improved  10/22/2024 0045 by Argentina Hankins RN  Outcome: Progressing  Flowsheets (Taken 10/20/2024 2000 by Brittney Quispe RN)  Care Plan - Patient's Chronic Conditions and Co-Morbidity Symptoms are Monitored and Maintained or Improved: Monitor and assess patient's chronic conditions and comorbid symptoms for stability, deterioration, or improvement  10/21/2024 1109 by Ibeth Atkinson RN  Outcome: Progressing     Problem: Pain  Goal: Verbalizes/displays adequate comfort level or baseline comfort level  10/22/2024 0045 by Argentina Hankins RN  Outcome: Progressing  Flowsheets (Taken 10/20/2024 2000 by Brittney Quispe, RN)  Verbalizes/displays adequate comfort level or baseline comfort level: Encourage patient to monitor pain and request assistance  10/21/2024 1109 by Ibeth Atkinson RN  Outcome: Progressing     Problem: Physical Therapy - Adult  Goal: By Discharge: Performs mobility at highest level of function for planned discharge setting.  See evaluation for individualized goals.  Description: FUNCTIONAL STATUS PRIOR TO ADMISSION: Patient was modified 
  Problem: Discharge Planning  Goal: Discharge to home or other facility with appropriate resources  10/23/2024 2245 by Argentina Hankins RN  Outcome: Progressing  Flowsheets (Taken 10/20/2024 2000 by Britntey Quispe, RN)  Discharge to home or other facility with appropriate resources: Identify barriers to discharge with patient and caregiver  10/23/2024 1222 by Amarilys Rodriguez RN  Outcome: Progressing     Problem: Safety - Adult  Goal: Free from fall injury  10/23/2024 2245 by Argentina Hankins RN  Outcome: Progressing  Flowsheets (Taken 10/20/2024 0126 by Na Sullvian RN)  Free From Fall Injury: Instruct family/caregiver on patient safety  10/23/2024 1222 by Amarilys Rodriguez RN  Outcome: Progressing     Problem: Chronic Conditions and Co-morbidities  Goal: Patient's chronic conditions and co-morbidity symptoms are monitored and maintained or improved  10/23/2024 2245 by Argentina Hankins RN  Outcome: Progressing  Flowsheets (Taken 10/20/2024 2000 by Brittney Quispe RN)  Care Plan - Patient's Chronic Conditions and Co-Morbidity Symptoms are Monitored and Maintained or Improved: Monitor and assess patient's chronic conditions and comorbid symptoms for stability, deterioration, or improvement  10/23/2024 1222 by Amarilys Rodriguez RN  Outcome: Progressing     Problem: Pain  Goal: Verbalizes/displays adequate comfort level or baseline comfort level  10/23/2024 2245 by Argentina Hankins RN  Outcome: Progressing  Flowsheets (Taken 10/20/2024 2000 by Brittney Quispe, RN)  Verbalizes/displays adequate comfort level or baseline comfort level: Encourage patient to monitor pain and request assistance  10/23/2024 1222 by Amarilys Rodriguez RN  Outcome: Progressing     Problem: Skin/Tissue Integrity  Goal: Absence of new skin breakdown  Description: 1.  Monitor for areas of redness and/or skin breakdown  2.  Assess vascular access sites hourly  3.  Every 4-6 hours minimum:  Change oxygen saturation probe site  4.  Every 4-6 
  Problem: Discharge Planning  Goal: Discharge to home or other facility with appropriate resources  10/29/2024 1419 by Amarilys Rodriguez RN  Outcome: Completed  10/29/2024 1227 by Amarilys Rodriguez RN  Outcome: Progressing  10/29/2024 0222 by Brittney Quispe RN  Outcome: Progressing  Flowsheets (Taken 10/28/2024 2000)  Discharge to home or other facility with appropriate resources: Arrange for needed discharge resources and transportation as appropriate     Problem: Safety - Adult  Goal: Free from fall injury  10/29/2024 1419 by Amarilys Rodriguez RN  Outcome: Completed  10/29/2024 1227 by Amarilys Rodriguez RN  Outcome: Progressing  10/29/2024 0222 by Brittney Quispe RN  Outcome: Progressing     Problem: Chronic Conditions and Co-morbidities  Goal: Patient's chronic conditions and co-morbidity symptoms are monitored and maintained or improved  10/29/2024 1419 by Amarilys Rodriguez RN  Outcome: Completed  10/29/2024 1227 by Amarilys Rodriguez RN  Outcome: Progressing  10/29/2024 0222 by Brittney Quispe RN  Outcome: Progressing  Flowsheets (Taken 10/28/2024 2000)  Care Plan - Patient's Chronic Conditions and Co-Morbidity Symptoms are Monitored and Maintained or Improved: Monitor and assess patient's chronic conditions and comorbid symptoms for stability, deterioration, or improvement     Problem: Pain  Goal: Verbalizes/displays adequate comfort level or baseline comfort level  10/29/2024 1419 by Amarilys Rodrgiuez RN  Outcome: Completed  10/29/2024 1227 by Amarilys Rodriguez RN  Outcome: Progressing  10/29/2024 0222 by Brittney Quispe RN  Outcome: Progressing  Flowsheets (Taken 10/28/2024 2000)  Verbalizes/displays adequate comfort level or baseline comfort level: Encourage patient to monitor pain and request assistance     Problem: Skin/Tissue Integrity  Goal: Absence of new skin breakdown  Description: 1.  Monitor for areas of redness and/or skin breakdown  2.  Assess vascular access sites hourly  3.  Every 4-6 hours 
  Problem: Discharge Planning  Goal: Discharge to home or other facility with appropriate resources  10/8/2024 0252 by Meeta Dawson RN  Outcome: Progressing  Flowsheets (Taken 10/7/2024 2000)  Discharge to home or other facility with appropriate resources: Identify barriers to discharge with patient and caregiver  10/7/2024 1442 by Ibeth Atkinson RN  Outcome: Progressing     Problem: Safety - Adult  Goal: Free from fall injury  10/8/2024 0252 by Meeta Dawson RN  Outcome: Progressing  10/7/2024 1442 by Ibeth Atkinson RN  Outcome: Progressing     Problem: Chronic Conditions and Co-morbidities  Goal: Patient's chronic conditions and co-morbidity symptoms are monitored and maintained or improved  10/8/2024 0252 by Meeta Dawson RN  Outcome: Progressing  Flowsheets (Taken 10/7/2024 2000)  Care Plan - Patient's Chronic Conditions and Co-Morbidity Symptoms are Monitored and Maintained or Improved: Monitor and assess patient's chronic conditions and comorbid symptoms for stability, deterioration, or improvement  10/7/2024 1442 by Ibeth Atkinson RN  Outcome: Progressing     Problem: Pain  Goal: Verbalizes/displays adequate comfort level or baseline comfort level  10/8/2024 0252 by Meeta Dawson RN  Outcome: Progressing  Flowsheets (Taken 10/7/2024 2000)  Verbalizes/displays adequate comfort level or baseline comfort level: Encourage patient to monitor pain and request assistance  10/7/2024 1442 by Ibeth Atkinson RN  Outcome: Progressing     Problem: Physical Therapy - Adult  Goal: By Discharge: Performs mobility at highest level of function for planned discharge setting.  See evaluation for individualized goals.  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    HOME SUPPORT PRIOR TO ADMISSION: The patient 
  Problem: Discharge Planning  Goal: Discharge to home or other facility with appropriate resources  9/18/2024 0854 by Brittney Quispe RN  Outcome: Progressing  9/18/2024 0737 by Bernadette Hauser RN  Outcome: Progressing  Flowsheets (Taken 9/17/2024 2130 by Brittney Quispe, RN)  Discharge to home or other facility with appropriate resources: Identify barriers to discharge with patient and caregiver     Problem: Safety - Adult  Goal: Free from fall injury  9/18/2024 0854 by Brittney Quispe RN  Outcome: Progressing  9/18/2024 0737 by Bernadette Hauser RN  Outcome: Progressing     Problem: Chronic Conditions and Co-morbidities  Goal: Patient's chronic conditions and co-morbidity symptoms are monitored and maintained or improved  Outcome: Progressing  Flowsheets (Taken 9/17/2024 2130)  Care Plan - Patient's Chronic Conditions and Co-Morbidity Symptoms are Monitored and Maintained or Improved: Monitor and assess patient's chronic conditions and comorbid symptoms for stability, deterioration, or improvement     
  Problem: Discharge Planning  Goal: Discharge to home or other facility with appropriate resources  9/20/2024 1048 by Gatito Almonte RN  Outcome: Progressing  Flowsheets (Taken 9/20/2024 0810)  Discharge to home or other facility with appropriate resources: Identify barriers to discharge with patient and caregiver  9/20/2024 0253 by Devora Crowder RN  Outcome: Progressing     Problem: Safety - Adult  Goal: Free from fall injury  9/20/2024 1048 by Gatito Almonte RN  Outcome: Progressing  9/20/2024 0253 by Devora Crowder RN  Outcome: Progressing     Problem: Chronic Conditions and Co-morbidities  Goal: Patient's chronic conditions and co-morbidity symptoms are monitored and maintained or improved  9/20/2024 1048 by Gatito Almonte RN  Outcome: Progressing  Flowsheets (Taken 9/20/2024 0810)  Care Plan - Patient's Chronic Conditions and Co-Morbidity Symptoms are Monitored and Maintained or Improved: Monitor and assess patient's chronic conditions and comorbid symptoms for stability, deterioration, or improvement  9/20/2024 0253 by Devora Crowder RN  Outcome: Progressing     Problem: Pain  Goal: Verbalizes/displays adequate comfort level or baseline comfort level  9/20/2024 1048 by Gatito Almonte RN  Outcome: Progressing  9/20/2024 0253 by Devora Crowder RN  Outcome: Progressing     Problem: Physical Therapy - Adult  Goal: By Discharge: Performs mobility at highest level of function for planned discharge setting.  See evaluation for individualized goals.  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    HOME SUPPORT PRIOR TO ADMISSION: The patient lived with his girlfriend, step daughter assists w/ dressing change.    Physical Therapy Goals  Initiated 9/19/2024  1.  Patient will move from supine to sit and sit to 
  Problem: Discharge Planning  Goal: Discharge to home or other facility with appropriate resources  9/21/2024 1129 by Gatito Almonte, RN  Outcome: Progressing  9/21/2024 0129 by Abdulaziz Bennett RN  Outcome: Progressing     Problem: Safety - Adult  Goal: Free from fall injury  9/21/2024 1129 by Gatito Almonte, RN  Outcome: Progressing  9/21/2024 0129 by Abdulaziz Bennett RN  Outcome: Progressing     Problem: Chronic Conditions and Co-morbidities  Goal: Patient's chronic conditions and co-morbidity symptoms are monitored and maintained or improved  9/21/2024 1129 by Gatito Almonte, RN  Outcome: Progressing  9/21/2024 0129 by Abdulaziz Bennett RN  Outcome: Progressing     Problem: Pain  Goal: Verbalizes/displays adequate comfort level or baseline comfort level  9/21/2024 1129 by Gatito Almonte, RN  Outcome: Progressing  9/21/2024 0129 by Abdulaziz Bennett RN  Outcome: Progressing     Problem: Skin/Tissue Integrity  Goal: Absence of new skin breakdown  Description: 1.  Monitor for areas of redness and/or skin breakdown  2.  Assess vascular access sites hourly  3.  Every 4-6 hours minimum:  Change oxygen saturation probe site  4.  Every 4-6 hours:  If on nasal continuous positive airway pressure, respiratory therapy assess nares and determine need for appliance change or resting period.  9/21/2024 1129 by Gatito Almonte, RN  Outcome: Progressing  9/21/2024 0129 by Abdulaziz Bennett RN  Outcome: Progressing     
  Problem: Discharge Planning  Goal: Discharge to home or other facility with appropriate resources  Outcome: Progressing     Problem: Safety - Adult  Goal: Free from fall injury  Outcome: Progressing     Problem: Chronic Conditions and Co-morbidities  Goal: Patient's chronic conditions and co-morbidity symptoms are monitored and maintained or improved  Outcome: Progressing     Problem: Pain  Goal: Verbalizes/displays adequate comfort level or baseline comfort level  Outcome: Progressing     Problem: Skin/Tissue Integrity  Goal: Absence of new skin breakdown  Description: 1.  Monitor for areas of redness and/or skin breakdown  2.  Assess vascular access sites hourly  3.  Every 4-6 hours minimum:  Change oxygen saturation probe site  4.  Every 4-6 hours:  If on nasal continuous positive airway pressure, respiratory therapy assess nares and determine need for appliance change or resting period.  Outcome: Progressing     
  Problem: Discharge Planning  Goal: Discharge to home or other facility with appropriate resources  Outcome: Progressing     Problem: Safety - Adult  Goal: Free from fall injury  Outcome: Progressing     Problem: Chronic Conditions and Co-morbidities  Goal: Patient's chronic conditions and co-morbidity symptoms are monitored and maintained or improved  Outcome: Progressing     Problem: Pain  Goal: Verbalizes/displays adequate comfort level or baseline comfort level  Outcome: Progressing     Problem: Skin/Tissue Integrity  Goal: Absence of new skin breakdown  Description: 1.  Monitor for areas of redness and/or skin breakdown  2.  Assess vascular access sites hourly  3.  Every 4-6 hours minimum:  Change oxygen saturation probe site  4.  Every 4-6 hours:  If on nasal continuous positive airway pressure, respiratory therapy assess nares and determine need for appliance change or resting period.  Outcome: Progressing     Problem: Nutrition Deficit:  Goal: Optimize nutritional status  Outcome: Progressing  Flowsheets (Taken 10/1/2024 1356 by Otto Alejo, SARAH)  Nutrient intake appropriate for improving, restoring, or maintaining nutritional needs:   Assess nutritional status and recommend course of action   Monitor oral intake, labs, and treatment plans   Recommend appropriate diets, oral nutritional supplements, and vitamin/mineral supplements     
  Problem: Discharge Planning  Goal: Discharge to home or other facility with appropriate resources  Outcome: Progressing     Problem: Safety - Adult  Goal: Free from fall injury  Outcome: Progressing     Problem: Chronic Conditions and Co-morbidities  Goal: Patient's chronic conditions and co-morbidity symptoms are monitored and maintained or improved  Outcome: Progressing     Problem: Pain  Goal: Verbalizes/displays adequate comfort level or baseline comfort level  Outcome: Progressing  Flowsheets  Taken 10/12/2024 2000 by Brittney Quispe RN  Verbalizes/displays adequate comfort level or baseline comfort level: Encourage patient to monitor pain and request assistance  Taken 10/12/2024 1900 by Ivy Cohen RN  Verbalizes/displays adequate comfort level or baseline comfort level: Encourage patient to monitor pain and request assistance  Taken 10/12/2024 1610 by Ivy Cohen RN  Verbalizes/displays adequate comfort level or baseline comfort level: Assess pain using appropriate pain scale  Taken 10/12/2024 1500 by Ivy Cohen RN  Verbalizes/displays adequate comfort level or baseline comfort level: Encourage patient to monitor pain and request assistance  Taken 10/12/2024 1150 by Ivy Cohen RN  Verbalizes/displays adequate comfort level or baseline comfort level: Encourage patient to monitor pain and request assistance     Problem: Skin/Tissue Integrity  Goal: Absence of new skin breakdown  Description: 1.  Monitor for areas of redness and/or skin breakdown  2.  Assess vascular access sites hourly  3.  Every 4-6 hours minimum:  Change oxygen saturation probe site  4.  Every 4-6 hours:  If on nasal continuous positive airway pressure, respiratory therapy assess nares and determine need for appliance change or resting period.  Outcome: Progressing     Problem: Nutrition Deficit:  Goal: Optimize nutritional status  Outcome: Progressing     
  Problem: Discharge Planning  Goal: Discharge to home or other facility with appropriate resources  Outcome: Progressing  Flowsheets (Taken 10/15/2024 0800)  Discharge to home or other facility with appropriate resources: Identify barriers to discharge with patient and caregiver     Problem: Safety - Adult  Goal: Free from fall injury  Outcome: Progressing     Problem: Chronic Conditions and Co-morbidities  Goal: Patient's chronic conditions and co-morbidity symptoms are monitored and maintained or improved  Outcome: Progressing  Flowsheets (Taken 10/15/2024 0800)  Care Plan - Patient's Chronic Conditions and Co-Morbidity Symptoms are Monitored and Maintained or Improved: Monitor and assess patient's chronic conditions and comorbid symptoms for stability, deterioration, or improvement     Problem: Pain  Goal: Verbalizes/displays adequate comfort level or baseline comfort level  Outcome: Progressing     Problem: Skin/Tissue Integrity  Goal: Absence of new skin breakdown  Description: 1.  Monitor for areas of redness and/or skin breakdown  2.  Assess vascular access sites hourly  3.  Every 4-6 hours minimum:  Change oxygen saturation probe site  4.  Every 4-6 hours:  If on nasal continuous positive airway pressure, respiratory therapy assess nares and determine need for appliance change or resting period.  Outcome: Progressing     Problem: Nutrition Deficit:  Goal: Optimize nutritional status  Outcome: Progressing     Problem: ABCDS Injury Assessment  Goal: Absence of physical injury  Outcome: Progressing     
  Problem: Discharge Planning  Goal: Discharge to home or other facility with appropriate resources  Outcome: Progressing  Flowsheets (Taken 10/18/2024 2000)  Discharge to home or other facility with appropriate resources: Identify barriers to discharge with patient and caregiver     Problem: Safety - Adult  Goal: Free from fall injury  Outcome: Progressing  Flowsheets (Taken 10/19/2024 0141)  Free From Fall Injury: Instruct family/caregiver on patient safety     Problem: Chronic Conditions and Co-morbidities  Goal: Patient's chronic conditions and co-morbidity symptoms are monitored and maintained or improved  Outcome: Progressing  Flowsheets (Taken 10/18/2024 2000)  Care Plan - Patient's Chronic Conditions and Co-Morbidity Symptoms are Monitored and Maintained or Improved: Monitor and assess patient's chronic conditions and comorbid symptoms for stability, deterioration, or improvement     Problem: Pain  Goal: Verbalizes/displays adequate comfort level or baseline comfort level  Outcome: Progressing  Flowsheets (Taken 10/18/2024 2000)  Verbalizes/displays adequate comfort level or baseline comfort level: Encourage patient to monitor pain and request assistance     Problem: Skin/Tissue Integrity  Goal: Absence of new skin breakdown  Description: 1.  Monitor for areas of redness and/or skin breakdown  2.  Assess vascular access sites hourly  3.  Every 4-6 hours minimum:  Change oxygen saturation probe site  4.  Every 4-6 hours:  If on nasal continuous positive airway pressure, respiratory therapy assess nares and determine need for appliance change or resting period.  Outcome: Progressing     Problem: ABCDS Injury Assessment  Goal: Absence of physical injury  Outcome: Progressing  Flowsheets (Taken 10/19/2024 0141)  Absence of Physical Injury: Implement safety measures based on patient assessment     
  Problem: Discharge Planning  Goal: Discharge to home or other facility with appropriate resources  Outcome: Progressing  Flowsheets (Taken 10/27/2024 2000)  Discharge to home or other facility with appropriate resources: Identify barriers to discharge with patient and caregiver     Problem: Safety - Adult  Goal: Free from fall injury  Outcome: Progressing  Flowsheets (Taken 10/27/2024 2338)  Free From Fall Injury: Instruct family/caregiver on patient safety     Problem: Chronic Conditions and Co-morbidities  Goal: Patient's chronic conditions and co-morbidity symptoms are monitored and maintained or improved  Outcome: Progressing  Flowsheets (Taken 10/27/2024 2000)  Care Plan - Patient's Chronic Conditions and Co-Morbidity Symptoms are Monitored and Maintained or Improved: Monitor and assess patient's chronic conditions and comorbid symptoms for stability, deterioration, or improvement     Problem: Pain  Goal: Verbalizes/displays adequate comfort level or baseline comfort level  Outcome: Progressing  Flowsheets  Taken 10/27/2024 2000 by Na Sullivan RN  Verbalizes/displays adequate comfort level or baseline comfort level: Encourage patient to monitor pain and request assistance    Problem: Skin/Tissue Integrity  Goal: Absence of new skin breakdown  Description: 1.  Monitor for areas of redness and/or skin breakdown  2.  Assess vascular access sites hourly  3.  Every 4-6 hours minimum:  Change oxygen saturation probe site  4.  Every 4-6 hours:  If on nasal continuous positive airway pressure, respiratory therapy assess nares and determine need for appliance change or resting period.  Outcome: Progressing     Problem: ABCDS Injury Assessment  Goal: Absence of physical injury  Outcome: Progressing  Flowsheets (Taken 10/27/2024 2338)  Absence of Physical Injury: Implement safety measures based on patient assessment        
  Problem: Discharge Planning  Goal: Discharge to home or other facility with appropriate resources  Outcome: Progressing  Flowsheets (Taken 9/26/2024 2000)  Discharge to home or other facility with appropriate resources: Identify barriers to discharge with patient and caregiver     Problem: Safety - Adult  Goal: Free from fall injury  Outcome: Progressing     Problem: Chronic Conditions and Co-morbidities  Goal: Patient's chronic conditions and co-morbidity symptoms are monitored and maintained or improved  Outcome: Progressing  Flowsheets (Taken 9/26/2024 2000)  Care Plan - Patient's Chronic Conditions and Co-Morbidity Symptoms are Monitored and Maintained or Improved: Monitor and assess patient's chronic conditions and comorbid symptoms for stability, deterioration, or improvement     Problem: Pain  Goal: Verbalizes/displays adequate comfort level or baseline comfort level  Outcome: Progressing  Flowsheets (Taken 9/26/2024 2000)  Verbalizes/displays adequate comfort level or baseline comfort level: Encourage patient to monitor pain and request assistance       Problem: Skin/Tissue Integrity  Goal: Absence of new skin breakdown  Description: 1.  Monitor for areas of redness and/or skin breakdown  2.  Assess vascular access sites hourly  3.  Every 4-6 hours minimum:  Change oxygen saturation probe site  4.  Every 4-6 hours:  If on nasal continuous positive airway pressure, respiratory therapy assess nares and determine need for appliance change or resting period.  Outcome: Progressing     Problem: Nutrition Deficit:  Goal: Optimize nutritional status  Outcome: Progressing     
  Problem: Occupational Therapy - Adult  Goal: By Discharge: Performs self-care activities at highest level of function for planned discharge setting.  See evaluation for individualized goals.  Description: FUNCTIONAL STATUS PRIOR TO ADMISSION:  Patient reports he was requiring assistance from his girlfriend as needed from family/girlfriend. He uses a SPC for ambulation.      Receives Help From: Family, Other (comment) (Girlfriend and her daughter), ADL Assistance: Independent  Homemaking Assistance: Independent, Ambulation Assistance: Independent (w/ SPC), Transfer Assistance: Independent, Active : Yes (Patient only drives short distances and sister drives patient to [place].)      HOME SUPPORT: Patient lived with family.     Occupational Therapy Goals  Initiated 9/19/2024    1. Patient will perform ADLs standing 5 mins without fatigue or LOB with Minimal assistance.  3. Patient will perform upper body ADLs with Supervision within 7 day(s).  4. Patient will perform toilet transfers with Minimal Assist within 7 day(s).  5. Patient will perform all aspects of toileting with Moderate Assist within 7 day(s).  6. Patient will participate in cardiac/sternal upper extremity therapeutic exercise/activities to increase independence with ADLs with Minimal Assist for 5 minutes within 7 day(s).  7. Patient will perform VAD switchover routine as part of ADL routine (including batteries<>batteries, battery clip<>battery clip, mock SC<>SC) with Supervision within 7 days.    Outcome: Progressing   OCCUPATIONAL THERAPY EVALUATION    Patient: Bishop Love (69 y.o. male)  Date: 9/19/2024  Primary Diagnosis: Hyponatremia [E87.1]  Avulsion fracture [T14.8XXA]  Injury of left rotator cuff, initial encounter [S46.002A]         Precautions: Fall Risk, Other (comment) (LUE sling for comfort, no weight bearing restrictions)                  ASSESSMENT :  The patient is limited by decreased functional mobility, independence in 
  Problem: Occupational Therapy - Adult  Goal: By Discharge: Performs self-care activities at highest level of function for planned discharge setting.  See evaluation for individualized goals.  Description: FUNCTIONAL STATUS PRIOR TO ADMISSION:  Patient reports he was requiring assistance from his girlfriend as needed from family/girlfriend. He uses a SPC for ambulation.      Receives Help From: Family, Other (comment) (Girlfriend and her daughter), ADL Assistance: Independent  Homemaking Assistance: Independent, Ambulation Assistance: Independent (w/ SPC), Transfer Assistance: Independent, Active : Yes (Patient only drives short distances and sister drives patient to [place].)      HOME SUPPORT: Patient lived with family.     Occupational Therapy Goals  Initiated 9/19/2024    1. Patient will perform ADLs standing 5 mins without fatigue or LOB with Minimal assistance.  3. Patient will perform upper body ADLs with Supervision within 7 day(s).  4. Patient will perform toilet transfers with Minimal Assist within 7 day(s).  5. Patient will perform all aspects of toileting with Moderate Assist within 7 day(s).  6. Patient will participate in cardiac/sternal upper extremity therapeutic exercise/activities to increase independence with ADLs with Minimal Assist for 5 minutes within 7 day(s).  7. Patient will perform VAD switchover routine as part of ADL routine (including batteries<>batteries, battery clip<>battery clip, mock SC<>SC) with Supervision within 7 days.    Outcome: Progressing   OCCUPATIONAL THERAPY TREATMENT  Patient: Bishop Love (69 y.o. male)  Date: 9/23/2024  Primary Diagnosis: Hyponatremia [E87.1]  Avulsion fracture [T14.8XXA]  Injury of left rotator cuff, initial encounter [S46.002A]       Precautions: Fall Risk, Other (comment) (LUE sling for comfort, no weight bearing restrictions)                Chart, occupational therapy assessment, plan of care, and goals were 
  Problem: Occupational Therapy - Adult  Goal: By Discharge: Performs self-care activities at highest level of function for planned discharge setting.  See evaluation for individualized goals.  Description: FUNCTIONAL STATUS PRIOR TO ADMISSION:  Patient reports he was requiring assistance from his girlfriend as needed from family/girlfriend. He uses a SPC for ambulation.      Receives Help From: Family, Other (comment) (Girlfriend and her daughter), ADL Assistance: Independent  Homemaking Assistance: Independent, Ambulation Assistance: Independent (w/ SPC), Transfer Assistance: Independent, Active : Yes (Patient only drives short distances and sister drives patient to [place].)      HOME SUPPORT: Patient lived with family.     Occupational Therapy Goals  Weekly RA 10/7/2024  1.  Patient will perform grooming routine in unsupported sitting with Supervision within 7 day(s). (Cont. 10/7)  2.  Patient will perform seated anterior neck to thigh bathing with Supervision within 7 day(s). (Cont. 10/7)  3.  Patient will perform seated upper body dressing with Supervision within 7 day(s). (Cont. 10/7)  4.  Patient will perform seated lower body dressing, using AE PRN, with Moderate Assist within 7 day(s). (Cont. 10/7)  4.  Patient will perform toilet/BSC transfers with Maximal Assist within 7 day(s). (Cont. 10/7)  5.  Patient will perform all aspects of toileting with Maximal Assist within 7 day(s). (Cont. 10/7)  6. Patient will perform VAD switchover routine as part of ADL routine (including batteries<>batteries, battery clip<>battery clip, mock SC<>SC) with Minimal Assist within 7 days. (Cont. 10/7)    Occupational Therapy Goals  Goals reviewed and all downgraded/modified/discontinued at weekly re-assessment 9/26/2024:   1.  Patient will perform grooming routine in unsupported sitting with Supervision within 7 day(s).  2.  Patient will perform seated anterior neck to thigh bathing with Supervision within 7 
  Problem: Occupational Therapy - Adult  Goal: By Discharge: Performs self-care activities at highest level of function for planned discharge setting.  See evaluation for individualized goals.  Description: FUNCTIONAL STATUS PRIOR TO ADMISSION:  Patient reports he was requiring assistance from his girlfriend as needed from family/girlfriend. He uses a SPC for ambulation.      Receives Help From: Family, Other (comment) (Girlfriend and her daughter), ADL Assistance: Independent  Homemaking Assistance: Independent, Ambulation Assistance: Independent (w/ SPC), Transfer Assistance: Independent, Active : Yes (Patient only drives short distances and sister drives patient to [place].)      HOME SUPPORT: Patient lived with family.     Occupational Therapy Goals  Weekly RA 10/7/2024  1.  Patient will perform grooming routine in unsupported sitting with Supervision within 7 day(s). (Cont. 10/7)  2.  Patient will perform seated anterior neck to thigh bathing with Supervision within 7 day(s). (Cont. 10/7)  3.  Patient will perform seated upper body dressing with Supervision within 7 day(s). (Cont. 10/7)  4.  Patient will perform seated lower body dressing, using AE PRN, with Moderate Assist within 7 day(s). (Cont. 10/7)  4.  Patient will perform toilet/BSC transfers with Maximal Assist within 7 day(s). (Cont. 10/7)  5.  Patient will perform all aspects of toileting with Maximal Assist within 7 day(s). (Cont. 10/7)  6. Patient will perform VAD switchover routine as part of ADL routine (including batteries<>batteries, battery clip<>battery clip, mock SC<>SC) with Minimal Assist within 7 days. (Cont. 10/7)    Occupational Therapy Goals  Goals reviewed and all downgraded/modified/discontinued at weekly re-assessment 9/26/2024:   1.  Patient will perform grooming routine in unsupported sitting with Supervision within 7 day(s).  2.  Patient will perform seated anterior neck to thigh bathing with Supervision within 7 
  Problem: Occupational Therapy - Adult  Goal: By Discharge: Performs self-care activities at highest level of function for planned discharge setting.  See evaluation for individualized goals.  Description: FUNCTIONAL STATUS PRIOR TO ADMISSION:  Patient reports he was requiring assistance from his girlfriend as needed from family/girlfriend. He uses a SPC for ambulation.      Receives Help From: Family, Other (comment) (Girlfriend and her daughter), ADL Assistance: Independent  Homemaking Assistance: Independent, Ambulation Assistance: Independent (w/ SPC), Transfer Assistance: Independent, Active : Yes (Patient only drives short distances and sister drives patient to [place].)      HOME SUPPORT: Patient lived with family.     Occupational Therapy Goals  Initiated 9/19/2024    1. Patient will perform ADLs standing 5 mins without fatigue or LOB with Minimal assistance.  3. Patient will perform upper body ADLs with Supervision within 7 day(s).  4. Patient will perform toilet transfers with Minimal Assist within 7 day(s).  5. Patient will perform all aspects of toileting with Moderate Assist within 7 day(s).  6. Patient will participate in cardiac/sternal upper extremity therapeutic exercise/activities to increase independence with ADLs with Minimal Assist for 5 minutes within 7 day(s).  7. Patient will perform VAD switchover routine as part of ADL routine (including batteries<>batteries, battery clip<>battery clip, mock SC<>SC) with Supervision within 7 days.    Outcome: Progressing     OCCUPATIONAL THERAPY TREATMENT  Patient: Bishop Love (69 y.o. male)  Date: 9/24/2024  Primary Diagnosis: Hyponatremia [E87.1]  Avulsion fracture [T14.8XXA]  Injury of left rotator cuff, initial encounter [S46.002A]       Precautions: Fall Risk, Weight Bearing Right Lower Extremity Weight Bearing: Non Weight Bearing              Chart, occupational therapy assessment, plan of care, and goals were 
  Problem: Occupational Therapy - Adult  Goal: By Discharge: Performs self-care activities at highest level of function for planned discharge setting.  See evaluation for individualized goals.  Description: FUNCTIONAL STATUS PRIOR TO ADMISSION:  Patient reports he was requiring assistance from his girlfriend as needed from family/girlfriend. He uses a SPC for ambulation.      Receives Help From: Family, Other (comment) (Girlfriend and her daughter), ADL Assistance: Independent  Homemaking Assistance: Independent, Ambulation Assistance: Independent (w/ SPC), Transfer Assistance: Independent, Active : Yes (Patient only drives short distances and sister drives patient to [place].)      HOME SUPPORT: Patient lived with family.     Occupational Therapy Goals  Weekly Re-Assessment 10/24/2024  1.  Patient will perform grooming routine in unsupported sitting with CGA within 7 day(s).   2.  Patient will perform seated anterior neck to thigh bathing with CGA within 7 day(s).  3.  Patient will perform seated upper body dressing with CGA within 7 day(s).   4.  Patient will perform seated lower body dressing, using AE PRN, with Moderate Assist within 7 day(s).  4.  Patient will perform toilet/BSC transfers with Maximal Assist within 7 day(s).   5.  Patient will perform all aspects of toileting with Maximal Assist within 7 day(s).   6. Patient will perform VAD switchover routine as part of ADL routine (including batteries<>batteries, battery clip<>battery clip, mock SC<>SC) with Moderate Assist within 7 days.     Weekly RA 10/16/2024  1.  Patient will perform grooming routine in unsupported sitting with Supervision within 7 day(s). (Cont. 10/16)  2.  Patient will perform seated anterior neck to thigh bathing with Supervision within 7 day(s). (Cont. 10/16)  3.  Patient will perform seated upper body dressing with Supervision within 7 day(s). (Cont. 10/16)  4.  Patient will perform seated lower body dressing, using AE PRN, 
  Problem: Occupational Therapy - Adult  Goal: By Discharge: Performs self-care activities at highest level of function for planned discharge setting.  See evaluation for individualized goals.  Description: FUNCTIONAL STATUS PRIOR TO ADMISSION:  Patient reports he was requiring assistance from his girlfriend as needed from family/girlfriend. He uses a SPC for ambulation.      Receives Help From: Family, Other (comment) (Girlfriend and her daughter), ADL Assistance: Independent  Homemaking Assistance: Independent, Ambulation Assistance: Independent (w/ SPC), Transfer Assistance: Independent, Active : Yes (Patient only drives short distances and sister drives patient to [place].)      HOME SUPPORT: Patient lived with family.     Occupational Therapy Goals  Weekly RA 10/16/2024  1.  Patient will perform grooming routine in unsupported sitting with Supervision within 7 day(s). (Cont. 10/16)  2.  Patient will perform seated anterior neck to thigh bathing with Supervision within 7 day(s). (Cont. 10/16)  3.  Patient will perform seated upper body dressing with Supervision within 7 day(s). (Cont. 10/16)  4.  Patient will perform seated lower body dressing, using AE PRN, with Moderate Assist within 7 day(s). (Cont. 10/16)  4.  Patient will perform toilet/BSC transfers with Maximal Assist within 7 day(s). (Cont. 10/16)  5.  Patient will perform all aspects of toileting with Maximal Assist within 7 day(s). (Cont. 10/16)  6. Patient will perform VAD switchover routine as part of ADL routine (including batteries<>batteries, battery clip<>battery clip, mock SC<>SC) with Minimal Assist within 7 days. (Cont. 10/16)    Occupational Therapy Goals  Weekly RA 10/7/2024  1.  Patient will perform grooming routine in unsupported sitting with Supervision within 7 day(s). (Cont. 10/7)  2.  Patient will perform seated anterior neck to thigh bathing with Supervision within 7 day(s). (Cont. 10/7)  3.  Patient will perform seated upper 
  Problem: Physical Therapy - Adult  Goal: By Discharge: Performs mobility at highest level of function for planned discharge setting.  See evaluation for individualized goals.  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    HOME SUPPORT PRIOR TO ADMISSION: The patient lived with his girlfriend, step daughter assists w/ dressing change.    Physical Therapy Goals  Initiated 9/19/2024  1.  Patient will move from supine to sit and sit to supine in bed with modified independence within 7 day(s).    2.  Patient will perform sit to stand with modified independence within 7 day(s).  3.  Patient will transfer from bed to chair and chair to bed with modified independence using the least restrictive device within 7 day(s).  4.  Patient will ambulate with modified independence for 150 feet with the least restrictive device within 7 day(s).   5.  Patient will ascend/descend 3 stairs with handrail(s) with modified independence within 7 day(s).    Outcome: Progressing       PHYSICAL THERAPY TREATMENT    Patient: Bishop Love (69 y.o. male)  Date: 9/20/2024  Diagnosis: Hyponatremia [E87.1]  Avulsion fracture [T14.8XXA]  Injury of left rotator cuff, initial encounter [S46.002A] Hyponatremia      Precautions: Fall Risk, Other (comment) (LUE sling for comfort, no weight bearing restrictions)                      ASSESSMENT:  Patient continues to benefit from skilled PT services and is progressing towards goals. Pt agreeable to therapy. Noted pt with flaky skin.  Pt was able to change over to batteries. Pt attempted to ambulate but unable due to bilateral feet pain. Pt able to transfer to chair with cane and min A. Pt reports having a w/c at home. Pt utilized left arm sling but no complaints of pain.          PLAN:  Patient continues to 
  Problem: Physical Therapy - Adult  Goal: By Discharge: Performs mobility at highest level of function for planned discharge setting.  See evaluation for individualized goals.  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    HOME SUPPORT PRIOR TO ADMISSION: The patient lived with his girlfriend, step daughter assists w/ dressing change.    Physical Therapy Goals  Initiated 9/19/2024  1.  Patient will move from supine to sit and sit to supine in bed with modified independence within 7 day(s).    2.  Patient will perform sit to stand with modified independence within 7 day(s).  3.  Patient will transfer from bed to chair and chair to bed with modified independence using the least restrictive device within 7 day(s).  4.  Patient will ambulate with modified independence for 150 feet with the least restrictive device within 7 day(s).   5.  Patient will ascend/descend 3 stairs with handrail(s) with modified independence within 7 day(s).    Outcome: Progressing    PHYSICAL THERAPY EVALUATION    Patient: Bishop Love (69 y.o. male)  Date: 9/19/2024  Primary Diagnosis: Hyponatremia [E87.1]  Avulsion fracture [T14.8XXA]  Injury of left rotator cuff, initial encounter [S46.002A]       Precautions: Fall Risk, LUE in sling for comfort, no weight bearing restrictions      ASSESSMENT :   DEFICITS/IMPAIRMENTS:   The patient is limited by decreased functional mobility, independence in ADLs, ROM, strength, activity tolerance, balance, LUE in sling and increased pain  (8/10 L foot, attributes to chronic wound; voiced no shoulder pain during this session) s/p admission with hyponatremia.  He had a fall prior to this admission, found to have small bone spur fracture L elbow and chronic large cuff tear.  Per ortho, LUE sling for comfort, no 
  Problem: Physical Therapy - Adult  Goal: By Discharge: Performs mobility at highest level of function for planned discharge setting.  See evaluation for individualized goals.  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    HOME SUPPORT PRIOR TO ADMISSION: The patient lived with his girlfriend, step daughter assists w/ dressing change.    Physical Therapy Goals  Initiated 9/19/2024  1.  Patient will move from supine to sit and sit to supine in bed with modified independence within 7 day(s).    2.  Patient will perform sit to stand with modified independence within 7 day(s).  3.  Patient will transfer from bed to chair and chair to bed with modified independence using the least restrictive device within 7 day(s).  4.  Patient will ambulate with modified independence for 150 feet with the least restrictive device within 7 day(s).   5.  Patient will ascend/descend 3 stairs with handrail(s) with modified independence within 7 day(s).    Outcome: Progressing   PHYSICAL THERAPY TREATMENT    Patient: Bishop Love (69 y.o. male)  Date: 9/24/2024  Diagnosis: Hyponatremia [E87.1]  Avulsion fracture [T14.8XXA]  Injury of left rotator cuff, initial encounter [S46.002A] Hyponatremia      Precautions: Fall Risk, Weight Bearing Right Lower Extremity Weight Bearing: Non Weight Bearing                    ASSESSMENT:  Patient continues to benefit from skilled PT services and is slowly progressing towards goals. Patient is now NWB RLE per podiatry due to RLE wounds.  Seen for PT intervention this date and patient tolerated PT session well, but now presents at a further decline from his functional baseline and requiring increased level of A for all mobility this date.  Now most limited by new NWB RLE deficits, BLE pain, and generalized 
  Problem: Physical Therapy - Adult  Goal: By Discharge: Performs mobility at highest level of function for planned discharge setting.  See evaluation for individualized goals.  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    HOME SUPPORT PRIOR TO ADMISSION: The patient lived with his girlfriend, step daughter assists w/ dressing change.    Physical Therapy Goals  Revised and downgraded 9/26/24  1.  Patient will move from supine to sit and sit to supine in bed with Candida within 7 day(s).    2.  Patient will perform sit to stand with Candida within 7 day(s).  3.  Patient will transfer from bed to chair and chair to bed with Candida using the least restrictive device within 7 day(s).  4.  Patient will ambulate 10ft with Min A with the least restrictive device within 7 day(s).     Initiated 9/19/2024  1.  Patient will move from supine to sit and sit to supine in bed with modified independence within 7 day(s).    2.  Patient will perform sit to stand with modified independence within 7 day(s).  3.  Patient will transfer from bed to chair and chair to bed with modified independence using the least restrictive device within 7 day(s).  4.  Patient will ambulate with modified independence for 150 feet with the least restrictive device within 7 day(s).   5.  Patient will ascend/descend 3 stairs with handrail(s) with modified independence within 7 day(s).    Outcome: Progressing   PHYSICAL THERAPY TREATMENT    Patient: Bishop Love (69 y.o. male)  Date: 9/27/2024  Diagnosis: Hyponatremia [E87.1]  Avulsion fracture [T14.8XXA]  Injury of left rotator cuff, initial encounter [S46.002A] Hyponatremia      Precautions: Fall Risk, Weight Bearing Right Lower Extremity Weight Bearing: Non Weight Bearing                    ASSESSMENT:  Patient 
  Problem: Physical Therapy - Adult  Goal: By Discharge: Performs mobility at highest level of function for planned discharge setting.  See evaluation for individualized goals.  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    HOME SUPPORT PRIOR TO ADMISSION: The patient lived with his girlfriend, step daughter assists w/ dressing change.    Physical Therapy Goals  Revised and downgraded 9/26/24  1.  Patient will move from supine to sit and sit to supine in bed with Candida within 7 day(s).    2.  Patient will perform sit to stand with Candida within 7 day(s).  3.  Patient will transfer from bed to chair and chair to bed with Candida using the least restrictive device within 7 day(s).  4.  Patient will ambulate 10ft with Min A with the least restrictive device within 7 day(s).     Initiated 9/19/2024  1.  Patient will move from supine to sit and sit to supine in bed with modified independence within 7 day(s).    2.  Patient will perform sit to stand with modified independence within 7 day(s).  3.  Patient will transfer from bed to chair and chair to bed with modified independence using the least restrictive device within 7 day(s).  4.  Patient will ambulate with modified independence for 150 feet with the least restrictive device within 7 day(s).   5.  Patient will ascend/descend 3 stairs with handrail(s) with modified independence within 7 day(s).    Outcome: Progressing   PHYSICAL THERAPY TREATMENT    Patient: Bishop Love (69 y.o. male)  Date: 9/30/2024  Diagnosis: Hyponatremia [E87.1]  Avulsion fracture [T14.8XXA]  Injury of left rotator cuff, initial encounter [S46.002A] Hyponatremia      Precautions: Fall Risk, Weight Bearing Right Lower Extremity Weight Bearing: Non Weight Bearing                    ASSESSMENT:  Patient 
  Problem: Physical Therapy - Adult  Goal: By Discharge: Performs mobility at highest level of function for planned discharge setting.  See evaluation for individualized goals.  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    HOME SUPPORT PRIOR TO ADMISSION: The patient lived with his girlfriend, step daughter assists w/ dressing change.    Physical Therapy Goals  Updated 10/16/2024 for weekly reassessment  1.  Patient will move from supine to sit and sit to supine in bed with supervision within 7 day(s).    2.  Patient will perform sit to stand with mod A within 7 day(s).  3.  Patient will transfer from bed to chair and chair to bed with max A using the least restrictive device within 7 day(s).      Updated 10/7/2024  1.  Patient will move from supine to sit and sit to supine in bed with supervision within 7 day(s).    2.  Patient will perform sit to stand with Candida within 7 day(s).  3.  Patient will transfer from bed to chair and chair to bed with Candida using the least restrictive device within 7 day(s).  4.  Patient will ambulate 10ft with Min A with the least restrictive device within 7 day(s).     Revised and downgraded 9/26/24  1.  Patient will move from supine to sit and sit to supine in bed with Candida within 7 day(s).    2.  Patient will perform sit to stand with Candida within 7 day(s).  3.  Patient will transfer from bed to chair and chair to bed with Candida using the least restrictive device within 7 day(s).  4.  Patient will ambulate 10ft with Min A with the least restrictive device within 7 day(s).     Initiated 9/19/2024  1.  Patient will move from supine to sit and sit to supine in bed with modified independence within 7 day(s).    2.  Patient will perform sit to stand with modified independence within 7 day(s).  3.  
  Problem: Physical Therapy - Adult  Goal: By Discharge: Performs mobility at highest level of function for planned discharge setting.  See evaluation for individualized goals.  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    HOME SUPPORT PRIOR TO ADMISSION: The patient lived with his girlfriend, step daughter assists w/ dressing change.    Physical Therapy Goals  Updated 10/16/2024 for weekly reassessment  1.  Patient will move from supine to sit and sit to supine in bed with supervision within 7 day(s).    2.  Patient will perform sit to stand with mod A within 7 day(s).  3.  Patient will transfer from bed to chair and chair to bed with max A using the least restrictive device within 7 day(s).  4.  Patient will perform lateral scooting transfer to drop arm recliner chair with moderate assist within 7 days.   5.  Patient will be independent and compliant with seated LE HEP within 7 days.     Updated 10/7/2024  1.  Patient will move from supine to sit and sit to supine in bed with supervision within 7 day(s).    2.  Patient will perform sit to stand with Candida within 7 day(s).  3.  Patient will transfer from bed to chair and chair to bed with Candida using the least restrictive device within 7 day(s).  4.  Patient will ambulate 10ft with Min A with the least restrictive device within 7 day(s).     Revised and downgraded 9/26/24  1.  Patient will move from supine to sit and sit to supine in bed with Candida within 7 day(s).    2.  Patient will perform sit to stand with Candida within 7 day(s).  3.  Patient will transfer from bed to chair and chair to bed with Candida using the least restrictive device within 7 day(s).  4.  Patient will ambulate 10ft with Min A with the least restrictive device within 7 day(s).     Initiated 9/19/2024  1.  
  Problem: Physical Therapy - Adult  Goal: By Discharge: Performs mobility at highest level of function for planned discharge setting.  See evaluation for individualized goals.  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    HOME SUPPORT PRIOR TO ADMISSION: The patient lived with his girlfriend, step daughter assists w/ dressing change.    Physical Therapy Goals  Updated 10/7/2024  1.  Patient will move from supine to sit and sit to supine in bed with supervision within 7 day(s).    2.  Patient will perform sit to stand with Candida within 7 day(s).  3.  Patient will transfer from bed to chair and chair to bed with Candida using the least restrictive device within 7 day(s).  4.  Patient will ambulate 10ft with Min A with the least restrictive device within 7 day(s).     Revised and downgraded 9/26/24  1.  Patient will move from supine to sit and sit to supine in bed with Candida within 7 day(s).    2.  Patient will perform sit to stand with Candida within 7 day(s).  3.  Patient will transfer from bed to chair and chair to bed with Candida using the least restrictive device within 7 day(s).  4.  Patient will ambulate 10ft with Min A with the least restrictive device within 7 day(s).     Initiated 9/19/2024  1.  Patient will move from supine to sit and sit to supine in bed with modified independence within 7 day(s).    2.  Patient will perform sit to stand with modified independence within 7 day(s).  3.  Patient will transfer from bed to chair and chair to bed with modified independence using the least restrictive device within 7 day(s).  4.  Patient will ambulate with modified independence for 150 feet with the least restrictive device within 7 day(s).   5.  Patient will ascend/descend 3 stairs with handrail(s) with modified independence 
  Problem: Physical Therapy - Adult  Goal: By Discharge: Performs mobility at highest level of function for planned discharge setting.  See evaluation for individualized goals.  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    HOME SUPPORT PRIOR TO ADMISSION: The patient lived with his girlfriend, step daughter assists w/ dressing change.    Physical Therapy Goals  Updated 10/7/2024  1.  Patient will move from supine to sit and sit to supine in bed with supervision within 7 day(s).    2.  Patient will perform sit to stand with Candida within 7 day(s).  3.  Patient will transfer from bed to chair and chair to bed with Candida using the least restrictive device within 7 day(s).  4.  Patient will ambulate 10ft with Min A with the least restrictive device within 7 day(s).     Revised and downgraded 9/26/24  1.  Patient will move from supine to sit and sit to supine in bed with Candida within 7 day(s).    2.  Patient will perform sit to stand with Candida within 7 day(s).  3.  Patient will transfer from bed to chair and chair to bed with Candida using the least restrictive device within 7 day(s).  4.  Patient will ambulate 10ft with Min A with the least restrictive device within 7 day(s).     Initiated 9/19/2024  1.  Patient will move from supine to sit and sit to supine in bed with modified independence within 7 day(s).    2.  Patient will perform sit to stand with modified independence within 7 day(s).  3.  Patient will transfer from bed to chair and chair to bed with modified independence using the least restrictive device within 7 day(s).  4.  Patient will ambulate with modified independence for 150 feet with the least restrictive device within 7 day(s).   5.  Patient will ascend/descend 3 stairs with handrail(s) with modified independence 
0730: Bedside and Verbal shift change report given to JAKOB Panda (oncoming nurse) by JAKOB Cintron (offgoing nurse). Report included the following information Nurse Handoff Report, Intake/Output, and Cardiac Rhythm apaced .     1930: Bedside and Verbal shift change report given to JAKOB Lozoya (oncoming nurse) by JAKOB Panda (offgoing nurse). Report included the following information Nurse Handoff Report, Intake/Output, and Cardiac Rhythm apaced .     Problem: Discharge Planning  Goal: Discharge to home or other facility with appropriate resources  Outcome: Progressing     Problem: Safety - Adult  Goal: Free from fall injury  Outcome: Progressing     Problem: Chronic Conditions and Co-morbidities  Goal: Patient's chronic conditions and co-morbidity symptoms are monitored and maintained or improved  Outcome: Progressing     Problem: Pain  Goal: Verbalizes/displays adequate comfort level or baseline comfort level  Outcome: Progressing     Problem: Occupational Therapy - Adult  Goal: By Discharge: Performs self-care activities at highest level of function for planned discharge setting.  See evaluation for individualized goals.  Description: FUNCTIONAL STATUS PRIOR TO ADMISSION:  Patient reports he was requiring assistance from his girlfriend as needed from family/girlfriend. He uses a SPC for ambulation.      Receives Help From: Family, Other (comment) (Girlfriend and her daughter), ADL Assistance: Independent  Homemaking Assistance: Independent, Ambulation Assistance: Independent (w/ SPC), Transfer Assistance: Independent, Active : Yes (Patient only drives short distances and sister drives patient to [place].)      HOME SUPPORT: Patient lived with family.     Occupational Therapy Goals  Initiated 9/19/2024    1. Patient will perform ADLs standing 5 mins without fatigue or LOB with Minimal assistance.  3. Patient will perform upper body ADLs with Supervision within 7 day(s).  4. Patient will perform toilet transfers with 
0730: Bedside and Verbal shift change report given to JAKOB Panda (oncoming nurse) by JAKOB Elaine (offgoing nurse). Report included the following information Nurse Handoff Report, Intake/Output, and Cardiac Rhythm sinus .     1930: Bedside and Verbal shift change report given to JAKOB Elaine (oncoming nurse) by JAKOB Panda (offgoing nurse). Report included the following information Nurse Handoff Report, Intake/Output, and Cardiac Rhythm sinus .     Problem: Discharge Planning  Goal: Discharge to home or other facility with appropriate resources  10/22/2024 1242 by Amarilys Rodriguez RN  Outcome: Progressing  10/22/2024 0045 by Argentina Hankins RN  Outcome: Progressing  Flowsheets (Taken 10/20/2024 2000 by Brittney Quispe RN)  Discharge to home or other facility with appropriate resources: Identify barriers to discharge with patient and caregiver     Problem: Safety - Adult  Goal: Free from fall injury  10/22/2024 1242 by Amarilys Rodriguez RN  Outcome: Progressing  10/22/2024 0045 by Argentina Hankins RN  Outcome: Progressing  Flowsheets (Taken 10/20/2024 0126 by Na Sullivan RN)  Free From Fall Injury: Instruct family/caregiver on patient safety     Problem: Chronic Conditions and Co-morbidities  Goal: Patient's chronic conditions and co-morbidity symptoms are monitored and maintained or improved  10/22/2024 1242 by Amarilys Rodriguez RN  Outcome: Progressing  10/22/2024 0045 by Argentina Hankins RN  Outcome: Progressing  Flowsheets (Taken 10/20/2024 2000 by Brittney Quispe RN)  Care Plan - Patient's Chronic Conditions and Co-Morbidity Symptoms are Monitored and Maintained or Improved: Monitor and assess patient's chronic conditions and comorbid symptoms for stability, deterioration, or improvement     Problem: Pain  Goal: Verbalizes/displays adequate comfort level or baseline comfort level  10/22/2024 1242 by Amarilys Rodriguez RN  Outcome: Progressing  10/22/2024 0045 by Argentina Hankins RN  Outcome: Progressing  Flowsheets 
0730: Bedside and Verbal shift change report given to JAKOB Panda (oncoming nurse) by JAKOB Elaine (offgoing nurse). Report included the following information Nurse Handoff Report, Intake/Output, and Cardiac Rhythm sinus .     1930: Bedside and Verbal shift change report given to JAKOB Elaine (oncoming nurse) by JAKOB Panda (offgoing nurse). Report included the following information Nurse Handoff Report, Intake/Output, and Cardiac Rhythm sinus .     Problem: Discharge Planning  Goal: Discharge to home or other facility with appropriate resources  Outcome: Progressing     Problem: Safety - Adult  Goal: Free from fall injury  Outcome: Progressing     Problem: Chronic Conditions and Co-morbidities  Goal: Patient's chronic conditions and co-morbidity symptoms are monitored and maintained or improved  Outcome: Progressing     Problem: Pain  Goal: Verbalizes/displays adequate comfort level or baseline comfort level  Outcome: Progressing     Problem: Skin/Tissue Integrity  Goal: Absence of new skin breakdown  Description: 1.  Monitor for areas of redness and/or skin breakdown  2.  Assess vascular access sites hourly  3.  Every 4-6 hours minimum:  Change oxygen saturation probe site  4.  Every 4-6 hours:  If on nasal continuous positive airway pressure, respiratory therapy assess nares and determine need for appliance change or resting period.  Outcome: Progressing     Problem: Nutrition Deficit:  Goal: Optimize nutritional status  Outcome: Progressing     Problem: ABCDS Injury Assessment  Goal: Absence of physical injury  Outcome: Progressing       
0730: Bedside and Verbal shift change report given to JAKOB Panda (oncoming nurse) by JAKOB Lozoya (offgoing nurse). Report included the following information Nurse Handoff Report, Intake/Output, and Cardiac Rhythm apaced .     1415: pt's bed replaced w/ the speciality bed. Pt resting in bed w/ eyes closed breathing unlabored. Call bell within reach.    1930: Bedside and Verbal shift change report given to JAKOB Lozoya (oncoming nurse) by JAKOB Panda (offgoing nurse). Report included the following information Nurse Handoff Report, Intake/Output, and Cardiac Rhythm sinus .     Problem: Discharge Planning  Goal: Discharge to home or other facility with appropriate resources  9/24/2024 1153 by Amarilys Rodriguez RN  Outcome: Progressing  9/23/2024 2246 by Devora Crowder RN  Outcome: Progressing     Problem: Safety - Adult  Goal: Free from fall injury  9/24/2024 1153 by Amarilys Rodriguez RN  Outcome: Progressing  9/23/2024 2246 by Devora Crowder RN  Outcome: Progressing     Problem: Chronic Conditions and Co-morbidities  Goal: Patient's chronic conditions and co-morbidity symptoms are monitored and maintained or improved  9/24/2024 1153 by Amarilys Rodriguez RN  Outcome: Progressing  9/23/2024 2246 by Devora Crowder RN  Outcome: Progressing     Problem: Pain  Goal: Verbalizes/displays adequate comfort level or baseline comfort level  9/24/2024 1153 by Amarilys Rodriguez RN  Outcome: Progressing  9/23/2024 2246 by Devora Crowder RN  Outcome: Progressing     Problem: Occupational Therapy - Adult  Goal: By Discharge: Performs self-care activities at highest level of function for planned discharge setting.  See evaluation for individualized goals.  Description: FUNCTIONAL STATUS PRIOR TO ADMISSION:  Patient reports he was requiring assistance from his girlfriend as needed from family/girlfriend. He uses a SPC for ambulation.      Receives Help From: Family, Other (comment) (Girlfriend and her daughter), ADL Assistance: Independent  Homemaking 
0730: Bedside and Verbal shift change report given to JAKOB Panda (oncoming nurse) by JAKOB Lugo (offgoing nurse). Report included the following information Nurse Handoff Report, Intake/Output, and Cardiac Rhythm sinus .     1926: report given to Giancarlo Lacy. Pt discharged w/ [facility]. Pt discharged w/ all his belongings including backup LVAD equipment, extra clothes and shoes, phone , cell phone, a loner battery charger station and cord, extra LVAD dressing changing kits, and his cane.       Problem: Discharge Planning  Goal: Discharge to home or other facility with appropriate resources  10/29/2024 1227 by Amarilys Rodriguez RN  Outcome: Progressing  10/29/2024 0222 by Brittney Quispe RN  Outcome: Progressing  Flowsheets (Taken 10/28/2024 2000)  Discharge to home or other facility with appropriate resources: Arrange for needed discharge resources and transportation as appropriate     Problem: Safety - Adult  Goal: Free from fall injury  10/29/2024 1227 by Amarilys Rodriguez RN  Outcome: Progressing  10/29/2024 0222 by Brittney Quispe RN  Outcome: Progressing     Problem: Chronic Conditions and Co-morbidities  Goal: Patient's chronic conditions and co-morbidity symptoms are monitored and maintained or improved  10/29/2024 1227 by Amarilys Rodriguez RN  Outcome: Progressing  10/29/2024 0222 by Brittney Quispe RN  Outcome: Progressing  Flowsheets (Taken 10/28/2024 2000)  Care Plan - Patient's Chronic Conditions and Co-Morbidity Symptoms are Monitored and Maintained or Improved: Monitor and assess patient's chronic conditions and comorbid symptoms for stability, deterioration, or improvement     Problem: Pain  Goal: Verbalizes/displays adequate comfort level or baseline comfort level  10/29/2024 1227 by Amarilys Rodriguez RN  Outcome: Progressing  10/29/2024 0222 by Brittney Quispe RN  Outcome: Progressing  Flowsheets (Taken 10/28/2024 2000)  Verbalizes/displays adequate comfort level or baseline comfort level: 
1000: Notified GITA Nelson and MD Donnie about patient's oxygen levels still not being able to be read. Per GITA Nelson get ABG.    1012: ABG done.     1022: Second ABG done to retest levels for accuracy.     1028: Notified GITA Nelson about patient's ABG being done.     1030: GITA Nelson now at bedside.    1202: Second ABG done.     1211: Patient being placed on BiPAP.     1235: Patient's O2 pleth finally being read.     1304: Notified GITA Nelson about calling a code sepsis.     1326: Code Sepsis called. Rapid Response present. See JAKOB Albert for note.      Care Plan:   Problem: Discharge Planning  Goal: Discharge to home or other facility with appropriate resources  10/9/2024 1032 by Ibeth Atkinson RN  Outcome: Progressing  10/8/2024 2313 by Devora Crowder RN  Outcome: Progressing     Problem: Safety - Adult  Goal: Free from fall injury  10/9/2024 1032 by Ibeth Atkinson RN  Outcome: Progressing  10/8/2024 2313 by Devora Crowder RN  Outcome: Progressing     Problem: Chronic Conditions and Co-morbidities  Goal: Patient's chronic conditions and co-morbidity symptoms are monitored and maintained or improved  10/9/2024 1032 by Ibeth Atkinson RN  Outcome: Progressing  10/8/2024 2313 by Devora Crowder RN  Outcome: Progressing     Problem: Pain  Goal: Verbalizes/displays adequate comfort level or baseline comfort level  10/9/2024 1032 by Ibeth Atkinson RN  Outcome: Progressing  10/8/2024 2313 by Devora Crowder RN  Outcome: Progressing     Problem: Skin/Tissue Integrity  Goal: Absence of new skin breakdown  Description: 1.  Monitor for areas of redness and/or skin breakdown  2.  Assess vascular access sites hourly  3.  Every 4-6 hours minimum:  Change oxygen saturation probe site  4.  Every 4-6 hours:  If on nasal continuous positive airway pressure, respiratory therapy assess nares and determine need for appliance change or resting period.  10/9/2024 1032 by Ibeth Atkinson RN  Outcome: 
1930: Bedside and Verbal shift change report given to Brittney RN (oncoming nurse) by Ivy RN (offgoing nurse). Report included the following information Nurse Handoff Report, Index, Adult Overview, Surgery Report, Intake/Output, MAR, Recent Results, and Cardiac Rhythm A paced .      0630: leg and foot wound care complete, cleansed with vash and dressed with silver foam and ace wrap    0730: Bedside and Verbal shift change report given to Ivy RN (oncoming nurse) by Brittney RN (offgoing nurse). Report included the following information Nurse Handoff Report, Index, Adult Overview, Surgery Report, Intake/Output, MAR, Recent Results, and Cardiac Rhythm A paced .        Care Plan:  Problem: Discharge Planning  Goal: Discharge to home or other facility with appropriate resources  Outcome: Progressing  Flowsheets (Taken 9/27/2024 2000)  Discharge to home or other facility with appropriate resources: Identify barriers to discharge with patient and caregiver     Problem: Safety - Adult  Goal: Free from fall injury  Outcome: Progressing     Problem: Chronic Conditions and Co-morbidities  Goal: Patient's chronic conditions and co-morbidity symptoms are monitored and maintained or improved  Outcome: Progressing  Flowsheets (Taken 9/27/2024 2000)  Care Plan - Patient's Chronic Conditions and Co-Morbidity Symptoms are Monitored and Maintained or Improved: Monitor and assess patient's chronic conditions and comorbid symptoms for stability, deterioration, or improvement     Problem: Pain  Goal: Verbalizes/displays adequate comfort level or baseline comfort level  Outcome: Progressing  Flowsheets  Taken 9/27/2024 2000 by Brittney Quispe RN  Verbalizes/displays adequate comfort level or baseline comfort level: Encourage patient to monitor pain and request assistance  Taken 9/27/2024 1500 by Ivy Cohen RN  Verbalizes/displays adequate comfort level or baseline comfort level: Encourage patient to monitor pain and request 
1930: Bedside and Verbal shift change report given to Brittney RN (oncoming nurse) by Kirstin RN (offgoing nurse). Report included the following information Nurse Handoff Report, Index, Adult Overview, Intake/Output, and Cardiac Rhythm SR .       0730: Bedside and Verbal shift change report given to Tory RN (oncoming nurse) by Brittney RN (offgoing nurse). Report included the following information Nurse Handoff Report, Index, Adult Overview, Intake/Output, and Cardiac Rhythm SR.     Care Plan:  Problem: Discharge Planning  Goal: Discharge to home or other facility with appropriate resources  Outcome: Progressing  Flowsheets (Taken 10/16/2024 2000)  Discharge to home or other facility with appropriate resources: Identify barriers to discharge with patient and caregiver     Problem: Safety - Adult  Goal: Free from fall injury  Outcome: Progressing     Problem: Chronic Conditions and Co-morbidities  Goal: Patient's chronic conditions and co-morbidity symptoms are monitored and maintained or improved  Outcome: Progressing  Flowsheets (Taken 10/16/2024 2000)  Care Plan - Patient's Chronic Conditions and Co-Morbidity Symptoms are Monitored and Maintained or Improved: Monitor and assess patient's chronic conditions and comorbid symptoms for stability, deterioration, or improvement     Problem: Pain  Goal: Verbalizes/displays adequate comfort level or baseline comfort level  Outcome: Progressing  Flowsheets (Taken 10/16/2024 2000)  Verbalizes/displays adequate comfort level or baseline comfort level: Encourage patient to monitor pain and request assistance     Problem: Skin/Tissue Integrity  Goal: Absence of new skin breakdown  Description: 1.  Monitor for areas of redness and/or skin breakdown  2.  Assess vascular access sites hourly  3.  Every 4-6 hours minimum:  Change oxygen saturation probe site  4.  Every 4-6 hours:  If on nasal continuous positive airway pressure, respiratory therapy assess nares and determine need for 
1930: Bedside and Verbal shift change report given to JAKOB Guzman (oncoming nurse) by JAKOB Spears (offgoing nurse). Report included the following information Nurse Handoff Report.      Problem: Discharge Planning  Goal: Discharge to home or other facility with appropriate resources  10/25/2024 1107 by Ibeth Atkinson RN  Outcome: Progressing     Problem: Safety - Adult  Goal: Free from fall injury  10/26/2024 0042 by Marcio Damico RN  Flowsheets (Taken 10/26/2024 0042)  Free From Fall Injury: Instruct family/caregiver on patient safety  10/25/2024 1107 by Ibeth Atkinson RN  Outcome: Progressing     Problem: Chronic Conditions and Co-morbidities  Goal: Patient's chronic conditions and co-morbidity symptoms are monitored and maintained or improved  10/26/2024 0042 by Marcio Damico RN  Flowsheets (Taken 10/26/2024 0042)  Care Plan - Patient's Chronic Conditions and Co-Morbidity Symptoms are Monitored and Maintained or Improved:   Monitor and assess patient's chronic conditions and comorbid symptoms for stability, deterioration, or improvement   Collaborate with multidisciplinary team to address chronic and comorbid conditions and prevent exacerbation or deterioration  10/25/2024 1107 by Ibeth Atkinson RN  Outcome: Progressing     Problem: Pain  Goal: Verbalizes/displays adequate comfort level or baseline comfort level  10/26/2024 0042 by Marcio Damico RN  Flowsheets (Taken 10/26/2024 0042)  Verbalizes/displays adequate comfort level or baseline comfort level:   Encourage patient to monitor pain and request assistance   Assess pain using appropriate pain scale   Administer analgesics based on type and severity of pain and evaluate response  10/25/2024 1107 by Ibeth Atkinson RN  Outcome: Progressing     Problem: Skin/Tissue Integrity  Goal: Absence of new skin breakdown  Description: 1.  Monitor for areas of redness and/or skin breakdown  2.  Assess vascular access sites hourly  3.  Every 4-6 hours 
1930:Bedside and Verbal shift change report given to Brittney RN (oncoming nurse) by Ivy RN (offgoing nurse). Report included the following information Nurse Handoff Report, Index, Adult Overview, Surgery Report, Intake/Output, MAR, Recent Results, and Cardiac Rhythm A paced .     0730:Bedside and Verbal shift change report given to Ivy RN (oncoming nurse) by Brittney RN (offgoing nurse). Report included the following information Nurse Handoff Report, Index, Adult Overview, Surgery Report, Intake/Output, MAR, Recent Results, and Cardiac Rhythm A paced .     Care Plan:  Problem: Discharge Planning  Goal: Discharge to home or other facility with appropriate resources  Outcome: Progressing  Flowsheets (Taken 9/28/2024 2000)  Discharge to home or other facility with appropriate resources: Identify barriers to discharge with patient and caregiver     Problem: Safety - Adult  Goal: Free from fall injury  Outcome: Progressing     Problem: Chronic Conditions and Co-morbidities  Goal: Patient's chronic conditions and co-morbidity symptoms are monitored and maintained or improved  Outcome: Progressing  Flowsheets (Taken 9/28/2024 2000)  Care Plan - Patient's Chronic Conditions and Co-Morbidity Symptoms are Monitored and Maintained or Improved: Monitor and assess patient's chronic conditions and comorbid symptoms for stability, deterioration, or improvement     Problem: Pain  Goal: Verbalizes/displays adequate comfort level or baseline comfort level  Outcome: Progressing  Flowsheets  Taken 9/28/2024 2000 by Brittney Quispe RN  Verbalizes/displays adequate comfort level or baseline comfort level: Encourage patient to monitor pain and request assistance  Taken 9/28/2024 1900 by Ivy Cohen RN  Verbalizes/displays adequate comfort level or baseline comfort level: Encourage patient to monitor pain and request assistance  Taken 9/28/2024 1500 by Ivy Cohen RN  Verbalizes/displays adequate comfort level or baseline 
1930:Bedside and Verbal shift change report given to Brittney RN (oncoming nurse) by Kirstin RN (offgoing nurse). Report included the following information Nurse Handoff Report, Index, Adult Overview, Intake/Output, and Cardiac Rhythm SR .      0000: pt on bipap      0300: pt back on high flow     0730: Bedside and Verbal shift change report given to Tory RN (oncoming nurse) by Brittney RN (offgoing nurse). Report included the following information Nurse Handoff Report, Index, Adult Overview, Intake/Output, and Cardiac Rhythm SR.       Care Plan:  Problem: Discharge Planning  Goal: Discharge to home or other facility with appropriate resources  Outcome: Progressing  Flowsheets (Taken 10/17/2024 2000)  Discharge to home or other facility with appropriate resources: Identify barriers to discharge with patient and caregiver     Problem: Safety - Adult  Goal: Free from fall injury  Outcome: Progressing     Problem: Chronic Conditions and Co-morbidities  Goal: Patient's chronic conditions and co-morbidity symptoms are monitored and maintained or improved  Outcome: Progressing  Flowsheets (Taken 10/17/2024 2000)  Care Plan - Patient's Chronic Conditions and Co-Morbidity Symptoms are Monitored and Maintained or Improved: Monitor and assess patient's chronic conditions and comorbid symptoms for stability, deterioration, or improvement     Problem: Pain  Goal: Verbalizes/displays adequate comfort level or baseline comfort level  Outcome: Progressing  Flowsheets (Taken 10/17/2024 2000)  Verbalizes/displays adequate comfort level or baseline comfort level: Encourage patient to monitor pain and request assistance     Problem: Skin/Tissue Integrity  Goal: Absence of new skin breakdown  Description: 1.  Monitor for areas of redness and/or skin breakdown  2.  Assess vascular access sites hourly  3.  Every 4-6 hours minimum:  Change oxygen saturation probe site  4.  Every 4-6 hours:  If on nasal continuous positive airway pressure, 
Care Plan:   Problem: Discharge Planning  Goal: Discharge to home or other facility with appropriate resources  10/25/2024 1107 by Ibeth Atkinson RN  Outcome: Progressing  10/25/2024 1107 by Ibeth Atkinson RN  Outcome: Progressing     Problem: Safety - Adult  Goal: Free from fall injury  10/25/2024 1107 by Ibeth Atkinson RN  Outcome: Progressing  10/25/2024 1107 by Ibeth Atkinson RN  Outcome: Progressing  10/25/2024 0525 by Marcio Damico RN  Flowsheets (Taken 10/25/2024 0525)  Free From Fall Injury: Instruct family/caregiver on patient safety     Problem: Chronic Conditions and Co-morbidities  Goal: Patient's chronic conditions and co-morbidity symptoms are monitored and maintained or improved  10/25/2024 1107 by Ibeth Atkinsno RN  Outcome: Progressing  10/25/2024 1107 by Ibeth Atkinson RN  Outcome: Progressing  10/25/2024 0525 by Marcio Damico RN  Flowsheets (Taken 10/25/2024 0525)  Care Plan - Patient's Chronic Conditions and Co-Morbidity Symptoms are Monitored and Maintained or Improved:   Monitor and assess patient's chronic conditions and comorbid symptoms for stability, deterioration, or improvement   Collaborate with multidisciplinary team to address chronic and comorbid conditions and prevent exacerbation or deterioration     Problem: Pain  Goal: Verbalizes/displays adequate comfort level or baseline comfort level  10/25/2024 1107 by Ibeth Atkinson RN  Outcome: Progressing  10/25/2024 1107 by Ibeth Atkinson RN  Outcome: Progressing  10/25/2024 0525 by Marcio Damico RN  Flowsheets (Taken 10/25/2024 0525)  Verbalizes/displays adequate comfort level or baseline comfort level:   Encourage patient to monitor pain and request assistance   Assess pain using appropriate pain scale   Administer analgesics based on type and severity of pain and evaluate response   Implement non-pharmacological measures as appropriate and evaluate response     Problem: Skin/Tissue Integrity  Goal: 
Care Plan:   Problem: Discharge Planning  Goal: Discharge to home or other facility with appropriate resources  10/28/2024 1019 by Ibeth Atkinson RN  Outcome: Progressing  10/27/2024 2338 by Na Sullivan RN  Outcome: Progressing  Flowsheets (Taken 10/27/2024 2000)  Discharge to home or other facility with appropriate resources: Identify barriers to discharge with patient and caregiver     Problem: Safety - Adult  Goal: Free from fall injury  10/28/2024 1019 by Ibeth Atkinson RN  Outcome: Progressing  10/27/2024 2338 by Na Sullivan RN  Outcome: Progressing  Flowsheets (Taken 10/27/2024 2338)  Free From Fall Injury: Instruct family/caregiver on patient safety     Problem: Chronic Conditions and Co-morbidities  Goal: Patient's chronic conditions and co-morbidity symptoms are monitored and maintained or improved  10/28/2024 1019 by Ibeth Atkinson RN  Outcome: Progressing  10/27/2024 2338 by Na Sullivan RN  Outcome: Progressing  Flowsheets (Taken 10/27/2024 2000)  Care Plan - Patient's Chronic Conditions and Co-Morbidity Symptoms are Monitored and Maintained or Improved: Monitor and assess patient's chronic conditions and comorbid symptoms for stability, deterioration, or improvement     Problem: Pain  Goal: Verbalizes/displays adequate comfort level or baseline comfort level  10/28/2024 1019 by Ibeth Atkinson RN  Outcome: Progressing  10/27/2024 2338 by Na Sullivan RN  Outcome: Progressing  Flowsheets  Taken 10/27/2024 2000 by Na Sullivan RN  Verbalizes/displays adequate comfort level or baseline comfort level: Encourage patient to monitor pain and request assistance  Taken 10/27/2024 1100 by Ivy Cohen RN  Verbalizes/displays adequate comfort level or baseline comfort level: Encourage patient to monitor pain and request assistance     Problem: Skin/Tissue Integrity  Goal: Absence of new skin breakdown  Description: 1.  Monitor for areas of redness and/or skin 
Care Plan:   Problem: Discharge Planning  Goal: Discharge to home or other facility with appropriate resources  Outcome: Progressing     Problem: Safety - Adult  Goal: Free from fall injury  Outcome: Progressing     Problem: Chronic Conditions and Co-morbidities  Goal: Patient's chronic conditions and co-morbidity symptoms are monitored and maintained or improved  Outcome: Progressing     Problem: Pain  Goal: Verbalizes/displays adequate comfort level or baseline comfort level  Outcome: Progressing     Problem: Skin/Tissue Integrity  Goal: Absence of new skin breakdown  Description: 1.  Monitor for areas of redness and/or skin breakdown  2.  Assess vascular access sites hourly  3.  Every 4-6 hours minimum:  Change oxygen saturation probe site  4.  Every 4-6 hours:  If on nasal continuous positive airway pressure, respiratory therapy assess nares and determine need for appliance change or resting period.  Outcome: Progressing     Problem: Nutrition Deficit:  Goal: Optimize nutritional status  Outcome: Progressing     
Care plan:   Problem: Discharge Planning  Goal: Discharge to home or other facility with appropriate resources  Outcome: Progressing     Problem: Safety - Adult  Goal: Free from fall injury  10/26/2024 0818 by Ibeth Atkinson RN  Outcome: Progressing  10/26/2024 0042 by Marcio Damico RN  Flowsheets (Taken 10/26/2024 0042)  Free From Fall Injury: Instruct family/caregiver on patient safety     Problem: Chronic Conditions and Co-morbidities  Goal: Patient's chronic conditions and co-morbidity symptoms are monitored and maintained or improved  10/26/2024 0818 by Ibeth Atkinson RN  Outcome: Progressing  10/26/2024 0042 by Marcio Damico RN  Flowsheets (Taken 10/26/2024 0042)  Care Plan - Patient's Chronic Conditions and Co-Morbidity Symptoms are Monitored and Maintained or Improved:   Monitor and assess patient's chronic conditions and comorbid symptoms for stability, deterioration, or improvement   Collaborate with multidisciplinary team to address chronic and comorbid conditions and prevent exacerbation or deterioration     Problem: Pain  Goal: Verbalizes/displays adequate comfort level or baseline comfort level  10/26/2024 0818 by Ibeth Atkinson RN  Outcome: Progressing  10/26/2024 0042 by Marcio Damico RN  Flowsheets (Taken 10/26/2024 0042)  Verbalizes/displays adequate comfort level or baseline comfort level:   Encourage patient to monitor pain and request assistance   Assess pain using appropriate pain scale   Administer analgesics based on type and severity of pain and evaluate response     Problem: Skin/Tissue Integrity  Goal: Absence of new skin breakdown  Description: 1.  Monitor for areas of redness and/or skin breakdown  2.  Assess vascular access sites hourly  3.  Every 4-6 hours minimum:  Change oxygen saturation probe site  4.  Every 4-6 hours:  If on nasal continuous positive airway pressure, respiratory therapy assess nares and determine need for appliance change or resting 
Integrity  Goal: Absence of new skin breakdown  Description: 1.  Monitor for areas of redness and/or skin breakdown  2.  Assess vascular access sites hourly  3.  Every 4-6 hours minimum:  Change oxygen saturation probe site  4.  Every 4-6 hours:  If on nasal continuous positive airway pressure, respiratory therapy assess nares and determine need for appliance change or resting period.  10/21/2024 1109 by Ibeth Atkinson RN  Outcome: Progressing  10/21/2024 0112 by Brittney Quispe RN  Outcome: Progressing     Problem: Nutrition Deficit:  Goal: Optimize nutritional status  10/21/2024 1109 by Ibeth Atkinson RN  Outcome: Progressing  Flowsheets (Taken 10/21/2024 1057 by Otto Alejo, SARAH)  Nutrient intake appropriate for improving, restoring, or maintaining nutritional needs:   Assess nutritional status and recommend course of action   Monitor oral intake, labs, and treatment plans   Recommend appropriate diets, oral nutritional supplements, and vitamin/mineral supplements  10/21/2024 0112 by Brittney Quispe RN  Outcome: Progressing     Problem: ABCDS Injury Assessment  Goal: Absence of physical injury  10/21/2024 1109 by Ibeth Atkinson RN  Outcome: Progressing  10/21/2024 0112 by Brittney Quispe RN  Outcome: Progressing     
RN  Outcome: Progressing  10/8/2024 0252 by Meeta Dawson, RN  Outcome: Progressing     Problem: Nutrition Deficit:  Goal: Optimize nutritional status  10/8/2024 1025 by Ibeth Atkinson RN  Outcome: Progressing  10/8/2024 0252 by Meeta Dawson, RN  Outcome: Progressing     
body dressing with Supervision within 7 day(s). (Cont. 10/7)  4.  Patient will perform seated lower body dressing, using AE PRN, with Moderate Assist within 7 day(s). (Cont. 10/7)  4.  Patient will perform toilet/BSC transfers with Maximal Assist within 7 day(s). (Cont. 10/7)  5.  Patient will perform all aspects of toileting with Maximal Assist within 7 day(s). (Cont. 10/7)  6. Patient will perform VAD switchover routine as part of ADL routine (including batteries<>batteries, battery clip<>battery clip, mock SC<>SC) with Minimal Assist within 7 days. (Cont. 10/7)    Occupational Therapy Goals  Goals reviewed and all downgraded/modified/discontinued at weekly re-assessment 9/26/2024:   1.  Patient will perform grooming routine in unsupported sitting with Supervision within 7 day(s).  2.  Patient will perform seated anterior neck to thigh bathing with Supervision within 7 day(s).  3.  Patient will perform seated upper body dressing with Supervision within 7 day(s).  4.  Patient will perform seated lower body dressing, using AE PRN, with Moderate Assist within 7 day(s).   4.  Patient will perform toilet/BSC transfers with Maximal Assist  within 7 day(s).  5.  Patient will perform all aspects of toileting with Maximal Assist within 7 day(s).  6. Patient will perform VAD switchover routine as part of ADL routine (including batteries<>batteries, battery clip<>battery clip, mock SC<>SC) with Minimal Assist within 7 days    Initiated 9/19/2024    1. Patient will perform ADLs standing 5 mins without fatigue or LOB with Minimal assistance.  3. Patient will perform upper body ADLs with Supervision within 7 day(s).  4. Patient will perform toilet transfers with Minimal Assist within 7 day(s).  5. Patient will perform all aspects of toileting with Moderate Assist within 7 day(s).  6. Patient will participate in cardiac/sternal upper extremity therapeutic exercise/activities to increase independence with ADLs with Minimal Assist 
body dressing with Supervision within 7 day(s). (Cont. 10/7)  4.  Patient will perform seated lower body dressing, using AE PRN, with Moderate Assist within 7 day(s). (Cont. 10/7)  4.  Patient will perform toilet/BSC transfers with Maximal Assist within 7 day(s). (Cont. 10/7)  5.  Patient will perform all aspects of toileting with Maximal Assist within 7 day(s). (Cont. 10/7)  6. Patient will perform VAD switchover routine as part of ADL routine (including batteries<>batteries, battery clip<>battery clip, mock SC<>SC) with Minimal Assist within 7 days. (Cont. 10/7)    Occupational Therapy Goals  Goals reviewed and all downgraded/modified/discontinued at weekly re-assessment 9/26/2024:   1.  Patient will perform grooming routine in unsupported sitting with Supervision within 7 day(s).  2.  Patient will perform seated anterior neck to thigh bathing with Supervision within 7 day(s).  3.  Patient will perform seated upper body dressing with Supervision within 7 day(s).  4.  Patient will perform seated lower body dressing, using AE PRN, with Moderate Assist within 7 day(s).   4.  Patient will perform toilet/BSC transfers with Maximal Assist  within 7 day(s).  5.  Patient will perform all aspects of toileting with Maximal Assist within 7 day(s).  6. Patient will perform VAD switchover routine as part of ADL routine (including batteries<>batteries, battery clip<>battery clip, mock SC<>SC) with Minimal Assist within 7 days    Initiated 9/19/2024    1. Patient will perform ADLs standing 5 mins without fatigue or LOB with Minimal assistance.  3. Patient will perform upper body ADLs with Supervision within 7 day(s).  4. Patient will perform toilet transfers with Minimal Assist within 7 day(s).  5. Patient will perform all aspects of toileting with Moderate Assist within 7 day(s).  6. Patient will participate in cardiac/sternal upper extremity therapeutic exercise/activities to increase independence with ADLs with Minimal Assist 
day(s).  3.  Patient will perform seated upper body dressing with Supervision within 7 day(s).  4.  Patient will perform seated lower body dressing, using AE PRN, with Moderate Assist within 7 day(s).   4.  Patient will perform toilet/BSC transfers with Maximal Assist  within 7 day(s).  5.  Patient will perform all aspects of toileting with Maximal Assist within 7 day(s).  6. Patient will perform VAD switchover routine as part of ADL routine (including batteries<>batteries, battery clip<>battery clip, mock SC<>SC) with Minimal Assist within 7 days    Initiated 9/19/2024    1. Patient will perform ADLs standing 5 mins without fatigue or LOB with Minimal assistance.  3. Patient will perform upper body ADLs with Supervision within 7 day(s).  4. Patient will perform toilet transfers with Minimal Assist within 7 day(s).  5. Patient will perform all aspects of toileting with Moderate Assist within 7 day(s).  6. Patient will participate in cardiac/sternal upper extremity therapeutic exercise/activities to increase independence with ADLs with Minimal Assist for 5 minutes within 7 day(s).  7. Patient will perform VAD switchover routine as part of ADL routine (including batteries<>batteries, battery clip<>battery clip, mock SC<>SC) with Supervision within 7 days.    Outcome: Progressing     OCCUPATIONAL THERAPY TREATMENT: WEEKLY REASSESSMENT    Patient: Bishop Love (69 y.o. male)  Date: 10/7/2024  Primary Diagnosis: Hyponatremia [E87.1]  Avulsion fracture [T14.8XXA]  Injury of left rotator cuff, initial encounter [S46.002A]       Precautions: Fall Risk, Weight Bearing Right Lower Extremity Weight Bearing: Non Weight Bearing              Chart, occupational therapy assessment, plan of care, and goals were reviewed.    ASSESSMENT  Patient continues to benefit from skilled OT services and is slowly progressing towards goals. Continues to be limited by impaired endurance, mobility/balance, activity tolerance, functional 
determine need for appliance change or resting period.  10/17/2024 1026 by Ibeth Atkinson RN  Outcome: Progressing  10/17/2024 0709 by Brittney Quispe RN  Outcome: Progressing     Problem: Nutrition Deficit:  Goal: Optimize nutritional status  10/17/2024 1026 by Ibeth Atkinson RN  Outcome: Progressing  10/17/2024 0709 by Brittney Quispe RN  Outcome: Progressing     Problem: ABCDS Injury Assessment  Goal: Absence of physical injury  10/17/2024 1026 by Ibeth Atkinson RN  Outcome: Progressing  10/17/2024 0709 by Brittney Quispe RN  Outcome: Progressing     
skin breakdown  Description: 1.  Monitor for areas of redness and/or skin breakdown  2.  Assess vascular access sites hourly  3.  Every 4-6 hours minimum:  Change oxygen saturation probe site  4.  Every 4-6 hours:  If on nasal continuous positive airway pressure, respiratory therapy assess nares and determine need for appliance change or resting period.  10/4/2024 0218 by Brittney Quispe, RN  Outcome: Progressing  10/3/2024 1515 by Kirstin Fields RN  Outcome: Progressing     Problem: Nutrition Deficit:  Goal: Optimize nutritional status  10/4/2024 0218 by Brittney Quispe, RN  Outcome: Progressing  10/3/2024 1515 by Kirstin Fields, RN  Outcome: Progressing     
status  Outcome: Progressing     Problem: ABCDS Injury Assessment  Goal: Absence of physical injury  Outcome: Progressing     
Skin/Tissue Integrity  Goal: Absence of new skin breakdown  Description: 1.  Monitor for areas of redness and/or skin breakdown  2.  Assess vascular access sites hourly  3.  Every 4-6 hours minimum:  Change oxygen saturation probe site  4.  Every 4-6 hours:  If on nasal continuous positive airway pressure, respiratory therapy assess nares and determine need for appliance change or resting period.  10/18/2024 1056 by Ibeth Atkinson RN  Outcome: Progressing  10/18/2024 0828 by Brittney Quispe RN  Outcome: Progressing     Problem: Nutrition Deficit:  Goal: Optimize nutritional status  10/18/2024 1056 by Ibeth Atkinson RN  Outcome: Progressing  10/18/2024 0828 by Brittney Quispe RN  Outcome: Progressing     Problem: ABCDS Injury Assessment  Goal: Absence of physical injury  10/18/2024 1056 by Ibeth Atkinson RN  Outcome: Progressing  10/18/2024 0828 by Brittney Quispe, RN  Outcome: Progressing     
high risk for falls, not safe to be alone, concern for safely navigating or managing the home environment, and new weight bearing restrictions limiting activity or patient is unable to maintain    IF patient discharges home will need the following DME: continuing to assess with progress       SUBJECTIVE:   Patient stated, \"I'll do it.\"    OBJECTIVE DATA SUMMARY:     Functional Mobility Training:  Bed Mobility:  Bed Mobility Training  Supine to Sit: Moderate assistance  Transfers:  Transfer Training  Transfer Training: Yes  Bed to Chair: Maximum assistance;Assist X2;Moderate assistance  Balance:  Balance  Sitting: Impaired;Without support  Sitting - Static: Good (unsupported)  Sitting - Dynamic: Fair (occasional)       Activity Tolerance:   Fair  and requires rest breaks    After treatment:   Patient left in no apparent distress sitting up in chair, Call bell within reach, and Heels elevated for pressure relief      COMMUNICATION/EDUCATION:   The patient's plan of care was discussed with: occupational therapist and registered nurse           Becky Winkler, PT, DPT, CCS  Minutes: 13    
patient to meet his/her long term goals):   Moderate intensity short-term skilled occupational therapy up to 5x/week    Other factors to consider for discharge: impaired cognition, high risk for falls, not safe to be alone, and concern for safely navigating or managing the home environment    IF patient discharges home will need the following DME: continuing to assess with progress       SUBJECTIVE:   Patient stated “Is it dinner time?” patient reoriented to time of day (lunch time)    OBJECTIVE DATA SUMMARY:   Cognitive/Behavioral Status:   Aox3-4       Functional Mobility and Transfers for ADLs:  Bed Mobility:  Bed Mobility Training  Rolling: Minimum assistance;Assist X1;Adaptive equipment;Additional time  Supine to Sit: Minimum assistance;Assist X1;Additional time;Adaptive equipment  Scooting: Minimum assistance     Transfers:   Transfer Training  Sit to Stand: Moderate assistance;Assist X2  Stand to Sit: Moderate assistance;Assist X2  Stand Pivot Transfers: Maximum assistance;Assist X2  Bed to Chair: Maximum assistance;Assist X2           Balance:     Balance  Sitting: Impaired  Sitting - Static: Good (unsupported)  Sitting - Dynamic: Fair (occasional)  Standing: Impaired  Standing - Static: Poor;Constant support  Standing - Dynamic: Poor;Constant support      ADL Intervention:                   Grooming: Setup;Minimal assistance   Grooming Skilled Clinical Factors: completed face washing seated at EOB; assisted with washing back and lotion application to back       LE Dressing: Dependent/Total  LE Dressing Skilled Clinical Factors: assisted with doffing soiled sock to RLE, donning clean sock to RLE        Pain Rating:  Patient endorsing pain in RLE with sitting at EOB, pain improving with elevating of RLE      Activity Tolerance:   Fair  and requires rest breaks  Please refer to the flowsheet for vital signs taken during this treatment.    After treatment:   Patient left in no apparent distress sitting up in 
perform all aspects of toileting with Maximal Assist within 7 day(s).  6. Patient will perform VAD switchover routine as part of ADL routine (including batteries<>batteries, battery clip<>battery clip, mock SC<>SC) with Minimal Assist within 7 days  Initiated 9/19/2024  1. Patient will perform ADLs standing 5 mins without fatigue or LOB with Minimal assistance.  3. Patient will perform upper body ADLs with Supervision within 7 day(s).  4. Patient will perform toilet transfers with Minimal Assist within 7 day(s).  5. Patient will perform all aspects of toileting with Moderate Assist within 7 day(s).  6. Patient will participate in cardiac/sternal upper extremity therapeutic exercise/activities to increase independence with ADLs with Minimal Assist for 5 minutes within 7 day(s).  7. Patient will perform VAD switchover routine as part of ADL routine (including batteries<>batteries, battery clip<>battery clip, mock SC<>SC) with Supervision within 7 days.    Outcome: Progressing   OCCUPATIONAL THERAPY TREATMENT  Patient: Bishop Love (69 y.o. male)  Date: 10/29/2024  Primary Diagnosis: Hyponatremia [E87.1]  Avulsion fracture [T14.8XXA]  Injury of left rotator cuff, initial encounter [S46.002A]       Precautions: Fall Risk, Weight Bearing Right Lower Extremity Weight Bearing: Non Weight Bearing (R LE, NO SHOES per wound care nurse (ok for socks))              Chart, occupational therapy assessment, plan of care, and goals were reviewed.      ASSESSMENT  Patient continues to benefit from skilled OT services and is progressing towards goals. Patient received reclined in bed, amenable to transfer to EOB with x1 assist, benefits from extended time and cueing for sequencing. Completed EOB ADL with up to supervision and rest breaks PRN, VSS throughout session. Reviewed therex to complete outside of therapy time with associated handout at bedside. Patient verbalized understanding and demo'd good carryover during session. 
physical assistance, poor safety awareness, impaired cognition, high risk for falls, not safe to be alone, and concern for safely navigating or managing the home environment    IF patient discharges home will need the following DME: continuing to assess with progress       SUBJECTIVE:   Patient stated “I'm okay.”    OBJECTIVE DATA SUMMARY:     Past Medical History:   Diagnosis Date    Atherosclerosis of native arteries of extremities with intermittent claudication, bilateral legs ([facility])     [facility]    [facility]     CHF (congestive heart failure) ([facility])     Chronic anticoagulation 10/31/2023    Chronic systolic (congestive) heart failure ([facility]) 10/31/2023    Coronary artery disease involving native coronary artery of native heart without angina pectoris 10/31/2023    Essential hypertension 10/31/2023    Heart attack ([facility]) 2014    Heart failure ([facility])     HTN (hypertension)     Hyperlipidemia     ICD (implantable cardioverter-defibrillator) in place 10/31/2023    Ischemic cardiomyopathy     Ischemic cardiomyopathy 10/31/2023    Mild mitral valve regurgitation     Nonrheumatic mitral (valve) insufficiency     Positive fecal occult blood test     PVD (peripheral vascular disease) ([facility])     Rheumatic tricuspid insufficiency     Sick sinus syndrome ([facility])      Past Surgical History:   Procedure Laterality Date    CARDIAC DEFIBRILLATOR PLACEMENT  2014    CARDIAC PROCEDURE N/A 08/29/2023    Left heart cath / coronary angiography performed by Sole Allen MD at [facility] CARDIAC CATH LAB    CARDIAC PROCEDURE N/A 08/29/2023    Percutaneous coronary intervention performed by Sole Allen MD at [facility] CARDIAC CATH LAB    CARDIAC PROCEDURE N/A 08/29/2023    Intravascular ultrasound performed by Sole Allen MD at [facility] CARDIAC CATH LAB    CARDIAC PROCEDURE N/A 08/29/2023    Insert stent manjit coronary performed by Sole Allen MD at [facility] 
their requirements for physical assistance, poor safety awareness, high risk for falls, not safe to be alone, and concern for safely navigating or managing the home environment    IF patient discharges home will need the following DME: continuing to assess with progress       SUBJECTIVE:   Patient stated, \"about an 8.\" Re: pain rating in feet    OBJECTIVE DATA SUMMARY:   Critical Behavior:  Orientation  Overall Orientation Status: Within Normal Limits  Orientation Level: Oriented X4  Cognition  Overall Cognitive Status: Exceptions  Arousal/Alertness: Appropriate responses to stimuli  Following Commands: Appears intact  Attention Span: Appears intact  Memory: Impaired  Safety Judgement: Good awareness of safety precautions  Problem Solving: Assistance required to correct errors made  Insights: Decreased awareness of deficits  Initiation: Does not require cues  Sequencing: Requires cues for some    Functional Mobility Training:  Bed Mobility:  Bed Mobility Training  Bed Mobility Training: Yes  Supine to Sit: Minimum assistance;Assist X1  Scooting: Assist X1;Minimum assistance (towards EOB)  Transfers:  Transfer Training  Bed to Chair: Moderate assistance;Maximum assistance;Assist X2 (lateral scooting into drop arm recliner chair)  Balance:  Balance  Sitting: Impaired;With support  Sitting - Static: Good (unsupported)  Sitting - Dynamic: Fair (occasional)      Pain Ratin/10   Pain Intervention(s):   nursing notified and addressing and patient medicated for pain prior to session    Activity Tolerance:   requires frequent rest breaks and desaturates with activity and requires oxygen    After treatment:   Patient left in no apparent distress sitting up in chair, Call bell within reach, and Heels elevated for pressure relief      COMMUNICATION/EDUCATION:   The patient's plan of care was discussed with: occupational therapist and registered nurse           Becky Winkler, PT, DPT, CCS  Minutes: 24    
will perform seated upper body dressing with Supervision within 7 day(s).  4.  Patient will perform seated lower body dressing, using AE PRN, with Moderate Assist within 7 day(s).   4.  Patient will perform toilet/BSC transfers with Maximal Assist  within 7 day(s).  5.  Patient will perform all aspects of toileting with Maximal Assist within 7 day(s).  6. Patient will perform VAD switchover routine as part of ADL routine (including batteries<>batteries, battery clip<>battery clip, mock SC<>SC) with Minimal Assist within 7 days  Initiated 9/19/2024  1. Patient will perform ADLs standing 5 mins without fatigue or LOB with Minimal assistance.  3. Patient will perform upper body ADLs with Supervision within 7 day(s).  4. Patient will perform toilet transfers with Minimal Assist within 7 day(s).  5. Patient will perform all aspects of toileting with Moderate Assist within 7 day(s).  6. Patient will participate in cardiac/sternal upper extremity therapeutic exercise/activities to increase independence with ADLs with Minimal Assist for 5 minutes within 7 day(s).  7. Patient will perform VAD switchover routine as part of ADL routine (including batteries<>batteries, battery clip<>battery clip, mock SC<>SC) with Supervision within 7 days.    10/24/2024 1559 by Ashlie Mcnamara, TRUONG  Outcome: Progressing     Problem: Skin/Tissue Integrity  Goal: Absence of new skin breakdown  Description: 1.  Monitor for areas of redness and/or skin breakdown  2.  Assess vascular access sites hourly  3.  Every 4-6 hours minimum:  Change oxygen saturation probe site  4.  Every 4-6 hours:  If on nasal continuous positive airway pressure, respiratory therapy assess nares and determine need for appliance change or resting period.  Outcome: Progressing     Problem: Safety - Adult  Goal: Free from fall injury  Flowsheets (Taken 10/25/2024 0531)  Free From Fall Injury: Instruct family/caregiver on patient safety     Problem: Chronic Conditions and 
with staff: therapy recommendations for staff: Recommend mobility with staff assist x2 using gait belt.    Recommend for next PT session: sit to stand transfers and bed to bedside chair transfers    Recommendation for discharge: (in order for the patient to meet his/her long term goals):   Moderate intensity short-term skilled physical therapy up to 5x/week    Other factors to consider for discharge: patient's current support system is unable to meet their requirements for physical assistance, poor safety awareness, high risk for falls, not safe to be alone, concern for safely navigating or managing the home environment, and new weight bearing restrictions limiting activity or patient is unable to maintain    IF patient discharges home will need the following DME: continuing to assess with progress       SUBJECTIVE:   Patient stated “my leg hurts.”    OBJECTIVE DATA SUMMARY:     Past Medical History:   Diagnosis Date    Atherosclerosis of native arteries of extremities with intermittent claudication, bilateral legs ([facility])     [facility]    Chest pain     CHF (congestive heart failure) ([facility])     Chronic anticoagulation 10/31/2023    Chronic systolic (congestive) heart failure ([facility]) 10/31/2023    Coronary artery disease involving native coronary artery of native heart without angina pectoris 10/31/2023    Essential hypertension 10/31/2023    Heart attack ([facility]) 2014    Heart failure ([facility])     HTN (hypertension)     Hyperlipidemia     ICD (implantable cardioverter-defibrillator) in place 10/31/2023    Ischemic cardiomyopathy     Ischemic cardiomyopathy 10/31/2023    Mild mitral valve regurgitation     Nonrheumatic mitral (valve) insufficiency     Positive fecal occult blood test     PVD (peripheral vascular disease) ([facility])     Rheumatic tricuspid insufficiency     Sick sinus syndrome ([facility])      Past Surgical History:   Procedure Laterality Date 
Impaired  Sitting - Static: Good (unsupported)  Sitting - Dynamic: Fair (occasional)  Standing: Impaired  Standing - Static: Poor;Constant support  Standing - Dynamic: Poor;Constant support      ADL Intervention:    Upper Extremity Dressing: .  - Hospital Gown : Minimal Assistance and Seated in chair     Lower Extremity Dressing: .  - Socks : Total Assistance and Bed Level    Pain Rating:  Reported in BLEs (R>L), RN aware    Activity Tolerance:   Good  Please refer to the flowsheet for vital signs taken during this treatment.    After treatment:   Patient left in no apparent distress sitting up in chair, Call bell within reach, and Bed/ chair alarm activated    COMMUNICATION/EDUCATION:   The patient's plan of care was discussed with: physical therapist and registered nurse    Patient Education  Education Given To: Patient  Education Provided: Plan of Care;Transfer Training;Fall Prevention Strategies;Mobility Training;ADL Adaptive Strategies  Education Provided Comments: LISA WEBSTER  Education Method: Demonstration;Verbal  Barriers to Learning: Cognition  Education Outcome: Verbalized understanding;Continued education needed    Thank you for this referral.  Tiffanie Xiong OT  Minutes: 30    
Progressing     Problem: Skin/Tissue Integrity  Goal: Absence of new skin breakdown  Description: 1.  Monitor for areas of redness and/or skin breakdown  2.  Assess vascular access sites hourly  3.  Every 4-6 hours minimum:  Change oxygen saturation probe site  4.  Every 4-6 hours:  If on nasal continuous positive airway pressure, respiratory therapy assess nares and determine need for appliance change or resting period.  10/22/2024 1242 by Amarilys Rodriguez, RN  Outcome: Progressing     Problem: Nutrition Deficit:  Goal: Optimize nutritional status  10/22/2024 1242 by Amarilys Rodriguez, RN  Outcome: Progressing     Problem: ABCDS Injury Assessment  Goal: Absence of physical injury  10/22/2024 1242 by Amarilys Rodriguez, RN  Outcome: Progressing     
to clear buttocks from EOB)  Stand to Sit: Minimum assistance;Assist X2  Bed to Chair: Total assistance (seated in [facility]or[facility] Steady)    Balance:     Balance  Sitting: Impaired  Sitting - Static: Good (unsupported)  Sitting - Dynamic: Good (unsupported);Fair (occasional)  Standing: Impaired  Standing - Static: Poor;Constant support  Standing - Dynamic: Poor;Constant support    ADL Intervention:     Toileting: Dependent/Total  Toileting Skilled Clinical Factors: incontinent of stool, pt unaware. Total A for hygiene while standing in Deloris Steady         Pain Ratin/10       Activity Tolerance:   Fair   Please refer to the flowsheet for vital signs taken during this treatment.    After treatment:   Patient left in no apparent distress sitting up in chair, Call bell within reach, and Bed/ chair alarm activated    COMMUNICATION/EDUCATION:   The patient's plan of care was discussed with: physical therapist and registered nurse    Patient Education  Education Given To: Patient  Education Provided: Role of Therapy;Plan of Care;Transfer Training;Fall Prevention Strategies;Mobility Training;Precautions  Education Provided Comments: LISA WEBSTER  Education Method: Demonstration;Verbal  Barriers to Learning: Cognition  Education Outcome: Verbalized understanding;Continued education needed    Thank you for this referral.  BAM Armijo, OTR/L  Minutes: 23    
Chair  - Food to Mouth: Stand-by Assistance and Seated in chair  - Drink to Mouth : Stand-by Assistance and Seated in chair    Pain Rating:  Reported in Linda, RN aware    Activity Tolerance:   Good and requires frequent rest breaks  Please refer to the flowsheet for vital signs taken during this treatment.    After treatment:   Patient left in no apparent distress sitting up in chair, Call bell within reach, and Bed/ chair alarm activated    COMMUNICATION/EDUCATION:   The patient's plan of care was discussed with: physical therapist and registered nurse    Patient Education  Education Given To: Patient  Education Provided: Plan of Care;Transfer Training;Fall Prevention Strategies;Mobility Training;ADL Adaptive Strategies  Education Provided Comments: LISA WEBSTER  Education Method: Demonstration;Verbal  Education Outcome: Verbalized understanding;Continued education needed;Demonstrated understanding    Thank you for this referral.  Tiffanie Xiong OT  Minutes: 29    
FEMORAL BYPASS Right 08/24/2023    RIGHT FEMORAL POPLITEAL BYPASS WITH POLYTETRAFLUOROETHYLENE performed by Sean Barger MD at [facility] MAIN OR    HERNIA REPAIR      INVASIVE VASCULAR N/A 08/29/2023    Ultrasound guided vascular access performed by Sole Allen MD at [facility] CARDIAC CATH LAB    INVASIVE VASCULAR N/A 11/29/2023    Ultrasound guided vascular access performed by Jose Daniel Fields MD at [facility] CARDIAC CATH LAB    INVASIVE VASCULAR N/A 7/31/2024    Ultrasound guided vascular access performed by Jose Daniel Fields MD at [facility] CARDIAC CATH LAB    IR MECHANICAL ART THROMBECTOMY INTRACRANIAL  09/19/2023    IR MECHANICAL ART THROMBECTOMY INTRACRANIAL 9/19/2023 [facility] RAD ANGIO IR    LEFT VENTRICULAR ASSIST DEVICE      PACEMAKER      pacemaker/ICD    UPPER GASTROINTESTINAL ENDOSCOPY N/A 09/15/2023    EGD ESOPHAGOGASTRODUODENOSCOPY performed by Jania Christopher MD at [facility] ENDOSCOPY    UPPER GASTROINTESTINAL ENDOSCOPY N/A 1/15/2024    EGD ESOPHAGOGASTRODUODENOSCOPY, 3 clips placed, one clip fell off after being placed performed by Maryjo Simmons MD at [facility] ENDOSCOPY    VASCULAR SURGERY  2014    aortobifemoral bypass and bilateral femoral above knee popliteal bypass       Home Situation:  Social/Functional History  Lives With: Significant other  Type of Home: House  Home Layout: One level  Home Access: Stairs to enter with rails  Entrance Stairs - Number of Steps: 3  Entrance Stairs - Rails: Both  Bathroom Shower/Tub: Tub/Shower unit  Bathroom Equipment: None  Home Equipment: Cane, Walker - Rolling, Wheelchair - Manual  Has the patient had two or more falls in the past year or any fall with injury in the past year?: Yes  Receives Help From: Family, Other (comment) (Girlfriend and her daughter)  ADL Assistance: Independent  Homemaking Assistance: Independent  Ambulation Assistance: Independent (w/ SPC)  Transfer Assistance: Independent  Active : Yes (Patient only drives short distances and sister drives patient

## 2024-10-31 ENCOUNTER — OFFICE VISIT (OUTPATIENT)
Facility: CLINIC | Age: 69
End: 2024-10-31

## 2024-10-31 DIAGNOSIS — Z76.89 ENCOUNTER FOR SOCIAL WORK INTERVENTION: Primary | ICD-10-CM

## 2024-11-01 ENCOUNTER — TELEPHONE (OUTPATIENT)
Age: 69
End: 2024-11-01

## 2024-11-01 ENCOUNTER — OFFICE VISIT (OUTPATIENT)
Facility: CLINIC | Age: 69
End: 2024-11-01

## 2024-11-01 VITALS
HEART RATE: 69 BPM | RESPIRATION RATE: 20 BRPM | TEMPERATURE: 97 F | OXYGEN SATURATION: 98 % | WEIGHT: 126.9 LBS | BODY MASS INDEX: 18.74 KG/M2

## 2024-11-01 DIAGNOSIS — I73.9 PVD (PERIPHERAL VASCULAR DISEASE) (HCC): ICD-10-CM

## 2024-11-01 DIAGNOSIS — I25.5 ISCHEMIC CARDIOMYOPATHY: Primary | ICD-10-CM

## 2024-11-01 DIAGNOSIS — R78.81 BACTEREMIA DUE TO METHICILLIN SUSCEPTIBLE STAPHYLOCOCCUS AUREUS (MSSA): ICD-10-CM

## 2024-11-01 DIAGNOSIS — I50.20 HEART FAILURE WITH REDUCED EJECTION FRACTION (HCC): ICD-10-CM

## 2024-11-01 DIAGNOSIS — B95.61 BACTEREMIA DUE TO METHICILLIN SUSCEPTIBLE STAPHYLOCOCCUS AUREUS (MSSA): ICD-10-CM

## 2024-11-01 DIAGNOSIS — J96.01 ACUTE HYPOXIC RESPIRATORY FAILURE: ICD-10-CM

## 2024-11-01 DIAGNOSIS — S91.002S WOUND OF LEFT ANKLE, SEQUELA: ICD-10-CM

## 2024-11-01 DIAGNOSIS — I25.10 CORONARY ARTERY DISEASE INVOLVING NATIVE CORONARY ARTERY OF NATIVE HEART WITHOUT ANGINA PECTORIS: ICD-10-CM

## 2024-11-01 DIAGNOSIS — S91.001S ANKLE WOUND, RIGHT, SEQUELA: ICD-10-CM

## 2024-11-01 DIAGNOSIS — R53.1 WEAKNESS: ICD-10-CM

## 2024-11-01 DIAGNOSIS — Z95.811 LVAD (LEFT VENTRICULAR ASSIST DEVICE) PRESENT (HCC): ICD-10-CM

## 2024-11-01 DIAGNOSIS — Z79.01 CHRONIC ANTICOAGULATION: ICD-10-CM

## 2024-11-01 DIAGNOSIS — S46.002S INJURY OF LEFT ROTATOR CUFF, SEQUELA: ICD-10-CM

## 2024-11-01 DIAGNOSIS — I10 ESSENTIAL HYPERTENSION: ICD-10-CM

## 2024-11-01 NOTE — TELEPHONE ENCOUNTER
Telephone Call RE:  Appointment reminder     Outcome:     [] Patient confirmed appointment   [] Patient rescheduled appointment for    [] Unable to reach  [x] Left message              [] Other:       First attempt.

## 2024-11-01 NOTE — PROGRESS NOTES
[address] [id number]  [address] [facility] [place] [address]  Phone: [phone]  Fax: [phone]  Also available via [employer]     PLACE OF SERVICE:  [facility] [address] [place] [address]    SKILLED VISIT    Chief Complaint:   Chief Complaint   Patient presents with    Follow-up         HPI : Bishop Love is a 69 y.o. male here for follow up.    Previous history prior to admission:  Patient was hospitalized from 9/17/2024 to 10/29/2024.  Patient has a past medical history of coronary artery disease MI pacemaker ICD, ischemic cardiomyopathy, chronic heart failure with preserved ejection fraction, long-term anticoagulation on Coumadin, mitral agitation, rheumatic tricuspid regurg, sick sinus syndrome, PVD, right femoral popliteal bypass 8/24/2023, bifemoral bypass and bilateral femoral above-the-knee popliteal bypass.  He presented to the ER with complaints of generalized body rash left shoulder and elbow pain rash and started a week prior.  And had a fall 1 week prior.  Left elbow fracture showed each indeterminant fracture with's bone spur at triceps left shoulder showed a high riding humeral head compatible with chronic massive cuff tear.  Patient has history of heart failure with reduced ejection fraction with chronic LVAD on 9/19/2023.  He is being managed by the chronic heart failure team and is on metoprolol 25 mg p.o. twice daily cannot tolerate ACE ARB or Arni due to severe cough.  He is on hydralazine 100 mg p.o. 3 times daily and Isordil 40 mg p.o. 3 times daily.  He is on Aldactone.  He is not on SGLT2 due to recurrent UTIs.  He continues on oral Bumex 2 mg twice daily.  He is not a candidate for liver transplant due to severe peripheral vascular disease.  He continues on Coumadin he had MSSA bacteremia his wound culture had MRSA MSSA Pseudomonas he was assessed by podiatry and recommended local wound care he is full weightbearing on left

## 2024-11-04 ENCOUNTER — OFFICE VISIT (OUTPATIENT)
Facility: CLINIC | Age: 69
End: 2024-11-04
Payer: MEDICARE

## 2024-11-04 ENCOUNTER — TELEPHONE (OUTPATIENT)
Age: 69
End: 2024-11-04

## 2024-11-04 VITALS
WEIGHT: 124.7 LBS | OXYGEN SATURATION: 98 % | BODY MASS INDEX: 18.41 KG/M2 | RESPIRATION RATE: 18 BRPM | TEMPERATURE: 97.4 F

## 2024-11-04 DIAGNOSIS — I50.20 HEART FAILURE WITH REDUCED EJECTION FRACTION (HCC): ICD-10-CM

## 2024-11-04 DIAGNOSIS — I73.9 PVD (PERIPHERAL VASCULAR DISEASE) (HCC): ICD-10-CM

## 2024-11-04 DIAGNOSIS — Z95.811 LVAD (LEFT VENTRICULAR ASSIST DEVICE) PRESENT (HCC): ICD-10-CM

## 2024-11-04 DIAGNOSIS — R53.1 WEAKNESS: ICD-10-CM

## 2024-11-04 DIAGNOSIS — R78.81 BACTEREMIA DUE TO METHICILLIN SUSCEPTIBLE STAPHYLOCOCCUS AUREUS (MSSA): ICD-10-CM

## 2024-11-04 DIAGNOSIS — Z79.01 CHRONIC ANTICOAGULATION: ICD-10-CM

## 2024-11-04 DIAGNOSIS — I10 ESSENTIAL HYPERTENSION: ICD-10-CM

## 2024-11-04 DIAGNOSIS — B95.61 BACTEREMIA DUE TO METHICILLIN SUSCEPTIBLE STAPHYLOCOCCUS AUREUS (MSSA): ICD-10-CM

## 2024-11-04 DIAGNOSIS — I25.10 CORONARY ARTERY DISEASE INVOLVING NATIVE CORONARY ARTERY OF NATIVE HEART WITHOUT ANGINA PECTORIS: ICD-10-CM

## 2024-11-04 DIAGNOSIS — S91.001S ANKLE WOUND, RIGHT, SEQUELA: ICD-10-CM

## 2024-11-04 DIAGNOSIS — J96.01 ACUTE HYPOXIC RESPIRATORY FAILURE: ICD-10-CM

## 2024-11-04 DIAGNOSIS — S91.002S WOUND OF LEFT ANKLE, SEQUELA: ICD-10-CM

## 2024-11-04 DIAGNOSIS — I25.5 ISCHEMIC CARDIOMYOPATHY: Primary | ICD-10-CM

## 2024-11-04 DIAGNOSIS — S46.002S INJURY OF LEFT ROTATOR CUFF, SEQUELA: ICD-10-CM

## 2024-11-04 PROCEDURE — 99309 SBSQ NF CARE MODERATE MDM 30: CPT | Performed by: NURSE PRACTITIONER

## 2024-11-04 PROCEDURE — 1123F ACP DISCUSS/DSCN MKR DOCD: CPT | Performed by: NURSE PRACTITIONER

## 2024-11-04 NOTE — PATIENT INSTRUCTIONS
Medication changes:    -restart your atorvastatin 80mg daily   -decrease your bumex to 1mg twice a day (from 2mg twice a day).  Please contact us if you notice your shortness of breath getting worse.    -I have updated our keflex prescription to reflect current dosing of 500mg twice a day       Testing Ordered:    We will request Giancarlo performs labwork in 2 weeks      Other Recommendations:     Effective October 2, 2024 the LVAD emergency pager ([phone]) will no longer be in service. Going forward, please contact the answering service at [phone] and request to speak with the Heart Failure provider on-call for all after hours needs or emergencies.     Continue to change drive line exit site dressing 3 times a week using sterile technique,     Ensure you are drinking an adequate amount of water with a goal of 6-8 eight ounce glasses (1.5-2 liters) of fluid daily. Your urine should be clear and light yellow straw colored.       Follow up in 1 month  with [facility]      Monthly LVAD Education Tip:    Static Electricity and The Mobile Power Unit                Driveline Infection Prevention  You can still move freely and do many routine activities if you have an LVAD. However, it is important you take some precautions to protect the driveline and your equipment. Trauma to the driveline exit site, including /equipment drops and pulls on the driveline (such as on a door handle) can increase your risk of developing a driveline infection. Below are tips to reduce driveline trauma and ensure your equipment remains protected:     Be careful not to twist, kink or pull on the driveline.  Keep scissors away from the driveline at all times!  Be sure that your controller is secure, so there’s no risk of it falling and pulling on your driveline.  Make sure the driveline doesn’t get pulled or snagged.  If you’re keeping your controller inside a zippered bag, make sure the driveline

## 2024-11-04 NOTE — PROGRESS NOTES
Take 1 packet by mouth daily as needed for Constipation 15 each 0    warfarin (COUMADIN) 1 MG tablet Take 1 tablet by mouth daily Pt takes 2 mg daily 180 tablet 0    amLODIPine (NORVASC) 5 MG tablet Take 1 tablet by mouth every morning 30 tablet 3    aspirin 81 MG EC tablet Take 1 tablet by mouth daily 90 tablet 0    spironolactone (ALDACTONE) 25 MG tablet TAKE 1 TABLET BY MOUTH DAILY 90 tablet 0    atorvastatin (LIPITOR) 40 MG tablet Take 2 tablets by mouth daily 90 tablet 1    pantoprazole (PROTONIX) 40 MG tablet Take 1 tablet by mouth every morning (before breakfast) 90 tablet 1    isosorbide dinitrate (ISORDIL) 20 MG tablet Take 2 tablets by mouth every 8 (eight) hours 180 tablet 2    hydrALAZINE (APRESOLINE) 100 MG tablet Take 1 tablet by mouth every 8 hours 270 tablet 1    metoprolol succinate (TOPROL XL) 25 MG extended release tablet Take 1 tablet by mouth in the morning and at bedtime 180 tablet 1    vitamin D (VITAMIN D3) 25 MCG (1000 UT) TABS tablet Take 1 tablet by mouth daily 90 tablet 1    magnesium oxide (MAG-OX) 400 MG tablet Take 1 tablet by mouth daily 30 tablet 1     No current facility-administered medications for this visit.        Assessment/Plans:    Diagnosis Orders   1. Ischemic cardiomyopathy     Followed by advanced heart failure center due to cardiomyopathy  Continue metoprolol 25 mg p.o. twice daily  Hydralazine 100 mg p.o. p.o. 3 times daily  Isordil 40 mg p.o. 3 times daily  Aldactone 25 mg p.o. daily  Bumex 2 mg p.o. daily  Has LVAD-HeartMate 3 in place - managed by [facility]  Has follow up appointment on 11/05/2024 with Leslie Mondragon NP   2. Coronary artery disease involving native coronary artery of native heart without angina pectoris     No chest pain or angina  Continue current medications   3. Heart failure with reduced ejection fraction (HCC)     Followed by advanced heart failure center due to cardiomyopathy  Continue metoprolol 25 mg p.o. twice

## 2024-11-05 ENCOUNTER — CLINICAL DOCUMENTATION (OUTPATIENT)
Age: 69
End: 2024-11-05

## 2024-11-05 ENCOUNTER — OFFICE VISIT (OUTPATIENT)
Age: 69
End: 2024-11-05

## 2024-11-05 ENCOUNTER — TELEPHONE (OUTPATIENT)
Age: 69
End: 2024-11-05

## 2024-11-05 VITALS
OXYGEN SATURATION: 100 % | BODY MASS INDEX: 18.41 KG/M2 | TEMPERATURE: 97.6 F | SYSTOLIC BLOOD PRESSURE: 76 MMHG | HEART RATE: 68 BPM | RESPIRATION RATE: 18 BRPM | HEIGHT: 69 IN

## 2024-11-05 DIAGNOSIS — Z51.81 ENCOUNTER FOR MONITORING DIURETIC THERAPY: ICD-10-CM

## 2024-11-05 DIAGNOSIS — R06.02 SHORTNESS OF BREATH: ICD-10-CM

## 2024-11-05 DIAGNOSIS — Z95.811 LEFT VENTRICULAR ASSIST DEVICE PRESENT (HCC): ICD-10-CM

## 2024-11-05 DIAGNOSIS — Z79.899 ENCOUNTER FOR MONITORING DIURETIC THERAPY: ICD-10-CM

## 2024-11-05 DIAGNOSIS — Z79.01 CHRONIC ANTICOAGULATION: ICD-10-CM

## 2024-11-05 DIAGNOSIS — Z95.811 LVAD (LEFT VENTRICULAR ASSIST DEVICE) PRESENT (HCC): ICD-10-CM

## 2024-11-05 DIAGNOSIS — I50.22 CHRONIC SYSTOLIC (CONGESTIVE) HEART FAILURE (HCC): ICD-10-CM

## 2024-11-05 DIAGNOSIS — I50.22 CHRONIC SYSTOLIC (CONGESTIVE) HEART FAILURE (HCC): Primary | ICD-10-CM

## 2024-11-05 RX ORDER — CEPHALEXIN 500 MG/1
500 CAPSULE ORAL 2 TIMES DAILY
Qty: 120 CAPSULE | Refills: 1
Start: 2024-11-05

## 2024-11-05 RX ORDER — ATORVASTATIN CALCIUM 40 MG/1
80 TABLET, FILM COATED ORAL DAILY
Qty: 90 TABLET | Refills: 1 | Status: SHIPPED | OUTPATIENT
Start: 2024-11-05

## 2024-11-05 RX ORDER — BUMETANIDE 1 MG/1
1 TABLET ORAL 2 TIMES DAILY
Qty: 60 TABLET | Refills: 1
Start: 2024-11-05

## 2024-11-05 ASSESSMENT — PATIENT HEALTH QUESTIONNAIRE - PHQ9
SUM OF ALL RESPONSES TO PHQ9 QUESTIONS 1 & 2: 0
1. LITTLE INTEREST OR PLEASURE IN DOING THINGS: NOT AT ALL
SUM OF ALL RESPONSES TO PHQ QUESTIONS 1-9: 0
2. FEELING DOWN, DEPRESSED OR HOPELESS: NOT AT ALL
SUM OF ALL RESPONSES TO PHQ QUESTIONS 1-9: 0

## 2024-11-05 ASSESSMENT — ENCOUNTER SYMPTOMS
ABDOMINAL DISTENTION: 0
ABDOMINAL PAIN: 0
SORE THROAT: 0
BLOOD IN STOOL: 0
CHEST TIGHTNESS: 0
SHORTNESS OF BREATH: 0
EYE PAIN: 0
COUGH: 0

## 2024-11-05 NOTE — PROGRESS NOTES
LVAD Clinic D/C F/U appointment transportation coordination with [facility]    LESLIE spoke with [facility] Kristen NELSON, [phone] on 11/4 about pt's transportation plans for his 11/5 1pm [facility] LVAD Clinic F/U appointment. Kristen explained that [facility] and [facility] usually arranging the transport to f/u appointment for their patients. Kristen explained that pt's transport has likely already been arranged by their . Kristen explained that she will call her  to double check.    LESLIE received call from Kristen NELSON later in the afternoon on 11/4. Kristen explained that she conriemd with  that pt's transport has been arranged for his 11/5 [facility] LVAD Clinic appointment.     11/5: LESLIE spoke with [facility] Sherley KING, [phone] about transport details for return transport back to [facility] from [facility]. Sherley explained that LESLIE can call pt's transport company, [employer], [phone], to notify them that pt is ready for his return transport.     LESLIE notified [employer] about pt's estimated time of when he will be ready for pickup. [employer] explained that they will be at [facility] to pick pt up within 20 minutes.     Jelena Rae, MSW, LCSW  Clinical   [facility]  Advanced Heart Failure  [phone]

## 2024-11-05 NOTE — PROGRESS NOTES
ADVANCED HEART FAILURE CENTER  [facility] in [place], [place]  LVAD Coordinator Clinic Visit Note  Chief Complaint   Patient presents with    Follow-up     VAD    Follow-Up from Hospital       BP (!) 76/0 (Site: Left Upper Arm, Position: Sitting, Cuff Size: Small Adult)   Pulse 68   Temp 97.6 °F (36.4 °C) (Oral)   Resp 18   Ht 1.753 m (5' 9.02\")   SpO2 100%   BMI 18.41 kg/m²     LVAD  LVAD Type:: Left Ventricular Assist Device (LVAD)  Pump Speed (rpm): 5050  Pump Flow (lpm): 3.3  MAP (mmHg): 76  Pump Pulse Index (PI): 10.3  Pump Power (Villan[place]va): 3.7  Battery Life Checked: Yes  Backup Controller Present: Yes  Driveline Dressing: Changed per order  Outpatient: Yes  MAP in Therapeutic Range (Outpatient): Yes         Bishop Love is a 69 y.o. male with a history of ICM with Heartmate 3 implant date of 09/19/2023. Alarm history reviewed. Please see VAD flow sheet for readings. Frequent PI events noted. One no external power alarm noted, no other adverse alarms noted. Patient states this happened when a staff member unplugged MPU, per  alarm lasted 4 seconds. Settings reviewed, but no changes made.      Patient denied s/s of driveline infection or driveline trauma (including  drops). Denies LVAD alarms at home. See flow sheet for details. Driveline inspected for integrity.  Above reported to provider.     Drive line dressing change completed per policy (dressing from [place] with no gauze under window dressing.)  (dressing supplies used: Sterile drape, Sterile [place] Shield, Sterile 4x4 gauze sponges (10 pack) x2, Sterile 2x2 Split Sponges, Sterile 2x2 Gauze sponges (x2); Sterile Cotton-tipped applicators; Chlorhexidine Gluconate 4% solution, Sterile 0.9% normal saline 100 ml (x2), Sterile gloves, Clean gloves). No signs or symptoms of infection noted.        All orders entered per VORB. Provider requested [place] draw labwork in 2 weeks. All provider 
(APRESOLINE) 100 MG tablet, Take 1 tablet by mouth every 8 hours, Disp: 270 tablet, Rfl: 1    metoprolol succinate (TOPROL XL) 25 MG extended release tablet, Take 1 tablet by mouth in the morning and at bedtime, Disp: 180 tablet, Rfl: 1    vitamin D (VITAMIN D3) 25 MCG (1000 UT) TABS tablet, Take 1 tablet by mouth daily, Disp: 90 tablet, Rfl: 1    magnesium oxide (MAG-OX) 400 MG tablet, Take 1 tablet by mouth daily, Disp: 30 tablet, Rfl: 1    Current Outpatient Medications:     bumetanide (BUMEX) 1 MG tablet, Take 1 tablet by mouth in the morning and 1 tablet in the evening., Disp: 60 tablet, Rfl: 1    cephALEXin (KEFLEX) 500 MG capsule, Take 1 capsule by mouth 2 times daily, Disp: 120 capsule, Rfl: 1    atorvastatin (LIPITOR) 40 MG tablet, Take 2 tablets by mouth daily, Disp: 90 tablet, Rfl: 1    amiodarone (PACERONE) 100 MG tablet, Take 1 tablet by mouth daily, Disp: 30 tablet, Rfl: 1    docusate sodium (COLACE, DULCOLAX) 100 MG CAPS, Take 100 mg by mouth daily for 15 days, Disp: 15 capsule, Rfl: 0    polyethylene glycol (GLYCOLAX) 17 g packet, Take 1 packet by mouth daily as needed for Constipation, Disp: 15 each, Rfl: 0    warfarin (COUMADIN) 1 MG tablet, Take 1 tablet by mouth daily Pt takes 2 mg daily, Disp: 180 tablet, Rfl: 0    amLODIPine (NORVASC) 5 MG tablet, Take 1 tablet by mouth every morning, Disp: 30 tablet, Rfl: 3    aspirin 81 MG EC tablet, Take 1 tablet by mouth daily, Disp: 90 tablet, Rfl: 0    spironolactone (ALDACTONE) 25 MG tablet, TAKE 1 TABLET BY MOUTH DAILY, Disp: 90 tablet, Rfl: 0    pantoprazole (PROTONIX) 40 MG tablet, Take 1 tablet by mouth every morning (before breakfast), Disp: 90 tablet, Rfl: 1    isosorbide dinitrate (ISORDIL) 20 MG tablet, Take 2 tablets by mouth every 8 (eight) hours, Disp: 180 tablet, Rfl: 2    hydrALAZINE (APRESOLINE) 100 MG tablet, Take 1 tablet by mouth every 8 hours, Disp: 270 tablet, Rfl: 1    metoprolol succinate (TOPROL XL) 25 MG extended release tablet,

## 2024-11-05 NOTE — TELEPHONE ENCOUNTER
1612: called [facility] and rehab and spoke with Nurse Kellie. Reviewed med changes from today's visit, provider order for labwork in 2 weeks, and follow up for December 5th. Kellie verbalized understanding. Stated they check patient's Inr twice a week.

## 2024-11-06 ENCOUNTER — OFFICE VISIT (OUTPATIENT)
Facility: CLINIC | Age: 69
End: 2024-11-06

## 2024-11-06 VITALS
OXYGEN SATURATION: 98 % | TEMPERATURE: 98 F | DIASTOLIC BLOOD PRESSURE: 70 MMHG | RESPIRATION RATE: 18 BRPM | SYSTOLIC BLOOD PRESSURE: 130 MMHG | BODY MASS INDEX: 18.45 KG/M2 | HEART RATE: 82 BPM | WEIGHT: 125 LBS

## 2024-11-06 DIAGNOSIS — I10 ESSENTIAL HYPERTENSION: ICD-10-CM

## 2024-11-06 DIAGNOSIS — S91.002S WOUND OF LEFT ANKLE, SEQUELA: ICD-10-CM

## 2024-11-06 DIAGNOSIS — R53.1 WEAKNESS: ICD-10-CM

## 2024-11-06 DIAGNOSIS — R78.81 BACTEREMIA DUE TO METHICILLIN SUSCEPTIBLE STAPHYLOCOCCUS AUREUS (MSSA): ICD-10-CM

## 2024-11-06 DIAGNOSIS — S91.001S ANKLE WOUND, RIGHT, SEQUELA: ICD-10-CM

## 2024-11-06 DIAGNOSIS — Z95.811 LVAD (LEFT VENTRICULAR ASSIST DEVICE) PRESENT (HCC): ICD-10-CM

## 2024-11-06 DIAGNOSIS — I73.9 PVD (PERIPHERAL VASCULAR DISEASE) (HCC): ICD-10-CM

## 2024-11-06 DIAGNOSIS — S46.002S INJURY OF LEFT ROTATOR CUFF, SEQUELA: ICD-10-CM

## 2024-11-06 DIAGNOSIS — I50.20 HEART FAILURE WITH REDUCED EJECTION FRACTION (HCC): ICD-10-CM

## 2024-11-06 DIAGNOSIS — Z79.01 CHRONIC ANTICOAGULATION: ICD-10-CM

## 2024-11-06 DIAGNOSIS — J96.01 ACUTE HYPOXIC RESPIRATORY FAILURE: ICD-10-CM

## 2024-11-06 DIAGNOSIS — I25.10 CORONARY ARTERY DISEASE INVOLVING NATIVE CORONARY ARTERY OF NATIVE HEART WITHOUT ANGINA PECTORIS: ICD-10-CM

## 2024-11-06 DIAGNOSIS — I25.5 ISCHEMIC CARDIOMYOPATHY: Primary | ICD-10-CM

## 2024-11-06 DIAGNOSIS — B95.61 BACTEREMIA DUE TO METHICILLIN SUSCEPTIBLE STAPHYLOCOCCUS AUREUS (MSSA): ICD-10-CM

## 2024-11-06 NOTE — PROGRESS NOTES
DIAGNOSTIC performed by Jania Christopher MD at [facility] ENDOSCOPY    FEMORAL BYPASS Right 08/24/2023    RIGHT FEMORAL POPLITEAL BYPASS WITH POLYTETRAFLUOROETHYLENE performed by Sean Bagrer MD at [facility] MAIN OR    HERNIA REPAIR      INVASIVE VASCULAR N/A 08/29/2023    Ultrasound guided vascular access performed by Sole Allen MD at [facility] CARDIAC CATH LAB    INVASIVE VASCULAR N/A 11/29/2023    Ultrasound guided vascular access performed by Jose Daniel Fields MD at [facility] CARDIAC CATH LAB    INVASIVE VASCULAR N/A 7/31/2024    Ultrasound guided vascular access performed by Jose Daniel Fields MD at [facility] CARDIAC CATH LAB    IR MECHANICAL ART THROMBECTOMY INTRACRANIAL  09/19/2023    IR MECHANICAL ART THROMBECTOMY INTRACRANIAL 9/19/2023 [facility] RAD ANGIO IR    LEFT VENTRICULAR ASSIST DEVICE      PACEMAKER      pacemaker/ICD    UPPER GASTROINTESTINAL ENDOSCOPY N/A 09/15/2023    EGD ESOPHAGOGASTRODUODENOSCOPY performed by Jania Christopher MD at [facility] ENDOSCOPY    UPPER GASTROINTESTINAL ENDOSCOPY N/A 1/15/2024    EGD ESOPHAGOGASTRODUODENOSCOPY, 3 clips placed, one clip fell off after being placed performed by Maryjo Simmons MD at [facility] ENDOSCOPY    VASCULAR SURGERY  2014    aortobifemoral bypass and bilateral femoral above knee popliteal bypass            Vitals:   Vitals:    11/06/24 1635   BP: 130/70   Pulse: 82   Resp: 18   Temp: 98 °F (36.7 °C)   SpO2: 98%           Exam:  Constitutional: No acute distress;   Eyes: Sclera clear, PERRLA;   Ears/nose/mouth/throat:mmm, OP clear, trachea midline;  Cardiovascular: has heartmate 3 LVAD no audible heart beat, MAP 88 , no pulse palpable no edema, no cyanosis;   Respiratory: Clear to auscultation, symmetric, no respiratory distress; saturations on 1LPM 98, removed to room air  Gastrointestinal: Abdomen soft, NT, ND, no masses, normal bowel sounds;  Neurologic: Cranial nerves II through VII grossly intact, no speech or motor deficits A&O in time place and person  Skin: No rash, warm and

## 2024-11-07 ENCOUNTER — TELEPHONE (OUTPATIENT)
Age: 69
End: 2024-11-07

## 2024-11-07 NOTE — TELEPHONE ENCOUNTER
Received call from care manager on [facility] stating she received call from patient's sister Lizandro stating patient's LVAD vest was misplaced and that he needs another one.     1529: left voicemail with patient's sister Lizandro requesting call back to discuss concerns.     0830: Received call from patient's sister Lizandro stating that patient's LVAD shirt was lost during admission. Informed her will Request [facility] staff check lost and found and try to locate shirt, she verbalized understanding.     Notified DILAN Hsu RN who stated she would try to locate shirt for patient.

## 2024-11-08 ENCOUNTER — TELEPHONE (OUTPATIENT)
Age: 69
End: 2024-11-08

## 2024-11-08 ENCOUNTER — OFFICE VISIT (OUTPATIENT)
Facility: CLINIC | Age: 69
End: 2024-11-08

## 2024-11-08 DIAGNOSIS — I25.5 ISCHEMIC CARDIOMYOPATHY: Primary | ICD-10-CM

## 2024-11-08 NOTE — TELEPHONE ENCOUNTER
1500: received call from Stephanie from [facility] and rehab asking to verify LVAD contact information. Reviewed [id number] pager is out of service, reviewed calling [phone] to contact LVAD team during business hours and requesting  page HF provider on call after hours, she verbalized understanding

## 2024-11-10 VITALS
WEIGHT: 125 LBS | RESPIRATION RATE: 18 BRPM | OXYGEN SATURATION: 96 % | TEMPERATURE: 97.2 F | BODY MASS INDEX: 18.45 KG/M2

## 2024-11-10 RX ORDER — WARFARIN SODIUM 3 MG/1
3 TABLET ORAL DAILY
COMMUNITY

## 2024-11-11 ENCOUNTER — OFFICE VISIT (OUTPATIENT)
Facility: CLINIC | Age: 69
End: 2024-11-11
Payer: MEDICARE

## 2024-11-11 VITALS
HEART RATE: 66 BPM | WEIGHT: 114 LBS | OXYGEN SATURATION: 98 % | SYSTOLIC BLOOD PRESSURE: 130 MMHG | RESPIRATION RATE: 17 BRPM | BODY MASS INDEX: 16.83 KG/M2 | DIASTOLIC BLOOD PRESSURE: 70 MMHG | TEMPERATURE: 97.6 F

## 2024-11-11 DIAGNOSIS — U07.1 COVID-19: ICD-10-CM

## 2024-11-11 DIAGNOSIS — S91.001S ANKLE WOUND, RIGHT, SEQUELA: ICD-10-CM

## 2024-11-11 DIAGNOSIS — I10 ESSENTIAL HYPERTENSION: ICD-10-CM

## 2024-11-11 DIAGNOSIS — I73.9 PVD (PERIPHERAL VASCULAR DISEASE) (HCC): ICD-10-CM

## 2024-11-11 DIAGNOSIS — I50.20 HEART FAILURE WITH REDUCED EJECTION FRACTION (HCC): ICD-10-CM

## 2024-11-11 DIAGNOSIS — R53.1 WEAKNESS: ICD-10-CM

## 2024-11-11 DIAGNOSIS — Z95.811 LVAD (LEFT VENTRICULAR ASSIST DEVICE) PRESENT (HCC): ICD-10-CM

## 2024-11-11 DIAGNOSIS — I25.10 CORONARY ARTERY DISEASE INVOLVING NATIVE CORONARY ARTERY OF NATIVE HEART WITHOUT ANGINA PECTORIS: ICD-10-CM

## 2024-11-11 DIAGNOSIS — S46.002S INJURY OF LEFT ROTATOR CUFF, SEQUELA: ICD-10-CM

## 2024-11-11 DIAGNOSIS — S91.002S WOUND OF LEFT ANKLE, SEQUELA: ICD-10-CM

## 2024-11-11 DIAGNOSIS — J96.01 ACUTE HYPOXIC RESPIRATORY FAILURE: ICD-10-CM

## 2024-11-11 DIAGNOSIS — Z79.01 CHRONIC ANTICOAGULATION: ICD-10-CM

## 2024-11-11 DIAGNOSIS — I25.5 ISCHEMIC CARDIOMYOPATHY: Primary | ICD-10-CM

## 2024-11-11 DIAGNOSIS — R78.81 BACTEREMIA DUE TO METHICILLIN SUSCEPTIBLE STAPHYLOCOCCUS AUREUS (MSSA): ICD-10-CM

## 2024-11-11 DIAGNOSIS — B95.61 BACTEREMIA DUE TO METHICILLIN SUSCEPTIBLE STAPHYLOCOCCUS AUREUS (MSSA): ICD-10-CM

## 2024-11-11 PROCEDURE — 99309 SBSQ NF CARE MODERATE MDM 30: CPT | Performed by: NURSE PRACTITIONER

## 2024-11-11 PROCEDURE — 1123F ACP DISCUSS/DSCN MKR DOCD: CPT | Performed by: NURSE PRACTITIONER

## 2024-11-11 RX ORDER — WARFARIN SODIUM 2 MG/1
2 TABLET ORAL
COMMUNITY

## 2024-11-12 NOTE — PROGRESS NOTES
daily  Bumex 1 mg p.o. twice daily  Admit weight 126.9 lbs, current weight 114 lbs -this is significant drop within few days, will check for validity  Has LVAD-HeartMate 3 in place - managed by [facility] team - not candidate for transplant  Has follow up appointment on 11/05/2024 with Leslie Mondragon NP - recommended dose reduction of bumex to 1mg PO BID   4. LVAD (left ventricular assist device) present ([facility])     Followed by advanced heart failure center due to cardiomyopathy  As LVAD-HeartMate 3 in place  Has follow-up appointment on 11/5/2024  Continue drive line dressing changes  11/07/2024 - INR was 1.3, given 10 mg x 1 dose, then increased to 4 mg Monday Wednesday Friday Saturday Sunday and 2 mg on Tuesday Thursday  Last MAP 70  stable (have been running 70-80)   5. PVD (peripheral vascular disease) ([facility])     Was seen by vascular surgery on 10/9/2024  Due to noted comorbidities he is at a prohibitive risk for surgery  Recommended continued wound care and lifetime keflex 500mg PO BID  Poor prognosis for wound, recommended hospice, but patient wasn't interested   6. Acute hypoxic respiratory failure     Currently on 1LPM, saturations are 98%   11/04/2024 - Wean down to 1LPM, wean as able to maintain saturations greater than 92% on room air  Was on room air at home  11/06/2024 - on 1LPM, saturations 98%, change to room air. Change order for 02 to PRN if saturations less than 92%  11/11/2024 - doing well on room air   7. Essential hypertension     MAP  at 72  On multiple medications due to advanced heart failure  Metoprolol 25 mg p.o. twice daily  Hydralazine 100 mg p.o. 3 times daily  Isordil 40 mg p.o. 3 times daily   Aldactone 25 mg p.o. daily, speech 5000, flow 3.8, PI 6  Bumex 1 mg po bid   8. Injury of left rotator cuff, sequela     Imaging during hospitalization showed x-ray of shoulder showing massive left cuff injury likely chronic  Seen by orthopedics and recommended conservative

## 2024-11-13 ENCOUNTER — OFFICE VISIT (OUTPATIENT)
Facility: CLINIC | Age: 69
End: 2024-11-13
Payer: MEDICARE

## 2024-11-13 VITALS
OXYGEN SATURATION: 97 % | WEIGHT: 122.6 LBS | BODY MASS INDEX: 18.1 KG/M2 | TEMPERATURE: 97.7 F | RESPIRATION RATE: 18 BRPM

## 2024-11-13 DIAGNOSIS — I25.10 CORONARY ARTERY DISEASE INVOLVING NATIVE CORONARY ARTERY OF NATIVE HEART WITHOUT ANGINA PECTORIS: ICD-10-CM

## 2024-11-13 DIAGNOSIS — B95.61 BACTEREMIA DUE TO METHICILLIN SUSCEPTIBLE STAPHYLOCOCCUS AUREUS (MSSA): ICD-10-CM

## 2024-11-13 DIAGNOSIS — I10 ESSENTIAL HYPERTENSION: ICD-10-CM

## 2024-11-13 DIAGNOSIS — R53.1 WEAKNESS: ICD-10-CM

## 2024-11-13 DIAGNOSIS — S91.002S WOUND OF LEFT ANKLE, SEQUELA: ICD-10-CM

## 2024-11-13 DIAGNOSIS — I25.5 ISCHEMIC CARDIOMYOPATHY: Primary | ICD-10-CM

## 2024-11-13 DIAGNOSIS — R78.81 BACTEREMIA DUE TO METHICILLIN SUSCEPTIBLE STAPHYLOCOCCUS AUREUS (MSSA): ICD-10-CM

## 2024-11-13 DIAGNOSIS — Z79.01 CHRONIC ANTICOAGULATION: ICD-10-CM

## 2024-11-13 DIAGNOSIS — I73.9 PVD (PERIPHERAL VASCULAR DISEASE) (HCC): ICD-10-CM

## 2024-11-13 DIAGNOSIS — U07.1 COVID-19: ICD-10-CM

## 2024-11-13 DIAGNOSIS — S46.002S INJURY OF LEFT ROTATOR CUFF, SEQUELA: ICD-10-CM

## 2024-11-13 DIAGNOSIS — I50.20 HEART FAILURE WITH REDUCED EJECTION FRACTION (HCC): ICD-10-CM

## 2024-11-13 DIAGNOSIS — S91.001S ANKLE WOUND, RIGHT, SEQUELA: ICD-10-CM

## 2024-11-13 DIAGNOSIS — Z95.811 LVAD (LEFT VENTRICULAR ASSIST DEVICE) PRESENT (HCC): ICD-10-CM

## 2024-11-13 PROCEDURE — 1123F ACP DISCUSS/DSCN MKR DOCD: CPT | Performed by: NURSE PRACTITIONER

## 2024-11-13 PROCEDURE — 99309 SBSQ NF CARE MODERATE MDM 30: CPT | Performed by: NURSE PRACTITIONER

## 2024-11-13 NOTE — PROGRESS NOTES
[facility] [place] [id number]  [address] [place] [address]  Phone: [phone]  Fax: [phone]  Also available via Perfect Serve     PLACE OF SERVICE:  [facility] [address] [place] [address]    SKILLED VISIT    Chief Complaint:   Chief Complaint   Patient presents with    Follow-up         HPI : Bishop Love is a 69 y.o. male here for follow up.    Previous history prior to admission:  Patient was hospitalized from 9/17/2024 to 10/29/2024.  Patient has a past medical history of coronary artery disease MI pacemaker ICD, ischemic cardiomyopathy, chronic heart failure with preserved ejection fraction, long-term anticoagulation on Coumadin, mitral agitation, rheumatic tricuspid regurg, sick sinus syndrome, PVD, right femoral popliteal bypass 8/24/2023, bifemoral bypass and bilateral femoral above-the-knee popliteal bypass.  He presented to the ER with complaints of generalized body rash left shoulder and elbow pain rash and started a week prior.  And had a fall 1 week prior.  Left elbow fracture showed each indeterminant fracture with's bone spur at triceps left shoulder showed a high riding humeral head compatible with chronic massive cuff tear.  Patient has history of heart failure with reduced ejection fraction with chronic LVAD on 9/19/2023.  He is being managed by the chronic heart failure team and is on metoprolol 25 mg p.o. twice daily cannot tolerate ACE ARB or Arni due to severe cough.  He is on hydralazine 100 mg p.o. 3 times daily and Isordil 40 mg p.o. 3 times daily.  He is on Aldactone.  He is not on SGLT2 due to recurrent UTIs.  He continues on oral Bumex 2 mg twice daily.  He is not a candidate for liver transplant due to severe peripheral vascular disease.  He continues on Coumadin he had MSSA bacteremia his wound culture had MRSA MSSA Pseudomonas he was assessed by podiatry and recommended local wound care he is full weightbearing on left

## 2024-11-15 ENCOUNTER — OFFICE VISIT (OUTPATIENT)
Facility: CLINIC | Age: 69
End: 2024-11-15

## 2024-11-15 VITALS
BODY MASS INDEX: 17.14 KG/M2 | OXYGEN SATURATION: 96 % | RESPIRATION RATE: 18 BRPM | TEMPERATURE: 97.4 F | WEIGHT: 116.1 LBS

## 2024-11-15 DIAGNOSIS — I25.10 CORONARY ARTERY DISEASE INVOLVING NATIVE CORONARY ARTERY OF NATIVE HEART WITHOUT ANGINA PECTORIS: ICD-10-CM

## 2024-11-15 DIAGNOSIS — I10 ESSENTIAL HYPERTENSION: ICD-10-CM

## 2024-11-15 DIAGNOSIS — Z79.01 CHRONIC ANTICOAGULATION: ICD-10-CM

## 2024-11-15 DIAGNOSIS — Z95.811 LVAD (LEFT VENTRICULAR ASSIST DEVICE) PRESENT (HCC): ICD-10-CM

## 2024-11-15 DIAGNOSIS — S91.302A NON-HEALING OPEN WOUND OF LEFT HEEL: ICD-10-CM

## 2024-11-15 DIAGNOSIS — R78.81 BACTEREMIA DUE TO METHICILLIN SUSCEPTIBLE STAPHYLOCOCCUS AUREUS (MSSA): ICD-10-CM

## 2024-11-15 DIAGNOSIS — I73.9 PVD (PERIPHERAL VASCULAR DISEASE) (HCC): ICD-10-CM

## 2024-11-15 DIAGNOSIS — R53.1 WEAKNESS: ICD-10-CM

## 2024-11-15 DIAGNOSIS — U07.1 COVID-19: ICD-10-CM

## 2024-11-15 DIAGNOSIS — I25.5 ISCHEMIC CARDIOMYOPATHY: Primary | ICD-10-CM

## 2024-11-15 DIAGNOSIS — B95.61 BACTEREMIA DUE TO METHICILLIN SUSCEPTIBLE STAPHYLOCOCCUS AUREUS (MSSA): ICD-10-CM

## 2024-11-15 DIAGNOSIS — I50.20 HEART FAILURE WITH REDUCED EJECTION FRACTION (HCC): ICD-10-CM

## 2024-11-15 DIAGNOSIS — S46.002S INJURY OF LEFT ROTATOR CUFF, SEQUELA: ICD-10-CM

## 2024-11-15 RX ORDER — SPIRONOLACTONE 25 MG/1
25 TABLET ORAL DAILY
Qty: 30 TABLET | Refills: 10 | OUTPATIENT
Start: 2024-11-15

## 2024-11-15 NOTE — PROGRESS NOTES
recent physical therapy note reviewed from 11/15/2024   13. Covid 19   Patient was screened in routine screening at facility due to outbreak  + 11/11/2024 - only fatigue  Not candidate for paxlovid due to severe interaction with amiodarone  He is high risk for decompensation -sister agreed to starting molnupiravir 800 mg p.o. twice daily  Isolation per facility protocol  Notified LVAD team           YASMIN Toscano - NP

## 2024-11-18 ENCOUNTER — OFFICE VISIT (OUTPATIENT)
Facility: CLINIC | Age: 69
End: 2024-11-18
Payer: MEDICARE

## 2024-11-18 VITALS
RESPIRATION RATE: 17 BRPM | WEIGHT: 124.8 LBS | TEMPERATURE: 97.7 F | OXYGEN SATURATION: 96 % | BODY MASS INDEX: 18.42 KG/M2

## 2024-11-18 DIAGNOSIS — Z95.811 LVAD (LEFT VENTRICULAR ASSIST DEVICE) PRESENT (HCC): ICD-10-CM

## 2024-11-18 DIAGNOSIS — I25.5 ISCHEMIC CARDIOMYOPATHY: Primary | ICD-10-CM

## 2024-11-18 DIAGNOSIS — S91.302A NON-HEALING OPEN WOUND OF LEFT HEEL: ICD-10-CM

## 2024-11-18 DIAGNOSIS — U07.1 COVID-19: ICD-10-CM

## 2024-11-18 DIAGNOSIS — S46.002S INJURY OF LEFT ROTATOR CUFF, SEQUELA: ICD-10-CM

## 2024-11-18 DIAGNOSIS — R78.81 BACTEREMIA DUE TO METHICILLIN SUSCEPTIBLE STAPHYLOCOCCUS AUREUS (MSSA): ICD-10-CM

## 2024-11-18 DIAGNOSIS — I10 ESSENTIAL HYPERTENSION: ICD-10-CM

## 2024-11-18 DIAGNOSIS — I25.10 CORONARY ARTERY DISEASE INVOLVING NATIVE CORONARY ARTERY OF NATIVE HEART WITHOUT ANGINA PECTORIS: ICD-10-CM

## 2024-11-18 DIAGNOSIS — R53.1 WEAKNESS: ICD-10-CM

## 2024-11-18 DIAGNOSIS — B95.61 BACTEREMIA DUE TO METHICILLIN SUSCEPTIBLE STAPHYLOCOCCUS AUREUS (MSSA): ICD-10-CM

## 2024-11-18 DIAGNOSIS — Z79.01 CHRONIC ANTICOAGULATION: ICD-10-CM

## 2024-11-18 DIAGNOSIS — I73.9 PVD (PERIPHERAL VASCULAR DISEASE) (HCC): ICD-10-CM

## 2024-11-18 DIAGNOSIS — I50.20 HEART FAILURE WITH REDUCED EJECTION FRACTION (HCC): ICD-10-CM

## 2024-11-18 PROCEDURE — 99309 SBSQ NF CARE MODERATE MDM 30: CPT | Performed by: NURSE PRACTITIONER

## 2024-11-18 PROCEDURE — 1123F ACP DISCUSS/DSCN MKR DOCD: CPT | Performed by: NURSE PRACTITIONER

## 2024-11-18 NOTE — PROGRESS NOTES
[facility] [place] [id number]  [address] [place] [address]  Phone: [phone]  Fax: [phone]  Also available via Perfect Serve     PLACE OF SERVICE:  [facility] [address] [place] [address]    SKILLED VISIT    Chief Complaint:   Chief Complaint   Patient presents with    Follow-up         HPI : iBshop Love is a 69 y.o. male here for follow up.    Previous history prior to admission:  Patient was hospitalized from 9/17/2024 to 10/29/2024.  Patient has a past medical history of coronary artery disease MI pacemaker ICD, ischemic cardiomyopathy, chronic heart failure with preserved ejection fraction, long-term anticoagulation on Coumadin, mitral agitation, rheumatic tricuspid regurg, sick sinus syndrome, PVD, right femoral popliteal bypass 8/24/2023, bifemoral bypass and bilateral femoral above-the-knee popliteal bypass.  He presented to the ER with complaints of generalized body rash left shoulder and elbow pain rash and started a week prior.  And had a fall 1 week prior.  Left elbow fracture showed each indeterminant fracture with's bone spur at triceps left shoulder showed a high riding humeral head compatible with chronic massive cuff tear.  Patient has history of heart failure with reduced ejection fraction with chronic LVAD on 9/19/2023.  He is being managed by the chronic heart failure team and is on metoprolol 25 mg p.o. twice daily cannot tolerate ACE ARB or Arni due to severe cough.  He is on hydralazine 100 mg p.o. 3 times daily and Isordil 40 mg p.o. 3 times daily.  He is on Aldactone.  He is not on SGLT2 due to recurrent UTIs.  He continues on oral Bumex 2 mg twice daily.  He is not a candidate for liver transplant due to severe peripheral vascular disease.  He continues on Coumadin he had MSSA bacteremia his wound culture had MRSA MSSA Pseudomonas he was assessed by podiatry and recommended local wound care he is full weightbearing on left

## 2024-11-19 DIAGNOSIS — Z79.899 ENCOUNTER FOR MONITORING DIURETIC THERAPY: ICD-10-CM

## 2024-11-19 DIAGNOSIS — I50.22 CHRONIC SYSTOLIC (CONGESTIVE) HEART FAILURE (HCC): ICD-10-CM

## 2024-11-19 DIAGNOSIS — Z51.81 ENCOUNTER FOR MONITORING DIURETIC THERAPY: ICD-10-CM

## 2024-11-19 DIAGNOSIS — Z95.811 LVAD (LEFT VENTRICULAR ASSIST DEVICE) PRESENT (HCC): ICD-10-CM

## 2024-11-19 DIAGNOSIS — Z79.01 CHRONIC ANTICOAGULATION: ICD-10-CM

## 2024-11-20 ENCOUNTER — OFFICE VISIT (OUTPATIENT)
Facility: CLINIC | Age: 69
End: 2024-11-20

## 2024-11-20 VITALS
RESPIRATION RATE: 16 BRPM | TEMPERATURE: 96.4 F | OXYGEN SATURATION: 96 % | BODY MASS INDEX: 17.23 KG/M2 | WEIGHT: 116.7 LBS

## 2024-11-20 DIAGNOSIS — R53.1 WEAKNESS: ICD-10-CM

## 2024-11-20 DIAGNOSIS — S46.002S INJURY OF LEFT ROTATOR CUFF, SEQUELA: ICD-10-CM

## 2024-11-20 DIAGNOSIS — U07.1 COVID-19: ICD-10-CM

## 2024-11-20 DIAGNOSIS — I25.10 CORONARY ARTERY DISEASE INVOLVING NATIVE CORONARY ARTERY OF NATIVE HEART WITHOUT ANGINA PECTORIS: ICD-10-CM

## 2024-11-20 DIAGNOSIS — S91.302A NON-HEALING OPEN WOUND OF LEFT HEEL: ICD-10-CM

## 2024-11-20 DIAGNOSIS — I73.9 PVD (PERIPHERAL VASCULAR DISEASE) (HCC): ICD-10-CM

## 2024-11-20 DIAGNOSIS — Z95.811 LVAD (LEFT VENTRICULAR ASSIST DEVICE) PRESENT (HCC): ICD-10-CM

## 2024-11-20 DIAGNOSIS — I25.5 ISCHEMIC CARDIOMYOPATHY: Primary | ICD-10-CM

## 2024-11-20 DIAGNOSIS — I10 ESSENTIAL HYPERTENSION: ICD-10-CM

## 2024-11-20 DIAGNOSIS — B95.61 BACTEREMIA DUE TO METHICILLIN SUSCEPTIBLE STAPHYLOCOCCUS AUREUS (MSSA): ICD-10-CM

## 2024-11-20 DIAGNOSIS — Z79.01 CHRONIC ANTICOAGULATION: ICD-10-CM

## 2024-11-20 DIAGNOSIS — R78.81 BACTEREMIA DUE TO METHICILLIN SUSCEPTIBLE STAPHYLOCOCCUS AUREUS (MSSA): ICD-10-CM

## 2024-11-20 DIAGNOSIS — I50.20 HEART FAILURE WITH REDUCED EJECTION FRACTION (HCC): ICD-10-CM

## 2024-11-20 NOTE — PROGRESS NOTES
5.5 INSERTION, HUDSON BY DR QUEZADA performed by Callum Loco MD at [facility] MAIN OR    CARDIAC SURGERY N/A 09/19/2023    LEFT VENTRICULAR ASSIST DEVICE (LVAD) IMPLANTATION;TEMPORARY RIGHT AXILLARY LVAD REMOVAL (IMPELLA); HUDSON & EPIAORTIC US BY DR. CADENA performed by Jose Francisco Del Toro MD at [facility] OPEN HEART    CARDIAC VALVE SURGERY  2017    COLONOSCOPY N/A 09/15/2023    COLONOSCOPY DIAGNOSTIC performed by Jania Christopher MD at [facility] ENDOSCOPY    FEMORAL BYPASS Right 08/24/2023    RIGHT FEMORAL POPLITEAL BYPASS WITH POLYTETRAFLUOROETHYLENE performed by Sean Barger MD at [facility] MAIN OR    HERNIA REPAIR      INVASIVE VASCULAR N/A 08/29/2023    Ultrasound guided vascular access performed by Sole Allen MD at [facility] CARDIAC CATH LAB    INVASIVE VASCULAR N/A 11/29/2023    Ultrasound guided vascular access performed by Jose Daniel Fields MD at [facility] CARDIAC CATH LAB    INVASIVE VASCULAR N/A 7/31/2024    Ultrasound guided vascular access performed by Jose Daniel Fields MD at [facility] CARDIAC CATH LAB    IR MECHANICAL ART THROMBECTOMY INTRACRANIAL  09/19/2023    IR MECHANICAL ART THROMBECTOMY INTRACRANIAL 9/19/2023 [facility] RAD ANGIO IR    LEFT VENTRICULAR ASSIST DEVICE      PACEMAKER      pacemaker/ICD    UPPER GASTROINTESTINAL ENDOSCOPY N/A 09/15/2023    EGD ESOPHAGOGASTRODUODENOSCOPY performed by Jania Christopher MD at [facility] ENDOSCOPY    UPPER GASTROINTESTINAL ENDOSCOPY N/A 1/15/2024    EGD ESOPHAGOGASTRODUODENOSCOPY, 3 clips placed, one clip fell off after being placed performed by Maryjo Simmons MD at [facility] ENDOSCOPY    VASCULAR SURGERY  2014    aortobifemoral bypass and bilateral femoral above knee popliteal bypass            Vitals:   Vitals:    11/20/24 1132   Resp: 16   Temp: (!) 96.4 °F (35.8 °C)   SpO2: 96%           Exam:  Constitutional: No acute distress;   Eyes: Sclera clear, PERRLA;   Ears/nose/mouth/throat:mmm, OP clear, trachea midline;  Cardiovascular: has heartmate 3 LVAD no audible heart beat, MAP 82  , no pulse

## 2024-11-21 ENCOUNTER — TELEPHONE (OUTPATIENT)
Age: 69
End: 2024-11-21

## 2024-11-21 NOTE — TELEPHONE ENCOUNTER
0950: voicemail received from patient's sister Lizandro stating patient's skin is getting dry and flakey again, and that she believes this could be from his diuretic, inquired if [facility] team thinks diruetic would cause dry skin.     1509: returned call to patient's sister. Informed her that diuretic alone would likely not cause dry skin, reviewed providers at [facility] are seeing patient and may be making changes to medications, encouraged her to reach out to [facility] staff to inquire about recent med changes, she vebrilized understanding .

## 2024-11-22 ENCOUNTER — OFFICE VISIT (OUTPATIENT)
Facility: CLINIC | Age: 69
End: 2024-11-22

## 2024-11-22 VITALS
OXYGEN SATURATION: 98 % | TEMPERATURE: 97.7 F | BODY MASS INDEX: 17.71 KG/M2 | RESPIRATION RATE: 18 BRPM | WEIGHT: 120 LBS

## 2024-11-22 DIAGNOSIS — B95.61 BACTEREMIA DUE TO METHICILLIN SUSCEPTIBLE STAPHYLOCOCCUS AUREUS (MSSA): ICD-10-CM

## 2024-11-22 DIAGNOSIS — I25.5 ISCHEMIC CARDIOMYOPATHY: Primary | ICD-10-CM

## 2024-11-22 DIAGNOSIS — I25.10 CORONARY ARTERY DISEASE INVOLVING NATIVE CORONARY ARTERY OF NATIVE HEART WITHOUT ANGINA PECTORIS: ICD-10-CM

## 2024-11-22 DIAGNOSIS — Z79.01 CHRONIC ANTICOAGULATION: ICD-10-CM

## 2024-11-22 DIAGNOSIS — R78.81 BACTEREMIA DUE TO METHICILLIN SUSCEPTIBLE STAPHYLOCOCCUS AUREUS (MSSA): ICD-10-CM

## 2024-11-22 DIAGNOSIS — I50.20 HEART FAILURE WITH REDUCED EJECTION FRACTION (HCC): ICD-10-CM

## 2024-11-22 DIAGNOSIS — I10 ESSENTIAL HYPERTENSION: ICD-10-CM

## 2024-11-22 DIAGNOSIS — S46.002S INJURY OF LEFT ROTATOR CUFF, SEQUELA: ICD-10-CM

## 2024-11-22 DIAGNOSIS — I73.9 PVD (PERIPHERAL VASCULAR DISEASE) (HCC): ICD-10-CM

## 2024-11-22 DIAGNOSIS — R53.1 WEAKNESS: ICD-10-CM

## 2024-11-22 DIAGNOSIS — U07.1 COVID-19: ICD-10-CM

## 2024-11-22 DIAGNOSIS — Z95.811 LVAD (LEFT VENTRICULAR ASSIST DEVICE) PRESENT (HCC): ICD-10-CM

## 2024-11-22 DIAGNOSIS — S91.302A NON-HEALING OPEN WOUND OF LEFT HEEL: ICD-10-CM

## 2024-11-22 NOTE — PROGRESS NOTES
as needed for Constipation 15 each 0    amLODIPine (NORVASC) 5 MG tablet Take 1 tablet by mouth every morning 30 tablet 3    aspirin 81 MG EC tablet Take 1 tablet by mouth daily 90 tablet 0    spironolactone (ALDACTONE) 25 MG tablet TAKE 1 TABLET BY MOUTH DAILY 90 tablet 0    pantoprazole (PROTONIX) 40 MG tablet Take 1 tablet by mouth every morning (before breakfast) 90 tablet 1    isosorbide dinitrate (ISORDIL) 20 MG tablet Take 2 tablets by mouth every 8 (eight) hours 180 tablet 2    hydrALAZINE (APRESOLINE) 100 MG tablet Take 1 tablet by mouth every 8 hours 270 tablet 1    metoprolol succinate (TOPROL XL) 25 MG extended release tablet Take 1 tablet by mouth in the morning and at bedtime 180 tablet 1    vitamin D (VITAMIN D3) 25 MCG (1000 UT) TABS tablet Take 1 tablet by mouth daily 90 tablet 1    magnesium oxide (MAG-OX) 400 MG tablet Take 1 tablet by mouth daily 30 tablet 1     No current facility-administered medications for this visit.        Assessment/Plans:    Diagnosis Orders   1. Ischemic cardiomyopathy     Followed by advanced heart failure center due to cardiomyopathy  Continue metoprolol 25 mg p.o. twice daily  Hydralazine 100 mg p.o. p.o. 3 times daily  Isordil 40 mg p.o. 3 times daily  Aldactone 25 mg p.o. daily  Bumex 1 mg p.o. BID  Has LVAD-HeartMate 3 in place - managed by [facility]  Has follow up appointment on 11/05/2024 with Leslie Mondragon NP - recommended dose reduction of bumex to 1mg PO BID  Follow up scheduled 12/05/2024   2. Coronary artery disease involving native coronary artery of native heart without angina pectoris     No chest pain or angina  Continue current medications   3. Heart failure with reduced ejection fraction (HCC)     Followed by [facility] heart failure center due to cardiomyopathy  Continue metoprolol 25 mg p.o. twice daily  Hydralazine 100 mg p.o. p.o. 3 times daily  Isordil 40 mg p.o. 3 times daily  Aldactone 25 mg p.o. daily  Bumex 1 mg p.o.

## 2024-11-25 ENCOUNTER — TELEPHONE (OUTPATIENT)
Age: 69
End: 2024-11-25

## 2024-11-25 ENCOUNTER — OFFICE VISIT (OUTPATIENT)
Facility: CLINIC | Age: 69
End: 2024-11-25
Payer: MEDICARE

## 2024-11-25 VITALS
BODY MASS INDEX: 17.06 KG/M2 | OXYGEN SATURATION: 98 % | WEIGHT: 115.6 LBS | RESPIRATION RATE: 18 BRPM | TEMPERATURE: 97.9 F

## 2024-11-25 DIAGNOSIS — B95.61 BACTEREMIA DUE TO METHICILLIN SUSCEPTIBLE STAPHYLOCOCCUS AUREUS (MSSA): ICD-10-CM

## 2024-11-25 DIAGNOSIS — Z95.811 LVAD (LEFT VENTRICULAR ASSIST DEVICE) PRESENT (HCC): ICD-10-CM

## 2024-11-25 DIAGNOSIS — I50.20 HEART FAILURE WITH REDUCED EJECTION FRACTION (HCC): ICD-10-CM

## 2024-11-25 DIAGNOSIS — Z79.01 CHRONIC ANTICOAGULATION: ICD-10-CM

## 2024-11-25 DIAGNOSIS — I25.10 CORONARY ARTERY DISEASE INVOLVING NATIVE CORONARY ARTERY OF NATIVE HEART WITHOUT ANGINA PECTORIS: ICD-10-CM

## 2024-11-25 DIAGNOSIS — R53.1 WEAKNESS: ICD-10-CM

## 2024-11-25 DIAGNOSIS — R78.81 BACTEREMIA DUE TO METHICILLIN SUSCEPTIBLE STAPHYLOCOCCUS AUREUS (MSSA): ICD-10-CM

## 2024-11-25 DIAGNOSIS — I10 ESSENTIAL HYPERTENSION: ICD-10-CM

## 2024-11-25 DIAGNOSIS — I73.9 PVD (PERIPHERAL VASCULAR DISEASE) (HCC): ICD-10-CM

## 2024-11-25 DIAGNOSIS — S91.302A NON-HEALING OPEN WOUND OF LEFT HEEL: ICD-10-CM

## 2024-11-25 DIAGNOSIS — S46.002S INJURY OF LEFT ROTATOR CUFF, SEQUELA: ICD-10-CM

## 2024-11-25 DIAGNOSIS — I25.5 ISCHEMIC CARDIOMYOPATHY: Primary | ICD-10-CM

## 2024-11-25 PROCEDURE — 99309 SBSQ NF CARE MODERATE MDM 30: CPT | Performed by: NURSE PRACTITIONER

## 2024-11-25 PROCEDURE — 1123F ACP DISCUSS/DSCN MKR DOCD: CPT | Performed by: NURSE PRACTITIONER

## 2024-11-25 NOTE — TELEPHONE ENCOUNTER
0945: received call from patient requesting to verify when his appointment with [facility] is. Informed patient appointment is 12/05 at 1pm. Patient inquired about transportation, informed patient that [place] staff should set that up for him, he verbalized understanding.     no nausea/no vomiting/no diarrhea/no constipation

## 2024-11-25 NOTE — PROGRESS NOTES
[facility] [place] [address] [place] [address]  Phone: [phone]  Fax: [phone]  Also available via Perfect Serve     PLACE OF SERVICE:  [facility] [address] [place] [address]    SKILLED VISIT    Chief Complaint:   Chief Complaint   Patient presents with    Other     30 DAY RECERTIFICATION         HPI : Bishop Love is a 69 y.o. male here for follow up.    Previous history prior to admission:  Patient was hospitalized from 9/17/2024 to 10/29/2024.  Patient has a past medical history of coronary artery disease MI pacemaker ICD, ischemic cardiomyopathy, chronic heart failure with preserved ejection fraction, long-term anticoagulation on Coumadin, mitral agitation, rheumatic tricuspid regurg, sick sinus syndrome, PVD, right femoral popliteal bypass 8/24/2023, bifemoral bypass and bilateral femoral above-the-knee popliteal bypass.  He presented to the ER with complaints of generalized body rash left shoulder and elbow pain rash and started a week prior.  And had a fall 1 week prior.  Left elbow fracture showed each indeterminant fracture with's bone spur at triceps left shoulder showed a high riding humeral head compatible with chronic massive cuff tear.  Patient has history of heart failure with reduced ejection fraction with chronic LVAD on 9/19/2023.  He is being managed by the chronic heart failure team and is on metoprolol 25 mg p.o. twice daily cannot tolerate ACE ARB or Arni due to severe cough.  He is on hydralazine 100 mg p.o. 3 times daily and Isordil 40 mg p.o. 3 times daily.  He is on Aldactone.  He is not on SGLT2 due to recurrent UTIs.  He continues on oral Bumex 2 mg twice daily.  He is not a candidate for liver transplant due to severe peripheral vascular disease.  He continues on Coumadin he had MSSA bacteremia his wound culture had MRSA MSSA Pseudomonas he was assessed by podiatry and recommended local wound care he is full

## 2024-11-26 ENCOUNTER — TELEPHONE (OUTPATIENT)
Age: 69
End: 2024-11-26

## 2024-11-26 NOTE — TELEPHONE ENCOUNTER
LVAD F/U Appointment Coordination with [facility].     1:01pm: LESLIE spoke with [facility] LESLIE Kristen, [phone] about pt's upcoming 12/5/24 1:00pm LVAD Clinic appointment. Karin explained that she will give the appointment details to [facility] Transportation  for coordination of transportation. Kristen explained that [facility] and [facility]  will call LVAD SW back to confirm that transport has been arranged for this 12/5 1pm appointment.     Plan: LESLIE will f/u with [facility] SW and/or Transportation  regarding transportation coordination for pt's 12/5 LVAD Clinic appointment.     Jelena Rae LCSW  Clinical   [facility]  Advanced Heart Failure  [phone]

## 2024-11-27 ENCOUNTER — OFFICE VISIT (OUTPATIENT)
Facility: CLINIC | Age: 69
End: 2024-11-27

## 2024-11-27 ENCOUNTER — HOSPITAL ENCOUNTER (INPATIENT)
Facility: HOSPITAL | Age: 69
LOS: 5 days | Discharge: NURSING, REHAB OR LONG-TERM CARE | DRG: 602 | End: 2024-12-02
Attending: EMERGENCY MEDICINE | Admitting: HOSPITALIST
Payer: MEDICARE

## 2024-11-27 VITALS
HEART RATE: 82 BPM | WEIGHT: 116.3 LBS | RESPIRATION RATE: 18 BRPM | BODY MASS INDEX: 17.17 KG/M2 | TEMPERATURE: 97.2 F | OXYGEN SATURATION: 98 %

## 2024-11-27 DIAGNOSIS — R53.1 WEAKNESS: ICD-10-CM

## 2024-11-27 DIAGNOSIS — S46.002S INJURY OF LEFT ROTATOR CUFF, SEQUELA: ICD-10-CM

## 2024-11-27 DIAGNOSIS — Z95.811 LVAD (LEFT VENTRICULAR ASSIST DEVICE) PRESENT (HCC): ICD-10-CM

## 2024-11-27 DIAGNOSIS — R79.89 ELEVATED LACTIC ACID LEVEL: ICD-10-CM

## 2024-11-27 DIAGNOSIS — I50.20 HEART FAILURE WITH REDUCED EJECTION FRACTION (HCC): ICD-10-CM

## 2024-11-27 DIAGNOSIS — B95.61 BACTEREMIA DUE TO METHICILLIN SUSCEPTIBLE STAPHYLOCOCCUS AUREUS (MSSA): ICD-10-CM

## 2024-11-27 DIAGNOSIS — S91.302A NON-HEALING OPEN WOUND OF LEFT HEEL: ICD-10-CM

## 2024-11-27 DIAGNOSIS — I25.10 CORONARY ARTERY DISEASE INVOLVING NATIVE CORONARY ARTERY OF NATIVE HEART WITHOUT ANGINA PECTORIS: ICD-10-CM

## 2024-11-27 DIAGNOSIS — I25.5 ISCHEMIC CARDIOMYOPATHY: Primary | ICD-10-CM

## 2024-11-27 DIAGNOSIS — I73.9 PVD (PERIPHERAL VASCULAR DISEASE) (HCC): ICD-10-CM

## 2024-11-27 DIAGNOSIS — Z79.01 CHRONIC ANTICOAGULATION: ICD-10-CM

## 2024-11-27 DIAGNOSIS — D72.829 LEUKOCYTOSIS, UNSPECIFIED TYPE: ICD-10-CM

## 2024-11-27 DIAGNOSIS — S81.802D OPEN LEG WOUND, LEFT, SUBSEQUENT ENCOUNTER: Primary | ICD-10-CM

## 2024-11-27 DIAGNOSIS — R78.81 BACTEREMIA DUE TO METHICILLIN SUSCEPTIBLE STAPHYLOCOCCUS AUREUS (MSSA): ICD-10-CM

## 2024-11-27 PROBLEM — L08.9 LEFT FOOT INFECTION: Status: ACTIVE | Noted: 2024-11-27

## 2024-11-27 LAB
ALBUMIN SERPL-MCNC: 3.5 G/DL (ref 3.5–5)
ALBUMIN/GLOB SERPL: 0.6 (ref 1.1–2.2)
ALP SERPL-CCNC: 209 U/L (ref 45–117)
ALT SERPL-CCNC: 56 U/L (ref 12–78)
ANION GAP SERPL CALC-SCNC: 7 MMOL/L (ref 2–12)
AST SERPL-CCNC: 48 U/L (ref 15–37)
BASOPHILS # BLD: 0.1 K/UL (ref 0–0.1)
BASOPHILS NFR BLD: 1 % (ref 0–1)
BILIRUB SERPL-MCNC: 0.5 MG/DL (ref 0.2–1)
BUN SERPL-MCNC: 56 MG/DL (ref 6–20)
BUN/CREAT SERPL: 37 (ref 12–20)
CALCIUM SERPL-MCNC: 9.3 MG/DL (ref 8.5–10.1)
CHLORIDE SERPL-SCNC: 97 MMOL/L (ref 97–108)
CO2 SERPL-SCNC: 29 MMOL/L (ref 21–32)
CREAT SERPL-MCNC: 1.51 MG/DL (ref 0.7–1.3)
DIFFERENTIAL METHOD BLD: ABNORMAL
EOSINOPHIL # BLD: 2.5 K/UL (ref 0–0.4)
EOSINOPHIL NFR BLD: 18 % (ref 0–7)
ERYTHROCYTE [DISTWIDTH] IN BLOOD BY AUTOMATED COUNT: 14.4 % (ref 11.5–14.5)
GLOBULIN SER CALC-MCNC: 6.1 G/DL (ref 2–4)
GLUCOSE BLD STRIP.AUTO-MCNC: 101 MG/DL (ref 65–117)
GLUCOSE SERPL-MCNC: 117 MG/DL (ref 65–100)
HCT VFR BLD AUTO: 38.4 % (ref 36.6–50.3)
HGB BLD-MCNC: 12.9 G/DL (ref 12.1–17)
IMM GRANULOCYTES # BLD AUTO: 0.1 K/UL (ref 0–0.04)
IMM GRANULOCYTES NFR BLD AUTO: 1 % (ref 0–0.5)
INR PPP: 2.1 (ref 0.9–1.1)
LACTATE SERPL-SCNC: 2.2 MMOL/L (ref 0.4–2)
LYMPHOCYTES # BLD: 1.9 K/UL (ref 0.8–3.5)
LYMPHOCYTES NFR BLD: 14 % (ref 12–49)
MCH RBC QN AUTO: 30.3 PG (ref 26–34)
MCHC RBC AUTO-ENTMCNC: 33.6 G/DL (ref 30–36.5)
MCV RBC AUTO: 90.1 FL (ref 80–99)
MONOCYTES # BLD: 1.2 K/UL (ref 0–1)
MONOCYTES NFR BLD: 9 % (ref 5–13)
NEUTS SEG # BLD: 7.9 K/UL (ref 1.8–8)
NEUTS SEG NFR BLD: 57 % (ref 32–75)
NRBC # BLD: 0 K/UL (ref 0–0.01)
NRBC BLD-RTO: 0 PER 100 WBC
PLATELET # BLD AUTO: 372 K/UL (ref 150–400)
PMV BLD AUTO: 12.1 FL (ref 8.9–12.9)
POTASSIUM SERPL-SCNC: 3.9 MMOL/L (ref 3.5–5.1)
PROT SERPL-MCNC: 9.6 G/DL (ref 6.4–8.2)
PROTHROMBIN TIME: 21 SEC (ref 9–11.1)
RBC # BLD AUTO: 4.26 M/UL (ref 4.1–5.7)
RBC MORPH BLD: ABNORMAL
SERVICE CMNT-IMP: NORMAL
SODIUM SERPL-SCNC: 133 MMOL/L (ref 136–145)
WBC # BLD AUTO: 13.7 K/UL (ref 4.1–11.1)

## 2024-11-27 PROCEDURE — 99223 1ST HOSP IP/OBS HIGH 75: CPT | Performed by: INTERNAL MEDICINE

## 2024-11-27 PROCEDURE — 6370000000 HC RX 637 (ALT 250 FOR IP): Performed by: HOSPITALIST

## 2024-11-27 PROCEDURE — 87040 BLOOD CULTURE FOR BACTERIA: CPT

## 2024-11-27 PROCEDURE — 6370000000 HC RX 637 (ALT 250 FOR IP): Performed by: EMERGENCY MEDICINE

## 2024-11-27 PROCEDURE — 6360000002 HC RX W HCPCS: Performed by: HOSPITALIST

## 2024-11-27 PROCEDURE — P9047 ALBUMIN (HUMAN), 25%, 50ML: HCPCS | Performed by: HOSPITALIST

## 2024-11-27 PROCEDURE — 82962 GLUCOSE BLOOD TEST: CPT

## 2024-11-27 PROCEDURE — 80053 COMPREHEN METABOLIC PANEL: CPT

## 2024-11-27 PROCEDURE — 99285 EMERGENCY DEPT VISIT HI MDM: CPT

## 2024-11-27 PROCEDURE — 6370000000 HC RX 637 (ALT 250 FOR IP): Performed by: INTERNAL MEDICINE

## 2024-11-27 PROCEDURE — 83605 ASSAY OF LACTIC ACID: CPT

## 2024-11-27 PROCEDURE — 85610 PROTHROMBIN TIME: CPT

## 2024-11-27 PROCEDURE — 2580000003 HC RX 258: Performed by: HOSPITALIST

## 2024-11-27 PROCEDURE — 2060000000 HC ICU INTERMEDIATE R&B

## 2024-11-27 PROCEDURE — 99222 1ST HOSP IP/OBS MODERATE 55: CPT | Performed by: NURSE PRACTITIONER

## 2024-11-27 PROCEDURE — 85025 COMPLETE CBC W/AUTO DIFF WBC: CPT

## 2024-11-27 PROCEDURE — 36415 COLL VENOUS BLD VENIPUNCTURE: CPT

## 2024-11-27 RX ORDER — AMLODIPINE BESYLATE 5 MG/1
5 TABLET ORAL EVERY MORNING
Status: DISCONTINUED | OUTPATIENT
Start: 2024-11-28 | End: 2024-12-02 | Stop reason: HOSPADM

## 2024-11-27 RX ORDER — ACETAMINOPHEN 650 MG/1
650 SUPPOSITORY RECTAL EVERY 6 HOURS PRN
Status: DISCONTINUED | OUTPATIENT
Start: 2024-11-27 | End: 2024-12-02 | Stop reason: HOSPADM

## 2024-11-27 RX ORDER — HYDRALAZINE HYDROCHLORIDE 50 MG/1
100 TABLET, FILM COATED ORAL EVERY 8 HOURS SCHEDULED
Status: DISCONTINUED | OUTPATIENT
Start: 2024-11-27 | End: 2024-12-02 | Stop reason: HOSPADM

## 2024-11-27 RX ORDER — LANOLIN ALCOHOL/MO/W.PET/CERES
400 CREAM (GRAM) TOPICAL DAILY
Status: DISCONTINUED | OUTPATIENT
Start: 2024-11-28 | End: 2024-12-02 | Stop reason: HOSPADM

## 2024-11-27 RX ORDER — INSULIN LISPRO 100 [IU]/ML
0-4 INJECTION, SOLUTION INTRAVENOUS; SUBCUTANEOUS
Status: DISCONTINUED | OUTPATIENT
Start: 2024-11-27 | End: 2024-11-30

## 2024-11-27 RX ORDER — SPIRONOLACTONE 25 MG/1
25 TABLET ORAL DAILY
Status: DISCONTINUED | OUTPATIENT
Start: 2024-11-27 | End: 2024-12-01

## 2024-11-27 RX ORDER — AMMONIUM LACTATE 12 G/100G
CREAM TOPICAL 2 TIMES DAILY
Status: DISCONTINUED | OUTPATIENT
Start: 2024-11-27 | End: 2024-12-02 | Stop reason: HOSPADM

## 2024-11-27 RX ORDER — POLYETHYLENE GLYCOL 3350 17 G/17G
17 POWDER, FOR SOLUTION ORAL DAILY PRN
Status: DISCONTINUED | OUTPATIENT
Start: 2024-11-27 | End: 2024-12-02 | Stop reason: HOSPADM

## 2024-11-27 RX ORDER — ONDANSETRON 4 MG/1
4 TABLET, ORALLY DISINTEGRATING ORAL EVERY 8 HOURS PRN
Status: DISCONTINUED | OUTPATIENT
Start: 2024-11-27 | End: 2024-12-02 | Stop reason: HOSPADM

## 2024-11-27 RX ORDER — POTASSIUM CHLORIDE 7.45 MG/ML
10 INJECTION INTRAVENOUS PRN
Status: DISCONTINUED | OUTPATIENT
Start: 2024-11-27 | End: 2024-12-02 | Stop reason: HOSPADM

## 2024-11-27 RX ORDER — MAGNESIUM OXIDE 400 MG/1
400 TABLET ORAL DAILY
Status: DISCONTINUED | OUTPATIENT
Start: 2024-11-27 | End: 2024-11-27 | Stop reason: SDUPTHER

## 2024-11-27 RX ORDER — WARFARIN SODIUM 4 MG/1
4 TABLET ORAL
Status: DISCONTINUED | OUTPATIENT
Start: 2024-11-27 | End: 2024-11-27

## 2024-11-27 RX ORDER — ALBUMIN (HUMAN) 12.5 G/50ML
25 SOLUTION INTRAVENOUS EVERY 6 HOURS
Status: COMPLETED | OUTPATIENT
Start: 2024-11-27 | End: 2024-11-28

## 2024-11-27 RX ORDER — VITAMIN B COMPLEX
1000 TABLET ORAL DAILY
Status: DISCONTINUED | OUTPATIENT
Start: 2024-11-28 | End: 2024-12-02 | Stop reason: HOSPADM

## 2024-11-27 RX ORDER — BUMETANIDE 1 MG/1
1 TABLET ORAL 2 TIMES DAILY
Status: DISCONTINUED | OUTPATIENT
Start: 2024-11-27 | End: 2024-12-01

## 2024-11-27 RX ORDER — ATORVASTATIN CALCIUM 40 MG/1
80 TABLET, FILM COATED ORAL DAILY
Status: DISCONTINUED | OUTPATIENT
Start: 2024-11-28 | End: 2024-12-02 | Stop reason: HOSPADM

## 2024-11-27 RX ORDER — PANTOPRAZOLE SODIUM 40 MG/1
40 TABLET, DELAYED RELEASE ORAL
Status: DISCONTINUED | OUTPATIENT
Start: 2024-11-28 | End: 2024-12-02 | Stop reason: HOSPADM

## 2024-11-27 RX ORDER — METOPROLOL SUCCINATE 25 MG/1
25 TABLET, EXTENDED RELEASE ORAL 2 TIMES DAILY
Status: DISCONTINUED | OUTPATIENT
Start: 2024-11-27 | End: 2024-12-02 | Stop reason: HOSPADM

## 2024-11-27 RX ORDER — DEXTROSE MONOHYDRATE 100 MG/ML
INJECTION, SOLUTION INTRAVENOUS CONTINUOUS PRN
Status: DISCONTINUED | OUTPATIENT
Start: 2024-11-27 | End: 2024-12-02 | Stop reason: HOSPADM

## 2024-11-27 RX ORDER — POLYETHYLENE GLYCOL 3350 17 G/17G
17 POWDER, FOR SOLUTION ORAL DAILY PRN
Status: DISCONTINUED | OUTPATIENT
Start: 2024-11-27 | End: 2024-11-27

## 2024-11-27 RX ORDER — ACETAMINOPHEN 325 MG/1
650 TABLET ORAL EVERY 6 HOURS PRN
Status: DISCONTINUED | OUTPATIENT
Start: 2024-11-27 | End: 2024-12-02 | Stop reason: HOSPADM

## 2024-11-27 RX ORDER — SODIUM CHLORIDE 0.9 % (FLUSH) 0.9 %
5-40 SYRINGE (ML) INJECTION EVERY 12 HOURS SCHEDULED
Status: DISCONTINUED | OUTPATIENT
Start: 2024-11-27 | End: 2024-12-02 | Stop reason: HOSPADM

## 2024-11-27 RX ORDER — ONDANSETRON 2 MG/ML
4 INJECTION INTRAMUSCULAR; INTRAVENOUS EVERY 6 HOURS PRN
Status: DISCONTINUED | OUTPATIENT
Start: 2024-11-27 | End: 2024-12-02 | Stop reason: HOSPADM

## 2024-11-27 RX ORDER — ISOSORBIDE DINITRATE 20 MG/1
40 TABLET ORAL EVERY 8 HOURS
Status: DISCONTINUED | OUTPATIENT
Start: 2024-11-27 | End: 2024-12-02 | Stop reason: HOSPADM

## 2024-11-27 RX ORDER — POTASSIUM CHLORIDE 750 MG/1
40 TABLET, EXTENDED RELEASE ORAL PRN
Status: DISCONTINUED | OUTPATIENT
Start: 2024-11-27 | End: 2024-12-02 | Stop reason: HOSPADM

## 2024-11-27 RX ORDER — MAGNESIUM SULFATE IN WATER 40 MG/ML
2000 INJECTION, SOLUTION INTRAVENOUS PRN
Status: DISCONTINUED | OUTPATIENT
Start: 2024-11-27 | End: 2024-12-02 | Stop reason: HOSPADM

## 2024-11-27 RX ORDER — SODIUM CHLORIDE 9 MG/ML
INJECTION, SOLUTION INTRAVENOUS PRN
Status: DISCONTINUED | OUTPATIENT
Start: 2024-11-27 | End: 2024-12-02 | Stop reason: HOSPADM

## 2024-11-27 RX ORDER — SODIUM CHLORIDE 0.9 % (FLUSH) 0.9 %
5-40 SYRINGE (ML) INJECTION PRN
Status: DISCONTINUED | OUTPATIENT
Start: 2024-11-27 | End: 2024-12-02 | Stop reason: HOSPADM

## 2024-11-27 RX ORDER — WARFARIN SODIUM 4 MG/1
4 TABLET ORAL
Status: COMPLETED | OUTPATIENT
Start: 2024-11-27 | End: 2024-11-27

## 2024-11-27 RX ORDER — LEVOFLOXACIN 500 MG/1
500 TABLET, FILM COATED ORAL
Status: COMPLETED | OUTPATIENT
Start: 2024-11-27 | End: 2024-11-27

## 2024-11-27 RX ORDER — AMIODARONE HYDROCHLORIDE 200 MG/1
100 TABLET ORAL DAILY
Status: DISCONTINUED | OUTPATIENT
Start: 2024-11-28 | End: 2024-12-02 | Stop reason: HOSPADM

## 2024-11-27 RX ORDER — ASPIRIN 81 MG/1
81 TABLET ORAL DAILY
Status: DISCONTINUED | OUTPATIENT
Start: 2024-11-28 | End: 2024-12-02 | Stop reason: HOSPADM

## 2024-11-27 RX ADMIN — SODIUM CHLORIDE, PRESERVATIVE FREE 10 ML: 5 INJECTION INTRAVENOUS at 22:23

## 2024-11-27 RX ADMIN — HYDRALAZINE HYDROCHLORIDE 100 MG: 50 TABLET ORAL at 18:27

## 2024-11-27 RX ADMIN — WARFARIN SODIUM 4 MG: 4 TABLET ORAL at 22:22

## 2024-11-27 RX ADMIN — Medication: at 23:40

## 2024-11-27 RX ADMIN — ALBUMIN (HUMAN) 25 G: 0.25 INJECTION, SOLUTION INTRAVENOUS at 23:27

## 2024-11-27 RX ADMIN — ISOSORBIDE DINITRATE 40 MG: 20 TABLET ORAL at 23:40

## 2024-11-27 RX ADMIN — HYDRALAZINE HYDROCHLORIDE 100 MG: 50 TABLET ORAL at 22:22

## 2024-11-27 RX ADMIN — LEVOFLOXACIN 500 MG: 500 TABLET, FILM COATED ORAL at 14:24

## 2024-11-27 RX ADMIN — METOPROLOL SUCCINATE 25 MG: 25 TABLET, EXTENDED RELEASE ORAL at 22:22

## 2024-11-27 RX ADMIN — WATER 2000 MG: 1 INJECTION INTRAMUSCULAR; INTRAVENOUS; SUBCUTANEOUS at 18:30

## 2024-11-27 RX ADMIN — ALBUMIN (HUMAN) 25 G: 0.25 INJECTION, SOLUTION INTRAVENOUS at 18:38

## 2024-11-27 RX ADMIN — ISOSORBIDE DINITRATE 40 MG: 20 TABLET ORAL at 18:28

## 2024-11-27 ASSESSMENT — PAIN DESCRIPTION - ORIENTATION: ORIENTATION: LEFT

## 2024-11-27 ASSESSMENT — PAIN SCALES - GENERAL
PAINLEVEL_OUTOF10: 9
PAINLEVEL_OUTOF10: 0
PAINLEVEL_OUTOF10: 0

## 2024-11-27 ASSESSMENT — PAIN DESCRIPTION - LOCATION: LOCATION: FOOT

## 2024-11-27 ASSESSMENT — PAIN - FUNCTIONAL ASSESSMENT: PAIN_FUNCTIONAL_ASSESSMENT: 0-10

## 2024-11-27 NOTE — PROGRESS NOTES
[facility] [place] [address] [place] [address]  Phone: [phone]  Fax: [phone]  Also available via Perfect Serve     PLACE OF SERVICE:  [facility] [address] [place] [address]    SKILLED VISIT    Chief Complaint:   Chief Complaint   Patient presents with    Follow-up         HPI : Bishop Love is a 69 y.o. male here for follow up.    Previous history prior to admission:  Patient was hospitalized from 9/17/2024 to 10/29/2024.  Patient has a past medical history of coronary artery disease MI pacemaker ICD, ischemic cardiomyopathy, chronic heart failure with preserved ejection fraction, long-term anticoagulation on Coumadin, mitral agitation, rheumatic tricuspid regurg, sick sinus syndrome, PVD, right femoral popliteal bypass 8/24/2023, bifemoral bypass and bilateral femoral above-the-knee popliteal bypass.  He presented to the ER with complaints of generalized body rash left shoulder and elbow pain rash and started a week prior.  And had a fall 1 week prior.  Left elbow fracture showed each indeterminant fracture with's bone spur at triceps left shoulder showed a high riding humeral head compatible with chronic massive cuff tear.  Patient has history of heart failure with reduced ejection fraction with chronic LVAD on 9/19/2023.  He is being managed by the chronic heart failure team and is on metoprolol 25 mg p.o. twice daily cannot tolerate ACE ARB or Arni due to severe cough.  He is on hydralazine 100 mg p.o. 3 times daily and Isordil 40 mg p.o. 3 times daily.  He is on Aldactone.  He is not on SGLT2 due to recurrent UTIs.  He continues on oral Bumex 2 mg twice daily.  He is not a candidate for liver transplant due to severe peripheral vascular disease.  He continues on Coumadin he had MSSA bacteremia his wound culture had MRSA MSSA Pseudomonas he was assessed by podiatry and recommended local wound care he is full weightbearing on left

## 2024-11-27 NOTE — ED PROVIDER NOTES
[facility] EMERGENCY DEP  EMERGENCY DEPARTMENT ENCOUNTER      Pt Name: Bishop Love  MRN: [id number]  Birthdate 1955  Date of evaluation: 11/27/2024  Provider: Ahmet Roy MD    CHIEF COMPLAINT       Chief Complaint   Patient presents with    Wound Check         HISTORY OF PRESENT ILLNESS   (Location/Symptom, Timing/Onset, Context/Setting, Quality, Duration, Modifying Factors, Severity)  Note limiting factors.    69-year-old male presents for reevaluation of the large open lower leg wound on the left side.  He has a history of LVAD and ongoing open wound with varying degrees of infection from time to time.  There is concern today for worsening infection of the leg.  No fevers.  No vomiting.  No problems with the LVAD.    The history is provided by the patient and the nursing home.   Wound Check   He was treated in the ED 10 to 14 days ago. Prior ED Treatment: Ongoing local wound. Treatments since wound repair include antibiotic ointment use. There has been no drainage from the wound. The redness has improved. The swelling has not changed. The pain has not changed.         Review of External Medical Records:     Nursing Notes were reviewed.    REVIEW OF SYSTEMS    (2-9 systems for level 4, 10 or more for level 5)     Review of Systems   Constitutional:  Negative for fatigue and fever.   HENT:  Negative for ear pain, rhinorrhea and sore throat.    Eyes:  Negative for pain and visual disturbance.   Respiratory:  Negative for cough and shortness of breath.    Cardiovascular:  Negative for chest pain.   Gastrointestinal:  Negative for abdominal pain, diarrhea, nausea and vomiting.   Genitourinary:  Negative for dysuria.   Musculoskeletal:  Negative for arthralgias, back pain and joint swelling.   Skin:  Negative for color change and rash.   Neurological:  Negative for dizziness, syncope and numbness.   Psychiatric/Behavioral:  Negative for agitation, behavioral problems, confusion and decreased concentration.

## 2024-11-27 NOTE — ED NOTES
TRANSFER - OUT REPORT:    Verbal report given to JAKOB Spears on Bishop Love  being transferred to [facility] for routine progression of patient care       Report consisted of patient's Situation, Background, Assessment and   Recommendations(SBAR).     Information from the following report(s) Nurse Handoff Report was reviewed with the receiving nurse.    Kinder Fall Assessment:                           Lines:   Peripheral IV 11/27/24 Left Antecubital (Active)   Site Assessment Clean, dry & intact 11/27/24 1232   Line Status Normal saline locked;Blood return noted 11/27/24 1232   Phlebitis Assessment No symptoms 11/27/24 1232   Infiltration Assessment 0 11/27/24 1232   Dressing Status Clean, dry & intact 11/27/24 1232   Dressing Type Transparent 11/27/24 1232   Dressing Intervention New 11/27/24 1232        Opportunity for questions and clarification was provided.      Patient transported with:  Monitor and Registered Nurse

## 2024-11-27 NOTE — ED TRIAGE NOTES
Patient arrives via EMS from [facility]. He has had an LVAD since last Sept. He has been dealing with foot/leg wounds since 2020. He came in b/c of worsening foot l. wound. + warfarin,

## 2024-11-27 NOTE — CONSULTS
Infectious Disease Consult Note    Assessment / Plan:       70 y/o male with MSSA LLE wound infection with possible Pseudomonas co-infection too of LLE wound with chronic infection not a candidate for source control admitted for leukocytosis otherwise quite asymptomatic.    Cefepime for 48 hrs pending blood cultures x2 sets.    If blood cultures no growth and patient otherwise asymptomatic, will need to transition back to PO Keflex lifelong suppression.    Further workup pending re-evaluations of blood cultures, WBC.    Amlactin 12% topical BID for significant xerosis of b/l feet.    Watch for diarrhea - if ongoing will need stool testing for C diff.          Reason for Consult: Leukocytosis  Date of Consultation: November 27, 2024  Date of Admission: 11/27/2024  Referring Physician: Dr. Amezcua    HPI:    70 y/o male with CHF s/p LVAD 9/2023 requiring thrombectomy post-operatively, AICD, prior right MCA CVA, CAD with MI, hematochezia s/p EGD 1/2024, PVD with critical limb ischemia to RLE s/p fem to fem PTFE bypass graft 2/2023 seen recently @ [facility] from 9/17-10/29/24 where ID evaluated patient for MSSA bacteremia.  TTE negative and noted to have infected LLE wound with polymicrobial celestine including anaerobes, MSSA, and Pseudomonas sensitive to Cefepime on superficial swab.  Treated with prolonged Cefepime course and then transitioned to lifelong Keflex 500mg PO BID suppressive therapy.  Not deemed a surgical candidate for LLE amputation per vascular surgery given comorbidities and subsequently discharged once respiratory status improved to [facility].    Was doing OK @ rehab and contacted today as labs with leukocytosis WBC 11-->13 despite being on Keflex suppression.  Denies fever, chills, and feels much better overall then previously.  No driveline-related complaints.  Tolerating PO diet.      Past Medical History:  Past Medical History:   Diagnosis Date    Atherosclerosis of native arteries of extremities with

## 2024-11-27 NOTE — H&P
History and Physical    Date of Service:  11/27/2024  Primary Care Provider: Freddy Chandler MD  Source of information: The patient and Chart review    Chief Complaint: Wound Check      History of Presenting Illness:   Bishop Love is a 69 y.o. male PMH significant for advanced heart failure status post HeartMate 3 LVAD 9/19/2023 chronic left foot infection with recent MSSA bacteremia, MRSA and Pseudomonas wound infection came to the ED transferred from SNF for leukocytosis.  Patient was transferred to the ED for expeditious workup as cultures and necessary testing could not be done in the SNF.  Advanced heart failure team was notified.  Patient had recent extended hospitalization from 9/17 - 10/29/2024 when he was treated with MSSA bacteremia, polymicrobial MDR left foot infection and was discharged on lifelong suppressive antibiotics with Keflex.  He was evaluated by ID who felt this unlikely to clear the infection and patient is not a candidate for surgical measures.  During my exam other than some pain on the foot patient does not have any complaints.      The patient denies any headache, blurry vision, sore throat, trouble swallowing, trouble with speech, chest pain, SOB, cough, fever, chills, N/V/D, abd pain, urinary symptoms, constipation, recent travels, sick contacts, focal or generalized neurological symptoms, falls, injuries, rashes, contact with COVID-19 diagnosed patients, hematemesis, melena, hemoptysis, hematuria, rashes, denies starting any new medications and denies any other concerns or problems besides as mentioned above.         REVIEW OF SYSTEMS:  A comprehensive review of systems was negative except for that written in the History of Present Illness.     Past Medical History:   Diagnosis Date    Atherosclerosis of native arteries of extremities with intermittent claudication, bilateral legs (HCC)     CAD (coronary artery disease)     dr christina shen- [facility] cardiology

## 2024-11-28 LAB
ANION GAP SERPL CALC-SCNC: 8 MMOL/L (ref 2–12)
APPEARANCE UR: CLEAR
BACTERIA URNS QL MICRO: NEGATIVE /HPF
BASOPHILS # BLD: 0 K/UL (ref 0–0.1)
BASOPHILS NFR BLD: 0 % (ref 0–1)
BILIRUB UR QL: NEGATIVE
BUN SERPL-MCNC: 54 MG/DL (ref 6–20)
BUN/CREAT SERPL: 39 (ref 12–20)
CALCIUM SERPL-MCNC: 9.2 MG/DL (ref 8.5–10.1)
CHLORIDE SERPL-SCNC: 99 MMOL/L (ref 97–108)
CO2 SERPL-SCNC: 27 MMOL/L (ref 21–32)
COLOR UR: ABNORMAL
CREAT SERPL-MCNC: 1.38 MG/DL (ref 0.7–1.3)
DIFFERENTIAL METHOD BLD: ABNORMAL
EOSINOPHIL # BLD: 3.1 K/UL (ref 0–0.4)
EOSINOPHIL NFR BLD: 22 % (ref 0–7)
EPITH CASTS URNS QL MICRO: ABNORMAL /LPF
ERYTHROCYTE [DISTWIDTH] IN BLOOD BY AUTOMATED COUNT: 13.9 % (ref 11.5–14.5)
GLUCOSE BLD STRIP.AUTO-MCNC: 130 MG/DL (ref 65–117)
GLUCOSE BLD STRIP.AUTO-MCNC: 141 MG/DL (ref 65–117)
GLUCOSE BLD STRIP.AUTO-MCNC: 165 MG/DL (ref 65–117)
GLUCOSE BLD STRIP.AUTO-MCNC: 93 MG/DL (ref 65–117)
GLUCOSE SERPL-MCNC: 101 MG/DL (ref 65–100)
GLUCOSE UR STRIP.AUTO-MCNC: NEGATIVE MG/DL
HCT VFR BLD AUTO: 30.7 % (ref 36.6–50.3)
HGB BLD-MCNC: 10 G/DL (ref 12.1–17)
HGB UR QL STRIP: NEGATIVE
HYALINE CASTS URNS QL MICRO: ABNORMAL /LPF (ref 0–5)
IMM GRANULOCYTES # BLD AUTO: 0 K/UL
IMM GRANULOCYTES NFR BLD AUTO: 0 %
INR PPP: 2 (ref 0.9–1.1)
KETONES UR QL STRIP.AUTO: NEGATIVE MG/DL
LACTATE SERPL-SCNC: 1.9 MMOL/L (ref 0.4–2)
LACTATE SERPL-SCNC: 2.4 MMOL/L (ref 0.4–2)
LDH SERPL L TO P-CCNC: 357 U/L (ref 85–241)
LEUKOCYTE ESTERASE UR QL STRIP.AUTO: ABNORMAL
LYMPHOCYTES # BLD: 2.2 K/UL (ref 0.8–3.5)
LYMPHOCYTES NFR BLD: 16 % (ref 12–49)
MCH RBC QN AUTO: 29.9 PG (ref 26–34)
MCHC RBC AUTO-ENTMCNC: 32.6 G/DL (ref 30–36.5)
MCV RBC AUTO: 91.9 FL (ref 80–99)
MONOCYTES # BLD: 2.1 K/UL (ref 0–1)
MONOCYTES NFR BLD: 15 % (ref 5–13)
NEUTS SEG # BLD: 6.6 K/UL (ref 1.8–8)
NEUTS SEG NFR BLD: 47 % (ref 32–75)
NITRITE UR QL STRIP.AUTO: NEGATIVE
NRBC # BLD: 0 K/UL (ref 0–0.01)
NRBC BLD-RTO: 0 PER 100 WBC
NT PRO BNP: 1151 PG/ML
PATH REV BLD -IMP: ABNORMAL
PH UR STRIP: 5.5 (ref 5–8)
PLATELET # BLD AUTO: 327 K/UL (ref 150–400)
PMV BLD AUTO: 10.3 FL (ref 8.9–12.9)
POTASSIUM SERPL-SCNC: 3.8 MMOL/L (ref 3.5–5.1)
PROT UR STRIP-MCNC: NEGATIVE MG/DL
PROTHROMBIN TIME: 20.1 SEC (ref 9–11.1)
RBC # BLD AUTO: 3.34 M/UL (ref 4.1–5.7)
RBC #/AREA URNS HPF: ABNORMAL /HPF (ref 0–5)
RBC MORPH BLD: ABNORMAL
SERVICE CMNT-IMP: ABNORMAL
SERVICE CMNT-IMP: NORMAL
SODIUM SERPL-SCNC: 134 MMOL/L (ref 136–145)
SP GR UR REFRACTOMETRY: 1.02 (ref 1–1.03)
UROBILINOGEN UR QL STRIP.AUTO: 0.2 EU/DL (ref 0.2–1)
WBC # BLD AUTO: 14 K/UL (ref 4.1–11.1)
WBC MORPH BLD: ABNORMAL
WBC URNS QL MICRO: ABNORMAL /HPF (ref 0–4)

## 2024-11-28 PROCEDURE — 85025 COMPLETE CBC W/AUTO DIFF WBC: CPT

## 2024-11-28 PROCEDURE — 81001 URINALYSIS AUTO W/SCOPE: CPT

## 2024-11-28 PROCEDURE — 99232 SBSQ HOSP IP/OBS MODERATE 35: CPT | Performed by: NURSE PRACTITIONER

## 2024-11-28 PROCEDURE — P9047 ALBUMIN (HUMAN), 25%, 50ML: HCPCS | Performed by: HOSPITALIST

## 2024-11-28 PROCEDURE — 93750 INTERROGATION VAD IN PERSON: CPT | Performed by: NURSE PRACTITIONER

## 2024-11-28 PROCEDURE — 6370000000 HC RX 637 (ALT 250 FOR IP): Performed by: HOSPITALIST

## 2024-11-28 PROCEDURE — 85610 PROTHROMBIN TIME: CPT

## 2024-11-28 PROCEDURE — 2580000003 HC RX 258: Performed by: HOSPITALIST

## 2024-11-28 PROCEDURE — 6360000002 HC RX W HCPCS: Performed by: HOSPITALIST

## 2024-11-28 PROCEDURE — 82962 GLUCOSE BLOOD TEST: CPT

## 2024-11-28 PROCEDURE — 83880 ASSAY OF NATRIURETIC PEPTIDE: CPT

## 2024-11-28 PROCEDURE — 83605 ASSAY OF LACTIC ACID: CPT

## 2024-11-28 PROCEDURE — 2060000000 HC ICU INTERMEDIATE R&B

## 2024-11-28 PROCEDURE — 83615 LACTATE (LD) (LDH) ENZYME: CPT

## 2024-11-28 PROCEDURE — 36415 COLL VENOUS BLD VENIPUNCTURE: CPT

## 2024-11-28 PROCEDURE — 80048 BASIC METABOLIC PNL TOTAL CA: CPT

## 2024-11-28 PROCEDURE — 6370000000 HC RX 637 (ALT 250 FOR IP): Performed by: INTERNAL MEDICINE

## 2024-11-28 RX ORDER — WARFARIN SODIUM 1 MG/1
2 TABLET ORAL
Status: COMPLETED | OUTPATIENT
Start: 2024-11-28 | End: 2024-11-28

## 2024-11-28 RX ADMIN — ACETAMINOPHEN 650 MG: 325 TABLET ORAL at 06:13

## 2024-11-28 RX ADMIN — SODIUM CHLORIDE 450 ML: 9 INJECTION, SOLUTION INTRAVENOUS at 16:00

## 2024-11-28 RX ADMIN — AMIODARONE HYDROCHLORIDE 100 MG: 200 TABLET ORAL at 09:52

## 2024-11-28 RX ADMIN — SODIUM CHLORIDE, PRESERVATIVE FREE 10 ML: 5 INJECTION INTRAVENOUS at 21:39

## 2024-11-28 RX ADMIN — HYDRALAZINE HYDROCHLORIDE 100 MG: 50 TABLET ORAL at 21:30

## 2024-11-28 RX ADMIN — ALBUMIN (HUMAN) 25 G: 0.25 INJECTION, SOLUTION INTRAVENOUS at 09:51

## 2024-11-28 RX ADMIN — Medication: at 21:38

## 2024-11-28 RX ADMIN — WARFARIN SODIUM 2 MG: 1 TABLET ORAL at 17:50

## 2024-11-28 RX ADMIN — Medication: at 09:53

## 2024-11-28 RX ADMIN — CEFEPIME 2000 MG: 2 INJECTION, POWDER, FOR SOLUTION INTRAVENOUS at 16:01

## 2024-11-28 RX ADMIN — HYDRALAZINE HYDROCHLORIDE 100 MG: 50 TABLET ORAL at 15:54

## 2024-11-28 RX ADMIN — ISOSORBIDE DINITRATE 40 MG: 20 TABLET ORAL at 06:13

## 2024-11-28 RX ADMIN — PANTOPRAZOLE SODIUM 40 MG: 40 TABLET, DELAYED RELEASE ORAL at 06:13

## 2024-11-28 RX ADMIN — Medication 1000 UNITS: at 09:52

## 2024-11-28 RX ADMIN — SODIUM CHLORIDE, PRESERVATIVE FREE 10 ML: 5 INJECTION INTRAVENOUS at 09:53

## 2024-11-28 RX ADMIN — HYDRALAZINE HYDROCHLORIDE 100 MG: 50 TABLET ORAL at 06:13

## 2024-11-28 RX ADMIN — AMLODIPINE BESYLATE 5 MG: 5 TABLET ORAL at 09:52

## 2024-11-28 RX ADMIN — METOPROLOL SUCCINATE 25 MG: 25 TABLET, EXTENDED RELEASE ORAL at 09:52

## 2024-11-28 RX ADMIN — ATORVASTATIN CALCIUM 80 MG: 40 TABLET, FILM COATED ORAL at 09:52

## 2024-11-28 RX ADMIN — ISOSORBIDE DINITRATE 40 MG: 20 TABLET ORAL at 17:50

## 2024-11-28 RX ADMIN — METOPROLOL SUCCINATE 25 MG: 25 TABLET, EXTENDED RELEASE ORAL at 21:30

## 2024-11-28 RX ADMIN — MAGNESIUM OXIDE TAB 400 MG (241.3 MG ELEMENTAL MG) 400 MG: 400 (241.3 MG) TAB at 09:52

## 2024-11-28 RX ADMIN — ALBUMIN (HUMAN) 25 G: 0.25 INJECTION, SOLUTION INTRAVENOUS at 03:55

## 2024-11-28 RX ADMIN — ASPIRIN 81 MG: 81 TABLET, COATED ORAL at 09:52

## 2024-11-28 RX ADMIN — ACETAMINOPHEN 650 MG: 325 TABLET ORAL at 19:10

## 2024-11-28 ASSESSMENT — PAIN SCALES - GENERAL
PAINLEVEL_OUTOF10: 5
PAINLEVEL_OUTOF10: 0
PAINLEVEL_OUTOF10: 0
PAINLEVEL_OUTOF10: 10

## 2024-11-28 ASSESSMENT — PAIN DESCRIPTION - LOCATION
LOCATION: FOOT
LOCATION: FOOT

## 2024-11-28 ASSESSMENT — PAIN DESCRIPTION - ORIENTATION
ORIENTATION: LEFT
ORIENTATION: LEFT

## 2024-11-28 ASSESSMENT — PAIN DESCRIPTION - DESCRIPTORS
DESCRIPTORS: ACHING
DESCRIPTORS: ACHING

## 2024-11-29 LAB
ANION GAP SERPL CALC-SCNC: 9 MMOL/L (ref 2–12)
BASOPHILS # BLD: 0.1 K/UL (ref 0–0.1)
BASOPHILS NFR BLD: 1 % (ref 0–1)
BUN SERPL-MCNC: 42 MG/DL (ref 6–20)
BUN/CREAT SERPL: 35 (ref 12–20)
CALCIUM SERPL-MCNC: 9.1 MG/DL (ref 8.5–10.1)
CHLORIDE SERPL-SCNC: 101 MMOL/L (ref 97–108)
CO2 SERPL-SCNC: 23 MMOL/L (ref 21–32)
CREAT SERPL-MCNC: 1.19 MG/DL (ref 0.7–1.3)
DIFFERENTIAL METHOD BLD: ABNORMAL
EOSINOPHIL # BLD: 3 K/UL (ref 0–0.4)
EOSINOPHIL NFR BLD: 27 % (ref 0–7)
ERYTHROCYTE [DISTWIDTH] IN BLOOD BY AUTOMATED COUNT: 14 % (ref 11.5–14.5)
GLUCOSE BLD STRIP.AUTO-MCNC: 105 MG/DL (ref 65–117)
GLUCOSE BLD STRIP.AUTO-MCNC: 106 MG/DL (ref 65–117)
GLUCOSE BLD STRIP.AUTO-MCNC: 107 MG/DL (ref 65–117)
GLUCOSE BLD STRIP.AUTO-MCNC: 110 MG/DL (ref 65–117)
GLUCOSE SERPL-MCNC: 121 MG/DL (ref 65–100)
HCT VFR BLD AUTO: 31.4 % (ref 36.6–50.3)
HGB BLD-MCNC: 10.2 G/DL (ref 12.1–17)
IMM GRANULOCYTES # BLD AUTO: 0.2 K/UL (ref 0–0.04)
IMM GRANULOCYTES NFR BLD AUTO: 2 % (ref 0–0.5)
INR PPP: 2.3 (ref 0.9–1.1)
LACTATE SERPL-SCNC: 1.4 MMOL/L (ref 0.4–2)
LACTATE SERPL-SCNC: 2.2 MMOL/L (ref 0.4–2)
LDH SERPL L TO P-CCNC: 311 U/L (ref 85–241)
LYMPHOCYTES # BLD: 1.6 K/UL (ref 0.8–3.5)
LYMPHOCYTES NFR BLD: 14 % (ref 12–49)
MCH RBC QN AUTO: 30.1 PG (ref 26–34)
MCHC RBC AUTO-ENTMCNC: 32.5 G/DL (ref 30–36.5)
MCV RBC AUTO: 92.6 FL (ref 80–99)
MONOCYTES # BLD: 1 K/UL (ref 0–1)
MONOCYTES NFR BLD: 9 % (ref 5–13)
NEUTS SEG # BLD: 5.3 K/UL (ref 1.8–8)
NEUTS SEG NFR BLD: 47 % (ref 32–75)
NRBC # BLD: 0 K/UL (ref 0–0.01)
NRBC BLD-RTO: 0 PER 100 WBC
NT PRO BNP: 1943 PG/ML
PLATELET # BLD AUTO: 298 K/UL (ref 150–400)
PMV BLD AUTO: 9.6 FL (ref 8.9–12.9)
POTASSIUM SERPL-SCNC: 3.9 MMOL/L (ref 3.5–5.1)
PROTHROMBIN TIME: 22.9 SEC (ref 9–11.1)
RBC # BLD AUTO: 3.39 M/UL (ref 4.1–5.7)
RBC MORPH BLD: ABNORMAL
SERVICE CMNT-IMP: NORMAL
SODIUM SERPL-SCNC: 133 MMOL/L (ref 136–145)
WBC # BLD AUTO: 11.2 K/UL (ref 4.1–11.1)

## 2024-11-29 PROCEDURE — 6360000002 HC RX W HCPCS: Performed by: HOSPITALIST

## 2024-11-29 PROCEDURE — 97165 OT EVAL LOW COMPLEX 30 MIN: CPT

## 2024-11-29 PROCEDURE — 2580000003 HC RX 258: Performed by: HOSPITALIST

## 2024-11-29 PROCEDURE — 83605 ASSAY OF LACTIC ACID: CPT

## 2024-11-29 PROCEDURE — 99231 SBSQ HOSP IP/OBS SF/LOW 25: CPT | Performed by: NURSE PRACTITIONER

## 2024-11-29 PROCEDURE — 6370000000 HC RX 637 (ALT 250 FOR IP): Performed by: HOSPITALIST

## 2024-11-29 PROCEDURE — 6360000002 HC RX W HCPCS: Performed by: INTERNAL MEDICINE

## 2024-11-29 PROCEDURE — 6370000000 HC RX 637 (ALT 250 FOR IP): Performed by: NURSE PRACTITIONER

## 2024-11-29 PROCEDURE — 2580000003 HC RX 258: Performed by: INTERNAL MEDICINE

## 2024-11-29 PROCEDURE — 93750 INTERROGATION VAD IN PERSON: CPT | Performed by: NURSE PRACTITIONER

## 2024-11-29 PROCEDURE — 99232 SBSQ HOSP IP/OBS MODERATE 35: CPT | Performed by: INTERNAL MEDICINE

## 2024-11-29 PROCEDURE — 97161 PT EVAL LOW COMPLEX 20 MIN: CPT

## 2024-11-29 PROCEDURE — 83880 ASSAY OF NATRIURETIC PEPTIDE: CPT

## 2024-11-29 PROCEDURE — 36415 COLL VENOUS BLD VENIPUNCTURE: CPT

## 2024-11-29 PROCEDURE — 85610 PROTHROMBIN TIME: CPT

## 2024-11-29 PROCEDURE — 2500000003 HC RX 250 WO HCPCS: Performed by: INTERNAL MEDICINE

## 2024-11-29 PROCEDURE — 2060000000 HC ICU INTERMEDIATE R&B

## 2024-11-29 PROCEDURE — 6370000000 HC RX 637 (ALT 250 FOR IP): Performed by: INTERNAL MEDICINE

## 2024-11-29 PROCEDURE — 82962 GLUCOSE BLOOD TEST: CPT

## 2024-11-29 PROCEDURE — 85025 COMPLETE CBC W/AUTO DIFF WBC: CPT

## 2024-11-29 PROCEDURE — 83615 LACTATE (LD) (LDH) ENZYME: CPT

## 2024-11-29 PROCEDURE — 97530 THERAPEUTIC ACTIVITIES: CPT

## 2024-11-29 PROCEDURE — 97535 SELF CARE MNGMENT TRAINING: CPT

## 2024-11-29 PROCEDURE — 80048 BASIC METABOLIC PNL TOTAL CA: CPT

## 2024-11-29 RX ORDER — OXYCODONE HYDROCHLORIDE 5 MG/1
5 TABLET ORAL EVERY 6 HOURS PRN
Status: DISCONTINUED | OUTPATIENT
Start: 2024-11-29 | End: 2024-12-02 | Stop reason: HOSPADM

## 2024-11-29 RX ORDER — SENNA AND DOCUSATE SODIUM 50; 8.6 MG/1; MG/1
2 TABLET, FILM COATED ORAL DAILY PRN
Status: DISCONTINUED | OUTPATIENT
Start: 2024-11-29 | End: 2024-12-02 | Stop reason: HOSPADM

## 2024-11-29 RX ORDER — WARFARIN SODIUM 4 MG/1
4 TABLET ORAL
Status: COMPLETED | OUTPATIENT
Start: 2024-11-29 | End: 2024-11-29

## 2024-11-29 RX ORDER — CASTOR OIL AND BALSAM, PERU 788; 87 MG/G; MG/G
OINTMENT TOPICAL 2 TIMES DAILY
Status: DISCONTINUED | OUTPATIENT
Start: 2024-11-29 | End: 2024-12-02 | Stop reason: HOSPADM

## 2024-11-29 RX ADMIN — Medication: at 08:23

## 2024-11-29 RX ADMIN — AMIODARONE HYDROCHLORIDE 100 MG: 200 TABLET ORAL at 08:22

## 2024-11-29 RX ADMIN — PANTOPRAZOLE SODIUM 40 MG: 40 TABLET, DELAYED RELEASE ORAL at 06:57

## 2024-11-29 RX ADMIN — OXYCODONE 5 MG: 5 TABLET ORAL at 22:35

## 2024-11-29 RX ADMIN — ASPIRIN 81 MG: 81 TABLET, COATED ORAL at 08:22

## 2024-11-29 RX ADMIN — Medication: at 22:09

## 2024-11-29 RX ADMIN — AMLODIPINE BESYLATE 5 MG: 5 TABLET ORAL at 08:22

## 2024-11-29 RX ADMIN — ISOSORBIDE DINITRATE 40 MG: 20 TABLET ORAL at 22:14

## 2024-11-29 RX ADMIN — METOPROLOL SUCCINATE 25 MG: 25 TABLET, EXTENDED RELEASE ORAL at 08:22

## 2024-11-29 RX ADMIN — METOPROLOL SUCCINATE 25 MG: 25 TABLET, EXTENDED RELEASE ORAL at 22:14

## 2024-11-29 RX ADMIN — ISOSORBIDE DINITRATE 40 MG: 20 TABLET ORAL at 00:14

## 2024-11-29 RX ADMIN — HYDRALAZINE HYDROCHLORIDE 100 MG: 50 TABLET ORAL at 06:57

## 2024-11-29 RX ADMIN — HYDRALAZINE HYDROCHLORIDE 100 MG: 50 TABLET ORAL at 22:14

## 2024-11-29 RX ADMIN — SODIUM CHLORIDE, PRESERVATIVE FREE 10 ML: 5 INJECTION INTRAVENOUS at 22:21

## 2024-11-29 RX ADMIN — SODIUM CHLORIDE, PRESERVATIVE FREE 10 ML: 5 INJECTION INTRAVENOUS at 08:24

## 2024-11-29 RX ADMIN — CEFEPIME 2000 MG: 2 INJECTION, POWDER, FOR SOLUTION INTRAVENOUS at 15:21

## 2024-11-29 RX ADMIN — WARFARIN SODIUM 4 MG: 4 TABLET ORAL at 17:45

## 2024-11-29 RX ADMIN — ACETAMINOPHEN 650 MG: 325 TABLET ORAL at 12:42

## 2024-11-29 RX ADMIN — ATORVASTATIN CALCIUM 80 MG: 40 TABLET, FILM COATED ORAL at 08:22

## 2024-11-29 RX ADMIN — COLLAGENASE SANTYL: 250 OINTMENT TOPICAL at 17:45

## 2024-11-29 RX ADMIN — MAGNESIUM OXIDE TAB 400 MG (241.3 MG ELEMENTAL MG) 400 MG: 400 (241.3 MG) TAB at 08:22

## 2024-11-29 RX ADMIN — ISOSORBIDE DINITRATE 40 MG: 20 TABLET ORAL at 08:14

## 2024-11-29 RX ADMIN — ACETAMINOPHEN 650 MG: 325 TABLET ORAL at 00:19

## 2024-11-29 RX ADMIN — HYDROMORPHONE HYDROCHLORIDE 0.5 MG: 1 INJECTION, SOLUTION INTRAMUSCULAR; INTRAVENOUS; SUBCUTANEOUS at 15:31

## 2024-11-29 RX ADMIN — Medication: at 22:19

## 2024-11-29 RX ADMIN — Medication 1000 UNITS: at 08:23

## 2024-11-29 RX ADMIN — FAMOTIDINE 20 MG: 10 INJECTION, SOLUTION INTRAVENOUS at 11:16

## 2024-11-29 RX ADMIN — ISOSORBIDE DINITRATE 40 MG: 20 TABLET ORAL at 15:20

## 2024-11-29 ASSESSMENT — PAIN DESCRIPTION - DESCRIPTORS
DESCRIPTORS: THROBBING
DESCRIPTORS: THROBBING
DESCRIPTORS: ACHING

## 2024-11-29 ASSESSMENT — PAIN DESCRIPTION - ORIENTATION
ORIENTATION: LEFT

## 2024-11-29 ASSESSMENT — PAIN DESCRIPTION - LOCATION
LOCATION: FOOT

## 2024-11-29 ASSESSMENT — PAIN SCALES - GENERAL
PAINLEVEL_OUTOF10: 8
PAINLEVEL_OUTOF10: 9
PAINLEVEL_OUTOF10: 0
PAINLEVEL_OUTOF10: 10
PAINLEVEL_OUTOF10: 8
PAINLEVEL_OUTOF10: 0
PAINLEVEL_OUTOF10: 10
PAINLEVEL_OUTOF10: 8
PAINLEVEL_OUTOF10: 3
PAINLEVEL_OUTOF10: 9

## 2024-11-29 NOTE — WOUND CARE
Wound Care Note:     New consult for left foot wound   Seen in 467/01 with KRISTEN Field    69 y.o. y/o male admitted on 11/27/2024 from rehab  Admitted for    LVAD (left ventricular assist device) present ([facility]) [Z95.811]  Elevated lactic acid level [R79.89]  Left foot infection [L08.9]  Open leg wound, left, subsequent encounter [D87.876W]   History of    Past Medical History:   Diagnosis Date    Atherosclerosis of native arteries of extremities with intermittent claudication, bilateral legs ([facility])     CAD (coronary artery disease)     [facility] [place]    Chest pain     CHF (congestive heart failure) ([facility])     Chronic anticoagulation 10/31/2023    Chronic systolic (congestive) heart failure ([facility]) 10/31/2023    Coronary artery disease involving native coronary artery of native heart without angina pectoris 10/31/2023    Essential hypertension 10/31/2023    Heart attack ([facility]) 2014    Heart failure ([facility])     HTN (hypertension)     Hyperlipidemia     ICD (implantable cardioverter-defibrillator) in place 10/31/2023    Ischemic cardiomyopathy     Ischemic cardiomyopathy 10/31/2023    Mild mitral valve regurgitation     Nonrheumatic mitral (valve) insufficiency     Positive fecal occult blood test     PVD (peripheral vascular disease) ([facility])     Rheumatic tricuspid insufficiency     Sick sinus syndrome ([facility])        Lab Results   Component Value Date/Time    WBC 11.2 (H) 11/29/2024 01:38 AM    LABA1C 5.4 09/19/2024 02:30 AM    POCGLU 105 11/29/2024 11:35 AM    POCGLU 107 11/29/2024 06:43 AM    HGB 10.2 (L) 11/29/2024 01:38 AM    HCT 31.4 (L) 11/29/2024 01:38 AM     11/29/2024 01:38 AM       No indication for wound culture    Diet: ADULT DIET; Regular           Assessment:   Appropriately conversational and Communicative and reports no pain.  Medicated by RN.  External urinary device to suction.    Surface: Valentin gel mattress  Skin dryness on arms and legs.    Right heel

## 2024-11-30 LAB
ANION GAP SERPL CALC-SCNC: 7 MMOL/L (ref 2–12)
BASOPHILS # BLD: 0.1 K/UL (ref 0–0.1)
BASOPHILS NFR BLD: 1 % (ref 0–1)
BUN SERPL-MCNC: 40 MG/DL (ref 6–20)
BUN/CREAT SERPL: 40 (ref 12–20)
CALCIUM SERPL-MCNC: 9 MG/DL (ref 8.5–10.1)
CHLORIDE SERPL-SCNC: 104 MMOL/L (ref 97–108)
CO2 SERPL-SCNC: 22 MMOL/L (ref 21–32)
CREAT SERPL-MCNC: 1.01 MG/DL (ref 0.7–1.3)
DIFFERENTIAL METHOD BLD: ABNORMAL
EOSINOPHIL # BLD: 3.8 K/UL (ref 0–0.4)
EOSINOPHIL NFR BLD: 34 % (ref 0–7)
ERYTHROCYTE [DISTWIDTH] IN BLOOD BY AUTOMATED COUNT: 13.9 % (ref 11.5–14.5)
GLUCOSE BLD STRIP.AUTO-MCNC: 120 MG/DL (ref 65–117)
GLUCOSE BLD STRIP.AUTO-MCNC: 83 MG/DL (ref 65–117)
GLUCOSE BLD STRIP.AUTO-MCNC: 97 MG/DL (ref 65–117)
GLUCOSE SERPL-MCNC: 93 MG/DL (ref 65–100)
HCT VFR BLD AUTO: 32.4 % (ref 36.6–50.3)
HGB BLD-MCNC: 10.5 G/DL (ref 12.1–17)
IMM GRANULOCYTES # BLD AUTO: 0.2 K/UL (ref 0–0.04)
IMM GRANULOCYTES NFR BLD AUTO: 2 % (ref 0–0.5)
INR PPP: 2.7 (ref 0.9–1.1)
LACTATE SERPL-SCNC: 1.4 MMOL/L (ref 0.4–2)
LDH SERPL L TO P-CCNC: 358 U/L (ref 85–241)
LYMPHOCYTES # BLD: 2 K/UL (ref 0.8–3.5)
LYMPHOCYTES NFR BLD: 18 % (ref 12–49)
MCH RBC QN AUTO: 30.1 PG (ref 26–34)
MCHC RBC AUTO-ENTMCNC: 32.4 G/DL (ref 30–36.5)
MCV RBC AUTO: 92.8 FL (ref 80–99)
MONOCYTES # BLD: 1 K/UL (ref 0–1)
MONOCYTES NFR BLD: 9 % (ref 5–13)
NEUTS SEG # BLD: 4.1 K/UL (ref 1.8–8)
NEUTS SEG NFR BLD: 36 % (ref 32–75)
NRBC # BLD: 0 K/UL (ref 0–0.01)
NRBC BLD-RTO: 0 PER 100 WBC
NT PRO BNP: 2210 PG/ML
PLATELET # BLD AUTO: 297 K/UL (ref 150–400)
PMV BLD AUTO: 9.6 FL (ref 8.9–12.9)
POTASSIUM SERPL-SCNC: 4.2 MMOL/L (ref 3.5–5.1)
PROTHROMBIN TIME: 26.3 SEC (ref 9–11.1)
RBC # BLD AUTO: 3.49 M/UL (ref 4.1–5.7)
RBC MORPH BLD: ABNORMAL
SERVICE CMNT-IMP: ABNORMAL
SERVICE CMNT-IMP: NORMAL
SERVICE CMNT-IMP: NORMAL
SODIUM SERPL-SCNC: 133 MMOL/L (ref 136–145)
WBC # BLD AUTO: 11.2 K/UL (ref 4.1–11.1)

## 2024-11-30 PROCEDURE — 6370000000 HC RX 637 (ALT 250 FOR IP): Performed by: INTERNAL MEDICINE

## 2024-11-30 PROCEDURE — 99232 SBSQ HOSP IP/OBS MODERATE 35: CPT | Performed by: INTERNAL MEDICINE

## 2024-11-30 PROCEDURE — 6360000002 HC RX W HCPCS: Performed by: HOSPITALIST

## 2024-11-30 PROCEDURE — 6360000002 HC RX W HCPCS: Performed by: INTERNAL MEDICINE

## 2024-11-30 PROCEDURE — 2580000003 HC RX 258: Performed by: HOSPITALIST

## 2024-11-30 PROCEDURE — 83605 ASSAY OF LACTIC ACID: CPT

## 2024-11-30 PROCEDURE — 6370000000 HC RX 637 (ALT 250 FOR IP): Performed by: HOSPITALIST

## 2024-11-30 PROCEDURE — 99231 SBSQ HOSP IP/OBS SF/LOW 25: CPT | Performed by: NURSE PRACTITIONER

## 2024-11-30 PROCEDURE — 36415 COLL VENOUS BLD VENIPUNCTURE: CPT

## 2024-11-30 PROCEDURE — 82962 GLUCOSE BLOOD TEST: CPT

## 2024-11-30 PROCEDURE — 83880 ASSAY OF NATRIURETIC PEPTIDE: CPT

## 2024-11-30 PROCEDURE — 93750 INTERROGATION VAD IN PERSON: CPT | Performed by: NURSE PRACTITIONER

## 2024-11-30 PROCEDURE — 85610 PROTHROMBIN TIME: CPT

## 2024-11-30 PROCEDURE — 83615 LACTATE (LD) (LDH) ENZYME: CPT

## 2024-11-30 PROCEDURE — 2060000000 HC ICU INTERMEDIATE R&B

## 2024-11-30 PROCEDURE — 80048 BASIC METABOLIC PNL TOTAL CA: CPT

## 2024-11-30 PROCEDURE — 85025 COMPLETE CBC W/AUTO DIFF WBC: CPT

## 2024-11-30 RX ORDER — HYDRALAZINE HYDROCHLORIDE 20 MG/ML
10 INJECTION INTRAMUSCULAR; INTRAVENOUS EVERY 4 HOURS PRN
Status: DISCONTINUED | OUTPATIENT
Start: 2024-11-30 | End: 2024-12-02 | Stop reason: HOSPADM

## 2024-11-30 RX ORDER — WARFARIN SODIUM 1 MG/1
3 TABLET ORAL
Status: COMPLETED | OUTPATIENT
Start: 2024-11-30 | End: 2024-11-30

## 2024-11-30 RX ADMIN — OXYCODONE 5 MG: 5 TABLET ORAL at 09:17

## 2024-11-30 RX ADMIN — Medication 1000 UNITS: at 09:05

## 2024-11-30 RX ADMIN — HYDROMORPHONE HYDROCHLORIDE 0.5 MG: 1 INJECTION, SOLUTION INTRAMUSCULAR; INTRAVENOUS; SUBCUTANEOUS at 22:27

## 2024-11-30 RX ADMIN — SODIUM CHLORIDE, PRESERVATIVE FREE 10 ML: 5 INJECTION INTRAVENOUS at 20:10

## 2024-11-30 RX ADMIN — HYDRALAZINE HYDROCHLORIDE 100 MG: 50 TABLET ORAL at 07:02

## 2024-11-30 RX ADMIN — HYDRALAZINE HYDROCHLORIDE 100 MG: 50 TABLET ORAL at 20:09

## 2024-11-30 RX ADMIN — WARFARIN SODIUM 3 MG: 1 TABLET ORAL at 17:06

## 2024-11-30 RX ADMIN — AMLODIPINE BESYLATE 5 MG: 5 TABLET ORAL at 09:06

## 2024-11-30 RX ADMIN — Medication: at 09:06

## 2024-11-30 RX ADMIN — MAGNESIUM OXIDE TAB 400 MG (241.3 MG ELEMENTAL MG) 400 MG: 400 (241.3 MG) TAB at 09:05

## 2024-11-30 RX ADMIN — ISOSORBIDE DINITRATE 40 MG: 20 TABLET ORAL at 15:47

## 2024-11-30 RX ADMIN — Medication: at 09:07

## 2024-11-30 RX ADMIN — SODIUM CHLORIDE, PRESERVATIVE FREE 10 ML: 5 INJECTION INTRAVENOUS at 09:06

## 2024-11-30 RX ADMIN — HYDRALAZINE HYDROCHLORIDE 100 MG: 50 TABLET ORAL at 15:47

## 2024-11-30 RX ADMIN — ASPIRIN 81 MG: 81 TABLET, COATED ORAL at 09:05

## 2024-11-30 RX ADMIN — Medication: at 20:09

## 2024-11-30 RX ADMIN — AMIODARONE HYDROCHLORIDE 100 MG: 200 TABLET ORAL at 09:05

## 2024-11-30 RX ADMIN — ISOSORBIDE DINITRATE 40 MG: 20 TABLET ORAL at 22:26

## 2024-11-30 RX ADMIN — Medication: at 20:12

## 2024-11-30 RX ADMIN — ISOSORBIDE DINITRATE 40 MG: 20 TABLET ORAL at 07:02

## 2024-11-30 RX ADMIN — METOPROLOL SUCCINATE 25 MG: 25 TABLET, EXTENDED RELEASE ORAL at 20:10

## 2024-11-30 RX ADMIN — PANTOPRAZOLE SODIUM 40 MG: 40 TABLET, DELAYED RELEASE ORAL at 07:02

## 2024-11-30 RX ADMIN — CEFEPIME 2000 MG: 2 INJECTION, POWDER, FOR SOLUTION INTRAVENOUS at 15:49

## 2024-11-30 RX ADMIN — ATORVASTATIN CALCIUM 80 MG: 40 TABLET, FILM COATED ORAL at 09:05

## 2024-11-30 RX ADMIN — METOPROLOL SUCCINATE 25 MG: 25 TABLET, EXTENDED RELEASE ORAL at 09:06

## 2024-11-30 RX ADMIN — COLLAGENASE SANTYL: 250 OINTMENT TOPICAL at 20:11

## 2024-11-30 ASSESSMENT — PAIN DESCRIPTION - DESCRIPTORS: DESCRIPTORS: ACHING

## 2024-11-30 ASSESSMENT — PAIN SCALES - GENERAL
PAINLEVEL_OUTOF10: 7
PAINLEVEL_OUTOF10: 8

## 2024-11-30 ASSESSMENT — PAIN DESCRIPTION - LOCATION
LOCATION: FOOT
LOCATION: FOOT

## 2024-11-30 ASSESSMENT — PAIN DESCRIPTION - ORIENTATION
ORIENTATION: LEFT
ORIENTATION: LEFT

## 2024-12-01 LAB
INR PPP: 2.8 (ref 0.9–1.1)
LACTATE SERPL-SCNC: 1.9 MMOL/L (ref 0.4–2)
LDH SERPL L TO P-CCNC: 360 U/L (ref 85–241)
NT PRO BNP: 2085 PG/ML
PROTHROMBIN TIME: 27.6 SEC (ref 9–11.1)

## 2024-12-01 PROCEDURE — 6370000000 HC RX 637 (ALT 250 FOR IP): Performed by: NURSE PRACTITIONER

## 2024-12-01 PROCEDURE — 2060000000 HC ICU INTERMEDIATE R&B

## 2024-12-01 PROCEDURE — 83880 ASSAY OF NATRIURETIC PEPTIDE: CPT

## 2024-12-01 PROCEDURE — 85610 PROTHROMBIN TIME: CPT

## 2024-12-01 PROCEDURE — 99231 SBSQ HOSP IP/OBS SF/LOW 25: CPT | Performed by: NURSE PRACTITIONER

## 2024-12-01 PROCEDURE — 83605 ASSAY OF LACTIC ACID: CPT

## 2024-12-01 PROCEDURE — 2580000003 HC RX 258: Performed by: HOSPITALIST

## 2024-12-01 PROCEDURE — 6370000000 HC RX 637 (ALT 250 FOR IP): Performed by: HOSPITALIST

## 2024-12-01 PROCEDURE — 6370000000 HC RX 637 (ALT 250 FOR IP): Performed by: INTERNAL MEDICINE

## 2024-12-01 PROCEDURE — 93750 INTERROGATION VAD IN PERSON: CPT | Performed by: NURSE PRACTITIONER

## 2024-12-01 PROCEDURE — 83615 LACTATE (LD) (LDH) ENZYME: CPT

## 2024-12-01 PROCEDURE — 6360000002 HC RX W HCPCS: Performed by: HOSPITALIST

## 2024-12-01 PROCEDURE — 36415 COLL VENOUS BLD VENIPUNCTURE: CPT

## 2024-12-01 RX ORDER — BUMETANIDE 1 MG/1
1 TABLET ORAL DAILY PRN
Status: DISCONTINUED | OUTPATIENT
Start: 2024-12-01 | End: 2024-12-02 | Stop reason: HOSPADM

## 2024-12-01 RX ORDER — SPIRONOLACTONE 25 MG/1
25 TABLET ORAL DAILY
Status: DISCONTINUED | OUTPATIENT
Start: 2024-12-01 | End: 2024-12-02 | Stop reason: HOSPADM

## 2024-12-01 RX ORDER — WARFARIN SODIUM 1 MG/1
3 TABLET ORAL
Status: COMPLETED | OUTPATIENT
Start: 2024-12-01 | End: 2024-12-01

## 2024-12-01 RX ADMIN — AMIODARONE HYDROCHLORIDE 100 MG: 200 TABLET ORAL at 09:30

## 2024-12-01 RX ADMIN — CEFEPIME 2000 MG: 2 INJECTION, POWDER, FOR SOLUTION INTRAVENOUS at 17:14

## 2024-12-01 RX ADMIN — OXYCODONE 5 MG: 5 TABLET ORAL at 22:13

## 2024-12-01 RX ADMIN — METOPROLOL SUCCINATE 25 MG: 25 TABLET, EXTENDED RELEASE ORAL at 09:30

## 2024-12-01 RX ADMIN — HYDRALAZINE HYDROCHLORIDE 100 MG: 50 TABLET ORAL at 06:40

## 2024-12-01 RX ADMIN — SPIRONOLACTONE 25 MG: 25 TABLET ORAL at 12:57

## 2024-12-01 RX ADMIN — Medication: at 09:31

## 2024-12-01 RX ADMIN — Medication: at 09:30

## 2024-12-01 RX ADMIN — Medication: at 22:15

## 2024-12-01 RX ADMIN — MAGNESIUM OXIDE TAB 400 MG (241.3 MG ELEMENTAL MG) 400 MG: 400 (241.3 MG) TAB at 09:30

## 2024-12-01 RX ADMIN — PANTOPRAZOLE SODIUM 40 MG: 40 TABLET, DELAYED RELEASE ORAL at 06:41

## 2024-12-01 RX ADMIN — SODIUM CHLORIDE, PRESERVATIVE FREE 10 ML: 5 INJECTION INTRAVENOUS at 22:15

## 2024-12-01 RX ADMIN — ISOSORBIDE DINITRATE 40 MG: 20 TABLET ORAL at 06:40

## 2024-12-01 RX ADMIN — METOPROLOL SUCCINATE 25 MG: 25 TABLET, EXTENDED RELEASE ORAL at 22:13

## 2024-12-01 RX ADMIN — OXYCODONE 5 MG: 5 TABLET ORAL at 09:30

## 2024-12-01 RX ADMIN — ISOSORBIDE DINITRATE 40 MG: 20 TABLET ORAL at 22:13

## 2024-12-01 RX ADMIN — AMLODIPINE BESYLATE 5 MG: 5 TABLET ORAL at 09:30

## 2024-12-01 RX ADMIN — SENNOSIDES AND DOCUSATE SODIUM 2 TABLET: 50; 8.6 TABLET ORAL at 10:32

## 2024-12-01 RX ADMIN — WARFARIN SODIUM 3 MG: 1 TABLET ORAL at 17:13

## 2024-12-01 RX ADMIN — ISOSORBIDE DINITRATE 40 MG: 20 TABLET ORAL at 17:13

## 2024-12-01 RX ADMIN — SODIUM CHLORIDE, PRESERVATIVE FREE 10 ML: 5 INJECTION INTRAVENOUS at 09:32

## 2024-12-01 RX ADMIN — Medication 1000 UNITS: at 09:30

## 2024-12-01 RX ADMIN — HYDRALAZINE HYDROCHLORIDE 100 MG: 50 TABLET ORAL at 12:57

## 2024-12-01 RX ADMIN — ASPIRIN 81 MG: 81 TABLET, COATED ORAL at 09:30

## 2024-12-01 RX ADMIN — HYDRALAZINE HYDROCHLORIDE 100 MG: 50 TABLET ORAL at 22:13

## 2024-12-01 RX ADMIN — COLLAGENASE SANTYL: 250 OINTMENT TOPICAL at 09:31

## 2024-12-01 RX ADMIN — ATORVASTATIN CALCIUM 80 MG: 40 TABLET, FILM COATED ORAL at 09:30

## 2024-12-01 ASSESSMENT — PAIN DESCRIPTION - ORIENTATION
ORIENTATION: LEFT
ORIENTATION: RIGHT;LEFT

## 2024-12-01 ASSESSMENT — PAIN SCALES - GENERAL
PAINLEVEL_OUTOF10: 8
PAINLEVEL_OUTOF10: 8

## 2024-12-01 ASSESSMENT — PAIN DESCRIPTION - LOCATION
LOCATION: FOOT
LOCATION: FOOT

## 2024-12-01 ASSESSMENT — PAIN DESCRIPTION - DESCRIPTORS: DESCRIPTORS: ACHING

## 2024-12-02 VITALS
HEIGHT: 69 IN | HEART RATE: 71 BPM | TEMPERATURE: 97.8 F | BODY MASS INDEX: 16.59 KG/M2 | RESPIRATION RATE: 17 BRPM | OXYGEN SATURATION: 100 % | WEIGHT: 112 LBS

## 2024-12-02 PROBLEM — R79.89 ELEVATED LACTIC ACID LEVEL: Status: ACTIVE | Noted: 2024-12-02

## 2024-12-02 LAB
BACTERIA SPEC CULT: NORMAL
BACTERIA SPEC CULT: NORMAL
BASOPHILS # BLD: 0 K/UL (ref 0–0.1)
BASOPHILS NFR BLD: 0 % (ref 0–1)
DIFFERENTIAL METHOD BLD: ABNORMAL
EOSINOPHIL # BLD: 3.1 K/UL (ref 0–0.4)
EOSINOPHIL NFR BLD: 33 % (ref 0–7)
ERYTHROCYTE [DISTWIDTH] IN BLOOD BY AUTOMATED COUNT: 14 % (ref 11.5–14.5)
HCT VFR BLD AUTO: 32 % (ref 36.6–50.3)
HGB BLD-MCNC: 10.2 G/DL (ref 12.1–17)
IMM GRANULOCYTES # BLD AUTO: 0 K/UL
IMM GRANULOCYTES NFR BLD AUTO: 0 %
INR PPP: 3.7 (ref 0.9–1.1)
LDH SERPL L TO P-CCNC: 339 U/L (ref 85–241)
LYMPHOCYTES # BLD: 1.2 K/UL (ref 0.8–3.5)
LYMPHOCYTES NFR BLD: 13 % (ref 12–49)
MCH RBC QN AUTO: 29.9 PG (ref 26–34)
MCHC RBC AUTO-ENTMCNC: 31.9 G/DL (ref 30–36.5)
MCV RBC AUTO: 93.8 FL (ref 80–99)
MONOCYTES # BLD: 1 K/UL (ref 0–1)
MONOCYTES NFR BLD: 11 % (ref 5–13)
NEUTS SEG # BLD: 4 K/UL (ref 1.8–8)
NEUTS SEG NFR BLD: 43 % (ref 32–75)
NRBC # BLD: 0 K/UL (ref 0–0.01)
NRBC BLD-RTO: 0 PER 100 WBC
NT PRO BNP: 2249 PG/ML
PLATELET # BLD AUTO: 309 K/UL (ref 150–400)
PMV BLD AUTO: 9.6 FL (ref 8.9–12.9)
PROTHROMBIN TIME: 35.4 SEC (ref 9–11.1)
RBC # BLD AUTO: 3.41 M/UL (ref 4.1–5.7)
RBC MORPH BLD: ABNORMAL
SERVICE CMNT-IMP: NORMAL
SERVICE CMNT-IMP: NORMAL
WBC # BLD AUTO: 9.3 K/UL (ref 4.1–11.1)

## 2024-12-02 PROCEDURE — 83615 LACTATE (LD) (LDH) ENZYME: CPT

## 2024-12-02 PROCEDURE — 93750 INTERROGATION VAD IN PERSON: CPT | Performed by: INTERNAL MEDICINE

## 2024-12-02 PROCEDURE — 6370000000 HC RX 637 (ALT 250 FOR IP): Performed by: NURSE PRACTITIONER

## 2024-12-02 PROCEDURE — 2580000003 HC RX 258: Performed by: HOSPITALIST

## 2024-12-02 PROCEDURE — 85610 PROTHROMBIN TIME: CPT

## 2024-12-02 PROCEDURE — 6370000000 HC RX 637 (ALT 250 FOR IP): Performed by: INTERNAL MEDICINE

## 2024-12-02 PROCEDURE — 6370000000 HC RX 637 (ALT 250 FOR IP): Performed by: HOSPITALIST

## 2024-12-02 PROCEDURE — 99232 SBSQ HOSP IP/OBS MODERATE 35: CPT | Performed by: INTERNAL MEDICINE

## 2024-12-02 PROCEDURE — 36415 COLL VENOUS BLD VENIPUNCTURE: CPT

## 2024-12-02 PROCEDURE — 83880 ASSAY OF NATRIURETIC PEPTIDE: CPT

## 2024-12-02 PROCEDURE — 85025 COMPLETE CBC W/AUTO DIFF WBC: CPT

## 2024-12-02 RX ORDER — AMMONIUM LACTATE 12 G/100G
CREAM TOPICAL
Qty: 140 G | Refills: 0 | Status: SHIPPED
Start: 2024-12-02 | End: 2024-12-05

## 2024-12-02 RX ORDER — POLYETHYLENE GLYCOL 3350 17 G/17G
17 POWDER, FOR SOLUTION ORAL DAILY PRN
Qty: 15 EACH | Refills: 0 | Status: SHIPPED
Start: 2024-12-02 | End: 2025-01-01

## 2024-12-02 RX ORDER — BUMETANIDE 1 MG/1
1 TABLET ORAL DAILY PRN
Qty: 30 TABLET | Refills: 0 | Status: SHIPPED
Start: 2024-12-02

## 2024-12-02 RX ORDER — OXYCODONE HYDROCHLORIDE 5 MG/1
5 TABLET ORAL 2 TIMES DAILY PRN
Qty: 4 TABLET | Refills: 0 | Status: SHIPPED | OUTPATIENT
Start: 2024-12-02 | End: 2024-12-05

## 2024-12-02 RX ORDER — CEPHALEXIN 500 MG/1
500 CAPSULE ORAL 2 TIMES DAILY
Status: DISCONTINUED | OUTPATIENT
Start: 2024-12-02 | End: 2024-12-02 | Stop reason: HOSPADM

## 2024-12-02 RX ORDER — CASTOR OIL AND BALSAM, PERU 788; 87 MG/G; MG/G
OINTMENT TOPICAL 2 TIMES DAILY
Qty: 5 G | Refills: 0 | Status: SHIPPED
Start: 2024-12-02

## 2024-12-02 RX ADMIN — CEPHALEXIN 500 MG: 500 CAPSULE ORAL at 13:18

## 2024-12-02 RX ADMIN — HYDRALAZINE HYDROCHLORIDE 100 MG: 50 TABLET ORAL at 05:50

## 2024-12-02 RX ADMIN — Medication: at 08:26

## 2024-12-02 RX ADMIN — SPIRONOLACTONE 25 MG: 25 TABLET ORAL at 08:25

## 2024-12-02 RX ADMIN — ASPIRIN 81 MG: 81 TABLET, COATED ORAL at 08:25

## 2024-12-02 RX ADMIN — MAGNESIUM OXIDE TAB 400 MG (241.3 MG ELEMENTAL MG) 400 MG: 400 (241.3 MG) TAB at 08:26

## 2024-12-02 RX ADMIN — COLLAGENASE SANTYL: 250 OINTMENT TOPICAL at 08:26

## 2024-12-02 RX ADMIN — AMLODIPINE BESYLATE 5 MG: 5 TABLET ORAL at 08:25

## 2024-12-02 RX ADMIN — ISOSORBIDE DINITRATE 40 MG: 20 TABLET ORAL at 05:50

## 2024-12-02 RX ADMIN — ATORVASTATIN CALCIUM 80 MG: 40 TABLET, FILM COATED ORAL at 08:25

## 2024-12-02 RX ADMIN — Medication 1000 UNITS: at 08:26

## 2024-12-02 RX ADMIN — ISOSORBIDE DINITRATE 40 MG: 20 TABLET ORAL at 15:16

## 2024-12-02 RX ADMIN — PANTOPRAZOLE SODIUM 40 MG: 40 TABLET, DELAYED RELEASE ORAL at 05:50

## 2024-12-02 RX ADMIN — METOPROLOL SUCCINATE 25 MG: 25 TABLET, EXTENDED RELEASE ORAL at 08:25

## 2024-12-02 RX ADMIN — SODIUM CHLORIDE, PRESERVATIVE FREE 10 ML: 5 INJECTION INTRAVENOUS at 08:26

## 2024-12-02 RX ADMIN — AMIODARONE HYDROCHLORIDE 100 MG: 200 TABLET ORAL at 08:25

## 2024-12-02 ASSESSMENT — ENCOUNTER SYMPTOMS
ABDOMINAL PAIN: 0
SORE THROAT: 0
EYE PAIN: 0
ABDOMINAL DISTENTION: 0
BLOOD IN STOOL: 0
SHORTNESS OF BREATH: 0
CHEST TIGHTNESS: 0
COUGH: 0

## 2024-12-02 ASSESSMENT — PAIN SCALES - GENERAL: PAINLEVEL_OUTOF10: 8

## 2024-12-02 NOTE — CARE COORDINATION
Care Management Initial Assessment       RUR: 23%  Readmission? Yes - 11/27/24  1st IM letter given? Yes - 11/27/24  1st  letter given: No    Patient admitted for wound check.     Met with patient at bedside.     Residence: 1 level home, 3 steps to enter  ADL: independent  DME: neha  PCP: Dr. Freddy Chandler  Verified Insurance: [facility] [employer] /[employer]   Emergency Contacts: Catalina Santos [phone]  Previous rehab/services: [facility]   Transportation: family    CARDIOLOGY FOLLOWING     PLEASE NOTE--Encounter / Re-Admission Within 30 Days  This patient has had another encounter or admission within the last 30 days.      Readmission Assessment  Number of Days since last admission?: 8-30 days  Previous Disposition: Home with Family  Who is being Interviewed: Patient  What was the patient's/caregiver's perception as to why they think they needed to return back to the hospital?: Could not/did not make appointment with PCP  Did you visit your Primary Care Physician after you left the hospital, before you returned this time?: No  Why weren't you able to visit your PCP?: Did not have an appointment  Did you see a specialist, such as Cardiac, Pulmonary, Orthopedic Physician, etc. after you left the hospital?: No  Who advised the patient to return to the hospital?: Self-referral  Does the patient report anything that got in the way of taking their medications?: No  In our efforts to provide the best possible care to you and others like you, can you think of anything that we could have done to help you after you left the hospital the first time, so that you might not have needed to return so soon?: Arrange for more help when leaving the hospital, Identify patient's health literacy needs, Improved written discharge instructions, Teach back instructions regarding management of illness, Discharge instructions that are concise, clear, and non 
Transition of Care Plan:    RUR: 23%  Prior Level of Functioning: LVAD from [facility] SNF. On IV ABX. Prior to Sept lived w significant other in 1 level home and g/f's brian did the drive line dressing change.  Disposition: Pending [facility] SNF and needs auth  WILLIMAS: 12/2  If SNF or IPR: Date FOC offered: 11/29  Date FOC received: 11/29  Accepting facility: Pending  Date authorization started with reference number: Needs  Date authorization received and expires:   Follow up appointments: TBD  DME needed: Defer to rehab  Transportation at discharge: Stretcher  /[facility] Medicare/ letter given: 11/28  Caregiver Contact: SisterCatalina [phone]  Discharge Caregiver contacted prior to discharge?   Care Conference needed?   Barriers to discharge:  Medical, SNF placement, SNF auth    Met w patient to follow up on OLIVIA needs and he would like to return to [facility] on Monday.  Patient is aware he needs an auth w his BC[employer] Medicare.    SNF referral in [facility] and requested they start auth today.    CANDACE Hale CCM  Care Management    
they have his supplies.  Facility confirms they have his LVAD back up equipment.    Dietary:  []Any diet limitations  []Tube Feedings   []Total Parenteral Management (TPN)    Financial Resources:  []Medicaid Application Completed    [x]UAI Completed and copy given to pt/family  and copy given to pt/family  []A screening has previously been completed.    []Level II Completed    [] Private pay individual who will not become   financially eligible for Medicaid within 6 months from admission to a [facility].     [] Individual refused to have screening conducted.     []Medicaid Application Completed    []The screening denied because it was determined individual did not need/did not qualify for nursing facility level of care.  [] Out of state residents seeking direct admission to a VA nursing facility.  [] Individuals who are inpatients of an out of state hospital, or in state or out of state veterans/ hospital and seek direct admission to a VA nursing facility  [] Individuals who are pateints or residents of a state owned/operated facility that is licensed by Department of Behavioral Services (DBS) and seek direct admission to VA nursing facility  [] A screening not required for enrollment in Medicaid Hospice services as set out in 12 VAC 30-  [] [facility] ([facility]) staff shall perform screenings of the [facility] clients. UAI completed during last admission and provided to SNF.    Advanced Care Plan:  []Surrogate Decision Maker of Care  []POA  []Communicated Code Status and copy sent.    Other:   CM met w patient at bedside to discuss the d/c plan w [facility] and to review an important message from Medicare. Patient verbalized understanding and gave permission for possible discharge within 4 hours of receiving IMM.  CM offered to call his sister, Catalina to update her but patient reports he will call her.  All questions have been answered and CM wished patient well.

## 2024-12-02 NOTE — PLAN OF CARE
Problem: Chronic Conditions and Co-morbidities  Goal: Patient's chronic conditions and co-morbidity symptoms are monitored and maintained or improved  Outcome: Progressing     Problem: Skin/Tissue Integrity  Goal: Absence of new skin breakdown  Description: 1.  Monitor for areas of redness and/or skin breakdown  2.  Assess vascular access sites hourly  3.  Every 4-6 hours minimum:  Change oxygen saturation probe site  4.  Every 4-6 hours:  If on nasal continuous positive airway pressure, respiratory therapy assess nares and determine need for appliance change or resting period.  Outcome: Progressing     Problem: Safety - Adult  Goal: Free from fall injury  11/28/2024 2225 by Walter Sommers, RN  Outcome: Progressing  11/28/2024 1145 by Norma Rodriguez, RN  Outcome: Progressing     Problem: ABCDS Injury Assessment  Goal: Absence of physical injury  Outcome: Progressing     
  Problem: Occupational Therapy - Adult  Goal: By Discharge: Performs self-care activities at highest level of function for planned discharge setting.  See evaluation for individualized goals.  Description: FUNCTIONAL STATUS PRIOR TO ADMISSION: Patient re-admitted from SNF rehab, where he reports he was working on sliding board transfers bed <> chair and BUE HEP. Required up to Abbie-maxA for UB ADLs and totalA for all LB ADLs at bed level. Patient had been NWB bilateral LEs last admission, until cleared for WBAT R LE 10/24/24 by podiatry. No updated notes on clarification of patient's L LE WB status. Destination LVAD 9/9/2023.     HOME SUPPORT: Pt re-admitted from SNF rehab.    Occupational Therapy Goals:  Initiated 11/29/2024  1.  Patient will perform grooming seated with Stand by Assist within 7 day(s).  2.  Patient will perform upper body dressing seated with Moderate Assist within 7 day(s).  3.  Patient will perform lower body bathing seated with Maximal Assist and AE PRN within 7 day(s).  4.  Patient will perform all aspects of toileting with Maximal Assist within 7 day(s).  5.  Patient will participate in upper extremity therapeutic exercise/activities with Stand by Assist for 5 minutes within 7 day(s).    6.  Patient will utilize energy conservation techniques during functional activities with verbal and visual cues within 7 day(s).    Outcome: Progressing     OCCUPATIONAL THERAPY EVALUATION    Patient: Bishop Love (69 y.o. male)  Date: 11/29/2024  Primary Diagnosis: LVAD (left ventricular assist device) present ([facility]) [Z95.811]  Elevated lactic acid level [R79.89]  Left foot infection [L08.9]  Open leg wound, left, subsequent encounter [B27.112D]         Precautions: Fall Risk (per last admission, NWB L LE - needs clarification)                  ASSESSMENT :  The patient is limited by decreased functional mobility, independence in ADLs, high-level IADLs, ROM, strength, body mechanics, activity tolerance, 
1710: Report given to Elaine at [facility].     Care Plan:   Problem: Chronic Conditions and Co-morbidities  Goal: Patient's chronic conditions and co-morbidity symptoms are monitored and maintained or improved  Outcome: Progressing     Problem: Discharge Planning  Goal: Discharge to home or other facility with appropriate resources  Outcome: Progressing     Problem: Skin/Tissue Integrity  Goal: Absence of new skin breakdown  Description: 1.  Monitor for areas of redness and/or skin breakdown  2.  Assess vascular access sites hourly  3.  Every 4-6 hours minimum:  Change oxygen saturation probe site  4.  Every 4-6 hours:  If on nasal continuous positive airway pressure, respiratory therapy assess nares and determine need for appliance change or resting period.  Outcome: Progressing     Problem: Safety - Adult  Goal: Free from fall injury  Outcome: Progressing     Problem: ABCDS Injury Assessment  Goal: Absence of physical injury  Outcome: Progressing     Problem: Pain  Goal: Verbalizes/displays adequate comfort level or baseline comfort level  Outcome: Progressing     
Care Plan:   Problem: Chronic Conditions and Co-morbidities  Goal: Patient's chronic conditions and co-morbidity symptoms are monitored and maintained or improved  11/29/2024 1048 by Ibeth Atkinson RN  Outcome: Progressing  11/28/2024 2225 by Walter Sommers RN  Outcome: Progressing     Problem: Discharge Planning  Goal: Discharge to home or other facility with appropriate resources  Outcome: Progressing     Problem: Skin/Tissue Integrity  Goal: Absence of new skin breakdown  Description: 1.  Monitor for areas of redness and/or skin breakdown  2.  Assess vascular access sites hourly  3.  Every 4-6 hours minimum:  Change oxygen saturation probe site  4.  Every 4-6 hours:  If on nasal continuous positive airway pressure, respiratory therapy assess nares and determine need for appliance change or resting period.  11/29/2024 1048 by Ibeth Atkinson RN  Outcome: Progressing  11/28/2024 2225 by Walter Sommers RN  Outcome: Progressing     Problem: Safety - Adult  Goal: Free from fall injury  11/29/2024 1048 by Ibeth Atkinson RN  Outcome: Progressing  11/28/2024 2225 by Walter Sommers RN  Outcome: Progressing     Problem: ABCDS Injury Assessment  Goal: Absence of physical injury  11/29/2024 1048 by Ibeth Atkinson RN  Outcome: Progressing  11/28/2024 2225 by Walter Sommers RN  Outcome: Progressing     Problem: Pain  Goal: Verbalizes/displays adequate comfort level or baseline comfort level  Outcome: Progressing     
admission before being cleared for WBAT R LE 10/24/24 per podiatry. Will proceed with therapy interventions under assumption of ongoing NWB L LE until further clarified by podiatry this admission. Patient received semi-fowlers in bed, alert, and agreeable to PT. Required max A x 2 for rolling and repositioning in bed. Tolerated bed in chair position for evaluation of bilateral UE/ LE AROM and strength, as well as participation in basic bed level exercises. Noted L knee flexion contracture and limited motion at bilateral ankles. Bilateral Z-justin boots applied for skin protection while in bed - RN aware. Recommend ongoing moderate intensity short-term skilled physical therapy up to 5x/week in order to maximize functional recovery.     Patient will benefit from skilled intervention to address the above impairments.    Functional Outcome Measure:  The patient scored 8/24 on the [facility] outcome measure with Cutoff score <=171,2,3 had higher odds of discharging home with home health or need of SNF/IPR.       PLAN :  Recommendations and Planned Interventions:   bed mobility training, transfer training, gait training, therapeutic exercises, neuromuscular re-education, patient and family training/education, and therapeutic activities    Frequency/Duration: Patient will be followed by physical therapy to address goals, PT Plan of Care: 3 times/week to address goals.    Recommend for next PT session: sitting balance at EOB, sliding board transfer to chair    Recommendation for discharge: (in order for the patient to meet his/her long term goals):   Moderate intensity short-term skilled physical therapy up to 5x/week    Other factors to consider for discharge: available support system works or is unable to provide adequate supervision and the patient would be alone, patient's current support system is unable to meet their requirements for physical assistance, poor safety awareness, high risk for falls, not safe to be alone,

## 2024-12-02 NOTE — DISCHARGE SUMMARY
Discharge Summary       PATIENT ID: Bishop Love  MRN: [id number]   YOB: 1955    DATE OF ADMISSION: 11/27/2024 11:55 AM    DATE OF DISCHARGE: 12/2/2024   PRIMARY CARE PROVIDER: Freddy Chandler MD     ATTENDING PHYSICIAN: Dr. Salmon   DISCHARGING PROVIDER: Sushma Madala, MD    To contact this individual call [phone] and ask the  to page.  If unavailable ask to be transferred the Adult Hospitalist Department.    CONSULTATIONS: IP CONSULT TO PHARMACY  IP CONSULT TO PHARMACY  IP CONSULT TO INFECTIOUS DISEASES    PROCEDURES/SURGERIES: * No surgery found *    DIAGNOSES & HOSPITAL COURSE:   Per HPI:\"Bishop Love is a 69 y.o. male PMH significant for advanced heart failure status post HeartMate 3 LVAD 9/19/2023 chronic left foot infection with recent MSSA bacteremia, MRSA and Pseudomonas wound infection came to the ED transferred from SNF for leukocytosis.  Patient was transferred to the ED for expeditious workup as cultures and necessary testing could not be done in the SNF.  Advanced heart failure team was notified.  Patient had recent extended hospitalization from 9/17 - 10/29/2024 when he was treated with MSSA bacteremia, polymicrobial MDR left foot infection and was discharged on lifelong suppressive antibiotics with Keflex.  He was evaluated by ID who felt this unlikely to clear the infection and patient is not a candidate for surgical measures.  During my exam other than some pain on the foot patient does not have any complaints.\"    Left leg wounds - chronic , unhealing   Hx MSSA bacteremia, polymicrobial MDR left lower extremity wound infection   Leucocytosis - resolved    - afebrile.  mild lactic acidosis  - Paired blood cultures: NGTD  - ID consulted, input appreciated.  - c/w IV cefepime while inpatient. Transitioned to po keflex on discharge - lifelong suppressive antibiotic therapy      Advanced heart failure status post HeartMate 3 LVAD placement 9/19/2023  CAD

## 2024-12-02 NOTE — PROGRESS NOTES
[facility]                                                                                          Hospitalist Progress Note  Sushma Madala, MD  Office Phone: [phone]        Date of Service:  2024  NAME:  Bishop Love  :  1955  MRN:  [id number]       Admission Summary:   Bishop Love is a 69 y.o. male PMH significant for advanced heart failure status post HeartMate 3 LVAD 2023 chronic left foot infection with recent MSSA bacteremia, MRSA and Pseudomonas wound infection came to the ED transferred from SNF for leukocytosis.  Patient was transferred to the ED for expeditious workup as cultures and necessary testing could not be done in the SNF.  Advanced heart failure team was notified.  Patient had recent extended hospitalization from  - 10/29/2024 when he was treated with MSSA bacteremia, polymicrobial MDR left foot infection and was discharged on lifelong suppressive antibiotics with Keflex.  He was evaluated by ID who felt this unlikely to clear the infection and patient is not a candidate for surgical measures.  During my exam other than some pain on the foot patient does not have any complaints.       Interval history / Subjective:   Patient is seen and examined at bedside this AM. He feels ok. Denied any new complaints. Wbc improved   Discussed with nursing, AHF NP     Assessment & Plan:     Left leg wounds - chronic , unhealing   Hx MSSA bacteremia, polymicrobial MDR left lower extremity wound infection   Leucocytosis - improved   - afebrile.  mild lactic acidosis  - Paired blood cultures: NGTD  - ID consulted, input appreciated.  - c/w IV cefepime      Advanced heart failure status post HeartMate 3 LVAD placement 2023  CAD status post PCI status post AICD  RV failure  Proximal atrial fibrillation  - Continue current CV meds aspirin, statin, isordil, metoprolol, but hold Bumex and Aldactone   -c/w amiodarone for rate control 
        [facility]                                                                                          Hospitalist Progress Note  Sushma Madala, MD  Office Phone: [phone]        Date of Service:  2024  NAME:  Bishop Love  :  1955  MRN:  [id number]       Admission Summary:   Bishop Love is a 69 y.o. male PMH significant for advanced heart failure status post HeartMate 3 LVAD 2023 chronic left foot infection with recent MSSA bacteremia, MRSA and Pseudomonas wound infection came to the ED transferred from SNF for leukocytosis.  Patient was transferred to the ED for expeditious workup as cultures and necessary testing could not be done in the SNF.  Advanced heart failure team was notified.  Patient had recent extended hospitalization from  - 10/29/2024 when he was treated with MSSA bacteremia, polymicrobial MDR left foot infection and was discharged on lifelong suppressive antibiotics with Keflex.  He was evaluated by ID who felt this unlikely to clear the infection and patient is not a candidate for surgical measures.  During my exam other than some pain on the foot patient does not have any complaints.       Interval history / Subjective:   Patient is seen and examined at bedside this AM. He feels better. Denied any new complaints.   Discussed with nursing     Assessment & Plan:     Left leg wounds - chronic , unhealing   Hx MSSA bacteremia, polymicrobial MDR left lower extremity wound infection   Leucocytosis - improved   - afebrile.  mild lactic acidosis  - Paired blood cultures: NGTD  - ID consulted, input appreciated.  - c/w IV cefepime while inpatient. Transition to po keflex on discharge      Advanced heart failure status post HeartMate 3 LVAD placement 2023  CAD status post PCI status post AICD  RV failure  Proximal atrial fibrillation  - Continue current CV meds aspirin, statin, isordil, metoprolol, but hold Bumex and Aldactone 
0700  Verbal report given to Nurse JAKOB Spears by JAKOB Watson. Report included Nurse Handoff Report, Index, Adult overview, MAR, Intake and Output, and Cardiac Rhythm Sinus Rhythm on telemeter    0545  Changed patients chucks, manwick, cannister, patients gown and applied lotion on the back, bilateral hips    2218  Inserted IV on right anterior forearm with G20     1945  Received verbal report from Nurse Norma RN  Noted patient on sinus rhythm on telemeter  Patient currently connected to cefepime  
0730  Verbal report given to Nurse Norma RN by padmini CORNEJO. Report included Nurse Handoff Report, Index, Adult overview, MAR, Intake and Output, and Cardiac Rhythm Sinus Rhythm on telemeter     0715  Changed jesús ugarte and maile    0340  Perfectserved NP [place] regarding Lactic acid of 2.4    1940  Received verbal report from Nurse Tory RN  Noted patient on sinus rhythm on telemeter  Patient with LVAD hooked to [place] connected on Q driveline   
0730: Bedside shift change report given to JAKOB Green (oncoming nurse) by JAKOB Watson (offgoing nurse). Report included the following information Nurse Handoff Report, MAR, and Recent Results.     1930: Bedside shift change report given to JAKOB Watson (oncoming nurse) by JAKOB Green (offgoing nurse). Report included the following information Nurse Handoff Report, MAR, and Recent Results.     Shift Summary: Uneventful shift. Patient alert and oriented x 4. SR on tele, HR 60-80s, MAPs 70-80s. Afebrile. Oxygen <90% on room air. Patient denies pain, chest pain, nausea, dizziness, or shortness of breath. Patient with minimal appetite. No BM this shift. Voiding via external catheter. Left foot wound clean, dry, and intact, elevated on pillows throughout shift. Patient asleep for majority of shift.  
0845: Noted no LVAD orders currently ordered, notified Lina PELAYO. NP to review.    Care plan:    Problem: Chronic Conditions and Co-morbidities  Goal: Patient's chronic conditions and co-morbidity symptoms are monitored and maintained or improved  Outcome: Progressing  Flowsheets  Taken 11/30/2024 1135  Care Plan - Patient's Chronic Conditions and Co-Morbidity Symptoms are Monitored and Maintained or Improved:   Monitor and assess patient's chronic conditions and comorbid symptoms for stability, deterioration, or improvement   Collaborate with multidisciplinary team to address chronic and comorbid conditions and prevent exacerbation or deterioration   Update acute care plan with appropriate goals if chronic or comorbid symptoms are exacerbated and prevent overall improvement and discharge  Taken 11/30/2024 0745  Care Plan - Patient's Chronic Conditions and Co-Morbidity Symptoms are Monitored and Maintained or Improved:   Monitor and assess patient's chronic conditions and comorbid symptoms for stability, deterioration, or improvement   Update acute care plan with appropriate goals if chronic or comorbid symptoms are exacerbated and prevent overall improvement and discharge   Collaborate with multidisciplinary team to address chronic and comorbid conditions and prevent exacerbation or deterioration     Problem: Discharge Planning  Goal: Discharge to home or other facility with appropriate resources  Outcome: Progressing  Flowsheets  Taken 11/30/2024 1135  Discharge to home or other facility with appropriate resources:   Identify barriers to discharge with patient and caregiver   Arrange for needed discharge resources and transportation as appropriate  Taken 11/30/2024 0745  Discharge to home or other facility with appropriate resources:   Identify barriers to discharge with patient and caregiver   Arrange for needed discharge resources and transportation as appropriate     Problem: Skin/Tissue Integrity  Goal: 
1200: Connected pt to LVAD tower, MAP and LVAD numbers put into flowsheet. See flowsheets for documentation. Emergency procedures reviewed with pt and ED nurse. Pt's personal emergency equipment at bedside.   
1335: Family @ bedside, updated on plan of care. Family requesting that pt be discharged with venelex prescription - notified MD Salmon.     Care plan:    Problem: Chronic Conditions and Co-morbidities  Goal: Patient's chronic conditions and co-morbidity symptoms are monitored and maintained or improved  Outcome: Progressing  Flowsheets  Taken 12/1/2024 1103  Care Plan - Patient's Chronic Conditions and Co-Morbidity Symptoms are Monitored and Maintained or Improved:   Monitor and assess patient's chronic conditions and comorbid symptoms for stability, deterioration, or improvement   Collaborate with multidisciplinary team to address chronic and comorbid conditions and prevent exacerbation or deterioration   Update acute care plan with appropriate goals if chronic or comorbid symptoms are exacerbated and prevent overall improvement and discharge  Taken 12/1/2024 0740  Care Plan - Patient's Chronic Conditions and Co-Morbidity Symptoms are Monitored and Maintained or Improved:   Monitor and assess patient's chronic conditions and comorbid symptoms for stability, deterioration, or improvement   Collaborate with multidisciplinary team to address chronic and comorbid conditions and prevent exacerbation or deterioration   Update acute care plan with appropriate goals if chronic or comorbid symptoms are exacerbated and prevent overall improvement and discharge     Problem: Discharge Planning  Goal: Discharge to home or other facility with appropriate resources  Outcome: Progressing  Flowsheets  Taken 12/1/2024 1103  Discharge to home or other facility with appropriate resources:   Identify barriers to discharge with patient and caregiver   Arrange for needed discharge resources and transportation as appropriate   Identify discharge learning needs (meds, wound care, etc)  Taken 12/1/2024 0740  Discharge to home or other facility with appropriate resources:   Identify barriers to discharge with patient and caregiver   Arrange 
1930: Bedside and Verbal shift change report given to JAKOB Cintron (oncoming nurse) by JAKOB Spears (offgoing nurse). Report included the following information Nurse Handoff Report, Index, Adult Overview, Intake/Output, MAR, Recent Results, and Cardiac Rhythm   .      0730: Bedside and Verbal shift change report given to JAKOB Meyers (oncoming nurse) by JAKOB Cintron (offgoing nurse). Report included the following information Nurse Handoff Report, Index, Adult Overview, Intake/Output, MAR, Recent Results, and Cardiac Rhythm   .        Problem: Chronic Conditions and Co-morbidities  Goal: Patient's chronic conditions and co-morbidity symptoms are monitored and maintained or improved  11/29/2024 2005 by Abdulaziz Bennett RN  Outcome: Progressing  11/29/2024 1048 by Ibeth Atkinson RN  Outcome: Progressing     Problem: Discharge Planning  Goal: Discharge to home or other facility with appropriate resources  11/29/2024 2005 by Abdulaziz Bennett RN  Outcome: Progressing  11/29/2024 1048 by Ibeth Atkinson RN  Outcome: Progressing     Problem: Skin/Tissue Integrity  Goal: Absence of new skin breakdown  Description: 1.  Monitor for areas of redness and/or skin breakdown  2.  Assess vascular access sites hourly  3.  Every 4-6 hours minimum:  Change oxygen saturation probe site  4.  Every 4-6 hours:  If on nasal continuous positive airway pressure, respiratory therapy assess nares and determine need for appliance change or resting period.  11/29/2024 2005 by Abdulaziz Bennett RN  Outcome: Progressing  11/29/2024 1048 by Ibeth Atkinson RN  Outcome: Progressing     Problem: Safety - Adult  Goal: Free from fall injury  11/29/2024 2005 by Abdulaziz Bennett RN  Outcome: Progressing  11/29/2024 1048 by Ibeth Atkinson RN  Outcome: Progressing     Problem: ABCDS Injury Assessment  Goal: Absence of physical injury  11/29/2024 2005 by Abdulaziz Bennett RN  Outcome: Progressing  11/29/2024 1048 by Ibeth Atkinson RN  Outcome: 
[facility]  [facility] in [place], [place]  Inpatient Progress Note    Patient name: Bishop Love  Patient : 1955  Patient MRN: [id number]  Date of service: 24    Primary care physician: Freddy Chandler MD  LVAD cardiologist: MICHAEL    Reason for Referral:  LVAD management     ASSESSMENT:  Bishop Love is a 69 y.o. male with history of chronic systolic heart failure due to  ischemic cardiomyopathy, s/p HM3 LVAD implantation (2023)  initially as destination therapy, stage D, on optimal GDMT limited by RV dysfunction-not a transplant candidate as per [facility] due to high risk given previous sternotomy and severe peripheral vascular disease with nonhealing foot ulcers  Non healing Foot wounds with multiple organisms.  Received IV antibiotics.  Bacteremia cleared.  Not a good candidate for amputation.  ID does not think infection will clear and recommends hospice, but he is not interested.  On long term suppressive Keflex.    Readmitted for leukocytosis and elevated lactic acid on        INTERVAL HISTORY:  Foot pain  No other complaints  Vitals and labs stable    PLAN:  Leukocytosis  Blood cultures pending.  Final, no growth 5 days  UA without infection  Antibiotics per primary team and ID, planning transition back to Keflex for lifelong suppression    Leg foot wounds,   Previous foot wound culture with MRSA, MSSA, pseudomonas   Blood cultures 10/1 and 10/3 grew MSSA. BC 10/7 negative  Overall very poor prognosis as wounds will not heal and he will have unending infections. ID recommended hospice. Patient not interested in hospice at this time  Poor surgical candidate for amputations  Will have Keflex 500 mg BID for lifelong therapy    Heart Failure/Cardiomyopathy with severe RV dilatation and RV failure  Continue current medical therapy for heart failure:  currently appears slightly dry.    Beta-blocker: Continue Metoprolol 25 mg BID   ACE/ARB/ARNi: Not 
[facility]  [facility] in [place], [place]  Inpatient Progress Note    Patient name: Bishop Love  Patient : 1955  Patient MRN: [id number]  Date of service: 24    Primary care physician: Freddy Chandler MD  LVAD cardiologist: MICHAEL    Reason for Referral:  LVAD management     ASSESSMENT:  Bishop Love is a 69 y.o. male with history of chronic systolic heart failure due to  ischemic cardiomyopathy, s/p HM3 LVAD implantation (2023)  initially as destination therapy, stage D, on optimal GDMT limited by RV dysfunction-not a transplant candidate as per [facility] due to high risk given previous sternotomy and severe peripheral vascular disease with nonhealing foot ulcers  Non healing Foot wounds with multiple organisms.  Received IV antibiotics.  Bacteremia cleared.  Not a good candidate for amputation.  ID does not think infection will clear and recommends hospice, but he is not interested.  On long term suppressive Keflex.    Readmitted for leukocytosis and elevated lactic acid on        INTERVAL HISTORY:  -MAP 70s-80s  -labs  INR 2.7; WBC 11; pBNP 2210  -weight up 2lbs; I/O even  -Bishop Love is feeling the same.  Itchy skin.  No SOB.  Feet hurt.         PLAN:  Leukocytosis  Blood cultures pending.  NGTD 48 hours  UA reviewed   Antibiotics per primary team and ID    Leg foot wounds,   Previous foot wound culture with MRSA, MSSA, pseudomonas   Blood cultures 10/1 and 10/3 grew MSSA. BC 10/7 negative  Overall very poor prognosis as wounds will not heal and he will have unending infections. ID recommended hospice. Patient not interested in hospice at this time  Poor surgical candidate for amputations  Will have Keflex 500 mg BID for lifelong therapy  Currently on IV abx - per ID    Heart Failure/Cardiomyopathy with severe RV dilatation and RV failure  Continue current medical therapy for heart failure:  currently appears slightly dry.    Beta-blocker: 
Infectious Disease Progress Note    Assessment / Plan:       68 y/o male with MSSA LLE wound infection with possible Pseudomonas co-infection too of LLE wound with chronic infection not a candidate for source control admitted for leukocytosis otherwise quite asymptomatic.    Continue Cefepime while inpatient - will change back to PO Keflex on Monday.    Amlactin 12% topical BID for significant xerosis of b/l feet - please apply.         Reason for Consult: Leukocytosis  Date of Consultation: November 29, 2024  Date of Admission: 11/27/2024  Referring Physician: Dr. Amezcua    HPI:    68 y/o male with CHF s/p LVAD 9/2023 requiring thrombectomy post-operatively, AICD, prior right MCA CVA, CAD with MI, hematochezia s/p EGD 1/2024, PVD with critical limb ischemia to RLE s/p fem to fem PTFE bypass graft 2/2023 seen recently @ [facility] from 9/17-10/29/24 where ID evaluated patient for MSSA bacteremia.  TTE negative and noted to have infected LLE wound with polymicrobial celestine including anaerobes, MSSA, and Pseudomonas sensitive to Cefepime on superficial swab.  Treated with prolonged Cefepime course and then transitioned to lifelong Keflex 500mg PO BID suppressive therapy.  Not deemed a surgical candidate for LLE amputation per vascular surgery given comorbidities and subsequently discharged once respiratory status improved to [facility].    Was doing OK @ rehab and contacted today as labs with leukocytosis WBC 11-->13 despite being on Keflex suppression.  Denies fever, chills, and feels much better overall then previously.  No driveline-related complaints.  Tolerating PO diet.    Interval events:    Afebrile, tolerating PO diet, feels OK, no new pain    Past Medical History:  Past Medical History:   Diagnosis Date    Atherosclerosis of native arteries of extremities with intermittent claudication, bilateral legs (HCC)     CAD (coronary artery disease)     dr christina shen- [facility] cardiology [place]    Chest pain  
Infectious Disease Progress Note    Assessment / Plan:       68 y/o male with MSSA LLE wound infection with possible Pseudomonas co-infection too of LLE wound with chronic infection not a candidate for source control admitted for leukocytosis otherwise quite asymptomatic.    Continue Cefepime while inpatient - will change back to PO Keflex on Monday.    Amlactin 12% topical BID for significant xerosis of b/l feet - please apply.    Trend WBC.    Will see intermittently.         Reason for Consult: Leukocytosis  Date of Consultation: November 30, 2024  Date of Admission: 11/27/2024  Referring Physician: Dr. Amezcua    HPI:    68 y/o male with CHF s/p LVAD 9/2023 requiring thrombectomy post-operatively, AICD, prior right MCA CVA, CAD with MI, hematochezia s/p EGD 1/2024, PVD with critical limb ischemia to RLE s/p fem to fem PTFE bypass graft 2/2023 seen recently @ [facility] from 9/17-10/29/24 where ID evaluated patient for MSSA bacteremia.  TTE negative and noted to have infected LLE wound with polymicrobial celestine including anaerobes, MSSA, and Pseudomonas sensitive to Cefepime on superficial swab.  Treated with prolonged Cefepime course and then transitioned to lifelong Keflex 500mg PO BID suppressive therapy.  Not deemed a surgical candidate for LLE amputation per vascular surgery given comorbidities and subsequently discharged once respiratory status improved to [facility].    Was doing OK @ rehab and contacted today as labs with leukocytosis WBC 11-->13 despite being on Keflex suppression.  Denies fever, chills, and feels much better overall then previously.  No driveline-related complaints.  Tolerating PO diet.    Interval events:    Largely unchanged, afebrile no diarrhea    Past Medical History:  Past Medical History:   Diagnosis Date    Atherosclerosis of native arteries of extremities with intermittent claudication, bilateral legs (HCC)     CAD (coronary artery disease)     dr christina shen- [facility] cardiology 
Infectious Disease Progress Note    Assessment / Plan:       70 y/o male with MSSA LLE wound infection with possible Pseudomonas co-infection too of LLE wound with chronic infection not a candidate for source control admitted for leukocytosis otherwise quite asymptomatic.    Stopped Cefepime.    Restarted PO Keflex 500mg BID lifelong suppressive antibiotic therapy - unclear how long this will work.    Amlactin 12% topical BID for significant xerosis of b/l feet - please apply.    Trend WBC.    Will see intermittently.         Reason for Consult: Leukocytosis  Date of Consultation: December 2, 2024  Date of Admission: 11/27/2024  Referring Physician: Dr. Amezcua    HPI:    70 y/o male with CHF s/p LVAD 9/2023 requiring thrombectomy post-operatively, AICD, prior right MCA CVA, CAD with MI, hematochezia s/p EGD 1/2024, PVD with critical limb ischemia to RLE s/p fem to fem PTFE bypass graft 2/2023 seen recently @ [facility] from 9/17-10/29/24 where ID evaluated patient for MSSA bacteremia.  TTE negative and noted to have infected LLE wound with polymicrobial celestine including anaerobes, MSSA, and Pseudomonas sensitive to Cefepime on superficial swab.  Treated with prolonged Cefepime course and then transitioned to lifelong Keflex 500mg PO BID suppressive therapy.  Not deemed a surgical candidate for LLE amputation per vascular surgery given comorbidities and subsequently discharged once respiratory status improved to [facility].    Was doing OK @ rehab and contacted today as labs with leukocytosis WBC 11-->13 despite being on Keflex suppression.  Denies fever, chills, and feels much better overall then previously.  No driveline-related complaints.  Tolerating PO diet.    Interval events:    No new LLE symptoms nor fever    Past Medical History:  Past Medical History:   Diagnosis Date    Atherosclerosis of native arteries of extremities with intermittent claudication, bilateral legs (HCC)     CAD (coronary artery disease)      
Pharmacist Note - Warfarin Dosing  Consult provided for this 69 y.o.male to manage warfarin for LVAD    INR Goal: 2 - 3    Home regimen/ tablet size: 4 mg [place]ez/M/W/F/Sa and 2 mg Tu/Th    Drugs that may increase INR: Amiodarone  Drugs that may decrease INR: None  Other current anticoagulants/ drugs that may increase bleeding risk: Aspirin  Risk factors: Age > 65  Daily INR ordered through: 12/18    Recent Labs     11/27/24  1227 11/27/24  1646   HGB 12.9  --    INR  --  2.1*     Date               INR                  Dose  11/27  2.1  4 mg                                                                                Assessment/ Plan:  Will order warfarin 4 mg PO x 1 dose.    Pharmacy will continue to monitor daily and adjust therapy as indicated.  Please contact the pharmacist at [phone] for outpatient recommendations if needed.       
Pharmacist Note - Warfarin Dosing  Consult provided for this 69 y.o.male to manage warfarin for LVAD    INR Goal: 2 - 3    Home regimen/ tablet size: 4 mg Cortez/M/W/F/Sa and 2 mg Tu/Th    Drugs that may increase INR: Amiodarone  Drugs that may decrease INR: None  Other current anticoagulants/ drugs that may increase bleeding risk: Aspirin  Risk factors: Age > 65  Daily INR ordered through: 12/18    Recent Labs     11/27/24  1227 11/27/24  1646   HGB 12.9  --    INR  --  2.1*     Date               INR                  Dose  11/27  2.1  4 mg                                                                                Assessment/ Plan:  Will order warfarin 4 mg PO x 1 dose.    Pharmacy will continue to monitor daily and adjust therapy as indicated.  Please contact the pharmacist at [phone] for outpatient recommendations if needed.       
Pharmacist Note - Warfarin Dosing  Consult provided for this 69 y.o.male to manage warfarin for LVAD    INR Goal: 2 - 3    Home regimen/ tablet size: 4 mg Cortez/M/W/F/Sa and 2 mg Tu/Th    Drugs that may increase INR: Amiodarone PTA med  Drugs that may decrease INR: None  Other current anticoagulants/ drugs that may increase bleeding risk: Aspirin  Risk factors: Age > 65  Daily INR ordered through: 12/18    Recent Labs     11/30/24  0355 12/01/24  0044 12/02/24  0313   HGB 10.5*  --  10.2*   INR 2.7* 2.8* 3.7*     Date               INR                  Dose  11/27  2.1  4 mg   11/28  2.0  2 mg   11/29  2.3  4 mg   11/30               2.7                  3 mg  12/1                2.8                   3 mg  12/2  3.7  hold                                                                                Assessment/ Plan:  Hold warfarin today for significant increase in INR to 3.7 which is above goal range 2-3    Pharmacy will continue to monitor daily and adjust therapy as indicated.  Please contact the pharmacist at x [phone] for outpatient recommendations if needed.           
Pharmacist Note - Warfarin Dosing  Consult provided for this 69 y.o.male to manage warfarin for LVAD    INR Goal: 2 - 3    Home regimen/ tablet size: 4 mg Su/M/W/F/Sa and 2 mg Tu/Th    Drugs that may increase INR: Amiodarone  Drugs that may decrease INR: None  Other current anticoagulants/ drugs that may increase bleeding risk: Aspirin  Risk factors: Age > 65  Daily INR ordered through: 12/18    Recent Labs     11/27/24  1227 11/27/24  1646 11/28/24  0158   HGB 12.9  --  10.0*   INR  --  2.1* 2.0*     Date               INR                  Dose  11/27  2.1  4 mg   11/28  2.0  2 mg                                                                                Assessment/ Plan:  Will order warfarin 2 mg PO x 1 dose as a continuation of home dosing.    Pharmacy will continue to monitor daily and adjust therapy as indicated.  Please contact the pharmacist at [phone] for outpatient recommendations if needed.         
Pharmacist Note - Warfarin Dosing  Consult provided for this 69 y.o.male to manage warfarin for LVAD    INR Goal: 2 - 3    Home regimen/ tablet size: 4 mg Su/M/W/F/Sa and 2 mg Tu/Th    Drugs that may increase INR: Amiodarone  Drugs that may decrease INR: None  Other current anticoagulants/ drugs that may increase bleeding risk: Aspirin  Risk factors: Age > 65  Daily INR ordered through: 12/18    Recent Labs     11/27/24  1227 11/27/24  1646 11/28/24  0158 11/29/24  0138   HGB 12.9  --  10.0* 10.2*   INR  --  2.1* 2.0* 2.3*     Date               INR                  Dose  11/27  2.1  4 mg   11/28  2.0  2 mg   11/29  2.3  4 mg                                                                                Assessment/ Plan:  Will order warfarin 4 mg PO x 1 dose as a continuation of home dosing.    Pharmacy will continue to monitor daily and adjust therapy as indicated.  Please contact the pharmacist at [phone] for outpatient recommendations if needed.     
Pharmacist Note - Warfarin Dosing  Consult provided for this 69 y.o.male to manage warfarin for LVAD    INR Goal: 2 - 3    Home regimen/ tablet size: 4 mg Su/M/W/F/Sa and 2 mg Tu/Th    Drugs that may increase INR: Amiodarone PTA med  Drugs that may decrease INR: None  Other current anticoagulants/ drugs that may increase bleeding risk: Aspirin  Risk factors: Age > 65  Daily INR ordered through: 12/18    Recent Labs     11/28/24  0158 11/29/24  0138 11/30/24  0355   HGB 10.0* 10.2* 10.5*   INR 2.0* 2.3* 2.7*     Date               INR                  Dose  11/27  2.1  4 mg   11/28  2.0  2 mg   11/29  2.3  4 mg   11/30               2.7                  3 mg                                                                               Assessment/ Plan:  Will order warfarin 3 mg PO x 1 dose today.    Pharmacy will continue to monitor daily and adjust therapy as indicated.  Please contact the pharmacist at [phone] for outpatient recommendations if needed.       
Pharmacist Note - Warfarin Dosing  Consult provided for this 69 y.o.male to manage warfarin for LVAD    INR Goal: 2 - 3    Home regimen/ tablet size: 4 mg Su/M/W/F/Sa and 2 mg Tu/Th    Drugs that may increase INR: Amiodarone PTA med  Drugs that may decrease INR: None  Other current anticoagulants/ drugs that may increase bleeding risk: Aspirin  Risk factors: Age > 65  Daily INR ordered through: 12/18    Recent Labs     11/29/24  0138 11/30/24  0355 12/01/24  0044   HGB 10.2* 10.5*  --    INR 2.3* 2.7* 2.8*     Date               INR                  Dose  11/27  2.1  4 mg   11/28  2.0  2 mg   11/29  2.3  4 mg   11/30               2.7                  3 mg  12/01               2.8                  3 mg                                                                               Assessment/ Plan:  Will order warfarin 3 mg PO x 1 dose today.    Pharmacy will continue to monitor daily and adjust therapy as indicated.  Please contact the pharmacist at x [phone] for outpatient recommendations if needed.         
Pharmacy renal dose protocol: cefepime  Indication:  SSTI  Current regimen:  2g IV q12h    Recent Labs     24  1227   WBC 13.7*   CREATININE 1.51*   BUN 56*     Est CrCl: 35 ml/min; UO: - ml/kg/hr  Temp (24hrs), Av.3 °F (36.3 °C), Min:97.2 °F (36.2 °C), Max:97.4 °F (36.3 °C)    Cultures: pending    Plan: Change to 2g IV q24h extended infusion for SSTI and CrCl 30-59 ml/min as per protocol        
Referral source:   Bishop Love at [facility] in [facility] 4 CV SERVICES UNIT.  attended rounds on the CV Services Unit as part of the Interdisciplinary team where the patient's ongoing care was discussed. I reviewed the medical record as part of this encounter.     Outcome: Interdisciplinary team are aware of  availability and were encouraged to request Spiritual Health support as needed.      The  on-call can be reached at [phone]).     Rev. Rosanna Calix MDiv, [facility]  Staff       
(11/27/2024)    [facility] Utilities     Threatened with loss of utilities: No       Review of Systems:   A comprehensive review of systems was negative.       Vital Signs:    Last 24hrs VS reviewed since prior progress note. Most recent are:  Vitals:    11/28/24 1400   Pulse: 67   Resp:    Temp:    SpO2:          Intake/Output Summary (Last 24 hours) at 11/28/2024 1528  Last data filed at 11/28/2024 1151  Gross per 24 hour   Intake 755.71 ml   Output 900 ml   Net -144.29 ml        Physical Examination:     I had a face to face encounter with this patient and independently examined them on 11/28/2024 as outlined below:          General : alert x 3, awake, no acute distress, ill looking   HEENT: PEERL, EOMI, moist mucus membrane  Neck: supple, no JVD, no meningeal signs  Chest: Clear to auscultation bilaterally   CVS: LVAD hum   Abd: soft/ non tender, non distended, BS physiological,   Ext: no clubbing, no cyanosis, no edema, brisk 2+ DP pulses  Neuro/Psych: pleasant mood and affect, CN 2-12 grossly intact, sensory grossly within normal limit, Strength 5/5 in all extremities,  Skin: warm , left leg wounds     Data Review:    I personally reviewed  Image      I have personally and independently reviewed all pertinent labs, diagnostic studies, imaging, and have provided independent interpretation of the same.     Labs:     Recent Labs     11/27/24  1227 11/28/24  0158   WBC 13.7* 14.0*   HGB 12.9 10.0*   HCT 38.4 30.7*    327     Recent Labs     11/27/24  1227 11/28/24  0158   * 134*   K 3.9 3.8   CL 97 99   CO2 29 27   BUN 56* 54*     Recent Labs     11/27/24  1227   ALT 56   GLOB 6.1*     Recent Labs     11/27/24  1646 11/28/24  0158   INR 2.1* 2.0*      No results for input(s): \"TIBC\" in the last 72 hours.    Invalid input(s): \"FE\", \"PSAT\", \"FERR\"   No results found for: \"RBCF\"   No results for input(s): \"PH\", \"PCO2\", \"PO2\" in the last 72 hours.  No results for input(s): \"CPK\" in the last 72 hours.    Invalid 
Implement safety measures based on patient assessment     Problem: Pain  Goal: Verbalizes/displays adequate comfort level or baseline comfort level  12/1/2024 2019 by Abdulaziz Bennett, RN  Outcome: Progressing  12/1/2024 1103 by Mira Dumont, RN  Outcome: Progressing  Flowsheets (Taken 12/1/2024 1103)  Verbalizes/displays adequate comfort level or baseline comfort level:   Encourage patient to monitor pain and request assistance   Assess pain using appropriate pain scale   Administer analgesics based on type and severity of pain and evaluate response         
Progressing  Flowsheets (Taken 11/30/2024 0474)  Verbalizes/displays adequate comfort level or baseline comfort level:   Encourage patient to monitor pain and request assistance   Assess pain using appropriate pain scale   Administer analgesics based on type and severity of pain and evaluate response       
2.3* 2.7* 2.8*      No results for input(s): \"TIBC\" in the last 72 hours.    Invalid input(s): \"FE\", \"PSAT\", \"FERR\"   No results found for: \"RBCF\"   No results for input(s): \"PH\", \"PCO2\", \"PO2\" in the last 72 hours.  No results for input(s): \"CPK\" in the last 72 hours.    Invalid input(s): \"CPKMB\", \"CKNDX\", \"TROIQ\"  Lab Results   Component Value Date/Time    CHOL 60 09/18/2024 01:14 AM    HDL 26 09/18/2024 01:14 AM    LDL 22.2 09/18/2024 01:14 AM    LDL  09/19/2023 09:45 PM     Unable to calculate LDL or VLDL due to low cholesterol.     No results found for: \"GLUCPOC\"  [unfilled]    Notes reviewed from all clinical/nonclinical/nursing services involved in patient's clinical care. Care coordination discussions were held with appropriate clinical/nonclinical/ nursing providers based on care coordination needs.         Patients current active Medications were reviewed, considered, added and adjusted based on the clinical condition today.      Home Medications were reconciled to the best of my ability given all available resources at the time of admission. Route is PO if not otherwise noted.      Admission Status:[id number]:::1}      Medications Reviewed:     Current Facility-Administered Medications   Medication Dose Route Frequency    warfarin (COUMADIN) tablet 3 mg  3 mg Oral Once    bumetanide (BUMEX) tablet 1 mg  1 mg Oral Daily PRN    spironolactone (ALDACTONE) tablet 25 mg  25 mg Oral Daily    hydrALAZINE (APRESOLINE) injection 10 mg  10 mg IntraVENous Q4H PRN    collagenase ointment   Topical Daily    HYDROmorphone (DILAUDID) injection 0.5 mg  0.5 mg IntraVENous Daily PRN    oxyCODONE (ROXICODONE) immediate release tablet 5 mg  5 mg Oral Q6H PRN    balsum peru-castor oil (VENELEX) ointment   Topical BID    sennosides-docusate sodium (SENOKOT-S) 8.6-50 MG tablet 2 tablet  2 tablet Oral Daily PRN    sodium chloride flush 0.9 % injection 5-40 mL  5-40 mL IntraVENous 2 times per day    sodium chloride flush 0.9 % 
no fracture but significant degenerative disease in the shoulder and large cuff tear which was chronic. There was a bone spur fracture in the elbow. He was evaluated by orthopedics and recommended sling and PT/OT. He would require reverse total should replacement for return of function but high risk due to underlying medical issues.     He was admitted 9/17- 10/29/24 for staff bacteremia, alpha strep UTI, and pseudomonas in leg wounds.  Was on IV antibiotics, bacteremia cleared and discharged on Keflex 500 mg BID.    PROBLEM LIST:  Chronic massive cuff tear left shoulder  Bony spur fracture left elbow  Infected foot wounds-MSSA, MRSA, pseudomonas  MSSA Bacteremia  Alpha strep UTI  Chronic systolic heart failure  Stage D  Ischemic Cardiomyopathy  Ischemic RLE  S/p Right Fem-pop bypass 8/24/2023  Complicated by:  NSTEMI  Cardiogenic Shock s/p Impella 5.5 8/28/2023-9/19/23  Lactic acidosis  Acute Renal Failure  S/p HeartMate III LVAD for destination therapy 9/19/23  Complicated by right MCA stroke  S/p right M1 thrombectomy with resolution of symptoms  CAD  Cath 8/29/2023: Severe distal LM to Ramus stenosis,  LAD,  RCA  S/p PCI LM to Ramus, unable to revascularize RCA territory  PVD  S/p aortobifemoral bypass and bilateral femoral above-the-knee popliteal bypass in 2014  S/p Fem-Fem bypass 2/24/2023  S/p right Fem-Pop bypass 8/24/23  Mitral regurgitation, severe  S/p dual chamber ICD  SSS s/p PPM  VT s/p emergent cardioversion 8/28/23  HTN  HLD  Anemia  MAX  History of UTI  Former smoker, quit 9/2023  GI Bleed due to Evelyn Tapia tear 1/2024    LIFE GOALS:  Lifestyle goals reviewed with the patient.  Patient's personal goals include: Discussed      CARDIAC IMAGING and DIAGNOSTICS REVIEWED:  Echo 10/9/24    Left Ventricle: Severely reduced left ventricular systolic function with a visually estimated EF of 15 - 20%. Left ventricle size is normal. Normal wall thickness. The intraventricular septum bows to the 
STAY:          5                 Sushma Madala, MD   
native coronary artery of native heart without angina pectoris 10/31/2023    Essential hypertension 10/31/2023    Heart attack (HCC) 2014    Heart failure (HCC)     HTN (hypertension)     Hyperlipidemia     ICD (implantable cardioverter-defibrillator) in place 10/31/2023    Ischemic cardiomyopathy     Ischemic cardiomyopathy 10/31/2023    Mild mitral valve regurgitation     Nonrheumatic mitral (valve) insufficiency     Positive fecal occult blood test     PVD (peripheral vascular disease) ([facility])     Rheumatic tricuspid insufficiency     Sick sinus syndrome (HCC)        PAST SURGICAL HISTORY:  Past Surgical History:   Procedure Laterality Date    CARDIAC DEFIBRILLATOR PLACEMENT  2014    CARDIAC PROCEDURE N/A 08/29/2023    Left heart cath / coronary angiography performed by Sole Allen MD at [facility] CARDIAC CATH LAB    CARDIAC PROCEDURE N/A 08/29/2023    Percutaneous coronary intervention performed by Sole Allen MD at [facility] CARDIAC CATH LAB    CARDIAC PROCEDURE N/A 08/29/2023    Intravascular ultrasound performed by Sole Allen MD at [facility] CARDIAC CATH LAB    CARDIAC PROCEDURE N/A 08/29/2023    Insert stent manjit coronary performed by Sole Allen MD at [facility] CARDIAC CATH LAB    CARDIAC PROCEDURE N/A 11/29/2023    Right heart cath performed by Jose Daniel Fields MD at [facility] CARDIAC CATH LAB    CARDIAC PROCEDURE N/A 7/31/2024    Right heart cath performed by Jose Daniel Fields MD at [facility] CARDIAC CATH LAB    CARDIAC SURGERY Right 08/28/2023    RIGHT AXILLARY IMPELLA  5.5 INSERTION, HUDSON BY DR QUEZADA performed by Callum Loco MD at [facility] MAIN OR    CARDIAC SURGERY N/A 09/19/2023    LEFT VENTRICULAR ASSIST DEVICE (LVAD) IMPLANTATION;TEMPORARY RIGHT AXILLARY LVAD REMOVAL (IMPELLA); HUDSON & EPIAORTIC US BY DR. CADENA performed by Jose Francisco Del Toro MD at [facility] OPEN HEART    CARDIAC VALVE SURGERY  2017    COLONOSCOPY N/A 09/15/2023    COLONOSCOPY DIAGNOSTIC performed by Jania Christopher MD at [facility] ENDOSCOPY    FEMORAL 
q8h, Justice Amezcua MD, 40 mg at 12/01/24 0640    metoprolol succinate (TOPROL XL) extended release tablet 25 mg, 25 mg, Oral, BID, Justice Amezcua MD, 25 mg at 12/01/24 0930    pantoprazole (PROTONIX) tablet 40 mg, 40 mg, Oral, QAM AC, Justice Amezcua MD, 40 mg at 12/01/24 0641    [Held by provider] spironolactone (ALDACTONE) tablet 25 mg, 25 mg, Oral, Daily, Justice Amezcua MD    Vitamin D (CHOLECALCIFEROL) tablet 1,000 Units, 1,000 Units, Oral, Daily, Justice Amezcua MD, 1,000 Units at 12/01/24 0930    glucose chewable tablet 16 g, 4 tablet, Oral, PRN, Justice Amezcua MD    dextrose bolus 10% 125 mL, 125 mL, IntraVENous, PRN **OR** dextrose bolus 10% 250 mL, 250 mL, IntraVENous, PRN, Justice Amezcua MD    glucagon injection 1 mg, 1 mg, SubCUTAneous, PRN, Justice Amezcua MD    dextrose 10 % infusion, , IntraVENous, Continuous PRN, Justice Amezcua MD    ceFEPIme (MAXIPIME) 2,000 mg in sodium chloride 0.9 % 100 mL IVPB (mini-bag), 2,000 mg, IntraVENous, Q24H, Justice Amezcua MD, Stopped at 11/30/24 1949    magnesium oxide (MAG-OX) tablet 400 mg, 400 mg, Oral, Daily, Justice Amezcua MD, 400 mg at 12/01/24 0930    warfarin placeholder: dosing by pharmacy, , Other, RX Placeholder, Justice Amezcua MD    ammonium lactate (AMLACTIN) 12 % cream, , Topical, BID, Gatito Jimenez MD, Given at 12/01/24 0931   PATIENT CARE TEAM:  Patient Care Team:  Freddy Chandler MD as PCP - General  Shay Zamarripa MD as Referring Physician  Dalton Iqbal III, DO as Consulting Physician (Cardiology)  Jose Francisco Del Toro MD as Consulting Physician (Cardiothoracic Surgery)  Sole Allen MD as Consulting Physician (Cardiology)  Norma Luevano APRN - NP (Nurse Practitioner)    Thank you for your referral and allowing me to participate in this patient's care.    YASMIN Chen NP  [facility]  [facility]  [address], [address]  Phone: [phone] 
counseling, writing orders, performing medical decision making, and documenting.

## 2024-12-02 NOTE — DISCHARGE INSTRUCTIONS
Discharge Instructions       PATIENT ID: Bsihop Love  MRN: [id number]   YOB: 1955    DATE OF ADMISSION: [unfilled]    DATE OF DISCHARGE: 12/2/2024    PRIMARY CARE PROVIDER: @PCP@     ATTENDING PHYSICIAN: [unfilled]  DISCHARGING PROVIDER: Sushma Madala, MD    To contact this individual call [phone] and ask the  to page.   If unavailable ask to be transferred the Adult Hospitalist Department.    DISCHARGE DIAGNOSES: Left leg wounds - chronic , unhealing . Leucocytosis improved with IV Cefepime     CONSULTATIONS: @ISH@    PROCEDURES/SURGERIES: * No surgery found *    PENDING TEST RESULTS:   At the time of discharge the following test results are still pending: none     FOLLOW UP APPOINTMENTS:   [unfilled]   PCP in 1-2 weeks   AHF as scheduled     ADDITIONAL CARE RECOMMENDATIONS:   Labs - cbc in 1 week     DIET: cardiac diet  Oral Nutritional Supplements:     ACTIVITY: activity as tolerated    Radiology      DISCHARGE MEDICATIONS:   See Medication Reconciliation Form    It is important that you take the medication exactly as they are prescribed.   Keep your medication in the bottles provided by the pharmacist and keep a list of the medication names, dosages, and times to be taken in your wallet.   Do not take other medications without consulting your doctor.       NOTIFY YOUR PHYSICIAN FOR ANY OF THE FOLLOWING:   Fever over 101 degrees for 24 hours.   Chest pain, shortness of breath, fever, chills, nausea, vomiting, diarrhea, change in mentation, falling, weakness, bleeding. Severe pain or pain not relieved by medications.  Or, any other signs or symptoms that you may have questions about.      DISPOSITION:    Home With:   OT  PT  HH  RN      X SNF/Inpatient Rehab/LTAC    Independent/assisted living    Hospice    Other:     Signed:   Sushma Madala, MD  12/2/2024  1:22 PM

## 2024-12-03 ENCOUNTER — OFFICE VISIT (OUTPATIENT)
Facility: CLINIC | Age: 69
End: 2024-12-03

## 2024-12-03 ENCOUNTER — TELEPHONE (OUTPATIENT)
Age: 69
End: 2024-12-03

## 2024-12-03 DIAGNOSIS — I70.229 CRITICAL LOWER LIMB ISCHEMIA (HCC): Chronic | ICD-10-CM

## 2024-12-03 DIAGNOSIS — I50.22 CHRONIC SYSTOLIC HEART FAILURE (HCC): Chronic | ICD-10-CM

## 2024-12-03 DIAGNOSIS — L02.612 CELLULITIS AND ABSCESS OF TOE OF LEFT FOOT: ICD-10-CM

## 2024-12-03 DIAGNOSIS — Z95.810 ICD (IMPLANTABLE CARDIOVERTER-DEFIBRILLATOR) IN PLACE: Chronic | ICD-10-CM

## 2024-12-03 DIAGNOSIS — I25.5 ISCHEMIC CARDIOMYOPATHY: Chronic | ICD-10-CM

## 2024-12-03 DIAGNOSIS — Z95.811 LVAD (LEFT VENTRICULAR ASSIST DEVICE) PRESENT (HCC): Chronic | ICD-10-CM

## 2024-12-03 DIAGNOSIS — I10 ESSENTIAL HYPERTENSION: Chronic | ICD-10-CM

## 2024-12-03 DIAGNOSIS — I73.9 PAD (PERIPHERAL ARTERY DISEASE) (HCC): Chronic | ICD-10-CM

## 2024-12-03 DIAGNOSIS — Z86.73 HISTORY OF CEREBROVASCULAR ACCIDENT: Chronic | ICD-10-CM

## 2024-12-03 DIAGNOSIS — L03.032 CELLULITIS AND ABSCESS OF TOE OF LEFT FOOT: ICD-10-CM

## 2024-12-03 DIAGNOSIS — I25.10 CORONARY ARTERY DISEASE INVOLVING NATIVE CORONARY ARTERY OF NATIVE HEART WITHOUT ANGINA PECTORIS: Primary | Chronic | ICD-10-CM

## 2024-12-03 DIAGNOSIS — E78.2 MIXED HYPERLIPIDEMIA: Chronic | ICD-10-CM

## 2024-12-03 DIAGNOSIS — J96.01 ACUTE HYPOXIC RESPIRATORY FAILURE: ICD-10-CM

## 2024-12-03 NOTE — TELEPHONE ENCOUNTER
Telephone Call RE:  Appointment reminder     Outcome:     [] Patient confirmed appointment   [] Patient rescheduled appointment for    [x] Unable to reach  [] Left message              [] Other:       First attempt, vm full.

## 2024-12-03 NOTE — PROGRESS NOTES
Average packs/day: 0.3 packs/day for 40.0 years (10.0 ttl pk-yrs)     Types: Cigarettes     Start date: 1983     Quit date: 2023     Years since quittin.3    Smokeless tobacco: Never   Vaping Use    Vaping status: Never Used   Substance Use Topics    Alcohol use: Not Currently    Drug use: Never       Current Medications:  Current Outpatient Medications on File Prior to Visit   Medication Sig Dispense Refill    balsum peru-castor oil (VENELEX) OINT ointment Apply topically 2 times daily 5 g 0    bumetanide (BUMEX) 1 MG tablet Take 1 tablet by mouth daily as needed (weight gain greater than 3lbs overnight or 5lbs in a week; increased SOB) 30 tablet 0    polyethylene glycol (GLYCOLAX) 17 g packet Take 1 packet by mouth daily as needed for Constipation 15 each 0    oxyCODONE (ROXICODONE) 5 MG immediate release tablet Take 1 tablet by mouth 2 times daily as needed for Pain for up to 3 days. Max Daily Amount: 10 mg 4 tablet 0    warfarin (COUMADIN) 2 MG tablet Take 1 tablet by mouth 4mg (2 tablets) on , 2 mg (1 tablet) on  and       cephALEXin (KEFLEX) 500 MG capsule Take 1 capsule by mouth 2 times daily 120 capsule 1    atorvastatin (LIPITOR) 40 MG tablet Take 2 tablets by mouth daily 90 tablet 1    amiodarone (PACERONE) 100 MG tablet Take 1 tablet by mouth daily 30 tablet 1    amLODIPine (NORVASC) 5 MG tablet Take 1 tablet by mouth every morning 30 tablet 3    aspirin 81 MG EC tablet Take 1 tablet by mouth daily 90 tablet 0    spironolactone (ALDACTONE) 25 MG tablet TAKE 1 TABLET BY MOUTH DAILY 90 tablet 0    pantoprazole (PROTONIX) 40 MG tablet Take 1 tablet by mouth every morning (before breakfast) 90 tablet 1    isosorbide dinitrate (ISORDIL) 20 MG tablet Take 2 tablets by mouth every 8 (eight) hours 180 tablet 2    hydrALAZINE (APRESOLINE) 100 MG tablet Take 1 tablet by mouth every 8 hours 270 tablet 1    metoprolol succinate (TOPROL XL) 25 MG

## 2024-12-04 ENCOUNTER — TELEPHONE (OUTPATIENT)
Age: 69
End: 2024-12-04

## 2024-12-04 NOTE — TELEPHONE ENCOUNTER
Telephone Call RE:  Appointment reminder     Outcome:     [] Patient confirmed appointment   [] Patient rescheduled appointment for    [x] Unable to reach  [] Left message              [] Other:       First number's voicemail box was full.  Second number could not be completed as dialed.  Second attempt.

## 2024-12-04 NOTE — PATIENT INSTRUCTIONS
after hours:  Dial the after hours office number ([phone]).  Request to speak with the Advanced Heart Failure Provider on-call.   A member of the LVAD team will return your call.     When to contact the VAD Emergency Line:  \"Red Heart\" Alarm with loss of green arrows (Pump is no longer running) AFTER you have called 911  Persistent \"Red Heart\" Alarms with green arrows illuminated  Check Flow and PI prior to calling  Acute change in heart failure symptoms:  Sudden onset shortness of breath  Persistent dizziness, lightheadedness leading to passing out or nearly passing out  Falls of any type  LVAD equipment issues that cannot wait until normal clinic hours (over the weekend):  MPU damaged or not working  Battery charger not working  Damage to     Situations NOT appropriate for utilizing the VAD Emergency Line:  Refills- It is important that you ensure you have adequate supplies of all of your medications. Refills should be requested prior to completely running out of your medication (with at least 1 week of supply left) during regular office hours. While you may contact the pager in the event that you are completely out of medication and unable to get through a weekend, it is important not to make a habit of utilizing the pager in this situation.  Concerns not relating to your LVAD or Heart Failure. It is important to reach out to your primary care provider or other specialists you follow (for example endocrinology for your diabetes) to address issues such as:  Cold/flu-like symptoms  Gout flair  Blood sugar issues  General COVID concerns not requiring hospitalization  Urinary tract infection symptoms       HeartMate II/3 LVAD Emergency          Red Heart with continuous audio tone   and no green \"pump running\" symbol  OR      Continuous Audio tone and   no lights on     What to do:  CHECK THE CONNECTIONS  Make sure the pump is connected to the system  controller and the power

## 2024-12-05 ENCOUNTER — OFFICE VISIT (OUTPATIENT)
Age: 69
End: 2024-12-05

## 2024-12-05 ENCOUNTER — TELEPHONE (OUTPATIENT)
Age: 69
End: 2024-12-05

## 2024-12-05 ENCOUNTER — OFFICE VISIT (OUTPATIENT)
Facility: CLINIC | Age: 69
End: 2024-12-05

## 2024-12-05 VITALS
RESPIRATION RATE: 14 BRPM | TEMPERATURE: 98.1 F | WEIGHT: 115.9 LBS | OXYGEN SATURATION: 100 % | BODY MASS INDEX: 17.11 KG/M2

## 2024-12-05 VITALS
RESPIRATION RATE: 18 BRPM | DIASTOLIC BLOOD PRESSURE: 59 MMHG | SYSTOLIC BLOOD PRESSURE: 99 MMHG | WEIGHT: 112 LBS | OXYGEN SATURATION: 99 % | TEMPERATURE: 97.1 F | BODY MASS INDEX: 16.53 KG/M2 | HEART RATE: 78 BPM

## 2024-12-05 VITALS
RESPIRATION RATE: 16 BRPM | HEIGHT: 69 IN | SYSTOLIC BLOOD PRESSURE: 90 MMHG | TEMPERATURE: 97.9 F | WEIGHT: 115 LBS | BODY MASS INDEX: 17.03 KG/M2 | HEART RATE: 68 BPM

## 2024-12-05 DIAGNOSIS — Z95.811 LVAD (LEFT VENTRICULAR ASSIST DEVICE) PRESENT (HCC): ICD-10-CM

## 2024-12-05 DIAGNOSIS — R63.6 UNDERWEIGHT (BMI < 18.5): ICD-10-CM

## 2024-12-05 DIAGNOSIS — Z76.89 ENCOUNTER FOR SOCIAL WORK INTERVENTION: Primary | ICD-10-CM

## 2024-12-05 DIAGNOSIS — S46.002S INJURY OF LEFT ROTATOR CUFF, SEQUELA: ICD-10-CM

## 2024-12-05 DIAGNOSIS — B95.61 BACTEREMIA DUE TO METHICILLIN SUSCEPTIBLE STAPHYLOCOCCUS AUREUS (MSSA): ICD-10-CM

## 2024-12-05 DIAGNOSIS — R53.1 WEAKNESS: ICD-10-CM

## 2024-12-05 DIAGNOSIS — R78.81 BACTEREMIA DUE TO METHICILLIN SUSCEPTIBLE STAPHYLOCOCCUS AUREUS (MSSA): ICD-10-CM

## 2024-12-05 DIAGNOSIS — I25.10 CORONARY ARTERY DISEASE INVOLVING NATIVE CORONARY ARTERY OF NATIVE HEART WITHOUT ANGINA PECTORIS: ICD-10-CM

## 2024-12-05 DIAGNOSIS — S91.302A NON-HEALING OPEN WOUND OF LEFT HEEL: ICD-10-CM

## 2024-12-05 DIAGNOSIS — I10 ESSENTIAL HYPERTENSION: ICD-10-CM

## 2024-12-05 DIAGNOSIS — Z79.01 CHRONIC ANTICOAGULATION: ICD-10-CM

## 2024-12-05 DIAGNOSIS — I50.20 HEART FAILURE WITH REDUCED EJECTION FRACTION (HCC): ICD-10-CM

## 2024-12-05 DIAGNOSIS — I73.9 PVD (PERIPHERAL VASCULAR DISEASE) (HCC): ICD-10-CM

## 2024-12-05 DIAGNOSIS — I50.22 CHRONIC SYSTOLIC (CONGESTIVE) HEART FAILURE (HCC): Primary | ICD-10-CM

## 2024-12-05 DIAGNOSIS — I25.5 ISCHEMIC CARDIOMYOPATHY: Primary | ICD-10-CM

## 2024-12-05 PROBLEM — L03.032 CELLULITIS AND ABSCESS OF TOE OF LEFT FOOT: Status: ACTIVE | Noted: 2024-12-05

## 2024-12-05 PROBLEM — R79.89 ELEVATED LACTIC ACID LEVEL: Status: RESOLVED | Noted: 2024-12-02 | Resolved: 2024-12-05

## 2024-12-05 PROBLEM — I70.25 ISCHEMIC FOOT ULCER DUE TO ATHEROSCLEROSIS OF NATIVE ARTERY OF LIMB (HCC): Chronic | Status: ACTIVE | Noted: 2021-06-17

## 2024-12-05 PROBLEM — L97.509 ISCHEMIC FOOT ULCER DUE TO ATHEROSCLEROSIS OF NATIVE ARTERY OF LIMB (HCC): Chronic | Status: ACTIVE | Noted: 2021-06-17

## 2024-12-05 PROBLEM — I70.229 CRITICAL LOWER LIMB ISCHEMIA (HCC): Chronic | Status: ACTIVE | Noted: 2023-08-24

## 2024-12-05 PROBLEM — Z95.810 ICD (IMPLANTABLE CARDIOVERTER-DEFIBRILLATOR) IN PLACE: Chronic | Status: ACTIVE | Noted: 2023-10-31

## 2024-12-05 PROBLEM — R73.9 TRANSIENT HYPERGLYCEMIA POST PROCEDURE: Status: RESOLVED | Noted: 2023-09-20 | Resolved: 2024-12-05

## 2024-12-05 PROBLEM — L02.612 CELLULITIS AND ABSCESS OF TOE OF LEFT FOOT: Status: ACTIVE | Noted: 2024-12-05

## 2024-12-05 PROBLEM — D72.829 LEUKOCYTOSIS: Status: RESOLVED | Noted: 2024-11-27 | Resolved: 2024-12-05

## 2024-12-05 PROBLEM — E78.2 MIXED HYPERLIPIDEMIA: Chronic | Status: ACTIVE | Noted: 2024-12-05

## 2024-12-05 PROBLEM — L98.9 SKIN EROSION: Status: RESOLVED | Noted: 2024-09-18 | Resolved: 2024-12-05

## 2024-12-05 PROBLEM — R97.20 ELEVATED PSA: Chronic | Status: ACTIVE | Noted: 2023-12-31

## 2024-12-05 PROBLEM — I99.8 LOWER LIMB ISCHEMIA: Status: RESOLVED | Noted: 2023-02-24 | Resolved: 2024-12-05

## 2024-12-05 PROBLEM — L08.9 LEFT FOOT INFECTION: Status: RESOLVED | Noted: 2024-11-27 | Resolved: 2024-12-05

## 2024-12-05 PROBLEM — E87.1 HYPONATREMIA: Status: RESOLVED | Noted: 2024-09-17 | Resolved: 2024-12-05

## 2024-12-05 PROBLEM — I63.9 CEREBROVASCULAR ACCIDENT (CVA) (HCC): Status: RESOLVED | Noted: 2023-09-20 | Resolved: 2024-12-05

## 2024-12-05 PROBLEM — Z86.73 HISTORY OF CEREBROVASCULAR ACCIDENT: Chronic | Status: ACTIVE | Noted: 2024-12-05

## 2024-12-05 PROBLEM — J81.0 ACUTE PULMONARY EDEMA: Status: RESOLVED | Noted: 2024-10-15 | Resolved: 2024-12-05

## 2024-12-05 PROBLEM — R21 GENERALIZED RASH: Status: RESOLVED | Noted: 2024-09-17 | Resolved: 2024-12-05

## 2024-12-05 RX ORDER — AMMONIUM LACTATE 12 G/100G
CREAM TOPICAL 2 TIMES DAILY
Qty: 140 G | Refills: 0 | Status: SHIPPED
Start: 2024-12-05

## 2024-12-05 ASSESSMENT — ENCOUNTER SYMPTOMS
CHEST TIGHTNESS: 0
COUGH: 0
SORE THROAT: 0
SHORTNESS OF BREATH: 0
ABDOMINAL PAIN: 0
ABDOMINAL DISTENTION: 0
EYE PAIN: 0
BLOOD IN STOOL: 0

## 2024-12-05 ASSESSMENT — PATIENT HEALTH QUESTIONNAIRE - PHQ9
SUM OF ALL RESPONSES TO PHQ9 QUESTIONS 1 & 2: 0
2. FEELING DOWN, DEPRESSED OR HOPELESS: NOT AT ALL
1. LITTLE INTEREST OR PLEASURE IN DOING THINGS: NOT AT ALL
SUM OF ALL RESPONSES TO PHQ QUESTIONS 1-9: 0

## 2024-12-05 NOTE — PROGRESS NOTES
ADVANCED HEART FAILURE CENTER  [facility] in [place], [place]  LVAD Coordinator Clinic Visit Note    Chief Complaint   Patient presents with    Congestive Heart Failure       BP (!) 90/0 (Site: Right Upper Arm, Position: Sitting, Cuff Size: Medium Adult)   Pulse 68 Comment: Measured manually, oximeter would not read  Temp 97.9 °F (36.6 °C) (Oral)   Resp 16   Ht 1.753 m (5' 9.02\")   Wt 52.2 kg (115 lb) Comment: Patient reported weight from yesterday. Could not stand to weigh today.  BMI 16.97 kg/m²      LVAD  LVAD Type:: Left Ventricular Assist Device (LVAD)  Pump Speed (rpm): 5000  Pump Flow (lpm): 3.5  MAP (mmHg): 90  Pump Pulse Index (PI): 6.9  Pump Power (Villanueva): 3.6       Bishop Love is a 69 y.o. male with a history of ischemic cardiomyopathy with Heartmate 3 implant date of 9/19/2023. LVAD interrogation completed in clinic. Notable for rare PI events, no adverse alarms. Denies LVAD alarms at home.  See flow sheet for details. All information reported to provider.     Patient presented to clinic with driveline dressing completely detached from the skin and wrapped around driveline distal to exit site. Exit site completely exposed. Site dry and intact with no redness, drainage, or swelling noted. Patient denied any driveline trauma (including  drops). Patient denied pain at the site and reported dressing changed yesterday. Date on dressing showed 12/5/24. Of note, dressing in place from [place] different from recommended dressing utilized by this program. Dressing appeared to be round gauze imbedded in medipore tape. Driveline dressing change completed using sterile technique. Provider assessed at bedside.      All orders entered per VORB. All provider instructions placed in AVS and reviewed with patient. LVAD education provided on contacted the LVAD emergency after hours line. Time given to ask questions. Patient verbalized understanding and had no further questions.

## 2024-12-05 NOTE — PROGRESS NOTES
Social Work Assessment     Date of referral: 12/5/24  Referral received from: Kirstin Stephenson NP  Reason for referral: Assess needs    MSW met with Mr. Love to introduce him to [facility] Social Work.   Mr. Love was laying in bed when MSW arrived.  He was alert and able to express himself without difficulty.      Social history    Living situation prior to SNF:    Mr. Love stated that he resides with his girlfriend in a one story home.     Anticipated living situation following discharge:   Patient anticipates returning to his home to live with his girlfriend.     Marital status: [] Single   []   []     []    [x] Life Partner   [] Other  Kaylin Doc    Family support:  Mr. Love stated that he feels supported by his sister, girlfriend, cousin and stepdaughter.     Previous career:   Patient stated that he worked in [employer].     Was assistance needed for ADLs prior to SNF?  [] Yes   [x] No  Additional notes:    Spirituality/Orthodoxy affiliation:   Mormon  Additional notes:    Is patient a :          [] Yes    []  No  Additional notes:    Advance Care Plan:          [x] Yes   [] No  Additional notes:    Housing:   Are you concerned about housing?                                  [] Yes   [x] No  Additional notes:    Food:  Can you afford nutritious meals?                                     [x] Yes   [] No  Are you able to prepare your own meals?                       [x] Yes   [] No  Are you able to access food?                                           [x] Yes   [] No  Additional notes:   Patient and girlfriend alternate meal preparation.     Financial:   Do you manage your own finances?                               [x] Yes   []  No  Can you afford your household expenses?                    [x] Yes   [] No  Can you afford your medical copays?                             [x] Yes   [] No  Additional notes:   Patient receies     Transportation:  Do you

## 2024-12-05 NOTE — TELEPHONE ENCOUNTER
1131: Called and spoke with staff at [facility] and rehab who confirmed patient has transportation set up for appointment to clinic today

## 2024-12-05 NOTE — PROGRESS NOTES
[facility] OPEN HEART    CARDIAC VALVE SURGERY  2017    COLONOSCOPY N/A 09/15/2023    COLONOSCOPY DIAGNOSTIC performed by Jania Christopher MD at [facility] ENDOSCOPY    FEMORAL BYPASS Right 08/24/2023    RIGHT FEMORAL POPLITEAL BYPASS WITH POLYTETRAFLUOROETHYLENE performed by Sean Barger MD at [facility] MAIN OR    HERNIA REPAIR      INVASIVE VASCULAR N/A 08/29/2023    Ultrasound guided vascular access performed by Sole Allen MD at [facility] CARDIAC CATH LAB    INVASIVE VASCULAR N/A 11/29/2023    Ultrasound guided vascular access performed by Jose Daniel Fields MD at [facility] CARDIAC CATH LAB    INVASIVE VASCULAR N/A 7/31/2024    Ultrasound guided vascular access performed by Jose Daniel Fields MD at [facility] CARDIAC CATH LAB    IR MECHANICAL ART THROMBECTOMY INTRACRANIAL  09/19/2023    IR MECHANICAL ART THROMBECTOMY INTRACRANIAL 9/19/2023 [facility] RAD ANGIO IR    LEFT VENTRICULAR ASSIST DEVICE      PACEMAKER      pacemaker/ICD    UPPER GASTROINTESTINAL ENDOSCOPY N/A 09/15/2023    EGD ESOPHAGOGASTRODUODENOSCOPY performed by Jania Christopher MD at [facility] ENDOSCOPY    UPPER GASTROINTESTINAL ENDOSCOPY N/A 1/15/2024    EGD ESOPHAGOGASTRODUODENOSCOPY, 3 clips placed, one clip fell off after being placed performed by Maryjo Simmons MD at [facility] ENDOSCOPY    VASCULAR SURGERY  2014    aortobifemoral bypass and bilateral femoral above knee popliteal bypass            Vitals:   Vitals:    12/05/24 1558   Resp: 14   Temp: 98.1 °F (36.7 °C)   SpO2: 100%             Exam:  Constitutional: No acute distress; frail  Eyes: Sclera clear, PERRLA;   Ears/nose/mouth/throat:mmm, OP clear, trachea midline;  Cardiovascular: has heartmate 3 LVAD no audible heart beat, MAP 84  , no pulse palpable no edema, no cyanosis;   Respiratory: Clear to auscultation, symmetric, no respiratory distress;  Gastrointestinal: Abdomen soft, NT, ND, no masses, normal bowel sounds;  Neurologic: Cranial nerves II through VII grossly intact, no speech or motor deficits A&O in time place and

## 2024-12-05 NOTE — PROGRESS NOTES
ADVANCED HEART FAILURE CENTER  [facility] in [place], [place]  Mechanical Circulatory Support Outpatient progress Note    Patient name: Bishop Love  Patient : 1955  Patient MRN: [id number]  Date of service: 24    Primary care physician: Freddy Chandler MD  LVAD cardiologist: MICHAEL    Chief Complaint   Patient presents with    Congestive Heart Failure       ASSESSMENT:  Bishop Love is a 69 y.o. male with history of chronic systolic heart failure due to  ischemic cardiomyopathy, s/p HM3 LVAD implantation (2023)  initially as destination therapy, stage D, on optimal GDMT limited by RV dysfunction-not a transplant candidate as per [facility] due to high risk given previous sternotomy and severe peripheral vascular disease with nonhealing foot ulcers  Non healing Foot wounds with multiple organisms.  Received IV antibiotics.  Bacteremia cleared.  Not a good candidate for amputation.  ID does not think infection will clear and recommends hospice, but he is not interested.  On long term suppressive Keflex.    Discussed GOC again with patient today.  At this time patient's wishes are clear to continue full medical care.  Not interested in palliative care at this time.       CURRENT VISIT 2024 :  Bishop Love presents [facility] f/u.  Was admitted 2024 through 2024 for leukocytosis.  Blood cultures drawn and negative.  Restarted on IV cefepime while inpatient which transition to Keflex 500 mg twice daily outpatient- for life long therapy.  Discharged back to [facility] and rehab.  Since discharge she feels well.  Denies acute heart failure symptoms.  Denies shortness of breath, orthopnea, dyspnea on exertion (although difficult to assess because patient unable to bear weight on his feet due to pain), chest pain, palpitations, dizziness, or syncope.  Patient denies swelling.  Patient denies signs of bleeding.  Patient says appetite has been okay.  Patient complains

## 2024-12-09 ENCOUNTER — OFFICE VISIT (OUTPATIENT)
Facility: CLINIC | Age: 69
End: 2024-12-09
Payer: MEDICARE

## 2024-12-09 VITALS
OXYGEN SATURATION: 98 % | TEMPERATURE: 97.8 F | BODY MASS INDEX: 17.12 KG/M2 | WEIGHT: 116 LBS | RESPIRATION RATE: 17 BRPM

## 2024-12-09 DIAGNOSIS — Z79.01 CHRONIC ANTICOAGULATION: ICD-10-CM

## 2024-12-09 DIAGNOSIS — S46.002S INJURY OF LEFT ROTATOR CUFF, SEQUELA: ICD-10-CM

## 2024-12-09 DIAGNOSIS — R78.81 BACTEREMIA DUE TO METHICILLIN SUSCEPTIBLE STAPHYLOCOCCUS AUREUS (MSSA): ICD-10-CM

## 2024-12-09 DIAGNOSIS — I73.9 PVD (PERIPHERAL VASCULAR DISEASE) (HCC): ICD-10-CM

## 2024-12-09 DIAGNOSIS — I50.20 HEART FAILURE WITH REDUCED EJECTION FRACTION (HCC): ICD-10-CM

## 2024-12-09 DIAGNOSIS — R53.1 WEAKNESS: ICD-10-CM

## 2024-12-09 DIAGNOSIS — I25.10 CORONARY ARTERY DISEASE INVOLVING NATIVE CORONARY ARTERY OF NATIVE HEART WITHOUT ANGINA PECTORIS: ICD-10-CM

## 2024-12-09 DIAGNOSIS — Z95.811 LVAD (LEFT VENTRICULAR ASSIST DEVICE) PRESENT (HCC): ICD-10-CM

## 2024-12-09 DIAGNOSIS — B95.61 BACTEREMIA DUE TO METHICILLIN SUSCEPTIBLE STAPHYLOCOCCUS AUREUS (MSSA): ICD-10-CM

## 2024-12-09 DIAGNOSIS — I25.5 ISCHEMIC CARDIOMYOPATHY: Primary | ICD-10-CM

## 2024-12-09 DIAGNOSIS — S91.302A NON-HEALING OPEN WOUND OF LEFT HEEL: ICD-10-CM

## 2024-12-09 DIAGNOSIS — D72.829 LEUKOCYTOSIS, UNSPECIFIED TYPE: ICD-10-CM

## 2024-12-09 PROCEDURE — 1123F ACP DISCUSS/DSCN MKR DOCD: CPT | Performed by: NURSE PRACTITIONER

## 2024-12-09 PROCEDURE — 99310 SBSQ NF CARE HIGH MDM 45: CPT | Performed by: NURSE PRACTITIONER

## 2024-12-09 NOTE — PROGRESS NOTES
even after discussion today.  Given holiday schedule rapid workup in SNF not available and will send to ER for workup.  12/05/2024 -return to facility after treatment with cefepime during hospitalization.  Continue prophylaxis with Keflex 500 mg p.o. twice daily  12/09/2024 - heel is improving, anterior foot/leg wound worsening - WBC on 12/06/2024 now 13.2, obtained wound culture, was debrided slightly by wound NP, repeat CBC w/ diff on 12/10/2024, if remains elevated can restart cefepime based on old result while awaiting new culture. Discussed hospice again - declined, wants to remain full code   11. Weakness     Work with PT OT for strengthening  Most recent physical therapy note reviewed from 12/9/2024   12. Leukocytosis   12/09/2024 - WBC on 12/06/2024 now 13.2, obtained wound culture of anterior left foot wound, was debrided slightly by wound NP, repeat CBC w/ diff on 12/10/2024, if remains elevated can restart cefepime based on old result while awaiting new culture. Discussed hospice again - declined, wants to remain full code           YASMIN Toscano - NP

## 2024-12-11 ENCOUNTER — OFFICE VISIT (OUTPATIENT)
Facility: CLINIC | Age: 69
End: 2024-12-11

## 2024-12-11 VITALS
BODY MASS INDEX: 17.12 KG/M2 | TEMPERATURE: 98.4 F | WEIGHT: 116 LBS | RESPIRATION RATE: 18 BRPM | OXYGEN SATURATION: 100 %

## 2024-12-11 DIAGNOSIS — I73.9 PVD (PERIPHERAL VASCULAR DISEASE) (HCC): ICD-10-CM

## 2024-12-11 DIAGNOSIS — I25.5 ISCHEMIC CARDIOMYOPATHY: Primary | ICD-10-CM

## 2024-12-11 DIAGNOSIS — Z95.811 LVAD (LEFT VENTRICULAR ASSIST DEVICE) PRESENT (HCC): ICD-10-CM

## 2024-12-11 DIAGNOSIS — S46.002S INJURY OF LEFT ROTATOR CUFF, SEQUELA: ICD-10-CM

## 2024-12-11 DIAGNOSIS — Z79.01 CHRONIC ANTICOAGULATION: ICD-10-CM

## 2024-12-11 DIAGNOSIS — R78.81 BACTEREMIA DUE TO METHICILLIN SUSCEPTIBLE STAPHYLOCOCCUS AUREUS (MSSA): ICD-10-CM

## 2024-12-11 DIAGNOSIS — B95.61 BACTEREMIA DUE TO METHICILLIN SUSCEPTIBLE STAPHYLOCOCCUS AUREUS (MSSA): ICD-10-CM

## 2024-12-11 DIAGNOSIS — D72.829 LEUKOCYTOSIS, UNSPECIFIED TYPE: ICD-10-CM

## 2024-12-11 DIAGNOSIS — S91.302A NON-HEALING OPEN WOUND OF LEFT HEEL: ICD-10-CM

## 2024-12-11 DIAGNOSIS — R53.1 WEAKNESS: ICD-10-CM

## 2024-12-11 DIAGNOSIS — I25.10 CORONARY ARTERY DISEASE INVOLVING NATIVE CORONARY ARTERY OF NATIVE HEART WITHOUT ANGINA PECTORIS: ICD-10-CM

## 2024-12-11 DIAGNOSIS — I50.20 HEART FAILURE WITH REDUCED EJECTION FRACTION (HCC): ICD-10-CM

## 2024-12-11 NOTE — PROGRESS NOTES
[id number]  [address] [facility] [place] [address]  Phone: [phone]  Fax: [phone]  Also available via Perfect [employer]     PLACE OF SERVICE:  [facility] [address] [place] [address]    SKILLED VISIT    Chief Complaint:   Chief Complaint   Patient presents with    Follow-up         HPI : Bishop Love is a 69 y.o. male here for follow up.    Previous history prior to admission:  Patient was hospitalized from 9/17/2024 to 10/29/2024.  Patient has a past medical history of coronary artery disease MI pacemaker ICD, ischemic cardiomyopathy, chronic heart failure with preserved ejection fraction, long-term anticoagulation on Coumadin, mitral agitation, rheumatic tricuspid regurg, sick sinus syndrome, PVD, right femoral popliteal bypass 8/24/2023, bifemoral bypass and bilateral femoral above-the-knee popliteal bypass.  He presented to the ER with complaints of generalized body rash left shoulder and elbow pain rash and started a week prior.  And had a fall 1 week prior.  Left elbow fracture showed each indeterminant fracture with's bone spur at triceps left shoulder showed a high riding humeral head compatible with chronic massive cuff tear.  Patient has history of heart failure with reduced ejection fraction with chronic LVAD on 9/19/2023.  He is being managed by the chronic heart failure team and is on metoprolol 25 mg p.o. twice daily cannot tolerate ACE ARB or Arni due to severe cough.  He is on hydralazine 100 mg p.o. 3 times daily and Isordil 40 mg p.o. 3 times daily.  He is on Aldactone.  He is not on SGLT2 due to recurrent UTIs.  He continues on oral Bumex 2 mg twice daily.  He is not a candidate for liver transplant due to severe peripheral vascular disease.  He continues on Coumadin he had MSSA bacteremia his wound culture had MRSA MSSA Pseudomonas he was assessed by podiatry and recommended local wound care he is full weightbearing on left

## 2024-12-15 ENCOUNTER — TELEPHONE (OUTPATIENT)
Age: 69
End: 2024-12-15

## 2024-12-15 NOTE — TELEPHONE ENCOUNTER
Bishop Love paged the on call service line bc he said [facility] does not have anymore LVAD dressing kits or anchors.  I told him I would reach out to the VAD coordinators on Monday and that we will get him LVAD dressing kits and an anchor.

## 2024-12-16 NOTE — TELEPHONE ENCOUNTER
Contacted [facility] DON Denice via secure email regarding patient's report of not having dressing change kits.     1423: received call from Denice who stated patient does have \"an abundance\" of kits at [facility] which are being kept in PRECIOUS's office. Denice stated she spoke with patient who was inquiring if [employer] could ship kits directly to [facility]. Due to supply issues patient was in the past using kits from home while at [facility]. Denice stated patient requested to use kits from home but that [facility] is able to supply dressing change kits for patient. Denice stated she will email a photo of their dressing change kit to RN coordinator to ensure it contains the appropriate components.      Received photo of [facility] supplied dressing change kits via secure email ( package marked sterile). Kits contain silver disk and no split or woven gauze. Discussed with Denice that our policy is to use gauze with no silver disk, discussed sterile gauze can be added to kit and provided picture for reference.  Also requested she verify that contents are sterile, applicators are sterile CHG and that dressing change is performed in a sterile manner.     12/18:   1107: called and spoke with patient, notified him of discussions with [facility]. Informed him from picture send by [facility] it appears kits contain all acceptable components for dressing change (with the exclusion of the silver disk and split/woven gauze and as long as dressing change is sterile).     12/24:     Follow up call placed to Denice LANG with [facility]. She confirmed available kits are sterile and applicators are sterile CHG.  Reviewed our policy does not include silver disk, and that nursing should use sterile split and woven gauze around driveline, she verbalized understanding.

## 2024-12-17 ENCOUNTER — OFFICE VISIT (OUTPATIENT)
Facility: CLINIC | Age: 69
End: 2024-12-17
Payer: MEDICARE

## 2024-12-17 VITALS
TEMPERATURE: 97.1 F | WEIGHT: 120.5 LBS | OXYGEN SATURATION: 97 % | RESPIRATION RATE: 18 BRPM | BODY MASS INDEX: 17.79 KG/M2

## 2024-12-17 DIAGNOSIS — R53.1 WEAKNESS: ICD-10-CM

## 2024-12-17 DIAGNOSIS — B95.61 BACTEREMIA DUE TO METHICILLIN SUSCEPTIBLE STAPHYLOCOCCUS AUREUS (MSSA): ICD-10-CM

## 2024-12-17 DIAGNOSIS — I25.5 ISCHEMIC CARDIOMYOPATHY: Primary | ICD-10-CM

## 2024-12-17 DIAGNOSIS — S91.302A NON-HEALING OPEN WOUND OF LEFT HEEL: ICD-10-CM

## 2024-12-17 DIAGNOSIS — I25.10 CORONARY ARTERY DISEASE INVOLVING NATIVE CORONARY ARTERY OF NATIVE HEART WITHOUT ANGINA PECTORIS: ICD-10-CM

## 2024-12-17 DIAGNOSIS — A49.02 INFECTION OF WOUND DUE TO METHICILLIN RESISTANT STAPHYLOCOCCUS AUREUS (MRSA): ICD-10-CM

## 2024-12-17 DIAGNOSIS — I73.9 PVD (PERIPHERAL VASCULAR DISEASE) (HCC): ICD-10-CM

## 2024-12-17 DIAGNOSIS — Z95.811 LVAD (LEFT VENTRICULAR ASSIST DEVICE) PRESENT (HCC): ICD-10-CM

## 2024-12-17 DIAGNOSIS — Z79.01 CHRONIC ANTICOAGULATION: ICD-10-CM

## 2024-12-17 DIAGNOSIS — I50.20 HEART FAILURE WITH REDUCED EJECTION FRACTION (HCC): ICD-10-CM

## 2024-12-17 DIAGNOSIS — R78.81 BACTEREMIA DUE TO METHICILLIN SUSCEPTIBLE STAPHYLOCOCCUS AUREUS (MSSA): ICD-10-CM

## 2024-12-17 DIAGNOSIS — D72.829 LEUKOCYTOSIS, UNSPECIFIED TYPE: ICD-10-CM

## 2024-12-17 DIAGNOSIS — S46.002S INJURY OF LEFT ROTATOR CUFF, SEQUELA: ICD-10-CM

## 2024-12-17 PROCEDURE — 99309 SBSQ NF CARE MODERATE MDM 30: CPT | Performed by: NURSE PRACTITIONER

## 2024-12-17 PROCEDURE — 1123F ACP DISCUSS/DSCN MKR DOCD: CPT | Performed by: NURSE PRACTITIONER

## 2024-12-17 NOTE — PROGRESS NOTES
[place] -scheduled on 11/22/2024 - no new recommendations, follow up with wound care  WBC on 11/19/2024 11.5, repeat on 11/26/2024 - now more elevated 15.2  11/27/2024 -discussed elevated white blood cell count, patient afebrile, concern for infection including bacteremia or wound infection.  Previous hospitalization and recommended hospice and patient's need for amputation but likely would result in death.  Wound will likely result in death.  Patient wants to remain full code even after discussion today.  Given holiday schedule rapid workup in SNF not available and will send to ER for workup.  12/05/2024 -return to facility after treatment with cefepime during hospitalization.  Continue prophylaxis with Keflex 500 mg p.o. twice daily  12/09/2024 - WBC on 12/06/2024 now 13.2, obtained wound culture, was debrided slightly by wound NP, repeat CBC w/ diff on 12/10/2024, if remains elevated can restart cefepime based on old result while awaiting new culture. Discussed hospice again - declined, wants to remain full code  12/11/2024 - WBC 12.8, stable, awaiting wound culture  12/17/2024 - WBC 12.8, pending labs from this morning, wound culture + MRSA started on doxycycline 1100mg PO BID x 7 days   10. Non healing wound of left heel and forefoot, sequelae and left anterior foot    Has left ankle and heel wound -seen by in-house wound care provider 11/20/2024-noted to be stalled, continue recommendations  11/27/2024 -discussed elevated white blood cell count, patient afebrile, concern for infection including bacteremia or wound infection.  Previous hospitalization and recommended hospice and patient's need for amputation but likely would result in death.  Wound will likely result in death.  Patient wants to remain full code even after discussion today.  Given holiday schedule rapid workup in SNF not available and will send to ER for workup.  12/05/2024 -return to facility after treatment with cefepime during

## 2024-12-19 ENCOUNTER — OFFICE VISIT (OUTPATIENT)
Facility: CLINIC | Age: 69
End: 2024-12-19

## 2024-12-19 VITALS — RESPIRATION RATE: 17 BRPM | OXYGEN SATURATION: 97 % | TEMPERATURE: 98 F | WEIGHT: 117 LBS | BODY MASS INDEX: 17.27 KG/M2

## 2024-12-19 DIAGNOSIS — B95.61 BACTEREMIA DUE TO METHICILLIN SUSCEPTIBLE STAPHYLOCOCCUS AUREUS (MSSA): ICD-10-CM

## 2024-12-19 DIAGNOSIS — I25.10 CORONARY ARTERY DISEASE INVOLVING NATIVE CORONARY ARTERY OF NATIVE HEART WITHOUT ANGINA PECTORIS: ICD-10-CM

## 2024-12-19 DIAGNOSIS — R78.81 BACTEREMIA DUE TO METHICILLIN SUSCEPTIBLE STAPHYLOCOCCUS AUREUS (MSSA): ICD-10-CM

## 2024-12-19 DIAGNOSIS — I73.9 PVD (PERIPHERAL VASCULAR DISEASE) (HCC): ICD-10-CM

## 2024-12-19 DIAGNOSIS — I25.5 ISCHEMIC CARDIOMYOPATHY: Primary | ICD-10-CM

## 2024-12-19 DIAGNOSIS — Z95.811 LVAD (LEFT VENTRICULAR ASSIST DEVICE) PRESENT (HCC): ICD-10-CM

## 2024-12-19 DIAGNOSIS — R53.1 WEAKNESS: ICD-10-CM

## 2024-12-19 DIAGNOSIS — I50.20 HEART FAILURE WITH REDUCED EJECTION FRACTION (HCC): ICD-10-CM

## 2024-12-19 DIAGNOSIS — S46.002S INJURY OF LEFT ROTATOR CUFF, SEQUELA: ICD-10-CM

## 2024-12-19 DIAGNOSIS — A49.02 INFECTION OF WOUND DUE TO METHICILLIN RESISTANT STAPHYLOCOCCUS AUREUS (MRSA): ICD-10-CM

## 2024-12-19 DIAGNOSIS — S91.302A NON-HEALING OPEN WOUND OF LEFT HEEL: ICD-10-CM

## 2024-12-19 DIAGNOSIS — D72.829 LEUKOCYTOSIS, UNSPECIFIED TYPE: ICD-10-CM

## 2024-12-19 DIAGNOSIS — Z79.01 CHRONIC ANTICOAGULATION: ICD-10-CM

## 2024-12-19 NOTE — PROGRESS NOTES
[id number]  [address] [facility] [place] [address]  Phone: [phone]  Fax: [phone]  Also available via Perfect Serve     PLACE OF SERVICE:  [facility] [address] [place] [address]    SKILLED VISIT    Chief Complaint:   Chief Complaint   Patient presents with    Follow-up         HPI : Bishop Love is a 69 y.o. male here for follow up.    Previous history prior to admission:  Patient was hospitalized from 9/17/2024 to 10/29/2024.  Patient has a past medical history of coronary artery disease MI pacemaker ICD, ischemic cardiomyopathy, chronic heart failure with preserved ejection fraction, long-term anticoagulation on Coumadin, mitral agitation, rheumatic tricuspid regurg, sick sinus syndrome, PVD, right femoral popliteal bypass 8/24/2023, bifemoral bypass and bilateral femoral above-the-knee popliteal bypass.  He presented to the ER with complaints of generalized body rash left shoulder and elbow pain rash and started a week prior.  And had a fall 1 week prior.  Left elbow fracture showed each indeterminant fracture with's bone spur at triceps left shoulder showed a high riding humeral head compatible with chronic massive cuff tear.  Patient has history of heart failure with reduced ejection fraction with chronic LVAD on 9/19/2023.  He is being managed by the chronic heart failure team and is on metoprolol 25 mg p.o. twice daily cannot tolerate ACE ARB or Arni due to severe cough.  He is on hydralazine 100 mg p.o. 3 times daily and Isordil 40 mg p.o. 3 times daily.  He is on Aldactone.  He is not on SGLT2 due to recurrent UTIs.  He continues on oral Bumex 2 mg twice daily.  He is not a candidate for liver transplant due to severe peripheral vascular disease.  He continues on Coumadin he had MSSA bacteremia his wound culture had MRSA MSSA Pseudomonas he was assessed by podiatry and recommended local wound care he is full weightbearing on left

## 2024-12-23 ENCOUNTER — OFFICE VISIT (OUTPATIENT)
Facility: CLINIC | Age: 69
End: 2024-12-23
Payer: MEDICARE

## 2024-12-23 ENCOUNTER — TELEPHONE (OUTPATIENT)
Age: 69
End: 2024-12-23

## 2024-12-23 VITALS
BODY MASS INDEX: 17.36 KG/M2 | WEIGHT: 117.6 LBS | RESPIRATION RATE: 17 BRPM | TEMPERATURE: 98 F | OXYGEN SATURATION: 97 %

## 2024-12-23 DIAGNOSIS — A49.02 INFECTION OF WOUND DUE TO METHICILLIN RESISTANT STAPHYLOCOCCUS AUREUS (MRSA): ICD-10-CM

## 2024-12-23 DIAGNOSIS — R78.81 BACTEREMIA DUE TO METHICILLIN SUSCEPTIBLE STAPHYLOCOCCUS AUREUS (MSSA): ICD-10-CM

## 2024-12-23 DIAGNOSIS — I73.9 PVD (PERIPHERAL VASCULAR DISEASE) (HCC): ICD-10-CM

## 2024-12-23 DIAGNOSIS — R53.1 WEAKNESS: ICD-10-CM

## 2024-12-23 DIAGNOSIS — Z79.01 CHRONIC ANTICOAGULATION: ICD-10-CM

## 2024-12-23 DIAGNOSIS — Z95.811 LVAD (LEFT VENTRICULAR ASSIST DEVICE) PRESENT (HCC): ICD-10-CM

## 2024-12-23 DIAGNOSIS — S46.002S INJURY OF LEFT ROTATOR CUFF, SEQUELA: ICD-10-CM

## 2024-12-23 DIAGNOSIS — I25.10 CORONARY ARTERY DISEASE INVOLVING NATIVE CORONARY ARTERY OF NATIVE HEART WITHOUT ANGINA PECTORIS: ICD-10-CM

## 2024-12-23 DIAGNOSIS — I50.20 HEART FAILURE WITH REDUCED EJECTION FRACTION (HCC): ICD-10-CM

## 2024-12-23 DIAGNOSIS — S91.302A NON-HEALING OPEN WOUND OF LEFT HEEL: ICD-10-CM

## 2024-12-23 DIAGNOSIS — B95.61 BACTEREMIA DUE TO METHICILLIN SUSCEPTIBLE STAPHYLOCOCCUS AUREUS (MSSA): ICD-10-CM

## 2024-12-23 DIAGNOSIS — I25.5 ISCHEMIC CARDIOMYOPATHY: Primary | ICD-10-CM

## 2024-12-23 DIAGNOSIS — D72.829 LEUKOCYTOSIS, UNSPECIFIED TYPE: ICD-10-CM

## 2024-12-23 PROCEDURE — 1123F ACP DISCUSS/DSCN MKR DOCD: CPT | Performed by: NURSE PRACTITIONER

## 2024-12-23 PROCEDURE — 99309 SBSQ NF CARE MODERATE MDM 30: CPT | Performed by: NURSE PRACTITIONER

## 2024-12-23 NOTE — PROGRESS NOTES
MRSA   12/19/2024 - repeat WBAT stable 12.7, continue course of doxycycline and keflex  Labs on 12/31/2024 CBC w/ diff   13. Infectious of wound due to MRSA   Previous known history of MRSA bilateral leg left shin and heel wounds positive for MRSA on culture  Continue on doxycycline 100mg PO BID x 7 days - now completed  Continue with wound care per note 12/19/2024  CBC w/ diff 12/172/2024 WBC stable 12.7  Labs on 12/31/2024 CBC w/ diff           YASMIN Toscano - NP

## 2024-12-23 NOTE — TELEPHONE ENCOUNTER
1345: Call received from patient stating he has upcoming appointments on 1/6 and 1/7 and requested to confirm if one appointment was for VAD follow up. Informed patient he does have VAD follow up on 1/7, and appointment on 1/6 was with [facility]. He verbalized understanding and had no further questions.    LUIS STEVEN RN  VAD Coordinator

## 2024-12-26 ENCOUNTER — OFFICE VISIT (OUTPATIENT)
Facility: CLINIC | Age: 69
End: 2024-12-26

## 2024-12-26 VITALS
HEART RATE: 73 BPM | WEIGHT: 117 LBS | SYSTOLIC BLOOD PRESSURE: 121 MMHG | DIASTOLIC BLOOD PRESSURE: 61 MMHG | TEMPERATURE: 97.4 F | RESPIRATION RATE: 17 BRPM | OXYGEN SATURATION: 94 % | BODY MASS INDEX: 17.27 KG/M2

## 2024-12-26 DIAGNOSIS — S46.002S INJURY OF LEFT ROTATOR CUFF, SEQUELA: ICD-10-CM

## 2024-12-26 DIAGNOSIS — B95.61 BACTEREMIA DUE TO METHICILLIN SUSCEPTIBLE STAPHYLOCOCCUS AUREUS (MSSA): ICD-10-CM

## 2024-12-26 DIAGNOSIS — A49.02 INFECTION OF WOUND DUE TO METHICILLIN RESISTANT STAPHYLOCOCCUS AUREUS (MRSA): ICD-10-CM

## 2024-12-26 DIAGNOSIS — I25.10 CORONARY ARTERY DISEASE INVOLVING NATIVE CORONARY ARTERY OF NATIVE HEART WITHOUT ANGINA PECTORIS: ICD-10-CM

## 2024-12-26 DIAGNOSIS — I25.5 ISCHEMIC CARDIOMYOPATHY: Primary | ICD-10-CM

## 2024-12-26 DIAGNOSIS — R78.81 BACTEREMIA DUE TO METHICILLIN SUSCEPTIBLE STAPHYLOCOCCUS AUREUS (MSSA): ICD-10-CM

## 2024-12-26 DIAGNOSIS — Z95.811 LVAD (LEFT VENTRICULAR ASSIST DEVICE) PRESENT (HCC): ICD-10-CM

## 2024-12-26 DIAGNOSIS — I50.20 HEART FAILURE WITH REDUCED EJECTION FRACTION (HCC): ICD-10-CM

## 2024-12-26 DIAGNOSIS — R53.1 WEAKNESS: ICD-10-CM

## 2024-12-26 DIAGNOSIS — D72.829 LEUKOCYTOSIS, UNSPECIFIED TYPE: ICD-10-CM

## 2024-12-26 DIAGNOSIS — Z79.01 CHRONIC ANTICOAGULATION: ICD-10-CM

## 2024-12-26 DIAGNOSIS — I73.9 PVD (PERIPHERAL VASCULAR DISEASE) (HCC): ICD-10-CM

## 2024-12-26 DIAGNOSIS — S91.302A NON-HEALING OPEN WOUND OF LEFT HEEL: ICD-10-CM

## 2024-12-26 NOTE — PROGRESS NOTES
peripheral edema.  LVAD was checked power 3.4, PI 3.4 Speed 5050 flow 4.1 and MAP 85.  Last labs were reviewed from 12/10/2024 white blood cell count was 12.8 and stable.  Was pending culture for bilateral leg wounds.  His culture did return back positive for MRSA in both areas.  He was started on doxycycline 100 mg p.o. twice daily for this in conjunction with Keflex.  His renal function electrolytes are stable.  Per documentation eating about 50 to 75% of meals.  His last bowel movement 12/16/2024.  He is participating with physical therapy most recent note reviewed from 12/17/2024 noting they are working on passive range of motion met in left knee sit to  parallel bars and static standing.  There is recent wound care notes reviewed from 12/9/2024.  Continue to monitor closely.  No other acute complaints.    12/19/2024 - patient lying in bed today he is awake alert oriented x 3.  His maps have been reviewed and are ranging 78-86 over last 48 hours.  He denies any lightheadedness or dizziness.  His current weight is 117 pounds however he has no shortness of breath cough or congestion.  No adventitious breath sounds.  No peripheral edema.  LVAD was checked power 3.4, PI 3.9 Speed 5050 flow 3.4 and MAP 86.  Last labs were reviewed from 12/17/2024 white blood cell count was 12.7 and stable.  His culture did return back positive for MRSA in both areas.  He was started on doxycycline 100 mg p.o. twice daily for this in conjunction with Keflex. Repeat labs on 12/26/2024.  His renal function electrolytes are stable.  Per documentation eating about 50 to 100% of meals.  His last bowel movement 12/19/2024.  He is participating with physical therapy most recent note reviewed from 12/18/2024.  There is recent wound care notes reviewed from 12/9/2024.  Continue to monitor closely.  No other acute complaints.    12/23/2024 - patient lying in bed today he is awake alert oriented x 3.  His maps have been reviewed and are

## 2024-12-30 ENCOUNTER — OFFICE VISIT (OUTPATIENT)
Facility: CLINIC | Age: 69
End: 2024-12-30
Payer: MEDICARE

## 2024-12-30 VITALS
WEIGHT: 114.7 LBS | RESPIRATION RATE: 18 BRPM | TEMPERATURE: 98 F | OXYGEN SATURATION: 98 % | BODY MASS INDEX: 16.93 KG/M2

## 2024-12-30 DIAGNOSIS — S46.002S INJURY OF LEFT ROTATOR CUFF, SEQUELA: ICD-10-CM

## 2024-12-30 DIAGNOSIS — I50.20 HEART FAILURE WITH REDUCED EJECTION FRACTION (HCC): ICD-10-CM

## 2024-12-30 DIAGNOSIS — D72.829 LEUKOCYTOSIS, UNSPECIFIED TYPE: ICD-10-CM

## 2024-12-30 DIAGNOSIS — Z79.01 CHRONIC ANTICOAGULATION: ICD-10-CM

## 2024-12-30 DIAGNOSIS — I73.9 PVD (PERIPHERAL VASCULAR DISEASE) (HCC): ICD-10-CM

## 2024-12-30 DIAGNOSIS — R78.81 BACTEREMIA DUE TO METHICILLIN SUSCEPTIBLE STAPHYLOCOCCUS AUREUS (MSSA): ICD-10-CM

## 2024-12-30 DIAGNOSIS — I25.10 CORONARY ARTERY DISEASE INVOLVING NATIVE CORONARY ARTERY OF NATIVE HEART WITHOUT ANGINA PECTORIS: ICD-10-CM

## 2024-12-30 DIAGNOSIS — Z95.811 LVAD (LEFT VENTRICULAR ASSIST DEVICE) PRESENT (HCC): ICD-10-CM

## 2024-12-30 DIAGNOSIS — B95.61 BACTEREMIA DUE TO METHICILLIN SUSCEPTIBLE STAPHYLOCOCCUS AUREUS (MSSA): ICD-10-CM

## 2024-12-30 DIAGNOSIS — S91.302A NON-HEALING OPEN WOUND OF LEFT HEEL: ICD-10-CM

## 2024-12-30 DIAGNOSIS — A49.02 INFECTION OF WOUND DUE TO METHICILLIN RESISTANT STAPHYLOCOCCUS AUREUS (MRSA): ICD-10-CM

## 2024-12-30 DIAGNOSIS — R53.1 WEAKNESS: ICD-10-CM

## 2024-12-30 DIAGNOSIS — I25.5 ISCHEMIC CARDIOMYOPATHY: Primary | ICD-10-CM

## 2024-12-30 PROCEDURE — 1123F ACP DISCUSS/DSCN MKR DOCD: CPT | Performed by: NURSE PRACTITIONER

## 2024-12-30 PROCEDURE — 99309 SBSQ NF CARE MODERATE MDM 30: CPT | Performed by: NURSE PRACTITIONER

## 2024-12-30 NOTE — PROGRESS NOTES
for strengthening  Most recent physical therapy note reviewed from 12/30/2024   12. Leukocytosis   12/09/2024 - WBC on 12/06/2024 now 13.2, obtained wound culture of anterior left foot wound, was debrided slightly by wound NP, repeat CBC w/ diff on 12/10/2024, if remains elevated can restart cefepime based on old result while awaiting new culture. Discussed hospice again - declined, wants to remain full code  12/11/2024 - slight improved to 12.8, awaiting culture, afebrile, continue keflex for lifelong prophylaxis, likely infection of left foot wounds will reoccur  12/17/2024 - Lower extremity wounds + MRSA   12/19/2024 - repeat WBAT stable 12.7, continue course of doxycycline and keflex  Labs from 12/24/2024 WBC 12.2, stable  Labs on 12/31/2024 CBC w/ diff - pending   13. Infectious of wound due to MRSA   Previous known history of MRSA bilateral leg left shin and heel wounds positive for MRSA on culture  Continue on doxycycline 100mg PO BID x 7 days - now completed  Continue with wound care per note 12/19/2024  CBC w/ diff 12/172/2024 WBC stable 12.7  Labs from 12/24/2024 WBC 12.2, stable  Labs on 12/31/2024 CBC w/ diff           YASMIN Toscano - NP

## 2025-01-01 ENCOUNTER — TELEPHONE (OUTPATIENT)
Age: 70
End: 2025-01-01

## 2025-01-01 ENCOUNTER — APPOINTMENT (OUTPATIENT)
Facility: HOSPITAL | Age: 70
End: 2025-01-01
Attending: EMERGENCY MEDICINE
Payer: MEDICARE

## 2025-01-01 ENCOUNTER — APPOINTMENT (OUTPATIENT)
Facility: HOSPITAL | Age: 70
End: 2025-01-01
Payer: MEDICARE

## 2025-01-01 ENCOUNTER — HOSPITAL ENCOUNTER (EMERGENCY)
Facility: HOSPITAL | Age: 70
Discharge: ANOTHER HOSPITAL | End: 2025-03-27
Attending: EMERGENCY MEDICINE
Payer: MEDICARE

## 2025-01-01 ENCOUNTER — OFFICE VISIT (OUTPATIENT)
Age: 70
End: 2025-01-01

## 2025-01-01 ENCOUNTER — APPOINTMENT (OUTPATIENT)
Facility: HOSPITAL | Age: 70
DRG: 871 | End: 2025-01-01
Attending: STUDENT IN AN ORGANIZED HEALTH CARE EDUCATION/TRAINING PROGRAM
Payer: MEDICARE

## 2025-01-01 ENCOUNTER — HOSPITAL ENCOUNTER (INPATIENT)
Facility: HOSPITAL | Age: 70
LOS: 1 days | DRG: 871 | End: 2025-03-28
Attending: STUDENT IN AN ORGANIZED HEALTH CARE EDUCATION/TRAINING PROGRAM | Admitting: STUDENT IN AN ORGANIZED HEALTH CARE EDUCATION/TRAINING PROGRAM
Payer: MEDICARE

## 2025-01-01 ENCOUNTER — HOSPITAL ENCOUNTER (OUTPATIENT)
Facility: HOSPITAL | Age: 70
Discharge: HOME | End: 2025-03-26
Attending: FAMILY MEDICINE
Payer: MEDICARE

## 2025-01-01 VITALS
TEMPERATURE: 97.5 F | SYSTOLIC BLOOD PRESSURE: 86 MMHG | HEIGHT: 69 IN | BODY MASS INDEX: 16.25 KG/M2 | RESPIRATION RATE: 16 BRPM | HEART RATE: 100 BPM | OXYGEN SATURATION: 98 %

## 2025-01-01 VITALS
SYSTOLIC BLOOD PRESSURE: 90 MMHG | HEIGHT: 69 IN | DIASTOLIC BLOOD PRESSURE: 68 MMHG | BODY MASS INDEX: 19.11 KG/M2 | HEART RATE: 78 BPM | WEIGHT: 129 LBS | TEMPERATURE: 95.8 F | OXYGEN SATURATION: 98 %

## 2025-01-01 VITALS — HEART RATE: 102 BPM | RESPIRATION RATE: 18 BRPM | OXYGEN SATURATION: 39 % | TEMPERATURE: 94.1 F

## 2025-01-01 VITALS — RESPIRATION RATE: 18 BRPM | TEMPERATURE: 97.3 F

## 2025-01-01 DIAGNOSIS — I25.10 CORONARY ARTERY DISEASE INVOLVING NATIVE CORONARY ARTERY OF NATIVE HEART WITHOUT ANGINA PECTORIS: ICD-10-CM

## 2025-01-01 DIAGNOSIS — I50.22 CHRONIC SYSTOLIC HEART FAILURE (HCC): ICD-10-CM

## 2025-01-01 DIAGNOSIS — L97.523 ULCER OF LEFT FOOT, WITH NECROSIS OF MUSCLE (HCC): ICD-10-CM

## 2025-01-01 DIAGNOSIS — R07.9 CHEST PAIN, UNSPECIFIED TYPE: ICD-10-CM

## 2025-01-01 DIAGNOSIS — R53.1 GENERAL WEAKNESS: ICD-10-CM

## 2025-01-01 DIAGNOSIS — L97.522 ULCER OF LEFT FOOT, WITH FAT LAYER EXPOSED (HCC): ICD-10-CM

## 2025-01-01 DIAGNOSIS — Z95.811 LEFT VENTRICULAR ASSIST DEVICE PRESENT (HCC): ICD-10-CM

## 2025-01-01 DIAGNOSIS — S91.109A NON-HEALING OPEN WOUND OF TOE, INITIAL ENCOUNTER: Primary | ICD-10-CM

## 2025-01-01 DIAGNOSIS — R11.2 NAUSEA AND VOMITING, UNSPECIFIED VOMITING TYPE: Primary | ICD-10-CM

## 2025-01-01 DIAGNOSIS — L97.823 ULCER OF LEFT PRETIBIAL REGION WITH NECROSIS OF MUSCLE (HCC): ICD-10-CM

## 2025-01-01 DIAGNOSIS — E86.0 DEHYDRATION: ICD-10-CM

## 2025-01-01 DIAGNOSIS — Z13.21 ENCOUNTER FOR VITAMIN DEFICIENCY SCREENING: ICD-10-CM

## 2025-01-01 DIAGNOSIS — R06.02 SHORTNESS OF BREATH: ICD-10-CM

## 2025-01-01 DIAGNOSIS — I73.9 PAD (PERIPHERAL ARTERY DISEASE): ICD-10-CM

## 2025-01-01 DIAGNOSIS — Z79.899 ENCOUNTER FOR MONITORING DIURETIC THERAPY: ICD-10-CM

## 2025-01-01 DIAGNOSIS — Z51.81 ENCOUNTER FOR MONITORING DIURETIC THERAPY: ICD-10-CM

## 2025-01-01 DIAGNOSIS — A41.9 SEPTIC SHOCK (HCC): Primary | ICD-10-CM

## 2025-01-01 DIAGNOSIS — Z79.01 CHRONIC ANTICOAGULATION: Primary | ICD-10-CM

## 2025-01-01 DIAGNOSIS — E43 UNSPECIFIED SEVERE PROTEIN-CALORIE MALNUTRITION: ICD-10-CM

## 2025-01-01 DIAGNOSIS — Z79.899 HIGH RISK MEDICATION USE: ICD-10-CM

## 2025-01-01 DIAGNOSIS — R65.21 SEPTIC SHOCK (HCC): Primary | ICD-10-CM

## 2025-01-01 DIAGNOSIS — R14.0 ABDOMINAL DISTENTION: ICD-10-CM

## 2025-01-01 DIAGNOSIS — I25.5 ISCHEMIC CARDIOMYOPATHY: ICD-10-CM

## 2025-01-01 LAB
25(OH)D3+25(OH)D2 SERPL-MCNC: 49.7 NG/ML (ref 30–100)
ABO + RH BLD: NORMAL
ALBUMIN SERPL-MCNC: 2.2 G/DL (ref 3.5–5)
ALBUMIN SERPL-MCNC: 3 G/DL (ref 3.5–5)
ALBUMIN SERPL-MCNC: 4.4 G/DL (ref 3.9–4.9)
ALBUMIN/GLOB SERPL: 0.6 (ref 1.1–2.2)
ALBUMIN/GLOB SERPL: 0.6 (ref 1.1–2.2)
ALP SERPL-CCNC: 113 U/L (ref 45–117)
ALP SERPL-CCNC: 137 U/L (ref 45–117)
ALP SERPL-CCNC: 176 IU/L (ref 44–121)
ALT SERPL-CCNC: 151 U/L (ref 12–78)
ALT SERPL-CCNC: 22 IU/L (ref 0–44)
ALT SERPL-CCNC: 26 U/L (ref 12–78)
ANION GAP SERPL CALC-SCNC: 12 MMOL/L (ref 2–12)
ANION GAP SERPL CALC-SCNC: 19 MMOL/L (ref 2–12)
APTT PPP: 71.1 SEC (ref 22.1–31)
ARTERIAL PATENCY WRIST A: ABNORMAL
AST SERPL W P-5'-P-CCNC: 30 U/L (ref 15–37)
AST SERPL-CCNC: 29 IU/L (ref 0–40)
AST SERPL-CCNC: 446 U/L (ref 15–37)
BASE DEFICIT BLD-SCNC: 0.5 MMOL/L
BASE DEFICIT BLD-SCNC: 0.6 MMOL/L
BASE DEFICIT BLD-SCNC: 2.4 MMOL/L
BASE DEFICIT BLDA-SCNC: 8.7 MMOL/L
BASOPHILS # BLD AUTO: 0.1 X10E3/UL (ref 0–0.2)
BASOPHILS # BLD: 0.04 K/UL (ref 0–0.1)
BASOPHILS NFR BLD AUTO: 1 %
BASOPHILS NFR BLD: 0.4 % (ref 0–1)
BDY SITE: ABNORMAL
BDY SITE: ABNORMAL
BILIRUB SERPL-MCNC: 0.5 MG/DL (ref 0–1.2)
BILIRUB SERPL-MCNC: 0.6 MG/DL (ref 0.2–1)
BILIRUB SERPL-MCNC: 0.6 MG/DL (ref 0.2–1)
BLOOD GROUP ANTIBODIES SERPL: NORMAL
BUN SERPL-MCNC: 34 MG/DL (ref 8–27)
BUN SERPL-MCNC: 41 MG/DL (ref 6–20)
BUN SERPL-MCNC: 43 MG/DL (ref 6–20)
BUN/CREAT SERPL: 13 (ref 10–24)
BUN/CREAT SERPL: 13 (ref 12–20)
BUN/CREAT SERPL: 15 (ref 12–20)
CA-I BLD-MCNC: 10.8 MG/DL (ref 8.5–10.1)
CA-I BLD-SCNC: 1.25 MMOL/L (ref 1.12–1.32)
CA-I BLD-SCNC: 1.3 MMOL/L (ref 1.12–1.32)
CALCIUM SERPL-MCNC: 10.6 MG/DL (ref 8.6–10.2)
CALCIUM SERPL-MCNC: 9.7 MG/DL (ref 8.5–10.1)
CHLORIDE SERPL-SCNC: 108 MMOL/L (ref 97–108)
CHLORIDE SERPL-SCNC: 94 MMOL/L (ref 96–106)
CHLORIDE SERPL-SCNC: 99 MMOL/L (ref 97–108)
CO2 SERPL-SCNC: 24 MMOL/L (ref 20–29)
CO2 SERPL-SCNC: 25 MMOL/L (ref 21–32)
CO2 SERPL-SCNC: 27 MMOL/L (ref 21–32)
COHGB MFR BLD: 0.3 % (ref 1–2)
COMMENT:: NORMAL
CREAT SERPL-MCNC: 2.59 MG/DL (ref 0.76–1.27)
CREAT SERPL-MCNC: 2.94 MG/DL (ref 0.7–1.3)
CREAT SERPL-MCNC: 3.15 MG/DL (ref 0.7–1.3)
DIFFERENTIAL METHOD BLD: ABNORMAL
EGFRCR SERPLBLD CKD-EPI 2021: 26 ML/MIN/1.73
EKG ATRIAL RATE: 60 BPM
EKG DIAGNOSIS: NORMAL
EKG P AXIS: 85 DEGREES
EKG P-R INTERVAL: 248 MS
EKG Q-T INTERVAL: 513 MS
EKG QRS DURATION: 101 MS
EKG QTC CALCULATION (BAZETT): 600 MS
EKG R AXIS: 32 DEGREES
EKG T AXIS: 143 DEGREES
EKG VENTRICULAR RATE: 82 BPM
EOSINOPHIL # BLD AUTO: 0 X10E3/UL (ref 0–0.4)
EOSINOPHIL # BLD: 0.01 K/UL (ref 0–0.4)
EOSINOPHIL NFR BLD AUTO: 0 %
EOSINOPHIL NFR BLD: 0.1 % (ref 0–7)
ERYTHROCYTE [DISTWIDTH] IN BLOOD BY AUTOMATED COUNT: 14.8 % (ref 11.6–15.4)
ERYTHROCYTE [DISTWIDTH] IN BLOOD BY AUTOMATED COUNT: 18.5 % (ref 11.5–14.5)
ERYTHROCYTE [DISTWIDTH] IN BLOOD BY AUTOMATED COUNT: 19.3 % (ref 11.5–14.5)
FIBRINOGEN PPP-MCNC: 600 MG/DL (ref 200–475)
FLUAV RNA SPEC QL NAA+PROBE: NOT DETECTED
FLUBV RNA SPEC QL NAA+PROBE: NOT DETECTED
GAS FLOW.O2 SETTING OXYMISER: 14
GLOBULIN SER CALC-MCNC: 3.8 G/DL (ref 2–4)
GLOBULIN SER CALC-MCNC: 4.2 G/DL (ref 1.5–4.5)
GLOBULIN SER CALC-MCNC: 5.1 G/DL (ref 2–4)
GLUCOSE BLD STRIP.AUTO-MCNC: 145 MG/DL (ref 65–117)
GLUCOSE BLD STRIP.AUTO-MCNC: 68 MG/DL (ref 65–117)
GLUCOSE SERPL-MCNC: 108 MG/DL (ref 65–100)
GLUCOSE SERPL-MCNC: 136 MG/DL (ref 70–99)
GLUCOSE SERPL-MCNC: 79 MG/DL (ref 65–100)
HCO3 BLD-SCNC: 25.8 MMOL/L (ref 21–28)
HCO3 BLD-SCNC: 27 MMOL/L (ref 21–28)
HCO3 BLD-SCNC: 27 MMOL/L (ref 21–28)
HCO3 BLDA-SCNC: 19 MMOL/L (ref 22–26)
HCT VFR BLD AUTO: 31.9 % (ref 36.6–50.3)
HCT VFR BLD AUTO: 40.6 % (ref 36.6–50.3)
HCT VFR BLD AUTO: 42.1 % (ref 37.5–51)
HGB BLD-MCNC: 12.5 G/DL (ref 12.1–17)
HGB BLD-MCNC: 13.1 G/DL (ref 13–17.7)
HGB BLD-MCNC: 9.7 G/DL (ref 12.1–17)
IMM GRANULOCYTES # BLD AUTO: 0 X10E3/UL (ref 0–0.1)
IMM GRANULOCYTES # BLD AUTO: 0.06 K/UL (ref 0–0.04)
IMM GRANULOCYTES NFR BLD AUTO: 0 %
IMM GRANULOCYTES NFR BLD AUTO: 0.6 % (ref 0–0.5)
INR PPP: >10 (ref 0.9–1.2)
INR PPP: >12.8 (ref 0.9–1.1)
LACTATE SERPL-SCNC: 4.7 MMOL/L (ref 0.4–2)
LACTATE SERPL-SCNC: 6.4 MMOL/L (ref 0.4–2)
LDH SERPL L TO P-CCNC: 489 IU/L (ref 121–224)
LYMPHOCYTES # BLD AUTO: 1.3 X10E3/UL (ref 0.7–3.1)
LYMPHOCYTES # BLD: 1.38 K/UL (ref 0.8–3.5)
LYMPHOCYTES NFR BLD AUTO: 13 %
LYMPHOCYTES NFR BLD: 13 % (ref 12–49)
MAGNESIUM SERPL-MCNC: 2.4 MG/DL (ref 1.6–2.3)
MAGNESIUM SERPL-MCNC: 2.5 MG/DL (ref 1.6–2.4)
MCH RBC QN AUTO: 28.4 PG (ref 26.6–33)
MCH RBC QN AUTO: 28.8 PG (ref 26–34)
MCH RBC QN AUTO: 29 PG (ref 26–34)
MCHC RBC AUTO-ENTMCNC: 30.4 G/DL (ref 30–36.5)
MCHC RBC AUTO-ENTMCNC: 30.8 G/DL (ref 30–36.5)
MCHC RBC AUTO-ENTMCNC: 31.1 G/DL (ref 31.5–35.7)
MCV RBC AUTO: 91 FL (ref 79–97)
MCV RBC AUTO: 93.5 FL (ref 80–99)
MCV RBC AUTO: 95.5 FL (ref 80–99)
METHGB MFR BLD: 0.3 % (ref 0–1.4)
MONOCYTES # BLD AUTO: 0.9 X10E3/UL (ref 0.1–0.9)
MONOCYTES # BLD: 1.35 K/UL (ref 0–1)
MONOCYTES NFR BLD AUTO: 9 %
MONOCYTES NFR BLD: 12.7 % (ref 5–13)
NEUTROPHILS # BLD AUTO: 7.9 X10E3/UL (ref 1.4–7)
NEUTROPHILS NFR BLD AUTO: 77 %
NEUTS SEG # BLD: 7.8 K/UL (ref 1.8–8)
NEUTS SEG NFR BLD: 73.2 % (ref 32–75)
NRBC # BLD: 0.02 K/UL (ref 0–0.01)
NRBC # BLD: 0.03 K/UL (ref 0–0.01)
NRBC BLD-RTO: 0.2 PER 100 WBC
NRBC BLD-RTO: 1.7 PER 100 WBC
NT-PROBNP SERPL-MCNC: ABNORMAL PG/ML (ref 0–376)
OXYHGB MFR BLD: 98 % (ref 95–99)
PCO2 BLD: 49.2 MMHG (ref 35–48)
PCO2 BLD: 56.9 MMHG (ref 35–48)
PCO2 BLD: 71.3 MMHG (ref 35–48)
PCO2 BLDA: 48 MMHG (ref 35–45)
PEEP RESPIRATORY: 5
PERFORMED BY:: ABNORMAL
PH BLD: 7.19 (ref 7.35–7.45)
PH BLD: 7.29 (ref 7.35–7.45)
PH BLD: 7.33 (ref 7.35–7.45)
PH BLDA: 7.21 (ref 7.35–7.45)
PLATELET # BLD AUTO: 303 K/UL (ref 150–400)
PLATELET # BLD AUTO: 398 K/UL (ref 150–400)
PLATELET # BLD AUTO: 480 X10E3/UL (ref 150–450)
PMV BLD AUTO: 10.3 FL (ref 8.9–12.9)
PMV BLD AUTO: 10.9 FL (ref 8.9–12.9)
PO2 BLD: 51 MMHG (ref 83–108)
PO2 BLD: 67 MMHG (ref 83–108)
PO2 BLD: 84 MMHG (ref 83–108)
PO2 BLDA: 192 MMHG (ref 80–100)
POC INR: >8
POTASSIUM SERPL-SCNC: 3.8 MMOL/L (ref 3.5–5.1)
POTASSIUM SERPL-SCNC: 4.3 MMOL/L (ref 3.5–5.2)
POTASSIUM SERPL-SCNC: 4.6 MMOL/L (ref 3.5–5.1)
PROCALCITONIN SERPL-MCNC: 7.68 NG/ML
PROT SERPL-MCNC: 6 G/DL (ref 6.4–8.2)
PROT SERPL-MCNC: 8.1 G/DL (ref 6.4–8.2)
PROT SERPL-MCNC: 8.6 G/DL (ref 6–8.5)
PROTHROMBIN TIME, POC: NORMAL
PROTHROMBIN TIME: >120 SEC (ref 9.2–11.2)
PROTHROMBIN TIME: >90 SEC (ref 9.1–12)
RBC # BLD AUTO: 3.34 M/UL (ref 4.1–5.7)
RBC # BLD AUTO: 4.34 M/UL (ref 4.1–5.7)
RBC # BLD AUTO: 4.62 X10E6/UL (ref 4.14–5.8)
SAO2 % BLD: 82.7 % (ref 92–97)
SAO2 % BLD: 90.2 % (ref 92–97)
SAO2 % BLD: 92.6 % (ref 92–97)
SAO2% DEVICE SAO2% SENSOR NAME: ABNORMAL
SARS-COV-2 RNA RESP QL NAA+PROBE: NOT DETECTED
SERVICE CMNT-IMP: 8
SERVICE CMNT-IMP: ABNORMAL
SERVICE CMNT-IMP: ABNORMAL
SERVICE CMNT-IMP: NORMAL
SODIUM SERPL-SCNC: 145 MMOL/L (ref 136–145)
SODIUM SERPL-SCNC: 145 MMOL/L (ref 136–145)
SODIUM SERPL-SCNC: 148 MMOL/L (ref 134–144)
SPECIMEN EXP DATE BLD: NORMAL
SPECIMEN HOLD: NORMAL
SPECIMEN SITE: ABNORMAL
SPECIMEN TYPE: ABNORMAL
T4 FREE SERPL-MCNC: 2.3 NG/DL (ref 0.82–1.77)
THERAPEUTIC RANGE: ABNORMAL SECS (ref 58–77)
TROPONIN I SERPL HS-MCNC: 96 NG/L (ref 0–76)
TSH SERPL DL<=0.005 MIU/L-ACNC: 3.89 UIU/ML (ref 0.45–4.5)
VENTILATION MODE VENT: ABNORMAL
VT SETTING VENT: 320
WBC # BLD AUTO: 1.8 K/UL (ref 4.1–11.1)
WBC # BLD AUTO: 10.2 X10E3/UL (ref 3.4–10.8)
WBC # BLD AUTO: 10.6 K/UL (ref 4.1–11.1)

## 2025-01-01 PROCEDURE — 86901 BLOOD TYPING SEROLOGIC RH(D): CPT

## 2025-01-01 PROCEDURE — 36415 COLL VENOUS BLD VENIPUNCTURE: CPT

## 2025-01-01 PROCEDURE — 83735 ASSAY OF MAGNESIUM: CPT

## 2025-01-01 PROCEDURE — 94002 VENT MGMT INPAT INIT DAY: CPT

## 2025-01-01 PROCEDURE — 6360000002 HC RX W HCPCS: Performed by: STUDENT IN AN ORGANIZED HEALTH CARE EDUCATION/TRAINING PROGRAM

## 2025-01-01 PROCEDURE — 83605 ASSAY OF LACTIC ACID: CPT

## 2025-01-01 PROCEDURE — 87636 SARSCOV2 & INF A&B AMP PRB: CPT

## 2025-01-01 PROCEDURE — 99214 OFFICE O/P EST MOD 30 MIN: CPT

## 2025-01-01 PROCEDURE — 84145 PROCALCITONIN (PCT): CPT

## 2025-01-01 PROCEDURE — 2580000003 HC RX 258: Performed by: STUDENT IN AN ORGANIZED HEALTH CARE EDUCATION/TRAINING PROGRAM

## 2025-01-01 PROCEDURE — 82962 GLUCOSE BLOOD TEST: CPT

## 2025-01-01 PROCEDURE — 6360000002 HC RX W HCPCS: Performed by: EMERGENCY MEDICINE

## 2025-01-01 PROCEDURE — 36600 WITHDRAWAL OF ARTERIAL BLOOD: CPT

## 2025-01-01 PROCEDURE — 87186 SC STD MICRODIL/AGAR DIL: CPT

## 2025-01-01 PROCEDURE — 85025 COMPLETE CBC W/AUTO DIFF WBC: CPT

## 2025-01-01 PROCEDURE — 85730 THROMBOPLASTIN TIME PARTIAL: CPT

## 2025-01-01 PROCEDURE — 2000000000 HC ICU R&B

## 2025-01-01 PROCEDURE — 74176 CT ABD & PELVIS W/O CONTRAST: CPT

## 2025-01-01 PROCEDURE — 3E033XZ INTRODUCTION OF VASOPRESSOR INTO PERIPHERAL VEIN, PERCUTANEOUS APPROACH: ICD-10-PCS | Performed by: STUDENT IN AN ORGANIZED HEALTH CARE EDUCATION/TRAINING PROGRAM

## 2025-01-01 PROCEDURE — 87077 CULTURE AEROBIC IDENTIFY: CPT

## 2025-01-01 PROCEDURE — 82803 BLOOD GASES ANY COMBINATION: CPT

## 2025-01-01 PROCEDURE — 71045 X-RAY EXAM CHEST 1 VIEW: CPT

## 2025-01-01 PROCEDURE — 2500000003 HC RX 250 WO HCPCS

## 2025-01-01 PROCEDURE — 85027 COMPLETE CBC AUTOMATED: CPT

## 2025-01-01 PROCEDURE — 31500 INSERT EMERGENCY AIRWAY: CPT

## 2025-01-01 PROCEDURE — 03HY32Z INSERTION OF MONITORING DEVICE INTO UPPER ARTERY, PERCUTANEOUS APPROACH: ICD-10-PCS | Performed by: STUDENT IN AN ORGANIZED HEALTH CARE EDUCATION/TRAINING PROGRAM

## 2025-01-01 PROCEDURE — 80053 COMPREHEN METABOLIC PANEL: CPT

## 2025-01-01 PROCEDURE — 84484 ASSAY OF TROPONIN QUANT: CPT

## 2025-01-01 PROCEDURE — 36556 INSERT NON-TUNNEL CV CATH: CPT

## 2025-01-01 PROCEDURE — 85384 FIBRINOGEN ACTIVITY: CPT

## 2025-01-01 PROCEDURE — 0D9670Z DRAINAGE OF STOMACH WITH DRAINAGE DEVICE, VIA NATURAL OR ARTIFICIAL OPENING: ICD-10-PCS | Performed by: SURGERY

## 2025-01-01 PROCEDURE — 86900 BLOOD TYPING SEROLOGIC ABO: CPT

## 2025-01-01 PROCEDURE — 6360000002 HC RX W HCPCS

## 2025-01-01 PROCEDURE — 85610 PROTHROMBIN TIME: CPT

## 2025-01-01 PROCEDURE — 96365 THER/PROPH/DIAG IV INF INIT: CPT

## 2025-01-01 PROCEDURE — 96374 THER/PROPH/DIAG INJ IV PUSH: CPT

## 2025-01-01 PROCEDURE — 93005 ELECTROCARDIOGRAM TRACING: CPT | Performed by: EMERGENCY MEDICINE

## 2025-01-01 PROCEDURE — 5A1935Z RESPIRATORY VENTILATION, LESS THAN 24 CONSECUTIVE HOURS: ICD-10-PCS | Performed by: EMERGENCY MEDICINE

## 2025-01-01 PROCEDURE — 06HY33Z INSERTION OF INFUSION DEVICE INTO LOWER VEIN, PERCUTANEOUS APPROACH: ICD-10-PCS | Performed by: STUDENT IN AN ORGANIZED HEALTH CARE EDUCATION/TRAINING PROGRAM

## 2025-01-01 PROCEDURE — 99285 EMERGENCY DEPT VISIT HI MDM: CPT

## 2025-01-01 PROCEDURE — 86850 RBC ANTIBODY SCREEN: CPT

## 2025-01-01 PROCEDURE — 87040 BLOOD CULTURE FOR BACTERIA: CPT

## 2025-01-01 RX ORDER — ACETAMINOPHEN 650 MG/1
650 SUPPOSITORY RECTAL EVERY 6 HOURS PRN
Status: DISCONTINUED | OUTPATIENT
Start: 2025-01-01 | End: 2025-01-01

## 2025-01-01 RX ORDER — PROPOFOL 10 MG/ML
5-50 INJECTION, EMULSION INTRAVENOUS CONTINUOUS
Status: DISCONTINUED | OUTPATIENT
Start: 2025-01-01 | End: 2025-01-01 | Stop reason: HOSPADM

## 2025-01-01 RX ORDER — LIDOCAINE HYDROCHLORIDE 20 MG/ML
JELLY TOPICAL PRN
Status: CANCELLED | OUTPATIENT
Start: 2025-01-01

## 2025-01-01 RX ORDER — LIDOCAINE 40 MG/G
CREAM TOPICAL PRN
Status: CANCELLED | OUTPATIENT
Start: 2025-01-01

## 2025-01-01 RX ORDER — NEOMYCIN/BACITRACIN/POLYMYXINB 3.5-400-5K
OINTMENT (GRAM) TOPICAL PRN
Status: CANCELLED | OUTPATIENT
Start: 2025-01-01

## 2025-01-01 RX ORDER — CLOBETASOL PROPIONATE 0.5 MG/G
OINTMENT TOPICAL PRN
Status: CANCELLED | OUTPATIENT
Start: 2025-01-01

## 2025-01-01 RX ORDER — NOREPINEPHRINE BITARTRATE 0.06 MG/ML
1-100 INJECTION, SOLUTION INTRAVENOUS CONTINUOUS
Status: DISCONTINUED | OUTPATIENT
Start: 2025-01-01 | End: 2025-01-01 | Stop reason: HOSPADM

## 2025-01-01 RX ORDER — SODIUM CHLORIDE 9 MG/ML
INJECTION, SOLUTION INTRAVENOUS CONTINUOUS
Status: DISCONTINUED | OUTPATIENT
Start: 2025-01-01 | End: 2025-01-01 | Stop reason: HOSPADM

## 2025-01-01 RX ORDER — GINSENG 100 MG
CAPSULE ORAL PRN
Status: CANCELLED | OUTPATIENT
Start: 2025-01-01

## 2025-01-01 RX ORDER — GENTAMICIN SULFATE 1 MG/G
OINTMENT TOPICAL PRN
Status: CANCELLED | OUTPATIENT
Start: 2025-01-01

## 2025-01-01 RX ORDER — ROCURONIUM BROMIDE 10 MG/ML
50 INJECTION, SOLUTION INTRAVENOUS ONCE
Status: COMPLETED | OUTPATIENT
Start: 2025-01-01 | End: 2025-01-01

## 2025-01-01 RX ORDER — ONDANSETRON 2 MG/ML
4 INJECTION INTRAMUSCULAR; INTRAVENOUS ONCE
Status: COMPLETED | OUTPATIENT
Start: 2025-01-01 | End: 2025-01-01

## 2025-01-01 RX ORDER — MIDAZOLAM HYDROCHLORIDE 1 MG/ML
INJECTION, SOLUTION INTRAMUSCULAR; INTRAVENOUS
Status: COMPLETED
Start: 2025-01-01 | End: 2025-01-01

## 2025-01-01 RX ORDER — MIDAZOLAM HYDROCHLORIDE 2 MG/2ML
2 INJECTION, SOLUTION INTRAMUSCULAR; INTRAVENOUS
Status: DISCONTINUED | OUTPATIENT
Start: 2025-01-01 | End: 2025-01-01 | Stop reason: HOSPADM

## 2025-01-01 RX ORDER — MIDAZOLAM HYDROCHLORIDE 2 MG/2ML
4 INJECTION, SOLUTION INTRAMUSCULAR; INTRAVENOUS ONCE
Status: COMPLETED | OUTPATIENT
Start: 2025-01-01 | End: 2025-01-01

## 2025-01-01 RX ORDER — AMIODARONE HYDROCHLORIDE 200 MG/1
100 TABLET ORAL DAILY
Status: DISCONTINUED | OUTPATIENT
Start: 2025-01-01 | End: 2025-01-01

## 2025-01-01 RX ORDER — ONDANSETRON 2 MG/ML
4 INJECTION INTRAMUSCULAR; INTRAVENOUS EVERY 6 HOURS PRN
Status: DISCONTINUED | OUTPATIENT
Start: 2025-01-01 | End: 2025-01-01 | Stop reason: HOSPADM

## 2025-01-01 RX ORDER — SODIUM CHLOR/HYPOCHLOROUS ACID 0.033 %
SOLUTION, IRRIGATION IRRIGATION PRN
Status: CANCELLED | OUTPATIENT
Start: 2025-01-01

## 2025-01-01 RX ORDER — INDOMETHACIN 25 MG/1
CAPSULE ORAL
Status: COMPLETED
Start: 2025-01-01 | End: 2025-01-01

## 2025-01-01 RX ORDER — GLYCOPYRROLATE 0.2 MG/ML
0.2 INJECTION INTRAMUSCULAR; INTRAVENOUS EVERY 4 HOURS PRN
Status: DISCONTINUED | OUTPATIENT
Start: 2025-01-01 | End: 2025-01-01 | Stop reason: HOSPADM

## 2025-01-01 RX ORDER — SILVER SULFADIAZINE 10 MG/G
CREAM TOPICAL PRN
Status: CANCELLED | OUTPATIENT
Start: 2025-01-01

## 2025-01-01 RX ORDER — FENTANYL CITRATE-0.9 % NACL/PF 10 MCG/ML
25-200 PLASTIC BAG, INJECTION (ML) INTRAVENOUS CONTINUOUS
Refills: 0 | Status: DISCONTINUED | OUTPATIENT
Start: 2025-01-01 | End: 2025-01-01 | Stop reason: HOSPADM

## 2025-01-01 RX ORDER — LIDOCAINE HYDROCHLORIDE 40 MG/ML
SOLUTION TOPICAL PRN
Status: CANCELLED | OUTPATIENT
Start: 2025-01-01

## 2025-01-01 RX ORDER — ATORVASTATIN CALCIUM 40 MG/1
80 TABLET, FILM COATED ORAL DAILY
Status: DISCONTINUED | OUTPATIENT
Start: 2025-01-01 | End: 2025-01-01

## 2025-01-01 RX ORDER — FENTANYL CITRATE 50 UG/ML
INJECTION, SOLUTION INTRAMUSCULAR; INTRAVENOUS
Status: DISCONTINUED
Start: 2025-01-01 | End: 2025-01-01

## 2025-01-01 RX ORDER — ACETAMINOPHEN 325 MG/1
650 TABLET ORAL EVERY 6 HOURS PRN
Status: DISCONTINUED | OUTPATIENT
Start: 2025-01-01 | End: 2025-01-01

## 2025-01-01 RX ORDER — HYDROMORPHONE HYDROCHLORIDE 1 MG/ML
1 INJECTION, SOLUTION INTRAMUSCULAR; INTRAVENOUS; SUBCUTANEOUS
Status: DISCONTINUED | OUTPATIENT
Start: 2025-01-01 | End: 2025-01-01 | Stop reason: HOSPADM

## 2025-01-01 RX ORDER — SODIUM CHLORIDE, SODIUM LACTATE, POTASSIUM CHLORIDE, AND CALCIUM CHLORIDE .6; .31; .03; .02 G/100ML; G/100ML; G/100ML; G/100ML
1000 INJECTION, SOLUTION INTRAVENOUS ONCE
Status: COMPLETED | OUTPATIENT
Start: 2025-01-01 | End: 2025-01-01

## 2025-01-01 RX ORDER — FENTANYL CITRATE 50 UG/ML
50 INJECTION, SOLUTION INTRAMUSCULAR; INTRAVENOUS ONCE
Status: DISCONTINUED | OUTPATIENT
Start: 2025-01-01 | End: 2025-01-01

## 2025-01-01 RX ORDER — INDOMETHACIN 25 MG/1
50 CAPSULE ORAL ONCE
Status: COMPLETED | OUTPATIENT
Start: 2025-01-01 | End: 2025-01-01

## 2025-01-01 RX ORDER — MIDAZOLAM HYDROCHLORIDE 1 MG/ML
5 INJECTION, SOLUTION INTRAMUSCULAR; INTRAVENOUS ONCE
Status: COMPLETED | OUTPATIENT
Start: 2025-01-01 | End: 2025-01-01

## 2025-01-01 RX ORDER — LOPERAMIDE HYDROCHLORIDE 2 MG/1
2 CAPSULE ORAL PRN
Status: DISCONTINUED | OUTPATIENT
Start: 2025-01-01 | End: 2025-01-01 | Stop reason: HOSPADM

## 2025-01-01 RX ORDER — MUPIROCIN 20 MG/G
OINTMENT TOPICAL PRN
Status: CANCELLED | OUTPATIENT
Start: 2025-01-01

## 2025-01-01 RX ORDER — EPINEPHRINE IN SOD CHLOR,ISO 1 MG/10 ML
SYRINGE (ML) INTRAVENOUS DAILY PRN
Status: COMPLETED | OUTPATIENT
Start: 2025-01-01 | End: 2025-01-01

## 2025-01-01 RX ORDER — BACITRACIN ZINC AND POLYMYXIN B SULFATE 500; 1000 [USP'U]/G; [USP'U]/G
OINTMENT TOPICAL PRN
Status: CANCELLED | OUTPATIENT
Start: 2025-01-01

## 2025-01-01 RX ORDER — NOREPINEPHRINE BITARTRATE 0.06 MG/ML
INJECTION, SOLUTION INTRAVENOUS
Status: COMPLETED
Start: 2025-01-01 | End: 2025-01-01

## 2025-01-01 RX ORDER — BETAMETHASONE DIPROPIONATE 0.5 MG/G
CREAM TOPICAL PRN
Status: CANCELLED | OUTPATIENT
Start: 2025-01-01

## 2025-01-01 RX ORDER — POLYETHYLENE GLYCOL 3350 17 G/17G
17 POWDER, FOR SOLUTION ORAL DAILY PRN
Status: DISCONTINUED | OUTPATIENT
Start: 2025-01-01 | End: 2025-01-01

## 2025-01-01 RX ORDER — POLYETHYLENE GLYCOL 3350 17 G/17G
17 POWDER, FOR SOLUTION ORAL DAILY PRN
Status: DISCONTINUED | OUTPATIENT
Start: 2025-01-01 | End: 2025-01-01 | Stop reason: HOSPADM

## 2025-01-01 RX ORDER — ISOSORBIDE DINITRATE 20 MG/1
40 TABLET ORAL EVERY 8 HOURS
Qty: 600 TABLET | Refills: 0 | Status: CANCELLED | OUTPATIENT
Start: 2025-01-01

## 2025-01-01 RX ORDER — DEXTROSE MONOHYDRATE 100 MG/ML
INJECTION, SOLUTION INTRAVENOUS CONTINUOUS PRN
Status: DISCONTINUED | OUTPATIENT
Start: 2025-01-01 | End: 2025-01-01

## 2025-01-01 RX ORDER — ROCURONIUM BROMIDE 10 MG/ML
INJECTION, SOLUTION INTRAVENOUS
Status: COMPLETED
Start: 2025-01-01 | End: 2025-01-01

## 2025-01-01 RX ORDER — TRIAMCINOLONE ACETONIDE 1 MG/G
OINTMENT TOPICAL PRN
Status: CANCELLED | OUTPATIENT
Start: 2025-01-01

## 2025-01-01 RX ORDER — HYDROCORTISONE SODIUM SUCCINATE 100 MG/2ML
50 INJECTION INTRAMUSCULAR; INTRAVENOUS EVERY 6 HOURS
Status: DISCONTINUED | OUTPATIENT
Start: 2025-01-01 | End: 2025-01-01

## 2025-01-01 RX ORDER — LIDOCAINE 50 MG/G
OINTMENT TOPICAL PRN
Status: CANCELLED | OUTPATIENT
Start: 2025-01-01

## 2025-01-01 RX ADMIN — EPINEPHRINE 1 MG: 0.1 INJECTION, SOLUTION ENDOTRACHEAL; INTRACARDIAC; INTRAVENOUS at 18:37

## 2025-01-01 RX ADMIN — FENTANYL CITRATE 50 MCG/HR: 50 INJECTION INTRAVENOUS at 01:17

## 2025-01-01 RX ADMIN — INDOMETHACIN 50 MEQ: 25 CAPSULE ORAL at 23:49

## 2025-01-01 RX ADMIN — SODIUM CHLORIDE, SODIUM LACTATE, POTASSIUM CHLORIDE, AND CALCIUM CHLORIDE 1000 ML: .6; .31; .03; .02 INJECTION, SOLUTION INTRAVENOUS at 22:15

## 2025-01-01 RX ADMIN — EPINEPHRINE 1 MG: 0.1 INJECTION, SOLUTION ENDOTRACHEAL; INTRACARDIAC; INTRAVENOUS at 19:15

## 2025-01-01 RX ADMIN — SODIUM CHLORIDE 1500 MG: 0.9 INJECTION, SOLUTION INTRAVENOUS at 00:49

## 2025-01-01 RX ADMIN — NOREPINEPHRINE BITARTRATE 10 MCG/MIN: 64 SOLUTION INTRAVENOUS at 22:19

## 2025-01-01 RX ADMIN — FENTANYL CITRATE 50 MCG: 50 INJECTION INTRAMUSCULAR; INTRAVENOUS at 00:39

## 2025-01-01 RX ADMIN — MIDAZOLAM HYDROCHLORIDE 5 MG: 1 INJECTION, SOLUTION INTRAMUSCULAR; INTRAVENOUS at 18:56

## 2025-01-01 RX ADMIN — VASOPRESSIN 0.04 UNITS/MIN: 20 INJECTION, SOLUTION INTRAVENOUS at 23:41

## 2025-01-01 RX ADMIN — EPINEPHRINE 1 MG: 0.1 INJECTION, SOLUTION ENDOTRACHEAL; INTRACARDIAC; INTRAVENOUS at 19:18

## 2025-01-01 RX ADMIN — EPINEPHRINE 1 MG: 0.1 INJECTION, SOLUTION ENDOTRACHEAL; INTRACARDIAC; INTRAVENOUS at 19:21

## 2025-01-01 RX ADMIN — ROCURONIUM BROMIDE 50 MG: 10 INJECTION INTRAVENOUS at 22:21

## 2025-01-01 RX ADMIN — MIDAZOLAM 5 MG: 1 INJECTION INTRAMUSCULAR; INTRAVENOUS at 18:56

## 2025-01-01 RX ADMIN — HYDROCORTISONE SODIUM SUCCINATE 50 MG: 100 INJECTION, POWDER, FOR SOLUTION INTRAMUSCULAR; INTRAVENOUS at 00:04

## 2025-01-01 RX ADMIN — PHYTONADIONE 2.5 MG: 10 INJECTION, EMULSION INTRAMUSCULAR; INTRAVENOUS; SUBCUTANEOUS at 01:04

## 2025-01-01 RX ADMIN — ONDANSETRON 4 MG: 2 INJECTION, SOLUTION INTRAMUSCULAR; INTRAVENOUS at 17:13

## 2025-01-01 RX ADMIN — MIDAZOLAM HYDROCHLORIDE 4 MG: 2 INJECTION, SOLUTION INTRAMUSCULAR; INTRAVENOUS at 22:21

## 2025-01-01 RX ADMIN — ROCURONIUM BROMIDE 50 MG: 10 INJECTION, SOLUTION INTRAVENOUS at 22:21

## 2025-01-01 RX ADMIN — EPINEPHRINE 1 MG: 0.1 INJECTION, SOLUTION ENDOTRACHEAL; INTRACARDIAC; INTRAVENOUS at 18:34

## 2025-01-01 RX ADMIN — DEXTROSE 125 ML: 10 SOLUTION INTRAVENOUS at 00:27

## 2025-01-01 RX ADMIN — SODIUM BICARBONATE 50 MEQ: 84 INJECTION INTRAVENOUS at 23:49

## 2025-01-01 RX ADMIN — PIPERACILLIN AND TAZOBACTAM 4500 MG: 4; .5 INJECTION, POWDER, LYOPHILIZED, FOR SOLUTION INTRAVENOUS at 00:03

## 2025-01-01 RX ADMIN — NOREPINEPHRINE BITARTRATE 10 MCG/MIN: 0.06 INJECTION, SOLUTION INTRAVENOUS at 22:19

## 2025-01-01 RX ADMIN — NOREPINEPHRINE BITARTRATE 5 MCG/MIN: 0.06 INJECTION, SOLUTION INTRAVENOUS at 19:51

## 2025-01-01 RX ADMIN — EPINEPHRINE 3 MCG/MIN: 1 INJECTION INTRAMUSCULAR; INTRAVENOUS; SUBCUTANEOUS at 00:44

## 2025-01-01 RX ADMIN — PROPOFOL 20 MCG/KG/MIN: 10 INJECTION, EMULSION INTRAVENOUS at 23:42

## 2025-01-01 RX ADMIN — NOREPINEPHRINE BITARTRATE 5 MCG/MIN: 64 SOLUTION INTRAVENOUS at 19:51

## 2025-01-01 RX ADMIN — FENTANYL CITRATE 50 MCG: 50 INJECTION, SOLUTION INTRAMUSCULAR; INTRAVENOUS at 00:39

## 2025-01-01 ASSESSMENT — ENCOUNTER SYMPTOMS
BLOOD IN STOOL: 0
SORE THROAT: 0
EYE PAIN: 0
ABDOMINAL PAIN: 0
SHORTNESS OF BREATH: 0
VOICE CHANGE: 1
CHEST TIGHTNESS: 0
COUGH: 0
ABDOMINAL DISTENTION: 0

## 2025-01-01 ASSESSMENT — PATIENT HEALTH QUESTIONNAIRE - PHQ9
2. FEELING DOWN, DEPRESSED OR HOPELESS: NOT AT ALL
SUM OF ALL RESPONSES TO PHQ QUESTIONS 1-9: 0
1. LITTLE INTEREST OR PLEASURE IN DOING THINGS: NOT AT ALL

## 2025-01-01 ASSESSMENT — PULMONARY FUNCTION TESTS: PIF_VALUE: 34

## 2025-01-01 ASSESSMENT — PAIN DESCRIPTION - ORIENTATION: ORIENTATION: RIGHT;LEFT

## 2025-01-01 ASSESSMENT — PAIN DESCRIPTION - LOCATION: LOCATION: FOOT

## 2025-01-01 ASSESSMENT — PAIN - FUNCTIONAL ASSESSMENT: PAIN_FUNCTIONAL_ASSESSMENT: 0-10

## 2025-01-01 ASSESSMENT — PAIN SCALES - GENERAL: PAINLEVEL_OUTOF10: 8

## 2025-01-02 ENCOUNTER — OFFICE VISIT (OUTPATIENT)
Facility: CLINIC | Age: 70
End: 2025-01-02

## 2025-01-02 ENCOUNTER — TELEPHONE (OUTPATIENT)
Age: 70
End: 2025-01-02

## 2025-01-02 VITALS
RESPIRATION RATE: 16 BRPM | TEMPERATURE: 98 F | OXYGEN SATURATION: 98 % | WEIGHT: 114.7 LBS | BODY MASS INDEX: 16.93 KG/M2

## 2025-01-02 DIAGNOSIS — S91.302A NON-HEALING OPEN WOUND OF LEFT HEEL: ICD-10-CM

## 2025-01-02 DIAGNOSIS — D72.829 LEUKOCYTOSIS, UNSPECIFIED TYPE: ICD-10-CM

## 2025-01-02 DIAGNOSIS — Z95.811 LVAD (LEFT VENTRICULAR ASSIST DEVICE) PRESENT (HCC): ICD-10-CM

## 2025-01-02 DIAGNOSIS — R53.1 WEAKNESS: ICD-10-CM

## 2025-01-02 DIAGNOSIS — S46.002S INJURY OF LEFT ROTATOR CUFF, SEQUELA: ICD-10-CM

## 2025-01-02 DIAGNOSIS — R06.02 SHORTNESS OF BREATH: ICD-10-CM

## 2025-01-02 DIAGNOSIS — Z79.01 CHRONIC ANTICOAGULATION: ICD-10-CM

## 2025-01-02 DIAGNOSIS — Z79.01 CHRONIC ANTICOAGULATION: Primary | ICD-10-CM

## 2025-01-02 DIAGNOSIS — I73.9 PVD (PERIPHERAL VASCULAR DISEASE) (HCC): ICD-10-CM

## 2025-01-02 DIAGNOSIS — Z51.81 ENCOUNTER FOR MONITORING DIURETIC THERAPY: ICD-10-CM

## 2025-01-02 DIAGNOSIS — R78.81 BACTEREMIA DUE TO METHICILLIN SUSCEPTIBLE STAPHYLOCOCCUS AUREUS (MSSA): ICD-10-CM

## 2025-01-02 DIAGNOSIS — B95.61 BACTEREMIA DUE TO METHICILLIN SUSCEPTIBLE STAPHYLOCOCCUS AUREUS (MSSA): ICD-10-CM

## 2025-01-02 DIAGNOSIS — A49.02 INFECTION OF WOUND DUE TO METHICILLIN RESISTANT STAPHYLOCOCCUS AUREUS (MRSA): ICD-10-CM

## 2025-01-02 DIAGNOSIS — I50.22 CHRONIC SYSTOLIC HEART FAILURE (HCC): ICD-10-CM

## 2025-01-02 DIAGNOSIS — I50.20 HEART FAILURE WITH REDUCED EJECTION FRACTION (HCC): ICD-10-CM

## 2025-01-02 DIAGNOSIS — Z79.899 ENCOUNTER FOR MONITORING DIURETIC THERAPY: ICD-10-CM

## 2025-01-02 DIAGNOSIS — I25.5 ISCHEMIC CARDIOMYOPATHY: Primary | ICD-10-CM

## 2025-01-02 DIAGNOSIS — I25.10 CORONARY ARTERY DISEASE INVOLVING NATIVE CORONARY ARTERY OF NATIVE HEART WITHOUT ANGINA PECTORIS: ICD-10-CM

## 2025-01-02 NOTE — TELEPHONE ENCOUNTER
Labwork ordered [facility] by LUPE Mondragon NP    1421: called and spoke with patient's nurse at [facility] and [facility] who confirmed they can draw labwork today or tomorrow. Faxed lab orders per their request.  Requested they fax results to [facility], they verbalized understanding .

## 2025-01-02 NOTE — PROGRESS NOTES
anterior foot    Has left ankle and heel wound -seen by in-house wound care provider 11/20/2024-noted to be stalled, continue recommendations  11/27/2024 -discussed elevated white blood cell count, patient afebrile, concern for infection including bacteremia or wound infection.  Previous hospitalization and recommended hospice and patient's need for amputation but likely would result in death.  Wound will likely result in death.  Patient wants to remain full code even after discussion today.  Given holiday schedule rapid workup in SNF not available and will send to ER for workup.  12/05/2024 -return to facility after treatment with cefepime during hospitalization.  Continue prophylaxis with Keflex 500 mg p.o. twice daily  12/09/2024 - heel is improving, anterior foot/leg wound worsening - WBC on 12/06/2024 now 13.2, obtained wound culture, was debrided slightly by wound NP, repeat CBC w/ diff on 12/10/2024, if remains elevated can restart cefepime based on old result while awaiting new culture. Discussed hospice again - declined, wants to remain full code  12/11/2024 - 12/11/2024 - WBC 12.8, stable, awaiting wound culture  2/17/2024 - WBC 12.8, pending labs from this morning, wound culture + MRSA started on doxycycline 100mg PO BID x 7 days  12/19/2024 - repeat WBAT stable 12.7, continue course  12/23/2024 - wound care note reviewed from 12/20/24 - noting improvement/stable, completed doxycycline, monitor  12/30/2024 - reviewed note from 12/27/2024 - noting this is stable on dorsal ankle/foot   11. Weakness     Work with PT OT for strengthening  Most recent physical therapy note reviewed from 01/01/2025   12. Leukocytosis   12/09/2024 - WBC on 12/06/2024 now 13.2, obtained wound culture of anterior left foot wound, was debrided slightly by wound NP, repeat CBC w/ diff on 12/10/2024, if remains elevated can restart cefepime based on old result while awaiting new culture. Discussed hospice again - declined, wants to

## 2025-01-03 ENCOUNTER — TELEPHONE (OUTPATIENT)
Age: 70
End: 2025-01-03

## 2025-01-06 ENCOUNTER — TELEPHONE (OUTPATIENT)
Age: 70
End: 2025-01-06

## 2025-01-06 NOTE — TELEPHONE ENCOUNTER
1323: Call received from Sherley at [facility]. She stated patient's ride has been cancelled for visit tomorrow 1/7/25 as he has changed insurance from [facility] to [employer]. She stated she did attempt to contact [employer], but they are unable to provide transportation on such short notice. Per Sherley will need to cancel visit. Patient rescheduled to next available appointment on 2/4/25 at 1300. She stated will ensure transportation arranged and inquired if anything, such as labs, needed in the meantime. Requested labs be drawn this week. She stated may fax orders to [phone]. She had no further questions. Lab orders faxed.    LUIS STEVEN RN  VAD Coordinator

## 2025-01-07 ENCOUNTER — OFFICE VISIT (OUTPATIENT)
Facility: CLINIC | Age: 70
End: 2025-01-07
Payer: MEDICARE

## 2025-01-07 VITALS
OXYGEN SATURATION: 97 % | RESPIRATION RATE: 18 BRPM | DIASTOLIC BLOOD PRESSURE: 68 MMHG | HEART RATE: 70 BPM | BODY MASS INDEX: 17.12 KG/M2 | WEIGHT: 116 LBS | SYSTOLIC BLOOD PRESSURE: 110 MMHG | TEMPERATURE: 97.8 F

## 2025-01-07 DIAGNOSIS — I73.9 PVD (PERIPHERAL VASCULAR DISEASE) (HCC): ICD-10-CM

## 2025-01-07 DIAGNOSIS — R53.1 WEAKNESS: ICD-10-CM

## 2025-01-07 DIAGNOSIS — I25.5 ISCHEMIC CARDIOMYOPATHY: Primary | ICD-10-CM

## 2025-01-07 DIAGNOSIS — I50.20 HEART FAILURE WITH REDUCED EJECTION FRACTION (HCC): ICD-10-CM

## 2025-01-07 DIAGNOSIS — D72.829 LEUKOCYTOSIS, UNSPECIFIED TYPE: ICD-10-CM

## 2025-01-07 DIAGNOSIS — B95.61 BACTEREMIA DUE TO METHICILLIN SUSCEPTIBLE STAPHYLOCOCCUS AUREUS (MSSA): ICD-10-CM

## 2025-01-07 DIAGNOSIS — S91.302A NON-HEALING OPEN WOUND OF LEFT HEEL: ICD-10-CM

## 2025-01-07 DIAGNOSIS — I25.10 CORONARY ARTERY DISEASE INVOLVING NATIVE CORONARY ARTERY OF NATIVE HEART WITHOUT ANGINA PECTORIS: ICD-10-CM

## 2025-01-07 DIAGNOSIS — S46.002S INJURY OF LEFT ROTATOR CUFF, SEQUELA: ICD-10-CM

## 2025-01-07 DIAGNOSIS — Z79.01 CHRONIC ANTICOAGULATION: ICD-10-CM

## 2025-01-07 DIAGNOSIS — R78.81 BACTEREMIA DUE TO METHICILLIN SUSCEPTIBLE STAPHYLOCOCCUS AUREUS (MSSA): ICD-10-CM

## 2025-01-07 DIAGNOSIS — Z95.811 LVAD (LEFT VENTRICULAR ASSIST DEVICE) PRESENT (HCC): ICD-10-CM

## 2025-01-07 DIAGNOSIS — A49.02 INFECTION OF WOUND DUE TO METHICILLIN RESISTANT STAPHYLOCOCCUS AUREUS (MRSA): ICD-10-CM

## 2025-01-07 PROCEDURE — 99309 SBSQ NF CARE MODERATE MDM 30: CPT | Performed by: NURSE PRACTITIONER

## 2025-01-07 PROCEDURE — 1123F ACP DISCUSS/DSCN MKR DOCD: CPT | Performed by: NURSE PRACTITIONER

## 2025-01-07 RX ORDER — WARFARIN SODIUM 2.5 MG/1
2.5 TABLET ORAL DAILY
COMMUNITY

## 2025-01-07 NOTE — PROGRESS NOTES
Cigarettes     Start date: 1983     Quit date: 2023     Years since quittin.4    Smokeless tobacco: Never   Vaping Use    Vaping status: Never Used   Substance Use Topics    Alcohol use: Not Currently    Drug use: Never       Family History:    Family History   Problem Relation Age of Onset    Hypertension Mother     Hypertension Father     Heart Attack Father      Past Surgical History:   Procedure Laterality Date    CARDIAC DEFIBRILLATOR PLACEMENT      CARDIAC PROCEDURE N/A 2023    Left heart cath / coronary angiography performed by Sole Allen MD at [facility] CARDIAC CATH LAB    CARDIAC PROCEDURE N/A 2023    Percutaneous coronary intervention performed by Sole Allen MD at [facility] CARDIAC CATH LAB    CARDIAC PROCEDURE N/A 2023    Intravascular ultrasound performed by Sole Allen MD at [facility] CARDIAC CATH LAB    CARDIAC PROCEDURE N/A 2023    Insert stent manjit coronary performed by Sole Allen MD at [facility] CARDIAC CATH LAB    CARDIAC PROCEDURE N/A 2023    Right heart cath performed by Jose Daniel Fields MD at [facility] CARDIAC CATH LAB    CARDIAC PROCEDURE N/A 2024    Right heart cath performed by Jose Daniel Fields MD at [facility] CARDIAC CATH LAB    CARDIAC SURGERY Right 2023    RIGHT AXILLARY IMPELLA  5.5 INSERTION, HUDSON BY DR QUEZADA performed by Callum Loco MD at [facility] MAIN OR    CARDIAC SURGERY N/A 2023    LEFT VENTRICULAR ASSIST DEVICE (LVAD) IMPLANTATION;TEMPORARY RIGHT AXILLARY LVAD REMOVAL (IMPELLA); HUDSON & EPIAORTIC US BY DR. CADENA performed by Jose Francisco Del Toro MD at [facility] OPEN HEART    CARDIAC VALVE SURGERY  2017    COLONOSCOPY N/A 09/15/2023    COLONOSCOPY DIAGNOSTIC performed by Jania Christopher MD at [facility] ENDOSCOPY    FEMORAL BYPASS Right 2023    RIGHT FEMORAL POPLITEAL BYPASS WITH POLYTETRAFLUOROETHYLENE performed by Sean Barger MD at [facility] MAIN OR    HERNIA REPAIR      INVASIVE VASCULAR N/A 2023    Ultrasound guided

## 2025-01-09 ENCOUNTER — OFFICE VISIT (OUTPATIENT)
Facility: CLINIC | Age: 70
End: 2025-01-09

## 2025-01-09 VITALS
TEMPERATURE: 96.1 F | RESPIRATION RATE: 18 BRPM | DIASTOLIC BLOOD PRESSURE: 61 MMHG | HEART RATE: 73 BPM | BODY MASS INDEX: 16.99 KG/M2 | OXYGEN SATURATION: 98 % | WEIGHT: 115.1 LBS | SYSTOLIC BLOOD PRESSURE: 121 MMHG

## 2025-01-09 DIAGNOSIS — I25.10 CORONARY ARTERY DISEASE INVOLVING NATIVE CORONARY ARTERY OF NATIVE HEART WITHOUT ANGINA PECTORIS: ICD-10-CM

## 2025-01-09 DIAGNOSIS — I50.20 HEART FAILURE WITH REDUCED EJECTION FRACTION (HCC): ICD-10-CM

## 2025-01-09 DIAGNOSIS — Z86.14 HISTORY OF MRSA INFECTION: ICD-10-CM

## 2025-01-09 DIAGNOSIS — R78.81 BACTEREMIA DUE TO METHICILLIN SUSCEPTIBLE STAPHYLOCOCCUS AUREUS (MSSA): ICD-10-CM

## 2025-01-09 DIAGNOSIS — I25.5 ISCHEMIC CARDIOMYOPATHY: Primary | ICD-10-CM

## 2025-01-09 DIAGNOSIS — Z79.01 CHRONIC ANTICOAGULATION: ICD-10-CM

## 2025-01-09 DIAGNOSIS — Z95.811 LVAD (LEFT VENTRICULAR ASSIST DEVICE) PRESENT (HCC): ICD-10-CM

## 2025-01-09 DIAGNOSIS — B95.61 BACTEREMIA DUE TO METHICILLIN SUSCEPTIBLE STAPHYLOCOCCUS AUREUS (MSSA): ICD-10-CM

## 2025-01-09 DIAGNOSIS — S46.002S INJURY OF LEFT ROTATOR CUFF, SEQUELA: ICD-10-CM

## 2025-01-09 DIAGNOSIS — R53.1 WEAKNESS: ICD-10-CM

## 2025-01-09 DIAGNOSIS — S91.302A NON-HEALING OPEN WOUND OF LEFT HEEL: ICD-10-CM

## 2025-01-09 DIAGNOSIS — I73.9 PVD (PERIPHERAL VASCULAR DISEASE) (HCC): ICD-10-CM

## 2025-01-09 NOTE — PROGRESS NOTES
[id number]  [address] [place] [address]  Phone: [phone]  Fax: [phone]  Also available via Perfect Serve     PLACE OF SERVICE:  [facility] [address] [place] [address]    SKILLED VISIT    Chief Complaint:   Chief Complaint   Patient presents with    Follow-up         HPI : Bishop Love is a 69 y.o. male here for follow up.    Previous history prior to admission:  Patient was hospitalized from 9/17/2024 to 10/29/2024.  Patient has a past medical history of coronary artery disease MI pacemaker ICD, ischemic cardiomyopathy, chronic heart failure with preserved ejection fraction, long-term anticoagulation on Coumadin, mitral agitation, rheumatic tricuspid regurg, sick sinus syndrome, PVD, right femoral popliteal bypass 8/24/2023, bifemoral bypass and bilateral femoral above-the-knee popliteal bypass.  He presented to the ER with complaints of generalized body rash left shoulder and elbow pain rash and started a week prior.  And had a fall 1 week prior.  Left elbow fracture showed each indeterminant fracture with's bone spur at triceps left shoulder showed a high riding humeral head compatible with chronic massive cuff tear.  Patient has history of heart failure with reduced ejection fraction with chronic LVAD on 9/19/2023.  He is being managed by the chronic heart failure team and is on metoprolol 25 mg p.o. twice daily cannot tolerate ACE ARB or Arni due to severe cough.  He is on hydralazine 100 mg p.o. 3 times daily and Isordil 40 mg p.o. 3 times daily.  He is on Aldactone.  He is not on SGLT2 due to recurrent UTIs.  He continues on oral Bumex 2 mg twice daily.  He is not a candidate for liver transplant due to severe peripheral vascular disease.  He continues on Coumadin he had MSSA bacteremia his wound culture had MRSA MSSA Pseudomonas he was assessed by podiatry and recommended local wound care he is full weightbearing on left

## 2025-01-14 ENCOUNTER — OFFICE VISIT (OUTPATIENT)
Facility: CLINIC | Age: 70
End: 2025-01-14
Payer: MEDICARE

## 2025-01-14 VITALS
TEMPERATURE: 96.5 F | BODY MASS INDEX: 16.97 KG/M2 | WEIGHT: 115 LBS | RESPIRATION RATE: 18 BRPM | OXYGEN SATURATION: 97 %

## 2025-01-14 DIAGNOSIS — Z95.811 LVAD (LEFT VENTRICULAR ASSIST DEVICE) PRESENT (HCC): ICD-10-CM

## 2025-01-14 DIAGNOSIS — I25.5 ISCHEMIC CARDIOMYOPATHY: Primary | ICD-10-CM

## 2025-01-14 DIAGNOSIS — Z79.01 CHRONIC ANTICOAGULATION: ICD-10-CM

## 2025-01-14 DIAGNOSIS — B95.61 BACTEREMIA DUE TO METHICILLIN SUSCEPTIBLE STAPHYLOCOCCUS AUREUS (MSSA): ICD-10-CM

## 2025-01-14 DIAGNOSIS — R78.81 BACTEREMIA DUE TO METHICILLIN SUSCEPTIBLE STAPHYLOCOCCUS AUREUS (MSSA): ICD-10-CM

## 2025-01-14 DIAGNOSIS — R53.1 WEAKNESS: ICD-10-CM

## 2025-01-14 DIAGNOSIS — I50.20 HEART FAILURE WITH REDUCED EJECTION FRACTION (HCC): ICD-10-CM

## 2025-01-14 DIAGNOSIS — Z86.14 HISTORY OF MRSA INFECTION: ICD-10-CM

## 2025-01-14 DIAGNOSIS — I25.10 CORONARY ARTERY DISEASE INVOLVING NATIVE CORONARY ARTERY OF NATIVE HEART WITHOUT ANGINA PECTORIS: ICD-10-CM

## 2025-01-14 DIAGNOSIS — S91.302A NON-HEALING OPEN WOUND OF LEFT HEEL: ICD-10-CM

## 2025-01-14 DIAGNOSIS — S46.002S INJURY OF LEFT ROTATOR CUFF, SEQUELA: ICD-10-CM

## 2025-01-14 DIAGNOSIS — I73.9 PVD (PERIPHERAL VASCULAR DISEASE) (HCC): ICD-10-CM

## 2025-01-14 PROCEDURE — 99309 SBSQ NF CARE MODERATE MDM 30: CPT | Performed by: NURSE PRACTITIONER

## 2025-01-14 PROCEDURE — 1123F ACP DISCUSS/DSCN MKR DOCD: CPT | Performed by: NURSE PRACTITIONER

## 2025-01-14 RX ORDER — WARFARIN SODIUM 2 MG/1
2 TABLET ORAL
COMMUNITY

## 2025-01-14 NOTE — PROGRESS NOTES
patient's need for amputation but likely would result in death.  Wound will likely result in death.  Patient wants to remain full code even after discussion today.  Given holiday schedule rapid workup in SNF not available and will send to ER for workup.  12/05/2024 -return to facility after treatment with cefepime during hospitalization.  Continue prophylaxis with Keflex 500 mg p.o. twice daily  12/09/2024 - WBC on 12/06/2024 now 13.2, obtained wound culture, was debrided slightly by wound NP, repeat CBC w/ diff on 12/10/2024, if remains elevated can restart cefepime based on old result while awaiting new culture. Discussed hospice again - declined, wants to remain full code  12/11/2024 - WBC 12.8, stable, awaiting wound culture  12/17/2024 - WBC 12.8, pending labs from this morning, wound culture + MRSA started on doxycycline 1100mg PO BID x 7 days  12/19/2024 - repeat WBAT stable 12.7, continue course  12/26/2024 - has compelted doxycycline, wbc from 12/24/24 was 12.2, stable  01/01/2025 - last WBC on 12/31/2024 was 12.8, next labs in 2 weeks  01/14/2025 - WBC is 10.9, wound is stable per wound note on 01/10/2025   10. Non healing wound of left heel and forefoot, sequelae and left anterior foot    Has left ankle and heel wound -seen by in-house wound care provider 11/20/2024-noted to be stalled, continue recommendations  11/27/2024 -discussed elevated white blood cell count, patient afebrile, concern for infection including bacteremia or wound infection.  Previous hospitalization and recommended hospice and patient's need for amputation but likely would result in death.  Wound will likely result in death.  Patient wants to remain full code even after discussion today.  Given holiday schedule rapid workup in SNF not available and will send to ER for workup.  12/05/2024 -return to facility after treatment with cefepime during hospitalization.  Continue prophylaxis with Keflex 500 mg p.o. twice daily  12/09/2024 -

## 2025-01-16 ENCOUNTER — OFFICE VISIT (OUTPATIENT)
Facility: CLINIC | Age: 70
End: 2025-01-16

## 2025-01-16 VITALS
RESPIRATION RATE: 18 BRPM | WEIGHT: 115.7 LBS | TEMPERATURE: 98.7 F | OXYGEN SATURATION: 97 % | BODY MASS INDEX: 17.08 KG/M2

## 2025-01-16 DIAGNOSIS — I25.5 ISCHEMIC CARDIOMYOPATHY: Primary | ICD-10-CM

## 2025-01-16 DIAGNOSIS — Z95.811 LVAD (LEFT VENTRICULAR ASSIST DEVICE) PRESENT (HCC): ICD-10-CM

## 2025-01-16 DIAGNOSIS — I25.10 CORONARY ARTERY DISEASE INVOLVING NATIVE CORONARY ARTERY OF NATIVE HEART WITHOUT ANGINA PECTORIS: ICD-10-CM

## 2025-01-16 DIAGNOSIS — S46.002S INJURY OF LEFT ROTATOR CUFF, SEQUELA: ICD-10-CM

## 2025-01-16 DIAGNOSIS — R53.1 WEAKNESS: ICD-10-CM

## 2025-01-16 DIAGNOSIS — R78.81 BACTEREMIA DUE TO METHICILLIN SUSCEPTIBLE STAPHYLOCOCCUS AUREUS (MSSA): ICD-10-CM

## 2025-01-16 DIAGNOSIS — B95.61 BACTEREMIA DUE TO METHICILLIN SUSCEPTIBLE STAPHYLOCOCCUS AUREUS (MSSA): ICD-10-CM

## 2025-01-16 DIAGNOSIS — I50.20 HEART FAILURE WITH REDUCED EJECTION FRACTION (HCC): ICD-10-CM

## 2025-01-16 DIAGNOSIS — Z79.01 CHRONIC ANTICOAGULATION: ICD-10-CM

## 2025-01-16 DIAGNOSIS — Z86.14 HISTORY OF MRSA INFECTION: ICD-10-CM

## 2025-01-16 DIAGNOSIS — S91.302A NON-HEALING OPEN WOUND OF LEFT HEEL: ICD-10-CM

## 2025-01-16 DIAGNOSIS — I73.9 PVD (PERIPHERAL VASCULAR DISEASE) (HCC): ICD-10-CM

## 2025-01-16 NOTE — PROGRESS NOTES
full code even after discussion today.  Given holiday schedule rapid workup in SNF not available and will send to ER for workup.  12/05/2024 -return to facility after treatment with cefepime during hospitalization.  Continue prophylaxis with Keflex 500 mg p.o. twice daily  12/09/2024 - heel is improving, anterior foot/leg wound worsening - WBC on 12/06/2024 now 13.2, obtained wound culture, was debrided slightly by wound NP, repeat CBC w/ diff on 12/10/2024, if remains elevated can restart cefepime based on old result while awaiting new culture. Discussed hospice again - declined, wants to remain full code  Would culture showed MRSA during stay, treated with doxycycline 100mg PO BID  01/16/2025 - WBC now back to 14.5, restart doxycycline 100mg PO BID due to suspected source as his foot wound   11. Weakness     Work with PT OT for strengthening  Most recent physical therapy note reviewed from 01/13/2025   12. Infectious of wound due to MRSA   Previous known history of MRSA bilateral leg left shin and heel wounds positive for MRSA on culture  Would culture showed MRSA during stay, treated with doxycycline 100mg PO BID  01/16/2025 - WBC now back to 14.5, restart doxycycline 100mg PO BID due to suspected source as his foot wound           YASMIN Toscano - NP

## 2025-01-17 ENCOUNTER — TELEPHONE (OUTPATIENT)
Age: 70
End: 2025-01-17

## 2025-01-17 NOTE — TELEPHONE ENCOUNTER
0845: call received from Siomara GIORDANO with [facility] inquiring if patient needs to continue to receive daily weights. Requested patient continued being weighed daily as this is standard for heart failure patients, she verbalized understanding. Inquired about recent lab orders sent to [facility], Kaia confirmed orders were received and labwork was drawn, stated she will fax results to [facility] today.     Labwork received with draw date , this set already reviewed by provider and scanned in chart however set is missing LDH. Secure email sent to siomara requesting LDH be drawn and result sent to [facility]. LD result received and placed in provider's box for review

## 2025-01-20 ENCOUNTER — OFFICE VISIT (OUTPATIENT)
Facility: CLINIC | Age: 70
End: 2025-01-20
Payer: MEDICARE

## 2025-01-20 VITALS
HEART RATE: 60 BPM | WEIGHT: 113.9 LBS | RESPIRATION RATE: 17 BRPM | DIASTOLIC BLOOD PRESSURE: 71 MMHG | BODY MASS INDEX: 16.81 KG/M2 | TEMPERATURE: 98.2 F | SYSTOLIC BLOOD PRESSURE: 136 MMHG | OXYGEN SATURATION: 98 %

## 2025-01-20 DIAGNOSIS — R78.81 BACTEREMIA DUE TO METHICILLIN SUSCEPTIBLE STAPHYLOCOCCUS AUREUS (MSSA): ICD-10-CM

## 2025-01-20 DIAGNOSIS — S46.002S INJURY OF LEFT ROTATOR CUFF, SEQUELA: ICD-10-CM

## 2025-01-20 DIAGNOSIS — I73.9 PVD (PERIPHERAL VASCULAR DISEASE) (HCC): ICD-10-CM

## 2025-01-20 DIAGNOSIS — I50.20 HEART FAILURE WITH REDUCED EJECTION FRACTION (HCC): ICD-10-CM

## 2025-01-20 DIAGNOSIS — S91.302A NON-HEALING OPEN WOUND OF LEFT HEEL: ICD-10-CM

## 2025-01-20 DIAGNOSIS — Z86.14 HISTORY OF MRSA INFECTION: ICD-10-CM

## 2025-01-20 DIAGNOSIS — I25.5 ISCHEMIC CARDIOMYOPATHY: Primary | ICD-10-CM

## 2025-01-20 DIAGNOSIS — R53.1 WEAKNESS: ICD-10-CM

## 2025-01-20 DIAGNOSIS — B95.61 BACTEREMIA DUE TO METHICILLIN SUSCEPTIBLE STAPHYLOCOCCUS AUREUS (MSSA): ICD-10-CM

## 2025-01-20 DIAGNOSIS — Z95.811 LVAD (LEFT VENTRICULAR ASSIST DEVICE) PRESENT (HCC): ICD-10-CM

## 2025-01-20 DIAGNOSIS — I25.10 CORONARY ARTERY DISEASE INVOLVING NATIVE CORONARY ARTERY OF NATIVE HEART WITHOUT ANGINA PECTORIS: ICD-10-CM

## 2025-01-20 DIAGNOSIS — Z79.01 CHRONIC ANTICOAGULATION: ICD-10-CM

## 2025-01-20 PROCEDURE — 99309 SBSQ NF CARE MODERATE MDM 30: CPT | Performed by: NURSE PRACTITIONER

## 2025-01-20 PROCEDURE — 1123F ACP DISCUSS/DSCN MKR DOCD: CPT | Performed by: NURSE PRACTITIONER

## 2025-01-20 NOTE — PROGRESS NOTES
[id number]  [address] [place] [address]  Phone: [phone]  Fax: [phone]  Also available via Perfect Serve     PLACE OF SERVICE:  [facility] [address] [place] [address]    SKILLED VISIT    Chief Complaint:   Chief Complaint   Patient presents with    Follow-up         HPI : Bishop Love is a 69 y.o. male here for follow up.    Previous history prior to admission:  Patient was hospitalized from 9/17/2024 to 10/29/2024.  Patient has a past medical history of coronary artery disease MI pacemaker ICD, ischemic cardiomyopathy, chronic heart failure with preserved ejection fraction, long-term anticoagulation on Coumadin, mitral agitation, rheumatic tricuspid regurg, sick sinus syndrome, PVD, right femoral popliteal bypass 8/24/2023, bifemoral bypass and bilateral femoral above-the-knee popliteal bypass.  He presented to the ER with complaints of generalized body rash left shoulder and elbow pain rash and started a week prior.  And had a fall 1 week prior.  Left elbow fracture showed each indeterminant fracture with's bone spur at triceps left shoulder showed a high riding humeral head compatible with chronic massive cuff tear.  Patient has history of heart failure with reduced ejection fraction with chronic LVAD on 9/19/2023.  He is being managed by the chronic heart failure team and is on metoprolol 25 mg p.o. twice daily cannot tolerate ACE ARB or Arni due to severe cough.  He is on hydralazine 100 mg p.o. 3 times daily and Isordil 40 mg p.o. 3 times daily.  He is on Aldactone.  He is not on SGLT2 due to recurrent UTIs.  He continues on oral Bumex 2 mg twice daily.  He is not a candidate for liver transplant due to severe peripheral vascular disease.  He continues on Coumadin he had MSSA bacteremia his wound culture had MRSA MSSA Pseudomonas he was assessed by podiatry and recommended local wound care he is full weightbearing on left

## 2025-01-22 ENCOUNTER — OFFICE VISIT (OUTPATIENT)
Facility: CLINIC | Age: 70
End: 2025-01-22

## 2025-01-22 VITALS
BODY MASS INDEX: 16.81 KG/M2 | WEIGHT: 113.9 LBS | RESPIRATION RATE: 19 BRPM | TEMPERATURE: 97.8 F | OXYGEN SATURATION: 96 %

## 2025-01-22 DIAGNOSIS — R53.1 WEAKNESS: ICD-10-CM

## 2025-01-22 DIAGNOSIS — I25.5 ISCHEMIC CARDIOMYOPATHY: Primary | ICD-10-CM

## 2025-01-22 DIAGNOSIS — I73.9 PVD (PERIPHERAL VASCULAR DISEASE) (HCC): ICD-10-CM

## 2025-01-22 DIAGNOSIS — S46.002S INJURY OF LEFT ROTATOR CUFF, SEQUELA: ICD-10-CM

## 2025-01-22 DIAGNOSIS — Z79.01 CHRONIC ANTICOAGULATION: ICD-10-CM

## 2025-01-22 DIAGNOSIS — R78.81 BACTEREMIA DUE TO METHICILLIN SUSCEPTIBLE STAPHYLOCOCCUS AUREUS (MSSA): ICD-10-CM

## 2025-01-22 DIAGNOSIS — I25.10 CORONARY ARTERY DISEASE INVOLVING NATIVE CORONARY ARTERY OF NATIVE HEART WITHOUT ANGINA PECTORIS: ICD-10-CM

## 2025-01-22 DIAGNOSIS — Z95.811 LVAD (LEFT VENTRICULAR ASSIST DEVICE) PRESENT (HCC): ICD-10-CM

## 2025-01-22 DIAGNOSIS — I50.20 HEART FAILURE WITH REDUCED EJECTION FRACTION (HCC): ICD-10-CM

## 2025-01-22 DIAGNOSIS — D72.829 LEUKOCYTOSIS, UNSPECIFIED TYPE: ICD-10-CM

## 2025-01-22 DIAGNOSIS — B95.61 BACTEREMIA DUE TO METHICILLIN SUSCEPTIBLE STAPHYLOCOCCUS AUREUS (MSSA): ICD-10-CM

## 2025-01-22 DIAGNOSIS — Z86.14 HISTORY OF MRSA INFECTION: ICD-10-CM

## 2025-01-22 DIAGNOSIS — S91.302A NON-HEALING OPEN WOUND OF LEFT HEEL: ICD-10-CM

## 2025-01-22 NOTE — PROGRESS NOTES
[id number]  [address] [facility] [place] [address]  Phone: [phone]  Fax: [phone]  Also available via Perfect Serve     PLACE OF SERVICE:  [facility] [address] [place] [address]    SKILLED VISIT    Chief Complaint:   Chief Complaint   Patient presents with    Follow-up         HPI : Bishop Love is a 69 y.o. male here for follow up.    Previous history prior to admission:  Patient was hospitalized from 9/17/2024 to 10/29/2024.  Patient has a past medical history of coronary artery disease MI pacemaker ICD, ischemic cardiomyopathy, chronic heart failure with preserved ejection fraction, long-term anticoagulation on Coumadin, mitral agitation, rheumatic tricuspid regurg, sick sinus syndrome, PVD, right femoral popliteal bypass 8/24/2023, bifemoral bypass and bilateral femoral above-the-knee popliteal bypass.  He presented to the ER with complaints of generalized body rash left shoulder and elbow pain rash and started a week prior.  And had a fall 1 week prior.  Left elbow fracture showed each indeterminant fracture with's bone spur at triceps left shoulder showed a high riding humeral head compatible with chronic massive cuff tear.  Patient has history of heart failure with reduced ejection fraction with chronic LVAD on 9/19/2023.  He is being managed by the chronic heart failure team and is on metoprolol 25 mg p.o. twice daily cannot tolerate ACE ARB or Arni due to severe cough.  He is on hydralazine 100 mg p.o. 3 times daily and Isordil 40 mg p.o. 3 times daily.  He is on Aldactone.  He is not on SGLT2 due to recurrent UTIs.  He continues on oral Bumex 2 mg twice daily.  He is not a candidate for liver transplant due to severe peripheral vascular disease.  He continues on Coumadin he had MSSA bacteremia his wound culture had MRSA MSSA Pseudomonas he was assessed by podiatry and recommended local wound care he is full weightbearing on left

## 2025-01-23 ENCOUNTER — TELEPHONE (OUTPATIENT)
Age: 70
End: 2025-01-23

## 2025-01-23 ENCOUNTER — OFFICE VISIT (OUTPATIENT)
Facility: CLINIC | Age: 70
End: 2025-01-23

## 2025-01-23 VITALS
TEMPERATURE: 97.4 F | RESPIRATION RATE: 18 BRPM | BODY MASS INDEX: 16.81 KG/M2 | OXYGEN SATURATION: 97 % | WEIGHT: 113.9 LBS

## 2025-01-23 DIAGNOSIS — I73.9 PVD (PERIPHERAL VASCULAR DISEASE) (HCC): ICD-10-CM

## 2025-01-23 DIAGNOSIS — A49.02 INFECTION OF WOUND DUE TO METHICILLIN RESISTANT STAPHYLOCOCCUS AUREUS (MRSA): ICD-10-CM

## 2025-01-23 DIAGNOSIS — D72.829 LEUKOCYTOSIS, UNSPECIFIED TYPE: ICD-10-CM

## 2025-01-23 DIAGNOSIS — I25.10 CORONARY ARTERY DISEASE INVOLVING NATIVE CORONARY ARTERY OF NATIVE HEART WITHOUT ANGINA PECTORIS: ICD-10-CM

## 2025-01-23 DIAGNOSIS — B95.61 BACTEREMIA DUE TO METHICILLIN SUSCEPTIBLE STAPHYLOCOCCUS AUREUS (MSSA): ICD-10-CM

## 2025-01-23 DIAGNOSIS — R53.1 WEAKNESS: ICD-10-CM

## 2025-01-23 DIAGNOSIS — R78.81 BACTEREMIA DUE TO METHICILLIN SUSCEPTIBLE STAPHYLOCOCCUS AUREUS (MSSA): ICD-10-CM

## 2025-01-23 DIAGNOSIS — Z79.01 CHRONIC ANTICOAGULATION: ICD-10-CM

## 2025-01-23 DIAGNOSIS — I50.20 HEART FAILURE WITH REDUCED EJECTION FRACTION (HCC): ICD-10-CM

## 2025-01-23 DIAGNOSIS — I25.5 ISCHEMIC CARDIOMYOPATHY: Primary | ICD-10-CM

## 2025-01-23 DIAGNOSIS — S91.302A NON-HEALING OPEN WOUND OF LEFT HEEL: ICD-10-CM

## 2025-01-23 DIAGNOSIS — Z95.811 LVAD (LEFT VENTRICULAR ASSIST DEVICE) PRESENT (HCC): ICD-10-CM

## 2025-01-23 DIAGNOSIS — Z86.14 HISTORY OF MRSA INFECTION: ICD-10-CM

## 2025-01-23 RX ORDER — LINEZOLID 600 MG/1
600 TABLET, FILM COATED ORAL 2 TIMES DAILY
COMMUNITY
End: 2025-01-30

## 2025-01-23 NOTE — TELEPHONE ENCOUNTER
Discussed patient with LUPE Mondragon who stated she was contacted by NP at [facility] regarding escalating white count and plan for blood and driveline cultures.     1030: received call from Denice LANG with [facility] who stated she wanted to make [facility] team aware of concern regarding patient's driveline dressing. Stated at one point she entered patient room and dressing was completely off but patient did not report what happened. She stated at a separate time she witnessed the patient scratching at his skin and peeling his driveline dressing off himself. Reports she is concerned about this in the presence of team concerns for escalating white count and because patient is also frequently touching his leg wounds and she is concerned for patient contaminating his driveline. Reported patient's skin is very dry and itchy and she thinks patient is scratching at dressing. Denice stated she will discuss plan to help with itching with team, reinforce dressing with extra layer such as aqua guard and that culture was collected per their provider's order, wanted to make [facility] team aware of this concern. Informed Denice will discuss with provider. LUPE Mondragon notified.

## 2025-01-23 NOTE — PROGRESS NOTES
01/22/2025, has vascular appointment on 02/04/2025 01/23/2025 - afebrile, but WBC increasing to 16, spoke with LVAD team - Blood cultures, procalcitonin, repeat CBC w/ diff, change doxycycline to Zyvox 600mg PO BID x 7 days, reculture left foot, culture driveline of LVAD. Will check CXR, urinalysis to rule other sources out.    11. Weakness     Work with PT OT for strengthening  Most recent physical therapy note reviewed from 01/23/2025   12. Infectious of wound due to MRSA   Previous known history of MRSA bilateral leg left shin and heel wounds positive for MRSA on culture  Would culture showed MRSA during stay, treated with doxycycline 100mg PO BID  01/16/2025 - WBC now back to 14.5, restart doxycycline 100mg PO BID due to suspected source as his foot wound  01/22/2025, continue doxycycline until 01/23/2025, last wound note says stable, recommends vascular consult on 02/05/25, repeat cbc on 01/22/25 01/23/2025 - afebrile, but WBC increasing to 16, spoke with LVAD team - Blood cultures, procalcitonin, repeat CBC w/ diff, change doxycycline to Zyvox 600mg PO BID x 7 days, culture of drive line, blood cultures. Will check CXR, urinalysis to rule other sources out.            YASMIN Toscano - NP

## 2025-01-24 ENCOUNTER — OFFICE VISIT (OUTPATIENT)
Facility: CLINIC | Age: 70
End: 2025-01-24

## 2025-01-24 ENCOUNTER — TELEPHONE (OUTPATIENT)
Age: 70
End: 2025-01-24

## 2025-01-24 VITALS
TEMPERATURE: 97.2 F | OXYGEN SATURATION: 98 % | RESPIRATION RATE: 18 BRPM | WEIGHT: 113.9 LBS | BODY MASS INDEX: 16.81 KG/M2

## 2025-01-24 DIAGNOSIS — S91.302A NON-HEALING OPEN WOUND OF LEFT HEEL: ICD-10-CM

## 2025-01-24 DIAGNOSIS — B95.61 BACTEREMIA DUE TO METHICILLIN SUSCEPTIBLE STAPHYLOCOCCUS AUREUS (MSSA): ICD-10-CM

## 2025-01-24 DIAGNOSIS — D72.829 LEUKOCYTOSIS, UNSPECIFIED TYPE: ICD-10-CM

## 2025-01-24 DIAGNOSIS — I25.10 CORONARY ARTERY DISEASE INVOLVING NATIVE CORONARY ARTERY OF NATIVE HEART WITHOUT ANGINA PECTORIS: ICD-10-CM

## 2025-01-24 DIAGNOSIS — I25.5 ISCHEMIC CARDIOMYOPATHY: Primary | ICD-10-CM

## 2025-01-24 DIAGNOSIS — Z79.01 CHRONIC ANTICOAGULATION: ICD-10-CM

## 2025-01-24 DIAGNOSIS — R53.1 WEAKNESS: ICD-10-CM

## 2025-01-24 DIAGNOSIS — Z95.811 LVAD (LEFT VENTRICULAR ASSIST DEVICE) PRESENT (HCC): ICD-10-CM

## 2025-01-24 DIAGNOSIS — A49.02 INFECTION OF WOUND DUE TO METHICILLIN RESISTANT STAPHYLOCOCCUS AUREUS (MRSA): ICD-10-CM

## 2025-01-24 DIAGNOSIS — I50.20 HEART FAILURE WITH REDUCED EJECTION FRACTION (HCC): ICD-10-CM

## 2025-01-24 DIAGNOSIS — R78.81 BACTEREMIA DUE TO METHICILLIN SUSCEPTIBLE STAPHYLOCOCCUS AUREUS (MSSA): ICD-10-CM

## 2025-01-24 DIAGNOSIS — I73.9 PVD (PERIPHERAL VASCULAR DISEASE) (HCC): ICD-10-CM

## 2025-01-24 DIAGNOSIS — Z86.14 HISTORY OF MRSA INFECTION: ICD-10-CM

## 2025-01-24 NOTE — TELEPHONE ENCOUNTER
1332: Contacted [facility] per request of LUPE Mondragon NP to inquire about driveline and blood culture results. Spoke with Regino who reported blood cultures, UA with culture, driveline culture, lower extremity culture, CBC, CMP and procalcitonin collected today. Requested he fax results to office once finalized. Fax number provided. He verbalized understanding and had no further questions. LUPE Mondragon NP updated.     LUIS STEVEN RN  VAD Coordinator

## 2025-01-24 NOTE — PROGRESS NOTES
[id number]  [address] [place] [address]  Phone: [phone]  Fax: [phone]  Also available via Perfect Serve     PLACE OF SERVICE:  [facility] [address] [place] [address]    SKILLED VISIT    Chief Complaint:   Chief Complaint   Patient presents with    Follow-up         HPI : Bishop Love is a 69 y.o. male here for follow up.    Previous history prior to admission:  Patient was hospitalized from 9/17/2024 to 10/29/2024.  Patient has a past medical history of coronary artery disease MI pacemaker ICD, ischemic cardiomyopathy, chronic heart failure with preserved ejection fraction, long-term anticoagulation on Coumadin, mitral agitation, rheumatic tricuspid regurg, sick sinus syndrome, PVD, right femoral popliteal bypass 8/24/2023, bifemoral bypass and bilateral femoral above-the-knee popliteal bypass.  He presented to the ER with complaints of generalized body rash left shoulder and elbow pain rash and started a week prior.  And had a fall 1 week prior.  Left elbow fracture showed each indeterminant fracture with's bone spur at triceps left shoulder showed a high riding humeral head compatible with chronic massive cuff tear.  Patient has history of heart failure with reduced ejection fraction with chronic LVAD on 9/19/2023.  He is being managed by the chronic heart failure team and is on metoprolol 25 mg p.o. twice daily cannot tolerate ACE ARB or Arni due to severe cough.  He is on hydralazine 100 mg p.o. 3 times daily and Isordil 40 mg p.o. 3 times daily.  He is on Aldactone.  He is not on SGLT2 due to recurrent UTIs.  He continues on oral Bumex 2 mg twice daily.  He is not a candidate for liver transplant due to severe peripheral vascular disease.  He continues on Coumadin he had MSSA bacteremia his wound culture had MRSA MSSA Pseudomonas he was assessed by podiatry and recommended local wound care he is full weightbearing on left

## 2025-01-27 ENCOUNTER — OFFICE VISIT (OUTPATIENT)
Facility: CLINIC | Age: 70
End: 2025-01-27
Payer: MEDICARE

## 2025-01-27 VITALS
RESPIRATION RATE: 18 BRPM | TEMPERATURE: 97.8 F | BODY MASS INDEX: 16.81 KG/M2 | OXYGEN SATURATION: 98 % | WEIGHT: 113.9 LBS

## 2025-01-27 DIAGNOSIS — I25.10 CORONARY ARTERY DISEASE INVOLVING NATIVE CORONARY ARTERY OF NATIVE HEART WITHOUT ANGINA PECTORIS: ICD-10-CM

## 2025-01-27 DIAGNOSIS — B95.61 BACTEREMIA DUE TO METHICILLIN SUSCEPTIBLE STAPHYLOCOCCUS AUREUS (MSSA): ICD-10-CM

## 2025-01-27 DIAGNOSIS — Z86.14 HISTORY OF MRSA INFECTION: ICD-10-CM

## 2025-01-27 DIAGNOSIS — I73.9 PVD (PERIPHERAL VASCULAR DISEASE) (HCC): ICD-10-CM

## 2025-01-27 DIAGNOSIS — I50.20 HEART FAILURE WITH REDUCED EJECTION FRACTION (HCC): ICD-10-CM

## 2025-01-27 DIAGNOSIS — I25.5 ISCHEMIC CARDIOMYOPATHY: Primary | ICD-10-CM

## 2025-01-27 DIAGNOSIS — Z79.01 CHRONIC ANTICOAGULATION: ICD-10-CM

## 2025-01-27 DIAGNOSIS — Z95.811 LVAD (LEFT VENTRICULAR ASSIST DEVICE) PRESENT (HCC): ICD-10-CM

## 2025-01-27 DIAGNOSIS — R78.81 BACTEREMIA DUE TO METHICILLIN SUSCEPTIBLE STAPHYLOCOCCUS AUREUS (MSSA): ICD-10-CM

## 2025-01-27 DIAGNOSIS — D72.829 LEUKOCYTOSIS, UNSPECIFIED TYPE: ICD-10-CM

## 2025-01-27 DIAGNOSIS — R53.1 WEAKNESS: ICD-10-CM

## 2025-01-27 DIAGNOSIS — S91.302A NON-HEALING OPEN WOUND OF LEFT HEEL: ICD-10-CM

## 2025-01-27 PROCEDURE — 1123F ACP DISCUSS/DSCN MKR DOCD: CPT | Performed by: NURSE PRACTITIONER

## 2025-01-27 PROCEDURE — 99309 SBSQ NF CARE MODERATE MDM 30: CPT | Performed by: NURSE PRACTITIONER

## 2025-01-27 NOTE — PROGRESS NOTES
[id number]  [address] [facility] [place] [address]  Phone: [phone]  Fax: [phone]  Also available via Perfect Serve     PLACE OF SERVICE:  [facility] [address] [place] [address]    SKILLED VISIT    Chief Complaint:   Chief Complaint   Patient presents with    Follow-up         HPI : Bishop Love is a 69 y.o. male here for follow up.    Previous history prior to admission:  Patient was hospitalized from 9/17/2024 to 10/29/2024.  Patient has a past medical history of coronary artery disease MI pacemaker ICD, ischemic cardiomyopathy, chronic heart failure with preserved ejection fraction, long-term anticoagulation on Coumadin, mitral agitation, rheumatic tricuspid regurg, sick sinus syndrome, PVD, right femoral popliteal bypass 8/24/2023, bifemoral bypass and bilateral femoral above-the-knee popliteal bypass.  He presented to the ER with complaints of generalized body rash left shoulder and elbow pain rash and started a week prior.  And had a fall 1 week prior.  Left elbow fracture showed each indeterminant fracture with's bone spur at triceps left shoulder showed a high riding humeral head compatible with chronic massive cuff tear.  Patient has history of heart failure with reduced ejection fraction with chronic LVAD on 9/19/2023.  He is being managed by the chronic heart failure team and is on metoprolol 25 mg p.o. twice daily cannot tolerate ACE ARB or Arni due to severe cough.  He is on hydralazine 100 mg p.o. 3 times daily and Isordil 40 mg p.o. 3 times daily.  He is on Aldactone.  He is not on SGLT2 due to recurrent UTIs.  He continues on oral Bumex 2 mg twice daily.  He is not a candidate for liver transplant due to severe peripheral vascular disease.  He continues on Coumadin he had MSSA bacteremia his wound culture had MRSA MSSA Pseudomonas he was assessed by podiatry and recommended local wound care he is full weightbearing on left

## 2025-01-29 ENCOUNTER — HOSPITAL ENCOUNTER (OUTPATIENT)
Facility: HOSPITAL | Age: 70
Discharge: HOME | End: 2025-01-29
Attending: FAMILY MEDICINE
Payer: MEDICARE

## 2025-01-29 ENCOUNTER — OFFICE VISIT (OUTPATIENT)
Facility: CLINIC | Age: 70
End: 2025-01-29

## 2025-01-29 VITALS — TEMPERATURE: 97.7 F | RESPIRATION RATE: 16 BRPM

## 2025-01-29 VITALS
TEMPERATURE: 97.2 F | RESPIRATION RATE: 18 BRPM | OXYGEN SATURATION: 96 % | WEIGHT: 114.8 LBS | BODY MASS INDEX: 16.95 KG/M2

## 2025-01-29 DIAGNOSIS — L97.823 ULCER OF LEFT PRETIBIAL REGION WITH NECROSIS OF MUSCLE (HCC): Primary | ICD-10-CM

## 2025-01-29 DIAGNOSIS — Z79.01 CHRONIC ANTICOAGULATION: ICD-10-CM

## 2025-01-29 DIAGNOSIS — I50.20 HEART FAILURE WITH REDUCED EJECTION FRACTION (HCC): ICD-10-CM

## 2025-01-29 DIAGNOSIS — Z95.811 LVAD (LEFT VENTRICULAR ASSIST DEVICE) PRESENT (HCC): ICD-10-CM

## 2025-01-29 DIAGNOSIS — D72.829 LEUKOCYTOSIS, UNSPECIFIED TYPE: ICD-10-CM

## 2025-01-29 DIAGNOSIS — I73.9 PVD (PERIPHERAL VASCULAR DISEASE) (HCC): ICD-10-CM

## 2025-01-29 DIAGNOSIS — Z86.14 HISTORY OF MRSA INFECTION: ICD-10-CM

## 2025-01-29 DIAGNOSIS — I25.5 ISCHEMIC CARDIOMYOPATHY: Primary | ICD-10-CM

## 2025-01-29 DIAGNOSIS — R78.81 BACTEREMIA DUE TO METHICILLIN SUSCEPTIBLE STAPHYLOCOCCUS AUREUS (MSSA): ICD-10-CM

## 2025-01-29 DIAGNOSIS — I10 ESSENTIAL HYPERTENSION: ICD-10-CM

## 2025-01-29 DIAGNOSIS — L97.522 ULCER OF LEFT FOOT, WITH FAT LAYER EXPOSED (HCC): ICD-10-CM

## 2025-01-29 DIAGNOSIS — B95.61 BACTEREMIA DUE TO METHICILLIN SUSCEPTIBLE STAPHYLOCOCCUS AUREUS (MSSA): ICD-10-CM

## 2025-01-29 DIAGNOSIS — L97.523 ULCER OF LEFT FOOT, WITH NECROSIS OF MUSCLE (HCC): ICD-10-CM

## 2025-01-29 DIAGNOSIS — S91.302A NON-HEALING OPEN WOUND OF LEFT HEEL: ICD-10-CM

## 2025-01-29 DIAGNOSIS — R53.1 WEAKNESS: ICD-10-CM

## 2025-01-29 DIAGNOSIS — S91.109A NON-HEALING OPEN WOUND OF TOE, INITIAL ENCOUNTER: ICD-10-CM

## 2025-01-29 DIAGNOSIS — I25.10 CORONARY ARTERY DISEASE INVOLVING NATIVE CORONARY ARTERY OF NATIVE HEART WITHOUT ANGINA PECTORIS: ICD-10-CM

## 2025-01-29 PROCEDURE — 99214 OFFICE O/P EST MOD 30 MIN: CPT

## 2025-01-29 PROCEDURE — 11043 DBRDMT MUSC&/FSCA 1ST 20/<: CPT

## 2025-01-29 PROCEDURE — 11046 DBRDMT MUSC&/FSCA EA ADDL: CPT

## 2025-01-29 RX ORDER — MUPIROCIN 20 MG/G
OINTMENT TOPICAL PRN
OUTPATIENT
Start: 2025-01-29

## 2025-01-29 RX ORDER — BETAMETHASONE DIPROPIONATE 0.5 MG/G
CREAM TOPICAL PRN
OUTPATIENT
Start: 2025-01-29

## 2025-01-29 RX ORDER — LIDOCAINE HYDROCHLORIDE 20 MG/ML
JELLY TOPICAL PRN
OUTPATIENT
Start: 2025-01-29

## 2025-01-29 RX ORDER — SILVER SULFADIAZINE 10 MG/G
CREAM TOPICAL PRN
OUTPATIENT
Start: 2025-01-29

## 2025-01-29 RX ORDER — CLOBETASOL PROPIONATE 0.5 MG/G
OINTMENT TOPICAL PRN
OUTPATIENT
Start: 2025-01-29

## 2025-01-29 RX ORDER — NEOMYCIN/BACITRACIN/POLYMYXINB 3.5-400-5K
OINTMENT (GRAM) TOPICAL PRN
OUTPATIENT
Start: 2025-01-29

## 2025-01-29 RX ORDER — GENTAMICIN SULFATE 1 MG/G
OINTMENT TOPICAL PRN
OUTPATIENT
Start: 2025-01-29

## 2025-01-29 RX ORDER — LIDOCAINE 40 MG/G
CREAM TOPICAL PRN
OUTPATIENT
Start: 2025-01-29

## 2025-01-29 RX ORDER — GINSENG 100 MG
CAPSULE ORAL PRN
OUTPATIENT
Start: 2025-01-29

## 2025-01-29 RX ORDER — TRIAMCINOLONE ACETONIDE 1 MG/G
OINTMENT TOPICAL PRN
OUTPATIENT
Start: 2025-01-29

## 2025-01-29 RX ORDER — BACITRACIN ZINC AND POLYMYXIN B SULFATE 500; 1000 [USP'U]/G; [USP'U]/G
OINTMENT TOPICAL PRN
OUTPATIENT
Start: 2025-01-29

## 2025-01-29 RX ORDER — LIDOCAINE 50 MG/G
OINTMENT TOPICAL PRN
OUTPATIENT
Start: 2025-01-29

## 2025-01-29 RX ORDER — SODIUM CHLOR/HYPOCHLOROUS ACID 0.033 %
SOLUTION, IRRIGATION IRRIGATION PRN
OUTPATIENT
Start: 2025-01-29

## 2025-01-29 RX ORDER — LIDOCAINE HYDROCHLORIDE 40 MG/ML
SOLUTION TOPICAL PRN
OUTPATIENT
Start: 2025-01-29

## 2025-01-29 NOTE — PROGRESS NOTES
[id number]  [address] [address]  Phone: [phone]  Fax: [phone]  Also available via Perfect Serve     PLACE OF SERVICE:  [facility] [address] [place] [address]    SKILLED VISIT    Chief Complaint:   Chief Complaint   Patient presents with    Follow-up         HPI : Bishop Love is a 69 y.o. male here for follow up.    Previous history prior to admission:  Patient was hospitalized from 9/17/2024 to 10/29/2024.  Patient has a past medical history of coronary artery disease MI pacemaker ICD, ischemic cardiomyopathy, chronic heart failure with preserved ejection fraction, long-term anticoagulation on Coumadin, mitral agitation, rheumatic tricuspid regurg, sick sinus syndrome, PVD, right femoral popliteal bypass 8/24/2023, bifemoral bypass and bilateral femoral above-the-knee popliteal bypass.  He presented to the ER with complaints of generalized body rash left shoulder and elbow pain rash and started a week prior.  And had a fall 1 week prior.  Left elbow fracture showed each indeterminant fracture with's bone spur at triceps left shoulder showed a high riding humeral head compatible with chronic massive cuff tear.  Patient has history of heart failure with reduced ejection fraction with chronic LVAD on 9/19/2023.  He is being managed by the chronic heart failure team and is on metoprolol 25 mg p.o. twice daily cannot tolerate ACE ARB or Arni due to severe cough.  He is on hydralazine 100 mg p.o. 3 times daily and Isordil 40 mg p.o. 3 times daily.  He is on Aldactone.  He is not on SGLT2 due to recurrent UTIs.  He continues on oral Bumex 2 mg twice daily.  He is not a candidate for liver transplant due to severe peripheral vascular disease.  He continues on Coumadin he had MSSA bacteremia his wound culture had MRSA MSSA Pseudomonas he was assessed by podiatry and recommended local wound care he is full weightbearing on left

## 2025-01-29 NOTE — FLOWSHEET NOTE
01/29/25 1341   Anesthetic   Anesthetic 4% Lidocaine Liquid Topical   Right Leg Edema Point of Measurement   Leg circumference 23.5 cm   Ankle circumference 17.5 cm   Left Leg Edema Point of Measurement   Leg circumference 22 cm   Ankle circumference 18 cm   Peripheral Vascular   RLE Edema None   LLE Edema None   RLE Neurovascular Assessment   Capillary Refill Less than/Equal to 3 seconds   Color Yellow-Brown/Hemosiderin Staining   Temperature Cold   RLE Sensation  Decreased   R Post Tibial Pulse Doppler   LLE Neurovascular Assessment   Capillary Refill Less than/Equal to 3 seconds   Color Yellow-Brown/Hemosiderin Staining   Temperature Cold   LLE Sensation  Full sensation   L Pedal Pulse Doppler   Wound 01/29/25 Pretibial Medial   Date First Assessed/Time First Assessed: 01/29/25 1337   Present on Original Admission: Yes  Wound Approximate Age at First Assessment (Weeks): 20 weeks  Primary Wound Type: Venous Ulcer  Location: Pretibial  Wound Location Orientation: Medial   Wound Image    Wound Etiology Venous   Dressing Status Old drainage noted   Wound Cleansed Cleansed with saline   Offloading for Diabetic Foot Ulcers Offloading not required   Wound Length (cm) 11.5 cm   Wound Width (cm) 2.7 cm   Wound Depth (cm) 0.2 cm   Wound Surface Area (cm^2) 31.05 cm^2   Wound Volume (cm^3) 6.21 cm^3   Wound Assessment Eschar dry;Bleeding;Slough   Drainage Amount Large (50-75% saturated)   Drainage Description Serosanguinous   Odor None   [place]-wound Assessment Hyperpigmented;Fragile   Margins Undefined edges   Wound Thickness Description not for Pressure Injury Full thickness   Wound 01/29/25 Foot Lateral;Left   Date First Assessed/Time First Assessed: 01/29/25 1339   Present on Original Admission: Yes  Wound Approximate Age at First Assessment (Weeks): 20 weeks  Location: Foot  Wound Location Orientation: Lateral;Left   Wound Image    Wound Etiology Other   Dressing Status Old drainage noted   Wound Cleansed Cleansed 
Wound Location Orientation: Right  Wound Description (Comment...   Wound Etiology Other   Dressing Status New dressing applied   Dressing/Treatment Betadine swabs/povidone iodine;Gauze dressing/dressing sponge;Roll gauze   Offloading for Diabetic Foot Ulcers Offloading not required   Wound 01/29/25 Toe (Comment  which one) Left All toes   Date First Assessed/Time First Assessed: 01/29/25 1350   Present on Original Admission: Yes  Wound Approximate Age at First Assessment (Weeks): 20 weeks  Location: Toe (Comment  which one)  Wound Location Orientation: Left  Wound Description (Comments...   Wound Etiology Other   Dressing/Treatment Betadine swabs/povidone iodine;Gauze dressing/dressing sponge;Roll gauze   Offloading for Diabetic Foot Ulcers Post op shoe     Discharge Condition: Stable    Pain: 0    Ambulatory Status: Wheelchair    Discharge Destination: nursing home    Transportation:handicap accessible transportation    Accompanied by: SELF    Discharge instructions reviewed with SELF and copy or written instructions have been provided. All questions/concerns have been addressed at this time.

## 2025-01-29 NOTE — PATIENT INSTRUCTIONS
Discharge Instructions for  [facility]  [address] [address]  Phone: [phone] Fax: [phone]    NAME:  Bishop Love  YOB: 1955  MEDICAL RECORD NUMBER:  [id number]  DATE:  January 29, 2025    WOUND CARE ORDERS:  All wounds-Cleanse with baby shampoo. Allow to lather. Rinse and pat dry.   Left pretibial wound-Apply 4-5 layers of Xerforom to wound. Cover with gauze. Secure with roll gauze. Change Monday, Wednesday, and Friday.  Left medial and lateral foot wounds-Apply betadine wet to dry dressing. Cover with gauze. Secure with roll gauze. Change Monday, Wednesday, and Friday.   Left and right toes-Apply betadine wet to dry dressing to any open areas and tips of toes. Cover with gauze. Secure with roll gauze-wind roll gauze in between toes and make sure to cover tips of toes. Change Monday, Wednesday, and Friday.      Activity:  [] Elevate leg(s) above the level of the heart when sitting.  [] Avoid prolonged standing in one place.   [x] Do no get dressing/wrap wet.       Dietary:  [x] Diet as tolerated      [] Diabetic Diet            [] Increase Protein: examples (Meat, cheese, eggs, greek yogurt, fish, nuts)          [] Gregorio Therapeutic Nutrition Powder  [] Other:  [] Dial a Dietician : Call [employer] at [phone] enter code (249) when prompted. M-F 9am-5pm EST.      Return Appointment:  [x] Return Appointment: With Dr. Leonie Bailey in 2 Week(s)  [] Nurse Visit :   [] Ordered tests:      Electronically signed Samantha Cheney RN on 1/29/2025 at 1:20 PM     Wound Care Center Information: Should you experience any significant changes in your wound(s) or have questions about your wound care, please contact the [facility] at MONDAY - FRIDAY 8:00 am - 4:30.  If you need help with your wound outside these hours and cannot wait until we are again available, contact your PCP or go to the hospital emergency room.     PLEASE

## 2025-01-29 NOTE — PROGRESS NOTES
Wound Center  Progress Note / Procedure Note      Chief Complaint:  Bishop Love is a 69 y.o.  male  with lower extremity wound of >few months duration.    Referred by:  Dr RADHA Bernal MD      Assessment/Plan     69 y.o. male with end-stage CHF, LVAD, PAD    -Left anterior chronic ulcer.  Full thickness  Slough/granular soft black detaching eschar/necrotic tendon  Self detaching eschar debrided necrotic tendon debrided  What ever was easily debrided was debrided, overall debridement was done on the conservative side      -Left lateral foot ulcer  Full-thickness  Fairly granular    -Left  medial foot ulcer  Full-thickness  Fairly granular    -Toes on bilateral feet  Distal tips mostly dry eschar and peeling dry skin  When removing dry skin nail came off on the left fourth and fifth toes    -PAD     -Chart reviewed patient has poor prognosis overall.  Not a candidate for amputation due to CHF and LVAD.  His leg infection have also been described as incurable.  We will try our best to manage the wounds and keep them stable and prevent them from getting infected    Dressing:  All wounds-Cleanse with baby shampoo. Allow to lather. Rinse and pat dry.   Left pretibial wound-Apply 4-5 layers of Xerforom to wound. Cover with gauze. Secure with roll gauze. Change Monday, Wednesday, and Friday.  Left medial and lateral foot wounds-Apply betadine wet to dry dressing. Cover with gauze. Secure with roll gauze. Change Monday, Wednesday, and Friday.   Left and right toes-Apply betadine wet to dry dressing to any open areas and tips of toes. Cover with gauze. Secure with roll gauze-wind roll gauze in between toes and make sure to cover tips of toes. Change Monday, Wednesday, and Friday.      -Nutrition optimization  Patient has about 3 ensures a day, along with his regular meals    -Good offloading  Discussed in detail    Patient/  understood and agrees with plan. Questions answered.    Serial visits and serial

## 2025-01-31 ENCOUNTER — TELEPHONE (OUTPATIENT)
Age: 70
End: 2025-01-31

## 2025-01-31 ENCOUNTER — OFFICE VISIT (OUTPATIENT)
Facility: CLINIC | Age: 70
End: 2025-01-31

## 2025-01-31 VITALS
OXYGEN SATURATION: 100 % | TEMPERATURE: 97.9 F | WEIGHT: 113.8 LBS | BODY MASS INDEX: 16.8 KG/M2 | RESPIRATION RATE: 17 BRPM

## 2025-01-31 DIAGNOSIS — R78.81 BACTEREMIA DUE TO METHICILLIN SUSCEPTIBLE STAPHYLOCOCCUS AUREUS (MSSA): ICD-10-CM

## 2025-01-31 DIAGNOSIS — I25.10 CORONARY ARTERY DISEASE INVOLVING NATIVE CORONARY ARTERY OF NATIVE HEART WITHOUT ANGINA PECTORIS: ICD-10-CM

## 2025-01-31 DIAGNOSIS — Z95.811 LVAD (LEFT VENTRICULAR ASSIST DEVICE) PRESENT (HCC): ICD-10-CM

## 2025-01-31 DIAGNOSIS — Z86.14 HISTORY OF MRSA INFECTION: ICD-10-CM

## 2025-01-31 DIAGNOSIS — R53.1 WEAKNESS: ICD-10-CM

## 2025-01-31 DIAGNOSIS — I25.5 ISCHEMIC CARDIOMYOPATHY: Primary | ICD-10-CM

## 2025-01-31 DIAGNOSIS — S91.302A NON-HEALING OPEN WOUND OF LEFT HEEL: ICD-10-CM

## 2025-01-31 DIAGNOSIS — I50.20 HEART FAILURE WITH REDUCED EJECTION FRACTION (HCC): ICD-10-CM

## 2025-01-31 DIAGNOSIS — A49.02 INFECTION OF WOUND DUE TO METHICILLIN RESISTANT STAPHYLOCOCCUS AUREUS (MRSA): ICD-10-CM

## 2025-01-31 DIAGNOSIS — I73.9 PVD (PERIPHERAL VASCULAR DISEASE) (HCC): ICD-10-CM

## 2025-01-31 DIAGNOSIS — Z79.01 CHRONIC ANTICOAGULATION: ICD-10-CM

## 2025-01-31 DIAGNOSIS — B95.61 BACTEREMIA DUE TO METHICILLIN SUSCEPTIBLE STAPHYLOCOCCUS AUREUS (MSSA): ICD-10-CM

## 2025-01-31 NOTE — PROGRESS NOTES
[id number]  [address] [facility] [place] [address]  Phone: [phone]  Fax: [phone]  Also available via Perfect Serve     PLACE OF SERVICE:  [facility] [address] [place] [address]    SKILLED VISIT    Chief Complaint:   Chief Complaint   Patient presents with    Follow-up         HPI : Bishop Love is a 69 y.o. male here for follow up.    Previous history prior to admission:  Patient was hospitalized from 9/17/2024 to 10/29/2024.  Patient has a past medical history of coronary artery disease MI pacemaker ICD, ischemic cardiomyopathy, chronic heart failure with preserved ejection fraction, long-term anticoagulation on Coumadin, mitral agitation, rheumatic tricuspid regurg, sick sinus syndrome, PVD, right femoral popliteal bypass 8/24/2023, bifemoral bypass and bilateral femoral above-the-knee popliteal bypass.  He presented to the ER with complaints of generalized body rash left shoulder and elbow pain rash and started a week prior.  And had a fall 1 week prior.  Left elbow fracture showed each indeterminant fracture with's bone spur at triceps left shoulder showed a high riding humeral head compatible with chronic massive cuff tear.  Patient has history of heart failure with reduced ejection fraction with chronic LVAD on 9/19/2023.  He is being managed by the chronic heart failure team and is on metoprolol 25 mg p.o. twice daily cannot tolerate ACE ARB or Arni due to severe cough.  He is on hydralazine 100 mg p.o. 3 times daily and Isordil 40 mg p.o. 3 times daily.  He is on Aldactone.  He is not on SGLT2 due to recurrent UTIs.  He continues on oral Bumex 2 mg twice daily.  He is not a candidate for liver transplant due to severe peripheral vascular disease.  He continues on Coumadin he had MSSA bacteremia his wound culture had MRSA MSSA Pseudomonas he was assessed by podiatry and recommended local wound care he is full weightbearing on left

## 2025-01-31 NOTE — TELEPHONE ENCOUNTER
Telephone Call RE:  Appointment reminder     Outcome:     [x] Patient confirmed appointment   [] Patient rescheduled appointment for    [] Unable to reach  [] Left message              [x] Other: Parking, Location

## 2025-02-03 ENCOUNTER — OFFICE VISIT (OUTPATIENT)
Facility: CLINIC | Age: 70
End: 2025-02-03
Payer: MEDICARE

## 2025-02-03 VITALS
WEIGHT: 112.6 LBS | RESPIRATION RATE: 18 BRPM | OXYGEN SATURATION: 99 % | BODY MASS INDEX: 16.62 KG/M2 | TEMPERATURE: 97.1 F

## 2025-02-03 DIAGNOSIS — Z95.811 LVAD (LEFT VENTRICULAR ASSIST DEVICE) PRESENT (HCC): Chronic | ICD-10-CM

## 2025-02-03 DIAGNOSIS — I10 ESSENTIAL HYPERTENSION: Chronic | ICD-10-CM

## 2025-02-03 DIAGNOSIS — A49.02 INFECTION OF WOUND DUE TO METHICILLIN RESISTANT STAPHYLOCOCCUS AUREUS (MRSA): ICD-10-CM

## 2025-02-03 DIAGNOSIS — Z79.01 CHRONIC ANTICOAGULATION: ICD-10-CM

## 2025-02-03 DIAGNOSIS — B95.61 BACTEREMIA DUE TO METHICILLIN SUSCEPTIBLE STAPHYLOCOCCUS AUREUS (MSSA): ICD-10-CM

## 2025-02-03 DIAGNOSIS — I25.10 CORONARY ARTERY DISEASE INVOLVING NATIVE CORONARY ARTERY OF NATIVE HEART WITHOUT ANGINA PECTORIS: ICD-10-CM

## 2025-02-03 DIAGNOSIS — Z86.73 HISTORY OF CEREBROVASCULAR ACCIDENT: Chronic | ICD-10-CM

## 2025-02-03 DIAGNOSIS — I25.5 ISCHEMIC CARDIOMYOPATHY: Primary | ICD-10-CM

## 2025-02-03 DIAGNOSIS — I34.0 NONRHEUMATIC MITRAL VALVE REGURGITATION: Chronic | ICD-10-CM

## 2025-02-03 DIAGNOSIS — I73.9 PVD (PERIPHERAL VASCULAR DISEASE) (HCC): ICD-10-CM

## 2025-02-03 DIAGNOSIS — R78.81 BACTEREMIA DUE TO METHICILLIN SUSCEPTIBLE STAPHYLOCOCCUS AUREUS (MSSA): ICD-10-CM

## 2025-02-03 DIAGNOSIS — I50.20 HEART FAILURE WITH REDUCED EJECTION FRACTION (HCC): ICD-10-CM

## 2025-02-03 DIAGNOSIS — Z86.14 HISTORY OF MRSA INFECTION: ICD-10-CM

## 2025-02-03 DIAGNOSIS — I25.10 CORONARY ARTERY DISEASE INVOLVING NATIVE CORONARY ARTERY OF NATIVE HEART WITHOUT ANGINA PECTORIS: Primary | Chronic | ICD-10-CM

## 2025-02-03 DIAGNOSIS — R53.1 WEAKNESS: ICD-10-CM

## 2025-02-03 DIAGNOSIS — S91.302A NON-HEALING OPEN WOUND OF LEFT HEEL: ICD-10-CM

## 2025-02-03 DIAGNOSIS — I25.5 ISCHEMIC CARDIOMYOPATHY: Chronic | ICD-10-CM

## 2025-02-03 DIAGNOSIS — Z95.810 ICD (IMPLANTABLE CARDIOVERTER-DEFIBRILLATOR) IN PLACE: Chronic | ICD-10-CM

## 2025-02-03 DIAGNOSIS — E78.2 MIXED HYPERLIPIDEMIA: Chronic | ICD-10-CM

## 2025-02-03 DIAGNOSIS — Z95.811 LVAD (LEFT VENTRICULAR ASSIST DEVICE) PRESENT (HCC): ICD-10-CM

## 2025-02-03 DIAGNOSIS — I73.9 PAD (PERIPHERAL ARTERY DISEASE) (HCC): Chronic | ICD-10-CM

## 2025-02-03 PROCEDURE — 1124F ACP DISCUSS-NO DSCNMKR DOCD: CPT | Performed by: FAMILY MEDICINE

## 2025-02-03 PROCEDURE — 99309 SBSQ NF CARE MODERATE MDM 30: CPT | Performed by: NURSE PRACTITIONER

## 2025-02-03 PROCEDURE — 1124F ACP DISCUSS-NO DSCNMKR DOCD: CPT | Performed by: NURSE PRACTITIONER

## 2025-02-03 PROCEDURE — 99309 SBSQ NF CARE MODERATE MDM 30: CPT | Performed by: FAMILY MEDICINE

## 2025-02-03 NOTE — PROGRESS NOTES
[id number]  [address] [facility] [place] [address]  Phone: [phone]  Fax: [phone]  Also available via Perfect Serve     PLACE OF SERVICE:  [facility] [address] [place] [address]    SKILLED VISIT    Chief Complaint:   Chief Complaint   Patient presents with    Follow-up         HPI : Bishop Love is a 69 y.o. male here for follow up.    Previous history prior to admission:  Patient was hospitalized from 9/17/2024 to 10/29/2024.  Patient has a past medical history of coronary artery disease MI pacemaker ICD, ischemic cardiomyopathy, chronic heart failure with preserved ejection fraction, long-term anticoagulation on Coumadin, mitral agitation, rheumatic tricuspid regurg, sick sinus syndrome, PVD, right femoral popliteal bypass 8/24/2023, bifemoral bypass and bilateral femoral above-the-knee popliteal bypass.  He presented to the ER with complaints of generalized body rash left shoulder and elbow pain rash and started a week prior.  And had a fall 1 week prior.  Left elbow fracture showed each indeterminant fracture with's bone spur at triceps left shoulder showed a high riding humeral head compatible with chronic massive cuff tear.  Patient has history of heart failure with reduced ejection fraction with chronic LVAD on 9/19/2023.  He is being managed by the chronic heart failure team and is on metoprolol 25 mg p.o. twice daily cannot tolerate ACE ARB or Arni due to severe cough.  He is on hydralazine 100 mg p.o. 3 times daily and Isordil 40 mg p.o. 3 times daily.  He is on Aldactone.  He is not on SGLT2 due to recurrent UTIs.  He continues on oral Bumex 2 mg twice daily.  He is not a candidate for liver transplant due to severe peripheral vascular disease.  He continues on Coumadin he had MSSA bacteremia his wound culture had MRSA MSSA Pseudomonas he was assessed by podiatry and recommended local wound care he is full weightbearing on left

## 2025-02-04 ENCOUNTER — TELEPHONE (OUTPATIENT)
Age: 70
End: 2025-02-04

## 2025-02-04 ENCOUNTER — CLINICAL DOCUMENTATION (OUTPATIENT)
Facility: CLINIC | Age: 70
End: 2025-02-04

## 2025-02-04 VITALS — RESPIRATION RATE: 20 BRPM | HEART RATE: 72 BPM | DIASTOLIC BLOOD PRESSURE: 64 MMHG | SYSTOLIC BLOOD PRESSURE: 110 MMHG

## 2025-02-04 PROBLEM — I34.0 NONRHEUMATIC MITRAL VALVE REGURGITATION: Chronic | Status: ACTIVE | Noted: 2025-02-04

## 2025-02-04 NOTE — PROGRESS NOTES
Contacted by LVAD coordinator, Bety Aranda, on 02/04/25 to question why patient had called to cancel his appointment today. Spoke with nursing Elaine Lea LPN at [facility] and rehab over the phone - she noted patient called to discontinue due to having nausea/diarrhea he reported as new onset overnight. Unsure of how many episodes of diarrhea. He has had no emesis only nausea. Relayed this to LVAD team. He had lab work yesterday that was stable for him and will request staff faxes this to LVAD team. He is afebrile. There are residents will similar symptoms, suggestive of GI virus. Will order zofran TID x 3 days and add PRN imodium for diarrhea. Requested current MAP and LVAD numbers -  current MAP 76, speed 5000, PI 9.4, flow 3.3, and power 3.6. Also notified LVAD team that patient has anticipated discharge home with his girlfriend on 02/09/25 [facility] [place] [address]. Requested an updated follow up appointment.

## 2025-02-04 NOTE — TELEPHONE ENCOUNTER
Sherley called from [facility] to inform us that pt will not be attending 1:00 p.m. appt today b/c he's exhibiting flu-like symptoms.  The appt has been cancelled, and I told her that I would route encounter to his LVAD Coordinator Chin

## 2025-02-04 NOTE — PROGRESS NOTES
care services are required. There is an ongoing need for care due to the primary health problems noted above.     Health status indicators were reviewed and there are no changes except as noted above. The past medical history, family history, social history and ethnic considerations have been updated as needed. The patient denies any constitutional, ENT, cardiopulmonary, gastrointestinal, or genitourinary issues otherwise as reviewed in the ROS.      Review of Symptoms:    General: Denies weight loss, fever, chills, malaise, fatigue or appetite change  Skin:  Denies rashes or suspicious skin lesions  HEENT: Denies visual disturbance, earache, sore throat, post-nasal drainage, hoarseness dysphagia  Cardiac: Denies chest pain, palpitations, DOS SANTOS, orthopnea, PND, edema  Pulmonary: Denies cough, hemoptysis, wheezing, SOB  GI:  Denies nausea, vomiting, diarrhea, hematemesis, melena, abdominal pain  :  Denies dysuria, frequency, urgency, incontinence  MS:  Denies joint pain, swelling, stiffness, myalgia, back pain  Neurologic: He has generalized weakness. Denies paresthesias, headache, syncope, seizure  Psychiatric: Denies anxiety, depression      Past Medical History:  Past Medical History:   Diagnosis Date    Atherosclerosis of native arteries of extremities with intermittent claudication, bilateral legs (HCC)     [facility]    Chest pain     CHF (congestive heart failure) ([facility])     Chronic anticoagulation 10/31/2023    Chronic systolic (congestive) heart failure ([facility]) 10/31/2023    Coronary artery disease involving native coronary artery of native heart without angina pectoris 10/31/2023    Essential hypertension 10/31/2023    Heart attack ([facility]) 2014    Heart failure ([facility])     HTN (hypertension)     Hyperlipidemia     ICD (implantable cardioverter-defibrillator) in place 10/31/2023    Ischemic cardiomyopathy     Ischemic cardiomyopathy

## 2025-02-04 NOTE — TELEPHONE ENCOUNTER
1211: Received call from patient stating that he needs to cancel his appointment due to having diarrhea. Inquired if patient had any other needs at this time, he stated he does not, would like to be notified of the date of his next appointment. Informed patient will call his nurse at [facility] to inquire about any LVAD needs.     1211: call placed to [facility] and rehab, requested to speak to his nurse, forwarded to floor however left on hold for extended period of time. DENILSON Cristina notified,  Perfect serve message sent to Kirstin Stephenson NP with [facility] and DENILSON Cristina NP (group message) inquiring about any LVAD or cardiac issues that could be addressed by [facility] team.  Kirstin Stephenson reported via perfect serve group message: WBC are stable, map of 76, flow 3.3 PI 9.4 power 3.6.     Received reply from NP who [facility] via [employer] who stated patient will be discharging from [facility] this Sunday pending resolution of GI issues. Requested referral be made to [facility]  as they previously had patient on service, NP confirmed she would request referral be sent to [employer] for hh at discharge.      1523: called [facility]  and spoke with JAKOB Bates regarding patient resuming services and staff LVAD training. Paulo stated she has not received referral yet. Stated she would verify territory coverage and availability of staff for LVAD training.      1617: received call back from Manager Paulo with [facility] who stated they will accept patient back for services after discharge from [facility]. Will plan on LVAD training for staff Monday and Thursday of next week.

## 2025-02-05 ENCOUNTER — OFFICE VISIT (OUTPATIENT)
Facility: CLINIC | Age: 70
End: 2025-02-05

## 2025-02-05 VITALS
RESPIRATION RATE: 18 BRPM | TEMPERATURE: 97.7 F | BODY MASS INDEX: 16.75 KG/M2 | WEIGHT: 113.5 LBS | OXYGEN SATURATION: 99 %

## 2025-02-05 DIAGNOSIS — I25.5 ISCHEMIC CARDIOMYOPATHY: Primary | ICD-10-CM

## 2025-02-05 DIAGNOSIS — I73.9 PVD (PERIPHERAL VASCULAR DISEASE) (HCC): ICD-10-CM

## 2025-02-05 DIAGNOSIS — R11.2 NAUSEA VOMITING AND DIARRHEA: ICD-10-CM

## 2025-02-05 DIAGNOSIS — I50.20 HEART FAILURE WITH REDUCED EJECTION FRACTION (HCC): ICD-10-CM

## 2025-02-05 DIAGNOSIS — Z95.811 LVAD (LEFT VENTRICULAR ASSIST DEVICE) PRESENT (HCC): ICD-10-CM

## 2025-02-05 DIAGNOSIS — I25.10 CORONARY ARTERY DISEASE INVOLVING NATIVE CORONARY ARTERY OF NATIVE HEART WITHOUT ANGINA PECTORIS: ICD-10-CM

## 2025-02-05 DIAGNOSIS — Z79.01 CHRONIC ANTICOAGULATION: ICD-10-CM

## 2025-02-05 DIAGNOSIS — R53.1 WEAKNESS: ICD-10-CM

## 2025-02-05 DIAGNOSIS — S91.302A NON-HEALING OPEN WOUND OF LEFT HEEL: ICD-10-CM

## 2025-02-05 DIAGNOSIS — I10 ESSENTIAL HYPERTENSION: ICD-10-CM

## 2025-02-05 DIAGNOSIS — R19.7 NAUSEA VOMITING AND DIARRHEA: ICD-10-CM

## 2025-02-05 DIAGNOSIS — Z86.14 HISTORY OF MRSA INFECTION: ICD-10-CM

## 2025-02-05 NOTE — PROGRESS NOTES
[id number]  [address] [place] [address]  Phone: [phone]  Fax: [phone]  Also available via Perfect Serve     PLACE OF SERVICE:  [facility] [address] [place] [address]    SKILLED VISIT    Chief Complaint:   Chief Complaint   Patient presents with    Follow-up         HPI : Bishop Love is a 69 y.o. male here for follow up.    Previous history prior to admission:  Patient was hospitalized from 9/17/2024 to 10/29/2024.  Patient has a past medical history of coronary artery disease MI pacemaker ICD, ischemic cardiomyopathy, chronic heart failure with preserved ejection fraction, long-term anticoagulation on Coumadin, mitral agitation, rheumatic tricuspid regurg, sick sinus syndrome, PVD, right femoral popliteal bypass 8/24/2023, bifemoral bypass and bilateral femoral above-the-knee popliteal bypass.  He presented to the ER with complaints of generalized body rash left shoulder and elbow pain rash and started a week prior.  And had a fall 1 week prior.  Left elbow fracture showed each indeterminant fracture with's bone spur at triceps left shoulder showed a high riding humeral head compatible with chronic massive cuff tear.  Patient has history of heart failure with reduced ejection fraction with chronic LVAD on 9/19/2023.  He is being managed by the chronic heart failure team and is on metoprolol 25 mg p.o. twice daily cannot tolerate ACE ARB or Arni due to severe cough.  He is on hydralazine 100 mg p.o. 3 times daily and Isordil 40 mg p.o. 3 times daily.  He is on Aldactone.  He is not on SGLT2 due to recurrent UTIs.  He continues on oral Bumex 2 mg twice daily.  He is not a candidate for liver transplant due to severe peripheral vascular disease.  He continues on Coumadin he had MSSA bacteremia his wound culture had MRSA MSSA Pseudomonas he was assessed by podiatry and recommended local wound care he is full weightbearing on left

## 2025-02-07 ENCOUNTER — OFFICE VISIT (OUTPATIENT)
Facility: CLINIC | Age: 70
End: 2025-02-07

## 2025-02-07 ENCOUNTER — TELEPHONE (OUTPATIENT)
Age: 70
End: 2025-02-07

## 2025-02-07 VITALS
OXYGEN SATURATION: 99 % | TEMPERATURE: 97.6 F | WEIGHT: 115.9 LBS | BODY MASS INDEX: 17.11 KG/M2 | RESPIRATION RATE: 18 BRPM

## 2025-02-07 DIAGNOSIS — R11.2 NAUSEA VOMITING AND DIARRHEA: ICD-10-CM

## 2025-02-07 DIAGNOSIS — R53.1 WEAKNESS: ICD-10-CM

## 2025-02-07 DIAGNOSIS — Z79.01 CHRONIC ANTICOAGULATION: ICD-10-CM

## 2025-02-07 DIAGNOSIS — I73.9 PVD (PERIPHERAL VASCULAR DISEASE) (HCC): ICD-10-CM

## 2025-02-07 DIAGNOSIS — I50.20 HEART FAILURE WITH REDUCED EJECTION FRACTION (HCC): ICD-10-CM

## 2025-02-07 DIAGNOSIS — Q80.9 XERODERMA: ICD-10-CM

## 2025-02-07 DIAGNOSIS — I25.10 CORONARY ARTERY DISEASE INVOLVING NATIVE CORONARY ARTERY OF NATIVE HEART WITHOUT ANGINA PECTORIS: ICD-10-CM

## 2025-02-07 DIAGNOSIS — S46.002S INJURY OF LEFT ROTATOR CUFF, SEQUELA: ICD-10-CM

## 2025-02-07 DIAGNOSIS — K59.04 CHRONIC IDIOPATHIC CONSTIPATION: ICD-10-CM

## 2025-02-07 DIAGNOSIS — I10 ESSENTIAL HYPERTENSION: ICD-10-CM

## 2025-02-07 DIAGNOSIS — Z95.810 ICD (IMPLANTABLE CARDIOVERTER-DEFIBRILLATOR) IN PLACE: ICD-10-CM

## 2025-02-07 DIAGNOSIS — Z95.811 LVAD (LEFT VENTRICULAR ASSIST DEVICE) PRESENT (HCC): ICD-10-CM

## 2025-02-07 DIAGNOSIS — S91.302A NON-HEALING OPEN WOUND OF LEFT HEEL: ICD-10-CM

## 2025-02-07 DIAGNOSIS — I25.5 ISCHEMIC CARDIOMYOPATHY: Primary | ICD-10-CM

## 2025-02-07 DIAGNOSIS — R19.7 NAUSEA VOMITING AND DIARRHEA: ICD-10-CM

## 2025-02-07 DIAGNOSIS — Z86.14 HISTORY OF MRSA INFECTION: ICD-10-CM

## 2025-02-07 RX ORDER — AMIODARONE HYDROCHLORIDE 100 MG/1
100 TABLET ORAL DAILY
Qty: 90 TABLET | Refills: 0 | Status: SHIPPED | OUTPATIENT
Start: 2025-02-07 | End: 2025-05-08

## 2025-02-07 RX ORDER — CHOLECALCIFEROL (VITAMIN D3) 25 MCG
1000 TABLET ORAL DAILY
Qty: 90 TABLET | Refills: 0 | Status: SHIPPED | OUTPATIENT
Start: 2025-02-07

## 2025-02-07 RX ORDER — SPIRONOLACTONE 25 MG/1
25 TABLET ORAL DAILY
Qty: 90 TABLET | Refills: 0 | Status: SHIPPED | OUTPATIENT
Start: 2025-02-07

## 2025-02-07 RX ORDER — POLYETHYLENE GLYCOL 3350 17 G/17G
17 POWDER, FOR SOLUTION ORAL DAILY
Qty: 1530 G | Refills: 0 | Status: SHIPPED | OUTPATIENT
Start: 2025-02-07 | End: 2025-05-08

## 2025-02-07 RX ORDER — M-VIT,TX,IRON,MINS/CALC/FOLIC 27MG-0.4MG
1 TABLET ORAL DAILY
Qty: 90 TABLET | Refills: 0 | Status: SHIPPED | OUTPATIENT
Start: 2025-02-07 | End: 2025-05-08

## 2025-02-07 RX ORDER — PANTOPRAZOLE SODIUM 40 MG/1
40 TABLET, DELAYED RELEASE ORAL
Qty: 90 TABLET | Refills: 0 | Status: SHIPPED | OUTPATIENT
Start: 2025-02-07

## 2025-02-07 RX ORDER — ASPIRIN 81 MG/1
81 TABLET ORAL DAILY
Qty: 90 TABLET | Refills: 0 | Status: SHIPPED | OUTPATIENT
Start: 2025-02-07

## 2025-02-07 RX ORDER — WARFARIN SODIUM 2 MG/1
2 TABLET ORAL DAILY
Qty: 90 TABLET | Refills: 0 | Status: SHIPPED | OUTPATIENT
Start: 2025-02-07

## 2025-02-07 RX ORDER — MAGNESIUM OXIDE 400 MG/1
400 TABLET ORAL DAILY
Qty: 90 TABLET | Refills: 0 | Status: SHIPPED | OUTPATIENT
Start: 2025-02-07

## 2025-02-07 RX ORDER — ONDANSETRON 4 MG/1
4 TABLET, ORALLY DISINTEGRATING ORAL EVERY 6 HOURS PRN
Qty: 30 TABLET | Refills: 0 | Status: SHIPPED | OUTPATIENT
Start: 2025-02-07

## 2025-02-07 RX ORDER — CEPHALEXIN 500 MG/1
500 CAPSULE ORAL 2 TIMES DAILY
Qty: 180 CAPSULE | Refills: 0 | Status: SHIPPED | OUTPATIENT
Start: 2025-02-07

## 2025-02-07 RX ORDER — AMMONIUM LACTATE 12 G/100G
CREAM TOPICAL 2 TIMES DAILY
Qty: 385 G | Refills: 2 | Status: SHIPPED | OUTPATIENT
Start: 2025-02-07

## 2025-02-07 RX ORDER — METOPROLOL SUCCINATE 25 MG/1
25 TABLET, EXTENDED RELEASE ORAL 2 TIMES DAILY
Qty: 180 TABLET | Refills: 0 | Status: SHIPPED | OUTPATIENT
Start: 2025-02-07

## 2025-02-07 RX ORDER — BUMETANIDE 1 MG/1
1 TABLET ORAL DAILY PRN
Qty: 30 TABLET | Refills: 2 | Status: SHIPPED | OUTPATIENT
Start: 2025-02-07

## 2025-02-07 RX ORDER — ATORVASTATIN CALCIUM 40 MG/1
80 TABLET, FILM COATED ORAL DAILY
Qty: 90 TABLET | Refills: 0 | Status: SHIPPED | OUTPATIENT
Start: 2025-02-07

## 2025-02-07 RX ORDER — ISOSORBIDE DINITRATE 20 MG/1
40 TABLET ORAL EVERY 8 HOURS
Qty: 270 TABLET | Refills: 0 | Status: SHIPPED | OUTPATIENT
Start: 2025-02-07

## 2025-02-07 RX ORDER — AMLODIPINE BESYLATE 5 MG/1
5 TABLET ORAL EVERY MORNING
Qty: 90 TABLET | Refills: 0 | Status: SHIPPED | OUTPATIENT
Start: 2025-02-07

## 2025-02-07 RX ORDER — HYDRALAZINE HYDROCHLORIDE 100 MG/1
100 TABLET, FILM COATED ORAL EVERY 8 HOURS SCHEDULED
Qty: 270 TABLET | Refills: 0 | Status: SHIPPED | OUTPATIENT
Start: 2025-02-07

## 2025-02-07 NOTE — PROGRESS NOTES
[address] [place] [address]  Phone: [phone]  Fax: [phone]  Also available via Perfect Serve     Place of Service:  [facility] [address] [place] [address]                                                                     Discharge Summary       Admit Dx: Fall, left rotator cuff tear, MSSA bacteremia and MRSA wound culture    Disposition: Home and Home Health Services    Presentation:  Patient was hospitalized from 9/17/2024 to 10/29/2024.  Patient has a past medical history of coronary artery disease MI pacemaker ICD, ischemic cardiomyopathy, chronic heart failure with preserved ejection fraction, long-term anticoagulation on Coumadin, mitral agitation, rheumatic tricuspid regurg, sick sinus syndrome, PVD, right femoral popliteal bypass 8/24/2023, bifemoral bypass and bilateral femoral above-the-knee popliteal bypass.  He presented to the ER with complaints of generalized body rash left shoulder and elbow pain rash and started a week prior.  And had a fall 1 week prior.  Left elbow fracture showed each indeterminant fracture with's bone spur at triceps left shoulder showed a high riding humeral head compatible with chronic massive cuff tear.  Patient has history of heart failure with reduced ejection fraction with chronic LVAD on 9/19/2023.  He is being managed by the chronic heart failure team and is on metoprolol 25 mg p.o. twice daily cannot tolerate ACE ARB or Arni due to severe cough.  He is on hydralazine 100 mg p.o. 3 times daily and Isordil 40 mg p.o. 3 times daily.  He is on Aldactone.  He is not on SGLT2 due to recurrent UTIs.  He continues on oral Bumex 2 mg twice daily.  He is not a candidate for liver transplant due to severe peripheral vascular disease.  He continues on Coumadin he had MSSA bacteremia his wound culture had MRSA MSSA Pseudomonas he was assessed by podiatry and recommended local wound care

## 2025-02-07 NOTE — TELEPHONE ENCOUNTER
1531: Voicemail received from Kat with [facility] stating that they are unable to accept patient for home health services due to being out of network.      1553: call placed to [facility] and spoke with Sherley. Explained any home health company caring for patient must have LVAD training prior to accepting patient to ensure they can safely care for patient. Discussed LVAD coordinator will need to set up training with home health company prior to them accepting patient, Sherley verbalized understanding, stated Kristen with discharge handles this, call forwarded to Kristen. Left voicemail for Kristen explaining need for LVAD training for any accepting home health company and requesting call back ASAP.     Also notified louie Stephenson of above conversations via [employer] tom confirmed patient will not be discharged Sunday. Stated she will request Kristen notify coordinator ASAP Monday when accepting hh agency is identified.

## 2025-02-10 ENCOUNTER — TELEPHONE (OUTPATIENT)
Age: 70
End: 2025-02-10

## 2025-02-10 ENCOUNTER — OFFICE VISIT (OUTPATIENT)
Facility: CLINIC | Age: 70
End: 2025-02-10
Payer: MEDICARE

## 2025-02-10 VITALS
TEMPERATURE: 99.1 F | RESPIRATION RATE: 17 BRPM | OXYGEN SATURATION: 97 % | BODY MASS INDEX: 17 KG/M2 | WEIGHT: 115.2 LBS

## 2025-02-10 DIAGNOSIS — I73.9 PVD (PERIPHERAL VASCULAR DISEASE) (HCC): ICD-10-CM

## 2025-02-10 DIAGNOSIS — Q80.9 XERODERMA: ICD-10-CM

## 2025-02-10 DIAGNOSIS — R53.1 WEAKNESS: ICD-10-CM

## 2025-02-10 DIAGNOSIS — S46.002S INJURY OF LEFT ROTATOR CUFF, SEQUELA: ICD-10-CM

## 2025-02-10 DIAGNOSIS — I25.5 ISCHEMIC CARDIOMYOPATHY: Primary | ICD-10-CM

## 2025-02-10 DIAGNOSIS — I10 ESSENTIAL HYPERTENSION: ICD-10-CM

## 2025-02-10 DIAGNOSIS — S91.302A NON-HEALING OPEN WOUND OF LEFT HEEL: ICD-10-CM

## 2025-02-10 DIAGNOSIS — Z95.810 ICD (IMPLANTABLE CARDIOVERTER-DEFIBRILLATOR) IN PLACE: ICD-10-CM

## 2025-02-10 DIAGNOSIS — Z86.14 HISTORY OF MRSA INFECTION: ICD-10-CM

## 2025-02-10 DIAGNOSIS — K59.04 CHRONIC IDIOPATHIC CONSTIPATION: ICD-10-CM

## 2025-02-10 DIAGNOSIS — Z95.811 LVAD (LEFT VENTRICULAR ASSIST DEVICE) PRESENT (HCC): ICD-10-CM

## 2025-02-10 DIAGNOSIS — I25.10 CORONARY ARTERY DISEASE INVOLVING NATIVE CORONARY ARTERY OF NATIVE HEART WITHOUT ANGINA PECTORIS: ICD-10-CM

## 2025-02-10 DIAGNOSIS — I50.20 HEART FAILURE WITH REDUCED EJECTION FRACTION (HCC): ICD-10-CM

## 2025-02-10 DIAGNOSIS — Z79.01 CHRONIC ANTICOAGULATION: ICD-10-CM

## 2025-02-10 PROCEDURE — 1124F ACP DISCUSS-NO DSCNMKR DOCD: CPT | Performed by: NURSE PRACTITIONER

## 2025-02-10 PROCEDURE — 99309 SBSQ NF CARE MODERATE MDM 30: CPT | Performed by: NURSE PRACTITIONER

## 2025-02-10 NOTE — TELEPHONE ENCOUNTER
0845: called and spoke with Kristen regarding patient discharge. Kristen stated that she has hh referrals out to 3 different companies. Requested she call ASAP when she has accepting company so LVAD coordinator can arrange training, she verbalized understanding.      0859: called and spoke with Paulo clinical manager with [facility], she confirmed they are unable to accept patient due to being out of network. Discussed cancelling training at this time, she verbalized understanding.     02/11: call received from Kristen with [facility] stating patient was denied by [facility] hh due to being out to network. Stated she as a referral pending with a company in [place], stated she will call [facility] coordinator asap when she receives a decision from them. She also stated she will send referral to [facility]'s hh agency.

## 2025-02-10 NOTE — PROGRESS NOTES
[id number]  [facility] [place] [place] [address]  Phone: [phone]  Fax: [phone]  Also available via Perfect Serve     PLACE OF SERVICE:  [facility] [address] [place] [address]    SKILLED VISIT    Chief Complaint:   Chief Complaint   Patient presents with    Follow-up         HPI : Bishop Love is a 69 y.o. male here for follow up.    Previous history prior to admission:  Patient was hospitalized from 9/17/2024 to 10/29/2024.  Patient has a past medical history of coronary artery disease MI pacemaker ICD, ischemic cardiomyopathy, chronic heart failure with preserved ejection fraction, long-term anticoagulation on Coumadin, mitral agitation, rheumatic tricuspid regurg, sick sinus syndrome, PVD, right femoral popliteal bypass 8/24/2023, bifemoral bypass and bilateral femoral above-the-knee popliteal bypass.  He presented to the ER with complaints of generalized body rash left shoulder and elbow pain rash and started a week prior.  And had a fall 1 week prior.  Left elbow fracture showed each indeterminant fracture with's bone spur at triceps left shoulder showed a high riding humeral head compatible with chronic massive cuff tear.  Patient has history of heart failure with reduced ejection fraction with chronic LVAD on 9/19/2023.  He is being managed by the chronic heart failure team and is on metoprolol 25 mg p.o. twice daily cannot tolerate ACE ARB or Arni due to severe cough.  He is on hydralazine 100 mg p.o. 3 times daily and Isordil 40 mg p.o. 3 times daily.  He is on Aldactone.  He is not on SGLT2 due to recurrent UTIs.  He continues on oral Bumex 2 mg twice daily.  He is not a candidate for liver transplant due to severe peripheral vascular disease.  He continues on Coumadin he had MSSA bacteremia his wound culture had MRSA MSSA Pseudomonas he was assessed by podiatry and recommended local wound care he is full weightbearing on left

## 2025-02-11 NOTE — TELEPHONE ENCOUNTER
1159: Attempted to contact Kristen with [facility] for update on discharge planning and identification of home health agency. Left message requesting return call with update and reiterated importance of notification of new hh agency asap to ensure proper LVAD training of agency prior to patient discharge. Will await return call.     LUIS STEVEN RN  VAD Coordinator

## 2025-02-13 ENCOUNTER — OFFICE VISIT (OUTPATIENT)
Facility: CLINIC | Age: 70
End: 2025-02-13

## 2025-02-13 VITALS
OXYGEN SATURATION: 99 % | TEMPERATURE: 97.8 F | RESPIRATION RATE: 18 BRPM | WEIGHT: 113.8 LBS | BODY MASS INDEX: 16.8 KG/M2

## 2025-02-13 DIAGNOSIS — K59.04 CHRONIC IDIOPATHIC CONSTIPATION: ICD-10-CM

## 2025-02-13 DIAGNOSIS — I25.5 ISCHEMIC CARDIOMYOPATHY: Primary | ICD-10-CM

## 2025-02-13 DIAGNOSIS — S91.302A NON-HEALING OPEN WOUND OF LEFT HEEL: ICD-10-CM

## 2025-02-13 DIAGNOSIS — I10 ESSENTIAL HYPERTENSION: ICD-10-CM

## 2025-02-13 DIAGNOSIS — I25.10 CORONARY ARTERY DISEASE INVOLVING NATIVE CORONARY ARTERY OF NATIVE HEART WITHOUT ANGINA PECTORIS: ICD-10-CM

## 2025-02-13 DIAGNOSIS — Z79.01 CHRONIC ANTICOAGULATION: ICD-10-CM

## 2025-02-13 DIAGNOSIS — I50.20 HEART FAILURE WITH REDUCED EJECTION FRACTION (HCC): ICD-10-CM

## 2025-02-13 DIAGNOSIS — Z95.810 ICD (IMPLANTABLE CARDIOVERTER-DEFIBRILLATOR) IN PLACE: ICD-10-CM

## 2025-02-13 DIAGNOSIS — Q80.9 XERODERMA: ICD-10-CM

## 2025-02-13 DIAGNOSIS — Z95.811 LVAD (LEFT VENTRICULAR ASSIST DEVICE) PRESENT (HCC): ICD-10-CM

## 2025-02-13 DIAGNOSIS — Z86.14 HISTORY OF MRSA INFECTION: ICD-10-CM

## 2025-02-13 DIAGNOSIS — I73.9 PVD (PERIPHERAL VASCULAR DISEASE) (HCC): ICD-10-CM

## 2025-02-13 DIAGNOSIS — R53.1 WEAKNESS: ICD-10-CM

## 2025-02-13 NOTE — PROGRESS NOTES
[address]  [address] [place] [address]  Phone: [phone]  Fax: [phone]  Also available via Perfect Serve     PLACE OF SERVICE:  [facility] [address] [place] [address]    SKILLED VISIT    Chief Complaint:   Chief Complaint   Patient presents with    Follow-up         HPI : Bishop Love is a 69 y.o. male here for follow up.    Previous history prior to admission:  Patient was hospitalized from 9/17/2024 to 10/29/2024.  Patient has a past medical history of coronary artery disease MI pacemaker ICD, ischemic cardiomyopathy, chronic heart failure with preserved ejection fraction, long-term anticoagulation on Coumadin, mitral agitation, rheumatic tricuspid regurg, sick sinus syndrome, PVD, right femoral popliteal bypass 8/24/2023, bifemoral bypass and bilateral femoral above-the-knee popliteal bypass.  He presented to the ER with complaints of generalized body rash left shoulder and elbow pain rash and started a week prior.  And had a fall 1 week prior.  Left elbow fracture showed each indeterminant fracture with's bone spur at triceps left shoulder showed a high riding humeral head compatible with chronic massive cuff tear.  Patient has history of heart failure with reduced ejection fraction with chronic LVAD on 9/19/2023.  He is being managed by the chronic heart failure team and is on metoprolol 25 mg p.o. twice daily cannot tolerate ACE ARB or Arni due to severe cough.  He is on hydralazine 100 mg p.o. 3 times daily and Isordil 40 mg p.o. 3 times daily.  He is on Aldactone.  He is not on SGLT2 due to recurrent UTIs.  He continues on oral Bumex 2 mg twice daily.  He is not a candidate for liver transplant due to severe peripheral vascular disease.  He continues on Coumadin he had MSSA bacteremia his wound culture had MRSA MSSA Pseudomonas he was assessed by podiatry and recommended local wound care he is full weightbearing on left

## 2025-02-14 ENCOUNTER — TELEPHONE (OUTPATIENT)
Age: 70
End: 2025-02-14

## 2025-02-14 NOTE — TELEPHONE ENCOUNTER
Telephone Call RE:  Appointment reminder     Outcome:     [x] Patient confirmed appointment   [] Patient rescheduled appointment for    [] Unable to reach  [] Left message              [] Other:       Pt has new Medicare ins, unable to confirm plan. Pt is currently away from home and does not have access to card. Pts sister typically bring pt to appointment he will reach out and see if she can bring it. VAD nurse is aware and assisted with informing pt that insurance needs to be updated.

## 2025-02-17 ENCOUNTER — OFFICE VISIT (OUTPATIENT)
Facility: CLINIC | Age: 70
End: 2025-02-17
Payer: MEDICARE

## 2025-02-17 ENCOUNTER — TELEPHONE (OUTPATIENT)
Age: 70
End: 2025-02-17

## 2025-02-17 VITALS
BODY MASS INDEX: 16.66 KG/M2 | WEIGHT: 112.9 LBS | OXYGEN SATURATION: 99 % | TEMPERATURE: 98.2 F | RESPIRATION RATE: 18 BRPM

## 2025-02-17 DIAGNOSIS — S91.302A NON-HEALING OPEN WOUND OF LEFT HEEL: ICD-10-CM

## 2025-02-17 DIAGNOSIS — Q80.9 XERODERMA: ICD-10-CM

## 2025-02-17 DIAGNOSIS — I50.20 HEART FAILURE WITH REDUCED EJECTION FRACTION (HCC): ICD-10-CM

## 2025-02-17 DIAGNOSIS — Z95.810 ICD (IMPLANTABLE CARDIOVERTER-DEFIBRILLATOR) IN PLACE: ICD-10-CM

## 2025-02-17 DIAGNOSIS — I25.10 CORONARY ARTERY DISEASE INVOLVING NATIVE CORONARY ARTERY OF NATIVE HEART WITHOUT ANGINA PECTORIS: ICD-10-CM

## 2025-02-17 DIAGNOSIS — Z95.811 LVAD (LEFT VENTRICULAR ASSIST DEVICE) PRESENT (HCC): ICD-10-CM

## 2025-02-17 DIAGNOSIS — I73.9 PVD (PERIPHERAL VASCULAR DISEASE): ICD-10-CM

## 2025-02-17 DIAGNOSIS — Z86.14 HISTORY OF MRSA INFECTION: ICD-10-CM

## 2025-02-17 DIAGNOSIS — K59.04 CHRONIC IDIOPATHIC CONSTIPATION: ICD-10-CM

## 2025-02-17 DIAGNOSIS — Z79.01 CHRONIC ANTICOAGULATION: ICD-10-CM

## 2025-02-17 DIAGNOSIS — R53.1 WEAKNESS: ICD-10-CM

## 2025-02-17 DIAGNOSIS — I10 ESSENTIAL HYPERTENSION: ICD-10-CM

## 2025-02-17 DIAGNOSIS — I25.5 ISCHEMIC CARDIOMYOPATHY: Primary | ICD-10-CM

## 2025-02-17 PROCEDURE — 99309 SBSQ NF CARE MODERATE MDM 30: CPT | Performed by: NURSE PRACTITIONER

## 2025-02-17 PROCEDURE — 1124F ACP DISCUSS-NO DSCNMKR DOCD: CPT | Performed by: NURSE PRACTITIONER

## 2025-02-17 ASSESSMENT — ENCOUNTER SYMPTOMS
SORE THROAT: 0
COUGH: 0
BLOOD IN STOOL: 0
ABDOMINAL PAIN: 0
ABDOMINAL DISTENTION: 0
EYE PAIN: 0
CHEST TIGHTNESS: 0

## 2025-02-17 NOTE — PROGRESS NOTES
dinitrate (ISORDIL) 20 MG tablet   Followed by [facility] heart failure center due to cardiomyopathy  Continue metoprolol succinate 25 mg p.o. twice daily  Hydralazine 100 mg p.o. p.o. 3 times daily  Isordil 40 mg p.o. 3 times daily  Aldactone 25 mg p.o. daily  Bumex 1 mg p.o. every 24 hours as needed if weight gain greater than 3 pounds in the last day or 5 pounds in 1 week or shortness of breath  MAP GOAL 70-90, within these parameters  Has LVAD-HeartMate 3 in place - managed by [facility] Advanced HF team  Awaiting Home health company to accept and have training before can discharge  Has follow up scheduled for 02/18/2025   2. Coronary artery disease involving native coronary artery of native heart without angina pectoris  isosorbide dinitrate (ISORDIL) 20 MG tablet     aspirin 81 MG EC tablet     atorvastatin (LIPITOR) 40 MG tablet   No chest pain or angina  Continue current medications   3. Heart failure with reduced ejection fraction (HCC)  spironolactone (ALDACTONE) 25 MG tablet   Followed by [facility] heart failure center due to cardiomyopathy  Continue metoprolol succinate 25 mg p.o. twice daily  Hydralazine 100 mg p.o. p.o. 3 times daily  Isordil 40 mg p.o. 3 times daily  Aldactone 25 mg p.o. daily  Bumex 1 mg p.o. every 24 hours as needed if weight gain greater than 3 pounds in the last day or 5 pounds in 1 week or shortness of breath  MAP GOAL 70-90   Has LVAD-HeartMate 3 in place - managed by [facility] HF team  Has follow up scheduled for 02/18/2025      4. LVAD (left ventricular assist device) present (HCC)  vitamin D3 (CHOLECALCIFEROL) 25 MCG (1000 UT) TABS tablet     warfarin (COUMADIN) 2 MG tablet   Followed by [facility] heart failure center due to cardiomyopathy  Continue metoprolol succinate 25 mg p.o. twice daily  Hydralazine 100 mg p.o. p.o. 3 times daily  Isordil 40 mg p.o. 3 times daily  Aldactone 25 mg p.o. daily  Bumex 1 mg p.o. every 24 hours as needed if weight gain greater than 3

## 2025-02-17 NOTE — TELEPHONE ENCOUNTER
02/17: called and spoke with Sherley to confirm patient is able to come to his appointment tomorrow, she confirmed he has transportation set up and is scheduled to arrive at clinic at 09am.     Also spoke with Kristen who confirmed she is still awaiting acceptance or denial from [facility]'s home health company. Kristen stated she will send a list to coordinator via secure email noting which hh agencies she has sent referral to and received denials from for LVAD coordinator can follow up on hh options.

## 2025-02-17 NOTE — TELEPHONE ENCOUNTER
Telephone Call RE:  Appointment reminder     Outcome:     [] Patient confirmed appointment   [] Patient rescheduled appointment for    [x] Unable to reach  [] Left message              [] Other:       First number's voicemail is full, and second # consistently has a busy signal.  Second attempt.

## 2025-02-18 ENCOUNTER — TELEPHONE (OUTPATIENT)
Age: 70
End: 2025-02-18

## 2025-02-18 ENCOUNTER — OFFICE VISIT (OUTPATIENT)
Age: 70
End: 2025-02-18
Payer: MEDICARE

## 2025-02-18 VITALS
TEMPERATURE: 98.4 F | OXYGEN SATURATION: 98 % | RESPIRATION RATE: 20 BRPM | BODY MASS INDEX: 16.29 KG/M2 | WEIGHT: 110 LBS | HEIGHT: 69 IN | SYSTOLIC BLOOD PRESSURE: 86 MMHG | HEART RATE: 61 BPM

## 2025-02-18 DIAGNOSIS — R06.02 SHORTNESS OF BREATH: ICD-10-CM

## 2025-02-18 DIAGNOSIS — I10 ESSENTIAL HYPERTENSION: Chronic | ICD-10-CM

## 2025-02-18 DIAGNOSIS — Z51.81 ENCOUNTER FOR MONITORING DIURETIC THERAPY: ICD-10-CM

## 2025-02-18 DIAGNOSIS — I50.22 CHRONIC SYSTOLIC HEART FAILURE (HCC): Primary | ICD-10-CM

## 2025-02-18 DIAGNOSIS — Z79.899 ENCOUNTER FOR MONITORING DIURETIC THERAPY: ICD-10-CM

## 2025-02-18 DIAGNOSIS — Z79.01 CHRONIC ANTICOAGULATION: ICD-10-CM

## 2025-02-18 DIAGNOSIS — E61.1 IRON DEFICIENCY: ICD-10-CM

## 2025-02-18 DIAGNOSIS — Z95.811 LVAD (LEFT VENTRICULAR ASSIST DEVICE) PRESENT (HCC): ICD-10-CM

## 2025-02-18 PROCEDURE — 99214 OFFICE O/P EST MOD 30 MIN: CPT | Performed by: NURSE PRACTITIONER

## 2025-02-18 PROCEDURE — 1124F ACP DISCUSS-NO DSCNMKR DOCD: CPT | Performed by: NURSE PRACTITIONER

## 2025-02-18 PROCEDURE — 1126F AMNT PAIN NOTED NONE PRSNT: CPT | Performed by: NURSE PRACTITIONER

## 2025-02-18 PROCEDURE — 1159F MED LIST DOCD IN RCRD: CPT | Performed by: NURSE PRACTITIONER

## 2025-02-18 PROCEDURE — 1160F RVW MEDS BY RX/DR IN RCRD: CPT | Performed by: NURSE PRACTITIONER

## 2025-02-18 RX ORDER — AMLODIPINE BESYLATE 2.5 MG/1
TABLET ORAL
Qty: 90 TABLET | Refills: 3 | Status: SHIPPED | OUTPATIENT
Start: 2025-02-18

## 2025-02-18 ASSESSMENT — ENCOUNTER SYMPTOMS: SHORTNESS OF BREATH: 1

## 2025-02-18 ASSESSMENT — PATIENT HEALTH QUESTIONNAIRE - PHQ9
SUM OF ALL RESPONSES TO PHQ9 QUESTIONS 1 & 2: 0
SUM OF ALL RESPONSES TO PHQ QUESTIONS 1-9: 0
1. LITTLE INTEREST OR PLEASURE IN DOING THINGS: NOT AT ALL
2. FEELING DOWN, DEPRESSED OR HOPELESS: NOT AT ALL
SUM OF ALL RESPONSES TO PHQ QUESTIONS 1-9: 0

## 2025-02-18 NOTE — PROGRESS NOTES
ADVANCED HEART FAILURE CENTER  [facility] in [place], [place]  LVAD Coordinator Clinic Visit Note  Chief Complaint   Patient presents with    Follow-Up from Hospital     VAD       BP (!) 86/0 (Site: Right Upper Arm, Position: Sitting, Cuff Size: Medium Adult) Comment: MAP  Pulse 61   Temp 98.4 °F (36.9 °C) (Temporal)   Resp 20   Ht 1.753 m (5' 9\")   Wt 49.9 kg (110 lb) Comment: Pt reported wt  SpO2 98%   BMI 16.24 kg/m²     LVAD  LVAD Type:: Left Ventricular Assist Device (LVAD)  Pump Speed (rpm): 5000  Pump Flow (lpm): 3.5  MAP (mmHg): 86  Pump Pulse Index (PI): 8.6  Pump Power (Villanueva): 3.5  Battery Life Checked: Yes  Backup Controller Present: Yes  Driveline Dressing: Clean, Dry and Intact  Outpatient: Yes  MAP in Therapeutic Range (Outpatient): Yes       Bishop Love is a 69 y.o. male with a history of ICM with Heartmate 3 implant date of 09/19/23. Alarm history reviewed. Please see VAD flow sheet for readings. Rare PI events noted. Several low flows, two not external power alarms noted, no other adverse alarms noted. Settings reviewed, but no changes made. Patient stated the power went out and [place] [facility] and this is why he had no external power.       Patient denied s/s of driveline infection or driveline trauma (including  drops).  See flow sheet for details. Driveline inspected for integrity. Drive line dressing change completed per policy (dressing supplies used: Sterile drape, Sterile Sorba View Shield, Sterile 4x4 gauze sponges (10 pack) x2, Sterile 2x2 Split Sponges, Sterile 2x2 Gauze sponges (x2); Chlorhexidine Gluconate 4% solution applicators,  Sterile gloves, Clean gloves). No signs or symptoms of infection noted.     Above reported to provider.      Patient having several low flows in clinic, reported to FAUSTO Garcia who stated to patient patient increase hydration. Patient stated he has not been drinking very much, patient drank several cups of water in 
(hypertension)     Hyperlipidemia     ICD (implantable cardioverter-defibrillator) in place 10/31/2023    Ischemic cardiomyopathy     Ischemic cardiomyopathy 10/31/2023    LVAD (left ventricular assist device) present ([facility]) 09/13/2023    Mild mitral valve regurgitation     Nonrheumatic mitral (valve) insufficiency     Positive fecal occult blood test     PVD (peripheral vascular disease) ([facility])     Rheumatic tricuspid insufficiency     Sick sinus syndrome ([facility])        PAST SURGICAL HISTORY:  Past Surgical History:   Procedure Laterality Date    CARDIAC DEFIBRILLATOR PLACEMENT  2014    CARDIAC PROCEDURE N/A 08/29/2023    Left heart cath / coronary angiography performed by Sole Allen MD at [facility] CARDIAC CATH LAB    CARDIAC PROCEDURE N/A 08/29/2023    Percutaneous coronary intervention performed by Sole Allen MD at [facility] CARDIAC CATH LAB    CARDIAC PROCEDURE N/A 08/29/2023    Intravascular ultrasound performed by Sloe Allen MD at [facility] CARDIAC CATH LAB    CARDIAC PROCEDURE N/A 08/29/2023    Insert stent manjit coronary performed by Sole Allen MD at [facility] CARDIAC CATH LAB    CARDIAC PROCEDURE N/A 11/29/2023    Right heart cath performed by Jose Daniel Fields MD at [facility] CARDIAC CATH LAB    CARDIAC PROCEDURE N/A 7/31/2024    Right heart cath performed by Jose Daniel Fields MD at [facility] CARDIAC CATH LAB    CARDIAC SURGERY Right 08/28/2023    RIGHT AXILLARY IMPELLA  5.5 INSERTION, HUDSON BY DR QUEZADA performed by Callum Loco MD at [facility] MAIN OR    CARDIAC SURGERY N/A 09/19/2023    LEFT VENTRICULAR ASSIST DEVICE (LVAD) IMPLANTATION;TEMPORARY RIGHT AXILLARY LVAD REMOVAL (IMPELLA); HUDSON & EPIAORTIC US BY DR. CADENA performed by Jose Francisco Del Toro MD at [facility] OPEN HEART    CARDIAC VALVE SURGERY  2017    COLONOSCOPY N/A 09/15/2023    COLONOSCOPY DIAGNOSTIC performed by Jania Christopher MD at [facility] ENDOSCOPY    FEMORAL BYPASS Right 08/24/2023    RIGHT FEMORAL POPLITEAL BYPASS WITH POLYTETRAFLUOROETHYLENE performed by

## 2025-02-18 NOTE — TELEPHONE ENCOUNTER
Email from Kristen from [facility] received stating reviewing the following home health companies responses:     [facility]: can not accept due to being at capacity with payor   [address] [place] [address] accepted, but was an able to LVAD care  [facility]: can not accept due to being out of their service area  [facility]: Can not accept due to being out of their service area  [facility]:  Can not accept due to being out of their service area  [facility]: Out of network with insurance    1549: called and spoke with clinical manager with [address] [place] [address] who stated patietn was declined due to company not having LVAD training. Notified her that LVAD coordinator from [place] [place] [facility] would be able to provide LVAD training and that we routinely provide this training to all home health companies that cover LVADs. She verbalized understanding, stated she will discuss with her director and return call to [facility].      02/20: call placed to [facility] to inquire if patient is in their service area, spoke with clinical manager Sarah who stated Ev[place] is outside of their service area.     1526: voicemail left for paco clinical manager with [facility] to inquire if patient's home is within their service area.     02/24:     1047: called and spoke with [address] [place] [address] clinical manager Rochelle. She stated she has not yet discussed LVAD training with her director but that earliest possible date that all staff would be available would be 03/11. She stated that they would not be able to train a smaller group of nurses because she is unsure who would be covering patient at any one time including overnight and other \"on call\" time. Requested that she call back as soon as possible with any possible training dates as patient is currently waiting on home health to be discharged from [facility], she verbalized  understanding.      1348: Called [facility] and requested to

## 2025-02-18 NOTE — PATIENT INSTRUCTIONS
Medication changes:    INCREASE amlodipine to 5mg in the morning and 2.5mg in the evening.      Testing Ordered:    An order for ECHOCARDIOGRAM has been placed to be done in April. You will be receiving an automated call from Coordination of Care to schedule this test. If you are unavailable to receive the call or would like to contact coordination of care yourself you may contact [phone] to schedule. You will need to contact coordination of care yourself if you miss their calls as they will only make 3 attempts to reach you.      Other Recommendations:     Effective October 2, 2024 the LVAD emergency pager ([phone]) will no longer be in service. Going forward, please contact the answering service at [phone] and request to speak with the Heart Failure provider on-call for all after hours needs or emergencies.     Continue to change drive line exit site dressing 3 times a week using sterile technique,     Ensure you are drinking an adequate amount of water with a goal of 6-8 eight ounce glasses (1.5-2 liters) of fluid daily. Your urine should be clear and light yellow straw colored.       Follow up in 4-6 weeks with [facility]      Monthly LVAD Education Tip:    LVAD Equipment Maintenance:    Cleaning and Caring for Equipment:     General Cleaning Rules:  Use a damp cloth to clean exterior surfaces of the external parts of equipment as needed.  Water, with or without a mild dish soap, may be used as a surface .   Do NOT allow water to enter the interior of devices or put equipment into water or liquid. Submersion in water or liquid may cause LVAD to stop!    Caring for the :  Clean the outside parts of the  with a damp, lint-free cloth as needed. You may use warm water and a mild dish soap if more aggressive cleaning needed.  NEVER put the  into water or liquid. This may cause the pump to stop.   DO NOT disconnect the System

## 2025-02-19 ENCOUNTER — OFFICE VISIT (OUTPATIENT)
Facility: CLINIC | Age: 70
End: 2025-02-19

## 2025-02-19 VITALS
WEIGHT: 112.9 LBS | BODY MASS INDEX: 16.67 KG/M2 | RESPIRATION RATE: 18 BRPM | TEMPERATURE: 97.2 F | OXYGEN SATURATION: 98 %

## 2025-02-19 DIAGNOSIS — I50.20 HEART FAILURE WITH REDUCED EJECTION FRACTION (HCC): ICD-10-CM

## 2025-02-19 DIAGNOSIS — S91.302A NON-HEALING OPEN WOUND OF LEFT HEEL: ICD-10-CM

## 2025-02-19 DIAGNOSIS — Q80.9 XERODERMA: ICD-10-CM

## 2025-02-19 DIAGNOSIS — I73.9 PVD (PERIPHERAL VASCULAR DISEASE): ICD-10-CM

## 2025-02-19 DIAGNOSIS — Z79.01 CHRONIC ANTICOAGULATION: ICD-10-CM

## 2025-02-19 DIAGNOSIS — I25.5 ISCHEMIC CARDIOMYOPATHY: Primary | ICD-10-CM

## 2025-02-19 DIAGNOSIS — R53.1 WEAKNESS: ICD-10-CM

## 2025-02-19 DIAGNOSIS — I10 ESSENTIAL HYPERTENSION: ICD-10-CM

## 2025-02-19 DIAGNOSIS — Z86.14 HISTORY OF MRSA INFECTION: ICD-10-CM

## 2025-02-19 DIAGNOSIS — I25.10 CORONARY ARTERY DISEASE INVOLVING NATIVE CORONARY ARTERY OF NATIVE HEART WITHOUT ANGINA PECTORIS: ICD-10-CM

## 2025-02-19 DIAGNOSIS — Z95.810 ICD (IMPLANTABLE CARDIOVERTER-DEFIBRILLATOR) IN PLACE: ICD-10-CM

## 2025-02-19 DIAGNOSIS — K59.04 CHRONIC IDIOPATHIC CONSTIPATION: ICD-10-CM

## 2025-02-19 DIAGNOSIS — Z95.811 LVAD (LEFT VENTRICULAR ASSIST DEVICE) PRESENT (HCC): ICD-10-CM

## 2025-02-19 LAB
INR PPP: 1.7 (ref 0.9–1.2)
NT-PROBNP SERPL-MCNC: 1344 PG/ML (ref 0–376)
PROTHROMBIN TIME: 18.4 SEC (ref 9.1–12)

## 2025-02-19 NOTE — PROGRESS NOTES
femoral above knee popliteal bypass            Vitals:   Vitals:    02/19/25 1017   Resp: 18   Temp: 97.2 °F (36.2 °C)   SpO2: 98%             Exam:  Constitutional: No acute distress; frail  Eyes: Sclera clear, PERRLA;   Ears/nose/mouth/throat:mmm, OP clear, trachea midline;  Cardiovascular: has heartmate 3 LVAD no audible heart beat but machine wooshing heard,  no pulse palpable no edema, no cyanosis;   Respiratory:CTA bilaterally, symmetric, no respiratory distress; saturations 98% on room air  Gastrointestinal: Abdomen soft, NT, ND, no masses, normal bowel sounds;  Neurologic: Cranial nerves II through VII grossly intact, no speech or motor deficits A&O in time place and person  Skin: No rash, warm and dry; right foot with dressing   musculoskeletal: No erythema swelling or joint tenderness, extremities without cyanosis, neck supple, ROM intact spine and extremities;  Psychiatric: Not agitated.  Appropriate affect, mood, judgment and insight.  Genitourinary: No suprapubic tenderness or flank tenderness  Heme, lymph, immuno: No pallor;       Labs: Last labs reviewed from 01/14/2025 white blood cell count 14.5 hemoglobin 9.5 hematocrit 28.7 platelet 309 INR 3.8    01/20/2025 - INR 1.5  01/23/2025 - INR 1.5  INR 1/27/25 - 1.7    Last labs reviewed wound culture from 1/24/2025 with left ankle no growth thus far, labs from 1/25/2025 white blood cell count now 12.9 hemoglobin 10.6 hematocrit 32.6 platelet 365  Urinalysis from 1/24/2025 no growth  Blood cultures final showed NO growth    INR on 01/30/2025 was 2.0    Labs reviewed from 2/3/2025 white blood cell count 11.1 hemoglobin 10.2 hematocrit 31.2 platelet 238 sodium 141 potassium 4.8 chloride 106 BUN 24.1 creatinine 1.04 NT proBNP 1398 magnesium 2.1 INR 1.79     INR on 02/10/2025 was 3.1  INR on 02/13/255 - 2.6  INR 02/17/2 2.3    Current Outpatient Medications   Medication Sig Dispense Refill    amLODIPine (NORVASC) 2.5 MG tablet Take 5mg in the morning and take

## 2025-02-20 ENCOUNTER — TELEPHONE (OUTPATIENT)
Age: 70
End: 2025-02-20

## 2025-02-20 LAB
ALBUMIN SERPL-MCNC: 3.7 G/DL (ref 3.9–4.9)
ALP SERPL-CCNC: 151 IU/L (ref 44–121)
ALT SERPL-CCNC: 28 IU/L (ref 0–44)
AST SERPL-CCNC: 38 IU/L (ref 0–40)
BASOPHILS # BLD AUTO: 0.1 X10E3/UL (ref 0–0.2)
BASOPHILS NFR BLD AUTO: 1 %
BILIRUB SERPL-MCNC: 0.4 MG/DL (ref 0–1.2)
BUN SERPL-MCNC: 23 MG/DL (ref 8–27)
BUN/CREAT SERPL: 24 (ref 10–24)
CALCIUM SERPL-MCNC: 9.2 MG/DL (ref 8.6–10.2)
CHLORIDE SERPL-SCNC: 101 MMOL/L (ref 96–106)
CO2 SERPL-SCNC: 19 MMOL/L (ref 20–29)
CREAT SERPL-MCNC: 0.95 MG/DL (ref 0.76–1.27)
EGFRCR SERPLBLD CKD-EPI 2021: 87 ML/MIN/1.73
EOSINOPHIL # BLD AUTO: 2.9 X10E3/UL (ref 0–0.4)
EOSINOPHIL NFR BLD AUTO: 31 %
ERYTHROCYTE [DISTWIDTH] IN BLOOD BY AUTOMATED COUNT: 13.3 % (ref 11.6–15.4)
FERRITIN SERPL-MCNC: 711 NG/ML (ref 30–400)
GLOBULIN SER CALC-MCNC: 3.8 G/DL (ref 1.5–4.5)
GLUCOSE SERPL-MCNC: 118 MG/DL (ref 70–99)
HCT VFR BLD AUTO: 34.6 % (ref 37.5–51)
HGB BLD-MCNC: 11.1 G/DL (ref 13–17.7)
IMM GRANULOCYTES # BLD AUTO: 0.1 X10E3/UL (ref 0–0.1)
IMM GRANULOCYTES NFR BLD AUTO: 1 %
IRON SATN MFR SERPL: 20 % (ref 15–55)
IRON SERPL-MCNC: 40 UG/DL (ref 38–169)
LDH SERPL L TO P-CCNC: 382 IU/L (ref 121–224)
LYMPHOCYTES # BLD AUTO: 1.2 X10E3/UL (ref 0.7–3.1)
LYMPHOCYTES NFR BLD AUTO: 13 %
MAGNESIUM SERPL-MCNC: 1.9 MG/DL (ref 1.6–2.3)
MCH RBC QN AUTO: 29.2 PG (ref 26.6–33)
MCHC RBC AUTO-ENTMCNC: 32.1 G/DL (ref 31.5–35.7)
MCV RBC AUTO: 91 FL (ref 79–97)
MONOCYTES # BLD AUTO: 1 X10E3/UL (ref 0.1–0.9)
MONOCYTES NFR BLD AUTO: 11 %
MORPHOLOGY BLD-IMP: ABNORMAL
NEUTROPHILS # BLD AUTO: 4.1 X10E3/UL (ref 1.4–7)
NEUTROPHILS NFR BLD AUTO: 43 %
PLATELET # BLD AUTO: 541 X10E3/UL (ref 150–450)
POTASSIUM SERPL-SCNC: 4.6 MMOL/L (ref 3.5–5.2)
PROT SERPL-MCNC: 7.5 G/DL (ref 6–8.5)
RBC # BLD AUTO: 3.8 X10E6/UL (ref 4.14–5.8)
SODIUM SERPL-SCNC: 138 MMOL/L (ref 134–144)
TIBC SERPL-MCNC: 199 UG/DL (ref 250–450)
UIBC SERPL-MCNC: 159 UG/DL (ref 111–343)
WBC # BLD AUTO: 9.3 X10E3/UL (ref 3.4–10.8)

## 2025-02-20 NOTE — TELEPHONE ENCOUNTER
February 20, 2025  Norma Luevano APRN - NP   to Me       2/20/25  9:33 AM   Is he taking his ASA?  His platelets are very high so that can also indicate inflammation but his WBC was ok.   Did he look more infected at his visit?  Me   to Norma Luevano APRN - NP   MP    2/20/25 12:10 PM   I didn't see his feet wounds as they were wrapped but I changed his driveline and it looked great.  He was afebrile, very dry and having a lot of low flows. Lina upped his amlodipine and had him increase PO, I'll check with the giancarlo PELAYO re: the asprin.    Lab results reviewed with CARMELINA Luevano NP who requested to verify patient was taking Asprin. Lab results forwarded to Giancarlo Stephenson who confirmed patient has asprin 81mg daily ordered (communicated via perfect serve).  Stated INR via finger stick at [facility] today was 3.3.  CARMELINA Luevano notified that per giancarlo PELAYO patient is getting asprin. CARMELINA Luevano notified who stated VORB no changes at this time.

## 2025-02-21 ENCOUNTER — OFFICE VISIT (OUTPATIENT)
Facility: CLINIC | Age: 70
End: 2025-02-21

## 2025-02-21 VITALS
OXYGEN SATURATION: 97 % | WEIGHT: 110.1 LBS | BODY MASS INDEX: 16.26 KG/M2 | RESPIRATION RATE: 18 BRPM | TEMPERATURE: 97.3 F

## 2025-02-21 DIAGNOSIS — I25.5 ISCHEMIC CARDIOMYOPATHY: Primary | ICD-10-CM

## 2025-02-21 DIAGNOSIS — Z79.01 CHRONIC ANTICOAGULATION: ICD-10-CM

## 2025-02-21 DIAGNOSIS — I50.20 HEART FAILURE WITH REDUCED EJECTION FRACTION (HCC): ICD-10-CM

## 2025-02-21 DIAGNOSIS — R53.1 WEAKNESS: ICD-10-CM

## 2025-02-21 DIAGNOSIS — S91.302A NON-HEALING OPEN WOUND OF LEFT HEEL: ICD-10-CM

## 2025-02-21 DIAGNOSIS — I25.10 CORONARY ARTERY DISEASE INVOLVING NATIVE CORONARY ARTERY OF NATIVE HEART WITHOUT ANGINA PECTORIS: ICD-10-CM

## 2025-02-21 DIAGNOSIS — Z95.811 LVAD (LEFT VENTRICULAR ASSIST DEVICE) PRESENT (HCC): ICD-10-CM

## 2025-02-21 DIAGNOSIS — Z86.14 HISTORY OF MRSA INFECTION: ICD-10-CM

## 2025-02-21 DIAGNOSIS — I10 ESSENTIAL HYPERTENSION: ICD-10-CM

## 2025-02-21 NOTE — PROGRESS NOTES
pounds in 1 week or shortness of breath  MAP GOAL 70-90  Has LVAD-HeartMate 3 in place - managed by [facility] HF team  Has follow up scheduled for 02/18/2025  Warfarin for anticoagulation - last INR 3.3 continue warfarin 2mg po daily   5. Essential hypertension     On multiple medications due to advanced heart failure  Metoprolol 25 mg p.o. twice daily  Hydralazine 100 mg p.o. 3 times daily  Isordil 40 mg p.o. 3 times daily   Aldactone 25 mg p.o. daily  LVAD team added amlodipine 5mg in am and 2.5mg in evening last visit on 02/18/25  Has maintained Maps 70 - 90   6. Non-healing open wound of left heel     Has left ankle and heel wound - followed by in house wound care provider  Upon discharge will follow up with [facility] on 02/12/25  On keflex 500mg PO BID for prophylaxis recommended by ID during hospitalization  His wound culture at facility showed MRSA, had repeat wound culture that was negative  Not surgical candidate or candidate for amputation  Has required multiple treatments during stay - doxycycline and zyvox   Last wound care note at facility 02/014/2025 - improving despite measurements   7. Weakness     Has worked with PT/OT and made some progress  Awaiting Discharge home with consult for PT/OT when accepting company an be LVAD trained  He is no longer working with PT while awaiting placement   8. History of MRSA infection     Has left ankle and heel wound - followed by in house wound care provider  Upon discharge will follow up with [facility] on 02/12/25  On keflex 500mg PO BID for prophylaxis recommended by ID during hospitalization  His wound culture at facility showed MRSA, had repeat wound culture that was negative  Not surgical candidate or candidate for amputation  Has required multiple treatments during stay - doxycycline and zyvox  Periodically monitor WBC - last on 02/18/25 at LVAD appt was 9.3, stable   9. Chronic anticoagulation     Last INR at facility

## 2025-02-24 ENCOUNTER — TELEPHONE (OUTPATIENT)
Age: 70
End: 2025-02-24

## 2025-02-24 NOTE — TELEPHONE ENCOUNTER
0854: call received from patient's sister Norah requesting call back regarding patient.     Lizandro not listed on patient's release of information form.      1331: placed call to patient to inquire if he is okay to staff speaking with his sister, no answer and patient's mailbox full.     1339: patient returned call, inquired if he is okay with staff speaking with his sister Lizandro. He stated he would like to staff to speak with her and is okay with them sharing details of current care.      1340: called and spoke with patient's sister Lizandro.She inquired about barriers to patient's discharge from [facility]. Discussed [facility] discharge coordinator has had difficulty identifying a [facility] company that is both in network, in service area and is willing to take LVAD patients. Noted that [address] [place] [address] was open to being trained however has not confirmed a date they would have staff available for this. Lizandro stated patient has \"used up his days\" at [facility] and is not getting PT or OT services at this time. Informed Lizandro that LVAD team will continue communicating with [facility] regarding assisting with patient discharge.  Lizandro stated she will call and speak to the discharge coordinator as well.     1348: Called [facility] and requested to speak with PRECIOUS Galdamez. Staff stated Denice is unavailable, transferred to Kristen, discharge coordinator.  Left message for Kristen requesting call back to discuss patient.  See telephone call regarding  services

## 2025-02-25 ENCOUNTER — OFFICE VISIT (OUTPATIENT)
Facility: CLINIC | Age: 70
End: 2025-02-25
Payer: MEDICARE

## 2025-02-25 VITALS
OXYGEN SATURATION: 96 % | RESPIRATION RATE: 18 BRPM | TEMPERATURE: 97.8 F | WEIGHT: 110.8 LBS | BODY MASS INDEX: 16.36 KG/M2

## 2025-02-25 DIAGNOSIS — S91.302A NON-HEALING OPEN WOUND OF LEFT HEEL: ICD-10-CM

## 2025-02-25 DIAGNOSIS — R53.1 WEAKNESS: ICD-10-CM

## 2025-02-25 DIAGNOSIS — I10 ESSENTIAL HYPERTENSION: ICD-10-CM

## 2025-02-25 DIAGNOSIS — I25.10 CORONARY ARTERY DISEASE INVOLVING NATIVE CORONARY ARTERY OF NATIVE HEART WITHOUT ANGINA PECTORIS: ICD-10-CM

## 2025-02-25 DIAGNOSIS — I25.5 ISCHEMIC CARDIOMYOPATHY: Primary | ICD-10-CM

## 2025-02-25 DIAGNOSIS — Z79.01 CHRONIC ANTICOAGULATION: ICD-10-CM

## 2025-02-25 DIAGNOSIS — I50.20 HEART FAILURE WITH REDUCED EJECTION FRACTION (HCC): ICD-10-CM

## 2025-02-25 DIAGNOSIS — Z95.811 LVAD (LEFT VENTRICULAR ASSIST DEVICE) PRESENT (HCC): ICD-10-CM

## 2025-02-25 DIAGNOSIS — Z86.14 HISTORY OF MRSA INFECTION: ICD-10-CM

## 2025-02-25 PROCEDURE — 99309 SBSQ NF CARE MODERATE MDM 30: CPT | Performed by: NURSE PRACTITIONER

## 2025-02-25 PROCEDURE — 1124F ACP DISCUSS-NO DSCNMKR DOCD: CPT | Performed by: NURSE PRACTITIONER

## 2025-02-25 RX ORDER — WARFARIN SODIUM 2 MG/1
TABLET ORAL
Qty: 90 TABLET | Refills: 0 | Status: SHIPPED
Start: 2025-02-25

## 2025-02-25 NOTE — PROGRESS NOTES
foot Weightbearing on right foot he was seen by vascular surgery and infectious disease per ID he has incurable infection regardless.  Then seen again by podiatry on 10/24/2020 for right heel wound resolved left heel improving and left anterior ankle ulcer worsened but did not appear infected.  Ultimately it was recommended that he complete IV cefepime and transition to Keflex 500 mg p.o. twice daily at discharge for lifelong therapy in regards to his avulsion fracture and rotator cuff tear just recommended conservative treatment.  He has anemia of chronic disease no active bleeding he is on Coumadin his baseline hemoglobin is 8-9.    Patient was readmitted to [facility] from 11/27/2024 until 12/2/2024.  He was transferred to the emergency room due to leukocytosis and workup that could not be done expeditiously at the [facility].  During hospitalization he was found to have leukocytosis.  Blood cultures showed no growth ID was consulted he was treated with IV cefepime while inpatient and transitioned back to p.o. Keflex on discharge for lifelong suppressive therapy.  Patient has advanced heart failure and has HeartMate 3 LVAD in place since 9/19/2023.  Recommended to continue aspirin and statin Isordil metoprolol Aldactone.  Bumex was changed to as needed due to weight loss he continues on amiodarone for rate control and will follow-up as outpatient with LVAD clinic has appointment today 12/5/2024.  Patient also has a history of hypertension peripheral vascular disease status post femoral-popliteal bypass, left rotator cuff injury and avulsion fracture treated with conservative management, anemia of chronic disease.    Current visit:  02/25/2025 -  patient lying in bed today he is awake alert oriented x 3.  His MAPs over last 24-48 hours were 74-84.  He denies any lightheadedness or dizziness.  His current weight is 110.8 lbs. He has ensure at the bedside and says he drinks 3 per day. he has no shortness of

## 2025-02-26 ENCOUNTER — TRANSCRIBE ORDERS (OUTPATIENT)
Facility: HOSPITAL | Age: 70
End: 2025-02-26

## 2025-02-26 ENCOUNTER — HOSPITAL ENCOUNTER (OUTPATIENT)
Facility: HOSPITAL | Age: 70
Discharge: HOME | End: 2025-02-26
Attending: FAMILY MEDICINE
Payer: MEDICARE

## 2025-02-26 VITALS — TEMPERATURE: 98.2 F | RESPIRATION RATE: 16 BRPM

## 2025-02-26 DIAGNOSIS — S91.109A NON-HEALING OPEN WOUND OF TOE, INITIAL ENCOUNTER: ICD-10-CM

## 2025-02-26 DIAGNOSIS — L97.522 ULCER OF LEFT FOOT, WITH FAT LAYER EXPOSED (HCC): ICD-10-CM

## 2025-02-26 DIAGNOSIS — L97.523 ULCER OF LEFT FOOT, WITH NECROSIS OF MUSCLE (HCC): ICD-10-CM

## 2025-02-26 DIAGNOSIS — I70.221 CRITICAL LIMB ISCHEMIA OF RIGHT LOWER EXTREMITY (HCC): Primary | ICD-10-CM

## 2025-02-26 DIAGNOSIS — I73.9 PAD (PERIPHERAL ARTERY DISEASE): Primary | Chronic | ICD-10-CM

## 2025-02-26 DIAGNOSIS — L97.819 SKIN ULCER OF RIGHT PRETIBIAL REGION, UNSPECIFIED ULCER STAGE (HCC): ICD-10-CM

## 2025-02-26 DIAGNOSIS — L97.823 ULCER OF LEFT PRETIBIAL REGION WITH NECROSIS OF MUSCLE (HCC): ICD-10-CM

## 2025-02-26 PROCEDURE — 11043 DBRDMT MUSC&/FSCA 1ST 20/<: CPT

## 2025-02-26 PROCEDURE — 11046 DBRDMT MUSC&/FSCA EA ADDL: CPT

## 2025-02-26 RX ORDER — MUPIROCIN 20 MG/G
OINTMENT TOPICAL PRN
OUTPATIENT
Start: 2025-02-26

## 2025-02-26 RX ORDER — LIDOCAINE 40 MG/G
CREAM TOPICAL PRN
OUTPATIENT
Start: 2025-02-26

## 2025-02-26 RX ORDER — LIDOCAINE HYDROCHLORIDE 20 MG/ML
JELLY TOPICAL PRN
OUTPATIENT
Start: 2025-02-26

## 2025-02-26 RX ORDER — LIDOCAINE 50 MG/G
OINTMENT TOPICAL PRN
OUTPATIENT
Start: 2025-02-26

## 2025-02-26 RX ORDER — GINSENG 100 MG
CAPSULE ORAL PRN
OUTPATIENT
Start: 2025-02-26

## 2025-02-26 RX ORDER — BETAMETHASONE DIPROPIONATE 0.5 MG/G
CREAM TOPICAL PRN
OUTPATIENT
Start: 2025-02-26

## 2025-02-26 RX ORDER — SILVER SULFADIAZINE 10 MG/G
CREAM TOPICAL PRN
OUTPATIENT
Start: 2025-02-26

## 2025-02-26 RX ORDER — TRIAMCINOLONE ACETONIDE 1 MG/G
OINTMENT TOPICAL PRN
OUTPATIENT
Start: 2025-02-26

## 2025-02-26 RX ORDER — SODIUM CHLOR/HYPOCHLOROUS ACID 0.033 %
SOLUTION, IRRIGATION IRRIGATION PRN
OUTPATIENT
Start: 2025-02-26

## 2025-02-26 RX ORDER — BACITRACIN ZINC AND POLYMYXIN B SULFATE 500; 1000 [USP'U]/G; [USP'U]/G
OINTMENT TOPICAL PRN
OUTPATIENT
Start: 2025-02-26

## 2025-02-26 RX ORDER — GENTAMICIN SULFATE 1 MG/G
OINTMENT TOPICAL PRN
OUTPATIENT
Start: 2025-02-26

## 2025-02-26 RX ORDER — CLOBETASOL PROPIONATE 0.5 MG/G
OINTMENT TOPICAL PRN
OUTPATIENT
Start: 2025-02-26

## 2025-02-26 RX ORDER — LIDOCAINE HYDROCHLORIDE 40 MG/ML
SOLUTION TOPICAL PRN
OUTPATIENT
Start: 2025-02-26

## 2025-02-26 RX ORDER — NEOMYCIN/BACITRACIN/POLYMYXINB 3.5-400-5K
OINTMENT (GRAM) TOPICAL PRN
OUTPATIENT
Start: 2025-02-26

## 2025-02-26 NOTE — PATIENT INSTRUCTIONS
Discharge Instructions for  [facility]  [address] [address]  Phone: [phone] Fax: [phone]    NAME:  Bishop Love  YOB: 1955  MEDICAL RECORD NUMBER:  [id number]  DATE:  February 26, 2025    [facility]    WOUND CARE ORDERS:  All wounds-Cleanse with baby shampoo. Allow to lather. Rinse and pat dry.   Left pretibial wound-Apply 4-5 layers of Xeroform. Cover with gauze. Secure with roll gauze and tape. Change Monday, Wednesday, and Friday.  Left medial and lateral foot wounds-Paint with betadine and leave open to air.  Change Monday, Wednesday, and Friday.   Left and right toes, right heel-Paint with betadine and leave open to air. Change Monday, Wednesday, and Friday.     Activity:  [] Elevate leg(s) above the level of the heart when sitting.  [] Avoid prolonged standing in one place.   [x] Do no get dressing/wrap wet.     [x] Float heels in bed.    Dietary:  [x] Diet as tolerated      [] Diabetic Diet            [] Increase Protein: examples (Meat, cheese, eggs, greek yogurt, fish, nuts)          [] Gregorio Therapeutic Nutrition Powder  [] Other:  [] Dial a Dietician : Call [employer] at [phone] enter code (249) when prompted. M-F 9am-5pm EST.      Return Appointment:  [x] Return Appointment: With Dr. Lenoie Bailey in 2 Week(s)  [] Nurse Visit :   [] Ordered tests:        Wound Care Center Information: Should you experience any significant changes in your wound(s) or have questions about your wound care, please contact the [facility] MONDAY - THURSDAY 8:00AM - 4:00PM and FRIDAY 8:00AM - NOON at the number listed above. If you need help with your wound outside these hours and cannot wait until we are again available, contact your PCP or go to the hospital emergency room.     PLEASE NOTE: IF YOU ARE UNABLE TO OBTAIN WOUND SUPPLIES, CONTINUE TO USE THE SUPPLIES YOU HAVE AVAILABLE UNTIL YOU ARE ABLE TO REACH

## 2025-02-26 NOTE — FLOWSHEET NOTE
02/26/25 1327   Anesthetic   Anesthetic 4% Lidocaine Cream   Peripheral Vascular   RLE Edema Trace   LLE Edema Trace   RLE Neurovascular Assessment   Capillary Refill Less than/Equal to 3 seconds   Color Yellow-Brown/Hemosiderin Staining   Temperature Cold   RLE Sensation  Decreased   R Post Tibial Pulse Doppler   LLE Neurovascular Assessment   Capillary Refill Less than/Equal to 3 seconds   Color Yellow-Brown/Hemosiderin Staining   Temperature Cold   LLE Sensation  Full sensation   L Pedal Pulse Doppler   Wound 01/29/25 Pretibial Medial;Left   Date First Assessed/Time First Assessed: 01/29/25 1337   Present on Original Admission: Yes  Wound Approximate Age at First Assessment (Weeks): 20 weeks  Primary Wound Type: Venous Ulcer  Location: Pretibial  Wound Location Orientation: Medial;Left   Wound Image    Wound Etiology Venous   Dressing Status Intact   Wound Cleansed Cleansed with saline   Offloading for Diabetic Foot Ulcers Offloading not ordered   Wound Length (cm) 10.1 cm   Wound Width (cm) 2.2 cm   Wound Depth (cm) 0.1 cm   Wound Surface Area (cm^2) 22.22 cm^2   Change in Wound Size % (l*w) 28.44   Wound Volume (cm^3) 2.222 cm^3   Wound Healing % 64   Wound Assessment Hyper granulation tissue;Exposed structure tendon;Pink/red   Drainage Amount Moderate (25-50%)   Drainage Description Serosanguinous   Odor None   Mariposa-wound Assessment Dry/flaky;Blanchable erythema;Hemosiderin staining (brown yellow)   Margins Defined edges   Wound Thickness Description not for Pressure Injury Full thickness   Wound 01/29/25 Foot Lateral;Left   Date First Assessed/Time First Assessed: 01/29/25 1339   Present on Original Admission: Yes  Wound Approximate Age at First Assessment (Weeks): 20 weeks  Location: Foot  Wound Location Orientation: Lateral;Left   Wound Image     Wound Etiology Other   Dressing Status Intact   Wound Cleansed Cleansed with saline   Offloading for Diabetic Foot Ulcers Post op shoe   Wound Length (cm) 1 cm

## 2025-02-26 NOTE — PROGRESS NOTES
Wound Center  Progress Note / Procedure Note      Chief Complaint:  Bishop Love is a 69 y.o.  male  with lower extremity wound of >few months duration.        Assessment/Plan     69 y.o. male with end-stage CHF, LVAD, PAD    -Left anterior chronic ulcer.  Full thickness  Smaller, more granular, small amount of necrotic tissue with necrotic tendon remaining   necrotic tendon debrided      -toes/feet/heel B/L  Mostly dry  Small open areas on lateral side of feet    Appears as if facility has been leaving these open to air  They look pretty good  So will just paint with betadine and leave open  And will send same orders to facility    Offloading and heel floating discussed with patient  Using pillows to offload heel, feet do slide off of them    -PAD     -Chart reviewed patient has poor prognosis overall.  Not a candidate for amputation due to CHF and LVAD.  His leg infection have also been described as incurable.  We will try our best to manage the wounds and keep them stable and prevent them from getting infected    Dressing:  See discharge instructions      -Nutrition optimization  Patient has about 3 ensures a day, along with his regular meals    -Good offloading  Discussed in detail    Patient/  understood and agrees with plan. Questions answered.          Follow up with me in 2 week.       Subjective:   Since last visit  No new issues, no pain    HPI:     Not much history from patient/difficult to understand.  Chart reviewed extensively.  Patient currently residing at [facility] in [place].    History/Chart/Medications reviewed    Wound caused by:  Arterial, infectious  Current wound care:See flowsheet  Offloading wound: Trying  Appetite: fair  Wound associated pain: See flowsheet  Diabetic: no  Smoker: no  ROS: no N/V/D, no T/chills; no local rash,  Objective:     Physical Exam:   See flowsheet / nursing notes for vitals  Vitals:    02/26/25 1315   Resp: 16   Temp: 98.2 °F (36.8 °C)

## 2025-02-28 ENCOUNTER — OFFICE VISIT (OUTPATIENT)
Facility: CLINIC | Age: 70
End: 2025-02-28

## 2025-02-28 ENCOUNTER — TELEPHONE (OUTPATIENT)
Age: 70
End: 2025-02-28

## 2025-02-28 ENCOUNTER — HOSPITAL ENCOUNTER (OUTPATIENT)
Facility: HOSPITAL | Age: 70
Discharge: HOME | End: 2025-02-28
Attending: SURGERY
Payer: MEDICARE

## 2025-02-28 VITALS
BODY MASS INDEX: 16.26 KG/M2 | RESPIRATION RATE: 16 BRPM | OXYGEN SATURATION: 100 % | WEIGHT: 110.1 LBS | TEMPERATURE: 98 F

## 2025-02-28 DIAGNOSIS — I25.10 CORONARY ARTERY DISEASE INVOLVING NATIVE CORONARY ARTERY OF NATIVE HEART WITHOUT ANGINA PECTORIS: ICD-10-CM

## 2025-02-28 DIAGNOSIS — I10 ESSENTIAL HYPERTENSION: ICD-10-CM

## 2025-02-28 DIAGNOSIS — I25.5 ISCHEMIC CARDIOMYOPATHY: Primary | ICD-10-CM

## 2025-02-28 DIAGNOSIS — L97.819 SKIN ULCER OF RIGHT PRETIBIAL REGION, UNSPECIFIED ULCER STAGE (HCC): ICD-10-CM

## 2025-02-28 DIAGNOSIS — I70.221 CRITICAL LIMB ISCHEMIA OF RIGHT LOWER EXTREMITY (HCC): ICD-10-CM

## 2025-02-28 DIAGNOSIS — R53.1 WEAKNESS: ICD-10-CM

## 2025-02-28 DIAGNOSIS — S91.302A NON-HEALING OPEN WOUND OF LEFT HEEL: ICD-10-CM

## 2025-02-28 DIAGNOSIS — Z86.14 HISTORY OF MRSA INFECTION: ICD-10-CM

## 2025-02-28 DIAGNOSIS — Z79.01 CHRONIC ANTICOAGULATION: ICD-10-CM

## 2025-02-28 DIAGNOSIS — Z95.811 LVAD (LEFT VENTRICULAR ASSIST DEVICE) PRESENT (HCC): ICD-10-CM

## 2025-02-28 DIAGNOSIS — I50.20 HEART FAILURE WITH REDUCED EJECTION FRACTION (HCC): ICD-10-CM

## 2025-02-28 PROCEDURE — 75635 CT ANGIO ABDOMINAL ARTERIES: CPT

## 2025-02-28 PROCEDURE — 6360000004 HC RX CONTRAST MEDICATION: Performed by: SURGERY

## 2025-02-28 RX ORDER — IOPAMIDOL 755 MG/ML
100 INJECTION, SOLUTION INTRAVASCULAR
Status: COMPLETED | OUTPATIENT
Start: 2025-02-28 | End: 2025-02-28

## 2025-02-28 RX ADMIN — IOPAMIDOL 100 ML: 755 INJECTION, SOLUTION INTRAVENOUS at 17:03

## 2025-02-28 NOTE — TELEPHONE ENCOUNTER
(See telephone call from 02/18 regarding discharge planning)     1045: message received from Kristen with [facility] stating she received message from LVAD coordinator regarding okay from [facility] home health company to accept patient without LVAD care. Reviewed patient has family member (Tiffany/Shelia) who can change driveline dressing, is able to self manage equipment and LVAD team is available 24/h per day for device concerns. Discussed message was left with Rochelle from [address] [place] [address] with this information however LVAD coordinator has not received call back at this time.  Kristen verbalized understanding. Stated she will re-refer patient to [address] [place] [address] and reiterate this information to them. Informed her that LVAD coordinator will call to confirm Tiffany is still in the area and agreeable to dressing changes.     1058: called and spoke with patient's step daughter Tiffany, inquired if is available and able to perform patient's driveline dressing changes should he return home, she confirmed she is able to do this. Kristen notified of confirmation from family member via secure email.     03/03: 1145 received call from Sherley with [facility] re[facility] who confirmed patient has been accepted with [address] [place] [address] with planned discharge 03/06

## 2025-02-28 NOTE — PROGRESS NOTES
[place] [address] [address]  [facility] [place] [address]  Phone: [phone]  Fax: [phone]  Also available via [employer]     PLACE OF SERVICE:  [facility] [address] [place] [address]    SKILLED VISIT    Chief Complaint:   Chief Complaint   Patient presents with    Follow-up         HPI : Bishop Love is a 69 y.o. male here for follow up.    Previous history prior to admission:  Patient was hospitalized from 9/17/2024 to 10/29/2024.  Patient has a past medical history of coronary artery disease MI pacemaker ICD, ischemic cardiomyopathy, chronic heart failure with preserved ejection fraction, long-term anticoagulation on Coumadin, mitral agitation, rheumatic tricuspid regurg, sick sinus syndrome, PVD, right femoral popliteal bypass 8/24/2023, bifemoral bypass and bilateral femoral above-the-knee popliteal bypass.  He presented to the ER with complaints of generalized body rash left shoulder and elbow pain rash and started a week prior.  And had a fall 1 week prior.  Left elbow fracture showed each indeterminant fracture with's bone spur at triceps left shoulder showed a high riding humeral head compatible with chronic massive cuff tear.  Patient has history of heart failure with reduced ejection fraction with chronic LVAD on 9/19/2023.  He is being managed by the chronic heart failure team and is on metoprolol 25 mg p.o. twice daily cannot tolerate ACE ARB or Arni due to severe cough.  He is on hydralazine 100 mg p.o. 3 times daily and Isordil 40 mg p.o. 3 times daily.  He is on Aldactone.  He is not on SGLT2 due to recurrent UTIs.  He continues on oral Bumex 2 mg twice daily.  He is not a candidate for liver transplant due to severe peripheral vascular disease.  He continues on Coumadin he had MSSA bacteremia his wound culture had MRSA MSSA Pseudomonas he was assessed by podiatry and recommended local wound care he is full weightbearing on left

## 2025-03-03 ENCOUNTER — TELEPHONE (OUTPATIENT)
Age: 70
End: 2025-03-03

## 2025-03-03 ENCOUNTER — OFFICE VISIT (OUTPATIENT)
Facility: CLINIC | Age: 70
End: 2025-03-03
Payer: MEDICARE

## 2025-03-03 VITALS
TEMPERATURE: 98.6 F | WEIGHT: 103.5 LBS | BODY MASS INDEX: 15.28 KG/M2 | OXYGEN SATURATION: 99 % | RESPIRATION RATE: 17 BRPM

## 2025-03-03 DIAGNOSIS — R53.1 WEAKNESS: ICD-10-CM

## 2025-03-03 DIAGNOSIS — I25.5 ISCHEMIC CARDIOMYOPATHY: Primary | ICD-10-CM

## 2025-03-03 DIAGNOSIS — I50.20 HEART FAILURE WITH REDUCED EJECTION FRACTION (HCC): ICD-10-CM

## 2025-03-03 DIAGNOSIS — Z86.14 HISTORY OF MRSA INFECTION: ICD-10-CM

## 2025-03-03 DIAGNOSIS — Z79.01 CHRONIC ANTICOAGULATION: ICD-10-CM

## 2025-03-03 DIAGNOSIS — I73.9 PVD (PERIPHERAL VASCULAR DISEASE): ICD-10-CM

## 2025-03-03 DIAGNOSIS — S91.302A NON-HEALING OPEN WOUND OF LEFT HEEL: ICD-10-CM

## 2025-03-03 DIAGNOSIS — I25.10 CORONARY ARTERY DISEASE INVOLVING NATIVE CORONARY ARTERY OF NATIVE HEART WITHOUT ANGINA PECTORIS: ICD-10-CM

## 2025-03-03 DIAGNOSIS — I10 ESSENTIAL HYPERTENSION: ICD-10-CM

## 2025-03-03 DIAGNOSIS — Z95.811 LVAD (LEFT VENTRICULAR ASSIST DEVICE) PRESENT (HCC): ICD-10-CM

## 2025-03-03 PROCEDURE — 1124F ACP DISCUSS-NO DSCNMKR DOCD: CPT | Performed by: NURSE PRACTITIONER

## 2025-03-03 PROCEDURE — 99309 SBSQ NF CARE MODERATE MDM 30: CPT | Performed by: NURSE PRACTITIONER

## 2025-03-03 NOTE — TELEPHONE ENCOUNTER
1144: call received from Sherley with [facility] calling to inquire about address for patient's echo. Informed her that echo is currently scheduled at [place], will attempt to change this to [place] and return call to her. She confirmed patient was accepted for home health by [address] [place] [address] at home [facility] with planned discharge for 03/06.    1306: called and spoke with Flores in Medical services who stated okay to book echo 03/05 at [place] at 11:45.     1307: called and spoke with Medical services who confirmed patient's echo rescheduled for 03/05 at [place] 1145am.     1330: called and spoke with Sherley and notified her of echo changed to [place] at 1145. She verbalized understanding

## 2025-03-03 NOTE — PROGRESS NOTES
[place] [address] [facility] [place] [address]  Phone: [phone]  Fax: [phone]  Also available via [employer]     PLACE OF SERVICE:  [facility] [address] [place] [address]    SKILLED VISIT    Chief Complaint:   Chief Complaint   Patient presents with    Other     30 DAY RECERTIFICATION         HPI : Bishop Love is a 69 y.o. male here for follow up.    Previous history prior to admission:  Patient was hospitalized from 9/17/2024 to 10/29/2024.  Patient has a past medical history of coronary artery disease MI pacemaker ICD, ischemic cardiomyopathy, chronic heart failure with preserved ejection fraction, long-term anticoagulation on Coumadin, mitral agitation, rheumatic tricuspid regurg, sick sinus syndrome, PVD, right femoral popliteal bypass 8/24/2023, bifemoral bypass and bilateral femoral above-the-knee popliteal bypass.  He presented to the ER with complaints of generalized body rash left shoulder and elbow pain rash and started a week prior.  And had a fall 1 week prior.  Left elbow fracture showed each indeterminant fracture with's bone spur at triceps left shoulder showed a high riding humeral head compatible with chronic massive cuff tear.  Patient has history of heart failure with reduced ejection fraction with chronic LVAD on 9/19/2023.  He is being managed by the chronic heart failure team and is on metoprolol 25 mg p.o. twice daily cannot tolerate ACE ARB or Arni due to severe cough.  He is on hydralazine 100 mg p.o. 3 times daily and Isordil 40 mg p.o. 3 times daily.  He is on Aldactone.  He is not on SGLT2 due to recurrent UTIs.  He continues on oral Bumex 2 mg twice daily.  He is not a candidate for liver transplant due to severe peripheral vascular disease.  He continues on Coumadin he had MSSA bacteremia his wound culture had MRSA MSSA Pseudomonas he was assessed by podiatry and recommended local wound care he is full

## 2025-03-05 ENCOUNTER — HOSPITAL ENCOUNTER (OUTPATIENT)
Facility: HOSPITAL | Age: 70
Discharge: HOME | End: 2025-03-07
Payer: MEDICARE

## 2025-03-05 ENCOUNTER — OFFICE VISIT (OUTPATIENT)
Facility: CLINIC | Age: 70
End: 2025-03-05

## 2025-03-05 VITALS
RESPIRATION RATE: 18 BRPM | BODY MASS INDEX: 16.26 KG/M2 | OXYGEN SATURATION: 97 % | WEIGHT: 110.1 LBS | TEMPERATURE: 98.2 F

## 2025-03-05 DIAGNOSIS — Z95.811 LVAD (LEFT VENTRICULAR ASSIST DEVICE) PRESENT (HCC): ICD-10-CM

## 2025-03-05 DIAGNOSIS — I25.10 CORONARY ARTERY DISEASE INVOLVING NATIVE CORONARY ARTERY OF NATIVE HEART WITHOUT ANGINA PECTORIS: ICD-10-CM

## 2025-03-05 DIAGNOSIS — K59.04 CHRONIC IDIOPATHIC CONSTIPATION: ICD-10-CM

## 2025-03-05 DIAGNOSIS — S91.302A NON-HEALING OPEN WOUND OF LEFT HEEL: ICD-10-CM

## 2025-03-05 DIAGNOSIS — R53.1 WEAKNESS: ICD-10-CM

## 2025-03-05 DIAGNOSIS — I50.22 CHRONIC SYSTOLIC HEART FAILURE (HCC): ICD-10-CM

## 2025-03-05 DIAGNOSIS — I25.5 ISCHEMIC CARDIOMYOPATHY: Primary | ICD-10-CM

## 2025-03-05 DIAGNOSIS — I73.9 PVD (PERIPHERAL VASCULAR DISEASE): ICD-10-CM

## 2025-03-05 DIAGNOSIS — I10 ESSENTIAL HYPERTENSION: ICD-10-CM

## 2025-03-05 DIAGNOSIS — Z79.01 CHRONIC ANTICOAGULATION: ICD-10-CM

## 2025-03-05 DIAGNOSIS — Z86.14 HISTORY OF MRSA INFECTION: ICD-10-CM

## 2025-03-05 DIAGNOSIS — I50.20 HEART FAILURE WITH REDUCED EJECTION FRACTION (HCC): ICD-10-CM

## 2025-03-05 DIAGNOSIS — Q80.9 XERODERMA: ICD-10-CM

## 2025-03-05 LAB
ECHO AO ASC DIAM: 3 CM
ECHO AO ROOT DIAM: 3.2 CM
ECHO EST RA PRESSURE: 5 MMHG
ECHO LV E' SEPTAL VELOCITY: 5.15 CM/S
ECHO LV EF PHYSICIAN: 15 %
ECHO LV INTERNAL DIMENSION DIASTOLIC: 4.1 CM (ref 4.2–5.9)
ECHO LV INTERNAL DIMENSION DIASTOLIC: 4.5 CM (ref 4.2–5.9)
ECHO LV INTERNAL DIMENSION SYSTOLIC: 3.8 CM
ECHO LV IVSD: 1 CM (ref 0.6–1)
ECHO LV POSTERIOR WALL DIASTOLIC: 1 CM (ref 0.6–1)
ECHO LVOT AREA: 3.1 CM2
ECHO LVOT DIAM: 2 CM
ECHO LVOT MEAN GRADIENT: 0 MMHG
ECHO LVOT PEAK GRADIENT: 0 MMHG
ECHO LVOT PEAK VELOCITY: 0.3 M/S
ECHO LVOT SV: 17.3 ML
ECHO LVOT VTI: 5.5 CM
ECHO MV A VELOCITY: 0.5 M/S
ECHO MV AREA PHT: 5 CM2
ECHO MV AREA VTI: 1 CM2
ECHO MV E DECELERATION TIME (DT): 152.7 MS
ECHO MV E VELOCITY: 0.52 M/S
ECHO MV E/A RATIO: 1.04
ECHO MV E/E' SEPTAL: 10.1
ECHO MV LVOT VTI INDEX: 3.29
ECHO MV MAX VELOCITY: 0.7 M/S
ECHO MV MEAN GRADIENT: 1 MMHG
ECHO MV MEAN VELOCITY: 0.4 M/S
ECHO MV PEAK GRADIENT: 2 MMHG
ECHO MV PRESSURE HALF TIME (PHT): 44.3 MS
ECHO MV VTI: 18.1 CM
ECHO PULMONARY ARTERY SYSTOLIC PRESSURE (PASP): 25 MMHG
ECHO RIGHT VENTRICULAR SYSTOLIC PRESSURE (RVSP): 30 MMHG
ECHO RV TAPSE: 1.6 CM (ref 1.7–?)
ECHO TV REGURGITANT MAX VELOCITY: 2.52 M/S
ECHO TV REGURGITANT PEAK GRADIENT: 25 MMHG

## 2025-03-05 PROCEDURE — 93306 TTE W/DOPPLER COMPLETE: CPT

## 2025-03-05 RX ORDER — AMIODARONE HYDROCHLORIDE 100 MG/1
100 TABLET ORAL DAILY
Qty: 90 TABLET | Refills: 0 | Status: SHIPPED | OUTPATIENT
Start: 2025-03-05 | End: 2025-06-03

## 2025-03-05 RX ORDER — POLYETHYLENE GLYCOL 3350 17 G/17G
17 POWDER, FOR SOLUTION ORAL DAILY
Qty: 1530 G | Refills: 0 | Status: SHIPPED | OUTPATIENT
Start: 2025-03-05 | End: 2025-06-03

## 2025-03-05 RX ORDER — WARFARIN SODIUM 2.5 MG/1
TABLET ORAL
Qty: 36 TABLET | Refills: 0 | Status: SHIPPED | OUTPATIENT
Start: 2025-03-05

## 2025-03-05 RX ORDER — AMMONIUM LACTATE 12 G/100G
CREAM TOPICAL 2 TIMES DAILY
Qty: 385 G | Refills: 2 | Status: SHIPPED | OUTPATIENT
Start: 2025-03-05

## 2025-03-05 RX ORDER — ASPIRIN 81 MG/1
81 TABLET ORAL DAILY
Qty: 90 TABLET | Refills: 0 | Status: SHIPPED | OUTPATIENT
Start: 2025-03-05

## 2025-03-05 RX ORDER — ATORVASTATIN CALCIUM 40 MG/1
80 TABLET, FILM COATED ORAL DAILY
Qty: 180 TABLET | Refills: 0 | Status: SHIPPED | OUTPATIENT
Start: 2025-03-05

## 2025-03-05 RX ORDER — PANTOPRAZOLE SODIUM 40 MG/1
40 TABLET, DELAYED RELEASE ORAL
Qty: 90 TABLET | Refills: 0 | Status: SHIPPED | OUTPATIENT
Start: 2025-03-05

## 2025-03-05 RX ORDER — AMLODIPINE BESYLATE 2.5 MG/1
TABLET ORAL
Qty: 270 TABLET | Refills: 0 | Status: SHIPPED | OUTPATIENT
Start: 2025-03-05

## 2025-03-05 RX ORDER — METOPROLOL SUCCINATE 25 MG/1
25 TABLET, EXTENDED RELEASE ORAL 2 TIMES DAILY
Qty: 180 TABLET | Refills: 0 | Status: SHIPPED | OUTPATIENT
Start: 2025-03-05

## 2025-03-05 RX ORDER — WARFARIN SODIUM 2 MG/1
TABLET ORAL
Qty: 48 TABLET | Refills: 0 | Status: SHIPPED | OUTPATIENT
Start: 2025-03-05

## 2025-03-05 RX ORDER — CHOLECALCIFEROL (VITAMIN D3) 25 MCG
1000 TABLET ORAL DAILY
Qty: 90 TABLET | Refills: 0 | Status: SHIPPED | OUTPATIENT
Start: 2025-03-05

## 2025-03-05 RX ORDER — MAGNESIUM OXIDE 400 MG/1
400 TABLET ORAL DAILY
Qty: 90 TABLET | Refills: 0 | Status: SHIPPED | OUTPATIENT
Start: 2025-03-05

## 2025-03-05 RX ORDER — SPIRONOLACTONE 25 MG/1
25 TABLET ORAL DAILY
Qty: 90 TABLET | Refills: 0 | Status: SHIPPED | OUTPATIENT
Start: 2025-03-05

## 2025-03-05 RX ORDER — HYDRALAZINE HYDROCHLORIDE 100 MG/1
100 TABLET, FILM COATED ORAL EVERY 8 HOURS SCHEDULED
Qty: 270 TABLET | Refills: 0 | Status: SHIPPED | OUTPATIENT
Start: 2025-03-05

## 2025-03-05 RX ORDER — CEPHALEXIN 500 MG/1
500 CAPSULE ORAL 2 TIMES DAILY
Qty: 180 CAPSULE | Refills: 0 | Status: SHIPPED | OUTPATIENT
Start: 2025-03-05

## 2025-03-05 RX ORDER — BUMETANIDE 1 MG/1
1 TABLET ORAL DAILY PRN
Qty: 30 TABLET | Refills: 2 | Status: SHIPPED | OUTPATIENT
Start: 2025-03-05

## 2025-03-05 RX ORDER — M-VIT,TX,IRON,MINS/CALC/FOLIC 27MG-0.4MG
1 TABLET ORAL DAILY
Qty: 90 TABLET | Refills: 0 | Status: SHIPPED | OUTPATIENT
Start: 2025-03-05 | End: 2025-06-03

## 2025-03-05 RX ORDER — ISOSORBIDE DINITRATE 20 MG/1
40 TABLET ORAL EVERY 8 HOURS
Qty: 270 TABLET | Refills: 0 | Status: SHIPPED | OUTPATIENT
Start: 2025-03-05

## 2025-03-05 NOTE — PROGRESS NOTES
prophylaxis 180 capsule 0    ammonium lactate (AMLACTIN) 12 % cream Apply topically in the morning and at bedtime Apply topically as needed.Apply to bilateral feet twice daily for severe xerosis. 385 g 2    bumetanide (BUMEX) 1 MG tablet Take 1 tablet by mouth daily as needed (weight gain greater than 3lbs overnight or 5lbs in a week; increased SOB) 30 tablet 2    metoprolol succinate (TOPROL XL) 25 MG extended release tablet Take 1 tablet by mouth in the morning and at bedtime 180 tablet 0    Multiple Vitamins-Minerals (THERAPEUTIC MULTIVITAMIN-MINERALS) tablet Take 1 tablet by mouth daily 90 tablet 0    polyethylene glycol (GLYCOLAX) 17 GM/SCOOP powder Take 17 g by mouth daily 1530 g 0    amLODIPine (NORVASC) 2.5 MG tablet Take 5mg in the morning and take 2.5mg in the evening 270 tablet 0    warfarin (COUMADIN) 2 MG tablet Receiving 2 mg on Tuesday, Thursday, Saturday Sunday 48 tablet 0    warfarin (COUMADIN) 2.5 MG tablet Administer warfarin 2.5mg on Monday, Wednesday, Friday 36 tablet 0    amiodarone (PACERONE) 100 MG tablet Take 1 tablet by mouth daily 90 tablet 0    ondansetron (ZOFRAN-ODT) 4 MG disintegrating tablet Take 1 tablet by mouth every 6 hours as needed for Nausea or Vomiting 30 tablet 0    melatonin 3 MG TABS tablet Take 1 tablet by mouth nightly 90 tablet 0     No current facility-administered medications for this visit.         Discharge Diagnosis :    Diagnosis Orders   1. Ischemic cardiomyopathy  isosorbide dinitrate (ISORDIL) 20 MG tablet   Followed by advanced heart failure center due to cardiomyopathy  Continue metoprolol succinate 25 mg p.o. twice daily  Hydralazine 100 mg p.o. p.o. 3 times daily  Isordil 40 mg p.o. 3 times daily  Aldactone 25 mg p.o. daily  Bumex 1 mg p.o. every 24 hours as needed if weight gain greater than 3 pounds in the last day or 5 pounds in 1 week or shortness of breath  MAP GOAL 70-90, within these parameters  Has LVAD-HeartMate 3 in place - managed by [facility]

## 2025-03-06 ENCOUNTER — TELEPHONE (OUTPATIENT)
Age: 70
End: 2025-03-06

## 2025-03-06 NOTE — TELEPHONE ENCOUNTER
0957: voicemail left for Kristen with [facility] and [facility] requesting call back to confirm patient is still being discharged today and is going with home health through [address] [place] [address] care [place].     Lina Garcia, Bety Montenegro NP, RN  Cc: Norma Luevano, YASMIN Albert, please let him know that I didn't see any significant changes on his ECHO.   I had Leslie look at it too because the images were not great quality.  Norma sees him next.    1203: left voicemail for patient inquiring if he is still being discharged home today. Requested call back to discuss. Also notified of results of echo and no changes per provider at this time.     1452: call received from Kristen with [facility] confirming patient was discharged home today with home health through [facility] care at [facility].      1548: left voice mail with Rochelle with [address] [place] [address] at [facility] requesting call back to discuss fax number. Requested they fax lab results on patient to [facility].     1555: called and spoke with patient who confirmed he arrived home. Discussed expectation that home health company will not be providing any services in relation to his LVAD. He verbalized understanding, stated his stepdaughter Tiffany will change his dressing. Educated patient that any issues or concerns related to his LVAD need to be discussed directly with his LVAD team, he verbalized understanding. FAUSTO Garcia notified of patient d/c home.     0945: called and spoke with [address] [place] [address] nurse Kaden who stated she would be performing patient's intake today. Discussed expectation from [facility] that they will exclude LVAD from their care, and that patient has been made aware he will be responsible for his LVAD care. Kaden verbalized understanding, confirmed this was communicated to her by their team. Kaden stated they do not have any current lab orders for patient.     Discussed patient arrival at home and hh

## 2025-03-11 ENCOUNTER — TELEPHONE (OUTPATIENT)
Age: 70
End: 2025-03-11

## 2025-03-11 ENCOUNTER — TELEPHONE (OUTPATIENT)
Age: 70
End: 2025-03-11
Payer: MEDICARE

## 2025-03-11 DIAGNOSIS — Z79.01 CHRONIC ANTICOAGULATION: Primary | ICD-10-CM

## 2025-03-11 LAB — INR BLD: 2.16

## 2025-03-11 PROCEDURE — 93793 ANTICOAG MGMT PT WARFARIN: CPT

## 2025-03-11 NOTE — TELEPHONE ENCOUNTER
[facility]  [facility] in [place], [place]      INR result reviewed with DENILSON Cristina NP who made the following recommendations (VORB): no changes and recheck 1 weej. Patient notified and verbalized understanding. They had no further questions.     Patient verified he is taking 2.5 mg every Mon, Wed, Fri; 2 mg all other days per [facility] discharge instructions .        (See anticoag tracker)     Bety Aranda RN

## 2025-03-11 NOTE — TELEPHONE ENCOUNTER
Labwork from home health received and placed in  provider box for review. Noted HBG down about 1 point from last month.     1041: called and spoke with patient regarding any s/s of bleeding. Patient denied dark tarry stools, emesis, nose bleeds or other bleeding. Educated patient to call ASAP if he has any of these symptoms, he verbalized understanding.  FAUSTO Garcia NP notified.

## 2025-03-11 NOTE — TELEPHONE ENCOUNTER
Message received from [employer] that they are unable to accept patient's new insurance with [employer].       1204: left voicemail for patient notifying him that [employer] will reach out to him about final shipment of supplies, requested call back to discuss.     1448: Received return call from patient regarding dressing change supplies. Notified patient that due to insurance coverage he will need to be switched to wound care resources. Notified him that [employer] will reach out to him regarding final shipment, and that he should be alert for call from wound care resources regarding starting services.  He verbalized understanding.

## 2025-03-12 ENCOUNTER — HOSPITAL ENCOUNTER (OUTPATIENT)
Facility: HOSPITAL | Age: 70
Discharge: HOME | End: 2025-03-12
Attending: FAMILY MEDICINE
Payer: MEDICARE

## 2025-03-12 VITALS — TEMPERATURE: 97.7 F | RESPIRATION RATE: 18 BRPM

## 2025-03-12 DIAGNOSIS — S91.109A NON-HEALING OPEN WOUND OF TOE, INITIAL ENCOUNTER: Primary | ICD-10-CM

## 2025-03-12 DIAGNOSIS — I73.9 PAD (PERIPHERAL ARTERY DISEASE): ICD-10-CM

## 2025-03-12 DIAGNOSIS — L97.823 ULCER OF LEFT PRETIBIAL REGION WITH NECROSIS OF MUSCLE (HCC): ICD-10-CM

## 2025-03-12 DIAGNOSIS — L97.522 ULCER OF LEFT FOOT, WITH FAT LAYER EXPOSED (HCC): ICD-10-CM

## 2025-03-12 DIAGNOSIS — L97.523 ULCER OF LEFT FOOT, WITH NECROSIS OF MUSCLE (HCC): ICD-10-CM

## 2025-03-12 PROCEDURE — 11043 DBRDMT MUSC&/FSCA 1ST 20/<: CPT

## 2025-03-12 PROCEDURE — 11042 DBRDMT SUBQ TIS 1ST 20SQCM/<: CPT

## 2025-03-12 RX ORDER — NEOMYCIN/BACITRACIN/POLYMYXINB 3.5-400-5K
OINTMENT (GRAM) TOPICAL PRN
OUTPATIENT
Start: 2025-03-12

## 2025-03-12 RX ORDER — LIDOCAINE 50 MG/G
OINTMENT TOPICAL PRN
OUTPATIENT
Start: 2025-03-12

## 2025-03-12 RX ORDER — LIDOCAINE HYDROCHLORIDE 20 MG/ML
JELLY TOPICAL PRN
OUTPATIENT
Start: 2025-03-12

## 2025-03-12 RX ORDER — MUPIROCIN 20 MG/G
OINTMENT TOPICAL PRN
OUTPATIENT
Start: 2025-03-12

## 2025-03-12 RX ORDER — CLOBETASOL PROPIONATE 0.5 MG/G
OINTMENT TOPICAL PRN
OUTPATIENT
Start: 2025-03-12

## 2025-03-12 RX ORDER — SILVER SULFADIAZINE 10 MG/G
CREAM TOPICAL PRN
OUTPATIENT
Start: 2025-03-12

## 2025-03-12 RX ORDER — SODIUM CHLOR/HYPOCHLOROUS ACID 0.033 %
SOLUTION, IRRIGATION IRRIGATION PRN
OUTPATIENT
Start: 2025-03-12

## 2025-03-12 RX ORDER — GENTAMICIN SULFATE 1 MG/G
OINTMENT TOPICAL PRN
OUTPATIENT
Start: 2025-03-12

## 2025-03-12 RX ORDER — BACITRACIN ZINC AND POLYMYXIN B SULFATE 500; 1000 [USP'U]/G; [USP'U]/G
OINTMENT TOPICAL PRN
OUTPATIENT
Start: 2025-03-12

## 2025-03-12 RX ORDER — LIDOCAINE 40 MG/G
CREAM TOPICAL PRN
OUTPATIENT
Start: 2025-03-12

## 2025-03-12 RX ORDER — TRIAMCINOLONE ACETONIDE 1 MG/G
OINTMENT TOPICAL PRN
OUTPATIENT
Start: 2025-03-12

## 2025-03-12 RX ORDER — LIDOCAINE HYDROCHLORIDE 40 MG/ML
SOLUTION TOPICAL PRN
OUTPATIENT
Start: 2025-03-12

## 2025-03-12 RX ORDER — BETAMETHASONE DIPROPIONATE 0.5 MG/G
CREAM TOPICAL PRN
OUTPATIENT
Start: 2025-03-12

## 2025-03-12 RX ORDER — GINSENG 100 MG
CAPSULE ORAL PRN
OUTPATIENT
Start: 2025-03-12

## 2025-03-12 NOTE — PROGRESS NOTES
Mariposa-wound Assessment Hemosiderin staining (brown yellow);Dry/flaky 03/12/25 1308   Margins Undefined edges 03/12/25 1308   Wound Thickness Description not for Pressure Injury Partial thickness 03/12/25 1308   Number of days: 0       Wound 03/12/25 Foot Right;Lateral (Active)   Wound Image   03/12/25 1308   Wound Cleansed Cleansed with saline 03/12/25 1308   Wound Length (cm) 0.6 cm 03/12/25 1308   Wound Width (cm) 0.9 cm 03/12/25 1308   Wound Depth (cm) 0.1 cm 03/12/25 1308   Wound Surface Area (cm^2) 0.54 cm^2 03/12/25 1308   Wound Volume (cm^3) 0.054 cm^3 03/12/25 1308   Wound Assessment Pink/red;Hyper granulation tissue;Slough 03/12/25 1308   Drainage Amount Moderate (25-50%) 03/12/25 1308   Drainage Description Serosanguinous 03/12/25 1308   Odor None 03/12/25 1308   Mariposa-wound Assessment Hyperpigmented;Dry/flaky;Fragile 03/12/25 1308   Margins Defined edges 03/12/25 1308   Wound Thickness Description not for Pressure Injury Full thickness 03/12/25 1308   Number of days: 0       Wound 03/12/25 Heel Right (Active)   Wound Image   03/12/25 1308   Wound Cleansed Cleansed with saline 03/12/25 1308   Offloading for Diabetic Foot Ulcers Offloading not required 03/12/25 1308   Wound Length (cm) 2.4 cm 03/12/25 1308   Wound Width (cm) 0.2 cm 03/12/25 1308   Wound Depth (cm) 0.2 cm 03/12/25 1308   Wound Surface Area (cm^2) 0.48 cm^2 03/12/25 1308   Wound Volume (cm^3) 0.096 cm^3 03/12/25 1308   Post-Procedure Length (cm) 2.4 cm 03/12/25 1355   Post-Procedure Width (cm) 0.2 cm 03/12/25 1355   Post-Procedure Depth (cm) 0.3 cm 03/12/25 1355   Post-Procedure Surface Area (cm^2) 0.48 cm^2 03/12/25 1355   Post-Procedure Volume (cm^3) 0.144 cm^3 03/12/25 1355   Wound Assessment Pink/red;Bleeding 03/12/25 1308   Drainage Amount Small (< 25%) 03/12/25 1308   Drainage Description Sanguinous 03/12/25 1308   Odor None 03/12/25 1308   Mariposa-wound Assessment Dry/flaky;Fragile 03/12/25 1308   Margins Defined edges 03/12/25 1308

## 2025-03-12 NOTE — PATIENT INSTRUCTIONS
Discharge Instructions for  [facility]  [address] [address]  Phone: [phone] Fax: [phone]    NAME:  Bishop Love  YOB: 1955  MEDICAL RECORD NUMBER:  [id number]  DATE:  March 12, 2025    [facility]    WOUND CARE ORDERS:  All wounds-Cleanse with baby shampoo. Allow to lather. Rinse and pat dry.   Left pretibial wound-In clinic paint wound with betadine. Apply 4-5 layers of Xeroform. Cover with gauze. Secure with roll gauze and tape. Change Monday, Wednesday, and Friday.  Left and right, medial and lateral foot wounds; Right heel; left ankle wounds-In clinic paint with betadine. Apply 4-5 layers of Xeroform. Cover with gauze. Secure with roll gauze and tape. Change Monday, Wednesday, and Friday.   Left and right toes-Paint with betadine and leave open to air. Change Monday, Wednesday, and Friday.     Activity:  [] Elevate leg(s) above the level of the heart when sitting.  [] Avoid prolonged standing in one place.   [x] Do no get dressing/wrap wet.     [x] Float heels in bed.    Dietary:  [x] Diet as tolerated      [] Diabetic Diet            [] Increase Protein: examples (Meat, cheese, eggs, greek yogurt, fish, nuts)          [] Gregorio Therapeutic Nutrition Powder  [] Other:  [] Dial a Dietician : Call [employer] at [phone] enter code (249) when prompted. M-F 9am-5pm EST.      Return Appointment:  [x] Return Appointment: With Dr. Leonie Bailey in 2 Week(s)  [] Nurse Visit :   [] Ordered tests:        Wound Care Center Information: Should you experience any significant changes in your wound(s) or have questions about your wound care, please contact the [facility] MONDAY - THURSDAY 8:00AM - 4:00PM and FRIDAY 8:00AM - NOON at the number listed above. If you need help with your wound outside these hours and cannot wait until we are again available, contact your PCP or go to the hospital emergency room.

## 2025-03-12 NOTE — FLOWSHEET NOTE
03/12/25 1308   RLE Neurovascular Assessment   Capillary Refill Less than/Equal to 3 seconds   Color Yellow-Brown/Hemosiderin Staining   Temperature Cold   RLE Sensation  Decreased   R Post Tibial Pulse Doppler   LLE Neurovascular Assessment   Capillary Refill Less than/Equal to 3 seconds   Color Yellow-Brown/Hemosiderin Staining   Temperature Cold   LLE Sensation  Full sensation   L Pedal Pulse Doppler   Wound 01/29/25 Pretibial Medial;Left   Date First Assessed/Time First Assessed: 01/29/25 1337   Present on Original Admission: Yes  Wound Approximate Age at First Assessment (Weeks): 20 weeks  Primary Wound Type: Venous Ulcer  Location: Pretibial  Wound Location Orientation: Medial;Left   Wound Image    Wound Etiology Venous   Dressing Status Intact;New drainage noted;Old drainage noted   Wound Cleansed Cleansed with saline   Wound Length (cm) 5.2 cm   Wound Width (cm) 2.2 cm   Wound Depth (cm) 0.2 cm   Wound Surface Area (cm^2) 11.44 cm^2   Change in Wound Size % (l*w) 63.16   Wound Volume (cm^3) 2.288 cm^3   Wound Healing % 63   Wound Assessment Slough;Exposed structure tendon;Pink/red   Drainage Amount Moderate (25-50%)   Drainage Description Serosanguinous   Odor None   [place]-wound Assessment Hemosiderin staining (brown yellow);Fragile   Margins Defined edges   Wound Thickness Description not for Pressure Injury Full thickness   Wound 03/12/25 Ankle Left;Anterior   Date First Assessed/Time First Assessed: 03/12/25 1319   Present on Original Admission: No  Wound Approximate Age at First Assessment (Weeks): 1 weeks  Location: Ankle  Wound Location Orientation: Left;Anterior   Wound Image    Dressing Status Intact;New drainage noted;Old drainage noted   Wound Cleansed Cleansed with saline   Offloading for Diabetic Foot Ulcers Offloading not required   Wound Length (cm) 0.4 cm   Wound Width (cm) 0.4 cm   Wound Depth (cm) 0.2 cm   Wound Surface Area (cm^2) 0.16 cm^2   Wound Volume (cm^3) 0.032 cm^3   Wound 
Assessment (Weeks): 1 weeks  Location: Foot  Wound Location Orientation: Right;Lateral   Dressing Status New dressing applied   Dressing/Treatment Betadine swabs/povidone iodine   Offloading for Diabetic Foot Ulcers Offloading not required   Wound 03/12/25 Heel Right   Date First Assessed/Time First Assessed: 03/12/25 1320   Present on Original Admission: No  Wound Approximate Age at First Assessment (Weeks): 1 weeks  Location: Heel  Wound Location Orientation: Right   Dressing Status New dressing applied   Dressing/Treatment Betadine swabs/povidone iodine   Offloading for Diabetic Foot Ulcers Offloading not required

## 2025-03-13 ENCOUNTER — TELEPHONE (OUTPATIENT)
Age: 70
End: 2025-03-13

## 2025-03-13 NOTE — TELEPHONE ENCOUNTER
0849: per [facility] home health to collect weekly labwork on patient. Called and left voicemail for patient's home health nurse Kaden notifying her that provider would like labwork collected weekly.     0940: received return voicemail from Kaden confirming she received information

## 2025-03-14 ENCOUNTER — TELEPHONE (OUTPATIENT)
Age: 70
End: 2025-03-14

## 2025-03-14 NOTE — TELEPHONE ENCOUNTER
1213: call received from patient stating that [employer] will send him 15 final kits. Informed patient auth with wound care resources is still pending but someone will reach out to him when he's approved, he verbalized understanding

## 2025-03-17 ENCOUNTER — TELEPHONE (OUTPATIENT)
Age: 70
End: 2025-03-17

## 2025-03-19 ENCOUNTER — TELEPHONE (OUTPATIENT)
Age: 70
End: 2025-03-19
Payer: MEDICARE

## 2025-03-19 DIAGNOSIS — Z79.01 CHRONIC ANTICOAGULATION: Primary | ICD-10-CM

## 2025-03-19 LAB — INR BLD: 2.4

## 2025-03-19 PROCEDURE — 93793 ANTICOAG MGMT PT WARFARIN: CPT | Performed by: NURSE PRACTITIONER

## 2025-03-19 NOTE — TELEPHONE ENCOUNTER
AD[facility]ED [facility] FAILURE CENTER  [facility] in [place], [place]      INR result reviewed with CARMELINA Luevano NP who made the following recommendations (VORB): no changes and recheck 1 week. Patient notified and verbalized understanding. They had no further questions. (See anticoag tracker)     Inquired if patient had any s/s of fluid overload. Patient denied shortness of breath or abdominal bloating. Stating his legs are swollen \"a little bit.\" Confirmed he is only taking bumex PRN but has not taken any recently. Denied s/s of bleeding including dark or tarry stools. CARMELINA Luevano notified.     Reviewed recent labwork with CARMELINA Luevano who stated VORB patient should take PRN bumex once daily for three days.     1534: called and spoke with patient, notified him of provider instructions to take PRN bumex once daily for three days. He verbalized understanding.  Requested he call [facility] if he experiences LVAD alarms or dizziness, he verbalized understanding.     1539: voicemail left for patient's home health nurse Kaden. Notified her of provider instructions for patient to take bumex for three days.      Bety Aranda, RN

## 2025-03-21 ENCOUNTER — TELEPHONE (OUTPATIENT)
Age: 70
End: 2025-03-21

## 2025-03-21 NOTE — TELEPHONE ENCOUNTER
0905: Voicemail received from patient requesting call back      1054: Returned call to patient, left voicemail requesting call back to discuss needs with patient.     1109: Patient returned call, stated that he was unable to find a screwdriver to change to back up battery in his . Educated patient that he should never attempt to change the  battery himself, he verbalized understanding. Inquired if controller was having alarms and what prompted him to feel he needed to change battery, patient denied any controller alarms, stated he thought this was something he just needed to do routinely. Educated patient he does not need to change back up battery himself and should not attempt to do so, he verbalized understanding.

## 2025-03-25 NOTE — TELEPHONE ENCOUNTER
Telephone Call RE:  Appointment reminder     Outcome:     [] Patient confirmed appointment   [] Patient rescheduled appointment for    [] Unable to reach  [x] Left message              [] Other:       First attempt, Left VM. Pt has medicare that was verified, medicaid on file was rejected. Insurance needs to be updated on termed.

## 2025-03-26 NOTE — PROGRESS NOTES
WOUND CARE PROGRESS       Subjective:     Patient   Bishop Love is a 69 y.o.  male  with lower extremity wound of >few months duration.           Assessment/Plan      69 y.o. male with end-stage CHF, LVAD, PAD     -Left anterior chronic ulcer.  Full thickness  Smaller, more granular, small amount of necrotic tissue with necrotic tendon remaining; started now   necrotic tendon debrided        -feet/heel B/L  Several open wounds  All mostly covered with slough and/or necrotic tissue, all are full-thickness  And all required debridement  Conservatively debrided  see note below     -Toes crusted      Offloading and heel floating discussed with patient  Using pillows to offload heel, feet do slide off of them     -PAD      -Chart reviewed patient has poor prognosis overall.  Not a candidate for amputation due to CHF and LVAD.  His leg infection have also been described as incurable.  We will try our best to manage the wounds and keep them stable and prevent them from getting infected     Dressing:  See discharge instructions        -Nutrition optimization  Patient has about 3 ensures a day, along with his regular meals     -Good offloading  Discussed in detail     Patient/  understood and agrees with plan. Questions answered.            Follow up in 2 week.        Subjective:   Since last visit  No new issues     HPI:     Not much history from patient/difficult to understand.  Chart reviewed extensively.  Patient currently residing at [facility] in [place].     History/Chart/Medications reviewed     Wound caused by:  Arterial, infectious  Current wound care:See flowsheet  Offloading wound: Trying  Appetite: fair  Wound associated pain: See flowsheet  Diabetic: no  Smoker: no    03/12/25 1608    Right Leg Edema Point of Measurement   Compression Therapy Compression not ordered   Left Leg Edema Point of Measurement   Compression Therapy Compression not ordered   Wound 01/29/25 Pretibial

## 2025-03-26 NOTE — PATIENT INSTRUCTIONS
Discharge Instructions for  [facility]  [address] [address]  Phone: [phone] Fax: [phone]    NAME:  Bishop Love  YOB: 1955  MEDICAL RECORD NUMBER:  [id number]  DATE:  March 26, 2025    Home Health: [address] [place] [address]   WOUND CARE ORDERS:  Bilateral lower legs and feet-Cleanse with baby shampoo. Allow to lather. Rinse and pat dry.   Left pretibial wound-In clinic paint wound with betadine. Apply 4-5 layers of Xeroform. Cover with gauze. Secure with roll gauze and tape. Change Monday, Wednesday, and Friday.   Left and right, medial and lateral foot wounds; Right heel; left ankle wounds-In clinic paint with betadine. Apply 4-5 layers of Xeroform. Cover with gauze. Secure with roll gauze and tape. Change Monday, Wednesday, and Friday.   Left and right toes-Paint with betadine and leave open to air. Change Monday, Wednesday, and Friday.       Activity:  [] Elevate leg(s) above the level of the heart when sitting.  [] Avoid prolonged standing in one place.   [x] Do no get dressing/wrap wet.     [x] Float heels in bed.    Dietary:  [x] Diet as tolerated      [] Diabetic Diet            [] Increase Protein: examples (Meat, cheese, eggs, greek yogurt, fish, nuts)          [] Gregorio Therapeutic Nutrition Powder  [] Other:  [] Dial a Dietician : Call [employer] at [phone] enter code (249) when prompted. M-F 9am-5pm EST.      Return Appointment:  [x] Return Appointment: With Dr. Leonie Bailey in 2 Week(s)  [] Nurse Visit :   [] Ordered tests:      Wound Care Center Information: Should you experience any significant changes in your wound(s) or have questions about your wound care, please contact the [facility] MONDAY - THURSDAY 8:00AM - 4:00PM and FRIDAY 8:00AM - NOON at the number listed above. If you need help with your wound outside these hours and cannot wait until we are again available, contact your PCP or go to the

## 2025-03-26 NOTE — FLOWSHEET NOTE
03/26/25 1309   Right Leg Edema Point of Measurement   Compression Therapy Compression not ordered   Left Leg Edema Point of Measurement   Compression Therapy Compression not ordered   RLE Neurovascular Assessment   Capillary Refill Less than/Equal to 3 seconds   Color Yellow-Brown/Hemosiderin Staining   Temperature Cold   RLE Sensation  Decreased   R Post Tibial Pulse Doppler   LLE Neurovascular Assessment   Capillary Refill Less than/Equal to 3 seconds   Color Yellow-Brown/Hemosiderin Staining   Temperature Cold   LLE Sensation  Full sensation   L Pedal Pulse Doppler   Wound 03/12/25 Heel Right   Date First Assessed/Time First Assessed: 03/12/25 1320   Present on Original Admission: No  Wound Approximate Age at First Assessment (Weeks): 1 weeks  Location: Heel  Wound Location Orientation: Right   Wound Image    Wound Etiology Arterial   Dressing Status Dry   Wound Cleansed Cleansed with saline   Offloading for Diabetic Foot Ulcers Offloading not required   Wound Length (cm) 0.1 cm   Wound Width (cm) 0.1 cm   Wound Depth (cm) 0.1 cm   Wound Surface Area (cm^2) 0.01 cm^2   Change in Wound Size % (l*w) 97.92   Wound Volume (cm^3) 0.001 cm^3   Wound Healing % 99   Wound Assessment Epithelialization   Drainage Amount None (dry)   Odor None   Wound 03/12/25 Foot Right;Lateral   Date First Assessed/Time First Assessed: 03/12/25 1319   Present on Original Admission: No  Wound Approximate Age at First Assessment (Weeks): 1 weeks  Location: Foot  Wound Location Orientation: Right;Lateral   Wound Image    Wound Etiology Arterial   Wound Length (cm) 0.1 cm   Wound Width (cm) 0.1 cm   Wound Depth (cm) 1 cm   Wound Surface Area (cm^2) 0.01 cm^2   Change in Wound Size % (l*w) 98.15   Wound Volume (cm^3) 0.01 cm^3   Wound Healing % 81   Wound Assessment Epithelialization   Drainage Amount None (dry)   Odor None   Wound 03/12/25 Foot Right;Medial   Date First Assessed/Time First Assessed: 03/12/25 1319   Present on Original 
Wound Description (Comments...   Dressing Status New dressing applied   Dressing/Treatment   (betadine, xeroform, roll gauze)   Offloading for Diabetic Foot Ulcers Offloading not required

## 2025-03-27 NOTE — ED PROVIDER NOTES
guided vascular access performed by Sole Allen MD at [facility] CARDIAC CATH LAB    INVASIVE VASCULAR N/A 11/29/2023    Ultrasound guided vascular access performed by Jose Daniel Fields MD at [facility] CARDIAC CATH LAB    INVASIVE VASCULAR N/A 7/31/2024    Ultrasound guided vascular access performed by Jose Daniel Fields MD at [facility] CARDIAC CATH LAB    IR MECHANICAL ART THROMBECTOMY INTRACRANIAL  09/19/2023    IR MECHANICAL ART THROMBECTOMY INTRACRANIAL 9/19/2023 [facility] RAD ANGIO IR    LEFT VENTRICULAR ASSIST DEVICE      PACEMAKER      pacemaker/ICD    UPPER GASTROINTESTINAL ENDOSCOPY N/A 09/15/2023    EGD ESOPHAGOGASTRODUODENOSCOPY performed by Jania Christopher MD at [facility] ENDOSCOPY    UPPER GASTROINTESTINAL ENDOSCOPY N/A 1/15/2024    EGD ESOPHAGOGASTRODUODENOSCOPY, 3 clips placed, one clip fell off after being placed performed by Maryjo Simmons MD at [facility] ENDOSCOPY    VASCULAR SURGERY  2014    aortobifemoral bypass and bilateral femoral above knee popliteal bypass         CURRENT MEDICATIONS       Previous Medications    AMIODARONE (PACERONE) 100 MG TABLET    Take 1 tablet by mouth daily    AMLODIPINE (NORVASC) 2.5 MG TABLET    Take 5mg in the morning and take 2.5mg in the evening    AMMONIUM LACTATE (AMLACTIN) 12 % CREAM    Apply topically in the morning and at bedtime Apply topically as needed.Apply to bilateral feet twice daily for severe xerosis.    ASPIRIN 81 MG EC TABLET    Take 1 tablet by mouth daily    ATORVASTATIN (LIPITOR) 40 MG TABLET    Take 2 tablets by mouth daily    BUMETANIDE (BUMEX) 1 MG TABLET    Take 1 tablet by mouth daily as needed (weight gain greater than 3lbs overnight or 5lbs in a week; increased SOB)    CEPHALEXIN (KEFLEX) 500 MG CAPSULE    Take 1 capsule by mouth 2 times daily This is for INDEFINITE duration per infectious disease provider, chronic prophylaxis    HYDRALAZINE (APRESOLINE) 100 MG TABLET    Take 1 tablet by mouth every 8 hours    ISOSORBIDE DINITRATE (ISORDIL) 20 MG TABLET    Take 2  tablets by mouth every 8 (eight) hours    MAGNESIUM OXIDE (MAG-OX) 400 MG TABLET    Take 1 tablet by mouth daily    MELATONIN 3 MG TABS TABLET    Take 1 tablet by mouth nightly    METOPROLOL SUCCINATE (TOPROL XL) 25 MG EXTENDED RELEASE TABLET    Take 1 tablet by mouth in the morning and at bedtime    MULTIPLE VITAMINS-MINERALS (THERAPEUTIC MULTIVITAMIN-MINERALS) TABLET    Take 1 tablet by mouth daily    ONDANSETRON (ZOFRAN-ODT) 4 MG DISINTEGRATING TABLET    Take 1 tablet by mouth every 6 hours as needed for Nausea or Vomiting    PANTOPRAZOLE (PROTONIX) 40 MG TABLET    Take 1 tablet by mouth every morning (before breakfast)    POLYETHYLENE GLYCOL (GLYCOLAX) 17 GM/SCOOP POWDER    Take 17 g by mouth daily    SPIRONOLACTONE (ALDACTONE) 25 MG TABLET    Take 1 tablet by mouth daily    VITAMIN D3 (CHOLECALCIFEROL) 25 MCG (1000 UT) TABS TABLET    Take 1 tablet by mouth daily    WARFARIN (COUMADIN) 2 MG TABLET    Receiving 2 mg on Tuesday, Thursday,     WARFARIN (COUMADIN) 2.5 MG TABLET    Administer warfarin 2.5mg on Monday, Wednesday, Friday       ALLERGIES     Patient has no known allergies.    FAMILY HISTORY       Family History   Problem Relation Age of Onset    Hypertension Mother     Hypertension Father     Heart Attack Father     Hypertension Maternal Grandfather           SOCIAL HISTORY       Social History     Socioeconomic History    Marital status: Single   Tobacco Use    Smoking status: Former     Current packs/day: 0.00     Average packs/day: 0.3 packs/day for 40.0 years (10.0 ttl pk-yrs)     Types: Cigarettes     Start date: 1983     Quit date: 2023     Years since quittin.6    Smokeless tobacco: Never   Vaping Use    Vaping status: Never Used   Substance and Sexual Activity    Alcohol use: Not Currently    Drug use: Never     Social Drivers of Health     Food Insecurity: No Food Insecurity (2024)    Hunger Vital Sign     Worried About Running Out of Food in the Last Year:

## 2025-03-27 NOTE — TELEPHONE ENCOUNTER
Call placed to patient's home health company regarding labwork from this week. Spoke with clinical manager Antonio whos stated they have not received labwork this week from their lab, Antonio stated he will reach out to lab to see if there is an updated set of labwork from this week and send to [facility] if so.

## 2025-03-27 NOTE — ED TRIAGE NOTES
Pt arrives via ems for weakness,, sob, and not eating today. Pt was seen at [facility] this morning for LVAD follow up. Pt reports spitting up green mucus since returning home. Pt was at home for 30 mins after transport back from [facility] when ems was dispatched.

## 2025-03-27 NOTE — ED NOTES
Rosc, pulse gained. Upgrrding transport to flight at this time through transfer center.  MD at bedside intubating.

## 2025-03-27 NOTE — ED NOTES
Pulse Check- pulse present- CPR stopped- SR at 79 on monitor. Family talked with Supervisor- did not want to come back.

## 2025-03-27 NOTE — PROGRESS NOTES
ADVANCED HEART FAILURE CENTER  [facility] in [place], [place]  LVAD Coordinator Clinic Visit Note  Chief Complaint   Patient presents with    Congestive Heart Failure       BP (!) 86/0 (BP Site: Left Upper Arm, Patient Position: Sitting, BP Cuff Size: Small Adult)   Pulse 100   Temp 97.5 °F (36.4 °C) (Oral)   Resp 16   Ht 1.753 m (5' 9.02\")   SpO2 98%   BMI 16.25 kg/m²     LVAD  LVAD Type:: Left Ventricular Assist Device (LVAD)  Pump Speed (rpm): 5000  Pump Flow (lpm): 3.3  MAP (mmHg): 86  Pump Pulse Index (PI): 9.5  Pump Power (Villanueva): 3.5  Battery Life Checked: Yes  Backup Controller Present: Yes  Driveline Dressing: Clean, Dry and Intact  Outpatient: Yes  MAP in Therapeutic Range (Outpatient): Yes       Bishop Love is a 70 y.o. male with a history of ICM with Heartmate 3 implant date of 09/19/2023.     Alarm history reviewed. Please see VAD flow sheet for readings. Occasional PI events noted. 3 low flows noted in the last week with PI's 10-11, no other adverse alarms noted. Settings reviewed, but no changes made.    Patient denied s/s of driveline infection or driveline trauma (including  drops). Driveline inspected for integrity.  Above reported to provider.     POC inr collected and reviewed with CARMELINA Luevano. POC meter returned value of >8.0, per CARMELINA Luevano patient to hold coumadin tonight, RN to draw venous sample for confirmation. Reviewed instructions with patient who verbalized understanding.       All orders entered per VORB. All provider instructions placed in AVS and reviewed with patient. LVAD education provided on coumadin and fall prevention. Educated patient on expected follow up.  Time given to ask questions. Patient verbalized understanding and had no further questions.     Call placed to patient's home health nurse Kaden, notified of no med changes at appointment today, notified that patient was instructed to contact PCP if voice hoarseness does 
[facility]  [address], [address]  Phone: [phone]    On this date 3/27/25 , I have spent a total time of  50 minutes personally reviewing new vitals, test results, notes from recent visits, face to face encounter/physical exam of patient with counseling, writing orders, performing medical decision making, and documenting.

## 2025-03-27 NOTE — ED NOTES
Poor venous access- nurses attempting labs/IV- unsuccessful after multiple attempts- MD aware. Pt vomited- abdomen distended

## 2025-03-27 NOTE — TELEPHONE ENCOUNTER
1709: voicemail received from patient's home health nurse Kaden stating that someone from their team contacted patient regarding hh PT visit tomorrow and patent's family stated patient is in the ER in [place]. DENILSON Cristina ([facility] provider on call) notified.

## 2025-03-27 NOTE — ED NOTES
Report to Tiffanie ROBERT- On phone with Transfer Center trying to upgrade bed- Transfer Center states MD has to speak with Intensivist- currently on hold. Awaiting flight

## 2025-03-27 NOTE — ED NOTES
[employer] called at 1830- alert- oriented- communicating with CT tech- started vomiting- unresponsive-

## 2025-03-27 NOTE — PATIENT INSTRUCTIONS
Medication changes:    HOLD your coumadin tonight,- No other medication changes today       Testing Ordered:    Lab work has been drawn today. You will be contacted with any abnormal results requiring changes to your current plan of care.      Other Recommendations:     Effective October 2, 2024 the LVAD emergency pager ([phone]) will no longer be in service. Going forward, please contact the answering service at [phone] and request to speak with the Heart Failure provider on-call for all after hours needs or emergencies.     Please contact your PCP if you develop throat pain or your voice hoarseness does not improve in the next few days.     Continue to change drive line exit site dressing 3 times a week using sterile technique,     Ensure you are drinking an adequate amount of water with a goal of 6-8 eight ounce glasses (1.5-2 liters) of fluid daily. Your urine should be clear and light yellow straw colored.       Follow up in 2 months with [facility]      Monthly LVAD Education Tip:      Warfarin (Coumadin) Education    warfarin (oral)  Pronunciation:  WAR far in  Brand:  Coumadin, Jantoven  What is the most important information I should know about warfarin?  You should not take warfarin if you are prone to bleeding because of a medical condition, if you have an upcoming surgery, or if you need a spinal tap or epidural. Do not take warfarin if you cannot take it on time every day.  Warfarin increases your risk of severe or fatal bleeding, especially if you have certain medical conditions, if you are 65 or older, or if you have had a stroke, or bleeding in your stomach or intestines. Seek emergency help if you have any bleeding that will not stop.   Call your doctor at once if you have other signs of bleeding such as: swelling, pain, feeling very weak or dizzy, unusual bruising, bleeding gums, nosebleeds, heavy menstrual periods or abnormal vaginal bleeding, blood in your urine,

## 2025-03-28 NOTE — TELEPHONE ENCOUNTER
Paged at 7:33 by [facility].  Spoke with JAKOB Moreno and Dr. Fields.  Patient arrived with nausea and vomiting.  Per staff, patient vomited and aspirated in CT and he PEA arrested with bradycardia..  He subsequently PEA arrested again. During this, the LVAD alarmed one time the \"red heart\" alarm and self terminated.  The LVAD green light is on and functioning.  We discusses patient not having peripheral pulses.  We discussed giving the patient treatment based off of a doppler MAP and not a cuff pressure.  I gave instructions on how to properly get the MAP pressure.  They will base pressors and fluids off if this. Dr. Fields has already spoken to Dr. Yung at [facility].  I also reached out to Dr. Yung.  I asked [facility] staff to page me if there are any further LVAD alarms. So far only the one.  The did perform CPR on the patient. Discussed concern with Dr. Yung.

## 2025-03-28 NOTE — TELEPHONE ENCOUNTER
1002: patient's sister Norah left message wishing to notify LVAD coordinator of patient's passing last night.     1041: returned call to patient's sister, notified her that heart failure team and LVAD coordinator is aware of patient's passing. Expressed my condolences and encouraged Norah to contact team if she needs any assistance or has questions in the future.

## 2025-03-28 NOTE — CONSULTS
CRITICAL CARE ADMISSION NOTE    Name: Bishop Love   : 1955   MRN: [id number]   Date: 3/27/2025      PRINCIPAL ICU DIAGNOSIS     Small Bowel Obstruction  Septic Shock  Acute Hypoxic Respiratory Failure  Cardiac Arrest  LVAD    HPI     This is a 70 year old male with history of HFrEF (HM3), HTN, HLD, PVD, CAD and CVA who presented to the ED with complaint of N/V. The patient states symptoms have been present for 1 day. Constant since onset and progressively worsening. He describes 2-3 episodes PTA. There has been no abdominal pain. No fever. No CP or SOB. No history of similar symptoms.     Of note, patient follows with CHF for ICM with HM3. He was last seen in clinic earlier today. At that time, he was reportedly without complaints. His VAD was interrogated and revealed rare low flow events. He has had his VAD since 2018. Over the past year, the patient has experienced progressive decline - has repeatedly declined palliative care evaluation. He is not a transplant candidate per CHF notes.     In the ED the patient was afebrile but hypotensive. He was intubated for persistent emesis. He reportedly experienced loss of pulse and received chest compressions. CBC did not show significant abnormality. CMP with creatinine >3. CT chest obtained and revealed high grade SBO without definitive transition point. The patient was transferred to [facility] for additional management.     ROS negative except as otherwise documented.    A/P     NEUROLOGICAL:    History of CVA. Mentation baseline prior to intubation.   - continue propofol   - continue fentanyl  - daily SAT  - RAAS -1 to -2    PULMONOLOGY:   Intubated for airway protection. CXR with bilateral airspace disease - worse compared to baseline. Likely pulmonary edema. DDXPNA  - maintain SpO2>=92, pH>=7.30  - lung protective ventilation; wean vent as tolerated  - daily SAT/SBT  - follow ABG and CXR    CARDIOVASCULAR:   ICM with HM3. Reported arrest at OSH and received CPR.

## 2025-03-28 NOTE — PROCEDURES
PROCEDURE NOTE  Date: 3/28/2025   Name: Bishop Love  YOB: 1955    Insert Arterial Line    Date/Time: 3/28/2025 12:32 AM    Performed by: Freddy Yung MD  Authorized by: Freddy Yung MD  Consent: The procedure was performed in an emergent situation.  Required items: required blood products, implants, devices, and special equipment available  Patient identity confirmed: arm band and hospital-assigned identification number  Time out: Immediately prior to procedure a \"time out\" was called to verify the correct patient, procedure, equipment, support staff and site/side marked as required.  Preparation: Patient was prepped and draped in the usual sterile fashion.  Indications: multiple ABGs, respiratory failure and hemodynamic monitoring  Location: left radial  Anesthesia: see MAR for details  Seldinger technique: Seldinger technique used  Number of attempts: 1  Post-procedure: line sutured  Post-procedure CMS: unchanged  Patient tolerance: patient tolerated the procedure well with no immediate complications  Comments: Emergent a-line placed for hemodynamic monitoring in LVAD patient.

## 2025-03-28 NOTE — DISCHARGE SUMMARY
[facility]  DEATH SUMMARY    Name: Bishop Love   : 1955   MRN: [id number]   Date: 3/28/2025      DATE OF ADMISSION: 3/27  DATE OF DISCHARGE/DEATH: 3/28      ADMISSION DIAGNOSES:  Septic Shock  Small Bowel Obstruction  LVAD  Acute Hypoxic Respiratory Failure    HOSPITAL COURSE:  This is a 70 year old male with history of HFrEF (HM3), HTN, HLD, PVD, CAD and CVA who presented to the ED with complaint of N/V. The patient states symptoms have been present for 1 day. Constant since onset and progressively worsening. He describes 2-3 episodes PTA. There has been no abdominal pain. No fever. No CP or SOB. No history of similar symptoms.      Of note, patient follows with CHF for ICM with HM3. He was last seen in clinic earlier today. At that time, he was reportedly without complaints. His VAD was interrogated and revealed rare low flow events. He has had his VAD since 2018. Over the past year, the patient has experienced progressive decline - has repeatedly declined palliative care evaluation. He is not a transplant candidate per CHF notes.      In the ED the patient was afebrile but hypotensive. He was intubated for persistent emesis. He reportedly experienced loss of pulse and received chest compressions. CBC did not show significant abnormality. CMP with creatinine >3. CT chest obtained and revealed high grade SBO without definitive transition point. The patient was transferred to [facility] for additional management.     Following transfer to the ICU the patient demonstrated progressive increase in vasopressor requirement. Surgery was consulted and recommend nonoperative management. Extensive discussions with the family - patient transitioned to DNR/DNI and later CMO. Family at bedside at the time of death.     DISCHARGE DIAGNOSES:   Septic Shock  Small Bowel Obstruction    OTHER DIAGNOSES/CONDITIONS AT TIME OF DEATH:  Acute Hypoxic Respiratory Failure  Acute Renal Failure      Freddy Bains

## 2025-03-28 NOTE — PROGRESS NOTES
Paged to CVICU. Spoke to Melva RN. Patient condition worsening. On high dose pressors. Per discussion with Dr. Yung, patient was made DNR/DNI and made comfort care. Family opted to turn the LVAD off. Offered opportunity for questions from family. Verbally assisted Melva with decommission of LVAD.

## 2025-03-28 NOTE — PROGRESS NOTES
2213: Pt arrived to unit. Report received from Paola Yung MD at bedside   Low flow alarm sounding, MAP 60  Levo at 7 on lifeevac pump  Plan for a-line/central line    2215: LR bolus pressure bagged, Per SUE Yung MD    2220: 4 versed given per MD Dilshad    2222: 50 rocuronium given per MD Dilshad    2237: ABG: PH 7.186 / CO 71.3 / PO 83.7 / HCO 27  MD Dilshad updated    2240: A line placed by MD Dilshad    2251: General surgery consult called for small bowel obstruction     2330: L fem CVC placed by MD Yung MD updated on high lactic of 6.4    2341: Vaso started per MD Dilshad     2347: ABG: PH 7.285 / CO 56.9 / PO 67.4 / HCO 27   MD Dilshad updated    2352: General surgeon at bedside. OGT placed to continuous suction.     0016: MD Dilshad updated about high PT and INR    0017: Low flow alarms, MAPs 50s, increased ectopy on monitor/runs of V-tach. MD Dilshad updated    0030 MD Dilshad preformed bedside ultrasound    0039: MD Dilshad discussing goals of care w/ pt family-patient now DNR/DNI per MD     0040: warming blanket applied for low temp    0044: Epi gtt started per MD Dilshad    0050: Patient cousin at bedside    0055: ICD alarming, MAPs 50s, patient having runs V-tach. MD Dilshad called to bedside. Updated on pressor requirements. MD to call family.     0105: ABG: PH 7.329 / CO 49.2 / PO 51.2 / HCO 25.8  MD Dilshad updated     0108: Per MD Dilshad pt now comfort care. RN to start fent drip, turn LVAD/pressors off, and extubate.      0120:heart failure team contacted- LVAD turned off with Samantha Cristina- LVAD team on phone.     0123: Pressors off    0133: pt extubated by chaplain RALPH contacted     0142: Patient passed, MD Dilshad pronounced pt.     0202: [facility] [employer] called okay to release body.     0549: Patient transferred to [facility] by nursing supervisors.

## 2025-03-28 NOTE — PROGRESS NOTES
Brief [facility] Note     I spoke to the family at bedside. We reviewed his multiple medical problems and now multiorgan failure. The patient is profoundly deconditioned. Family request transition to DNR/DNI. We will continue with time-limited trial of medical management. Family in agreement with plan.     110 Update - patient with continued increase in vasopressor requirement. Bedside TTE shows relatively preserved RV function. I continue to suspect refractory distributive shock. I again discussed with family - we considered additional medical management. Both Catalina (STARR) and sister (Norah) request transition to CMO. All questions answered.     Freddy Yung M.D.  Pulmonary and Critical Care Medicine   03/28/25

## 2025-03-28 NOTE — ED NOTES
Transfer to [facility], via helicopter, with [facility]. Report given to [facility]. Paperwork and EMTALA sent via [facility].

## 2025-03-28 NOTE — PROGRESS NOTES
EGD ESOPHAGOGASTRODUODENOSCOPY performed by Jania Christopher MD at [facility] ENDOSCOPY    UPPER GASTROINTESTINAL ENDOSCOPY N/A 1/15/2024    EGD ESOPHAGOGASTRODUODENOSCOPY, 3 clips placed, one clip fell off after being placed performed by Maryjo Simmons MD at [facility] ENDOSCOPY    VASCULAR SURGERY  2014    aortobifemoral bypass and bilateral femoral above knee popliteal bypass       Family History   Problem Relation Age of Onset    Hypertension Mother     Hypertension Father     Heart Attack Father     Hypertension Maternal Grandfather        No current facility-administered medications on file prior to encounter.     Current Outpatient Medications on File Prior to Encounter   Medication Sig Dispense Refill    isosorbide dinitrate (ISORDIL) 20 MG tablet Take 2 tablets by mouth every 8 (eight) hours 270 tablet 0    aspirin 81 MG EC tablet Take 1 tablet by mouth daily 90 tablet 0    pantoprazole (PROTONIX) 40 MG tablet Take 1 tablet by mouth every morning (before breakfast) 90 tablet 0    atorvastatin (LIPITOR) 40 MG tablet Take 2 tablets by mouth daily 180 tablet 0    hydrALAZINE (APRESOLINE) 100 MG tablet Take 1 tablet by mouth every 8 hours 270 tablet 0    magnesium oxide (MAG-OX) 400 MG tablet Take 1 tablet by mouth daily 90 tablet 0    spironolactone (ALDACTONE) 25 MG tablet Take 1 tablet by mouth daily 90 tablet 0    vitamin D3 (CHOLECALCIFEROL) 25 MCG (1000 UT) TABS tablet Take 1 tablet by mouth daily 90 tablet 0    cephALEXin (KEFLEX) 500 MG capsule Take 1 capsule by mouth 2 times daily This is for INDEFINITE duration per infectious disease provider, chronic prophylaxis 180 capsule 0    ammonium lactate (AMLACTIN) 12 % cream Apply topically in the morning and at bedtime Apply topically as needed.Apply to bilateral feet twice daily for severe xerosis. 385 g 2    bumetanide (BUMEX) 1 MG tablet Take 1 tablet by mouth daily as needed (weight gain greater than 3lbs overnight or 5lbs in a week; increased SOB) 30 tablet 2     intravenous contrast material reduces the sensitivity for evaluation of the vasculature and solid organs. FINDINGS: LOWER THORAX: Airspace disease in the bilateral lung bases only partially visualized, however features mixed groundglass and consolidative opacification. Trace bilateral effusions. Left ventricular assist device. LIVER: No mass. BILIARY TREE: Gallbladder is within normal limits. CBD is not dilated. SPLEEN: within normal limits. PANCREAS: No focal abnormality. ADRENALS: Unremarkable. KIDNEYS/URETERS: No calculus or hydronephrosis. Rim calcification of the right renal lesion, compatible with a focus of cortical scarring or necrosis. 4.2 cm left renal cyst. STOMACH: The stomach is severely distended with fluid and ingested material SMALL BOWEL: The duodenum is severely distended. Mid to distal small bowel loops are also dilated and fluid-filled. Transition point is not definitively identified, however distal small bowel loops are completely decompressed. COLON: No dilatation or wall thickening. Mild diverticulosis without evidence of diverticulitis. APPENDIX: Not identified. PERITONEUM: No ascites or pneumoperitoneum. RETROPERITONEUM: No lymphadenopathy or aortic aneurysm. REPRODUCTIVE ORGANS: Prostatomegaly. URINARY BLADDER: No mass or calculus. BONES: No destructive bone lesion. ABDOMINAL WALL: No mass or hernia. ADDITIONAL COMMENTS: Status post right to left femorofemoral bypass graft. Unable to evaluate patency without contrast.     1. Small bowel obstruction. The transition point is not definitively identified, however there is severe distention of the proximal to mid small bowel, and the distal small bowel is completely decompressed, indicative of a high-grade obstruction. The stomach is severely distended. 2. Multilobar pneumonia. Electronically signed by Edson Bateman    XR CHEST PORTABLE  Result Date: 3/27/2025  EXAM: XR CHEST PORTABLE INDICATION: LVAD history congestive heart failure COMPARISON:

## 2025-03-28 NOTE — DEATH NOTES
Death Pronouncement Note  Patient's Name: Bishop Love   Patient's YOB: 1955  MRN Number: [id number]    Admitting Provider: Freddy Yung MD  Attending Provider: rFeddy Yung MD    Patient was examined and the following were absent: Pulses, Blood Pressure, and Respiratory effort    I declared the patient dead on 3/28/2025 at 1:42 AM    Preliminary Cause of Death: Septic shock (HCC)     Electronically signed by Freddy Yung MD on 3/28/25 at 1:47 AM EDT

## 2025-03-28 NOTE — ED NOTES
Spoke with NP on call for LVAD hotline; per NP ok to start Levophed at initial start dose of 5mcg/min to main doppler MAP of >65. Doppler MAP currently 55.

## 2025-03-28 NOTE — PROCEDURES
PROCEDURE NOTE  Date: 3/28/2025   Name: Bishop Love  YOB: 1955    CENTRAL LINE    Date/Time: 3/28/2025 12:30 AM    Performed by: Freddy Yung MD  Authorized by: Freddy Yung MD  Consent: The procedure was performed in an emergent situation.  Required items: required blood products, implants, devices, and special equipment available  Patient identity confirmed: arm band and hospital-assigned identification number  Time out: Immediately prior to procedure a \"time out\" was called to verify the correct patient, procedure, equipment, support staff and site/side marked as required.  Indications: vascular access  Anesthesia: see MAR for details  Preparation: skin prepped with ChloraPrep  Skin prep agent dried: skin prep agent completely dried prior to procedure  Sterile barriers: all five maximum sterile barriers used - cap, mask, sterile gown, sterile gloves, and large sterile sheet  Hand hygiene: hand hygiene performed prior to central venous catheter insertion  Location details: left femoral  Catheter size: 7 Fr  Pre-procedure: landmarks identified  Ultrasound guidance: yes  Sterile ultrasound techniques: sterile gel and sterile probe covers were used  Number of attempts: 2  Successful placement: yes  Post-procedure: line sutured and dressing applied  Patient tolerance: patient tolerated the procedure well with no immediate complications  Comments: Verified placement by transducing venous pressure (10). Emergent CVC for access. Patient tolerated well.                  no

## 2025-03-28 NOTE — PROGRESS NOTES
Spiritual Health History and Assessment/Progress Note  [facility]    Grief, Loss, and Adjustments,  , Death,      Name: Bishop Love MRN: [id number]    Age: 70 y.o.     Sex: male   Language: English   Taoism: Anabaptist   <principal problem not specified>     Date: 3/28/2025            Total Time Calculated: 39 min              Spiritual Assessment began in [facility] 4 CV INTNSV CARE        Referral/Consult From: Nurse   Encounter Overview/Reason: Grief, Loss, and Adjustments  Service Provided For: Family    Notified of death by RN with family present. Upon arrival, pt's cousin was preparing to leave and I walked with her and talked about her cousin.   She was spiritually coping, noting that Bishop () was a man of michael. She was sad but had signs of spiritual resilience.  She expressed gratitude for talking. No further needs identified.     Electronically signed by Giovani Esquivel, Chaplain Resident on 3/28/2025 at 2:08 AM

## 2025-03-30 LAB
BACTERIA SPEC CULT: ABNORMAL
SERVICE CMNT-IMP: ABNORMAL
SERVICE CMNT-IMP: ABNORMAL

## 2025-04-02 LAB
BACTERIA SPEC CULT: ABNORMAL
BACTERIA SPEC CULT: ABNORMAL
SERVICE CMNT-IMP: ABNORMAL

## (undated) DEVICE — STAPLER SKIN H3.9MM WIRE DIA0.58MM CRWN 6.9MM 35 STPL ROT

## (undated) DEVICE — SUTURE PERMA-HAND 0 L18IN NONABSORBABLE BLK CT-1 L36MM 1/2 C021D

## (undated) DEVICE — NC TREK CORONARY DILATATION CATHETER 2.5 MM X 15 MM / RAPID-EXCHANGE: Brand: NC TREK

## (undated) DEVICE — Device

## (undated) DEVICE — 450 ML BOTTLE OF 0.05% CHLORHEXIDINE GLUCONATE IN 99.95% STERILE WATER FOR IRRIGATION, USP AND APPLICATOR.: Brand: IRRISEPT ANTIMICROBIAL WOUND LAVAGE

## (undated) DEVICE — SUTURE PERMA-HAND SZ 2-0 L30IN NONABSORBABLE BLK L26MM SH K833H

## (undated) DEVICE — VASCULAR-MRMC: Brand: MEDLINE INDUSTRIES, INC.

## (undated) DEVICE — BANDAGE COBAN 6 IN WND 6INX5YD FOAM

## (undated) DEVICE — PROVE COVER: Brand: UNBRANDED

## (undated) DEVICE — GLIDESHEATH SLENDER ACCESS KIT: Brand: GLIDESHEATH SLENDER

## (undated) DEVICE — 3M™ IOBAN™ 2 ANTIMICROBIAL INCISE DRAPE 6648EZ: Brand: IOBAN™ 2

## (undated) DEVICE — SUTURE VCRL SZ 0 L18IN ABSRB VLT L40MM CT 1/2 CIR J752D

## (undated) DEVICE — RADIFOCUS OPTITORQUE ANGIOGRAPHIC CATHETER: Brand: OPTITORQUE

## (undated) DEVICE — GLOVE SURG SZ 8 L12IN FNGR THK94MIL STD WHT LTX FREE

## (undated) DEVICE — SHEET, T, LAPAROTOMY, STERILE: Brand: MEDLINE

## (undated) DEVICE — SUTURE VCRL SZ 3-0 L27IN ABSRB UD L26MM SH 1/2 CIR J416H

## (undated) DEVICE — DRAPE,REIN 53X77,STERILE: Brand: MEDLINE

## (undated) DEVICE — GOWN,SIRUS,NONRNF,SETINSLV,2XL,18/CS: Brand: MEDLINE

## (undated) DEVICE — LOOP,VESSEL,MAXI,BLUE,2/PK,STERILE: Brand: MEDLINE

## (undated) DEVICE — SYRINGE MED 3ML CLR PLAS STD N CTRL LUERLOCK TIP DISP

## (undated) DEVICE — TUBING IRRIG COMPATIBLE W ERBE MEDIVATOR PMP HYDR

## (undated) DEVICE — ANGIOGRAPHY KIT

## (undated) DEVICE — SOL INJ SOD CL 0.9% 500ML BG --

## (undated) DEVICE — CV INCISE SHEET: Brand: CONVERTORS

## (undated) DEVICE — CO-SET DELIVERY SYSTEM FOR 123 ROOM TEMPATURE INJECTATE: Brand: CO-SET+

## (undated) DEVICE — SUTURE PERMAHAND SZ 4-0 L18IN NONABSORBABLE BLK SILK BRAID A183H

## (undated) DEVICE — SYSTEM TISS GLUE 5ML CONTAIN SYR PLUNG STD SYR TIP 12MM 5PKS/EA

## (undated) DEVICE — BLADE CLIPPER GEN PURP NS

## (undated) DEVICE — KIT MED IMAG CNTRST AGNT W/ IOPAMIDOL REUSE

## (undated) DEVICE — PROBE VASC 8MHZ WTRPRF

## (undated) DEVICE — DRESSING SIL W4XL5IN ANTIBACT GELLING FBR CYTOFORM

## (undated) DEVICE — GLIDESHEATH SLENDER STAINLESS STEEL KIT: Brand: GLIDESHEATH SLENDER

## (undated) DEVICE — SYRINGE ANGIO 10 CC BRL STD PRNT POLYCARB LT BLU MEDALLION

## (undated) DEVICE — SUTURE VCRL SZ 2-0 L36IN ABSRB UD L36MM CT-1 1/2 CIR J945H

## (undated) DEVICE — 3M™ STERI-DRAPE™ ISOLATION BAG, 10 PER CARTON / 4 CARTONS PER CASE, 1003: Brand: 3M™ STERI-DRAPE™

## (undated) DEVICE — SNAP KOVER: Brand: UNBRANDED

## (undated) DEVICE — TUBING PRSS MON L6IN PVC M FEM CONN

## (undated) DEVICE — HI-TORQUE VERSACORE FLOPPY GUIDE WIRE SYSTEM 145 CM: Brand: HI-TORQUE VERSACORE

## (undated) DEVICE — CORONARY IMAGING CATHETER: Brand: OPTICROSS 6

## (undated) DEVICE — TR BAND RADIAL ARTERY COMPRESSION DEVICE: Brand: TR BAND

## (undated) DEVICE — AGENT HEMSTAT W4XL4IN OXIDIZED REGENERATED CELOS ABSRB SFT

## (undated) DEVICE — BLADE ASSEMB CLP HAIR FINE --

## (undated) DEVICE — SPONGE LAPAROTOMY W18XL18IN WHITE STRUNG RADIOPAQUE STERILE

## (undated) DEVICE — PACK PROCEDURE SURG HRT CATH

## (undated) DEVICE — WASTE KIT - ST MARY: Brand: MEDLINE INDUSTRIES, INC.

## (undated) DEVICE — SUTURE PROL SZ 6-0 L24IN NONABSORBABLE BLU L9.3MM BV-1 3/8 8805H

## (undated) DEVICE — TUBING, SUCTION, 1/4" X 10', STRAIGHT: Brand: MEDLINE

## (undated) DEVICE — 1LYRTR 16FR10ML100%SIL UMS SNP: Brand: MEDLINE INDUSTRIES, INC.

## (undated) DEVICE — KIT MFLD ISOLATN NACL CNTRST PRT TBNG SPIK W/ PRSS TRNSDUC

## (undated) DEVICE — CATHETER ANGIO AL2 AD 5 FRX100 CM 5 CM PERFORMA

## (undated) DEVICE — GOWN,SIRUS,NONRNF,SETINSLV,XL,20/CS: Brand: MEDLINE

## (undated) DEVICE — SPECIAL PROCEDURE DRAPE 32" X 34": Brand: SPECIAL PROCEDURE DRAPE

## (undated) DEVICE — RUNTHROUGH NS EXTRA FLOPPY PTCA GUIDEWIRE: Brand: RUNTHROUGH

## (undated) DEVICE — CATHETER DIAG 5FR L100CM LUMN ID0.047IN JL3.5 CRV 0 SIDE H

## (undated) DEVICE — SPONGE GZ W4XL4IN COT RADPQ HIGHLY ABSRB

## (undated) DEVICE — Device: Brand: ASAHI GLADIUS MONGO14

## (undated) DEVICE — GUIDEWIRE VASC L260CM 0.035IN J TIP L3MM PTFE FIX COR NAMIC

## (undated) DEVICE — HEART CATH-MRMC: Brand: MEDLINE INDUSTRIES, INC.

## (undated) DEVICE — SENSOR OXMTR AD PED DISP NSL ALAR SPO2 XHALE ASSURANCE

## (undated) DEVICE — CLIP LIG M BLU TI HRT SHP WIRE HORZ 600 PER BX

## (undated) DEVICE — SUTURE PROL SZ 5-0 L24IN NONABSORBABLE BLU RB-2 L13IN 1/2 8554H

## (undated) DEVICE — CATHETER GUID 6FR L100CM DIA0.071IN NYL SHFT EBU3.5

## (undated) DEVICE — CATHETER ART THERMODILUTION 6 FRX110 CM 4 LUMEN SWAN

## (undated) DEVICE — GLOVE SURG SZ 7 CRM LTX FREE POLYISOPRENE POLYMER BEAD ANTI

## (undated) DEVICE — DRESSING FOAM W8.7XL9.1IN SAFETAC LAYR SELF ADH MEPILEX

## (undated) DEVICE — BANDAGE,GAUZE,BULKEE II,4.5"X4.1YD,STRL: Brand: MEDLINE

## (undated) DEVICE — TOWEL,OR,DSP,ST,BLUE,STD,4/PK,20PK/CS: Brand: MEDLINE

## (undated) DEVICE — MEDI-TRACE CADENCE ADULT, DEFIBRILLATION ELECTRODE -RTS  (10 PR/PK) - PHYSIO-CONTROL: Brand: MEDI-TRACE CADENCE

## (undated) DEVICE — PRESSURE MONITORING SET: Brand: TRUWAVE

## (undated) DEVICE — Device: Brand: ASAHI CORSAIR PRO XS

## (undated) DEVICE — SUTURE PERMA-HAND SZ 2 L60IN NONABSORBABLE BLK SILK BRAID SA8H

## (undated) DEVICE — SOLUTION IV 500ML 0.9% SOD CHL PH 5 INJ USP VIAFLX PLAS

## (undated) DEVICE — SUT SLK 2-0SH 30IN BLK --

## (undated) DEVICE — BOWL UTIL GRAD 32OZ STRL --

## (undated) DEVICE — DEVICE ES L3M EDGE COAT HEX LOK BLDE ELECTRD HOLSTER FORC

## (undated) DEVICE — AIRLIFE™ ADULT OXYGEN MASK VINYL, UNDER THE CHIN STYLE, HIGH CONCENTRATION REBREATHER MASK (NO VALVES) WITH 7 FEET (2.1 M) CRUSH RESISTANT OXYGEN TUBING: Brand: AIRLIFE™ ADULT OXYGEN MASK VINYL, UNDER THE CHIN STYLE

## (undated) DEVICE — MAJOR LAPAROTOMY-MRMC: Brand: MEDLINE INDUSTRIES, INC.

## (undated) DEVICE — HI-TORQUE PILOT 200 GUIDE WIRE .014 STRAIGHT TIP 3.0 CM X 190 CM: Brand: HI-TORQUE PILOT

## (undated) DEVICE — PADPRO DEFIBRILLATION/PACING/CARDIOVERSION/MONITORING ELECTRODES, ADULT/CHILD GREATER THAN 10 KG RADIOTRANSPARENT ELECTRODE, PHYSIO-CONTROL QUIK-COMBO (M) 60" (152 CM): Brand: PADPRO

## (undated) DEVICE — FELT SURG W6XL6IN THK1.65MM PTFE FOR THE REP OF SEPT DEFCT

## (undated) DEVICE — CATH BLLN ANGIO 3.50X12MM NC EUPHORIA RX

## (undated) DEVICE — 1LYRTR 16FR10ML100%SILTMPS SNP: Brand: MEDLINE INDUSTRIES, INC.

## (undated) DEVICE — FOGARTY - HYDRAGRIP SURGICAL - CLAMP INSERTS: Brand: FOGARTY SOFTJAW

## (undated) DEVICE — SHEAR HARMONIC FOCUS OEM 9CM --

## (undated) DEVICE — KIT HND CTRL 3 W STPCOCK ROT END 54IN PREM HI PRSS TBNG AT

## (undated) DEVICE — GLUE TISS 10ML PLAS STD SPRD TIP SYR PLUNG PREFIL SKIN CLSR 5PK

## (undated) DEVICE — SYRINGE MED 30ML STD CLR PLAS LUERLOCK TIP N CTRL DISP

## (undated) DEVICE — SPONGE GZ W4XL4IN COT 12 PLY TYP VII WVN C FLD DSGN STERILE

## (undated) DEVICE — 3M™ MEDIPORE™ H SOFT CLOTH SURGICAL TAPE 2864, 4 INCH X 10 YARD (10CM X 9,14M), 12 ROLLS/CASE: Brand: 3M™ MEDIPORE™

## (undated) DEVICE — SHEARS ENDOSCP L9CM CRV HARM FOCS +

## (undated) DEVICE — SUTURE PROL SZ 5-0 L36IN NONABSORBABLE BLU L17MM RB-1 1/2 8556H

## (undated) DEVICE — SOLUTION IRRIG 1000ML 0.9% SOD CHL USP POUR PLAS BTL

## (undated) DEVICE — CANISTER, RIGID, 3000CC: Brand: MEDLINE INDUSTRIES, INC.

## (undated) DEVICE — CATHETER GUID 6FR 0.071IN COR AMPLATZ L 0.75 MID

## (undated) DEVICE — PINNACLE INTRODUCER SHEATH: Brand: PINNACLE

## (undated) DEVICE — CATHETER DIAG 5FR L100CM LUMN ID0.047IN JR4 CRV 0 SIDE H

## (undated) DEVICE — KIT,1200CC CANISTER,3/16"X6' TUBING: Brand: MEDLINE INDUSTRIES, INC.

## (undated) DEVICE — SUTURE PROL SZ 5-0 L30IN NONABSORBABLE BLU L13MM RB-2 1/2 8710H

## (undated) DEVICE — GLOVE SURG SZ 6 CRM LTX FREE POLYISOPRENE POLYMER BEAD ANTI

## (undated) DEVICE — 3M™ TEGADERM™ TRANSPARENT FILM DRESSING FRAME STYLE, 1626W, 4 IN X 4-3/4 IN (10 CM X 12 CM), 50/CT 4CT/CASE: Brand: 3M™ TEGADERM™

## (undated) DEVICE — SPLINT WR VELC FOAM NEUT POS DISP FOR RAD ART ACC SFT STRP

## (undated) DEVICE — SUTURE GOR TX SZ 4-0 L24IN NONABSORBABLE L13MM TTC-13 3/8 5K02B